# Patient Record
Sex: MALE | Race: BLACK OR AFRICAN AMERICAN | NOT HISPANIC OR LATINO | URBAN - METROPOLITAN AREA
[De-identification: names, ages, dates, MRNs, and addresses within clinical notes are randomized per-mention and may not be internally consistent; named-entity substitution may affect disease eponyms.]

---

## 2018-06-11 ENCOUNTER — INPATIENT (INPATIENT)
Facility: HOSPITAL | Age: 83
LOS: 8 days | Discharge: HOME | End: 2018-06-20
Attending: INTERNAL MEDICINE | Admitting: INTERNAL MEDICINE
Payer: MEDICARE

## 2018-06-11 VITALS
SYSTOLIC BLOOD PRESSURE: 112 MMHG | RESPIRATION RATE: 20 BRPM | HEIGHT: 71 IN | OXYGEN SATURATION: 99 % | DIASTOLIC BLOOD PRESSURE: 62 MMHG | TEMPERATURE: 101 F | WEIGHT: 115.96 LBS | HEART RATE: 116 BPM

## 2018-06-11 DIAGNOSIS — N17.9 ACUTE KIDNEY FAILURE, UNSPECIFIED: ICD-10-CM

## 2018-06-11 DIAGNOSIS — R74.8 ABNORMAL LEVELS OF OTHER SERUM ENZYMES: ICD-10-CM

## 2018-06-11 DIAGNOSIS — N39.0 URINARY TRACT INFECTION, SITE NOT SPECIFIED: ICD-10-CM

## 2018-06-11 DIAGNOSIS — I10 ESSENTIAL (PRIMARY) HYPERTENSION: ICD-10-CM

## 2018-06-11 DIAGNOSIS — E86.0 DEHYDRATION: ICD-10-CM

## 2018-06-11 LAB
ALBUMIN SERPL ELPH-MCNC: 2.5 G/DL — LOW (ref 3.3–5)
ALP SERPL-CCNC: 81 U/L — SIGNIFICANT CHANGE UP (ref 40–120)
ALT FLD-CCNC: 33 U/L — SIGNIFICANT CHANGE UP (ref 12–78)
ANION GAP SERPL CALC-SCNC: 8 MMOL/L — SIGNIFICANT CHANGE UP (ref 5–17)
APPEARANCE UR: ABNORMAL
APTT BLD: 36.9 SEC — SIGNIFICANT CHANGE UP (ref 27.5–37.4)
AST SERPL-CCNC: 82 U/L — HIGH (ref 15–37)
BACTERIA # UR AUTO: ABNORMAL
BASOPHILS # BLD AUTO: 0.1 K/UL — SIGNIFICANT CHANGE UP (ref 0–0.2)
BASOPHILS NFR BLD AUTO: 1 % — SIGNIFICANT CHANGE UP (ref 0–2)
BILIRUB SERPL-MCNC: 0.6 MG/DL — SIGNIFICANT CHANGE UP (ref 0.2–1.2)
BILIRUB UR-MCNC: NEGATIVE — SIGNIFICANT CHANGE UP
BUN SERPL-MCNC: 43 MG/DL — HIGH (ref 7–23)
CALCIUM SERPL-MCNC: 9.4 MG/DL — SIGNIFICANT CHANGE UP (ref 8.5–10.1)
CHLORIDE SERPL-SCNC: 101 MMOL/L — SIGNIFICANT CHANGE UP (ref 96–108)
CK MB BLD-MCNC: 0.9 % — SIGNIFICANT CHANGE UP (ref 0–3.5)
CK MB CFR SERPL CALC: 1.9 NG/ML — SIGNIFICANT CHANGE UP (ref 0.5–3.6)
CK SERPL-CCNC: 212 U/L — SIGNIFICANT CHANGE UP (ref 26–308)
CO2 SERPL-SCNC: 29 MMOL/L — SIGNIFICANT CHANGE UP (ref 22–31)
COLOR SPEC: YELLOW — SIGNIFICANT CHANGE UP
CREAT SERPL-MCNC: 2.42 MG/DL — HIGH (ref 0.5–1.3)
DIFF PNL FLD: ABNORMAL
EOSINOPHIL # BLD AUTO: 0 K/UL — SIGNIFICANT CHANGE UP (ref 0–0.5)
EOSINOPHIL NFR BLD AUTO: 0 % — SIGNIFICANT CHANGE UP (ref 0–6)
GLUCOSE SERPL-MCNC: 199 MG/DL — HIGH (ref 70–99)
GLUCOSE UR QL: NEGATIVE MG/DL — SIGNIFICANT CHANGE UP
HCT VFR BLD CALC: 28.1 % — LOW (ref 39–50)
HGB BLD-MCNC: 9.1 G/DL — LOW (ref 13–17)
INR BLD: 1.6 RATIO — HIGH (ref 0.88–1.16)
KETONES UR-MCNC: NEGATIVE — SIGNIFICANT CHANGE UP
LEUKOCYTE ESTERASE UR-ACNC: ABNORMAL
LYMPHOCYTES # BLD AUTO: 0.68 K/UL — LOW (ref 1–3.3)
LYMPHOCYTES # BLD AUTO: 7 % — LOW (ref 13–44)
MCHC RBC-ENTMCNC: 29.4 PG — SIGNIFICANT CHANGE UP (ref 27–34)
MCHC RBC-ENTMCNC: 32.4 GM/DL — SIGNIFICANT CHANGE UP (ref 32–36)
MCV RBC AUTO: 90.9 FL — SIGNIFICANT CHANGE UP (ref 80–100)
MONOCYTES # BLD AUTO: 1.36 K/UL — HIGH (ref 0–0.9)
MONOCYTES NFR BLD AUTO: 14 % — SIGNIFICANT CHANGE UP (ref 2–14)
NEUTROPHILS # BLD AUTO: 7.57 K/UL — HIGH (ref 1.8–7.4)
NEUTROPHILS NFR BLD AUTO: 77 % — SIGNIFICANT CHANGE UP (ref 43–77)
NEUTS BAND # BLD: 1 % — SIGNIFICANT CHANGE UP (ref 0–8)
NITRITE UR-MCNC: NEGATIVE — SIGNIFICANT CHANGE UP
NRBC # BLD: 0 /100 — SIGNIFICANT CHANGE UP (ref 0–0)
NRBC # BLD: SIGNIFICANT CHANGE UP /100 WBCS (ref 0–0)
PH UR: 7 — SIGNIFICANT CHANGE UP (ref 5–8)
PLAT MORPH BLD: NORMAL — SIGNIFICANT CHANGE UP
PLATELET # BLD AUTO: 226 K/UL — SIGNIFICANT CHANGE UP (ref 150–400)
POTASSIUM SERPL-MCNC: 3.9 MMOL/L — SIGNIFICANT CHANGE UP (ref 3.5–5.3)
POTASSIUM SERPL-SCNC: 3.9 MMOL/L — SIGNIFICANT CHANGE UP (ref 3.5–5.3)
PROT SERPL-MCNC: 8.2 GM/DL — SIGNIFICANT CHANGE UP (ref 6–8.3)
PROT UR-MCNC: 100 MG/DL
PROTHROM AB SERPL-ACNC: 17.6 SEC — HIGH (ref 9.8–12.7)
RBC # BLD: 3.09 M/UL — LOW (ref 4.2–5.8)
RBC # FLD: 14.4 % — SIGNIFICANT CHANGE UP (ref 10.3–14.5)
RBC BLD AUTO: SIGNIFICANT CHANGE UP
RBC CASTS # UR COMP ASSIST: ABNORMAL /HPF (ref 0–4)
SODIUM SERPL-SCNC: 138 MMOL/L — SIGNIFICANT CHANGE UP (ref 135–145)
SP GR SPEC: 1.01 — SIGNIFICANT CHANGE UP (ref 1.01–1.02)
TROPONIN I SERPL-MCNC: 0.08 NG/ML — HIGH (ref 0.01–0.04)
UROBILINOGEN FLD QL: NEGATIVE MG/DL — SIGNIFICANT CHANGE UP
WBC # BLD: 9.71 K/UL — SIGNIFICANT CHANGE UP (ref 3.8–10.5)
WBC # FLD AUTO: 9.71 K/UL — SIGNIFICANT CHANGE UP (ref 3.8–10.5)
WBC UR QL: ABNORMAL

## 2018-06-11 PROCEDURE — 74176 CT ABD & PELVIS W/O CONTRAST: CPT | Mod: 26

## 2018-06-11 PROCEDURE — 99285 EMERGENCY DEPT VISIT HI MDM: CPT

## 2018-06-11 RX ORDER — SENNA PLUS 8.6 MG/1
2 TABLET ORAL AT BEDTIME
Qty: 0 | Refills: 0 | Status: DISCONTINUED | OUTPATIENT
Start: 2018-06-11 | End: 2018-06-20

## 2018-06-11 RX ORDER — ACETAMINOPHEN 500 MG
650 TABLET ORAL EVERY 6 HOURS
Qty: 0 | Refills: 0 | Status: DISCONTINUED | OUTPATIENT
Start: 2018-06-11 | End: 2018-06-20

## 2018-06-11 RX ORDER — FINASTERIDE 5 MG/1
5 TABLET, FILM COATED ORAL DAILY
Qty: 0 | Refills: 0 | Status: DISCONTINUED | OUTPATIENT
Start: 2018-06-11 | End: 2018-06-20

## 2018-06-11 RX ORDER — ENOXAPARIN SODIUM 100 MG/ML
30 INJECTION SUBCUTANEOUS DAILY
Qty: 0 | Refills: 0 | Status: DISCONTINUED | OUTPATIENT
Start: 2018-06-11 | End: 2018-06-20

## 2018-06-11 RX ORDER — PIPERACILLIN AND TAZOBACTAM 4; .5 G/20ML; G/20ML
3.38 INJECTION, POWDER, LYOPHILIZED, FOR SOLUTION INTRAVENOUS EVERY 12 HOURS
Qty: 0 | Refills: 0 | Status: DISCONTINUED | OUTPATIENT
Start: 2018-06-11 | End: 2018-06-20

## 2018-06-11 RX ORDER — PIPERACILLIN AND TAZOBACTAM 4; .5 G/20ML; G/20ML
3.38 INJECTION, POWDER, LYOPHILIZED, FOR SOLUTION INTRAVENOUS EVERY 12 HOURS
Qty: 0 | Refills: 0 | Status: DISCONTINUED | OUTPATIENT
Start: 2018-06-11 | End: 2018-06-11

## 2018-06-11 RX ORDER — SODIUM CHLORIDE 9 MG/ML
1000 INJECTION INTRAMUSCULAR; INTRAVENOUS; SUBCUTANEOUS ONCE
Qty: 0 | Refills: 0 | Status: COMPLETED | OUTPATIENT
Start: 2018-06-11 | End: 2018-06-11

## 2018-06-11 RX ORDER — TAMSULOSIN HYDROCHLORIDE 0.4 MG/1
0.4 CAPSULE ORAL AT BEDTIME
Qty: 0 | Refills: 0 | Status: DISCONTINUED | OUTPATIENT
Start: 2018-06-11 | End: 2018-06-20

## 2018-06-11 RX ORDER — MAGNESIUM HYDROXIDE 400 MG/1
30 TABLET, CHEWABLE ORAL DAILY
Qty: 0 | Refills: 0 | Status: DISCONTINUED | OUTPATIENT
Start: 2018-06-11 | End: 2018-06-20

## 2018-06-11 RX ORDER — ASPIRIN/CALCIUM CARB/MAGNESIUM 324 MG
325 TABLET ORAL ONCE
Qty: 0 | Refills: 0 | Status: COMPLETED | OUTPATIENT
Start: 2018-06-11 | End: 2018-06-11

## 2018-06-11 RX ORDER — LABETALOL HCL 100 MG
100 TABLET ORAL
Qty: 0 | Refills: 0 | Status: DISCONTINUED | OUTPATIENT
Start: 2018-06-11 | End: 2018-06-20

## 2018-06-11 RX ORDER — PIPERACILLIN AND TAZOBACTAM 4; .5 G/20ML; G/20ML
3.38 INJECTION, POWDER, LYOPHILIZED, FOR SOLUTION INTRAVENOUS ONCE
Qty: 0 | Refills: 0 | Status: COMPLETED | OUTPATIENT
Start: 2018-06-11 | End: 2018-06-11

## 2018-06-11 RX ORDER — SODIUM CHLORIDE 9 MG/ML
1000 INJECTION INTRAMUSCULAR; INTRAVENOUS; SUBCUTANEOUS
Qty: 0 | Refills: 0 | Status: DISCONTINUED | OUTPATIENT
Start: 2018-06-11 | End: 2018-06-17

## 2018-06-11 RX ORDER — ACETAMINOPHEN 500 MG
975 TABLET ORAL ONCE
Qty: 0 | Refills: 0 | Status: COMPLETED | OUTPATIENT
Start: 2018-06-11 | End: 2018-06-11

## 2018-06-11 RX ORDER — AMLODIPINE BESYLATE 2.5 MG/1
5 TABLET ORAL DAILY
Qty: 0 | Refills: 0 | Status: DISCONTINUED | OUTPATIENT
Start: 2018-06-11 | End: 2018-06-20

## 2018-06-11 RX ADMIN — SODIUM CHLORIDE 1000 MILLILITER(S): 9 INJECTION INTRAMUSCULAR; INTRAVENOUS; SUBCUTANEOUS at 20:51

## 2018-06-11 RX ADMIN — PIPERACILLIN AND TAZOBACTAM 200 GRAM(S): 4; .5 INJECTION, POWDER, LYOPHILIZED, FOR SOLUTION INTRAVENOUS at 22:42

## 2018-06-11 RX ADMIN — Medication 325 MILLIGRAM(S): at 22:41

## 2018-06-11 RX ADMIN — Medication 975 MILLIGRAM(S): at 20:51

## 2018-06-11 NOTE — ED ADULT NURSE NOTE - PMH
Benign Prostatic Hyperplasia    BPH (benign prostatic hypertrophy)    Dehydration    HTN (Hypertension)    Hypertension    Renal insufficiency    Spinal stenosis of lumbar region

## 2018-06-11 NOTE — H&P ADULT - NSHPLABSRESULTS_GEN_ALL_CORE
9.1                  138  | 29   | 43           9.71  >-----------< 226     ------------------------< 199                   28.1                 3.9  | 101  | 2.42                                         Ca 9.4   Mg x     Ph x      EKG sinus tach  CARDIAC MARKERS ( 11 Jun 2018 20:46 )  .077 ng/mL / x     / 212 U/L / x     / 1.9 ng/mL  < from: CT Renal Stone Hunt (06.11.18 @ 21:42) >      EXAM:  CT RENAL STONE HUNT                            PROCEDURE DATE:  06/11/2018          INTERPRETATION:  CLINICAL INFORMATION: UTI. Rule out obstruction.    COMPARISON: CT abdomen/pelvis of 12/15/2014.    PROCEDURE:   CT of the Abdomen and Pelvis was performed without intravenous contrast.   Intravenous contrast: None.  Oral contrast: None.  Sagittal and coronal reformats were performed.    FINDINGS:    Examination is limited secondary to streak artifact, predominantly from   scanning technique with arms at the patient's side. Additional evaluation   of the solid abdominal organs is limited without the administration of   intravenous contrast material.    LOWER CHEST: Within normal limits.    LIVER: Within normal limits.  BILE DUCTS: Normal caliber.  GALLBLADDER: Within normal limits.  SPLEEN: Within normal limits.  PANCREAS: Within normal limits.  ADRENALS: Within normal limits.  KIDNEYS/URETERS: 2.8 cm right upper pole renal cyst. There may be mild   bilateral collecting system fullness without shorty hydronephrosis..   Minimal symmetric perinephric stranding.    BLADDER: Fluid distended demonstrating wall thickening and mild   perivesicular inflammatory change.  REPRODUCTIVE ORGANS: The prostate is enlarged. Bilateral hydroceles.    BOWEL: No bowel obstruction. Rectum is distended with stool measuring up   to 8.2 cm in the AP dimension. No definite colonic bowel wall thickening.   Colonic diverticulosis without discrete evidence of acute diverticulitis.   Appendix normal.  PERITONEUM: No ascites.  VESSELS:  Atherosclerosis of the abdominal aorta and its branch vessels.  RETROPERITONEUM: No lymphadenopathy.    ABDOMINAL WALL: Within normal limits.  BONES: Extensive multilevel degenerative changes of the spine with bulky   anterior osteophytes involving the lumbar spine.    IMPRESSION:     Limited examination.    Urinary bladder distention with wall thickening and mild perivesicular   inflammatory change, concerning for cystitis. Possible mild bilateral   collecting system fullness without shorty hydronephrosis to suggest   obstruction.    Large amount of stool within the rectum.    Bilateral hydroceles.    < end of copied text >

## 2018-06-11 NOTE — ED ADULT TRIAGE NOTE - CHIEF COMPLAINT QUOTE
BIBA, as per family, patient has been treated for UTI, abx unknown, has not been eating x1 week. Patient speaking in triage

## 2018-06-11 NOTE — H&P ADULT - ASSESSMENT
uti, dehydration, ROGER, CKD. HTn.Spinal stenosis uti, dehydration, ROGER, CKD stage 3 . HTn. Spinal stenosis

## 2018-06-11 NOTE — ED PROVIDER NOTE - OBJECTIVE STATEMENT
Pertinent PMH/PSH/FHx/SHx and Review of Systems contained within:    89yo M w no reported PMH presents to ED for eval of decr appetite, weakness & fever.  Daughter states pt started w sx about 1wk ago, PMD had home visit, dx pt w UTI, pt started on unknown abx 2d ago.  Pt has been taking abx but sx not improving.  Pt denies CP, SOB, vomiting, diarrhea, abd pain.     +fever, No photophobia/eye pain/changes in vision, No ear pain/sore throat/dysphagia, No chest pain/palpitations, no SOB/cough/wheeze/stridor, No abdominal pain, no rash

## 2018-06-11 NOTE — H&P ADULT - PSH
Eye Problem(s):negative  ENT Problem(s):negative  Cardiovascular problem(s):negative  Respiratory problem(s):negative  Gastro-intestinal problem(s):abdominal pain started 1 mo ago. Patient reports takes sy and is better. Patient bloating and gas per patient.   Genito-urinary problem(s):negative  Musculoskeletal problem(s):back pain  Integumentary problem(s):Patient reports skin is dry and itching   Neurological problem(s):negative  Psychiatric problem(s):negative  Endocrine problem(s):diabetes  Hematologic and/or Lymphatic problem(s):night sweats daily and night occasional, Patient also has Hot flashes.       Distress Thermometer Rating (0-10):0    Main Cause of Distress: worry, pain, sexual, skin dry/itchy, tingling in feet     Evaluation: Distress is mild (<4) and/or stable/going down     Intervention:No interventions needed at this time     Referral or Contacts:Oncology Provider          No significant past surgical history

## 2018-06-11 NOTE — H&P ADULT - FAMILY HISTORY
Father  Still living? Unknown  No significant family history, Age at diagnosis: Age Unknown     Mother  Still living? No  No significant family history, Age at diagnosis: Age Unknown

## 2018-06-11 NOTE — H&P ADULT - NSHPPHYSICALEXAM_GEN_ALL_CORE
General: A/ox 3, No acute Distress. appears weak  skin : Normal  Head. Sky. No lacerations  Neck: Supple, NO JVD  Cardiac: S1 S2, No M/R/G  Pulmonary: CTAP, Breathing unlabored, No Rhonchi/Rales/Wheezing  Abdomen: Soft, Non -tender, +BS x 4 quads  Extremities: No Rashes, No edema  Neuro: A/o x 3, No focal deficits. Normal Motor and sensory exam  Vascular: Normal distal pulses.  . bilaterql hydrocoels  Extremities: No edema  Decubiti ; None General: A/ox 3, No acute Distress. appears weak  skin : Normal  Head. Sky. No lacerations  Neck: Supple, NO JVD  Cardiac: S1 S2, No M/R/G  Pulmonary: CTAP, Breathing unlabored, No Rhonchi/Rales/Wheezing  Abdomen: Soft, Non -tender, +BS x 4 quads  Extremities: No Rashes, No edema  Neuro: A/o x 3, No focal deficits. Normal Motor and sensory exam  Vascular: Normal distal pulses.  . bilateral hydrocoels  Extremities: No edema  Decubiti ; None

## 2018-06-11 NOTE — H&P ADULT - PMH
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

## 2018-06-11 NOTE — H&P ADULT - NSHPREVIEWOFSYSTEMS_GEN_ALL_CORE
REVIEW OF SYSTEMS:    CONSTITUTIONAL: generalized weakness for 1 week with anorexia.  EYES/ENT: No visual changes;  No vertigo or throat pain   NECK: No pain or stiffness  RESPIRATORY: No cough, wheezing, hemoptysis; No shortness of breath  CARDIOVASCULAR: No chest pain or palpitations [ ] CHF [ ] MI [ ] CABG [ ]  GASTROINTESTINAL: No abdominal or epigastric pain. No nausea, vomiting, or hematemesis; No diarrhea or constipation. No melena or hematochezia. [ ]abdominal surgery [ ] prostrate problems   GENITOURINARY: No dysuria, frequency or hematuria   NEUROLOGICAL: No numbness or weakness. No hx of seizures  SKIN: No itching, burning, rashes, or lesions   All other review of systems is negative unless indicated above.

## 2018-06-11 NOTE — ED PROVIDER NOTE - PHYSICAL EXAMINATION
Gen: Alert, pleasant M in NAD  Head: NC, AT, EOMI, normal lids/conjunctiva  ENT: normal hearing, patent oropharynx, dry MM  Neck: supple, no tenderness/meningismus/JVD, Trachea midline, FROM  Pulm: Bilateral clear BS, normal resp effort, no wheeze/stridor/retractions  CV: RRR, no M/R/G, +dist pulses  Abd: soft, +mild suprapubic TTP, ND, +BS, no guarding/rebound tenderness  Mskel: no edema/erythema/cyanosis  Skin: no rash  Neuro: no sensory/motor deficits

## 2018-06-11 NOTE — H&P ADULT - HISTORY OF PRESENT ILLNESS
89yo M w no reported PMH presents to ED for eval of decr appetite, weakness & fever.  Daughter states pt started w sx about 1wk ago, PMD had home visit, dx pt w UTI, pt started on unknown abx 2d ago.  Pt has been taking abx but sx not improving.  Pt denies CP, SOB, vomiting, diarrhea, abd pain.     	+fever, No photophobia/eye pain/changes in vision, No ear pain/sore throat/dysphagia, No chest pain/palpitations, no SOB/cough/wheeze/stridor, No abdominal pain, no rash    ALLERGIES AND HOME MEDICATIONS:   Allergies:        Allergies:  	No Known Allergies:   	No Known Allergies:

## 2018-06-12 LAB
ANION GAP SERPL CALC-SCNC: 8 MMOL/L — SIGNIFICANT CHANGE UP (ref 5–17)
BLD GP AB SCN SERPL QL: SIGNIFICANT CHANGE UP
BUN SERPL-MCNC: 41 MG/DL — HIGH (ref 7–23)
CALCIUM SERPL-MCNC: 8.9 MG/DL — SIGNIFICANT CHANGE UP (ref 8.5–10.1)
CEA SERPL-MCNC: 4.3 NG/ML — HIGH (ref 0–3.8)
CHLORIDE SERPL-SCNC: 106 MMOL/L — SIGNIFICANT CHANGE UP (ref 96–108)
CO2 SERPL-SCNC: 29 MMOL/L — SIGNIFICANT CHANGE UP (ref 22–31)
CREAT SERPL-MCNC: 2.09 MG/DL — HIGH (ref 0.5–1.3)
GLUCOSE SERPL-MCNC: 130 MG/DL — HIGH (ref 70–99)
HCT VFR BLD CALC: 26.6 % — LOW (ref 39–50)
HGB BLD-MCNC: 8.4 G/DL — LOW (ref 13–17)
IRON SATN MFR SERPL: 13 % — LOW (ref 16–55)
IRON SATN MFR SERPL: 15 UG/DL — LOW (ref 45–165)
LACTATE SERPL-SCNC: 0.9 MMOL/L — SIGNIFICANT CHANGE UP (ref 0.7–2)
MCHC RBC-ENTMCNC: 29.4 PG — SIGNIFICANT CHANGE UP (ref 27–34)
MCHC RBC-ENTMCNC: 31.6 GM/DL — LOW (ref 32–36)
MCV RBC AUTO: 93 FL — SIGNIFICANT CHANGE UP (ref 80–100)
NRBC # BLD: 0 /100 WBCS — SIGNIFICANT CHANGE UP (ref 0–0)
OB PNL STL: NEGATIVE — SIGNIFICANT CHANGE UP
PLATELET # BLD AUTO: 199 K/UL — SIGNIFICANT CHANGE UP (ref 150–400)
POTASSIUM SERPL-MCNC: 3 MMOL/L — LOW (ref 3.5–5.3)
POTASSIUM SERPL-SCNC: 3 MMOL/L — LOW (ref 3.5–5.3)
RBC # BLD: 2.86 M/UL — LOW (ref 4.2–5.8)
RBC # FLD: 14.4 % — SIGNIFICANT CHANGE UP (ref 10.3–14.5)
SODIUM SERPL-SCNC: 143 MMOL/L — SIGNIFICANT CHANGE UP (ref 135–145)
TIBC SERPL-MCNC: 119 UG/DL — LOW (ref 220–430)
TROPONIN I SERPL-MCNC: 0.04 NG/ML — SIGNIFICANT CHANGE UP (ref 0.01–0.04)
UIBC SERPL-MCNC: 104 UG/DL — LOW (ref 110–370)
WBC # BLD: 9 K/UL — SIGNIFICANT CHANGE UP (ref 3.8–10.5)
WBC # FLD AUTO: 9 K/UL — SIGNIFICANT CHANGE UP (ref 3.8–10.5)

## 2018-06-12 PROCEDURE — 93010 ELECTROCARDIOGRAM REPORT: CPT

## 2018-06-12 PROCEDURE — 71045 X-RAY EXAM CHEST 1 VIEW: CPT | Mod: 26

## 2018-06-12 RX ORDER — MEGESTROL ACETATE 40 MG/ML
400 SUSPENSION ORAL DAILY
Qty: 0 | Refills: 0 | Status: DISCONTINUED | OUTPATIENT
Start: 2018-06-12 | End: 2018-06-20

## 2018-06-12 RX ORDER — PANTOPRAZOLE SODIUM 20 MG/1
40 TABLET, DELAYED RELEASE ORAL
Qty: 0 | Refills: 0 | Status: DISCONTINUED | OUTPATIENT
Start: 2018-06-12 | End: 2018-06-20

## 2018-06-12 RX ORDER — POTASSIUM CHLORIDE 20 MEQ
10 PACKET (EA) ORAL
Qty: 0 | Refills: 0 | Status: COMPLETED | OUTPATIENT
Start: 2018-06-12 | End: 2018-06-12

## 2018-06-12 RX ORDER — POTASSIUM CHLORIDE 20 MEQ
40 PACKET (EA) ORAL ONCE
Qty: 0 | Refills: 0 | Status: COMPLETED | OUTPATIENT
Start: 2018-06-12 | End: 2018-06-12

## 2018-06-12 RX ORDER — MEGESTROL ACETATE 40 MG/ML
400 SUSPENSION ORAL DAILY
Qty: 0 | Refills: 0 | Status: DISCONTINUED | OUTPATIENT
Start: 2018-06-12 | End: 2018-06-12

## 2018-06-12 RX ADMIN — Medication 100 MILLIEQUIVALENT(S): at 17:40

## 2018-06-12 RX ADMIN — MEGESTROL ACETATE 400 MILLIGRAM(S): 40 SUSPENSION ORAL at 12:10

## 2018-06-12 RX ADMIN — PIPERACILLIN AND TAZOBACTAM 25 GRAM(S): 4; .5 INJECTION, POWDER, LYOPHILIZED, FOR SOLUTION INTRAVENOUS at 17:40

## 2018-06-12 RX ADMIN — ENOXAPARIN SODIUM 30 MILLIGRAM(S): 100 INJECTION SUBCUTANEOUS at 12:10

## 2018-06-12 RX ADMIN — PANTOPRAZOLE SODIUM 40 MILLIGRAM(S): 20 TABLET, DELAYED RELEASE ORAL at 12:08

## 2018-06-12 RX ADMIN — Medication 1 ENEMA: at 00:45

## 2018-06-12 RX ADMIN — Medication 100 MILLIEQUIVALENT(S): at 09:14

## 2018-06-12 RX ADMIN — SODIUM CHLORIDE 100 MILLILITER(S): 9 INJECTION INTRAMUSCULAR; INTRAVENOUS; SUBCUTANEOUS at 00:58

## 2018-06-12 RX ADMIN — Medication 40 MILLIEQUIVALENT(S): at 12:09

## 2018-06-12 RX ADMIN — TAMSULOSIN HYDROCHLORIDE 0.4 MILLIGRAM(S): 0.4 CAPSULE ORAL at 21:05

## 2018-06-12 RX ADMIN — FINASTERIDE 5 MILLIGRAM(S): 5 TABLET, FILM COATED ORAL at 12:09

## 2018-06-12 RX ADMIN — Medication 100 MILLIEQUIVALENT(S): at 12:09

## 2018-06-12 RX ADMIN — PANTOPRAZOLE SODIUM 40 MILLIGRAM(S): 20 TABLET, DELAYED RELEASE ORAL at 17:39

## 2018-06-12 RX ADMIN — PIPERACILLIN AND TAZOBACTAM 25 GRAM(S): 4; .5 INJECTION, POWDER, LYOPHILIZED, FOR SOLUTION INTRAVENOUS at 06:37

## 2018-06-12 NOTE — PHYSICAL THERAPY INITIAL EVALUATION ADULT - PERTINENT HX OF CURRENT PROBLEM, REHAB EVAL
Pt is an 87yo M admitted secondary to decreased appetite, weakness, & fever. H/H: 8.4/26.6 + melena on 6/12. Occult blood feces pending as of 6/12. X-ray chest: negative. CT renal stone hunt: urinary bladder distention with wall thickening and mild perivesicular inflammatory change concerning for cystitis, possible mild bilateral collecting system fulness without shorty hydronephrosis to suggest obstruction, large amount of stool within the rectum, & bilateral hydroceles.

## 2018-06-12 NOTE — PROGRESS NOTE ADULT - SUBJECTIVE AND OBJECTIVE BOX
Patient is a 88y old  Male who presents with a chief complaint of wekness, UTI, ROGER, dehydration (2018 23:16)    ROGER improved. Hpokalemic. extremely anorexic.   INTERVAL HPI/OVERNIGHT EVENTS: has melanotic stools,. patient denies any abdominal or chest pain   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, chronic  Renal insufficiency  Dehydration  Spinal stenosis of lumbar region  BPH (benign prostatic hypertrophy)  Hypertension  Benign Prostatic Hyperplasia  HTN (Hypertension)  No significant past surgical history      MEDICATIONS  (STANDING):  amLODIPine   Tablet 5 milliGRAM(s) Oral daily  enoxaparin Injectable 30 milliGRAM(s) SubCutaneous daily  finasteride 5 milliGRAM(s) Oral daily  labetalol 100 milliGRAM(s) Oral two times a day  megestrol 400 milliGRAM(s) Oral daily  pantoprazole  Injectable 40 milliGRAM(s) IV Push two times a day  piperacillin/tazobactam IVPB. 3.375 Gram(s) IV Intermittent every 12 hours  potassium chloride    Tablet ER 40 milliEquivalent(s) Oral once  potassium chloride  10 mEq/100 mL IVPB 10 milliEquivalent(s) IV Intermittent every 1 hour  senna 2 Tablet(s) Oral at bedtime  sodium chloride 0.9%. 1000 milliLiter(s) (100 mL/Hr) IV Continuous <Continuous>  tamsulosin 0.4 milliGRAM(s) Oral at bedtime    MEDICATIONS  (PRN):  acetaminophen  Suppository 650 milliGRAM(s) Rectal every 6 hours PRN For Temp greater than 38 C (100.4 F)  magnesium hydroxide Suspension 30 milliLiter(s) Oral daily PRN Constipation      Allergies    No Known Allergies    Intolerances        REVIEW OF SYSTEMS:  CONSTITUTIONAL: No fever, weight loss, or fatigue  EYES: No eye pain, visual disturbances, or discharge  ENMT:  No difficulty hearing, tinnitus, vertigo; No sinus or throat pain  NECK: No pain or stiffness  BREASTS: No pain, masses, or nipple discharge  RESPIRATORY: No cough, wheezing, chills or hemoptysis; No shortness of breath  CARDIOVASCULAR: No chest pain, palpitations, dizziness, or leg swelling  GASTROINTESTINAL: No abdominal or epigastric pain. No nausea, vomiting, or hematemesis; No diarrhea or constipation. No melena or hematochezia.  GENITOURINARY: No dysuria, frequency, hematuria, or incontinence  NEUROLOGICAL: No headaches, memory loss, loss of strength, numbness, or tremors  SKIN: No itching, burning, rashes, or lesions   LYMPH NODES: No enlarged glands  ENDOCRINE: No heat or cold intolerance; No hair loss  MUSCULOSKELETAL: No joint pain or swelling; No muscle, back, or extremity pain  PSYCHIATRIC: No depression, anxiety, mood swings, or difficulty sleeping  HEME/LYMPH: No easy bruising, or bleeding gums  ALLERY AND IMMUNOLOGIC: No hives or eczema    Vital Signs Last 24 Hrs  T(C): 36.6 (2018 06:04), Max: 38.4 (2018 19:33)  T(F): 97.8 (2018 06:04), Max: 101.2 (2018 19:33)  HR: 85 (2018 06:04) (82 - 116)  BP: 108/58 (2018 06:34) (83/42 - 113/61)  BP(mean): --  RR: 18 (2018 06:04) (16 - 20)  SpO2: 94% (2018 06:04) (94% - 100%)    PHYSICAL EXAM:  GENERAL: NAD, well-groomed, well-developed  HEAD:  Atraumatic, Normocephalic  EYES: EOMI, PERRLA, conjunctiva and sclera clear  ENMT: No tonsillar erythema, exudates, or enlargement; Moist mucous membranes, Good dentition, No lesions  NECK: Supple, No JVD, Normal thyroid  NERVOUS SYSTEM:  Alert & Oriented X3, Good concentration; Motor Strength 5/5 B/L upper and lower extremities; DTRs 2+ intact and symmetric  CHEST/LUNG: Clear to percussion bilaterally; No rales, rhonchi, wheezing, or rubs  HEART: Regular rate and rhythm; No murmurs, rubs, or gallops  ABDOMEN: Soft, Nontender, Nondistended; Bowel sounds present  EXTREMITIES:  2+ Peripheral Pulses, No clubbing, cyanosis, or edema  LYMPH: No lymphadenopathy noted  SKIN: No rashes or lesions    LABS:                        8.4    9.00  )-----------( 199      ( 2018 06:45 )             26.6     06-12    143  |  106  |  41<H>  ----------------------------<  130<H>  3.0<L>   |  29  |  2.09<H>    Ca    8.9      2018 06:45    TPro  8.2  /  Alb  2.5<L>  /  TBili  0.6  /  DBili  x   /  AST  82<H>  /  ALT  33  /  AlkPhos  81  06-11      PT/INR - ( 2018 20:46 )   PT: 17.6 sec;   INR: 1.60 ratio         PTT - ( 2018 20:46 )  PTT:36.9 sec  Urinalysis Basic - ( 2018 20:46 )    Color: Yellow / Appearance: Slightly Turbid / S.010 / pH: x  Gluc: x / Ketone: Negative  / Bili: Negative / Urobili: Negative mg/dL   Blood: x / Protein: 100 mg/dL / Nitrite: Negative   Leuk Esterase: Moderate / RBC: 3-5 /HPF / WBC 11-25   Sq Epi: x / Non Sq Epi: x / Bacteria: Many      CAPILLARY BLOOD GLUCOSE          CARDIAC MARKERS ( 2018 20:46 )  .077 ng/mL / x     / 212 U/L / x     / 1.9 ng/mL            RADIOLOGY & ADDITIONAL TESTS:    Imaging Personally Reviewed:  [ ] YES  [ ] NO    Consultant(s) Notes Reviewed:  [ ] YES  [ ] NO    Care Discussed with Consultants/Other Providers [ ] YES  [ ] NO    Care discussed with family,         [  ]   yes  [  ]  No    imp:    stable[ ]    unstable[  ]     improving [   ]       unchanged  [ x ]                Plans:  Continue present plans  [x  ] continue zosyn, stool for occult blood, ir               New consult [  ]   specialty  .......               order test[  ]    test name. iron studies, CEA                 Discharge Planning  [  ]

## 2018-06-13 DIAGNOSIS — N18.3 CHRONIC KIDNEY DISEASE, STAGE 3 (MODERATE): ICD-10-CM

## 2018-06-13 LAB
-  K. PNEUMONIAE GROUP: SIGNIFICANT CHANGE UP
HCT VFR BLD CALC: 28 % — LOW (ref 39–50)
HGB BLD-MCNC: 8.8 G/DL — LOW (ref 13–17)
MCHC RBC-ENTMCNC: 28.6 PG — SIGNIFICANT CHANGE UP (ref 27–34)
MCHC RBC-ENTMCNC: 31.4 GM/DL — LOW (ref 32–36)
MCV RBC AUTO: 90.9 FL — SIGNIFICANT CHANGE UP (ref 80–100)
METHOD TYPE: SIGNIFICANT CHANGE UP
NRBC # BLD: 0 /100 WBCS — SIGNIFICANT CHANGE UP (ref 0–0)
PLATELET # BLD AUTO: 250 K/UL — SIGNIFICANT CHANGE UP (ref 150–400)
RBC # BLD: 3.08 M/UL — LOW (ref 4.2–5.8)
RBC # FLD: 14.4 % — SIGNIFICANT CHANGE UP (ref 10.3–14.5)
WBC # BLD: 10 K/UL — SIGNIFICANT CHANGE UP (ref 3.8–10.5)
WBC # FLD AUTO: 10 K/UL — SIGNIFICANT CHANGE UP (ref 3.8–10.5)

## 2018-06-13 PROCEDURE — 74176 CT ABD & PELVIS W/O CONTRAST: CPT | Mod: 26

## 2018-06-13 RX ORDER — IOHEXOL 300 MG/ML
30 INJECTION, SOLUTION INTRAVENOUS ONCE
Qty: 0 | Refills: 0 | Status: COMPLETED | OUTPATIENT
Start: 2018-06-13 | End: 2018-06-13

## 2018-06-13 RX ADMIN — FINASTERIDE 5 MILLIGRAM(S): 5 TABLET, FILM COATED ORAL at 12:20

## 2018-06-13 RX ADMIN — PANTOPRAZOLE SODIUM 40 MILLIGRAM(S): 20 TABLET, DELAYED RELEASE ORAL at 18:02

## 2018-06-13 RX ADMIN — AMLODIPINE BESYLATE 5 MILLIGRAM(S): 2.5 TABLET ORAL at 05:17

## 2018-06-13 RX ADMIN — Medication 100 MILLIGRAM(S): at 05:17

## 2018-06-13 RX ADMIN — PIPERACILLIN AND TAZOBACTAM 25 GRAM(S): 4; .5 INJECTION, POWDER, LYOPHILIZED, FOR SOLUTION INTRAVENOUS at 18:03

## 2018-06-13 RX ADMIN — ENOXAPARIN SODIUM 30 MILLIGRAM(S): 100 INJECTION SUBCUTANEOUS at 12:20

## 2018-06-13 RX ADMIN — PIPERACILLIN AND TAZOBACTAM 25 GRAM(S): 4; .5 INJECTION, POWDER, LYOPHILIZED, FOR SOLUTION INTRAVENOUS at 05:16

## 2018-06-13 RX ADMIN — SENNA PLUS 2 TABLET(S): 8.6 TABLET ORAL at 22:04

## 2018-06-13 RX ADMIN — IOHEXOL 30 MILLILITER(S): 300 INJECTION, SOLUTION INTRAVENOUS at 11:44

## 2018-06-13 RX ADMIN — MEGESTROL ACETATE 400 MILLIGRAM(S): 40 SUSPENSION ORAL at 12:20

## 2018-06-13 RX ADMIN — PANTOPRAZOLE SODIUM 40 MILLIGRAM(S): 20 TABLET, DELAYED RELEASE ORAL at 05:17

## 2018-06-13 RX ADMIN — TAMSULOSIN HYDROCHLORIDE 0.4 MILLIGRAM(S): 0.4 CAPSULE ORAL at 22:04

## 2018-06-13 RX ADMIN — Medication 100 MILLIGRAM(S): at 18:02

## 2018-06-13 NOTE — PROGRESS NOTE ADULT - SUBJECTIVE AND OBJECTIVE BOX
Patient is a 88y old  Male who presents with a chief complaint of weakness, UTI, ROGER, dehydration (2018 23:16)  CEa elevated. has iron deficiency anemia.     INTERVAL HPI/OVERNIGHT EVENTS: appetite has picked up.  PAST MEDICAL & SURGICAL HISTORY:  Hydrocele in adult: bilateral, chronic  Renal insufficiency  Dehydration  Spinal stenosis of lumbar region  BPH (benign prostatic hypertrophy)  Hypertension  Benign Prostatic Hyperplasia  HTN (Hypertension)  No significant past surgical history      MEDICATIONS  (STANDING):  amLODIPine   Tablet 5 milliGRAM(s) Oral daily  enoxaparin Injectable 30 milliGRAM(s) SubCutaneous daily  finasteride 5 milliGRAM(s) Oral daily  iohexol 300 mG (iodine)/mL Oral Solution 30 milliLiter(s) Oral once  labetalol 100 milliGRAM(s) Oral two times a day  megestrol Suspension 400 milliGRAM(s) Oral daily  pantoprazole  Injectable 40 milliGRAM(s) IV Push two times a day  piperacillin/tazobactam IVPB. 3.375 Gram(s) IV Intermittent every 12 hours  senna 2 Tablet(s) Oral at bedtime  sodium chloride 0.9%. 1000 milliLiter(s) (100 mL/Hr) IV Continuous <Continuous>  tamsulosin 0.4 milliGRAM(s) Oral at bedtime    MEDICATIONS  (PRN):  acetaminophen  Suppository 650 milliGRAM(s) Rectal every 6 hours PRN For Temp greater than 38 C (100.4 F)  magnesium hydroxide Suspension 30 milliLiter(s) Oral daily PRN Constipation      Allergies    No Known Allergies    Intolerances        REVIEW OF SYSTEMS:  CONSTITUTIONAL: No fever, weight loss, or fatigue  EYES: No eye pain, visual disturbances, or discharge  ENMT:  No difficulty hearing, tinnitus, vertigo; No sinus or throat pain  NECK: No pain or stiffness  BREASTS: No pain, masses, or nipple discharge  RESPIRATORY: No cough, wheezing, chills or hemoptysis; No shortness of breath  CARDIOVASCULAR: No chest pain, palpitations, dizziness, or leg swelling  GASTROINTESTINAL: No abdominal or epigastric pain. No nausea, vomiting, or hematemesis; No diarrhea or constipation. No melena or hematochezia.  GENITOURINARY: No dysuria, frequency, hematuria, or incontinence  NEUROLOGICAL: No headaches, memory loss, loss of strength, numbness, or tremors  SKIN: No itching, burning, rashes, or lesions   LYMPH NODES: No enlarged glands  ENDOCRINE: No heat or cold intolerance; No hair loss  MUSCULOSKELETAL: No joint pain or swelling; No muscle, back, or extremity pain  PSYCHIATRIC: No depression, anxiety, mood swings, or difficulty sleeping  HEME/LYMPH: No easy bruising, or bleeding gums  ALLERY AND IMMUNOLOGIC: No hives or eczema    Vital Signs Last 24 Hrs  T(C): 36.3 (2018 05:04), Max: 37.1 (2018 11:08)  T(F): 97.4 (2018 05:04), Max: 98.8 (2018 11:08)  HR: 86 (2018 05:04) (74 - 86)  BP: 145/79 (2018 05:04) (101/51 - 145/79)  BP(mean): --  RR: 18 (2018 05:04) (17 - 18)  SpO2: 99% (2018 05:04) (96% - 99%)    PHYSICAL EXAM:  GENERAL: NAD, well-groomed, well-developed. bed ridden  HEAD:  Atraumatic, Normocephalic  EYES: EOMI, PERRLA, conjunctiva and sclera clear  ENMT: No tonsillar erythema, exudates, or enlargement; Moist mucous membranes, Good dentition, No lesions  NECK: Supple, No JVD, Normal thyroid  NERVOUS SYSTEM:  Alert & Oriented X3, Good concentration; Motor Strength 5/5 B/L upper and lower extremities; DTRs 2+ intact and symmetric  CHEST/LUNG: Clear to percussion bilaterally; No rales, rhonchi, wheezing, or rubs  HEART: Regular rate and rhythm; No murmurs, rubs, or gallops  ABDOMEN: Soft, Nontender, Nondistended; Bowel sounds present. bilateral hydrocoeles  EXTREMITIES:  2+ Peripheral Pulses, No clubbing, cyanosis, or edema. contractures  LYMPH: No lymphadenopathy noted  SKIN: No rashes or lesions    LABS:                        8.8    10.00 )-----------( 250      ( 2018 06:24 )             28.0     06-12    143  |  106  |  41<H>  ----------------------------<  130<H>  3.0<L>   |  29  |  2.09<H>    Ca    8.9      2018 06:45    TPro  8.2  /  Alb  2.5<L>  /  TBili  0.6  /  DBili  x   /  AST  82<H>  /  ALT  33  /  AlkPhos  81  06-11      PT/INR - ( 2018 20:46 )   PT: 17.6 sec;   INR: 1.60 ratio         PTT - ( 2018 20:46 )  PTT:36.9 sec  Urinalysis Basic - ( 2018 20:46 )    Color: Yellow / Appearance: Slightly Turbid / S.010 / pH: x  Gluc: x / Ketone: Negative  / Bili: Negative / Urobili: Negative mg/dL   Blood: x / Protein: 100 mg/dL / Nitrite: Negative   Leuk Esterase: Moderate / RBC: 3-5 /HPF / WBC 11-25   Sq Epi: x / Non Sq Epi: x / Bacteria: Many      CAPILLARY BLOOD GLUCOSE          CARDIAC MARKERS ( 2018 10:31 )  .040 ng/mL / x     / x     / x     / x      CARDIAC MARKERS ( 2018 20:46 )  .077 ng/mL / x     / 212 U/L / x     / 1.9 ng/mL            RADIOLOGY & ADDITIONAL TESTS:    Imaging Personally Reviewed:  [ ] YES  [ ] NO    Consultant(s) Notes Reviewed:  [ ] YES  [ ] NO    Care Discussed with Consultants/Other Providers [ ] YES  [ ] NO    Care discussed with family,         [  ]   yes  [  ]  No    imp:    stable[ ]    unstable[  ]     improving [ x  ]       unchanged  [  ]                Plans:  Continue present plans  [x  ] start iron supplementation               New consult [  ]   specialty  .......               order test[  ]    test name.  Ct scan of abd and pelvis, CBC , BMP in am                Discharge Planning  [  ]

## 2018-06-13 NOTE — CONSULT NOTE ADULT - SUBJECTIVE AND OBJECTIVE BOX
HPI:  87yo M w no reported PMH presents to ED for eval of decr appetite, weakness & fever.  Daughter states pt started w sx about 1wk ago, PMD had home visit, dx pt w UTI, pt started on unknown abx 2d ago.  Pt has been taking abx but sx not improving.  Pt denies CP, SOB, vomiting, diarrhea, abd pain. 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, chronic  Renal insufficiency  Dehydration  Spinal stenosis of lumbar region  BPH (benign prostatic hypertrophy)  Hypertension  Benign Prostatic Hyperplasia  HTN (Hypertension)  No significant past surgical history      SOCHX:   tobacco,  -  alcohol    FMHX: FA/MO  - contributory       Recent Travel:    Immunizations:    Allergies    No Known Allergies    Intolerances        MEDICATIONS  (STANDING):  amLODIPine   Tablet 5 milliGRAM(s) Oral daily  enoxaparin Injectable 30 milliGRAM(s) SubCutaneous daily  finasteride 5 milliGRAM(s) Oral daily  labetalol 100 milliGRAM(s) Oral two times a day  megestrol Suspension 400 milliGRAM(s) Oral daily  pantoprazole  Injectable 40 milliGRAM(s) IV Push two times a day  piperacillin/tazobactam IVPB. 3.375 Gram(s) IV Intermittent every 12 hours  senna 2 Tablet(s) Oral at bedtime  sodium chloride 0.9%. 1000 milliLiter(s) (100 mL/Hr) IV Continuous <Continuous>  tamsulosin 0.4 milliGRAM(s) Oral at bedtime    MEDICATIONS  (PRN):  acetaminophen  Suppository 650 milliGRAM(s) Rectal every 6 hours PRN For Temp greater than 38 C (100.4 F)  magnesium hydroxide Suspension 30 milliLiter(s) Oral daily PRN Constipation      REVIEW OF SYSTEMS:  CONSTITUTIONAL: No fever, weight loss, or fatigue  EYES: No eye pain, visual disturbances, or discharge  ENMT:  No difficulty hearing, tinnitus, vertigo; No sinus or throat pain  NECK: No pain or stiffness  BREASTS: No pain, masses, or nipple discharge  RESPIRATORY: No cough, wheezing, chills or hemoptysis; No shortness of breath  CARDIOVASCULAR: No chest pain, palpitations, dizziness, or leg swelling  GASTROINTESTINAL: No abdominal or epigastric pain. No nausea, vomiting, or hematemesis; No diarrhea or constipation. No melena or hematochezia.  GENITOURINARY: No dysuria, frequency, hematuria, or incontinence  NEUROLOGICAL: No headaches, memory loss, loss of strength, numbness, or tremors  SKIN: No itching, burning, rashes, or lesions   LYMPH NODES: No enlarged glands  ENDOCRINE: No heat or cold intolerance; No hair loss  MUSCULOSKELETAL: No joint pain or swelling; No muscle, back, or extremity pain  PSYCHIATRIC: No depression, anxiety, mood swings, or difficulty sleeping  HEME/LYMPH: No easy bruising, or bleeding gums  ALLERGY AND IMMUNOLOGIC: No hives or eczema    VITAL SIGNS:    T(C): 36.2 (18 @ 16:35), Max: 36.6 (18 @ 11:29)  T(F): 97.1 (18 @ 16:35), Max: 97.8 (18 @ 11:29)  HR: 84 (18 @ 16:35) (74 - 86)  BP: 117/69 (18 @ 16:35) (115/62 - 145/79)  RR: 17 (18 @ 16:35) (17 - 18)  SpO2: 99% (18 @ 16:35) (97% - 99%)    PHYSICAL EXAM:    GENERAL: NAD, well-groomed, well-developed  HEAD:  Atraumatic, Normocephalic  EYES: EOMI, PERRLA, conjunctiva and sclera clear  ENMT: No tonsillar erythema, exudates, or enlargement; Moist mucous membranes, Good dentition, No lesions  NECK: Supple, No JVD, Normal thyroid  NERVOUS SYSTEM:  Alert & Oriented X3, Good concentration; Motor Strength 5/5 B/L upper and lower extremities; DTRs 2+ intact and symmetric  CHEST/LUNG: Clear to auscultation bilaterally; No rales, rhonchi, wheezing bilaterally  HEART: Regular rate and rhythm; No murmurs, rubs, or gallops  ABDOMEN: Soft, Nontender, Nondistended; Bowel sounds present  EXTREMITIES:  2+ Peripheral Pulses, No clubbing, cyanosis, or edema  LYMPH: No lymphadenopathy noted  SKIN: No rashes or lesions  BACK: no pressor sore     LABS:                         8.8    10.00 )-----------( 250      ( 2018 06:24 )             28.0     06-12    143  |  106  |  41<H>  ----------------------------<  130<H>  3.0<L>   |  29  |  2.09<H>    Ca    8.9      2018 06:45    TPro  8.2  /  Alb  2.5<L>  /  TBili  0.6  /  DBili  x   /  AST  82<H>  /  ALT  33  /  AlkPhos  81  06-11    LIVER FUNCTIONS - ( 2018 20:46 )  Alb: 2.5 g/dL / Pro: 8.2 gm/dL / ALK PHOS: 81 U/L / ALT: 33 U/L / AST: 82 U/L / GGT: x           PT/INR - ( 2018 20:46 )   PT: 17.6 sec;   INR: 1.60 ratio         PTT - ( 2018 20:46 )  PTT:36.9 sec  Urinalysis Basic - ( 2018 20:46 )    Color: Yellow / Appearance: Slightly Turbid / S.010 / pH: x  Gluc: x / Ketone: Negative  / Bili: Negative / Urobili: Negative mg/dL   Blood: x / Protein: 100 mg/dL / Nitrite: Negative   Leuk Esterase: Moderate / RBC: 3-5 /HPF / WBC 11-25   Sq Epi: x / Non Sq Epi: x / Bacteria: Many        CARDIAC MARKERS ( 2018 10:31 )  .040 ng/mL / x     / x     / x     / x      CARDIAC MARKERS ( 2018 20:46 )  .077 ng/mL / x     / 212 U/L / x     / 1.9 ng/mL                    Culture Results:   Growth in aerobic bottle: Gram Negative Rods  "Due to technical problems, Proteus sp. will Not be reported as part of  the BCID panel until further notice"  ***Blood Panel PCR results on this specimen are available  approximately 3 hours after the Gram stain result.***  Gram stain, PCR, and/or culture results may not always  correspond due to difference in methodologies.  ************************************************************  This PCR assay was performed using [employer].  The following targets are tested for: Enterococcus,  vancomycin resistant enterococci, Listeria monocytogenes,  coagulase negative staphylococci, S. aureus,  methicillin resistant S. aureus, Streptococcus agalactiae  (Group B), S. pneumoniae, S. pyogenes (Group A),  Acinetobacter baumannii, Enterobacter cloacae, E. coli,  Klebsiella oxytoca, K. pneumoniae, Proteus sp.,  Serratia marcescens, Haemophilus influenzae,  Neisseria meningitidis, Pseudomonas aeruginosa, Candida  albicans, C. glabrata, C krusei, C parapsilosis,  C. tropicalis and the KPC resistance gene. ( @ 09:25)  Culture Results:   >100,000 CFU/ml Klebsiella pneumoniae ( @ 09:24)  Culture Results:   No growth to date. ( @ 09:23)                Radiology:      < from: CT Abdomen and Pelvis w/ Oral Cont (18 @ 14:11) >    IMPRESSION: New moderate sided left hydroureteronephrosis. No radiopaque   urolithiasis is seen. Consider distal left ureteral stricture.    Persistent urinary bladder wall thickening and adjacent inflammation.   Correlate for infection. Pharyngeal includes neoplasm.    Cytology recommended.                BEATA OLIVARES M.D., ATTENDING RADIOLOGIST  This document has been electronically signed. 2018  2:37PM    < end of copied text > 89yo M w no reported PMH presents to ED for eval of decr appetite, weakness & fever.  Daughter states pt started w sx about 1wk ago, PMD had home visit, dx pt w UTI, pt started on unknown abx 2d ago.  Pt has been taking abx but sx not improving.  Pt denies CP, SOB, vomiting, diarrhea, abd pain. +fever, No photophobia/eye pain/changes in vision, No ear pain/sore throat/dysphagia, No chest pain/palpitations, no SOB/cough/wheeze/stridor, No abdominal pain, no rash  very poor historian   ALLERGIES AND HOME MEDICATIONS:   Allergies:        Allergies:  	No Known Allergies:   	No Known Allergies: (2018 23:16)      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, chronic  Renal insufficiency  Dehydration  Spinal stenosis of lumbar region  BPH (benign prostatic hypertrophy)  Hypertension  Benign Prostatic Hyperplasia  HTN (Hypertension)  No significant past surgical history      SOCHX:   no tobacco,  no-  alcohol    FMHX: FA/MO  -non contributory       Recent Travel:    Immunizations:    Allergies    No Known Allergies    Intolerances        MEDICATIONS  (STANDING):  amLODIPine   Tablet 5 milliGRAM(s) Oral daily  enoxaparin Injectable 30 milliGRAM(s) SubCutaneous daily  finasteride 5 milliGRAM(s) Oral daily  labetalol 100 milliGRAM(s) Oral two times a day  megestrol Suspension 400 milliGRAM(s) Oral daily  pantoprazole  Injectable 40 milliGRAM(s) IV Push two times a day  piperacillin/tazobactam IVPB. 3.375 Gram(s) IV Intermittent every 12 hours  senna 2 Tablet(s) Oral at bedtime  sodium chloride 0.9%. 1000 milliLiter(s) (100 mL/Hr) IV Continuous <Continuous>  tamsulosin 0.4 milliGRAM(s) Oral at bedtime    MEDICATIONS  (PRN):  acetaminophen  Suppository 650 milliGRAM(s) Rectal every 6 hours PRN For Temp greater than 38 C (100.4 F)  magnesium hydroxide Suspension 30 milliLiter(s) Oral daily PRN Constipation      REVIEW OF SYSTEMS:  CONSTITUTIONAL: No fever, weight loss, or fatigue  EYES: No eye pain, visual disturbances, or discharge  ENMT:  No difficulty hearing, tinnitus, vertigo; No sinus or throat pain  NECK: No pain or stiffness  RESPIRATORY: No cough, wheezing, chills or hemoptysis; No shortness of breath  CARDIOVASCULAR: No chest pain, palpitations, dizziness, or leg swelling  GASTROINTESTINAL: No abdominal or epigastric pain. No nausea, vomiting, or hematemesis; No diarrhea or constipation. No melena or hematochezia.  GENITOURINARY: No dysuria, frequency, hematuria, or incontinence  NEUROLOGICAL: No headaches, memory loss, loss of strength, numbness, or tremors  SKIN: No itching, burning, rashes, or lesions   LYMPH NODES: No enlarged glands  ENDOCRINE: No heat or cold intolerance; No hair loss  MUSCULOSKELETAL: rt shoulder hurts   PSYCHIATRIC: No depression, anxiety, mood swings, or difficulty sleeping  HEME/LYMPH: No easy bruising, or bleeding gums  ALLERGY AND IMMUNOLOGIC: No hives or eczema    VITAL SIGNS:    T(C): 36.2 (18 @ 16:35), Max: 36.6 (18 @ 11:29)  T(F): 97.1 (18 @ 16:35), Max: 97.8 (18 @ 11:29)  HR: 84 (18 @ 16:35) (74 - 86)  BP: 117/69 (18 @ 16:35) (115/62 - 145/79)  RR: 17 (18 @ 16:35) (17 - 18)  SpO2: 99% (18 @ 16:35) (97% - 99%)    PHYSICAL EXAM:    GENERAL: NAD, well-groomed, well-developed  HEAD:  Atraumatic, Normocephalic  EYES: EOMI, PERRLA, conjunctiva and sclera clear  ENMT:  Moist mucous membranes,  NECK: Supple, No JVD, Normal thyroid  NERVOUS SYSTEM:  Alert & Oriented X3, Good concentration;   rt shoulder very painful; not warm but swollen   CHEST/LUNG: Clear to auscultation bilaterally; No rales, rhonchi, wheezing bilaterally  HEART: Regular rate and rhythm; No murmurs, rubs, or gallops  ABDOMEN:  fiem distended; Bowel sounds present  EXTREMITIES:  2+ Peripheral Pulses, No clubbing, cyanosis, or edema  LYMPH: No lymphadenopathy noted  SKIN: No rashes or lesions  BACK: no pressor sore     LABS:                         8.8    10.00 )-----------( 250      ( 2018 06:24 )             28.0     06-12    143  |  106  |  41<H>  ----------------------------<  130<H>  3.0<L>   |  29  |  2.09<H>    Ca    8.9      2018 06:45    TPro  8.2  /  Alb  2.5<L>  /  TBili  0.6  /  DBili  x   /  AST  82<H>  /  ALT  33  /  AlkPhos  81  06-11    LIVER FUNCTIONS - ( 2018 20:46 )  Alb: 2.5 g/dL / Pro: 8.2 gm/dL / ALK PHOS: 81 U/L / ALT: 33 U/L / AST: 82 U/L / GGT: x           PT/INR - ( 2018 20:46 )   PT: 17.6 sec;   INR: 1.60 ratio         PTT - ( 2018 20:46 )  PTT:36.9 sec  Urinalysis Basic - ( 2018 20:46 )    Color: Yellow / Appearance: Slightly Turbid / S.010 / pH: x  Gluc: x / Ketone: Negative  / Bili: Negative / Urobili: Negative mg/dL   Blood: x / Protein: 100 mg/dL / Nitrite: Negative   Leuk Esterase: Moderate / RBC: 3-5 /HPF / WBC 11-25   Sq Epi: x / Non Sq Epi: x / Bacteria: Many        CARDIAC MARKERS ( 2018 10:31 )  .040 ng/mL / x     / x     / x     / x      CARDIAC MARKERS ( 2018 20:46 )  .077 ng/mL / x     / 212 U/L / x     / 1.9 ng/mL                    Culture Results:   Growth in aerobic bottle: Gram Negative Rods  "Due to technical problems, Proteus sp. will Not be reported as part of  the BCID panel until further notice"  ***Blood Panel PCR results on this specimen are available  approximately 3 hours after the Gram stain result.***  Gram stain, PCR, and/or culture results may not always  correspond due to difference in methodologies.  ************************************************************  This PCR assay was performed using [employer].  The following targets are tested for: Enterococcus,  vancomycin resistant enterococci, Listeria monocytogenes,  coagulase negative staphylococci, S. aureus,  methicillin resistant S. aureus, Streptococcus agalactiae  (Group B), S. pneumoniae, S. pyogenes (Group A),  Acinetobacter baumannii, Enterobacter cloacae, E. coli,  Klebsiella oxytoca, K. pneumoniae, Proteus sp.,  Serratia marcescens, Haemophilus influenzae,  Neisseria meningitidis, Pseudomonas aeruginosa, Candida  albicans, C. glabrata, C krusei, C parapsilosis,  C. tropicalis and the KPC resistance gene. ( @ 09:25)  Culture Results:   >100,000 CFU/ml Klebsiella pneumoniae ( @ 09:24)  Culture Results:   No growth to date. ( @ 09:23)                Radiology:      < from: CT Abdomen and Pelvis w/ Oral Cont (18 @ 14:11) >    IMPRESSION: New moderate sided left hydroureteronephrosis. No radiopaque   urolithiasis is seen. Consider distal left ureteral stricture.    Persistent urinary bladder wall thickening and adjacent inflammation.   Correlate for infection. Pharyngeal includes neoplasm.    Cytology recommended.                BEATA OLIVARES M.D., ATTENDING RADIOLOGIST  This document has been electronically signed. 2018  2:37PM    < end of copied text >

## 2018-06-13 NOTE — CONSULT NOTE ADULT - ASSESSMENT
urosepsis   gram negative rods in blood urosepsis   gram negative rods in blood   rt shoulder pain   antibiotics   await culture and revise   will follow with you thanks

## 2018-06-14 LAB
ANION GAP SERPL CALC-SCNC: 11 MMOL/L — SIGNIFICANT CHANGE UP (ref 5–17)
APPEARANCE UR: ABNORMAL
BACTERIA # UR AUTO: ABNORMAL
BILIRUB UR-MCNC: NEGATIVE — SIGNIFICANT CHANGE UP
BUN SERPL-MCNC: 36 MG/DL — HIGH (ref 7–23)
CALCIUM SERPL-MCNC: 9.2 MG/DL — SIGNIFICANT CHANGE UP (ref 8.5–10.1)
CHLORIDE SERPL-SCNC: 104 MMOL/L — SIGNIFICANT CHANGE UP (ref 96–108)
CO2 SERPL-SCNC: 26 MMOL/L — SIGNIFICANT CHANGE UP (ref 22–31)
COLOR SPEC: YELLOW — SIGNIFICANT CHANGE UP
COMMENT - URINE: SIGNIFICANT CHANGE UP
CREAT SERPL-MCNC: 1.73 MG/DL — HIGH (ref 0.5–1.3)
DIFF PNL FLD: ABNORMAL
EPI CELLS # UR: SIGNIFICANT CHANGE UP
GLUCOSE SERPL-MCNC: 92 MG/DL — SIGNIFICANT CHANGE UP (ref 70–99)
GLUCOSE UR QL: NEGATIVE MG/DL — SIGNIFICANT CHANGE UP
GRAN CASTS # UR COMP ASSIST: ABNORMAL /LPF
HCT VFR BLD CALC: 25.7 % — LOW (ref 39–50)
HGB BLD-MCNC: 8.6 G/DL — LOW (ref 13–17)
KETONES UR-MCNC: NEGATIVE — SIGNIFICANT CHANGE UP
LEUKOCYTE ESTERASE UR-ACNC: ABNORMAL
MCHC RBC-ENTMCNC: 29.7 PG — SIGNIFICANT CHANGE UP (ref 27–34)
MCHC RBC-ENTMCNC: 33.5 GM/DL — SIGNIFICANT CHANGE UP (ref 32–36)
MCV RBC AUTO: 88.6 FL — SIGNIFICANT CHANGE UP (ref 80–100)
NITRITE UR-MCNC: NEGATIVE — SIGNIFICANT CHANGE UP
NRBC # BLD: 0 /100 WBCS — SIGNIFICANT CHANGE UP (ref 0–0)
PH UR: 6 — SIGNIFICANT CHANGE UP (ref 5–8)
PLATELET # BLD AUTO: 263 K/UL — SIGNIFICANT CHANGE UP (ref 150–400)
POTASSIUM SERPL-MCNC: 3.3 MMOL/L — LOW (ref 3.5–5.3)
POTASSIUM SERPL-SCNC: 3.3 MMOL/L — LOW (ref 3.5–5.3)
PROT UR-MCNC: 100 MG/DL
RBC # BLD: 2.9 M/UL — LOW (ref 4.2–5.8)
RBC # FLD: 14.2 % — SIGNIFICANT CHANGE UP (ref 10.3–14.5)
RBC CASTS # UR COMP ASSIST: ABNORMAL /HPF (ref 0–4)
SODIUM SERPL-SCNC: 141 MMOL/L — SIGNIFICANT CHANGE UP (ref 135–145)
SP GR SPEC: 1.01 — SIGNIFICANT CHANGE UP (ref 1.01–1.02)
UROBILINOGEN FLD QL: NEGATIVE MG/DL — SIGNIFICANT CHANGE UP
WBC # BLD: 8.04 K/UL — SIGNIFICANT CHANGE UP (ref 3.8–10.5)
WBC # FLD AUTO: 8.04 K/UL — SIGNIFICANT CHANGE UP (ref 3.8–10.5)
WBC UR QL: >50

## 2018-06-14 PROCEDURE — 73030 X-RAY EXAM OF SHOULDER: CPT | Mod: 26,RT

## 2018-06-14 RX ORDER — IRON SUCROSE 20 MG/ML
100 INJECTION, SOLUTION INTRAVENOUS EVERY 24 HOURS
Qty: 0 | Refills: 0 | Status: COMPLETED | OUTPATIENT
Start: 2018-06-14 | End: 2018-06-15

## 2018-06-14 RX ORDER — POTASSIUM CHLORIDE 20 MEQ
40 PACKET (EA) ORAL EVERY 4 HOURS
Qty: 0 | Refills: 0 | Status: COMPLETED | OUTPATIENT
Start: 2018-06-14 | End: 2018-06-14

## 2018-06-14 RX ADMIN — PANTOPRAZOLE SODIUM 40 MILLIGRAM(S): 20 TABLET, DELAYED RELEASE ORAL at 05:47

## 2018-06-14 RX ADMIN — MEGESTROL ACETATE 400 MILLIGRAM(S): 40 SUSPENSION ORAL at 12:38

## 2018-06-14 RX ADMIN — PANTOPRAZOLE SODIUM 40 MILLIGRAM(S): 20 TABLET, DELAYED RELEASE ORAL at 19:48

## 2018-06-14 RX ADMIN — ENOXAPARIN SODIUM 30 MILLIGRAM(S): 100 INJECTION SUBCUTANEOUS at 12:38

## 2018-06-14 RX ADMIN — IRON SUCROSE 210 MILLIGRAM(S): 20 INJECTION, SOLUTION INTRAVENOUS at 18:47

## 2018-06-14 RX ADMIN — FINASTERIDE 5 MILLIGRAM(S): 5 TABLET, FILM COATED ORAL at 12:38

## 2018-06-14 RX ADMIN — Medication 100 MILLIGRAM(S): at 18:47

## 2018-06-14 RX ADMIN — PIPERACILLIN AND TAZOBACTAM 25 GRAM(S): 4; .5 INJECTION, POWDER, LYOPHILIZED, FOR SOLUTION INTRAVENOUS at 05:47

## 2018-06-14 RX ADMIN — Medication 100 MILLIGRAM(S): at 05:47

## 2018-06-14 RX ADMIN — PIPERACILLIN AND TAZOBACTAM 25 GRAM(S): 4; .5 INJECTION, POWDER, LYOPHILIZED, FOR SOLUTION INTRAVENOUS at 19:48

## 2018-06-14 RX ADMIN — Medication 40 MILLIEQUIVALENT(S): at 12:38

## 2018-06-14 RX ADMIN — AMLODIPINE BESYLATE 5 MILLIGRAM(S): 2.5 TABLET ORAL at 05:47

## 2018-06-14 RX ADMIN — Medication 40 MILLIEQUIVALENT(S): at 19:49

## 2018-06-14 NOTE — DIETITIAN INITIAL EVALUATION ADULT. - ETIOLOGY
inadequate protein-energy intake in setting of hx AMS, of BPH, renal insufficiency , cardiac hx, spinal stenosis

## 2018-06-14 NOTE — DIETITIAN INITIAL EVALUATION ADULT. - FACTORS AFF FOOD INTAKE
persistent lack of appetite/decreased appetite & fever x 1 week reported by daughter on adm/change in mental status/other (specify)

## 2018-06-14 NOTE — DIETITIAN INITIAL EVALUATION ADULT. - OTHER INFO
pt seen due to RN consult decreased oral intake. Breakfast tray was @ bedside c 0% intake when pt seen.  MD documentation of anorexia noted.

## 2018-06-14 NOTE — PROGRESS NOTE ADULT - ASSESSMENT
klebsiella sepsis, Left ureterohydronephrosis, BPH with bladder thickening  spinal stenosis  lam hydrocoeles  HYn  CKD stage 3

## 2018-06-14 NOTE — DIETITIAN INITIAL EVALUATION ADULT. - ENERGY NEEDS
Height (cm): 180.34 (06-11)  Weight (kg): 69.8 (06-12)  BMI (kg/m2): 21.5 (06-12)  IBW: 80.7 kg       % IBW: 86%           UBW: ?           %UBW: ?

## 2018-06-14 NOTE — CONSULT NOTE ADULT - ASSESSMENT
Urinary retention with hydronephrosis and bacteriemia Continue Ma Catheter and antibiotic.   Mid shaft Hypospadias ? acquired secondary to Ma catheter  Purulent urine      Continue Ma catheter, antibiotics

## 2018-06-14 NOTE — DIETITIAN INITIAL EVALUATION ADULT. - PERTINENT MEDS FT
06-14, Iron sucrose IV x 2 days, potassium chloride tablet 06-14, amlodipine , labetalol , tamsulosin, enoxaparin , megestrol , pantoprazole , senna , finasteride ,

## 2018-06-14 NOTE — CHART NOTE - NSCHARTNOTEFT_GEN_A_CORE
Upon Nutritional Assessment by the Registered Dietitian your patient was determined to meet criteria / has evidence of the following diagnosis/diagnoses:          [ ]  Mild Protein Calorie Malnutrition        [ x]  Moderate Protein Calorie Malnutrition        [ ] Severe Protein Calorie Malnutrition        [ ] Unspecified Protein Calorie Malnutrition        [ ] Underweight / BMI <19        [ ] Morbid Obesity / BMI > 40      Findings as based on:  •  Comprehensive nutrition assessment and consultation  •  Calorie counts (nutrient intake analysis)  •  Food acceptance and intake status from observations by staff  •  Follow up  •  Patient education  •  Intervention secondary to interdisciplinary rounds  •   concerns      Treatment:    The following diet has been recommended:  Low sodium , Ensure Enlive 2 x day=750 calories, 40 grams protein       PROVIDER Section:     By signing this assessment you are acknowledging and agree with the diagnosis/diagnoses assigned by the Registered Dietitian    Comments:

## 2018-06-14 NOTE — DIETITIAN INITIAL EVALUATION ADULT. - SOURCE
other (specify)/Chart review , RN, family not present when pt seen, pt is forgetful, unable to provide diet hx

## 2018-06-14 NOTE — PROGRESS NOTE ADULT - SUBJECTIVE AND OBJECTIVE BOX
HPI:  89yo M w no reported PMH presents to ED for eval of decr appetite, weakness & fever.  Daughter states pt started w sx about 1wk ago, PMD had home visit, dx pt w UTI, pt started on unknown abx 2d ago.  Pt has been taking abx but sx not improving.  Pt denies CP, SOB, vomiting, diarrhea, abd pain.     	+fever, No photophobia/eye pain/changes in vision, No ear pain/sore throat/dysphagia, No chest pain/palpitations, no SOB/cough/wheeze/stridor, No abdominal pain, no rash    ALLERGIES AND HOME MEDICATIONS:   Allergies:        Allergies:  	No Known Allergies:   	No Known Allergies: (2018 23:16)      Allergies    No Known Allergies    Intolerances        MEDICATIONS  (STANDING):  amLODIPine   Tablet 5 milliGRAM(s) Oral daily  enoxaparin Injectable 30 milliGRAM(s) SubCutaneous daily  finasteride 5 milliGRAM(s) Oral daily  iron sucrose IVPB 100 milliGRAM(s) IV Intermittent every 24 hours  labetalol 100 milliGRAM(s) Oral two times a day  megestrol Suspension 400 milliGRAM(s) Oral daily  pantoprazole  Injectable 40 milliGRAM(s) IV Push two times a day  piperacillin/tazobactam IVPB. 3.375 Gram(s) IV Intermittent every 12 hours  potassium chloride    Tablet ER 40 milliEquivalent(s) Oral every 4 hours  senna 2 Tablet(s) Oral at bedtime  sodium chloride 0.9%. 1000 milliLiter(s) (100 mL/Hr) IV Continuous <Continuous>  tamsulosin 0.4 milliGRAM(s) Oral at bedtime    MEDICATIONS  (PRN):  acetaminophen  Suppository 650 milliGRAM(s) Rectal every 6 hours PRN For Temp greater than 38 C (100.4 F)  magnesium hydroxide Suspension 30 milliLiter(s) Oral daily PRN Constipation      REVIEW OF SYSTEMS:    CONSTITUTIONAL: No fever, chills, weight loss, or fatigue  HEENT: No sore throat, runny nose, ear ache  RESPIRATORY: No cough, wheezing, No shortness of breath  CARDIOVASCULAR: No chest pain, palpitations, dizziness  GASTROINTESTINAL: No abdominal pain. No nausea, vomiting, diarrhea  GENITOURINARY: No dysuria, increase frequency, hematuria, or incontinence  NEUROLOGICAL: No headaches, memory loss, loss of strength, numbness, or tremors, no weakness  EXTREMITY: No pedal edema BLE  SKIN: No itching, burning, rashes, or lesions     VITAL SIGNS:  T(C): 37.5 (18 @ 16:36), Max: 37.5 (18 @ 16:36)  T(F): 99.5 (18 @ 16:36), Max: 99.5 (18 @ 16:36)  HR: 79 (18 @ 16:36) (72 - 80)  BP: 114/63 (18 @ 16:36) (114/63 - 153/71)  RR: 18 (18 @ 16:36) (18 - 18)  SpO2: 98% (18 @ 16:36) (98% - 100%)  Wt(kg): --    PHYSICAL EXAM:    GENERAL: not in any distress  HEENT: Neck is supple, normocephalic, atraumatic   CHEST/LUNG: Clear to auscultation bilaterally; No rales, rhonchi, wheezing  HEART: Regular rate and rhythm; No murmurs, rubs, or gallops  ABDOMEN: Soft, Nontender, Nondistended; Bowel sounds present, no rebound   EXTREMITIES:  2+ Peripheral Pulses, No clubbing, cyanosis, or edema  GENITOURINARY:   SKIN: No rashes or lesions  BACK: no pressor sore   NERVOUS SYSTEM:  Alert & Oriented X3, Good concentration  PSYCH: normal affect     LABS:                         8.6    8.04  )-----------( 263      ( 2018 07:11 )             25.7     06-14    141  |  104  |  36<H>  ----------------------------<  92  3.3<L>   |  26  |  1.73<H>    Ca    9.2      2018 07:11          Urinalysis Basic - ( 2018 14:03 )    Color: Yellow / Appearance: very cloudy / S.015 / pH: x  Gluc: x / Ketone: Negative  / Bili: Negative / Urobili: Negative mg/dL   Blood: x / Protein: 100 mg/dL / Nitrite: Negative   Leuk Esterase: Moderate / RBC: 25-50 /HPF / WBC >50   Sq Epi: x / Non Sq Epi: Few / Bacteria: Many                          Culture Results:   Growth in aerobic bottle: Klebsiella pneumoniae  "Due to technical problems, Proteus sp. will Not be reported as part of  the BCID panel until further notice"  ***Blood Panel PCR results on this specimen are available  approximately 3 hours after the Gram stain result.***  Gram stain, PCR, and/or culture results may not always  correspond due to difference in methodologies.  ************************************************************  This PCR assay was performed using [employer].  The following targets are tested for: Enterococcus,  vancomycin resistant enterococci, Listeria monocytogenes,  coagulase negative staphylococci, S. aureus,  methicillin resistant S. aureus, Streptococcus agalactiae  (Group B), S. pneumoniae, S. pyogenes (Group A),  Acinetobacter baumannii, Enterobacter cloacae, E. coli,  Klebsiella oxytoca, K. pneumoniae, Proteus sp.,  Serratia marcescens, Haemophilus influenzae,  Neisseria meningitidis, Pseudomonas aeruginosa, Candida  albicans, C. glabrata, C krusei, C parapsilosis,  C. tropicalis and the KPC resistance gene. ( @ 09:25)  Culture Results:   >100,000 CFU/ml Klebsiella pneumoniae  Susceptibility to follow. ( @ 09:24)  Culture Results:   No growth to date. ( @ 09:23)                Radiology:    < from: Xray Shoulder 2 Views, Right (18 @ 10:02) >    IMPRESSION: Degeneration as above.                IRMA HUNTER M.D., ATTENDING RADIOLOGIST  This document has been electronically signed. 2018 10:08AM                < end of copied text > HPI:  89yo M w no reported PMH presents to ED for eval of decr appetite, weakness & fever.  Daughter states pt started w sx about 1wk ago, PMD had home visit, dx pt w UTI, pt started on unknown abx 2d ago.  Pt has been taking abx but sx not improving.  Pt denies CP, SOB, vomiting, diarrhea, abd pain.   also with fever   work up consistent with urinary tract infection left hydro and kleb in blood   FINAL ID pending       ALLERGIES AND HOME MEDICATIONS:   Allergies:        Allergies:  	No Known Allergies:   	No Known Allergies: (2018 23:16)      Allergies    No Known Allergies    Intolerances        MEDICATIONS  (STANDING):  amLODIPine   Tablet 5 milliGRAM(s) Oral daily  enoxaparin Injectable 30 milliGRAM(s) SubCutaneous daily  finasteride 5 milliGRAM(s) Oral daily  iron sucrose IVPB 100 milliGRAM(s) IV Intermittent every 24 hours  labetalol 100 milliGRAM(s) Oral two times a day  megestrol Suspension 400 milliGRAM(s) Oral daily  pantoprazole  Injectable 40 milliGRAM(s) IV Push two times a day  piperacillin/tazobactam IVPB. 3.375 Gram(s) IV Intermittent every 12 hours  potassium chloride    Tablet ER 40 milliEquivalent(s) Oral every 4 hours  senna 2 Tablet(s) Oral at bedtime  sodium chloride 0.9%. 1000 milliLiter(s) (100 mL/Hr) IV Continuous <Continuous>  tamsulosin 0.4 milliGRAM(s) Oral at bedtime    MEDICATIONS  (PRN):  acetaminophen  Suppository 650 milliGRAM(s) Rectal every 6 hours PRN For Temp greater than 38 C (100.4 F)  magnesium hydroxide Suspension 30 milliLiter(s) Oral daily PRN Constipation      REVIEW OF SYSTEMS:    feels better   shoulder is better   no nausea vomiting diarrhea     VITAL SIGNS:  T(C): 37.5 (18 @ 16:36), Max: 37.5 (18 @ 16:36)  T(F): 99.5 (18 @ 16:36), Max: 99.5 (18 @ 16:36)  HR: 79 (18 @ 16:36) (72 - 80)  BP: 114/63 (18 @ 16:36) (114/63 - 153/71)  RR: 18 (18 @ 16:36) (18 - 18)  SpO2: 98% (18 @ 16:36) (98% - 100%)  Wt(kg): --    PHYSICAL EXAM:    GENERAL: not in any distress  HEENT: Neck is supple, normocephalic, atraumatic   CHEST/LUNG: Clear to auscultation bilaterally; No rales, rhonchi, wheezing  HEART: Regular rate and rhythm; No murmurs, rubs, or gallops  ABDOMEN: Soft, Nontender, Nondistended; Bowel sounds present, no rebound   EXTREMITIES:  2+ Peripheral Pulses, No clubbing, cyanosis, or edema  SKIN: No rashes or lesions  BACK: no pressor sore   NERVOUS SYSTEM:  Alert & Oriented X3, Good concentration  PSYCH: normal affect     LABS:                         8.6    8.04  )-----------( 263      ( 2018 07:11 )             25.7     06-14    141  |  104  |  36<H>  ----------------------------<  92  3.3<L>   |  26  |  1.73<H>    Ca    9.2      2018 07:11          Urinalysis Basic - ( 2018 14:03 )    Color: Yellow / Appearance: very cloudy / S.015 / pH: x  Gluc: x / Ketone: Negative  / Bili: Negative / Urobili: Negative mg/dL   Blood: x / Protein: 100 mg/dL / Nitrite: Negative   Leuk Esterase: Moderate / RBC: 25-50 /HPF / WBC >50   Sq Epi: x / Non Sq Epi: Few / Bacteria: Many                          Culture Results:   Growth in aerobic bottle: Klebsiella pneumoniae  "Due to technical problems, Proteus sp. will Not be reported as part of  the BCID panel until further notice"  ***Blood Panel PCR results on this specimen are available  approximately 3 hours after the Gram stain result.***  Gram stain, PCR, and/or culture results may not always  correspond due to difference in methodologies.  ************************************************************  This PCR assay was performed using [employer].  The following targets are tested for: Enterococcus,  vancomycin resistant enterococci, Listeria monocytogenes,  coagulase negative staphylococci, S. aureus,  methicillin resistant S. aureus, Streptococcus agalactiae  (Group B), S. pneumoniae, S. pyogenes (Group A),  Acinetobacter baumannii, Enterobacter cloacae, E. coli,  Klebsiella oxytoca, K. pneumoniae, Proteus sp.,  Serratia marcescens, Haemophilus influenzae,  Neisseria meningitidis, Pseudomonas aeruginosa, Candida  albicans, C. glabrata, C krusei, C parapsilosis,  C. tropicalis and the KPC resistance gene. ( @ 09:25)  Culture Results:   >100,000 CFU/ml Klebsiella pneumoniae  Susceptibility to follow. ( @ 09:24)  Culture Results:   No growth to date. ( @ 09:23)                Radiology:    < from: Xray Shoulder 2 Views, Right (18 @ 10:02) >    IMPRESSION: Degeneration as above.                IRMA HUNTER M.D., ATTENDING RADIOLOGIST  This document has been electronically signed. 2018 10:08AM                < end of copied text >

## 2018-06-14 NOTE — DIETITIAN INITIAL EVALUATION ADULT. - PHYSICAL APPEARANCE
debilitated/BMI=21.5(06-12), Nutrition focused physical exam conducted; Subcutaneous fat Exam;  [ mild  ]  Orbital fat pads region,  [mild   ]Buccal fat region,  [ mild ]triceps region, [WNL  ]ribs region.  Muscle Exam; [WNL  ]temples region, [  mild ]clavicle region, [  mild ]shoulder region, [ unable ]Scapula region, [ mild ]Interosseous region, [unable ]thigh region, [unable  ]Calf region/underweight

## 2018-06-14 NOTE — DIETITIAN INITIAL EVALUATION ADULT. - PERTINENT LABORATORY DATA
06-14 Na141 mmol/L Glu 92 mg/dL K+ 3.3 mmol/L<L> Cr  1.73 mg/dL<H> BUN 36 mg/dL<H> Est GFR (AA)40 mL/min/1.73M2<L> Phos n/a   Ca 9.2 mg/dL Hgb 8.6 g/dL<L> Hct 25.7 %<L> 06-11 Glucose 199 mg/dL<H> Alb 2.5 g/dL<L> bun 43 mg/dL<H> Cr 2.42 mg/dL<H>

## 2018-06-14 NOTE — PROGRESS NOTE ADULT - SUBJECTIVE AND OBJECTIVE BOX
Patient is a 88y old  Male who presents with a chief complaint of weakness, UTI, ROGER,CKD stage 3 , dehydration (11 Jun 2018 23:16)  klebsiella sepsis,  Sensitivity pending.   Ct scan of abd showing left ureterohydronephrosis  Xray shulder- DJD  INTERVAL HPI/OVERNIGHT EVENTS: unevenrful  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, chronic  Renal insufficiency  Dehydration  Spinal stenosis of lumbar region  BPH (benign prostatic hypertrophy)  Hypertension  Benign Prostatic Hyperplasia  HTN (Hypertension)  No significant past surgical history      MEDICATIONS  (STANDING):  amLODIPine   Tablet 5 milliGRAM(s) Oral daily  enoxaparin Injectable 30 milliGRAM(s) SubCutaneous daily  finasteride 5 milliGRAM(s) Oral daily  iron sucrose IVPB 100 milliGRAM(s) IV Intermittent every 24 hours  labetalol 100 milliGRAM(s) Oral two times a day  megestrol Suspension 400 milliGRAM(s) Oral daily  pantoprazole  Injectable 40 milliGRAM(s) IV Push two times a day  piperacillin/tazobactam IVPB. 3.375 Gram(s) IV Intermittent every 12 hours  potassium chloride    Tablet ER 40 milliEquivalent(s) Oral every 4 hours  senna 2 Tablet(s) Oral at bedtime  sodium chloride 0.9%. 1000 milliLiter(s) (100 mL/Hr) IV Continuous <Continuous>  tamsulosin 0.4 milliGRAM(s) Oral at bedtime    MEDICATIONS  (PRN):  acetaminophen  Suppository 650 milliGRAM(s) Rectal every 6 hours PRN For Temp greater than 38 C (100.4 F)  magnesium hydroxide Suspension 30 milliLiter(s) Oral daily PRN Constipation      Allergies    No Known Allergies    Intolerances        REVIEW OF SYSTEMS:  CONSTITUTIONAL: No fever, weight loss, or fatigue  EYES: No eye pain, visual disturbances, or discharge  ENMT:  No difficulty hearing, tinnitus, vertigo; No sinus or throat pain  NECK: No pain or stiffness  BREASTS: No pain, masses, or nipple discharge  RESPIRATORY: No cough, wheezing, chills or hemoptysis; No shortness of breath  CARDIOVASCULAR: No chest pain, palpitations, dizziness, or leg swelling  GASTROINTESTINAL: No abdominal or epigastric pain. No nausea, vomiting, or hematemesis; No diarrhea or constipation. No melena or hematochezia.  GENITOURINARY: No dysuria, frequency, hematuria, or incontinence  NEUROLOGICAL: No headaches, memory loss, loss of strength, numbness, or tremors  SKIN: No itching, burning, rashes, or lesions   LYMPH NODES: No enlarged glands  ENDOCRINE: No heat or cold intolerance; No hair loss  MUSCULOSKELETAL: No joint pain or swelling; No muscle, back, or extremity pain  PSYCHIATRIC: No depression, anxiety, mood swings, or difficulty sleeping  HEME/LYMPH: No easy bruising, or bleeding gums  ALLERY AND IMMUNOLOGIC: No hives or eczema    Vital Signs Last 24 Hrs  T(C): 37.1 (14 Jun 2018 04:50), Max: 37.1 (14 Jun 2018 04:50)  T(F): 98.8 (14 Jun 2018 04:50), Max: 98.8 (14 Jun 2018 04:50)  HR: 80 (14 Jun 2018 04:50) (73 - 84)  BP: 120/65 (14 Jun 2018 04:50) (115/62 - 153/71)  BP(mean): --  RR: 18 (14 Jun 2018 04:50) (17 - 18)  SpO2: 99% (14 Jun 2018 04:50) (97% - 100%)    PHYSICAL EXAM:  GENERAL: NAD, well-groomed, well-developed  HEAD:  Atraumatic, Normocephalic  EYES: EOMI, PERRLA, conjunctiva and sclera clear  ENMT: No tonsillar erythema, exudates, or enlargement; Moist mucous membranes, Good dentition, No lesions  NECK: Supple, No JVD, Normal thyroid  NERVOUS SYSTEM:  Alert & Oriented X3, Good concentration; Motor Strength 5/5 B/L upper and lower extremities; DTRs 2+ intact and symmetric  CHEST/LUNG: Clear to percussion bilaterally; No rales, rhonchi, wheezing, or rubs  HEART: Regular rate and rhythm; No murmurs, rubs, or gallops  ABDOMEN: Soft, Nontender, Nondistended; Bowel sounds present  EXTREMITIES:  2+ Peripheral Pulses, No clubbing, cyanosis, or edema  LYMPH: No lymphadenopathy noted  SKIN: No rashes or lesions    LABS:                        8.6    8.04  )-----------( 263      ( 14 Jun 2018 07:11 )             25.7     06-14    141  |  104  |  36<H>  ----------------------------<  92  3.3<L>   |  26  |  1.73<H>    Ca    9.2      14 Jun 2018 07:11            CAPILLARY BLOOD GLUCOSE                    RADIOLOGY & ADDITIONAL TESTS:    Imaging Personally Reviewed:  [ ] YES  [ ] NO    Consultant(s) Notes Reviewed:  [ ] YES  [ ] NO    Care Discussed with Consultants/Other Providers [ ] YES  [ ] NO    Care discussed with family,         [  ]   yes  [  ]  No    imp:    stable[ ]    unstable[  ]     improving [ x  ]       unchanged  [  ]                Plans:  Continue present plans  [ x ]               New consult [  ]   specialty  .... dr Mensah...               order test[  ]    test name.  cbc, bmp                Discharge Planning  [  ]

## 2018-06-14 NOTE — CONSULT NOTE ADULT - SUBJECTIVE AND OBJECTIVE BOX
HPI:  87yo M w no reported PMH presents to ED for eval of decr appetite, weakness & fever.  Daughter states pt started w sx about 1wk ago, PMD had home visit, dx pt w UTI, pt started on unknown abx 2d ago.  Pt has been taking abx but sx not improving.  Pt denies CP, SOB, vomiting, diarrhea, abd pain.     	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, chronic  Renal insufficiency  Dehydration  Spinal stenosis of lumbar region  BPH (benign prostatic hypertrophy)  Hypertension  HTN (Hypertension)  No significant past surgical history      Allergies    No Known Allergies        FAMILY HISTORY:  No significant family history (Father, Mother)      Home Medications:  amLODIPine 5 mg oral tablet: 1 tab(s) orally once a day (2018 22:48)  cholecalciferol oral tablet: 1000 unit(s) orally once a day (2018 22:48)  docusate sodium 100 mg oral capsule: 1 cap(s) orally 2 times a day (2018 22:48)  finasteride 5 mg oral tablet: 1 tab(s) orally once a day (2018 22:48)  labetalol 100 mg oral tablet: 1 tab(s) orally 2 times a day (2018 22:48)  senna oral tablet: 2 tab(s) orally once a day (at bedtime) (2018 22:48)  tamsulosin 0.4 mg oral capsule: 1 cap(s) orally once a day (2018 22:48)      MEDICATIONS  (STANDING):  amLODIPine   Tablet 5 milliGRAM(s) Oral daily  enoxaparin Injectable 30 milliGRAM(s) SubCutaneous daily  finasteride 5 milliGRAM(s) Oral daily  iron sucrose IVPB 100 milliGRAM(s) IV Intermittent every 24 hours  labetalol 100 milliGRAM(s) Oral two times a day  megestrol Suspension 400 milliGRAM(s) Oral daily  pantoprazole  Injectable 40 milliGRAM(s) IV Push two times a day  piperacillin/tazobactam IVPB. 3.375 Gram(s) IV Intermittent every 12 hours  potassium chloride    Tablet ER 40 milliEquivalent(s) Oral every 4 hours  senna 2 Tablet(s) Oral at bedtime  sodium chloride 0.9%. 1000 milliLiter(s) (100 mL/Hr) IV Continuous <Continuous>  tamsulosin 0.4 milliGRAM(s) Oral at bedtime    MEDICATIONS  (PRN):  acetaminophen  Suppository 650 milliGRAM(s) Rectal every 6 hours PRN For Temp greater than 38 C (100.4 F)  magnesium hydroxide Suspension 30 milliLiter(s) Oral daily PRN Constipation      ROS:    could not get history from the patient,      Physical Exam:    Vital Signs:  Vital Signs Last 24 Hrs  T(C): 37.5 (2018 16:36), Max: 37.5 (2018 16:36)  T(F): 99.5 (2018 16:36), Max: 99.5 (2018 16:36)  HR: 79 (2018 16:36) (72 - 80)  BP: 114/63 (2018 16:36) (114/63 - 153/71)  BP(mean): --  RR: 18 (2018 16:36) (18 - 18)  SpO2: 98% (2018 16:36) (98% - 100%)  Daily     Daily Weight in k.7 (2018 12:00)  I&O's Summary    2018 07:  -  2018 07:00  --------------------------------------------------------  IN: 340 mL / OUT: 0 mL / NET: 340 mL    2018 07:  -  2018 19:18  --------------------------------------------------------  IN: 410 mL / OUT: 0 mL / NET: 410 mL        General:  Appears stated age,  well-nourished, no distress  HEENT:  NC/AT, patent nares w/ pink mucosa, OP moist and pink,   conjunctivae clear  Chest:  Full & symmetric excursion, no increased effort.   Abdomen:  Soft, non-tender, non-distended.  Genitalia: Circumcised Phallus, mid shaft hypospadias. Both testicles descended, non tender, no mass. No epididymitis or epididymal mass. marked bilateral hydroceles  Rectal Examination: Deferred.  Extremities:  no edema, pedal pusation are present, no calf tenderness.  Skin:  No rash/erythema  Neuro/Psych:  Alert and conscious. Grossly intact and symmetrical.      LABS:                        8.6    8.04  )-----------( 263      ( 2018 07:11 )             25.7     06-14    141  |  104  |  36<H>  ----------------------------<  92  3.3<L>   |  26  |  1.73<H>    Ca    9.2      2018 07:11        Urinalysis Basic - ( 2018 14:03 )    Color: Yellow / Appearance: very cloudy / S.015 / pH: x  Gluc: x / Ketone: Negative  / Bili: Negative / Urobili: Negative mg/dL   Blood: x / Protein: 100 mg/dL / Nitrite: Negative   Leuk Esterase: Moderate / RBC: 25-50 /HPF / WBC >50   Sq Epi: x / Non Sq Epi: Few / Bacteria: Many        Culture - Blood (collected 2018 09:25)  Source: .Blood Blood  Gram Stain (prelim) (2018 14:44):    Growth in aerobic bottle: Gram Negative Rods  Preliminary Report (2018 10:16):    Growth in aerobic bottle: Klebsiella pneumoniae    "Due to technical problems, Proteus sp. will Not be reported as part of    the BCID panel until further notice"    ***Blood Panel PCR results on this specimen are available    approximately 3 hours after the Gram stain result.***    Gram stain, PCR, and/or culture results may not always    correspond due to difference in methodologies.    ************************************************************    This PCR assay was performed using [employer].    The following targets are tested for: Enterococcus,    vancomycin resistant enterococci, Listeria monocytogenes,    coagulase negative staphylococci, S. aureus,    methicillin resistant S. aureus, Streptococcus agalactiae    (Group B), S. pneumoniae, S. pyogenes (Group A),    Acinetobacter baumannii, Enterobacter cloacae, E. coli,    Klebsiella oxytoca, K. pneumoniae, Proteus sp.,    Serratia marcescens, Haemophilus influenzae,    Neisseria meningitidis, Pseudomonas aeruginosa, Candida    albicans, C. glabrata, C krusei, C parapsilosis,    C. tropicalis and the KPC resistance gene.  Organism: Blood Culture PCR (2018 19:04)  Organism: Blood Culture PCR (2018 19:04)    Sensitivities:      -  Klebsiella pneumoniae: Detec      Method Type: PCR    Culture - Urine (collected 2018 09:24)  Source: .Urine Clean Catch (Midstream)  Preliminary Report (2018 10:40):    >100,000 CFU/ml Klebsiella pneumoniae    Susceptibility to follow.    Culture - Blood (collected 2018 09:23)  Source: .Blood Blood  Preliminary Report (2018 10:00):    No growth to date.        RADIOLOGY & ADDITIONAL STUDIES:    < from: CT Abdomen and Pelvis w/ Oral Cont (18 @ 14:11) >  EXAM:  CT ABDOMEN AND PELVIS OC                            PROCEDURE DATE:  2018          INTERPRETATION:  CLINICAL INFORMATION: Anemia. Elevated CEA.    COMPARISON: CT abdomen and pelvis 2018    PROCEDURE:   CT of the Abdomen and Pelviswas performed without intravenous contrast.   Intravenous contrast: None.  Oral contrast: positive contrast was administered.  Sagittal and coronal reformats were performed.    FINDINGS:    LOWER CHEST: Trace bilateral pleural effusions.    LIVER: Within normal limits.  BILE DUCTS: Normal caliber.  GALLBLADDER: Within normal limits.  SPLEEN: Within normal limits.  PANCREAS: Within normal limits.  ADRENALS: Within normal limits.  KIDNEYS/URETERS: 3.1 cm right renal cyst again noted. New moderate   left-sided hydronephrosis with a dilated extrarenal pelvis. The ureter is   moderately. No ureterolithiasis is seen.  BLADDER: Circumferential urinary bladder wall thickening and trace.   Vesicular inflammation..  REPRODUCTIVE ORGANS: Prostatomegaly.    BOWEL: Diverticulosis coli. No bowel obstruction. Appendix normal.  PERITONEUM: No ascites.  VESSELS:  Tortuous calcified abdominal aorta..  RETROPERITONEUM: No lymphadenopathy.    ABDOMINAL WALL: Small bilateral inguinal hernias. Bilateral scrotal   hydroceles. Right anterior abdominal wall subcutaneous air likely from   recent subcutaneous injections.  BONES: Extensive multilevel degenerative changes of the spine with bulky   lumbar osteophytes and straightening of the lumbar lordosis.    IMPRESSION: New moderate sided left hydroureteronephrosis. No radiopaque   urolithiasis is seen. Consider distal left ureteral stricture.    Persistent urinary bladder wall thickening and adjacent inflammation.   Correlate for infection. Pharyngeal includes neoplasm.    Cytology recommended.                BEATA OLIVARES M.D., ATTENDING RADIOLOGIST

## 2018-06-15 LAB
-  AMIKACIN: SIGNIFICANT CHANGE UP
-  AMIKACIN: SIGNIFICANT CHANGE UP
-  AMOXICILLIN/CLAVULANIC ACID: SIGNIFICANT CHANGE UP
-  AMPICILLIN/SULBACTAM: SIGNIFICANT CHANGE UP
-  AMPICILLIN/SULBACTAM: SIGNIFICANT CHANGE UP
-  AMPICILLIN: SIGNIFICANT CHANGE UP
-  AMPICILLIN: SIGNIFICANT CHANGE UP
-  AZTREONAM: SIGNIFICANT CHANGE UP
-  AZTREONAM: SIGNIFICANT CHANGE UP
-  CEFAZOLIN: SIGNIFICANT CHANGE UP
-  CEFAZOLIN: SIGNIFICANT CHANGE UP
-  CEFEPIME: SIGNIFICANT CHANGE UP
-  CEFEPIME: SIGNIFICANT CHANGE UP
-  CEFOXITIN: SIGNIFICANT CHANGE UP
-  CEFOXITIN: SIGNIFICANT CHANGE UP
-  CEFTRIAXONE: SIGNIFICANT CHANGE UP
-  CEFTRIAXONE: SIGNIFICANT CHANGE UP
-  CIPROFLOXACIN: SIGNIFICANT CHANGE UP
-  CIPROFLOXACIN: SIGNIFICANT CHANGE UP
-  ERTAPENEM: SIGNIFICANT CHANGE UP
-  ERTAPENEM: SIGNIFICANT CHANGE UP
-  GENTAMICIN: SIGNIFICANT CHANGE UP
-  GENTAMICIN: SIGNIFICANT CHANGE UP
-  IMIPENEM: SIGNIFICANT CHANGE UP
-  IMIPENEM: SIGNIFICANT CHANGE UP
-  LEVOFLOXACIN: SIGNIFICANT CHANGE UP
-  LEVOFLOXACIN: SIGNIFICANT CHANGE UP
-  MEROPENEM: SIGNIFICANT CHANGE UP
-  MEROPENEM: SIGNIFICANT CHANGE UP
-  NITROFURANTOIN: SIGNIFICANT CHANGE UP
-  PIPERACILLIN/TAZOBACTAM: SIGNIFICANT CHANGE UP
-  PIPERACILLIN/TAZOBACTAM: SIGNIFICANT CHANGE UP
-  TIGECYCLINE: SIGNIFICANT CHANGE UP
-  TOBRAMYCIN: SIGNIFICANT CHANGE UP
-  TOBRAMYCIN: SIGNIFICANT CHANGE UP
-  TRIMETHOPRIM/SULFAMETHOXAZOLE: SIGNIFICANT CHANGE UP
-  TRIMETHOPRIM/SULFAMETHOXAZOLE: SIGNIFICANT CHANGE UP
CULTURE RESULTS: SIGNIFICANT CHANGE UP
CULTURE RESULTS: SIGNIFICANT CHANGE UP
GRAM STN FLD: SIGNIFICANT CHANGE UP
METHOD TYPE: SIGNIFICANT CHANGE UP
METHOD TYPE: SIGNIFICANT CHANGE UP
ORGANISM # SPEC MICROSCOPIC CNT: SIGNIFICANT CHANGE UP
SPECIMEN SOURCE: SIGNIFICANT CHANGE UP
SPECIMEN SOURCE: SIGNIFICANT CHANGE UP

## 2018-06-15 RX ADMIN — MEGESTROL ACETATE 400 MILLIGRAM(S): 40 SUSPENSION ORAL at 12:34

## 2018-06-15 RX ADMIN — PANTOPRAZOLE SODIUM 40 MILLIGRAM(S): 20 TABLET, DELAYED RELEASE ORAL at 05:45

## 2018-06-15 RX ADMIN — IRON SUCROSE 210 MILLIGRAM(S): 20 INJECTION, SOLUTION INTRAVENOUS at 18:32

## 2018-06-15 RX ADMIN — PIPERACILLIN AND TAZOBACTAM 25 GRAM(S): 4; .5 INJECTION, POWDER, LYOPHILIZED, FOR SOLUTION INTRAVENOUS at 05:38

## 2018-06-15 RX ADMIN — FINASTERIDE 5 MILLIGRAM(S): 5 TABLET, FILM COATED ORAL at 12:34

## 2018-06-15 RX ADMIN — SENNA PLUS 2 TABLET(S): 8.6 TABLET ORAL at 22:37

## 2018-06-15 RX ADMIN — PANTOPRAZOLE SODIUM 40 MILLIGRAM(S): 20 TABLET, DELAYED RELEASE ORAL at 18:31

## 2018-06-15 RX ADMIN — ENOXAPARIN SODIUM 30 MILLIGRAM(S): 100 INJECTION SUBCUTANEOUS at 12:33

## 2018-06-15 RX ADMIN — TAMSULOSIN HYDROCHLORIDE 0.4 MILLIGRAM(S): 0.4 CAPSULE ORAL at 22:37

## 2018-06-15 RX ADMIN — PIPERACILLIN AND TAZOBACTAM 25 GRAM(S): 4; .5 INJECTION, POWDER, LYOPHILIZED, FOR SOLUTION INTRAVENOUS at 19:40

## 2018-06-15 RX ADMIN — Medication 100 MILLIGRAM(S): at 19:41

## 2018-06-15 NOTE — PROGRESS NOTE ADULT - ATTENDING COMMENTS
discussed with pt's family including his son, an internist, will follow renal function, WBCS, Left hydronephrosis.

## 2018-06-15 NOTE — PROGRESS NOTE ADULT - SUBJECTIVE AND OBJECTIVE BOX
INTERVAL HPI/OVERNIGHT EVENTS:    Patient lying comfortably.  No complaint of abdominal pain.  Tolerating diet with no nausea/vomiting.  Obrien intact with slight turbid yellow urine/pyuria.      Vital Signs Last 24 Hrs  T(C): 37.2 (2018 23:26), Max: 37.5 (2018 16:36)  T(F): 98.9 (2018 23:26), Max: 99.5 (2018 16:36)  HR: 94 (2018 23:26) (72 - 94)  BP: 128/72 (2018 23:26) (114/63 - 135/98)  BP(mean): --  RR: 18 (2018 23:26) (18 - 18)  SpO2: 100% (2018 23:) (98% - 100%)    MEDICATIONS  (STANDING):  amLODIPine   Tablet 5 milliGRAM(s) Oral daily  enoxaparin Injectable 30 milliGRAM(s) SubCutaneous daily  finasteride 5 milliGRAM(s) Oral daily  iron sucrose IVPB 100 milliGRAM(s) IV Intermittent every 24 hours  labetalol 100 milliGRAM(s) Oral two times a day  megestrol Suspension 400 milliGRAM(s) Oral daily  pantoprazole  Injectable 40 milliGRAM(s) IV Push two times a day  piperacillin/tazobactam IVPB. 3.375 Gram(s) IV Intermittent every 12 hours  senna 2 Tablet(s) Oral at bedtime  sodium chloride 0.9%. 1000 milliLiter(s) (100 mL/Hr) IV Continuous <Continuous>  tamsulosin 0.4 milliGRAM(s) Oral at bedtime    MEDICATIONS  (PRN):  acetaminophen  Suppository 650 milliGRAM(s) Rectal every 6 hours PRN For Temp greater than 38 C (100.4 F)  magnesium hydroxide Suspension 30 milliLiter(s) Oral daily PRN Constipation      PHYSICAL EXAM:    GENERAL: NAD  HEAD:  Atraumatic, Normocephalic  EYES: EOMI, PERRLA, conjunctiva and sclera clear  CHEST/LUNG: Clear to ausculation, bilaterally   HEART: S1S2  ABDOMEN: non distended, +BS, soft, non tender, no guarding  EXTREMITIES:  calf soft, non tender   :  Genitalia: Circumcised Phallus, mid shaft hypospadias. Both testicles descended, non tender, no mass. No epididymitis or epididymal mass. marked bilateral hydroceles    I&O's Detail    2018 07:01  -  15 Eliezer 2018 07:00  --------------------------------------------------------  IN:    IV PiggyBack: 100 mL    Oral Fluid: 410 mL  Total IN: 510 mL    OUT:  Total OUT: 0 mL    Total NET: 510 mL      15 Eliezer 2018 07:01  -  15 Eliezer 2018 09:40  --------------------------------------------------------  IN:    Oral Fluid: 120 mL  Total IN: 120 mL    OUT:  Total OUT: 0 mL    Total NET: 120 mL          LABS:                        8.6    8.04  )-----------( 263      ( 2018 07:11 )             25.7         141  |  104  |  36<H>  ----------------------------<  92  3.3<L>   |  26  |  1.73<H>    Ca    9.2      2018 07:11        Urinalysis Basic - ( 2018 14:03 )    Color: Yellow / Appearance: very cloudy / S.015 / pH: x  Gluc: x / Ketone: Negative  / Bili: Negative / Urobili: Negative mg/dL   Blood: x / Protein: 100 mg/dL / Nitrite: Negative   Leuk Esterase: Moderate / RBC: 25-50 /HPF / WBC >50   Sq Epi: x / Non Sq Epi: Few / Bacteria: Many      Culture Results:   No growth to date. ( @ 08:25)    RADIOLOGY & ADDITIONAL STUDIES:    < from: CT Abdomen and Pelvis w/ Oral Cont (18 @ 14:11) >    LIVER: Within normal limits.  BILE DUCTS: Normal caliber.  GALLBLADDER: Within normal limits.  SPLEEN: Within normal limits.  PANCREAS: Within normal limits.  ADRENALS: Within normal limits.  KIDNEYS/URETERS: 3.1 cm right renal cyst again noted. New moderate   left-sided hydronephrosis with a dilated extrarenal pelvis. The ureter is   moderately. No ureterolithiasis is seen.  BLADDER: Circumferential urinary bladder wall thickening and trace.   Vesicular inflammation..  REPRODUCTIVE ORGANS: Prostatomegaly.    BOWEL: Diverticulosis coli. No bowel obstruction. Appendix normal.  PERITONEUM: No ascites.  VESSELS:  Tortuous calcified abdominal aorta..  RETROPERITONEUM: No lymphadenopathy.    ABDOMINAL WALL: Small bilateral inguinal hernias. Bilateral scrotal   hydroceles. Right anterior abdominal wall subcutaneous air likely from   recent subcutaneous injections.  BONES: Extensive multilevel degenerative changes of the spine with bulky   lumbar osteophytes and straightening of the lumbar lordosis.    IMPRESSION: New moderate sided left hydroureteronephrosis. No radiopaque   urolithiasis is seen. Consider distal left ureteral stricture.    Persistent urinary bladder wall thickening and adjacent inflammation.   Correlate for infection. Pharyngeal includes neoplasm.    < end of copied text >      Impression:    88M with HTN, CKD st III, a/w ROGER on CKD,  urosepsis due to Klebsiella with hydronephrosis, Left.  thickened bladder wall/enlarged prostate     Plan:  cont obrien/obrien care, I/O, trend renal function   Abx per ID   cont medical management/supportive care   prophylactic measure   will discuss with Dr. Mensah Re:  further urological intervention

## 2018-06-15 NOTE — PROGRESS NOTE ADULT - ASSESSMENT
uti, positive blood cultures.  dementia.  bedridden from spinal stenosis  bladder outlet obstruction

## 2018-06-16 LAB
-  AMIKACIN: SIGNIFICANT CHANGE UP
-  AMOXICILLIN/CLAVULANIC ACID: SIGNIFICANT CHANGE UP
-  AMPICILLIN/SULBACTAM: SIGNIFICANT CHANGE UP
-  AMPICILLIN: SIGNIFICANT CHANGE UP
-  AZTREONAM: SIGNIFICANT CHANGE UP
-  CEFAZOLIN: SIGNIFICANT CHANGE UP
-  CEFEPIME: SIGNIFICANT CHANGE UP
-  CEFOXITIN: SIGNIFICANT CHANGE UP
-  CEFTRIAXONE: SIGNIFICANT CHANGE UP
-  CIPROFLOXACIN: SIGNIFICANT CHANGE UP
-  ERTAPENEM: SIGNIFICANT CHANGE UP
-  GENTAMICIN: SIGNIFICANT CHANGE UP
-  IMIPENEM: SIGNIFICANT CHANGE UP
-  LEVOFLOXACIN: SIGNIFICANT CHANGE UP
-  MEROPENEM: SIGNIFICANT CHANGE UP
-  NITROFURANTOIN: SIGNIFICANT CHANGE UP
-  PIPERACILLIN/TAZOBACTAM: SIGNIFICANT CHANGE UP
-  TIGECYCLINE: SIGNIFICANT CHANGE UP
-  TOBRAMYCIN: SIGNIFICANT CHANGE UP
-  TRIMETHOPRIM/SULFAMETHOXAZOLE: SIGNIFICANT CHANGE UP
ANION GAP SERPL CALC-SCNC: 9 MMOL/L — SIGNIFICANT CHANGE UP (ref 5–17)
BUN SERPL-MCNC: 32 MG/DL — HIGH (ref 7–23)
CALCIUM SERPL-MCNC: 9.8 MG/DL — SIGNIFICANT CHANGE UP (ref 8.5–10.1)
CHLORIDE SERPL-SCNC: 105 MMOL/L — SIGNIFICANT CHANGE UP (ref 96–108)
CO2 SERPL-SCNC: 27 MMOL/L — SIGNIFICANT CHANGE UP (ref 22–31)
CREAT SERPL-MCNC: 1.37 MG/DL — HIGH (ref 0.5–1.3)
CULTURE RESULTS: SIGNIFICANT CHANGE UP
GLUCOSE SERPL-MCNC: 116 MG/DL — HIGH (ref 70–99)
HCT VFR BLD CALC: 27.6 % — LOW (ref 39–50)
HGB BLD-MCNC: 8.7 G/DL — LOW (ref 13–17)
MCHC RBC-ENTMCNC: 28.5 PG — SIGNIFICANT CHANGE UP (ref 27–34)
MCHC RBC-ENTMCNC: 31.5 GM/DL — LOW (ref 32–36)
MCV RBC AUTO: 90.5 FL — SIGNIFICANT CHANGE UP (ref 80–100)
METHOD TYPE: SIGNIFICANT CHANGE UP
NRBC # BLD: 0 /100 WBCS — SIGNIFICANT CHANGE UP (ref 0–0)
ORGANISM # SPEC MICROSCOPIC CNT: SIGNIFICANT CHANGE UP
ORGANISM # SPEC MICROSCOPIC CNT: SIGNIFICANT CHANGE UP
PLATELET # BLD AUTO: 315 K/UL — SIGNIFICANT CHANGE UP (ref 150–400)
POTASSIUM SERPL-MCNC: 3.3 MMOL/L — LOW (ref 3.5–5.3)
POTASSIUM SERPL-SCNC: 3.3 MMOL/L — LOW (ref 3.5–5.3)
RBC # BLD: 3.05 M/UL — LOW (ref 4.2–5.8)
RBC # FLD: 14.8 % — HIGH (ref 10.3–14.5)
SODIUM SERPL-SCNC: 141 MMOL/L — SIGNIFICANT CHANGE UP (ref 135–145)
SPECIMEN SOURCE: SIGNIFICANT CHANGE UP
WBC # BLD: 9.93 K/UL — SIGNIFICANT CHANGE UP (ref 3.8–10.5)
WBC # FLD AUTO: 9.93 K/UL — SIGNIFICANT CHANGE UP (ref 3.8–10.5)

## 2018-06-16 RX ORDER — POTASSIUM CHLORIDE 20 MEQ
40 PACKET (EA) ORAL ONCE
Qty: 0 | Refills: 0 | Status: COMPLETED | OUTPATIENT
Start: 2018-06-16 | End: 2018-06-16

## 2018-06-16 RX ORDER — IRON SUCROSE 20 MG/ML
100 INJECTION, SOLUTION INTRAVENOUS ONCE
Qty: 0 | Refills: 0 | Status: COMPLETED | OUTPATIENT
Start: 2018-06-16 | End: 2018-06-16

## 2018-06-16 RX ADMIN — ENOXAPARIN SODIUM 30 MILLIGRAM(S): 100 INJECTION SUBCUTANEOUS at 11:20

## 2018-06-16 RX ADMIN — Medication 40 MILLIEQUIVALENT(S): at 11:19

## 2018-06-16 RX ADMIN — FINASTERIDE 5 MILLIGRAM(S): 5 TABLET, FILM COATED ORAL at 11:21

## 2018-06-16 RX ADMIN — IRON SUCROSE 210 MILLIGRAM(S): 20 INJECTION, SOLUTION INTRAVENOUS at 23:26

## 2018-06-16 RX ADMIN — AMLODIPINE BESYLATE 5 MILLIGRAM(S): 2.5 TABLET ORAL at 11:19

## 2018-06-16 RX ADMIN — Medication 100 MILLIGRAM(S): at 17:27

## 2018-06-16 RX ADMIN — PIPERACILLIN AND TAZOBACTAM 25 GRAM(S): 4; .5 INJECTION, POWDER, LYOPHILIZED, FOR SOLUTION INTRAVENOUS at 17:27

## 2018-06-16 RX ADMIN — PANTOPRAZOLE SODIUM 40 MILLIGRAM(S): 20 TABLET, DELAYED RELEASE ORAL at 06:32

## 2018-06-16 RX ADMIN — PANTOPRAZOLE SODIUM 40 MILLIGRAM(S): 20 TABLET, DELAYED RELEASE ORAL at 17:27

## 2018-06-16 RX ADMIN — Medication 100 MILLIGRAM(S): at 06:32

## 2018-06-16 RX ADMIN — PIPERACILLIN AND TAZOBACTAM 25 GRAM(S): 4; .5 INJECTION, POWDER, LYOPHILIZED, FOR SOLUTION INTRAVENOUS at 06:31

## 2018-06-16 RX ADMIN — MEGESTROL ACETATE 400 MILLIGRAM(S): 40 SUSPENSION ORAL at 11:20

## 2018-06-16 NOTE — PROGRESS NOTE ADULT - SUBJECTIVE AND OBJECTIVE BOX
Patient is a 88y old  Male who presents with a chief complaint of weakness, UTI, SANJIV,CKD stage 3 , dehydration (2018 23:16)  s/p klebsiella sepsis, Bladder outflow obstruction.  Sanjiv improved.     INTERVAL HPI/OVERNIGHT EVENTS:   uneventful. no fever     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, chronic  Renal insufficiency  Dehydration  Spinal stenosis of lumbar region  BPH (benign prostatic hypertrophy)  Hypertension  Benign Prostatic Hyperplasia  HTN (Hypertension)  No significant past surgical history      MEDICATIONS  (STANDING):  amLODIPine   Tablet 5 milliGRAM(s) Oral daily  enoxaparin Injectable 30 milliGRAM(s) SubCutaneous daily  finasteride 5 milliGRAM(s) Oral daily  iron sucrose IVPB 100 milliGRAM(s) IV Intermittent once  labetalol 100 milliGRAM(s) Oral two times a day  megestrol Suspension 400 milliGRAM(s) Oral daily  pantoprazole  Injectable 40 milliGRAM(s) IV Push two times a day  piperacillin/tazobactam IVPB. 3.375 Gram(s) IV Intermittent every 12 hours  senna 2 Tablet(s) Oral at bedtime  sodium chloride 0.9%. 1000 milliLiter(s) (100 mL/Hr) IV Continuous <Continuous>  tamsulosin 0.4 milliGRAM(s) Oral at bedtime    MEDICATIONS  (PRN):  acetaminophen  Suppository 650 milliGRAM(s) Rectal every 6 hours PRN For Temp greater than 38 C (100.4 F)  magnesium hydroxide Suspension 30 milliLiter(s) Oral daily PRN Constipation      Allergies    No Known Allergies    Intolerances        REVIEW OF SYSTEMS:  CONSTITUTIONAL: No fever, weight loss, or fatigue  EYES: No eye pain, visual disturbances, or discharge  ENMT:  No difficulty hearing, tinnitus, vertigo; No sinus or throat pain  NECK: No pain or stiffness  BREASTS: No pain, masses, or nipple discharge  RESPIRATORY: No cough, wheezing, chills or hemoptysis; No shortness of breath  CARDIOVASCULAR: No chest pain, palpitations, dizziness, or leg swelling  GASTROINTESTINAL: No abdominal or epigastric pain. No nausea, vomiting, or hematemesis; No diarrhea or constipation. No melena or hematochezia.  GENITOURINARY: No dysuria, frequency, hematuria, or incontinence  NEUROLOGICAL: No headaches, memory loss, loss of strength, numbness, or tremors  SKIN: No itching, burning, rashes, or lesions   LYMPH NODES: No enlarged glands  ENDOCRINE: No heat or cold intolerance; No hair loss  MUSCULOSKELETAL: No joint pain or swelling; No muscle, back, or extremity pain  PSYCHIATRIC: No depression, anxiety, mood swings, or difficulty sleeping  HEME/LYMPH: No easy bruising, or bleeding gums  ALLERY AND IMMUNOLOGIC: No hives or eczema    Vital Signs Last 24 Hrs  T(C): 36.2 (2018 11:09), Max: 36.9 (15 Eliezer 2018 17:55)  T(F): 97.2 (2018 11:09), Max: 98.4 (15 Eliezer 2018 17:55)  HR: 78 (2018 11:09) (78 - 85)  BP: 113/65 (2018 11:09) (106/62 - 136/79)  BP(mean): --  RR: 18 (2018 11:09) (18 - 19)  SpO2: 98% (2018 11:09) (98% - 100%)    PHYSICAL EXAM:  GENERAL: NAD, well-groomed, well-developed  HEAD:  Atraumatic, Normocephalic  EYES: EOMI, PERRLA, conjunctiva and sclera clear  ENMT: No tonsillar erythema, exudates, or enlargement; Moist mucous membranes, Good dentition, No lesions  NECK: Supple, No JVD, Normal thyroid  NERVOUS SYSTEM:  Alert & Oriented X3, Good concentration; Motor Strength 5/5 B/L upper and lower extremities; DTRs 2+ intact and symmetric  CHEST/LUNG: Clear to percussion bilaterally; No rales, rhonchi, wheezing, or rubs  HEART: Regular rate and rhythm; No murmurs, rubs, or gallops  ABDOMEN: Soft, Nontender, Nondistended; Bowel sounds present  EXTREMITIES:  2+ Peripheral Pulses, No clubbing, cyanosis, or edema  LYMPH: No lymphadenopathy noted  SKIN: No rashes or lesions    LABS:                        8.7    9.93  )-----------( 315      ( 2018 10:16 )             27.6     06-16    141  |  105  |  32<H>  ----------------------------<  116<H>  3.3<L>   |  27  |  1.37<H>    Ca    9.8      2018 10:16          Urinalysis Basic - ( 2018 14:03 )    Color: Yellow / Appearance: very cloudy / S.015 / pH: x  Gluc: x / Ketone: Negative  / Bili: Negative / Urobili: Negative mg/dL   Blood: x / Protein: 100 mg/dL / Nitrite: Negative   Leuk Esterase: Moderate / RBC: 25-50 /HPF / WBC >50   Sq Epi: x / Non Sq Epi: Few / Bacteria: Many      CAPILLARY BLOOD GLUCOSE                    RADIOLOGY & ADDITIONAL TESTS:    Imaging Personally Reviewed:  [ ] YES  [ ] NO    Consultant(s) Notes Reviewed:  [ ] YES  [ ] NO    Care Discussed with Consultants/Other Providers [ ] YES  [ ] NO    Care discussed with family,         [  ]   yes  [  ]  No    imp:    stable[ ]    unstable[  ]     improving [  x ]       unchanged  [  ]                Plans:  Continue present plans  [x  ] as per Gu and ID. potassium supplementation.  continue Iv zosyn               New consult [  ]   specialty  .......               order test[  ]    test name.                  Discharge Planning  [  ]

## 2018-06-16 NOTE — PROGRESS NOTE ADULT - SUBJECTIVE AND OBJECTIVE BOX
87yo M w no reported PMH presents to ED for eval of decr appetite, weakness & fever.  Daughter states pt started w sx about 1wk ago, PMD had home visit, dx pt w UTI, pt started on unknown abx 2d ago.  Pt has been taking abx but sx not improving.  Pt denies CP, SOB, vomiting, diarrhea, abd pain.   also with fever   work up consistent with urinary tract infection left hydro and kleb in blood   FINAL ID noted      Allergies    No Known Allergies    Intolerances        MEDICATIONS  (STANDING):  amLODIPine   Tablet 5 milliGRAM(s) Oral daily  enoxaparin Injectable 30 milliGRAM(s) SubCutaneous daily  finasteride 5 milliGRAM(s) Oral daily  labetalol 100 milliGRAM(s) Oral two times a day  megestrol Suspension 400 milliGRAM(s) Oral daily  pantoprazole  Injectable 40 milliGRAM(s) IV Push two times a day  piperacillin/tazobactam IVPB. 3.375 Gram(s) IV Intermittent every 12 hours  senna 2 Tablet(s) Oral at bedtime  sodium chloride 0.9%. 1000 milliLiter(s) (100 mL/Hr) IV Continuous <Continuous>  tamsulosin 0.4 milliGRAM(s) Oral at bedtime    MEDICATIONS  (PRN):  acetaminophen  Suppository 650 milliGRAM(s) Rectal every 6 hours PRN For Temp greater than 38 C (100.4 F)  magnesium hydroxide Suspension 30 milliLiter(s) Oral daily PRN Constipation      REVIEW OF SYSTEMS:    feels better    VITAL SIGNS:  T(C): 36.8 (06-16-18 @ 17:08), Max: 36.8 (06-16-18 @ 17:08)  T(F): 98.2 (06-16-18 @ 17:08), Max: 98.2 (06-16-18 @ 17:08)  HR: 74 (06-16-18 @ 17:08) (74 - 85)  BP: 112/80 (06-16-18 @ 17:08) (112/80 - 128/71)  RR: 18 (06-16-18 @ 17:08) (18 - 18)  SpO2: 100% (06-16-18 @ 17:08) (98% - 100%)  Wt(kg): --    PHYSICAL EXAM:    GENERAL: not in any distress  HEENT: Neck is supple, normocephalic, atraumatic   CHEST/LUNG: Clear to auscultation bilaterally; No rales, rhonchi, wheezing  HEART: Regular rate and rhythm; No murmurs, rubs, or gallops  ABDOMEN: Soft, Nontender, Nondistended; Bowel sounds present, no rebound   EXTREMITIES:  2+ Peripheral Pulses, No clubbing, cyanosis, or edema  GENITOURINARY: no change in status ; obrien   SKIN: No rashes or lesions  BACK: no pressor sore   NERVOUS SYSTEM:  Alert & Oriented X3, Good concentration  PSYCH: normal affect     LABS:                         8.7    9.93  )-----------( 315      ( 16 Jun 2018 10:16 )             27.6     06-16    141  |  105  |  32<H>  ----------------------------<  116<H>  3.3<L>   |  27  |  1.37<H>    Ca    9.8      16 Jun 2018 10:16                                Culture Results:   50,000 - 99,000 CFU/mL Klebsiella pneumoniae (06-14 @ 18:16)  Culture Results:   No growth to date. (06-14 @ 08:25)  Culture Results:   Growth in aerobic bottle: Klebsiella pneumoniae  "Due to technical problems, Proteus sp. will Not be reported as part of  the BCID panel until further notice"  ***Blood Panel PCR results on this specimen are available  approximately 3 hours after the Gram stain result.***  Gram stain, PCR, and/or culture results may not always  correspond due to difference in methodologies.  ************************************************************  This PCR assay was performed using [employer].  The following targets are tested for: Enterococcus,  vancomycin resistant enterococci, Listeria monocytogenes,  coagulase negative staphylococci, S. aureus,  methicillin resistant S. aureus, Streptococcus agalactiae  (Group B), S. pneumoniae, S. pyogenes (Group A),  Acinetobacter baumannii, Enterobacter cloacae, E. coli,  Klebsiella oxytoca, K. pneumoniae, Proteus sp.,  Serratia marcescens, Haemophilus influenzae,  Neisseria meningitidis, Pseudomonas aeruginosa, Candida  albicans, C. glabrata, C krusei, C parapsilosis,  C. tropicalis and the KPC resistance gene. (06-12 @ 09:25)  Culture Results:   >100,000 CFU/ml Klebsiella pneumoniae (06-12 @ 09:24)  Culture Results:   No growth to date. (06-12 @ 09:23)                Radiology:

## 2018-06-16 NOTE — PROGRESS NOTE ADULT - SUBJECTIVE AND OBJECTIVE BOX
Patient seen and examined bedside resting comfortably.  No complaints offered.   Has Ma cathter, Afebrile    T(F): 97.7 (06-16-18 @ 05:14), Max: 98.9 (06-15-18 @ 11:39)  HR: 85 (06-16-18 @ 05:14) (76 - 85)  BP: 128/71 (06-16-18 @ 05:14) (106/62 - 136/79)  RR: 18 (06-16-18 @ 05:14) (18 - 19)  SpO2: 99% (06-16-18 @ 05:14) (99% - 100%)  Wt(kg): --  CAPILLARY BLOOD GLUCOSE    PHYSICAL EXAM:  General: NAD, alert and awake  HEENT: NCAT, EOMI, conjunctiva clear  Chest: nonlabored respirations, good inspiratory effort  Abdomen: soft, NTND.   Extremities: no pedal edema or calf tenderness noted   : uncircumcised phallus, adequate meatus. Ma in SITU with pyuria 1800ml/24hrs    LABS:                        8.7    9.93  )-----------( 315      ( 16 Jun 2018 10:16 )             27.6         I&O's Detail    15 Eliezer 2018 07:01  -  16 Jun 2018 07:00  --------------------------------------------------------  IN:    IV PiggyBack: 100 mL    Oral Fluid: 240 mL  Total IN: 340 mL    OUT:    Indwelling Catheter - Urethral: 1800 mL  Total OUT: 1800 mL    Total NET: -1460 mL      Impression:    88M with HTN, CKD st III, a/w ROGER on CKD,  urosepsis due to Klebsiella with Left hydronephrosis.  Thickened  bladder wall/enlarged prostate on CT    Plan:  -cont Ma Catheter, I/O, trend renal function   -Abiotics per ID   -cont medical management/supportive care   Dr. Mensah to discuss with family for further urological intervention Patient seen and examined bedside resting comfortably.  No complaints offered.   Has Ma cathter, Afebrile    T(F): 97.7 (06-16-18 @ 05:14), Max: 98.9 (06-15-18 @ 11:39)  HR: 85 (06-16-18 @ 05:14) (76 - 85)  BP: 128/71 (06-16-18 @ 05:14) (106/62 - 136/79)  RR: 18 (06-16-18 @ 05:14) (18 - 19)  SpO2: 99% (06-16-18 @ 05:14) (99% - 100%)  Wt(kg): --  CAPILLARY BLOOD GLUCOSE    PHYSICAL EXAM:  General: NAD, alert and awake  HEENT: NCAT, EOMI, conjunctiva clear  Chest: nonlabored respirations, good inspiratory effort  Abdomen: soft, NTND.   Extremities: no pedal edema or calf tenderness noted   : uncircumcised phallus, adequate meatus. Ma in SITU with yellow urine with + sediment 1800ml/24hrs    LABS:                        8.7    9.93  )-----------( 315      ( 16 Jun 2018 10:16 )             27.6         I&O's Detail    15 Eliezer 2018 07:01  -  16 Jun 2018 07:00  --------------------------------------------------------  IN:    IV PiggyBack: 100 mL    Oral Fluid: 240 mL  Total IN: 340 mL    OUT:    Indwelling Catheter - Urethral: 1800 mL  Total OUT: 1800 mL    Total NET: -1460 mL      Impression:    88M with HTN, CKD st III, a/w ROGER on CKD,  urosepsis due to Klebsiella with Left hydronephrosis.  Thickened  bladder wall/enlarged prostate on CT    Plan:  -cont Ma Catheter, I/O, trend renal function   -Abiotics per ID   -cont medical management/supportive care   Dr. Mensah to discuss with family for further urological intervention

## 2018-06-16 NOTE — PROGRESS NOTE ADULT - ASSESSMENT
urosepsis   kleb bacteremia fairly sensitive   obs uropathy     eval noted   follow up   continue current treatment

## 2018-06-17 LAB
ANION GAP SERPL CALC-SCNC: 11 MMOL/L — SIGNIFICANT CHANGE UP (ref 5–17)
BUN SERPL-MCNC: 36 MG/DL — HIGH (ref 7–23)
CALCIUM SERPL-MCNC: 10.1 MG/DL — SIGNIFICANT CHANGE UP (ref 8.5–10.1)
CHLORIDE SERPL-SCNC: 107 MMOL/L — SIGNIFICANT CHANGE UP (ref 96–108)
CO2 SERPL-SCNC: 24 MMOL/L — SIGNIFICANT CHANGE UP (ref 22–31)
CREAT SERPL-MCNC: 1.62 MG/DL — HIGH (ref 0.5–1.3)
CULTURE RESULTS: SIGNIFICANT CHANGE UP
GLUCOSE SERPL-MCNC: 100 MG/DL — HIGH (ref 70–99)
HCT VFR BLD CALC: 27.5 % — LOW (ref 39–50)
HGB BLD-MCNC: 8.7 G/DL — LOW (ref 13–17)
MCHC RBC-ENTMCNC: 28.3 PG — SIGNIFICANT CHANGE UP (ref 27–34)
MCHC RBC-ENTMCNC: 31.6 GM/DL — LOW (ref 32–36)
MCV RBC AUTO: 89.6 FL — SIGNIFICANT CHANGE UP (ref 80–100)
NRBC # BLD: 0 /100 WBCS — SIGNIFICANT CHANGE UP (ref 0–0)
PLATELET # BLD AUTO: 366 K/UL — SIGNIFICANT CHANGE UP (ref 150–400)
POTASSIUM SERPL-MCNC: 3.7 MMOL/L — SIGNIFICANT CHANGE UP (ref 3.5–5.3)
POTASSIUM SERPL-SCNC: 3.7 MMOL/L — SIGNIFICANT CHANGE UP (ref 3.5–5.3)
RBC # BLD: 3.07 M/UL — LOW (ref 4.2–5.8)
RBC # FLD: 14.9 % — HIGH (ref 10.3–14.5)
SODIUM SERPL-SCNC: 142 MMOL/L — SIGNIFICANT CHANGE UP (ref 135–145)
SPECIMEN SOURCE: SIGNIFICANT CHANGE UP
WBC # BLD: 10.54 K/UL — HIGH (ref 3.8–10.5)
WBC # FLD AUTO: 10.54 K/UL — HIGH (ref 3.8–10.5)

## 2018-06-17 RX ORDER — SODIUM CHLORIDE 9 MG/ML
1000 INJECTION INTRAMUSCULAR; INTRAVENOUS; SUBCUTANEOUS
Qty: 0 | Refills: 0 | Status: DISCONTINUED | OUTPATIENT
Start: 2018-06-17 | End: 2018-06-20

## 2018-06-17 RX ADMIN — PIPERACILLIN AND TAZOBACTAM 25 GRAM(S): 4; .5 INJECTION, POWDER, LYOPHILIZED, FOR SOLUTION INTRAVENOUS at 17:44

## 2018-06-17 RX ADMIN — PANTOPRAZOLE SODIUM 40 MILLIGRAM(S): 20 TABLET, DELAYED RELEASE ORAL at 17:45

## 2018-06-17 RX ADMIN — MEGESTROL ACETATE 400 MILLIGRAM(S): 40 SUSPENSION ORAL at 11:49

## 2018-06-17 RX ADMIN — PIPERACILLIN AND TAZOBACTAM 25 GRAM(S): 4; .5 INJECTION, POWDER, LYOPHILIZED, FOR SOLUTION INTRAVENOUS at 05:45

## 2018-06-17 RX ADMIN — PANTOPRAZOLE SODIUM 40 MILLIGRAM(S): 20 TABLET, DELAYED RELEASE ORAL at 05:41

## 2018-06-17 RX ADMIN — Medication 100 MILLIGRAM(S): at 17:40

## 2018-06-17 RX ADMIN — SODIUM CHLORIDE 100 MILLILITER(S): 9 INJECTION INTRAMUSCULAR; INTRAVENOUS; SUBCUTANEOUS at 12:00

## 2018-06-17 RX ADMIN — FINASTERIDE 5 MILLIGRAM(S): 5 TABLET, FILM COATED ORAL at 11:49

## 2018-06-17 RX ADMIN — AMLODIPINE BESYLATE 5 MILLIGRAM(S): 2.5 TABLET ORAL at 05:41

## 2018-06-17 RX ADMIN — SENNA PLUS 2 TABLET(S): 8.6 TABLET ORAL at 22:16

## 2018-06-17 RX ADMIN — Medication 100 MILLIGRAM(S): at 05:41

## 2018-06-17 RX ADMIN — ENOXAPARIN SODIUM 30 MILLIGRAM(S): 100 INJECTION SUBCUTANEOUS at 11:59

## 2018-06-17 RX ADMIN — TAMSULOSIN HYDROCHLORIDE 0.4 MILLIGRAM(S): 0.4 CAPSULE ORAL at 22:16

## 2018-06-17 NOTE — PROGRESS NOTE ADULT - SUBJECTIVE AND OBJECTIVE BOX
Patient is a 88y old  Male who presents with a chief complaint of weakness, UTI, ROGER,CKD stage 3 , dehydration (11 Jun 2018 23:16)  bed ridden male with klebsiella sepsis from UTI POA,  ROGER resolving    INTERVAL HPI/OVERNIGHT EVENTS: looking clinically dehydrated today  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, chronic  Renal insufficiency  Dehydration  Spinal stenosis of lumbar region  BPH (benign prostatic hypertrophy)  Hypertension  Benign Prostatic Hyperplasia  HTN (Hypertension)  No significant past surgical history      MEDICATIONS  (STANDING):  amLODIPine   Tablet 5 milliGRAM(s) Oral daily  enoxaparin Injectable 30 milliGRAM(s) SubCutaneous daily  finasteride 5 milliGRAM(s) Oral daily  labetalol 100 milliGRAM(s) Oral two times a day  megestrol Suspension 400 milliGRAM(s) Oral daily  pantoprazole  Injectable 40 milliGRAM(s) IV Push two times a day  piperacillin/tazobactam IVPB. 3.375 Gram(s) IV Intermittent every 12 hours  senna 2 Tablet(s) Oral at bedtime  sodium chloride 0.9%. 1000 milliLiter(s) (100 mL/Hr) IV Continuous <Continuous>  tamsulosin 0.4 milliGRAM(s) Oral at bedtime    MEDICATIONS  (PRN):  acetaminophen  Suppository 650 milliGRAM(s) Rectal every 6 hours PRN For Temp greater than 38 C (100.4 F)  magnesium hydroxide Suspension 30 milliLiter(s) Oral daily PRN Constipation      Allergies    No Known Allergies    Intolerances        REVIEW OF SYSTEMS:  CONSTITUTIONAL: No fever, weight loss, or fatigue  EYES: No eye pain, visual disturbances, or discharge  ENMT:  No difficulty hearing, tinnitus, vertigo; No sinus or throat pain  NECK: No pain or stiffness  BREASTS: No pain, masses, or nipple discharge  RESPIRATORY: No cough, wheezing, chills or hemoptysis; No shortness of breath  CARDIOVASCULAR: No chest pain, palpitations, dizziness, or leg swelling  GASTROINTESTINAL: No abdominal or epigastric pain. No nausea, vomiting, or hematemesis; No diarrhea or constipation. No melena or hematochezia.  GENITOURINARY: No dysuria, frequency, hematuria, or incontinence  NEUROLOGICAL: No headaches, memory loss, loss of strength, numbness, or tremors  SKIN: No itching, burning, rashes, or lesions   LYMPH NODES: No enlarged glands  ENDOCRINE: No heat or cold intolerance; No hair loss  MUSCULOSKELETAL: No joint pain or swelling; No muscle, back, or extremity pain  PSYCHIATRIC: No depression, anxiety, mood swings, or difficulty sleeping  HEME/LYMPH: No easy bruising, or bleeding gums  ALLERY AND IMMUNOLOGIC: No hives or eczema    Vital Signs Last 24 Hrs  T(C): 36.7 (17 Jun 2018 05:42), Max: 36.8 (16 Jun 2018 17:08)  T(F): 98 (17 Jun 2018 05:42), Max: 98.2 (16 Jun 2018 17:08)  HR: 87 (17 Jun 2018 05:42) (74 - 87)  BP: 129/69 (17 Jun 2018 05:42) (112/80 - 129/69)  BP(mean): --  RR: 18 (17 Jun 2018 05:42) (18 - 18)  SpO2: 98% (17 Jun 2018 05:42) (98% - 100%)    PHYSICAL EXAM:  GENERAL: NAD, well-groomed, well-developed  HEAD:  Atraumatic, Normocephalic  EYES: EOMI, PERRLA, conjunctiva and sclera clear  ENMT: No tonsillar erythema, exudates, or enlargement; Moist mucous membranes, Good dentition, No lesions  NECK: Supple, No JVD, Normal thyroid  NERVOUS SYSTEM:  Alert & Oriented X3, Good concentration; Motor Strength 5/5 B/L upper and lower extremities; DTRs 2+ intact and symmetric  CHEST/LUNG: Clear to percussion bilaterally; No rales, rhonchi, wheezing, or rubs  HEART: Regular rate and rhythm; No murmurs, rubs, or gallops  ABDOMEN: Soft, Nontender, Nondistended; Bowel sounds present. hAs indwelling orbien cath.  EXTREMITIES:  2+ Peripheral Pulses, No clubbing, cyanosis, or edema  LYMPH: No lymphadenopathy noted  SKIN: No rashes or lesions    LABS:                        8.7    10.54 )-----------( 366      ( 17 Jun 2018 07:49 )             27.5     06-17    142  |  107  |  36<H>  ----------------------------<  100<H>  3.7   |  24  |  1.62<H>    Ca    10.1      17 Jun 2018 07:49            CAPILLARY BLOOD GLUCOSE                    RADIOLOGY & ADDITIONAL TESTS:    Imaging Personally Reviewed:  [ ] YES  [ ] NO    Consultant(s) Notes Reviewed:  [ ] YES  [ ] NO    Care Discussed with Consultants/Other Providers [ ] YES  [ ] NO    Care discussed with family,         [  ]   yes  [  ]  No    imp:    stable[ ]    unstable[  ]     improving [ x  ]       unchanged  [  ]                Plans:  Continue present plans  [x  ] continue Iv zosyn as per ID               New consult [  ]   specialty  .......               order test[  ]    test name.                  Discharge Planning  [  ]

## 2018-06-17 NOTE — PROGRESS NOTE ADULT - SUBJECTIVE AND OBJECTIVE BOX
Patient seen and examined bedside resting earlier this am  No complaints.  Denies abdominal pain, fever.    T(F): 98.2 (06-17-18 @ 17:31), Max: 98.2 (06-17-18 @ 11:13)  HR: 70 (06-17-18 @ 17:31) (70 - 87)  BP: 136/69 (06-17-18 @ 17:31) (103/59 - 136/69)  RR: 18 (06-17-18 @ 17:31) (18 - 18)  SpO2: 100% (06-17-18 @ 17:31) (98% - 100%)  Wt(kg): --  CAPILLARY BLOOD GLUCOSE      PHYSICAL EXAM:  General: NAD, alert and awake  HEENT: NCAT, EOMI, conjunctiva clear  Chest: nonlabored respirations, good inspiratory effort  Abdomen: soft, NTND.   : obrien catheter indwelling draining clear urine. +scrotal edema    LABS:                        8.7    10.54 )-----------( 366      ( 17 Jun 2018 07:49 )             27.5   06-17    142  |  107  |  36<H>  ----------------------------<  100<H>  3.7   |  24  |  1.62<H>    Ca    10.1      17 Jun 2018 07:49      I&O's Detail    16 Jun 2018 07:01  -  17 Jun 2018 07:00  --------------------------------------------------------  IN:    Solution: 100 mL    Solution: 100 mL  Total IN: 200 mL    OUT:    Indwelling Catheter - Urethral: 900 mL  Total OUT: 900 mL    Total NET: -700 mL      17 Jun 2018 07:01  -  17 Jun 2018 17:51  --------------------------------------------------------  IN:  Total IN: 0 mL    OUT:    Indwelling Catheter - Urethral: 200 mL  Total OUT: 200 mL    Total NET: -200 mL      Culture - Urine (06.14.18 @ 18:16)    -  Tobramycin: R >8    -  Amoxicillin/Clavulanic Acid: I 16/8    -  Ampicillin: R >16 These ampicillin results predict results for amoxicillin    -  Ampicillin/Sulbactam: R >16/8    -  Cefazolin: S 4 This predicts results for oral agents cefaclor, cefdinir, cefpodoxime, cefprozil, cefuroxime axetil, cephalexin and locarbef for uncomplicated UTI. Note that some isolates may be susceptible to these agents while testing resistant to cefazolin.    -  Aztreonam: S <=4    -  Cefoxitin: S <=4    -  Cefepime: S <=2    -  Ceftriaxone: S <=1 Enterobacter, Citrobacter, and Serratia may develop resistance during prolonged therapy    -  Ciprofloxacin: R >2    -  Ertapenem: S <=0.5    -  Gentamicin: S <=1    -  Imipenem: S <=1    -  Levofloxacin: S 2    -  Nitrofurantoin: I 64 Should not be used to treat pyelonephritis    -  Meropenem: S <=1    -  Trimethoprim/Sulfamethoxazole: R >2/38    -  Tigecycline: S <=1    -  Piperacillin/Tazobactam: S <=8    -  Amikacin: S <=8    Specimen Source: .Urine Catheterized    Culture Results:   50,000 - 99,000 CFU/mL Klebsiella pneumoniae    Organism Identification: Klebsiella pneumoniae    Organism: Klebsiella pneumoniae    Method Type: JASWINDER      Impression: 88M with HTN, CKD st III, a/w ROGER on CKD,  urosepsis due to Klebsiella pneumoniae with Left hydronephrosis ?due to ureteral stricture.  Thickened bladder wall/enlarged prostate on CT    Plan:  -cont Obrien Catheter, I/O, trend renal function. Will discuss urology intervention planning with Dr Mensah.  -continue antibiotics per ID, sensitivity reviewed  -cont medical management and supportive care

## 2018-06-17 NOTE — PROGRESS NOTE ADULT - ASSESSMENT
sepsis , secondary to UTI POA from obstruc tive uropathy.  dementia  bedridden   hemodynamically improved.

## 2018-06-18 LAB
ANION GAP SERPL CALC-SCNC: 10 MMOL/L — SIGNIFICANT CHANGE UP (ref 5–17)
BUN SERPL-MCNC: 31 MG/DL — HIGH (ref 7–23)
CALCIUM SERPL-MCNC: 9.7 MG/DL — SIGNIFICANT CHANGE UP (ref 8.5–10.1)
CHLORIDE SERPL-SCNC: 110 MMOL/L — HIGH (ref 96–108)
CO2 SERPL-SCNC: 25 MMOL/L — SIGNIFICANT CHANGE UP (ref 22–31)
CREAT SERPL-MCNC: 1.41 MG/DL — HIGH (ref 0.5–1.3)
GLUCOSE SERPL-MCNC: 94 MG/DL — SIGNIFICANT CHANGE UP (ref 70–99)
HCT VFR BLD CALC: 25.6 % — LOW (ref 39–50)
HGB BLD-MCNC: 8.2 G/DL — LOW (ref 13–17)
MCHC RBC-ENTMCNC: 29.1 PG — SIGNIFICANT CHANGE UP (ref 27–34)
MCHC RBC-ENTMCNC: 32 GM/DL — SIGNIFICANT CHANGE UP (ref 32–36)
MCV RBC AUTO: 90.8 FL — SIGNIFICANT CHANGE UP (ref 80–100)
NRBC # BLD: 0 /100 WBCS — SIGNIFICANT CHANGE UP (ref 0–0)
PLATELET # BLD AUTO: 332 K/UL — SIGNIFICANT CHANGE UP (ref 150–400)
POTASSIUM SERPL-MCNC: 3.5 MMOL/L — SIGNIFICANT CHANGE UP (ref 3.5–5.3)
POTASSIUM SERPL-SCNC: 3.5 MMOL/L — SIGNIFICANT CHANGE UP (ref 3.5–5.3)
RBC # BLD: 2.82 M/UL — LOW (ref 4.2–5.8)
RBC # FLD: 14.9 % — HIGH (ref 10.3–14.5)
SODIUM SERPL-SCNC: 145 MMOL/L — SIGNIFICANT CHANGE UP (ref 135–145)
WBC # BLD: 10.21 K/UL — SIGNIFICANT CHANGE UP (ref 3.8–10.5)
WBC # FLD AUTO: 10.21 K/UL — SIGNIFICANT CHANGE UP (ref 3.8–10.5)

## 2018-06-18 PROCEDURE — 76775 US EXAM ABDO BACK WALL LIM: CPT | Mod: 26

## 2018-06-18 RX ADMIN — AMLODIPINE BESYLATE 5 MILLIGRAM(S): 2.5 TABLET ORAL at 06:12

## 2018-06-18 RX ADMIN — PIPERACILLIN AND TAZOBACTAM 25 GRAM(S): 4; .5 INJECTION, POWDER, LYOPHILIZED, FOR SOLUTION INTRAVENOUS at 06:11

## 2018-06-18 RX ADMIN — Medication 100 MILLIGRAM(S): at 18:03

## 2018-06-18 RX ADMIN — PIPERACILLIN AND TAZOBACTAM 25 GRAM(S): 4; .5 INJECTION, POWDER, LYOPHILIZED, FOR SOLUTION INTRAVENOUS at 18:04

## 2018-06-18 RX ADMIN — SODIUM CHLORIDE 100 MILLILITER(S): 9 INJECTION INTRAMUSCULAR; INTRAVENOUS; SUBCUTANEOUS at 11:49

## 2018-06-18 RX ADMIN — SODIUM CHLORIDE 100 MILLILITER(S): 9 INJECTION INTRAMUSCULAR; INTRAVENOUS; SUBCUTANEOUS at 06:12

## 2018-06-18 RX ADMIN — PANTOPRAZOLE SODIUM 40 MILLIGRAM(S): 20 TABLET, DELAYED RELEASE ORAL at 06:11

## 2018-06-18 RX ADMIN — ENOXAPARIN SODIUM 30 MILLIGRAM(S): 100 INJECTION SUBCUTANEOUS at 11:50

## 2018-06-18 RX ADMIN — MEGESTROL ACETATE 400 MILLIGRAM(S): 40 SUSPENSION ORAL at 11:51

## 2018-06-18 RX ADMIN — Medication 100 MILLIGRAM(S): at 06:12

## 2018-06-18 RX ADMIN — PANTOPRAZOLE SODIUM 40 MILLIGRAM(S): 20 TABLET, DELAYED RELEASE ORAL at 18:03

## 2018-06-18 RX ADMIN — FINASTERIDE 5 MILLIGRAM(S): 5 TABLET, FILM COATED ORAL at 11:51

## 2018-06-18 NOTE — PROGRESS NOTE ADULT - ASSESSMENT
Bed ridden patient  with sepsis- klebsiella . from UTi POA.    has obrien cath , ckd stage 3,   HTn  improving

## 2018-06-18 NOTE — PROGRESS NOTE ADULT - SUBJECTIVE AND OBJECTIVE BOX
Patient is a 88y old  Male who presents with a chief complaint of weakness, UTI, ROGER,CKD stage 3 , dehydration (11 Jun 2018 23:16)    Klebsiella  sepsis, ckd stage 3, , HTN  INTERVAL HPI/OVERNIGHT EVENTS: patient received Iv fluids and BUN/CR is improved. still has obrien cath  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, chronic  Renal insufficiency  Dehydration  Spinal stenosis of lumbar region  BPH (benign prostatic hypertrophy)  Hypertension  Benign Prostatic Hyperplasia  HTN (Hypertension)  No significant past surgical history      MEDICATIONS  (STANDING):  amLODIPine   Tablet 5 milliGRAM(s) Oral daily  enoxaparin Injectable 30 milliGRAM(s) SubCutaneous daily  finasteride 5 milliGRAM(s) Oral daily  labetalol 100 milliGRAM(s) Oral two times a day  megestrol Suspension 400 milliGRAM(s) Oral daily  pantoprazole  Injectable 40 milliGRAM(s) IV Push two times a day  piperacillin/tazobactam IVPB. 3.375 Gram(s) IV Intermittent every 12 hours  senna 2 Tablet(s) Oral at bedtime  sodium chloride 0.9%. 1000 milliLiter(s) (100 mL/Hr) IV Continuous <Continuous>  tamsulosin 0.4 milliGRAM(s) Oral at bedtime    MEDICATIONS  (PRN):  acetaminophen  Suppository 650 milliGRAM(s) Rectal every 6 hours PRN For Temp greater than 38 C (100.4 F)  magnesium hydroxide Suspension 30 milliLiter(s) Oral daily PRN Constipation      Allergies    No Known Allergies    Intolerances        REVIEW OF SYSTEMS:  CONSTITUTIONAL: No fever, weight loss, or fatigue  EYES: No eye pain, visual disturbances, or discharge  ENMT:  No difficulty hearing, tinnitus, vertigo; No sinus or throat pain  NECK: No pain or stiffness  BREASTS: No pain, masses, or nipple discharge  RESPIRATORY: No cough, wheezing, chills or hemoptysis; No shortness of breath  CARDIOVASCULAR: No chest pain, palpitations, dizziness, or leg swelling  GASTROINTESTINAL: No abdominal or epigastric pain. No nausea, vomiting, or hematemesis; No diarrhea or constipation. No melena or hematochezia.  GENITOURINARY: No dysuria, frequency, hematuria, or incontinence  NEUROLOGICAL: No headaches, memory loss, loss of strength, numbness, or tremors  SKIN: No itching, burning, rashes, or lesions   LYMPH NODES: No enlarged glands  ENDOCRINE: No heat or cold intolerance; No hair loss  MUSCULOSKELETAL: No joint pain or swelling; No muscle, back, or extremity pain  PSYCHIATRIC: No depression, anxiety, mood swings, or difficulty sleeping  HEME/LYMPH: No easy bruising, or bleeding gums  ALLERY AND IMMUNOLOGIC: No hives or eczema    Vital Signs Last 24 Hrs  T(C): 36.4 (18 Jun 2018 05:13), Max: 36.8 (17 Jun 2018 17:31)  T(F): 97.6 (18 Jun 2018 05:13), Max: 98.2 (17 Jun 2018 17:31)  HR: 69 (18 Jun 2018 05:13) (69 - 80)  BP: 131/61 (18 Jun 2018 05:13) (131/61 - 145/67)  BP(mean): --  RR: 17 (18 Jun 2018 05:13) (17 - 18)  SpO2: 99% (18 Jun 2018 05:13) (99% - 100%)    PHYSICAL EXAM:  GENERAL: NAD, well-groomed, well-developed  HEAD:  Atraumatic, Normocephalic  EYES: EOMI, PERRLA, conjunctiva and sclera clear  ENMT: No tonsillar erythema, exudates, or enlargement; Moist mucous membranes, Good dentition, No lesions  NECK: Supple, No JVD, Normal thyroid  NERVOUS SYSTEM:  Alert & Oriented X3, Good concentration; Motor Strength 5/5 B/L upper and lower extremities; DTRs 2+ intact and symmetric  CHEST/LUNG: Clear to percussion bilaterally; No rales, rhonchi, wheezing, or rubs  HEART: Regular rate and rhythm; No murmurs, rubs, or gallops  ABDOMEN: Soft, Nontender, Nondistended; Bowel sounds present  EXTREMITIES:  2+ Peripheral Pulses, No clubbing, cyanosis, or edema  LYMPH: No lymphadenopathy noted  SKIN: No rashes or lesions    LABS:                        8.2    10.21 )-----------( 332      ( 18 Jun 2018 07:48 )             25.6     06-18    145  |  110<H>  |  31<H>  ----------------------------<  94  3.5   |  25  |  1.41<H>    Ca    9.7      18 Jun 2018 07:48            CAPILLARY BLOOD GLUCOSE                    RADIOLOGY & ADDITIONAL TESTS:    Imaging Personally Reviewed:  [ ] YES  [ ] NO    Consultant(s) Notes Reviewed:  [ ] YES  [ ] NO    Care Discussed with Consultants/Other Providers [ ] YES  [ ] NO    Care discussed with family,         [  ]   yes  [  ]  No    imp:    stable[ ]    unstable[  ]     improving [  x ]       unchanged  [  ]                Plans:  Continue present plans  [x  ] as per Gu and Id               New consult [  ]   specialty  .......               order test[  ]    test name.                  Discharge Planning  [  ]

## 2018-06-18 NOTE — PROGRESS NOTE ADULT - SUBJECTIVE AND OBJECTIVE BOX
INTERVAL HPI/OVERNIGHT EVENTS:    Patient lying comfortably.  No complaint abdominal pain.  Obrien intact with yellow urine.        Vital Signs Last 24 Hrs  T(C): 36.4 (18 Jun 2018 05:13), Max: 36.8 (17 Jun 2018 17:31)  T(F): 97.6 (18 Jun 2018 05:13), Max: 98.2 (17 Jun 2018 17:31)  HR: 69 (18 Jun 2018 05:13) (69 - 80)  BP: 131/61 (18 Jun 2018 05:13) (131/61 - 145/67)  BP(mean): --  RR: 17 (18 Jun 2018 05:13) (17 - 18)  SpO2: 99% (18 Jun 2018 05:13) (99% - 100%)    MEDICATIONS  (STANDING):  amLODIPine   Tablet 5 milliGRAM(s) Oral daily  enoxaparin Injectable 30 milliGRAM(s) SubCutaneous daily  finasteride 5 milliGRAM(s) Oral daily  labetalol 100 milliGRAM(s) Oral two times a day  megestrol Suspension 400 milliGRAM(s) Oral daily  pantoprazole  Injectable 40 milliGRAM(s) IV Push two times a day  piperacillin/tazobactam IVPB. 3.375 Gram(s) IV Intermittent every 12 hours  senna 2 Tablet(s) Oral at bedtime  sodium chloride 0.9%. 1000 milliLiter(s) (100 mL/Hr) IV Continuous <Continuous>  tamsulosin 0.4 milliGRAM(s) Oral at bedtime    MEDICATIONS  (PRN):  acetaminophen  Suppository 650 milliGRAM(s) Rectal every 6 hours PRN For Temp greater than 38 C (100.4 F)  magnesium hydroxide Suspension 30 milliLiter(s) Oral daily PRN Constipation      PHYSICAL EXAM:      General: NAD, alert and awake  HEENT: NCAT, EOMI, conjunctiva clear  Chest: nonlabored respirations, good inspiratory effort  Abdomen: soft, NTND.   : obrien catheter indwelling draining clear urine. +scrotal edema    I&O's Detail    17 Jun 2018 07:01  -  18 Jun 2018 07:00  --------------------------------------------------------  IN:    sodium chloride 0.9%.: 2200 mL    Solution: 100 mL  Total IN: 2300 mL    OUT:    Indwelling Catheter - Urethral: 600 mL  Total OUT: 600 mL    Total NET: 1700 mL          LABS:                        8.2    10.21 )-----------( 332      ( 18 Jun 2018 07:48 )             25.6     06-18    145  |  110<H>  |  31<H>  ----------------------------<  94  3.5   |  25  |  1.41<H>    Ca    9.7      18 Jun 2018 07:48        Impression:      88M with HTN, CKD st III, a/w ROGER on CKD,  urosepsis due to Klebsiella pneumoniae with Left hydronephrosis ?due to ureteral stricture.  Thickened bladder wall/enlarged prostate on CT    Plan:  -cont Obrien Catheter, I/O, trend renal function. Will discuss urology intervention planning with Dr Mensah.  -continue antibiotics per ID  -cont medical management and supportive care

## 2018-06-18 NOTE — PROGRESS NOTE ADULT - SUBJECTIVE AND OBJECTIVE BOX
88M with HTN, CKD st III, a/w ROGER on CKD,  urosepsis due to Klebsiella pneumoniae with Left hydronephrosis ?due to ureteral stricture.  Thickened bladder wall/enlarged prostate on CT      No Known Allergies    Intolerances        MEDICATIONS  (STANDING):  amLODIPine   Tablet 5 milliGRAM(s) Oral daily  enoxaparin Injectable 30 milliGRAM(s) SubCutaneous daily  finasteride 5 milliGRAM(s) Oral daily  labetalol 100 milliGRAM(s) Oral two times a day  megestrol Suspension 400 milliGRAM(s) Oral daily  pantoprazole  Injectable 40 milliGRAM(s) IV Push two times a day  piperacillin/tazobactam IVPB. 3.375 Gram(s) IV Intermittent every 12 hours  senna 2 Tablet(s) Oral at bedtime  sodium chloride 0.9%. 1000 milliLiter(s) (100 mL/Hr) IV Continuous <Continuous>  tamsulosin 0.4 milliGRAM(s) Oral at bedtime    MEDICATIONS  (PRN):  acetaminophen  Suppository 650 milliGRAM(s) Rectal every 6 hours PRN For Temp greater than 38 C (100.4 F)  magnesium hydroxide Suspension 30 milliLiter(s) Oral daily PRN Constipation      REVIEW OF SYSTEMS:    poor historian  VITAL SIGNS:  T(C): 36.8 (06-18-18 @ 17:24), Max: 36.8 (06-18-18 @ 17:24)  T(F): 98.3 (06-18-18 @ 17:24), Max: 98.3 (06-18-18 @ 17:24)  HR: 87 (06-18-18 @ 17:24) (69 - 87)  BP: 144/77 (06-18-18 @ 17:24) (131/61 - 145/67)  RR: 18 (06-18-18 @ 17:24) (17 - 18)  SpO2: 99% (06-18-18 @ 17:24) (99% - 99%)  Wt(kg): --    PHYSICAL EXAM:    GENERAL: not in any distress  HEENT: Neck is supple, normocephalic, atraumatic   CHEST/LUNG: Clear to auscultation bilaterally; No rales, rhonchi, wheezing  HEART: Regular rate and rhythm; No murmurs, rubs, or gallops  ABDOMEN: Soft, Nontender, Nondistended; Bowel sounds present, no rebound   EXTREMITIES:  2+ Peripheral Pulses, No clubbing, cyanosis, or edema  GENITOURINARY: fley clear urine   SKIN: No rashes or lesions  BACK: no pressor sore   NERVOUS SYSTEM:  Alert & Oriented X3, Good concentration  PSYCH: normal affect     LABS:                         8.2    10.21 )-----------( 332      ( 18 Jun 2018 07:48 )             25.6     06-18    145  |  110<H>  |  31<H>  ----------------------------<  94  3.5   |  25  |  1.41<H>    Ca    9.7      18 Jun 2018 07:48                                Culture Results:   50,000 - 99,000 CFU/mL Klebsiella pneumoniae (06-14 @ 18:16)  Culture Results:   No growth to date. (06-14 @ 08:25)  Culture Results:   Growth in aerobic bottle: Klebsiella pneumoniae  "Due to technical problems, Proteus sp. will Not be reported as part of  the BCID panel until further notice"  ***Blood Panel PCR results on this specimen are available  approximately 3 hours after the Gram stain result.***  Gram stain, PCR, and/or culture results may not always  correspond due to difference in methodologies.  ************************************************************  This PCR assay was performed using [employer].  The following targets are tested for: Enterococcus,  vancomycin resistant enterococci, Listeria monocytogenes,  coagulase negative staphylococci, S. aureus,  methicillin resistant S. aureus, Streptococcus agalactiae  (Group B), S. pneumoniae, S. pyogenes (Group A),  Acinetobacter baumannii, Enterobacter cloacae, E. coli,  Klebsiella oxytoca, K. pneumoniae, Proteus sp.,  Serratia marcescens, Haemophilus influenzae,  Neisseria meningitidis, Pseudomonas aeruginosa, Candida  albicans, C. glabrata, C krusei, C parapsilosis,  C. tropicalis and the KPC resistance gene. (06-12 @ 09:25)  Culture Results:   >100,000 CFU/ml Klebsiella pneumoniae (06-12 @ 09:24)  Culture Results:   No growth at 5 days. (06-12 @ 09:23)                Radiology: 88M with HTN, CKD st III, a/w ROGER on CKD,  urosepsis due to Klebsiella pneumoniae with Left hydronephrosis ?due to ureteral stricture.  Thickened bladder wall/enlarged prostate on CT  sx note seen      No Known Allergies    Intolerances        MEDICATIONS  (STANDING):  amLODIPine   Tablet 5 milliGRAM(s) Oral daily  enoxaparin Injectable 30 milliGRAM(s) SubCutaneous daily  finasteride 5 milliGRAM(s) Oral daily  labetalol 100 milliGRAM(s) Oral two times a day  megestrol Suspension 400 milliGRAM(s) Oral daily  pantoprazole  Injectable 40 milliGRAM(s) IV Push two times a day  piperacillin/tazobactam IVPB. 3.375 Gram(s) IV Intermittent every 12 hours  senna 2 Tablet(s) Oral at bedtime  sodium chloride 0.9%. 1000 milliLiter(s) (100 mL/Hr) IV Continuous <Continuous>  tamsulosin 0.4 milliGRAM(s) Oral at bedtime    MEDICATIONS  (PRN):  acetaminophen  Suppository 650 milliGRAM(s) Rectal every 6 hours PRN For Temp greater than 38 C (100.4 F)  magnesium hydroxide Suspension 30 milliLiter(s) Oral daily PRN Constipation      REVIEW OF SYSTEMS:    poor historian  feels fine  VITAL SIGNS:  T(C): 36.8 (06-18-18 @ 17:24), Max: 36.8 (06-18-18 @ 17:24)  T(F): 98.3 (06-18-18 @ 17:24), Max: 98.3 (06-18-18 @ 17:24)  HR: 87 (06-18-18 @ 17:24) (69 - 87)  BP: 144/77 (06-18-18 @ 17:24) (131/61 - 145/67)  RR: 18 (06-18-18 @ 17:24) (17 - 18)  SpO2: 99% (06-18-18 @ 17:24) (99% - 99%)  Wt(kg): --    PHYSICAL EXAM:    GENERAL: not in any distress  HEENT: Neck is supple, normocephalic, atraumatic   CHEST/LUNG: Clear to auscultation bilaterally; No rales, rhonchi, wheezing  HEART: Regular rate and rhythm; No murmurs, rubs, or gallops  ABDOMEN: Soft, Nontender, Nondistended; Bowel sounds present, no rebound   EXTREMITIES:  2+ Peripheral Pulses, No clubbing, cyanosis, or edema  GENITOURINARY: obrien clear urine   SKIN: No rashes or lesions  BACK: no pressor sore   NERVOUS SYSTEM:  Alert & Oriented X2    LABS:                         8.2    10.21 )-----------( 332      ( 18 Jun 2018 07:48 )             25.6     06-18    145  |  110<H>  |  31<H>  ----------------------------<  94  3.5   |  25  |  1.41<H>    Ca    9.7      18 Jun 2018 07:48                                Culture Results:   50,000 - 99,000 CFU/mL Klebsiella pneumoniae (06-14 @ 18:16)  Culture Results:   No growth to date. (06-14 @ 08:25)  Culture Results:   Growth in aerobic bottle: Klebsiella pneumoniae  "Due to technical problems, Proteus sp. will Not be reported as part of  the BCID panel until further notice"  ***Blood Panel PCR results on this specimen are available  approximately 3 hours after the Gram stain result.***  Gram stain, PCR, and/or culture results may not always  correspond due to difference in methodologies.  ************************************************************  This PCR assay was performed using [employer].  The following targets are tested for: Enterococcus,  vancomycin resistant enterococci, Listeria monocytogenes,  coagulase negative staphylococci, S. aureus,  methicillin resistant S. aureus, Streptococcus agalactiae  (Group B), S. pneumoniae, S. pyogenes (Group A),  Acinetobacter baumannii, Enterobacter cloacae, E. coli,  Klebsiella oxytoca, K. pneumoniae, Proteus sp.,  Serratia marcescens, Haemophilus influenzae,  Neisseria meningitidis, Pseudomonas aeruginosa, Candida  albicans, C. glabrata, C krusei, C parapsilosis,  C. tropicalis and the KPC resistance gene. (06-12 @ 09:25)  Culture Results:   >100,000 CFU/ml Klebsiella pneumoniae (06-12 @ 09:24)  Culture Results:   No growth at 5 days. (06-12 @ 09:23)                Radiology:

## 2018-06-18 NOTE — PROGRESS NOTE ADULT - ASSESSMENT
urosepsis   kleb bacteremia fairly sensitive   obs uropathy     eval noted   follow up   continue cultureurrent treatment urosepsis   kleb bacteremia fairly sensitive   obs uropathy     eval noted   follow up   continue current treatment   discharge plan per gu

## 2018-06-19 LAB
ANION GAP SERPL CALC-SCNC: 9 MMOL/L — SIGNIFICANT CHANGE UP (ref 5–17)
BUN SERPL-MCNC: 26 MG/DL — HIGH (ref 7–23)
CALCIUM SERPL-MCNC: 9.5 MG/DL — SIGNIFICANT CHANGE UP (ref 8.5–10.1)
CHLORIDE SERPL-SCNC: 112 MMOL/L — HIGH (ref 96–108)
CO2 SERPL-SCNC: 23 MMOL/L — SIGNIFICANT CHANGE UP (ref 22–31)
CREAT SERPL-MCNC: 1.25 MG/DL — SIGNIFICANT CHANGE UP (ref 0.5–1.3)
CULTURE RESULTS: SIGNIFICANT CHANGE UP
GLUCOSE SERPL-MCNC: 85 MG/DL — SIGNIFICANT CHANGE UP (ref 70–99)
HCT VFR BLD CALC: 27 % — LOW (ref 39–50)
HGB BLD-MCNC: 8.6 G/DL — LOW (ref 13–17)
MCHC RBC-ENTMCNC: 29.3 PG — SIGNIFICANT CHANGE UP (ref 27–34)
MCHC RBC-ENTMCNC: 31.9 GM/DL — LOW (ref 32–36)
MCV RBC AUTO: 91.8 FL — SIGNIFICANT CHANGE UP (ref 80–100)
NRBC # BLD: 0 /100 WBCS — SIGNIFICANT CHANGE UP (ref 0–0)
PLATELET # BLD AUTO: 352 K/UL — SIGNIFICANT CHANGE UP (ref 150–400)
POTASSIUM SERPL-MCNC: 3.5 MMOL/L — SIGNIFICANT CHANGE UP (ref 3.5–5.3)
POTASSIUM SERPL-SCNC: 3.5 MMOL/L — SIGNIFICANT CHANGE UP (ref 3.5–5.3)
RBC # BLD: 2.94 M/UL — LOW (ref 4.2–5.8)
RBC # FLD: 15.3 % — HIGH (ref 10.3–14.5)
SODIUM SERPL-SCNC: 144 MMOL/L — SIGNIFICANT CHANGE UP (ref 135–145)
SPECIMEN SOURCE: SIGNIFICANT CHANGE UP
WBC # BLD: 10.4 K/UL — SIGNIFICANT CHANGE UP (ref 3.8–10.5)
WBC # FLD AUTO: 10.4 K/UL — SIGNIFICANT CHANGE UP (ref 3.8–10.5)

## 2018-06-19 RX ADMIN — Medication 100 MILLIGRAM(S): at 11:22

## 2018-06-19 RX ADMIN — AMLODIPINE BESYLATE 5 MILLIGRAM(S): 2.5 TABLET ORAL at 11:22

## 2018-06-19 RX ADMIN — SENNA PLUS 2 TABLET(S): 8.6 TABLET ORAL at 22:16

## 2018-06-19 RX ADMIN — ENOXAPARIN SODIUM 30 MILLIGRAM(S): 100 INJECTION SUBCUTANEOUS at 11:22

## 2018-06-19 RX ADMIN — MEGESTROL ACETATE 400 MILLIGRAM(S): 40 SUSPENSION ORAL at 11:22

## 2018-06-19 RX ADMIN — PANTOPRAZOLE SODIUM 40 MILLIGRAM(S): 20 TABLET, DELAYED RELEASE ORAL at 18:10

## 2018-06-19 RX ADMIN — FINASTERIDE 5 MILLIGRAM(S): 5 TABLET, FILM COATED ORAL at 11:22

## 2018-06-19 RX ADMIN — Medication 100 MILLIGRAM(S): at 18:10

## 2018-06-19 RX ADMIN — PIPERACILLIN AND TAZOBACTAM 25 GRAM(S): 4; .5 INJECTION, POWDER, LYOPHILIZED, FOR SOLUTION INTRAVENOUS at 18:10

## 2018-06-19 RX ADMIN — PANTOPRAZOLE SODIUM 40 MILLIGRAM(S): 20 TABLET, DELAYED RELEASE ORAL at 06:22

## 2018-06-19 RX ADMIN — PIPERACILLIN AND TAZOBACTAM 25 GRAM(S): 4; .5 INJECTION, POWDER, LYOPHILIZED, FOR SOLUTION INTRAVENOUS at 06:22

## 2018-06-19 RX ADMIN — TAMSULOSIN HYDROCHLORIDE 0.4 MILLIGRAM(S): 0.4 CAPSULE ORAL at 22:16

## 2018-06-19 NOTE — PROGRESS NOTE ADULT - SUBJECTIVE AND OBJECTIVE BOX
INTERVAL HPI/OVERNIGHT EVENTS:    Patient lying comfortably.  No complaint of abdominal pain.  Tolerating diet with no complaint of nausea/vomiting.  Obrien intact with clear yellow urine on the tubing.      Vital Signs Last 24 Hrs  T(C): 36.8 (19 Jun 2018 05:06), Max: 36.9 (19 Jun 2018 00:37)  T(F): 98.2 (19 Jun 2018 05:06), Max: 98.5 (19 Jun 2018 00:37)  HR: 83 (19 Jun 2018 05:06) (83 - 87)  BP: 138/84 (19 Jun 2018 05:06) (138/84 - 155/80)  BP(mean): --  RR: 18 (19 Jun 2018 05:06) (18 - 18)  SpO2: 99% (19 Jun 2018 05:06) (99% - 99%)    MEDICATIONS  (STANDING):  amLODIPine   Tablet 5 milliGRAM(s) Oral daily  enoxaparin Injectable 30 milliGRAM(s) SubCutaneous daily  finasteride 5 milliGRAM(s) Oral daily  labetalol 100 milliGRAM(s) Oral two times a day  megestrol Suspension 400 milliGRAM(s) Oral daily  pantoprazole  Injectable 40 milliGRAM(s) IV Push two times a day  piperacillin/tazobactam IVPB. 3.375 Gram(s) IV Intermittent every 12 hours  senna 2 Tablet(s) Oral at bedtime  sodium chloride 0.9%. 1000 milliLiter(s) (100 mL/Hr) IV Continuous <Continuous>  tamsulosin 0.4 milliGRAM(s) Oral at bedtime    MEDICATIONS  (PRN):  acetaminophen  Suppository 650 milliGRAM(s) Rectal every 6 hours PRN For Temp greater than 38 C (100.4 F)  magnesium hydroxide Suspension 30 milliLiter(s) Oral daily PRN Constipation      PHYSICAL EXAM:    General: NAD, alert and awake  HEENT: NCAT, EOMI, conjunctiva clear  Chest: nonlabored respirations, good inspiratory effort  Abdomen: soft, NTND.   : obrien catheter indwelling draining clear urine. +scrotal edema        I&O's Detail    18 Jun 2018 07:01  -  19 Jun 2018 07:00  --------------------------------------------------------  IN:    Oral Fluid: 120 mL  Total IN: 120 mL    OUT:    Indwelling Catheter - Urethral: 1250 mL  Total OUT: 1250 mL    Total NET: -1130 mL          LABS:                        8.6    10.40 )-----------( 352      ( 19 Jun 2018 07:46 )             27.0     06-19    144  |  112<H>  |  26<H>  ----------------------------<  85  3.5   |  23  |  1.25    Ca    9.5      19 Jun 2018 07:46    < from: US Renal (06.18.18 @ 21:14) >  Right kidney:  9.2 cm in length. No hydronephrosis or obvious renal   stone. Again noted, 2.9 x 3.4 x 2.9 cm cyst in the upper pole.    Left kidney:  9.5 cm in length. Mild hydronephrosis. No obvious renal   stone.    Urinary bladder: Decompressed by a Obrien catheter. Diffuse wall   thickening.    Prostate: Mildly enlarged, measuring 3.5 x 5.0 x 4.5 cm (41 ml in volume,   normal volume < 25 ml).          IMPRESSION:     Mild left hydronephrosis.    Diffuse bladder wall thickening, likely related to known urinary tract   infection/under distention. Neoplasm cannot be excluded.    < end of copied text >        Impression:        88M with HTN, CKD st III, a/w ROGER on CKD,  urosepsis due to Klebsiella pneumoniae with Left hydronephrosis  Thickened bladder wall/enlarged prostate on CT    Plan:    -cont Obrien Catheter, I/O, trend renal function. Will discuss with Dr. Mensah re:  renal sono and further urological intervention  -continue antibiotics per ID  -cont medical management and supportive care   prophylactic measure

## 2018-06-19 NOTE — PROGRESS NOTE ADULT - SUBJECTIVE AND OBJECTIVE BOX
Patient is a 88y old  Male who presents with a chief complaint of weakness, UTI, ROGER,CKD stage 3 , dehydration (11 Jun 2018 23:16)    Ct scan shows left hydronephrosis and thickening of the bladder wall  INTERVAL HPI/OVERNIGHT EVENTS:uneventful, no fever. BUN / Cr  improved  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, chronic  Renal insufficiency  Dehydration  Spinal stenosis of lumbar region  BPH (benign prostatic hypertrophy)  Hypertension  Benign Prostatic Hyperplasia  HTN (Hypertension)  No significant past surgical history      MEDICATIONS  (STANDING):  amLODIPine   Tablet 5 milliGRAM(s) Oral daily  enoxaparin Injectable 30 milliGRAM(s) SubCutaneous daily  finasteride 5 milliGRAM(s) Oral daily  labetalol 100 milliGRAM(s) Oral two times a day  megestrol Suspension 400 milliGRAM(s) Oral daily  pantoprazole  Injectable 40 milliGRAM(s) IV Push two times a day  piperacillin/tazobactam IVPB. 3.375 Gram(s) IV Intermittent every 12 hours  senna 2 Tablet(s) Oral at bedtime  sodium chloride 0.9%. 1000 milliLiter(s) (100 mL/Hr) IV Continuous <Continuous>  tamsulosin 0.4 milliGRAM(s) Oral at bedtime    MEDICATIONS  (PRN):  acetaminophen  Suppository 650 milliGRAM(s) Rectal every 6 hours PRN For Temp greater than 38 C (100.4 F)  magnesium hydroxide Suspension 30 milliLiter(s) Oral daily PRN Constipation      Allergies    No Known Allergies    Intolerances        REVIEW OF SYSTEMS:  CONSTITUTIONAL: No fever, weight loss, or fatigue  EYES: No eye pain, visual disturbances, or discharge  ENMT:  No difficulty hearing, tinnitus, vertigo; No sinus or throat pain  NECK: No pain or stiffness  BREASTS: No pain, masses, or nipple discharge  RESPIRATORY: No cough, wheezing, chills or hemoptysis; No shortness of breath  CARDIOVASCULAR: No chest pain, palpitations, dizziness, or leg swelling  GASTROINTESTINAL: No abdominal or epigastric pain. No nausea, vomiting, or hematemesis; No diarrhea or constipation. No melena or hematochezia.  GENITOURINARY: No dysuria, frequency, hematuria, or incontinence  NEUROLOGICAL: No headaches, memory loss, loss of strength, numbness, or tremors  SKIN: No itching, burning, rashes, or lesions   LYMPH NODES: No enlarged glands  ENDOCRINE: No heat or cold intolerance; No hair loss  MUSCULOSKELETAL: No joint pain or swelling; No muscle, back, or extremity pain  PSYCHIATRIC: No depression, anxiety, mood swings, or difficulty sleeping  HEME/LYMPH: No easy bruising, or bleeding gums  ALLERY AND IMMUNOLOGIC: No hives or eczema    Vital Signs Last 24 Hrs  T(C): 36.8 (19 Jun 2018 05:06), Max: 36.9 (19 Jun 2018 00:37)  T(F): 98.2 (19 Jun 2018 05:06), Max: 98.5 (19 Jun 2018 00:37)  HR: 83 (19 Jun 2018 05:06) (83 - 87)  BP: 138/84 (19 Jun 2018 05:06) (138/84 - 155/80)  BP(mean): --  RR: 18 (19 Jun 2018 05:06) (18 - 18)  SpO2: 99% (19 Jun 2018 05:06) (99% - 99%)    PHYSICAL EXAM:  GENERAL: NAD, well-groomed, well-developed  HEAD:  Atraumatic, Normocephalic  EYES: EOMI, PERRLA, conjunctiva and sclera clear  ENMT: No tonsillar erythema, exudates, or enlargement; Moist mucous membranes, Good dentition, No lesions  NECK: Supple, No JVD, Normal thyroid  NERVOUS SYSTEM:  Alert & Oriented X3, dementia , Good concentration; Motor Strength 5/5 B/L upper and lower extremities; DTRs 2+ intact and symmetric  CHEST/LUNG: Clear to percussion bilaterally; No rales, rhonchi, wheezing, or rubs  HEART: Regular rate and rhythm; No murmurs, rubs, or gallops  ABDOMEN: Soft, Nontender, Nondistended; Bowel sounds present  EXTREMITIES:  2+ Peripheral Pulses, No clubbing, cyanosis, or edema  LYMPH: No lymphadenopathy noted  SKIN: No rashes or lesions    LABS:                        8.6    10.40 )-----------( 352      ( 19 Jun 2018 07:46 )             27.0     06-19    144  |  112<H>  |  26<H>  ----------------------------<  85  3.5   |  23  |  1.25    Ca    9.5      19 Jun 2018 07:46            CAPILLARY BLOOD GLUCOSE                    RADIOLOGY & ADDITIONAL TESTS:    Imaging Personally Reviewed:  [ ] YES  [ ] NO    Consultant(s) Notes Reviewed:  [ ] YES  [ ] NO    Care Discussed with Consultants/Other Providers [ ] YES  [ ] NO    Care discussed with family,         [  ]   yes  [  ]  No    imp:    stable[ ]    unstable[  ]     improving [ x  ]       unchanged  [  ]                Plans:  Continue present plans  [ x ]               New consult [  ]   specialty  .......               order test[  ]    test name.                  Discharge Planning  [  ]

## 2018-06-20 ENCOUNTER — TRANSCRIPTION ENCOUNTER (OUTPATIENT)
Age: 83
End: 2018-06-20

## 2018-06-20 VITALS
RESPIRATION RATE: 17 BRPM | TEMPERATURE: 98 F | DIASTOLIC BLOOD PRESSURE: 72 MMHG | SYSTOLIC BLOOD PRESSURE: 143 MMHG | OXYGEN SATURATION: 100 % | HEART RATE: 71 BPM

## 2018-06-20 RX ORDER — ERYTHROPOIETIN 10000 [IU]/ML
10000 INJECTION, SOLUTION INTRAVENOUS; SUBCUTANEOUS ONCE
Qty: 0 | Refills: 0 | Status: COMPLETED | OUTPATIENT
Start: 2018-06-20 | End: 2018-06-20

## 2018-06-20 RX ADMIN — SODIUM CHLORIDE 100 MILLILITER(S): 9 INJECTION INTRAMUSCULAR; INTRAVENOUS; SUBCUTANEOUS at 05:25

## 2018-06-20 RX ADMIN — PIPERACILLIN AND TAZOBACTAM 25 GRAM(S): 4; .5 INJECTION, POWDER, LYOPHILIZED, FOR SOLUTION INTRAVENOUS at 05:34

## 2018-06-20 RX ADMIN — ERYTHROPOIETIN 10000 UNIT(S): 10000 INJECTION, SOLUTION INTRAVENOUS; SUBCUTANEOUS at 15:11

## 2018-06-20 RX ADMIN — FINASTERIDE 5 MILLIGRAM(S): 5 TABLET, FILM COATED ORAL at 12:59

## 2018-06-20 RX ADMIN — AMLODIPINE BESYLATE 5 MILLIGRAM(S): 2.5 TABLET ORAL at 05:24

## 2018-06-20 RX ADMIN — Medication 100 MILLIGRAM(S): at 05:23

## 2018-06-20 RX ADMIN — MEGESTROL ACETATE 400 MILLIGRAM(S): 40 SUSPENSION ORAL at 12:59

## 2018-06-20 RX ADMIN — PANTOPRAZOLE SODIUM 40 MILLIGRAM(S): 20 TABLET, DELAYED RELEASE ORAL at 05:23

## 2018-06-20 RX ADMIN — ENOXAPARIN SODIUM 30 MILLIGRAM(S): 100 INJECTION SUBCUTANEOUS at 12:59

## 2018-06-20 RX ADMIN — PANTOPRAZOLE SODIUM 40 MILLIGRAM(S): 20 TABLET, DELAYED RELEASE ORAL at 17:47

## 2018-06-20 RX ADMIN — Medication 100 MILLIGRAM(S): at 17:47

## 2018-06-20 NOTE — PROGRESS NOTE ADULT - NSHPATTENDINGPLANDISCUSS_GEN_ALL_CORE
patient.
RN/ team/ family
family
iliana.
iliana.
patient
patient, daughter
patient/ team / RN
son via tel. daughter at bed side.

## 2018-06-20 NOTE — DISCHARGE NOTE ADULT - CARE PROVIDER_API CALL
PMD,   PMD/ SOn who is an MD  Phone: (   )    -  Fax: (   )    -    Adithya Mensah (MD), Urology  [address] [place] [address]  Phone: [phone]  Fax: [phone]

## 2018-06-20 NOTE — DISCHARGE NOTE ADULT - SECONDARY DIAGNOSIS.
HTN (hypertension) Renal insufficiency Spinal stenosis of lumbar region UTI (urinary tract infection) Dehydration ROGER (acute kidney injury)

## 2018-06-20 NOTE — DISCHARGE NOTE ADULT - CARE PLAN
Principal Discharge DX:	Sepsis due to Klebsiella pneumoniae  Goal:	resolved  Assessment and plan of treatment:	Keep indwelling obrien catheter sttached to leg bag, Change catheter every 4 weeks. , take meds as advised, follow up with PMD In 1 week. . Also follow up with dr ferguson , If family decided to change to Suprapubic cystostomy  Secondary Diagnosis:	HTN (hypertension)  Assessment and plan of treatment:	medical management  Secondary Diagnosis:	Renal insufficiency  Assessment and plan of treatment:	improved,  with anemia. . On Procrit and [place] supplementation  Secondary Diagnosis:	Spinal stenosis of lumbar region  Assessment and plan of treatment:	stable, bedridden  Secondary Diagnosis:	UTI (urinary tract infection)  Assessment and plan of treatment:	resolved  Secondary Diagnosis:	Dehydration  Assessment and plan of treatment:	resolved  Secondary Diagnosis:	ROGER (acute kidney injury)  Assessment and plan of treatment:	resolved.

## 2018-06-20 NOTE — PROGRESS NOTE ADULT - PROBLEM SELECTOR PROBLEM 4
ROGER (acute kidney injury)

## 2018-06-20 NOTE — DISCHARGE NOTE ADULT - HOSPITAL COURSE
88 yewar old male was admitted with dehydration and ROGER and blood culture grew klebsiella. He had UTI POA from BPH and his sepsis was treated with IV Zosyn, and he was evaluated by dr mensah and a Ma cath was placed for ureine retention. patient's ROGER resolved. He has CKD stage 3 with anemia. and he also was iron deficient. he received Iv iron, and also procrit for his renal failure. . He is demented, Is bed ridden from spinal stenosis  for more than 6 monts with contractures of his lower extremities . His HTn was controlled with meds. . he improved. Family was advised to hasve a supra pubic catheter, but declined for the moment. patient will go home with Ma cath connected to leg bag and will follow up with Dr Mensah.

## 2018-06-20 NOTE — PROGRESS NOTE ADULT - PROVIDER SPECIALTY LIST ADULT
Infectious Disease
Internal Medicine
Surgery
Urology
Urology
Internal Medicine
Infectious Disease

## 2018-06-20 NOTE — PROGRESS NOTE ADULT - SUBJECTIVE AND OBJECTIVE BOX
HPI:  89yo M w no reported PMH presents to ED for eval of decr appetite, weakness & fever.  Daughter states pt started w sx about 1wk ago, PMD had home visit, dx pt w UTI, pt started on unknown abx 2d ago.  Pt has been taking abx but sx not improving.  Pt denies CP, SOB, vomiting, diarrhea, abd pain.   work up consistent with   urosepsis due to Klebsiella pneumoniae with Left hydronephrosis ?due to ureteral stricture.  Thickened bladder wall/enlarged prostate on CT  sx note seen      	ALLERGIES AND HOME MEDICATIONS:   Allergies:        Allergies:  	No Known Allergies:   	No Known Allergies: (11 Jun 2018 23:16)      Allergies    No Known Allergies    Intolerances        MEDICATIONS  (STANDING):  amLODIPine   Tablet 5 milliGRAM(s) Oral daily  enoxaparin Injectable 30 milliGRAM(s) SubCutaneous daily  finasteride 5 milliGRAM(s) Oral daily  labetalol 100 milliGRAM(s) Oral two times a day  megestrol Suspension 400 milliGRAM(s) Oral daily  pantoprazole  Injectable 40 milliGRAM(s) IV Push two times a day  piperacillin/tazobactam IVPB. 3.375 Gram(s) IV Intermittent every 12 hours  senna 2 Tablet(s) Oral at bedtime  sodium chloride 0.9%. 1000 milliLiter(s) (100 mL/Hr) IV Continuous <Continuous>  tamsulosin 0.4 milliGRAM(s) Oral at bedtime    MEDICATIONS  (PRN):  acetaminophen  Suppository 650 milliGRAM(s) Rectal every 6 hours PRN For Temp greater than 38 C (100.4 F)  magnesium hydroxide Suspension 30 milliLiter(s) Oral daily PRN Constipation      REVIEW OF SYSTEMS:    feels fine except rt knee arthritis   no cough short of breath   obrien plus     VITAL SIGNS:  T(C): 36.8 (06-20-18 @ 16:20), Max: 36.9 (06-19-18 @ 23:37)  T(F): 98.2 (06-20-18 @ 16:20), Max: 98.5 (06-19-18 @ 23:37)  HR: 71 (06-20-18 @ 16:20) (70 - 79)  BP: 143/72 (06-20-18 @ 16:20) (119/56 - 150/63)  RR: 17 (06-20-18 @ 16:20) (17 - 18)  SpO2: 100% (06-20-18 @ 16:20) (100% - 100%)  Wt(kg): --    PHYSICAL EXAM:    GENERAL: not in any distress  HEENT: Neck is supple, normocephalic, atraumatic   CHEST/LUNG: Clear to auscultation bilaterally; No rales, rhonchi, wheezing  HEART: Regular rate and rhythm; No murmurs, rubs, or gallops  ABDOMEN: Soft, Nontender, Nondistended; Bowel sounds present, no rebound   EXTREMITIES:  2+ Peripheral Pulses, No clubbing, cyanosis, or edema  sl swollen rt knee and decreased range of motion   GENITOURINARY: obrien   SKIN: No rashes or lesions  BACK: no pressor sore   NERVOUS SYSTEM:  Alert & Oriented X3, Good concentration  PSYCH: normal affect     LABS:                         8.6    10.40 )-----------( 352      ( 19 Jun 2018 07:46 )             27.0     06-19    144  |  112<H>  |  26<H>  ----------------------------<  85  3.5   |  23  |  1.25    Ca    9.5      19 Jun 2018 07:46                                Culture Results:   50,000 - 99,000 CFU/mL Klebsiella pneumoniae (06-14 @ 18:16)  Culture Results:   No growth at 5 days. (06-14 @ 08:25)                Radiology:

## 2018-06-20 NOTE — DISCHARGE NOTE ADULT - PLAN OF CARE
resolved Keep indwelling obrien catheter sttached to leg bag, Change catheter every 4 weeks. , take meds as advised, follow up with PMD In 1 week. . Also follow up with dr ferguson , If family decided to change to Suprapubic cystostomy medical management improved,  with anemia. . On Procrit and [place] supplementation stable, bedridden resolved.

## 2018-06-20 NOTE — DISCHARGE NOTE ADULT - PATIENT PORTAL LINK FT
You can access the [facility] Portal, offered by [facility], by registering with the following website: [email or web address]

## 2018-06-20 NOTE — PROGRESS NOTE ADULT - ASSESSMENT
renal failure, Bilateral hydronephrosis, UTI    secondary to urinary retention. Reversed with Ma catheter drainage and antibiotics.   Discussed with Dr Tierney, Pt 's son, yesterday. will continue Ma catheter drainage. Suggested to get suprapubic cystostomy to reduce the risk of infections. Family will think about it and let me know. Ma catheter should not be removed. should be changed every 4 weeks. Pt may be discharged with Ma catheter

## 2018-06-20 NOTE — DISCHARGE NOTE ADULT - PROVIDER TOKENS
FREE:[LAST:[PMD],PHONE:[(   )    -],FAX:[(   )    -],ADDRESS:[PMD/ SOn who is an MD]],TOKEN:'[id number]'

## 2018-06-20 NOTE — DISCHARGE NOTE ADULT - MEDICATION SUMMARY - MEDICATIONS TO TAKE
I will START or STAY ON the medications listed below when I get home from the hospital:    finasteride 5 mg oral tablet  -- 1 tab(s) by mouth once a day  -- Indication: For BPH    tamsulosin 0.4 mg oral capsule  -- 1 cap(s) by mouth once a day  -- Indication: For BPH    labetalol 100 mg oral tablet  -- 1 tab(s) by mouth 2 times a day  -- Indication: For HTN    amLODIPine 5 mg oral tablet  -- 1 tab(s) by mouth once a day  -- Indication: For HTN    ferrous sulfate 250 mg oral capsule, extended release  -- 1 cap(s) by mouth once a day  -- Indication: For Iron supplement. over the counter    docusate sodium 100 mg oral capsule  -- 1 cap(s) by mouth 2 times a day  -- Indication: For Consti[pation    cholecalciferol oral tablet  -- 1000 unit(s) by mouth once a day  -- Indication: For supplement

## 2018-06-20 NOTE — PROGRESS NOTE ADULT - ASSESSMENT
urosepsis   kleb bacteremia fairly sensitive   obs uropathy     eval noted   follow up   continue current treatment   discharge plan per gu and primary care physician  needs PT

## 2018-06-20 NOTE — PROGRESS NOTE ADULT - SUBJECTIVE AND OBJECTIVE BOX
Patient seen and examined bedside resting comfortably.  Ma catheter draining well.      T(F): 97.9 (06-20-18 @ 04:57), Max: 99.9 (06-19-18 @ 16:50)  HR: 77 (06-20-18 @ 04:57) (73 - 79)  BP: 145/77 (06-20-18 @ 04:57) (136/59 - 150/63)  RR: 17 (06-20-18 @ 04:57) (17 - 18)  SpO2: 100% (06-20-18 @ 04:57) (96% - 100%)        PHYSICAL EXAM:  General: NAD, WDWN  Neuro:  Alert & conscious  HEENT: NCAT, EOMI, conjunctiva clear  Lung: respirations nonlabored, good inspiratory effort  Abdomen: soft, NTND.   Extremities: no pedal edema or calf tenderness noted   : uncircumcised phallus, adequate meatus. Ma catheter draining well.    LABS:                        8.6    10.40 )-----------( 352      ( 19 Jun 2018 07:46 )             27.0     06-19    144  |  112<H>  |  26<H>  ----------------------------<  85  3.5   |  23  |  1.25    Ca    9.5      19 Jun 2018 07:46        I&O's Detail    19 Jun 2018 07:01  -  20 Jun 2018 07:00  --------------------------------------------------------  IN:    Oral Fluid: 520 mL    sodium chloride 0.9%.: 1200 mL    Solution: 100 mL  Total IN: 1820 mL    OUT:    Indwelling Catheter - Urethral: 1475 mL  Total OUT: 1475 mL    Total NET: 345 mL          wbc    06-19 @ 07:46    10.40    06-18 @ 07:48    10.21    06-17 @ 07:49    10.54    06-16 @ 10:16    9.93    06-14 @ 07:11    8.04        cr   06-19 @ 07:46   1.25    06-18 @ 07:48   1.41    06-17 @ 07:49   1.62    06-16 @ 10:16   1.37    06-14 @ 07:11   1.73

## 2018-06-20 NOTE — PROGRESS NOTE ADULT - SUBJECTIVE AND OBJECTIVE BOX
Patient is a 88y old  Male who presents with a chief complaint of weakness, UTI, ROGER,CKD stage 3 , dehydration (11 Jun 2018 23:16)  dmented, bedridden patient, s/p Klebsiella sepsis from UTI  secondary to bladder outlet obstruction POA..  Stable now with negative blood cultures.    INTERVAL HPI/OVERNIGHT EVENTS:  PAST MEDICAL & SURGICAL HISTORY:  Hydrocele in adult: bilateral, chronic  Renal insufficiency  Dehydration  Spinal stenosis of lumbar region  BPH (benign prostatic hypertrophy)  Hypertension  Benign Prostatic Hyperplasia  HTN (Hypertension)  No significant past surgical history      MEDICATIONS  (STANDING):  amLODIPine   Tablet 5 milliGRAM(s) Oral daily  enoxaparin Injectable 30 milliGRAM(s) SubCutaneous daily  finasteride 5 milliGRAM(s) Oral daily  labetalol 100 milliGRAM(s) Oral two times a day  megestrol Suspension 400 milliGRAM(s) Oral daily  pantoprazole  Injectable 40 milliGRAM(s) IV Push two times a day  piperacillin/tazobactam IVPB. 3.375 Gram(s) IV Intermittent every 12 hours  senna 2 Tablet(s) Oral at bedtime  sodium chloride 0.9%. 1000 milliLiter(s) (100 mL/Hr) IV Continuous <Continuous>  tamsulosin 0.4 milliGRAM(s) Oral at bedtime    MEDICATIONS  (PRN):  acetaminophen  Suppository 650 milliGRAM(s) Rectal every 6 hours PRN For Temp greater than 38 C (100.4 F)  magnesium hydroxide Suspension 30 milliLiter(s) Oral daily PRN Constipation      Allergies    No Known Allergies    Intolerances        REVIEW OF SYSTEMS:  CONSTITUTIONAL: No fever, weight loss, or fatigue  EYES: No eye pain, visual disturbances, or discharge  ENMT:  No difficulty hearing, tinnitus, vertigo; No sinus or throat pain  NECK: No pain or stiffness  BREASTS: No pain, masses, or nipple discharge  RESPIRATORY: No cough, wheezing, chills or hemoptysis; No shortness of breath  CARDIOVASCULAR: No chest pain, palpitations, dizziness, or leg swelling  GASTROINTESTINAL: No abdominal or epigastric pain. No nausea, vomiting, or hematemesis; No diarrhea or constipation. No melena or hematochezia.  GENITOURINARY: No dysuria, frequency, hematuria, or incontinence  NEUROLOGICAL: No headaches, memory loss, loss of strength, numbness, or tremors  SKIN: No itching, burning, rashes, or lesions   LYMPH NODES: No enlarged glands  ENDOCRINE: No heat or cold intolerance; No hair loss  MUSCULOSKELETAL: No joint pain or swelling; No muscle, back, or extremity pain  PSYCHIATRIC: No depression, anxiety, mood swings, or difficulty sleeping  HEME/LYMPH: No easy bruising, or bleeding gums  ALLERY AND IMMUNOLOGIC: No hives or eczema    Vital Signs Last 24 Hrs  T(C): 36.6 (20 Jun 2018 04:57), Max: 37.7 (19 Jun 2018 16:50)  T(F): 97.9 (20 Jun 2018 04:57), Max: 99.9 (19 Jun 2018 16:50)  HR: 77 (20 Jun 2018 04:57) (73 - 79)  BP: 145/77 (20 Jun 2018 04:57) (136/59 - 150/63)  BP(mean): --  RR: 17 (20 Jun 2018 04:57) (17 - 18)  SpO2: 100% (20 Jun 2018 04:57) (96% - 100%)    PHYSICAL EXAM:  GENERAL: NAD, well-groomed, well-developed  HEAD:  Atraumatic, Normocephalic  EYES: EOMI, PERRLA, conjunctiva and sclera clear  ENMT: No tonsillar erythema, exudates, or enlargement; Moist mucous membranes, Good dentition, No lesions  NECK: Supple, No JVD, Normal thyroid  NERVOUS SYSTEM:  Alert & Oriented X3, Good concentration; Motor Strength 5/5 B/L upper and lower extremities; DTRs 2+ intact and symmetric  CHEST/LUNG: Clear to percussion bilaterally; No rales, rhonchi, wheezing, or rubs  HEART: Regular rate and rhythm; No murmurs, rubs, or gallops  ABDOMEN: Soft, Nontender, Nondistended; Bowel sounds present. Ma cath in place. draining clear urine.  EXTREMITIES:  2+ Peripheral Pulses, No clubbing, cyanosis, or edema  LYMPH: No lymphadenopathy noted  SKIN: No rashes or lesions    LABS:                        8.6    10.40 )-----------( 352      ( 19 Jun 2018 07:46 )             27.0     06-19    144  |  112<H>  |  26<H>  ----------------------------<  85  3.5   |  23  |  1.25    Ca    9.5      19 Jun 2018 07:46            CAPILLARY BLOOD GLUCOSE                    RADIOLOGY & ADDITIONAL TESTS:    Imaging Personally Reviewed:  [ ] YES  [ ] NO    Consultant(s) Notes Reviewed:  [ ] YES  [ ] NO    Care Discussed with Consultants/Other Providers [ ] YES  [ ] NO    Care discussed with family,         [  ]   yes  [  ]  No    imp:    stable[ ]    unstable[  ]     improving [   x]       unchanged  [  ]                Plans:  Continue present plans  [x  ]will give Procrit for anemia. X 1 dose.               New consult [  ]   specialty  .......               order test[  ]    test name.                  Discharge Planning  [x ]

## 2018-06-25 DIAGNOSIS — N13.30 UNSPECIFIED HYDRONEPHROSIS: ICD-10-CM

## 2018-06-25 DIAGNOSIS — N43.2 OTHER HYDROCELE: ICD-10-CM

## 2018-06-25 DIAGNOSIS — K21.9 GASTRO-ESOPHAGEAL REFLUX DISEASE WITHOUT ESOPHAGITIS: ICD-10-CM

## 2018-06-25 DIAGNOSIS — N39.0 URINARY TRACT INFECTION, SITE NOT SPECIFIED: ICD-10-CM

## 2018-06-25 DIAGNOSIS — N40.0 BENIGN PROSTATIC HYPERPLASIA WITHOUT LOWER URINARY TRACT SYMPTOMS: ICD-10-CM

## 2018-06-25 DIAGNOSIS — R63.0 ANOREXIA: ICD-10-CM

## 2018-06-25 DIAGNOSIS — K59.00 CONSTIPATION, UNSPECIFIED: ICD-10-CM

## 2018-06-25 DIAGNOSIS — I12.9 HYPERTENSIVE CHRONIC KIDNEY DISEASE WITH STAGE 1 THROUGH STAGE 4 CHRONIC KIDNEY DISEASE, OR UNSPECIFIED CHRONIC KIDNEY DISEASE: ICD-10-CM

## 2018-06-25 DIAGNOSIS — E44.0 MODERATE PROTEIN-CALORIE MALNUTRITION: ICD-10-CM

## 2018-06-25 DIAGNOSIS — N18.3 CHRONIC KIDNEY DISEASE, STAGE 3 (MODERATE): ICD-10-CM

## 2018-06-25 DIAGNOSIS — Z74.01 BED CONFINEMENT STATUS: ICD-10-CM

## 2018-06-25 DIAGNOSIS — M48.061 SPINAL STENOSIS, LUMBAR REGION WITHOUT NEUROGENIC CLAUDICATION: ICD-10-CM

## 2018-06-25 DIAGNOSIS — F03.90 UNSPECIFIED DEMENTIA WITHOUT BEHAVIORAL DISTURBANCE: ICD-10-CM

## 2018-06-25 DIAGNOSIS — N32.0 BLADDER-NECK OBSTRUCTION: ICD-10-CM

## 2018-06-25 DIAGNOSIS — Z79.2 LONG TERM (CURRENT) USE OF ANTIBIOTICS: ICD-10-CM

## 2018-06-25 DIAGNOSIS — N17.9 ACUTE KIDNEY FAILURE, UNSPECIFIED: ICD-10-CM

## 2018-06-25 DIAGNOSIS — E87.6 HYPOKALEMIA: ICD-10-CM

## 2018-06-25 DIAGNOSIS — D50.9 IRON DEFICIENCY ANEMIA, UNSPECIFIED: ICD-10-CM

## 2018-06-25 DIAGNOSIS — A41.59 OTHER GRAM-NEGATIVE SEPSIS: ICD-10-CM

## 2018-06-25 DIAGNOSIS — R53.1 WEAKNESS: ICD-10-CM

## 2018-06-25 DIAGNOSIS — E86.0 DEHYDRATION: ICD-10-CM

## 2018-06-25 DIAGNOSIS — N13.2 HYDRONEPHROSIS WITH RENAL AND URETERAL CALCULOUS OBSTRUCTION: ICD-10-CM

## 2018-08-01 ENCOUNTER — OUTPATIENT (OUTPATIENT)
Dept: OUTPATIENT SERVICES | Facility: HOSPITAL | Age: 83
LOS: 1 days | End: 2018-08-01
Payer: MEDICARE

## 2018-08-01 PROCEDURE — G9001: CPT

## 2018-08-07 ENCOUNTER — EMERGENCY (EMERGENCY)
Facility: HOSPITAL | Age: 83
LOS: 0 days | Discharge: HOME | End: 2018-08-08
Attending: EMERGENCY MEDICINE
Payer: MEDICARE

## 2018-08-07 VITALS
HEART RATE: 80 BPM | WEIGHT: 149.91 LBS | TEMPERATURE: 98 F | RESPIRATION RATE: 16 BRPM | OXYGEN SATURATION: 100 % | DIASTOLIC BLOOD PRESSURE: 87 MMHG | HEIGHT: 68 IN | SYSTOLIC BLOOD PRESSURE: 142 MMHG

## 2018-08-07 VITALS
SYSTOLIC BLOOD PRESSURE: 145 MMHG | TEMPERATURE: 98 F | DIASTOLIC BLOOD PRESSURE: 80 MMHG | OXYGEN SATURATION: 98 % | RESPIRATION RATE: 16 BRPM | HEART RATE: 74 BPM

## 2018-08-07 DIAGNOSIS — R31.9 HEMATURIA, UNSPECIFIED: ICD-10-CM

## 2018-08-07 DIAGNOSIS — N39.0 URINARY TRACT INFECTION, SITE NOT SPECIFIED: ICD-10-CM

## 2018-08-07 DIAGNOSIS — N40.0 BENIGN PROSTATIC HYPERPLASIA WITHOUT LOWER URINARY TRACT SYMPTOMS: ICD-10-CM

## 2018-08-07 DIAGNOSIS — N28.9 DISORDER OF KIDNEY AND URETER, UNSPECIFIED: ICD-10-CM

## 2018-08-07 DIAGNOSIS — I10 ESSENTIAL (PRIMARY) HYPERTENSION: ICD-10-CM

## 2018-08-07 LAB
ALBUMIN SERPL ELPH-MCNC: 3.4 G/DL — SIGNIFICANT CHANGE UP (ref 3.3–5)
ALP SERPL-CCNC: 39 U/L — LOW (ref 40–120)
ALT FLD-CCNC: 11 U/L — LOW (ref 12–78)
ANION GAP SERPL CALC-SCNC: 11 MMOL/L — SIGNIFICANT CHANGE UP (ref 5–17)
APPEARANCE UR: ABNORMAL
APTT BLD: 36.7 SEC — SIGNIFICANT CHANGE UP (ref 27.5–37.4)
AST SERPL-CCNC: 19 U/L — SIGNIFICANT CHANGE UP (ref 15–37)
BACTERIA # UR AUTO: ABNORMAL
BASOPHILS # BLD AUTO: 0.02 K/UL — SIGNIFICANT CHANGE UP (ref 0–0.2)
BASOPHILS NFR BLD AUTO: 0.4 % — SIGNIFICANT CHANGE UP (ref 0–2)
BILIRUB SERPL-MCNC: 0.6 MG/DL — SIGNIFICANT CHANGE UP (ref 0.2–1.2)
BILIRUB UR-MCNC: NEGATIVE — SIGNIFICANT CHANGE UP
BUN SERPL-MCNC: 13 MG/DL — SIGNIFICANT CHANGE UP (ref 7–23)
CALCIUM SERPL-MCNC: 10.5 MG/DL — HIGH (ref 8.5–10.1)
CHLORIDE SERPL-SCNC: 101 MMOL/L — SIGNIFICANT CHANGE UP (ref 96–108)
CO2 SERPL-SCNC: 28 MMOL/L — SIGNIFICANT CHANGE UP (ref 22–31)
COLOR SPEC: ABNORMAL
CREAT SERPL-MCNC: 1.3 MG/DL — SIGNIFICANT CHANGE UP (ref 0.5–1.3)
DIFF PNL FLD: ABNORMAL
EOSINOPHIL # BLD AUTO: 0.33 K/UL — SIGNIFICANT CHANGE UP (ref 0–0.5)
EOSINOPHIL NFR BLD AUTO: 6.6 % — HIGH (ref 0–6)
EPI CELLS # UR: SIGNIFICANT CHANGE UP
GLUCOSE SERPL-MCNC: 89 MG/DL — SIGNIFICANT CHANGE UP (ref 70–99)
GLUCOSE UR QL: NEGATIVE MG/DL — SIGNIFICANT CHANGE UP
HCT VFR BLD CALC: 32.4 % — LOW (ref 39–50)
HGB BLD-MCNC: 10.7 G/DL — LOW (ref 13–17)
IMM GRANULOCYTES NFR BLD AUTO: 0 % — SIGNIFICANT CHANGE UP (ref 0–1.5)
INR BLD: 1.19 RATIO — HIGH (ref 0.88–1.16)
KETONES UR-MCNC: ABNORMAL
LEUKOCYTE ESTERASE UR-ACNC: ABNORMAL
LYMPHOCYTES # BLD AUTO: 1.14 K/UL — SIGNIFICANT CHANGE UP (ref 1–3.3)
LYMPHOCYTES # BLD AUTO: 22.7 % — SIGNIFICANT CHANGE UP (ref 13–44)
MCHC RBC-ENTMCNC: 29.5 PG — SIGNIFICANT CHANGE UP (ref 27–34)
MCHC RBC-ENTMCNC: 33 GM/DL — SIGNIFICANT CHANGE UP (ref 32–36)
MCV RBC AUTO: 89.3 FL — SIGNIFICANT CHANGE UP (ref 80–100)
MONOCYTES # BLD AUTO: 0.61 K/UL — SIGNIFICANT CHANGE UP (ref 0–0.9)
MONOCYTES NFR BLD AUTO: 12.2 % — SIGNIFICANT CHANGE UP (ref 2–14)
NEUTROPHILS # BLD AUTO: 2.92 K/UL — SIGNIFICANT CHANGE UP (ref 1.8–7.4)
NEUTROPHILS NFR BLD AUTO: 58.1 % — SIGNIFICANT CHANGE UP (ref 43–77)
NITRITE UR-MCNC: NEGATIVE — SIGNIFICANT CHANGE UP
NRBC # BLD: 0 /100 WBCS — SIGNIFICANT CHANGE UP (ref 0–0)
PH UR: 6.5 — SIGNIFICANT CHANGE UP (ref 5–8)
PLATELET # BLD AUTO: 203 K/UL — SIGNIFICANT CHANGE UP (ref 150–400)
POTASSIUM SERPL-MCNC: 3.5 MMOL/L — SIGNIFICANT CHANGE UP (ref 3.5–5.3)
POTASSIUM SERPL-SCNC: 3.5 MMOL/L — SIGNIFICANT CHANGE UP (ref 3.5–5.3)
PROT SERPL-MCNC: 8.4 GM/DL — HIGH (ref 6–8.3)
PROT UR-MCNC: 500 MG/DL
PROTHROM AB SERPL-ACNC: 13 SEC — HIGH (ref 9.8–12.7)
RBC # BLD: 3.63 M/UL — LOW (ref 4.2–5.8)
RBC # FLD: 16.4 % — HIGH (ref 10.3–14.5)
RBC CASTS # UR COMP ASSIST: >50 /HPF (ref 0–4)
SODIUM SERPL-SCNC: 140 MMOL/L — SIGNIFICANT CHANGE UP (ref 135–145)
SP GR SPEC: 1.02 — SIGNIFICANT CHANGE UP (ref 1.01–1.02)
UROBILINOGEN FLD QL: NEGATIVE MG/DL — SIGNIFICANT CHANGE UP
WBC # BLD: 5.02 K/UL — SIGNIFICANT CHANGE UP (ref 3.8–10.5)
WBC # FLD AUTO: 5.02 K/UL — SIGNIFICANT CHANGE UP (ref 3.8–10.5)
WBC UR QL: ABNORMAL

## 2018-08-07 PROCEDURE — 74176 CT ABD & PELVIS W/O CONTRAST: CPT | Mod: 26

## 2018-08-07 PROCEDURE — 99284 EMERGENCY DEPT VISIT MOD MDM: CPT

## 2018-08-07 RX ORDER — CEFPODOXIME PROXETIL 100 MG
1 TABLET ORAL
Qty: 20 | Refills: 0 | OUTPATIENT
Start: 2018-08-07 | End: 2018-08-16

## 2018-08-07 RX ORDER — CEFAZOLIN SODIUM 1 G
2000 VIAL (EA) INJECTION ONCE
Qty: 0 | Refills: 0 | Status: COMPLETED | OUTPATIENT
Start: 2018-08-07 | End: 2018-08-07

## 2018-08-07 RX ADMIN — Medication 100 MILLIGRAM(S): at 21:17

## 2018-08-07 NOTE — ED ADULT NURSE REASSESSMENT NOTE - NS ED NURSE REASSESS COMMENT FT1
Patient remains stable while in the ED, albs and UA and CT scan resulted. Due antibiotics given as ordered tolerated well. FC draining well with no blood or blood clots noted. VS wnl, will continue to monitor.

## 2018-08-07 NOTE — ED PROVIDER NOTE - PHYSICAL EXAMINATION
Gen: Alert, NAD  Head: NC, AT, EOMI, normal lids/conjunctiva  ENT: normal hearing, patent oropharynx, MMM  Neck: supple, no tenderness/meningismus/JVD, Trachea midline  Pulm: Bilateral clear BS, normal resp effort, no wheeze/stridor/retractions  CV: RRR, no M/R/G, +dist pulses  Abd: soft, NT/ND, +BS, no guarding/rebound tenderness  : obrien in place draining red urine, swollen scrotum family states is baseline  Mskel: no edema/erythema/cyanosis  Skin: no rash  Neuro: no new sensory/motor deficits

## 2018-08-07 NOTE — ED PROVIDER NOTE - OBJECTIVE STATEMENT
Pertinent PMH/PSH/FHx/SHx and Review of Systems contained within:    89yo M w PMH of HTN, BPH w indwelling obrien, presents to ED for eval of hematuria.  Family noted obrien output was dark red for the past 4d.  No fever, chills, vomiting, diarrhea, cough.  Pt denies abd pain, back pain, chest pain.  Pt previous UCx demonstrated Klebsiella, sensitive to Ancef, cefpodoxime.    No fever/chills, No photophobia/eye pain/changes in vision, No ear pain/sore throat/dysphagia, No chest pain/palpitations, no SOB/cough/wheeze/stridor, No abdominal pain, No neck/back pain, no rash, no changes in neurological status/function.

## 2018-08-07 NOTE — ED ADULT NURSE NOTE - NSIMPLEMENTINTERV_GEN_ALL_ED
Implemented All Fall with Harm Risk Interventions:  [place] to call system. Call bell, personal items and telephone within reach. Instruct patient to call for assistance. Room bathroom lighting operational. Non-slip footwear when patient is off stretcher. Physically safe environment: no spills, clutter or unnecessary equipment. Stretcher in lowest position, wheels locked, appropriate side rails in place. Provide visual cue, wrist band, yellow gown, etc. Monitor gait and stability. Monitor for mental status changes and reorient to person, place, and time. Review medications for side effects contributing to fall risk. Reinforce activity limits and safety measures with patient and family. Provide visual clues: red socks.

## 2018-08-07 NOTE — ED ADULT NURSE NOTE - OBJECTIVE STATEMENT
Patient 88M aaox3 non ambulatory with FC in place with blood in the bag as per family patient had FC inserted here a month ago for BPH, followed up with urologist and FC was changed around July 24 at urology office. Hematuria presemted since Friday. Patient 88M aaox3 non ambulatory with FC in place with blood in the bag as per family patient had FC inserted here a month ago for BPH, followed up with urologist and FC was changed around July 24 at urology office. Hematuria presemted since Friday.  Noted a [place] citizen 16 FC with dark eamon urine output, no blood or blood clots noted.  FC [place] citizen 16 replaced aseptically, UA specimen sent. IV access inerted, labs sent pending results.

## 2018-08-07 NOTE — ED PROVIDER NOTE - MEDICAL DECISION MAKING DETAILS
Pt VSS, no hydro on CT, given first dose of abx IV in ED.  Given Rx and instructed/cautioned on use.  Discussed results and outcome of testing with the patient, given copy as well.  Patient advised to please follow up with their primary care doctor within the next 24 hours and return to the Emergency Department for worsening symptoms or any other concerns.  Patient advised that their doctor may call  to follow up on the specific results of the tests performed today in the emergency department. Pt VSS, no hydro on CT, given first dose of abx IV in ED.  Ma changed, now draining straw colored urine.  Given Rx and instructed/cautioned on use.  Discussed results and outcome of testing with the patient, given copy as well.  Patient advised to please follow up with their primary care doctor within the next 24 hours and return to the Emergency Department for worsening symptoms or any other concerns.  Patient advised that their doctor may call  to follow up on the specific results of the tests performed today in the emergency department.

## 2018-08-09 LAB
-  AMIKACIN: SIGNIFICANT CHANGE UP
-  AMOXICILLIN/CLAVULANIC ACID: SIGNIFICANT CHANGE UP
-  AMPICILLIN/SULBACTAM: SIGNIFICANT CHANGE UP
-  AMPICILLIN: SIGNIFICANT CHANGE UP
-  AZTREONAM: SIGNIFICANT CHANGE UP
-  CEFAZOLIN: SIGNIFICANT CHANGE UP
-  CEFEPIME: SIGNIFICANT CHANGE UP
-  CEFOXITIN: SIGNIFICANT CHANGE UP
-  CEFTAZIDIME: SIGNIFICANT CHANGE UP
-  CEFTRIAXONE: SIGNIFICANT CHANGE UP
-  CIPROFLOXACIN: SIGNIFICANT CHANGE UP
-  ERTAPENEM: SIGNIFICANT CHANGE UP
-  GENTAMICIN: SIGNIFICANT CHANGE UP
-  IMIPENEM: SIGNIFICANT CHANGE UP
-  LEVOFLOXACIN: SIGNIFICANT CHANGE UP
-  LEVOFLOXACIN: SIGNIFICANT CHANGE UP
-  MEROPENEM: SIGNIFICANT CHANGE UP
-  NITROFURANTOIN: SIGNIFICANT CHANGE UP
-  PIPERACILLIN/TAZOBACTAM: SIGNIFICANT CHANGE UP
-  TIGECYCLINE: SIGNIFICANT CHANGE UP
-  TOBRAMYCIN: SIGNIFICANT CHANGE UP
-  TRIMETHOPRIM/SULFAMETHOXAZOLE: SIGNIFICANT CHANGE UP
-  TRIMETHOPRIM/SULFAMETHOXAZOLE: SIGNIFICANT CHANGE UP
CULTURE RESULTS: SIGNIFICANT CHANGE UP
METHOD TYPE: SIGNIFICANT CHANGE UP
METHOD TYPE: SIGNIFICANT CHANGE UP
ORGANISM # SPEC MICROSCOPIC CNT: SIGNIFICANT CHANGE UP
SPECIMEN SOURCE: SIGNIFICANT CHANGE UP

## 2018-08-10 RX ORDER — AZTREONAM 2 G
1 VIAL (EA) INJECTION
Qty: 14 | Refills: 0 | OUTPATIENT
Start: 2018-08-10 | End: 2018-08-16

## 2018-08-13 DIAGNOSIS — Z71.89 OTHER SPECIFIED COUNSELING: ICD-10-CM

## 2018-12-08 ENCOUNTER — EMERGENCY (EMERGENCY)
Facility: HOSPITAL | Age: 83
LOS: 0 days | Discharge: HOME | End: 2018-12-08
Attending: EMERGENCY MEDICINE
Payer: MEDICARE

## 2018-12-08 VITALS
RESPIRATION RATE: 19 BRPM | TEMPERATURE: 99 F | SYSTOLIC BLOOD PRESSURE: 124 MMHG | HEIGHT: 69 IN | DIASTOLIC BLOOD PRESSURE: 63 MMHG | HEART RATE: 89 BPM | OXYGEN SATURATION: 100 % | WEIGHT: 169.98 LBS

## 2018-12-08 DIAGNOSIS — M79.661 PAIN IN RIGHT LOWER LEG: ICD-10-CM

## 2018-12-08 DIAGNOSIS — N40.0 BENIGN PROSTATIC HYPERPLASIA WITHOUT LOWER URINARY TRACT SYMPTOMS: ICD-10-CM

## 2018-12-08 DIAGNOSIS — R50.9 FEVER, UNSPECIFIED: ICD-10-CM

## 2018-12-08 DIAGNOSIS — I10 ESSENTIAL (PRIMARY) HYPERTENSION: ICD-10-CM

## 2018-12-08 DIAGNOSIS — N30.00 ACUTE CYSTITIS WITHOUT HEMATURIA: ICD-10-CM

## 2018-12-08 DIAGNOSIS — M79.662 PAIN IN LEFT LOWER LEG: ICD-10-CM

## 2018-12-08 LAB
APPEARANCE UR: ABNORMAL
BILIRUB UR-MCNC: NEGATIVE — SIGNIFICANT CHANGE UP
COLOR SPEC: YELLOW — SIGNIFICANT CHANGE UP
DIFF PNL FLD: ABNORMAL
GLUCOSE UR QL: NEGATIVE MG/DL — SIGNIFICANT CHANGE UP
KETONES UR-MCNC: NEGATIVE — SIGNIFICANT CHANGE UP
LEUKOCYTE ESTERASE UR-ACNC: ABNORMAL
NITRITE UR-MCNC: POSITIVE
PH UR: 5 — SIGNIFICANT CHANGE UP (ref 5–8)
PROT UR-MCNC: 500 MG/DL
SP GR SPEC: 1.02 — SIGNIFICANT CHANGE UP (ref 1.01–1.02)
UROBILINOGEN FLD QL: NEGATIVE MG/DL — SIGNIFICANT CHANGE UP

## 2018-12-08 PROCEDURE — 99283 EMERGENCY DEPT VISIT LOW MDM: CPT

## 2018-12-08 RX ORDER — IBUPROFEN 200 MG
600 TABLET ORAL ONCE
Qty: 0 | Refills: 0 | Status: COMPLETED | OUTPATIENT
Start: 2018-12-08 | End: 2018-12-08

## 2018-12-08 RX ORDER — MOXIFLOXACIN HYDROCHLORIDE TABLETS, 400 MG 400 MG/1
1 TABLET, FILM COATED ORAL
Qty: 14 | Refills: 0 | OUTPATIENT
Start: 2018-12-08 | End: 2018-12-14

## 2018-12-08 RX ORDER — CIPROFLOXACIN LACTATE 400MG/40ML
500 VIAL (ML) INTRAVENOUS ONCE
Qty: 0 | Refills: 0 | Status: COMPLETED | OUTPATIENT
Start: 2018-12-08 | End: 2018-12-08

## 2018-12-08 RX ADMIN — Medication 500 MILLIGRAM(S): at 18:58

## 2018-12-08 RX ADMIN — Medication 600 MILLIGRAM(S): at 18:57

## 2018-12-08 NOTE — ED ADULT NURSE NOTE - OBJECTIVE STATEMENT
patient a/o x4 , c/o fever and foul smelling urine from Ma catheter since yesterday, today temperature 101.2 as per daughter. Patient is bed ridden and is not able to ambulate. Ski is intact.

## 2018-12-08 NOTE — ED ADULT TRIAGE NOTE - CHIEF COMPLAINT QUOTE
patient BIBA , c/o of fever started today , patient c/o of bilateral legs pain , patient has a Ma cath  in place , history of UTI denied any chest pain , denied abdominal pin no N/V

## 2018-12-08 NOTE — ED PROVIDER NOTE - CARE PROVIDER_API CALL
Adithya Mensah), Urology  [address] [place] [address]  Phone: [phone]  Fax: [phone]

## 2018-12-08 NOTE — ED PROVIDER NOTE - MEDICAL DECISION MAKING DETAILS
89 yo M with indwelling obrien, likely UTI  -change obrien, motrin, cipro, send ua and cx  -d/c with uro f/u

## 2018-12-08 NOTE — ED ADULT NURSE NOTE - NSIMPLEMENTINTERV_GEN_ALL_ED
Implemented All Universal Safety Interventions:  [place] to call system. Call bell, personal items and telephone within reach. Instruct patient to call for assistance. Room bathroom lighting operational. Non-slip footwear when patient is off stretcher. Physically safe environment: no spills, clutter or unnecessary equipment. Stretcher in lowest position, wheels locked, appropriate side rails in place.

## 2018-12-08 NOTE — ED ADULT NURSE REASSESSMENT NOTE - NS ED NURSE REASSESS COMMENT FT1
Ma catheter size 16 F replaced as per order, no urine return noted, DR. Frazier made aware, oral fluid given and will continue to observe.

## 2018-12-08 NOTE — ED PROVIDER NOTE - PROGRESS NOTE DETAILS
Results reported to patient--UA shows UTI  Pt. reports feeling better after meds  pt. agrees to f/u with primary care outpt., will refer to URO  pt. understands to return to ED if symptoms worsen; will d/c

## 2018-12-08 NOTE — ED PROVIDER NOTE - OBJECTIVE STATEMENT
89 yo M with recurrent UTIs present for fever and cloudy urine in obrien bag.  Fever started yesterday, temp of 101 at home.  Pt. has no other complaints, feels well otherwise.   ROS: negative for fever, cough, headache, chest pain, shortness of breath, abd pain, nausea, vomiting, diarrhea, rash, paresthesia, and weakness--all other systems reviewed are negative.   PMH: HTN, BPH w indwelling obrien; Meds: See EMR; SH: Denies smoking/drinking/drug use

## 2018-12-09 LAB
CULTURE RESULTS: SIGNIFICANT CHANGE UP
SPECIMEN SOURCE: SIGNIFICANT CHANGE UP

## 2019-02-13 ENCOUNTER — EMERGENCY (EMERGENCY)
Facility: HOSPITAL | Age: 84
LOS: 0 days | Discharge: HOME | End: 2019-02-13
Attending: STUDENT IN AN ORGANIZED HEALTH CARE EDUCATION/TRAINING PROGRAM
Payer: MEDICARE

## 2019-02-13 VITALS
RESPIRATION RATE: 18 BRPM | TEMPERATURE: 98 F | DIASTOLIC BLOOD PRESSURE: 84 MMHG | HEART RATE: 72 BPM | SYSTOLIC BLOOD PRESSURE: 169 MMHG | OXYGEN SATURATION: 100 %

## 2019-02-13 VITALS
OXYGEN SATURATION: 94 % | HEART RATE: 89 BPM | TEMPERATURE: 98 F | HEIGHT: 67 IN | SYSTOLIC BLOOD PRESSURE: 155 MMHG | WEIGHT: 149.91 LBS | DIASTOLIC BLOOD PRESSURE: 92 MMHG | RESPIRATION RATE: 19 BRPM

## 2019-02-13 DIAGNOSIS — N43.3 HYDROCELE, UNSPECIFIED: ICD-10-CM

## 2019-02-13 DIAGNOSIS — N40.0 BENIGN PROSTATIC HYPERPLASIA WITHOUT LOWER URINARY TRACT SYMPTOMS: ICD-10-CM

## 2019-02-13 DIAGNOSIS — Z79.2 LONG TERM (CURRENT) USE OF ANTIBIOTICS: ICD-10-CM

## 2019-02-13 DIAGNOSIS — E86.0 DEHYDRATION: ICD-10-CM
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
## 2019-02-13 DIAGNOSIS — S00.93XA CONTUSION OF UNSPECIFIED PART OF HEAD, INITIAL ENCOUNTER: ICD-10-CM

## 2019-02-13 DIAGNOSIS — N28.9 DISORDER OF KIDNEY AND URETER, UNSPECIFIED: ICD-10-CM

## 2019-02-13 DIAGNOSIS — Z04.3 ENCOUNTER FOR EXAMINATION AND OBSERVATION FOLLOWING OTHER ACCIDENT: ICD-10-CM
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
## 2019-02-13 DIAGNOSIS — I10 ESSENTIAL (PRIMARY) HYPERTENSION: ICD-10-CM

## 2019-02-13 PROCEDURE — 70450 CT HEAD/BRAIN W/O DYE: CPT | Mod: 26

## 2019-02-13 PROCEDURE — 99284 EMERGENCY DEPT VISIT MOD MDM: CPT

## 2019-02-13 PROCEDURE — 73080 X-RAY EXAM OF ELBOW: CPT | Mod: 26,LT

## 2019-02-13 PROCEDURE — 72125 CT NECK SPINE W/O DYE: CPT | Mod: 26

## 2019-02-13 PROCEDURE — 76377 3D RENDER W/INTRP POSTPROCES: CPT | Mod: 26

## 2019-02-13 NOTE — ED PROVIDER NOTE - CONSTITUTIONAL, MLM
normal... Well appearing, well nourished, awake, alert, oriented to person, place, time/situation and in no apparent distress, c collar in place

## 2019-02-13 NOTE — ED PROVIDER NOTE - OBJECTIVE STATEMENT
88 year old male presents today biba from home accompanied with his daughter c/o falling backwards while being assisted to the car, pt fell back struck his head without LOC and the transporter assisting him fell on top of him, (-) headache (-) dizziness or  lightheadedness (-) nausea or vomiting (-) chest pain (-) sob

## 2019-02-13 NOTE — ED PROVIDER NOTE - CLINICAL SUMMARY MEDICAL DECISION MAKING FREE TEXT BOX
pt presented s/p fall as he was being assisted, he did strike his head without LOC, ct head and neck negative for bleed or fracture, elbow xray negative for acute fracture, c collar removed, outpt follow up with is pmd

## 2019-02-13 NOTE — ED ADULT TRIAGE NOTE - CHIEF COMPLAINT QUOTE
patient BIBA as per EMS patient had a fall today unknown if hit head unknown LOC , patient confuse at the time of triage , patient co of general pain , denied chest pain

## 2019-04-11 ENCOUNTER — INPATIENT (INPATIENT)
Facility: HOSPITAL | Age: 84
LOS: 12 days | Discharge: HOME HEALTH | End: 2019-04-24
Attending: INTERNAL MEDICINE | Admitting: INTERNAL MEDICINE
Payer: MEDICARE

## 2019-04-11 VITALS
WEIGHT: 149.91 LBS | RESPIRATION RATE: 16 BRPM | TEMPERATURE: 98 F | HEART RATE: 75 BPM | DIASTOLIC BLOOD PRESSURE: 78 MMHG | HEIGHT: 70 IN | OXYGEN SATURATION: 98 % | SYSTOLIC BLOOD PRESSURE: 140 MMHG

## 2019-04-11 LAB
ALBUMIN SERPL ELPH-MCNC: 2.7 G/DL — LOW (ref 3.3–5)
ALP SERPL-CCNC: 56 U/L — SIGNIFICANT CHANGE UP (ref 40–120)
ALT FLD-CCNC: 24 U/L — SIGNIFICANT CHANGE UP (ref 12–78)
ANION GAP SERPL CALC-SCNC: 10 MMOL/L — SIGNIFICANT CHANGE UP (ref 5–17)
APTT BLD: 36.5 SEC — HIGH (ref 27.5–36.3)
AST SERPL-CCNC: 43 U/L — HIGH (ref 15–37)
BASOPHILS # BLD AUTO: 0.03 K/UL — SIGNIFICANT CHANGE UP (ref 0–0.2)
BASOPHILS NFR BLD AUTO: 0.3 % — SIGNIFICANT CHANGE UP (ref 0–2)
BILIRUB SERPL-MCNC: 0.7 MG/DL — SIGNIFICANT CHANGE UP (ref 0.2–1.2)
BUN SERPL-MCNC: 22 MG/DL — SIGNIFICANT CHANGE UP (ref 7–23)
CALCIUM SERPL-MCNC: 10.6 MG/DL — HIGH (ref 8.5–10.1)
CHLORIDE SERPL-SCNC: 99 MMOL/L — SIGNIFICANT CHANGE UP (ref 96–108)
CK MB BLD-MCNC: 0.5 % — SIGNIFICANT CHANGE UP (ref 0–3.5)
CK MB CFR SERPL CALC: 1.4 NG/ML — SIGNIFICANT CHANGE UP (ref 0.5–3.6)
CK SERPL-CCNC: 294 U/L — SIGNIFICANT CHANGE UP (ref 26–308)
CO2 SERPL-SCNC: 30 MMOL/L — SIGNIFICANT CHANGE UP (ref 22–31)
CREAT SERPL-MCNC: 1.18 MG/DL — SIGNIFICANT CHANGE UP (ref 0.5–1.3)
EOSINOPHIL # BLD AUTO: 0.38 K/UL — SIGNIFICANT CHANGE UP (ref 0–0.5)
EOSINOPHIL NFR BLD AUTO: 3.7 % — SIGNIFICANT CHANGE UP (ref 0–6)
GLUCOSE SERPL-MCNC: 105 MG/DL — HIGH (ref 70–99)
HCT VFR BLD CALC: 28.8 % — LOW (ref 39–50)
HGB BLD-MCNC: 9 G/DL — LOW (ref 13–17)
IMM GRANULOCYTES NFR BLD AUTO: 0.5 % — SIGNIFICANT CHANGE UP (ref 0–1.5)
INR BLD: 1.36 RATIO — HIGH (ref 0.88–1.16)
LACTATE SERPL-SCNC: 1.1 MMOL/L — SIGNIFICANT CHANGE UP (ref 0.7–2)
LYMPHOCYTES # BLD AUTO: 0.63 K/UL — LOW (ref 1–3.3)
LYMPHOCYTES # BLD AUTO: 6.2 % — LOW (ref 13–44)
MCHC RBC-ENTMCNC: 27.9 PG — SIGNIFICANT CHANGE UP (ref 27–34)
MCHC RBC-ENTMCNC: 31.3 GM/DL — LOW (ref 32–36)
MCV RBC AUTO: 89.2 FL — SIGNIFICANT CHANGE UP (ref 80–100)
MONOCYTES # BLD AUTO: 0.85 K/UL — SIGNIFICANT CHANGE UP (ref 0–0.9)
MONOCYTES NFR BLD AUTO: 8.3 % — SIGNIFICANT CHANGE UP (ref 2–14)
NEUTROPHILS # BLD AUTO: 8.24 K/UL — HIGH (ref 1.8–7.4)
NEUTROPHILS NFR BLD AUTO: 81 % — HIGH (ref 43–77)
NRBC # BLD: 0 /100 WBCS — SIGNIFICANT CHANGE UP (ref 0–0)
PLATELET # BLD AUTO: 289 K/UL — SIGNIFICANT CHANGE UP (ref 150–400)
POTASSIUM SERPL-MCNC: 3.9 MMOL/L — SIGNIFICANT CHANGE UP (ref 3.5–5.3)
POTASSIUM SERPL-SCNC: 3.9 MMOL/L — SIGNIFICANT CHANGE UP (ref 3.5–5.3)
PROT SERPL-MCNC: 8.8 GM/DL — HIGH (ref 6–8.3)
PROTHROM AB SERPL-ACNC: 15.4 SEC — HIGH (ref 10–12.9)
RBC # BLD: 3.23 M/UL — LOW (ref 4.2–5.8)
RBC # FLD: 15.7 % — HIGH (ref 10.3–14.5)
SODIUM SERPL-SCNC: 139 MMOL/L — SIGNIFICANT CHANGE UP (ref 135–145)
TROPONIN I SERPL-MCNC: 0.02 NG/ML — SIGNIFICANT CHANGE UP (ref 0.01–0.04)
WBC # BLD: 10.18 K/UL — SIGNIFICANT CHANGE UP (ref 3.8–10.5)
WBC # FLD AUTO: 10.18 K/UL — SIGNIFICANT CHANGE UP (ref 3.8–10.5)

## 2019-04-11 PROCEDURE — 93010 ELECTROCARDIOGRAM REPORT: CPT | Mod: 76

## 2019-04-11 PROCEDURE — 71045 X-RAY EXAM CHEST 1 VIEW: CPT | Mod: 26

## 2019-04-11 PROCEDURE — 99285 EMERGENCY DEPT VISIT HI MDM: CPT

## 2019-04-11 PROCEDURE — 70450 CT HEAD/BRAIN W/O DYE: CPT | Mod: 26

## 2019-04-11 PROCEDURE — 99223 1ST HOSP IP/OBS HIGH 75: CPT | Mod: AI

## 2019-04-11 NOTE — H&P ADULT - NSHPPHYSICALEXAM_GEN_ALL_CORE
Vital Signs Last 24 Hrs  T(C): 37.9 (12 Apr 2019 01:12), Max: 38 (11 Apr 2019 17:20)  T(F): 100.3 (12 Apr 2019 01:12), Max: 100.4 (11 Apr 2019 17:20)  HR: 84 (12 Apr 2019 01:12) (74 - 84)  BP: 140/78 (12 Apr 2019 01:12) (107/53 - 140/78)  BP(mean): --  RR: 18 (12 Apr 2019 01:12) (16 - 22)  SpO2: 99% (12 Apr 2019 01:12) (97% - 99%)    PHYSICAL EXAM:    GENERAL: elderly frail male  HEAD:  Atraumatic, Normocephalic  EYES: EOMI, PERRLA, conjunctiva and sclera clear  ENMT: No tonsillar erythema, exudates, or enlargement; Moist mucous membranes, No lesions  NECK: Supple, No JVD, Normal thyroid  NERVOUS SYSTEM:  Alert & Oriented X3,  Motor Strength 5/5 B/L upper and 4+/5 b/l lower extremities  CHEST/LUNG: Clear to percussion bilaterally; No rales, rhonchi, wheezing, or rubs  HEART: Regular rate and rhythm; No rubs, or gallops, +S1,S2  ABDOMEN: Soft, Nontender, Nondistended; Bowel sounds present, +mild tenderness suprapubic  EXTREMITIES:  2+ Peripheral Pulses, No clubbing, cyanosis, or edema  LYMPH: No cervical adenopathy  RECTAL: deferred  BREAST: No palpatble masses, skin no lesions   : nl male genitalia, obrien in place, +phemosis, +pus around obrien  SKIN: No rashes or lesions    IMPROVE VTE Individual Risk Assessment          RISK                                                          Points  [  ] Previous VTE                                                3  [  ] Thrombophilia                                             2  [  ] Lower limb paralysis                                    2        (unable to hold up >15 seconds)    [  ] Current Cancer                                             2         (within 6 months)  [ x ] Immobilization > 24 hrs                              1  [  ] ICU/CCU stay > 24 hours                            1  [ x ] Age > 60                                                    1  IMPROVE VTE Score ______2___ Vital Signs Last 24 Hrs  T(C): 37.9 (12 Apr 2019 01:12), Max: 38 (11 Apr 2019 17:20)  T(F): 100.3 (12 Apr 2019 01:12), Max: 100.4 (11 Apr 2019 17:20)  HR: 84 (12 Apr 2019 01:12) (74 - 84)  BP: 140/78 (12 Apr 2019 01:12) (107/53 - 140/78)  BP(mean): --  RR: 18 (12 Apr 2019 01:12) (16 - 22)  SpO2: 99% (12 Apr 2019 01:12) (97% - 99%)    PHYSICAL EXAM:    GENERAL: elderly frail male  HEAD:  Atraumatic, Normocephalic  EYES: EOMI, PERRLA, conjunctiva and sclera clear  ENMT: No tonsillar erythema, exudates, or enlargement; Moist mucous membranes, No lesions  NECK: Supple, No JVD, Normal thyroid  NERVOUS SYSTEM:  Alert & Oriented X3,  Motor Strength 5/5 B/L upper and 4+/5 b/l lower extremities  CHEST/LUNG: Clear to percussion bilaterally; No rales, rhonchi, wheezing, or rubs  HEART: Regular rate and rhythm; No rubs, or gallops, +S1,S2  ABDOMEN: Soft, Nontender, Nondistended; Bowel sounds present, +mild tenderness suprapubic  EXTREMITIES:  2+ Peripheral Pulses, No clubbing, cyanosis, no edema rle, 1+lle no tenderness no cord  LYMPH: No cervical adenopathy  RECTAL: deferred  BREAST: No palpatble masses, skin no lesions   : nl male genitalia, obrien in place, +phemosis, +pus around obrien  SKIN: No rashes or lesions    IMPROVE VTE Individual Risk Assessment          RISK                                                          Points  [  ] Previous VTE                                                3  [  ] Thrombophilia                                             2  [  ] Lower limb paralysis                                    2        (unable to hold up >15 seconds)    [  ] Current Cancer                                             2         (within 6 months)  [ x ] Immobilization > 24 hrs                              1  [  ] ICU/CCU stay > 24 hours                            1  [ x ] Age > 60                                                    1  IMPROVE VTE Score ______2___

## 2019-04-11 NOTE — ED ADULT NURSE NOTE - OBJECTIVE STATEMENT
89 YEAR OLD MALE 89 YEAR OLD MALE brought to the ed by ems c/o of syncope for 5 min unresponsive. hx of htn, enlarged prostate

## 2019-04-11 NOTE — H&P ADULT - HISTORY OF PRESENT ILLNESS
Pt is a 89 y/o male w/pmhx of HTN, HLD,CKD, LBP/radiculopathy - bed bound, BPH and urinary retention w/chronic obrien comes in after family noticed he was unresponsive to them for 2-3 minutes.  family was talking to him and pt became unresponsive family splattered some water on face and pt came about. no sz like activity. pt was at baseline on arousal.  Per son who  is a physician and  currently bedside reports he wasnt present but family had checked pt's bp and was reported to be about 82/60.  pt did have bp meds increased recently.  pt denies any fever, chills, sob, cp, palpitations, n/v/d/c, no travels or sick contacts. pt does report bladder discomfort and pain since last week.  Pt's son reports pt was urged for suprapubic in past and pt has been reluctant unsure when obrien was last changed.

## 2019-04-11 NOTE — H&P ADULT - NSICDXFAMILYHX_GEN_ALL_CORE_FT
FAMILY HISTORY:  Father  Still living? Unknown  No significant family history, Age at diagnosis: Age Unknown    Mother  Still living? No  No significant family history, Age at diagnosis: Age Unknown

## 2019-04-11 NOTE — ED ADULT NURSE NOTE - NSIMPLEMENTINTERV_GEN_ALL_ED
Implemented All Fall with Harm Risk Interventions:  [place] to call system. Call bell, personal items and telephone within reach. Instruct patient to call for assistance. Room bathroom lighting operational. Non-slip footwear when patient is off stretcher. Physically safe environment: no spills, clutter or unnecessary equipment. Stretcher in lowest position, wheels locked, appropriate side rails in place. Provide visual cue, wrist band, yellow gown, etc. Monitor gait and stability. Monitor for mental status changes and reorient to person, place, and time. Review medications for side effects contributing to fall risk. Reinforce activity limits and safety measures with patient and family. Provide visual clues: red socks.

## 2019-04-11 NOTE — H&P ADULT - NSICDXPASTMEDICALHX_GEN_ALL_CORE_FT
PAST MEDICAL HISTORY:  Benign Prostatic Hyperplasia     BPH (benign prostatic hypertrophy)     Dehydration     HTN (Hypertension)     Hydrocele in adult bolateral, chronic    Hypertension     Renal insufficiency     Spinal stenosis of lumbar region

## 2019-04-11 NOTE — H&P ADULT - PROBLEM SELECTOR PLAN 1
admit to tele  ?from hypotension  will hold labetolol and cont only ace  pt also w/temp likely urinary source

## 2019-04-11 NOTE — H&P ADULT - NSHPLABSRESULTS_GEN_ALL_CORE
LABS:                        9.0    10.18 )-----------( 289      ( 11 Apr 2019 16:59 )             28.8     04-11    139  |  99  |  22  ----------------------------<  105<H>  3.9   |  30  |  1.18    Ca    10.6<H>      11 Apr 2019 16:59    TPro  8.8<H>  /  Alb  2.7<L>  /  TBili  0.7  /  DBili  x   /  AST  43<H>  /  ALT  24  /  AlkPhos  56  04-11    PT/INR - ( 11 Apr 2019 16:59 )   PT: 15.4 sec;   INR: 1.36 ratio         PTT - ( 11 Apr 2019 16:59 )  PTT:36.5 sec    CAPILLARY BLOOD GLUCOSE      POCT Blood Glucose.: 114 mg/dL (11 Apr 2019 14:38)      RADIOLOGY & ADDITIONAL TESTS:    Imaging Personally Reviewed:  [ X] YES  [ ] NO

## 2019-04-12 DIAGNOSIS — Z29.9 ENCOUNTER FOR PROPHYLACTIC MEASURES, UNSPECIFIED: ICD-10-CM

## 2019-04-12 DIAGNOSIS — N40.1 BENIGN PROSTATIC HYPERPLASIA WITH LOWER URINARY TRACT SYMPTOMS: ICD-10-CM

## 2019-04-12 DIAGNOSIS — R55 SYNCOPE AND COLLAPSE: ICD-10-CM

## 2019-04-12 LAB
ANION GAP SERPL CALC-SCNC: 10 MMOL/L — SIGNIFICANT CHANGE UP (ref 5–17)
APPEARANCE UR: CLEAR — SIGNIFICANT CHANGE UP
BACTERIA # UR AUTO: ABNORMAL
BILIRUB UR-MCNC: NEGATIVE — SIGNIFICANT CHANGE UP
BUN SERPL-MCNC: 24 MG/DL — HIGH (ref 7–23)
CALCIUM SERPL-MCNC: 10.6 MG/DL — HIGH (ref 8.5–10.1)
CHLORIDE SERPL-SCNC: 96 MMOL/L — SIGNIFICANT CHANGE UP (ref 96–108)
CO2 SERPL-SCNC: 32 MMOL/L — HIGH (ref 22–31)
COLOR SPEC: YELLOW — SIGNIFICANT CHANGE UP
CREAT SERPL-MCNC: 1.11 MG/DL — SIGNIFICANT CHANGE UP (ref 0.5–1.3)
DIFF PNL FLD: ABNORMAL
EPI CELLS # UR: SIGNIFICANT CHANGE UP
GLUCOSE SERPL-MCNC: 99 MG/DL — SIGNIFICANT CHANGE UP (ref 70–99)
GLUCOSE UR QL: NEGATIVE MG/DL — SIGNIFICANT CHANGE UP
HCT VFR BLD CALC: 26.5 % — LOW (ref 39–50)
HGB BLD-MCNC: 8.3 G/DL — LOW (ref 13–17)
KETONES UR-MCNC: NEGATIVE — SIGNIFICANT CHANGE UP
LEUKOCYTE ESTERASE UR-ACNC: ABNORMAL
MCHC RBC-ENTMCNC: 27.9 PG — SIGNIFICANT CHANGE UP (ref 27–34)
MCHC RBC-ENTMCNC: 31.3 GM/DL — LOW (ref 32–36)
MCV RBC AUTO: 88.9 FL — SIGNIFICANT CHANGE UP (ref 80–100)
NITRITE UR-MCNC: NEGATIVE — SIGNIFICANT CHANGE UP
NRBC # BLD: 0 /100 WBCS — SIGNIFICANT CHANGE UP (ref 0–0)
PH UR: 6.5 — SIGNIFICANT CHANGE UP (ref 5–8)
PLATELET # BLD AUTO: 309 K/UL — SIGNIFICANT CHANGE UP (ref 150–400)
POTASSIUM SERPL-MCNC: 3.2 MMOL/L — LOW (ref 3.5–5.3)
POTASSIUM SERPL-SCNC: 3.2 MMOL/L — LOW (ref 3.5–5.3)
PROT SERPL-MCNC: 8.1 G/DL — SIGNIFICANT CHANGE UP (ref 6–8.3)
PROT SERPL-MCNC: 8.1 G/DL — SIGNIFICANT CHANGE UP (ref 6–8.3)
PROT UR-MCNC: 100 MG/DL
RBC # BLD: 2.98 M/UL — LOW (ref 4.2–5.8)
RBC # FLD: 15.7 % — HIGH (ref 10.3–14.5)
RBC CASTS # UR COMP ASSIST: SIGNIFICANT CHANGE UP /HPF (ref 0–4)
SODIUM SERPL-SCNC: 138 MMOL/L — SIGNIFICANT CHANGE UP (ref 135–145)
SP GR SPEC: 1.01 — SIGNIFICANT CHANGE UP (ref 1.01–1.02)
UROBILINOGEN FLD QL: NEGATIVE MG/DL — SIGNIFICANT CHANGE UP
WBC # BLD: 9.92 K/UL — SIGNIFICANT CHANGE UP (ref 3.8–10.5)
WBC # FLD AUTO: 9.92 K/UL — SIGNIFICANT CHANGE UP (ref 3.8–10.5)
WBC UR QL: ABNORMAL

## 2019-04-12 PROCEDURE — 99233 SBSQ HOSP IP/OBS HIGH 50: CPT

## 2019-04-12 PROCEDURE — 93970 EXTREMITY STUDY: CPT | Mod: 26

## 2019-04-12 RX ORDER — ENOXAPARIN SODIUM 100 MG/ML
40 INJECTION SUBCUTANEOUS EVERY 24 HOURS
Qty: 0 | Refills: 0 | Status: DISCONTINUED | OUTPATIENT
Start: 2019-04-12 | End: 2019-04-12

## 2019-04-12 RX ORDER — CHOLECALCIFEROL (VITAMIN D3) 125 MCG
1000 CAPSULE ORAL DAILY
Qty: 0 | Refills: 0 | Status: DISCONTINUED | OUTPATIENT
Start: 2019-04-12 | End: 2019-04-24

## 2019-04-12 RX ORDER — ACETAMINOPHEN 500 MG
650 TABLET ORAL EVERY 6 HOURS
Qty: 0 | Refills: 0 | Status: DISCONTINUED | OUTPATIENT
Start: 2019-04-12 | End: 2019-04-24

## 2019-04-12 RX ORDER — FINASTERIDE 5 MG/1
5 TABLET, FILM COATED ORAL DAILY
Qty: 0 | Refills: 0 | Status: DISCONTINUED | OUTPATIENT
Start: 2019-04-12 | End: 2019-04-24

## 2019-04-12 RX ORDER — POTASSIUM CHLORIDE 20 MEQ
40 PACKET (EA) ORAL ONCE
Qty: 0 | Refills: 0 | Status: COMPLETED | OUTPATIENT
Start: 2019-04-12 | End: 2019-04-12

## 2019-04-12 RX ORDER — TAMSULOSIN HYDROCHLORIDE 0.4 MG/1
0.4 CAPSULE ORAL AT BEDTIME
Qty: 0 | Refills: 0 | Status: DISCONTINUED | OUTPATIENT
Start: 2019-04-12 | End: 2019-04-19

## 2019-04-12 RX ORDER — FERROUS SULFATE 325(65) MG
325 TABLET ORAL DAILY
Qty: 0 | Refills: 0 | Status: DISCONTINUED | OUTPATIENT
Start: 2019-04-12 | End: 2019-04-24

## 2019-04-12 RX ORDER — DOCUSATE SODIUM 100 MG
100 CAPSULE ORAL
Qty: 0 | Refills: 0 | Status: DISCONTINUED | OUTPATIENT
Start: 2019-04-12 | End: 2019-04-24

## 2019-04-12 RX ORDER — PIPERACILLIN AND TAZOBACTAM 4; .5 G/20ML; G/20ML
3.38 INJECTION, POWDER, LYOPHILIZED, FOR SOLUTION INTRAVENOUS EVERY 8 HOURS
Qty: 0 | Refills: 0 | Status: DISCONTINUED | OUTPATIENT
Start: 2019-04-12 | End: 2019-04-16

## 2019-04-12 RX ORDER — AMLODIPINE BESYLATE 2.5 MG/1
5 TABLET ORAL DAILY
Qty: 0 | Refills: 0 | Status: DISCONTINUED | OUTPATIENT
Start: 2019-04-12 | End: 2019-04-24

## 2019-04-12 RX ADMIN — AMLODIPINE BESYLATE 5 MILLIGRAM(S): 2.5 TABLET ORAL at 06:24

## 2019-04-12 RX ADMIN — Medication 325 MILLIGRAM(S): at 12:26

## 2019-04-12 RX ADMIN — Medication 100 MILLIGRAM(S): at 17:25

## 2019-04-12 RX ADMIN — TAMSULOSIN HYDROCHLORIDE 0.4 MILLIGRAM(S): 0.4 CAPSULE ORAL at 22:14

## 2019-04-12 RX ADMIN — PIPERACILLIN AND TAZOBACTAM 25 GRAM(S): 4; .5 INJECTION, POWDER, LYOPHILIZED, FOR SOLUTION INTRAVENOUS at 06:24

## 2019-04-12 RX ADMIN — FINASTERIDE 5 MILLIGRAM(S): 5 TABLET, FILM COATED ORAL at 12:26

## 2019-04-12 RX ADMIN — PIPERACILLIN AND TAZOBACTAM 25 GRAM(S): 4; .5 INJECTION, POWDER, LYOPHILIZED, FOR SOLUTION INTRAVENOUS at 13:57

## 2019-04-12 RX ADMIN — Medication 1000 UNIT(S): at 12:26

## 2019-04-12 RX ADMIN — Medication 40 MILLIEQUIVALENT(S): at 12:26

## 2019-04-12 RX ADMIN — PIPERACILLIN AND TAZOBACTAM 25 GRAM(S): 4; .5 INJECTION, POWDER, LYOPHILIZED, FOR SOLUTION INTRAVENOUS at 22:14

## 2019-04-12 RX ADMIN — Medication 100 MILLIGRAM(S): at 06:24

## 2019-04-12 NOTE — PROGRESS NOTE ADULT - PROBLEM SELECTOR PLAN 1
Presumed Vasovagal in setting of sepsis   Head CT no acute changes, CXR: no acute changes.   EKG: SR @ 80bpm, premature atrial complex, bifascicular block. will get Echo.

## 2019-04-12 NOTE — PROGRESS NOTE ADULT - PROBLEM SELECTOR PLAN 2
complex UTI Severe Sepsis POA  Urology consult appreciated.   continue with IV abx. Pt will most likely need suprapubic cath after infection

## 2019-04-12 NOTE — PROGRESS NOTE ADULT - SUBJECTIVE AND OBJECTIVE BOX
Patient is a 89y old  Male who presents with a chief complaint of passed out (2019 13:18)      INTERVAL HPI/ OVERNIGHT EVENTS: Pt was seen and examined at bedside today, No significant overnight events, pt admits to chronic bedbound status, denies any CP, SoB, palpitation and dizziness. He admits to wound on insertion of obrien catheter. Daughter at bedside and care plan was discussed.     MEDICATIONS  (STANDING):  amLODIPine   Tablet 5 milliGRAM(s) Oral daily  cholecalciferol 1000 Unit(s) Oral daily  docusate sodium 100 milliGRAM(s) Oral two times a day  ferrous    sulfate 325 milliGRAM(s) Oral daily  finasteride 5 milliGRAM(s) Oral daily  piperacillin/tazobactam IVPB. 3.375 Gram(s) IV Intermittent every 8 hours  tamsulosin 0.4 milliGRAM(s) Oral at bedtime    MEDICATIONS  (PRN):  acetaminophen   Tablet .. 650 milliGRAM(s) Oral every 6 hours PRN Temp greater or equal to 38C (100.4F), Mild Pain (1 - 3)      Allergies    No Known Allergies    Intolerances        REVIEW OF SYSTEMS:    Unable to examine due to [ ] Encephalopathy [ ] Advanced Dementia [ ] Expressive Aphasia [ ] Non-verbal patient    CONSTITUTIONAL: positive fever, positive generalized weakness/Fatigue, No weight loss  EYES: No eye pain, visual disturbances, or discharge  ENMT:  No difficulty hearing, tinnitus, vertigo; No sinus or throat pain  NECK: No pain or stiffness  RESPIRATORY: No shortness of breath,  cough, wheezing, sputum or hemoptysis   CARDIOVASCULAR: No chest pain, palpitations, or leg swelling  GASTROINTESTINAL: No abdominal pain. No nausea, vomiting, diarrhea or constipation. No melena or hematochezia.  GENITOURINARY: indwelling obrien with penile wound   NEUROLOGICAL: chronic Leg weakness, No headaches, Dizziness, memory loss,  numbness, or tremors  SKIN: penile wound   MUSCULOSKELETAL: back pain  PSYCHIATRIC: No depression, anxiety, mood swings, or difficulty sleeping  HEME/LYMPH: No easy bruising, or bleeding gums      Vital Signs Last 24 Hrs  T(C): 37.7 (2019 17:24), Max: 37.9 (2019 01:12)  T(F): 99.8 (2019 17:24), Max: 100.3 (2019 01:12)  HR: 90 (2019 17:24) (77 - 90)  BP: 118/61 (2019 17:24) (107/53 - 148/76)  BP(mean): --  RR: 18 (2019 17:24) (17 - 22)  SpO2: 98% (2019 17:24) (97% - 100%)    PHYSICAL EXAM:  GENERAL: NAD, Thin, well-groomed  HEAD:  Atraumatic, Normocephalic  EYES: conjunctiva and sclera clear  ENMT: Moist mucous membranes  NECK: Supple, No JVD, Normal thyroid  CHEST/LUNG: Clear to Auscultation bilaterally; No rales, rhonchi, wheezing, or rubs  HEART: Regular rate and rhythm; No murmurs, rubs, or gallops  ABDOMEN: Soft, Nontender, Nondistended; Bowel sounds present  EXTREMITIES:  trace b/l LE edema, 2+ Peripheral Pulses, No clubbing, cyanosis  : indwelling obrien catheter with erosion of penile head, purulent discharge.  SKIN: No rashes or lesions  NERVOUS SYSTEM:  Alert & Oriented X3, Good concentration; Motor Strength 3/5 B/L lower extremities    LABS:                        8.3    9.92  )-----------( 309      ( 2019 07:15 )             26.5     04-12    138  |  96  |  24<H>  ----------------------------<  99  3.2<L>   |  32<H>  |  1.11    Ca    10.6<H>      2019 07:15    TPro  8.1  /  Alb  x   /  TBili  x   /  DBili  x   /  AST  x   /  ALT  x   /  AlkPhos  x   -12    PT/INR - ( 2019 16:59 )   PT: 15.4 sec;   INR: 1.36 ratio         PTT - ( 2019 16:59 )  PTT:36.5 sec  Urinalysis Basic - ( 2019 07:16 )    Color: Yellow / Appearance: Clear / S.010 / pH: x  Gluc: x / Ketone: Negative  / Bili: Negative / Urobili: Negative mg/dL   Blood: x / Protein: 100 mg/dL / Nitrite: Negative   Leuk Esterase: Moderate / RBC: 0-2 /HPF / WBC 6-10   Sq Epi: x / Non Sq Epi: Few / Bacteria: Many      CAPILLARY BLOOD GLUCOSE              RADIOLOGY & ADDITIONAL TESTS:          Imaging Personally Reviewed:  [ ] YES  [ ] NO    Consultant(s) Notes Reviewed:  [x ] YES  [ ] NO    Care Discussed with Consultants/Other Providers [x ] YES  [ ] NO

## 2019-04-12 NOTE — CONSULT NOTE ADULT - SUBJECTIVE AND OBJECTIVE BOX
HPI:  Pt is a 88 y/o male w/pmhx of HTN, HLD,CKD, LBP/radiculopathy - bed bound, BPH and urinary retention w/chronic obrien comes in after family noticed he was unresponsive to them for 2-3 minutes.  family was talking to him and pt became unresponsive family splattered some water on face and pt came about. no sz like activity. pt was at baseline on arousal.  Per son who  is a physician and  currently bedside reports he wasnt present but family had checked pt's bp and was reported to be about 82/60.  pt did have bp meds increased recently.  pt denies any fever, chills, sob, cp, palpitations, n/v/d/c, no travels or sick contacts. pt does report bladder discomfort and pain since last week.  Pt's son reports pt was urged for suprapubic in past and pt has been reluctant unsure when obrien was last changed. (2019 23:58)      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, chronic  Renal insufficiency  Dehydration  Spinal stenosis of lumbar region  BPH (benign prostatic hypertrophy)  Hypertension  Benign Prostatic Hyperplasia  HTN (Hypertension)  No significant past surgical history      Allergies    No Known Allergies    FAMILY HISTORY:  No significant family history (Father, Mother)      Home Medications:  amLODIPine 5 mg oral tablet: 1 tab(s) orally once a day (2018 22:48)  cholecalciferol oral tablet: 1000 unit(s) orally once a day (2018 22:48)  docusate sodium 100 mg oral capsule: 1 cap(s) orally 2 times a day (2018 22:48)  ferrous sulfate 250 mg oral capsule, extended release: 1 cap(s) orally once a day (2018 10:23)  finasteride 5 mg oral tablet: 1 tab(s) orally once a day (2018 22:48)  labetalol 100 mg oral tablet: 1 tab(s) orally 2 times a day (2018 22:48)  tamsulosin 0.4 mg oral capsule: 1 cap(s) orally once a day (2018 22:48)      MEDICATIONS  (STANDING):  amLODIPine   Tablet 5 milliGRAM(s) Oral daily  cholecalciferol 1000 Unit(s) Oral daily  docusate sodium 100 milliGRAM(s) Oral two times a day  ferrous    sulfate 325 milliGRAM(s) Oral daily  finasteride 5 milliGRAM(s) Oral daily  piperacillin/tazobactam IVPB. 3.375 Gram(s) IV Intermittent every 8 hours  tamsulosin 0.4 milliGRAM(s) Oral at bedtime    MEDICATIONS  (PRN):  acetaminophen   Tablet .. 650 milliGRAM(s) Oral every 6 hours PRN Temp greater or equal to 38C (100.4F), Mild Pain (1 - 3)      ROS:    General:  No wt loss, fevers, chills, night sweats  ENT:  No sore throat, pain, runny nose,   CV:  No pain, palpitatioins,  Resp:  No dyspnea, cough, tachypnea, wheezing  GI:  No pain, nausea, vomiting, diarrhea, constipatiion  : Obrien catheter hypospadias mid shaft. testes difficult to palpate.  Neuro:  No weakness, tingling,   Endocrine:  No polyuria, polydypsia, cold/heat intolerance  Skin:  No rash,  edema      Physical Exam:    Vital Signs:  Vital Signs Last 24 Hrs  T(C): 37 (2019 12:23), Max: 38 (2019 17:20)  T(F): 98.6 (2019 12:23), Max: 100.4 (2019 17:20)  HR: 80 (2019 12:23) (74 - 87)  BP: 121/67 (2019 12:23) (107/53 - 148/76)  BP(mean): --  RR: 18 (2019 12:23) (16 - 22)  SpO2: 100% (2019 12:23) (97% - 100%)  Daily Height in cm: 177.8 (2019 01:12)    Daily Weight in k.4 (2019 06:27)  I&O's Summary      General:  Appears stated age,  well-nourished, no distress  HEENT:  NC/AT, patent nares w/ pink mucosa, OP moist and pink,   conjunctivae clear  Chest:  Full & symmetric excursion, no increased effort.   Abdomen:  Soft, non-tender, non-distended, normoactive bowel sounds.  Genitalia: Circumcised Phallus, mid shaft hypospadias ++. Both testicles descended, mildly tender, no mass. No epididymitis or epididymal mass. No hydrocele.  Rectal Examination: Deferred.  Extremities:  no edema, pedal pusation are present, no calf tenderness.  Skin:  No rash/erythema  Neuro/Psych:  Alert and conscious. Grossly intact and symmetrical.      LABS:                        8.3    9.92  )-----------( 309      ( 2019 07:15 )             26.5     04-    138  |  96  |  24<H>  ----------------------------<  99  3.2<L>   |  32<H>  |  1.11    Ca    10.6<H>      2019 07:15    TPro  8.1  /  Alb  x   /  TBili  x   /  DBili  x   /  AST  x   /  ALT  x   /  AlkPhos  x   -12    PT/INR - ( 2019 16:59 )   PT: 15.4 sec;   INR: 1.36 ratio         PTT - ( 2019 16:59 )  PTT:36.5 sec  Urinalysis Basic - ( 2019 07:16 )    Color: Yellow / Appearance: Clear / S.010 / pH: x  Gluc: x / Ketone: Negative  / Bili: Negative / Urobili: Negative mg/dL   Blood: x / Protein: 100 mg/dL / Nitrite: Negative   Leuk Esterase: Moderate / RBC: 0-2 /HPF / WBC 6-10   Sq Epi: x / Non Sq Epi: Few / Bacteria: Many          RADIOLOGY & ADDITIONAL STUDIES:

## 2019-04-12 NOTE — CONSULT NOTE ADULT - ASSESSMENT
urinary retention: Ma catheter  Hypospadies continue Ma  UTI:  antibiotic    when stable recommend Suprapubic cystosotmy

## 2019-04-13 LAB
% ALBUMIN: 39 % — SIGNIFICANT CHANGE UP
% ALPHA 1: 7.6 % — SIGNIFICANT CHANGE UP
% ALPHA 2: 13.7 % — SIGNIFICANT CHANGE UP
% BETA: 11.7 % — SIGNIFICANT CHANGE UP
% GAMMA: 28 % — SIGNIFICANT CHANGE UP
% M SPIKE: 6.3 % — SIGNIFICANT CHANGE UP
ALBUMIN SERPL ELPH-MCNC: 3.2 G/DL — LOW (ref 3.6–5.5)
ALBUMIN/GLOB SERPL ELPH: 0.7 RATIO — SIGNIFICANT CHANGE UP
ALPHA1 GLOB SERPL ELPH-MCNC: 0.6 G/DL — HIGH (ref 0.1–0.4)
ALPHA2 GLOB SERPL ELPH-MCNC: 1.1 G/DL — HIGH (ref 0.5–1)
ANION GAP SERPL CALC-SCNC: 9 MMOL/L — SIGNIFICANT CHANGE UP (ref 5–17)
B-GLOBULIN SERPL ELPH-MCNC: 0.9 G/DL — SIGNIFICANT CHANGE UP (ref 0.5–1)
BLD GP AB SCN SERPL QL: SIGNIFICANT CHANGE UP
BUN SERPL-MCNC: 27 MG/DL — HIGH (ref 7–23)
CALCIUM SERPL-MCNC: 10.5 MG/DL — HIGH (ref 8.5–10.1)
CHLORIDE SERPL-SCNC: 98 MMOL/L — SIGNIFICANT CHANGE UP (ref 96–108)
CO2 SERPL-SCNC: 29 MMOL/L — SIGNIFICANT CHANGE UP (ref 22–31)
CREAT SERPL-MCNC: 1.32 MG/DL — HIGH (ref 0.5–1.3)
FERRITIN SERPL-MCNC: 972 NG/ML — HIGH (ref 30–400)
GAMMA GLOBULIN: 2.3 G/DL — HIGH (ref 0.6–1.6)
GLUCOSE SERPL-MCNC: 103 MG/DL — HIGH (ref 70–99)
HCT VFR BLD CALC: 27.5 % — LOW (ref 39–50)
HGB BLD-MCNC: 8.6 G/DL — LOW (ref 13–17)
INTERPRETATION SERPL IFE-IMP: SIGNIFICANT CHANGE UP
IRON SATN MFR SERPL: 12 % — LOW (ref 16–55)
IRON SATN MFR SERPL: 17 UG/DL — LOW (ref 45–165)
M-SPIKE: 0.5 G/DL — HIGH (ref 0–0)
MCHC RBC-ENTMCNC: 27.7 PG — SIGNIFICANT CHANGE UP (ref 27–34)
MCHC RBC-ENTMCNC: 31.3 GM/DL — LOW (ref 32–36)
MCV RBC AUTO: 88.4 FL — SIGNIFICANT CHANGE UP (ref 80–100)
NRBC # BLD: 0 /100 WBCS — SIGNIFICANT CHANGE UP (ref 0–0)
PLATELET # BLD AUTO: 316 K/UL — SIGNIFICANT CHANGE UP (ref 150–400)
POTASSIUM SERPL-MCNC: 3.3 MMOL/L — LOW (ref 3.5–5.3)
POTASSIUM SERPL-SCNC: 3.3 MMOL/L — LOW (ref 3.5–5.3)
PROT PATTERN SERPL ELPH-IMP: SIGNIFICANT CHANGE UP
RBC # BLD: 3.11 M/UL — LOW (ref 4.2–5.8)
RBC # FLD: 15.9 % — HIGH (ref 10.3–14.5)
SODIUM SERPL-SCNC: 136 MMOL/L — SIGNIFICANT CHANGE UP (ref 135–145)
TIBC SERPL-MCNC: 143 UG/DL — LOW (ref 220–430)
UIBC SERPL-MCNC: 126 UG/DL — SIGNIFICANT CHANGE UP (ref 110–370)
VIT B12 SERPL-MCNC: 717 PG/ML — SIGNIFICANT CHANGE UP (ref 232–1245)
WBC # BLD: 8.96 K/UL — SIGNIFICANT CHANGE UP (ref 3.8–10.5)
WBC # FLD AUTO: 8.96 K/UL — SIGNIFICANT CHANGE UP (ref 3.8–10.5)

## 2019-04-13 PROCEDURE — 93010 ELECTROCARDIOGRAM REPORT: CPT

## 2019-04-13 PROCEDURE — 99233 SBSQ HOSP IP/OBS HIGH 50: CPT

## 2019-04-13 RX ORDER — VANCOMYCIN HCL 1 G
1000 VIAL (EA) INTRAVENOUS ONCE
Qty: 0 | Refills: 0 | Status: COMPLETED | OUTPATIENT
Start: 2019-04-13 | End: 2019-04-13

## 2019-04-13 RX ORDER — VANCOMYCIN HCL 1 G
VIAL (EA) INTRAVENOUS
Qty: 0 | Refills: 0 | Status: DISCONTINUED | OUTPATIENT
Start: 2019-04-13 | End: 2019-04-13

## 2019-04-13 RX ORDER — POTASSIUM CHLORIDE 20 MEQ
40 PACKET (EA) ORAL ONCE
Qty: 0 | Refills: 0 | Status: COMPLETED | OUTPATIENT
Start: 2019-04-13 | End: 2019-04-13

## 2019-04-13 RX ADMIN — Medication 100 MILLIGRAM(S): at 17:25

## 2019-04-13 RX ADMIN — PIPERACILLIN AND TAZOBACTAM 25 GRAM(S): 4; .5 INJECTION, POWDER, LYOPHILIZED, FOR SOLUTION INTRAVENOUS at 14:40

## 2019-04-13 RX ADMIN — Medication 650 MILLIGRAM(S): at 10:50

## 2019-04-13 RX ADMIN — Medication 325 MILLIGRAM(S): at 12:03

## 2019-04-13 RX ADMIN — Medication 40 MILLIEQUIVALENT(S): at 12:03

## 2019-04-13 RX ADMIN — Medication 100 MILLIGRAM(S): at 06:33

## 2019-04-13 RX ADMIN — Medication 1000 UNIT(S): at 12:03

## 2019-04-13 RX ADMIN — Medication 650 MILLIGRAM(S): at 11:00

## 2019-04-13 RX ADMIN — TAMSULOSIN HYDROCHLORIDE 0.4 MILLIGRAM(S): 0.4 CAPSULE ORAL at 21:15

## 2019-04-13 RX ADMIN — FINASTERIDE 5 MILLIGRAM(S): 5 TABLET, FILM COATED ORAL at 12:03

## 2019-04-13 RX ADMIN — Medication 250 MILLIGRAM(S): at 13:01

## 2019-04-13 RX ADMIN — PIPERACILLIN AND TAZOBACTAM 25 GRAM(S): 4; .5 INJECTION, POWDER, LYOPHILIZED, FOR SOLUTION INTRAVENOUS at 06:33

## 2019-04-13 RX ADMIN — AMLODIPINE BESYLATE 5 MILLIGRAM(S): 2.5 TABLET ORAL at 06:33

## 2019-04-13 RX ADMIN — PIPERACILLIN AND TAZOBACTAM 25 GRAM(S): 4; .5 INJECTION, POWDER, LYOPHILIZED, FOR SOLUTION INTRAVENOUS at 21:14

## 2019-04-13 NOTE — PROGRESS NOTE ADULT - PROBLEM SELECTOR PLAN 2
complex UTI Severe Sepsis POA  Urology consult appreciated.   continue with IV abx. will add vancomycin, Pt will most likely need suprapubic cath after infection

## 2019-04-13 NOTE — PROGRESS NOTE ADULT - SUBJECTIVE AND OBJECTIVE BOX
Patient is a 89y old  Male who presents with a chief complaint of passed out (2019 18:22)      INTERVAL HPI/ OVERNIGHT EVENTS: Pt was seen and examined at bedside today, Pt continues to have fever, pt has chronic body pain otherwise no acute pain.      MEDICATIONS  (STANDING):  amLODIPine   Tablet 5 milliGRAM(s) Oral daily  cholecalciferol 1000 Unit(s) Oral daily  docusate sodium 100 milliGRAM(s) Oral two times a day  ferrous    sulfate 325 milliGRAM(s) Oral daily  finasteride 5 milliGRAM(s) Oral daily  piperacillin/tazobactam IVPB. 3.375 Gram(s) IV Intermittent every 8 hours  tamsulosin 0.4 milliGRAM(s) Oral at bedtime  vancomycin  IVPB 1000 milliGRAM(s) IV Intermittent once    MEDICATIONS  (PRN):  acetaminophen   Tablet .. 650 milliGRAM(s) Oral every 6 hours PRN Temp greater or equal to 38C (100.4F), Mild Pain (1 - 3)      Allergies    No Known Allergies    Intolerances        REVIEW OF SYSTEMS:    Unable to examine due to [ ] Encephalopathy [ ] Advanced Dementia [ ] Expressive Aphasia [ ] Non-verbal patient    CONSTITUTIONAL: positive fever, positive generalized weakness/Fatigue, No weight loss  EYES: No eye pain, visual disturbances, or discharge  ENMT:  No difficulty hearing, tinnitus, vertigo; No sinus or throat pain  NECK: No pain or stiffness  RESPIRATORY: No shortness of breath,  cough, wheezing, sputum or hemoptysis   CARDIOVASCULAR: No chest pain, palpitations, or leg swelling  GASTROINTESTINAL: No abdominal pain. No nausea, vomiting, diarrhea or constipation. No melena or hematochezia.  GENITOURINARY: indwelling obrien with penile wound   NEUROLOGICAL: chronic Leg weakness, No headaches, Dizziness, memory loss,  numbness, or tremors  SKIN: penile wound   MUSCULOSKELETAL: back pain  PSYCHIATRIC: No depression, anxiety, mood swings, or difficulty sleeping  HEME/LYMPH: No easy bruising, or bleeding gums      Vital Signs Last 24 Hrs  T(C): 38.4 (2019 11:12), Max: 38.4 (2019 11:12)  T(F): 101.1 (2019 11:12), Max: 101.1 (2019 11:12)  HR: 96 (2019 11:12) (90 - 97)  BP: 144/64 (2019 11:12) (118/61 - 144/64)  BP(mean): --  RR: 18 (2019 11:12) (18 - 18)  SpO2: 98% (2019 11:12) (98% - 99%)    PHYSICAL EXAM:  GENERAL: NAD, Thin, well-groomed  HEAD:  Atraumatic, Normocephalic  EYES: conjunctiva and sclera clear  ENMT: Moist mucous membranes  NECK: Supple, No JVD, Normal thyroid  CHEST/LUNG: Clear to Auscultation bilaterally; No rales, rhonchi, wheezing, or rubs  HEART: Regular rate and rhythm; No murmurs, rubs, or gallops  ABDOMEN: Soft, Nontender, Nondistended; Bowel sounds present  EXTREMITIES:  trace b/l LE edema, 2+ Peripheral Pulses, No clubbing, cyanosis  : indwelling obrien catheter with erosion of penile head, purulent discharge.  SKIN: No rashes or lesions  NERVOUS SYSTEM:  Alert & Oriented X3, Good concentration; Motor Strength 3/5 B/L lower extremities    LABS:                        8.6    8.96  )-----------( 316      ( 2019 07:37 )             27.5     04-13    136  |  98  |  27<H>  ----------------------------<  103<H>  3.3<L>   |  29  |  1.32<H>    Ca    10.5<H>      2019 07:37    TPro  8.1  /  Alb  3.2<L>  /  TBili  x   /  DBili  x   /  AST  x   /  ALT  x   /  AlkPhos  x   -12    PT/INR - ( 2019 16:59 )   PT: 15.4 sec;   INR: 1.36 ratio         PTT - ( 2019 16:59 )  PTT:36.5 sec  Urinalysis Basic - ( 2019 07:16 )    Color: Yellow / Appearance: Clear / S.010 / pH: x  Gluc: x / Ketone: Negative  / Bili: Negative / Urobili: Negative mg/dL   Blood: x / Protein: 100 mg/dL / Nitrite: Negative   Leuk Esterase: Moderate / RBC: 0-2 /HPF / WBC 6-10   Sq Epi: x / Non Sq Epi: Few / Bacteria: Many      CAPILLARY BLOOD GLUCOSE            Culture - Blood (collected 19)  Source: .Blood  Preliminary Report (19):    No growth to date.    Culture - Blood (collected 19)  Source: .Blood  Preliminary Report (19):    No growth to date.        RADIOLOGY & ADDITIONAL TESTS:          Imaging Personally Reviewed:  [ ] YES  [ ] NO    Consultant(s) Notes Reviewed:  [ ] YES  [ ] NO    Care Discussed with Consultants/Other Providers [x ] YES  [ ] NO

## 2019-04-13 NOTE — PROGRESS NOTE ADULT - SUBJECTIVE AND OBJECTIVE BOX
Patient seen and examined bedside resting comfortably.  No complaints offered.   Has Obrien Denies nausea and vomiting. Tolerating diet.  Denies chest pain, dyspnea, cough.    T(F): 99.8 (04-13-19 @ 17:25), Max: 101.1 (04-13-19 @ 11:12)  HR: 86 (04-13-19 @ 17:25) (86 - 97)  BP: 157/75 (04-13-19 @ 17:25) (130/73 - 157/75)  RR: 18 (04-13-19 @ 17:25) (18 - 18)  SpO2: 100% (04-13-19 @ 17:25) (98% - 100%)  Wt(kg): --  CAPILLARY BLOOD GLUCOSE          PHYSICAL EXAM:  General: NAD, alert and awake  HEENT: NCAT, EOMI, conjunctiva clear  Chest: nonlabored respirations, good inspiratory effort  Abdomen: soft, NTND.   Extremities: no pedal edema or calf tenderness noted   : No CVA or SP tenderness. Obrien with 1150/24hrs    LABS:                        8.6    8.96  )-----------( 316      ( 13 Apr 2019 07:37 )             27.5   04-13    136  |  98  |  27<H>  ----------------------------<  103<H>  3.3<L>   |  29  |  1.32<H>    Ca    10.5<H>      13 Apr 2019 07:37    TPro  8.1  /  Alb  3.2<L>  /  TBili  x   /  DBili  x   /  AST  x   /  ALT  x   /  AlkPhos  x   04-12    I&O's Detail    12 Apr 2019 07:01  -  13 Apr 2019 07:00  --------------------------------------------------------  IN:    IV PiggyBack: 100 mL    Oral Fluid: 480 mL  Total IN: 580 mL    OUT:    Indwelling Catheter - Urethral: 1150 mL  Total OUT: 1150 mL    Total NET: -570 mL      13 Apr 2019 07:01  -  13 Apr 2019 20:17  --------------------------------------------------------  IN:    IV PiggyBack: 350 mL    Oral Fluid: 598 mL  Total IN: 948 mL    OUT:    Indwelling Catheter - Urethral: 750 mL  Total OUT: 750 mL    Total NET: 198 mL          when stable recommend Suprapubic cystosotmy  Impression: 89y Male admitted with Urinary retention, UTI, Hypospadies      Plan:  - Continue obrien  - SPC planning Monday   - D/C Obrien catheter Sunday midnight, Hold AC   Discussed with Dr Mensah

## 2019-04-14 LAB
ANION GAP SERPL CALC-SCNC: 10 MMOL/L — SIGNIFICANT CHANGE UP (ref 5–17)
BUN SERPL-MCNC: 25 MG/DL — HIGH (ref 7–23)
CALCIUM SERPL-MCNC: 10.1 MG/DL — SIGNIFICANT CHANGE UP (ref 8.5–10.1)
CHLORIDE SERPL-SCNC: 100 MMOL/L — SIGNIFICANT CHANGE UP (ref 96–108)
CO2 SERPL-SCNC: 29 MMOL/L — SIGNIFICANT CHANGE UP (ref 22–31)
CREAT SERPL-MCNC: 1.31 MG/DL — HIGH (ref 0.5–1.3)
GLUCOSE SERPL-MCNC: 79 MG/DL — SIGNIFICANT CHANGE UP (ref 70–99)
HCT VFR BLD CALC: 25.6 % — LOW (ref 39–50)
HGB BLD-MCNC: 8.2 G/DL — LOW (ref 13–17)
MCHC RBC-ENTMCNC: 28.3 PG — SIGNIFICANT CHANGE UP (ref 27–34)
MCHC RBC-ENTMCNC: 32 GM/DL — SIGNIFICANT CHANGE UP (ref 32–36)
MCV RBC AUTO: 88.3 FL — SIGNIFICANT CHANGE UP (ref 80–100)
NRBC # BLD: 0 /100 WBCS — SIGNIFICANT CHANGE UP (ref 0–0)
PLATELET # BLD AUTO: 286 K/UL — SIGNIFICANT CHANGE UP (ref 150–400)
POTASSIUM SERPL-MCNC: 3.3 MMOL/L — LOW (ref 3.5–5.3)
POTASSIUM SERPL-SCNC: 3.3 MMOL/L — LOW (ref 3.5–5.3)
RBC # BLD: 2.9 M/UL — LOW (ref 4.2–5.8)
RBC # FLD: 15.6 % — HIGH (ref 10.3–14.5)
SODIUM SERPL-SCNC: 139 MMOL/L — SIGNIFICANT CHANGE UP (ref 135–145)
WBC # BLD: 8.27 K/UL — SIGNIFICANT CHANGE UP (ref 3.8–10.5)
WBC # FLD AUTO: 8.27 K/UL — SIGNIFICANT CHANGE UP (ref 3.8–10.5)

## 2019-04-14 PROCEDURE — 93306 TTE W/DOPPLER COMPLETE: CPT | Mod: 26

## 2019-04-14 PROCEDURE — 99233 SBSQ HOSP IP/OBS HIGH 50: CPT

## 2019-04-14 RX ORDER — POTASSIUM CHLORIDE 20 MEQ
20 PACKET (EA) ORAL
Qty: 0 | Refills: 0 | Status: COMPLETED | OUTPATIENT
Start: 2019-04-14 | End: 2019-04-14

## 2019-04-14 RX ORDER — VANCOMYCIN HCL 1 G
1000 VIAL (EA) INTRAVENOUS ONCE
Qty: 0 | Refills: 0 | Status: COMPLETED | OUTPATIENT
Start: 2019-04-14 | End: 2019-04-14

## 2019-04-14 RX ORDER — VANCOMYCIN HCL 1 G
VIAL (EA) INTRAVENOUS
Qty: 0 | Refills: 0 | Status: DISCONTINUED | OUTPATIENT
Start: 2019-04-14 | End: 2019-04-16

## 2019-04-14 RX ORDER — VANCOMYCIN HCL 1 G
1000 VIAL (EA) INTRAVENOUS DAILY
Qty: 0 | Refills: 0 | Status: DISCONTINUED | OUTPATIENT
Start: 2019-04-15 | End: 2019-04-16

## 2019-04-14 RX ADMIN — Medication 1000 UNIT(S): at 11:56

## 2019-04-14 RX ADMIN — Medication 250 MILLIGRAM(S): at 17:04

## 2019-04-14 RX ADMIN — TAMSULOSIN HYDROCHLORIDE 0.4 MILLIGRAM(S): 0.4 CAPSULE ORAL at 22:10

## 2019-04-14 RX ADMIN — PIPERACILLIN AND TAZOBACTAM 25 GRAM(S): 4; .5 INJECTION, POWDER, LYOPHILIZED, FOR SOLUTION INTRAVENOUS at 22:10

## 2019-04-14 RX ADMIN — AMLODIPINE BESYLATE 5 MILLIGRAM(S): 2.5 TABLET ORAL at 06:00

## 2019-04-14 RX ADMIN — Medication 20 MILLIEQUIVALENT(S): at 10:53

## 2019-04-14 RX ADMIN — PIPERACILLIN AND TAZOBACTAM 25 GRAM(S): 4; .5 INJECTION, POWDER, LYOPHILIZED, FOR SOLUTION INTRAVENOUS at 14:40

## 2019-04-14 RX ADMIN — Medication 100 MILLIGRAM(S): at 06:00

## 2019-04-14 RX ADMIN — Medication 325 MILLIGRAM(S): at 11:56

## 2019-04-14 RX ADMIN — FINASTERIDE 5 MILLIGRAM(S): 5 TABLET, FILM COATED ORAL at 11:56

## 2019-04-14 RX ADMIN — PIPERACILLIN AND TAZOBACTAM 25 GRAM(S): 4; .5 INJECTION, POWDER, LYOPHILIZED, FOR SOLUTION INTRAVENOUS at 06:00

## 2019-04-14 RX ADMIN — Medication 100 MILLIGRAM(S): at 17:06

## 2019-04-14 RX ADMIN — Medication 20 MILLIEQUIVALENT(S): at 11:56

## 2019-04-14 NOTE — PROGRESS NOTE ADULT - PROBLEM SELECTOR PLAN 1
Presumed Vasovagal in setting of sepsis   Head CT no acute changes, CXR: no acute changes. Echo benign    EKG: SR @ 80bpm, premature atrial complex, bifascicular block.

## 2019-04-14 NOTE — PROGRESS NOTE ADULT - PROBLEM SELECTOR PLAN 2
complex UTI Severe Sepsis POA  Urology consult appreciated. will get ID consult   continue with IV abx. pt to get suprapubic cath on Monday

## 2019-04-14 NOTE — PROGRESS NOTE ADULT - SUBJECTIVE AND OBJECTIVE BOX
Patient is a 89y old  Male who presents with a chief complaint of passed out (13 Apr 2019 20:17)      INTERVAL HPI/ OVERNIGHT EVENTS: Pt was seen and examined at bedside today, pt continues to have low grade fevers, pt denies new complaints.      MEDICATIONS  (STANDING):  amLODIPine   Tablet 5 milliGRAM(s) Oral daily  cholecalciferol 1000 Unit(s) Oral daily  docusate sodium 100 milliGRAM(s) Oral two times a day  ferrous    sulfate 325 milliGRAM(s) Oral daily  finasteride 5 milliGRAM(s) Oral daily  piperacillin/tazobactam IVPB. 3.375 Gram(s) IV Intermittent every 8 hours  tamsulosin 0.4 milliGRAM(s) Oral at bedtime    MEDICATIONS  (PRN):  acetaminophen   Tablet .. 650 milliGRAM(s) Oral every 6 hours PRN Temp greater or equal to 38C (100.4F), Mild Pain (1 - 3)      Allergies    No Known Allergies    Intolerances        REVIEW OF SYSTEMS:    Unable to examine due to [ ] Encephalopathy [ ] Advanced Dementia [ ] Expressive Aphasia [ ] Non-verbal patient    CONSTITUTIONAL: positive fever, positive generalized weakness/Fatigue, No weight loss  EYES: No eye pain, visual disturbances, or discharge  ENMT:  No difficulty hearing, tinnitus, vertigo; No sinus or throat pain  NECK: No pain or stiffness  RESPIRATORY: No shortness of breath,  cough, wheezing, sputum or hemoptysis   CARDIOVASCULAR: No chest pain, palpitations, or leg swelling  GASTROINTESTINAL: No abdominal pain. No nausea, vomiting, diarrhea or constipation. No melena or hematochezia.  GENITOURINARY: indwelling obrien with penile wound   NEUROLOGICAL: chronic Leg weakness, No headaches, Dizziness, memory loss,  numbness, or tremors  SKIN: penile wound   MUSCULOSKELETAL: back pain  PSYCHIATRIC: No depression, anxiety, mood swings, or difficulty sleeping  HEME/LYMPH: No easy bruising, or bleeding gums        Vital Signs Last 24 Hrs  T(C): 36.4 (14 Apr 2019 10:50), Max: 37.8 (14 Apr 2019 05:30)  T(F): 97.6 (14 Apr 2019 10:50), Max: 100 (14 Apr 2019 05:30)  HR: 89 (14 Apr 2019 10:50) (86 - 99)  BP: 133/63 (14 Apr 2019 10:50) (133/63 - 157/75)  BP(mean): --  RR: 18 (14 Apr 2019 10:50) (18 - 20)  SpO2: 100% (14 Apr 2019 10:50) (98% - 100%)    PHYSICAL EXAM:  GENERAL: NAD, Thin, well-groomed  HEAD:  Atraumatic, Normocephalic  EYES: conjunctiva and sclera clear  ENMT: Moist mucous membranes  NECK: Supple, No JVD, Normal thyroid  CHEST/LUNG: Clear to Auscultation bilaterally; No rales, rhonchi, wheezing, or rubs  HEART: Regular rate and rhythm; No murmurs, rubs, or gallops  ABDOMEN: Soft, Nontender, Nondistended; Bowel sounds present  EXTREMITIES:  trace b/l LE edema, 2+ Peripheral Pulses, No clubbing, cyanosis  : indwelling obrien catheter with erosion of penile head, purulent discharge.  SKIN: No rashes or lesions  NERVOUS SYSTEM:  Alert & Oriented X3, Good concentration; Motor Strength 3/5 B/L lower extremities    LABS:                        8.2    8.27  )-----------( 286      ( 14 Apr 2019 07:08 )             25.6     04-14    139  |  100  |  25<H>  ----------------------------<  79  3.3<L>   |  29  |  1.31<H>    Ca    10.1      14 Apr 2019 07:08          CAPILLARY BLOOD GLUCOSE            Culture - Blood (collected 04-12-19)  Source: .Blood  Preliminary Report (04-13-19):    No growth to date.    Culture - Blood (collected 04-12-19)  Source: .Blood  Preliminary Report (04-13-19):    No growth to date.        RADIOLOGY & ADDITIONAL TESTS:          Imaging Personally Reviewed:  [ ] YES  [ ] NO    Consultant(s) Notes Reviewed:  [x ] YES  [ ] NO    Care Discussed with Consultants/Other Providers [x ] YES  [ ] NO

## 2019-04-14 NOTE — PROGRESS NOTE ADULT - SUBJECTIVE AND OBJECTIVE BOX
Patient seen and examined bedside resting comfortably. No complaints offered.   Obrien in place draining well. denies hematuria.  Denies fevers, chills, dyspnea, chest pain, abdominal pain, n/v.      T(F): 98.8 (04-14-19 @ 16:43), Max: 100 (04-14-19 @ 05:30)  HR: 96 (04-14-19 @ 16:43) (89 - 99)  BP: 134/80 (04-14-19 @ 16:43) (133/63 - 140/72)  RR: 20 (04-14-19 @ 16:43) (18 - 20)  SpO2: 97% (04-14-19 @ 16:43) (97% - 100%)  Wt(kg): --  CAPILLARY BLOOD GLUCOSE      PHYSICAL EXAM:  General: NAD, A&Ox3  CV: +S1S2 regular rate and rhythm  Lung: clear to ausculation bilaterally, respirations nonlabored   Abdomen: non-distended, soft, nontender to palpation  Extremities: no pedal edema or calf tenderness noted   : obrien in place draining clear, yellow urine; Output: 2050ccs    LABS:                        8.2    8.27  )-----------( 286      ( 14 Apr 2019 07:08 )             25.6     04-14    139  |  100  |  25<H>  ----------------------------<  79  3.3<L>   |  29  |  1.31<H>    Ca    10.1      14 Apr 2019 07:08        I&O's Detail    13 Apr 2019 07:01  -  14 Apr 2019 07:00  --------------------------------------------------------  IN:    IV PiggyBack: 350 mL    Oral Fluid: 598 mL  Total IN: 948 mL    OUT:    Indwelling Catheter - Urethral: 2050 mL  Total OUT: 2050 mL    Total NET: -1102 mL        Impression: 9y Male admitted with Urinary retention, UTI, Hypospadies      Plan:  - Continue obrien for now. D/c obrien tonight at Midnight. Hold   - For SPC planning tomorrow Patient seen and examined bedside resting comfortably. No complaints offered.   Obrien in place draining well. denies hematuria.  Denies fevers, chills, dyspnea, chest pain, abdominal pain, n/v.      T(F): 98.8 (04-14-19 @ 16:43), Max: 100 (04-14-19 @ 05:30)  HR: 96 (04-14-19 @ 16:43) (89 - 99)  BP: 134/80 (04-14-19 @ 16:43) (133/63 - 140/72)  RR: 20 (04-14-19 @ 16:43) (18 - 20)  SpO2: 97% (04-14-19 @ 16:43) (97% - 100%)  Wt(kg): --  CAPILLARY BLOOD GLUCOSE      PHYSICAL EXAM:  General: NAD, A&Ox3  CV: +S1S2 regular rate and rhythm  Lung: clear to ausculation bilaterally, respirations nonlabored   Abdomen: non-distended, soft, nontender to palpation  Extremities: no pedal edema or calf tenderness noted   : obrien in place draining clear, yellow urine; Output: 2050ccs    LABS:                        8.2    8.27  )-----------( 286      ( 14 Apr 2019 07:08 )             25.6     04-14    139  |  100  |  25<H>  ----------------------------<  79  3.3<L>   |  29  |  1.31<H>    Ca    10.1      14 Apr 2019 07:08        I&O's Detail    13 Apr 2019 07:01  -  14 Apr 2019 07:00  --------------------------------------------------------  IN:    IV PiggyBack: 350 mL    Oral Fluid: 598 mL  Total IN: 948 mL    OUT:    Indwelling Catheter - Urethral: 2050 mL  Total OUT: 2050 mL    Total NET: -1102 mL        Impression: 89y Male admitted with Urinary retention, UTI, Hypospadies      Plan:  - Continue obrien for now. D/c obrien tonight at Midnight. Hold   - For SPC planning tomorrow

## 2019-04-15 LAB
ANION GAP SERPL CALC-SCNC: 13 MMOL/L — SIGNIFICANT CHANGE UP (ref 5–17)
BUN SERPL-MCNC: 23 MG/DL — SIGNIFICANT CHANGE UP (ref 7–23)
CALCIUM SERPL-MCNC: 10.7 MG/DL — HIGH (ref 8.5–10.1)
CHLORIDE SERPL-SCNC: 101 MMOL/L — SIGNIFICANT CHANGE UP (ref 96–108)
CO2 SERPL-SCNC: 24 MMOL/L — SIGNIFICANT CHANGE UP (ref 22–31)
CREAT SERPL-MCNC: 1.02 MG/DL — SIGNIFICANT CHANGE UP (ref 0.5–1.3)
GLUCOSE SERPL-MCNC: 65 MG/DL — LOW (ref 70–99)
HCT VFR BLD CALC: 29.3 % — LOW (ref 39–50)
HGB BLD-MCNC: 9 G/DL — LOW (ref 13–17)
MCHC RBC-ENTMCNC: 27.4 PG — SIGNIFICANT CHANGE UP (ref 27–34)
MCHC RBC-ENTMCNC: 30.7 GM/DL — LOW (ref 32–36)
MCV RBC AUTO: 89.3 FL — SIGNIFICANT CHANGE UP (ref 80–100)
NRBC # BLD: 0 /100 WBCS — SIGNIFICANT CHANGE UP (ref 0–0)
PLATELET # BLD AUTO: 360 K/UL — SIGNIFICANT CHANGE UP (ref 150–400)
POTASSIUM SERPL-MCNC: 3.6 MMOL/L — SIGNIFICANT CHANGE UP (ref 3.5–5.3)
POTASSIUM SERPL-SCNC: 3.6 MMOL/L — SIGNIFICANT CHANGE UP (ref 3.5–5.3)
RBC # BLD: 3.28 M/UL — LOW (ref 4.2–5.8)
RBC # FLD: 15.7 % — HIGH (ref 10.3–14.5)
SODIUM SERPL-SCNC: 138 MMOL/L — SIGNIFICANT CHANGE UP (ref 135–145)
VANCOMYCIN TROUGH SERPL-MCNC: 13.4 UG/ML — SIGNIFICANT CHANGE UP (ref 10–20)
WBC # BLD: 8.45 K/UL — SIGNIFICANT CHANGE UP (ref 3.8–10.5)
WBC # FLD AUTO: 8.45 K/UL — SIGNIFICANT CHANGE UP (ref 3.8–10.5)

## 2019-04-15 PROCEDURE — 99233 SBSQ HOSP IP/OBS HIGH 50: CPT

## 2019-04-15 RX ORDER — ENOXAPARIN SODIUM 100 MG/ML
40 INJECTION SUBCUTANEOUS DAILY
Qty: 0 | Refills: 0 | Status: DISCONTINUED | OUTPATIENT
Start: 2019-04-15 | End: 2019-04-20

## 2019-04-15 RX ADMIN — Medication 1000 UNIT(S): at 13:14

## 2019-04-15 RX ADMIN — FINASTERIDE 5 MILLIGRAM(S): 5 TABLET, FILM COATED ORAL at 13:14

## 2019-04-15 RX ADMIN — Medication 100 MILLIGRAM(S): at 16:00

## 2019-04-15 RX ADMIN — Medication 250 MILLIGRAM(S): at 22:16

## 2019-04-15 RX ADMIN — Medication 325 MILLIGRAM(S): at 13:15

## 2019-04-15 RX ADMIN — TAMSULOSIN HYDROCHLORIDE 0.4 MILLIGRAM(S): 0.4 CAPSULE ORAL at 22:16

## 2019-04-15 RX ADMIN — PIPERACILLIN AND TAZOBACTAM 25 GRAM(S): 4; .5 INJECTION, POWDER, LYOPHILIZED, FOR SOLUTION INTRAVENOUS at 07:25

## 2019-04-15 RX ADMIN — PIPERACILLIN AND TAZOBACTAM 25 GRAM(S): 4; .5 INJECTION, POWDER, LYOPHILIZED, FOR SOLUTION INTRAVENOUS at 15:59

## 2019-04-15 NOTE — PROGRESS NOTE ADULT - SUBJECTIVE AND OBJECTIVE BOX
88 y/o male w/ history of HTN, HLD, CKD II, Lumbar spinal stenosis w/ LBP/radiculopathy - bed bound, Hydrocele, BPH and urinary retention w/chronic Ma presented after a syncopal episode and was found to have a UTI, dehydration w/ ROGER on CKD 2.  He is lying in bed in NAD.    MEDICATIONS  (STANDING):  amLODIPine   Tablet 5 milliGRAM(s) Oral daily  cholecalciferol 1000 Unit(s) Oral daily  docusate sodium 100 milliGRAM(s) Oral two times a day  ferrous    sulfate 325 milliGRAM(s) Oral daily  finasteride 5 milliGRAM(s) Oral daily  piperacillin/tazobactam IVPB. 3.375 Gram(s) IV Intermittent every 8 hours  tamsulosin 0.4 milliGRAM(s) Oral at bedtime  vancomycin  IVPB      vancomycin  IVPB 1000 milliGRAM(s) IV Intermittent daily    MEDICATIONS  (PRN):  acetaminophen   Tablet .. 650 milliGRAM(s) Oral every 6 hours PRN Temp greater or equal to 38C (100.4F), Mild Pain (1 - 3)      Allergies    No Known Allergies    Intolerances        Vital Signs Last 24 Hrs  T(C): 37.6 (15 Apr 2019 16:37), Max: 37.6 (15 Apr 2019 12:41)  T(F): 99.7 (15 Apr 2019 16:37), Max: 99.7 (15 Apr 2019 16:37)  HR: 104 (15 Apr 2019 16:37) (73 - 104)  BP: 113/80 (15 Apr 2019 16:37) (113/80 - 154/85)  BP(mean): --  RR: 17 (15 Apr 2019 16:37) (16 - 18)  SpO2: 99% (15 Apr 2019 16:37) (98% - 100%)    PHYSICAL EXAM:  GENERAL: NAD, well-groomed, well-developed  HEAD: Atraumatic, Normocephalic  EYES: EOMI, PERRLA   NECK: Supple   NERVOUS SYSTEM: Alert   CHEST/LUNG: Clear to auscultation bilaterally; No rales, rhonchi, wheezing, or rubs  HEART: Regular rate and rhythm; No murmurs, rubs, or gallops  ABDOMEN: Soft, Nontender, Nondistended; Bowel sounds present  EXTREMITIES: No clubbing, cyanosis, or edema    LABS:                        9.0    8.45  )-----------( 360      ( 15 Apr 2019 07:45 )             29.3     04-15    138  |  101  |  23  ----------------------------<  65<L>  3.6   |  24  |  1.02    Ca    10.7<H>      15 Apr 2019 07:45      Culture - Blood (collected 12 Apr 2019 11:26)  Source: .Blood  Preliminary Report (13 Apr 2019 12:01):    No growth to date.    Culture - Blood (collected 12 Apr 2019 11:26)  Source: .Blood  Preliminary Report (13 Apr 2019 12:01):    No growth to date.      RADIOLOGY & ADDITIONAL TESTS:    04-15-19 @ 07:01  -  04-15-19 @ 19:31  --------------------------------------------------------  IN:  Total IN: 0 mL    OUT:    Voided: 2 mL  Total OUT: 2 mL    Total NET: -2 mL

## 2019-04-15 NOTE — PROGRESS NOTE ADULT - ASSESSMENT
89 y/o male w/ history of HTN, HLD, CKD II, Lumbar spinal stenosis w/ LBP/radiculopathy - bed bound, Hydrocele, BPH and urinary retention w/chronic Obrien presented after a syncopal episode and was found to have a UTI, dehydration w/ ROGER on CKD 2.      Syncope  - likely Vasovagal in setting of sepsis and dehydration/hypotension  - Head CT showed no acute changes  - Echo showed EF 55-60% w/ grade I diastolic dysfunction & mild to moderate aortic regurgitation  - EKG: SR @ 80bpm, premature atrial complex, bifascicular block    UTI w/ Severe Sepsis POA in patient  - urology following - unable to place SPC today so placed obrien instead - to be removed tomorrow for TOV  - c/w Zosyn and Vanc   - trough was checked only after 2 doses, so will need to be rechecked   - will Follow up w/ ID      HTN  - c/w Norvasc  - resume BB if BP is not controlled    BPH  - c/w Flomax and Proscar     ROGER on CKD III  - reolved  - likely prerenal    Prophylaxis:   DVT: Lovenox   GI: PO diet    Disposition: f/u PT consult.

## 2019-04-15 NOTE — PROGRESS NOTE ADULT - SUBJECTIVE AND OBJECTIVE BOX
Patient seen and examined at bedside in no distress.  No complaints offered.  S/p removal of obrien catheter this AM; no urge to void yet, no pain.    T(F): 97.5 (04-15-19 @ 06:22), Max: 98.8 (04-14-19 @ 16:43)  HR: 94 (04-15-19 @ 06:22) (89 - 98)  BP: 139/65 (04-15-19 @ 06:22) (133/63 - 154/85)  RR: 18 (04-15-19 @ 06:22) (16 - 20)  SpO2: 99% (04-15-19 @ 06:22) (97% - 100%)    PHYSICAL EXAM:  General: Awake, alert, NAD  CV: +S1S2 regular rate and rhythm  Lung: Respirations nonlabored  Abdomen: Soft, NTND  : No palpable bladder distention, no suprapubic tenderness     LABS:                        9.0    8.45  )-----------( 360      ( 15 Apr 2019 07:45 )             29.3     04-15    138  |  101  |  23  ----------------------------<  65<L>  3.6   |  24  |  1.02    Ca    10.7<H>      15 Apr 2019 07:45      A/P: 89M PMH HTN, HLD,CKD, LBP/radiculopathy, bed bound, BPH and urinary retention w/chronic obrien, a/w UTI, now s/p catheter removal  - suprapubic catheter placement planning today  - continue medical management and supportive care

## 2019-04-16 LAB
ANION GAP SERPL CALC-SCNC: 8 MMOL/L — SIGNIFICANT CHANGE UP (ref 5–17)
BUN SERPL-MCNC: 26 MG/DL — HIGH (ref 7–23)
CALCIUM SERPL-MCNC: 10.3 MG/DL — HIGH (ref 8.5–10.1)
CHLORIDE SERPL-SCNC: 100 MMOL/L — SIGNIFICANT CHANGE UP (ref 96–108)
CO2 SERPL-SCNC: 29 MMOL/L — SIGNIFICANT CHANGE UP (ref 22–31)
CREAT SERPL-MCNC: 1.38 MG/DL — HIGH (ref 0.5–1.3)
GLUCOSE SERPL-MCNC: 133 MG/DL — HIGH (ref 70–99)
HCT VFR BLD CALC: 29.2 % — LOW (ref 39–50)
HGB BLD-MCNC: 8.9 G/DL — LOW (ref 13–17)
MAGNESIUM SERPL-MCNC: 2.4 MG/DL — SIGNIFICANT CHANGE UP (ref 1.6–2.6)
MCHC RBC-ENTMCNC: 27.1 PG — SIGNIFICANT CHANGE UP (ref 27–34)
MCHC RBC-ENTMCNC: 30.5 GM/DL — LOW (ref 32–36)
MCV RBC AUTO: 89 FL — SIGNIFICANT CHANGE UP (ref 80–100)
NRBC # BLD: 0 /100 WBCS — SIGNIFICANT CHANGE UP (ref 0–0)
PHOSPHATE SERPL-MCNC: 2.4 MG/DL — LOW (ref 2.5–4.5)
PLATELET # BLD AUTO: 415 K/UL — HIGH (ref 150–400)
POTASSIUM SERPL-MCNC: 3.3 MMOL/L — LOW (ref 3.5–5.3)
POTASSIUM SERPL-SCNC: 3.3 MMOL/L — LOW (ref 3.5–5.3)
RBC # BLD: 3.28 M/UL — LOW (ref 4.2–5.8)
RBC # FLD: 15.7 % — HIGH (ref 10.3–14.5)
SODIUM SERPL-SCNC: 137 MMOL/L — SIGNIFICANT CHANGE UP (ref 135–145)
WBC # BLD: 12.96 K/UL — HIGH (ref 3.8–10.5)
WBC # FLD AUTO: 12.96 K/UL — HIGH (ref 3.8–10.5)

## 2019-04-16 PROCEDURE — 99233 SBSQ HOSP IP/OBS HIGH 50: CPT

## 2019-04-16 PROCEDURE — 99223 1ST HOSP IP/OBS HIGH 75: CPT

## 2019-04-16 RX ORDER — POTASSIUM CHLORIDE 20 MEQ
40 PACKET (EA) ORAL EVERY 4 HOURS
Qty: 0 | Refills: 0 | Status: COMPLETED | OUTPATIENT
Start: 2019-04-16 | End: 2019-04-16

## 2019-04-16 RX ORDER — NYSTATIN CREAM 100000 [USP'U]/G
1 CREAM TOPICAL
Qty: 0 | Refills: 0 | Status: DISCONTINUED | OUTPATIENT
Start: 2019-04-16 | End: 2019-04-24

## 2019-04-16 RX ORDER — SODIUM,POTASSIUM PHOSPHATES 278-250MG
1 POWDER IN PACKET (EA) ORAL
Qty: 0 | Refills: 0 | Status: COMPLETED | OUTPATIENT
Start: 2019-04-16 | End: 2019-04-17

## 2019-04-16 RX ORDER — CEFTRIAXONE 500 MG/1
1 INJECTION, POWDER, FOR SOLUTION INTRAMUSCULAR; INTRAVENOUS EVERY 24 HOURS
Qty: 0 | Refills: 0 | Status: DISCONTINUED | OUTPATIENT
Start: 2019-04-16 | End: 2019-04-24

## 2019-04-16 RX ADMIN — Medication 100 MILLIGRAM(S): at 17:44

## 2019-04-16 RX ADMIN — NYSTATIN CREAM 1 APPLICATION(S): 100000 CREAM TOPICAL at 17:43

## 2019-04-16 RX ADMIN — TAMSULOSIN HYDROCHLORIDE 0.4 MILLIGRAM(S): 0.4 CAPSULE ORAL at 21:13

## 2019-04-16 RX ADMIN — Medication 1 PACKET(S): at 17:43

## 2019-04-16 RX ADMIN — Medication 40 MILLIEQUIVALENT(S): at 11:45

## 2019-04-16 RX ADMIN — PIPERACILLIN AND TAZOBACTAM 25 GRAM(S): 4; .5 INJECTION, POWDER, LYOPHILIZED, FOR SOLUTION INTRAVENOUS at 00:22

## 2019-04-16 RX ADMIN — Medication 325 MILLIGRAM(S): at 11:45

## 2019-04-16 RX ADMIN — Medication 1 PACKET(S): at 21:13

## 2019-04-16 RX ADMIN — NYSTATIN CREAM 1 APPLICATION(S): 100000 CREAM TOPICAL at 06:13

## 2019-04-16 RX ADMIN — CEFTRIAXONE 100 GRAM(S): 500 INJECTION, POWDER, FOR SOLUTION INTRAMUSCULAR; INTRAVENOUS at 17:42

## 2019-04-16 RX ADMIN — Medication 40 MILLIEQUIVALENT(S): at 13:04

## 2019-04-16 RX ADMIN — Medication 1000 UNIT(S): at 11:44

## 2019-04-16 RX ADMIN — PIPERACILLIN AND TAZOBACTAM 25 GRAM(S): 4; .5 INJECTION, POWDER, LYOPHILIZED, FOR SOLUTION INTRAVENOUS at 06:11

## 2019-04-16 RX ADMIN — FINASTERIDE 5 MILLIGRAM(S): 5 TABLET, FILM COATED ORAL at 11:45

## 2019-04-16 RX ADMIN — AMLODIPINE BESYLATE 5 MILLIGRAM(S): 2.5 TABLET ORAL at 06:11

## 2019-04-16 RX ADMIN — PIPERACILLIN AND TAZOBACTAM 25 GRAM(S): 4; .5 INJECTION, POWDER, LYOPHILIZED, FOR SOLUTION INTRAVENOUS at 13:03

## 2019-04-16 RX ADMIN — ENOXAPARIN SODIUM 40 MILLIGRAM(S): 100 INJECTION SUBCUTANEOUS at 11:44

## 2019-04-16 RX ADMIN — Medication 100 MILLIGRAM(S): at 06:11

## 2019-04-16 NOTE — CONSULT NOTE ADULT - PROBLEM SELECTOR RECOMMENDATION 3
possibly UTI   change antibiotics to Rocephin(based on previous urine c.s which mostly grw klebsiella.  now obrien has been removed for TOV so new smaple cant be sent   SPC was tried to be placed yestefday unsuccessfully   this time urine c/s sample couldn't be sent   voiding trials

## 2019-04-16 NOTE — CONSULT NOTE ADULT - SUBJECTIVE AND OBJECTIVE BOX
Infectious Diseases - Attending at Dr. Laguna    HPI:  Pt is a 89 y/o male w/pmhx of HTN, HLD,CKD, LBP/radiculopathy - bed bound, BPH and urinary retention w/chronic obrien comes in after family noticed he was unresponsive to them for 2-3 minutes.  family was talking to him and pt became unresponsive family splattered some water on face and pt came about. no sz like activity. pt was at baseline on arousal.  Per son who  is a physician and  currently bedside reports he wasnt present but family had checked pt's bp and was reported to be about 82/60.  pt did have bp meds increased recently.  pt denies any fever, chills, sob, cp, palpitations, n/v/d/c, no travels or sick contacts. pt does report bladder discomfort and pain since last week.  Pt's son reports pt was urged for suprapubic in past and pt has been reluctant unsure when obrien was last changed. 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, chronic  Renal insufficiency  Dehydration  Spinal stenosis of lumbar region  BPH (benign prostatic hypertrophy)  Hypertension  Benign Prostatic Hyperplasia  HTN (Hypertension)  No significant past surgical history      Allergies    No Known Allergies    Intolerances        FAMILY HISTORY:  No significant family history (Father, Mother)      SOCIAL HISTORY:    REVIEW OF SYSTEMS:    Constitutional: No fever, weight loss or fatigue  Eyes: No eye pain, visual disturbances, or discharge  ENT:  No difficulty hearing, tinnitus, vertigo; No sinus or throat pain  Neck: No pain or stiffness  Respiratory: No cough, wheezing, chills or hemoptysis  Cardiovascular: No chest pain, palpitations, shortness of breath, dizziness or leg swelling  Gastrointestinal: No abdominal or epigastric pain. No nausea, vomiting or hematemesis; No diarrhea or constipation. No melena or hematochezia.  Genitourinary: No dysuria, frequency, hematuria or incontinence  Neurological: No headaches, memory loss, loss of strength, numbness or tremors  Skin: No itching, burning, rashes or lesions       MEDICATIONS  (STANDING):  amLODIPine   Tablet 5 milliGRAM(s) Oral daily  cholecalciferol 1000 Unit(s) Oral daily  docusate sodium 100 milliGRAM(s) Oral two times a day  enoxaparin Injectable 40 milliGRAM(s) SubCutaneous daily  ferrous    sulfate 325 milliGRAM(s) Oral daily  finasteride 5 milliGRAM(s) Oral daily  nystatin Powder 1 Application(s) Topical two times a day  piperacillin/tazobactam IVPB. 3.375 Gram(s) IV Intermittent every 8 hours  potassium phosphate / sodium phosphate powder 1 Packet(s) Oral four times a day before meals  tamsulosin 0.4 milliGRAM(s) Oral at bedtime    MEDICATIONS  (PRN):  acetaminophen   Tablet .. 650 milliGRAM(s) Oral every 6 hours PRN Temp greater or equal to 38C (100.4F), Mild Pain (1 - 3)      Vital Signs Last 24 Hrs  T(C): 36.4 (16 Apr 2019 10:50), Max: 37.3 (15 Apr 2019 23:53)  T(F): 97.6 (16 Apr 2019 10:50), Max: 99.2 (15 Apr 2019 23:53)  HR: 94 (16 Apr 2019 10:50) (94 - 104)  BP: 123/58 (16 Apr 2019 10:50) (123/58 - 149/86)  BP(mean): --  RR: 18 (16 Apr 2019 10:50) (18 - 19)  SpO2: 100% (16 Apr 2019 10:50) (98% - 100%)    PHYSICAL EXAM:    Constitutional: NAD, well-groomed, well-developed  HEENT: PERRLA, EOMI, Normal Hearing, MMM  Neck: No LAD, No JVD  Back: Normal spine flexure, No CVA tenderness  Respiratory: CTAB/L  Cardiovascular: S1 and S2, RRR, no M/G/R  Gastrointestinal: BS+, soft, NT/ND  Extremities: No peripheral edema  Vascular: 2+ peripheral pulses  Neurological: A/O x 3, no focal deficits  Skin: No rashes      LABS:                        8.9    12.96 )-----------( 415      ( 16 Apr 2019 07:12 )             29.2     04-16    137  |  100  |  26<H>  ----------------------------<  133<H>  3.3<L>   |  29  |  1.38<H>    Ca    10.3<H>      16 Apr 2019 07:12  Phos  2.4     04-16  Mg     2.4     04-16            MICROBIOLOGY:  RECENT CULTURES:  04-12 .Blood XXXX XXXX   No growth to date.          RADIOLOGY & ADDITIONAL STUDIES: Infectious Diseases - Attending at Dr. Laguna    HPI:  Pt is a 88 y/o male w/pmhx of HTN, HLD,CKD, LBP/radiculopathy - bed bound, BPH and urinary retention w/chronic obrien comes in after family noticed he was unresponsive to them for 2-3 minutes.  family was talking to him and pt became unresponsive family splattered some water on face and pt came about. no sz like activity. pt was at baseline on arousal.  Per son who  is a physician and  currently bedside reports he wasnt present but family had checked pt's bp and was reported to be about 82/60.  pt did have bp meds increased recently.  pt denies any fever, chills, sob, cp, palpitations, n/v/d/c, no travels or sick contacts. pt does report bladder discomfort and pain since last week.  Pt's son reports pt was urged for suprapubic in past and pt has been reluctant unsure when obrien was last changed. (11 Apr 2019 23:58)  pt was found to have pus around the obiren catheter site on admission ,.he has been in [facility] and [facility]   urine c/s couldn't be performed as tit was an old obrien sample    PAST MEDICAL & SURGICAL HISTORY:  Hydrocele in adult: bilateral, chronic  hypospadiasis  Renal insufficiency  Dehydration  Spinal stenosis of lumbar region  BPH (benign prostatic hypertrophy)  Hypertension  Benign Prostatic Hyperplasia  HTN (Hypertension)  No significant past surgical history      Allergies    No Known Allergies    Intolerances        FAMILY HISTORY:  No significant family history (Father, Mother)      SOCIAL HISTORY: non smoker    REVIEW OF SYSTEMS:    non verbal     MEDICATIONS  (STANDING):  amLODIPine   Tablet 5 milliGRAM(s) Oral daily  cholecalciferol 1000 Unit(s) Oral daily  docusate sodium 100 milliGRAM(s) Oral two times a day  enoxaparin Injectable 40 milliGRAM(s) SubCutaneous daily  ferrous    sulfate 325 milliGRAM(s) Oral daily  finasteride 5 milliGRAM(s) Oral daily  nystatin Powder 1 Application(s) Topical two times a day  piperacillin/tazobactam IVPB. 3.375 Gram(s) IV Intermittent every 8 hours  potassium phosphate / sodium phosphate powder 1 Packet(s) Oral four times a day before meals  tamsulosin 0.4 milliGRAM(s) Oral at bedtime    MEDICATIONS  (PRN):  acetaminophen   Tablet .. 650 milliGRAM(s) Oral every 6 hours PRN Temp greater or equal to 38C (100.4F), Mild Pain (1 - 3)      Vital Signs Last 24 Hrs  T(C): 36.4 (16 Apr 2019 10:50), Max: 37.3 (15 Apr 2019 23:53)  T(F): 97.6 (16 Apr 2019 10:50), Max: 99.2 (15 Apr 2019 23:53)  HR: 94 (16 Apr 2019 10:50) (94 - 104)  BP: 123/58 (16 Apr 2019 10:50) (123/58 - 149/86)  BP(mean): --  RR: 18 (16 Apr 2019 10:50) (18 - 19)  SpO2: 100% (16 Apr 2019 10:50) (98% - 100%)    PHYSICAL EXAM:    Constitutional: NAD, thin built  HEENT: PERRL  Neck: No LAD, No JVD  Back: Normal spine flexure, No CVA tenderness  Respiratory: CTAB/L  Cardiovascular: S1 and S2, RRR, no M/G/R  Gastrointestinal: BS+, soft, NT/ND  Extremities: No peripheral edema  Vascular: 2+ peripheral pulses  Neurological: moves all extremities  Skin: No rashes      LABS:                        8.9    12.96 )-----------( 415      ( 16 Apr 2019 07:12 )             29.2     04-16    137  |  100  |  26<H>  ----------------------------<  133<H>  3.3<L>   |  29  |  1.38<H>    Ca    10.3<H>      16 Apr 2019 07:12  Phos  2.4     04-16  Mg     2.4     04-16            MICROBIOLOGY:  RECENT CULTURES:  04-12 .Blood XXXX XXXX   No growth to date.          RADIOLOGY & ADDITIONAL STUDIES:

## 2019-04-16 NOTE — PROGRESS NOTE ADULT - SUBJECTIVE AND OBJECTIVE BOX
Patient seen and examined bedside resting comfortably.  Ma catheter removed for TOV      T(F): 97.6 (04-16-19 @ 10:50), Max: 99.7 (04-15-19 @ 16:37)  HR: 94 (04-16-19 @ 10:50) (94 - 104)  BP: 123/58 (04-16-19 @ 10:50) (113/80 - 149/86)  RR: 18 (04-16-19 @ 10:50) (17 - 19)  SpO2: 100% (04-16-19 @ 10:50) (98% - 100%)        PHYSICAL EXAM:  General: NAD, WDWN  Neuro:  Alert & conscious  HEENT: NCAT, EOMI, conjunctiva clear  Lung: respirations nonlabored, good inspiratory effort  Abdomen: soft, NTND.   Extremities: no pedal edema or calf tenderness noted   : Hypospadias      LABS:                        8.9    12.96 )-----------( 415      ( 16 Apr 2019 07:12 )             29.2     04-16    137  |  100  |  26<H>  ----------------------------<  133<H>  3.3<L>   |  29  |  1.38<H>    Ca    10.3<H>      16 Apr 2019 07:12  Phos  2.4     04-16  Mg     2.4     04-16        I&O's Detail    15 Apr 2019 07:01  -  16 Apr 2019 07:00  --------------------------------------------------------  IN:    Oral Fluid: 236 mL  Total IN: 236 mL    OUT:    Indwelling Catheter - Urethral: 300 mL    Voided: 2 mL  Total OUT: 302 mL    Total NET: -66 mL      16 Apr 2019 07:01  -  16 Apr 2019 13:48  --------------------------------------------------------  IN:  Total IN: 0 mL    OUT:    Indwelling Catheter - Urethral: 150 mL  Total OUT: 150 mL    Total NET: -150 mL          wbc    04-16 @ 07:12    12.96    04-15 @ 07:45    8.45    04-14 @ 07:08    8.27    04-13 @ 07:37    8.96    04-12 @ 07:15    9.92    04-11 @ 16:59    10.18        cr   04-16 @ 07:12   1.38    04-15 @ 07:45   1.02    04-14 @ 07:08   1.31    04-13 @ 07:37   1.32    04-12 @ 07:15   1.11    04-11 @ 16:59   1.18

## 2019-04-16 NOTE — PROGRESS NOTE ADULT - SUBJECTIVE AND OBJECTIVE BOX
89 y/o male w/ history of HTN, HLD, CKD II, Lumbar spinal stenosis w/ LBP/radiculopathy - bed bound, Hydrocele, BPH and urinary retention w/chronic Ma presented after a syncopal episode and was found to have a UTI, dehydration w/ ROGER on CKD 2. He is lying in bed in NAD.     MEDICATIONS  (STANDING):  amLODIPine   Tablet 5 milliGRAM(s) Oral daily  cefTRIAXone   IVPB 1 Gram(s) IV Intermittent every 24 hours  cholecalciferol 1000 Unit(s) Oral daily  docusate sodium 100 milliGRAM(s) Oral two times a day  enoxaparin Injectable 40 milliGRAM(s) SubCutaneous daily  ferrous    sulfate 325 milliGRAM(s) Oral daily  finasteride 5 milliGRAM(s) Oral daily  nystatin Powder 1 Application(s) Topical two times a day  potassium phosphate / sodium phosphate powder 1 Packet(s) Oral four times a day before meals  tamsulosin 0.4 milliGRAM(s) Oral at bedtime    MEDICATIONS  (PRN):  acetaminophen   Tablet .. 650 milliGRAM(s) Oral every 6 hours PRN Temp greater or equal to 38C (100.4F), Mild Pain (1 - 3)      Allergies    No Known Allergies     Vital Signs Last 24 Hrs  T(C): 37.1 (16 Apr 2019 16:48), Max: 37.3 (15 Apr 2019 23:53)  T(F): 98.8 (16 Apr 2019 16:48), Max: 99.2 (15 Apr 2019 23:53)  HR: 89 (16 Apr 2019 16:48) (89 - 104)  BP: 164/80 (16 Apr 2019 16:48) (123/58 - 164/80)  BP(mean): --  RR: 18 (16 Apr 2019 16:48) (18 - 19)  SpO2: 99% (16 Apr 2019 16:48) (98% - 100%)    PHYSICAL EXAM:  GENERAL: NAD, well-groomed, well-developed  HEAD: Atraumatic, Normocephalic  EYES: EOMI, PERRLA   NECK: Supple   NERVOUS SYSTEM: Alert   CHEST/LUNG: Clear to auscultation bilaterally; No rales, rhonchi, wheezing, or rubs  HEART: Regular rate and rhythm; No murmurs, rubs, or gallops  ABDOMEN: Soft, Nontender, Nondistended; Bowel sounds present  EXTREMITIES: No clubbing, cyanosis, or edema    LABS:                        8.9    12.96 )-----------( 415      ( 16 Apr 2019 07:12 )             29.2     04-16    137  |  100  |  26<H>  ----------------------------<  133<H>  3.3<L>   |  29  |  1.38<H>    Ca    10.3<H>      16 Apr 2019 07:12  Phos  2.4     04-16  Mg     2.4     04-16          Culture - Blood (collected 12 Apr 2019 11:26)  Source: .Blood  Preliminary Report (13 Apr 2019 12:01):    No growth to date.    Culture - Blood (collected 12 Apr 2019 11:26)  Source: .Blood  Preliminary Report (13 Apr 2019 12:01):    No growth to date.      RADIOLOGY & ADDITIONAL TESTS:    04-15-19 @ 07:01  -  04-16-19 @ 07:00  --------------------------------------------------------  IN:    Oral Fluid: 236 mL  Total IN: 236 mL    OUT:    Indwelling Catheter - Urethral: 300 mL    Voided: 2 mL  Total OUT: 302 mL    Total NET: -66 mL      04-16-19 @ 07:01  -  04-16-19 @ 21:03  --------------------------------------------------------  IN:    Oral Fluid: 360 mL  Total IN: 360 mL    OUT:    Indwelling Catheter - Urethral: 150 mL  Total OUT: 150 mL    Total NET: 210 mL

## 2019-04-16 NOTE — PROGRESS NOTE ADULT - SUBJECTIVE AND OBJECTIVE BOX
Patient seen and examined bedside resting comfortably.  No complaints offered.   Denies nausea and vomiting. Tolerating diet.  Flatus/BM. +  Denies chest pain, dyspnea, cough.    T(F): 97.8 (04-16-19 @ 05:38), Max: 99.7 (04-15-19 @ 16:37)  HR: 101 (04-16-19 @ 05:38) (73 - 104)  BP: 145/79 (04-16-19 @ 05:38) (113/80 - 149/86)  RR: 18 (04-16-19 @ 05:38) (17 - 19)  SpO2: 98% (04-16-19 @ 05:38) (98% - 99%)    PHYSICAL EXAM:  General: NAD  Neuro:  Alert & oriented x 3  Abdomen: soft, NTND. Normactive BS   Ma cath still in place. Site of attempted suprapubic tube insertion is clean &dry    LABS:                        8.9    12.96 )-----------( 415      ( 16 Apr 2019 07:12 )             29.2     04-16    137  |  100  |  26<H>  ----------------------------<  133<H>  3.3<L>   |  29  |  1.38<H>    Ca    10.3<H>      16 Apr 2019 07:12  Phos  2.4     04-16  Mg     2.4     04-16        I&O's Detail    15 Apr 2019 07:01  -  16 Apr 2019 07:00  --------------------------------------------------------  IN:    Oral Fluid: 236 mL  Total IN: 236 mL    OUT:    Indwelling Catheter - Urethral: 300 mL    Voided: 2 mL  Total OUT: 302 mL    Total NET: -66 mL          Impression:   urinary retention  UTI    Plan:  per Dr Mensah, Ma cath to be removed for a TOV today,  monitor attempted SP cath insertion site.

## 2019-04-16 NOTE — PROGRESS NOTE ADULT - ASSESSMENT
88 y/o male w/ history of HTN, HLD, CKD II, Lumbar spinal stenosis w/ LBP/radiculopathy - bed bound, Hydrocele, BPH and urinary retention w/chronic Obrien presented after a syncopal episode and was found to have a UTI, dehydration w/ ROGER on CKD 2.      Syncope  - likely Vasovagal in setting of sepsis and dehydration/hypotension  - Head CT showed no acute changes  - Echo showed EF 55-60% w/ grade I diastolic dysfunction & mild to moderate aortic regurgitation  - EKG: SR @ 80bpm, premature atrial complex, bifascicular block    UTI w/ Severe Sepsis POA in patient  - urology following - unable to place SPC today so placed obrien instead - to be removed tomorrow for TOV  - Zosyn and Vanc changed to Rocephin by ID  today    Hypokalemia & Hypophosphatemia  - replete w/ KCl & Kphos    BPH w/ urinary retention  - c/w Flomax and Proscar   - TOV pending after Obrien was removed today    HTN  - c/w Norvasc  - resume BB if BP is not controlled    ROGER on CKD III  - resolved  - likely prerenal    Prophylaxis:   DVT: Lovenox   GI: PO diet    Disposition: f/u PT consult. 88 y/o male w/ history of HTN, HLD, CKD II, Lumbar spinal stenosis w/ LBP/radiculopathy - bed bound, Hydrocele, BPH and urinary retention w/chronic Obrien presented after a syncopal episode and was found to have a UTI, dehydration w/ ROGER on CKD 2.      Syncope  - likely Vasovagal in setting of sepsis and dehydration/hypotension  - Head CT showed no acute changes  - Echo showed EF 55-60% w/ grade I diastolic dysfunction & mild to moderate aortic regurgitation  - EKG: SR @ 80bpm, premature atrial complex, bifascicular block    UTI w/ Severe Sepsis POA in patient  - urology following - unable to place SPC today so placed obrien instead - to be removed tomorrow for TOV  - Zosyn and Vanc changed to Rocephin by ID  today    Hypokalemia & Hypophosphatemia  - replete w/ KCl & Kphos    BPH w/ urinary retention  - c/w Flomax and Proscar   - TOV pending after Obrien was removed today    HTN  - c/w Norvasc  - resume BB if BP is not controlled    ROGER on CKD III  - resolved  - likely prerenal    Functional Quadraplegia    Prophylaxis:   DVT: Lovenox   GI: PO diet    Disposition: f/u PT consult.

## 2019-04-17 ENCOUNTER — TRANSCRIPTION ENCOUNTER (OUTPATIENT)
Age: 84
End: 2019-04-17

## 2019-04-17 DIAGNOSIS — D72.829 ELEVATED WHITE BLOOD CELL COUNT, UNSPECIFIED: ICD-10-CM

## 2019-04-17 DIAGNOSIS — Q54.1 HYPOSPADIAS, PENILE: ICD-10-CM

## 2019-04-17 LAB
ANION GAP SERPL CALC-SCNC: 9 MMOL/L — SIGNIFICANT CHANGE UP (ref 5–17)
BASOPHILS # BLD AUTO: 0.03 K/UL — SIGNIFICANT CHANGE UP (ref 0–0.2)
BASOPHILS NFR BLD AUTO: 0.3 % — SIGNIFICANT CHANGE UP (ref 0–2)
BUN SERPL-MCNC: 22 MG/DL — SIGNIFICANT CHANGE UP (ref 7–23)
CALCIUM SERPL-MCNC: 9.9 MG/DL — SIGNIFICANT CHANGE UP (ref 8.5–10.1)
CHLORIDE SERPL-SCNC: 106 MMOL/L — SIGNIFICANT CHANGE UP (ref 96–108)
CO2 SERPL-SCNC: 27 MMOL/L — SIGNIFICANT CHANGE UP (ref 22–31)
CREAT SERPL-MCNC: 1.16 MG/DL — SIGNIFICANT CHANGE UP (ref 0.5–1.3)
CULTURE RESULTS: SIGNIFICANT CHANGE UP
CULTURE RESULTS: SIGNIFICANT CHANGE UP
EOSINOPHIL # BLD AUTO: 1.02 K/UL — HIGH (ref 0–0.5)
EOSINOPHIL NFR BLD AUTO: 10.6 % — HIGH (ref 0–6)
GLUCOSE SERPL-MCNC: 124 MG/DL — HIGH (ref 70–99)
HCT VFR BLD CALC: 26.2 % — LOW (ref 39–50)
HGB BLD-MCNC: 8.1 G/DL — LOW (ref 13–17)
IMM GRANULOCYTES NFR BLD AUTO: 0.4 % — SIGNIFICANT CHANGE UP (ref 0–1.5)
LYMPHOCYTES # BLD AUTO: 0.82 K/UL — LOW (ref 1–3.3)
LYMPHOCYTES # BLD AUTO: 8.5 % — LOW (ref 13–44)
MAGNESIUM SERPL-MCNC: 2.1 MG/DL — SIGNIFICANT CHANGE UP (ref 1.6–2.6)
MCHC RBC-ENTMCNC: 27.6 PG — SIGNIFICANT CHANGE UP (ref 27–34)
MCHC RBC-ENTMCNC: 30.9 GM/DL — LOW (ref 32–36)
MCV RBC AUTO: 89.4 FL — SIGNIFICANT CHANGE UP (ref 80–100)
MONOCYTES # BLD AUTO: 0.92 K/UL — HIGH (ref 0–0.9)
MONOCYTES NFR BLD AUTO: 9.5 % — SIGNIFICANT CHANGE UP (ref 2–14)
NEUTROPHILS # BLD AUTO: 6.81 K/UL — SIGNIFICANT CHANGE UP (ref 1.8–7.4)
NEUTROPHILS NFR BLD AUTO: 70.7 % — SIGNIFICANT CHANGE UP (ref 43–77)
NRBC # BLD: 0 /100 WBCS — SIGNIFICANT CHANGE UP (ref 0–0)
PHOSPHATE SERPL-MCNC: 2.5 MG/DL — SIGNIFICANT CHANGE UP (ref 2.5–4.5)
PLATELET # BLD AUTO: 350 K/UL — SIGNIFICANT CHANGE UP (ref 150–400)
POTASSIUM SERPL-MCNC: 3.5 MMOL/L — SIGNIFICANT CHANGE UP (ref 3.5–5.3)
POTASSIUM SERPL-SCNC: 3.5 MMOL/L — SIGNIFICANT CHANGE UP (ref 3.5–5.3)
RBC # BLD: 2.93 M/UL — LOW (ref 4.2–5.8)
RBC # FLD: 15.8 % — HIGH (ref 10.3–14.5)
SODIUM SERPL-SCNC: 142 MMOL/L — SIGNIFICANT CHANGE UP (ref 135–145)
SPECIMEN SOURCE: SIGNIFICANT CHANGE UP
SPECIMEN SOURCE: SIGNIFICANT CHANGE UP
WBC # BLD: 9.64 K/UL — SIGNIFICANT CHANGE UP (ref 3.8–10.5)
WBC # FLD AUTO: 9.64 K/UL — SIGNIFICANT CHANGE UP (ref 3.8–10.5)

## 2019-04-17 PROCEDURE — 99233 SBSQ HOSP IP/OBS HIGH 50: CPT

## 2019-04-17 RX ORDER — NYSTATIN CREAM 100000 [USP'U]/G
1 CREAM TOPICAL
Qty: 1 | Refills: 0 | OUTPATIENT
Start: 2019-04-17 | End: 2019-04-23

## 2019-04-17 RX ORDER — POTASSIUM CHLORIDE 20 MEQ
20 PACKET (EA) ORAL ONCE
Qty: 0 | Refills: 0 | Status: COMPLETED | OUTPATIENT
Start: 2019-04-17 | End: 2019-04-17

## 2019-04-17 RX ADMIN — Medication 100 MILLIGRAM(S): at 17:39

## 2019-04-17 RX ADMIN — NYSTATIN CREAM 1 APPLICATION(S): 100000 CREAM TOPICAL at 17:38

## 2019-04-17 RX ADMIN — NYSTATIN CREAM 1 APPLICATION(S): 100000 CREAM TOPICAL at 06:08

## 2019-04-17 RX ADMIN — Medication 1 PACKET(S): at 06:08

## 2019-04-17 RX ADMIN — CEFTRIAXONE 100 GRAM(S): 500 INJECTION, POWDER, FOR SOLUTION INTRAMUSCULAR; INTRAVENOUS at 17:38

## 2019-04-17 RX ADMIN — Medication 1000 UNIT(S): at 12:21

## 2019-04-17 RX ADMIN — Medication 100 MILLIGRAM(S): at 06:08

## 2019-04-17 RX ADMIN — FINASTERIDE 5 MILLIGRAM(S): 5 TABLET, FILM COATED ORAL at 12:21

## 2019-04-17 RX ADMIN — Medication 325 MILLIGRAM(S): at 12:21

## 2019-04-17 RX ADMIN — AMLODIPINE BESYLATE 5 MILLIGRAM(S): 2.5 TABLET ORAL at 06:08

## 2019-04-17 RX ADMIN — TAMSULOSIN HYDROCHLORIDE 0.4 MILLIGRAM(S): 0.4 CAPSULE ORAL at 22:48

## 2019-04-17 RX ADMIN — ENOXAPARIN SODIUM 40 MILLIGRAM(S): 100 INJECTION SUBCUTANEOUS at 12:22

## 2019-04-17 RX ADMIN — Medication 20 MILLIEQUIVALENT(S): at 12:20

## 2019-04-17 NOTE — DISCHARGE NOTE PROVIDER - NSDCCPCAREPLAN_GEN_ALL_CORE_FT
PRINCIPAL DISCHARGE DIAGNOSIS  Diagnosis: Syncope  Assessment and Plan of Treatment:       SECONDARY DISCHARGE DIAGNOSES  Diagnosis: Acute UTI  Assessment and Plan of Treatment: PRINCIPAL DISCHARGE DIAGNOSIS  Diagnosis: Syncope  Assessment and Plan of Treatment: Presumed Vasovagal in settting of UTI sepsis and dehydration  - Head CT showed no acute changes  - Echo showed EF 55-60% w/ grade I diastolic dysfunction & mild to moderate aortic regurgitation  - EKG: SR @ 80bpm, premature atrial complex, bifascicular block      SECONDARY DISCHARGE DIAGNOSES  Diagnosis: Acute UTI  Assessment and Plan of Treatment: Severe Sepsis POA; resolved now.   IV Antibiotic course completed.    Diagnosis: Acute blood loss anemia  Assessment and Plan of Treatment: - drop in hgb due to hematuria  -s/p 2U pRBC transfusion   -H/H stable  -continue to monitor with PCP    Diagnosis: Acute renal failure  Assessment and Plan of Treatment: Resolved with IV fluids   follow up renal function with PCP.    Diagnosis: Hypertension  Assessment and Plan of Treatment: continue with Norvasc  follow up with PCP for management.    Diagnosis: Benign prostatic hyperplasia with urinary obstruction  Assessment and Plan of Treatment: Continue with indwelling obrien  Proscar and Flomax  Follow up with Urology Dr. ferguson in 1-2 weeks.    Diagnosis: Chronic diastolic heart failure  Assessment and Plan of Treatment: EF 55-60% w/ grade I diastolic dysfunction & mild to moderate aortic regurgitation  -Euvolemic  Follow up with PCP for monitoring.    Diagnosis: Spinal stenosis of lumbar region with radiculopathy  Assessment and Plan of Treatment: Functional quadriplegic   home physical therapy and analgesics as needed.   Supportive care.

## 2019-04-17 NOTE — PROGRESS NOTE ADULT - SUBJECTIVE AND OBJECTIVE BOX
88 y/o male w/ history of HTN, HLD, CKD II, Lumbar spinal stenosis w/ LBP/radiculopathy - bed bound, Hydrocele, BPH and urinary retention w/chronic Ma presented after a syncopal episode and was found to have a UTI, dehydration w/ ROGER on CKD 2. He is lying in bed in NAD.     MEDICATIONS  (STANDING):  amLODIPine   Tablet 5 milliGRAM(s) Oral daily  cefTRIAXone   IVPB 1 Gram(s) IV Intermittent every 24 hours  cholecalciferol 1000 Unit(s) Oral daily  docusate sodium 100 milliGRAM(s) Oral two times a day  enoxaparin Injectable 40 milliGRAM(s) SubCutaneous daily  ferrous    sulfate 325 milliGRAM(s) Oral daily  finasteride 5 milliGRAM(s) Oral daily  nystatin Powder 1 Application(s) Topical two times a day  tamsulosin 0.4 milliGRAM(s) Oral at bedtime    MEDICATIONS  (PRN):  acetaminophen   Tablet .. 650 milliGRAM(s) Oral every 6 hours PRN Temp greater or equal to 38C (100.4F), Mild Pain (1 - 3)      Allergies    No Known Allergies    Intolerances       Vital Signs Last 24 Hrs  T(C): 36.2 (17 Apr 2019 17:08), Max: 37.3 (17 Apr 2019 00:36)  T(F): 97.1 (17 Apr 2019 17:08), Max: 99.1 (17 Apr 2019 00:36)  HR: 94 (17 Apr 2019 17:08) (92 - 95)  BP: 137/64 (17 Apr 2019 17:08) (134/50 - 152/84)  BP(mean): --  RR: 19 (17 Apr 2019 17:08) (18 - 24)  SpO2: 98% (17 Apr 2019 17:08) (97% - 99%)    PHYSICAL EXAM:  GENERAL: NAD, well-groomed, well-developed  HEAD: Atraumatic, Normocephalic  EYES: EOMI, PERRLA   NECK: Supple   NERVOUS SYSTEM: Alert   CHEST/LUNG: Clear to auscultation bilaterally; No rales, rhonchi, wheezing, or rubs  HEART: Regular rate and rhythm; No murmurs, rubs, or gallops  ABDOMEN: Soft, Nontender, Nondistended; Bowel sounds present  EXTREMITIES: No clubbing, cyanosis, or edema    LABS:                        8.1    9.64  )-----------( 350      ( 17 Apr 2019 07:57 )             26.2     04-17    142  |  106  |  22  ----------------------------<  124<H>  3.5   |  27  |  1.16    Ca    9.9      17 Apr 2019 07:57  Phos  2.5     04-17  Mg     2.1     04-17          CAPILLARY BLOOD GLUCOSE          RADIOLOGY & ADDITIONAL TESTS:    04-16-19 @ 07:01  -  04-17-19 @ 07:00  --------------------------------------------------------  IN:    Oral Fluid: 360 mL  Total IN: 360 mL    OUT:    Indwelling Catheter - Urethral: 150 mL    Post-Void Residual per Intermittent Catheterization: 180 mL    Voided: 650 mL  Total OUT: 980 mL    Total NET: -620 mL

## 2019-04-17 NOTE — DISCHARGE NOTE PROVIDER - HOSPITAL COURSE
88 y/o male w/ history of HTN, HLD, CKD II, Lumbar spinal stenosis w/ LBP/radiculopathy - bed bound w/ functional quadriplegia, Hydrocele, BPH and urinary retention w/chronic Obrien presented after a syncopal episode and was found to have a UTI (there was pus around the Obrien) w/ Severe Sepsis POA, dehydration w/ ROGER on CKD 2.  Pt's syncope was likely Vasovagal in setting of sepsis and dehydration/hypotension. Head CT showed no acute changes. Pt's ROGER resolved w/ IVF. Pt was put on Zosyn and Vanc, which ID changed to Rocephin. Urology was consulted and attempted to place SPC but could not because the bladder was collapsed. A obrien was placed temporarily instead and removed yesterday. Since that time, patient has been voiding and bladder scan today revealed ~200ml in the bladder as per the nurse. As per my conversation with Dr. Mensah, the patient was cleared for discharge from urology standpoint.            Discharge time: 43 minutes 88 y/o male w/ history of HTN, HLD, CKD II, Lumbar spinal stenosis w/ LBP/radiculopathy - bed bound w/ functional quadriplegia, Hydrocele, BPH and urinary retention w/chronic Obrien presented after a syncopal episode and was found to have a UTI (there was pus around the Obrien) w/ Severe Sepsis POA, dehydration w/ ROGER on CKD 2.  Pt's syncope was likely Vasovagal in setting of sepsis and dehydration/hypotension. Head CT showed no acute changes. Pt's ROGER resolved w/ IVF. Pt was put on Zosyn and Vanc, which ID changed to Rocephin. Urology was consulted and attempted to place SPC but could not because the bladder was collapsed. Replacement indwelling obrien was placed. The patient had hematuria with acute blood loss anemia. He was transfused 2U pRBC with adequate improvement of hemoglobin. Obrien was irrigated and the hematuria resolved. Hemoglobin is stable and the patient will be discharged home with home services.           Discharge time: 40 minutes

## 2019-04-17 NOTE — PROGRESS NOTE ADULT - SUBJECTIVE AND OBJECTIVE BOX
Patient seen and examined bedside resting comfortably.  No complaints offered.   Has been incontinent of urine  Denies nausea and vomiting. Tolerating diet.  Flatus/BM. +  Denies chest pain, dyspnea, cough.    T(F): 97.3 (04-17-19 @ 10:50), Max: 99.1 (04-17-19 @ 00:36)  HR: 95 (04-17-19 @ 10:50) (89 - 95)  BP: 134/50 (04-17-19 @ 10:50) (134/50 - 164/80)  RR: 24 (04-17-19 @ 10:50) (18 - 24)  SpO2: 99% (04-17-19 @ 10:50) (97% - 99%)    PHYSICAL EXAM:  General: NAD  Neuro:  Alert   Abdomen: soft, NTND. Normactive BS  : incontinent of urine. Attempted SP tube site ok. Hypospadeus.    LABS:                        8.1    9.64  )-----------( 350      ( 17 Apr 2019 07:57 )             26.2     04-17    142  |  106  |  22  ----------------------------<  124<H>  3.5   |  27  |  1.16    Ca    9.9      17 Apr 2019 07:57  Phos  2.5     04-17  Mg     2.1     04-17        I&O's Detail    16 Apr 2019 07:01  -  17 Apr 2019 07:00  --------------------------------------------------------  IN:    Oral Fluid: 360 mL  Total IN: 360 mL    OUT:    Indwelling Catheter - Urethral: 150 mL    Post-Void Residual per Intermittent Catheterization: 180 mL    Voided: 650 mL  Total OUT: 980 mL    Total NET: -620 mL          Impression:   incontinent of urine  UTI      Plan:  -continue VTE prophylaxis   - continue IVAB per ID  - continue medical f/u  -will discuss with Dr. Mensah

## 2019-04-17 NOTE — DISCHARGE NOTE PROVIDER - CARE PROVIDER_API CALL
Your PMD,   Phone: (   )    -  Fax: (   )    -  Follow Up Time:     Adithya Mensah)  Urology  [address] [place] [address]  Phone: [phone]  Fax: [phone]  Follow Up Time:

## 2019-04-17 NOTE — DISCHARGE NOTE PROVIDER - NSDCHHNEEDSERVICE_GEN_ALL_CORE
Rehabilitation services Medication teaching and assessment/Observation and assessment/Teaching and training

## 2019-04-17 NOTE — PROGRESS NOTE ADULT - ASSESSMENT
89 y/o male w/ history of HTN, HLD, CKD II, Lumbar spinal stenosis w/ LBP/radiculopathy - bed bound, Hydrocele, BPH and urinary retention w/chronic Obrien presented after a syncopal episode and was found to have a UTI, dehydration w/ ROGER on CKD 2.      BPH w/ urinary retention  - c/w Flomax and Proscar   - as per Dr. Mensah, will placed SPC in AM    Syncope  - likely Vasovagal in setting of sepsis and dehydration/hypotension  - Head CT showed no acute changes  - Echo showed EF 55-60% w/ grade I diastolic dysfunction & mild to moderate aortic regurgitation  - EKG: SR @ 80bpm, premature atrial complex, bifascicular block    UTI w/ Severe Sepsis POA in patient  - urology following - unable to place SPC today so placed obrien instead - to be removed tomorrow for TOV  - Zosyn and Vanc changed to Rocephin by ID  today    Hypokalemia & Hypophosphatemia  - replete w/ KCl & Kphos    HTN  - c/w Norvasc  - resume BB if BP is not controlled    ROGER on CKD III  - resolved  - likely prerenal    Functional Quadriplegia  - nursing care    Prophylaxis:   DVT: Lovenox   GI: PO diet    Disposition: Discharge to home once cleared by urology, likely after SPC placed in AM. Called the pt's son, Samy ([phone]), but he did not . I left a message and will try again tomorrow. 88 y/o male w/ history of HTN, HLD, CKD II, Lumbar spinal stenosis w/ LBP/radiculopathy - bed bound, Hydrocele, BPH and urinary retention w/chronic Obrien presented after a syncopal episode and was found to have a UTI, dehydration w/ ROGER on CKD 2.      BPH w/ urinary retention  - c/w Flomax and Proscar   - as per Dr. Mensah, will get cystostomy in AM    Syncope  - likely Vasovagal in setting of sepsis and dehydration/hypotension  - Head CT showed no acute changes  - Echo showed EF 55-60% w/ grade I diastolic dysfunction & mild to moderate aortic regurgitation  - EKG: SR @ 80bpm, premature atrial complex, bifascicular block    UTI w/ Severe Sepsis POA in patient  - urology following - unable to place SPC today so placed obrien instead - to be removed tomorrow for TOV  - Zosyn and Vanc changed to Rocephin by ID  today    Hypokalemia & Hypophosphatemia  - replete w/ KCl & Kphos    HTN  - c/w Norvasc  - resume BB if BP is not controlled    ROGER on CKD III  - resolved  - likely prerenal    Functional Quadriplegia  - nursing care    Prophylaxis:   DVT: Lovenox   GI: PO diet    Disposition: Discharge to home once cleared by urology, likely after cystostomy in AM. Called the pt's son, Samy ([phone]), but he did not . I left a message and will try again tomorrow.

## 2019-04-18 PROCEDURE — 99233 SBSQ HOSP IP/OBS HIGH 50: CPT

## 2019-04-18 RX ORDER — LANOLIN ALCOHOL/MO/W.PET/CERES
3 CREAM (GRAM) TOPICAL ONCE
Qty: 0 | Refills: 0 | Status: COMPLETED | OUTPATIENT
Start: 2019-04-18 | End: 2019-04-18

## 2019-04-18 RX ORDER — CEPHALEXIN 500 MG
1 CAPSULE ORAL
Qty: 6 | Refills: 0 | OUTPATIENT
Start: 2019-04-18 | End: 2019-04-20

## 2019-04-18 RX ORDER — KETOROLAC TROMETHAMINE 30 MG/ML
15 SYRINGE (ML) INJECTION ONCE
Qty: 0 | Refills: 0 | Status: DISCONTINUED | OUTPATIENT
Start: 2019-04-18 | End: 2019-04-18

## 2019-04-18 RX ADMIN — Medication 100 MILLIGRAM(S): at 05:38

## 2019-04-18 RX ADMIN — Medication 650 MILLIGRAM(S): at 13:12

## 2019-04-18 RX ADMIN — Medication 3 MILLIGRAM(S): at 01:34

## 2019-04-18 RX ADMIN — CEFTRIAXONE 100 GRAM(S): 500 INJECTION, POWDER, FOR SOLUTION INTRAMUSCULAR; INTRAVENOUS at 18:06

## 2019-04-18 RX ADMIN — AMLODIPINE BESYLATE 5 MILLIGRAM(S): 2.5 TABLET ORAL at 05:38

## 2019-04-18 RX ADMIN — Medication 650 MILLIGRAM(S): at 22:36

## 2019-04-18 RX ADMIN — TAMSULOSIN HYDROCHLORIDE 0.4 MILLIGRAM(S): 0.4 CAPSULE ORAL at 22:36

## 2019-04-18 RX ADMIN — Medication 650 MILLIGRAM(S): at 23:34

## 2019-04-18 RX ADMIN — Medication 650 MILLIGRAM(S): at 13:35

## 2019-04-18 RX ADMIN — Medication 1000 UNIT(S): at 12:21

## 2019-04-18 RX ADMIN — FINASTERIDE 5 MILLIGRAM(S): 5 TABLET, FILM COATED ORAL at 12:21

## 2019-04-18 RX ADMIN — ENOXAPARIN SODIUM 40 MILLIGRAM(S): 100 INJECTION SUBCUTANEOUS at 12:21

## 2019-04-18 RX ADMIN — Medication 100 MILLIGRAM(S): at 18:07

## 2019-04-18 RX ADMIN — Medication 325 MILLIGRAM(S): at 12:21

## 2019-04-18 RX ADMIN — NYSTATIN CREAM 1 APPLICATION(S): 100000 CREAM TOPICAL at 06:55

## 2019-04-18 RX ADMIN — NYSTATIN CREAM 1 APPLICATION(S): 100000 CREAM TOPICAL at 18:07

## 2019-04-18 NOTE — DIETITIAN INITIAL EVALUATION ADULT. - OTHER INFO
Pt seen for LOS. Pt lives c daughter; is dependent for ADLs; has  [employer] assistance. Unable to interview pt due to cognitive impairment; no family present; pt unable to use  phone.  Wt loss as noted based on previous nutrition assessment  (6/2018). CNA reports pt c poor PO intake. No reports of N/V/C/D or chew/swallowing difficulty.

## 2019-04-18 NOTE — PROGRESS NOTE ADULT - SUBJECTIVE AND OBJECTIVE BOX
89 y/o male w/ history of HTN, HLD, CKD II, Lumbar spinal stenosis w/ LBP/radiculopathy - bed bound, Hydrocele, BPH and urinary retention w/chronic Ma presented after a syncopal episode and was found to have a UTI, dehydration w/ ROGER on CKD 2. He is lying in bed in NAD.     MEDICATIONS  (STANDING):  amLODIPine   Tablet 5 milliGRAM(s) Oral daily  cefTRIAXone   IVPB 1 Gram(s) IV Intermittent every 24 hours  cholecalciferol 1000 Unit(s) Oral daily  docusate sodium 100 milliGRAM(s) Oral two times a day  enoxaparin Injectable 40 milliGRAM(s) SubCutaneous daily  ferrous    sulfate 325 milliGRAM(s) Oral daily  finasteride 5 milliGRAM(s) Oral daily  nystatin Powder 1 Application(s) Topical two times a day  tamsulosin 0.4 milliGRAM(s) Oral at bedtime    MEDICATIONS  (PRN):  acetaminophen   Tablet .. 650 milliGRAM(s) Oral every 6 hours PRN Temp greater or equal to 38C (100.4F), Mild Pain (1 - 3)      Allergies    No Known Allergies    Intolerances        Vital Signs Last 24 Hrs  T(C): 37.3 (18 Apr 2019 13:58), Max: 37.3 (18 Apr 2019 13:58)  T(F): 99.2 (18 Apr 2019 13:58), Max: 99.2 (18 Apr 2019 13:58)  HR: 90 (18 Apr 2019 13:58) (90 - 104)  BP: 141/73 (18 Apr 2019 13:58) (134/69 - 149/68)  BP(mean): 99 (18 Apr 2019 00:15) (99 - 99)  RR: 17 (18 Apr 2019 13:58) (17 - 20)  SpO2: 98% (18 Apr 2019 13:58) (98% - 100%)    PHYSICAL EXAM:  GENERAL: NAD, well-groomed, well-developed  HEAD: Atraumatic, Normocephalic  EYES: EOMI, PERRLA   NECK: Supple   NERVOUS SYSTEM: Alert   CHEST/LUNG: Clear to auscultation bilaterally; No rales, rhonchi, wheezing, or rubs  HEART: Regular rate and rhythm; No murmurs, rubs, or gallops  ABDOMEN: Soft, Nontender, Nondistended; Bowel sounds present  EXTREMITIES: No clubbing, cyanosis, or edema      LABS:                        8.1    9.64  )-----------( 350      ( 17 Apr 2019 07:57 )             26.2     04-17    142  |  106  |  22  ----------------------------<  124<H>  3.5   |  27  |  1.16    Ca    9.9      17 Apr 2019 07:57  Phos  2.5     04-17  Mg     2.1     04-17       RADIOLOGY & ADDITIONAL TESTS:    04-17-19 @ 07:01  -  04-18-19 @ 07:00  --------------------------------------------------------  IN:  Total IN: 0 mL    OUT:    Voided: 400 mL  Total OUT: 400 mL    Total NET: -400 mL

## 2019-04-18 NOTE — DIETITIAN INITIAL EVALUATION ADULT. - ENERGY NEEDS
Height (cm): 177.8 (04-12)  Weight (kg): 61.5 (04-18)  BMI (kg/m2): 19.5 (04-18)  IBW:  75.5 kg +/- 10%   % IBW:  81%     UBW:  69.8 kg      %UBW: 88%

## 2019-04-18 NOTE — DIETITIAN INITIAL EVALUATION ADULT. - PHYSICAL APPEARANCE
BMI = 19.5; 2+ edema noted b/l feet & ankles; Nutrition focused physical exam conducted:  Subcutaneous fat loss: [moderate ] Orbital fat pads region, [mild ]Buccal fat region, [moderate ]Triceps region,  [moderate ]Ribs region.  Muscle wasting: [moderate ]Temples region, [moderate ]Clavicle region, [WDL ]Shoulder region, [unable ]Scapula region, [WDL ]Interosseous region,  [mild ]thigh region, [unable ]Calf region/underweight

## 2019-04-18 NOTE — DIETITIAN INITIAL EVALUATION ADULT. - PERTINENT LABORATORY DATA
04-17 Na142 mmol/L Glu 124 mg/dL<H> K+ 3.5 mmol/L Cr  1.16 mg/dL BUN 22 mg/dL 04-17 Phos 2.5 mg/dL 04-12 Alb 3.2 g/dL<L>

## 2019-04-18 NOTE — CHART NOTE - NSCHARTNOTEFT_GEN_A_CORE
Upon Nutritional Assessment by the Registered Dietitian your patient was determined to meet criteria / has evidence of the following diagnosis/diagnoses:          [ ]  Mild Protein Calorie Malnutrition        [X ]  Moderate Protein Calorie Malnutrition        [ ] Severe Protein Calorie Malnutrition        [ ] Unspecified Protein Calorie Malnutrition        [ ] Underweight / BMI <19        [ ] Morbid Obesity / BMI > 40      Findings as based on:  •  Comprehensive nutrition assessment and consultation  •  Calorie counts (nutrient intake analysis)  •  Food acceptance and intake status from observations by staff  •  Follow up  •  Patient education  •  Intervention secondary to interdisciplinary rounds  •   concerns      Treatment:    The following diet has been recommended:  Liberalize to Low Na diet + Ensure Enlive x 2/day (provides 700 kcal, 40 g protein) for additional nutrition support      PROVIDER Section:     By signing this assessment you are acknowledging and agree with the diagnosis/diagnoses assigned by the Registered Dietitian    Comments:

## 2019-04-18 NOTE — PROGRESS NOTE ADULT - ASSESSMENT
89 y/o male w/ history of HTN, HLD, CKD II, Lumbar spinal stenosis w/ LBP/radiculopathy - bed bound, Hydrocele, BPH and urinary retention w/chronic Obrien presented after a syncopal episode and was found to have a UTI, dehydration w/ ROGER on CKD 2.      BPH w/ urinary retention  - c/w Flomax and Proscar   - SPC placed by Dr. Mensah today,  - Dr. Mensah recommended patient be watched today w/ repeat CBC in AM    Syncope  - likely Vasovagal in setting of sepsis and dehydration/hypotension  - Head CT showed no acute changes  - Echo showed EF 55-60% w/ grade I diastolic dysfunction & mild to moderate aortic regurgitation  - EKG: SR @ 80bpm, premature atrial complex, bifascicular block    UTI w/ Severe Sepsis POA in patient  - urology following - unable to place SPC today so placed obrien instead - to be removed tomorrow for TOV  - Zosyn and Vanc changed to Rocephin by ID  today    Hypokalemia & Hypophosphatemia  - replete w/ KCl & Kphos    HTN  - c/w Norvasc  - resume BB if BP is not controlled    ROGER on CKD III  - resolved  - likely prerenal    Functional Quadriplegia  - nursing care    Prophylaxis:   DVT: Lovenox   GI: PO diet    Disposition: Discharge to home once cleared by urology. Called family and made them aware.

## 2019-04-19 LAB
ANION GAP SERPL CALC-SCNC: 10 MMOL/L — SIGNIFICANT CHANGE UP (ref 5–17)
ANION GAP SERPL CALC-SCNC: 12 MMOL/L — SIGNIFICANT CHANGE UP (ref 5–17)
BUN SERPL-MCNC: 26 MG/DL — HIGH (ref 7–23)
BUN SERPL-MCNC: 33 MG/DL — HIGH (ref 7–23)
CALCIUM SERPL-MCNC: 10.1 MG/DL — SIGNIFICANT CHANGE UP (ref 8.5–10.1)
CALCIUM SERPL-MCNC: 9.8 MG/DL — SIGNIFICANT CHANGE UP (ref 8.5–10.1)
CHLORIDE SERPL-SCNC: 108 MMOL/L — SIGNIFICANT CHANGE UP (ref 96–108)
CHLORIDE SERPL-SCNC: 110 MMOL/L — HIGH (ref 96–108)
CO2 SERPL-SCNC: 23 MMOL/L — SIGNIFICANT CHANGE UP (ref 22–31)
CO2 SERPL-SCNC: 26 MMOL/L — SIGNIFICANT CHANGE UP (ref 22–31)
CREAT SERPL-MCNC: 1.76 MG/DL — HIGH (ref 0.5–1.3)
CREAT SERPL-MCNC: 2.21 MG/DL — HIGH (ref 0.5–1.3)
CULTURE RESULTS: NO GROWTH — SIGNIFICANT CHANGE UP
GLUCOSE SERPL-MCNC: 121 MG/DL — HIGH (ref 70–99)
GLUCOSE SERPL-MCNC: 134 MG/DL — HIGH (ref 70–99)
HCT VFR BLD CALC: 23.3 % — LOW (ref 39–50)
HCT VFR BLD CALC: 24.1 % — LOW (ref 39–50)
HGB BLD-MCNC: 7.1 G/DL — LOW (ref 13–17)
HGB BLD-MCNC: 7.4 G/DL — LOW (ref 13–17)
MAGNESIUM SERPL-MCNC: 2.4 MG/DL — SIGNIFICANT CHANGE UP (ref 1.6–2.6)
MCHC RBC-ENTMCNC: 27.4 PG — SIGNIFICANT CHANGE UP (ref 27–34)
MCHC RBC-ENTMCNC: 28 PG — SIGNIFICANT CHANGE UP (ref 27–34)
MCHC RBC-ENTMCNC: 30.5 GM/DL — LOW (ref 32–36)
MCHC RBC-ENTMCNC: 30.7 GM/DL — LOW (ref 32–36)
MCV RBC AUTO: 90 FL — SIGNIFICANT CHANGE UP (ref 80–100)
MCV RBC AUTO: 91.3 FL — SIGNIFICANT CHANGE UP (ref 80–100)
NRBC # BLD: 0 /100 WBCS — SIGNIFICANT CHANGE UP (ref 0–0)
NRBC # BLD: 0 /100 WBCS — SIGNIFICANT CHANGE UP (ref 0–0)
PLATELET # BLD AUTO: 449 K/UL — HIGH (ref 150–400)
PLATELET # BLD AUTO: 470 K/UL — HIGH (ref 150–400)
POTASSIUM SERPL-MCNC: 3.9 MMOL/L — SIGNIFICANT CHANGE UP (ref 3.5–5.3)
POTASSIUM SERPL-MCNC: 4.1 MMOL/L — SIGNIFICANT CHANGE UP (ref 3.5–5.3)
POTASSIUM SERPL-SCNC: 3.9 MMOL/L — SIGNIFICANT CHANGE UP (ref 3.5–5.3)
POTASSIUM SERPL-SCNC: 4.1 MMOL/L — SIGNIFICANT CHANGE UP (ref 3.5–5.3)
RBC # BLD: 2.59 M/UL — LOW (ref 4.2–5.8)
RBC # BLD: 2.64 M/UL — LOW (ref 4.2–5.8)
RBC # FLD: 15.9 % — HIGH (ref 10.3–14.5)
RBC # FLD: 16 % — HIGH (ref 10.3–14.5)
SODIUM SERPL-SCNC: 144 MMOL/L — SIGNIFICANT CHANGE UP (ref 135–145)
SODIUM SERPL-SCNC: 145 MMOL/L — SIGNIFICANT CHANGE UP (ref 135–145)
SPECIMEN SOURCE: SIGNIFICANT CHANGE UP
WBC # BLD: 13.64 K/UL — HIGH (ref 3.8–10.5)
WBC # BLD: 13.78 K/UL — HIGH (ref 3.8–10.5)
WBC # FLD AUTO: 13.64 K/UL — HIGH (ref 3.8–10.5)
WBC # FLD AUTO: 13.78 K/UL — HIGH (ref 3.8–10.5)

## 2019-04-19 PROCEDURE — 99233 SBSQ HOSP IP/OBS HIGH 50: CPT

## 2019-04-19 RX ORDER — SODIUM CHLORIDE 9 MG/ML
1000 INJECTION, SOLUTION INTRAVENOUS
Qty: 0 | Refills: 0 | Status: DISCONTINUED | OUTPATIENT
Start: 2019-04-19 | End: 2019-04-23

## 2019-04-19 RX ORDER — SENNA PLUS 8.6 MG/1
2 TABLET ORAL AT BEDTIME
Qty: 0 | Refills: 0 | Status: DISCONTINUED | OUTPATIENT
Start: 2019-04-19 | End: 2019-04-24

## 2019-04-19 RX ORDER — LACTULOSE 10 G/15ML
20 SOLUTION ORAL
Qty: 0 | Refills: 0 | Status: DISCONTINUED | OUTPATIENT
Start: 2019-04-19 | End: 2019-04-24

## 2019-04-19 RX ADMIN — Medication 15 MILLIGRAM(S): at 01:00

## 2019-04-19 RX ADMIN — Medication 15 MILLIGRAM(S): at 00:14

## 2019-04-19 RX ADMIN — SENNA PLUS 2 TABLET(S): 8.6 TABLET ORAL at 22:33

## 2019-04-19 RX ADMIN — LACTULOSE 20 GRAM(S): 10 SOLUTION ORAL at 22:33

## 2019-04-19 RX ADMIN — Medication 1000 UNIT(S): at 12:41

## 2019-04-19 RX ADMIN — FINASTERIDE 5 MILLIGRAM(S): 5 TABLET, FILM COATED ORAL at 12:41

## 2019-04-19 RX ADMIN — NYSTATIN CREAM 1 APPLICATION(S): 100000 CREAM TOPICAL at 18:23

## 2019-04-19 RX ADMIN — NYSTATIN CREAM 1 APPLICATION(S): 100000 CREAM TOPICAL at 06:33

## 2019-04-19 RX ADMIN — Medication 100 MILLIGRAM(S): at 06:33

## 2019-04-19 RX ADMIN — Medication 100 MILLIGRAM(S): at 18:23

## 2019-04-19 RX ADMIN — CEFTRIAXONE 100 GRAM(S): 500 INJECTION, POWDER, FOR SOLUTION INTRAMUSCULAR; INTRAVENOUS at 18:23

## 2019-04-19 RX ADMIN — Medication 325 MILLIGRAM(S): at 12:41

## 2019-04-19 RX ADMIN — SODIUM CHLORIDE 60 MILLILITER(S): 9 INJECTION, SOLUTION INTRAVENOUS at 15:43

## 2019-04-19 NOTE — PROGRESS NOTE ADULT - SUBJECTIVE AND OBJECTIVE BOX
UROLOGY PROGRESS HPI:  Pt seen and examined at bedside. Denies complaints. Tolerating obrien well.    Vital Signs Last 24 Hrs  T(C): 36.4 (19 Apr 2019 12:30), Max: 36.7 (19 Apr 2019 00:24)  T(F): 97.5 (19 Apr 2019 12:30), Max: 98.1 (19 Apr 2019 00:24)  HR: 99 (19 Apr 2019 12:30) (99 - 137)  BP: 112/58 (19 Apr 2019 12:30) (85/53 - 115/75)  BP(mean): --  RR: 24 (19 Apr 2019 12:30) (16 - 24)  SpO2: 100% (19 Apr 2019 12:30) (100% - 100%)      PHYSICAL EXAM:    GENERAL: NAD  CHEST/LUNG: Clear to ausculation, bilaterally   HEART: RRR S1S2  ABDOMEN: non distended, +BS, soft, non tender, no guarding  : Dressing from old suprapubic site clean/dry/intact. Obrien indwelling draining light pink urine  EXTREMITIES:  calf soft, non tender b/l    I&O's Detail    18 Apr 2019 07:01  -  19 Apr 2019 07:00  --------------------------------------------------------  IN:  Total IN: 0 mL    OUT:    Indwelling Catheter - Suprapubic: 150 mL  Total OUT: 150 mL    Total NET: -150 mL          LABS:                        7.4    13.78 )-----------( 449      ( 19 Apr 2019 07:22 )             24.1     04-19    145  |  110<H>  |  26<H>  ----------------------------<  134<H>  4.1   |  23  |  1.76<H>    Ca    9.8      19 Apr 2019 07:22  Mg     2.4     04-19    Culture Results:   No growth (04-18 @ 21:01)      Assessment:89M with UR s/p SPC 4/18 and removal 4/19. Obrien placed 4/19.    Plan:  -Continue obrien, monitor urine output  -Continue flomax and proscar  -Wound care to old SPC site  -Continue medical management

## 2019-04-19 NOTE — PROGRESS NOTE ADULT - ASSESSMENT
89 y/o male w/ history of HTN, HLD, CKD II, Lumbar spinal stenosis w/ LBP/radiculopathy - bed bound, Hydrocele, BPH and urinary retention w/chronic Obrien presented after a syncopal episode and was found to have a UTI, dehydration w/ ROGER on CKD 2.      BPH w/ urinary retention  - c/w Flomax and Proscar   - SPC placed by Dr. Mensah on 4/18 - but there was mild bleeding around the site and no output from the catheter (Dr. Mesnah evaluated for clots blocking the catheter from draining but no clots were found and Dr. Mensah reports bladder was empty)  -  SPC removed today and obrien was placed  - drop in hgb may be due to mild bleeding around the site  - leukocytosis likely reactive    ROGER on CKD III  - rise in Cr today may have been postrenal   - give gentle hydration today to stimulate urine production as patient has no clear urine in bladder       Syncope  - likely Vasovagal in setting of sepsis and dehydration/hypotension  - Head CT showed no acute changes  - Echo showed EF 55-60% w/ grade I diastolic dysfunction & mild to moderate aortic regurgitation  - EKG: SR @ 80bpm, premature atrial complex, bifascicular block    UTI w/ Severe Sepsis POA in patient  - urology following - unable to place SPC today so placed obrien instead - to be removed tomorrow for TOV  - Zosyn and Vanc changed to Rocephin by ID  today    HTN  - c/w Norvasc  - resume BB if BP is not controlled    Functional Quadriplegia  - nursing care    Prophylaxis:   DVT: Lovenox   GI: PO diet    Disposition: Discharge to home once cleared by urology. Will discharge if Cr and WBC improve tomorrow and Hgb remains stable.

## 2019-04-19 NOTE — PROGRESS NOTE ADULT - SUBJECTIVE AND OBJECTIVE BOX
88 y/o male w/ history of HTN, HLD, CKD II, Lumbar spinal stenosis w/ LBP/radiculopathy - bed bound, Hydrocele, BPH and urinary retention w/chronic Ma presented after a syncopal episode and was found to have a UTI, dehydration w/ ROGER on CKD 2. He is lying in bed in NAD.     MEDICATIONS  (STANDING):  amLODIPine   Tablet 5 milliGRAM(s) Oral daily  cefTRIAXone   IVPB 1 Gram(s) IV Intermittent every 24 hours  cholecalciferol 1000 Unit(s) Oral daily  docusate sodium 100 milliGRAM(s) Oral two times a day  enoxaparin Injectable 40 milliGRAM(s) SubCutaneous daily  ferrous    sulfate 325 milliGRAM(s) Oral daily  finasteride 5 milliGRAM(s) Oral daily  nystatin Powder 1 Application(s) Topical two times a day  sodium chloride 0.45%. 1000 milliLiter(s) (60 mL/Hr) IV Continuous <Continuous>    MEDICATIONS  (PRN):  acetaminophen   Tablet .. 650 milliGRAM(s) Oral every 6 hours PRN Temp greater or equal to 38C (100.4F), Mild Pain (1 - 3)      Allergies    No Known Allergies    Intolerances        Vital Signs Last 24 Hrs  T(C): 37 (19 Apr 2019 17:59), Max: 37 (19 Apr 2019 17:59)  T(F): 98.6 (19 Apr 2019 17:59), Max: 98.6 (19 Apr 2019 17:59)  HR: 100 (19 Apr 2019 17:59) (99 - 137)  BP: 109/53 (19 Apr 2019 17:59) (85/53 - 115/75)  BP(mean): --  RR: 20 (19 Apr 2019 17:59) (16 - 24)  SpO2: 100% (19 Apr 2019 17:59) (100% - 100%)    PHYSICAL EXAM:  GENERAL: NAD, well-groomed, well-developed  HEAD: Atraumatic, Normocephalic  EYES: EOMI, PERRLA   NECK: Supple   NERVOUS SYSTEM: Alert   CHEST/LUNG: Clear to auscultation bilaterally; No rales, rhonchi, wheezing, or rubs  HEART: Regular rate and rhythm; No murmurs, rubs, or gallops  ABDOMEN: Soft, Nontender, Nondistended; Bowel sounds present  EXTREMITIES: No clubbing, cyanosis, or edema    LABS:                        7.1    13.64 )-----------( 470      ( 19 Apr 2019 18:38 )             23.3     04-19    144  |  108  |  33<H>  ----------------------------<  121<H>  3.9   |  26  |  2.21<H>    Ca    10.1      19 Apr 2019 18:38  Mg     2.4     04-19         Culture - Urine (collected 18 Apr 2019 21:01)  Source: .Urine  Final Report (19 Apr 2019 14:45):    No growth      RADIOLOGY & ADDITIONAL TESTS:    04-18-19 @ 07:01  -  04-19-19 @ 07:00  --------------------------------------------------------  IN:  Total IN: 0 mL    OUT:    Indwelling Catheter - Suprapubic: 150 mL  Total OUT: 150 mL    Total NET: -150 mL      04-19-19 @ 07:01  -  04-19-19 @ 19:37  --------------------------------------------------------  IN:    IV PiggyBack: 50 mL    Oral Fluid: 200 mL    sodium chloride 0.45%.: 240 mL  Total IN: 490 mL    OUT:    Indwelling Catheter - Suprapubic: 320 mL  Total OUT: 320 mL    Total NET: 170 mL

## 2019-04-20 LAB
ANION GAP SERPL CALC-SCNC: 9 MMOL/L — SIGNIFICANT CHANGE UP (ref 5–17)
BLD GP AB SCN SERPL QL: SIGNIFICANT CHANGE UP
BUN SERPL-MCNC: 38 MG/DL — HIGH (ref 7–23)
CALCIUM SERPL-MCNC: 10.1 MG/DL — SIGNIFICANT CHANGE UP (ref 8.5–10.1)
CHLORIDE SERPL-SCNC: 108 MMOL/L — SIGNIFICANT CHANGE UP (ref 96–108)
CO2 SERPL-SCNC: 26 MMOL/L — SIGNIFICANT CHANGE UP (ref 22–31)
CREAT SERPL-MCNC: 2.15 MG/DL — HIGH (ref 0.5–1.3)
GLUCOSE SERPL-MCNC: 89 MG/DL — SIGNIFICANT CHANGE UP (ref 70–99)
HCT VFR BLD CALC: 21.6 % — LOW (ref 39–50)
HGB BLD-MCNC: 6.6 G/DL — CRITICAL LOW (ref 13–17)
MAGNESIUM SERPL-MCNC: 2.6 MG/DL — SIGNIFICANT CHANGE UP (ref 1.6–2.6)
MCHC RBC-ENTMCNC: 27.7 PG — SIGNIFICANT CHANGE UP (ref 27–34)
MCHC RBC-ENTMCNC: 30.6 GM/DL — LOW (ref 32–36)
MCV RBC AUTO: 90.8 FL — SIGNIFICANT CHANGE UP (ref 80–100)
NRBC # BLD: 0 /100 WBCS — SIGNIFICANT CHANGE UP (ref 0–0)
PLATELET # BLD AUTO: 377 K/UL — SIGNIFICANT CHANGE UP (ref 150–400)
POTASSIUM SERPL-MCNC: 3.6 MMOL/L — SIGNIFICANT CHANGE UP (ref 3.5–5.3)
POTASSIUM SERPL-SCNC: 3.6 MMOL/L — SIGNIFICANT CHANGE UP (ref 3.5–5.3)
PROCALCITONIN SERPL-MCNC: 0.41 NG/ML — HIGH (ref 0.02–0.1)
RBC # BLD: 2.38 M/UL — LOW (ref 4.2–5.8)
RBC # FLD: 16 % — HIGH (ref 10.3–14.5)
SODIUM SERPL-SCNC: 143 MMOL/L — SIGNIFICANT CHANGE UP (ref 135–145)
WBC # BLD: 11.12 K/UL — HIGH (ref 3.8–10.5)
WBC # FLD AUTO: 11.12 K/UL — HIGH (ref 3.8–10.5)

## 2019-04-20 PROCEDURE — 99233 SBSQ HOSP IP/OBS HIGH 50: CPT

## 2019-04-20 RX ORDER — GABAPENTIN 400 MG/1
300 CAPSULE ORAL DAILY
Qty: 0 | Refills: 0 | Status: DISCONTINUED | OUTPATIENT
Start: 2019-04-20 | End: 2019-04-24

## 2019-04-20 RX ADMIN — GABAPENTIN 300 MILLIGRAM(S): 400 CAPSULE ORAL at 13:33

## 2019-04-20 RX ADMIN — Medication 325 MILLIGRAM(S): at 18:58

## 2019-04-20 RX ADMIN — LACTULOSE 20 GRAM(S): 10 SOLUTION ORAL at 06:11

## 2019-04-20 RX ADMIN — LACTULOSE 20 GRAM(S): 10 SOLUTION ORAL at 18:58

## 2019-04-20 RX ADMIN — Medication 650 MILLIGRAM(S): at 13:32

## 2019-04-20 RX ADMIN — CEFTRIAXONE 100 GRAM(S): 500 INJECTION, POWDER, FOR SOLUTION INTRAMUSCULAR; INTRAVENOUS at 19:02

## 2019-04-20 RX ADMIN — Medication 650 MILLIGRAM(S): at 15:11

## 2019-04-20 RX ADMIN — AMLODIPINE BESYLATE 5 MILLIGRAM(S): 2.5 TABLET ORAL at 06:11

## 2019-04-20 RX ADMIN — Medication 100 MILLIGRAM(S): at 18:59

## 2019-04-20 RX ADMIN — NYSTATIN CREAM 1 APPLICATION(S): 100000 CREAM TOPICAL at 06:11

## 2019-04-20 RX ADMIN — Medication 1000 UNIT(S): at 18:57

## 2019-04-20 RX ADMIN — Medication 100 MILLIGRAM(S): at 06:11

## 2019-04-20 NOTE — PROGRESS NOTE ADULT - SUBJECTIVE AND OBJECTIVE BOX
Patient seen and examined bedside     T(F): 98.8 (04-20-19 @ 14:26), Max: 98.8 (04-20-19 @ 14:26)  HR: 86 (04-20-19 @ 14:26) (86 - 100)  BP: 142/69 (04-20-19 @ 14:26) (105/60 - 142/69)  RR: 18 (04-20-19 @ 14:26) (17 - 20)  SpO2: 100% (04-20-19 @ 14:26) (95% - 100%)  Wt(kg): --  CAPILLARY BLOOD GLUCOSE      PHYSICAL EXAM:  General:   HEENT:   Chest:   Abdomen:   Extremities:  :   urine output 350cc    LABS:                        6.6    11.12 )-----------( 377      ( 20 Apr 2019 07:32 )             21.6   04-20    143  |  108  |  38<H>  ----------------------------<  89  3.6   |  26  |  2.15<H>    Ca    10.1      20 Apr 2019 07:32  Mg     2.6     04-20      I&O's Detail    19 Apr 2019 07:01  -  20 Apr 2019 07:00  --------------------------------------------------------  IN:    IV PiggyBack: 50 mL    Oral Fluid: 200 mL    sodium chloride 0.45%.: 240 mL  Total IN: 490 mL    OUT:    Indwelling Catheter - Suprapubic: 320 mL    Indwelling Catheter - Urethral: 350 mL  Total OUT: 670 mL    Total NET: -180 mL        Assessment:89M with UR s/p SPC 4/18 and removal 4/19. Obrien placed 4/19.    Plan:  -Continue obrien, monitor urine output  -Continue flomax and proscar  -Wound care to old SPC site  -Continue medical management Patient seen and examined bedside  Son and family present  Pt has had hematuria and low urine output today per RN.  2u PRBC transfusion.  Pt denies headache, cp, dyspnea, dizziness.    T(F): 98.8 (04-20-19 @ 14:26), Max: 98.8 (04-20-19 @ 14:26)  HR: 86 (04-20-19 @ 14:26) (86 - 100)  BP: 142/69 (04-20-19 @ 14:26) (105/60 - 142/69)  RR: 18 (04-20-19 @ 14:26) (17 - 20)  SpO2: 100% (04-20-19 @ 14:26) (95% - 100%)    PHYSICAL EXAM:  General: NAD, Alert and awake  HEENT: NCAT  Chest: nonlabored respirations  Abdomen: soft, nontender  Extremities:  : dressing over old SPC site CDI. Uncircumcised phallus with Hypospadias present, obrien catheter indwelling. No blood clots at urethral meatus. Obrien catheter drainage is gross hematuria, shorty blood. Urine output 350cc    LABS:                        6.6    11.12 )-----------( 377      ( 20 Apr 2019 07:32 )             21.6   04-20    143  |  108  |  38<H>  ----------------------------<  89  3.6   |  26  |  2.15<H>    Ca    10.1      20 Apr 2019 07:32  Mg     2.6     04-20      I&O's Detail    19 Apr 2019 07:01  -  20 Apr 2019 07:00  --------------------------------------------------------  IN:    IV PiggyBack: 50 mL    Oral Fluid: 200 mL    sodium chloride 0.45%.: 240 mL  Total IN: 490 mL    OUT:    Indwelling Catheter - Suprapubic: 320 mL    Indwelling Catheter - Urethral: 350 mL  Total OUT: 670 mL    Total NET: -180 mL        Assessment: 88yo M with h/o chronic obrien for UR s/p SPC 4/18 and removal 4/19. Obrien placed 4/19. With acute blood loss anemia 2/2 postprocedure gross hematuria    Plan:  -as discussed with Dr Mensah, agree with blood transfusions, trend wbc, h/h, renal function  -continue obrien catheter, monitor urine output quality and quantity.  -continue to hold lovenox VTE ppx.  -Continue flomax and proscar  -Wound care to old SPC site  -Continue medical management and supportive care Patient seen and examined bedside  Son and family present  Pt has had hematuria and low urine output today per RN.  2u PRBC transfusion.  Pt denies headache, cp, dyspnea, dizziness.    T(F): 98.8 (04-20-19 @ 14:26), Max: 98.8 (04-20-19 @ 14:26)  HR: 86 (04-20-19 @ 14:26) (86 - 100)  BP: 142/69 (04-20-19 @ 14:26) (105/60 - 142/69)  RR: 18 (04-20-19 @ 14:26) (17 - 20)  SpO2: 100% (04-20-19 @ 14:26) (95% - 100%)    PHYSICAL EXAM:  General: NAD, Alert and awake  HEENT: NCAT  Chest: nonlabored respirations  Abdomen: soft, nontender  Extremities:  : dressing over old SPC site CDI. Uncircumcised phallus with Hypospadias present, obrien catheter indwelling. No blood clots at urethral meatus. Obrien catheter drainage is gross hematuria, shorty blood. Urine output 350cc. Irrigated with saline, irrigant is clear. Pt tolerated well    LABS:                        6.6    11.12 )-----------( 377      ( 20 Apr 2019 07:32 )             21.6   04-20    143  |  108  |  38<H>  ----------------------------<  89  3.6   |  26  |  2.15<H>    Ca    10.1      20 Apr 2019 07:32  Mg     2.6     04-20      I&O's Detail    19 Apr 2019 07:01  -  20 Apr 2019 07:00  --------------------------------------------------------  IN:    IV PiggyBack: 50 mL    Oral Fluid: 200 mL    sodium chloride 0.45%.: 240 mL  Total IN: 490 mL    OUT:    Indwelling Catheter - Suprapubic: 320 mL    Indwelling Catheter - Urethral: 350 mL  Total OUT: 670 mL    Total NET: -180 mL        Assessment: 89yo M with h/o chronic obrien for UR s/p SPC 4/18 and removal 4/19. Obrien placed 4/19. With acute blood loss anemia 2/2 postprocedure gross hematuria    Plan:  -as discussed with Dr Mensah, agree with blood transfusions, trend wbc, h/h, renal function  -continue obrien catheter, monitor urine output quality and quantity.  -continue to hold lovenox VTE ppx.  -Continue flomax and proscar  -Wound care to old SPC site  -Continue medical management and supportive care

## 2019-04-20 NOTE — PROGRESS NOTE ADULT - SUBJECTIVE AND OBJECTIVE BOX
89 y/o male w/ history of HTN, HLD, CKD II, Lumbar spinal stenosis w/ LBP/radiculopathy - bed bound, Hydrocele, BPH and urinary retention w/chronic Obrien presented after a syncopal episode and was found to have a UTI, dehydration w/ ROGER on CKD 2. He is lying in bed in NAD, gross hematuria in obrien bag.     MEDICATIONS  (STANDING):  amLODIPine   Tablet 5 milliGRAM(s) Oral daily  cefTRIAXone   IVPB 1 Gram(s) IV Intermittent every 24 hours  cholecalciferol 1000 Unit(s) Oral daily  docusate sodium 100 milliGRAM(s) Oral two times a day  ferrous    sulfate 325 milliGRAM(s) Oral daily  finasteride 5 milliGRAM(s) Oral daily  gabapentin 300 milliGRAM(s) Oral daily  lactulose Syrup 20 Gram(s) Oral two times a day  nystatin Powder 1 Application(s) Topical two times a day  senna 2 Tablet(s) Oral at bedtime  sodium chloride 0.45%. 1000 milliLiter(s) (60 mL/Hr) IV Continuous <Continuous>    MEDICATIONS  (PRN):  acetaminophen   Tablet .. 650 milliGRAM(s) Oral every 6 hours PRN Temp greater or equal to 38C (100.4F), Mild Pain (1 - 3)      Allergies    No Known Allergies    Intolerances        Vital Signs Last 24 Hrs  T(C): 36.7 (20 Apr 2019 19:56), Max: 37.1 (20 Apr 2019 14:26)  T(F): 98 (20 Apr 2019 19:56), Max: 98.8 (20 Apr 2019 14:26)  HR: 87 (20 Apr 2019 19:56) (76 - 96)  BP: 129/61 (20 Apr 2019 19:56) (105/60 - 142/69)  BP(mean): --  RR: 18 (20 Apr 2019 19:56) (17 - 18)  SpO2: 97% (20 Apr 2019 19:56) (95% - 100%)    PHYSICAL EXAM:  GENERAL: NAD, well-groomed, well-developed  HEAD: Atraumatic, Normocephalic  EYES: EOMI, PERRLA   NECK: Supple   NERVOUS SYSTEM: Alert   CHEST/LUNG: Clear to auscultation bilaterally; No rales, rhonchi, wheezing, or rubs  HEART: Regular rate and rhythm; No murmurs, rubs, or gallops  ABDOMEN: Soft, Nontender, Nondistended; Bowel sounds present  EXTREMITIES: No clubbing, cyanosis, or edema      LABS:                        6.6    11.12 )-----------( 377      ( 20 Apr 2019 07:32 )             21.6     04-20    143  |  108  |  38<H>  ----------------------------<  89  3.6   |  26  |  2.15<H>    Ca    10.1      20 Apr 2019 07:32  Mg     2.6     04-20          CAPILLARY BLOOD GLUCOSE          Culture - Urine (collected 18 Apr 2019 21:01)  Source: .Urine  Final Report (19 Apr 2019 14:45):    No growth      RADIOLOGY & ADDITIONAL TESTS:    04-19-19 @ 07:01  -  04-20-19 @ 07:00  --------------------------------------------------------  IN:    IV PiggyBack: 50 mL    Oral Fluid: 200 mL    sodium chloride 0.45%.: 240 mL  Total IN: 490 mL    OUT:    Indwelling Catheter - Suprapubic: 320 mL    Indwelling Catheter - Urethral: 350 mL  Total OUT: 670 mL    Total NET: -180 mL      04-20-19 @ 07:01  -  04-20-19 @ 21:37  --------------------------------------------------------  IN:    Oral Fluid: 120 mL  Total IN: 120 mL    OUT:  Total OUT: 0 mL    Total NET: 120 mL

## 2019-04-20 NOTE — PROGRESS NOTE ADULT - ASSESSMENT
89 y/o male w/ history of HTN, HLD, CKD II, Lumbar spinal stenosis w/ LBP/radiculopathy - bed bound, Hydrocele, BPH and urinary retention w/chronic Obrien presented after a syncopal episode and was found to have a UTI, dehydration w/ ROGER on CKD 2.      BPH w/ urinary retention  - c/w Flomax and Proscar   - SPC placed by Dr. Mensah on 4/18 - but there was mild bleeding around the site and no output from the catheter (Dr. Mensah evaluated for clots blocking the catheter from draining but no clots were found and Dr. Mensah reports bladder was empty)  -  SPC removed today and Obrien was placed on 4/19    Anemia from acute blood loss  - drop in hgb due to hematuria, likely from SPC placement- no bleeding at SPC site   - will transfuse 2 units    ROGER on CKD III  - improving   - rise in Cr today may have been postrenal   - give gentle hydration today to stimulate urine production as patient has no clear urine in bladder     Syncope  - likely Vasovagal in setting of sepsis and dehydration/hypotension  - Head CT showed no acute changes  - Echo showed EF 55-60% w/ grade I diastolic dysfunction & mild to moderate aortic regurgitation  - EKG: SR @ 80bpm, premature atrial complex, bifascicular block    UTI w/ Severe Sepsis POA in patient  - urology following - unable to place SPC today so placed obrien instead - to be removed tomorrow for TOV  - Zosyn and Vanc changed to Rocephin by ID today  - leukocytosis likely reactive to SPC placement and is improving     HTN  - c/w Norvasc  - resume BB if BP is not controlled    Functional Quadriplegia  - nursing care    Prophylaxis:   DVT: Lovenox   GI: PO diet    Disposition: Discharge to home once cleared by urology. Will discharge if Cr and WBC improve tomorrow and Hgb remains stable. 89 y/o male w/ history of HTN, HLD, CKD II, Lumbar spinal stenosis w/ LBP/radiculopathy - bed bound, Hydrocele, BPH and urinary retention w/chronic Obrien presented after a syncopal episode and was found to have a UTI, dehydration w/ ROGER on CKD 2.      BPH w/ urinary retention  - c/w Flomax and Proscar   - SPC placed by Dr. Mensah on 4/18 - but there was mild bleeding around the site and no output from the catheter (Dr. Mensah evaluated for clots blocking the catheter from draining but no clots were found and Dr. Mensah reports bladder was empty)  -  SPC removed today and Obrien was placed on 4/19    Anemia from acute blood loss  - drop in hgb due to hematuria, likely from SPC placement- no bleeding at SPC site   - will transfuse 2 units  - off Lovenox    ROGER on CKD III  - improving   - rise in Cr today may have been postrenal   - give gentle hydration today to stimulate urine production as patient has no clear urine in bladder     Syncope  - likely Vasovagal in setting of sepsis and dehydration/hypotension  - Head CT showed no acute changes  - Echo showed EF 55-60% w/ grade I diastolic dysfunction & mild to moderate aortic regurgitation  - EKG: SR @ 80bpm, premature atrial complex, bifascicular block    UTI w/ Severe Sepsis POA in patient  - urology following - unable to place SPC today so placed obrien instead - to be removed tomorrow for TOV  - Zosyn and Vanc changed to Rocephin by ID today  - leukocytosis likely reactive to SPC placement and is improving     HTN  - c/w Norvasc  - resume BB if BP is not controlled    Functional Quadriplegia  - nursing care    Prophylaxis:   DVT: SCD  GI: PO diet    Disposition: Discharge to home once cleared by urology. Will discharge if Cr and WBC improve tomorrow and Hgb remains stable.

## 2019-04-21 LAB
ANION GAP SERPL CALC-SCNC: 9 MMOL/L — SIGNIFICANT CHANGE UP (ref 5–17)
BUN SERPL-MCNC: 32 MG/DL — HIGH (ref 7–23)
CALCIUM SERPL-MCNC: 9.8 MG/DL — SIGNIFICANT CHANGE UP (ref 8.5–10.1)
CHLORIDE SERPL-SCNC: 108 MMOL/L — SIGNIFICANT CHANGE UP (ref 96–108)
CO2 SERPL-SCNC: 26 MMOL/L — SIGNIFICANT CHANGE UP (ref 22–31)
CREAT SERPL-MCNC: 1.5 MG/DL — HIGH (ref 0.5–1.3)
GLUCOSE SERPL-MCNC: 85 MG/DL — SIGNIFICANT CHANGE UP (ref 70–99)
HCT VFR BLD CALC: 29.6 % — LOW (ref 39–50)
HGB BLD-MCNC: 9.4 G/DL — LOW (ref 13–17)
MAGNESIUM SERPL-MCNC: 2.3 MG/DL — SIGNIFICANT CHANGE UP (ref 1.6–2.6)
MCHC RBC-ENTMCNC: 28.1 PG — SIGNIFICANT CHANGE UP (ref 27–34)
MCHC RBC-ENTMCNC: 31.8 GM/DL — LOW (ref 32–36)
MCV RBC AUTO: 88.4 FL — SIGNIFICANT CHANGE UP (ref 80–100)
NRBC # BLD: 0 /100 WBCS — SIGNIFICANT CHANGE UP (ref 0–0)
PHOSPHATE SERPL-MCNC: 2.7 MG/DL — SIGNIFICANT CHANGE UP (ref 2.5–4.5)
PLATELET # BLD AUTO: 366 K/UL — SIGNIFICANT CHANGE UP (ref 150–400)
POTASSIUM SERPL-MCNC: 3.4 MMOL/L — LOW (ref 3.5–5.3)
POTASSIUM SERPL-SCNC: 3.4 MMOL/L — LOW (ref 3.5–5.3)
RBC # BLD: 3.35 M/UL — LOW (ref 4.2–5.8)
RBC # FLD: 15.5 % — HIGH (ref 10.3–14.5)
SODIUM SERPL-SCNC: 143 MMOL/L — SIGNIFICANT CHANGE UP (ref 135–145)
WBC # BLD: 10.12 K/UL — SIGNIFICANT CHANGE UP (ref 3.8–10.5)
WBC # FLD AUTO: 10.12 K/UL — SIGNIFICANT CHANGE UP (ref 3.8–10.5)

## 2019-04-21 PROCEDURE — 99233 SBSQ HOSP IP/OBS HIGH 50: CPT

## 2019-04-21 RX ORDER — POTASSIUM CHLORIDE 20 MEQ
40 PACKET (EA) ORAL ONCE
Qty: 0 | Refills: 0 | Status: COMPLETED | OUTPATIENT
Start: 2019-04-21 | End: 2019-04-21

## 2019-04-21 RX ADMIN — FINASTERIDE 5 MILLIGRAM(S): 5 TABLET, FILM COATED ORAL at 17:31

## 2019-04-21 RX ADMIN — Medication 100 MILLIGRAM(S): at 06:27

## 2019-04-21 RX ADMIN — Medication 40 MILLIEQUIVALENT(S): at 14:12

## 2019-04-21 RX ADMIN — CEFTRIAXONE 100 GRAM(S): 500 INJECTION, POWDER, FOR SOLUTION INTRAMUSCULAR; INTRAVENOUS at 17:30

## 2019-04-21 RX ADMIN — Medication 1000 UNIT(S): at 14:12

## 2019-04-21 RX ADMIN — NYSTATIN CREAM 1 APPLICATION(S): 100000 CREAM TOPICAL at 17:31

## 2019-04-21 RX ADMIN — LACTULOSE 20 GRAM(S): 10 SOLUTION ORAL at 06:27

## 2019-04-21 RX ADMIN — AMLODIPINE BESYLATE 5 MILLIGRAM(S): 2.5 TABLET ORAL at 06:27

## 2019-04-21 RX ADMIN — NYSTATIN CREAM 1 APPLICATION(S): 100000 CREAM TOPICAL at 06:27

## 2019-04-21 RX ADMIN — Medication 325 MILLIGRAM(S): at 14:12

## 2019-04-21 RX ADMIN — GABAPENTIN 300 MILLIGRAM(S): 400 CAPSULE ORAL at 14:16

## 2019-04-21 NOTE — PROGRESS NOTE ADULT - SUBJECTIVE AND OBJECTIVE BOX
89 y/o male w/ history of HTN, HLD, CKD II, Lumbar spinal stenosis w/ LBP/radiculopathy - bed bound, Hydrocele, BPH and urinary retention w/chronic Ma presented after a syncopal episode and was found to have a UTI, dehydration w/ ROGER on CKD 2. He is lying in bed in NAD, gross hematuria in Ma bag.    MEDICATIONS  (STANDING):  amLODIPine   Tablet 5 milliGRAM(s) Oral daily  cefTRIAXone   IVPB 1 Gram(s) IV Intermittent every 24 hours  cholecalciferol 1000 Unit(s) Oral daily  docusate sodium 100 milliGRAM(s) Oral two times a day  ferrous    sulfate 325 milliGRAM(s) Oral daily  finasteride 5 milliGRAM(s) Oral daily  gabapentin 300 milliGRAM(s) Oral daily  lactulose Syrup 20 Gram(s) Oral two times a day  nystatin Powder 1 Application(s) Topical two times a day  senna 2 Tablet(s) Oral at bedtime  sodium chloride 0.45%. 1000 milliLiter(s) (60 mL/Hr) IV Continuous <Continuous>    MEDICATIONS  (PRN):  acetaminophen   Tablet .. 650 milliGRAM(s) Oral every 6 hours PRN Temp greater or equal to 38C (100.4F), Mild Pain (1 - 3)      Allergies    No Known Allergies    Intolerances        Vital Signs Last 24 Hrs  T(C): 36.4 (21 Apr 2019 16:27), Max: 37 (21 Apr 2019 05:47)  T(F): 97.6 (21 Apr 2019 16:27), Max: 98.6 (21 Apr 2019 05:47)  HR: 80 (21 Apr 2019 16:27) (77 - 86)  BP: 141/61 (21 Apr 2019 16:27) (126/81 - 157/76)  BP(mean): --  RR: 16 (21 Apr 2019 16:27) (16 - 19)  SpO2: 100% (21 Apr 2019 16:27) (98% - 100%)    PHYSICAL EXAM:  GENERAL: NAD, well-groomed, well-developed  HEAD: Atraumatic, Normocephalic  EYES: EOMI, PERRLA   NECK: Supple   NERVOUS SYSTEM: Alert   CHEST/LUNG: Clear to auscultation bilaterally; No rales, rhonchi, wheezing, or rubs  HEART: Regular rate and rhythm; No murmurs, rubs, or gallops  ABDOMEN: Soft, Nontender, Nondistended; Bowel sounds present  EXTREMITIES: No clubbing, cyanosis, or edema      LABS:                        9.4    10.12 )-----------( 366      ( 21 Apr 2019 07:13 )             29.6     04-21    143  |  108  |  32<H>  ----------------------------<  85  3.4<L>   |  26  |  1.50<H>    Ca    9.8      21 Apr 2019 07:13  Phos  2.7     04-21  Mg     2.3     04-21       Culture - Urine (collected 18 Apr 2019 21:01)  Source: .Urine  Final Report (19 Apr 2019 14:45):    No growth      RADIOLOGY & ADDITIONAL TESTS:    04-20-19 @ 07:01  -  04-21-19 @ 07:00  --------------------------------------------------------  IN:    Oral Fluid: 180 mL  Total IN: 180 mL    OUT:    Indwelling Catheter - Suprapubic: 450 mL    Indwelling Catheter - Urethral: 400 mL  Total OUT: 850 mL    Total NET: -670 mL

## 2019-04-21 NOTE — PROGRESS NOTE ADULT - ASSESSMENT
89 y/o male w/ history of HTN, HLD, CKD II, Lumbar spinal stenosis w/ LBP/radiculopathy - bed bound, Hydrocele, BPH and urinary retention w/chronic Obrien presented after a syncopal episode and was found to have a UTI, dehydration w/ ROGER on CKD 2.      BPH w/ urinary retention  - c/w Flomax and Proscar   - SPC placed by Dr. Mensah on 4/18 - but there was mild bleeding around the site and no output from the catheter (Dr. Mensah evaluated for clots blocking the catheter from draining but no clots were found and Dr. Mensah reports bladder was empty)  -  SPC removed today and Obrien was placed on 4/19 - subsequent hematuria improving    Anemia from acute blood loss  - drop in hgb due to hematuria, likely from SPC placement- no bleeding at SPC site   - Hgb 9.4 s/p 2 units  - off prophylactic Lovenox    ROGER on CKD III  - improving   - rise in Cr today may have been postrenal   - give gentle hydration today to stimulate urine production as patient has no clear urine in bladder    Hypokalemia  - replete w/ KCl     Syncope  - likely Vasovagal in setting of sepsis and dehydration/hypotension  - Head CT showed no acute changes  - Echo showed EF 55-60% w/ grade I diastolic dysfunction & mild to moderate aortic regurgitation  - EKG: SR @ 80bpm, premature atrial complex, bifascicular block    UTI w/ Severe Sepsis POA in patient  - urology following - unable to place SPC today so placed obrien instead - to be removed tomorrow for TOV  - Zosyn and Vanc changed to Rocephin by ID today  - leukocytosis likely reactive to SPC placement and is improving     HTN  - c/w Norvasc  - resume BB if BP is not controlled    Functional Quadriplegia  - nursing care    Prophylaxis:   DVT: SCD  GI: PO diet    Disposition: Discharge to home once cleared by urology. Will discharge if Cr and WBC improve tomorrow and Hgb remains stable.

## 2019-04-22 LAB
ANION GAP SERPL CALC-SCNC: 8 MMOL/L — SIGNIFICANT CHANGE UP (ref 5–17)
BUN SERPL-MCNC: 28 MG/DL — HIGH (ref 7–23)
CALCIUM SERPL-MCNC: 9.6 MG/DL — SIGNIFICANT CHANGE UP (ref 8.5–10.1)
CHLORIDE SERPL-SCNC: 107 MMOL/L — SIGNIFICANT CHANGE UP (ref 96–108)
CO2 SERPL-SCNC: 29 MMOL/L — SIGNIFICANT CHANGE UP (ref 22–31)
CREAT SERPL-MCNC: 1.2 MG/DL — SIGNIFICANT CHANGE UP (ref 0.5–1.3)
GLUCOSE SERPL-MCNC: 90 MG/DL — SIGNIFICANT CHANGE UP (ref 70–99)
HCT VFR BLD CALC: 30.1 % — LOW (ref 39–50)
HGB BLD-MCNC: 9.7 G/DL — LOW (ref 13–17)
MAGNESIUM SERPL-MCNC: 2.3 MG/DL — SIGNIFICANT CHANGE UP (ref 1.6–2.6)
MCHC RBC-ENTMCNC: 28.8 PG — SIGNIFICANT CHANGE UP (ref 27–34)
MCHC RBC-ENTMCNC: 32.2 GM/DL — SIGNIFICANT CHANGE UP (ref 32–36)
MCV RBC AUTO: 89.3 FL — SIGNIFICANT CHANGE UP (ref 80–100)
NRBC # BLD: 0 /100 WBCS — SIGNIFICANT CHANGE UP (ref 0–0)
PHOSPHATE SERPL-MCNC: 2.7 MG/DL — SIGNIFICANT CHANGE UP (ref 2.5–4.5)
PLATELET # BLD AUTO: 374 K/UL — SIGNIFICANT CHANGE UP (ref 150–400)
POTASSIUM SERPL-MCNC: 3.6 MMOL/L — SIGNIFICANT CHANGE UP (ref 3.5–5.3)
POTASSIUM SERPL-SCNC: 3.6 MMOL/L — SIGNIFICANT CHANGE UP (ref 3.5–5.3)
RBC # BLD: 3.37 M/UL — LOW (ref 4.2–5.8)
RBC # FLD: 15.6 % — HIGH (ref 10.3–14.5)
SODIUM SERPL-SCNC: 144 MMOL/L — SIGNIFICANT CHANGE UP (ref 135–145)
WBC # BLD: 9.54 K/UL — SIGNIFICANT CHANGE UP (ref 3.8–10.5)
WBC # FLD AUTO: 9.54 K/UL — SIGNIFICANT CHANGE UP (ref 3.8–10.5)

## 2019-04-22 PROCEDURE — 99233 SBSQ HOSP IP/OBS HIGH 50: CPT

## 2019-04-22 RX ADMIN — FINASTERIDE 5 MILLIGRAM(S): 5 TABLET, FILM COATED ORAL at 19:04

## 2019-04-22 RX ADMIN — Medication 1000 UNIT(S): at 12:02

## 2019-04-22 RX ADMIN — CEFTRIAXONE 100 GRAM(S): 500 INJECTION, POWDER, FOR SOLUTION INTRAMUSCULAR; INTRAVENOUS at 17:34

## 2019-04-22 RX ADMIN — GABAPENTIN 300 MILLIGRAM(S): 400 CAPSULE ORAL at 12:02

## 2019-04-22 RX ADMIN — AMLODIPINE BESYLATE 5 MILLIGRAM(S): 2.5 TABLET ORAL at 06:10

## 2019-04-22 RX ADMIN — Medication 325 MILLIGRAM(S): at 12:02

## 2019-04-22 RX ADMIN — NYSTATIN CREAM 1 APPLICATION(S): 100000 CREAM TOPICAL at 20:47

## 2019-04-22 RX ADMIN — LACTULOSE 20 GRAM(S): 10 SOLUTION ORAL at 19:04

## 2019-04-22 RX ADMIN — NYSTATIN CREAM 1 APPLICATION(S): 100000 CREAM TOPICAL at 06:11

## 2019-04-22 RX ADMIN — Medication 100 MILLIGRAM(S): at 19:04

## 2019-04-22 NOTE — PROCEDURE NOTE - NSPOSTCAREGUIDE_GEN_A_CORE
Verbal/written post procedure instructions were given to patient/caregiver
Instructed patient/caregiver regarding signs and symptoms of infection

## 2019-04-22 NOTE — PROGRESS NOTE ADULT - ASSESSMENT
89 y/o male w/ history of HTN, HLD, CKD II, Lumbar spinal stenosis w/ LBP/radiculopathy - bed bound, Hydrocele, BPH and urinary retention w/chronic Obrien presented after a syncopal episode and was found to have a UTI, dehydration w/ ROGER on CKD 2.      BPH w/ urinary retention  - c/w Flomax and Proscar   - indwelling obrien after failed SPC placement   -Urology note appreciated.     Anemia from acute blood loss  - drop in hgb due to hematuria  -s/p 2U pRBC  -H/H stable, cont to monitor.     ROGER on CKD III  - improving   - cont to monitor     Hypokalemia  - replete w/ KCl     Syncope  - likely Vasovagal in setting of sepsis and dehydration/hypotension  - Head CT showed no acute changes  - Echo showed EF 55-60% w/ grade I diastolic dysfunction & mild to moderate aortic regurgitation  - EKG: SR @ 80bpm, premature atrial complex, bifascicular block    UTI w/ Severe Sepsis POA in patient  - ID on board cont w/ Rocephin      HTN  - c/w Norvasc  - resume BB if BP is not controlled    Functional Quadriplegia  - nursing care    Prophylaxis:   DVT: SCD  GI: PO diet

## 2019-04-22 NOTE — PROGRESS NOTE ADULT - SUBJECTIVE AND OBJECTIVE BOX
Patient is a 89y old  Male who presents with a chief complaint of passed out (22 Apr 2019 10:37)      INTERVAL HPI/ OVERNIGHT EVENTS: Pt was seen and examined at bedside today, No significant overnight events, pt denies any complaints, continue to have gross hematuria in urine.      MEDICATIONS  (STANDING):  amLODIPine   Tablet 5 milliGRAM(s) Oral daily  cefTRIAXone   IVPB 1 Gram(s) IV Intermittent every 24 hours  cholecalciferol 1000 Unit(s) Oral daily  docusate sodium 100 milliGRAM(s) Oral two times a day  ferrous    sulfate 325 milliGRAM(s) Oral daily  finasteride 5 milliGRAM(s) Oral daily  gabapentin 300 milliGRAM(s) Oral daily  lactulose Syrup 20 Gram(s) Oral two times a day  nystatin Powder 1 Application(s) Topical two times a day  senna 2 Tablet(s) Oral at bedtime  sodium chloride 0.45%. 1000 milliLiter(s) (60 mL/Hr) IV Continuous <Continuous>    MEDICATIONS  (PRN):  acetaminophen   Tablet .. 650 milliGRAM(s) Oral every 6 hours PRN Temp greater or equal to 38C (100.4F), Mild Pain (1 - 3)      Allergies    No Known Allergies    Intolerances        REVIEW OF SYSTEMS:    Unable to examine due to [ ] Encephalopathy [ ] Advanced Dementia [ ] Expressive Aphasia [ ] Non-verbal patient    CONSTITUTIONAL: No fever, positive generalized weakness/Fatigue, No weight loss  EYES: No eye pain, visual disturbances, or discharge  ENMT:  No difficulty hearing, tinnitus, vertigo; No sinus or throat pain  NECK: No pain or stiffness  RESPIRATORY: No shortness of breath,  cough, wheezing, sputum or hemoptysis   CARDIOVASCULAR: No chest pain, palpitations, or leg swelling  GASTROINTESTINAL: No abdominal pain. No nausea, vomiting, diarrhea or constipation. No melena or hematochezia.  GENITOURINARY:  gross hematuria  NEUROLOGICAL: No headaches, Dizziness, memory loss, loss of strength, numbness, or tremors  SKIN: No itching, burning, rashes, or lesions   MUSCULOSKELETAL: No joint pain or swelling; No muscle, back, or extremity pain  PSYCHIATRIC: No depression, anxiety, mood swings, or difficulty sleeping  HEME/LYMPH: No easy bruising, or bleeding gums      Vital Signs Last 24 Hrs  T(C): 36.8 (22 Apr 2019 17:25), Max: 36.8 (22 Apr 2019 17:25)  T(F): 98.3 (22 Apr 2019 17:25), Max: 98.3 (22 Apr 2019 17:25)  HR: 86 (22 Apr 2019 17:25) (78 - 86)  BP: 140/78 (22 Apr 2019 17:25) (123/66 - 140/78)  BP(mean): --  RR: 17 (22 Apr 2019 17:25) (16 - 18)  SpO2: 99% (22 Apr 2019 17:25) (99% - 100%)    PHYSICAL EXAM:  GENERAL: NAD, thin/ frail, well-groomed  HEAD:  Atraumatic, Normocephalic  EYES: conjunctiva and sclera clear  ENMT: Moist mucous membranes  NECK: Supple, No JVD, Normal thyroid  CHEST/LUNG: Clear to Auscultation bilaterally; No rales, rhonchi, wheezing, or rubs  HEART: Regular rate and rhythm; No murmurs, rubs, or gallops  ABDOMEN: Soft, Nontender, Nondistended; Bowel sounds present  EXTREMITIES:  2+ Peripheral Pulses, No clubbing, cyanosis, or edema  : Ma in place, gross hematuria.   SKIN: No rashes or lesions  NERVOUS SYSTEM:  Alert & Oriented, functional quadriplegic    LABS:                        9.7    9.54  )-----------( 374      ( 22 Apr 2019 07:43 )             30.1     04-22    144  |  107  |  28<H>  ----------------------------<  90  3.6   |  29  |  1.20    Ca    9.6      22 Apr 2019 07:43  Phos  2.7     04-22  Mg     2.3     04-22          CAPILLARY BLOOD GLUCOSE            Culture - Urine (collected 04-18-19)  Source: .Urine  Final Report (04-19-19):    No growth        RADIOLOGY & ADDITIONAL TESTS:          Imaging Personally Reviewed:  [ ] YES  [ ] NO    Consultant(s) Notes Reviewed:  [x ] YES  [ ] NO    Care Discussed with Consultants/Other Providers [x ] YES  [ ] NO

## 2019-04-22 NOTE — PROCEDURE NOTE - NSURITECHNIQUE_GU_A_CORE
Sterile gloves were worn for the duration of the procedure/The collection bag is below the level of the patient and urinary bladder/Proper hand hygiene was performed/A sterile drape was used to cover all adjacent areas/The catheter was appropriately lubricated/The site was cleaned with soap/water and sterile solution (betadine)/The catheter was secured with a securement device (e.g. StatLock)/The urinary drainage system is closed at the end of the procedure/All applicable medical record documentation is completed
The collection bag is below the level of the patient and urinary bladder/The catheter was appropriately lubricated/Proper hand hygiene was performed/Sterile gloves were worn for the duration of the procedure/A sterile drape was used to cover all adjacent areas/The catheter was secured with a securement device (e.g. StatLock)/All applicable medical record documentation is completed/The site was cleaned with soap/water and sterile solution (betadine)/The urinary drainage system is closed at the end of the procedure

## 2019-04-22 NOTE — PROCEDURE NOTE - NSICDXPROCEDURE_GEN_ALL_CORE_FT
PROCEDURES:  Complex insertion of Ma catheter 22-Apr-2019 14:32:18  Adithya Mensah
PROCEDURES:  Suprapubic tube placement 18-Apr-2019 15:37:17  Adithya Mensah

## 2019-04-22 NOTE — PROGRESS NOTE ADULT - SUBJECTIVE AND OBJECTIVE BOX
Patient seen and examined bedside resting comfortably.  No complaints offered.   Denies nausea and vomiting. Tolerating diet.  Flatus/BM. +  Denies chest pain, dyspnea, cough.    T(F): 97.6 (04-22-19 @ 05:35), Max: 97.7 (04-22-19 @ 00:32)  HR: 79 (04-22-19 @ 05:35) (79 - 86)  BP: 132/77 (04-22-19 @ 05:35) (132/77 - 145/73)  RR: 18 (04-22-19 @ 05:35) (16 - 18)  SpO2: 99% (04-22-19 @ 05:35) (99% - 100%)    PHYSICAL EXAM:  General: NAD  Neuro:  Alert & oriented x 3  Abdomen: soft, NTND. Normactive BS  : Ma cath in place. Draining grossly bloody urine. S-P wound dressing clean & dry.     LABS:                        9.7    9.54  )-----------( 374      ( 22 Apr 2019 07:43 )             30.1     04-22    144  |  107  |  28<H>  ----------------------------<  90  3.6   |  29  |  1.20    Ca    9.6      22 Apr 2019 07:43  Phos  2.7     04-22  Mg     2.3     04-22        I&O's Detail    21 Apr 2019 07:01  -  22 Apr 2019 07:00  --------------------------------------------------------  IN:    Oral Fluid: 300 mL  Total IN: 300 mL    OUT:    Indwelling Catheter - Suprapubic: 500 mL    Indwelling Catheter - Urethral: 350 mL  Total OUT: 850 mL    Total NET: -550 mL          Impression: 89y Male admitted with SYNCOPE  WEAKNESS  - s/p attempted S-P catheter insertion X 2  -  Hematuria via indwelling FC.  S/P 2 units PRBC 4/20.      Plan:  -continue VTE prophylaxis   - continue medical f/u  -Increase activity with PT, OOB, Ambulate  -continue local wound care  -f/u AM labs  -will discuss with Dr. Mensah.

## 2019-04-22 NOTE — PROCEDURE NOTE - NSPROCDETAILS_GEN_ALL_CORE
a urinary catheter insertion kit was used for all insertion materials
a urinary catheter insertion kit was used for all insertion materials

## 2019-04-23 LAB
ANION GAP SERPL CALC-SCNC: 8 MMOL/L — SIGNIFICANT CHANGE UP (ref 5–17)
BUN SERPL-MCNC: 29 MG/DL — HIGH (ref 7–23)
CALCIUM SERPL-MCNC: 9.9 MG/DL — SIGNIFICANT CHANGE UP (ref 8.5–10.1)
CHLORIDE SERPL-SCNC: 105 MMOL/L — SIGNIFICANT CHANGE UP (ref 96–108)
CO2 SERPL-SCNC: 28 MMOL/L — SIGNIFICANT CHANGE UP (ref 22–31)
CREAT SERPL-MCNC: 1.18 MG/DL — SIGNIFICANT CHANGE UP (ref 0.5–1.3)
GLUCOSE SERPL-MCNC: 93 MG/DL — SIGNIFICANT CHANGE UP (ref 70–99)
HCT VFR BLD CALC: 29.7 % — LOW (ref 39–50)
HGB BLD-MCNC: 9.4 G/DL — LOW (ref 13–17)
MCHC RBC-ENTMCNC: 28.3 PG — SIGNIFICANT CHANGE UP (ref 27–34)
MCHC RBC-ENTMCNC: 31.6 GM/DL — LOW (ref 32–36)
MCV RBC AUTO: 89.5 FL — SIGNIFICANT CHANGE UP (ref 80–100)
NRBC # BLD: 0 /100 WBCS — SIGNIFICANT CHANGE UP (ref 0–0)
PLATELET # BLD AUTO: 408 K/UL — HIGH (ref 150–400)
POTASSIUM SERPL-MCNC: 3.4 MMOL/L — LOW (ref 3.5–5.3)
POTASSIUM SERPL-SCNC: 3.4 MMOL/L — LOW (ref 3.5–5.3)
RBC # BLD: 3.32 M/UL — LOW (ref 4.2–5.8)
RBC # FLD: 15.4 % — HIGH (ref 10.3–14.5)
SODIUM SERPL-SCNC: 141 MMOL/L — SIGNIFICANT CHANGE UP (ref 135–145)
WBC # BLD: 8.14 K/UL — SIGNIFICANT CHANGE UP (ref 3.8–10.5)
WBC # FLD AUTO: 8.14 K/UL — SIGNIFICANT CHANGE UP (ref 3.8–10.5)

## 2019-04-23 PROCEDURE — 99233 SBSQ HOSP IP/OBS HIGH 50: CPT

## 2019-04-23 RX ORDER — POTASSIUM CHLORIDE 20 MEQ
20 PACKET (EA) ORAL ONCE
Qty: 0 | Refills: 0 | Status: COMPLETED | OUTPATIENT
Start: 2019-04-23 | End: 2019-04-23

## 2019-04-23 RX ADMIN — Medication 325 MILLIGRAM(S): at 12:52

## 2019-04-23 RX ADMIN — GABAPENTIN 300 MILLIGRAM(S): 400 CAPSULE ORAL at 12:53

## 2019-04-23 RX ADMIN — NYSTATIN CREAM 1 APPLICATION(S): 100000 CREAM TOPICAL at 18:08

## 2019-04-23 RX ADMIN — LACTULOSE 20 GRAM(S): 10 SOLUTION ORAL at 06:53

## 2019-04-23 RX ADMIN — NYSTATIN CREAM 1 APPLICATION(S): 100000 CREAM TOPICAL at 06:54

## 2019-04-23 RX ADMIN — FINASTERIDE 5 MILLIGRAM(S): 5 TABLET, FILM COATED ORAL at 17:54

## 2019-04-23 RX ADMIN — Medication 100 MILLIGRAM(S): at 06:54

## 2019-04-23 RX ADMIN — LACTULOSE 20 GRAM(S): 10 SOLUTION ORAL at 17:55

## 2019-04-23 RX ADMIN — AMLODIPINE BESYLATE 5 MILLIGRAM(S): 2.5 TABLET ORAL at 06:54

## 2019-04-23 RX ADMIN — Medication 20 MILLIEQUIVALENT(S): at 12:52

## 2019-04-23 RX ADMIN — CEFTRIAXONE 100 GRAM(S): 500 INJECTION, POWDER, FOR SOLUTION INTRAMUSCULAR; INTRAVENOUS at 17:53

## 2019-04-23 RX ADMIN — Medication 100 MILLIGRAM(S): at 17:53

## 2019-04-23 RX ADMIN — Medication 1000 UNIT(S): at 12:52

## 2019-04-23 NOTE — PROGRESS NOTE ADULT - SUBJECTIVE AND OBJECTIVE BOX
89y old  Male who presented with a chief complaint of passed out and was admitted with SYNCOPE  WEAKNESS      Interval History: Patient seen and examined at bedside with no complaints. Admits to flatus/BM. Denies pain, nasuea/vomiting. Tolerating diet.    Vital Signs Last 24 Hrs  T(F): 98.1 (04-23-19 @ 06:24), Max: 98.5 (04-22-19 @ 23:18)  HR: 82 (04-23-19 @ 06:24)  BP: 163/90 (04-23-19 @ 06:24)  RR: 18 (04-23-19 @ 06:24)  SpO2: 98% (04-23-19 @ 06:24)  Wt(kg): --   CAPILLARY BLOOD GLUCOSE          PHYSICAL EXAM:  GENERAL: Alert, NAD  CHEST/LUNG: Clear to auscultation bilaterally, respirations nonlabored  HEART: Regular rate and rhythm; S1 & S2 appreciated  ABDOMEN: + Bowel sounds, soft, Nontender, Nondistended  EXTREMITIES:  no calf tenderness, No edema    I&O's Detail    22 Apr 2019 07:01  -  23 Apr 2019 07:00  --------------------------------------------------------  IN:  Total IN: 0 mL    OUT:    Voided: 300 mL  Total OUT: 300 mL    Total NET: -300 mL          LABS:                        9.4    8.14  )-----------( 408      ( 23 Apr 2019 06:22 )             29.7     04-23    141  |  105  |  29<H>  ----------------------------<  93  3.4<L>   |  28  |  1.18    Ca    9.9      23 Apr 2019 06:22  Phos  2.7     04-22  Mg     2.3     04-22          RADIOLOGY & ADDITIONAL STUDIES:    ASSESSMENT & PLAN:  89M admitted with SYNCOPE/WEAKNESS. Hx of BPH with UR and chronic obrien use.  - s/p attempted S-P catheter insertion X 2  - Hematuria s/p 2 units PRBC 4/20. No resolved    - Maintain obrien, exchanged 4/23 by Dr. Mensah.  - f/u labs  - DVT prophylaxis, Incentive Spirometer, OOB, Ambulating, pain control  - continue current management per medicine  - will discuss with surgical attending, Dr. Mensah

## 2019-04-23 NOTE — PROGRESS NOTE ADULT - ASSESSMENT
88 y/o male w/ history of HTN, HLD, CKD II, Lumbar spinal stenosis w/ LBP/radiculopathy - bed bound, Hydrocele, BPH and urinary retention w/chronic Obrien presented after a syncopal episode and was found to have a UTI, dehydration w/ ROGER on CKD 2.      BPH w/ urinary retention  - c/w Flomax and Proscar   - indwelling obrien after failed SPC placement   -hematuria resolved with irrigation   -Urology note appreciated.     Anemia from acute blood loss  - drop in hgb due to hematuria  -s/p 2U pRBC  -H/H stable, cont to monitor.     ROGER on CKD III  - improving   - cont to monitor     Hypokalemia  - replete w/ KCl     Syncope  - likely Vasovagal in setting of sepsis and dehydration/hypotension  - Head CT showed no acute changes  - Echo showed EF 55-60% w/ grade I diastolic dysfunction & mild to moderate aortic regurgitation  - EKG: SR @ 80bpm, premature atrial complex, bifascicular block    UTI w/ Severe Sepsis POA in patient  - ID on board cont w/ Rocephin      HTN  - c/w Norvasc  - resume BB if BP is not controlled    Functional Quadriplegia  - nursing care    Prophylaxis:   DVT: SCD  GI: PO diet  Discharge planning to home with home aide.

## 2019-04-23 NOTE — PROGRESS NOTE ADULT - SUBJECTIVE AND OBJECTIVE BOX
Patient is a 89y old  Male who presents with a chief complaint of passed out (23 Apr 2019 09:57)      INTERVAL HPI/ OVERNIGHT EVENTS: Pt was seen and examined at bedside today, No significant overnight events, pt denies any new complaints. Spoke with son about the pt's care.      MEDICATIONS  (STANDING):  amLODIPine   Tablet 5 milliGRAM(s) Oral daily  cefTRIAXone   IVPB 1 Gram(s) IV Intermittent every 24 hours  cholecalciferol 1000 Unit(s) Oral daily  docusate sodium 100 milliGRAM(s) Oral two times a day  ferrous    sulfate 325 milliGRAM(s) Oral daily  finasteride 5 milliGRAM(s) Oral daily  gabapentin 300 milliGRAM(s) Oral daily  lactulose Syrup 20 Gram(s) Oral two times a day  nystatin Powder 1 Application(s) Topical two times a day  senna 2 Tablet(s) Oral at bedtime    MEDICATIONS  (PRN):  acetaminophen   Tablet .. 650 milliGRAM(s) Oral every 6 hours PRN Temp greater or equal to 38C (100.4F), Mild Pain (1 - 3)      Allergies    No Known Allergies    Intolerances        REVIEW OF SYSTEMS:    Unable to examine due to [ ] Encephalopathy [ ] Advanced Dementia [ ] Expressive Aphasia [ ] Non-verbal patient    CONSTITUTIONAL: No fever, positive generalized weakness/Fatigue, No weight loss  EYES: No eye pain, visual disturbances, or discharge  ENMT:  No difficulty hearing, tinnitus, vertigo; No sinus or throat pain  NECK: No pain or stiffness  RESPIRATORY: No shortness of breath,  cough, wheezing, sputum or hemoptysis   CARDIOVASCULAR: No chest pain, palpitations, or leg swelling  GASTROINTESTINAL: No abdominal pain. No nausea, vomiting, diarrhea or constipation. No melena or hematochezia.  GENITOURINARY: No dysuria, frequency, hematuria, or incontinence  NEUROLOGICAL: No headaches, Dizziness, memory loss, loss of strength, numbness, or tremors  SKIN: No itching, burning, rashes, or lesions   MUSCULOSKELETAL: No joint pain or swelling; No muscle, back, or extremity pain  PSYCHIATRIC: No depression, anxiety, mood swings, or difficulty sleeping  HEME/LYMPH: No easy bruising, or bleeding gums      Vital Signs Last 24 Hrs  T(C): 36.5 (23 Apr 2019 11:19), Max: 36.9 (22 Apr 2019 23:18)  T(F): 97.7 (23 Apr 2019 11:19), Max: 98.5 (22 Apr 2019 23:18)  HR: 82 (23 Apr 2019 11:19) (82 - 94)  BP: 124/82 (23 Apr 2019 11:19) (124/82 - 163/90)  BP(mean): --  RR: 16 (23 Apr 2019 11:19) (16 - 18)  SpO2: 98% (23 Apr 2019 11:19) (98% - 99%)    PHYSICAL EXAM:  GENERAL: NAD, thin/ frail, well-groomed  HEAD:  Atraumatic, Normocephalic  EYES: conjunctiva and sclera clear  ENMT: Moist mucous membranes  NECK: Supple, No JVD, Normal thyroid  CHEST/LUNG: Clear to Auscultation bilaterally; No rales, rhonchi, wheezing, or rubs  HEART: Regular rate and rhythm; No murmurs, rubs, or gallops  ABDOMEN: Soft, Nontender, Nondistended; Bowel sounds present  EXTREMITIES:  2+ Peripheral Pulses, No clubbing, cyanosis, or edema  : Ma in place, gross hematuria.   SKIN: No rashes or lesions  NERVOUS SYSTEM:  Alert & Oriented, functional quadriplegic      LABS:                        9.4    8.14  )-----------( 408      ( 23 Apr 2019 06:22 )             29.7     04-23    141  |  105  |  29<H>  ----------------------------<  93  3.4<L>   |  28  |  1.18    Ca    9.9      23 Apr 2019 06:22  Phos  2.7     04-22  Mg     2.3     04-22          CAPILLARY BLOOD GLUCOSE            Culture - Urine (collected 04-18-19)  Source: .Urine  Final Report (04-19-19):    No growth        RADIOLOGY & ADDITIONAL TESTS:          Imaging Personally Reviewed:  [ ] YES  [ ] NO    Consultant(s) Notes Reviewed:  [x ] YES  [ ] NO    Care Discussed with Consultants/Other Providers [x ] YES  [ ] NO

## 2019-04-23 NOTE — PROGRESS NOTE ADULT - ATTENDING COMMENTS
pt is stable. may be discharged to be followed as outpatient.
Dark hematuric urine indicated old clotted blood in the urine. # 22 F 2 way 5 cc balloon Ma catheter inserted and old clots evacuated. now irrgating clear return.
pt has overflow incontinence, post void residual is 200 ml. Bladder capacity is increasing as Ma catheter has been removed. Will insert suprapubic cystostomy tomorrow.

## 2019-04-24 ENCOUNTER — TRANSCRIPTION ENCOUNTER (OUTPATIENT)
Age: 84
End: 2019-04-24

## 2019-04-24 VITALS
OXYGEN SATURATION: 94 % | RESPIRATION RATE: 18 BRPM | HEART RATE: 91 BPM | TEMPERATURE: 97 F | DIASTOLIC BLOOD PRESSURE: 81 MMHG | SYSTOLIC BLOOD PRESSURE: 132 MMHG

## 2019-04-24 LAB
ANION GAP SERPL CALC-SCNC: 8 MMOL/L — SIGNIFICANT CHANGE UP (ref 5–17)
BUN SERPL-MCNC: 29 MG/DL — HIGH (ref 7–23)
CALCIUM SERPL-MCNC: 9.7 MG/DL — SIGNIFICANT CHANGE UP (ref 8.5–10.1)
CHLORIDE SERPL-SCNC: 104 MMOL/L — SIGNIFICANT CHANGE UP (ref 96–108)
CO2 SERPL-SCNC: 28 MMOL/L — SIGNIFICANT CHANGE UP (ref 22–31)
CREAT SERPL-MCNC: 1.05 MG/DL — SIGNIFICANT CHANGE UP (ref 0.5–1.3)
GLUCOSE SERPL-MCNC: 94 MG/DL — SIGNIFICANT CHANGE UP (ref 70–99)
HCT VFR BLD CALC: 31.4 % — LOW (ref 39–50)
HGB BLD-MCNC: 9.8 G/DL — LOW (ref 13–17)
MCHC RBC-ENTMCNC: 28.2 PG — SIGNIFICANT CHANGE UP (ref 27–34)
MCHC RBC-ENTMCNC: 31.2 GM/DL — LOW (ref 32–36)
MCV RBC AUTO: 90.2 FL — SIGNIFICANT CHANGE UP (ref 80–100)
NRBC # BLD: 0 /100 WBCS — SIGNIFICANT CHANGE UP (ref 0–0)
PLATELET # BLD AUTO: 365 K/UL — SIGNIFICANT CHANGE UP (ref 150–400)
POTASSIUM SERPL-MCNC: 3.7 MMOL/L — SIGNIFICANT CHANGE UP (ref 3.5–5.3)
POTASSIUM SERPL-SCNC: 3.7 MMOL/L — SIGNIFICANT CHANGE UP (ref 3.5–5.3)
RBC # BLD: 3.48 M/UL — LOW (ref 4.2–5.8)
RBC # FLD: 15.7 % — HIGH (ref 10.3–14.5)
SODIUM SERPL-SCNC: 140 MMOL/L — SIGNIFICANT CHANGE UP (ref 135–145)
WBC # BLD: 8.48 K/UL — SIGNIFICANT CHANGE UP (ref 3.8–10.5)
WBC # FLD AUTO: 8.48 K/UL — SIGNIFICANT CHANGE UP (ref 3.8–10.5)

## 2019-04-24 PROCEDURE — 99233 SBSQ HOSP IP/OBS HIGH 50: CPT

## 2019-04-24 RX ORDER — DOCUSATE SODIUM 100 MG
1 CAPSULE ORAL
Qty: 60 | Refills: 0
Start: 2019-04-24 | End: 2019-05-23

## 2019-04-24 RX ORDER — GABAPENTIN 400 MG/1
1 CAPSULE ORAL
Qty: 30 | Refills: 0
Start: 2019-04-24 | End: 2019-05-23

## 2019-04-24 RX ORDER — CHOLECALCIFEROL (VITAMIN D3) 125 MCG
1000 CAPSULE ORAL
Qty: 30 | Refills: 0
Start: 2019-04-24 | End: 2019-05-23

## 2019-04-24 RX ADMIN — NYSTATIN CREAM 1 APPLICATION(S): 100000 CREAM TOPICAL at 06:58

## 2019-04-24 RX ADMIN — AMLODIPINE BESYLATE 5 MILLIGRAM(S): 2.5 TABLET ORAL at 06:58

## 2019-04-24 RX ADMIN — Medication 100 MILLIGRAM(S): at 06:58

## 2019-04-24 NOTE — DISCHARGE NOTE NURSING/CASE MANAGEMENT/SOCIAL WORK - NSDCDPATPORTLINK_GEN_ALL_CORE
You can access the [facility], offered by [facility], by registering with the following website: [email or web address]

## 2019-04-24 NOTE — PROGRESS NOTE ADULT - PROVIDER SPECIALTY LIST ADULT
Hospitalist
Urology
Hospitalist
Hospitalist

## 2019-04-24 NOTE — PROGRESS NOTE ADULT - REASON FOR ADMISSION
passed out

## 2019-04-24 NOTE — PROGRESS NOTE ADULT - SUBJECTIVE AND OBJECTIVE BOX
Patient seen and examined bedside resting comfortably.  No complaints offered.   Denies nausea and vomiting. Tolerating diet.  Flatus/BM. +  Denies chest pain, dyspnea, cough.    T(F): 97.6 (04-24-19 @ 06:19), Max: 98.5 (04-23-19 @ 18:29)  HR: 84 (04-24-19 @ 06:19) (82 - 88)  BP: 165/92 (04-24-19 @ 06:19) (124/82 - 165/92)  RR: 17 (04-24-19 @ 06:19) (16 - 17)  SpO2: 95% (04-24-19 @ 06:19) (95% - 100%)      PHYSICAL EXAM:  General: NAD  Neuro:  Alert & oriented x 3  Abdomen: soft, NTND. Normactive BS  : Ma catheter draining clear urine. SP wound ok.    LABS:                        9.8    8.48  )-----------( 365      ( 24 Apr 2019 07:53 )             31.4     04-24    140  |  104  |  29<H>  ----------------------------<  94  3.7   |  28  |  1.05    Ca    9.7      24 Apr 2019 07:53        I&O's Detail    23 Apr 2019 07:01  -  24 Apr 2019 07:00  --------------------------------------------------------  IN:  Total IN: 0 mL    OUT:    Indwelling Catheter - Urethral: 500 mL  Total OUT: 500 mL    Total NET: -500 mL          Impression& Plan:  urologically stable for discharge, with Ma catheter in place. F/U in Dr. Mensah's office on an outpt basis
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
## 2019-05-06 DIAGNOSIS — N39.0 URINARY TRACT INFECTION, SITE NOT SPECIFIED: ICD-10-CM

## 2019-05-06 DIAGNOSIS — D62 ACUTE POSTHEMORRHAGIC ANEMIA: ICD-10-CM

## 2019-05-06 DIAGNOSIS — Q54.9 HYPOSPADIAS, UNSPECIFIED: ICD-10-CM

## 2019-05-06 DIAGNOSIS — A41.9 SEPSIS, UNSPECIFIED ORGANISM: ICD-10-CM

## 2019-05-06 DIAGNOSIS — N43.3 HYDROCELE, UNSPECIFIED: ICD-10-CM
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
## 2019-05-06 DIAGNOSIS — R55 SYNCOPE AND COLLAPSE: ICD-10-CM

## 2019-05-06 DIAGNOSIS — M48.061 SPINAL STENOSIS, LUMBAR REGION WITHOUT NEUROGENIC CLAUDICATION: ICD-10-CM

## 2019-05-06 DIAGNOSIS — I95.9 HYPOTENSION, UNSPECIFIED: ICD-10-CM

## 2019-05-06 DIAGNOSIS — N48.29 OTHER INFLAMMATORY DISORDERS OF PENIS: ICD-10-CM

## 2019-05-06 DIAGNOSIS — N39.490 OVERFLOW INCONTINENCE: ICD-10-CM

## 2019-05-06 DIAGNOSIS — R31.0 GROSS HEMATURIA: ICD-10-CM

## 2019-05-06 DIAGNOSIS — N18.3 CHRONIC KIDNEY DISEASE, STAGE 3 (MODERATE): ICD-10-CM

## 2019-05-06 DIAGNOSIS — E83.39 OTHER DISORDERS OF PHOSPHORUS METABOLISM: ICD-10-CM

## 2019-05-06 DIAGNOSIS — R33.8 OTHER RETENTION OF URINE: ICD-10-CM

## 2019-05-06 DIAGNOSIS — I35.0 NONRHEUMATIC AORTIC (VALVE) STENOSIS: ICD-10-CM

## 2019-05-06 DIAGNOSIS — T83.510A INFECTION AND INFLAMMATORY REACTION DUE TO CYSTOSTOMY CATHETER, INITIAL ENCOUNTER: ICD-10-CM

## 2019-05-06 DIAGNOSIS — N40.1 BENIGN PROSTATIC HYPERPLASIA WITH LOWER URINARY TRACT SYMPTOMS: ICD-10-CM

## 2019-05-06 DIAGNOSIS — Z74.01 BED CONFINEMENT STATUS: ICD-10-CM

## 2019-05-06 DIAGNOSIS — E44.0 MODERATE PROTEIN-CALORIE MALNUTRITION: ICD-10-CM

## 2019-05-06 DIAGNOSIS — M54.16 RADICULOPATHY, LUMBAR REGION: ICD-10-CM
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
## 2019-05-06 DIAGNOSIS — R53.2 FUNCTIONAL QUADRIPLEGIA: ICD-10-CM

## 2019-05-06 DIAGNOSIS — E87.6 HYPOKALEMIA: ICD-10-CM

## 2019-05-06 DIAGNOSIS — I13.0 HYPERTENSIVE HEART AND CHRONIC KIDNEY DISEASE WITH HEART FAILURE AND STAGE 1 THROUGH STAGE 4 CHRONIC KIDNEY DISEASE, OR UNSPECIFIED CHRONIC KIDNEY DISEASE: ICD-10-CM

## 2019-05-06 DIAGNOSIS — I50.32 CHRONIC DIASTOLIC (CONGESTIVE) HEART FAILURE: ICD-10-CM

## 2019-05-06 DIAGNOSIS — R65.20 SEVERE SEPSIS WITHOUT SEPTIC SHOCK: ICD-10-CM

## 2019-05-06 DIAGNOSIS — N13.8 OTHER OBSTRUCTIVE AND REFLUX UROPATHY: ICD-10-CM
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
## 2019-08-01 ENCOUNTER — OUTPATIENT (OUTPATIENT)
Dept: OUTPATIENT SERVICES | Facility: HOSPITAL | Age: 84
LOS: 1 days | End: 2019-08-01

## 2019-08-11 RX ORDER — MOXIFLOXACIN HYDROCHLORIDE TABLETS, 400 MG 400 MG/1
1 TABLET, FILM COATED ORAL
Qty: 0 | Refills: 0 | DISCHARGE
Start: 2019-08-11

## 2019-08-15 ENCOUNTER — INPATIENT (INPATIENT)
Facility: HOSPITAL | Age: 84
LOS: 4 days | Discharge: HOME | End: 2019-08-20
Attending: STUDENT IN AN ORGANIZED HEALTH CARE EDUCATION/TRAINING PROGRAM | Admitting: STUDENT IN AN ORGANIZED HEALTH CARE EDUCATION/TRAINING PROGRAM
Payer: MEDICARE

## 2019-08-15 VITALS
DIASTOLIC BLOOD PRESSURE: 68 MMHG | OXYGEN SATURATION: 93 % | TEMPERATURE: 98 F | RESPIRATION RATE: 18 BRPM | HEART RATE: 83 BPM | SYSTOLIC BLOOD PRESSURE: 117 MMHG

## 2019-08-15 DIAGNOSIS — D64.9 ANEMIA, UNSPECIFIED: ICD-10-CM

## 2019-08-15 DIAGNOSIS — N17.9 ACUTE KIDNEY FAILURE, UNSPECIFIED: ICD-10-CM

## 2019-08-15 DIAGNOSIS — R55 SYNCOPE AND COLLAPSE: ICD-10-CM

## 2019-08-15 DIAGNOSIS — I10 ESSENTIAL (PRIMARY) HYPERTENSION: ICD-10-CM

## 2019-08-15 DIAGNOSIS — N39.0 URINARY TRACT INFECTION, SITE NOT SPECIFIED: ICD-10-CM

## 2019-08-15 LAB
ALBUMIN SERPL ELPH-MCNC: 3 G/DL — LOW (ref 3.3–5)
ALP SERPL-CCNC: 40 U/L — SIGNIFICANT CHANGE UP (ref 40–120)
ALT FLD-CCNC: 9 U/L — SIGNIFICANT CHANGE UP (ref 4–41)
ANION GAP SERPL CALC-SCNC: 13 MMO/L — SIGNIFICANT CHANGE UP (ref 7–14)
APPEARANCE UR: SIGNIFICANT CHANGE UP
AST SERPL-CCNC: 19 U/L — SIGNIFICANT CHANGE UP (ref 4–40)
BACTERIA # UR AUTO: SIGNIFICANT CHANGE UP
BASE EXCESS BLDV CALC-SCNC: 2.8 MMOL/L — SIGNIFICANT CHANGE UP
BASOPHILS # BLD AUTO: 0.04 K/UL — SIGNIFICANT CHANGE UP (ref 0–0.2)
BASOPHILS NFR BLD AUTO: 0.4 % — SIGNIFICANT CHANGE UP (ref 0–2)
BILIRUB SERPL-MCNC: 0.4 MG/DL — SIGNIFICANT CHANGE UP (ref 0.2–1.2)
BILIRUB UR-MCNC: NEGATIVE — SIGNIFICANT CHANGE UP
BLOOD GAS VENOUS - CREATININE: 1.52 MG/DL — HIGH (ref 0.5–1.3)
BLOOD UR QL VISUAL: HIGH
BUN SERPL-MCNC: 22 MG/DL — SIGNIFICANT CHANGE UP (ref 7–23)
CALCIUM SERPL-MCNC: 9.8 MG/DL — SIGNIFICANT CHANGE UP (ref 8.4–10.5)
CHLORIDE BLDV-SCNC: 105 MMOL/L — SIGNIFICANT CHANGE UP (ref 96–108)
CHLORIDE SERPL-SCNC: 101 MMOL/L — SIGNIFICANT CHANGE UP (ref 98–107)
CK MB BLD-MCNC: 2.12 NG/ML — SIGNIFICANT CHANGE UP (ref 1–6.6)
CK MB BLD-MCNC: SIGNIFICANT CHANGE UP (ref 0–2.5)
CK SERPL-CCNC: 66 U/L — SIGNIFICANT CHANGE UP (ref 30–200)
CO2 SERPL-SCNC: 25 MMOL/L — SIGNIFICANT CHANGE UP (ref 22–31)
COLOR SPEC: YELLOW — SIGNIFICANT CHANGE UP
CREAT SERPL-MCNC: 1.43 MG/DL — HIGH (ref 0.5–1.3)
EOSINOPHIL # BLD AUTO: 0.6 K/UL — HIGH (ref 0–0.5)
EOSINOPHIL NFR BLD AUTO: 6.7 % — HIGH (ref 0–6)
GAS PNL BLDV: 134 MMOL/L — LOW (ref 136–146)
GLUCOSE BLDV-MCNC: 98 MG/DL — SIGNIFICANT CHANGE UP (ref 70–99)
GLUCOSE SERPL-MCNC: 102 MG/DL — HIGH (ref 70–99)
GLUCOSE UR-MCNC: NEGATIVE — SIGNIFICANT CHANGE UP
HCO3 BLDV-SCNC: 27 MMOL/L — SIGNIFICANT CHANGE UP (ref 20–27)
HCT VFR BLD CALC: 28 % — LOW (ref 39–50)
HCT VFR BLDV CALC: 28.1 % — LOW (ref 39–51)
HGB BLD-MCNC: 8.7 G/DL — LOW (ref 13–17)
HGB BLDV-MCNC: 9.1 G/DL — LOW (ref 13–17)
HYALINE CASTS # UR AUTO: NEGATIVE — SIGNIFICANT CHANGE UP
IMM GRANULOCYTES NFR BLD AUTO: 0.3 % — SIGNIFICANT CHANGE UP (ref 0–1.5)
KETONES UR-MCNC: NEGATIVE — SIGNIFICANT CHANGE UP
LACTATE BLDV-MCNC: 1.8 MMOL/L — SIGNIFICANT CHANGE UP (ref 0.5–2)
LEUKOCYTE ESTERASE UR-ACNC: SIGNIFICANT CHANGE UP
LYMPHOCYTES # BLD AUTO: 0.84 K/UL — LOW (ref 1–3.3)
LYMPHOCYTES # BLD AUTO: 9.4 % — LOW (ref 13–44)
MCHC RBC-ENTMCNC: 27.4 PG — SIGNIFICANT CHANGE UP (ref 27–34)
MCHC RBC-ENTMCNC: 31.1 % — LOW (ref 32–36)
MCV RBC AUTO: 88.3 FL — SIGNIFICANT CHANGE UP (ref 80–100)
MONOCYTES # BLD AUTO: 1.01 K/UL — HIGH (ref 0–0.9)
MONOCYTES NFR BLD AUTO: 11.3 % — SIGNIFICANT CHANGE UP (ref 2–14)
NEUTROPHILS # BLD AUTO: 6.39 K/UL — SIGNIFICANT CHANGE UP (ref 1.8–7.4)
NEUTROPHILS NFR BLD AUTO: 71.9 % — SIGNIFICANT CHANGE UP (ref 43–77)
NITRITE UR-MCNC: NEGATIVE — SIGNIFICANT CHANGE UP
NRBC # FLD: 0 K/UL — SIGNIFICANT CHANGE UP (ref 0–0)
PCO2 BLDV: 36 MMHG — LOW (ref 41–51)
PH BLDV: 7.47 PH — HIGH (ref 7.32–7.43)
PH UR: 6.5 — SIGNIFICANT CHANGE UP (ref 5–8)
PLATELET # BLD AUTO: 309 K/UL — SIGNIFICANT CHANGE UP (ref 150–400)
PMV BLD: 10.8 FL — SIGNIFICANT CHANGE UP (ref 7–13)
PO2 BLDV: 166 MMHG — HIGH (ref 35–40)
POTASSIUM BLDV-SCNC: 4.9 MMOL/L — HIGH (ref 3.4–4.5)
POTASSIUM SERPL-MCNC: 3.6 MMOL/L — SIGNIFICANT CHANGE UP (ref 3.5–5.3)
POTASSIUM SERPL-SCNC: 3.6 MMOL/L — SIGNIFICANT CHANGE UP (ref 3.5–5.3)
PROT SERPL-MCNC: 7.9 G/DL — SIGNIFICANT CHANGE UP (ref 6–8.3)
PROT UR-MCNC: 30 — SIGNIFICANT CHANGE UP
RBC # BLD: 3.17 M/UL — LOW (ref 4.2–5.8)
RBC # FLD: 15.2 % — HIGH (ref 10.3–14.5)
RBC CASTS # UR COMP ASSIST: SIGNIFICANT CHANGE UP (ref 0–?)
SAO2 % BLDV: 99.7 % — HIGH (ref 60–85)
SODIUM SERPL-SCNC: 139 MMOL/L — SIGNIFICANT CHANGE UP (ref 135–145)
SP GR SPEC: 1.01 — SIGNIFICANT CHANGE UP (ref 1–1.04)
SQUAMOUS # UR AUTO: SIGNIFICANT CHANGE UP
TROPONIN T, HIGH SENSITIVITY: 106 NG/L — CRITICAL HIGH (ref ?–14)
TROPONIN T, HIGH SENSITIVITY: 87 NG/L — CRITICAL HIGH (ref ?–14)
UROBILINOGEN FLD QL: NORMAL — SIGNIFICANT CHANGE UP
WBC # BLD: 8.91 K/UL — SIGNIFICANT CHANGE UP (ref 3.8–10.5)
WBC # FLD AUTO: 8.91 K/UL — SIGNIFICANT CHANGE UP (ref 3.8–10.5)
WBC UR QL: >50 — HIGH (ref 0–?)

## 2019-08-15 PROCEDURE — 99223 1ST HOSP IP/OBS HIGH 75: CPT

## 2019-08-15 PROCEDURE — 71045 X-RAY EXAM CHEST 1 VIEW: CPT | Mod: 26

## 2019-08-15 RX ORDER — HEPARIN SODIUM 5000 [USP'U]/ML
5000 INJECTION INTRAVENOUS; SUBCUTANEOUS
Refills: 0 | Status: DISCONTINUED | OUTPATIENT
Start: 2019-08-15 | End: 2019-08-20

## 2019-08-15 RX ORDER — AMLODIPINE BESYLATE 2.5 MG/1
5 TABLET ORAL DAILY
Refills: 0 | Status: DISCONTINUED | OUTPATIENT
Start: 2019-08-15 | End: 2019-08-20

## 2019-08-15 RX ORDER — GABAPENTIN 400 MG/1
300 CAPSULE ORAL
Refills: 0 | Status: DISCONTINUED | OUTPATIENT
Start: 2019-08-15 | End: 2019-08-20

## 2019-08-15 RX ORDER — SODIUM CHLORIDE 9 MG/ML
1000 INJECTION INTRAMUSCULAR; INTRAVENOUS; SUBCUTANEOUS
Refills: 0 | Status: DISCONTINUED | OUTPATIENT
Start: 2019-08-15 | End: 2019-08-16

## 2019-08-15 RX ORDER — CEFTRIAXONE 500 MG/1
1000 INJECTION, POWDER, FOR SOLUTION INTRAMUSCULAR; INTRAVENOUS EVERY 24 HOURS
Refills: 0 | Status: DISCONTINUED | OUTPATIENT
Start: 2019-08-16 | End: 2019-08-16

## 2019-08-15 RX ORDER — LABETALOL HCL 100 MG
100 TABLET ORAL
Refills: 0 | Status: DISCONTINUED | OUTPATIENT
Start: 2019-08-15 | End: 2019-08-16

## 2019-08-15 RX ORDER — ASPIRIN/CALCIUM CARB/MAGNESIUM 324 MG
324 TABLET ORAL ONCE
Refills: 0 | Status: COMPLETED | OUTPATIENT
Start: 2019-08-15 | End: 2019-08-15

## 2019-08-15 RX ORDER — CEFTRIAXONE 500 MG/1
1000 INJECTION, POWDER, FOR SOLUTION INTRAMUSCULAR; INTRAVENOUS ONCE
Refills: 0 | Status: COMPLETED | OUTPATIENT
Start: 2019-08-15 | End: 2019-08-15

## 2019-08-15 RX ADMIN — GABAPENTIN 300 MILLIGRAM(S): 400 CAPSULE ORAL at 19:29

## 2019-08-15 RX ADMIN — CEFTRIAXONE 100 MILLIGRAM(S): 500 INJECTION, POWDER, FOR SOLUTION INTRAMUSCULAR; INTRAVENOUS at 15:47

## 2019-08-15 RX ADMIN — SODIUM CHLORIDE 75 MILLILITER(S): 9 INJECTION INTRAMUSCULAR; INTRAVENOUS; SUBCUTANEOUS at 19:10

## 2019-08-15 RX ADMIN — AMLODIPINE BESYLATE 5 MILLIGRAM(S): 2.5 TABLET ORAL at 19:11

## 2019-08-15 RX ADMIN — HEPARIN SODIUM 5000 UNIT(S): 5000 INJECTION INTRAVENOUS; SUBCUTANEOUS at 19:11

## 2019-08-15 RX ADMIN — Medication 324 MILLIGRAM(S): at 15:47

## 2019-08-15 RX ADMIN — Medication 100 MILLIGRAM(S): at 19:11

## 2019-08-15 NOTE — H&P ADULT - NEGATIVE CARDIOVASCULAR SYMPTOMS
no chest pain/no orthopnea/no dyspnea on exertion/no palpitations/no paroxysmal nocturnal dyspnea/no peripheral edema/no claudication

## 2019-08-15 NOTE — CONSULT NOTE ADULT - ATTENDING COMMENTS
Syncope, hypotension in setting of poor PO. Otherwise appears to be a reliable historian.  Has chronic indwelling obrien. BP improved with IV fluids. TTE from 4/2019 reviewed.    Mildly elevated hs-troponin in setting of decreased renal function and recent hypotension. Large leukesterase and white blood cells in urine. No CP or palpitations. No ECG findings to suggest ACS. Given his bifascicular block would continue to monitor on tele for now, though the suspicion of this being the cause of his presentation is low.

## 2019-08-15 NOTE — H&P ADULT - RS GEN PE MLT RESP DETAILS PC
airway patent/no subcutaneous emphysema/respirations non-labored/no wheezes/clear to auscultation bilaterally/normal/breath sounds equal/good air movement/no chest wall tenderness/no rhonchi

## 2019-08-15 NOTE — ED PROVIDER NOTE - OBJECTIVE STATEMENT
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  Family reported to EMS that before their arrival pt was unresponsive for "10 minutes", but pt was awake/alert on EMS arrival.  Pt poor historian, not able to give me any information on what occurred this morning.  EMS states that on their arrival pt was hypotensive to 70s systolic, improved to 110s systolic with IVF.  On exam pt unwell appearing, in moderate, heart RRR, lungs CTAB, abd NTND, extremities without swelling, strength 5/5 in all extremities and skin without rash.

## 2019-08-15 NOTE — ED ADULT NURSE NOTE - NSIMPLEMENTINTERV_GEN_ALL_ED
Implemented All Universal Safety Interventions:  [place] to call system. Call bell, personal items and telephone within reach. Instruct patient to call for assistance. Room bathroom lighting operational. Non-slip footwear when patient is off stretcher. Physically safe environment: no spills, clutter or unnecessary equipment. Stretcher in lowest position, wheels locked, appropriate side rails in place.

## 2019-08-15 NOTE — H&P ADULT - GASTROINTESTINAL DETAILS
bowel sounds normal/no bruit/no rebound tenderness/no rigidity/no masses palpable/no guarding/no organomegaly/normal/nontender/no distention/soft

## 2019-08-15 NOTE — CONSULT NOTE ADULT - ASSESSMENT
88 y/o M w/ PMH of HTN, HLD, CKD, chronic LBP, and BPH w/ chronic indwelling Ma admit for syncope. Cardiology c/s for elev hs-cTn.    #Elev hs-cTn  -Unlikely to be ACS  -Suspect hs-cTn elev 2/2 dehydration/HoTN and ROGER/CKD  -Rec no further inpt ischemia eval  -Hold anti-HTNsives if BP remains labile  -Eval of malaise and poor PO intake as per primary team    #Please call w/ any further Qs    Stone Baker MD  Cardiology Fellow  [phone]  All Cardiology service information can be found 24/7 on [email or web address], password: [email or web address]

## 2019-08-15 NOTE — ED PROVIDER NOTE - CLINICAL SUMMARY MEDICAL DECISION MAKING FREE TEXT BOX
pt with multiple medical conditions as listed, in ED with report of "passing out" before ED arrival; pt awake/alert and in NAD at time of ED eval, but very poor historian and cannot explain episode that occurred this morning; will look for infectious vs electrolyte vs cardiac cause and re-eval; currently BP improving with IVF

## 2019-08-15 NOTE — CONSULT NOTE ADULT - CONSULT REASON
Progress Notes by Dreyer, Roman, MD at 10/23/18 03:54 PM     Author:  Dreyer, Roman, MD Service:  (none) Author Type:  Physician     Filed:  10/23/18 04:43 PM Encounter Date:  10/23/2018 Status:  Signed     :  Dreyer, Roman, MD (Physician)              Kingsley Mota is a very pleasant[RD1.1C] 60 year old[RD1.1T] male, here for:         6 months follow up[RD1.1C]      >>>>  follow up[RD1.2T] from [facility] yesterday  He had possible allergic reaction, and felt throat swelling and itching.  He vomited also[RD1.2M] x 2[RD1.3M] at work prior to coming in.[RD1.2M]  Felt itchy and jittery.  He may have eating something at work, but does not remember what.[RD1.3M]    Kingsley[RD1.2T] was given epi, zofran, IV solumedrol and prednisone taper over 6 days  The throat swelling is improved  He feels it could be related to food.[RD1.2M]  Kingsley[RD1.2T] has never had this type of reaction in the past.[RD1.2M]    No new detergents, soaps, moisturizing lotions, make-up, foods, drinks, medications, insect bites, plant exposure, occupational exposure.[RD1.2T]      >>>>  Pre-diabetes   Currently diet controlled[RD1.1C]  Recent a1c was significantly increased in 5/2018[RD1.3M]  Kingsley[RD1.3T] did not[RD1.3M] follow up[RD1.3T] to recheck it in August 2018[RD1.3M]  Kingsley denies any symptoms of hyperglycemia.  Kingsley walks for exercise 10,000 steps per day  However does not control dietary calorie intake.    No polyuria, polydipsia  No blurry vision.          Diabetes Testing[RD1.1C]   Lab Results   Component Value Date    A1C 6.9 05/12/2018    A1C 6.6 10/28/2017    A1C 6.7 05/06/2017    GLUCOSE 111 05/12/2018    GLUCOSE 117 10/28/2017        Lab Results      Component  Value Date    HGB 14.6 05/09/2017    HGB 14.5 10/06/2016         Wt Readings from Last 5 Encounters:    10/23/18 131 lb (59.4 kg)   04/09/18 135 lb (61.2 kg)   10/26/17 133 lb (60.3 kg)   07/18/17 135 lb (61.2 kg)   05/09/17 132 lb (59.9 kg)[RD1.1T]            ------------------------------------------  hyperlipidemia  Kingsley states that he is compliant with: lipitor 10 mg[RD1.1C]  He is due for a recheck of[RD1.3M]  LDL (bad cholesterol) because[RD1.3T] it was elevated in 2018[RD1.3M]    Lipid Panel with Hepatic Function[RD1.1C]   Lab Results   Component Value Date    CHOL 276 2018    HDL 47 2018    TRIG 130 2018     2018    AST 18 2018    ALT 22 2018[RD1.1T]             PMH:  -hyperlipidemia  -Impaired fasting glucose  -coronary angiogram in --normal   -screening colonoscopy   -Myoview stress: normal SPECT, 1 mm ST depression---false [+] EKG.     PSH:  -none    SOCIAL HX:  -no cig  -no etoh  -work: customer service, [employer]    FAMILY HX:  -father  of a stroke in his 60's  -mother: healthy  -6 siblings ---healthy    MEDS LIST:[RD1.1C]  Current Outpatient Prescriptions     Medication  Sig   • EPINEPHrine 0.3 MG/0.3ML SOAJ Use as directed and seek care immediately   • predniSONE (DELTASONE) 20 MG tablet 60mg daily for 2 days, then 40mg daily for 2 days, then 20mg daily for 2 days   • atorvastatin (LIPITOR) 10 MG tablet Take 1 Tab by mouth daily.   • fluocinonide (LIDEX) 0.05 % gel Apply to the affected areas of scalp twice daily until clear   • fexofenadine (ALLEGRA ALLERGY) 180 MG tablet Take 1 Tab by mouth daily.[RD1.1T]        ROS:[RD1.1C]  --General: No fever, no chills, no fatigue  --HENT:[RD1.3T] see HIP[RD1.3M]  --Respiratory: No cough, no dyspnea on exertion  --Cardiovascular: No chest discomfort, [+] palpitations.  --Gastrointestinal:[RD1.3T] see HPI[RD1.3M]  --Genitourinary: No dysuria, no frequency, no hematuria  --Musculoskeletal: No back pain, no joint pain, no extremity pain  --Neurologic: No syncope, no headache.  --Psychiatric: [+][RD1.3T] mild agitation.[RD1.3M]  --Hematologic/Lymphatic/Immunologic: No bruising or bleeding  --Endocrine: No polydipsia/polyuria  --Skin: No rash[RD1.3T]            PHYSICAL EXAM:[RD1.1C]    /78  Pulse 100  Temp 98.6 °F (37 °C) (Tympanic)  Ht 5' (1.524 m)  Wt 131 lb (59.4 kg)  BMI 25.58 kg/m2[RD1.1T]       GENERAL:  He is a well developed, well-nourished male, no acute distress,  ambulatory, alert/oriented x 3    HEAD: normocephalic, atraumatic, no scalp lesions, no rashes seen.    EYES:  PERRL, EOMI, Right eye: non-injected conjunctiva, no icterus.  Left eye: non-injected conjunctiva, no icterus.  THROAT:  oropharynx clear,  oral mucosa pink and moist, no tonsillar hypertrophy, no tonsillar exudate.  MOUTH:  Normal teeth, gums, tongue. Oral mucosa without lesions.[RD1.1C] No palatal, tongue or uvula edema.[RD1.3M]    NECK:  Supple, normal flexion/extension/rotation, no lymphadenopathy, no thyromegaly, no jvd, no carotid bruits[RD1.1C], no stridor[RD1.3M]    CHEST:  auscultation:  clear on the left, on the right, normal respiratory effort    CVS: Regular rhythm, normal heart rate, normal S1, S2; no S3, no S4, no murmur,     ABD: normo-active bowel sounds, soft to palpation, no distension, no tenderness, no hepato-splenomegaly, no hernias,    EXT: no clubbing, cyanosis, edema.  No varicosity, no calf tenderness, no calf induration.  DP/PT pulses are 2+ bilaterally,      NEURO:  CN II-XII intact, no nystagmus; Normal gait. Normal mentation. No focal neurological abnormalities.             ASSESSMENT / PLAN:[RD1.1C]    1. Anaphylaxis, subsequent encounter[RD1.4T]  Check RAST testing of food and environmental allergens  Refer to allergist service  for consultation  Acute symptoms are resolved  EKG checked to[RD1.3M] rule out[RD1.3T] cardiac issue:[RD1.3M] normal sinus rhythm[RD1.3T], and no signs of MI  Call 911 or go to ER if severe throat swelling or nausea recurs[RD1.3M]    - ALLERGY ORDER  - EKG - READY BY IM OR FM PROVIDER    2. Impaired fasting glucose[RD1.4T]  Monitor[RD1.3M] blood sugar[RD1.3T] and  a1c next week, after prednisone is comleted.[RD1.3M]    3.  Elev troponin Hyperlipidemia, unspecified hyperlipidemia type[RD1.4T]  Monitor lipids and[RD1.3M] liver function tests[RD1.3T] next week[RD1.3M]  - LIPID PROFILE - FILL IN THE DATE  - HEPATIC PANEL - FILL IN THE DATE[RD1.4T]          FOLLOW UP VISIT with R. DREYER, MD in[RD1.1C] April 2019[RD1.3M]  or earlier, if needed.     All of the instruction documented above was provided to the patient in writing via an After-Visit Summary.      The patient indicates understanding of these issues and agrees with the plan.  _______________[RD1.1C]  Electronically Signed by:    Roman Dreyer, MD , 10/23/2018[RD1.1T]      Revision History        User Key Date/Time User Provider Type Action    > RD1.4 10/23/18 04:43 PM Dreyer, Roman, MD Physician Sign     RD1.3 10/23/18 04:35 PM Dreyer, Roman, MD Physician      RD1.2 10/23/18 03:55 PM Dreyer, Roman, MD Physician      RD1.1 10/23/18 03:54 PM Dreyer, Roman, MD Physician     C - Copied, M - Manual, T - Template

## 2019-08-15 NOTE — H&P ADULT - PROBLEM SELECTOR PLAN 1
TTE  CE x 3, telemetry , serial EKG's   elevated high sensitivity troponin - doubt ACS- will keep trending   check orthostatics  asa TTE  CE x 3, telemetry , serial EKG's   elevated high sensitivity troponin - doubt ACS- will keep trending   check orthostatics  asa  Case d w DR Vernon

## 2019-08-15 NOTE — H&P ADULT - HISTORY OF PRESENT ILLNESS
88 yo M BPH with chronic obrien, HTN, CKD, spinal stenosis here for syncope. AS per ED chart and family patient passed out. As per patient he was "unresponsive " but he does not remember event only recalling his family' telling him what had happened. As per ED chart pt was hypotensive 70's systolic and improved to 110 with IVF. Pt currently without any complaints. NO chest pain, SOB, MANTILLA, PND, orthopnea, palpitations, diaphoresis, lightheadedness, dizziness, syncope, increased lower extremity edema, fever chills, malaise, myalgias, anorexia, weight changes ( loss or gain), night sweats, generalized fatigue abdominal pain, N/V/C/D BRBPR, melena, urinary symptoms, cough, and wheezing. Pt has been on Cipro 500mg PO BID since 8/11 for UTI

## 2019-08-15 NOTE — ED ADULT NURSE NOTE - OBJECTIVE STATEMENT
Low RN: Pt a&ox2 coming from home, initial complaint was having syncopal episodes at home, pt denies any present injury, pt has indwelling obrien in place, pt ekg showed abnormalities with ems, but when the pt arrived to the ED a stemi was not seen on the ekg gathered here. Pt breathing even and unlabored, poor historian, nsr on monitor, denies cp/discomfort, no headache/dizziness, abd soft, non-tender, non-distended, skin is cool and dry. IVL is clear and patent via ems, labs drawn and sent, rpt given to primary RN.

## 2019-08-15 NOTE — ED PROVIDER NOTE - CARE PLAN
Principal Discharge DX:	Syncope  Secondary Diagnosis:	ROGER (acute kidney injury)  Secondary Diagnosis:	Elevated troponin  Secondary Diagnosis:	UTI (urinary tract infection)

## 2019-08-15 NOTE — H&P ADULT - NEGATIVE GASTROINTESTINAL SYMPTOMS
no vomiting/no flatulence/no constipation/no change in bowel habits/no steatorrhea/no hiccoughs/no nausea/no abdominal pain/no melena/no jaundice/no diarrhea

## 2019-08-15 NOTE — H&P ADULT - NEGATIVE GENERAL SYMPTOMS
no weight gain/no polyphagia/no polyuria/no polydipsia/no fever/no malaise/no weight loss/no fatigue/no anorexia/no chills/no sweating

## 2019-08-15 NOTE — CONSULT NOTE ADULT - SUBJECTIVE AND OBJECTIVE BOX
Patient seen and evaluated at bedside    Chief Complaint: Elev troponin    HPI: Pt seen/examined at bedside using Mongolian Creole . 89 y/o M w/ PMH of HTN, HLD, CKD, chronic LBP, and BPH w/ chronic indwelling Ma admit for syncope. Pt seen/examined at bedside, but is a subpar historian. As per pt/fam, pt has had malaise and poor PO intake over last 24 hours. This AM, daughter noticed pt not eating anything and had poor PO intake of fluids as well. While sitting in chair, pt suddenly had LOC and his eyes rolled upwards. Total downtime ~several mins. Upon EMS arrival, they noted he was HoTNsive w/ BP in 70s. Pt not able to elaborate hx further, but he is very clear that he had no CP/dyspnea/palps. He remains w/o complaints at time of my interview.    PMHx:   Hydrocele in adult  Renal insufficiency  Dehydration  Spinal stenosis of lumbar region  BPH (benign prostatic hypertrophy)  Hypertension  Benign Prostatic Hyperplasia  HTN (Hypertension)      PSHx:   No significant past surgical history      Allergies:  No Known Allergies      Home Meds: Reviewed    FAMILY HISTORY:  No significant family history (Father, Mother)      Social History: Denies    REVIEW OF SYSTEMS:  CONSTITUTIONAL: No weakness, fevers or chills  EYES/ENT: No visual changes;  No dysphagia  NECK: No pain or stiffness  RESPIRATORY: No cough, wheezing, hemoptysis; No shortness of breath  CARDIOVASCULAR: No chest pain or palpitations; No lower extremity edema  GASTROINTESTINAL: No abdominal or epigastric pain. No nausea, vomiting, or hematemesis; No diarrhea or constipation. No melena or hematochezia.  BACK: No back pain  GENITOURINARY: No dysuria, frequency or hematuria  NEUROLOGICAL: No numbness or weakness  SKIN: No itching, burning, rashes, or lesions   All other review of systems is negative unless indicated above.    Physical Exam:  T(F): 98.3 (08-15), Max: 98.3 (08-15)  HR: 86 (08-15) (78 - 86)  BP: 128/73 (08-15) (106/56 - 128/73)  RR: 17 (08-15)  SpO2: 100% (08-15)  Gen: NAD.  HEENT: NCAT. PERRLA b/l.  Neck: No JVP elev.  CV: Normal S1, S2. RRR. No MRG.  Chest: CTAB. No WRR.  Abd: +BSx4. Soft. NTND.  Ext: No LE edema.  Skin: No cyanosis.    Cardiovascular Diagnostic Testing:    ECG: Personally reviewed: RBBB and LAFB. L axis deviation.    Imaging:    CXR: Personally reviewed.   Rotation limits evaluation but there are no focal consolidations. The   heart is not enlarged and no effusions or vascular congestion.    Labs: Personally reviewed                        8.7    8.91  )-----------( 309      ( 15 Aug 2019 12:17 )             28.0     08-15    139  |  101  |  22  ----------------------------<  102<H>  3.6   |  25  |  1.43<H>    Ca    9.8      15 Aug 2019 12:17    TPro  7.9  /  Alb  3.0<L>  /  TBili  0.4  /  DBili  x   /  AST  19  /  ALT  9   /  AlkPhos  40  08-15

## 2019-08-15 NOTE — ED ADULT NURSE REASSESSMENT NOTE - NS ED NURSE REASSESS COMMENT FT1
pt denies any symptoms. awake and alert. breathing unlabored. NSR on monitor. report given to ESSU 2 LAMAR Solomon. brought to essu 2 to await bed assignment.

## 2019-08-16 ENCOUNTER — TRANSCRIPTION ENCOUNTER (OUTPATIENT)
Age: 84
End: 2019-08-16

## 2019-08-16 DIAGNOSIS — Z29.9 ENCOUNTER FOR PROPHYLACTIC MEASURES, UNSPECIFIED: ICD-10-CM

## 2019-08-16 DIAGNOSIS — Z71.89 OTHER SPECIFIED COUNSELING: ICD-10-CM

## 2019-08-16 DIAGNOSIS — E87.6 HYPOKALEMIA: ICD-10-CM

## 2019-08-16 LAB
ANION GAP SERPL CALC-SCNC: 12 MMO/L — SIGNIFICANT CHANGE UP (ref 7–14)
ANION GAP SERPL CALC-SCNC: 14 MMO/L — SIGNIFICANT CHANGE UP (ref 7–14)
BASOPHILS # BLD AUTO: 0.03 K/UL — SIGNIFICANT CHANGE UP (ref 0–0.2)
BASOPHILS NFR BLD AUTO: 0.4 % — SIGNIFICANT CHANGE UP (ref 0–2)
BUN SERPL-MCNC: 27 MG/DL — HIGH (ref 7–23)
BUN SERPL-MCNC: 29 MG/DL — HIGH (ref 7–23)
CALCIUM SERPL-MCNC: 8.2 MG/DL — LOW (ref 8.4–10.5)
CALCIUM SERPL-MCNC: 9.6 MG/DL — SIGNIFICANT CHANGE UP (ref 8.4–10.5)
CHLORIDE SERPL-SCNC: 102 MMOL/L — SIGNIFICANT CHANGE UP (ref 98–107)
CHLORIDE SERPL-SCNC: 105 MMOL/L — SIGNIFICANT CHANGE UP (ref 98–107)
CHOLEST SERPL-MCNC: 117 MG/DL — LOW (ref 120–199)
CO2 SERPL-SCNC: 22 MMOL/L — SIGNIFICANT CHANGE UP (ref 22–31)
CO2 SERPL-SCNC: 26 MMOL/L — SIGNIFICANT CHANGE UP (ref 22–31)
CREAT SERPL-MCNC: 1.34 MG/DL — HIGH (ref 0.5–1.3)
CREAT SERPL-MCNC: 1.35 MG/DL — HIGH (ref 0.5–1.3)
EOSINOPHIL # BLD AUTO: 0.6 K/UL — HIGH (ref 0–0.5)
EOSINOPHIL NFR BLD AUTO: 8.9 % — HIGH (ref 0–6)
FERRITIN SERPL-MCNC: 565.9 NG/ML — HIGH (ref 30–400)
FOLATE SERPL-MCNC: 10.8 NG/ML — SIGNIFICANT CHANGE UP (ref 4.7–20)
GLUCOSE SERPL-MCNC: 80 MG/DL — SIGNIFICANT CHANGE UP (ref 70–99)
GLUCOSE SERPL-MCNC: 98 MG/DL — SIGNIFICANT CHANGE UP (ref 70–99)
HBA1C BLD-MCNC: 5.1 % — SIGNIFICANT CHANGE UP (ref 4–5.6)
HCT VFR BLD CALC: 24.3 % — LOW (ref 39–50)
HCT VFR BLD CALC: 25.3 % — LOW (ref 39–50)
HDLC SERPL-MCNC: 29 MG/DL — LOW (ref 35–55)
HGB BLD-MCNC: 7.8 G/DL — LOW (ref 13–17)
HGB BLD-MCNC: 8 G/DL — LOW (ref 13–17)
IMM GRANULOCYTES NFR BLD AUTO: 0.3 % — SIGNIFICANT CHANGE UP (ref 0–1.5)
IRON SATN MFR SERPL: 103 UG/DL — LOW (ref 155–535)
IRON SATN MFR SERPL: 17 UG/DL — LOW (ref 45–165)
LIPID PNL WITH DIRECT LDL SERPL: 71 MG/DL — SIGNIFICANT CHANGE UP
LYMPHOCYTES # BLD AUTO: 1.25 K/UL — SIGNIFICANT CHANGE UP (ref 1–3.3)
LYMPHOCYTES # BLD AUTO: 18.5 % — SIGNIFICANT CHANGE UP (ref 13–44)
MAGNESIUM SERPL-MCNC: 1.5 MG/DL — LOW (ref 1.6–2.6)
MAGNESIUM SERPL-MCNC: 1.7 MG/DL — SIGNIFICANT CHANGE UP (ref 1.6–2.6)
MCHC RBC-ENTMCNC: 27.8 PG — SIGNIFICANT CHANGE UP (ref 27–34)
MCHC RBC-ENTMCNC: 28.2 PG — SIGNIFICANT CHANGE UP (ref 27–34)
MCHC RBC-ENTMCNC: 31.6 % — LOW (ref 32–36)
MCHC RBC-ENTMCNC: 32.1 % — SIGNIFICANT CHANGE UP (ref 32–36)
MCV RBC AUTO: 87.7 FL — SIGNIFICANT CHANGE UP (ref 80–100)
MCV RBC AUTO: 87.8 FL — SIGNIFICANT CHANGE UP (ref 80–100)
MONOCYTES # BLD AUTO: 0.62 K/UL — SIGNIFICANT CHANGE UP (ref 0–0.9)
MONOCYTES NFR BLD AUTO: 9.2 % — SIGNIFICANT CHANGE UP (ref 2–14)
NEUTROPHILS # BLD AUTO: 4.22 K/UL — SIGNIFICANT CHANGE UP (ref 1.8–7.4)
NEUTROPHILS NFR BLD AUTO: 62.7 % — SIGNIFICANT CHANGE UP (ref 43–77)
NRBC # FLD: 0 K/UL — SIGNIFICANT CHANGE UP (ref 0–0)
NRBC # FLD: 0.02 K/UL — SIGNIFICANT CHANGE UP (ref 0–0)
PHOSPHATE SERPL-MCNC: 3.4 MG/DL — SIGNIFICANT CHANGE UP (ref 2.5–4.5)
PHOSPHATE SERPL-MCNC: 3.5 MG/DL — SIGNIFICANT CHANGE UP (ref 2.5–4.5)
PLATELET # BLD AUTO: 247 K/UL — SIGNIFICANT CHANGE UP (ref 150–400)
PLATELET # BLD AUTO: 267 K/UL — SIGNIFICANT CHANGE UP (ref 150–400)
PMV BLD: 10 FL — SIGNIFICANT CHANGE UP (ref 7–13)
PMV BLD: 11.1 FL — SIGNIFICANT CHANGE UP (ref 7–13)
POTASSIUM SERPL-MCNC: 2.9 MMOL/L — CRITICAL LOW (ref 3.5–5.3)
POTASSIUM SERPL-MCNC: 3.4 MMOL/L — LOW (ref 3.5–5.3)
POTASSIUM SERPL-SCNC: 2.9 MMOL/L — CRITICAL LOW (ref 3.5–5.3)
POTASSIUM SERPL-SCNC: 3.4 MMOL/L — LOW (ref 3.5–5.3)
RBC # BLD: 2.77 M/UL — LOW (ref 4.2–5.8)
RBC # BLD: 2.88 M/UL — LOW (ref 4.2–5.8)
RBC # FLD: 15.3 % — HIGH (ref 10.3–14.5)
RBC # FLD: 15.4 % — HIGH (ref 10.3–14.5)
RETICS #: 32 K/UL — SIGNIFICANT CHANGE UP (ref 25–125)
RETICS/RBC NFR: 1.1 % — SIGNIFICANT CHANGE UP (ref 0.5–2.5)
SODIUM SERPL-SCNC: 140 MMOL/L — SIGNIFICANT CHANGE UP (ref 135–145)
SODIUM SERPL-SCNC: 141 MMOL/L — SIGNIFICANT CHANGE UP (ref 135–145)
SPECIMEN SOURCE: SIGNIFICANT CHANGE UP
TRIGL SERPL-MCNC: 88 MG/DL — SIGNIFICANT CHANGE UP (ref 10–149)
TROPONIN T, HIGH SENSITIVITY: 117 NG/L — CRITICAL HIGH (ref ?–14)
TROPONIN T, HIGH SENSITIVITY: 127 NG/L — CRITICAL HIGH (ref ?–14)
TSH SERPL-MCNC: 1.46 UIU/ML — SIGNIFICANT CHANGE UP (ref 0.27–4.2)
UIBC SERPL-MCNC: 85.5 UG/DL — LOW (ref 110–370)
VIT B12 SERPL-MCNC: 548 PG/ML — SIGNIFICANT CHANGE UP (ref 200–900)
WBC # BLD: 5.84 K/UL — SIGNIFICANT CHANGE UP (ref 3.8–10.5)
WBC # BLD: 6.74 K/UL — SIGNIFICANT CHANGE UP (ref 3.8–10.5)
WBC # FLD AUTO: 5.84 K/UL — SIGNIFICANT CHANGE UP (ref 3.8–10.5)
WBC # FLD AUTO: 6.74 K/UL — SIGNIFICANT CHANGE UP (ref 3.8–10.5)

## 2019-08-16 PROCEDURE — 99233 SBSQ HOSP IP/OBS HIGH 50: CPT

## 2019-08-16 PROCEDURE — 93010 ELECTROCARDIOGRAM REPORT: CPT

## 2019-08-16 RX ORDER — MAGNESIUM SULFATE 500 MG/ML
2 VIAL (ML) INJECTION ONCE
Refills: 0 | Status: COMPLETED | OUTPATIENT
Start: 2019-08-16 | End: 2019-08-16

## 2019-08-16 RX ORDER — POTASSIUM CHLORIDE 20 MEQ
40 PACKET (EA) ORAL EVERY 4 HOURS
Refills: 0 | Status: COMPLETED | OUTPATIENT
Start: 2019-08-16 | End: 2019-08-16

## 2019-08-16 RX ORDER — VANCOMYCIN HCL 1 G
750 VIAL (EA) INTRAVENOUS EVERY 12 HOURS
Refills: 0 | Status: DISCONTINUED | OUTPATIENT
Start: 2019-08-16 | End: 2019-08-16

## 2019-08-16 RX ORDER — POTASSIUM CHLORIDE 20 MEQ
20 PACKET (EA) ORAL ONCE
Refills: 0 | Status: COMPLETED | OUTPATIENT
Start: 2019-08-16 | End: 2019-08-16

## 2019-08-16 RX ORDER — VANCOMYCIN HCL 1 G
1000 VIAL (EA) INTRAVENOUS EVERY 24 HOURS
Refills: 0 | Status: DISCONTINUED | OUTPATIENT
Start: 2019-08-16 | End: 2019-08-18

## 2019-08-16 RX ORDER — POTASSIUM CHLORIDE 20 MEQ
10 PACKET (EA) ORAL
Refills: 0 | Status: COMPLETED | OUTPATIENT
Start: 2019-08-16 | End: 2019-08-16

## 2019-08-16 RX ADMIN — GABAPENTIN 300 MILLIGRAM(S): 400 CAPSULE ORAL at 06:32

## 2019-08-16 RX ADMIN — Medication 100 MILLIEQUIVALENT(S): at 09:47

## 2019-08-16 RX ADMIN — AMLODIPINE BESYLATE 5 MILLIGRAM(S): 2.5 TABLET ORAL at 06:32

## 2019-08-16 RX ADMIN — Medication 100 MILLIGRAM(S): at 06:32

## 2019-08-16 RX ADMIN — HEPARIN SODIUM 5000 UNIT(S): 5000 INJECTION INTRAVENOUS; SUBCUTANEOUS at 06:32

## 2019-08-16 RX ADMIN — Medication 100 MILLIEQUIVALENT(S): at 11:39

## 2019-08-16 RX ADMIN — HEPARIN SODIUM 5000 UNIT(S): 5000 INJECTION INTRAVENOUS; SUBCUTANEOUS at 18:58

## 2019-08-16 RX ADMIN — Medication 50 GRAM(S): at 21:26

## 2019-08-16 RX ADMIN — GABAPENTIN 300 MILLIGRAM(S): 400 CAPSULE ORAL at 18:59

## 2019-08-16 RX ADMIN — Medication 50 GRAM(S): at 15:21

## 2019-08-16 RX ADMIN — Medication 40 MILLIEQUIVALENT(S): at 21:26

## 2019-08-16 RX ADMIN — Medication 20 MILLIEQUIVALENT(S): at 11:40

## 2019-08-16 RX ADMIN — Medication 40 MILLIEQUIVALENT(S): at 18:59

## 2019-08-16 RX ADMIN — Medication 250 MILLIGRAM(S): at 18:59

## 2019-08-16 RX ADMIN — Medication 100 MILLIEQUIVALENT(S): at 07:42

## 2019-08-16 NOTE — PHYSICAL THERAPY INITIAL EVALUATION ADULT - CRITERIA FOR SKILLED THERAPEUTIC INTERVENTIONS
impairments found/therapy frequency/anticipated discharge recommendation/predicted duration of therapy intervention/rehab potential

## 2019-08-16 NOTE — CONSULT NOTE ADULT - ASSESSMENT
89 yo M with a PMH of renal insufficiency, spinal stenosis, wheelchair bound x "many years", HTN, BPH, recent hospitalization 04/2019 for syncope/unresponsiveness after BP meds increased (treated for K. pneumoniae UTI), recurrent UTIs, urosepsis June 2018 (K. pneumoniae), and chronic indwelling obrien for obstructive uropathy who presented yesterday 8/15/2019 with a cc of syncope, found to be hypotensive by EMS, SBP 70s.  Per family, pt had been experiencing malaise and poor appetite.  ACS ruled out by Cardiology.  LVEF preserved on previous echo in Apr 2019 (LVEF 55-60%):    - would consider an alternative agent for hypertension; avoid AV lorrie blocking agents for now  - continue telemetry  - Keep K+ 4.0 - 4.5 & Mg 2.0 - 2.5  - further mgmt of UTI per ID/primary  - will d/w EP attending, Dr. Roberson 88 yo M with a PMH of renal insufficiency, spinal stenosis, wheelchair bound x "many years", HTN, BPH, recent hospitalization 04/2019 for syncope/unresponsiveness after BP meds increased (treated for K. pneumoniae UTI), recurrent UTIs, urosepsis June 2018 (K. pneumoniae), and chronic indwelling obrien for obstructive uropathy who presented yesterday 8/15/2019 with a cc of syncope, found to be hypotensive by EMS, SBP 70s.  Per family, pt had been experiencing malaise and poor appetite.  ACS ruled out by Cardiology.  LVEF preserved on previous echo in Apr 2019 (LVEF 55-60%):    - would consider an alternative agent for hypertension; avoid AV lorrie blocking agents for now  - continue telemetry  - Keep K+ 4.0 - 4.5 & Mg 2.0 - 2.5  - further mgmt of UTI per ID/primary  - will d/w EP attending, Dr. Sorenson

## 2019-08-16 NOTE — CONSULT NOTE ADULT - SUBJECTIVE AND OBJECTIVE BOX
HPI/CC:  Asked to see this 89 yo M with a PMH of renal insufficiency, spinal stenosis, wheelchair bound x many years, HTN, BPH, recent hospitalization 2019 for syncope/unresponsiveness after BP meds increased (treated for K. pneumoniae UTI), recurrent UTIs, urosepsis 2018 (K. pneumoniae), and chronic indwelling obrien for obstructive uropathy who presented yesterday 8/15/2019 with a cc of syncope, found to be hypotensive by EMS, SBP 70s.  Per family, pt had been experiencing malaise and poor appetite.  ACS ruled out by Cardiology.  LVEF preserved on previous echo in 2019 (LVEF 55-60%).  On exam, the patient was interviewed in his native language, [place] [employer]; he is alert, oriented, and a good historian.  He has a good understanding of his reason for visit however cannot recall any events surrounding his recurrent syncopal episodes.    PAST MEDICAL & SURGICAL HISTORY:  Hydrocele in adult: bilateral chronic  Renal insufficiency  Dehydration  Spinal stenosis of lumbar region  BPH (benign prostatic hypertrophy)  Hypertension  Benign Prostatic Hyperplasia  HTN (Hypertension)  No significant past surgical history  	  FAMILY HISTORY:  No significant family history    DRUG ALLERGIES:  No Known Allergies    MEDICATIONS:  amLODIPine   Tablet 5 milliGRAM(s) Oral daily  heparin  Injectable 5000 Unit(s) SubCutaneous two times a day  labetalol 100 milliGRAM(s) Oral two times a day    SOCIAL HISTORY:    Denies illicit drug use or tobacco use  Denies alcohol 	    REVIEW OF SYSTEMS:  CONSTITUTIONAL: No fever, weight loss, or fatigue  EYES: No eye pain, visual disturbances, or discharge  ENMT:  No difficulty hearing, tinnitus, vertigo; No sinus or throat pain  NECK: No pain or stiffness  RESPIRATORY: No cough, wheezing, chills or hemoptysis; No Shortness of Breath  CARDIOVASCULAR: No chest pain, palpitations, passing out, dizziness, or leg swelling  GASTROINTESTINAL: No abdominal or epigastric pain. No nausea, vomiting, or hematemesis; No diarrhea or constipation. No melena or hematochezia.  GENITOURINARY: No dysuria, frequency, hematuria, or incontinence  NEUROLOGICAL: No headaches, memory loss, loss of strength, numbness, or tremors  SKIN: No itching, burning, rashes, or lesions   LYMPH Nodes: No enlarged glands  ENDOCRINE: No heat or cold intolerance; No hair loss  MUSCULOSKELETAL: No joint pain or swelling; No muscle, back, or extremity pain  PSYCHIATRIC: No depression, anxiety, mood swings, or difficulty sleeping  HEME/LYMPH: No easy bruising, or bleeding gums  ALLERGY AND IMMUNOLOGIC: No hives or eczema	  All others negative	    PHYSICAL EXAM:  T(C): 36.6 (19 @ 10:54), Max: 37.4 (08-15-19 @ 19:06)  HR: 72 (19 @ 10:54) (72 - 86)  BP: 106/60 (19 @ 10:54) (106/60 - 135/70)  RR: 17 (19 @ 10:54) (16 - 18)  SpO2: 99% (19 @ 10:54) (97% - 100%)  Wt(kg): --  I&O's Summary    15 Aug 2019 07:01  -  16 Aug 2019 07:00  --------------------------------------------------------  IN: 0 mL / OUT: 400 mL / NET: -400 mL    Appearance: Normal	  HEENT:   Normal oral mucosa, PERRL, EOMI	  Lymphatic: No lymphadenopathy  Cardiovascular: Normal S1 S2, No JVD, No murmurs, No edema  Chest: clear  Respiratory: Lungs clear to auscultation	  Psychiatry: A & O x 3, Mood & affect appropriate  Gastrointestinal:  Soft, Non-tender, + BS	  Skin: No rashes, No ecchymoses, No cyanosis	  Neurologic: Non-focal  : obrien in situ  Extremities: Normal range of motion, No clubbing, cyanosis , 1-2+ pitting ankle edema, bilaterally  Vascular: Peripheral pulses palpable 2+ bilaterally    DIAGNOSTICS:  TELEMETRY: NSR, isolated VPC followed by brief NSVT  x 4beats  12 Lead EC2019, NSR 68 bpm, RBBB, LAFB, ARJUN 164, , QTc 521bpm  CATH: N/A  2D TTE:   < from: TTE Echo Doppler w/o Cont (19 @ 09:55) >   EXAM:  TTE WO CON COMPLETE W DOPPL      PROCEDURE DATE:  2019      INTERPRETATION:  REPORT:    TRANSTHORACIC ECHOCARDIOGRAM REPORT    Patient Name:   MOIZ RANDOLPH Patient Location: [facility] Rec #:  [id number]      Accession #:     [id number]  Account #:                      Height:           70.1 in 178.0 cm  YOB: 1930       Weight:           151.0 lb 68.50 kg  Patient Age:    89 years        BSA:              1.85 m²  Patient Gender: M               BP:          140/63 mmHg     Date of Exam:        2019 9:55:32 AM  Sonographer:         VICKY  Referring Physician: ANDREA    Procedure:   2D Echo/Doppler/Color Doppler Complete.  Indications: Syncope and collapse - R55  Diagnosis:   Syncope and collapse - R55    < end of copied text >  < from: TTE Echo Doppler w/o Cont (19 @ 09:55) >  Summary:   1. Left ventricular ejection fraction, by visual estimation, is 55 to   60%.   2. Spectral Doppler shows impaired relaxation pattern of left   ventricular myocardial filling (Grade I diastolic dysfunction).   3. Mild mitral annular calcification.   4. Mild mitral valve regurgitation.   5. Mild thickening and calcification of the anterior and posterior   mitral valve leaflets.   6. Mild to moderate aortic regurgitation.    [id number] Huseyin Ellis MDMD  Electronically signed on 2019 at 1:27:12 PM  *** Final ***  HUSEYIN ELLIS   This document has been electronically signed. 2019  9:55AM  < end of copied text >    HARRY: N/A	  RADIOLOGY: N/A    OTHER: N/A    LABS:	 	                          7.8    6.74  )-----------( 267      ( 16 Aug 2019 05:50 )             24.3     08-16    141  |  105  |  27<H>  ----------------------------<  80  2.9<LL>   |  22  |  1.34<H>    Ca    8.2<L>      16 Aug 2019 05:50  Phos  3.5     08-16  Mg     1.5     08-16    TPro  7.9  /  Alb  3.0<L>  /  TBili  0.4  /  DBili  x   /  AST  19  /  ALT  9   /  AlkPhos  40  -15    proBNP: N/A

## 2019-08-16 NOTE — CHART NOTE - NSCHARTNOTEFT_GEN_A_CORE
Ucx results reviewed showing >100K Staph aureus in setting of Chronic Ma.   Will start IV vancomycin for now and deescalate based on UCx sensitivities.

## 2019-08-16 NOTE — PHYSICAL THERAPY INITIAL EVALUATION ADULT - ADDITIONAL COMMENTS
Pt. poor historian. Social history and previous level of function obtained from patient's daughter at bedside. Pt. owns DME of rolling walker, wheelchair. Pt. has HHA 7 days/week for 10 hours/day. Pt. is non-ambulatory for past 5 years. Pt. transfers from bed<>chair by squat pviot transfer and with assistance.     Pt. was left supine in bed post PT Evaluation, NAD, all lines intact, daughter present.

## 2019-08-16 NOTE — PROGRESS NOTE ADULT - ASSESSMENT
89M BPH with chronic obrien, HTN, CKD3, spinal stenosis and chronically bedbound/chair bound here for syncope.  Syncope likely 2/2 dehydration, however currently undergoing infectious workup.  Discussed with cardiology, unlikely to be a cardiogenic syncope.

## 2019-08-16 NOTE — PHYSICAL THERAPY INITIAL EVALUATION ADULT - GENERAL OBSERVATIONS, REHAB EVAL
Consult received, chart reviewed. Patient received supine in bed, NAD, +tele, +obrien, daughter present. Patient agreed to Evaluation from Physical Therapist.

## 2019-08-16 NOTE — DISCHARGE NOTE PROVIDER - NSDCCPCAREPLAN_GEN_ALL_CORE_FT
PRINCIPAL DISCHARGE DIAGNOSIS  Diagnosis: Syncope  Assessment and Plan of Treatment:       SECONDARY DISCHARGE DIAGNOSES  Diagnosis: BPH (benign prostatic hyperplasia)  Assessment and Plan of Treatment:     Diagnosis: UTI (urinary tract infection)  Assessment and Plan of Treatment:     Diagnosis: Anemia  Assessment and Plan of Treatment: Anemia    Diagnosis: HTN (Hypertension)  Assessment and Plan of Treatment: HTN (Hypertension)    Diagnosis: Hypokalemia  Assessment and Plan of Treatment: Hypokalemia    Diagnosis: Elevated troponin  Assessment and Plan of Treatment:     Diagnosis: ROGER (acute kidney injury)  Assessment and Plan of Treatment: PRINCIPAL DISCHARGE DIAGNOSIS  Diagnosis: Syncope  Assessment and Plan of Treatment: TTE 4/2019 LVEF 55-60%.-no segmental wall motion abnormalities  - Repeat Echo showed- Moderate aortic regurgitation. Severe concentric left ventricular hypertrophy.  s/p implantable loop recorder today.  Wound site dry intact.  Device teaching provided to patient and his daughter.  Follow up on 9/5 at 12:30pm.    Oncology Building 4 th Floor at [facility]  [phone].         SECONDARY DISCHARGE DIAGNOSES  Diagnosis: HTN (Hypertension)  Assessment and Plan of Treatment: Your medications were adjusted. Take Amlodipine 5 mg daily and make a follow up appt with your PCP in 1 week to repeat your blood pressure    Diagnosis: UTI (urinary tract infection)  Assessment and Plan of Treatment: Ma was changed this admission  - UCx showed Staph aureus  Complete antibiotics as prescribed    Diagnosis: BPH (benign prostatic hyperplasia)  Assessment and Plan of Treatment:     Diagnosis: Hypokalemia  Assessment and Plan of Treatment: Hypokalemia    Diagnosis: Anemia  Assessment and Plan of Treatment: Anemia    Diagnosis: Elevated troponin  Assessment and Plan of Treatment:     Diagnosis: ROGER (acute kidney injury)  Assessment and Plan of Treatment: PRINCIPAL DISCHARGE DIAGNOSIS  Diagnosis: Syncope  Assessment and Plan of Treatment: TTE 4/2019 LVEF 55-60%.-no segmental wall motion abnormalities  - Repeat Echo showed- Moderate aortic regurgitation. Severe concentric left ventricular hypertrophy.  s/p implantable loop recorder today.  Wound site dry intact.  Device teaching provided to patient and his daughter.  Follow up on 9/5 at 12:30pm.    Oncology Building 4 th Floor at [facility]  [phone].         SECONDARY DISCHARGE DIAGNOSES  Diagnosis: ROGER (acute kidney injury)  Assessment and Plan of Treatment: avoid nephrotoxic medications such as Motrin, Advil and Aleve    Diagnosis: HTN (Hypertension)  Assessment and Plan of Treatment: Your medications were adjusted. Take Amlodipine 5 mg daily and make a follow up appt with your PCP in 1 week to repeat your blood pressure    Diagnosis: UTI (urinary tract infection)  Assessment and Plan of Treatment: Ma was changed this admission  - UCx showed Staph aureus  Complete antibiotics as prescribed: Ceftin 500 mg 2 x day for 3 days    Diagnosis: Anemia  Assessment and Plan of Treatment: Anemia    Diagnosis: Hypokalemia  Assessment and Plan of Treatment: Hypokalemia    Diagnosis: BPH (benign prostatic hyperplasia)  Assessment and Plan of Treatment:     Diagnosis: Elevated troponin  Assessment and Plan of Treatment:

## 2019-08-16 NOTE — DISCHARGE NOTE PROVIDER - NSDCCAREPROVSEEN_GEN_ALL_CORE_FT
Carlton Roberson  [facility], ADS  [facility] Cardiology, Team

## 2019-08-16 NOTE — DISCHARGE NOTE PROVIDER - NSDCFUSCHEDAPPT_GEN_ALL_CORE_FT
MOIZ RANDOLPH ; 09/05/2019 ; [facility] [phone] MOIZ RANDOLPH ; 09/05/2019 ; [facility] [phone] MOIZ RANDOLPH ; 09/05/2019 ; [facility] [phone]

## 2019-08-16 NOTE — DISCHARGE NOTE PROVIDER - HOSPITAL COURSE
89 Creole speaking M (wheelchair bound) PMHx of BPH (w/ chronic Ma), HTN, CKD and hydrocele who presented after  syncopal episode with LOC and hypotension (70s). Recently admitted 4/2019 for syncope/unresponsiveness after BP meds increased. 89 Creole speaking M (wheelchair bound) PMHx of BPH (w/ chronic Ma), HTN, CKD and hydrocele who presented after  syncopal episode with LOC and hypotension (70s). Recently admitted 4/2019 for syncope/unresponsiveness after BP meds increased.     TTE from April 2019 reviewed showing no segmental wall motion abnormalities. Repeat TTE reviewed, normal LVSF with severe concentric left ventricular hypertrophy.      - Discussed with cards, unlikely to be cardiogenic syncope.  EKG showing bifascicular block, however no indication for PPM placement at this time per cards/EP.     - labetalol DC'd s/p loop recorder by EP 8/20/19     UTI- UCx growing Staph aureus, sensitivities pending, Bcx negative     - s/p IV ceftriaxone x 1 day, vanco x3 days,  resumed IV ceftriaxone and on dc changed to Ceftin to complete total 7 day course.      ROGER resolving likely 2/2 volume depletion.     HTN meds adjusted     Chronic anemia, no evidence of overt bleeding Fe studies suggestive of anemia of chronic disease.     Hypokalemia, supplement PRN        Pt is optimized for discharge, home w/ home care

## 2019-08-16 NOTE — DISCHARGE NOTE PROVIDER - NSFOLLOWUPCLINICS_GEN_ALL_ED_FT
[facility]  Cardiology  [address] [place] [id number]  Phone: [phone]  Fax:   Follow Up Time:

## 2019-08-17 LAB
ANION GAP SERPL CALC-SCNC: 10 MMO/L — SIGNIFICANT CHANGE UP (ref 7–14)
BACTERIA UR CULT: SIGNIFICANT CHANGE UP
BUN SERPL-MCNC: 27 MG/DL — HIGH (ref 7–23)
CALCIUM SERPL-MCNC: 9.5 MG/DL — SIGNIFICANT CHANGE UP (ref 8.4–10.5)
CHLORIDE SERPL-SCNC: 102 MMOL/L — SIGNIFICANT CHANGE UP (ref 98–107)
CO2 SERPL-SCNC: 27 MMOL/L — SIGNIFICANT CHANGE UP (ref 22–31)
CREAT SERPL-MCNC: 1.17 MG/DL — SIGNIFICANT CHANGE UP (ref 0.5–1.3)
GLUCOSE SERPL-MCNC: 87 MG/DL — SIGNIFICANT CHANGE UP (ref 70–99)
HCT VFR BLD CALC: 27.8 % — LOW (ref 39–50)
HGB BLD-MCNC: 8.4 G/DL — LOW (ref 13–17)
MAGNESIUM SERPL-MCNC: 2.4 MG/DL — SIGNIFICANT CHANGE UP (ref 1.6–2.6)
MCHC RBC-ENTMCNC: 27.3 PG — SIGNIFICANT CHANGE UP (ref 27–34)
MCHC RBC-ENTMCNC: 30.2 % — LOW (ref 32–36)
MCV RBC AUTO: 90.3 FL — SIGNIFICANT CHANGE UP (ref 80–100)
NRBC # FLD: 0 K/UL — SIGNIFICANT CHANGE UP (ref 0–0)
PHOSPHATE SERPL-MCNC: 2.8 MG/DL — SIGNIFICANT CHANGE UP (ref 2.5–4.5)
PLATELET # BLD AUTO: 272 K/UL — SIGNIFICANT CHANGE UP (ref 150–400)
PMV BLD: 10.8 FL — SIGNIFICANT CHANGE UP (ref 7–13)
POTASSIUM SERPL-MCNC: 3.7 MMOL/L — SIGNIFICANT CHANGE UP (ref 3.5–5.3)
POTASSIUM SERPL-SCNC: 3.7 MMOL/L — SIGNIFICANT CHANGE UP (ref 3.5–5.3)
RBC # BLD: 3.08 M/UL — LOW (ref 4.2–5.8)
RBC # FLD: 15.4 % — HIGH (ref 10.3–14.5)
SODIUM SERPL-SCNC: 139 MMOL/L — SIGNIFICANT CHANGE UP (ref 135–145)
WBC # BLD: 6.2 K/UL — SIGNIFICANT CHANGE UP (ref 3.8–10.5)
WBC # FLD AUTO: 6.2 K/UL — SIGNIFICANT CHANGE UP (ref 3.8–10.5)

## 2019-08-17 PROCEDURE — 99233 SBSQ HOSP IP/OBS HIGH 50: CPT

## 2019-08-17 RX ORDER — POTASSIUM CHLORIDE 20 MEQ
20 PACKET (EA) ORAL ONCE
Refills: 0 | Status: COMPLETED | OUTPATIENT
Start: 2019-08-17 | End: 2019-08-17

## 2019-08-17 RX ADMIN — GABAPENTIN 300 MILLIGRAM(S): 400 CAPSULE ORAL at 17:45

## 2019-08-17 RX ADMIN — GABAPENTIN 300 MILLIGRAM(S): 400 CAPSULE ORAL at 09:31

## 2019-08-17 RX ADMIN — HEPARIN SODIUM 5000 UNIT(S): 5000 INJECTION INTRAVENOUS; SUBCUTANEOUS at 17:45

## 2019-08-17 RX ADMIN — Medication 20 MILLIEQUIVALENT(S): at 09:30

## 2019-08-17 RX ADMIN — AMLODIPINE BESYLATE 5 MILLIGRAM(S): 2.5 TABLET ORAL at 09:31

## 2019-08-17 RX ADMIN — Medication 250 MILLIGRAM(S): at 19:08

## 2019-08-17 NOTE — CHART NOTE - NSCHARTNOTEFT_GEN_A_CORE
- patient noted to have 8 beats of NSVT overnight. Patient currently with +Ucx and being treated for infectious etiology  - Would replete K to 4 Mg to 2.    Thank you.     Sukhdev Sanches MD   EP fellow

## 2019-08-18 LAB
ANION GAP SERPL CALC-SCNC: 11 MMO/L — SIGNIFICANT CHANGE UP (ref 7–14)
BUN SERPL-MCNC: 22 MG/DL — SIGNIFICANT CHANGE UP (ref 7–23)
CALCIUM SERPL-MCNC: 9.8 MG/DL — SIGNIFICANT CHANGE UP (ref 8.4–10.5)
CHLORIDE SERPL-SCNC: 102 MMOL/L — SIGNIFICANT CHANGE UP (ref 98–107)
CO2 SERPL-SCNC: 28 MMOL/L — SIGNIFICANT CHANGE UP (ref 22–31)
CREAT SERPL-MCNC: 1.03 MG/DL — SIGNIFICANT CHANGE UP (ref 0.5–1.3)
GLUCOSE SERPL-MCNC: 87 MG/DL — SIGNIFICANT CHANGE UP (ref 70–99)
HCT VFR BLD CALC: 29.9 % — LOW (ref 39–50)
HGB BLD-MCNC: 9.1 G/DL — LOW (ref 13–17)
MAGNESIUM SERPL-MCNC: 2 MG/DL — SIGNIFICANT CHANGE UP (ref 1.6–2.6)
MCHC RBC-ENTMCNC: 27.1 PG — SIGNIFICANT CHANGE UP (ref 27–34)
MCHC RBC-ENTMCNC: 30.4 % — LOW (ref 32–36)
MCV RBC AUTO: 89 FL — SIGNIFICANT CHANGE UP (ref 80–100)
NRBC # FLD: 0 K/UL — SIGNIFICANT CHANGE UP (ref 0–0)
PHOSPHATE SERPL-MCNC: 2.8 MG/DL — SIGNIFICANT CHANGE UP (ref 2.5–4.5)
PLATELET # BLD AUTO: 283 K/UL — SIGNIFICANT CHANGE UP (ref 150–400)
PMV BLD: 11 FL — SIGNIFICANT CHANGE UP (ref 7–13)
POTASSIUM SERPL-MCNC: 3.4 MMOL/L — LOW (ref 3.5–5.3)
POTASSIUM SERPL-SCNC: 3.4 MMOL/L — LOW (ref 3.5–5.3)
RBC # BLD: 3.36 M/UL — LOW (ref 4.2–5.8)
RBC # FLD: 15.2 % — HIGH (ref 10.3–14.5)
SODIUM SERPL-SCNC: 141 MMOL/L — SIGNIFICANT CHANGE UP (ref 135–145)
VANCOMYCIN TROUGH SERPL-MCNC: 13.9 UG/ML — SIGNIFICANT CHANGE UP (ref 10–20)
WBC # BLD: 4.8 K/UL — SIGNIFICANT CHANGE UP (ref 3.8–10.5)
WBC # FLD AUTO: 4.8 K/UL — SIGNIFICANT CHANGE UP (ref 3.8–10.5)

## 2019-08-18 PROCEDURE — 99233 SBSQ HOSP IP/OBS HIGH 50: CPT

## 2019-08-18 RX ORDER — VANCOMYCIN HCL 1 G
1250 VIAL (EA) INTRAVENOUS EVERY 24 HOURS
Refills: 0 | Status: DISCONTINUED | OUTPATIENT
Start: 2019-08-19 | End: 2019-08-19

## 2019-08-18 RX ORDER — VANCOMYCIN HCL 1 G
1250 VIAL (EA) INTRAVENOUS ONCE
Refills: 0 | Status: COMPLETED | OUTPATIENT
Start: 2019-08-18 | End: 2019-08-18

## 2019-08-18 RX ORDER — VANCOMYCIN HCL 1 G
VIAL (EA) INTRAVENOUS
Refills: 0 | Status: DISCONTINUED | OUTPATIENT
Start: 2019-08-18 | End: 2019-08-19

## 2019-08-18 RX ORDER — POTASSIUM CHLORIDE 20 MEQ
40 PACKET (EA) ORAL EVERY 4 HOURS
Refills: 0 | Status: COMPLETED | OUTPATIENT
Start: 2019-08-18 | End: 2019-08-18

## 2019-08-18 RX ADMIN — Medication 40 MILLIEQUIVALENT(S): at 10:59

## 2019-08-18 RX ADMIN — Medication 166.67 MILLIGRAM(S): at 22:13

## 2019-08-18 RX ADMIN — Medication 40 MILLIEQUIVALENT(S): at 14:55

## 2019-08-18 RX ADMIN — AMLODIPINE BESYLATE 5 MILLIGRAM(S): 2.5 TABLET ORAL at 06:56

## 2019-08-18 RX ADMIN — GABAPENTIN 300 MILLIGRAM(S): 400 CAPSULE ORAL at 17:15

## 2019-08-18 RX ADMIN — HEPARIN SODIUM 5000 UNIT(S): 5000 INJECTION INTRAVENOUS; SUBCUTANEOUS at 17:15

## 2019-08-18 RX ADMIN — HEPARIN SODIUM 5000 UNIT(S): 5000 INJECTION INTRAVENOUS; SUBCUTANEOUS at 06:56

## 2019-08-18 RX ADMIN — GABAPENTIN 300 MILLIGRAM(S): 400 CAPSULE ORAL at 06:56

## 2019-08-18 NOTE — CHART NOTE - NSCHARTNOTEFT_GEN_A_CORE
Tele Note    Sinus rhythm 50s-70s, 4 beats NSVT  - replete K with KCL 40mEQ x 2    Abelardo Lovell MD  Cardiology Fellow  [phone]  All Cardiology service information can be found 24/7 on [email or web address], password: [email or web address]

## 2019-08-19 LAB
ANION GAP SERPL CALC-SCNC: 11 MMO/L — SIGNIFICANT CHANGE UP (ref 7–14)
BUN SERPL-MCNC: 20 MG/DL — SIGNIFICANT CHANGE UP (ref 7–23)
CALCIUM SERPL-MCNC: 10 MG/DL — SIGNIFICANT CHANGE UP (ref 8.4–10.5)
CHLORIDE SERPL-SCNC: 102 MMOL/L — SIGNIFICANT CHANGE UP (ref 98–107)
CO2 SERPL-SCNC: 27 MMOL/L — SIGNIFICANT CHANGE UP (ref 22–31)
CREAT SERPL-MCNC: 0.99 MG/DL — SIGNIFICANT CHANGE UP (ref 0.5–1.3)
GLUCOSE SERPL-MCNC: 84 MG/DL — SIGNIFICANT CHANGE UP (ref 70–99)
HCT VFR BLD CALC: 27.9 % — LOW (ref 39–50)
HGB BLD-MCNC: 8.7 G/DL — LOW (ref 13–17)
MAGNESIUM SERPL-MCNC: 1.9 MG/DL — SIGNIFICANT CHANGE UP (ref 1.6–2.6)
MCHC RBC-ENTMCNC: 27.4 PG — SIGNIFICANT CHANGE UP (ref 27–34)
MCHC RBC-ENTMCNC: 31.2 % — LOW (ref 32–36)
MCV RBC AUTO: 87.7 FL — SIGNIFICANT CHANGE UP (ref 80–100)
NRBC # FLD: 0 K/UL — SIGNIFICANT CHANGE UP (ref 0–0)
PHOSPHATE SERPL-MCNC: 2.6 MG/DL — SIGNIFICANT CHANGE UP (ref 2.5–4.5)
PLATELET # BLD AUTO: 261 K/UL — SIGNIFICANT CHANGE UP (ref 150–400)
PMV BLD: 10.7 FL — SIGNIFICANT CHANGE UP (ref 7–13)
POTASSIUM SERPL-MCNC: 3.8 MMOL/L — SIGNIFICANT CHANGE UP (ref 3.5–5.3)
POTASSIUM SERPL-SCNC: 3.8 MMOL/L — SIGNIFICANT CHANGE UP (ref 3.5–5.3)
RBC # BLD: 3.18 M/UL — LOW (ref 4.2–5.8)
RBC # FLD: 15 % — HIGH (ref 10.3–14.5)
SODIUM SERPL-SCNC: 140 MMOL/L — SIGNIFICANT CHANGE UP (ref 135–145)
WBC # BLD: 4.6 K/UL — SIGNIFICANT CHANGE UP (ref 3.8–10.5)
WBC # FLD AUTO: 4.6 K/UL — SIGNIFICANT CHANGE UP (ref 3.8–10.5)

## 2019-08-19 PROCEDURE — 99233 SBSQ HOSP IP/OBS HIGH 50: CPT

## 2019-08-19 PROCEDURE — 93306 TTE W/DOPPLER COMPLETE: CPT | Mod: 26

## 2019-08-19 RX ORDER — MAGNESIUM OXIDE 400 MG ORAL TABLET 241.3 MG
400 TABLET ORAL
Refills: 0 | Status: COMPLETED | OUTPATIENT
Start: 2019-08-19 | End: 2019-08-20

## 2019-08-19 RX ORDER — CEFTRIAXONE 500 MG/1
1000 INJECTION, POWDER, FOR SOLUTION INTRAMUSCULAR; INTRAVENOUS EVERY 24 HOURS
Refills: 0 | Status: DISCONTINUED | OUTPATIENT
Start: 2019-08-19 | End: 2019-08-20

## 2019-08-19 RX ADMIN — AMLODIPINE BESYLATE 5 MILLIGRAM(S): 2.5 TABLET ORAL at 07:02

## 2019-08-19 RX ADMIN — GABAPENTIN 300 MILLIGRAM(S): 400 CAPSULE ORAL at 18:34

## 2019-08-19 RX ADMIN — GABAPENTIN 300 MILLIGRAM(S): 400 CAPSULE ORAL at 07:02

## 2019-08-19 RX ADMIN — HEPARIN SODIUM 5000 UNIT(S): 5000 INJECTION INTRAVENOUS; SUBCUTANEOUS at 18:34

## 2019-08-19 RX ADMIN — HEPARIN SODIUM 5000 UNIT(S): 5000 INJECTION INTRAVENOUS; SUBCUTANEOUS at 07:03

## 2019-08-19 RX ADMIN — CEFTRIAXONE 100 MILLIGRAM(S): 500 INJECTION, POWDER, FOR SOLUTION INTRAMUSCULAR; INTRAVENOUS at 22:37

## 2019-08-19 NOTE — PROGRESS NOTE ADULT - ASSESSMENT
88 yo M with a PMH of renal insufficiency, spinal stenosis, wheelchair bound x "many years", HTN, BPH, recent hospitalization 04/2019 for syncope/unresponsiveness after BP meds increased (treated for K. pneumoniae UTI), recurrent UTIs, urosepsis June 2018 (K. pneumoniae), and chronic indwelling obrien for obstructive uropathy who presented  8/15/2019 with recurrent syncope, unclear the etiology.   However, given the evidence of bifascicular block on EKG, bradyarrhythmia can not be ruled out.  Telemetry also demonstrated NSVT.  LVEF preserved on previous echo in Apr 2019 (LVEF 55-60%).      -  avoid AV lorrie blocking agents for now  - continue telemetry for symptomatic ling or tachycarrthmia  - Keep K+ 4.0 - 4.5 & Mg 2.0 - 2.5  - further mgmt of UTI per ID/primary  - will d/w EP attending, Dr. Sorenson regarding EPS with possible implantable loop recorder indication 88 yo M with a PMH of renal insufficiency, spinal stenosis, wheelchair bound x "many years", HTN, BPH, recent hospitalization 04/2019 for syncope/unresponsiveness after BP meds increased (treated for K. pneumoniae UTI), recurrent UTIs, urosepsis June 2018 (K. pneumoniae), and chronic indwelling obrien for obstructive uropathy who presented  8/15/2019 with recurrent syncope, unclear the etiology.   However, given the evidence of bifascicular block on EKG, bradyarrhythmia can not be ruled out.  Telemetry also demonstrated NSVT.  LVEF preserved on previous echo in Apr 2019 (LVEF 55-60%).      -  avoid AV lorrie blocking agents for now  - continue telemetry for symptomatic ling or tachyarrhythmia  - Keep K+ 4.0 - 4.5 & Mg 2.0 - 2.5  - further mgmt of UTI per ID/primary  - will d/w EP attending, Dr. Sorenson regarding EPS with possible implantable loop recorder indication

## 2019-08-20 ENCOUNTER — TRANSCRIPTION ENCOUNTER (OUTPATIENT)
Age: 84
End: 2019-08-20

## 2019-08-20 VITALS
SYSTOLIC BLOOD PRESSURE: 139 MMHG | TEMPERATURE: 98 F | OXYGEN SATURATION: 98 % | HEART RATE: 74 BPM | DIASTOLIC BLOOD PRESSURE: 68 MMHG | RESPIRATION RATE: 18 BRPM

## 2019-08-20 DIAGNOSIS — N40.0 BENIGN PROSTATIC HYPERPLASIA WITHOUT LOWER URINARY TRACT SYMPTOMS: ICD-10-CM

## 2019-08-20 DIAGNOSIS — R74.8 ABNORMAL LEVELS OF OTHER SERUM ENZYMES: ICD-10-CM

## 2019-08-20 LAB
ANION GAP SERPL CALC-SCNC: 13 MMO/L — SIGNIFICANT CHANGE UP (ref 7–14)
BACTERIA BLD CULT: SIGNIFICANT CHANGE UP
BACTERIA BLD CULT: SIGNIFICANT CHANGE UP
BUN SERPL-MCNC: 19 MG/DL — SIGNIFICANT CHANGE UP (ref 7–23)
CALCIUM SERPL-MCNC: 10 MG/DL — SIGNIFICANT CHANGE UP (ref 8.4–10.5)
CHLORIDE SERPL-SCNC: 100 MMOL/L — SIGNIFICANT CHANGE UP (ref 98–107)
CO2 SERPL-SCNC: 26 MMOL/L — SIGNIFICANT CHANGE UP (ref 22–31)
CREAT SERPL-MCNC: 0.95 MG/DL — SIGNIFICANT CHANGE UP (ref 0.5–1.3)
GLUCOSE SERPL-MCNC: 85 MG/DL — SIGNIFICANT CHANGE UP (ref 70–99)
HCT VFR BLD CALC: 30 % — LOW (ref 39–50)
HGB BLD-MCNC: 9.4 G/DL — LOW (ref 13–17)
MAGNESIUM SERPL-MCNC: 1.8 MG/DL — SIGNIFICANT CHANGE UP (ref 1.6–2.6)
MCHC RBC-ENTMCNC: 27.4 PG — SIGNIFICANT CHANGE UP (ref 27–34)
MCHC RBC-ENTMCNC: 31.3 % — LOW (ref 32–36)
MCV RBC AUTO: 87.5 FL — SIGNIFICANT CHANGE UP (ref 80–100)
NRBC # FLD: 0 K/UL — SIGNIFICANT CHANGE UP (ref 0–0)
PHOSPHATE SERPL-MCNC: 3 MG/DL — SIGNIFICANT CHANGE UP (ref 2.5–4.5)
PLATELET # BLD AUTO: 260 K/UL — SIGNIFICANT CHANGE UP (ref 150–400)
PMV BLD: 10.8 FL — SIGNIFICANT CHANGE UP (ref 7–13)
POTASSIUM SERPL-MCNC: 3.4 MMOL/L — LOW (ref 3.5–5.3)
POTASSIUM SERPL-SCNC: 3.4 MMOL/L — LOW (ref 3.5–5.3)
RBC # BLD: 3.43 M/UL — LOW (ref 4.2–5.8)
RBC # FLD: 15.2 % — HIGH (ref 10.3–14.5)
SODIUM SERPL-SCNC: 139 MMOL/L — SIGNIFICANT CHANGE UP (ref 135–145)
WBC # BLD: 4.89 K/UL — SIGNIFICANT CHANGE UP (ref 3.8–10.5)
WBC # FLD AUTO: 4.89 K/UL — SIGNIFICANT CHANGE UP (ref 3.8–10.5)

## 2019-08-20 PROCEDURE — 99239 HOSP IP/OBS DSCHRG MGMT >30: CPT

## 2019-08-20 PROCEDURE — 33285 INSJ SUBQ CAR RHYTHM MNTR: CPT

## 2019-08-20 RX ORDER — LABETALOL HCL 100 MG
1 TABLET ORAL
Qty: 0 | Refills: 0 | DISCHARGE

## 2019-08-20 RX ORDER — CEFUROXIME AXETIL 250 MG
500 TABLET ORAL EVERY 12 HOURS
Refills: 0 | Status: DISCONTINUED | OUTPATIENT
Start: 2019-08-20 | End: 2019-08-20

## 2019-08-20 RX ORDER — CEFUROXIME AXETIL 250 MG
1 TABLET ORAL
Qty: 6 | Refills: 0
Start: 2019-08-20 | End: 2019-08-22

## 2019-08-20 RX ADMIN — GABAPENTIN 300 MILLIGRAM(S): 400 CAPSULE ORAL at 05:09

## 2019-08-20 RX ADMIN — AMLODIPINE BESYLATE 5 MILLIGRAM(S): 2.5 TABLET ORAL at 05:09

## 2019-08-20 RX ADMIN — HEPARIN SODIUM 5000 UNIT(S): 5000 INJECTION INTRAVENOUS; SUBCUTANEOUS at 05:09

## 2019-08-20 RX ADMIN — MAGNESIUM OXIDE 400 MG ORAL TABLET 400 MILLIGRAM(S): 241.3 TABLET ORAL at 12:59

## 2019-08-20 NOTE — PROGRESS NOTE ADULT - SUBJECTIVE AND OBJECTIVE BOX
Patient is a 89y old  Male who presents with a chief complaint of Syncope (15 Aug 2019 16:20)      SUBJECTIVE / OVERNIGHT EVENTS: No acute complaints currently.  Daughter at bedside was not present during syncopal event prior to admission, but reports that he often faints especially when he doesn't eat or drink.  He has had lack of appetite for past week.  Denies fevers/chills, N/V/D, abd pain, dysphagia/odynophagia.  Gets monthly Ma exchange, hasn't had it exchanged this month yet.  Pt currenlty denies CP, SOB, dizziness.  Pt is largely bedbound/chair bound.       MEDICATIONS  (STANDING):  amLODIPine   Tablet 5 milliGRAM(s) Oral daily  cefTRIAXone   IVPB 1000 milliGRAM(s) IV Intermittent every 24 hours  gabapentin 300 milliGRAM(s) Oral two times a day  heparin  Injectable 5000 Unit(s) SubCutaneous two times a day  labetalol 100 milliGRAM(s) Oral two times a day  magnesium sulfate  IVPB 2 Gram(s) IV Intermittent once      Vital Signs Last 24 Hrs  T(C): 36.6 (16 Aug 2019 10:54), Max: 37.4 (15 Aug 2019 19:06)  T(F): 97.8 (16 Aug 2019 10:54), Max: 99.3 (15 Aug 2019 19:06)  HR: 72 (16 Aug 2019 10:54) (72 - 86)  BP: 106/60 (16 Aug 2019 10:54) (106/56 - 135/70)  BP(mean): --  RR: 17 (16 Aug 2019 10:54) (16 - 23)  SpO2: 99% (16 Aug 2019 10:54) (97% - 100%)  CAPILLARY BLOOD GLUCOSE        I&O's Summary    15 Aug 2019 07:01  -  16 Aug 2019 07:00  --------------------------------------------------------  IN: 0 mL / OUT: 400 mL / NET: -400 mL      PHYSICAL EXAM  GENERAL: NAD, well-developed, frail   HEAD:  Atraumatic, Normocephalic  EYES: EOMI, PERRLA, conjunctiva and sclera clear  CHEST/LUNG: Clear to auscultation bilaterally; No wheeze on anterior lung exam   HEART: Regular rate and rhythm; No murmurs, rubs, or gallops  ABDOMEN: Soft, Nontender, Nondistended; Bowel sounds present  : Ma in place, urine is clear, 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   EXTREMITIES:  2+ Peripheral Pulses, No clubbing, cyanosis, or edema  PSYCH: AAOx2  SKIN: No rashes or lesions    LABS:                        7.8    6.74  )-----------( 267      ( 16 Aug 2019 05:50 )             24.3     08-16    141  |  105  |  27<H>  ----------------------------<  80  2.9<LL>   |  22  |  1.34<H>    Ca    8.2<L>      16 Aug 2019 05:50  Phos  3.5     08-  Mg     1.5     -16    TPro  7.9  /  Alb  3.0<L>  /  TBili  0.4  /  DBili  x   /  AST  19  /  ALT  9   /  AlkPhos  40  08-15      CARDIAC MARKERS ( 15 Aug 2019 16:25 )  x     / x     / 66 u/L / 2.12 ng/mL / x          Urinalysis Basic - ( 15 Aug 2019 12:45 )    Color: YELLOW / Appearance: TURBID / S.009 / pH: 6.5  Gluc: NEGATIVE / Ketone: NEGATIVE  / Bili: NEGATIVE / Urobili: NORMAL   Blood: MODERATE / Protein: 30 / Nitrite: NEGATIVE   Leuk Esterase: LARGE / RBC: 0-2 / WBC >50   Sq Epi: OCC / Non Sq Epi: x / Bacteria: FEW    Blood Gas Venous Comprehensive (08.15.19 @ 12:17)    Blood Gas Venous - Lactate: 1.8: Please note updated reference range. mmol/L    Blood Gas Venous - Chloride: 105 mmol/L    Blood Gas Venous - Creatinine: 1.52 mg/dL    pH, Venous: 7.47 pH    pCO2, Venous: 36 mmHg    pO2, Venous: 166 mmHg    HCO3, Venous: 27 mmol/L    Base Excess, Venous: 2.8: REFERENCE RANGE = -3 + 2 mmol/L mmol/L    Oxygen Saturation, Venous: 99.7 %    Blood Gas Venous - Sodium: 134 mmol/L    Blood Gas Venous - Potassium: 4.9 mmol/L    Blood Gas Venous - Glucose: 98 mg/dL    Blood Gas Venous - Hemoglobin: 9.1 g/dL    Blood Gas Venous - Hematocrit: 28.1 %    Ferritin, Serum in AM (19 @ 05:50)    Ferritin, Serum: 565.9 ng/mL    Iron with Total Binding Capacity in AM (19 @ 05:50)    Iron Total, Serum: 17 ug/dL    Unsaturated Iron Binding Capacity: 85.5 ug/dL    Total Iron Binding Capacity.: 103 ug/dL          RADIOLOGY & ADDITIONAL TESTS:    Imaging Personally Reviewed:  < from: Xray Chest 1 View- PORTABLE-Urgent (08.15.19 @ 14:13) >  IMPRESSION:  Clear lungs.    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 Left ventricular ejection fraction, by visual estimation, is 55 to   60%.   2. Spectral Doppler shows impaired relaxation pattern of left   ventricular myocardial filling (Grade I diastolic dysfunction).   3. Mild mitral annular calcification.   4. Mild mitral valve regurgitation.   5. Mild thickening and calcification of the anterior and posterior   mitral valve leaflets.   6. Mild to moderate aortic regurgitation.    < end of copied text >    Consultant(s) Notes Reviewed:  Cards  Care Discussed with Consultants/Other Providers: Cards Dr. Baker
Patient is a 89y old  Male who presents with a chief complaint of Syncope (17 Aug 2019 13:45)    SUBJECTIVE / OVERNIGHT EVENTS:  Patient seen denying any complaints.     MEDICATIONS  (STANDING):  amLODIPine   Tablet 5 milliGRAM(s) Oral daily  gabapentin 300 milliGRAM(s) Oral two times a day  heparin  Injectable 5000 Unit(s) SubCutaneous two times a day  potassium chloride    Tablet ER 40 milliEquivalent(s) Oral every 4 hours  vancomycin  IVPB 1000 milliGRAM(s) IV Intermittent every 24 hours    MEDICATIONS  (PRN):      Vital Signs Last 24 Hrs  T(C): 36.9 (18 Aug 2019 09:47), Max: 36.9 (18 Aug 2019 09:47)  T(F): 98.4 (18 Aug 2019 09:47), Max: 98.4 (18 Aug 2019 09:47)  HR: 62 (18 Aug 2019 09:47) (62 - 78)  BP: 139/76 (18 Aug 2019 09:47) (116/62 - 139/76)  BP(mean): --  RR: 18 (18 Aug 2019 09:47) (18 - 18)  SpO2: 100% (18 Aug 2019 09:47) (96% - 100%)  CAPILLARY BLOOD GLUCOSE        I&O's Summary    17 Aug 2019 07:01  -  18 Aug 2019 07:00  --------------------------------------------------------  IN: 0 mL / OUT: 1950 mL / NET: -1950 mL    18 Aug 2019 07:01  -  18 Aug 2019 13:48  --------------------------------------------------------  IN: 0 mL / OUT: 100 mL / NET: -100 mL      PHYSICAL EXAM  GENERAL: NAD, well-developed  HEAD:  Atraumatic, Normocephalic  EYES: EOMI, PERRLA, conjunctiva and sclera clear  NECK: Supple, No JVD  CHEST/LUNG: Clear to auscultation bilaterally; No wheeze  HEART: Regular rate and rhythm; No murmurs, rubs, or gallops  ABDOMEN: Soft, Nontender, Nondistended; Bowel sounds present  EXTREMITIES:  2+ Peripheral Pulses, No clubbing, cyanosis, or edema  PSYCH: AAOx3  : Chronic obrien draining yellow urine  SKIN: No rashes or lesions      LABS:                        9.1    4.80  )-----------( 283      ( 18 Aug 2019 07:09 )             29.9     08-18    141  |  102  |  22  ----------------------------<  87  3.4<L>   |  28  |  1.03    Ca    9.8      18 Aug 2019 07:09  Phos  2.8     08-18  Mg     2.0     08-18                Culture - Urine (collected 15 Aug 2019 14:20)  Source: URINE CATHETER  Preliminary Report (16 Aug 2019 13:12):    STAU^Staphylococcus aureus    COLONY COUNT: > = 100,000 CFU/ML  Final Report (17 Aug 2019 10:14):    Culture grew 3 or more types of organisms which indicate    collection contamination; consider recollection only if    clinically indicated.        RADIOLOGY & ADDITIONAL TESTS:    Imaging Personally Reviewed:  Consultant(s) Notes Reviewed:    Care Discussed with Consultants/Other Providers:
Patient is a 89y old  Male who presents with a chief complaint of Syncope (17 Aug 2019 13:45)  Denies SOB, CP or palpitations;  Reports hx of dizziness.      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, chronic  Renal insufficiency  Dehydration  Spinal stenosis of lumbar region  BPH (benign prostatic hypertrophy)  Hypertension  Benign Prostatic Hyperplasia  HTN (Hypertension)  No significant past surgical history      MEDICATIONS  (STANDING):  amLODIPine   Tablet 5 milliGRAM(s) Oral daily  cefTRIAXone   IVPB 1000 milliGRAM(s) IV Intermittent every 24 hours  gabapentin 300 milliGRAM(s) Oral two times a day  heparin  Injectable 5000 Unit(s) SubCutaneous two times a day    MEDICATIONS  (PRN):            Vital Signs Last 24 Hrs  T(C): 36.7 (19 Aug 2019 07:01), Max: 37.2 (18 Aug 2019 17:12)  T(F): 98 (19 Aug 2019 07:01), Max: 98.9 (18 Aug 2019 17:12)  HR: 86 (19 Aug 2019 07:01) (71 - 86)  BP: 142/92 (19 Aug 2019 07:01) (117/68 - 144/68)  BP(mean): --  RR: 16 (19 Aug 2019 07:01) (16 - 18)  SpO2: 100% (19 Aug 2019 07:01) (98% - 100%)            INTERPRETATION OF TELEMETRY:  SR 50-80; NSVT on 8/17  ECG:SR; RBBB; LAFB        LABS:                        8.7    4.60  )-----------( 261      ( 19 Aug 2019 07:26 )             27.9     08-19    140  |  102  |  20  ----------------------------<  84  3.8   |  27  |  0.99    Ca    10.0      19 Aug 2019 07:26  Phos  2.6     08-19  Mg     1.9     08-19                BNP  RADIOLOGY & ADDITIONAL STUDIES:      Summary:   1. Left ventricular ejection fraction, by visual estimation, is 55 to   60%.   2. Spectral Doppler shows impaired relaxation pattern of left   ventricular myocardial filling (Grade I diastolic dysfunction).   3. Mild mitral annular calcification.   4. Mild mitral valve regurgitation.   5. Mild thickening and calcification of the anterior and posterior   mitral valve leaflets.   6. Mild to moderate aortic regurgitation.    [id number] De Ellis MDMD  Electronically signed on 4/14/2019 at 1:27:12 PM              *** Final ***    PHYSICAL EXAM:    GENERAL: In no apparent distress, well nourished, and hydrated.  elderly  NECK: Supple and normal thyroid.  No JVD or carotid bruit.  Carotid pulse is 2+ bilaterally.  HEART: Regular rate and rhythm; 2/6 systolic murmurs, no rubs, or gallops.  PULMONARY: Clear to auscultation and perfusion.  No rales, wheezing, or rhonchi bilaterally.  ABDOMEN: Soft, Nontender, Nondistended; Bowel sounds present  : obrien catheter  EXTREMITIES:  2+ Peripheral Pulses, No clubbing, cyanosis, or edema  NEUROLOGICAL: alert oriented x2; speech clear following commands appropritaely
Patient is a 89y old  Male who presents with a chief complaint of syncope (19 Aug 2019 12:08)    SUBJECTIVE / OVERNIGHT EVENTS:  Patient seen in recovery suite s/p loop recorder implantation denying any CP/dyspnea, is complaining of some leg pain, but denies any weakness.    MEDICATIONS  (STANDING):  amLODIPine   Tablet 5 milliGRAM(s) Oral daily  cefTRIAXone   IVPB 1000 milliGRAM(s) IV Intermittent every 24 hours  gabapentin 300 milliGRAM(s) Oral two times a day  heparin  Injectable 5000 Unit(s) SubCutaneous two times a day  magnesium oxide 400 milliGRAM(s) Oral three times a day with meals    MEDICATIONS  (PRN):      Vital Signs Last 24 Hrs  T(C): 36.8 (20 Aug 2019 05:06), Max: 36.8 (19 Aug 2019 17:24)  T(F): 98.2 (20 Aug 2019 05:06), Max: 98.2 (19 Aug 2019 17:24)  HR: 71 (20 Aug 2019 05:06) (71 - 83)  BP: 140/79 (20 Aug 2019 05:06) (135/70 - 150/70)  BP(mean): --  RR: 18 (20 Aug 2019 05:06) (18 - 18)  SpO2: 99% (20 Aug 2019 05:06) (98% - 100%)  CAPILLARY BLOOD GLUCOSE        I&O's Summary    19 Aug 2019 07:01  -  20 Aug 2019 07:00  --------------------------------------------------------  IN: 100 mL / OUT: 1400 mL / NET: -1300 mL    20 Aug 2019 07:01  -  20 Aug 2019 11:26  --------------------------------------------------------  IN: 0 mL / OUT: 200 mL / NET: -200 mL      PHYSICAL EXAM  GENERAL: NAD, well-developed  HEAD:  Atraumatic, Normocephalic  EYES: EOMI, PERRLA, conjunctiva and sclera clear  NECK: Supple, No JVD  CHEST/LUNG: Clear to auscultation bilaterally; No wheeze  HEART: Regular rate and rhythm; No murmurs, rubs, or gallops  ABDOMEN: Soft, Nontender, Nondistended; Bowel sounds present  EXTREMITIES:  2+ Peripheral Pulses, No clubbing, cyanosis, or edema  PSYCH: AAOx3  : Chronic obrien draining yellow urine  SKIN: No rashes or lesions        LABS:                        9.4    4.89  )-----------( 260      ( 20 Aug 2019 06:30 )             30.0     08-20    139  |  100  |  19  ----------------------------<  85  3.4<L>   |  26  |  0.95    Ca    10.0      20 Aug 2019 06:00  Phos  3.0     08-20  Mg     1.8     08-20                  RADIOLOGY & ADDITIONAL TESTS:    Imaging Personally Reviewed:  Consultant(s) Notes Reviewed:    Care Discussed with Consultants/Other Providers:
Patient is a 89y old  Male who presents with a chief complaint of Syncope (16 Aug 2019 12:22)      SUBJECTIVE / OVERNIGHT EVENTS: Tele events noted: 8 beats NSVT, pt asymptomatic.  Denies ISRAEL, DANYEL, Francesca changed yesterday.       MEDICATIONS  (STANDING):  amLODIPine   Tablet 5 milliGRAM(s) Oral daily  gabapentin 300 milliGRAM(s) Oral two times a day  heparin  Injectable 5000 Unit(s) SubCutaneous two times a day  vancomycin  IVPB 1000 milliGRAM(s) IV Intermittent every 24 hours      Vital Signs Last 24 Hrs  T(C): 36.8 (17 Aug 2019 09:10), Max: 36.8 (17 Aug 2019 09:10)  T(F): 98.2 (17 Aug 2019 09:10), Max: 98.2 (17 Aug 2019 09:10)  HR: 73 (17 Aug 2019 09:10) (63 - 91)  BP: 140/79 (17 Aug 2019 09:10) (114/59 - 140/79)  BP(mean): --  RR: 16 (17 Aug 2019 09:10) (16 - 18)  SpO2: 100% (17 Aug 2019 09:10) (98% - 100%)  CAPILLARY BLOOD GLUCOSE        I&O's Summary    16 Aug 2019 07:01  -  17 Aug 2019 07:00  --------------------------------------------------------  IN: 0 mL / OUT: 400 mL / NET: -400 mL    17 Aug 2019 07:01  -  17 Aug 2019 13:45  --------------------------------------------------------  IN: 0 mL / OUT: 1200 mL / NET: -1200 mL          PHYSICAL EXAM  GENERAL: NAD, well-developed  HEAD:  Atraumatic, Normocephalic  EYES: EOMI, PERRLA, conjunctiva and sclera clear  NECK: Supple, No JVD  CHEST/LUNG: Clear to auscultation bilaterally; No wheeze  HEART: Regular rate and rhythm; No murmurs, rubs, or gallops  ABDOMEN: Soft, Nontender, Nondistended; Bowel sounds present  EXTREMITIES:  2+ Peripheral Pulses, No clubbing, cyanosis, or edema  PSYCH: AAOx3  SKIN: No rashes or lesions    LABS:                        8.4    6.20  )-----------( 272      ( 17 Aug 2019 01:40 )             27.8     08-17    139  |  102  |  27<H>  ----------------------------<  87  3.7   |  27  |  1.17    Ca    9.5      17 Aug 2019 01:40  Phos  2.8     08-17  Mg     2.4     08-17        CARDIAC MARKERS ( 15 Aug 2019 16:25 )  x     / x     / 66 u/L / 2.12 ng/mL / x        Culture - Urine (08.15.19 @ 14:20)    Culture - Urine:   STAU^Staphylococcus aureus  COLONY COUNT: > = 100,000 CFU/ML    Culture - Urine:   Culture grew 3 or more types of organisms which indicate  collection contamination; consider recollection only if  clinically indicated.    Specimen Source: URINE CATHETER    Culture - Blood (08.15.19 @ 12:36)    Culture - Blood:   NO ORGANISMS ISOLATED  NO ORGANISMS ISOLATED AT 48 HRS.    Specimen Source: BLOOD VENOUS      RADIOLOGY & ADDITIONAL TESTS:    Imaging Personally Reviewed:  < from: Xray Chest 1 View- PORTABLE-Urgent (08.15.19 @ 14:13) >    IMPRESSION:  Clear lungs.      Consultant(s) Notes Reviewed:  Jenelle GUERRIER   Care Discussed with Consultants/Other Providers: Jenelle Baker
Patient is a 89y old  Male who presents with a chief complaint of syncope (19 Aug 2019 12:08)    SUBJECTIVE / OVERNIGHT EVENTS:  Patient seen denying any complaints.   Tele: SR 50-80; NSVT on 8/17, no episodes overnight    MEDICATIONS  (STANDING):  amLODIPine   Tablet 5 milliGRAM(s) Oral daily  cefTRIAXone   IVPB 1000 milliGRAM(s) IV Intermittent every 24 hours  gabapentin 300 milliGRAM(s) Oral two times a day  heparin  Injectable 5000 Unit(s) SubCutaneous two times a day    MEDICATIONS  (PRN):      Vital Signs Last 24 Hrs  T(C): 36.7 (19 Aug 2019 07:01), Max: 37.2 (18 Aug 2019 17:12)  T(F): 98 (19 Aug 2019 07:01), Max: 98.9 (18 Aug 2019 17:12)  HR: 86 (19 Aug 2019 07:01) (71 - 86)  BP: 142/92 (19 Aug 2019 07:01) (117/68 - 144/68)  BP(mean): --  RR: 16 (19 Aug 2019 07:01) (16 - 18)  SpO2: 100% (19 Aug 2019 07:01) (98% - 100%)  CAPILLARY BLOOD GLUCOSE        I&O's Summary    18 Aug 2019 07:01  -  19 Aug 2019 07:00  --------------------------------------------------------  IN: 0 mL / OUT: 550 mL / NET: -550 mL      PHYSICAL EXAM  GENERAL: NAD, well-developed  HEAD:  Atraumatic, Normocephalic  EYES: EOMI, PERRLA, conjunctiva and sclera clear  NECK: Supple, No JVD  CHEST/LUNG: Clear to auscultation bilaterally; No wheeze  HEART: Regular rate and rhythm; No murmurs, rubs, or gallops  ABDOMEN: Soft, Nontender, Nondistended; Bowel sounds present  EXTREMITIES:  2+ Peripheral Pulses, No clubbing, cyanosis, or edema  PSYCH: AAOx3  : Chronic obrien draining yellow urine  SKIN: No rashes or lesions      LABS:                        8.7    4.60  )-----------( 261      ( 19 Aug 2019 07:26 )             27.9     08-19    140  |  102  |  20  ----------------------------<  84  3.8   |  27  |  0.99    Ca    10.0      19 Aug 2019 07:26  Phos  2.6     08-19  Mg     1.9     08-19                  RADIOLOGY & ADDITIONAL TESTS:    Imaging Personally Reviewed:  Consultant(s) Notes Reviewed:    Care Discussed with Consultants/Other Providers:

## 2019-08-20 NOTE — PROGRESS NOTE ADULT - PROBLEM SELECTOR PROBLEM 3
ROGER (acute kidney injury)

## 2019-08-20 NOTE — PROGRESS NOTE ADULT - PROBLEM SELECTOR PLAN 6
- 2/2 poor PO intake
- 2/2 poor PO intake  - continue to supplement, monitor BMP
- 2/2 poor PO intake  - IV and PO repletion today  - Repeat BMP this afternoon   - Replete Mg
- 2/2 poor PO intake  - continue to supplement, monitor BMP
- 2/2 poor PO intake  - continue to supplement, monitor BMP

## 2019-08-20 NOTE — PROGRESS NOTE ADULT - PROBLEM SELECTOR PLAN 3
- ROGER resolving likely 2/2 volume depletion

## 2019-08-20 NOTE — PROGRESS NOTE ADULT - PROBLEM SELECTOR PLAN 2
- Pt has been on PO cipro since 8/11 from outpt provider.   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  F/U sensitivities   - Bcx negative   - Ma was changed this admission, pt has refused suprapubic catheter placement in past per [employer] notes
- Pt has been on PO cipro since 8/11 from outpt provider.   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
- Pt has been on PO cipro since 8/11 from outpt provider.   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  F/U sensitivities   - Bcx negative   - Ma was changed this admission, pt has refused suprapubic catheter placement in past per [employer] notes
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

## 2019-08-20 NOTE — DISCHARGE NOTE NURSING/CASE MANAGEMENT/SOCIAL WORK - NSDCDPATPORTLINK_GEN_ALL_CORE
You can access the [facility] Patient Portal, offered by [facility], by registering with the following website: [email or web address]

## 2019-08-20 NOTE — CHART NOTE - NSCHARTNOTEFT_GEN_A_CORE
s/p implantable loop recorder today.  Wound site dry intact.  Device teaching provided to patient and his daughter.  Follow up on 9/5 at 12:30pm.  Oncology Building 4 th Floor at [facility]  [phone].   Stable for discharge from EP standpoint.

## 2019-08-20 NOTE — ED ADULT NURSE NOTE - PRO INTERPRETER NEED 2

## 2019-08-20 NOTE — PROGRESS NOTE ADULT - PROBLEM SELECTOR PLAN 5
- Chronic anemia, no evidence of overt bleeding  - Fe studies suggestive of anemia of chronic disease

## 2019-08-20 NOTE — PROGRESS NOTE ADULT - PROBLEM SELECTOR PLAN 1
- Likely 2/2 dehydration as pt with poor PO intake for past week.  Hypotension was noted during EMS, likely hypovolemic hypotension   - CE continue to rise, pt denies any CP.  TTE from April 2019 reviewed showing no segmental wall motion abnormalities. Repeat TTE pending.  Discussed with cards, unlikely to be cardiogenic syncope.  EKG showing bifascicular block, however no indication for PPM placement at this time per cards/EP.   - Discontinued Labetalol  - Noted to have 4 beats NSVT overnight on telemetry,  keep Mg>2, K>4  - Tele fellow following, recommendations appreciated
- Likely 2/2 dehydration as pt with poor PO intake.  Hypotension was noted during EMS, likely hypovolemic hypotension   - CE continue to rise, pt denies any CP.  TTE from April 2019 reviewed showing no segmental wall motion abnormalities. Repeat TTE pending.  Discussed with cards, unlikely to be cardiogenic syncope.  EKG showing bifascicular block, however no indication for PPM placement at this time per cards.  - Consider dose decrease of BB if BP remains on lower side throughout admission.  Pt also on HCTZ, currently being held.
Likely 2/2 dehydration as pt with poor PO intake for past week.  Hypotension was noted during EMS, likely hypovolemic hypotension   - CE continue to rise, pt denies any CP.  TTE from April 2019 reviewed showing no segmental wall motion abnormalities. Repeat TTE reviewed, normal LVSF with severe concentric left ventricular hypertrophy.    - Discussed with cards, unlikely to be cardiogenic syncope.  EKG showing bifascicular block, however no indication for PPM placement at this time per cards/EP.   - labetalol DC'd  - s/p loop recorder by EP today  -  keep Mg>2, K>4  - EP following, recommendations appreciated
- Likely 2/2 dehydration as pt with poor PO intake for past week.  Hypotension was noted during EMS, likely hypovolemic hypotension   - CE continue to rise, pt denies any CP.  TTE from April 2019 reviewed showing no segmental wall motion abnormalities. Repeat TTE pending.  Discussed with cards, unlikely to be cardiogenic syncope.  EKG showing bifascicular block, however no indication for PPM placement at this time per cards/EP.   - Discontinued Labetalol  - Noted to have 8 beats NSVT overnight on telemetry,  keep Mg>2, K>4
Likely 2/2 dehydration as pt with poor PO intake for past week.  Hypotension was noted during EMS, likely hypovolemic hypotension   - CE continue to rise, pt denies any CP.  TTE from April 2019 reviewed showing no segmental wall motion abnormalities. Repeat TTE pending.  Discussed with cards, unlikely to be cardiogenic syncope.  EKG showing bifascicular block, however no indication for PPM placement at this time per cards/EP.   - Discontinued Labetalol  -  keep Mg>2, K>4  - EP following, recommendations appreciated

## 2019-08-20 NOTE — PROGRESS NOTE ADULT - PROBLEM SELECTOR PLAN 7
IMPROVE VTE Individual Risk Assessment    RISK                                                                Points    [  ] Previous VTE                                                  3  [  ] Thrombophilia                                               2  [ x ] Lower limb paralysis                                      2        (unable to hold up >15 seconds)    [  ] Current Cancer                                              2         (within 6 months)  [ x  ] Immobilization > 24 hrs                                1  [  ] ICU/CCU stay > 24 hours                              1  [ x ] Age > 60                                                      1    IMPROVE VTE Score 4  IMPROVE Score > or = 4: High Risk, pharmacologic VTE prophylaxis is indicated for most patients (in the absence of a contraindication)    On HSQ for DVT ppx

## 2019-08-20 NOTE — PROGRESS NOTE ADULT - PROBLEM SELECTOR PLAN 4
- Holding HCTZ  - Resume other home meds
- Holding HCTZ  - Resume other home meds, but consider dose decrease if BP remains on low side
- Holding HCTZ  - Resume other home meds

## 2019-09-05 ENCOUNTER — APPOINTMENT (OUTPATIENT)
Dept: ELECTROPHYSIOLOGY | Facility: CLINIC | Age: 84
End: 2019-09-05
Payer: MEDICARE

## 2019-09-05 PROCEDURE — 93285 PRGRMG DEV EVAL SCRMS IP: CPT

## 2019-10-08 ENCOUNTER — APPOINTMENT (OUTPATIENT)
Dept: ELECTROPHYSIOLOGY | Facility: CLINIC | Age: 84
End: 2019-10-08
Payer: MEDICARE

## 2019-10-08 PROCEDURE — 93299: CPT

## 2019-10-08 PROCEDURE — 93298 REM INTERROG DEV EVAL SCRMS: CPT

## 2019-10-22 NOTE — ED ADULT TRIAGE NOTE - WEIGHT IN LBS
149.9 Patient Specific Counseling (Will Not Stick From Patient To Patient): Call the office for a cyst inj. Detail Level: Detailed

## 2019-11-01 ENCOUNTER — INPATIENT (INPATIENT)
Facility: HOSPITAL | Age: 84
LOS: 3 days | Discharge: HOME HEALTH | End: 2019-11-05
Attending: INTERNAL MEDICINE | Admitting: INTERNAL MEDICINE
Payer: MEDICARE

## 2019-11-01 VITALS
OXYGEN SATURATION: 100 % | DIASTOLIC BLOOD PRESSURE: 69 MMHG | TEMPERATURE: 99 F | HEIGHT: 71 IN | SYSTOLIC BLOOD PRESSURE: 114 MMHG | HEART RATE: 78 BPM | WEIGHT: 119.93 LBS | RESPIRATION RATE: 18 BRPM

## 2019-11-01 DIAGNOSIS — Z29.9 ENCOUNTER FOR PROPHYLACTIC MEASURES, UNSPECIFIED: ICD-10-CM

## 2019-11-01 DIAGNOSIS — G93.41 METABOLIC ENCEPHALOPATHY: ICD-10-CM

## 2019-11-01 DIAGNOSIS — T83.511A INFECTION AND INFLAMMATORY REACTION DUE TO INDWELLING URETHRAL CATHETER, INITIAL ENCOUNTER: ICD-10-CM

## 2019-11-01 DIAGNOSIS — N40.1 BENIGN PROSTATIC HYPERPLASIA WITH LOWER URINARY TRACT SYMPTOMS: ICD-10-CM

## 2019-11-01 DIAGNOSIS — I10 ESSENTIAL (PRIMARY) HYPERTENSION: ICD-10-CM

## 2019-11-01 DIAGNOSIS — N17.9 ACUTE KIDNEY FAILURE, UNSPECIFIED: ICD-10-CM

## 2019-11-01 DIAGNOSIS — E87.6 HYPOKALEMIA: ICD-10-CM

## 2019-11-01 LAB
ALBUMIN SERPL ELPH-MCNC: 2.9 G/DL — LOW (ref 3.3–5)
ALP SERPL-CCNC: 51 U/L — SIGNIFICANT CHANGE UP (ref 40–120)
ALT FLD-CCNC: 16 U/L — SIGNIFICANT CHANGE UP (ref 12–78)
ANION GAP SERPL CALC-SCNC: 7 MMOL/L — SIGNIFICANT CHANGE UP (ref 5–17)
APPEARANCE UR: CLEAR — SIGNIFICANT CHANGE UP
AST SERPL-CCNC: 25 U/L — SIGNIFICANT CHANGE UP (ref 15–37)
BACTERIA # UR AUTO: ABNORMAL
BASOPHILS # BLD AUTO: 0.02 K/UL — SIGNIFICANT CHANGE UP (ref 0–0.2)
BASOPHILS NFR BLD AUTO: 0.3 % — SIGNIFICANT CHANGE UP (ref 0–2)
BILIRUB SERPL-MCNC: 0.3 MG/DL — SIGNIFICANT CHANGE UP (ref 0.2–1.2)
BILIRUB UR-MCNC: NEGATIVE — SIGNIFICANT CHANGE UP
BUN SERPL-MCNC: 22 MG/DL — SIGNIFICANT CHANGE UP (ref 7–23)
CALCIUM SERPL-MCNC: 9.6 MG/DL — SIGNIFICANT CHANGE UP (ref 8.5–10.1)
CHLORIDE SERPL-SCNC: 99 MMOL/L — SIGNIFICANT CHANGE UP (ref 96–108)
CO2 SERPL-SCNC: 30 MMOL/L — SIGNIFICANT CHANGE UP (ref 22–31)
COLOR SPEC: YELLOW — SIGNIFICANT CHANGE UP
CREAT SERPL-MCNC: 1.29 MG/DL — SIGNIFICANT CHANGE UP (ref 0.5–1.3)
DIFF PNL FLD: ABNORMAL
EOSINOPHIL # BLD AUTO: 0.31 K/UL — SIGNIFICANT CHANGE UP (ref 0–0.5)
EOSINOPHIL NFR BLD AUTO: 4.1 % — SIGNIFICANT CHANGE UP (ref 0–6)
GLUCOSE SERPL-MCNC: 108 MG/DL — HIGH (ref 70–99)
GLUCOSE UR QL: NEGATIVE MG/DL — SIGNIFICANT CHANGE UP
HCT VFR BLD CALC: 31.7 % — LOW (ref 39–50)
HGB BLD-MCNC: 10.2 G/DL — LOW (ref 13–17)
IMM GRANULOCYTES NFR BLD AUTO: 0.3 % — SIGNIFICANT CHANGE UP (ref 0–1.5)
KETONES UR-MCNC: NEGATIVE — SIGNIFICANT CHANGE UP
LACTATE SERPL-SCNC: 1 MMOL/L — SIGNIFICANT CHANGE UP (ref 0.7–2)
LEUKOCYTE ESTERASE UR-ACNC: ABNORMAL
LIDOCAIN IGE QN: 76 U/L — SIGNIFICANT CHANGE UP (ref 73–393)
LYMPHOCYTES # BLD AUTO: 0.6 K/UL — LOW (ref 1–3.3)
LYMPHOCYTES # BLD AUTO: 8 % — LOW (ref 13–44)
MCHC RBC-ENTMCNC: 29.3 PG — SIGNIFICANT CHANGE UP (ref 27–34)
MCHC RBC-ENTMCNC: 32.2 GM/DL — SIGNIFICANT CHANGE UP (ref 32–36)
MCV RBC AUTO: 91.1 FL — SIGNIFICANT CHANGE UP (ref 80–100)
MONOCYTES # BLD AUTO: 1.07 K/UL — HIGH (ref 0–0.9)
MONOCYTES NFR BLD AUTO: 14.3 % — HIGH (ref 2–14)
NEUTROPHILS # BLD AUTO: 5.48 K/UL — SIGNIFICANT CHANGE UP (ref 1.8–7.4)
NEUTROPHILS NFR BLD AUTO: 73 % — SIGNIFICANT CHANGE UP (ref 43–77)
NITRITE UR-MCNC: NEGATIVE — SIGNIFICANT CHANGE UP
NRBC # BLD: 0 /100 WBCS — SIGNIFICANT CHANGE UP (ref 0–0)
PH UR: 7 — SIGNIFICANT CHANGE UP (ref 5–8)
PLATELET # BLD AUTO: 280 K/UL — SIGNIFICANT CHANGE UP (ref 150–400)
POTASSIUM SERPL-MCNC: 3.2 MMOL/L — LOW (ref 3.5–5.3)
POTASSIUM SERPL-SCNC: 3.2 MMOL/L — LOW (ref 3.5–5.3)
PROT SERPL-MCNC: 8.5 GM/DL — HIGH (ref 6–8.3)
PROT UR-MCNC: 100 MG/DL
RBC # BLD: 3.48 M/UL — LOW (ref 4.2–5.8)
RBC # FLD: 15.2 % — HIGH (ref 10.3–14.5)
RBC CASTS # UR COMP ASSIST: ABNORMAL /HPF (ref 0–4)
SODIUM SERPL-SCNC: 136 MMOL/L — SIGNIFICANT CHANGE UP (ref 135–145)
SP GR SPEC: 1 — LOW (ref 1.01–1.02)
UROBILINOGEN FLD QL: NEGATIVE MG/DL — SIGNIFICANT CHANGE UP
WBC # BLD: 7.5 K/UL — SIGNIFICANT CHANGE UP (ref 3.8–10.5)
WBC # FLD AUTO: 7.5 K/UL — SIGNIFICANT CHANGE UP (ref 3.8–10.5)
WBC UR QL: >50

## 2019-11-01 PROCEDURE — 99223 1ST HOSP IP/OBS HIGH 75: CPT | Mod: AI

## 2019-11-01 PROCEDURE — 99285 EMERGENCY DEPT VISIT HI MDM: CPT

## 2019-11-01 PROCEDURE — 93010 ELECTROCARDIOGRAM REPORT: CPT

## 2019-11-01 PROCEDURE — 71045 X-RAY EXAM CHEST 1 VIEW: CPT | Mod: 26

## 2019-11-01 RX ORDER — POTASSIUM CHLORIDE 20 MEQ
40 PACKET (EA) ORAL ONCE
Refills: 0 | Status: COMPLETED | OUTPATIENT
Start: 2019-11-01 | End: 2019-11-01

## 2019-11-01 RX ORDER — POLYETHYLENE GLYCOL 3350 17 G/17G
17 POWDER, FOR SOLUTION ORAL DAILY
Refills: 0 | Status: DISCONTINUED | OUTPATIENT
Start: 2019-11-01 | End: 2019-11-05

## 2019-11-01 RX ORDER — GABAPENTIN 400 MG/1
300 CAPSULE ORAL
Refills: 0 | Status: DISCONTINUED | OUTPATIENT
Start: 2019-11-01 | End: 2019-11-05

## 2019-11-01 RX ORDER — SENNA PLUS 8.6 MG/1
2 TABLET ORAL AT BEDTIME
Refills: 0 | Status: DISCONTINUED | OUTPATIENT
Start: 2019-11-01 | End: 2019-11-05

## 2019-11-01 RX ORDER — LABETALOL HCL 100 MG
100 TABLET ORAL
Refills: 0 | Status: DISCONTINUED | OUTPATIENT
Start: 2019-11-01 | End: 2019-11-05

## 2019-11-01 RX ORDER — SODIUM CHLORIDE 9 MG/ML
1000 INJECTION INTRAMUSCULAR; INTRAVENOUS; SUBCUTANEOUS ONCE
Refills: 0 | Status: COMPLETED | OUTPATIENT
Start: 2019-11-01 | End: 2019-11-01

## 2019-11-01 RX ORDER — FERROUS SULFATE 325(65) MG
325 TABLET ORAL
Refills: 0 | Status: DISCONTINUED | OUTPATIENT
Start: 2019-11-01 | End: 2019-11-05

## 2019-11-01 RX ORDER — ACETAMINOPHEN 500 MG
975 TABLET ORAL ONCE
Refills: 0 | Status: COMPLETED | OUTPATIENT
Start: 2019-11-01 | End: 2019-11-01

## 2019-11-01 RX ORDER — AMLODIPINE BESYLATE 2.5 MG/1
5 TABLET ORAL DAILY
Refills: 0 | Status: DISCONTINUED | OUTPATIENT
Start: 2019-11-01 | End: 2019-11-05

## 2019-11-01 RX ORDER — CEFEPIME 1 G/1
1000 INJECTION, POWDER, FOR SOLUTION INTRAMUSCULAR; INTRAVENOUS EVERY 24 HOURS
Refills: 0 | Status: DISCONTINUED | OUTPATIENT
Start: 2019-11-01 | End: 2019-11-05

## 2019-11-01 RX ORDER — HYDROCHLOROTHIAZIDE 25 MG
25 TABLET ORAL DAILY
Refills: 0 | Status: DISCONTINUED | OUTPATIENT
Start: 2019-11-01 | End: 2019-11-05

## 2019-11-01 RX ORDER — HEPARIN SODIUM 5000 [USP'U]/ML
5000 INJECTION INTRAVENOUS; SUBCUTANEOUS EVERY 12 HOURS
Refills: 0 | Status: DISCONTINUED | OUTPATIENT
Start: 2019-11-01 | End: 2019-11-05

## 2019-11-01 RX ORDER — SODIUM CHLORIDE 9 MG/ML
1000 INJECTION, SOLUTION INTRAVENOUS
Refills: 0 | Status: DISCONTINUED | OUTPATIENT
Start: 2019-11-01 | End: 2019-11-05

## 2019-11-01 RX ORDER — CEFEPIME 1 G/1
1000 INJECTION, POWDER, FOR SOLUTION INTRAMUSCULAR; INTRAVENOUS ONCE
Refills: 0 | Status: COMPLETED | OUTPATIENT
Start: 2019-11-01 | End: 2019-11-01

## 2019-11-01 RX ORDER — ACETAMINOPHEN 500 MG
650 TABLET ORAL EVERY 6 HOURS
Refills: 0 | Status: DISCONTINUED | OUTPATIENT
Start: 2019-11-01 | End: 2019-11-05

## 2019-11-01 RX ADMIN — SODIUM CHLORIDE 75 MILLILITER(S): 9 INJECTION, SOLUTION INTRAVENOUS at 20:47

## 2019-11-01 RX ADMIN — Medication 975 MILLIGRAM(S): at 17:46

## 2019-11-01 RX ADMIN — Medication 40 MILLIEQUIVALENT(S): at 21:12

## 2019-11-01 RX ADMIN — SODIUM CHLORIDE 1000 MILLILITER(S): 9 INJECTION INTRAMUSCULAR; INTRAVENOUS; SUBCUTANEOUS at 17:45

## 2019-11-01 RX ADMIN — CEFEPIME 100 MILLIGRAM(S): 1 INJECTION, POWDER, FOR SOLUTION INTRAMUSCULAR; INTRAVENOUS at 17:45

## 2019-11-01 RX ADMIN — SENNA PLUS 2 TABLET(S): 8.6 TABLET ORAL at 21:12

## 2019-11-01 NOTE — H&P ADULT - ATTENDING COMMENTS
Authored by JENNY rosen. Reviewed and edited by Aida Abbott PA-C. Authored by PA student. Reviewed and edited by Aida Abbott PA-C and Pasquale Chen MD

## 2019-11-01 NOTE — H&P ADULT - NSHPPHYSICALEXAM_GEN_ALL_CORE
Vital Signs Last 24 Hrs  T(C): 37.9 (01 Nov 2019 19:01), Max: 38.1 (01 Nov 2019 17:45)  T(F): 100.2 (01 Nov 2019 19:01), Max: 100.5 (01 Nov 2019 17:45)  HR: 78 (01 Nov 2019 15:28) (78 - 78)  BP: 123/70 (01 Nov 2019 18:56) (114/69 - 123/70)  BP(mean): --  RR: 18 (01 Nov 2019 18:56) (18 - 18)  SpO2: 98% (01 Nov 2019 18:56) (98% - 100%)    Physical Exam:   GENERAL: NAD, well-groomed, well-developed  HEAD:  Atraumatic, Normocephalic  EYES: EOMI, PERRLA, conjunctiva and sclera clear  ENMT: No tonsillar erythema, exudates, or enlargement; Moist mucous membranes, No lesions  NECK: Supple, No JVD  NERVOUS SYSTEM:  Alert & Confused, Poor concentration; Motor Strength 5/5 B/L upper and 0/5 lower extremities, No arm/leg drift, good finger grasp  CHEST/LUNG: Clear to percussion bilaterally; No rales, rhonchi, wheezing, or rubs  HEART: Regular rate and rhythm; No murmurs  ABDOMEN: Soft, Mild suprapubic tenderness, Mild distended; Bowel sounds present  EXTREMITIES:  2+ Peripheral Pulses, No clubbing, cyanosis, or 1+ pitting edema b/l lower extremity  LYMPH: No lymphadenopathy noted  SKIN: No rashes or lesions

## 2019-11-01 NOTE — H&P ADULT - ASSESSMENT
Pt is a 89 y/o male with a PMHx of HTN, BHP chronic obrien catheter, paraplegic b/l LE, CKD and recurrent UTIs. Admitted for metabolic encephalopathy 2/2 UTI, and ROGER    IMPROVE VTE Individual Risk Assessment          RISK                                                          Points    [  ] Previous VTE                                                3    [  ] Thrombophilia                                             2    [x  ] Lower limb paralysis                                   2        (unable to hold up >15 seconds)      [  ] Current Cancer                                             2         (within 6 months)    [ x ] Immobilization > 24 hrs                              1    [  ] ICU/CCU stay > 24 hours                            1    [x  ] Age > 60                                                        1    IMPROVE VTE Score __4_______

## 2019-11-01 NOTE — H&P ADULT - NSHPREVIEWOFSYSTEMS_GEN_ALL_CORE
REVIEW OF SYSTEMS:  CONSTITUTIONAL: +fever, weight loss, or fatigue  EYES: No eye pain, visual disturbances, or discharge  ENMT:  No difficulty hearing, tinnitus, vertigo; No sinus or throat pain  NECK: No pain or stiffness  RESPIRATORY: No cough, wheezing, chills or hemoptysis; No shortness of breath  CARDIOVASCULAR: No chest pain, palpitations, dizziness, or leg swelling  GASTROINTESTINAL: No abdominal or epigastric pain. No nausea, vomiting, or hematemesis; No diarrhea or constipation. No melena or hematochezia.  GENITOURINARY: No dysuria, No frequency, No hematuria, No incontinence  NEUROLOGICAL: No headaches, memory loss, paraplegic, numbness, or tremors  SKIN: No itching, burning, rashes, or lesions   LYMPH NODES: No enlarged glands  ENDOCRINE: No heat or cold intolerance; No hair loss  MUSCULOSKELETAL: No joint pain or swelling; + chronic back pain, No muscle, or extremity pain  PSYCHIATRIC: No depression, anxiety, mood swings, or difficulty sleeping  HEME/LYMPH: No easy bruising, or bleeding gums  ALLERGY AND IMMUNOLOGIC: No hives or eczema

## 2019-11-01 NOTE — ED ADULT NURSE REASSESSMENT NOTE - NS ED NURSE REASSESS COMMENT FT1
obrien irrigated by MD Mensah, as per MD Mensah no need to change obrien catheter at this time. no acute distress noted. ongoing assess.

## 2019-11-01 NOTE — H&P ADULT - HISTORY OF PRESENT ILLNESS
Pt is a 89 y/o male with a PMHx of HTN, BHP chronic obrien catheter, paraplegic b/l LE, CKD and recurrent UTIs. Patient accompanied by son who explains he had been experiencing confusion and weakness. Son explains that these are the same symptoms he usually experiences during UTI. The son noted a low grade fever, Tmax: 99.7. Pt had obrien changed 7 days prior, suspecting of possible infection, and brought pt in for evaluation. Obrien usually changed 1x/month. Denies CP, SOB, cough, respiratory symptoms, HA.   Of note, son states SPC was attempted last time however unsuccessful 2/2 to persistent bleeding, requiring blood transfusion.    In ED, labs no leukocytosis, K 3.2, UA +LE, don, WBC, RBC. CXR clear. Patient received 1LNS bolus, cefepime 1g IVPB, tylenol 975mg.     ED physician discussed patient with Dr. Chen, who accepted patient for admission.

## 2019-11-01 NOTE — ED PROVIDER NOTE - OBJECTIVE STATEMENT
Pt is a 89 y/o male with a PMHx of HTN, BHP chronic obrien, and recurrent UTI. Per son, in the past pt has had listlessness, confusion and weakness during a UTI. The son noted a similar pattern with a low grade fever. Pt recently had obrien changed 2-3 days prior, suspecting of possible infection, and brought pt in for evaluation.

## 2019-11-01 NOTE — ED PROVIDER NOTE - GENITOURINARY, MLM
Catheter with urethral enlargement and some indentation of penile tissue likely secondary to chronic obrien placement. No discharge, lesions.

## 2019-11-01 NOTE — H&P ADULT - NSHPLABSRESULTS_GEN_ALL_CORE
10.2   7.50  )-----------( 280      ( 2019 17:38 )             31.7       136  |  99  |  22  ----------------------------<  108<H>  3.2<L>   |  30  |  1.29    Ca    9.6      2019 17:38    TPro  8.5<H>  /  Alb  2.9<L>  /  TBili  0.3  /  DBili  x   /  AST  25  /  ALT  16  /  AlkPhos  51      Urinalysis Basic - ( 2019 17:40 )    Color: Yellow / Appearance: Clear / S.005 / pH: x  Gluc: x / Ketone: Negative  / Bili: Negative / Urobili: Negative mg/dL   Blood: x / Protein: 100 mg/dL / Nitrite: Negative   Leuk Esterase: Moderate / RBC: 6-10 /HPF / WBC >50   Sq Epi: x / Non Sq Epi: x / Bacteria: Moderate      Xray Chest 1 View-PORTABLE IMMEDIATE (19 @ 17:10) >  IMPRESSION: No gross consolidation is seen.

## 2019-11-01 NOTE — H&P ADULT - PROBLEM SELECTOR PROBLEM 3
Acute renal failure superimposed on stage 2 chronic kidney disease, unspecified acute renal failure type

## 2019-11-01 NOTE — H&P ADULT - PROBLEM SELECTOR PLAN 2
-continue cefepime 1g q24 hrs (renal dosing)  -urine culture, blood culture pending  -Urology consult with Dr. Mensah pending  -per son, SPC attempted last admission however failed 2/2 to persistent bleeding, requiring blood transfusion

## 2019-11-01 NOTE — ED ADULT NURSE NOTE - NSIMPLEMENTINTERV_GEN_ALL_ED
Implemented All Fall Risk Interventions:  [place] to call system. Call bell, personal items and telephone within reach. Instruct patient to call for assistance. Room bathroom lighting operational. Non-slip footwear when patient is off stretcher. Physically safe environment: no spills, clutter or unnecessary equipment. Stretcher in lowest position, wheels locked, appropriate side rails in place. Provide visual cue, wrist band, yellow gown, etc. Monitor gait and stability. Monitor for mental status changes and reorient to person, place, and time. Review medications for side effects contributing to fall risk. Reinforce activity limits and safety measures with patient and family.

## 2019-11-01 NOTE — ED ADULT NURSE NOTE - OBJECTIVE STATEMENT
pt having temp 99.7 at home, confused, drowsy this am, indwelling obrien changed by visiting nurse x 1 week ago. hx:bph, paraplegia pt having temp 99.7 at home, confused, drowsy this am, indwelling obrien from home changed by visiting nurse x 1 week ago. hx:bph, paraplegia

## 2019-11-02 LAB
ANION GAP SERPL CALC-SCNC: 4 MMOL/L — LOW (ref 5–17)
BUN SERPL-MCNC: 17 MG/DL — SIGNIFICANT CHANGE UP (ref 7–23)
CALCIUM SERPL-MCNC: 9.9 MG/DL — SIGNIFICANT CHANGE UP (ref 8.5–10.1)
CHLORIDE SERPL-SCNC: 105 MMOL/L — SIGNIFICANT CHANGE UP (ref 96–108)
CO2 SERPL-SCNC: 31 MMOL/L — SIGNIFICANT CHANGE UP (ref 22–31)
CREAT SERPL-MCNC: 1.03 MG/DL — SIGNIFICANT CHANGE UP (ref 0.5–1.3)
CULTURE RESULTS: SIGNIFICANT CHANGE UP
GLUCOSE SERPL-MCNC: 91 MG/DL — SIGNIFICANT CHANGE UP (ref 70–99)
HCT VFR BLD CALC: 29.6 % — LOW (ref 39–50)
HGB BLD-MCNC: 9.4 G/DL — LOW (ref 13–17)
MAGNESIUM SERPL-MCNC: 1.9 MG/DL — SIGNIFICANT CHANGE UP (ref 1.6–2.6)
MCHC RBC-ENTMCNC: 29.1 PG — SIGNIFICANT CHANGE UP (ref 27–34)
MCHC RBC-ENTMCNC: 31.8 GM/DL — LOW (ref 32–36)
MCV RBC AUTO: 91.6 FL — SIGNIFICANT CHANGE UP (ref 80–100)
NRBC # BLD: 0 /100 WBCS — SIGNIFICANT CHANGE UP (ref 0–0)
PHOSPHATE SERPL-MCNC: 2.6 MG/DL — SIGNIFICANT CHANGE UP (ref 2.5–4.5)
PLATELET # BLD AUTO: 259 K/UL — SIGNIFICANT CHANGE UP (ref 150–400)
POTASSIUM SERPL-MCNC: 3.5 MMOL/L — SIGNIFICANT CHANGE UP (ref 3.5–5.3)
POTASSIUM SERPL-SCNC: 3.5 MMOL/L — SIGNIFICANT CHANGE UP (ref 3.5–5.3)
RBC # BLD: 3.23 M/UL — LOW (ref 4.2–5.8)
RBC # FLD: 15.5 % — HIGH (ref 10.3–14.5)
SODIUM SERPL-SCNC: 140 MMOL/L — SIGNIFICANT CHANGE UP (ref 135–145)
SPECIMEN SOURCE: SIGNIFICANT CHANGE UP
WBC # BLD: 6.79 K/UL — SIGNIFICANT CHANGE UP (ref 3.8–10.5)
WBC # FLD AUTO: 6.79 K/UL — SIGNIFICANT CHANGE UP (ref 3.8–10.5)

## 2019-11-02 PROCEDURE — 99233 SBSQ HOSP IP/OBS HIGH 50: CPT

## 2019-11-02 RX ORDER — FINASTERIDE 5 MG/1
5 TABLET, FILM COATED ORAL DAILY
Refills: 0 | Status: DISCONTINUED | OUTPATIENT
Start: 2019-11-02 | End: 2019-11-05

## 2019-11-02 RX ORDER — TAMSULOSIN HYDROCHLORIDE 0.4 MG/1
0.4 CAPSULE ORAL AT BEDTIME
Refills: 0 | Status: DISCONTINUED | OUTPATIENT
Start: 2019-11-02 | End: 2019-11-05

## 2019-11-02 RX ADMIN — Medication 100 MILLIGRAM(S): at 17:49

## 2019-11-02 RX ADMIN — Medication 325 MILLIGRAM(S): at 17:49

## 2019-11-02 RX ADMIN — CEFEPIME 100 MILLIGRAM(S): 1 INJECTION, POWDER, FOR SOLUTION INTRAMUSCULAR; INTRAVENOUS at 17:49

## 2019-11-02 RX ADMIN — HEPARIN SODIUM 5000 UNIT(S): 5000 INJECTION INTRAVENOUS; SUBCUTANEOUS at 17:50

## 2019-11-02 RX ADMIN — SODIUM CHLORIDE 75 MILLILITER(S): 9 INJECTION, SOLUTION INTRAVENOUS at 05:10

## 2019-11-02 RX ADMIN — GABAPENTIN 300 MILLIGRAM(S): 400 CAPSULE ORAL at 17:49

## 2019-11-02 RX ADMIN — Medication 650 MILLIGRAM(S): at 17:50

## 2019-11-02 NOTE — PROGRESS NOTE ADULT - PROBLEM SELECTOR PLAN 2
-continue cefepime 1g q24 hrs  -urine culture, blood culture pending  -Urology consult with Dr. Mensah pending  -per son, SPC attempted last admission however failed 2/2 to persistent bleeding, requiring blood transfusion

## 2019-11-02 NOTE — CONSULT NOTE ADULT - SUBJECTIVE AND OBJECTIVE BOX
Chief Complaint:  Patient is a 89y old  Male who presents with a chief complaint of UTI (2019 13:17)      HPI:  Pt is a 89 y/o male with a PMHx of HTN, BHP chronic obrien catheter, paraplegic b/l LE, CKD and recurrent UTIs. Patient accompanied by son who explains he had been experiencing confusion and weakness. Son explains that these are the same symptoms he usually experiences during UTI. The son noted a low grade fever, Tmax: 99.7. Pt had obrien changed 7 days prior, suspecting of possible infection, and brought pt in for evaluation. Obrien usually changed 1x/month. Denies CP, SOB, cough, respiratory symptoms, HA.   Of note, son states SPC was attempted last time however unsuccessful 2/2 to persistent bleeding, requiring blood transfusion.    ED physician discussed patient with Dr. Chen, who accepted patient for admission. 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 650 milliGRAM(s) Oral every 6 hours PRN  amLODIPine   Tablet 5 milliGRAM(s) Oral daily  cefepime   IVPB 1000 milliGRAM(s) IV Intermittent every 24 hours  ferrous    sulfate 325 milliGRAM(s) Oral two times a day  gabapentin 300 milliGRAM(s) Oral two times a day  heparin  Injectable 5000 Unit(s) SubCutaneous every 12 hours  hydrochlorothiazide 25 milliGRAM(s) Oral daily  labetalol 100 milliGRAM(s) Oral two times a day  lactated ringers. 1000 milliLiter(s) IV Continuous <Continuous>  polyethylene glycol 3350 17 Gram(s) Oral daily PRN  senna 2 Tablet(s) Oral at bedtime      REVIEW OF SYSTEMS:  All other review of systems is negative unless indicated above.    Relevant Family History:   FAMILY HISTORY:  No significant family history      Relevant Social History:  Denies ETOH or tobacco history    Physical Exam:    Vital Signs:  Vital Signs Last 24 Hrs  T(C): 37.1 (2019 11:25), Max: 38.1 (2019 17:45)  T(F): 98.8 (2019 11:25), Max: 100.5 (2019 17:45)  HR: 80 (2019 11:25) (64 - 80)  BP: 154/78 (2019 11:25) (114/68 - 154/78)  BP(mean): --  RR: 17 (2019 11:25) (16 - 18)  SpO2: 99% (2019 11:25) (97% - 99%)  Daily Height in cm: 175.26 (2019 20:35)    Daily Weight in k.3 (2019 05:49)    Constitutional: WDWN resting comfortably in bed; NAD  HEENT: NC/AT, PERRL, EOMI, anicteric sclera, no nasal discharge; uvula midline, no oropharyngeal erythema or exudates  Neck: supple; no JVD or thyromegaly  Respiratory: CTA B/L; no W/R/R, no retractions  Cardiac: +S1/S2; RRR; no M/R/G; PMI non-displaced  Gastrointestinal: soft, NT/ND; no rebound or guarding; +BS   Extremities: , no clubbing or cyanosis; no peripheral edema  Musculoskeletal:  no joint swelling, tenderness or erythema  Vascular: 2+ radial, femoral, DP/PT pulses B/L  Skin: warm, dry and intact; no rashes, wounds, or scars  Neurologic: Alert and responsive; CNS grossly intact; no focal deficits no asterixis, no tremor, no encephalopathy  : Indwelling Obrien draining clear yellow urine, Uncircumcised phallus with Hypospadias present  Laboratory:                          9.4    6.79  )-----------( 259      ( 2019 07:36 )             29.6     11-02    140  |  105  |  17  ----------------------------<  91  3.5   |  31  |  1.03    Ca    9.9      2019 07:36  Phos  2.6     11-  Mg     1.9     11-02    TPro  8.5<H>  /  Alb  2.9<L>  /  TBili  0.3  /  DBili  x   /  AST  25  /  ALT  16  /  AlkPhos  51  11-    LIVER FUNCTIONS - ( 2019 17:38 )  Alb: 2.9 g/dL / Pro: 8.5 gm/dL / ALK PHOS: 51 U/L / ALT: 16 U/L / AST: 25 U/L / GGT: x             Urinalysis Basic - ( 2019 17:40 )    Color: Yellow / Appearance: Clear / S.005 / pH: x  Gluc: x / Ketone: Negative  / Bili: Negative / Urobili: Negative mg/dL   Blood: x / Protein: 100 mg/dL / Nitrite: Negative   Leuk Esterase: Moderate / RBC: 6-10 /HPF / WBC >50   Sq Epi: x / Non Sq Epi: x / Bacteria: Moderate    Lipase serum76 U/L      Intake and Output    19 @ 07:01  -  19 @ 07:00  --------------------------------------------------------  IN: 750 mL / OUT: 300 mL / NET: 450 mL    19 @ 08:01  -  19 @ 17:17  --------------------------------------------------------  IN: 640 mL / OUT: 800 mL / NET: -160 mL    Urinalysis (19 @ 17:40)    pH Urine: 7.0    Glucose Qualitative, Urine: Negative mg/dL    Blood, Urine: Moderate    Color: Yellow    Urine Appearance: Clear    Bilirubin: Negative    Ketone - Urine: Negative    Specific Gravity: 1.005    Protein, Urine: 100 mg/dL    Urobilinogen: Negative mg/dL    Nitrite: Negative    Leukocyte Esterase Concentration: Moderate      Assessment: 89yo M with h/o chronic obrien for UR with recurrent UTI    Plan:  - continue Obrien catheter, monitor urine output quality and quantity.  - Continue antibiotics  - Continue Flomax and Proscar  - Continue medical management and supportive care Chief Complaint:  Patient is a 89y old  Male who presents with a chief complaint of UTI (2019 13:17)      HPI:  Pt is a 89 y/o male with a PMHx of HTN, BHP chronic obrien catheter, paraplegic b/l LE, CKD and recurrent UTIs. Patient accompanied by son who explains he had been experiencing confusion and weakness. Son explains that these are the same symptoms he usually experiences during UTI. The son noted a low grade fever, Tmax: 99.7. Pt had obrien changed 7 days prior, suspecting of possible infection, and brought pt in for evaluation. Obrien usually changed 1x/month. Denies CP, SOB, cough, respiratory symptoms, HA.   Of note, son states SPC was attempted last time however unsuccessful 2/2 to persistent bleeding, requiring blood transfusion.    ED physician discussed patient with Dr. Chen, who accepted patient for admission. 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 650 milliGRAM(s) Oral every 6 hours PRN  amLODIPine   Tablet 5 milliGRAM(s) Oral daily  cefepime   IVPB 1000 milliGRAM(s) IV Intermittent every 24 hours  ferrous    sulfate 325 milliGRAM(s) Oral two times a day  gabapentin 300 milliGRAM(s) Oral two times a day  heparin  Injectable 5000 Unit(s) SubCutaneous every 12 hours  hydrochlorothiazide 25 milliGRAM(s) Oral daily  labetalol 100 milliGRAM(s) Oral two times a day  lactated ringers. 1000 milliLiter(s) IV Continuous <Continuous>  polyethylene glycol 3350 17 Gram(s) Oral daily PRN  senna 2 Tablet(s) Oral at bedtime      REVIEW OF SYSTEMS:  All other review of systems is negative unless indicated above.    Relevant Family History:   FAMILY HISTORY:  No significant family history      Relevant Social History:  Denies ETOH or tobacco history    Physical Exam:    Vital Signs:  Vital Signs Last 24 Hrs  T(C): 37.1 (2019 11:25), Max: 38.1 (2019 17:45)  T(F): 98.8 (2019 11:25), Max: 100.5 (2019 17:45)  HR: 80 (2019 11:25) (64 - 80)  BP: 154/78 (2019 11:25) (114/68 - 154/78)  BP(mean): --  RR: 17 (2019 11:25) (16 - 18)  SpO2: 99% (2019 11:25) (97% - 99%)  Daily Height in cm: 175.26 (2019 20:35)    Daily Weight in k.3 (2019 05:49)    Constitutional: WDWN resting comfortably in bed; NAD  HEENT: NC/AT, PERRL, EOMI, anicteric sclera, no nasal discharge; uvula midline, no oropharyngeal erythema or exudates  Neck: supple; no JVD or thyromegaly  Respiratory: CTA B/L; no W/R/R, no retractions  Cardiac: +S1/S2; RRR; no M/R/G; PMI non-displaced  Gastrointestinal: soft, NT/ND; no rebound or guarding; +BS   Extremities: , no clubbing or cyanosis; no peripheral edema  Musculoskeletal:  no joint swelling, tenderness or erythema  Vascular: 2+ radial, femoral, DP/PT pulses B/L  Skin: warm, dry and intact; no rashes, wounds, or scars  Neurologic: Alert and responsive; CNS grossly intact; no focal deficits no asterixis, no tremor, no encephalopathy  : Indwelling Obrien draining clear yellow urine, Uncircumcised phallus with Hypospadias present  Laboratory:                          9.4    6.79  )-----------( 259      ( 2019 07:36 )             29.6     11-02    140  |  105  |  17  ----------------------------<  91  3.5   |  31  |  1.03    Ca    9.9      2019 07:36  Phos  2.6     11-  Mg     1.9     11-02    TPro  8.5<H>  /  Alb  2.9<L>  /  TBili  0.3  /  DBili  x   /  AST  25  /  ALT  16  /  AlkPhos  51  11-    LIVER FUNCTIONS - ( 2019 17:38 )  Alb: 2.9 g/dL / Pro: 8.5 gm/dL / ALK PHOS: 51 U/L / ALT: 16 U/L / AST: 25 U/L / GGT: x             Urinalysis Basic - ( 2019 17:40 )    Color: Yellow / Appearance: Clear / S.005 / pH: x  Gluc: x / Ketone: Negative  / Bili: Negative / Urobili: Negative mg/dL   Blood: x / Protein: 100 mg/dL / Nitrite: Negative   Leuk Esterase: Moderate / RBC: 6-10 /HPF / WBC >50   Sq Epi: x / Non Sq Epi: x / Bacteria: Moderate    Lipase serum76 U/L      Intake and Output    19 @ 07:01  -  19 @ 07:00  --------------------------------------------------------  IN: 750 mL / OUT: 300 mL / NET: 450 mL    19 @ 08:01  -  19 @ 17:17  --------------------------------------------------------  IN: 640 mL / OUT: 800 mL / NET: -160 mL    Urinalysis (19 @ 17:40)    pH Urine: 7.0    Glucose Qualitative, Urine: Negative mg/dL    Blood, Urine: Moderate    Color: Yellow    Urine Appearance: Clear    Bilirubin: Negative    Ketone - Urine: Negative    Specific Gravity: 1.005    Protein, Urine: 100 mg/dL    Urobilinogen: Negative mg/dL    Nitrite: Negative    Leukocyte Esterase Concentration: Moderate      Assessment: 89yo M with h/o chronic obrien for UR with recurrent UTI, Failed SPC in April sec to bleeding    Plan:  - continue Obrien catheter, monitor urine output quality and quantity.  - Continue antibiotics, f/u Cultures  - Continue Flomax and Proscar  - Continue medical management and supportive care

## 2019-11-02 NOTE — PROGRESS NOTE ADULT - SUBJECTIVE AND OBJECTIVE BOX
89 y/o male with a PMHx of HTN, BHP chronic obrien catheter, paraplegic b/l LE, CKD and recurrent UTIs. Patient accompanied by son to ED who explains he had been experiencing confusion and weakness.  Pt admitted for UTI.    Today:  Pt seen at bedside, GEOVANNI x 3.  Says he feels much better today.      REVIEW OF SYSTEMS:  No new complaints      MEDICATIONS  (STANDING):  amLODIPine   Tablet 5 milliGRAM(s) Oral daily  cefepime   IVPB 1000 milliGRAM(s) IV Intermittent every 24 hours  ferrous    sulfate 325 milliGRAM(s) Oral two times a day  gabapentin 300 milliGRAM(s) Oral two times a day  heparin  Injectable 5000 Unit(s) SubCutaneous every 12 hours  hydrochlorothiazide 25 milliGRAM(s) Oral daily  labetalol 100 milliGRAM(s) Oral two times a day  lactated ringers. 1000 milliLiter(s) (75 mL/Hr) IV Continuous <Continuous>  senna 2 Tablet(s) Oral at bedtime    MEDICATIONS  (PRN):  acetaminophen   Tablet .. 650 milliGRAM(s) Oral every 6 hours PRN Temp greater or equal to 38C (100.4F), Mild Pain (1 - 3)  polyethylene glycol 3350 17 Gram(s) Oral daily PRN Constipation          Vital Signs Last 24 Hrs  T(C): 37.1 (2019 11:25), Max: 38.1 (2019 17:45)  T(F): 98.8 (2019 11:25), Max: 100.5 (2019 17:45)  HR: 80 (2019 11:25) (64 - 80)  BP: 154/78 (2019 11:25) (114/68 - 154/78)  BP(mean): --  RR: 17 (2019 11:25) (16 - 18)  SpO2: 99% (2019 11:25) (97% - 100%)    PHYSICAL EXAM:    GENERAL: NAD, well-groomed, well-developed  HEAD:  Atraumatic, Normocephalic  EYES: EOMI, PERRLA, conjunctiva and sclera clear  NECK: Supple, No JVD, Normal thyroid  NERVOUS SYSTEM:  Alert & Oriented X3, Good concentration  CHEST/LUNG: Clear to percussion bilaterally; No rales, rhonchi, wheezing, or rubs  HEART: Regular rate and rhythm; No murmurs, rubs, or gallops  ABDOMEN: Soft, Nontender, Nondistended; Bowel sounds present      LABS:                        9.4    6.79  )-----------( 259      ( 2019 07:36 )             29.6     11-02    140  |  105  |  17  ----------------------------<  91  3.5   |  31  |  1.03    Ca    9.9      2019 07:36  Phos  2.6     11-  Mg     1.9         TPro  8.5<H>  /  Alb  2.9<L>  /  TBili  0.3  /  DBili  x   /  AST  25  /  ALT  16  /  AlkPhos  51  11      Urinalysis Basic - ( 2019 17:40 )    Color: Yellow / Appearance: Clear / S.005 / pH: x  Gluc: x / Ketone: Negative  / Bili: Negative / Urobili: Negative mg/dL   Blood: x / Protein: 100 mg/dL / Nitrite: Negative   Leuk Esterase: Moderate / RBC: 6-10 /HPF / WBC >50   Sq Epi: x / Non Sq Epi: x / Bacteria: Moderate

## 2019-11-02 NOTE — PROGRESS NOTE ADULT - ASSESSMENT
88 y/o male with a PMHx of HTN, BHP chronic obrien catheter, paraplegic b/l LE, CKD and recurrent UTIs. Patient accompanied by son to ED who explains he had been experiencing confusion and weakness.  Pt admitted for UTI.

## 2019-11-03 LAB
ANION GAP SERPL CALC-SCNC: 7 MMOL/L — SIGNIFICANT CHANGE UP (ref 5–17)
BASOPHILS # BLD AUTO: 0.04 K/UL — SIGNIFICANT CHANGE UP (ref 0–0.2)
BASOPHILS NFR BLD AUTO: 0.7 % — SIGNIFICANT CHANGE UP (ref 0–2)
BUN SERPL-MCNC: 15 MG/DL — SIGNIFICANT CHANGE UP (ref 7–23)
CALCIUM SERPL-MCNC: 9.9 MG/DL — SIGNIFICANT CHANGE UP (ref 8.5–10.1)
CHLORIDE SERPL-SCNC: 102 MMOL/L — SIGNIFICANT CHANGE UP (ref 96–108)
CO2 SERPL-SCNC: 30 MMOL/L — SIGNIFICANT CHANGE UP (ref 22–31)
CREAT SERPL-MCNC: 1 MG/DL — SIGNIFICANT CHANGE UP (ref 0.5–1.3)
EOSINOPHIL # BLD AUTO: 0.55 K/UL — HIGH (ref 0–0.5)
EOSINOPHIL NFR BLD AUTO: 9.4 % — HIGH (ref 0–6)
GLUCOSE SERPL-MCNC: 77 MG/DL — SIGNIFICANT CHANGE UP (ref 70–99)
HCT VFR BLD CALC: 29.9 % — LOW (ref 39–50)
HGB BLD-MCNC: 9.5 G/DL — LOW (ref 13–17)
IMM GRANULOCYTES NFR BLD AUTO: 0.3 % — SIGNIFICANT CHANGE UP (ref 0–1.5)
LYMPHOCYTES # BLD AUTO: 1.02 K/UL — SIGNIFICANT CHANGE UP (ref 1–3.3)
LYMPHOCYTES # BLD AUTO: 17.4 % — SIGNIFICANT CHANGE UP (ref 13–44)
MCHC RBC-ENTMCNC: 28.9 PG — SIGNIFICANT CHANGE UP (ref 27–34)
MCHC RBC-ENTMCNC: 31.8 GM/DL — LOW (ref 32–36)
MCV RBC AUTO: 90.9 FL — SIGNIFICANT CHANGE UP (ref 80–100)
MONOCYTES # BLD AUTO: 0.75 K/UL — SIGNIFICANT CHANGE UP (ref 0–0.9)
MONOCYTES NFR BLD AUTO: 12.8 % — SIGNIFICANT CHANGE UP (ref 2–14)
NEUTROPHILS # BLD AUTO: 3.47 K/UL — SIGNIFICANT CHANGE UP (ref 1.8–7.4)
NEUTROPHILS NFR BLD AUTO: 59.4 % — SIGNIFICANT CHANGE UP (ref 43–77)
NRBC # BLD: 0 /100 WBCS — SIGNIFICANT CHANGE UP (ref 0–0)
PLATELET # BLD AUTO: 267 K/UL — SIGNIFICANT CHANGE UP (ref 150–400)
POTASSIUM SERPL-MCNC: 3.3 MMOL/L — LOW (ref 3.5–5.3)
POTASSIUM SERPL-SCNC: 3.3 MMOL/L — LOW (ref 3.5–5.3)
RBC # BLD: 3.29 M/UL — LOW (ref 4.2–5.8)
RBC # FLD: 15.1 % — HIGH (ref 10.3–14.5)
SODIUM SERPL-SCNC: 139 MMOL/L — SIGNIFICANT CHANGE UP (ref 135–145)
WBC # BLD: 5.85 K/UL — SIGNIFICANT CHANGE UP (ref 3.8–10.5)
WBC # FLD AUTO: 5.85 K/UL — SIGNIFICANT CHANGE UP (ref 3.8–10.5)

## 2019-11-03 PROCEDURE — 99233 SBSQ HOSP IP/OBS HIGH 50: CPT

## 2019-11-03 RX ORDER — POTASSIUM CHLORIDE 20 MEQ
40 PACKET (EA) ORAL EVERY 4 HOURS
Refills: 0 | Status: COMPLETED | OUTPATIENT
Start: 2019-11-03 | End: 2019-11-03

## 2019-11-03 RX ADMIN — Medication 325 MILLIGRAM(S): at 05:38

## 2019-11-03 RX ADMIN — TAMSULOSIN HYDROCHLORIDE 0.4 MILLIGRAM(S): 0.4 CAPSULE ORAL at 21:53

## 2019-11-03 RX ADMIN — GABAPENTIN 300 MILLIGRAM(S): 400 CAPSULE ORAL at 17:01

## 2019-11-03 RX ADMIN — Medication 650 MILLIGRAM(S): at 17:01

## 2019-11-03 RX ADMIN — HEPARIN SODIUM 5000 UNIT(S): 5000 INJECTION INTRAVENOUS; SUBCUTANEOUS at 17:01

## 2019-11-03 RX ADMIN — Medication 100 MILLIGRAM(S): at 17:01

## 2019-11-03 RX ADMIN — GABAPENTIN 300 MILLIGRAM(S): 400 CAPSULE ORAL at 05:38

## 2019-11-03 RX ADMIN — Medication 25 MILLIGRAM(S): at 05:38

## 2019-11-03 RX ADMIN — SENNA PLUS 2 TABLET(S): 8.6 TABLET ORAL at 21:52

## 2019-11-03 RX ADMIN — Medication 40 MILLIEQUIVALENT(S): at 11:29

## 2019-11-03 RX ADMIN — HEPARIN SODIUM 5000 UNIT(S): 5000 INJECTION INTRAVENOUS; SUBCUTANEOUS at 05:38

## 2019-11-03 RX ADMIN — Medication 40 MILLIEQUIVALENT(S): at 09:45

## 2019-11-03 RX ADMIN — FINASTERIDE 5 MILLIGRAM(S): 5 TABLET, FILM COATED ORAL at 11:29

## 2019-11-03 RX ADMIN — AMLODIPINE BESYLATE 5 MILLIGRAM(S): 2.5 TABLET ORAL at 05:38

## 2019-11-03 RX ADMIN — Medication 100 MILLIGRAM(S): at 05:38

## 2019-11-03 RX ADMIN — Medication 325 MILLIGRAM(S): at 17:01

## 2019-11-03 RX ADMIN — CEFEPIME 100 MILLIGRAM(S): 1 INJECTION, POWDER, FOR SOLUTION INTRAMUSCULAR; INTRAVENOUS at 17:02

## 2019-11-03 RX ADMIN — Medication 650 MILLIGRAM(S): at 18:11

## 2019-11-03 NOTE — PROGRESS NOTE ADULT - SUBJECTIVE AND OBJECTIVE BOX
89 y/o male with a PMHx of HTN, BHP chronic obrien catheter, paraplegic b/l LE, CKD and recurrent UTIs. Patient accompanied by son to ED who explains he had been experiencing confusion and weakness.  Pt admitted for UTI.    Today:  Pt seen at bedside, GEOVANNI x 3.  Says he feels much better today.      REVIEW OF SYSTEMS:  No new complaints      MEDICATIONS  (STANDING):  amLODIPine   Tablet 5 milliGRAM(s) Oral daily  cefepime   IVPB 1000 milliGRAM(s) IV Intermittent every 24 hours  ferrous    sulfate 325 milliGRAM(s) Oral two times a day  finasteride 5 milliGRAM(s) Oral daily  gabapentin 300 milliGRAM(s) Oral two times a day  heparin  Injectable 5000 Unit(s) SubCutaneous every 12 hours  hydrochlorothiazide 25 milliGRAM(s) Oral daily  labetalol 100 milliGRAM(s) Oral two times a day  lactated ringers. 1000 milliLiter(s) (75 mL/Hr) IV Continuous <Continuous>  potassium chloride    Tablet ER 40 milliEquivalent(s) Oral every 4 hours  senna 2 Tablet(s) Oral at bedtime  tamsulosin 0.4 milliGRAM(s) Oral at bedtime    MEDICATIONS  (PRN):  acetaminophen   Tablet .. 650 milliGRAM(s) Oral every 6 hours PRN Temp greater or equal to 38C (100.4F), Mild Pain (1 - 3)  polyethylene glycol 3350 17 Gram(s) Oral daily PRN Constipation        Vital Signs Last 24 Hrs  T(C): 37.3 (2019 05:04), Max: 38.1 (2019 17:20)  T(F): 99.1 (2019 05:04), Max: 100.5 (2019 17:20)  HR: 76 (2019 05:04) (75 - 80)  BP: 158/74 (2019 05:04) (154/78 - 173/72)  BP(mean): --  RR: 18 (2019 05:04) (17 - 18)  SpO2: 96% (2019 05:04) (96% - 99%)    PHYSICAL EXAM:    GENERAL: NAD, well-groomed, well-developed  HEAD:  Atraumatic, Normocephalic  EYES: EOMI, PERRLA, conjunctiva and sclera clear  NECK: Supple, No JVD, Normal thyroid  NERVOUS SYSTEM:  Alert & Oriented X3, Good concentration  CHEST/LUNG: Clear to percussion bilaterally; No rales, rhonchi, wheezing, or rubs  HEART: Regular rate and rhythm; No murmurs, rubs, or gallops  ABDOMEN: Soft, Nontender, Nondistended; Bowel sounds present      LABS:                        9.5    5.85  )-----------( 267      ( 2019 07:38 )             29.9     11-03    139  |  102  |  15  ----------------------------<  77  3.3<L>   |  30  |  1.00    Ca    9.9      2019 07:38  Phos  2.6     11-02  Mg     1.9     11-    TPro  8.5<H>  /  Alb  2.9<L>  /  TBili  0.3  /  DBili  x   /  AST  25  /  ALT  16  /  AlkPhos  51  11-01      Urinalysis Basic - ( 2019 17:40 )    Color: Yellow / Appearance: Clear / S.005 / pH: x  Gluc: x / Ketone: Negative  / Bili: Negative / Urobili: Negative mg/dL   Blood: x / Protein: 100 mg/dL / Nitrite: Negative   Leuk Esterase: Moderate / RBC: 6-10 /HPF / WBC >50   Sq Epi: x / Non Sq Epi: x / Bacteria: Moderate

## 2019-11-03 NOTE — PROGRESS NOTE ADULT - SUBJECTIVE AND OBJECTIVE BOX
Patient seen and examined bedside resting comfortably. No acute overnight events and no new complaints offered.   Denies hematuria and dysuria. Denies nausea and vomiting. Tolerating diet.  Denies chest pain, dyspnea, cough.    T(F): 99.1 (11-03-19 @ 05:04), Max: 100.5 (11-02-19 @ 17:20)  HR: 76 (11-03-19 @ 05:04) (75 - 80)  BP: 158/74 (11-03-19 @ 05:04) (154/78 - 173/72)  RR: 18 (11-03-19 @ 05:04) (17 - 18)  SpO2: 96% (11-03-19 @ 05:04) (96% - 99%)  CAPILLARY BLOOD GLUCOSE    PHYSICAL EXAM:    General: NAD, A&O x3  Chest: Nonlabored respirations, good inspiratory intake  Abdomen: Soft, NT/ND. (+) BS, no rebound tenderness, no guarding  : Indwelling obrien catheter draining clear, yellow urine. Uncircumcised phallus with Hypospadias present    LABS:                        9.5    5.85  )-----------( 267      ( 03 Nov 2019 07:38 )             29.9   11-03    139  |  102  |  15  ----------------------------<  77  3.3<L>   |  30  |  1.00    Ca    9.9      03 Nov 2019 07:38  Phos  2.6     11-02  Mg     1.9     11-02    TPro  8.5<H>  /  Alb  2.9<L>  /  TBili  0.3  /  DBili  x   /  AST  25  /  ALT  16  /  AlkPhos  51  11-01    I&O's Detail      02 Nov 2019 08:01  -  03 Nov 2019 07:00  --------------------------------------------------------  IN:    lactated ringers.: 2700 mL    Oral Fluid: 1040 mL  Total IN: 3740 mL    OUT:    Ureteral Catheter: 1050 mL    Voided: 1650 mL  Total OUT: 2700 mL    Total NET: 1040 mL    A/P: 89y Male with PMHx HTN, BPH, chronic obrien catheter, CKD, and recurrent UTIs admitted with metabolic encephalopathy 2/2 UTI    -Continue chronic obrien catheter, monitor I&Os  -Continue IV abx, f/u cultures  -F/u labs  -Continue Flomax and Proscar  -Continue medical management per medical team Patient seen and examined bedside resting comfortably. No acute overnight events and no new complaints offered.   Denies hematuria and dysuria. Denies nausea and vomiting. Tolerating diet.  Denies chest pain, dyspnea, cough.    T(F): 99.1 (11-03-19 @ 05:04), Max: 100.5 (11-02-19 @ 17:20)  HR: 76 (11-03-19 @ 05:04) (75 - 80)  BP: 158/74 (11-03-19 @ 05:04) (154/78 - 173/72)  RR: 18 (11-03-19 @ 05:04) (17 - 18)  SpO2: 96% (11-03-19 @ 05:04) (96% - 99%)  CAPILLARY BLOOD GLUCOSE    PHYSICAL EXAM:    General: NAD, A&O x3  Chest: Nonlabored respirations  CV: S1S2, RRR  Abdomen: Soft, NT/ND  : Indwelling obrien catheter draining clear, yellow urine (1050cc/24hr). Uncircumcised phallus with Hypospadias present    LABS:                        9.5    5.85  )-----------( 267      ( 03 Nov 2019 07:38 )             29.9   11-03    139  |  102  |  15  ----------------------------<  77  3.3<L>   |  30  |  1.00    Ca    9.9      03 Nov 2019 07:38  Phos  2.6     11-02  Mg     1.9     11-02    TPro  8.5<H>  /  Alb  2.9<L>  /  TBili  0.3  /  DBili  x   /  AST  25  /  ALT  16  /  AlkPhos  51  11-01    I&O's Detail      02 Nov 2019 08:01  -  03 Nov 2019 07:00  --------------------------------------------------------  IN:    lactated ringers.: 2700 mL    Oral Fluid: 1040 mL  Total IN: 3740 mL    OUT:    Ureteral Catheter: 1050 mL    Voided: 1650 mL  Total OUT: 2700 mL    Total NET: 1040 mL    A/P: 89y Male with PMHx HTN, BPH, chronic obrien catheter, CKD, and recurrent UTIs admitted with metabolic encephalopathy (resolved) 2/2 UTI.   Failed SPC in April sec to bleeding    -Continue obrien catheter, monitor I&Os  -Continue IV abx  -F/u labs  -Continue Flomax and Proscar  -Continue medical management per medical team

## 2019-11-03 NOTE — PROGRESS NOTE ADULT - PROBLEM SELECTOR PLAN 2
-continue cefepime 1g q24 hrs  -urine culture shows >3 organisms; await sens  -Blood Cx no growth to date  -Urology consult with Dr. Mensah  -per son, SPC attempted last admission however failed 2/2 to persistent bleeding, requiring blood transfusion

## 2019-11-04 LAB
ANION GAP SERPL CALC-SCNC: 5 MMOL/L — SIGNIFICANT CHANGE UP (ref 5–17)
BASOPHILS # BLD AUTO: 0.03 K/UL — SIGNIFICANT CHANGE UP (ref 0–0.2)
BASOPHILS NFR BLD AUTO: 0.6 % — SIGNIFICANT CHANGE UP (ref 0–2)
BLD GP AB SCN SERPL QL: SIGNIFICANT CHANGE UP
BUN SERPL-MCNC: 23 MG/DL — SIGNIFICANT CHANGE UP (ref 7–23)
CALCIUM SERPL-MCNC: 9.4 MG/DL — SIGNIFICANT CHANGE UP (ref 8.5–10.1)
CHLORIDE SERPL-SCNC: 104 MMOL/L — SIGNIFICANT CHANGE UP (ref 96–108)
CO2 SERPL-SCNC: 30 MMOL/L — SIGNIFICANT CHANGE UP (ref 22–31)
CREAT SERPL-MCNC: 1.19 MG/DL — SIGNIFICANT CHANGE UP (ref 0.5–1.3)
EOSINOPHIL # BLD AUTO: 0.65 K/UL — HIGH (ref 0–0.5)
EOSINOPHIL NFR BLD AUTO: 13.2 % — HIGH (ref 0–6)
GLUCOSE SERPL-MCNC: 98 MG/DL — SIGNIFICANT CHANGE UP (ref 70–99)
HCT VFR BLD CALC: 29.3 % — LOW (ref 39–50)
HGB BLD-MCNC: 9.1 G/DL — LOW (ref 13–17)
IMM GRANULOCYTES NFR BLD AUTO: 0.2 % — SIGNIFICANT CHANGE UP (ref 0–1.5)
LYMPHOCYTES # BLD AUTO: 0.95 K/UL — LOW (ref 1–3.3)
LYMPHOCYTES # BLD AUTO: 19.3 % — SIGNIFICANT CHANGE UP (ref 13–44)
MCHC RBC-ENTMCNC: 28.4 PG — SIGNIFICANT CHANGE UP (ref 27–34)
MCHC RBC-ENTMCNC: 31.1 GM/DL — LOW (ref 32–36)
MCV RBC AUTO: 91.6 FL — SIGNIFICANT CHANGE UP (ref 80–100)
MONOCYTES # BLD AUTO: 0.68 K/UL — SIGNIFICANT CHANGE UP (ref 0–0.9)
MONOCYTES NFR BLD AUTO: 13.8 % — SIGNIFICANT CHANGE UP (ref 2–14)
NEUTROPHILS # BLD AUTO: 2.61 K/UL — SIGNIFICANT CHANGE UP (ref 1.8–7.4)
NEUTROPHILS NFR BLD AUTO: 52.9 % — SIGNIFICANT CHANGE UP (ref 43–77)
NRBC # BLD: 0 /100 WBCS — SIGNIFICANT CHANGE UP (ref 0–0)
PLATELET # BLD AUTO: 285 K/UL — SIGNIFICANT CHANGE UP (ref 150–400)
POTASSIUM SERPL-MCNC: 3.8 MMOL/L — SIGNIFICANT CHANGE UP (ref 3.5–5.3)
POTASSIUM SERPL-SCNC: 3.8 MMOL/L — SIGNIFICANT CHANGE UP (ref 3.5–5.3)
RBC # BLD: 3.2 M/UL — LOW (ref 4.2–5.8)
RBC # FLD: 15.2 % — HIGH (ref 10.3–14.5)
SODIUM SERPL-SCNC: 139 MMOL/L — SIGNIFICANT CHANGE UP (ref 135–145)
WBC # BLD: 4.93 K/UL — SIGNIFICANT CHANGE UP (ref 3.8–10.5)
WBC # FLD AUTO: 4.93 K/UL — SIGNIFICANT CHANGE UP (ref 3.8–10.5)

## 2019-11-04 PROCEDURE — 99233 SBSQ HOSP IP/OBS HIGH 50: CPT

## 2019-11-04 RX ADMIN — GABAPENTIN 300 MILLIGRAM(S): 400 CAPSULE ORAL at 06:17

## 2019-11-04 RX ADMIN — GABAPENTIN 300 MILLIGRAM(S): 400 CAPSULE ORAL at 17:55

## 2019-11-04 RX ADMIN — Medication 25 MILLIGRAM(S): at 06:17

## 2019-11-04 RX ADMIN — FINASTERIDE 5 MILLIGRAM(S): 5 TABLET, FILM COATED ORAL at 11:12

## 2019-11-04 RX ADMIN — Medication 325 MILLIGRAM(S): at 06:17

## 2019-11-04 RX ADMIN — CEFEPIME 100 MILLIGRAM(S): 1 INJECTION, POWDER, FOR SOLUTION INTRAMUSCULAR; INTRAVENOUS at 17:55

## 2019-11-04 RX ADMIN — TAMSULOSIN HYDROCHLORIDE 0.4 MILLIGRAM(S): 0.4 CAPSULE ORAL at 21:23

## 2019-11-04 RX ADMIN — Medication 100 MILLIGRAM(S): at 06:17

## 2019-11-04 RX ADMIN — HEPARIN SODIUM 5000 UNIT(S): 5000 INJECTION INTRAVENOUS; SUBCUTANEOUS at 17:55

## 2019-11-04 RX ADMIN — Medication 100 MILLIGRAM(S): at 17:55

## 2019-11-04 RX ADMIN — AMLODIPINE BESYLATE 5 MILLIGRAM(S): 2.5 TABLET ORAL at 06:17

## 2019-11-04 RX ADMIN — Medication 325 MILLIGRAM(S): at 17:55

## 2019-11-04 RX ADMIN — SENNA PLUS 2 TABLET(S): 8.6 TABLET ORAL at 21:23

## 2019-11-04 NOTE — CHART NOTE - NSCHARTNOTEFT_GEN_A_CORE
Discussed with son, Samy Tierney, about suprapubic cystotomy placement in operating room. However, family did not agree and would like to hold off on this procedure  Continue regular diet  Discussed with Dr. Mensah

## 2019-11-04 NOTE — PROGRESS NOTE ADULT - PROBLEM SELECTOR PLAN 2
-continue cefepime 1g q24 hrs  -urine culture shows >3 organisms; await sens  -Blood Cx no growth to date  -Urology consult with Dr. Mensah  -per son, SPC attempted last admission however failed 2/2 to persistent bleeding, requiring blood transfusion  -Urology consult appreciated; plan is for inpatient SPC placement.  -Pt is currently medically optimized for procedure

## 2019-11-04 NOTE — PROGRESS NOTE ADULT - ASSESSMENT
88 y/o male with a PMHx of HTN, BHP chronic obrien catheter, paraplegic b/l LE, CKD and recurrent UTIs. Patient accompanied by son to ED who explains he had been experiencing confusion and weakness.  Pt admitted for UTI. No

## 2019-11-04 NOTE — PROGRESS NOTE ADULT - SUBJECTIVE AND OBJECTIVE BOX
88 y/o male with a PMHx of HTN, BHP chronic obrien catheter, paraplegic b/l LE, CKD and recurrent UTIs. Patient accompanied by son to ED who explains he had been experiencing confusion and weakness.  Pt admitted for UTI.    Today:  Pt seen at bedside, GEOVANNI x 3.  Says he feels the same, greatly improved.      REVIEW OF SYSTEMS:  No new complaints      REVIEW OF SYSTEMS:  No fever/chills, No photophobia/eye pain/changes in vision,  No chest pain/palpitations, no SOB/cough/wheeze/stridor, No abdominal pain, No N/V/D, no dysuria/frequency/discharge, No neck/back pain, no rash, no changes in neurological status/function.      MEDICATIONS  (STANDING):  amLODIPine   Tablet 5 milliGRAM(s) Oral daily  cefepime   IVPB 1000 milliGRAM(s) IV Intermittent every 24 hours  ferrous    sulfate 325 milliGRAM(s) Oral two times a day  finasteride 5 milliGRAM(s) Oral daily  gabapentin 300 milliGRAM(s) Oral two times a day  heparin  Injectable 5000 Unit(s) SubCutaneous every 12 hours  hydrochlorothiazide 25 milliGRAM(s) Oral daily  labetalol 100 milliGRAM(s) Oral two times a day  lactated ringers. 1000 milliLiter(s) (75 mL/Hr) IV Continuous <Continuous>  senna 2 Tablet(s) Oral at bedtime  tamsulosin 0.4 milliGRAM(s) Oral at bedtime    MEDICATIONS  (PRN):  acetaminophen   Tablet .. 650 milliGRAM(s) Oral every 6 hours PRN Temp greater or equal to 38C (100.4F), Mild Pain (1 - 3)  polyethylene glycol 3350 17 Gram(s) Oral daily PRN Constipation      Allergies  No Known Allergies        Vital Signs Last 24 Hrs  T(C): 36.4 (04 Nov 2019 11:11), Max: 38.6 (03 Nov 2019 17:02)  T(F): 97.5 (04 Nov 2019 11:11), Max: 101.4 (03 Nov 2019 17:02)  HR: 65 (04 Nov 2019 11:11) (65 - 83)  BP: 139/64 (04 Nov 2019 11:11) (139/64 - 151/75)  BP(mean): --  RR: 17 (04 Nov 2019 11:11) (17 - 18)  SpO2: 100% (04 Nov 2019 11:11) (98% - 100%)    PHYSICAL EXAM:    GENERAL: NAD, well-groomed, well-developed  HEAD:  Atraumatic, Normocephalic  NECK: Supple, No JVD, Normal thyroid  NERVOUS SYSTEM:  Alert & Oriented X3, Good concentration  CHEST/LUNG: Clear to percussion bilaterally; No rales, rhonchi, wheezing, or rubs  HEART: Regular rate and rhythm; No murmurs, rubs, or gallops  ABDOMEN: Soft, Nontender, Nondistended; Bowel sounds present      LABS:                        9.1    4.93  )-----------( 285      ( 04 Nov 2019 06:33 )             29.3     11-04    139  |  104  |  23  ----------------------------<  98  3.8   |  30  |  1.19    Ca    9.4      04 Nov 2019 06:33

## 2019-11-05 ENCOUNTER — TRANSCRIPTION ENCOUNTER (OUTPATIENT)
Age: 84
End: 2019-11-05

## 2019-11-05 VITALS
SYSTOLIC BLOOD PRESSURE: 111 MMHG | TEMPERATURE: 98 F | HEART RATE: 77 BPM | RESPIRATION RATE: 18 BRPM | OXYGEN SATURATION: 100 % | DIASTOLIC BLOOD PRESSURE: 67 MMHG

## 2019-11-05 LAB
ALBUMIN SERPL ELPH-MCNC: 2.6 G/DL — LOW (ref 3.3–5)
ALP SERPL-CCNC: 47 U/L — SIGNIFICANT CHANGE UP (ref 40–120)
ALT FLD-CCNC: 16 U/L — SIGNIFICANT CHANGE UP (ref 12–78)
ANION GAP SERPL CALC-SCNC: 7 MMOL/L — SIGNIFICANT CHANGE UP (ref 5–17)
AST SERPL-CCNC: 21 U/L — SIGNIFICANT CHANGE UP (ref 15–37)
BASOPHILS # BLD AUTO: 0.05 K/UL — SIGNIFICANT CHANGE UP (ref 0–0.2)
BASOPHILS NFR BLD AUTO: 0.9 % — SIGNIFICANT CHANGE UP (ref 0–2)
BILIRUB SERPL-MCNC: 0.2 MG/DL — SIGNIFICANT CHANGE UP (ref 0.2–1.2)
BUN SERPL-MCNC: 26 MG/DL — HIGH (ref 7–23)
CALCIUM SERPL-MCNC: 10.1 MG/DL — SIGNIFICANT CHANGE UP (ref 8.5–10.1)
CHLORIDE SERPL-SCNC: 103 MMOL/L — SIGNIFICANT CHANGE UP (ref 96–108)
CO2 SERPL-SCNC: 29 MMOL/L — SIGNIFICANT CHANGE UP (ref 22–31)
CREAT SERPL-MCNC: 1.17 MG/DL — SIGNIFICANT CHANGE UP (ref 0.5–1.3)
EOSINOPHIL # BLD AUTO: 0.63 K/UL — HIGH (ref 0–0.5)
EOSINOPHIL NFR BLD AUTO: 11.3 % — HIGH (ref 0–6)
GLUCOSE SERPL-MCNC: 88 MG/DL — SIGNIFICANT CHANGE UP (ref 70–99)
HCT VFR BLD CALC: 30.4 % — LOW (ref 39–50)
HGB BLD-MCNC: 9.5 G/DL — LOW (ref 13–17)
IMM GRANULOCYTES NFR BLD AUTO: 0.2 % — SIGNIFICANT CHANGE UP (ref 0–1.5)
LYMPHOCYTES # BLD AUTO: 1.04 K/UL — SIGNIFICANT CHANGE UP (ref 1–3.3)
LYMPHOCYTES # BLD AUTO: 18.7 % — SIGNIFICANT CHANGE UP (ref 13–44)
MCHC RBC-ENTMCNC: 28.8 PG — SIGNIFICANT CHANGE UP (ref 27–34)
MCHC RBC-ENTMCNC: 31.3 GM/DL — LOW (ref 32–36)
MCV RBC AUTO: 92.1 FL — SIGNIFICANT CHANGE UP (ref 80–100)
MONOCYTES # BLD AUTO: 0.53 K/UL — SIGNIFICANT CHANGE UP (ref 0–0.9)
MONOCYTES NFR BLD AUTO: 9.5 % — SIGNIFICANT CHANGE UP (ref 2–14)
NEUTROPHILS # BLD AUTO: 3.31 K/UL — SIGNIFICANT CHANGE UP (ref 1.8–7.4)
NEUTROPHILS NFR BLD AUTO: 59.4 % — SIGNIFICANT CHANGE UP (ref 43–77)
NRBC # BLD: 0 /100 WBCS — SIGNIFICANT CHANGE UP (ref 0–0)
PLATELET # BLD AUTO: 315 K/UL — SIGNIFICANT CHANGE UP (ref 150–400)
POTASSIUM SERPL-MCNC: 3.6 MMOL/L — SIGNIFICANT CHANGE UP (ref 3.5–5.3)
POTASSIUM SERPL-SCNC: 3.6 MMOL/L — SIGNIFICANT CHANGE UP (ref 3.5–5.3)
PROT SERPL-MCNC: 8.3 GM/DL — SIGNIFICANT CHANGE UP (ref 6–8.3)
RBC # BLD: 3.3 M/UL — LOW (ref 4.2–5.8)
RBC # FLD: 15.3 % — HIGH (ref 10.3–14.5)
SODIUM SERPL-SCNC: 139 MMOL/L — SIGNIFICANT CHANGE UP (ref 135–145)
WBC # BLD: 5.57 K/UL — SIGNIFICANT CHANGE UP (ref 3.8–10.5)
WBC # FLD AUTO: 5.57 K/UL — SIGNIFICANT CHANGE UP (ref 3.8–10.5)

## 2019-11-05 PROCEDURE — 99239 HOSP IP/OBS DSCHRG MGMT >30: CPT

## 2019-11-05 RX ADMIN — AMLODIPINE BESYLATE 5 MILLIGRAM(S): 2.5 TABLET ORAL at 05:54

## 2019-11-05 RX ADMIN — Medication 325 MILLIGRAM(S): at 05:52

## 2019-11-05 RX ADMIN — Medication 25 MILLIGRAM(S): at 05:52

## 2019-11-05 RX ADMIN — CEFEPIME 100 MILLIGRAM(S): 1 INJECTION, POWDER, FOR SOLUTION INTRAMUSCULAR; INTRAVENOUS at 17:07

## 2019-11-05 RX ADMIN — FINASTERIDE 5 MILLIGRAM(S): 5 TABLET, FILM COATED ORAL at 11:12

## 2019-11-05 RX ADMIN — Medication 325 MILLIGRAM(S): at 17:08

## 2019-11-05 RX ADMIN — HEPARIN SODIUM 5000 UNIT(S): 5000 INJECTION INTRAVENOUS; SUBCUTANEOUS at 05:53

## 2019-11-05 RX ADMIN — Medication 100 MILLIGRAM(S): at 06:02

## 2019-11-05 RX ADMIN — GABAPENTIN 300 MILLIGRAM(S): 400 CAPSULE ORAL at 05:52

## 2019-11-05 RX ADMIN — HEPARIN SODIUM 5000 UNIT(S): 5000 INJECTION INTRAVENOUS; SUBCUTANEOUS at 17:08

## 2019-11-05 RX ADMIN — GABAPENTIN 300 MILLIGRAM(S): 400 CAPSULE ORAL at 17:08

## 2019-11-05 RX ADMIN — Medication 100 MILLIGRAM(S): at 17:08

## 2019-11-05 NOTE — PROGRESS NOTE ADULT - SUBJECTIVE AND OBJECTIVE BOX
Patient seen and examined bedside resting comfortably. Pt is confused.  No complaints offered.   Denies nausea and vomiting. Tolerating diet.  Flatus/BM. +  Denies chest pain, dyspnea, cough.    T(F): 99 (11-05-19 @ 05:35), Max: 99 (11-05-19 @ 05:35)  HR: 74 (11-05-19 @ 05:35) (65 - 84)  BP: 152/68 (11-05-19 @ 05:35) (139/64 - 155/82)  RR: 18 (11-05-19 @ 05:35) (17 - 18)  SpO2: 99% (11-05-19 @ 05:35) (97% - 100%)    PHYSICAL EXAM:  General: NAD  Neuro:  Alert confused  Abdomen: soft, NTND. Normactive BS  : Ma cath in place. Draining clear urine.    LABS:                        9.5    5.57  )-----------( 315      ( 05 Nov 2019 08:08 )             30.4     11-05    139  |  103  |  26<H>  ----------------------------<  88  3.6   |  29  |  1.17    Ca    10.1      05 Nov 2019 08:08    TPro  8.3  /  Alb  2.6<L>  /  TBili  0.2  /  DBili  x   /  AST  21  /  ALT  16  /  AlkPhos  47  11-05      I&O's Detail    04 Nov 2019 07:01  -  05 Nov 2019 07:00  --------------------------------------------------------  IN:    Solution: 50 mL  Total IN: 50 mL    OUT:    Ureteral Catheter: 900 mL    Voided: 700 mL  Total OUT: 1600 mL    Total NET: -1550 mL          Impression: 89y Male admitted with URINARY TRACT INFECTION  UTI  chronic Ma    Plan:  -continue VTE prophylaxis   - continue IVAB  - continue medical f/u  - Continue Ma cath. Family refuse insertion of SP cystostomy yesterday  - continue present care. SP cath, if family agrees.

## 2019-11-05 NOTE — DISCHARGE NOTE PROVIDER - CARE PROVIDER_API CALL
Adithya Mensah)  Urology  [address] [facility], [address] [place] [address]  Phone: [phone]  Fax: [phone]  Follow Up Time:

## 2019-11-05 NOTE — DISCHARGE NOTE PROVIDER - NSDCMRMEDTOKEN_GEN_ALL_CORE_FT
amLODIPine 5 mg oral tablet: 1 tab(s) orally once a day  ferrous sulfate 324 mg (65 mg elemental iron) oral tablet: 1 tab(s) orally 2 times a day  finasteride 5 mg oral tablet: 1 tab(s) orally once a day  gabapentin 300 mg oral capsule: 1 cap(s) orally 2 times a day  hydroCHLOROthiazide 25 mg oral tablet: 1 tab(s) orally once a day  labetalol 100 mg oral tablet: 1 tab(s) orally 2 times a day  Senna 8.6 mg oral tablet: 2 tab(s) orally once a day (at bedtime)  tamsulosin 0.4 mg oral capsule: 1 cap(s) orally once a day (at bedtime)

## 2019-11-05 NOTE — DISCHARGE NOTE NURSING/CASE MANAGEMENT/SOCIAL WORK - PATIENT PORTAL LINK FT
You can access the FollowMyHealth Patient Portal offered by [facility] by registering at the following website: [email or web address]. By joining [facility]’s FollowMyHealth portal, you will also be able to view your health information using other applications (apps) compatible with our system.

## 2019-11-06 LAB
CULTURE RESULTS: SIGNIFICANT CHANGE UP
CULTURE RESULTS: SIGNIFICANT CHANGE UP
SPECIMEN SOURCE: SIGNIFICANT CHANGE UP
SPECIMEN SOURCE: SIGNIFICANT CHANGE UP

## 2019-11-08 ENCOUNTER — APPOINTMENT (OUTPATIENT)
Dept: ELECTROPHYSIOLOGY | Facility: CLINIC | Age: 84
End: 2019-11-08

## 2019-11-08 ENCOUNTER — APPOINTMENT (OUTPATIENT)
Dept: ELECTROPHYSIOLOGY | Facility: CLINIC | Age: 84
End: 2019-11-08
Payer: MEDICARE

## 2019-11-08 DIAGNOSIS — N18.2 CHRONIC KIDNEY DISEASE, STAGE 2 (MILD): ICD-10-CM

## 2019-11-08 DIAGNOSIS — N17.9 ACUTE KIDNEY FAILURE, UNSPECIFIED: ICD-10-CM

## 2019-11-08 DIAGNOSIS — E87.6 HYPOKALEMIA: ICD-10-CM

## 2019-11-08 DIAGNOSIS — N39.0 URINARY TRACT INFECTION, SITE NOT SPECIFIED: ICD-10-CM

## 2019-11-08 DIAGNOSIS — Z96.0 PRESENCE OF UROGENITAL IMPLANTS: ICD-10-CM
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
## 2019-11-08 DIAGNOSIS — G93.41 METABOLIC ENCEPHALOPATHY: ICD-10-CM
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
## 2019-11-08 DIAGNOSIS — T83.511A INFECTION AND INFLAMMATORY REACTION DUE TO INDWELLING URETHRAL CATHETER, INITIAL ENCOUNTER: ICD-10-CM

## 2019-11-08 DIAGNOSIS — I12.9 HYPERTENSIVE CHRONIC KIDNEY DISEASE WITH STAGE 1 THROUGH STAGE 4 CHRONIC KIDNEY DISEASE, OR UNSPECIFIED CHRONIC KIDNEY DISEASE: ICD-10-CM

## 2019-11-08 DIAGNOSIS — R33.8 OTHER RETENTION OF URINE: ICD-10-CM

## 2019-11-08 DIAGNOSIS — N40.1 BENIGN PROSTATIC HYPERPLASIA WITH LOWER URINARY TRACT SYMPTOMS: ICD-10-CM

## 2019-11-08 PROCEDURE — 93299: CPT

## 2019-11-08 PROCEDURE — 93298 REM INTERROG DEV EVAL SCRMS: CPT

## 2019-12-11 ENCOUNTER — APPOINTMENT (OUTPATIENT)
Dept: ELECTROPHYSIOLOGY | Facility: CLINIC | Age: 84
End: 2019-12-11
Payer: MEDICARE

## 2019-12-11 PROCEDURE — 93299: CPT

## 2019-12-11 PROCEDURE — 93298 REM INTERROG DEV EVAL SCRMS: CPT

## 2020-01-04 ENCOUNTER — INPATIENT (INPATIENT)
Facility: HOSPITAL | Age: 85
LOS: 9 days | Discharge: HOME | End: 2020-01-14
Attending: INTERNAL MEDICINE | Admitting: INTERNAL MEDICINE
Payer: MEDICARE

## 2020-01-04 VITALS
SYSTOLIC BLOOD PRESSURE: 109 MMHG | OXYGEN SATURATION: 95 % | RESPIRATION RATE: 19 BRPM | HEART RATE: 99 BPM | TEMPERATURE: 100 F | WEIGHT: 149.91 LBS | DIASTOLIC BLOOD PRESSURE: 61 MMHG | HEIGHT: 67 IN

## 2020-01-04 DIAGNOSIS — I10 ESSENTIAL (PRIMARY) HYPERTENSION: ICD-10-CM

## 2020-01-04 DIAGNOSIS — M48.061 SPINAL STENOSIS, LUMBAR REGION WITHOUT NEUROGENIC CLAUDICATION: ICD-10-CM

## 2020-01-04 DIAGNOSIS — N28.9 DISORDER OF KIDNEY AND URETER, UNSPECIFIED: ICD-10-CM

## 2020-01-04 DIAGNOSIS — N43.3 HYDROCELE, UNSPECIFIED: ICD-10-CM

## 2020-01-04 DIAGNOSIS — T83.511D INFECTION AND INFLAMMATORY REACTION DUE TO INDWELLING URETHRAL CATHETER, SUBSEQUENT ENCOUNTER: ICD-10-CM

## 2020-01-04 DIAGNOSIS — N40.1 BENIGN PROSTATIC HYPERPLASIA WITH LOWER URINARY TRACT SYMPTOMS: ICD-10-CM

## 2020-01-04 LAB
ALBUMIN SERPL ELPH-MCNC: 3.1 G/DL — LOW (ref 3.3–5)
ALP SERPL-CCNC: 45 U/L — SIGNIFICANT CHANGE UP (ref 40–120)
ALT FLD-CCNC: 16 U/L — SIGNIFICANT CHANGE UP (ref 12–78)
ANION GAP SERPL CALC-SCNC: 7 MMOL/L — SIGNIFICANT CHANGE UP (ref 5–17)
APPEARANCE UR: ABNORMAL
APTT BLD: 34.9 SEC — SIGNIFICANT CHANGE UP (ref 27.5–36.3)
AST SERPL-CCNC: 18 U/L — SIGNIFICANT CHANGE UP (ref 15–37)
BACTERIA # UR AUTO: ABNORMAL
BASOPHILS # BLD AUTO: 0.04 K/UL — SIGNIFICANT CHANGE UP (ref 0–0.2)
BASOPHILS NFR BLD AUTO: 0.3 % — SIGNIFICANT CHANGE UP (ref 0–2)
BILIRUB SERPL-MCNC: 0.6 MG/DL — SIGNIFICANT CHANGE UP (ref 0.2–1.2)
BILIRUB UR-MCNC: NEGATIVE — SIGNIFICANT CHANGE UP
BUN SERPL-MCNC: 38 MG/DL — HIGH (ref 7–23)
CALCIUM SERPL-MCNC: 9.8 MG/DL — SIGNIFICANT CHANGE UP (ref 8.5–10.1)
CHLORIDE SERPL-SCNC: 104 MMOL/L — SIGNIFICANT CHANGE UP (ref 96–108)
CO2 SERPL-SCNC: 30 MMOL/L — SIGNIFICANT CHANGE UP (ref 22–31)
COLOR SPEC: YELLOW — SIGNIFICANT CHANGE UP
CREAT SERPL-MCNC: 2.04 MG/DL — HIGH (ref 0.5–1.3)
DIFF PNL FLD: ABNORMAL
EOSINOPHIL # BLD AUTO: 0.56 K/UL — HIGH (ref 0–0.5)
EOSINOPHIL NFR BLD AUTO: 4.9 % — SIGNIFICANT CHANGE UP (ref 0–6)
EPI CELLS # UR: SIGNIFICANT CHANGE UP
GLUCOSE SERPL-MCNC: 113 MG/DL — HIGH (ref 70–99)
GLUCOSE UR QL: NEGATIVE MG/DL — SIGNIFICANT CHANGE UP
HCT VFR BLD CALC: 32 % — LOW (ref 39–50)
HGB BLD-MCNC: 10.2 G/DL — LOW (ref 13–17)
IMM GRANULOCYTES NFR BLD AUTO: 0.5 % — SIGNIFICANT CHANGE UP (ref 0–1.5)
INR BLD: 1.24 RATIO — HIGH (ref 0.88–1.16)
KETONES UR-MCNC: NEGATIVE — SIGNIFICANT CHANGE UP
LACTATE SERPL-SCNC: 1.5 MMOL/L — SIGNIFICANT CHANGE UP (ref 0.7–2)
LEUKOCYTE ESTERASE UR-ACNC: ABNORMAL
LYMPHOCYTES # BLD AUTO: 0.5 K/UL — LOW (ref 1–3.3)
LYMPHOCYTES # BLD AUTO: 4.3 % — LOW (ref 13–44)
MCHC RBC-ENTMCNC: 29 PG — SIGNIFICANT CHANGE UP (ref 27–34)
MCHC RBC-ENTMCNC: 31.9 GM/DL — LOW (ref 32–36)
MCV RBC AUTO: 90.9 FL — SIGNIFICANT CHANGE UP (ref 80–100)
MONOCYTES # BLD AUTO: 0.95 K/UL — HIGH (ref 0–0.9)
MONOCYTES NFR BLD AUTO: 8.2 % — SIGNIFICANT CHANGE UP (ref 2–14)
NEUTROPHILS # BLD AUTO: 9.42 K/UL — HIGH (ref 1.8–7.4)
NEUTROPHILS NFR BLD AUTO: 81.8 % — HIGH (ref 43–77)
NITRITE UR-MCNC: NEGATIVE — SIGNIFICANT CHANGE UP
NRBC # BLD: 0 /100 WBCS — SIGNIFICANT CHANGE UP (ref 0–0)
PH UR: 6.5 — SIGNIFICANT CHANGE UP (ref 5–8)
PLATELET # BLD AUTO: 244 K/UL — SIGNIFICANT CHANGE UP (ref 150–400)
POTASSIUM SERPL-MCNC: 3.8 MMOL/L — SIGNIFICANT CHANGE UP (ref 3.5–5.3)
POTASSIUM SERPL-SCNC: 3.8 MMOL/L — SIGNIFICANT CHANGE UP (ref 3.5–5.3)
PROT SERPL-MCNC: 8.4 GM/DL — HIGH (ref 6–8.3)
PROT UR-MCNC: 500 MG/DL
PROTHROM AB SERPL-ACNC: 14 SEC — HIGH (ref 10–12.9)
RBC # BLD: 3.52 M/UL — LOW (ref 4.2–5.8)
RBC # FLD: 16.4 % — HIGH (ref 10.3–14.5)
RBC CASTS # UR COMP ASSIST: ABNORMAL /HPF (ref 0–4)
SODIUM SERPL-SCNC: 141 MMOL/L — SIGNIFICANT CHANGE UP (ref 135–145)
SP GR SPEC: 1.01 — SIGNIFICANT CHANGE UP (ref 1.01–1.02)
UROBILINOGEN FLD QL: NEGATIVE MG/DL — SIGNIFICANT CHANGE UP
WBC # BLD: 11.53 K/UL — HIGH (ref 3.8–10.5)
WBC # FLD AUTO: 11.53 K/UL — HIGH (ref 3.8–10.5)
WBC UR QL: ABNORMAL

## 2020-01-04 PROCEDURE — 71045 X-RAY EXAM CHEST 1 VIEW: CPT | Mod: 26

## 2020-01-04 PROCEDURE — 99285 EMERGENCY DEPT VISIT HI MDM: CPT

## 2020-01-04 PROCEDURE — 93010 ELECTROCARDIOGRAM REPORT: CPT

## 2020-01-04 RX ORDER — AMLODIPINE BESYLATE 2.5 MG/1
5 TABLET ORAL DAILY
Refills: 0 | Status: DISCONTINUED | OUTPATIENT
Start: 2020-01-04 | End: 2020-01-14

## 2020-01-04 RX ORDER — ACETAMINOPHEN 500 MG
650 TABLET ORAL EVERY 6 HOURS
Refills: 0 | Status: DISCONTINUED | OUTPATIENT
Start: 2020-01-04 | End: 2020-01-14

## 2020-01-04 RX ORDER — GABAPENTIN 400 MG/1
300 CAPSULE ORAL
Refills: 0 | Status: DISCONTINUED | OUTPATIENT
Start: 2020-01-04 | End: 2020-01-14

## 2020-01-04 RX ORDER — SENNA PLUS 8.6 MG/1
2 TABLET ORAL AT BEDTIME
Refills: 0 | Status: DISCONTINUED | OUTPATIENT
Start: 2020-01-04 | End: 2020-01-14

## 2020-01-04 RX ORDER — SODIUM CHLORIDE 9 MG/ML
1000 INJECTION, SOLUTION INTRAVENOUS ONCE
Refills: 0 | Status: COMPLETED | OUTPATIENT
Start: 2020-01-04 | End: 2020-01-04

## 2020-01-04 RX ORDER — TAMSULOSIN HYDROCHLORIDE 0.4 MG/1
0.4 CAPSULE ORAL AT BEDTIME
Refills: 0 | Status: DISCONTINUED | OUTPATIENT
Start: 2020-01-04 | End: 2020-01-14

## 2020-01-04 RX ORDER — FINASTERIDE 5 MG/1
5 TABLET, FILM COATED ORAL DAILY
Refills: 0 | Status: DISCONTINUED | OUTPATIENT
Start: 2020-01-04 | End: 2020-01-14

## 2020-01-04 RX ORDER — SODIUM CHLORIDE 9 MG/ML
1000 INJECTION INTRAMUSCULAR; INTRAVENOUS; SUBCUTANEOUS
Refills: 0 | Status: DISCONTINUED | OUTPATIENT
Start: 2020-01-04 | End: 2020-01-12

## 2020-01-04 RX ORDER — FERROUS SULFATE 325(65) MG
325 TABLET ORAL DAILY
Refills: 0 | Status: DISCONTINUED | OUTPATIENT
Start: 2020-01-04 | End: 2020-01-14

## 2020-01-04 RX ORDER — ACETAMINOPHEN 500 MG
1000 TABLET ORAL ONCE
Refills: 0 | Status: COMPLETED | OUTPATIENT
Start: 2020-01-04 | End: 2020-01-04

## 2020-01-04 RX ORDER — PIPERACILLIN AND TAZOBACTAM 4; .5 G/20ML; G/20ML
3.38 INJECTION, POWDER, LYOPHILIZED, FOR SOLUTION INTRAVENOUS EVERY 12 HOURS
Refills: 0 | Status: COMPLETED | OUTPATIENT
Start: 2020-01-04 | End: 2020-01-07

## 2020-01-04 RX ORDER — LABETALOL HCL 100 MG
100 TABLET ORAL
Refills: 0 | Status: DISCONTINUED | OUTPATIENT
Start: 2020-01-04 | End: 2020-01-14

## 2020-01-04 RX ORDER — HEPARIN SODIUM 5000 [USP'U]/ML
5000 INJECTION INTRAVENOUS; SUBCUTANEOUS
Refills: 0 | Status: DISCONTINUED | OUTPATIENT
Start: 2020-01-04 | End: 2020-01-09

## 2020-01-04 RX ADMIN — Medication 400 MILLIGRAM(S): at 12:44

## 2020-01-04 RX ADMIN — GABAPENTIN 300 MILLIGRAM(S): 400 CAPSULE ORAL at 18:31

## 2020-01-04 RX ADMIN — SODIUM CHLORIDE 60 MILLILITER(S): 9 INJECTION INTRAMUSCULAR; INTRAVENOUS; SUBCUTANEOUS at 16:45

## 2020-01-04 RX ADMIN — FINASTERIDE 5 MILLIGRAM(S): 5 TABLET, FILM COATED ORAL at 18:31

## 2020-01-04 RX ADMIN — TAMSULOSIN HYDROCHLORIDE 0.4 MILLIGRAM(S): 0.4 CAPSULE ORAL at 21:48

## 2020-01-04 RX ADMIN — Medication 1000 MILLIGRAM(S): at 13:00

## 2020-01-04 RX ADMIN — PIPERACILLIN AND TAZOBACTAM 25 GRAM(S): 4; .5 INJECTION, POWDER, LYOPHILIZED, FOR SOLUTION INTRAVENOUS at 18:31

## 2020-01-04 RX ADMIN — HEPARIN SODIUM 5000 UNIT(S): 5000 INJECTION INTRAVENOUS; SUBCUTANEOUS at 18:31

## 2020-01-04 RX ADMIN — Medication 650 MILLIGRAM(S): at 00:00

## 2020-01-04 RX ADMIN — Medication 650 MILLIGRAM(S): at 14:47

## 2020-01-04 RX ADMIN — SODIUM CHLORIDE 1000 MILLILITER(S): 9 INJECTION, SOLUTION INTRAVENOUS at 11:30

## 2020-01-04 RX ADMIN — SENNA PLUS 2 TABLET(S): 8.6 TABLET ORAL at 21:48

## 2020-01-04 RX ADMIN — Medication 650 MILLIGRAM(S): at 20:45

## 2020-01-04 RX ADMIN — SODIUM CHLORIDE 1000 MILLILITER(S): 9 INJECTION, SOLUTION INTRAVENOUS at 10:30

## 2020-01-04 RX ADMIN — Medication 325 MILLIGRAM(S): at 18:31

## 2020-01-04 NOTE — ED ADULT TRIAGE NOTE - CHIEF COMPLAINT QUOTE
patient BIBA , as per son Amada , patient was confuse and weak this morning , patient  has a Ma in place

## 2020-01-04 NOTE — H&P ADULT - NSHPLABSRESULTS_GEN_ALL_CORE
10.2   11.53 )-----------( 244      ( 2020 11:13 )             32.0     01-04    141  |  104  |  38<H>  ----------------------------<  113<H>  3.8   |  30  |  2.04<H>    Ca    9.8      2020 11:13    TPro  8.4<H>  /  Alb  3.1<L>  /  TBili  0.6  /  DBili  x   /  AST  18  /  ALT  16  /  AlkPhos  45  01-04    PT/INR - ( 2020 11:13 )   PT: 14.0 sec;   INR: 1.24 ratio         PTT - ( 2020 11:13 )  PTT:34.9 sec  Urinalysis Basic - ( 2020 11:32 )    Color: Yellow / Appearance: very cloudy / S.010 / pH: x  Gluc: x / Ketone: Negative  / Bili: Negative / Urobili: Negative mg/dL   Blood: x / Protein: 500 mg/dL / Nitrite: Negative   Leuk Esterase: Moderate / RBC: 3-5 /HPF / WBC 11-25   Sq Epi: x / Non Sq Epi: Few / Bacteria: Many      CAPILLARY BLOOD GLUCOSE                Urine Culture:      Blood Culture:

## 2020-01-04 NOTE — ED ADULT NURSE NOTE - PMH
Benign Prostatic Hyperplasia    BPH (benign prostatic hypertrophy)    Dehydration    HTN (Hypertension)    Hydrocele in adult  bilateral, chronic  Hypertension    Renal insufficiency    Spinal stenosis of lumbar region

## 2020-01-04 NOTE — ED PROVIDER NOTE - CLINICAL SUMMARY MEDICAL DECISION MAKING FREE TEXT BOX
labs and diagnostic imaging results reviewed with patient and family; considering age extremis and multi medical problems will admit for IV abx

## 2020-01-04 NOTE — ED ADULT NURSE NOTE - NSIMPLEMENTINTERV_GEN_ALL_ED
Implemented All Fall with Harm Risk Interventions:  [place] to call system. Call bell, personal items and telephone within reach. Instruct patient to call for assistance. Room bathroom lighting operational. Non-slip footwear when patient is off stretcher. Physically safe environment: no spills, clutter or unnecessary equipment. Stretcher in lowest position, wheels locked, appropriate side rails in place. Provide visual cue, wrist band, yellow gown, etc. Monitor gait and stability. Monitor for mental status changes and reorient to person, place, and time. Review medications for side effects contributing to fall risk. Reinforce activity limits and safety measures with patient and family. Provide visual clues: red socks.

## 2020-01-04 NOTE — H&P ADULT - ASSESSMENT
IMPROVE VTE Individual Risk Assessment    RISK                                                                Points    [  ] Previous VTE                                                  3    [  ] Thrombophilia                                               2    [  ] Lower limb paralysis                                      2        (unable to hold up >15 seconds)      [  ] Current Cancer                                              2         (within 6 months)    [  ] Immobilization > 24 hrs                                1    [  ] ICU/CCU stay > 24 hours                              1    [  ] Age > 60                                                      1    IMPROVE VTE Score _________    IMPROVE Score 0-1: Low Risk, No VTE prophylaxis required for most patients, encourage ambulation.   IMPROVE Score 2-3: At risk, pharmacologic VTE prophylaxis is indicated for most patients (in the absence of a contraindication)  IMPROVE Score > or = 4: High Risk, pharmacologic VTE prophylaxis is indicated for most patients (in the absence of a contraindication)  : The patient is a 89y Male complaining of weakness.	  Pt. was found to have a kink in the obrien, resulting in suprapubic pain. Had temp. as per family 102

## 2020-01-04 NOTE — H&P ADULT - NSICDXPASTMEDICALHX_GEN_ALL_CORE_FT
PAST MEDICAL HISTORY:  Benign Prostatic Hyperplasia     BPH (benign prostatic hypertrophy)     Dehydration     HTN (Hypertension)     Hydrocele in adult bilateral, chronic    Hypertension     Renal insufficiency     Spinal stenosis of lumbar region

## 2020-01-04 NOTE — H&P ADULT - HISTORY OF PRESENT ILLNESS
· Chief Complaint: The patient is a 89y Male complaining of weakness.	  Pt. was found to have a kink in the obrien, resulting in suprapubic pain. Had temp. as per family 102.

## 2020-01-04 NOTE — H&P ADULT - NSHPREVIEWOFSYSTEMS_GEN_ALL_CORE
CONSTITUTIONAL: Fever, weight loss, or fatigue  EYES: No eye pain, visual disturbances, or discharge  ENMT:  No difficulty hearing, tinnitus, vertigo; No sinus or throat pain  NECK: No pain or stiffness  BREASTS: No pain, masses, or nipple discharge  RESPIRATORY: No cough, wheezing, chills or hemoptysis; No shortness of breath  CARDIOVASCULAR: No chest pain, palpitations, dizziness, or leg swelling  GASTROINTESTINAL: No abdominal or epigastric pain. No nausea, vomiting, or hematemesis; No diarrhea or constipation. No melena or hematochezia.  GENITOURINARY: Chronic Ma  NEUROLOGICAL: No headaches, memory loss, loss of strength, numbness, or tremors  SKIN: No itching, burning, rashes, or lesions   LYMPH NODES: No enlarged glands  ENDOCRINE: No heat or cold intolerance; No hair loss  MUSCULOSKELETAL: No joint pain or swelling; No muscle, back, or extremity pain  PSYCHIATRIC: No depression, anxiety, mood swings, or difficulty sleeping  HEME/LYMPH: No easy bruising, or bleeding gums  ALLERGY AND IMMUNOLOGIC: No hives or eczema

## 2020-01-04 NOTE — H&P ADULT - NSHPPHYSICALEXAM_GEN_ALL_CORE
GENERAL: NAD, well-groomed, well-developed  HEAD:  Atraumatic, Normocephalic  EYES: EOMI, PERRLA, conjunctiva and sclera clear  ENMT:  Moist mucous membranes, Good dentition, No lesions  NECK: Supple, No JVD, Normal thyroid  NERVOUS SYSTEM:  Alert & Oriented X 3, Good concentration; Motor Strength 4/5 B/L upper and lower extremities;   CHEST/LUNG: Clear to percussion bilaterally; No rales, rhonchi, wheezing, or rubs  HEART: Regular rate and rhythm; No murmurs, rubs, or gallops  ABDOMEN: Soft, Nontender, Nondistended; Bowel sounds present. Ma in place, draining clear urine  EXTREMITIES:  2+ Peripheral Pulses, No clubbing, cyanosis, or edema  LYMPH: No lymphadenopathy noted  SKIN: No rashes or lesions      Vital Signs Last 24 Hrs  T(C): 38.3 (04 Jan 2020 10:39), Max: 38.3 (04 Jan 2020 10:39)  T(F): 101 (04 Jan 2020 10:39), Max: 101 (04 Jan 2020 10:39)  HR: 93 (04 Jan 2020 10:39) (93 - 99)  BP: 108/63 (04 Jan 2020 10:39) (108/63 - 109/61)  BP(mean): --  RR: 17 (04 Jan 2020 10:39) (17 - 19)  SpO2: 97% (04 Jan 2020 10:39) (95% - 97%)

## 2020-01-04 NOTE — ED ADULT NURSE NOTE - OBJECTIVE STATEMENT
a/o x4 c/o weakness and confusion this morning. patient c/o feeling cold with chills, denies pains. Ma catheter 16 F. draining thick cloudy yellow urine.

## 2020-01-05 LAB
ANION GAP SERPL CALC-SCNC: 9 MMOL/L — SIGNIFICANT CHANGE UP (ref 5–17)
BASOPHILS # BLD AUTO: 0.04 K/UL — SIGNIFICANT CHANGE UP (ref 0–0.2)
BASOPHILS NFR BLD AUTO: 0.3 % — SIGNIFICANT CHANGE UP (ref 0–2)
BUN SERPL-MCNC: 33 MG/DL — HIGH (ref 7–23)
CALCIUM SERPL-MCNC: 9.2 MG/DL — SIGNIFICANT CHANGE UP (ref 8.5–10.1)
CHLORIDE SERPL-SCNC: 103 MMOL/L — SIGNIFICANT CHANGE UP (ref 96–108)
CO2 SERPL-SCNC: 27 MMOL/L — SIGNIFICANT CHANGE UP (ref 22–31)
CREAT SERPL-MCNC: 1.71 MG/DL — HIGH (ref 0.5–1.3)
EOSINOPHIL # BLD AUTO: 0.18 K/UL — SIGNIFICANT CHANGE UP (ref 0–0.5)
EOSINOPHIL NFR BLD AUTO: 1.6 % — SIGNIFICANT CHANGE UP (ref 0–6)
GLUCOSE SERPL-MCNC: 96 MG/DL — SIGNIFICANT CHANGE UP (ref 70–99)
HCT VFR BLD CALC: 27.4 % — LOW (ref 39–50)
HGB BLD-MCNC: 8.6 G/DL — LOW (ref 13–17)
IMM GRANULOCYTES NFR BLD AUTO: 0.5 % — SIGNIFICANT CHANGE UP (ref 0–1.5)
LYMPHOCYTES # BLD AUTO: 0.63 K/UL — LOW (ref 1–3.3)
LYMPHOCYTES # BLD AUTO: 5.4 % — LOW (ref 13–44)
MCHC RBC-ENTMCNC: 28.5 PG — SIGNIFICANT CHANGE UP (ref 27–34)
MCHC RBC-ENTMCNC: 31.4 GM/DL — LOW (ref 32–36)
MCV RBC AUTO: 90.7 FL — SIGNIFICANT CHANGE UP (ref 80–100)
MONOCYTES # BLD AUTO: 0.69 K/UL — SIGNIFICANT CHANGE UP (ref 0–0.9)
MONOCYTES NFR BLD AUTO: 6 % — SIGNIFICANT CHANGE UP (ref 2–14)
NEUTROPHILS # BLD AUTO: 9.96 K/UL — HIGH (ref 1.8–7.4)
NEUTROPHILS NFR BLD AUTO: 86.2 % — HIGH (ref 43–77)
NRBC # BLD: 0 /100 WBCS — SIGNIFICANT CHANGE UP (ref 0–0)
PLATELET # BLD AUTO: 174 K/UL — SIGNIFICANT CHANGE UP (ref 150–400)
POTASSIUM SERPL-MCNC: 3.2 MMOL/L — LOW (ref 3.5–5.3)
POTASSIUM SERPL-SCNC: 3.2 MMOL/L — LOW (ref 3.5–5.3)
RBC # BLD: 3.02 M/UL — LOW (ref 4.2–5.8)
RBC # FLD: 16.9 % — HIGH (ref 10.3–14.5)
SODIUM SERPL-SCNC: 139 MMOL/L — SIGNIFICANT CHANGE UP (ref 135–145)
WBC # BLD: 11.56 K/UL — HIGH (ref 3.8–10.5)
WBC # FLD AUTO: 11.56 K/UL — HIGH (ref 3.8–10.5)

## 2020-01-05 RX ORDER — POTASSIUM CHLORIDE 20 MEQ
20 PACKET (EA) ORAL
Refills: 0 | Status: COMPLETED | OUTPATIENT
Start: 2020-01-05 | End: 2020-01-05

## 2020-01-05 RX ORDER — IBUPROFEN 200 MG
600 TABLET ORAL EVERY 6 HOURS
Refills: 0 | Status: DISCONTINUED | OUTPATIENT
Start: 2020-01-05 | End: 2020-01-14

## 2020-01-05 RX ADMIN — Medication 100 MILLIGRAM(S): at 05:50

## 2020-01-05 RX ADMIN — HEPARIN SODIUM 5000 UNIT(S): 5000 INJECTION INTRAVENOUS; SUBCUTANEOUS at 18:45

## 2020-01-05 RX ADMIN — GABAPENTIN 300 MILLIGRAM(S): 400 CAPSULE ORAL at 18:44

## 2020-01-05 RX ADMIN — Medication 20 MILLIEQUIVALENT(S): at 13:19

## 2020-01-05 RX ADMIN — GABAPENTIN 300 MILLIGRAM(S): 400 CAPSULE ORAL at 05:51

## 2020-01-05 RX ADMIN — PIPERACILLIN AND TAZOBACTAM 25 GRAM(S): 4; .5 INJECTION, POWDER, LYOPHILIZED, FOR SOLUTION INTRAVENOUS at 18:45

## 2020-01-05 RX ADMIN — TAMSULOSIN HYDROCHLORIDE 0.4 MILLIGRAM(S): 0.4 CAPSULE ORAL at 21:39

## 2020-01-05 RX ADMIN — Medication 600 MILLIGRAM(S): at 01:28

## 2020-01-05 RX ADMIN — Medication 325 MILLIGRAM(S): at 13:19

## 2020-01-05 RX ADMIN — FINASTERIDE 5 MILLIGRAM(S): 5 TABLET, FILM COATED ORAL at 13:19

## 2020-01-05 RX ADMIN — Medication 600 MILLIGRAM(S): at 01:30

## 2020-01-05 RX ADMIN — SENNA PLUS 2 TABLET(S): 8.6 TABLET ORAL at 21:39

## 2020-01-05 RX ADMIN — HEPARIN SODIUM 5000 UNIT(S): 5000 INJECTION INTRAVENOUS; SUBCUTANEOUS at 05:51

## 2020-01-05 RX ADMIN — Medication 100 MILLIGRAM(S): at 18:44

## 2020-01-05 RX ADMIN — AMLODIPINE BESYLATE 5 MILLIGRAM(S): 2.5 TABLET ORAL at 05:51

## 2020-01-05 RX ADMIN — PIPERACILLIN AND TAZOBACTAM 25 GRAM(S): 4; .5 INJECTION, POWDER, LYOPHILIZED, FOR SOLUTION INTRAVENOUS at 05:51

## 2020-01-05 NOTE — PROGRESS NOTE ADULT - SUBJECTIVE AND OBJECTIVE BOX
Patient is a 89y old  Male who presents with a chief complaint of Fever. UTI. Renal inasufficiency. (2020 13:42)      INTERVAL HPI/OVERNIGHT EVENTS:    MEDICATIONS  (STANDING):  amLODIPine   Tablet 5 milliGRAM(s) Oral daily  ferrous    sulfate 325 milliGRAM(s) Oral daily  finasteride 5 milliGRAM(s) Oral daily  gabapentin 300 milliGRAM(s) Oral two times a day  heparin  Injectable 5000 Unit(s) SubCutaneous two times a day  labetalol 100 milliGRAM(s) Oral two times a day  piperacillin/tazobactam IVPB.. 3.375 Gram(s) IV Intermittent every 12 hours  senna 2 Tablet(s) Oral at bedtime  sodium chloride 0.9%. 1000 milliLiter(s) (60 mL/Hr) IV Continuous <Continuous>  tamsulosin 0.4 milliGRAM(s) Oral at bedtime    MEDICATIONS  (PRN):  acetaminophen   Tablet .. 650 milliGRAM(s) Oral every 6 hours PRN Temp greater or equal to 38C (100.4F), Mild Pain (1 - 3)  ibuprofen  Tablet. 600 milliGRAM(s) Oral every 6 hours PRN Temp greater or equal to 38C (100.4F)      Allergies    No Known Allergies    Intolerances        REVIEW OF SYSTEMS:  CONSTITUTIONAL: No fever, weight loss, or fatigue  EYES: No eye pain, visual disturbances, or discharge  ENMT:  No difficulty hearing, tinnitus, vertigo; No sinus or throat pain  NECK: No pain or stiffness  BREASTS: No pain, masses, or nipple discharge  RESPIRATORY: No cough, wheezing, chills or hemoptysis; No shortness of breath  CARDIOVASCULAR: No chest pain, palpitations, dizziness, or leg swelling  GASTROINTESTINAL: No abdominal or epigastric pain. No nausea, vomiting, or hematemesis; No diarrhea or constipation. No melena or hematochezia.  GENITOURINARY: No dysuria, frequency, hematuria, or incontinence  NEUROLOGICAL: No headaches, memory loss, loss of strength, numbness, or tremors  SKIN: No itching, burning, rashes, or lesions   LYMPH NODES: No enlarged glands  ENDOCRINE: No heat or cold intolerance; No hair loss  MUSCULOSKELETAL: No joint pain or swelling; No muscle, back, or extremity pain  PSYCHIATRIC: No depression, anxiety, mood swings, or difficulty sleeping  HEME/LYMPH: No easy bruising, or bleeding gums  ALLERGY AND IMMUNOLOGIC: No hives or eczema    Vital Signs Last 24 Hrs  T(C): 36.9 (2020 05:42), Max: 38.8 (2020 20:14)  T(F): 98.5 (2020 05:42), Max: 101.9 (2020 20:14)  HR: 85 (2020 05:42) (70 - 104)  BP: 140/67 (2020 05:42) (98/68 - 140/67)  BP(mean): --  RR: 18 (2020 05:42) (16 - 19)  SpO2: 98% (2020 05:42) (95% - 98%)    PHYSICAL EXAM:  GENERAL: NAD, well-groomed, well-developed  HEAD:  Atraumatic, Normocephalic  EYES: EOMI, PERRL, conjunctiva and sclera clear  ENMT:  Moist mucous membranes, Good dentition, No lesions  NECK: Supple, No JVD, Normal thyroid  NERVOUS SYSTEM:  Alert & Oriented X3, Good concentration; Motor Strength 4/5 B/L upper and lower extremities;   CHEST/LUNG: Clear to percussion bilaterally; No rales, rhonchi, wheezing, or rubs  HEART: Regular rate and rhythm; No murmurs, rubs, or gallops  ABDOMEN: Soft, Nontender, Nondistended; Bowel sounds present. Ma in place, draining clear eamon urine.  EXTREMITIES:  2+ Peripheral Pulses, No clubbing, cyanosis, or edema  LYMPH: No lymphadenopathy noted  SKIN: No rashes or lesions    LABS:                        8.6    11.56 )-----------( 174      ( 2020 07:36 )             27.4     01-04    141  |  104  |  38<H>  ----------------------------<  113<H>  3.8   |  30  |  2.04<H>    Ca    9.8      2020 11:13    TPro  8.4<H>  /  Alb  3.1<L>  /  TBili  0.6  /  DBili  x   /  AST  18  /  ALT  16  /  AlkPhos  45  01-04    PT/INR - ( 2020 11:13 )   PT: 14.0 sec;   INR: 1.24 ratio         PTT - ( 2020 11:13 )  PTT:34.9 sec  Urinalysis Basic - ( 2020 11:32 )    Color: Yellow / Appearance: very cloudy / S.010 / pH: x  Gluc: x / Ketone: Negative  / Bili: Negative / Urobili: Negative mg/dL   Blood: x / Protein: 500 mg/dL / Nitrite: Negative   Leuk Esterase: Moderate / RBC: 3-5 /HPF / WBC 11-25   Sq Epi: x / Non Sq Epi: Few / Bacteria: Many      CAPILLARY BLOOD GLUCOSE      RADIOLOGY & ADDITIONAL TESTS:    < from: Xray Chest 1 View-PORTABLE IMMEDIATE (20 @ 12:33) >    EXAM:  XR CHEST PORTABLE IMMED 1V                            PROCEDURE DATE:  2020          INTERPRETATION:  Single AP view of the chest    Comparison:2019    Clinical Indication:Sepsis    FINDINGS/  IMPRESSION:Loop recorder overlies the left heart. Lungs are grossly clear. Degenerative changes of the thoracic spine.                JOSE ANTONIO REN M.D., ATTENDING RADIOLOGIST  This document has been electronically signed. 2020  4:12PM                < end of copied text >  Imaging Personally Reviewed:  [ ] YES  [ ] NO    Consultant(s) Notes Reviewed:  [ ] YES  [ ] NO    Care Discussed with Consultants/Other Providers [ ] YES  [ ] NO    PROBLEMS:  ACUTE CYSTITIS WITH HEMATURIA  BLOOD PRESSURE  Acute cystitis with hematuria  Renal insufficiency  Hypertension  BPH with obstruction/lower urinary tract symptoms  Essential hypertension  Spinal stenosis of lumbar region without neurogenic claudication  Hydrocele in adult  Urinary tract infection associated with catheterization of urinary tract, unspecified indwelling urinary catheter type, subsequent encounter      Care discussed with family,         [  ]   yes  [  ]  No    imp:    stable[ ]    unstable[  ]     improving [   ]       unchanged  [  ]                Plans:  Continue present plans  [  ]               New consult [  ]   specialty  .......               order keyla[  ]    test name.                  Discharge Planning  [  ]

## 2020-01-05 NOTE — PROGRESS NOTE ADULT - ASSESSMENT
IMPROVE VTE Individual Risk Assessment    RISK                                                                Points    [  ] Previous VTE                                                  3    [  ] Thrombophilia                                               2    [  ] Lower limb paralysis                                      2        (unable to hold up >15 seconds)      [  ] Current Cancer                                              2         (within 6 months)    [  ] Immobilization > 24 hrs                                1    [  ] ICU/CCU stay > 24 hours                              1    [  ] Age > 60                                                      1    IMPROVE VTE Score _________    IMPROVE Score 0-1: Low Risk, No VTE prophylaxis required for most patients, encourage ambulation.   IMPROVE Score 2-3: At risk, pharmacologic VTE prophylaxis is indicated for most patients (in the absence of a contraindication)  IMPROVE Score > or = 4: High Risk, pharmacologic VTE prophylaxis is indicated for most patients (in the absence of a contraindication)  : The patient is a 89y Male complaining of weakness.	  Pt. was found to have a kink in the obrien, resulting in suprapubic pain. Had temp. as per family 102  01/05/2020 : Alert, awake. Asymptomatic. On Zosyn. Urology consult.

## 2020-01-06 LAB
-  AMIKACIN: SIGNIFICANT CHANGE UP
-  AMPICILLIN/SULBACTAM: SIGNIFICANT CHANGE UP
-  AMPICILLIN: SIGNIFICANT CHANGE UP
-  AZTREONAM: SIGNIFICANT CHANGE UP
-  CEFAZOLIN: SIGNIFICANT CHANGE UP
-  CEFEPIME: SIGNIFICANT CHANGE UP
-  CEFOXITIN: SIGNIFICANT CHANGE UP
-  CEFTRIAXONE: SIGNIFICANT CHANGE UP
-  CIPROFLOXACIN: SIGNIFICANT CHANGE UP
-  GENTAMICIN: SIGNIFICANT CHANGE UP
-  IMIPENEM: SIGNIFICANT CHANGE UP
-  LEVOFLOXACIN: SIGNIFICANT CHANGE UP
-  MEROPENEM: SIGNIFICANT CHANGE UP
-  NITROFURANTOIN: SIGNIFICANT CHANGE UP
-  PIPERACILLIN/TAZOBACTAM: SIGNIFICANT CHANGE UP
-  TIGECYCLINE: SIGNIFICANT CHANGE UP
-  TOBRAMYCIN: SIGNIFICANT CHANGE UP
-  TRIMETHOPRIM/SULFAMETHOXAZOLE: SIGNIFICANT CHANGE UP
ANION GAP SERPL CALC-SCNC: 7 MMOL/L — SIGNIFICANT CHANGE UP (ref 5–17)
BUN SERPL-MCNC: 32 MG/DL — HIGH (ref 7–23)
CALCIUM SERPL-MCNC: 9.9 MG/DL — SIGNIFICANT CHANGE UP (ref 8.5–10.1)
CHLORIDE SERPL-SCNC: 105 MMOL/L — SIGNIFICANT CHANGE UP (ref 96–108)
CO2 SERPL-SCNC: 29 MMOL/L — SIGNIFICANT CHANGE UP (ref 22–31)
CREAT SERPL-MCNC: 1.59 MG/DL — HIGH (ref 0.5–1.3)
CULTURE RESULTS: SIGNIFICANT CHANGE UP
GLUCOSE SERPL-MCNC: 79 MG/DL — SIGNIFICANT CHANGE UP (ref 70–99)
MAGNESIUM SERPL-MCNC: 2 MG/DL — SIGNIFICANT CHANGE UP (ref 1.6–2.6)
METHOD TYPE: SIGNIFICANT CHANGE UP
ORGANISM # SPEC MICROSCOPIC CNT: SIGNIFICANT CHANGE UP
ORGANISM # SPEC MICROSCOPIC CNT: SIGNIFICANT CHANGE UP
PHOSPHATE SERPL-MCNC: 3.1 MG/DL — SIGNIFICANT CHANGE UP (ref 2.5–4.5)
POTASSIUM SERPL-MCNC: 3.2 MMOL/L — LOW (ref 3.5–5.3)
POTASSIUM SERPL-SCNC: 3.2 MMOL/L — LOW (ref 3.5–5.3)
SODIUM SERPL-SCNC: 141 MMOL/L — SIGNIFICANT CHANGE UP (ref 135–145)
SPECIMEN SOURCE: SIGNIFICANT CHANGE UP

## 2020-01-06 PROCEDURE — 99233 SBSQ HOSP IP/OBS HIGH 50: CPT

## 2020-01-06 RX ORDER — POTASSIUM CHLORIDE 20 MEQ
40 PACKET (EA) ORAL EVERY 4 HOURS
Refills: 0 | Status: COMPLETED | OUTPATIENT
Start: 2020-01-06 | End: 2020-01-06

## 2020-01-06 RX ADMIN — Medication 40 MILLIEQUIVALENT(S): at 10:22

## 2020-01-06 RX ADMIN — FINASTERIDE 5 MILLIGRAM(S): 5 TABLET, FILM COATED ORAL at 12:50

## 2020-01-06 RX ADMIN — GABAPENTIN 300 MILLIGRAM(S): 400 CAPSULE ORAL at 06:47

## 2020-01-06 RX ADMIN — TAMSULOSIN HYDROCHLORIDE 0.4 MILLIGRAM(S): 0.4 CAPSULE ORAL at 22:10

## 2020-01-06 RX ADMIN — SODIUM CHLORIDE 60 MILLILITER(S): 9 INJECTION INTRAMUSCULAR; INTRAVENOUS; SUBCUTANEOUS at 06:46

## 2020-01-06 RX ADMIN — HEPARIN SODIUM 5000 UNIT(S): 5000 INJECTION INTRAVENOUS; SUBCUTANEOUS at 06:47

## 2020-01-06 RX ADMIN — Medication 325 MILLIGRAM(S): at 12:50

## 2020-01-06 RX ADMIN — PIPERACILLIN AND TAZOBACTAM 25 GRAM(S): 4; .5 INJECTION, POWDER, LYOPHILIZED, FOR SOLUTION INTRAVENOUS at 06:47

## 2020-01-06 RX ADMIN — GABAPENTIN 300 MILLIGRAM(S): 400 CAPSULE ORAL at 17:20

## 2020-01-06 RX ADMIN — Medication 100 MILLIGRAM(S): at 17:22

## 2020-01-06 RX ADMIN — HEPARIN SODIUM 5000 UNIT(S): 5000 INJECTION INTRAVENOUS; SUBCUTANEOUS at 17:21

## 2020-01-06 RX ADMIN — AMLODIPINE BESYLATE 5 MILLIGRAM(S): 2.5 TABLET ORAL at 06:47

## 2020-01-06 RX ADMIN — Medication 100 MILLIGRAM(S): at 06:47

## 2020-01-06 RX ADMIN — PIPERACILLIN AND TAZOBACTAM 25 GRAM(S): 4; .5 INJECTION, POWDER, LYOPHILIZED, FOR SOLUTION INTRAVENOUS at 17:21

## 2020-01-06 NOTE — PROGRESS NOTE ADULT - ASSESSMENT
IMPROVE VTE Individual Risk Assessment    RISK                                                                Points    [  ] Previous VTE                                                  3    [  ] Thrombophilia                                               2    [  ] Lower limb paralysis                                      2        (unable to hold up >15 seconds)      [  ] Current Cancer                                              2         (within 6 months)    [  ] Immobilization > 24 hrs                                1    [  ] ICU/CCU stay > 24 hours                              1    [  ] Age > 60                                                      1    IMPROVE VTE Score _________    IMPROVE Score 0-1: Low Risk, No VTE prophylaxis required for most patients, encourage ambulation.   IMPROVE Score 2-3: At risk, pharmacologic VTE prophylaxis is indicated for most patients (in the absence of a contraindication)  IMPROVE Score > or = 4: High Risk, pharmacologic VTE prophylaxis is indicated for most patients (in the absence of a contraindication)  : The patient is a 89y Male complaining of weakness.	  Pt. was found to have a kink in the obrien, resulting in suprapubic pain. Had temp. as per family 102  01/05/2020 : Alert, awake. Asymptomatic. On Zosyn. Urology consult.  01/06/2020 : Alert. asymptomatic. Urine culture pos, for Gm neg. rods. On Zosyn. Urology consult.

## 2020-01-06 NOTE — CONSULT NOTE ADULT - ASSESSMENT
Impression: 89y Male w/ PMH HTN, spinal stenosis, BPH, renal insufficiency hydrocele admitted with UTI.    Plan  -Pt to go for cystoscopy and cystostomy when medically stable.    -Continue obrien catheter for now.  Monitor output.    -Continue abx.   -Continue medical management per medicine.  -Discussed with Dr. Mensah.

## 2020-01-06 NOTE — DIETITIAN INITIAL EVALUATION ADULT. - PERTINENT MEDS FT
MEDICATIONS  (STANDING):  amLODIPine   Tablet 5 milliGRAM(s) Oral daily  ferrous    sulfate 325 milliGRAM(s) Oral daily  finasteride 5 milliGRAM(s) Oral daily  gabapentin 300 milliGRAM(s) Oral two times a day  heparin  Injectable 5000 Unit(s) SubCutaneous two times a day  labetalol 100 milliGRAM(s) Oral two times a day  piperacillin/tazobactam IVPB.. 3.375 Gram(s) IV Intermittent every 12 hours  senna 2 Tablet(s) Oral at bedtime  sodium chloride 0.9%. 1000 milliLiter(s) (60 mL/Hr) IV Continuous <Continuous>  tamsulosin 0.4 milliGRAM(s) Oral at bedtime    MEDICATIONS  (PRN):  acetaminophen   Tablet .. 650 milliGRAM(s) Oral every 6 hours PRN Temp greater or equal to 38C (100.4F), Mild Pain (1 - 3)  ibuprofen  Tablet. 600 milliGRAM(s) Oral every 6 hours PRN Temp greater or equal to 38C (100.4F)

## 2020-01-06 NOTE — DIETITIAN INITIAL EVALUATION ADULT. - PHYSICAL APPEARANCE
other (specify)/BMI 25.1; no edema noted/well nourished Nutrition focused physical exam conducted:  Subcutaneous fat loss: [ moderate ] Orbital fat pads region, [n/a  ]Buccal fat region, [ moderate ]Triceps region,  [ mild-moderate ]Ribs region.  Muscle wasting: [ moderate ]Temples region, [ moderate ]Clavicle region, [ mild ]Shoulder region, [ moderate  ]Scapula region, [ moderate ]Interosseous region,  [ moderate ]thigh region, [ moderate ]Calf region

## 2020-01-06 NOTE — DIETITIAN INITIAL EVALUATION ADULT. - PERTINENT LABORATORY DATA
01-06 Na141 mmol/L Glu 79 mg/dL K+ 3.2 mmol/L<L> Cr  1.59 mg/dL<H> BUN 32 mg/dL<H> 01-06 Phos 3.1 mg/dL 01-04 Alb 3.1 g/dL<L>

## 2020-01-06 NOTE — CHART NOTE - NSCHARTNOTEFT_GEN_A_CORE
Upon Nutritional Assessment by the Registered Dietitian your patient was determined to meet criteria / has evidence of the following diagnosis/diagnoses:          [ ]  Mild Protein Calorie Malnutrition        [x ]  Moderate Protein Calorie Malnutrition        [ ] Severe Protein Calorie Malnutrition        [ ] Unspecified Protein Calorie Malnutrition        [ ] Underweight / BMI <19        [ ] Morbid Obesity / BMI > 40      Findings as based on:  •  Comprehensive nutrition assessment and consultation  •  Calorie counts (nutrient intake analysis)  •  Food acceptance and intake status from observations by staff  •  Follow up  •  Patient education  •  Intervention secondary to interdisciplinary rounds  •   concerns      Treatment:    The following diet has been recommended: Regular diet, Low Sodium, Ensure Enlive x 2/day (provides 700 kcal, 40 g protein)       PROVIDER Section:     By signing this assessment you are acknowledging and agree with the diagnosis/diagnoses assigned by the Registered Dietitian    Comments:

## 2020-01-06 NOTE — PROGRESS NOTE ADULT - SUBJECTIVE AND OBJECTIVE BOX
Patient is a 89y old  Male who presents with a chief complaint of Fever. UTI. Renal inasufficiency. (2020 08:41)      INTERVAL HPI/OVERNIGHT EVENTS:    MEDICATIONS  (STANDING):  amLODIPine   Tablet 5 milliGRAM(s) Oral daily  ferrous    sulfate 325 milliGRAM(s) Oral daily  finasteride 5 milliGRAM(s) Oral daily  gabapentin 300 milliGRAM(s) Oral two times a day  heparin  Injectable 5000 Unit(s) SubCutaneous two times a day  labetalol 100 milliGRAM(s) Oral two times a day  piperacillin/tazobactam IVPB.. 3.375 Gram(s) IV Intermittent every 12 hours  senna 2 Tablet(s) Oral at bedtime  sodium chloride 0.9%. 1000 milliLiter(s) (60 mL/Hr) IV Continuous <Continuous>  tamsulosin 0.4 milliGRAM(s) Oral at bedtime    MEDICATIONS  (PRN):  acetaminophen   Tablet .. 650 milliGRAM(s) Oral every 6 hours PRN Temp greater or equal to 38C (100.4F), Mild Pain (1 - 3)  ibuprofen  Tablet. 600 milliGRAM(s) Oral every 6 hours PRN Temp greater or equal to 38C (100.4F)      Allergies    No Known Allergies    Intolerances        REVIEW OF SYSTEMS:  CONSTITUTIONAL: No fever, weight loss, or fatigue  EYES: No eye pain, visual disturbances, or discharge  ENMT:  No difficulty hearing, tinnitus, vertigo; No sinus or throat pain  NECK: No pain or stiffness  BREASTS: No pain, masses, or nipple discharge  RESPIRATORY: No cough, wheezing, chills or hemoptysis; No shortness of breath  CARDIOVASCULAR: No chest pain, palpitations, dizziness, or leg swelling  GASTROINTESTINAL: No abdominal or epigastric pain. No nausea, vomiting, or hematemesis; No diarrhea or constipation. No melena or hematochezia.  GENITOURINARY: No dysuria, frequency, hematuria, or incontinence  NEUROLOGICAL: No headaches, memory loss, loss of strength, numbness, or tremors  SKIN: No itching, burning, rashes, or lesions   LYMPH NODES: No enlarged glands  ENDOCRINE: No heat or cold intolerance; No hair loss  MUSCULOSKELETAL: No joint pain or swelling; No muscle, back, or extremity pain  PSYCHIATRIC: No depression, anxiety, mood swings, or difficulty sleeping  HEME/LYMPH: No easy bruising, or bleeding gums  ALLERGY AND IMMUNOLOGIC: No hives or eczema    Vital Signs Last 24 Hrs  T(C): 37 (2020 06:50), Max: 37 (2020 06:50)  T(F): 98.6 (2020 06:50), Max: 98.6 (2020 06:50)  HR: 70 (2020 06:50) (68 - 79)  BP: 140/68 (2020 06:50) (110/59 - 140/68)  BP(mean): --  RR: 18 (2020 06:50) (18 - 18)  SpO2: 98% (2020 06:50) (97% - 99%)    PHYSICAL EXAM:  GENERAL: NAD, well-groomed, well-developed  HEAD:  Atraumatic, Normocephalic  EYES: EOMI, PERRL, conjunctiva and sclera clear  ENMT:  Moist mucous membranes, Good dentition, No lesions  NECK: Supple, No JVD, Normal thyroid  NERVOUS SYSTEM:  Alert & Oriented X3, Good concentration; Motor Strength 5/5 B/L upper and lower extremities;   CHEST/LUNG: Clear to percussion bilaterally; No rales, rhonchi, wheezing, or rubs  HEART: Regular rate and rhythm; No murmurs, rubs, or gallops  ABDOMEN: Soft, Nontender, Nondistended; Bowel sounds present. Ma in place  EXTREMITIES:  2+ Peripheral Pulses, No clubbing, cyanosis, or edema  LYMPH: No lymphadenopathy noted  SKIN: No rashes or lesions    LABS:                        8.6    11.56 )-----------( 174      ( 2020 07:36 )             27.4     01-05    139  |  103  |  33<H>  ----------------------------<  96  3.2<L>   |  27  |  1.71<H>    Ca    9.2      2020 07:36    TPro  8.4<H>  /  Alb  3.1<L>  /  TBili  0.6  /  DBili  x   /  AST  18  /  ALT  16  /  AlkPhos  45  01-04    PT/INR - ( 2020 11:13 )   PT: 14.0 sec;   INR: 1.24 ratio         PTT - ( 2020 11:13 )  PTT:34.9 sec  Urinalysis Basic - ( 2020 11:32 )    Color: Yellow / Appearance: very cloudy / S.010 / pH: x  Gluc: x / Ketone: Negative  / Bili: Negative / Urobili: Negative mg/dL   Blood: x / Protein: 500 mg/dL / Nitrite: Negative   Leuk Esterase: Moderate / RBC: 3-5 /HPF / WBC 11-25   Sq Epi: x / Non Sq Epi: Few / Bacteria: Many      CAPILLARY BLOOD GLUCOSE                    Culture - Blood (collected 20 @ 17:58)  Source: .Blood Blood-Peripheral  Preliminary Report (20 @ 18:01):    No growth to date.    Culture - Urine (collected 20 @ 17:00)  Source: .Urine Clean Catch (Midstream)  Preliminary Report (20 @ 14:43):    >100,000 CFU/ml Gram Negative Rods    Culture - Blood (collected 20 @ 16:40)  Source: .Blood Blood-Peripheral  Preliminary Report (20 @ 17:01):    No growth to date.          RADIOLOGY & ADDITIONAL TESTS:    Imaging Personally Reviewed:  [ ] YES  [ ] NO    Consultant(s) Notes Reviewed:  [ ] YES  [ ] NO    Care Discussed with Consultants/Other Providers [ ] YES  [ ] NO    PROBLEMS:  ACUTE CYSTITIS WITH HEMATURIA  BLOOD PRESSURE  Acute cystitis with hematuria  Renal insufficiency  Hypertension  BPH with obstruction/lower urinary tract symptoms  Essential hypertension  Spinal stenosis of lumbar region without neurogenic claudication  Hydrocele in adult  Urinary tract infection associated with catheterization of urinary tract, unspecified indwelling urinary catheter type, subsequent encounter      Care discussed with family,         [  ]   yes  [  ]  No    imp:    stable[ ]    unstable[  ]     improving [   ]       unchanged  [  ]                Plans:  Continue present plans  [  ]               New consult [  ]   specialty  .......               order keyla[  ]    test name.                  Discharge Planning  [  ]

## 2020-01-06 NOTE — DIETITIAN INITIAL EVALUATION ADULT. - OTHER INFO
Pt confused; speaks some English. Pt refused free  services, pt requested "No. No. Call my son". Called son, daughter answered was unable to speak while at work. Daughter did not return call. Per chart review and pt. Pt lives with adult children. Pt denied any abdominal discomfort. No reported difficulties with chewing/swallowing. Pt consuming ~75% of meals. No reported allergies to foods. Laset BM 01-05 x 2. Pt c obrien 800 mL 01-06.

## 2020-01-06 NOTE — CONSULT NOTE ADULT - SUBJECTIVE AND OBJECTIVE BOX
Chief Complaint:  Patient is a 89y old  Male who presents with a chief complaint of Fever. UTI. Renal insufficiency. (2020 08:11)    HPI: The patient is a 89y Male w/ PMH HTN, spinal stenosis, BPH, renal insufficiency hydrocele complaining of weakness.  Pt was found to have a kink in the obrien, resulting in suprapubic pain. Had temp as per family 102. (2020 13:42)      Pt admitted with UTI.    Pt seen and examined at bedside with Dr. Mensah.    Denies fever, chills, nausea, vomiting.  Obrien catheter in place.        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 650 milliGRAM(s) Oral every 6 hours PRN  amLODIPine   Tablet 5 milliGRAM(s) Oral daily  ferrous    sulfate 325 milliGRAM(s) Oral daily  finasteride 5 milliGRAM(s) Oral daily  gabapentin 300 milliGRAM(s) Oral two times a day  heparin  Injectable 5000 Unit(s) SubCutaneous two times a day  ibuprofen  Tablet. 600 milliGRAM(s) Oral every 6 hours PRN  labetalol 100 milliGRAM(s) Oral two times a day  piperacillin/tazobactam IVPB.. 3.375 Gram(s) IV Intermittent every 12 hours  senna 2 Tablet(s) Oral at bedtime  sodium chloride 0.9%. 1000 milliLiter(s) IV Continuous <Continuous>  tamsulosin 0.4 milliGRAM(s) Oral at bedtime      REVIEW OF SYSTEMS:  All other review of systems is negative unless indicated above.    Relevant Family History:   FAMILY HISTORY:  No significant family history      Relevant Social History:  Denies ETOH or tobacco history    Physical Exam:    Vital Signs:  Vital Signs Last 24 Hrs  T(C): 37.1 (2020 17:30), Max: 37.1 (2020 17:30)  T(F): 98.8 (2020 17:30), Max: 98.8 (2020 17:30)  HR: 85 (2020 17:30) (68 - 85)  BP: 159/75 (2020 17:30) (117/63 - 159/75)  RR: 18 (2020 17:30) (17 - 18)  SpO2: 100% (2020 17:30) (97% - 100%)  Daily     Daily Weight in k.8 (2020 15:24)    Constitutional: WDWN resting comfortably in bed; NAD  HEENT: NC/AT  Neck: supple; no JVD or thyromegaly  Respiratory: nonlabored respiations  Cardiac: +S1/S2; RRR  Gastrointestinal: soft, NT/ND; no suprapubic tenderness  : uncircumcised phallus, hypospadias of proximal shaft, b/l hydroceles, no swelling, erythema, or discharge, obrien catheter in place draining clear yellow urine  Extremities: no clubbing or cyanosis; no peripheral edema  Musculoskeletal:  no joint swelling, tenderness or erythema  Skin: warm, dry and intact; no rashes, wounds, or scars      Laboratory:                          8.6    11.56 )-----------( 174      ( 2020 07:36 )             27.4         141  |  105  |  32<H>  ----------------------------<  79  3.2<L>   |  29  |  1.59<H>    Ca    9.9      2020 07:59  Phos  3.1       Mg     2.0             Intake and Output    20 @ 07:20 @ 07:00  --------------------------------------------------------  IN: 1080 mL / OUT: 3502 mL / NET: -2422 mL    20 @ 07:01  -  20 @ 18:02  --------------------------------------------------------  IN: 0 mL / OUT: 400 mL / NET: -400 mL

## 2020-01-07 LAB
ANION GAP SERPL CALC-SCNC: 5 MMOL/L — SIGNIFICANT CHANGE UP (ref 5–17)
BUN SERPL-MCNC: 24 MG/DL — HIGH (ref 7–23)
CALCIUM SERPL-MCNC: 9.6 MG/DL — SIGNIFICANT CHANGE UP (ref 8.5–10.1)
CHLORIDE SERPL-SCNC: 107 MMOL/L — SIGNIFICANT CHANGE UP (ref 96–108)
CO2 SERPL-SCNC: 28 MMOL/L — SIGNIFICANT CHANGE UP (ref 22–31)
CREAT SERPL-MCNC: 1.39 MG/DL — HIGH (ref 0.5–1.3)
GLUCOSE SERPL-MCNC: 84 MG/DL — SIGNIFICANT CHANGE UP (ref 70–99)
HCT VFR BLD CALC: 28.3 % — LOW (ref 39–50)
HGB BLD-MCNC: 9 G/DL — LOW (ref 13–17)
MCHC RBC-ENTMCNC: 28.5 PG — SIGNIFICANT CHANGE UP (ref 27–34)
MCHC RBC-ENTMCNC: 31.8 GM/DL — LOW (ref 32–36)
MCV RBC AUTO: 89.6 FL — SIGNIFICANT CHANGE UP (ref 80–100)
NRBC # BLD: 0 /100 WBCS — SIGNIFICANT CHANGE UP (ref 0–0)
PLATELET # BLD AUTO: 186 K/UL — SIGNIFICANT CHANGE UP (ref 150–400)
POTASSIUM SERPL-MCNC: 3.5 MMOL/L — SIGNIFICANT CHANGE UP (ref 3.5–5.3)
POTASSIUM SERPL-SCNC: 3.5 MMOL/L — SIGNIFICANT CHANGE UP (ref 3.5–5.3)
RBC # BLD: 3.16 M/UL — LOW (ref 4.2–5.8)
RBC # FLD: 16.3 % — HIGH (ref 10.3–14.5)
SODIUM SERPL-SCNC: 140 MMOL/L — SIGNIFICANT CHANGE UP (ref 135–145)
WBC # BLD: 5.39 K/UL — SIGNIFICANT CHANGE UP (ref 3.8–10.5)
WBC # FLD AUTO: 5.39 K/UL — SIGNIFICANT CHANGE UP (ref 3.8–10.5)

## 2020-01-07 PROCEDURE — 99232 SBSQ HOSP IP/OBS MODERATE 35: CPT

## 2020-01-07 RX ORDER — CIPROFLOXACIN LACTATE 400MG/40ML
250 VIAL (ML) INTRAVENOUS EVERY 12 HOURS
Refills: 0 | Status: DISCONTINUED | OUTPATIENT
Start: 2020-01-07 | End: 2020-01-14

## 2020-01-07 RX ADMIN — Medication 325 MILLIGRAM(S): at 12:20

## 2020-01-07 RX ADMIN — SODIUM CHLORIDE 60 MILLILITER(S): 9 INJECTION INTRAMUSCULAR; INTRAVENOUS; SUBCUTANEOUS at 13:20

## 2020-01-07 RX ADMIN — Medication 100 MILLIGRAM(S): at 05:17

## 2020-01-07 RX ADMIN — Medication 100 MILLIGRAM(S): at 17:58

## 2020-01-07 RX ADMIN — Medication 250 MILLIGRAM(S): at 17:56

## 2020-01-07 RX ADMIN — PIPERACILLIN AND TAZOBACTAM 25 GRAM(S): 4; .5 INJECTION, POWDER, LYOPHILIZED, FOR SOLUTION INTRAVENOUS at 05:17

## 2020-01-07 RX ADMIN — HEPARIN SODIUM 5000 UNIT(S): 5000 INJECTION INTRAVENOUS; SUBCUTANEOUS at 05:16

## 2020-01-07 RX ADMIN — GABAPENTIN 300 MILLIGRAM(S): 400 CAPSULE ORAL at 17:56

## 2020-01-07 RX ADMIN — SENNA PLUS 2 TABLET(S): 8.6 TABLET ORAL at 21:22

## 2020-01-07 RX ADMIN — SODIUM CHLORIDE 60 MILLILITER(S): 9 INJECTION INTRAMUSCULAR; INTRAVENOUS; SUBCUTANEOUS at 04:27

## 2020-01-07 RX ADMIN — TAMSULOSIN HYDROCHLORIDE 0.4 MILLIGRAM(S): 0.4 CAPSULE ORAL at 21:22

## 2020-01-07 RX ADMIN — HEPARIN SODIUM 5000 UNIT(S): 5000 INJECTION INTRAVENOUS; SUBCUTANEOUS at 17:58

## 2020-01-07 RX ADMIN — GABAPENTIN 300 MILLIGRAM(S): 400 CAPSULE ORAL at 05:17

## 2020-01-07 RX ADMIN — AMLODIPINE BESYLATE 5 MILLIGRAM(S): 2.5 TABLET ORAL at 05:17

## 2020-01-07 RX ADMIN — FINASTERIDE 5 MILLIGRAM(S): 5 TABLET, FILM COATED ORAL at 12:20

## 2020-01-07 NOTE — PROGRESS NOTE ADULT - ASSESSMENT
IMPROVE VTE Individual Risk Assessment    RISK                                                                Points    [  ] Previous VTE                                                  3    [  ] Thrombophilia                                               2    [  ] Lower limb paralysis                                      2        (unable to hold up >15 seconds)      [  ] Current Cancer                                              2         (within 6 months)    [  ] Immobilization > 24 hrs                                1    [  ] ICU/CCU stay > 24 hours                              1    [  ] Age > 60                                                      1    IMPROVE VTE Score _________    IMPROVE Score 0-1: Low Risk, No VTE prophylaxis required for most patients, encourage ambulation.   IMPROVE Score 2-3: At risk, pharmacologic VTE prophylaxis is indicated for most patients (in the absence of a contraindication)  IMPROVE Score > or = 4: High Risk, pharmacologic VTE prophylaxis is indicated for most patients (in the absence of a contraindication)  : The patient is a 89y Male complaining of weakness.	  Pt. was found to have a kink in the obrien, resulting in suprapubic pain. Had temp. as per family 102  01/05/2020 : Alert, awake. Asymptomatic. On Zosyn. Urology consult.  01/06/2020 : Alert. asymptomatic. Urine culture pos, for Gm neg. rods. On Zosyn. Urology consult.  01/07/2020 : Awake, Asymptomatic. Afebrile. Klebsiella in the urine, pan sensitive. Urology consult noted. For cystoscopy. ID consult. On Zosyn.

## 2020-01-07 NOTE — PHYSICAL THERAPY INITIAL EVALUATION ADULT - ADDITIONAL COMMENTS
As per pt, Pt lives with his daughter Ching in a private home, 3 entry steps, no steps once inside. Pt sleeps in a hospital bed & has a home health aide 12 hours/day, 7 days/week. Family provides assist when aide not present. Pt require assist for all bed mobility & OOB to wheelchair transfers (squat pivot). Pt is non-ambulatory 6 years. Pt dependent for ADL's & incontinent at baseline.  Daughter states pt was not able to maintain upright sitting at edge of bed or in wheelchair at baseline.

## 2020-01-07 NOTE — CONSULT NOTE ADULT - SUBJECTIVE AND OBJECTIVE BOX
· Chief Complaint: The patient is a 89y Male complaining of weakness.	  Pt. was found to have a kink in the obrien, resulting in suprapubic pain. Had temp. as per family 102. 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, chronic  Renal insufficiency  Dehydration  Spinal stenosis of lumbar region  BPH (benign prostatic hypertrophy)  Hypertension  Benign Prostatic Hyperplasia  HTN (Hypertension)  No significant past surgical history      SOCHX:   tobacco,  -  alcohol    FMHX: FA/MO  - contributory       Recent Travel:    Immunizations:    Allergies    No Known Allergies    Intolerances        MEDICATIONS  (STANDING):  amLODIPine   Tablet 5 milliGRAM(s) Oral daily  ferrous    sulfate 325 milliGRAM(s) Oral daily  finasteride 5 milliGRAM(s) Oral daily  gabapentin 300 milliGRAM(s) Oral two times a day  heparin  Injectable 5000 Unit(s) SubCutaneous two times a day  labetalol 100 milliGRAM(s) Oral two times a day  senna 2 Tablet(s) Oral at bedtime  sodium chloride 0.9%. 1000 milliLiter(s) (60 mL/Hr) IV Continuous <Continuous>  tamsulosin 0.4 milliGRAM(s) Oral at bedtime    MEDICATIONS  (PRN):  acetaminophen   Tablet .. 650 milliGRAM(s) Oral every 6 hours PRN Temp greater or equal to 38C (100.4F), Mild Pain (1 - 3)  ibuprofen  Tablet. 600 milliGRAM(s) Oral every 6 hours PRN Temp greater or equal to 38C (100.4F)      REVIEW OF SYSTEMS: IN PROGRESS  CONSTITUTIONAL: No fever, weight loss, or fatigue  EYES: No eye pain, visual disturbances, or discharge  ENMT:  No difficulty hearing, tinnitus, vertigo; No sinus or throat pain  NECK: No pain or stiffness  BREASTS: No pain, masses, or nipple discharge  RESPIRATORY: No cough, wheezing, chills or hemoptysis; No shortness of breath  CARDIOVASCULAR: No chest pain, palpitations, dizziness, or leg swelling  GASTROINTESTINAL: No abdominal or epigastric pain. No nausea, vomiting, or hematemesis; No diarrhea or constipation. No melena or hematochezia.  GENITOURINARY: No dysuria, frequency, hematuria, or incontinence  NEUROLOGICAL: No headaches, memory loss, loss of strength, numbness, or tremors  SKIN: No itching, burning, rashes, or lesions   LYMPH NODES: No enlarged glands      VITAL SIGNS:    T(C): 36.8 (01-07-20 @ 05:00), Max: 37.1 (01-06-20 @ 17:30)  T(F): 98.3 (01-07-20 @ 05:00), Max: 98.8 (01-06-20 @ 17:30)  HR: 77 (01-07-20 @ 05:00) (75 - 85)  BP: 155/77 (01-07-20 @ 05:00) (117/63 - 159/75)  RR: 18 (01-07-20 @ 05:00) (17 - 18)  SpO2: 98% (01-07-20 @ 05:00) (97% - 100%)    PHYSICAL EXAM: IN PROGRESS     GENERAL: NAD, well-groomed, well-developed  HEAD:  Atraumatic, Normocephalic  EYES: EOMI, PERRLA, conjunctiva and sclera clear  ENMT: No tonsillar erythema, exudates, or enlargement; Moist mucous membranes, Good dentition, No lesions  NECK: Supple, No JVD, Normal thyroid  NERVOUS SYSTEM:  Alert & Oriented X3, Good concentration; Motor Strength 5/5 B/L upper and lower extremities; DTRs 2+ intact and symmetric  CHEST/LUNG: Clear bilaterally; No rales, rhonchi, wheezing bilaterally  HEART: Regular rate and rhythm; No murmurs, rubs, or gallops  ABDOMEN: Soft, Nontender, Nondistended; Bowel sounds present  EXTREMITIES:  2+ Peripheral Pulses, No clubbing, cyanosis, or edema  LYMPH: No lymphadenopathy noted  SKIN: No rashes or lesions  BACK: no pressor sore     LABS:                         9.0    5.39  )-----------( 186      ( 07 Jan 2020 07:35 )             28.3     01-07    140  |  107  |  24<H>  ----------------------------<  84  3.5   |  28  |  1.39<H>    Ca    9.6      07 Jan 2020 07:35  Phos  3.1     01-06  Mg     2.0     01-06                                Culture Results:   No growth to date. (01-04 @ 17:58)  Culture Results:   >100,000 CFU/ml Klebsiella pneumoniae (01-04 @ 17:00)  Culture Results:   No growth to date. (01-04 @ 16:40)                Radiology: 89y Male complaining of weakness.	  Pt. was found to have a kink in the obrien, resulting in suprapubic pain. Had temp. as per family 102. 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, chronic  Renal insufficiency  Dehydration  Spinal stenosis of lumbar region  BPH (benign prostatic hypertrophy)  Hypertension  Benign Prostatic Hyperplasia  HTN (Hypertension)  No significant past surgical history      SOCHX:   tobacco,  -  alcohol    FMHX: FA/MO  - contributory       Recent Travel:    Immunizations:    Allergies    No Known Allergies    Intolerances        MEDICATIONS  (STANDING):  amLODIPine   Tablet 5 milliGRAM(s) Oral daily  ferrous    sulfate 325 milliGRAM(s) Oral daily  finasteride 5 milliGRAM(s) Oral daily  gabapentin 300 milliGRAM(s) Oral two times a day  heparin  Injectable 5000 Unit(s) SubCutaneous two times a day  labetalol 100 milliGRAM(s) Oral two times a day  senna 2 Tablet(s) Oral at bedtime  sodium chloride 0.9%. 1000 milliLiter(s) (60 mL/Hr) IV Continuous <Continuous>  tamsulosin 0.4 milliGRAM(s) Oral at bedtime        REVIEW OF SYSTEMS:   CONSTITUTIONAL: No fever, weight loss, or fatigue  EYES: No eye pain, visual disturbances, or discharge  ENMT:  No difficulty hearing, tinnitus, vertigo; No sinus or throat pain  NECK: No pain or stiffness  BREASTS: No pain, masses, or nipple discharge  RESPIRATORY: No cough,   CARDIOVASCULAR: No chest pain,   GASTROINTESTINAL: No abdominal   GENITOURINARY: No dysuria  NEUROLOGICAL: No headache  SKIN: No itching, burning, rashes, or lesions         VITAL SIGNS:    T(C): 36.8 (01-07-20 @ 05:00), Max: 37.1 (01-06-20 @ 17:30)  T(F): 98.3 (01-07-20 @ 05:00), Max: 98.8 (01-06-20 @ 17:30)  HR: 77 (01-07-20 @ 05:00) (75 - 85)  BP: 155/77 (01-07-20 @ 05:00) (117/63 - 159/75)  RR: 18 (01-07-20 @ 05:00) (17 - 18)  SpO2: 98% (01-07-20 @ 05:00) (97% - 100%)    PHYSICAL EXAM: IN PROGRESS     GENERAL: NAD, well-groomed, well-developed  HEAD:  Atraumatic, Normocephalic  EYES: EOMI, PERRLA, conjunctiva and sclera clear  ENMT: No tonsillar erythema, exudates, or enlargement; Moist mucous membranes, Good dentition, No lesions  NECK: Supple, No JVD, Normal thyroid  NERVOUS SYSTEM:  Alert & Oriented   CHEST/LUNG: Clear bilaterally; No rales, rhonchi, wheezing bilaterally  HEART: Regular rate and rhythm; No murmurs, rubs, or gallops  ABDOMEN: Soft, Nontender, Nondistended; Bowel sounds present, on obrien   EXTREMITIES:  2+ Peripheral Pulses, No clubbing, cyanosis, or edema  LYMPH: No lymphadenopathy noted  SKIN: No rashes or lesions  BACK: no pressor sore     LABS:                         9.0    5.39  )-----------( 186      ( 07 Jan 2020 07:35 )             28.3     01-07    140  |  107  |  24<H>  ----------------------------<  84  3.5   |  28  |  1.39<H>    Ca    9.6      07 Jan 2020 07:35  Phos  3.1     01-06  Mg     2.0     01-06                                Culture Results:   No growth to date. (01-04 @ 17:58)  Culture Results:   >100,000 CFU/ml Klebsiella pneumoniae (01-04 @ 17:00)  Culture Results:   No growth to date. (01-04 @ 16:40)                Radiology:

## 2020-01-07 NOTE — PROGRESS NOTE ADULT - SUBJECTIVE AND OBJECTIVE BOX
Patient is a 89y old  Male who presents with a chief complaint of Fever. UTI. Renal inasufficiency. (06 Jan 2020 18:01)      INTERVAL HPI/OVERNIGHT EVENTS:    MEDICATIONS  (STANDING):  amLODIPine   Tablet 5 milliGRAM(s) Oral daily  ferrous    sulfate 325 milliGRAM(s) Oral daily  finasteride 5 milliGRAM(s) Oral daily  gabapentin 300 milliGRAM(s) Oral two times a day  heparin  Injectable 5000 Unit(s) SubCutaneous two times a day  labetalol 100 milliGRAM(s) Oral two times a day  senna 2 Tablet(s) Oral at bedtime  sodium chloride 0.9%. 1000 milliLiter(s) (60 mL/Hr) IV Continuous <Continuous>  tamsulosin 0.4 milliGRAM(s) Oral at bedtime    MEDICATIONS  (PRN):  acetaminophen   Tablet .. 650 milliGRAM(s) Oral every 6 hours PRN Temp greater or equal to 38C (100.4F), Mild Pain (1 - 3)  ibuprofen  Tablet. 600 milliGRAM(s) Oral every 6 hours PRN Temp greater or equal to 38C (100.4F)      Allergies    No Known Allergies    Intolerances        REVIEW OF SYSTEMS:  CONSTITUTIONAL: No fever, weight loss, or fatigue  EYES: No eye pain, visual disturbances, or discharge  ENMT:  No difficulty hearing, tinnitus, vertigo; No sinus or throat pain  NECK: No pain or stiffness  BREASTS: No pain, masses, or nipple discharge  RESPIRATORY: No cough, wheezing, chills or hemoptysis; No shortness of breath  CARDIOVASCULAR: No chest pain, palpitations, dizziness, or leg swelling  GASTROINTESTINAL: No abdominal or epigastric pain. No nausea, vomiting, or hematemesis; No diarrhea or constipation. No melena or hematochezia.  GENITOURINARY: No dysuria, frequency, hematuria, or incontinence  NEUROLOGICAL: No headaches, memory loss, loss of strength, numbness, or tremors  SKIN: No itching, burning, rashes, or lesions   LYMPH NODES: No enlarged glands  ENDOCRINE: No heat or cold intolerance; No hair loss  MUSCULOSKELETAL: No joint pain or swelling; No muscle, back, or extremity pain  PSYCHIATRIC: No depression, anxiety, mood swings, or difficulty sleeping  HEME/LYMPH: No easy bruising, or bleeding gums  ALLERGY AND IMMUNOLOGIC: No hives or eczema    Vital Signs Last 24 Hrs  T(C): 36.8 (07 Jan 2020 05:00), Max: 37.1 (06 Jan 2020 17:30)  T(F): 98.3 (07 Jan 2020 05:00), Max: 98.8 (06 Jan 2020 17:30)  HR: 77 (07 Jan 2020 05:00) (75 - 85)  BP: 155/77 (07 Jan 2020 05:00) (117/63 - 159/75)  BP(mean): --  RR: 18 (07 Jan 2020 05:00) (17 - 18)  SpO2: 98% (07 Jan 2020 05:00) (97% - 100%)    PHYSICAL EXAM:  GENERAL: NAD, well-groomed, well-developed  HEAD:  Atraumatic, Normocephalic  EYES: EOMI, PERRL, conjunctiva and sclera clear  ENMT:  Moist mucous membranes, Good dentition, No lesions  NECK: Supple, No JVD, Normal thyroid  NERVOUS SYSTEM:  Alert & Oriented X3, Good concentration; Motor Strength 5/5 B/L upper and lower extremities; DTRs 2+ intact and symmetric  CHEST/LUNG: Clear to percussion bilaterally; No rales, rhonchi, wheezing, or rubs  HEART: Regular rate and rhythm; No murmurs, rubs, or gallops  ABDOMEN: Soft, Nontender, Nondistended; Bowel sounds present  EXTREMITIES:  2+ Peripheral Pulses, No clubbing, cyanosis, or edema  LYMPH: No lymphadenopathy noted  SKIN: No rashes or lesions    LABS:                        9.0    5.39  )-----------( 186      ( 07 Jan 2020 07:35 )             28.3     01-07    140  |  107  |  24<H>  ----------------------------<  84  3.5   |  28  |  1.39<H>    Ca    9.6      07 Jan 2020 07:35  Phos  3.1     01-06  Mg     2.0     01-06          CAPILLARY BLOOD GLUCOSE                    Culture - Blood (collected 01-04-20 @ 17:58)  Source: .Blood Blood-Peripheral  Preliminary Report (01-05-20 @ 18:01):    No growth to date.    Culture - Urine (collected 01-04-20 @ 17:00)  Source: .Urine Clean Catch (Midstream)  Final Report (01-06-20 @ 18:49):    >100,000 CFU/ml Klebsiella pneumoniae  Organism: Klebsiella pneumoniae (01-06-20 @ 18:49)  Organism: Klebsiella pneumoniae (01-06-20 @ 18:49)      -  Amikacin: S <=16      -  Ampicillin: R >16 These ampicillin results predict results for amoxicillin      -  Ampicillin/Sulbactam: R >16/8 Enterobacter, Citrobacter, and Serratia may develop resistance during prolonged therapy (3-4 days)      -  Aztreonam: S <=4      -  Cefazolin: S <=8 (MIC_CL_COM_ENTERIC_CEFAZU) For uncomplicated UTI with K. pneumoniae, E. coli, or P. mirablis: JASWINDER <=16 is sensitive and JASWINDER >=32 is resistant. This also predicts results for oral agents cefaclor, cefdinir, cefpodoxime, cefprozil, cefuroxime axetil, cephalexin and locarbef for uncomplicated UTI. Note that some isolates may be susceptible to these agents while testing resistant to cefazolin.      -  Cefepime: S <=4      -  Cefoxitin: S <=8      -  Ceftriaxone: S <=1 Enterobacter, Citrobacter, and Serratia may develop resistance during prolonged therapy      -  Ciprofloxacin: S <=1      -  Gentamicin: S <=4      -  Imipenem: S <=1      -  Levofloxacin: S <=2      -  Meropenem: S <=1      -  Nitrofurantoin: I 64 Should not be used to treat pyelonephritis      -  Piperacillin/Tazobactam: S <=16      -  Tigecycline: S <=2      -  Tobramycin: R >8      -  Trimethoprim/Sulfamethoxazole: R >2/38      Method Type: JASWINDER    Culture - Blood (collected 01-04-20 @ 16:40)  Source: .Blood Blood-Peripheral  Preliminary Report (01-05-20 @ 17:01):    No growth to date.          RADIOLOGY & ADDITIONAL TESTS:    Imaging Personally Reviewed:  [ ] YES  [ ] NO    Consultant(s) Notes Reviewed:  [ ] YES  [ ] NO    Care Discussed with Consultants/Other Providers [ ] YES  [ ] NO    PROBLEMS:  ACUTE CYSTITIS WITH HEMATURIA  BLOOD PRESSURE  Acute cystitis with hematuria  Renal insufficiency  Hypertension  BPH with obstruction/lower urinary tract symptoms  Essential hypertension  Spinal stenosis of lumbar region without neurogenic claudication  Hydrocele in adult  Urinary tract infection associated with catheterization of urinary tract, unspecified indwelling urinary catheter type, subsequent encounter      Care discussed with family,         [  ]   yes  [  ]  No    imp:    stable[ ]    unstable[  ]     improving [   ]       unchanged  [  ]                Plans:  Continue present plans  [  ]               New consult [  ]   specialty  .......               order keyla[  ]    test name.                  Discharge Planning  [  ]

## 2020-01-07 NOTE — PROGRESS NOTE ADULT - SUBJECTIVE AND OBJECTIVE BOX
Patient seen and examined bedside resting comfortably.  Denies nausea and vomiting. Tolerating diet.  Denies chest pain, dyspnea, cough.    T(F): 98.3 (01-07-20 @ 05:00), Max: 98.8 (01-06-20 @ 17:30)  HR: 77 (01-07-20 @ 05:00) (75 - 85)  BP: 155/77 (01-07-20 @ 05:00) (117/63 - 159/75)  RR: 18 (01-07-20 @ 05:00) (17 - 18)  SpO2: 98% (01-07-20 @ 05:00) (97% - 100%)    PHYSICAL EXAM:  General: NAD  Neuro:  Alert & oriented x 3  Abdomen: soft, NTND. Normactive BS  : Ma cath draining yellow urine with some sediment in it. Scrotum is enlarged with bilateral hydroceles. Soft; non-tender. Uncircumcised. Hypospadias.    LABS:                        9.0    5.39  )-----------( 186      ( 07 Jan 2020 07:35 )             28.3     01-07    140  |  107  |  24<H>  ----------------------------<  84  3.5   |  28  |  1.39<H>    Ca    9.6      07 Jan 2020 07:35  Phos  3.1     01-06  Mg     2.0     01-06    Culture - Blood (01.04.20 @ 17:58)    Specimen Source: .Blood Blood-Peripheral    Culture Results:   No growth to date. in 2 specimens    Culture - Urine (01.04.20 @ 17:00)    -  Imipenem: S <=1    -  Levofloxacin: S <=2    -  Meropenem: S <=1    -  Piperacillin/Tazobactam: S <=16    -  Tigecycline: S <=2    -  Nitrofurantoin: I 64 Should not be used to treat pyelonephritis    -  Trimethoprim/Sulfamethoxazole: R >2/38    -  Tobramycin: R >8    -  Amikacin: S <=16    -  Ampicillin: R >16 These ampicillin results predict results for amoxicillin    -  Ampicillin/Sulbactam: R >16/8 Enterobacter, Citrobacter, and Serratia may develop resistance during prolonged therapy (3-4 days)    -  Cefazolin: S <=8 (MIC_CL_COM_ENTERIC_CEFAZU) For uncomplicated UTI with K. pneumoniae, E. coli, or P. mirablis: JASWNIDER <=16 is sensitive and JASWINDER >=32 is resistant. This also predicts results for oral agents cefaclor, cefdinir, cefpodoxime, cefprozil, cefuroxime axetil, cephalexin and locarbef for uncomplicated UTI. Note that some isolates may be susceptible to these agents while testing resistant to cefazolin.    -  Aztreonam: S <=4    -  Cefoxitin: S <=8    -  Cefepime: S <=4    -  Ceftriaxone: S <=1 Enterobacter, Citrobacter, and Serratia may develop resistance during prolonged therapy    -  Ciprofloxacin: S <=1    -  Gentamicin: S <=4    Specimen Source: .Urine Clean Catch (Midstream)    Culture Results:   >100,000 CFU/ml Klebsiella pneumoniae    Organism Identification: Klebsiella pneumoniae    Organism: Klebsiella pneumoniae    Method Type: JASWINDER    I&O's Detail    06 Jan 2020 07:01  -  07 Jan 2020 07:00  --------------------------------------------------------  IN:    sodium chloride 0.9%.: 720 mL  Total IN: 720 mL    OUT:    Indwelling Catheter - Urethral: 2100 mL    Stool: 3 mL    Voided: 400 mL  Total OUT: 2503 mL    Total NET: -1783 mL          Impression: 89y Male admitted with ACUTE CYSTITIS WITH HEMATURIA  chronic Ma catheter  UTI  leukocytosis has improved from yesterday.      Plan:  -continue VTE prophylaxis   - continue IV Zosyn, as started by Dr. Jeffery  - continue medical f/u  - Dr. Mensah plans to do cystoscopy & SPC when medically stable.

## 2020-01-07 NOTE — PHYSICAL THERAPY INITIAL EVALUATION ADULT - GENERAL OBSERVATIONS, REHAB EVAL
Pt encountered supine in bed, daughter at bedside, A&Ox4, NAd and comfortable. Pt denied pain prior to session.

## 2020-01-07 NOTE — CONSULT NOTE ADULT - ASSESSMENT
Complicated UTI with kink in the obrien/ BPH, renal insufficiency hydrocele    urine culture with klebsiella fairly sensistive   s/p zosyn, will give po cipro  Blood cultures so far neg   urology   fu cystoscopy by urology   NOTE TO CONTINUE  EVAL;AUITON IN PROGRESS Complicated UTI with kink in the obrien/ BPH, renal insufficiency hydrocele    urine culture with klebsiella fairly sensistive   s/p zosyn, will give po cipro  Blood cultures so far neg   urology   fu cystoscopy by urology /  SPC when medically stable.  patient is clear for any urological procedure from infectious disease point of view   we will fu  thanks

## 2020-01-07 NOTE — PHYSICAL THERAPY INITIAL EVALUATION ADULT - CRITERIA FOR SKILLED THERAPEUTIC INTERVENTIONS
impairments found/functional limitations in following categories/therapy frequency/anticipated discharge recommendation/rehab potential/risk reduction/prevention/predicted duration of therapy intervention

## 2020-01-08 DIAGNOSIS — T83.511S INFECTION AND INFLAMMATORY REACTION DUE TO INDWELLING URETHRAL CATHETER, SEQUELA: ICD-10-CM

## 2020-01-08 PROCEDURE — 99232 SBSQ HOSP IP/OBS MODERATE 35: CPT

## 2020-01-08 RX ADMIN — HEPARIN SODIUM 5000 UNIT(S): 5000 INJECTION INTRAVENOUS; SUBCUTANEOUS at 05:50

## 2020-01-08 RX ADMIN — SENNA PLUS 2 TABLET(S): 8.6 TABLET ORAL at 22:49

## 2020-01-08 RX ADMIN — Medication 100 MILLIGRAM(S): at 17:43

## 2020-01-08 RX ADMIN — SODIUM CHLORIDE 60 MILLILITER(S): 9 INJECTION INTRAMUSCULAR; INTRAVENOUS; SUBCUTANEOUS at 01:05

## 2020-01-08 RX ADMIN — TAMSULOSIN HYDROCHLORIDE 0.4 MILLIGRAM(S): 0.4 CAPSULE ORAL at 22:49

## 2020-01-08 RX ADMIN — Medication 325 MILLIGRAM(S): at 11:19

## 2020-01-08 RX ADMIN — FINASTERIDE 5 MILLIGRAM(S): 5 TABLET, FILM COATED ORAL at 11:19

## 2020-01-08 RX ADMIN — Medication 100 MILLIGRAM(S): at 05:50

## 2020-01-08 RX ADMIN — Medication 250 MILLIGRAM(S): at 17:43

## 2020-01-08 RX ADMIN — HEPARIN SODIUM 5000 UNIT(S): 5000 INJECTION INTRAVENOUS; SUBCUTANEOUS at 17:43

## 2020-01-08 RX ADMIN — AMLODIPINE BESYLATE 5 MILLIGRAM(S): 2.5 TABLET ORAL at 05:50

## 2020-01-08 RX ADMIN — GABAPENTIN 300 MILLIGRAM(S): 400 CAPSULE ORAL at 17:43

## 2020-01-08 RX ADMIN — GABAPENTIN 300 MILLIGRAM(S): 400 CAPSULE ORAL at 05:50

## 2020-01-08 RX ADMIN — Medication 250 MILLIGRAM(S): at 05:49

## 2020-01-08 RX ADMIN — SODIUM CHLORIDE 60 MILLILITER(S): 9 INJECTION INTRAMUSCULAR; INTRAVENOUS; SUBCUTANEOUS at 17:43

## 2020-01-08 NOTE — PROGRESS NOTE ADULT - SUBJECTIVE AND OBJECTIVE BOX
HPI:  · Chief Complaint: The patient is a 89y Male complaining of weakness.	  Pt. was found to have a kink in the obrien, resulting in suprapubic pain. Had temp. as per family 102. (04 Jan 2020 13:42)      Allergies    No Known Allergies    Intolerances        MEDICATIONS  (STANDING):  amLODIPine   Tablet 5 milliGRAM(s) Oral daily  ciprofloxacin     Tablet 250 milliGRAM(s) Oral every 12 hours  ferrous    sulfate 325 milliGRAM(s) Oral daily  finasteride 5 milliGRAM(s) Oral daily  gabapentin 300 milliGRAM(s) Oral two times a day  heparin  Injectable 5000 Unit(s) SubCutaneous two times a day  labetalol 100 milliGRAM(s) Oral two times a day  senna 2 Tablet(s) Oral at bedtime  sodium chloride 0.9%. 1000 milliLiter(s) (60 mL/Hr) IV Continuous <Continuous>  tamsulosin 0.4 milliGRAM(s) Oral at bedtime    MEDICATIONS  (PRN):  acetaminophen   Tablet .. 650 milliGRAM(s) Oral every 6 hours PRN Temp greater or equal to 38C (100.4F), Mild Pain (1 - 3)  ibuprofen  Tablet. 600 milliGRAM(s) Oral every 6 hours PRN Temp greater or equal to 38C (100.4F)      REVIEW OF SYSTEMS:    CONSTITUTIONAL: No fever, chills, weight loss, or fatigue  HEENT: No sore throat, runny nose, ear ache  RESPIRATORY: No cough, wheezing, No shortness of breath  CARDIOVASCULAR: No chest pain, palpitations, dizziness  GASTROINTESTINAL: No abdominal pain. No nausea, vomiting, diarrhea  GENITOURINARY: No dysuria, increase frequency, hematuria, or incontinence  NEUROLOGICAL: No headaches   EXTREMITY: No pedal edema BLE  SKIN: No itching, burning, rashes, or lesions     VITAL SIGNS:  T(C): 36.4 (01-08-20 @ 11:28), Max: 37.2 (01-07-20 @ 17:41)  T(F): 97.5 (01-08-20 @ 11:28), Max: 98.9 (01-07-20 @ 17:41)  HR: 68 (01-08-20 @ 11:28) (66 - 81)  BP: 143/69 (01-08-20 @ 11:28) (143/69 - 159/79)  RR: 18 (01-08-20 @ 11:28) (17 - 18)  SpO2: 98% (01-08-20 @ 11:28) (97% - 100%)  Wt(kg): --    PHYSICAL EXAM:    GENERAL: not in any distress  HEENT: Neck is supple, normocephalic, atraumatic   CHEST/LUNG: Clear to percussion bilaterally; No rales, rhonchi, wheezing  HEART: Regular rate and rhythm; No murmurs, rubs, or gallops  ABDOMEN: Soft, Nontender, Nondistended; Bowel sounds present, no rebound   EXTREMITIES:  2+ Peripheral Pulses, No clubbing, cyanosis, or edema  GENITOURINARY:   SKIN: No rashes or lesions  BACK: no pressor sore   NERVOUS SYSTEM:  Alert & Oriented X3  LABS:                         9.0    5.39  )-----------( 186      ( 07 Jan 2020 07:35 )             28.3     01-07    140  |  107  |  24<H>  ----------------------------<  84  3.5   |  28  |  1.39<H>    Ca    9.6      07 Jan 2020 07:35                Culture Results:   No growth to date. (01-04 @ 17:58)  Culture Results:   >100,000 CFU/ml Klebsiella pneumoniae (01-04 @ 17:00)  Culture Results:   No growth to date. (01-04 @ 16:40)                Radiology: HPI:  · Chief Complaint: The patient is a 89y Male complaining of weakness.	  Pt. was found to have a kink in the obrien, resulting in suprapubic pain. Had temp. as per family 102. (04 Jan 2020 13:42)  work up consistent with urinary tract infection   now all better     Allergies    No Known Allergies    Intolerances        MEDICATIONS  (STANDING):  amLODIPine   Tablet 5 milliGRAM(s) Oral daily  ciprofloxacin     Tablet 250 milliGRAM(s) Oral every 12 hours  ferrous    sulfate 325 milliGRAM(s) Oral daily  finasteride 5 milliGRAM(s) Oral daily  gabapentin 300 milliGRAM(s) Oral two times a day  heparin  Injectable 5000 Unit(s) SubCutaneous two times a day  labetalol 100 milliGRAM(s) Oral two times a day  senna 2 Tablet(s) Oral at bedtime  sodium chloride 0.9%. 1000 milliLiter(s) (60 mL/Hr) IV Continuous <Continuous>  tamsulosin 0.4 milliGRAM(s) Oral at bedtime    MEDICATIONS  (PRN):  acetaminophen   Tablet .. 650 milliGRAM(s) Oral every 6 hours PRN Temp greater or equal to 38C (100.4F), Mild Pain (1 - 3)  ibuprofen  Tablet. 600 milliGRAM(s) Oral every 6 hours PRN Temp greater or equal to 38C (100.4F)      REVIEW OF SYSTEMS:    feels ok   no pelvic genital pain     VITAL SIGNS:  T(C): 36.4 (01-08-20 @ 11:28), Max: 37.2 (01-07-20 @ 17:41)  T(F): 97.5 (01-08-20 @ 11:28), Max: 98.9 (01-07-20 @ 17:41)  HR: 68 (01-08-20 @ 11:28) (66 - 81)  BP: 143/69 (01-08-20 @ 11:28) (143/69 - 159/79)  RR: 18 (01-08-20 @ 11:28) (17 - 18)  SpO2: 98% (01-08-20 @ 11:28) (97% - 100%)  Wt(kg): --    PHYSICAL EXAM:    GENERAL: not in any distress  HEENT: Neck is supple, normocephalic, atraumatic   CHEST/LUNG: Clear to percussion bilaterally; No rales, rhonchi, wheezing  HEART: Regular rate and rhythm; No murmurs, rubs, or gallops  ABDOMEN: Soft, Nontender, Nondistended; Bowel sounds present, no rebound   EXTREMITIES:  2+ Peripheral Pulses, No clubbing, cyanosis, or edema  GENITOURINARY: obrien   non tender genitalia   SKIN: No rashes or lesions  BACK: no pressor sore   NERVOUS SYSTEM:  Alert & Oriented X3  LABS:                         9.0    5.39  )-----------( 186      ( 07 Jan 2020 07:35 )             28.3     01-07    140  |  107  |  24<H>  ----------------------------<  84  3.5   |  28  |  1.39<H>    Ca    9.6      07 Jan 2020 07:35                Culture Results:   No growth to date. (01-04 @ 17:58)  Culture Results:   >100,000 CFU/ml Klebsiella pneumoniae (01-04 @ 17:00)  Culture Results:   No growth to date. (01-04 @ 16:40)                Radiology:

## 2020-01-08 NOTE — PROGRESS NOTE ADULT - ASSESSMENT
IMPROVE VTE Individual Risk Assessment    RISK                                                                Points    [  ] Previous VTE                                                  3    [  ] Thrombophilia                                               2    [  ] Lower limb paralysis                                      2        (unable to hold up >15 seconds)      [  ] Current Cancer                                              2         (within 6 months)    [  ] Immobilization > 24 hrs                                1    [  ] ICU/CCU stay > 24 hours                              1    [  ] Age > 60                                                      1    IMPROVE VTE Score _________    IMPROVE Score 0-1: Low Risk, No VTE prophylaxis required for most patients, encourage ambulation.   IMPROVE Score 2-3: At risk, pharmacologic VTE prophylaxis is indicated for most patients (in the absence of a contraindication)  IMPROVE Score > or = 4: High Risk, pharmacologic VTE prophylaxis is indicated for most patients (in the absence of a contraindication)  : The patient is a 89y Male complaining of weakness.	  Pt. was found to have a kink in the obrien, resulting in suprapubic pain. Had temp. as per family 102  01/05/2020 : Alert, awake. Asymptomatic. On Zosyn. Urology consult.  01/06/2020 : Alert. asymptomatic. Urine culture pos, for Gm neg. rods. On Zosyn. Urology consult.  01/07/2020 : Awake, Asymptomatic. Afebrile. Klebsiella in the urine, pan sensitive. Urology consult noted. For cystoscopy. ID consult. On Zosyn.  01/08/2020 : Awake, Asymptomatic. ID consult noted. On Cipro, Zosyn discontinued. Urology F/U noted. Discharge planning.

## 2020-01-08 NOTE — PROGRESS NOTE ADULT - SUBJECTIVE AND OBJECTIVE BOX
Patient seen and examined bedside resting comfortably.  No complaints offered.     T(F): 97.8 (01-08-20 @ 05:47), Max: 99.1 (01-07-20 @ 11:18)  HR: 66 (01-08-20 @ 05:47) (66 - 81)  BP: 158/80 (01-08-20 @ 05:47) (131/79 - 159/79)  RR: 18 (01-08-20 @ 05:47) (16 - 18)  SpO2: 98% (01-08-20 @ 05:47) (97% - 100%)    PHYSICAL EXAM:  General: NAD  Neuro:  Alert & oriented x 3  Abdomen: soft, NTND. Normactive BS  : Ma cath draining yellow urine with some sediment in it. Scrotum is enlarged with bilateral hydroceles. Soft; non-tender. Uncircumcised. Hypospadias.    LABS:                        9.0    5.39  )-----------( 186      ( 07 Jan 2020 07:35 )             28.3     01-07    140  |  107  |  24<H>  ----------------------------<  84  3.5   |  28  |  1.39<H>    Ca    9.6      07 Jan 2020 07:35        I&O's Detail    07 Jan 2020 07:01  -  08 Jan 2020 07:00  --------------------------------------------------------  IN:    sodium chloride 0.9%.: 720 mL  Total IN: 720 mL    OUT:    Indwelling Catheter - Urethral: 4300 mL  Total OUT: 4300 mL    Total NET: -3580 mL          Impression:   UTI   Urinary retention    Plan:  -continue VTE prophylaxis   - antibiotic changed by ID to Cipro PO  - continue medical f/u  - continue Ma cath to BSD  - Dr. Mensah plans to insert SP cath when UTI is fully resolved. He may delay till after discharge &   do on outpt. basis

## 2020-01-08 NOTE — PROGRESS NOTE ADULT - SUBJECTIVE AND OBJECTIVE BOX
Patient is a 89y old  Male who presents with a chief complaint of Fever. UTI. Renal inasufficiency. (08 Jan 2020 08:36)      INTERVAL HPI/OVERNIGHT EVENTS:    MEDICATIONS  (STANDING):  amLODIPine   Tablet 5 milliGRAM(s) Oral daily  ciprofloxacin     Tablet 250 milliGRAM(s) Oral every 12 hours  ferrous    sulfate 325 milliGRAM(s) Oral daily  finasteride 5 milliGRAM(s) Oral daily  gabapentin 300 milliGRAM(s) Oral two times a day  heparin  Injectable 5000 Unit(s) SubCutaneous two times a day  labetalol 100 milliGRAM(s) Oral two times a day  senna 2 Tablet(s) Oral at bedtime  sodium chloride 0.9%. 1000 milliLiter(s) (60 mL/Hr) IV Continuous <Continuous>  tamsulosin 0.4 milliGRAM(s) Oral at bedtime    MEDICATIONS  (PRN):  acetaminophen   Tablet .. 650 milliGRAM(s) Oral every 6 hours PRN Temp greater or equal to 38C (100.4F), Mild Pain (1 - 3)  ibuprofen  Tablet. 600 milliGRAM(s) Oral every 6 hours PRN Temp greater or equal to 38C (100.4F)      Allergies    No Known Allergies    Intolerances        REVIEW OF SYSTEMS:  CONSTITUTIONAL: No fever, weight loss, or fatigue  EYES: No eye pain, visual disturbances, or discharge  ENMT:  No difficulty hearing, tinnitus, vertigo; No sinus or throat pain  NECK: No pain or stiffness  BREASTS: No pain, masses, or nipple discharge  RESPIRATORY: No cough, wheezing, chills or hemoptysis; No shortness of breath  CARDIOVASCULAR: No chest pain, palpitations, dizziness, or leg swelling  GASTROINTESTINAL: No abdominal or epigastric pain. No nausea, vomiting, or hematemesis; No diarrhea or constipation. No melena or hematochezia.  GENITOURINARY: No dysuria, frequency, hematuria, or incontinence  NEUROLOGICAL: No headaches, memory loss, loss of strength, numbness, or tremors  SKIN: No itching, burning, rashes, or lesions   LYMPH NODES: No enlarged glands  ENDOCRINE: No heat or cold intolerance; No hair loss  MUSCULOSKELETAL: No joint pain or swelling; No muscle, back, or extremity pain  PSYCHIATRIC: No depression, anxiety, mood swings, or difficulty sleeping  HEME/LYMPH: No easy bruising, or bleeding gums  ALLERGY AND IMMUNOLOGIC: No hives or eczema    Vital Signs Last 24 Hrs  T(C): 36.6 (08 Jan 2020 05:47), Max: 37.3 (07 Jan 2020 11:18)  T(F): 97.8 (08 Jan 2020 05:47), Max: 99.1 (07 Jan 2020 11:18)  HR: 66 (08 Jan 2020 05:47) (66 - 81)  BP: 158/80 (08 Jan 2020 05:47) (131/79 - 159/79)  BP(mean): --  RR: 18 (08 Jan 2020 05:47) (16 - 18)  SpO2: 98% (08 Jan 2020 05:47) (97% - 100%)    PHYSICAL EXAM:  GENERAL: NAD, well-groomed, well-developed  HEAD:  Atraumatic, Normocephalic  EYES: EOMI, PERRL, conjunctiva and sclera clear  ENMT:  Moist mucous membranes, Good dentition, No lesions  NECK: Supple, No JVD, Normal thyroid  NERVOUS SYSTEM:  Alert & Oriented X3, Good concentration; Motor Strength 5/5 B/L upper and lower extremities; DTRs 2+ intact and symmetric  CHEST/LUNG: Clear to percussion bilaterally; No rales, rhonchi, wheezing, or rubs  HEART: Regular rate and rhythm; No murmurs, rubs, or gallops  ABDOMEN: Soft, Nontender, Nondistended; Bowel sounds present  EXTREMITIES:  2+ Peripheral Pulses, No clubbing, cyanosis, or edema  LYMPH: No lymphadenopathy noted  SKIN: No rashes or lesions    LABS:                        9.0    5.39  )-----------( 186      ( 07 Jan 2020 07:35 )             28.3     01-07    140  |  107  |  24<H>  ----------------------------<  84  3.5   |  28  |  1.39<H>    Ca    9.6      07 Jan 2020 07:35          CAPILLARY BLOOD GLUCOSE                    Culture - Blood (collected 01-04-20 @ 17:58)  Source: .Blood Blood-Peripheral  Preliminary Report (01-05-20 @ 18:01):    No growth to date.    Culture - Urine (collected 01-04-20 @ 17:00)  Source: .Urine Clean Catch (Midstream)  Final Report (01-06-20 @ 18:49):    >100,000 CFU/ml Klebsiella pneumoniae  Organism: Klebsiella pneumoniae (01-06-20 @ 18:49)  Organism: Klebsiella pneumoniae (01-06-20 @ 18:49)      -  Amikacin: S <=16      -  Ampicillin: R >16 These ampicillin results predict results for amoxicillin      -  Ampicillin/Sulbactam: R >16/8 Enterobacter, Citrobacter, and Serratia may develop resistance during prolonged therapy (3-4 days)      -  Aztreonam: S <=4      -  Cefazolin: S <=8 (MIC_CL_COM_ENTERIC_CEFAZU) For uncomplicated UTI with K. pneumoniae, E. coli, or P. mirablis: JASWINDER <=16 is sensitive and JASWINDER >=32 is resistant. This also predicts results for oral agents cefaclor, cefdinir, cefpodoxime, cefprozil, cefuroxime axetil, cephalexin and locarbef for uncomplicated UTI. Note that some isolates may be susceptible to these agents while testing resistant to cefazolin.      -  Cefepime: S <=4      -  Cefoxitin: S <=8      -  Ceftriaxone: S <=1 Enterobacter, Citrobacter, and Serratia may develop resistance during prolonged therapy      -  Ciprofloxacin: S <=1      -  Gentamicin: S <=4      -  Imipenem: S <=1      -  Levofloxacin: S <=2      -  Meropenem: S <=1      -  Nitrofurantoin: I 64 Should not be used to treat pyelonephritis      -  Piperacillin/Tazobactam: S <=16      -  Tigecycline: S <=2      -  Tobramycin: R >8      -  Trimethoprim/Sulfamethoxazole: R >2/38      Method Type: JASWINDER    Culture - Blood (collected 01-04-20 @ 16:40)  Source: .Blood Blood-Peripheral  Preliminary Report (01-05-20 @ 17:01):    No growth to date.          RADIOLOGY & ADDITIONAL TESTS:  < from: Xray Chest 1 View-PORTABLE IMMEDIATE (01.04.20 @ 12:33) >  FINDINGS/  IMPRESSION:Loop recorder overlies the left heart. Lungs are grossly clear. Degenerative changes of the thoracic spine.      < end of copied text >    Imaging Personally Reviewed:  [ ] YES  [ ] NO    Consultant(s) Notes Reviewed:  [ ] YES  [ ] NO    Care Discussed with Consultants/Other Providers [ ] YES  [ ] NO    PROBLEMS:  ACUTE CYSTITIS WITH HEMATURIA  BLOOD PRESSURE  Acute cystitis with hematuria  Renal insufficiency  Hypertension  BPH with obstruction/lower urinary tract symptoms  Essential hypertension  Spinal stenosis of lumbar region without neurogenic claudication  Hydrocele in adult  Urinary tract infection associated with catheterization of urinary tract, unspecified indwelling urinary catheter type, subsequent encounter      Care discussed with family,         [  ]   yes  [  ]  No    imp:    stable[ ]    unstable[  ]     improving [   ]       unchanged  [  ]                Plans:  Continue present plans  [  ]               New consult [  ]   specialty  .......               order keyla[  ]    test name.                  Discharge Planning  [  ]

## 2020-01-08 NOTE — PROGRESS NOTE ADULT - ASSESSMENT
Complicated UTI with kink in the obrien/ BPH, renal insufficiency hydrocele    urine culture with klebsiella fairly sensitive   s/p zosyn, on po cipro  Blood cultures so far neg   urology   fu cystoscopy by urology /  SPC when medically stable.  patient is clear for any urological procedure from infectious disease point of view   we will fu Complicated UTI with kink in the obrien/ BPH, renal insufficiency hydrocele  urine culture with klebsiella fairly sensitive   s/p zosyn, on po cipro  Blood cultures so far neg   urology   fu cystoscopy by urology /  SPC when medically stable.  patient is clear for any urological procedure from infectious disease point of view   we will fu

## 2020-01-08 NOTE — CHART NOTE - NSCHARTNOTEFT_GEN_A_CORE
Per telephone discussion with Dr. Mensah. pt is being added on to OR schedule for Friday. This case is going to be an add-on case to be done after the previously scheduled cases are completed.

## 2020-01-09 PROCEDURE — 99231 SBSQ HOSP IP/OBS SF/LOW 25: CPT

## 2020-01-09 RX ADMIN — SENNA PLUS 2 TABLET(S): 8.6 TABLET ORAL at 22:34

## 2020-01-09 RX ADMIN — Medication 325 MILLIGRAM(S): at 11:05

## 2020-01-09 RX ADMIN — FINASTERIDE 5 MILLIGRAM(S): 5 TABLET, FILM COATED ORAL at 11:05

## 2020-01-09 RX ADMIN — Medication 250 MILLIGRAM(S): at 18:14

## 2020-01-09 RX ADMIN — SODIUM CHLORIDE 60 MILLILITER(S): 9 INJECTION INTRAMUSCULAR; INTRAVENOUS; SUBCUTANEOUS at 11:05

## 2020-01-09 RX ADMIN — AMLODIPINE BESYLATE 5 MILLIGRAM(S): 2.5 TABLET ORAL at 06:32

## 2020-01-09 RX ADMIN — GABAPENTIN 300 MILLIGRAM(S): 400 CAPSULE ORAL at 06:32

## 2020-01-09 RX ADMIN — SODIUM CHLORIDE 60 MILLILITER(S): 9 INJECTION INTRAMUSCULAR; INTRAVENOUS; SUBCUTANEOUS at 22:33

## 2020-01-09 RX ADMIN — Medication 100 MILLIGRAM(S): at 18:15

## 2020-01-09 RX ADMIN — GABAPENTIN 300 MILLIGRAM(S): 400 CAPSULE ORAL at 18:14

## 2020-01-09 RX ADMIN — TAMSULOSIN HYDROCHLORIDE 0.4 MILLIGRAM(S): 0.4 CAPSULE ORAL at 22:34

## 2020-01-09 RX ADMIN — Medication 100 MILLIGRAM(S): at 06:32

## 2020-01-09 RX ADMIN — HEPARIN SODIUM 5000 UNIT(S): 5000 INJECTION INTRAVENOUS; SUBCUTANEOUS at 18:14

## 2020-01-09 RX ADMIN — HEPARIN SODIUM 5000 UNIT(S): 5000 INJECTION INTRAVENOUS; SUBCUTANEOUS at 06:32

## 2020-01-09 RX ADMIN — Medication 250 MILLIGRAM(S): at 06:32

## 2020-01-09 NOTE — PROGRESS NOTE ADULT - SUBJECTIVE AND OBJECTIVE BOX
Patient is a 89y old  Male who presents with a chief complaint of Fever. UTI. Renal inasufficiency. (08 Jan 2020 12:29)      INTERVAL HPI/OVERNIGHT EVENTS:    MEDICATIONS  (STANDING):  amLODIPine   Tablet 5 milliGRAM(s) Oral daily  ciprofloxacin     Tablet 250 milliGRAM(s) Oral every 12 hours  ferrous    sulfate 325 milliGRAM(s) Oral daily  finasteride 5 milliGRAM(s) Oral daily  gabapentin 300 milliGRAM(s) Oral two times a day  heparin  Injectable 5000 Unit(s) SubCutaneous two times a day  labetalol 100 milliGRAM(s) Oral two times a day  senna 2 Tablet(s) Oral at bedtime  sodium chloride 0.9%. 1000 milliLiter(s) (60 mL/Hr) IV Continuous <Continuous>  tamsulosin 0.4 milliGRAM(s) Oral at bedtime    MEDICATIONS  (PRN):  acetaminophen   Tablet .. 650 milliGRAM(s) Oral every 6 hours PRN Temp greater or equal to 38C (100.4F), Mild Pain (1 - 3)  ibuprofen  Tablet. 600 milliGRAM(s) Oral every 6 hours PRN Temp greater or equal to 38C (100.4F)      Allergies    No Known Allergies    Intolerances        REVIEW OF SYSTEMS:  CONSTITUTIONAL: No fever, weight loss, or fatigue  EYES: No eye pain, visual disturbances, or discharge  ENMT:  No difficulty hearing, tinnitus, vertigo; No sinus or throat pain  NECK: No pain or stiffness  BREASTS: No pain, masses, or nipple discharge  RESPIRATORY: No cough, wheezing, chills or hemoptysis; No shortness of breath  CARDIOVASCULAR: No chest pain, palpitations, dizziness, or leg swelling  GASTROINTESTINAL: No abdominal or epigastric pain. No nausea, vomiting, or hematemesis; No diarrhea or constipation. No melena or hematochezia.  GENITOURINARY: No dysuria, frequency, hematuria, or incontinence  NEUROLOGICAL: No headaches, memory loss, loss of strength, numbness, or tremors  SKIN: No itching, burning, rashes, or lesions   LYMPH NODES: No enlarged glands  ENDOCRINE: No heat or cold intolerance; No hair loss  MUSCULOSKELETAL: No joint pain or swelling; No muscle, back, or extremity pain  PSYCHIATRIC: No depression, anxiety, mood swings, or difficulty sleeping  HEME/LYMPH: No easy bruising, or bleeding gums  ALLERGY AND IMMUNOLOGIC: No hives or eczema    Vital Signs Last 24 Hrs  T(C): 36.7 (09 Jan 2020 10:57), Max: 36.8 (09 Jan 2020 00:33)  T(F): 98 (09 Jan 2020 10:57), Max: 98.3 (09 Jan 2020 00:33)  HR: 70 (09 Jan 2020 10:57) (70 - 77)  BP: 121/60 (09 Jan 2020 10:57) (121/60 - 167/86)  BP(mean): --  RR: 18 (09 Jan 2020 10:57) (17 - 18)  SpO2: 100% (09 Jan 2020 10:57) (97% - 100%)    PHYSICAL EXAM:  GENERAL: NAD, well-groomed, well-developed  HEAD:  Atraumatic, Normocephalic  EYES: EOMI, PERRL, conjunctiva and sclera clear  ENMT:  Moist mucous membranes, Good dentition, No lesions  NECK: Supple, No JVD, Normal thyroid  NERVOUS SYSTEM:  Alert & Oriented X3, Good concentration; Motor Strength 5/5 B/L upper and lower extremities; DTRs 2+ intact and symmetric  CHEST/LUNG: Clear to percussion bilaterally; No rales, rhonchi, wheezing, or rubs  HEART: Regular rate and rhythm; No murmurs, rubs, or gallops  ABDOMEN: Soft, Nontender, Nondistended; Bowel sounds present  EXTREMITIES:  2+ Peripheral Pulses, No clubbing, cyanosis, or edema  LYMPH: No lymphadenopathy noted  SKIN: No rashes or lesions    LABS:              CAPILLARY BLOOD GLUCOSE                    Culture - Blood (collected 01-04-20 @ 17:58)  Source: .Blood Blood-Peripheral  Preliminary Report (01-05-20 @ 18:01):    No growth to date.    Culture - Urine (collected 01-04-20 @ 17:00)  Source: .Urine Clean Catch (Midstream)  Final Report (01-06-20 @ 18:49):    >100,000 CFU/ml Klebsiella pneumoniae  Organism: Klebsiella pneumoniae (01-06-20 @ 18:49)  Organism: Klebsiella pneumoniae (01-06-20 @ 18:49)      -  Amikacin: S <=16      -  Ampicillin: R >16 These ampicillin results predict results for amoxicillin      -  Ampicillin/Sulbactam: R >16/8 Enterobacter, Citrobacter, and Serratia may develop resistance during prolonged therapy (3-4 days)      -  Aztreonam: S <=4      -  Cefazolin: S <=8 (MIC_CL_COM_ENTERIC_CEFAZU) For uncomplicated UTI with K. pneumoniae, E. coli, or P. mirablis: JASWINDER <=16 is sensitive and JASWINDER >=32 is resistant. This also predicts results for oral agents cefaclor, cefdinir, cefpodoxime, cefprozil, cefuroxime axetil, cephalexin and locarbef for uncomplicated UTI. Note that some isolates may be susceptible to these agents while testing resistant to cefazolin.      -  Cefepime: S <=4      -  Cefoxitin: S <=8      -  Ceftriaxone: S <=1 Enterobacter, Citrobacter, and Serratia may develop resistance during prolonged therapy      -  Ciprofloxacin: S <=1      -  Gentamicin: S <=4      -  Imipenem: S <=1      -  Levofloxacin: S <=2      -  Meropenem: S <=1      -  Nitrofurantoin: I 64 Should not be used to treat pyelonephritis      -  Piperacillin/Tazobactam: S <=16      -  Tigecycline: S <=2      -  Tobramycin: R >8      -  Trimethoprim/Sulfamethoxazole: R >2/38      Method Type: JASWINDER    Culture - Blood (collected 01-04-20 @ 16:40)  Source: .Blood Blood-Peripheral  Preliminary Report (01-05-20 @ 17:01):    No growth to date.          RADIOLOGY & ADDITIONAL TESTS:    Imaging Personally Reviewed:  [ ] YES  [ ] NO    Consultant(s) Notes Reviewed:  [ ] YES  [ ] NO    Care Discussed with Consultants/Other Providers [ ] YES  [ ] NO    PROBLEMS:  ACUTE CYSTITIS WITH HEMATURIA  BLOOD PRESSURE  Acute cystitis with hematuria  Urinary tract infection associated with indwelling urethral catheter, sequela  Renal insufficiency  Hypertension  BPH with obstruction/lower urinary tract symptoms  Essential hypertension  Spinal stenosis of lumbar region without neurogenic claudication  Hydrocele in adult  Urinary tract infection associated with catheterization of urinary tract, unspecified indwelling urinary catheter type, subsequent encounter      Care discussed with family,         [  ]   yes  [  ]  No    imp:    stable[ ]    unstable[  ]     improving [   ]       unchanged  [  ]                Plans:  Continue present plans  [  ]               New consult [  ]   specialty  .......               order keyla[  ]    test name.                  Discharge Planning  [  ]

## 2020-01-09 NOTE — PROGRESS NOTE ADULT - SUBJECTIVE AND OBJECTIVE BOX
Patient seen and examined bedside resting comfortably.  No complaints offered.   Abdominal pain is well controlled.  Denies nausea and vomiting. Tolerating diet.  Flatus/BM. +  Denies chest pain, dyspnea, cough.    T(F): 98 (01-09-20 @ 10:57), Max: 98.3 (01-09-20 @ 00:33)  HR: 70 (01-09-20 @ 10:57) (70 - 77)  BP: 121/60 (01-09-20 @ 10:57) (121/60 - 167/86)  RR: 18 (01-09-20 @ 10:57) (17 - 18)  SpO2: 100% (01-09-20 @ 10:57) (97% - 100%)    PHYSICAL EXAM:  General: NAD  Neuro:  Alert & oriented x 3  Abdomen: soft, NTND. Normactive BS  : Ma cath draining yellow urine with some sediment in it. Scrotum is enlarged with bilateral hydroceles. Soft; non-tender. Uncircumcised. Hypospadias.General: NAD  Neuro:  Alert & oriented x 3  Abdomen: soft, NTND. Normactive BS    LABS:            I&O's Detail    08 Jan 2020 07:01  -  09 Jan 2020 07:00  --------------------------------------------------------  IN:    sodium chloride 0.9%.: 720 mL  Total IN: 720 mL    OUT:    Indwelling Catheter - Urethral: 2300 mL  Total OUT: 2300 mL    Total NET: -1580 mL      09 Jan 2020 07:01  -  09 Jan 2020 13:11  --------------------------------------------------------  IN:    Oral Fluid: 360 mL  Total IN: 360 mL    OUT:  Total OUT: 0 mL    Total NET: 360 mL          Impression:   UTI   Urinary retention    Plan:  -continue VTE prophylaxis   - antibiotic changed by ID to Cipro PO  - continue medical f/u  - continue Ma cath to BSD  - scheduled for cysto, TURRB, SP cystostomy Fri. [Was originally supposed to be done today. however, Dr Mensah needs to speak to pt's son, who is a physician.

## 2020-01-09 NOTE — PROGRESS NOTE ADULT - ASSESSMENT
IMPROVE VTE Individual Risk Assessment    RISK                                                                Points    [  ] Previous VTE                                                  3    [  ] Thrombophilia                                               2    [  ] Lower limb paralysis                                      2        (unable to hold up >15 seconds)      [  ] Current Cancer                                              2         (within 6 months)    [  ] Immobilization > 24 hrs                                1    [  ] ICU/CCU stay > 24 hours                              1    [  ] Age > 60                                                      1    IMPROVE VTE Score _________    IMPROVE Score 0-1: Low Risk, No VTE prophylaxis required for most patients, encourage ambulation.   IMPROVE Score 2-3: At risk, pharmacologic VTE prophylaxis is indicated for most patients (in the absence of a contraindication)  IMPROVE Score > or = 4: High Risk, pharmacologic VTE prophylaxis is indicated for most patients (in the absence of a contraindication)  : The patient is a 89y Male complaining of weakness.	  Pt. was found to have a kink in the obrien, resulting in suprapubic pain. Had temp. as per family 102  01/05/2020 : Alert, awake. Asymptomatic. On Zosyn. Urology consult.  01/06/2020 : Alert. asymptomatic. Urine culture pos, for Gm neg. rods. On Zosyn. Urology consult.  01/07/2020 : Awake, Asymptomatic. Afebrile. Klebsiella in the urine, pan sensitive. Urology consult noted. For cystoscopy. ID consult. On Zosyn.  01/09/2020 : Pt. alert. Awake. On oral cipro. Urology consult noted. For Cystoscopy today.  01/08/2020 : Awake, Asymptomatic. ID consult noted. On Cipro, Zosyn discontinued. Urology F/U noted. Discharge planning.

## 2020-01-10 DIAGNOSIS — N17.9 ACUTE KIDNEY FAILURE, UNSPECIFIED: ICD-10-CM

## 2020-01-10 LAB
ANION GAP SERPL CALC-SCNC: 5 MMOL/L — SIGNIFICANT CHANGE UP (ref 5–17)
BUN SERPL-MCNC: 20 MG/DL — SIGNIFICANT CHANGE UP (ref 7–23)
CALCIUM SERPL-MCNC: 10 MG/DL — SIGNIFICANT CHANGE UP (ref 8.5–10.1)
CHLORIDE SERPL-SCNC: 107 MMOL/L — SIGNIFICANT CHANGE UP (ref 96–108)
CO2 SERPL-SCNC: 27 MMOL/L — SIGNIFICANT CHANGE UP (ref 22–31)
CREAT SERPL-MCNC: 1.09 MG/DL — SIGNIFICANT CHANGE UP (ref 0.5–1.3)
GLUCOSE BLDC GLUCOMTR-MCNC: 77 MG/DL — SIGNIFICANT CHANGE UP (ref 70–99)
GLUCOSE SERPL-MCNC: 80 MG/DL — SIGNIFICANT CHANGE UP (ref 70–99)
HCT VFR BLD CALC: 29.9 % — LOW (ref 39–50)
HGB BLD-MCNC: 9.4 G/DL — LOW (ref 13–17)
MCHC RBC-ENTMCNC: 28.3 PG — SIGNIFICANT CHANGE UP (ref 27–34)
MCHC RBC-ENTMCNC: 31.4 GM/DL — LOW (ref 32–36)
MCV RBC AUTO: 90.1 FL — SIGNIFICANT CHANGE UP (ref 80–100)
NRBC # BLD: 0 /100 WBCS — SIGNIFICANT CHANGE UP (ref 0–0)
PLATELET # BLD AUTO: 232 K/UL — SIGNIFICANT CHANGE UP (ref 150–400)
POTASSIUM SERPL-MCNC: 3.2 MMOL/L — LOW (ref 3.5–5.3)
POTASSIUM SERPL-SCNC: 3.2 MMOL/L — LOW (ref 3.5–5.3)
RBC # BLD: 3.32 M/UL — LOW (ref 4.2–5.8)
RBC # FLD: 16.4 % — HIGH (ref 10.3–14.5)
SODIUM SERPL-SCNC: 139 MMOL/L — SIGNIFICANT CHANGE UP (ref 135–145)
WBC # BLD: 4.7 K/UL — SIGNIFICANT CHANGE UP (ref 3.8–10.5)
WBC # FLD AUTO: 4.7 K/UL — SIGNIFICANT CHANGE UP (ref 3.8–10.5)

## 2020-01-10 PROCEDURE — 99231 SBSQ HOSP IP/OBS SF/LOW 25: CPT

## 2020-01-10 RX ORDER — POTASSIUM CHLORIDE 20 MEQ
40 PACKET (EA) ORAL EVERY 4 HOURS
Refills: 0 | Status: COMPLETED | OUTPATIENT
Start: 2020-01-10 | End: 2020-01-11

## 2020-01-10 RX ADMIN — Medication 100 MILLIGRAM(S): at 06:36

## 2020-01-10 RX ADMIN — Medication 250 MILLIGRAM(S): at 06:36

## 2020-01-10 RX ADMIN — SENNA PLUS 2 TABLET(S): 8.6 TABLET ORAL at 22:32

## 2020-01-10 RX ADMIN — Medication 100 MILLIGRAM(S): at 17:03

## 2020-01-10 RX ADMIN — SODIUM CHLORIDE 60 MILLILITER(S): 9 INJECTION INTRAMUSCULAR; INTRAVENOUS; SUBCUTANEOUS at 13:06

## 2020-01-10 RX ADMIN — AMLODIPINE BESYLATE 5 MILLIGRAM(S): 2.5 TABLET ORAL at 06:36

## 2020-01-10 RX ADMIN — FINASTERIDE 5 MILLIGRAM(S): 5 TABLET, FILM COATED ORAL at 13:09

## 2020-01-10 RX ADMIN — GABAPENTIN 300 MILLIGRAM(S): 400 CAPSULE ORAL at 06:36

## 2020-01-10 RX ADMIN — Medication 40 MILLIEQUIVALENT(S): at 22:33

## 2020-01-10 RX ADMIN — TAMSULOSIN HYDROCHLORIDE 0.4 MILLIGRAM(S): 0.4 CAPSULE ORAL at 22:32

## 2020-01-10 RX ADMIN — Medication 250 MILLIGRAM(S): at 17:03

## 2020-01-10 RX ADMIN — Medication 325 MILLIGRAM(S): at 13:08

## 2020-01-10 RX ADMIN — GABAPENTIN 300 MILLIGRAM(S): 400 CAPSULE ORAL at 17:03

## 2020-01-10 NOTE — PROGRESS NOTE ADULT - ASSESSMENT
IMPROVE VTE Individual Risk Assessment    RISK                                                                Points    [  ] Previous VTE                                                  3    [  ] Thrombophilia                                               2    [  ] Lower limb paralysis                                      2        (unable to hold up >15 seconds)      [  ] Current Cancer                                              2         (within 6 months)    [  ] Immobilization > 24 hrs                                1    [  ] ICU/CCU stay > 24 hours                              1    [  ] Age > 60                                                      1    IMPROVE VTE Score _________    IMPROVE Score 0-1: Low Risk, No VTE prophylaxis required for most patients, encourage ambulation.   IMPROVE Score 2-3: At risk, pharmacologic VTE prophylaxis is indicated for most patients (in the absence of a contraindication)  IMPROVE Score > or = 4: High Risk, pharmacologic VTE prophylaxis is indicated for most patients (in the absence of a contraindication)  : The patient is a 89y Male complaining of weakness.	  Pt. was found to have a kink in the obrien, resulting in suprapubic pain. Had temp. as per family 102  01/05/2020 : Alert, awake. Asymptomatic. On Zosyn. Urology consult.  01/06/2020 : Alert. asymptomatic. Urine culture pos, for Gm neg. rods. On Zosyn. Urology consult.  01/07/2020 : Awake, Asymptomatic. Afebrile. Klebsiella in the urine, pan sensitive. Urology consult noted. For cystoscopy. ID consult. On Zosyn.  01/09/2020 : Pt. alert. Awake. On oral cipro. Urology consult noted. For Cystoscopy today.  01/08/2020 : Awake, Asymptomatic. ID consult noted. On Cipro, Zosyn discontinued. Urology F/U noted. Discharge planning.  01/10/2020 : Asymptomatic. For cystoscopy today. On Cipro. Renal function improving. Urology F/U.

## 2020-01-10 NOTE — PROGRESS NOTE ADULT - SUBJECTIVE AND OBJECTIVE BOX
Patient is a 89y old  Male who presents with a chief complaint of Fever. UTI. Renal inasufficiency. (09 Jan 2020 13:10)      INTERVAL HPI/OVERNIGHT EVENTS:    MEDICATIONS  (STANDING):  amLODIPine   Tablet 5 milliGRAM(s) Oral daily  ciprofloxacin     Tablet 250 milliGRAM(s) Oral every 12 hours  ferrous    sulfate 325 milliGRAM(s) Oral daily  finasteride 5 milliGRAM(s) Oral daily  gabapentin 300 milliGRAM(s) Oral two times a day  labetalol 100 milliGRAM(s) Oral two times a day  senna 2 Tablet(s) Oral at bedtime  sodium chloride 0.9%. 1000 milliLiter(s) (60 mL/Hr) IV Continuous <Continuous>  tamsulosin 0.4 milliGRAM(s) Oral at bedtime    MEDICATIONS  (PRN):  acetaminophen   Tablet .. 650 milliGRAM(s) Oral every 6 hours PRN Temp greater or equal to 38C (100.4F), Mild Pain (1 - 3)  ibuprofen  Tablet. 600 milliGRAM(s) Oral every 6 hours PRN Temp greater or equal to 38C (100.4F)      Allergies    No Known Allergies    Intolerances        REVIEW OF SYSTEMS:  CONSTITUTIONAL: No fever, weight loss, or fatigue  EYES: No eye pain, visual disturbances, or discharge  ENMT:  No difficulty hearing, tinnitus, vertigo; No sinus or throat pain  NECK: No pain or stiffness  BREASTS: No pain, masses, or nipple discharge  RESPIRATORY: No cough, wheezing, chills or hemoptysis; No shortness of breath  CARDIOVASCULAR: No chest pain, palpitations, dizziness, or leg swelling  GASTROINTESTINAL: No abdominal or epigastric pain. No nausea, vomiting, or hematemesis; No diarrhea or constipation. No melena or hematochezia.  GENITOURINARY: No dysuria, frequency, hematuria, or incontinence  NEUROLOGICAL: No headaches, memory loss, loss of strength, numbness, or tremors  SKIN: No itching, burning, rashes, or lesions   LYMPH NODES: No enlarged glands  ENDOCRINE: No heat or cold intolerance; No hair loss  MUSCULOSKELETAL: No joint pain or swelling; No muscle, back, or extremity pain  PSYCHIATRIC: No depression, anxiety, mood swings, or difficulty sleeping  HEME/LYMPH: No easy bruising, or bleeding gums  ALLERGY AND IMMUNOLOGIC: No hives or eczema    Vital Signs Last 24 Hrs  T(C): 36.4 (10 Gelacio 2020 05:56), Max: 36.7 (09 Jan 2020 10:57)  T(F): 97.5 (10 Gelacio 2020 05:56), Max: 98 (09 Jan 2020 10:57)  HR: 71 (10 Gelacio 2020 05:56) (68 - 71)  BP: 161/70 (10 Gelacio 2020 05:56) (110/72 - 161/70)  BP(mean): --  RR: 18 (10 Gelacio 2020 05:56) (17 - 18)  SpO2: 100% (10 Gelacio 2020 05:56) (95% - 100%)    PHYSICAL EXAM:  GENERAL: NAD, well-groomed, well-developed  HEAD:  Atraumatic, Normocephalic  EYES: EOMI, PERRL, conjunctiva and sclera clear  ENMT:  Moist mucous membranes, Good dentition, No lesions  NECK: Supple, No JVD, Normal thyroid  NERVOUS SYSTEM:  Alert & Oriented X3, Good concentration; Motor Strength 5/5 B/L upper and lower extremities; DTRs 2+ intact and symmetric  CHEST/LUNG: Clear to percussion bilaterally; No rales, rhonchi, wheezing, or rubs  HEART: Regular rate and rhythm; No murmurs, rubs, or gallops  ABDOMEN: Soft, Nontender, Nondistended; Bowel sounds present  EXTREMITIES:  2+ Peripheral Pulses, No clubbing, cyanosis, or edema  LYMPH: No lymphadenopathy noted  SKIN: No rashes or lesions    LABS:                        9.4    4.70  )-----------( 232      ( 10 Gelacio 2020 08:21 )             29.9               CAPILLARY BLOOD GLUCOSE                    Culture - Blood (collected 01-04-20 @ 17:58)  Source: .Blood Blood-Peripheral  Final Report (01-09-20 @ 18:01):    No growth at 5 days.    Culture - Urine (collected 01-04-20 @ 17:00)  Source: .Urine Clean Catch (Midstream)  Final Report (01-06-20 @ 18:49):    >100,000 CFU/ml Klebsiella pneumoniae  Organism: Klebsiella pneumoniae (01-06-20 @ 18:49)  Organism: Klebsiella pneumoniae (01-06-20 @ 18:49)      -  Amikacin: S <=16      -  Ampicillin: R >16 These ampicillin results predict results for amoxicillin      -  Ampicillin/Sulbactam: R >16/8 Enterobacter, Citrobacter, and Serratia may develop resistance during prolonged therapy (3-4 days)      -  Aztreonam: S <=4      -  Cefazolin: S <=8 (MIC_CL_COM_ENTERIC_CEFAZU) For uncomplicated UTI with K. pneumoniae, E. coli, or P. mirablis: JASWINDER <=16 is sensitive and JASWINDER >=32 is resistant. This also predicts results for oral agents cefaclor, cefdinir, cefpodoxime, cefprozil, cefuroxime axetil, cephalexin and locarbef for uncomplicated UTI. Note that some isolates may be susceptible to these agents while testing resistant to cefazolin.      -  Cefepime: S <=4      -  Cefoxitin: S <=8      -  Ceftriaxone: S <=1 Enterobacter, Citrobacter, and Serratia may develop resistance during prolonged therapy      -  Ciprofloxacin: S <=1      -  Gentamicin: S <=4      -  Imipenem: S <=1      -  Levofloxacin: S <=2      -  Meropenem: S <=1      -  Nitrofurantoin: I 64 Should not be used to treat pyelonephritis      -  Piperacillin/Tazobactam: S <=16      -  Tigecycline: S <=2      -  Tobramycin: R >8      -  Trimethoprim/Sulfamethoxazole: R >2/38      Method Type: JASWINDER    Culture - Blood (collected 01-04-20 @ 16:40)  Source: .Blood Blood-Peripheral  Final Report (01-09-20 @ 17:01):    No growth at 5 days.          RADIOLOGY & ADDITIONAL TESTS:    Imaging Personally Reviewed:  [ ] YES  [ ] NO    Consultant(s) Notes Reviewed:  [ ] YES  [ ] NO    Care Discussed with Consultants/Other Providers [ ] YES  [ ] NO    PROBLEMS:  ACUTE CYSTITIS WITH HEMATURIA  BLOOD PRESSURE  Acute cystitis with hematuria  Urinary tract infection associated with indwelling urethral catheter, sequela  Renal insufficiency  Hypertension  BPH with obstruction/lower urinary tract symptoms  Essential hypertension  Spinal stenosis of lumbar region without neurogenic claudication  Hydrocele in adult  Urinary tract infection associated with catheterization of urinary tract, unspecified indwelling urinary catheter type, subsequent encounter      Care discussed with family,         [  ]   yes  [  ]  No    imp:    stable[ ]    unstable[  ]     improving [   ]       unchanged  [  ]                Plans:  Continue present plans  [  ]               New consult [  ]   specialty  .......               order keyla[  ]    test name.                  Discharge Planning  [  ]

## 2020-01-10 NOTE — PROGRESS NOTE ADULT - SUBJECTIVE AND OBJECTIVE BOX
Patient seen and examined bedside resting comfortably.  Ma catheter draining well.      T(F): 97.3 (01-10-20 @ 17:28), Max: 97.7 (01-10-20 @ 00:40)  HR: 63 (01-10-20 @ 17:28) (62 - 71)  BP: 145/67 (01-10-20 @ 17:28) (137/73 - 161/70)  RR: 17 (01-10-20 @ 17:28) (17 - 18)  SpO2: 97% (01-10-20 @ 17:28) (97% - 100%)    POCT Blood Glucose.: 77 mg/dL (10 Gelacio 2020 12:06)      PHYSICAL EXAM:  General: NAD, WDWN  Neuro:  Alert & conscious  HEENT: NCAT, EOMI, conjunctiva clear  Lung: respirations nonlabored, good inspiratory effort  Abdomen: soft, NTND.   Extremities: no pedal edema or calf tenderness noted   : uncircumcised phallus, adequate meatus.     LABS:                        9.4    4.70  )-----------( 232      ( 10 Gelacio 2020 08:21 )             29.9     01-10    139  |  107  |  20  ----------------------------<  80  3.2<L>   |  27  |  1.09    Ca    10.0      10 Gelacio 2020 08:21        I&O's Detail    09 Jan 2020 07:01  -  10 Gelacio 2020 07:00  --------------------------------------------------------  IN:    Oral Fluid: 540 mL    sodium chloride 0.9%.: 720 mL  Total IN: 1260 mL    OUT:    Indwelling Catheter - Urethral: 850 mL    Voided: 400 mL  Total OUT: 1250 mL    Total NET: 10 mL      10 Gelacio 2020 07:01  -  10 Gelacio 2020 20:34  --------------------------------------------------------  IN:    sodium chloride 0.9%.: 720 mL  Total IN: 720 mL    OUT:  Total OUT: 0 mL    Total NET: 720 mL          wbc    01-10 @ 08:21    4.70    01-07 @ 07:35    5.39    01-05 @ 07:36    11.56    01-04 @ 11:13    11.53        cr   01-10 @ 08:21   1.09    01-07 @ 07:35   1.39    01-06 @ 07:59   1.59    01-05 @ 07:36   1.71    01-04 @ 11:13   2.04

## 2020-01-11 ENCOUNTER — TRANSCRIPTION ENCOUNTER (OUTPATIENT)
Age: 85
End: 2020-01-11

## 2020-01-11 PROCEDURE — 99231 SBSQ HOSP IP/OBS SF/LOW 25: CPT

## 2020-01-11 RX ORDER — CIPROFLOXACIN LACTATE 400MG/40ML
1 VIAL (ML) INTRAVENOUS
Qty: 10 | Refills: 0
Start: 2020-01-11 | End: 2020-01-15

## 2020-01-11 RX ADMIN — FINASTERIDE 5 MILLIGRAM(S): 5 TABLET, FILM COATED ORAL at 11:01

## 2020-01-11 RX ADMIN — Medication 100 MILLIGRAM(S): at 17:49

## 2020-01-11 RX ADMIN — SODIUM CHLORIDE 60 MILLILITER(S): 9 INJECTION INTRAMUSCULAR; INTRAVENOUS; SUBCUTANEOUS at 06:57

## 2020-01-11 RX ADMIN — Medication 250 MILLIGRAM(S): at 17:50

## 2020-01-11 RX ADMIN — Medication 100 MILLIGRAM(S): at 06:58

## 2020-01-11 RX ADMIN — Medication 40 MILLIEQUIVALENT(S): at 02:39

## 2020-01-11 RX ADMIN — Medication 250 MILLIGRAM(S): at 06:58

## 2020-01-11 RX ADMIN — GABAPENTIN 300 MILLIGRAM(S): 400 CAPSULE ORAL at 06:58

## 2020-01-11 RX ADMIN — SODIUM CHLORIDE 60 MILLILITER(S): 9 INJECTION INTRAMUSCULAR; INTRAVENOUS; SUBCUTANEOUS at 23:12

## 2020-01-11 RX ADMIN — Medication 325 MILLIGRAM(S): at 11:01

## 2020-01-11 RX ADMIN — TAMSULOSIN HYDROCHLORIDE 0.4 MILLIGRAM(S): 0.4 CAPSULE ORAL at 23:02

## 2020-01-11 RX ADMIN — SENNA PLUS 2 TABLET(S): 8.6 TABLET ORAL at 23:02

## 2020-01-11 RX ADMIN — AMLODIPINE BESYLATE 5 MILLIGRAM(S): 2.5 TABLET ORAL at 06:58

## 2020-01-11 RX ADMIN — GABAPENTIN 300 MILLIGRAM(S): 400 CAPSULE ORAL at 17:49

## 2020-01-11 NOTE — PROGRESS NOTE ADULT - ATTENDING COMMENTS
I have discussed the plan wit Dr Tierney at bedside, will do the procedure as elective ambulatory procedure.

## 2020-01-11 NOTE — PROGRESS NOTE ADULT - ASSESSMENT
Complicated UTI with kink in the obrien/ BPH, renal insufficiency hydrocele  urine culture with klebsiella fairly sensitive   s/p zosyn, on po cipro  Blood cultures so far neg   urology   fu cystoscopy by urology /  SPC when medically stable.  patient is clear for any urological procedure from infectious disease point of view

## 2020-01-11 NOTE — PROGRESS NOTE ADULT - SUBJECTIVE AND OBJECTIVE BOX
HPI:  · Chief Complaint: The patient is a 89y Male complaining of weakness.	  Pt. was found to have a kink in the obrien, resulting in suprapubic pain. Had temp. as per family 102. (04 Jan 2020 13:42)  work up consistent with urinary tract infection fairly sensitive kleb   my error   i saw on9th and accidently entered ID note on my partners note     Allergies    No Known Allergies    Intolerances        MEDICATIONS  (STANDING):  amLODIPine   Tablet 5 milliGRAM(s) Oral daily  ciprofloxacin     Tablet 250 milliGRAM(s) Oral every 12 hours  ferrous    sulfate 325 milliGRAM(s) Oral daily  finasteride 5 milliGRAM(s) Oral daily  gabapentin 300 milliGRAM(s) Oral two times a day  labetalol 100 milliGRAM(s) Oral two times a day  senna 2 Tablet(s) Oral at bedtime  sodium chloride 0.9%. 1000 milliLiter(s) (60 mL/Hr) IV Continuous <Continuous>  tamsulosin 0.4 milliGRAM(s) Oral at bedtime    MEDICATIONS  (PRN):  acetaminophen   Tablet .. 650 milliGRAM(s) Oral every 6 hours PRN Temp greater or equal to 38C (100.4F), Mild Pain (1 - 3)  ibuprofen  Tablet. 600 milliGRAM(s) Oral every 6 hours PRN Temp greater or equal to 38C (100.4F)      REVIEW OF SYSTEMS:    feels fine   pain in penile area    VITAL SIGNS:  T(C): 36.8 (01-11-20 @ 17:05), Max: 36.8 (01-11-20 @ 17:05)  T(F): 98.3 (01-11-20 @ 17:05), Max: 98.3 (01-11-20 @ 17:05)  HR: 69 (01-11-20 @ 17:05) (69 - 72)  BP: 148/70 (01-11-20 @ 17:05) (126/68 - 163/75)  RR: 16 (01-11-20 @ 17:05) (16 - 18)  SpO2: 98% (01-11-20 @ 17:05) (97% - 100%)  Wt(kg): --    PHYSICAL EXAM:    GENERAL: not in any distress  HEENT: Neck is supple, normocephalic, atraumatic   CHEST/LUNG: Clear to auscultation bilaterally; No rales, rhonchi, wheezing  HEART: Regular rate and rhythm; No murmurs, rubs, or gallops  ABDOMEN: Soft, Nontender, Nondistended; Bowel sounds present, no rebound   EXTREMITIES:  2+ Peripheral Pulses, No clubbing, cyanosis, or edema  GENITOURINARY: mild tender  SKIN: No rashes or lesions  BACK: no pressor sore   NERVOUS SYSTEM:  Alert & Oriented X3, Good concentration  PSYCH: normal affect     LABS:                         9.4    4.70  )-----------( 232      ( 10 Gelacio 2020 08:21 )             29.9     01-10    139  |  107  |  20  ----------------------------<  80  3.2<L>   |  27  |  1.09    Ca    10.0      10 Gelacio 2020 08:21                                              Radiology:

## 2020-01-11 NOTE — DISCHARGE NOTE PROVIDER - NSDCMRMEDTOKEN_GEN_ALL_CORE_FT
amLODIPine 5 mg oral tablet: 1 tab(s) orally once a day  ciprofloxacin 250 mg oral tablet: 1 tab(s) orally every 12 hours MDD:UTI  ferrous sulfate 324 mg (65 mg elemental iron) oral tablet: 1 tab(s) orally 2 times a day  finasteride 5 mg oral tablet: 1 tab(s) orally once a day  gabapentin 300 mg oral capsule: 1 cap(s) orally 2 times a day  hydroCHLOROthiazide 25 mg oral tablet: 1 tab(s) orally once a day  labetalol 100 mg oral tablet: 1 tab(s) orally 2 times a day  Senna 8.6 mg oral tablet: 2 tab(s) orally once a day (at bedtime)  tamsulosin 0.4 mg oral capsule: 1 cap(s) orally once a day (at bedtime)

## 2020-01-11 NOTE — DISCHARGE NOTE PROVIDER - NSDCFUSCHEDAPPT_GEN_ALL_CORE_FT
MOIZ RANDOLPH ; 03/09/2020 ; [facility] [id number] MOIZ RANDOLPH ; 03/09/2020 ; [facility] [id number]

## 2020-01-11 NOTE — DISCHARGE NOTE PROVIDER - HOSPITAL COURSE
Pt. was found to have a kink in the obrien, resulting in suprapubic pain. Had temp. as per family 102    01/05/2020 : Alert, awake. Asymptomatic. On Zosyn. Urology consult.    01/06/2020 : Alert. asymptomatic. Urine culture pos, for Gm neg. rods. On Zosyn. Urology consult.    01/07/2020 : Awake, Asymptomatic. Afebrile. Klebsiella in the urine, pan sensitive. Urology consult noted. For cystoscopy. ID consult. On Zosyn.    01/09/2020 : Pt. alert. Awake. On oral cipro. Urology consult noted. For Cystoscopy today.    01/08/2020 : Awake, Asymptomatic. ID consult noted. On Cipro, Zosyn discontinued. Urology F/U noted. Discharge planning.    01/10/2020 : Asymptomatic. For cystoscopy today. On Cipro. Renal function improving. Urology F/U.    01/11/2020 : Asymptomatic. Discussed with Dr. Mensah. He has no plans for Cystoscopy or SPC at present. Pt. may be discharged home and f/u as out patient. Spoke to the daughter, requesting ambulett for transportation.

## 2020-01-11 NOTE — DISCHARGE NOTE PROVIDER - NSDCCPCAREPLAN_GEN_ALL_CORE_FT
PRINCIPAL DISCHARGE DIAGNOSIS  Diagnosis: Acute cystitis with hematuria  Assessment and Plan of Treatment:

## 2020-01-11 NOTE — PROGRESS NOTE ADULT - SUBJECTIVE AND OBJECTIVE BOX
Patient seen and examined bedside in no acute distress.  No acute overnight events. Obrien draining well.     T(F): 97.9 (01-11-20 @ 11:00), Max: 97.9 (01-11-20 @ 11:00)  HR: 72 (01-11-20 @ 11:00) (63 - 72)  BP: 126/68 (01-11-20 @ 11:00) (126/68 - 163/75)  RR: 18 (01-11-20 @ 11:00) (17 - 18)  SpO2: 97% (01-11-20 @ 11:00) (97% - 100%)    PHYSICAL EXAM:    General: NAD  Chest: nonlabored respirations  Abdomen: soft, NT/ND.   Extremities: Calf soft, nontender b/l.   : No suprapubic tenderness or bladder distention. Obrien draining clear yellow urine (output: 1320cc/24hr)     LABS:                        9.4    4.70  )-----------( 232      ( 10 Gelacio 2020 08:21 )             29.9   01-10    139  |  107  |  20  ----------------------------<  80  3.2<L>   |  27  |  1.09    Ca    10.0      10 Gelacio 2020 08:21      I&O's Detail    10 Gelacio 2020 07:01  -  11 Jan 2020 07:00  --------------------------------------------------------  IN:    sodium chloride 0.9%.: 1440 mL  Total IN: 1440 mL    OUT:    Indwelling Catheter - Urethral: 1320 mL  Total OUT: 1320 mL    Total NET: 120 mL    Impression: 89y Male w/ PMH HTN, spinal stenosis, BPH, chronic obrien, admitted with hydrocele, ROGER, Klebsiella UTI.  ROGER improved, and leukocytosis improved. Afebrile.   Obrien draining well.     Plan  As per Dr. Mensah-- Cystoscopy and SPC placement postponed, patient  stable for d/c, patient to follow up with Dr. Mensah as outpatient for elective procedure

## 2020-01-11 NOTE — DISCHARGE NOTE PROVIDER - CARE PROVIDER_API CALL
Adithya Mensah)  Urology  [address] [facility], [address] [place] [address]  Phone: [phone]  Fax: [phone]  Follow Up Time:

## 2020-01-12 PROCEDURE — 99231 SBSQ HOSP IP/OBS SF/LOW 25: CPT

## 2020-01-12 RX ADMIN — GABAPENTIN 300 MILLIGRAM(S): 400 CAPSULE ORAL at 05:42

## 2020-01-12 RX ADMIN — Medication 100 MILLIGRAM(S): at 17:17

## 2020-01-12 RX ADMIN — AMLODIPINE BESYLATE 5 MILLIGRAM(S): 2.5 TABLET ORAL at 05:41

## 2020-01-12 RX ADMIN — Medication 250 MILLIGRAM(S): at 17:17

## 2020-01-12 RX ADMIN — Medication 250 MILLIGRAM(S): at 05:41

## 2020-01-12 RX ADMIN — TAMSULOSIN HYDROCHLORIDE 0.4 MILLIGRAM(S): 0.4 CAPSULE ORAL at 22:18

## 2020-01-12 RX ADMIN — Medication 100 MILLIGRAM(S): at 05:41

## 2020-01-12 RX ADMIN — SENNA PLUS 2 TABLET(S): 8.6 TABLET ORAL at 22:18

## 2020-01-12 RX ADMIN — FINASTERIDE 5 MILLIGRAM(S): 5 TABLET, FILM COATED ORAL at 11:11

## 2020-01-12 RX ADMIN — Medication 325 MILLIGRAM(S): at 11:11

## 2020-01-12 RX ADMIN — GABAPENTIN 300 MILLIGRAM(S): 400 CAPSULE ORAL at 17:17

## 2020-01-12 NOTE — PROGRESS NOTE ADULT - ASSESSMENT
IMPROVE VTE Individual Risk Assessment    RISK                                                                Points    [  ] Previous VTE                                                  3    [  ] Thrombophilia                                               2    [  ] Lower limb paralysis                                      2        (unable to hold up >15 seconds)      [  ] Current Cancer                                              2         (within 6 months)    [  ] Immobilization > 24 hrs                                1    [  ] ICU/CCU stay > 24 hours                              1    [  ] Age > 60                                                      1    IMPROVE VTE Score _________    IMPROVE Score 0-1: Low Risk, No VTE prophylaxis required for most patients, encourage ambulation.   IMPROVE Score 2-3: At risk, pharmacologic VTE prophylaxis is indicated for most patients (in the absence of a contraindication)  IMPROVE Score > or = 4: High Risk, pharmacologic VTE prophylaxis is indicated for most patients (in the absence of a contraindication)  : The patient is a 89y Male complaining of weakness.	  Pt. was found to have a kink in the obrien, resulting in suprapubic pain. Had temp. as per family 102  01/05/2020 : Alert, awake. Asymptomatic. On Zosyn. Urology consult.  01/06/2020 : Alert. asymptomatic. Urine culture pos, for Gm neg. rods. On Zosyn. Urology consult.  01/07/2020 : Awake, Asymptomatic. Afebrile. Klebsiella in the urine, pan sensitive. Urology consult noted. For cystoscopy. ID consult. On Zosyn.  01/09/2020 : Pt. alert. Awake. On oral cipro. Urology consult noted. For Cystoscopy today.  01/08/2020 : Awake, Asymptomatic. ID consult noted. On Cipro, Zosyn discontinued. Urology F/U noted. Discharge planning.  01/10/2020 : Asymptomatic. For cystoscopy today. On Cipro. Renal function improving. Urology F/U.  01/12/2020 : Pt. not discharged yesterday because of HHA not in place. Discharge in AM.;

## 2020-01-12 NOTE — PROGRESS NOTE ADULT - SUBJECTIVE AND OBJECTIVE BOX
Patient seen and examined at bedside with no complaints.   Obrien catheter in place.  Denies fever, chills, suprapubic pain, n/v/d.        Vital Signs Last 24 Hrs  T(F): 98.2 (01-12-20 @ 05:19), Max: 98.3 (01-11-20 @ 17:05)  HR: 76 (01-12-20 @ 05:19)  BP: 160/83 (01-12-20 @ 05:19)  RR: 18 (01-12-20 @ 05:19)  SpO2: 99% (01-12-20 @ 05:19)        POCT Blood Glucose.: 77 mg/dL (10 Gelacio 2020 12:06)      GENERAL: Alert, NAD  CHEST/LUNG: Respirations nonlabored  HEART: S1S2, Regular rate and rhythm;   ABDOMEN: Soft, Nontender, Nondistended, no suprapubic tenderness  : Obrien catheter draining 2,000 cc/24 hr clear yellow urine  EXTREMITIES:  no calf tenderness, No edema    I&O's Detail    11 Jan 2020 07:01  -  12 Jan 2020 07:00  --------------------------------------------------------  IN:    Oral Fluid: 300 mL    sodium chloride 0.9%: 720 mL  Total IN: 1020 mL    OUT:    Indwelling Catheter - Urethral: 2000 mL  Total OUT: 2000 mL    Total NET: -980 mL      12 Jan 2020 07:01  -  12 Jan 2020 11:28  --------------------------------------------------------  IN:    Oral Fluid: 240 mL  Total IN: 240 mL    OUT:  Total OUT: 0 mL    Total NET: 240 mL    Impression:  89 y/o Male w/ PMH HTN, spinal stenosis, BPH, chronic obrein, admitted with hydrocele, ROGER, Klebsiella UTI.  ROGER improved, and leukocytosis improved. Afebrile. Obrien in place.    Plan  -Urologically stable for d/c with obrien catheter.  Pt is to follow up outpatient with Dr Mensah for ambulatory SPC.    -Plan discussed with patient and family.   -Continue to monitor obrien output.    -Continue medical management and supportive care.    -Will discuss with Dr. Mensah.

## 2020-01-12 NOTE — PROGRESS NOTE ADULT - SUBJECTIVE AND OBJECTIVE BOX
Patient is a 89y old  Male who presents with a chief complaint of Fever. UTI. Renal inasufficiency. (11 Jan 2020 23:45)      INTERVAL HPI/OVERNIGHT EVENTS:    MEDICATIONS  (STANDING):  amLODIPine   Tablet 5 milliGRAM(s) Oral daily  ciprofloxacin     Tablet 250 milliGRAM(s) Oral every 12 hours  ferrous    sulfate 325 milliGRAM(s) Oral daily  finasteride 5 milliGRAM(s) Oral daily  gabapentin 300 milliGRAM(s) Oral two times a day  labetalol 100 milliGRAM(s) Oral two times a day  senna 2 Tablet(s) Oral at bedtime  tamsulosin 0.4 milliGRAM(s) Oral at bedtime    MEDICATIONS  (PRN):  acetaminophen   Tablet .. 650 milliGRAM(s) Oral every 6 hours PRN Temp greater or equal to 38C (100.4F), Mild Pain (1 - 3)  ibuprofen  Tablet. 600 milliGRAM(s) Oral every 6 hours PRN Temp greater or equal to 38C (100.4F)      Allergies    No Known Allergies    Intolerances        REVIEW OF SYSTEMS:  CONSTITUTIONAL: No fever, weight loss, or fatigue  EYES: No eye pain, visual disturbances, or discharge  ENMT:  No difficulty hearing, tinnitus, vertigo; No sinus or throat pain  NECK: No pain or stiffness  BREASTS: No pain, masses, or nipple discharge  RESPIRATORY: No cough, wheezing, chills or hemoptysis; No shortness of breath  CARDIOVASCULAR: No chest pain, palpitations, dizziness, or leg swelling  GASTROINTESTINAL: No abdominal or epigastric pain. No nausea, vomiting, or hematemesis; No diarrhea or constipation. No melena or hematochezia.  GENITOURINARY: No dysuria, frequency, hematuria, or incontinence  NEUROLOGICAL: No headaches, memory loss, loss of strength, numbness, or tremors  SKIN: No itching, burning, rashes, or lesions   LYMPH NODES: No enlarged glands  ENDOCRINE: No heat or cold intolerance; No hair loss  MUSCULOSKELETAL: No joint pain or swelling; No muscle, back, or extremity pain  PSYCHIATRIC: No depression, anxiety, mood swings, or difficulty sleeping  HEME/LYMPH: No easy bruising, or bleeding gums  ALLERGY AND IMMUNOLOGIC: No hives or eczema    Vital Signs Last 24 Hrs  T(C): 36.8 (12 Jan 2020 05:19), Max: 36.8 (11 Jan 2020 17:05)  T(F): 98.2 (12 Jan 2020 05:19), Max: 98.3 (11 Jan 2020 17:05)  HR: 76 (12 Jan 2020 05:19) (69 - 76)  BP: 160/83 (12 Jan 2020 05:19) (126/68 - 162/77)  BP(mean): --  RR: 18 (12 Jan 2020 05:19) (16 - 18)  SpO2: 99% (12 Jan 2020 05:19) (97% - 99%)    PHYSICAL EXAM:  GENERAL: NAD, well-groomed, well-developed  HEAD:  Atraumatic, Normocephalic  EYES: EOMI, PERRL, conjunctiva and sclera clear  ENMT:  Moist mucous membranes, Good dentition, No lesions  NECK: Supple, No JVD, Normal thyroid  NERVOUS SYSTEM:  Alert & Oriented X3, Good concentration; Motor Strength 5/5 B/L upper and lower extremities; DTRs 2+ intact and symmetric  CHEST/LUNG: Clear to percussion bilaterally; No rales, rhonchi, wheezing, or rubs  HEART: Regular rate and rhythm; No murmurs, rubs, or gallops  ABDOMEN: Soft, Nontender, Nondistended; Bowel sounds present  EXTREMITIES:  2+ Peripheral Pulses, No clubbing, cyanosis, or edema  LYMPH: No lymphadenopathy noted  SKIN: No rashes or lesions    LABS:              CAPILLARY BLOOD GLUCOSE                        RADIOLOGY & ADDITIONAL TESTS:    Imaging Personally Reviewed:  [ ] YES  [ ] NO    Consultant(s) Notes Reviewed:  [ ] YES  [ ] NO    Care Discussed with Consultants/Other Providers [ ] YES  [ ] NO    PROBLEMS:  ACUTE CYSTITIS WITH HEMATURIA  BLOOD PRESSURE  Acute cystitis with hematuria  ROGER (acute kidney injury)  Urinary tract infection associated with indwelling urethral catheter, sequela  Renal insufficiency  Hypertension  BPH with obstruction/lower urinary tract symptoms  Essential hypertension  Spinal stenosis of lumbar region without neurogenic claudication  Hydrocele in adult  Urinary tract infection associated with catheterization of urinary tract, unspecified indwelling urinary catheter type, subsequent encounter      Care discussed with family,         [  ]   yes  [  ]  No    imp:    stable[ ]    unstable[  ]     improving [   ]       unchanged  [  ]                Plans:  Continue present plans  [  ]               New consult [  ]   specialty  .......               order keyla[  ]    test name.                  Discharge Planning  [  ]

## 2020-01-13 ENCOUNTER — APPOINTMENT (OUTPATIENT)
Dept: ELECTROPHYSIOLOGY | Facility: CLINIC | Age: 85
End: 2020-01-13
Payer: MEDICARE

## 2020-01-13 PROCEDURE — 99231 SBSQ HOSP IP/OBS SF/LOW 25: CPT

## 2020-01-13 PROCEDURE — G2066: CPT

## 2020-01-13 PROCEDURE — 93298 REM INTERROG DEV EVAL SCRMS: CPT

## 2020-01-13 RX ADMIN — Medication 250 MILLIGRAM(S): at 17:56

## 2020-01-13 RX ADMIN — FINASTERIDE 5 MILLIGRAM(S): 5 TABLET, FILM COATED ORAL at 11:08

## 2020-01-13 RX ADMIN — SENNA PLUS 2 TABLET(S): 8.6 TABLET ORAL at 22:43

## 2020-01-13 RX ADMIN — AMLODIPINE BESYLATE 5 MILLIGRAM(S): 2.5 TABLET ORAL at 05:35

## 2020-01-13 RX ADMIN — GABAPENTIN 300 MILLIGRAM(S): 400 CAPSULE ORAL at 05:35

## 2020-01-13 RX ADMIN — Medication 100 MILLIGRAM(S): at 05:35

## 2020-01-13 RX ADMIN — TAMSULOSIN HYDROCHLORIDE 0.4 MILLIGRAM(S): 0.4 CAPSULE ORAL at 22:43

## 2020-01-13 RX ADMIN — Medication 100 MILLIGRAM(S): at 17:56

## 2020-01-13 RX ADMIN — GABAPENTIN 300 MILLIGRAM(S): 400 CAPSULE ORAL at 17:56

## 2020-01-13 RX ADMIN — Medication 250 MILLIGRAM(S): at 05:35

## 2020-01-13 RX ADMIN — Medication 325 MILLIGRAM(S): at 11:08

## 2020-01-13 NOTE — PROGRESS NOTE ADULT - REASON FOR ADMISSION
Fever. UTI. Renal inasufficiency.
Fever. UTI. Renal insufficiency.
Fever. UTI. Renal inasufficiency.

## 2020-01-13 NOTE — PROGRESS NOTE ADULT - PROBLEM SELECTOR PROBLEM 4
Essential hypertension
Essential hypertension
BPH with obstruction/lower urinary tract symptoms
Essential hypertension

## 2020-01-13 NOTE — PROGRESS NOTE ADULT - SUBJECTIVE AND OBJECTIVE BOX
Patient seen and examined bedside resting comfortably.  No complaints offered. NO acute overnight events.  Obrien draining well.   Denies nausea and vomiting. Tolerating diet.  Denies fevers, chest pain, dyspnea, cough.    T(F): 98.8 (01-13-20 @ 05:31), Max: 99.1 (01-13-20 @ 00:34)  HR: 90 (01-13-20 @ 05:31) (74 - 90)  BP: 153/84 (01-13-20 @ 05:31) (120/55 - 153/84)  RR: 18 (01-13-20 @ 05:31) (18 - 19)  SpO2: 100% (01-13-20 @ 05:31) (98% - 100%)    PHYSICAL EXAM:    General: NAD, alert and awake  Chest: nonlabored respirations  Abdomen: soft, NT/ND.   Extremities: Calf soft, nontender b/l.   : No suprapubic tenderness or bladder distention.  Obrien draining clear yellow urine    LABS:  No new labs    I&O's Detail    12 Jan 2020 07:01  -  13 Jan 2020 07:00  --------------------------------------------------------  IN:    Oral Fluid: 480 mL  Total IN: 480 mL    OUT:    Indwelling Catheter - Urethral: 800 mL    Intermittent Catheterization - Urethral: 600 mL  Total OUT: 1400 mL    Total NET: -920 mL    Impression:  88 y/o Male w/ PMH HTN, spinal stenosis, BPH, chronic obrien, admitted with hydrocele, ROGER, Klebsiella UTI.  ROGER improved, and leukocytosis improved. Afebrile. Obrien in place.    Plan  -Urologically stable for d/c with obrien catheter.  Pt is to follow up outpatient with Dr Mensah for ambulatory SPC.    -Plan discussed with patient and family.   -Continue to monitor obrien output.    -Continue medical management and supportive care

## 2020-01-13 NOTE — PROGRESS NOTE ADULT - SUBJECTIVE AND OBJECTIVE BOX
Patient is a 89y old  Male who presents with a chief complaint of Fever. UTI. Renal inasufficiency. (13 Jan 2020 08:48)      INTERVAL HPI/OVERNIGHT EVENTS:    MEDICATIONS  (STANDING):  amLODIPine   Tablet 5 milliGRAM(s) Oral daily  ciprofloxacin     Tablet 250 milliGRAM(s) Oral every 12 hours  ferrous    sulfate 325 milliGRAM(s) Oral daily  finasteride 5 milliGRAM(s) Oral daily  gabapentin 300 milliGRAM(s) Oral two times a day  labetalol 100 milliGRAM(s) Oral two times a day  senna 2 Tablet(s) Oral at bedtime  tamsulosin 0.4 milliGRAM(s) Oral at bedtime    MEDICATIONS  (PRN):  acetaminophen   Tablet .. 650 milliGRAM(s) Oral every 6 hours PRN Temp greater or equal to 38C (100.4F), Mild Pain (1 - 3)  ibuprofen  Tablet. 600 milliGRAM(s) Oral every 6 hours PRN Temp greater or equal to 38C (100.4F)      Allergies    No Known Allergies    Intolerances        REVIEW OF SYSTEMS:  CONSTITUTIONAL: No fever, weight loss, or fatigue  EYES: No eye pain, visual disturbances, or discharge  ENMT:  No difficulty hearing, tinnitus, vertigo; No sinus or throat pain  NECK: No pain or stiffness  BREASTS: No pain, masses, or nipple discharge  RESPIRATORY: No cough, wheezing, chills or hemoptysis; No shortness of breath  CARDIOVASCULAR: No chest pain, palpitations, dizziness, or leg swelling  GASTROINTESTINAL: No abdominal or epigastric pain. No nausea, vomiting, or hematemesis; No diarrhea or constipation. No melena or hematochezia.  GENITOURINARY: No dysuria, frequency, hematuria, or incontinence  NEUROLOGICAL: No headaches, memory loss, loss of strength, numbness, or tremors  SKIN: No itching, burning, rashes, or lesions   LYMPH NODES: No enlarged glands  ENDOCRINE: No heat or cold intolerance; No hair loss  MUSCULOSKELETAL: No joint pain or swelling; No muscle, back, or extremity pain  PSYCHIATRIC: No depression, anxiety, mood swings, or difficulty sleeping  HEME/LYMPH: No easy bruising, or bleeding gums  ALLERGY AND IMMUNOLOGIC: No hives or eczema    Vital Signs Last 24 Hrs  T(C): 37.2 (13 Jan 2020 12:17), Max: 37.3 (13 Jan 2020 00:34)  T(F): 99 (13 Jan 2020 12:17), Max: 99.1 (13 Jan 2020 00:34)  HR: 80 (13 Jan 2020 12:17) (76 - 90)  BP: 108/70 (13 Jan 2020 12:17) (108/70 - 153/84)  BP(mean): --  RR: 17 (13 Jan 2020 12:17) (17 - 19)  SpO2: 100% (13 Jan 2020 12:17) (98% - 100%)    PHYSICAL EXAM:  GENERAL: NAD, well-groomed, well-developed  HEAD:  Atraumatic, Normocephalic  EYES: EOMI, PERRL, conjunctiva and sclera clear  ENMT:  Moist mucous membranes, Good dentition, No lesions  NECK: Supple, No JVD, Normal thyroid  NERVOUS SYSTEM:  Alert & Oriented X3, Good concentration; Motor Strength 5/5 B/L upper and lower extremities; DTRs 2+ intact and symmetric  CHEST/LUNG: Clear to percussion bilaterally; No rales, rhonchi, wheezing, or rubs  HEART: Regular rate and rhythm; No murmurs, rubs, or gallops  ABDOMEN: Soft, Nontender, Nondistended; Bowel sounds present  EXTREMITIES:  2+ Peripheral Pulses, No clubbing, cyanosis, or edema  LYMPH: No lymphadenopathy noted  SKIN: No rashes or lesions    LABS:              CAPILLARY BLOOD GLUCOSE                        RADIOLOGY & ADDITIONAL TESTS:    Imaging Personally Reviewed:  [ ] YES  [ ] NO    Consultant(s) Notes Reviewed:  [ ] YES  [ ] NO    Care Discussed with Consultants/Other Providers [ ] YES  [ ] NO    PROBLEMS:  ACUTE CYSTITIS WITH HEMATURIA  BLOOD PRESSURE  Acute cystitis with hematuria  ROGER (acute kidney injury)  Urinary tract infection associated with indwelling urethral catheter, sequela  Renal insufficiency  Hypertension  BPH with obstruction/lower urinary tract symptoms  Essential hypertension  Spinal stenosis of lumbar region without neurogenic claudication  Hydrocele in adult  Urinary tract infection associated with catheterization of urinary tract, unspecified indwelling urinary catheter type, subsequent encounter      Care discussed with family,         [  ]   yes  [  ]  No    imp:    stable[ ]    unstable[  ]     improving [   ]       unchanged  [  ]                Plans:  Continue present plans  [  ]               New consult [  ]   specialty  .......               order keyla[  ]    test name.                  Discharge Planning  [  ]

## 2020-01-13 NOTE — PROGRESS NOTE ADULT - ASSESSMENT
IMPROVE VTE Individual Risk Assessment    RISK                                                                Points    [  ] Previous VTE                                                  3    [  ] Thrombophilia                                               2    [  ] Lower limb paralysis                                      2        (unable to hold up >15 seconds)      [  ] Current Cancer                                              2         (within 6 months)    [  ] Immobilization > 24 hrs                                1    [  ] ICU/CCU stay > 24 hours                              1    [  ] Age > 60                                                      1    IMPROVE VTE Score _________    IMPROVE Score 0-1: Low Risk, No VTE prophylaxis required for most patients, encourage ambulation.   IMPROVE Score 2-3: At risk, pharmacologic VTE prophylaxis is indicated for most patients (in the absence of a contraindication)  IMPROVE Score > or = 4: High Risk, pharmacologic VTE prophylaxis is indicated for most patients (in the absence of a contraindication)  : The patient is a 89y Male complaining of weakness.	  Pt. was found to have a kink in the obrien, resulting in suprapubic pain. Had temp. as per family 102  01/05/2020 : Alert, awake. Asymptomatic. On Zosyn. Urology consult.  01/06/2020 : Alert. asymptomatic. Urine culture pos, for Gm neg. rods. On Zosyn. Urology consult.  01/07/2020 : Awake, Asymptomatic. Afebrile. Klebsiella in the urine, pan sensitive. Urology consult noted. For cystoscopy. ID consult. On Zosyn.  01/09/2020 : Pt. alert. Awake. On oral cipro. Urology consult noted. For Cystoscopy today.  01/08/2020 : Awake, Asymptomatic. ID consult noted. On Cipro, Zosyn discontinued. Urology F/U noted. Discharge planning.  01/10/2020 : Asymptomatic. For cystoscopy today. On Cipro. Renal function improving. Urology F/U.  01/13/2020 : For discharge Home. F/O with urology as out patient.  01/12/2020 : Pt. not discharged yesterday because of HHA not in place. Discharge in AM.;

## 2020-01-13 NOTE — PROGRESS NOTE ADULT - PROBLEM SELECTOR PROBLEM 5
Hydrocele in adult
BPH with obstruction/lower urinary tract symptoms
Hypertension

## 2020-01-13 NOTE — PROGRESS NOTE ADULT - SUBJECTIVE AND OBJECTIVE BOX
HPI:  · Chief Complaint: The patient is a 89y Male complaining of weakness.	  Pt. was found to have a kink in the obrien, resulting in suprapubic pain. Had temp. as per family 102. (04 Jan 2020 13:42)  work up consistent with pan sensitive urinary tract infection     Allergies    No Known Allergies    Intolerances        MEDICATIONS  (STANDING):  amLODIPine   Tablet 5 milliGRAM(s) Oral daily  ciprofloxacin     Tablet 250 milliGRAM(s) Oral every 12 hours  ferrous    sulfate 325 milliGRAM(s) Oral daily  finasteride 5 milliGRAM(s) Oral daily  gabapentin 300 milliGRAM(s) Oral two times a day  labetalol 100 milliGRAM(s) Oral two times a day  senna 2 Tablet(s) Oral at bedtime  tamsulosin 0.4 milliGRAM(s) Oral at bedtime    MEDICATIONS  (PRN):  acetaminophen   Tablet .. 650 milliGRAM(s) Oral every 6 hours PRN Temp greater or equal to 38C (100.4F), Mild Pain (1 - 3)  ibuprofen  Tablet. 600 milliGRAM(s) Oral every 6 hours PRN Temp greater or equal to 38C (100.4F)      REVIEW OF SYSTEMS:  no issues     VITAL SIGNS:  T(C): 36.7 (01-13-20 @ 16:20), Max: 37.3 (01-13-20 @ 00:34)  T(F): 98 (01-13-20 @ 16:20), Max: 99.1 (01-13-20 @ 00:34)  HR: 82 (01-13-20 @ 16:20) (80 - 90)  BP: 131/77 (01-13-20 @ 16:20) (108/70 - 153/84)  RR: 17 (01-13-20 @ 16:20) (17 - 18)  SpO2: 99% (01-13-20 @ 16:20) (98% - 100%)  Wt(kg): --    PHYSICAL EXAM:    GENERAL: not in any distress  HEENT: Neck is supple, normocephalic, atraumatic   CHEST/LUNG: Clear to auscultation bilaterally; No rales, rhonchi, wheezing  HEART: Regular rate and rhythm; No murmurs, rubs, or gallops  ABDOMEN: Soft, Nontender, Nondistended; Bowel sounds present, no rebound   EXTREMITIES:  2+ Peripheral Pulses, No clubbing, cyanosis, or edema  GENITOURINARY: swollen genitalia obrien   tender  SKIN: No rashes or lesions  BACK: no pressor sore   NERVOUS SYSTEM:  Alert & Oriented X3  LABS:                                                   Radiology: No Yes

## 2020-01-13 NOTE — CHART NOTE - NSCHARTNOTEFT_GEN_A_CORE
Assessment: Pt seen for nutrition follow-up. Pt c ROGER, UTI, renal insufficiency, spinal stenosis.  Pt denies any N/V/C/D. Per Nurses aid, no GI distress noted. Pt c good appetite.     Factors impacting intake: [ ] none [ ] nausea  [ ] vomiting [ ] diarrhea [ ] constipation  [ ]chewing problems [ ] swallowing issues  [x ] other:     Diet Prescription: Diet, Regular:   Low Sodium  Supplement Feeding Modality:  Oral  Ensure Enlive Cans or Servings Per Day:  1       Frequency:  Two Times a day (01-06-20 @ 15:43)    Intake: 100%    Current Weight: 74.3 kg (01/13)  % Weight Change: Unable to assess, changes in fluid accumulation     Physical appearance: BMI 25.1; Nutrition focused physical exam conducted:  Subcutaneous fat loss: [ moderate ] Orbital fat pads region, [n/a  ]Buccal fat region, [ moderate ]Triceps region,  [ mild-moderate ]Ribs region.  Muscle wasting: [ moderate ]Temples region, [ moderate ]Clavicle region, [ mild ]Shoulder region, [ moderate  ]Scapula region, [ moderate ]Interosseous region,  [ moderate ]thigh region, [ moderate ]Calf region      Pertinent Medications: MEDICATIONS  (STANDING):  amLODIPine   Tablet 5 milliGRAM(s) Oral daily  ciprofloxacin     Tablet 250 milliGRAM(s) Oral every 12 hours  ferrous    sulfate 325 milliGRAM(s) Oral daily  finasteride 5 milliGRAM(s) Oral daily  gabapentin 300 milliGRAM(s) Oral two times a day  labetalol 100 milliGRAM(s) Oral two times a day  senna 2 Tablet(s) Oral at bedtime  tamsulosin 0.4 milliGRAM(s) Oral at bedtime    MEDICATIONS  (PRN):  acetaminophen   Tablet .. 650 milliGRAM(s) Oral every 6 hours PRN Temp greater or equal to 38C (100.4F), Mild Pain (1 - 3)  ibuprofen  Tablet. 600 milliGRAM(s) Oral every 6 hours PRN Temp greater or equal to 38C (100.4F)    Pertinent Labs: 01-10 Na 139 mmol/L Glu 80 mg/dL K+ 3.2 mmol/L<L> Cr 1.09 mg/dL BUN 20 mg/dL Phos n/a   Alb n/a   PAB n/a   Hgb 9.4 g/dL<L> Hct 29.9 %<L> HgA1C n/a     24Hr FS:    Skin: pressure injury penis     Estimated Needs:   [x ] no change since previous assessment (01/06)  [ ] recalculated:     Previous Nutrition Diagnosis:   Nutrition Diagnostic Terminology #1 Malnutrition...   Malnutrition Moderate Malnutrition in context of chronic illness/condition.   Etiology increased protein-energy needs in presence of HTN, renal insufficient c suprapubic obrien.   Signs/Symptoms Physical Findings: moderate subq fat loss and muscle wasting.     Goal/Expected Outcome Pt to meet > 75% protein-energy needs via meals/supplements during LOS. (met)       Nutrition Diagnosis is [x ] ongoing  [ ] resolved  [ ] improved  [ ] not applicable     New Nutrition Diagnosis: [x ] not applicable       Interventions:   Recommend  [x ] Continue: current diet Rx   [ ] Change Diet To:  [ ] Nutrition Supplement:  [ ] Nutrition Support:  [ ] Other:     Monitoring and Evaluation:   [x ] PO intake [ x ] Tolerance to diet prescription [ x ] weights [ x ] labs[ x ] follow up per protocol  [ ] other:

## 2020-01-13 NOTE — PROGRESS NOTE ADULT - PROBLEM SELECTOR PROBLEM 3
Renal insufficiency
Renal insufficiency
Essential hypertension
Spinal stenosis of lumbar region without neurogenic claudication
Renal insufficiency

## 2020-01-13 NOTE — PROGRESS NOTE ADULT - PROBLEM SELECTOR PROBLEM 2
Hypertension
Hypertension
Hydrocele in adult
Spinal stenosis of lumbar region without neurogenic claudication
Hypertension

## 2020-01-13 NOTE — PROGRESS NOTE ADULT - PROBLEM SELECTOR PROBLEM 6
Spinal stenosis of lumbar region without neurogenic claudication
Renal insufficiency
Renal insufficiency
Urinary tract infection associated with indwelling urethral catheter, sequela
Hypertension

## 2020-01-13 NOTE — PROGRESS NOTE ADULT - PROBLEM SELECTOR PROBLEM 7
BPH with obstruction/lower urinary tract symptoms
ROGER (acute kidney injury)
Renal insufficiency
Urinary tract infection associated with indwelling urethral catheter, sequela
Urinary tract infection associated with indwelling urethral catheter, sequela

## 2020-01-13 NOTE — PROGRESS NOTE ADULT - PROBLEM SELECTOR PROBLEM 1
Hydrocele in adult
Urinary tract infection associated with catheterization of urinary tract, unspecified indwelling urinary catheter type, subsequent encounter
Urinary tract infection associated with indwelling urethral catheter, sequela

## 2020-01-14 ENCOUNTER — TRANSCRIPTION ENCOUNTER (OUTPATIENT)
Age: 85
End: 2020-01-14

## 2020-01-14 VITALS
SYSTOLIC BLOOD PRESSURE: 130 MMHG | TEMPERATURE: 98 F | OXYGEN SATURATION: 98 % | HEART RATE: 74 BPM | RESPIRATION RATE: 18 BRPM | DIASTOLIC BLOOD PRESSURE: 60 MMHG

## 2020-01-14 RX ADMIN — Medication 100 MILLIGRAM(S): at 06:37

## 2020-01-14 RX ADMIN — Medication 250 MILLIGRAM(S): at 06:37

## 2020-01-14 RX ADMIN — GABAPENTIN 300 MILLIGRAM(S): 400 CAPSULE ORAL at 06:37

## 2020-01-14 RX ADMIN — AMLODIPINE BESYLATE 5 MILLIGRAM(S): 2.5 TABLET ORAL at 06:37

## 2020-01-14 NOTE — DISCHARGE NOTE NURSING/CASE MANAGEMENT/SOCIAL WORK - PATIENT PORTAL LINK FT
You can access the FollowMyHealth Patient Portal offered by [facility] by registering at the following website: [email or web address]. By joining [facility]’s FollowMyHealth portal, you will also be able to view your health information using other applications (apps) compatible with our system.

## 2020-01-21 DIAGNOSIS — N43.3 HYDROCELE, UNSPECIFIED: ICD-10-CM

## 2020-01-21 DIAGNOSIS — N30.01 ACUTE CYSTITIS WITH HEMATURIA: ICD-10-CM

## 2020-01-21 DIAGNOSIS — T83.511A INFECTION AND INFLAMMATORY REACTION DUE TO INDWELLING URETHRAL CATHETER, INITIAL ENCOUNTER: ICD-10-CM

## 2020-01-21 DIAGNOSIS — E86.0 DEHYDRATION: ICD-10-CM

## 2020-01-21 DIAGNOSIS — N17.9 ACUTE KIDNEY FAILURE, UNSPECIFIED: ICD-10-CM

## 2020-01-21 DIAGNOSIS — T83.091A OTHER MECHANICAL COMPLICATION OF INDWELLING URETHRAL CATHETER, INITIAL ENCOUNTER: ICD-10-CM

## 2020-01-21 DIAGNOSIS — B96.1 KLEBSIELLA PNEUMONIAE [K. PNEUMONIAE] AS THE CAUSE OF DISEASES CLASSIFIED ELSEWHERE: ICD-10-CM

## 2020-01-21 DIAGNOSIS — N13.8 OTHER OBSTRUCTIVE AND REFLUX UROPATHY: ICD-10-CM

## 2020-01-21 DIAGNOSIS — T83.028A DISPLACEMENT OF OTHER URINARY CATHETER, INITIAL ENCOUNTER: ICD-10-CM
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
## 2020-01-21 DIAGNOSIS — N28.9 DISORDER OF KIDNEY AND URETER, UNSPECIFIED: ICD-10-CM

## 2020-01-21 DIAGNOSIS — M48.061 SPINAL STENOSIS, LUMBAR REGION WITHOUT NEUROGENIC CLAUDICATION: ICD-10-CM

## 2020-01-21 DIAGNOSIS — E44.0 MODERATE PROTEIN-CALORIE MALNUTRITION: ICD-10-CM

## 2020-01-21 DIAGNOSIS — N40.1 BENIGN PROSTATIC HYPERPLASIA WITH LOWER URINARY TRACT SYMPTOMS: ICD-10-CM

## 2020-02-13 ENCOUNTER — APPOINTMENT (OUTPATIENT)
Dept: ELECTROPHYSIOLOGY | Facility: CLINIC | Age: 85
End: 2020-02-13
Payer: MEDICARE

## 2020-02-13 PROBLEM — N43.3 HYDROCELE, UNSPECIFIED: Chronic | Status: ACTIVE | Noted: 2018-06-11

## 2020-02-13 PROCEDURE — 93298 REM INTERROG DEV EVAL SCRMS: CPT

## 2020-02-13 PROCEDURE — G2066: CPT

## 2020-03-09 ENCOUNTER — APPOINTMENT (OUTPATIENT)
Dept: ELECTROPHYSIOLOGY | Facility: CLINIC | Age: 85
End: 2020-03-09
Payer: MEDICARE

## 2020-03-09 PROCEDURE — 93285 PRGRMG DEV EVAL SCRMS IP: CPT

## 2020-04-13 ENCOUNTER — APPOINTMENT (OUTPATIENT)
Dept: ELECTROPHYSIOLOGY | Facility: CLINIC | Age: 85
End: 2020-04-13
Payer: MEDICARE

## 2020-04-13 PROCEDURE — 93298 REM INTERROG DEV EVAL SCRMS: CPT

## 2020-04-13 PROCEDURE — G2066: CPT

## 2020-04-20 NOTE — PATIENT PROFILE ADULT - DO YOU FEEL UNSAFE AT WORK?
not applicable Topical Sulfur Applications Pregnancy And Lactation Text: This medication is Pregnancy Category C and has an unknown safety profile during pregnancy. It is unknown if this topical medication is excreted in breast milk.

## 2020-05-18 ENCOUNTER — APPOINTMENT (OUTPATIENT)
Dept: ELECTROPHYSIOLOGY | Facility: CLINIC | Age: 85
End: 2020-05-18
Payer: MEDICARE

## 2020-05-18 PROCEDURE — 93298 REM INTERROG DEV EVAL SCRMS: CPT

## 2020-05-18 PROCEDURE — G2066: CPT

## 2020-06-18 ENCOUNTER — APPOINTMENT (OUTPATIENT)
Dept: ELECTROPHYSIOLOGY | Facility: CLINIC | Age: 85
End: 2020-06-18
Payer: MEDICARE

## 2020-06-18 PROCEDURE — 93298 REM INTERROG DEV EVAL SCRMS: CPT

## 2020-06-18 PROCEDURE — G2066: CPT

## 2020-07-23 ENCOUNTER — APPOINTMENT (OUTPATIENT)
Dept: ELECTROPHYSIOLOGY | Facility: CLINIC | Age: 85
End: 2020-07-23
Payer: MEDICARE

## 2020-07-23 PROCEDURE — G2066: CPT

## 2020-07-23 PROCEDURE — 93298 REM INTERROG DEV EVAL SCRMS: CPT

## 2020-08-25 ENCOUNTER — APPOINTMENT (OUTPATIENT)
Dept: ELECTROPHYSIOLOGY | Facility: CLINIC | Age: 85
End: 2020-08-25
Payer: MEDICARE

## 2020-08-25 PROCEDURE — G2066: CPT

## 2020-08-25 PROCEDURE — 93298 REM INTERROG DEV EVAL SCRMS: CPT

## 2020-09-22 ENCOUNTER — INPATIENT (INPATIENT)
Facility: HOSPITAL | Age: 85
LOS: 20 days | Discharge: HOME HEALTH | End: 2020-10-13
Attending: INTERNAL MEDICINE | Admitting: INTERNAL MEDICINE
Payer: MEDICARE

## 2020-09-22 VITALS
HEART RATE: 112 BPM | WEIGHT: 149.91 LBS | HEIGHT: 68 IN | RESPIRATION RATE: 18 BRPM | DIASTOLIC BLOOD PRESSURE: 64 MMHG | OXYGEN SATURATION: 95 % | SYSTOLIC BLOOD PRESSURE: 101 MMHG | TEMPERATURE: 101 F

## 2020-09-22 DIAGNOSIS — A41.9 SEPSIS, UNSPECIFIED ORGANISM: ICD-10-CM

## 2020-09-22 DIAGNOSIS — I10 ESSENTIAL (PRIMARY) HYPERTENSION: ICD-10-CM

## 2020-09-22 DIAGNOSIS — N30.00 ACUTE CYSTITIS WITHOUT HEMATURIA: ICD-10-CM

## 2020-09-22 DIAGNOSIS — N40.1 BENIGN PROSTATIC HYPERPLASIA WITH LOWER URINARY TRACT SYMPTOMS: ICD-10-CM

## 2020-09-22 LAB
ALBUMIN SERPL ELPH-MCNC: 2.3 G/DL — LOW (ref 3.3–5)
ALP SERPL-CCNC: 82 U/L — SIGNIFICANT CHANGE UP (ref 40–120)
ALT FLD-CCNC: 25 U/L — SIGNIFICANT CHANGE UP (ref 12–78)
ANION GAP SERPL CALC-SCNC: 8 MMOL/L — SIGNIFICANT CHANGE UP (ref 5–17)
APPEARANCE UR: ABNORMAL
APTT BLD: 29.3 SEC — SIGNIFICANT CHANGE UP (ref 27.5–35.5)
AST SERPL-CCNC: 42 U/L — HIGH (ref 15–37)
BACTERIA # UR AUTO: ABNORMAL
BASOPHILS # BLD AUTO: 0.04 K/UL — SIGNIFICANT CHANGE UP (ref 0–0.2)
BASOPHILS NFR BLD AUTO: 0.2 % — SIGNIFICANT CHANGE UP (ref 0–2)
BILIRUB SERPL-MCNC: 0.9 MG/DL — SIGNIFICANT CHANGE UP (ref 0.2–1.2)
BILIRUB UR-MCNC: NEGATIVE — SIGNIFICANT CHANGE UP
BUN SERPL-MCNC: 48 MG/DL — HIGH (ref 7–23)
CALCIUM SERPL-MCNC: 9.5 MG/DL — SIGNIFICANT CHANGE UP (ref 8.5–10.1)
CHLORIDE SERPL-SCNC: 109 MMOL/L — HIGH (ref 96–108)
CO2 SERPL-SCNC: 27 MMOL/L — SIGNIFICANT CHANGE UP (ref 22–31)
COLOR SPEC: YELLOW — SIGNIFICANT CHANGE UP
CREAT SERPL-MCNC: 2.36 MG/DL — HIGH (ref 0.5–1.3)
DIFF PNL FLD: ABNORMAL
EOSINOPHIL # BLD AUTO: 0.38 K/UL — SIGNIFICANT CHANGE UP (ref 0–0.5)
EOSINOPHIL NFR BLD AUTO: 2.1 % — SIGNIFICANT CHANGE UP (ref 0–6)
GLUCOSE SERPL-MCNC: 135 MG/DL — HIGH (ref 70–99)
GLUCOSE UR QL: NEGATIVE MG/DL — SIGNIFICANT CHANGE UP
GRAN CASTS # UR COMP ASSIST: ABNORMAL /LPF
HCT VFR BLD CALC: 26 % — LOW (ref 39–50)
HGB BLD-MCNC: 8.1 G/DL — LOW (ref 13–17)
IMM GRANULOCYTES NFR BLD AUTO: 0.9 % — SIGNIFICANT CHANGE UP (ref 0–1.5)
INR BLD: 1.59 RATIO — HIGH (ref 0.88–1.16)
KETONES UR-MCNC: NEGATIVE — SIGNIFICANT CHANGE UP
LACTATE SERPL-SCNC: 1.4 MMOL/L — SIGNIFICANT CHANGE UP (ref 0.7–2)
LEUKOCYTE ESTERASE UR-ACNC: ABNORMAL
LYMPHOCYTES # BLD AUTO: 0.55 K/UL — LOW (ref 1–3.3)
LYMPHOCYTES # BLD AUTO: 3 % — LOW (ref 13–44)
MCHC RBC-ENTMCNC: 29.1 PG — SIGNIFICANT CHANGE UP (ref 27–34)
MCHC RBC-ENTMCNC: 31.2 GM/DL — LOW (ref 32–36)
MCV RBC AUTO: 93.5 FL — SIGNIFICANT CHANGE UP (ref 80–100)
MONOCYTES # BLD AUTO: 1.61 K/UL — HIGH (ref 0–0.9)
MONOCYTES NFR BLD AUTO: 8.8 % — SIGNIFICANT CHANGE UP (ref 2–14)
NEUTROPHILS # BLD AUTO: 15.63 K/UL — HIGH (ref 1.8–7.4)
NEUTROPHILS NFR BLD AUTO: 85 % — HIGH (ref 43–77)
NITRITE UR-MCNC: NEGATIVE — SIGNIFICANT CHANGE UP
NRBC # BLD: 0 /100 WBCS — SIGNIFICANT CHANGE UP (ref 0–0)
PH UR: 6.5 — SIGNIFICANT CHANGE UP (ref 5–8)
PLATELET # BLD AUTO: 300 K/UL — SIGNIFICANT CHANGE UP (ref 150–400)
POTASSIUM SERPL-MCNC: 3.6 MMOL/L — SIGNIFICANT CHANGE UP (ref 3.5–5.3)
POTASSIUM SERPL-SCNC: 3.6 MMOL/L — SIGNIFICANT CHANGE UP (ref 3.5–5.3)
PROT SERPL-MCNC: 8.4 GM/DL — HIGH (ref 6–8.3)
PROT UR-MCNC: 100 MG/DL
PROTHROM AB SERPL-ACNC: 18 SEC — HIGH (ref 10.6–13.6)
RBC # BLD: 2.78 M/UL — LOW (ref 4.2–5.8)
RBC # FLD: 16.5 % — HIGH (ref 10.3–14.5)
RBC CASTS # UR COMP ASSIST: SIGNIFICANT CHANGE UP /HPF (ref 0–4)
SARS-COV-2 RNA SPEC QL NAA+PROBE: SIGNIFICANT CHANGE UP
SODIUM SERPL-SCNC: 144 MMOL/L — SIGNIFICANT CHANGE UP (ref 135–145)
SP GR SPEC: 1 — LOW (ref 1.01–1.02)
TROPONIN I SERPL-MCNC: <.015 NG/ML — SIGNIFICANT CHANGE UP (ref 0.01–0.04)
UROBILINOGEN FLD QL: NEGATIVE MG/DL — SIGNIFICANT CHANGE UP
WBC # BLD: 18.37 K/UL — HIGH (ref 3.8–10.5)
WBC # FLD AUTO: 18.37 K/UL — HIGH (ref 3.8–10.5)
WBC UR QL: ABNORMAL

## 2020-09-22 PROCEDURE — 99285 EMERGENCY DEPT VISIT HI MDM: CPT | Mod: CS

## 2020-09-22 PROCEDURE — 99223 1ST HOSP IP/OBS HIGH 75: CPT | Mod: AI

## 2020-09-22 PROCEDURE — 71045 X-RAY EXAM CHEST 1 VIEW: CPT | Mod: 26

## 2020-09-22 PROCEDURE — 93010 ELECTROCARDIOGRAM REPORT: CPT

## 2020-09-22 RX ORDER — MIRTAZAPINE 45 MG/1
15 TABLET, ORALLY DISINTEGRATING ORAL DAILY
Refills: 0 | Status: DISCONTINUED | OUTPATIENT
Start: 2020-09-22 | End: 2020-09-22

## 2020-09-22 RX ORDER — CEFEPIME 1 G/1
1000 INJECTION, POWDER, FOR SOLUTION INTRAMUSCULAR; INTRAVENOUS DAILY
Refills: 0 | Status: DISCONTINUED | OUTPATIENT
Start: 2020-09-23 | End: 2020-09-24

## 2020-09-22 RX ORDER — FINASTERIDE 5 MG/1
5 TABLET, FILM COATED ORAL DAILY
Refills: 0 | Status: DISCONTINUED | OUTPATIENT
Start: 2020-09-22 | End: 2020-10-13

## 2020-09-22 RX ORDER — VANCOMYCIN HCL 1 G
1000 VIAL (EA) INTRAVENOUS ONCE
Refills: 0 | Status: COMPLETED | OUTPATIENT
Start: 2020-09-22 | End: 2020-09-22

## 2020-09-22 RX ORDER — TAMSULOSIN HYDROCHLORIDE 0.4 MG/1
0.4 CAPSULE ORAL AT BEDTIME
Refills: 0 | Status: DISCONTINUED | OUTPATIENT
Start: 2020-09-22 | End: 2020-10-13

## 2020-09-22 RX ORDER — HYDROCHLOROTHIAZIDE 25 MG
25 TABLET ORAL DAILY
Refills: 0 | Status: DISCONTINUED | OUTPATIENT
Start: 2020-09-22 | End: 2020-09-22

## 2020-09-22 RX ORDER — SENNA PLUS 8.6 MG/1
2 TABLET ORAL
Qty: 0 | Refills: 0 | DISCHARGE

## 2020-09-22 RX ORDER — GABAPENTIN 400 MG/1
300 CAPSULE ORAL
Refills: 0 | Status: DISCONTINUED | OUTPATIENT
Start: 2020-09-22 | End: 2020-10-13

## 2020-09-22 RX ORDER — ACETAMINOPHEN 500 MG
650 TABLET ORAL ONCE
Refills: 0 | Status: COMPLETED | OUTPATIENT
Start: 2020-09-22 | End: 2020-09-22

## 2020-09-22 RX ORDER — CEFEPIME 1 G/1
1000 INJECTION, POWDER, FOR SOLUTION INTRAMUSCULAR; INTRAVENOUS ONCE
Refills: 0 | Status: COMPLETED | OUTPATIENT
Start: 2020-09-22 | End: 2020-09-22

## 2020-09-22 RX ORDER — AMLODIPINE BESYLATE 2.5 MG/1
5 TABLET ORAL DAILY
Refills: 0 | Status: DISCONTINUED | OUTPATIENT
Start: 2020-09-22 | End: 2020-09-23

## 2020-09-22 RX ORDER — SODIUM CHLORIDE 9 MG/ML
2000 INJECTION INTRAMUSCULAR; INTRAVENOUS; SUBCUTANEOUS ONCE
Refills: 0 | Status: COMPLETED | OUTPATIENT
Start: 2020-09-22 | End: 2020-09-22

## 2020-09-22 RX ORDER — ACETAMINOPHEN 500 MG
650 TABLET ORAL EVERY 6 HOURS
Refills: 0 | Status: DISCONTINUED | OUTPATIENT
Start: 2020-09-22 | End: 2020-10-13

## 2020-09-22 RX ORDER — LABETALOL HCL 100 MG
100 TABLET ORAL
Refills: 0 | Status: DISCONTINUED | OUTPATIENT
Start: 2020-09-22 | End: 2020-10-07

## 2020-09-22 RX ORDER — MIRTAZAPINE 45 MG/1
7.5 TABLET, ORALLY DISINTEGRATING ORAL DAILY
Refills: 0 | Status: DISCONTINUED | OUTPATIENT
Start: 2020-09-22 | End: 2020-10-13

## 2020-09-22 RX ADMIN — TAMSULOSIN HYDROCHLORIDE 0.4 MILLIGRAM(S): 0.4 CAPSULE ORAL at 21:39

## 2020-09-22 RX ADMIN — CEFEPIME 100 MILLIGRAM(S): 1 INJECTION, POWDER, FOR SOLUTION INTRAMUSCULAR; INTRAVENOUS at 13:25

## 2020-09-22 RX ADMIN — CEFEPIME 1000 MILLIGRAM(S): 1 INJECTION, POWDER, FOR SOLUTION INTRAMUSCULAR; INTRAVENOUS at 13:55

## 2020-09-22 RX ADMIN — SODIUM CHLORIDE 2000 MILLILITER(S): 9 INJECTION INTRAMUSCULAR; INTRAVENOUS; SUBCUTANEOUS at 12:00

## 2020-09-22 RX ADMIN — Medication 650 MILLIGRAM(S): at 13:00

## 2020-09-22 RX ADMIN — Medication 250 MILLIGRAM(S): at 14:00

## 2020-09-22 RX ADMIN — Medication 100 MILLIGRAM(S): at 17:36

## 2020-09-22 RX ADMIN — Medication 650 MILLIGRAM(S): at 12:00

## 2020-09-22 RX ADMIN — GABAPENTIN 300 MILLIGRAM(S): 400 CAPSULE ORAL at 17:36

## 2020-09-22 RX ADMIN — Medication 650 MILLIGRAM(S): at 22:52

## 2020-09-22 NOTE — ED ADULT NURSE NOTE - ED STAT RN HANDOFF DETAILS
received report from Olga NEWTON for continued care in ED Hold.  Pt is awake, alert, obrien cath in tact, draining well. to 1D for continued care awaiting bed assignment

## 2020-09-22 NOTE — ED PROVIDER NOTE - OBJECTIVE STATEMENT
89M pmhx HTN, CKD, BPH with chronic obrien. fever and weakness for 8 days. no other symptoms. fever started shortly after hs obrien was changed

## 2020-09-22 NOTE — H&P ADULT - NSHPPHYSICALEXAM_GEN_ALL_CORE
ICU Vital Signs Last 24 Hrs  T(C): 36.9 (22 Sep 2020 14:00), Max: 38.1 (22 Sep 2020 11:29)  T(F): 98.4 (22 Sep 2020 14:00), Max: 100.6 (22 Sep 2020 11:29)  HR: 96 (22 Sep 2020 14:35) (90 - 112)  BP: 115/53 (22 Sep 2020 14:35) (101/64 - 115/53)  BP(mean): --  ABP: --  ABP(mean): --  RR: 20 (22 Sep 2020 14:35) (15 - 20)  SpO2: 96% (22 Sep 2020 14:35) (94% - 98%)  GENERAL: NAD well-developed  HEAD:  Atraumatic, Normocephalic  EYES: EOMI, PERRLA, conjunctiva and sclera clear  ENMT: No tonsillar erythema, exudates, or enlargement; Moist mucous membranes, Good dentition, No lesions  NECK: Supple, No JVD, Normal thyroid  NERVOUS SYSTEM:  Alert & Oriented X3, Good concentration; Motor Strength 5/5 B/L upper and lower extremities; DTRs 2+ intact and symmetric  CHEST/LUNG: Clear to percussion bilaterally; No rales, rhonchi, wheezing, or rubs  HEART: Regular rate and rhythm; No murmurs, rubs, or gallops  ABDOMEN: Soft, Nontender, Nondistended; Bowel sounds present  EXTREMITIES:  2+ Peripheral Pulses, No clubbing, cyanosis, or edema  LYMPH: No lymphadenopathy   SKIN: No rashes or lesions

## 2020-09-22 NOTE — H&P ADULT - PROBLEM SELECTOR PROBLEM 2
Sepsis with acute renal failure and tubular necrosis without septic shock, due to unspecified organism

## 2020-09-22 NOTE — ED ADULT TRIAGE NOTE - NS ED NURSE DIRECT TO ROOM YN
"Chief Complaint   Patient presents with     Diabetes     f/u     Eye Problem       Initial /88 (BP Location: Left arm, Patient Position: Sitting, Cuff Size: Adult Large)  Pulse 95  Temp 98  F (36.7  C) (Tympanic)  Resp 20  Wt (!) 363 lb (164.7 kg)  SpO2 97%  BMI 54.39 kg/m2 Estimated body mass index is 54.39 kg/(m^2) as calculated from the following:    Height as of 4/12/17: 5' 8.5\" (1.74 m).    Weight as of this encounter: 363 lb (164.7 kg).  Medication Reconciliation: complete     Princess LINDSEY Betts CMA      " Yes

## 2020-09-22 NOTE — CONSULT NOTE ADULT - SUBJECTIVE AND OBJECTIVE BOX
Patient chart reviewed, full consult to follow.     Thank you for the courtesy of this consultation.   [facility], [facility]  NEPHROLOGY AND HYPERTENSION  [address]  [address]  [place] [address]  [phone]    MD SOFY KEENAN MD ANDREY GONCHARUK, MD MADHU KORRAPATI, MD YELENA ROSENBERG, MD BINNY KOSHY, MD CHRISTOPHER CAPUTO, MD EDWARD BOVER, MD      Information from chart:  "Patient is a 89y old  Male who presents with a chief complaint of fever (22 Sep 2020 15:53)    HPI:  : 89M pmhx HTN, CKD, BPH with chronic obrien. fever and weakness for 8 days. no other symptoms. fever started shortly after hs obrien was changed- patient denies nausea vomiting or diarrhea - complain of abdominal distention      (22 Sep 2020 15:16)   "    Patient with obrien, gross pyuria    PAST MEDICAL & SURGICAL HISTORY:  Hydrocele in adult  bilateral, chronic    Renal insufficiency    Dehydration    Spinal stenosis of lumbar region    BPH (benign prostatic hypertrophy)    Hypertension    Benign Prostatic Hyperplasia    HTN (Hypertension)    No significant past surgical history      FAMILY HISTORY:  No significant family history      Allergies    No Known Allergies    Intolerances      Home Medications:  amLODIPine 5 mg oral tablet: 1 tab(s) orally once a day (22 Sep 2020 15:13)  finasteride 5 mg oral tablet: 1 tab(s) orally once a day (22 Sep 2020 15:13)  gabapentin 300 mg oral capsule: 1 cap(s) orally 2 times a day (22 Sep 2020 15:13)  hydroCHLOROthiazide 25 mg oral tablet: 1 tab(s) orally once a day (22 Sep 2020 15:13)  labetalol 100 mg oral tablet: 1 tab(s) orally 2 times a day (22 Sep 2020 15:13)  mirtazapine 15 mg oral tablet: 1/2 tab(s) orally once a day (22 Sep 2020 15:13)  tamsulosin 0.4 mg oral capsule: 1 cap(s) orally once a day (at bedtime) (22 Sep 2020 15:13)    MEDICATIONS  (STANDING):  amLODIPine   Tablet 5 milliGRAM(s) Oral daily  cefepime   IVPB 1000 milliGRAM(s) IV Intermittent daily  finasteride 5 milliGRAM(s) Oral daily  gabapentin 300 milliGRAM(s) Oral two times a day  labetalol 100 milliGRAM(s) Oral two times a day  mirtazapine 7.5 milliGRAM(s) Oral daily  tamsulosin 0.4 milliGRAM(s) Oral at bedtime    MEDICATIONS  (PRN):  acetaminophen   Tablet .. 650 milliGRAM(s) Oral every 6 hours PRN Temp greater or equal to 38C (100.4F)    Vital Signs Last 24 Hrs  T(C): 38.3 (22 Sep 2020 22:44), Max: 38.3 (22 Sep 2020 22:44)  T(F): 101 (22 Sep 2020 22:44), Max: 101 (22 Sep 2020 22:44)  HR: 105 (22 Sep 2020 22:44) (90 - 112)  BP: 114/44 (22 Sep 2020 22:44) ( - /)  BP(mean): --  RR: 22 (22 Sep 2020 22:44) (15 - )  SpO2: 96% (22 Sep 2020 22:44) (94% - 98%)    Daily Height in cm: 172.72 (22 Sep 2020 22:44)    Daily     20 @ 07:01  -  20 @ 00:16  --------------------------------------------------------  IN: 0 mL / OUT: 700 mL / NET: -700 mL      CAPILLARY BLOOD GLUCOSE        PHYSICAL EXAM:      T(C): 38.3 (20 @ 22:44), Max: 38.3 (20 @ 22:44)  HR: 105 (20 @ 22:44) (90 - 112)  BP: 114/44 (20 @ 22:44) ( - /73)  RR: 22 (20 @ 22:44) (15 - )  SpO2: 96% (20 @ 22:44) (94% - 98%)  Wt(kg): --  Lungs clear  Heart S1S2  Abd soft NT ND  Extremities:   tr edema                  144  |  109<H>  |  48<H>  ----------------------------<  135<H>  3.6   |  27  |  2.36<H>    Ca    9.5      22 Sep 2020 12:25    TPro  8.4<H>  /  Alb  2.3<L>  /  TBili  0.9  /  DBili  x   /  AST  42<H>  /  ALT  25  /  AlkPhos  82                            8.1    18.37 )-----------( 300      ( 22 Sep 2020 12:25 )             26.0     Creatinine Trend: 2.36<--  Urinalysis Basic - ( 22 Sep 2020 13:48 )    Color: Yellow / Appearance: very cloudy / S.005 / pH: x  Gluc: x / Ketone: Negative  / Bili: Negative / Urobili: Negative mg/dL   Blood: x / Protein: 100 mg/dL / Nitrite: Negative   Leuk Esterase: Moderate / RBC: 0-2 /HPF / WBC 26-50   Sq Epi: x / Non Sq Epi: x / Bacteria: TNTC            Assessment   ROGER suspected post renal, infectious AIN    Plan  Obrien, IV abx; maintenance IVF  Urine  eosinophils   Will follow.    Samy Noriega MD

## 2020-09-22 NOTE — H&P ADULT - HISTORY OF PRESENT ILLNESS
: 89M pmhx HTN, CKD, BPH with chronic obrien. fever and weakness for 8 days. no other symptoms. fever started shortly after hs obrien was changed     : 89M pmhx HTN, CKD, BPH with chronic obrien. fever and weakness for 8 days. no other symptoms. fever started shortly after hs obrien was changed- patient denies nausea vomiting or diarrhea - complain of abdominal distention

## 2020-09-22 NOTE — ED ADULT TRIAGE NOTE - RESPIRATORY RATE (BREATHS/MIN)
18 Modified Advancement Flap Text: The defect edges were debeveled with a #15 scalpel blade.  Given the location of the defect, shape of the defect and the proximity to free margins a modified advancement flap was deemed most appropriate.  Using a sterile surgical marker, an appropriate advancement flap was drawn incorporating the defect and placing the expected incisions within the relaxed skin tension lines where possible.    The area thus outlined was incised deep to adipose tissue with a #15 scalpel blade.  The skin margins were undermined to an appropriate distance in all directions utilizing iris scissors.

## 2020-09-22 NOTE — ED PROVIDER NOTE - CLINICAL SUMMARY MEDICAL DECISION MAKING FREE TEXT BOX
patient will be evaluated for septic workup. UTI very possible- after switching obrien patient with cloudy urine in obrien. will require empiric treatment and admission for sepsis. r/o covid and other sourcs of infection patient will be evaluated for septic workup. UTI very possible- after switching obrien patient with cloudy urine in obrien. will require empiric treatment and admission for sepsis. r/o covid and other sources of infection

## 2020-09-22 NOTE — ED ADULT NURSE NOTE - OBJECTIVE STATEMENT
Pt presents to ED A&Ox2 (name and place) for fever. Pt is unable to provide a hx at this time. skin is hot to touch and dry. No cough observed. sinus tach w PACs on monitor, tachy to 110s. Pt has an indwelling obrien with little drainage noted. respirations even and unlabored. Pt has poor vasculature and is resistant to IV placement. One 18 ga placed in left fa for initial labs and first set of BC. Second IV placed in same arm for 2nd set of blood cultures. Obrien changed out. Unable to replace with 16Fr on one attempt, likely due to BPH as per his daughter (and enlarged scrotum). 16Fr Coude tip obrien placed with immediate drainage of cloudy yellow / white urine. UA and urine culture sent. ivf infusing well, sites asymptomatic. Pt monitored closely, vitals as noted as per orders.- Updated information provided by daughter- states pt has had decreased PO intake and decreased energy for about 8 days, believes he has a UTI- Hx of same after obrien changes., and his obrien was last changed about 2 weeks ago, and this is how he presents when he has UTIs. Daughter also states pt eats a regular diet but must be fed. (passed dysphagia screening prior to her arrival)

## 2020-09-23 LAB
ANION GAP SERPL CALC-SCNC: 9 MMOL/L — SIGNIFICANT CHANGE UP (ref 5–17)
BUN SERPL-MCNC: 40 MG/DL — HIGH (ref 7–23)
CALCIUM SERPL-MCNC: 8.8 MG/DL — SIGNIFICANT CHANGE UP (ref 8.5–10.1)
CHLORIDE SERPL-SCNC: 111 MMOL/L — HIGH (ref 96–108)
CO2 SERPL-SCNC: 24 MMOL/L — SIGNIFICANT CHANGE UP (ref 22–31)
CREAT SERPL-MCNC: 2.02 MG/DL — HIGH (ref 0.5–1.3)
EOSINOPHIL NFR URNS MANUAL: POSITIVE
GLUCOSE SERPL-MCNC: 119 MG/DL — HIGH (ref 70–99)
HCT VFR BLD CALC: 24 % — LOW (ref 39–50)
HGB BLD-MCNC: 7.3 G/DL — LOW (ref 13–17)
MCHC RBC-ENTMCNC: 28.4 PG — SIGNIFICANT CHANGE UP (ref 27–34)
MCHC RBC-ENTMCNC: 30.4 GM/DL — LOW (ref 32–36)
MCV RBC AUTO: 93.4 FL — SIGNIFICANT CHANGE UP (ref 80–100)
NRBC # BLD: 0 /100 WBCS — SIGNIFICANT CHANGE UP (ref 0–0)
PLATELET # BLD AUTO: 323 K/UL — SIGNIFICANT CHANGE UP (ref 150–400)
POTASSIUM SERPL-MCNC: 3.3 MMOL/L — LOW (ref 3.5–5.3)
POTASSIUM SERPL-SCNC: 3.3 MMOL/L — LOW (ref 3.5–5.3)
RBC # BLD: 2.57 M/UL — LOW (ref 4.2–5.8)
RBC # FLD: 16.5 % — HIGH (ref 10.3–14.5)
SARS-COV-2 IGG SERPL QL IA: NEGATIVE — SIGNIFICANT CHANGE UP
SARS-COV-2 IGM SERPL IA-ACNC: 0.32 RATIO — SIGNIFICANT CHANGE UP
SODIUM SERPL-SCNC: 144 MMOL/L — SIGNIFICANT CHANGE UP (ref 135–145)
WBC # BLD: 20.15 K/UL — HIGH (ref 3.8–10.5)
WBC # FLD AUTO: 20.15 K/UL — HIGH (ref 3.8–10.5)

## 2020-09-23 PROCEDURE — 76775 US EXAM ABDO BACK WALL LIM: CPT | Mod: 26

## 2020-09-23 PROCEDURE — 99233 SBSQ HOSP IP/OBS HIGH 50: CPT

## 2020-09-23 RX ORDER — POTASSIUM CHLORIDE 20 MEQ
40 PACKET (EA) ORAL ONCE
Refills: 0 | Status: COMPLETED | OUTPATIENT
Start: 2020-09-23 | End: 2020-09-23

## 2020-09-23 RX ORDER — SODIUM CHLORIDE 9 MG/ML
1000 INJECTION, SOLUTION INTRAVENOUS
Refills: 0 | Status: DISCONTINUED | OUTPATIENT
Start: 2020-09-23 | End: 2020-09-24

## 2020-09-23 RX ADMIN — SODIUM CHLORIDE 70 MILLILITER(S): 9 INJECTION, SOLUTION INTRAVENOUS at 00:53

## 2020-09-23 RX ADMIN — Medication 100 MILLIGRAM(S): at 18:51

## 2020-09-23 RX ADMIN — Medication 650 MILLIGRAM(S): at 00:24

## 2020-09-23 RX ADMIN — GABAPENTIN 300 MILLIGRAM(S): 400 CAPSULE ORAL at 05:26

## 2020-09-23 RX ADMIN — Medication 650 MILLIGRAM(S): at 21:51

## 2020-09-23 RX ADMIN — MIRTAZAPINE 7.5 MILLIGRAM(S): 45 TABLET, ORALLY DISINTEGRATING ORAL at 12:21

## 2020-09-23 RX ADMIN — Medication 100 MILLIGRAM(S): at 05:26

## 2020-09-23 RX ADMIN — SODIUM CHLORIDE 70 MILLILITER(S): 9 INJECTION, SOLUTION INTRAVENOUS at 22:33

## 2020-09-23 RX ADMIN — TAMSULOSIN HYDROCHLORIDE 0.4 MILLIGRAM(S): 0.4 CAPSULE ORAL at 21:52

## 2020-09-23 RX ADMIN — Medication 650 MILLIGRAM(S): at 23:32

## 2020-09-23 RX ADMIN — SODIUM CHLORIDE 70 MILLILITER(S): 9 INJECTION, SOLUTION INTRAVENOUS at 10:13

## 2020-09-23 RX ADMIN — GABAPENTIN 300 MILLIGRAM(S): 400 CAPSULE ORAL at 18:51

## 2020-09-23 RX ADMIN — FINASTERIDE 5 MILLIGRAM(S): 5 TABLET, FILM COATED ORAL at 12:21

## 2020-09-23 RX ADMIN — Medication 40 MILLIEQUIVALENT(S): at 18:53

## 2020-09-23 RX ADMIN — CEFEPIME 100 MILLIGRAM(S): 1 INJECTION, POWDER, FOR SOLUTION INTRAMUSCULAR; INTRAVENOUS at 12:18

## 2020-09-23 NOTE — PROGRESS NOTE ADULT - SUBJECTIVE AND OBJECTIVE BOX
[facility] NEPHROLOGY SERVICES, [facility]  NEPHROLOGY AND HYPERTENSION  [address]  [address]  [place] [address]  [phone]    MD SOFY KEENAN, MD LUIS CAT, MD ELODIA MCGEE, MD JIGNESH MCCARTY, MD LESA RAMIREZ, MD LEANNE MURRAY MD          Patient events noted  feels well no distress     MEDICATIONS  (STANDING):  cefepime   IVPB 1000 milliGRAM(s) IV Intermittent daily  dextrose 5% + sodium chloride 0.9%. 1000 milliLiter(s) (70 mL/Hr) IV Continuous <Continuous>  finasteride 5 milliGRAM(s) Oral daily  gabapentin 300 milliGRAM(s) Oral two times a day  labetalol 100 milliGRAM(s) Oral two times a day  mirtazapine 7.5 milliGRAM(s) Oral daily  tamsulosin 0.4 milliGRAM(s) Oral at bedtime    MEDICATIONS  (PRN):  acetaminophen   Tablet .. 650 milliGRAM(s) Oral every 6 hours PRN Temp greater or equal to 38C (100.4F)      09-22-20 @ 07:01  -  09-23-20 @ 07:00  --------------------------------------------------------  IN: 420 mL / OUT: 1100 mL / NET: -680 mL    09-23-20 @ 07:01  -  09-23-20 @ 23:45  --------------------------------------------------------  IN: 1690 mL / OUT: 500 mL / NET: 1190 mL      PHYSICAL EXAM:      T(C): 37.4 (09-23-20 @ 23:32), Max: 38.9 (09-23-20 @ 00:24)  HR: 94 (09-23-20 @ 23:32) (93 - 105)  BP: 121/57 (09-23-20 @ 23:32) (104/50 - 146/62)  RR: 18 (09-23-20 @ 23:32) (18 - 18)  SpO2: 98% (09-23-20 @ 23:32) (93% - 98%)  Wt(kg): --  Lungs clear  Heart S1S2  Abd soft NT ND  Extremities:   tr edema                                    7.3    20.15 )-----------( 323      ( 23 Sep 2020 06:56 )             24.0     09-23    144  |  111<H>  |  40<H>  ----------------------------<  119<H>  3.3<L>   |  24  |  2.02<H>    Ca    8.8      23 Sep 2020 06:56    TPro  8.4<H>  /  Alb  2.3<L>  /  TBili  0.9  /  DBili  x   /  AST  42<H>  /  ALT  25  /  AlkPhos  82  09-22      LIVER FUNCTIONS - ( 22 Sep 2020 12:25 )  Alb: 2.3 g/dL / Pro: 8.4 gm/dL / ALK PHOS: 82 U/L / ALT: 25 U/L / AST: 42 U/L / GGT: x           Creatinine Trend: 2.02<--, 2.36<--    Eosinophil Count, Urine (09.23.20 @ 09:52)    Eosinophil Count, Urine: Positive          Assessment   ROGER suspected post renal, infectious AIN    Plan  Ma, IV abx; maintenance IVF  Urine  eosinophils noted  Will follow.      Samy Noriega MD

## 2020-09-23 NOTE — CONSULT NOTE ADULT - SUBJECTIVE AND OBJECTIVE BOX
HPI:  : 89M pmhx HTN, CKD, BPH with chronic obrien. fever and weakness for 8 days. no other symptoms. fever started shortly after hs obrien was changed- patient denies nausea vomiting or diarrhea - complain of abdominal distention   ID service called for evaluation.      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 1000 milliLiter(s) (70 mL/Hr) IV Continuous <Continuous>  finasteride 5 milliGRAM(s) Oral daily  gabapentin 300 milliGRAM(s) Oral two times a day  labetalol 100 milliGRAM(s) Oral two times a day  mirtazapine 7.5 milliGRAM(s) Oral daily  tamsulosin 0.4 milliGRAM(s) Oral at bedtime    MEDICATIONS  (PRN):  acetaminophen   Tablet .. 650 milliGRAM(s) Oral every 6 hours PRN Temp greater or equal to 38C (100.4F)      REVIEW OF SYSTEMS:  CONSTITUTIONAL: feels cold and weak   EYES: No eye pain,   ENMT:  No throat pain  NECK: No pain or stiffness  RESPIRATORY: No cough, wheezing,   CARDIOVASCULAR: No chest pain, palpitations, dizziness,   GASTROINTESTINAL: + abdominal No nausea, vomiting, or hematemesis; No diarrhea or constipation. No melena or hematochezia.  GENITOURINARY: POSITIVE obrien chronic   NEUROLOGICAL: No headaches,   SKIN: No itching, burning, rashes, or lesions   LYMPH NODES: No enlarged glands  ENDOCRINE: + feels cold + chills   MUSCULOSKELETAL: No joint pain or swelling; No muscle, back, or extremity pain  PSYCHIATRIC: No depression, anxiety, mood swings, or difficulty sleeping  HEME/LYMPH: No easy bruising, or bleeding gums  ALLERGY AND IMMUNOLOGIC: No hives or eczema    VITAL SIGNS:    T(C): 38 (20 @ 06:24), Max: 38.9 (20 @ 00:24)  T(F): 100.4 (20 @ 06:24), Max: 102 (20 @ 00:24)  HR: 105 (20 @ 05:23) (90 - 112)  BP: 128/60 (20 @ 05:23) (101/64 - 128/60)  RR: 18 (20 @ 05:23) (15 - 22)  SpO2: 96% (20 @ 05:23) (94% - 98%)  Wt(kg): --    PHYSICAL EXAM:    GENERAL: appears chronically ill no acute distress   HEAD:  Atraumatic, Normocephalic  EYES: EOMI, PERRLA, conjunctiva and sclera clear  ENMT: Moist mucous membranes  NECK: Supple, No JVD  NERVOUS SYSTEM:  not very talkative   CHEST/LUNG: Clear to percussion bilaterally; No rales, rhonchi, wheezing bilaterally  HEART: Regular rate and rhythm; No murmurs, rubs, or gallops  ABDOMEN: Soft, Nontender, Nondistended; Bowel sounds present  EXTREMITIES:  2+ Peripheral Pulses, No clubbing, cyanosis, or edema  LYMPH: No lymphadenopathy noted  SKIN: No rashes or lesions      LABS:                         7.3    20.15 )-----------( 323      ( 23 Sep 2020 06:56 )             24.0         144  |  111<H>  |  40<H>  ----------------------------<  119<H>  3.3<L>   |  24  |  2.02<H>    Ca    8.8      23 Sep 2020 06:56    TPro  8.4<H>  /  Alb  2.3<L>  /  TBili  0.9  /  DBili  x   /  AST  42<H>  /  ALT  25  /  AlkPhos  82      LIVER FUNCTIONS - ( 22 Sep 2020 12:25 )  Alb: 2.3 g/dL / Pro: 8.4 gm/dL / ALK PHOS: 82 U/L / ALT: 25 U/L / AST: 42 U/L / GGT: x           PT/INR - ( 22 Sep 2020 12:25 )   PT: 18.0 sec;   INR: 1.59 ratio         PTT - ( 22 Sep 2020 12:25 )  PTT:29.3 sec  Urinalysis Basic - ( 22 Sep 2020 13:48 )    Color: Yellow / Appearance: very cloudy / S.005 / pH: x  Gluc: x / Ketone: Negative  / Bili: Negative / Urobili: Negative mg/dL   Blood: x / Protein: 100 mg/dL / Nitrite: Negative   Leuk Esterase: Moderate / RBC: 0-2 /HPF / WBC 26-50   Sq Epi: x / Non Sq Epi: x / Bacteria: TNTC        CARDIAC MARKERS ( 22 Sep 2020 18:22 )  <.015 ng/mL / x     / x     / x     / x

## 2020-09-23 NOTE — PROGRESS NOTE ADULT - SUBJECTIVE AND OBJECTIVE BOX
Patient is a 89y old  Male who presents with a chief complaint of fever (24 Sep 2020 09:51)      OVERNIGHT EVENTS:    MEDICATIONS  (STANDING):  cefepime   IVPB 1000 milliGRAM(s) IV Intermittent daily  dextrose 5% + sodium chloride 0.45%. 1000 milliLiter(s) (70 mL/Hr) IV Continuous <Continuous>  finasteride 5 milliGRAM(s) Oral daily  gabapentin 300 milliGRAM(s) Oral two times a day  labetalol 100 milliGRAM(s) Oral two times a day  mirtazapine 7.5 milliGRAM(s) Oral daily  tamsulosin 0.4 milliGRAM(s) Oral at bedtime    MEDICATIONS  (PRN):  acetaminophen   Tablet .. 650 milliGRAM(s) Oral every 6 hours PRN Temp greater or equal to 38C (100.4F)      Allergies    No Known Allergies    Intolerances        SUBJECTIVE: in bed in NAD, no acute events overnight     T(F): 100.4  HR: 105  BP: 128/60  RR: 18   SpO2: 96%   Wt(kg): --    PHYSICAL EXAM:  GENERAL: NAD, well-groomed, well-developed  HEAD:  Atraumatic, Normocephalic  EYES: EOMI, PERRLA, conjunctiva and sclera clear  ENMT: No tonsillar erythema, exudates, or enlargement; Moist mucous membranes, Good dentition, No lesions  NECK: Supple, No JVD, Normal thyroid  CHEST/LUNG: Clear to  auscultation bilaterally; No rales, rhonchi, wheezing, or rubs  bilaterally  HEART: Regular rate and rhythm; No murmurs, rubs, or gallops  ABDOMEN: Soft, Nontender, Nondistended; Bowel sounds present  EXTREMITIES:  2+ Peripheral Pulses, No clubbing, cyanosis, or edema BL LE  SKIN: No rashes or lesions  NERVOUS SYSTEM:  Alert & Oriented X3, Good concentration; Motor Strength 5/5 B/L upper and lower extremities;   DTRs 2+ intact and symmetric, sensation intact BL    LABS:             Urinalysis Basic - ( 22 Sep 2020 13:48 )    Color: Yellow / Appearance: very cloudy / S.005 / pH: x  Gluc: x / Ketone: Negative  / Bili: Negative / Urobili: Negative mg/dL   Blood: x / Protein: 100 mg/dL / Nitrite: Negative   Leuk Esterase: Moderate / RBC: 0-2 /HPF / WBC 26-50   Sq Epi: x / Non Sq Epi: x / Bacteria: TNTC      Cultures;   CAPILLARY BLOOD GLUCOSE      Culture - Urine (collected 22 Sep 2020 18:58)  Source: .Urine Clean Catch (Midstream)  Preliminary Report (23 Sep 2020 20:17):    >100,000 CFU/ml Klebsiella pneumoniae    Culture - Blood (collected 22 Sep 2020 15:10)  Source: .Blood Blood-Peripheral  Preliminary Report (23 Sep 2020 16:01):    No growth to date.    Culture - Blood (collected 22 Sep 2020 15:10)  Source: .Blood Blood-Peripheral  Preliminary Report (23 Sep 2020 16:01):    No growth to date.        Lipid panel:     CARDIAC MARKERS ( 22 Sep 2020 18:22 )  <.015 ng/mL / x     / x     / x     / x            RADIOLOGY & ADDITIONAL TESTS:      Imaging Personally Reviewed:  [ x] YES      Consultant(s) Notes Reviewed:  [x ] YES     Care Discussed with [x ] Consultants [X ] Patient [x ] Family  [x ]    [x ]  Other; RN

## 2020-09-24 DIAGNOSIS — N39.0 URINARY TRACT INFECTION, SITE NOT SPECIFIED: ICD-10-CM

## 2020-09-24 DIAGNOSIS — E87.0 HYPEROSMOLALITY AND HYPERNATREMIA: ICD-10-CM

## 2020-09-24 DIAGNOSIS — D64.9 ANEMIA, UNSPECIFIED: ICD-10-CM

## 2020-09-24 DIAGNOSIS — E44.0 MODERATE PROTEIN-CALORIE MALNUTRITION: ICD-10-CM

## 2020-09-24 DIAGNOSIS — N40.0 BENIGN PROSTATIC HYPERPLASIA WITHOUT LOWER URINARY TRACT SYMPTOMS: ICD-10-CM

## 2020-09-24 LAB
ANION GAP SERPL CALC-SCNC: 9 MMOL/L — SIGNIFICANT CHANGE UP (ref 5–17)
BUN SERPL-MCNC: 35 MG/DL — HIGH (ref 7–23)
CALCIUM SERPL-MCNC: 9.1 MG/DL — SIGNIFICANT CHANGE UP (ref 8.5–10.1)
CHLORIDE SERPL-SCNC: 116 MMOL/L — HIGH (ref 96–108)
CO2 SERPL-SCNC: 23 MMOL/L — SIGNIFICANT CHANGE UP (ref 22–31)
CREAT SERPL-MCNC: 1.72 MG/DL — HIGH (ref 0.5–1.3)
GLUCOSE SERPL-MCNC: 144 MG/DL — HIGH (ref 70–99)
HCT VFR BLD CALC: 23.9 % — LOW (ref 39–50)
HGB BLD-MCNC: 7.5 G/DL — LOW (ref 13–17)
MAGNESIUM SERPL-MCNC: 2 MG/DL — SIGNIFICANT CHANGE UP (ref 1.6–2.6)
MCHC RBC-ENTMCNC: 29.1 PG — SIGNIFICANT CHANGE UP (ref 27–34)
MCHC RBC-ENTMCNC: 31.4 GM/DL — LOW (ref 32–36)
MCV RBC AUTO: 92.6 FL — SIGNIFICANT CHANGE UP (ref 80–100)
NRBC # BLD: 0 /100 WBCS — SIGNIFICANT CHANGE UP (ref 0–0)
PHOSPHATE SERPL-MCNC: 2.9 MG/DL — SIGNIFICANT CHANGE UP (ref 2.5–4.5)
PLATELET # BLD AUTO: 381 K/UL — SIGNIFICANT CHANGE UP (ref 150–400)
POTASSIUM SERPL-MCNC: 3.5 MMOL/L — SIGNIFICANT CHANGE UP (ref 3.5–5.3)
POTASSIUM SERPL-SCNC: 3.5 MMOL/L — SIGNIFICANT CHANGE UP (ref 3.5–5.3)
RBC # BLD: 2.58 M/UL — LOW (ref 4.2–5.8)
RBC # FLD: 16.5 % — HIGH (ref 10.3–14.5)
SODIUM SERPL-SCNC: 148 MMOL/L — HIGH (ref 135–145)
WBC # BLD: 20.92 K/UL — HIGH (ref 3.8–10.5)
WBC # FLD AUTO: 20.92 K/UL — HIGH (ref 3.8–10.5)

## 2020-09-24 PROCEDURE — 99233 SBSQ HOSP IP/OBS HIGH 50: CPT

## 2020-09-24 RX ORDER — SODIUM CHLORIDE 9 MG/ML
1000 INJECTION, SOLUTION INTRAVENOUS
Refills: 0 | Status: DISCONTINUED | OUTPATIENT
Start: 2020-09-24 | End: 2020-10-03

## 2020-09-24 RX ORDER — MEROPENEM 1 G/30ML
500 INJECTION INTRAVENOUS EVERY 12 HOURS
Refills: 0 | Status: DISCONTINUED | OUTPATIENT
Start: 2020-09-24 | End: 2020-09-27

## 2020-09-24 RX ADMIN — GABAPENTIN 300 MILLIGRAM(S): 400 CAPSULE ORAL at 18:16

## 2020-09-24 RX ADMIN — Medication 100 MILLIGRAM(S): at 18:16

## 2020-09-24 RX ADMIN — GABAPENTIN 300 MILLIGRAM(S): 400 CAPSULE ORAL at 06:02

## 2020-09-24 RX ADMIN — SODIUM CHLORIDE 70 MILLILITER(S): 9 INJECTION, SOLUTION INTRAVENOUS at 11:10

## 2020-09-24 RX ADMIN — MIRTAZAPINE 7.5 MILLIGRAM(S): 45 TABLET, ORALLY DISINTEGRATING ORAL at 11:57

## 2020-09-24 RX ADMIN — Medication 100 MILLIGRAM(S): at 06:02

## 2020-09-24 RX ADMIN — FINASTERIDE 5 MILLIGRAM(S): 5 TABLET, FILM COATED ORAL at 11:55

## 2020-09-24 RX ADMIN — TAMSULOSIN HYDROCHLORIDE 0.4 MILLIGRAM(S): 0.4 CAPSULE ORAL at 21:29

## 2020-09-24 RX ADMIN — CEFEPIME 100 MILLIGRAM(S): 1 INJECTION, POWDER, FOR SOLUTION INTRAMUSCULAR; INTRAVENOUS at 12:20

## 2020-09-24 NOTE — CHART NOTE - TREATMENT: THE FOLLOWING DIET HAS BEEN RECOMMENDED
Diet, Soft:   Low Sodium  Supplement Feeding Modality:  Oral  Ensure Enlive Cans or Servings Per Day:  1       Frequency:  Two Times a day (09-24-20 @ 15:41) [Pending Verification By Attending]  Diet, DASH/TLC:   Sodium & Cholesterol Restricted (09-22-20 @ 16:43) [Active]

## 2020-09-24 NOTE — PROGRESS NOTE ADULT - SUBJECTIVE AND OBJECTIVE BOX
Patient is a 89y old  Male who presents with a chief complaint of fever (24 Sep 2020 09:51)      OVERNIGHT EVENTS:      REVIEW OF SYSTEMS: denies chest pain/SOB, diaphoresis, no F/C, cough, dizziness, headache, blurry vision, nausea, vomiting, abdominal pain. Rest unremarkable     MEDICATIONS  (STANDING):  cefepime   IVPB 1000 milliGRAM(s) IV Intermittent daily  dextrose 5% + sodium chloride 0.45%. 1000 milliLiter(s) (70 mL/Hr) IV Continuous <Continuous>  finasteride 5 milliGRAM(s) Oral daily  gabapentin 300 milliGRAM(s) Oral two times a day  labetalol 100 milliGRAM(s) Oral two times a day  mirtazapine 7.5 milliGRAM(s) Oral daily  tamsulosin 0.4 milliGRAM(s) Oral at bedtime    MEDICATIONS  (PRN):  acetaminophen   Tablet .. 650 milliGRAM(s) Oral every 6 hours PRN Temp greater or equal to 38C (100.4F)      Allergies    No Known Allergies    Intolerances        SUBJECTIVE: in bed in NAD, no acute events overnight     T(F): 100 (20 @ 05:20), Max: 102 (20 @ 21:51)  HR: 107 (20 @ 05:20) (93 - 107)  BP: 104/62 (20 @ 05:20) (104/50 - 146/62)  RR: 18 (20 @ 05:20) (18 - 18)  SpO2: 97% (20 @ 05:20) (93% - 98%)  Wt(kg): --    PHYSICAL EXAM:  GENERAL: NAD, well-groomed, well-developed  HEAD:  Atraumatic, Normocephalic  EYES: EOMI, PERRLA, conjunctiva and sclera clear  ENMT: No tonsillar erythema, exudates, or enlargement; Moist mucous membranes, Good dentition, No lesions  NECK: Supple, No JVD, Normal thyroid  CHEST/LUNG: Clear to  auscultation bilaterally; No rales, rhonchi, wheezing, or rubs  bilaterally  HEART: Regular rate and rhythm; No murmurs, rubs, or gallops  ABDOMEN: Soft, Nontender, Nondistended; Bowel sounds present  EXTREMITIES:  2+ Peripheral Pulses, No clubbing, cyanosis, or edema BL LE  SKIN: No rashes or lesions  NERVOUS SYSTEM:  Alert & Oriented X3, Good concentration; Motor Strength 5/5 B/L upper and lower extremities;   DTRs 2+ intact and symmetric, sensation intact BL    LABS:                                          7.5    20.92 )-----------( 381      ( 24 Sep 2020 07:37 )             23.9   09-24    148<H>  |  116<H>  |  35<H>  ----------------------------<  144<H>  3.5   |  23  |  1.72<H>    Ca    9.1      24 Sep 2020 07:37  Phos  2.9     09-24  Mg     2.0     09-24    TPro  8.4<H>  /  Alb  2.3<L>  /  TBili  0.9  /  DBili  x   /  AST  42<H>  /  ALT  25  /  AlkPhos  82  09-22     PTT - ( 22 Sep 2020 12:25 )  PTT:29.3 sec  Urinalysis Basic - ( 22 Sep 2020 13:48 )    Color: Yellow / Appearance: very cloudy / S.005 / pH: x  Gluc: x / Ketone: Negative  / Bili: Negative / Urobili: Negative mg/dL   Blood: x / Protein: 100 mg/dL / Nitrite: Negative   Leuk Esterase: Moderate / RBC: 0-2 /HPF / WBC 26-50   Sq Epi: x / Non Sq Epi: x / Bacteria: TNTC      Cultures;   CAPILLARY BLOOD GLUCOSE      Culture - Urine (collected 22 Sep 2020 18:58)  Source: .Urine Clean Catch (Midstream)  Preliminary Report (23 Sep 2020 20:17):    >100,000 CFU/ml Klebsiella pneumoniae    Culture - Blood (collected 22 Sep 2020 15:10)  Source: .Blood Blood-Peripheral  Preliminary Report (23 Sep 2020 16:01):    No growth to date.    Culture - Blood (collected 22 Sep 2020 15:10)  Source: .Blood Blood-Peripheral  Preliminary Report (23 Sep 2020 16:01):    No growth to date.        Lipid panel:     CARDIAC MARKERS ( 22 Sep 2020 18:22 )  <.015 ng/mL / x     / x     / x     / x            RADIOLOGY & ADDITIONAL TESTS:      Imaging Personally Reviewed:  [ x] YES      Consultant(s) Notes Reviewed:  [x ] YES     Care Discussed with [x ] Consultants [X ] Patient [x ] Family  [x ]    [x ]  Other; RN Patient is a 89y old  Male who presents with a chief complaint of fever (24 Sep 2020 09:51)      OVERNIGHT EVENTS:      REVIEW OF SYSTEMS: denies chest pain/SOB, diaphoresis, no F/C, cough, dizziness, headache, blurry vision, nausea, vomiting, abdominal pain. Rest unremarkable     MEDICATIONS  (STANDING):  cefepime   IVPB 1000 milliGRAM(s) IV Intermittent daily  dextrose 5% + sodium chloride 0.45%. 1000 milliLiter(s) (70 mL/Hr) IV Continuous <Continuous>  finasteride 5 milliGRAM(s) Oral daily  gabapentin 300 milliGRAM(s) Oral two times a day  labetalol 100 milliGRAM(s) Oral two times a day  mirtazapine 7.5 milliGRAM(s) Oral daily  tamsulosin 0.4 milliGRAM(s) Oral at bedtime    MEDICATIONS  (PRN):  acetaminophen   Tablet .. 650 milliGRAM(s) Oral every 6 hours PRN Temp greater or equal to 38C (100.4F)      Allergies    No Known Allergies    Intolerances        SUBJECTIVE: in bed in NAD, no acute events overnight     T(F): 100 (20 @ 05:20), Max: 102 (20 @ 21:51)  HR: 107 (20 @ 05:20) (93 - 107)  BP: 104/62 (20 @ 05:20) (104/50 - 146/62)  RR: 18 (20 @ 05:20) (18 - 18)  SpO2: 97% (20 @ 05:20) (93% - 98%)  Wt(kg): --    PHYSICAL EXAM:  GENERAL: NAD, well-groomed, well-developed, elderly   HEAD:  Atraumatic, Normocephalic  EYES: EOMI, PERRLA, conjunctiva and sclera clear  ENMT: No tonsillar erythema, exudates, or enlargement; Moist mucous membranes, Good dentition, No lesions  NECK: Supple, No JVD, Normal thyroid  CHEST/LUNG: Clear to  auscultation bilaterally; No rales, rhonchi, wheezing, or rubs  bilaterally  HEART: Regular rate and rhythm; No murmurs, rubs, or gallops  ABDOMEN: Soft, Nontender, Nondistended; Bowel sounds present  EXTREMITIES:  2+ Peripheral Pulses, No clubbing, cyanosis, or edema BL LE  SKIN: No rashes or lesions  NERVOUS SYSTEM:  Alert & Oriented X3, Good concentration; Motor Strength 4/5 B/L upper and lower extremities;   DTRs 2+ intact and symmetric, sensation intact BL    LABS:                                          7.5    20.92 )-----------( 381      ( 24 Sep 2020 07:37 )             23.9   09-24    148<H>  |  116<H>  |  35<H>  ----------------------------<  144<H>  3.5   |  23  |  1.72<H>    Ca    9.1      24 Sep 2020 07:37  Phos  2.9     09-24  Mg     2.0     09-24    TPro  8.4<H>  /  Alb  2.3<L>  /  TBili  0.9  /  DBili  x   /  AST  42<H>  /  ALT  25  /  AlkPhos  82  09-22     PTT - ( 22 Sep 2020 12:25 )  PTT:29.3 sec  Urinalysis Basic - ( 22 Sep 2020 13:48 )    Color: Yellow / Appearance: very cloudy / S.005 / pH: x  Gluc: x / Ketone: Negative  / Bili: Negative / Urobili: Negative mg/dL   Blood: x / Protein: 100 mg/dL / Nitrite: Negative   Leuk Esterase: Moderate / RBC: 0-2 /HPF / WBC 26-50   Sq Epi: x / Non Sq Epi: x / Bacteria: TNTC      Cultures;   CAPILLARY BLOOD GLUCOSE      Culture - Urine (collected 22 Sep 2020 18:58)  Source: .Urine Clean Catch (Midstream)  Preliminary Report (23 Sep 2020 20:17):    >100,000 CFU/ml Klebsiella pneumoniae    Culture - Blood (collected 22 Sep 2020 15:10)  Source: .Blood Blood-Peripheral  Preliminary Report (23 Sep 2020 16:01):    No growth to date.    Culture - Blood (collected 22 Sep 2020 15:10)  Source: .Blood Blood-Peripheral  Preliminary Report (23 Sep 2020 16:01):    No growth to date.        Lipid panel:     CARDIAC MARKERS ( 22 Sep 2020 18:22 )  <.015 ng/mL / x     / x     / x     / x            RADIOLOGY & ADDITIONAL TESTS:      Imaging Personally Reviewed:  [ x] YES      Consultant(s) Notes Reviewed:  [x ] YES     Care Discussed with [x ] Consultants [X ] Patient [x ] Family  [x ]    [x ]  Other; RN Patient is a 89y old  Male who presents with a chief complaint of fever (24 Sep 2020 09:51)      OVERNIGHT EVENTS:      REVIEW OF SYSTEMS: denies chest pain/SOB, diaphoresis, no F/C, cough, dizziness, headache, blurry vision, nausea, vomiting, abdominal pain. Rest unremarkable     MEDICATIONS  (STANDING):  cefepime   IVPB 1000 milliGRAM(s) IV Intermittent daily  dextrose 5% + sodium chloride 0.45%. 1000 milliLiter(s) (70 mL/Hr) IV Continuous <Continuous>  finasteride 5 milliGRAM(s) Oral daily  gabapentin 300 milliGRAM(s) Oral two times a day  labetalol 100 milliGRAM(s) Oral two times a day  mirtazapine 7.5 milliGRAM(s) Oral daily  tamsulosin 0.4 milliGRAM(s) Oral at bedtime    MEDICATIONS  (PRN):  acetaminophen   Tablet .. 650 milliGRAM(s) Oral every 6 hours PRN Temp greater or equal to 38C (100.4F)      Allergies    No Known Allergies    Intolerances        SUBJECTIVE: in bed in NAD, no acute events overnight     T(F): 100 (20 @ 05:20), Max: 102 (20 @ 21:51)  HR: 107 (20 @ 05:20) (93 - 107)  BP: 104/62 (20 @ 05:20) (104/50 - 146/62)  RR: 18 (20 @ 05:20) (18 - 18)  SpO2: 97% (20 @ 05:20) (93% - 98%)  Wt(kg): --    PHYSICAL EXAM:  GENERAL: NAD, well-groomed, well-developed, elderly   HEAD:  Atraumatic, Normocephalic  EYES: EOMI, PERRLA, conjunctiva and sclera clear  ENMT: No tonsillar erythema, exudates, or enlargement; Moist mucous membranes, Good dentition, No lesions  NECK: Supple, No JVD, Normal thyroid  CHEST/LUNG: Clear to  auscultation bilaterally; No rales, rhonchi, wheezing, or rubs  bilaterally  HEART: Regular rate and rhythm; No murmurs, rubs, or gallops  ABDOMEN: Soft, Nontender, Nondistended; Bowel sounds present  EXTREMITIES:  2+ Peripheral Pulses, No clubbing, cyanosis, or edema BL LE  SKIN: No rashes or lesions  NERVOUS SYSTEM:  Alert & Oriented X3, Good concentration; Motor Strength 4/5 B/L upper and lower extremities;   DTRs 2+ intact and symmetric, sensation intact BL    LABS:                                          7.5    20.92 )-----------( 381      ( 24 Sep 2020 07:37 )             23.9   09-24    148<H>  |  116<H>  |  35<H>  ----------------------------<  144<H>  3.5   |  23  |  1.72<H>    Ca    9.1      24 Sep 2020 07:37  Phos  2.9     -  Mg     2.0     -24    TPro  8.4<H>  /  Alb  2.3<L>  /  TBili  0.9  /  DBili  x   /  AST  42<H>  /  ALT  25  /  AlkPhos  82  09-22     PTT - ( 22 Sep 2020 12:25 )  PTT:29.3 sec  Urinalysis Basic - ( 22 Sep 2020 13:48 )    Color: Yellow / Appearance: very cloudy / S.005 / pH: x  Gluc: x / Ketone: Negative  / Bili: Negative / Urobili: Negative mg/dL   Blood: x / Protein: 100 mg/dL / Nitrite: Negative   Leuk Esterase: Moderate / RBC: 0-2 /HPF / WBC 26-50   Sq Epi: x / Non Sq Epi: x / Bacteria: TNTC      Cultures;   CAPILLARY BLOOD GLUCOSE      Culture - Urine (collected 22 Sep 2020 18:58)  Source: .Urine Clean Catch (Midstream)  Preliminary Report (23 Sep 2020 20:17):    >100,000 CFU/ml Klebsiella pneumoniae    Culture - Blood (collected 22 Sep 2020 15:10)  Source: .Blood Blood-Peripheral  Preliminary Report (23 Sep 2020 16:01):    No growth to date.    Culture - Blood (collected 22 Sep 2020 15:10)  Source: .Blood Blood-Peripheral  Preliminary Report (23 Sep 2020 16:01):    No growth to date.        Lipid panel:     CARDIAC MARKERS ( 22 Sep 2020 18:22 )  <.015 ng/mL / x     / x     / x     / x            RADIOLOGY & ADDITIONAL TESTS:    < from: Xray Chest 1 View- PORTABLE-Urgent (Xray Chest 1 View- PORTABLE-Urgent .) (20 @ 13:37) >  IMPRESSION:  Trace left pleural effusion with associated atelectasis and/or pneumonia.      < end of copied text >    Imaging Personally Reviewed:  [ x] YES      Consultant(s) Notes Reviewed:  [x ] YES     Care Discussed with [x ] Consultants [X ] Patient [x ] Family  [x ]    [x ]  Other; RN

## 2020-09-24 NOTE — DIETITIAN INITIAL EVALUATION ADULT. - PHYSICAL APPEARANCE
well nourished/other (specify)/BMI= 26.0. 2+ edema: L-leg, L-foot, L-ankle. 3+ edema R-ankle, R-foot. Nutrition focused physical exam conducted:  Subcutaneous fat loss: [moderate ] Orbital fat pads region, [n/a- pt w/dentures ]Buccal fat region, [moderate] ribs region.  Muscle wasting: [moderate ]Temples region, [moderate ]Clavicle region, [ moderate ]Shoulder region.

## 2020-09-24 NOTE — DIETITIAN INITIAL EVALUATION ADULT. - PERTINENT MEDS FT
MEDICATIONS  (STANDING):  cefepime   IVPB 1000 milliGRAM(s) IV Intermittent daily  dextrose 5% + sodium chloride 0.45%. 1000 milliLiter(s) (70 mL/Hr) IV Continuous <Continuous>  finasteride 5 milliGRAM(s) Oral daily  gabapentin 300 milliGRAM(s) Oral two times a day  labetalol 100 milliGRAM(s) Oral two times a day  mirtazapine 7.5 milliGRAM(s) Oral daily  tamsulosin 0.4 milliGRAM(s) Oral at bedtime    MEDICATIONS  (PRN):  acetaminophen   Tablet .. 650 milliGRAM(s) Oral every 6 hours PRN Temp greater or equal to 38C (100.4F)

## 2020-09-24 NOTE — PROGRESS NOTE ADULT - PROBLEM SELECTOR PLAN 7
unclear present since [id number] normally 8-9 now in 7s check stool and iron studies,   empirically will type and cross in am just in cased

## 2020-09-24 NOTE — PROGRESS NOTE ADULT - SUBJECTIVE AND OBJECTIVE BOX
Subjective: agitated, confused.       MEDICATIONS  (STANDING):  cefepime   IVPB 1000 milliGRAM(s) IV Intermittent daily  dextrose 5% + sodium chloride 0.45%. 1000 milliLiter(s) (70 mL/Hr) IV Continuous <Continuous>  finasteride 5 milliGRAM(s) Oral daily  gabapentin 300 milliGRAM(s) Oral two times a day  labetalol 100 milliGRAM(s) Oral two times a day  mirtazapine 7.5 milliGRAM(s) Oral daily  tamsulosin 0.4 milliGRAM(s) Oral at bedtime    MEDICATIONS  (PRN):  acetaminophen   Tablet .. 650 milliGRAM(s) Oral every 6 hours PRN Temp greater or equal to 38C (100.4F)          T(C): 37.8 (20 @ 05:20), Max: 38.9 (20 @ 21:51)  HR: 107 (20 @ 05:20) (93 - 107)  BP: 104/62 (20 @ 05:20) (104/50 - 146/62)  RR: 18 (20 @ 05:20) (18 - 18)  SpO2: 97% (20 @ 05:20) (93% - 98%)  Wt(kg): --        I&O's Detail    23 Sep 2020 07:01  -  24 Sep 2020 07:00  --------------------------------------------------------  IN:    dextrose 5% + sodium chloride 0.9%: 1470 mL    IV PiggyBack: 50 mL    Oral Fluid: 660 mL  Total IN: 2180 mL    OUT:    Indwelling Catheter - Urethral (mL): 700 mL    Voided (mL): 500 mL  Total OUT: 1200 mL    Total NET: 980 mL               PHYSICAL EXAM:    GENERAL: agitated  NECK: Supple, no inc in JVP  CHEST/LUNG: Clear  HEART: S1S2  ABDOMEN: Soft, Nontender, Nondistended; Bowel sounds present  EXTREMITIES: trace edema   NEURO: confused.       LABS:  CBC Full  -  ( 24 Sep 2020 07:37 )  WBC Count : 20.92 K/uL  RBC Count : 2.58 M/uL  Hemoglobin : 7.5 g/dL  Hematocrit : 23.9 %  Platelet Count - Automated : 381 K/uL  Mean Cell Volume : 92.6 fl  Mean Cell Hemoglobin : 29.1 pg  Mean Cell Hemoglobin Concentration : 31.4 gm/dL  Auto Neutrophil # : x  Auto Lymphocyte # : x  Auto Monocyte # : x  Auto Eosinophil # : x  Auto Basophil # : x  Auto Neutrophil % : x  Auto Lymphocyte % : x  Auto Monocyte % : x  Auto Eosinophil % : x  Auto Basophil % : x        148<H>  |  116<H>  |  35<H>  ----------------------------<  144<H>  3.5   |  23  |  1.72<H>    Ca    9.1      24 Sep 2020 07:37  Phos  2.9       Mg     2.0         TPro  8.4<H>  /  Alb  2.3<L>  /  TBili  0.9  /  DBili  x   /  AST  42<H>  /  ALT  25  /  AlkPhos  82      PT/INR - ( 22 Sep 2020 12:25 )   PT: 18.0 sec;   INR: 1.59 ratio         PTT - ( 22 Sep 2020 12:25 )  PTT:29.3 sec  Urinalysis Basic - ( 22 Sep 2020 13:48 )    Color: Yellow / Appearance: very cloudy / S.005 / pH: x  Gluc: x / Ketone: Negative  / Bili: Negative / Urobili: Negative mg/dL   Blood: x / Protein: 100 mg/dL / Nitrite: Negative   Leuk Esterase: Moderate / RBC: 0-2 /HPF / WBC 26-50   Sq Epi: x / Non Sq Epi: x / Bacteria: TNTC      Culture Results:   >100,000 CFU/ml Klebsiella pneumoniae ( @ 18:58)  Culture Results:   No growth to date. ( @ 15:10)  Culture Results:   No growth to date. ( @ 15:10)          Impression:  ROGER secondary to pre-renal azotemia  Emerging hypoNa -- poor oral intake, isotonic saline    Recommendations:  Change IVFs to hypotonic  BMP in am

## 2020-09-24 NOTE — DIETITIAN INITIAL EVALUATION ADULT. - ENERGY NEEDS
Height (cm): 172.7 (09-23)  Weight (kg): 77.6 (09-23)  BMI (kg/m2): 26 (09-23)  IBW: 70 kg   % IBW: 111%      UBW: 165#     %UBW: 103%

## 2020-09-24 NOTE — PROGRESS NOTE ADULT - ASSESSMENT
fevers   urinary tract infection ? from  obrien   fevers started after obrien change   as per RN no cough ;  urine culture klebsiella;in jan 2020; urinary tract infection urine culture pan sensitive kleb   persistent fever and increased wbc   discussed with PMD   vss   patient is non toxic   will revise antibiotics   follow up urine culture   as no response in 48 hours of cefepime may have esbl   will narrow coverage based on culture

## 2020-09-24 NOTE — DIETITIAN INITIAL EVALUATION ADULT. - PERTINENT LABORATORY DATA
09-24 Na148 mmol/L<H> Glu 144 mg/dL<H> K+ 3.5 mmol/L Cr  1.72 mg/dL<H> BUN 35 mg/dL<H> 09-24 Phos 2.9 mg/dL 09-22 Alb 2.3 g/dL<L>

## 2020-09-24 NOTE — PROGRESS NOTE ADULT - SUBJECTIVE AND OBJECTIVE BOX
HPI:  : 89M pmhx HTN, CKD, BPH with chronic obrien. fever and weakness for 8 days. no other symptoms. fever started shortly after hs obrien was changed- patient denies nausea vomiting or diarrhea - complain of abdominal distention      (22 Sep 2020 15:16)      Allergies    No Known Allergies    Intolerances        MEDICATIONS  (STANDING):  cefepime   IVPB 1000 milliGRAM(s) IV Intermittent daily  dextrose 5% + sodium chloride 0.45%. 1000 milliLiter(s) (70 mL/Hr) IV Continuous <Continuous>  finasteride 5 milliGRAM(s) Oral daily  gabapentin 300 milliGRAM(s) Oral two times a day  labetalol 100 milliGRAM(s) Oral two times a day  mirtazapine 7.5 milliGRAM(s) Oral daily  tamsulosin 0.4 milliGRAM(s) Oral at bedtime    MEDICATIONS  (PRN):  acetaminophen   Tablet .. 650 milliGRAM(s) Oral every 6 hours PRN Temp greater or equal to 38C (100.4F)      REVIEW OF SYSTEMS:    CONSTITUTIONAL: No fever, chills, weight loss, or fatigue  HEENT: No sore throat, runny nose, ear ache  RESPIRATORY: No cough, wheezing, No shortness of breath  CARDIOVASCULAR: No chest pain, palpitations, dizziness  GASTROINTESTINAL: No abdominal pain. No nausea, vomiting, diarrhea  GENITOURINARY: No dysuria, increase frequency, hematuria, or incontinence  NEUROLOGICAL: No headaches, memory loss, loss of strength, numbness, or tremors, no weakness  EXTREMITY: No pedal edema BLE  SKIN: No itching, burning, rashes, or lesions     VITAL SIGNS:  T(C): 36.7 (09-24-20 @ 13:05), Max: 38.9 (09-23-20 @ 21:51)  T(F): 98 (09-24-20 @ 13:05), Max: 102 (09-23-20 @ 21:51)  HR: 102 (09-24-20 @ 13:05) (88 - 107)  BP: 111/64 (09-24-20 @ 13:05) (104/62 - 146/62)  RR: 16 (09-24-20 @ 13:05) (16 - 18)  SpO2: 97% (09-24-20 @ 13:05) (93% - 98%)  Wt(kg): --    PHYSICAL EXAM:    GENERAL: not in any distress  HEENT: Neck is supple, normocephalic, atraumatic   CHEST/LUNG: Clear to auscultation bilaterally; No rales, rhonchi, wheezing  HEART: Regular rate and rhythm; No murmurs, rubs, or gallops  ABDOMEN: Soft, Nontender, Nondistended; Bowel sounds present, no rebound   EXTREMITIES:  2+ Peripheral Pulses, No clubbing, cyanosis, or edema  GENITOURINARY:   SKIN: No rashes or lesions  BACK: no pressor sore   NERVOUS SYSTEM:  Alert & Oriented X3, Good concentration  PSYCH: normal affect     LABS:                         7.5    20.92 )-----------( 381      ( 24 Sep 2020 07:37 )             23.9     09-24    148<H>  |  116<H>  |  35<H>  ----------------------------<  144<H>  3.5   |  23  |  1.72<H>    Ca    9.1      24 Sep 2020 07:37  Phos  2.9     09-24  Mg     2.0     09-24              CARDIAC MARKERS ( 22 Sep 2020 18:22 )  <.015 ng/mL / x     / x     / x     / x                        Culture Results:   >100,000 CFU/ml Klebsiella pneumoniae (09-22 @ 18:58)  Culture Results:   No growth to date. (09-22 @ 15:10)  Culture Results:   No growth to date. (09-22 @ 15:10)                Radiology:       HPI:   89M pmhx HTN, CKD, BPH with chronic obrien. fever and weakness for 8 days. no other symptoms. fever started shortly after hs obrien was changed- patient denies nausea vomiting or diarrhea - complain of abdominal distention   all events noted   no info from patient   PMD and renal notes reviewed  day 2 cefepime      (22 Sep 2020 15:16)  102 once this aftrnoon otherwise vss     Allergies    No Known Allergies    Intolerances        MEDICATIONS  (STANDING):  cefepime   IVPB 1000 milliGRAM(s) IV Intermittent daily  dextrose 5% + sodium chloride 0.45%. 1000 milliLiter(s) (70 mL/Hr) IV Continuous <Continuous>  finasteride 5 milliGRAM(s) Oral daily  gabapentin 300 milliGRAM(s) Oral two times a day  labetalol 100 milliGRAM(s) Oral two times a day  mirtazapine 7.5 milliGRAM(s) Oral daily  tamsulosin 0.4 milliGRAM(s) Oral at bedtime    MEDICATIONS  (PRN):  acetaminophen   Tablet .. 650 milliGRAM(s) Oral every 6 hours PRN Temp greater or equal to 38C (100.4F)      REVIEW OF SYSTEMS:  unable to assess   had a fever earlier   agitated   in nad     VITAL SIGNS:  T(C): 36.7 (09-24-20 @ 13:05), Max: 38.9 (09-23-20 @ 21:51)  T(F): 98 (09-24-20 @ 13:05), Max: 102 (09-23-20 @ 21:51)  HR: 102 (09-24-20 @ 13:05) (88 - 107)  BP: 111/64 (09-24-20 @ 13:05) (104/62 - 146/62)  RR: 16 (09-24-20 @ 13:05) (16 - 18)  SpO2: 97% (09-24-20 @ 13:05) (93% - 98%)  Wt(kg): --    PHYSICAL EXAM:    GENERAL: not in any distress  HEENT: Neck is supple, normocephalic, atraumatic   CHEST/LUNG: Clear to auscultation bilaterally; No rales, rhonchi, wheezing  HEART: Regular rate and rhythm; No murmurs, rubs, or gallops  ABDOMEN: Soft, Nontender, Nondistended; Bowel sounds present, no rebound   EXTREMITIES:  2+ Peripheral Pulses, No clubbing, cyanosis, or edema  GENITOURINARY: obrien   SKIN: No rashes or lesions  BACK: no pressor sore   NERVOUS SYSTEM:  Alert &confused; limited exam     LABS:                         7.5    20.92 )-----------( 381      ( 24 Sep 2020 07:37 )             23.9     09-24    148<H>  |  116<H>  |  35<H>  ----------------------------<  144<H>  3.5   |  23  |  1.72<H>    Ca    9.1      24 Sep 2020 07:37  Phos  2.9     09-24  Mg     2.0     09-24              CARDIAC MARKERS ( 22 Sep 2020 18:22 )  <.015 ng/mL / x     / x     / x     / x                        Culture Results:   >100,000 CFU/ml Klebsiella pneumoniae (09-22 @ 18:58)  Culture Results:   No growth to date. (09-22 @ 15:10)  Culture Results:   No growth to date. (09-22 @ 15:10)                Radiology:    < from: US Renal (09.23.20 @ 13:32) >  Impression: Mild bilateral hydronephrosis. Consider CT abdomen and pelvis for further evaluation. Bilateral renal cysts.            DEENA SCHILLING M.D., ATTENDING RADIOLOGIST  This document has been electronically signed. Sep 23 2020  1:47PM    < end of copied text >

## 2020-09-24 NOTE — DIETITIAN INITIAL EVALUATION ADULT. - ETIOLOGY
inadequate protein energy intake in the presence of AMS, stage 2 PU, Renal Insufficiency, advanced age

## 2020-09-24 NOTE — DIETITIAN INITIAL EVALUATION ADULT. - OTHER INFO
Pt adm w/fever, per chart pt w/UTI w/sepsis. Met w/pt at bedside, pt sleepy and lethargic. Spoke to RN at bedside, RN reported pt w/depressed appetite and PO intake, consumes <50% meals. Pt needs & receives assistance w/meals. No N/V/D/C reported. No difficulty chewing/swallowing reported. Pt w/full dentures. Per chart pt is forgetful & disoriented. Per previous adm record pt lives w/daughter. Attempted to speak to dtr Ching Tierney, as contact provided ([phone]), however, unsuccessful. Per previous records wt (01/2020) was 74.9 kg/165# w/no documented edema. Current wt may likely be elevated due to 2+ & 3+ edema, as noted below.

## 2020-09-24 NOTE — PROGRESS NOTE ADULT - PROBLEM SELECTOR PLAN 1
intravenous Rocephin   infectious disease consult-kendra noted  9/24/2020 wbc not down trending . will d/w ID about changing antibx. urine cx klebsiella

## 2020-09-25 LAB
-  AMIKACIN: SIGNIFICANT CHANGE UP
-  AMOXICILLIN/CLAVULANIC ACID: SIGNIFICANT CHANGE UP
-  AMPICILLIN/SULBACTAM: SIGNIFICANT CHANGE UP
-  AMPICILLIN: SIGNIFICANT CHANGE UP
-  AZTREONAM: SIGNIFICANT CHANGE UP
-  CEFAZOLIN: SIGNIFICANT CHANGE UP
-  CEFEPIME: SIGNIFICANT CHANGE UP
-  CEFOXITIN: SIGNIFICANT CHANGE UP
-  CEFTRIAXONE: SIGNIFICANT CHANGE UP
-  CIPROFLOXACIN: SIGNIFICANT CHANGE UP
-  ERTAPENEM: SIGNIFICANT CHANGE UP
-  GENTAMICIN: SIGNIFICANT CHANGE UP
-  IMIPENEM: SIGNIFICANT CHANGE UP
-  LEVOFLOXACIN: SIGNIFICANT CHANGE UP
-  MEROPENEM: SIGNIFICANT CHANGE UP
-  NITROFURANTOIN: SIGNIFICANT CHANGE UP
-  PIPERACILLIN/TAZOBACTAM: SIGNIFICANT CHANGE UP
-  TIGECYCLINE: SIGNIFICANT CHANGE UP
-  TOBRAMYCIN: SIGNIFICANT CHANGE UP
-  TRIMETHOPRIM/SULFAMETHOXAZOLE: SIGNIFICANT CHANGE UP
ANION GAP SERPL CALC-SCNC: 5 MMOL/L — SIGNIFICANT CHANGE UP (ref 5–17)
BLD GP AB SCN SERPL QL: SIGNIFICANT CHANGE UP
BUN SERPL-MCNC: 29 MG/DL — HIGH (ref 7–23)
CALCIUM SERPL-MCNC: 9.3 MG/DL — SIGNIFICANT CHANGE UP (ref 8.5–10.1)
CHLORIDE SERPL-SCNC: 115 MMOL/L — HIGH (ref 96–108)
CO2 SERPL-SCNC: 27 MMOL/L — SIGNIFICANT CHANGE UP (ref 22–31)
CREAT SERPL-MCNC: 1.6 MG/DL — HIGH (ref 0.5–1.3)
CULTURE RESULTS: SIGNIFICANT CHANGE UP
FERRITIN SERPL-MCNC: 1439 NG/ML — HIGH (ref 30–400)
GLUCOSE SERPL-MCNC: 122 MG/DL — HIGH (ref 70–99)
HCT VFR BLD CALC: 23.6 % — LOW (ref 39–50)
HGB BLD-MCNC: 7.5 G/DL — LOW (ref 13–17)
IRON SATN MFR SERPL: 15 % — LOW (ref 16–55)
IRON SATN MFR SERPL: 16 UG/DL — LOW (ref 45–165)
MAGNESIUM SERPL-MCNC: 2 MG/DL — SIGNIFICANT CHANGE UP (ref 1.6–2.6)
MCHC RBC-ENTMCNC: 29.2 PG — SIGNIFICANT CHANGE UP (ref 27–34)
MCHC RBC-ENTMCNC: 31.8 GM/DL — LOW (ref 32–36)
MCV RBC AUTO: 91.8 FL — SIGNIFICANT CHANGE UP (ref 80–100)
METHOD TYPE: SIGNIFICANT CHANGE UP
NRBC # BLD: 0 /100 WBCS — SIGNIFICANT CHANGE UP (ref 0–0)
ORGANISM # SPEC MICROSCOPIC CNT: SIGNIFICANT CHANGE UP
ORGANISM # SPEC MICROSCOPIC CNT: SIGNIFICANT CHANGE UP
PHOSPHATE SERPL-MCNC: 2.8 MG/DL — SIGNIFICANT CHANGE UP (ref 2.5–4.5)
PLATELET # BLD AUTO: 424 K/UL — HIGH (ref 150–400)
POTASSIUM SERPL-MCNC: 3.4 MMOL/L — LOW (ref 3.5–5.3)
POTASSIUM SERPL-SCNC: 3.4 MMOL/L — LOW (ref 3.5–5.3)
RBC # BLD: 2.57 M/UL — LOW (ref 4.2–5.8)
RBC # FLD: 16.8 % — HIGH (ref 10.3–14.5)
SODIUM SERPL-SCNC: 147 MMOL/L — HIGH (ref 135–145)
SPECIMEN SOURCE: SIGNIFICANT CHANGE UP
TIBC SERPL-MCNC: 108 UG/DL — LOW (ref 220–430)
UIBC SERPL-MCNC: 91 UG/DL — LOW (ref 110–370)
WBC # BLD: 21.68 K/UL — HIGH (ref 3.8–10.5)
WBC # FLD AUTO: 21.68 K/UL — HIGH (ref 3.8–10.5)

## 2020-09-25 PROCEDURE — 99232 SBSQ HOSP IP/OBS MODERATE 35: CPT

## 2020-09-25 RX ADMIN — GABAPENTIN 300 MILLIGRAM(S): 400 CAPSULE ORAL at 06:22

## 2020-09-25 RX ADMIN — MIRTAZAPINE 7.5 MILLIGRAM(S): 45 TABLET, ORALLY DISINTEGRATING ORAL at 17:01

## 2020-09-25 RX ADMIN — TAMSULOSIN HYDROCHLORIDE 0.4 MILLIGRAM(S): 0.4 CAPSULE ORAL at 21:27

## 2020-09-25 RX ADMIN — Medication 100 MILLIGRAM(S): at 06:22

## 2020-09-25 RX ADMIN — Medication 100 MILLIGRAM(S): at 17:01

## 2020-09-25 RX ADMIN — MEROPENEM 100 MILLIGRAM(S): 1 INJECTION INTRAVENOUS at 17:02

## 2020-09-25 RX ADMIN — FINASTERIDE 5 MILLIGRAM(S): 5 TABLET, FILM COATED ORAL at 17:01

## 2020-09-25 RX ADMIN — MEROPENEM 100 MILLIGRAM(S): 1 INJECTION INTRAVENOUS at 06:22

## 2020-09-25 RX ADMIN — SODIUM CHLORIDE 70 MILLILITER(S): 9 INJECTION, SOLUTION INTRAVENOUS at 22:46

## 2020-09-25 RX ADMIN — GABAPENTIN 300 MILLIGRAM(S): 400 CAPSULE ORAL at 17:01

## 2020-09-25 NOTE — PROGRESS NOTE ADULT - SUBJECTIVE AND OBJECTIVE BOX
HPI:  : 89M pmhx HTN, CKD, BPH with chronic obrien. fever and weakness for 8 days. no other symptoms. fever started shortly after hs obrien was changed- patient denies nausea vomiting or diarrhea - complain of abdominal distention      (22 Sep 2020 15:16)      Allergies    No Known Allergies    Intolerances        MEDICATIONS  (STANDING):  dextrose 5% + sodium chloride 0.45%. 1000 milliLiter(s) (70 mL/Hr) IV Continuous <Continuous>  finasteride 5 milliGRAM(s) Oral daily  gabapentin 300 milliGRAM(s) Oral two times a day  labetalol 100 milliGRAM(s) Oral two times a day  meropenem  IVPB 500 milliGRAM(s) IV Intermittent every 12 hours  mirtazapine 7.5 milliGRAM(s) Oral daily  tamsulosin 0.4 milliGRAM(s) Oral at bedtime    MEDICATIONS  (PRN):  acetaminophen   Tablet .. 650 milliGRAM(s) Oral every 6 hours PRN Temp greater or equal to 38C (100.4F)      REVIEW OF SYSTEMS:    CONSTITUTIONAL: No fever, chills, weight loss, or fatigue  HEENT: No sore throat, runny nose, ear ache  RESPIRATORY: No cough, wheezing, No shortness of breath  CARDIOVASCULAR: No chest pain, palpitations, dizziness  GASTROINTESTINAL: No abdominal pain. No nausea, vomiting, diarrhea  GENITOURINARY: No dysuria, increase frequency, hematuria, or incontinence  NEUROLOGICAL: No headaches, memory loss, loss of strength, numbness, or tremors, no weakness  EXTREMITY: No pedal edema BLE  SKIN: No itching, burning, rashes, or lesions     VITAL SIGNS:  T(C): 37.1 (09-25-20 @ 06:07), Max: 37.1 (09-24-20 @ 11:51)  T(F): 98.8 (09-25-20 @ 06:07), Max: 98.8 (09-25-20 @ 06:07)  HR: 99 (09-25-20 @ 06:07) (88 - 102)  BP: 116/56 (09-25-20 @ 06:07) (111/64 - 130/63)  RR: 16 (09-25-20 @ 06:07) (16 - 18)  SpO2: 99% (09-25-20 @ 06:07) (95% - 99%)  Wt(kg): --    PHYSICAL EXAM:    GENERAL: not in any distress  HEENT: Neck is supple, normocephalic, atraumatic   CHEST/LUNG: Clear to percussion bilaterally; No rales, rhonchi, wheezing  HEART: Regular rate and rhythm; No murmurs, rubs, or gallops  ABDOMEN: Soft, Nontender, Nondistended; Bowel sounds present, no rebound   EXTREMITIES:  2+ Peripheral Pulses, No clubbing, cyanosis, or edema  GENITOURINARY:   SKIN: No rashes or lesions  BACK: no pressor sore   NERVOUS SYSTEM:  Alert & Oriented X3, Good concentration  PSYCH: normal affect     LABS:                         7.5    21.68 )-----------( 424      ( 25 Sep 2020 08:17 )             23.6     09-24    148<H>  |  116<H>  |  35<H>  ----------------------------<  144<H>  3.5   |  23  |  1.72<H>    Ca    9.1      24 Sep 2020 07:37  Phos  2.9     09-24  Mg     2.0     09-24        Culture Results:   >100,000 CFU/ml Klebsiella pneumoniae (09-22 @ 18:58)  Culture Results:   No growth to date. (09-22 @ 15:10)  Culture Results:   No growth to date. (09-22 @ 15:10)

## 2020-09-25 NOTE — PROGRESS NOTE ADULT - ASSESSMENT
Assessment and Plan     sepsis due to uti  remains with leukocytosis  antibiotics were increased to merrem yesterday   status post fevers afebrile today   renal us mild bilateral hydro   uc + kleb sensitivities pending   cont merrem   follow sensitivities

## 2020-09-25 NOTE — PROGRESS NOTE ADULT - SUBJECTIVE AND OBJECTIVE BOX
Patient is a 89y old  Male who presents with a chief complaint of fever (24 Sep 2020 09:51)      OVERNIGHT EVENTS: No events over night.       REVIEW OF SYSTEMS: denies chest pain/SOB, diaphoresis, no F/C, cough, dizziness, headache, blurry vision, nausea, vomiting, abdominal pain. Rest unremarkable     MEDICATIONS  (STANDING):  dextrose 5% + sodium chloride 0.45%. 1000 milliLiter(s) (70 mL/Hr) IV Continuous <Continuous>  finasteride 5 milliGRAM(s) Oral daily  gabapentin 300 milliGRAM(s) Oral two times a day  labetalol 100 milliGRAM(s) Oral two times a day  meropenem  IVPB 500 milliGRAM(s) IV Intermittent every 12 hours  mirtazapine 7.5 milliGRAM(s) Oral daily  tamsulosin 0.4 milliGRAM(s) Oral at bedtime    MEDICATIONS  (PRN):  acetaminophen   Tablet .. 650 milliGRAM(s) Oral every 6 hours PRN Temp greater or equal to 38C (100.4F)    Allergies    No Known Allergies    Intolerances        SUBJECTIVE: in bed in NAD, no acute events overnight     T(C): 37 (09-25-20 @ 12:27), Max: 37.1 (09-25-20 @ 06:07)  HR: 91 (09-25-20 @ 12:27) (91 - 99)  BP: 119/45 (09-25-20 @ 12:27) (116/56 - 119/45)  RR: 18 (09-25-20 @ 12:27) (16 - 18)  SpO2: 97% (09-25-20 @ 12:27) (97% - 99%)      PHYSICAL EXAM:  GENERAL: NAD, well-groomed, well-developed, elderly   HEAD:  Atraumatic, Normocephalic  EYES: EOMI, conjunctiva and sclera clear  NECK: Supple, No JVD, Normal thyroid  CHEST/LUNG: Clear to  auscultation bilaterally; No rales, rhonchi, wheezing, or rubs  bilaterally  HEART: Regular rate and rhythm; No murmurs, rubs, or gallops  ABDOMEN: Soft, Nontender, Nondistended; Bowel sounds present  EXTREMITIES:  2+ Peripheral Pulses, No clubbing, cyanosis, or edema BL LE  SKIN: No rashes or lesions  NERVOUS SYSTEM:  Alert & Oriented X3, Good concentration; Motor Strength 4/5 B/L upper and lower extremities;   DTRs 2+ intact and symmetric, sensation intact BL                          7.5    21.68 )-----------( 424      ( 25 Sep 2020 08:17 )             23.6               147|115|29<122  3.4|27|1.60  9.3,2.0,2.8  09-25 @ 08:17      Culture - Urine (collected 22 Sep 2020 18:58)  Source: .Urine Clean Catch (Midstream)  Preliminary Report (23 Sep 2020 20:17):    >100,000 CFU/ml Klebsiella pneumoniae    Culture - Blood (collected 22 Sep 2020 15:10)  Source: .Blood Blood-Peripheral  Preliminary Report (23 Sep 2020 16:01):    No growth to date.    Culture - Blood (collected 22 Sep 2020 15:10)  Source: .Blood Blood-Peripheral  Preliminary Report (23 Sep 2020 16:01):    No growth to date.        Lipid panel:     CARDIAC MARKERS ( 22 Sep 2020 18:22 )  <.015 ng/mL / x     / x     / x     / x            RADIOLOGY & ADDITIONAL TESTS:    < from: Xray Chest 1 View- PORTABLE-Urgent (Xray Chest 1 View- PORTABLE-Urgent .) (09.22.20 @ 13:37) >  IMPRESSION:  Trace left pleural effusion with associated atelectasis and/or pneumonia.      < end of copied text >    Imaging Personally Reviewed:  [ x] YES      Consultant(s) Notes Reviewed:  [x ] YES     Care Discussed with [x ] Consultants [X ] Patient [x ] Family  [x ]    [x ]  Other; RN

## 2020-09-25 NOTE — PROGRESS NOTE ADULT - SUBJECTIVE AND OBJECTIVE BOX
NEPHROLOGY PROGRESS NOTE    CHIEF COMPLAINT:  ROGER    HPI:  Renal function better but Na still elevated      EXAM:  T(F): 98.6 (09-25-20 @ 12:27)  HR: 91 (09-25-20 @ 12:27)  BP: 119/45 (09-25-20 @ 12:27)  RR: 18 (09-25-20 @ 12:27)  SpO2: 97% (09-25-20 @ 12:27)    NO apparent distress  Normal respiratory effort, lungs clear bilaterally  Heart RRR with no murmur, no peripheral edema         LABS                             7.5    21.68 )-----------( 424      ( 25 Sep 2020 08:17 )             23.6          09-25    147<H>  |  115<H>  |  29<H>  ----------------------------<  122<H>  3.4<L>   |  27  |  1.60<H>    Ca    9.3      25 Sep 2020 08:17  Phos  2.8     09-25  Mg     2.0     09-25      Impression:  ROGER secondary to pre-renal azotemia, better  Hypernatremia -- slightly better on 1/2 NS    Recommendations:  Continue present IVF and check BMP again in AM

## 2020-09-26 LAB
ANION GAP SERPL CALC-SCNC: 5 MMOL/L — SIGNIFICANT CHANGE UP (ref 5–17)
ANISOCYTOSIS BLD QL: SLIGHT — SIGNIFICANT CHANGE UP
BASOPHILS # BLD AUTO: 0 K/UL — SIGNIFICANT CHANGE UP (ref 0–0.2)
BASOPHILS NFR BLD AUTO: 0 % — SIGNIFICANT CHANGE UP (ref 0–2)
BUN SERPL-MCNC: 34 MG/DL — HIGH (ref 7–23)
CALCIUM SERPL-MCNC: 9.3 MG/DL — SIGNIFICANT CHANGE UP (ref 8.5–10.1)
CHLORIDE SERPL-SCNC: 115 MMOL/L — HIGH (ref 96–108)
CO2 SERPL-SCNC: 26 MMOL/L — SIGNIFICANT CHANGE UP (ref 22–31)
CREAT SERPL-MCNC: 1.6 MG/DL — HIGH (ref 0.5–1.3)
EOSINOPHIL # BLD AUTO: 0.49 K/UL — SIGNIFICANT CHANGE UP (ref 0–0.5)
EOSINOPHIL NFR BLD AUTO: 2 % — SIGNIFICANT CHANGE UP (ref 0–6)
GLUCOSE SERPL-MCNC: 125 MG/DL — HIGH (ref 70–99)
HCT VFR BLD CALC: 22.6 % — LOW (ref 39–50)
HGB BLD-MCNC: 7.3 G/DL — LOW (ref 13–17)
HYPOCHROMIA BLD QL: SLIGHT — SIGNIFICANT CHANGE UP
LYMPHOCYTES # BLD AUTO: 0.73 K/UL — LOW (ref 1–3.3)
LYMPHOCYTES # BLD AUTO: 3 % — LOW (ref 13–44)
MANUAL SMEAR VERIFICATION: SIGNIFICANT CHANGE UP
MCHC RBC-ENTMCNC: 29.2 PG — SIGNIFICANT CHANGE UP (ref 27–34)
MCHC RBC-ENTMCNC: 32.3 GM/DL — SIGNIFICANT CHANGE UP (ref 32–36)
MCV RBC AUTO: 90.4 FL — SIGNIFICANT CHANGE UP (ref 80–100)
MICROCYTES BLD QL: SLIGHT — SIGNIFICANT CHANGE UP
MONOCYTES # BLD AUTO: 0.73 K/UL — SIGNIFICANT CHANGE UP (ref 0–0.9)
MONOCYTES NFR BLD AUTO: 3 % — SIGNIFICANT CHANGE UP (ref 2–14)
NEUTROPHILS # BLD AUTO: 22.2 K/UL — HIGH (ref 1.8–7.4)
NEUTROPHILS NFR BLD AUTO: 87 % — HIGH (ref 43–77)
NEUTS BAND # BLD: 4 % — SIGNIFICANT CHANGE UP (ref 0–8)
NRBC # BLD: 0 /100 — SIGNIFICANT CHANGE UP (ref 0–0)
NRBC # BLD: SIGNIFICANT CHANGE UP /100 WBCS (ref 0–0)
PLAT MORPH BLD: NORMAL — SIGNIFICANT CHANGE UP
PLATELET # BLD AUTO: 441 K/UL — HIGH (ref 150–400)
POTASSIUM SERPL-MCNC: 3.6 MMOL/L — SIGNIFICANT CHANGE UP (ref 3.5–5.3)
POTASSIUM SERPL-SCNC: 3.6 MMOL/L — SIGNIFICANT CHANGE UP (ref 3.5–5.3)
PROMYELOCYTES # FLD: 1 % — HIGH (ref 0–0)
RBC # BLD: 2.5 M/UL — LOW (ref 4.2–5.8)
RBC # FLD: 16.7 % — HIGH (ref 10.3–14.5)
RBC BLD AUTO: SIGNIFICANT CHANGE UP
SCHISTOCYTES BLD QL AUTO: SLIGHT — SIGNIFICANT CHANGE UP
SODIUM SERPL-SCNC: 146 MMOL/L — HIGH (ref 135–145)
WBC # BLD: 24.4 K/UL — HIGH (ref 3.8–10.5)
WBC # FLD AUTO: 24.4 K/UL — HIGH (ref 3.8–10.5)

## 2020-09-26 PROCEDURE — 99233 SBSQ HOSP IP/OBS HIGH 50: CPT

## 2020-09-26 RX ADMIN — GABAPENTIN 300 MILLIGRAM(S): 400 CAPSULE ORAL at 06:03

## 2020-09-26 RX ADMIN — SODIUM CHLORIDE 70 MILLILITER(S): 9 INJECTION, SOLUTION INTRAVENOUS at 21:44

## 2020-09-26 RX ADMIN — FINASTERIDE 5 MILLIGRAM(S): 5 TABLET, FILM COATED ORAL at 11:21

## 2020-09-26 RX ADMIN — GABAPENTIN 300 MILLIGRAM(S): 400 CAPSULE ORAL at 17:10

## 2020-09-26 RX ADMIN — MEROPENEM 100 MILLIGRAM(S): 1 INJECTION INTRAVENOUS at 06:03

## 2020-09-26 RX ADMIN — Medication 100 MILLIGRAM(S): at 06:03

## 2020-09-26 RX ADMIN — MIRTAZAPINE 7.5 MILLIGRAM(S): 45 TABLET, ORALLY DISINTEGRATING ORAL at 11:21

## 2020-09-26 RX ADMIN — MEROPENEM 100 MILLIGRAM(S): 1 INJECTION INTRAVENOUS at 17:10

## 2020-09-26 RX ADMIN — Medication 100 MILLIGRAM(S): at 17:11

## 2020-09-26 RX ADMIN — TAMSULOSIN HYDROCHLORIDE 0.4 MILLIGRAM(S): 0.4 CAPSULE ORAL at 21:42

## 2020-09-26 RX ADMIN — SODIUM CHLORIDE 70 MILLILITER(S): 9 INJECTION, SOLUTION INTRAVENOUS at 11:22

## 2020-09-26 NOTE — PROGRESS NOTE ADULT - ASSESSMENT
89M pmhx HTN, CKD, BPH with chronic obrien who presents with fever and weakness for 8 days. No other symptoms. fever started shortly after his obrien was changed- patient denies nausea vomiting or diarrhea - complain of abdominal distention

## 2020-09-26 NOTE — PROGRESS NOTE ADULT - PROBLEM SELECTOR PLAN 4
Per son, has chronic anemia; old records show baseline Hgb of 8 - 9  Monitor CBC stable in the mid 7's  high Ferritin levels likely due to acute or chronic disease

## 2020-09-26 NOTE — PROGRESS NOTE ADULT - SUBJECTIVE AND OBJECTIVE BOX
Patient is a 89y old  Male who presents with a chief complaint of fever (25 Sep 2020 13:39)      INTERVAL HPI/OVERNIGHT EVENTS:    No specific complaints    REVIEW OF SYSTEMS:   Unable to get full ROS; pt not speaking much    MEDICATIONS  (STANDING):  dextrose 5% + sodium chloride 0.45%. 1000 milliLiter(s) (70 mL/Hr) IV Continuous <Continuous>  finasteride 5 milliGRAM(s) Oral daily  gabapentin 300 milliGRAM(s) Oral two times a day  labetalol 100 milliGRAM(s) Oral two times a day  meropenem  IVPB 500 milliGRAM(s) IV Intermittent every 12 hours  mirtazapine 7.5 milliGRAM(s) Oral daily  tamsulosin 0.4 milliGRAM(s) Oral at bedtime    MEDICATIONS  (PRN):  acetaminophen   Tablet .. 650 milliGRAM(s) Oral every 6 hours PRN Temp greater or equal to 38C (100.4F)      Allergies    No Known Allergies    Intolerances        Vital Signs Last 24 Hrs  T(C): 37.1 (26 Sep 2020 12:31), Max: 37.1 (25 Sep 2020 16:32)  T(F): 98.7 (26 Sep 2020 12:31), Max: 98.7 (25 Sep 2020 16:32)  HR: 91 (26 Sep 2020 12:31) (91 - 98)  BP: 106/47 (26 Sep 2020 12:31) (106/47 - 113/54)  BP(mean): --  RR: 18 (26 Sep 2020 12:31) (18 - 18)  SpO2: 98% (26 Sep 2020 12:31) (98% - 100%)    PHYSICAL EXAM:  GENERAL: NAD; Awake and responsive, but not very talkative  HEAD:  Atraumatic, Normocephalic  EYES: EOMI, PERRLA, conjunctiva and sclera clear  ENT: O/P Clear  NECK: Supple, No JVD  NERVOUS SYSTEM:  No focal deficits  CHEST/LUNG: Clear to percussion bilaterally; No rales, rhonchi, wheezing  HEART: Regular rate and rhythm; No murmurs, rubs, or gallops  ABDOMEN: Soft, Nontender, Nondistended; Bowel sounds present  EXTREMITIES:  2+ Peripheral Pulses, No clubbing, cyanosis, or edema  SKIN: No rashes or lesions    LABS:                        7.3    24.40 )-----------( 441      ( 26 Sep 2020 10:37 )             22.6     09-26    146<H>  |  115<H>  |  34<H>  ----------------------------<  125<H>  3.6   |  26  |  1.60<H>    Ca    9.3      26 Sep 2020 10:37  Phos  2.8     09-25  Mg     2.0     09-25          CAPILLARY BLOOD GLUCOSE          RADIOLOGY & ADDITIONAL TESTS:    Imaging Personally Reviewed:  [ ] YES  [ ] NO    Consultant(s) Notes Reviewed:  [ ] YES  [ ] NO    Care Discussed with Consultants/Other Providers [ ] YES  [ ] NO

## 2020-09-26 NOTE — PROGRESS NOTE ADULT - PROBLEM SELECTOR PLAN 1
Escalated to Meropenem.   Urine cx shows Klebsiella, R to multiple ABX but S to Meropenam  ID following (Marchese)

## 2020-09-27 DIAGNOSIS — Z29.9 ENCOUNTER FOR PROPHYLACTIC MEASURES, UNSPECIFIED: ICD-10-CM

## 2020-09-27 LAB
ANION GAP SERPL CALC-SCNC: 7 MMOL/L — SIGNIFICANT CHANGE UP (ref 5–17)
BUN SERPL-MCNC: 30 MG/DL — HIGH (ref 7–23)
CALCIUM SERPL-MCNC: 9.2 MG/DL — SIGNIFICANT CHANGE UP (ref 8.5–10.1)
CHLORIDE SERPL-SCNC: 115 MMOL/L — HIGH (ref 96–108)
CO2 SERPL-SCNC: 24 MMOL/L — SIGNIFICANT CHANGE UP (ref 22–31)
CREAT SERPL-MCNC: 1.41 MG/DL — HIGH (ref 0.5–1.3)
CULTURE RESULTS: SIGNIFICANT CHANGE UP
CULTURE RESULTS: SIGNIFICANT CHANGE UP
GLUCOSE SERPL-MCNC: 124 MG/DL — HIGH (ref 70–99)
HCT VFR BLD CALC: 21.5 % — LOW (ref 39–50)
HGB BLD-MCNC: 7 G/DL — CRITICAL LOW (ref 13–17)
MAGNESIUM SERPL-MCNC: 2 MG/DL — SIGNIFICANT CHANGE UP (ref 1.6–2.6)
MCHC RBC-ENTMCNC: 29.2 PG — SIGNIFICANT CHANGE UP (ref 27–34)
MCHC RBC-ENTMCNC: 32.6 GM/DL — SIGNIFICANT CHANGE UP (ref 32–36)
MCV RBC AUTO: 89.6 FL — SIGNIFICANT CHANGE UP (ref 80–100)
NRBC # BLD: 0 /100 WBCS — SIGNIFICANT CHANGE UP (ref 0–0)
PHOSPHATE SERPL-MCNC: 3 MG/DL — SIGNIFICANT CHANGE UP (ref 2.5–4.5)
PLATELET # BLD AUTO: 483 K/UL — HIGH (ref 150–400)
POTASSIUM SERPL-MCNC: 3.4 MMOL/L — LOW (ref 3.5–5.3)
POTASSIUM SERPL-SCNC: 3.4 MMOL/L — LOW (ref 3.5–5.3)
RBC # BLD: 2.4 M/UL — LOW (ref 4.2–5.8)
RBC # FLD: 16.4 % — HIGH (ref 10.3–14.5)
SODIUM SERPL-SCNC: 146 MMOL/L — HIGH (ref 135–145)
SPECIMEN SOURCE: SIGNIFICANT CHANGE UP
SPECIMEN SOURCE: SIGNIFICANT CHANGE UP
WBC # BLD: 24.35 K/UL — HIGH (ref 3.8–10.5)
WBC # FLD AUTO: 24.35 K/UL — HIGH (ref 3.8–10.5)

## 2020-09-27 PROCEDURE — 71045 X-RAY EXAM CHEST 1 VIEW: CPT | Mod: 26

## 2020-09-27 PROCEDURE — 99233 SBSQ HOSP IP/OBS HIGH 50: CPT

## 2020-09-27 PROCEDURE — 74018 RADEX ABDOMEN 1 VIEW: CPT | Mod: 26

## 2020-09-27 RX ORDER — POLYETHYLENE GLYCOL 3350 17 G/17G
17 POWDER, FOR SOLUTION ORAL ONCE
Refills: 0 | Status: COMPLETED | OUTPATIENT
Start: 2020-09-27 | End: 2020-09-27

## 2020-09-27 RX ORDER — POTASSIUM CHLORIDE 20 MEQ
10 PACKET (EA) ORAL
Refills: 0 | Status: COMPLETED | OUTPATIENT
Start: 2020-09-27 | End: 2020-09-28

## 2020-09-27 RX ORDER — POLYETHYLENE GLYCOL 3350 17 G/17G
17 POWDER, FOR SOLUTION ORAL AT BEDTIME
Refills: 0 | Status: DISCONTINUED | OUTPATIENT
Start: 2020-09-27 | End: 2020-10-13

## 2020-09-27 RX ORDER — METRONIDAZOLE 500 MG
500 TABLET ORAL EVERY 8 HOURS
Refills: 0 | Status: DISCONTINUED | OUTPATIENT
Start: 2020-09-27 | End: 2020-09-29

## 2020-09-27 RX ORDER — IOHEXOL 300 MG/ML
30 INJECTION, SOLUTION INTRAVENOUS ONCE
Refills: 0 | Status: COMPLETED | OUTPATIENT
Start: 2020-09-27 | End: 2020-09-28

## 2020-09-27 RX ORDER — CEFTRIAXONE 500 MG/1
1000 INJECTION, POWDER, FOR SOLUTION INTRAMUSCULAR; INTRAVENOUS EVERY 24 HOURS
Refills: 0 | Status: DISCONTINUED | OUTPATIENT
Start: 2020-09-27 | End: 2020-09-30

## 2020-09-27 RX ADMIN — TAMSULOSIN HYDROCHLORIDE 0.4 MILLIGRAM(S): 0.4 CAPSULE ORAL at 22:05

## 2020-09-27 RX ADMIN — GABAPENTIN 300 MILLIGRAM(S): 400 CAPSULE ORAL at 18:23

## 2020-09-27 RX ADMIN — POLYETHYLENE GLYCOL 3350 17 GRAM(S): 17 POWDER, FOR SOLUTION ORAL at 22:05

## 2020-09-27 RX ADMIN — POLYETHYLENE GLYCOL 3350 17 GRAM(S): 17 POWDER, FOR SOLUTION ORAL at 11:43

## 2020-09-27 RX ADMIN — SODIUM CHLORIDE 70 MILLILITER(S): 9 INJECTION, SOLUTION INTRAVENOUS at 18:22

## 2020-09-27 RX ADMIN — Medication 10 MILLIEQUIVALENT(S): at 18:23

## 2020-09-27 RX ADMIN — Medication 100 MILLIGRAM(S): at 05:36

## 2020-09-27 RX ADMIN — Medication 100 MILLIGRAM(S): at 18:24

## 2020-09-27 RX ADMIN — MEROPENEM 100 MILLIGRAM(S): 1 INJECTION INTRAVENOUS at 05:35

## 2020-09-27 RX ADMIN — GABAPENTIN 300 MILLIGRAM(S): 400 CAPSULE ORAL at 05:36

## 2020-09-27 RX ADMIN — MEROPENEM 100 MILLIGRAM(S): 1 INJECTION INTRAVENOUS at 18:25

## 2020-09-27 RX ADMIN — FINASTERIDE 5 MILLIGRAM(S): 5 TABLET, FILM COATED ORAL at 11:43

## 2020-09-27 RX ADMIN — MIRTAZAPINE 7.5 MILLIGRAM(S): 45 TABLET, ORALLY DISINTEGRATING ORAL at 11:42

## 2020-09-27 NOTE — PROGRESS NOTE ADULT - PROBLEM SELECTOR PLAN 4
Per son, has chronic anemia; old records show baseline Hgb of 8 - 9  Monitor CBC; has been stable in the mid 7's  Hgb at 7.0 now; will d/w son regarding possible transfusion  high Ferritin levels likely due to acute or chronic disease

## 2020-09-27 NOTE — PROGRESS NOTE ADULT - PROBLEM SELECTOR PLAN 2
Treat UTI as above  Per records, baseline Cr ~1.1  At admission, Cr = 2.36 --> 2.02, 1.72, 1.6, 1.6, 1.41

## 2020-09-27 NOTE — PROGRESS NOTE ADULT - SUBJECTIVE AND OBJECTIVE BOX
Patient is a 89y old  Male who presents with a chief complaint of fever (26 Sep 2020 14:24)      INTERVAL HPI/OVERNIGHT EVENTS:    States he doesn't feel well, but can't explain further; on further questioning, agrees it may be related to abdomen    REVIEW OF SYSTEMS:   Remaining ROS negative    MEDICATIONS  (STANDING):  dextrose 5% + sodium chloride 0.45%. 1000 milliLiter(s) (70 mL/Hr) IV Continuous <Continuous>  finasteride 5 milliGRAM(s) Oral daily  gabapentin 300 milliGRAM(s) Oral two times a day  labetalol 100 milliGRAM(s) Oral two times a day  meropenem  IVPB 500 milliGRAM(s) IV Intermittent every 12 hours  mirtazapine 7.5 milliGRAM(s) Oral daily  tamsulosin 0.4 milliGRAM(s) Oral at bedtime    MEDICATIONS  (PRN):  acetaminophen   Tablet .. 650 milliGRAM(s) Oral every 6 hours PRN Temp greater or equal to 38C (100.4F)      Allergies    No Known Allergies    Intolerances        Vital Signs Last 24 Hrs  T(C): 37 (27 Sep 2020 05:46), Max: 37.5 (26 Sep 2020 23:55)  T(F): 98.6 (27 Sep 2020 05:46), Max: 99.5 (26 Sep 2020 23:55)  HR: 91 (27 Sep 2020 05:46) (91 - 111)  BP: 119/53 (27 Sep 2020 05:46) (106/47 - 133/58)  BP(mean): --  RR: 18 (27 Sep 2020 05:46) (17 - 18)  SpO2: 97% (27 Sep 2020 05:46) (97% - 98%)    PHYSICAL EXAM:  GENERAL: NAD  HEAD:  Atraumatic, Normocephalic  EYES: EOMI, PERRLA, conjunctiva and sclera clear  ENT: O/P Clear  NECK: Supple, No JVD  NERVOUS SYSTEM:  No focal deficits  CHEST/LUNG: Clear to percussion bilaterally; No rales, rhonchi, wheezing  HEART: Regular rate and rhythm; No murmurs, rubs, or gallops  ABDOMEN: Soft, Nontender, distended and resonant; slightly tender.   EXTREMITIES:  2+ Peripheral Pulses, No clubbing, cyanosis, or edema  SKIN: No rashes or lesions    LABS:                        7.0    24.35 )-----------( 483      ( 27 Sep 2020 09:37 )             21.5     09-27    146<H>  |  115<H>  |  30<H>  ----------------------------<  124<H>  3.4<L>   |  24  |  1.41<H>    Ca    9.2      27 Sep 2020 09:37  Phos  3.0     09-27  Mg     2.0     09-27          CAPILLARY BLOOD GLUCOSE          RADIOLOGY & ADDITIONAL TESTS:    Imaging Personally Reviewed:  [ ] YES  [ ] NO    Consultant(s) Notes Reviewed:  [ ] YES  [ ] NO    Care Discussed with Consultants/Other Providers [ ] YES  [ ] NO

## 2020-09-27 NOTE — PROGRESS NOTE ADULT - SUBJECTIVE AND OBJECTIVE BOX
HPI:  : 89M pmhx HTN, CKD, BPH with chronic obrien. fever and weakness for 8 days. no other symptoms. fever started shortly after hs obrien was changed- patient denies nausea vomiting or diarrhea - complain of abdominal distention      (22 Sep 2020 15:16)      Allergies    No Known Allergies    Intolerances        MEDICATIONS  (STANDING):  dextrose 5% + sodium chloride 0.45%. 1000 milliLiter(s) (70 mL/Hr) IV Continuous <Continuous>  finasteride 5 milliGRAM(s) Oral daily  gabapentin 300 milliGRAM(s) Oral two times a day  labetalol 100 milliGRAM(s) Oral two times a day  meropenem  IVPB 500 milliGRAM(s) IV Intermittent every 12 hours  mirtazapine 7.5 milliGRAM(s) Oral daily  polyethylene glycol 3350 17 Gram(s) Oral at bedtime  potassium chloride    Tablet ER 10 milliEquivalent(s) Oral two times a day  tamsulosin 0.4 milliGRAM(s) Oral at bedtime    MEDICATIONS  (PRN):  acetaminophen   Tablet .. 650 milliGRAM(s) Oral every 6 hours PRN Temp greater or equal to 38C (100.4F)      REVIEW OF SYSTEMS:    CONSTITUTIONAL: No fever, chills, weight loss, or fatigue  HEENT: No sore throat, runny nose, ear ache  RESPIRATORY: No cough, wheezing, No shortness of breath  CARDIOVASCULAR: No chest pain, palpitations, dizziness  GASTROINTESTINAL: No abdominal pain. No nausea, vomiting, diarrhea  GENITOURINARY: No dysuria, increase frequency, hematuria, or incontinence  NEUROLOGICAL: No headaches, memory loss, loss of strength, numbness, or tremors, no weakness  EXTREMITY: No pedal edema BLE  SKIN: No itching, burning, rashes, or lesions     VITAL SIGNS:  T(C): 37 (09-27-20 @ 13:37), Max: 37.5 (09-26-20 @ 23:55)  T(F): 98.6 (09-27-20 @ 13:37), Max: 99.5 (09-26-20 @ 23:55)  HR: 89 (09-27-20 @ 13:37) (89 - 111)  BP: 120/77 (09-27-20 @ 13:37) (119/53 - 133/58)  RR: 18 (09-27-20 @ 13:37) (17 - 18)  SpO2: 98% (09-27-20 @ 13:37) (97% - 98%)  Wt(kg): --    PHYSICAL EXAM:    GENERAL: not in any distress  HEENT: Neck is supple, normocephalic, atraumatic   CHEST/LUNG: Clear to auscultation bilaterally; No rales, rhonchi, wheezing  HEART: Regular rate and rhythm; No murmurs, rubs, or gallops  ABDOMEN: Soft, Nontender, Nondistended; Bowel sounds present, no rebound   EXTREMITIES:  2+ Peripheral Pulses, No clubbing, cyanosis, or edema  GENITOURINARY:   SKIN: No rashes or lesions  BACK: no pressor sore   NERVOUS SYSTEM:  Alert & Oriented X3, Good concentration  PSYCH: normal affect     LABS:                         7.0    24.35 )-----------( 483      ( 27 Sep 2020 09:37 )             21.5     09-27    146<H>  |  115<H>  |  30<H>  ----------------------------<  124<H>  3.4<L>   |  24  |  1.41<H>    Ca    9.2      27 Sep 2020 09:37  Phos  3.0     09-27  Mg     2.0     09-27                                Culture Results:   >100,000 CFU/ml Klebsiella pneumoniae (09-22 @ 18:58)  Culture Results:   No Growth Final (09-22 @ 15:10)  Culture Results:   No Growth Final (09-22 @ 15:10)                Radiology:          89M pmhx HTN, CKD, BPH with chronic obrien. fever and weakness for 8 days. no other symptoms. fever started shortly after hs obrien was changed- patient denies nausea vomiting or diarrhea - complain of abdominal distention      (22 Sep 2020 15:16)  work up consistent with urinary tract infection ; high wbc; acute renal failure   on merrem for persistent fevers and kleb in urine      Allergies    No Known Allergies    Intolerances        MEDICATIONS  (STANDING):  dextrose 5% + sodium chloride 0.45%. 1000 milliLiter(s) (70 mL/Hr) IV Continuous <Continuous>  finasteride 5 milliGRAM(s) Oral daily  gabapentin 300 milliGRAM(s) Oral two times a day  labetalol 100 milliGRAM(s) Oral two times a day  meropenem  IVPB 500 milliGRAM(s) IV Intermittent every 12 hours  mirtazapine 7.5 milliGRAM(s) Oral daily  polyethylene glycol 3350 17 Gram(s) Oral at bedtime  potassium chloride    Tablet ER 10 milliEquivalent(s) Oral two times a day  tamsulosin 0.4 milliGRAM(s) Oral at bedtime    MEDICATIONS  (PRN):  acetaminophen   Tablet .. 650 milliGRAM(s) Oral every 6 hours PRN Temp greater or equal to 38C (100.4F)      REVIEW OF SYSTEMS:    poor historian   feels fine   denies nausea vomiting diarrhea     VITAL SIGNS:  T(C): 37 (09-27-20 @ 13:37), Max: 37.5 (09-26-20 @ 23:55)  T(F): 98.6 (09-27-20 @ 13:37), Max: 99.5 (09-26-20 @ 23:55)  HR: 89 (09-27-20 @ 13:37) (89 - 111)  BP: 120/77 (09-27-20 @ 13:37) (119/53 - 133/58)  RR: 18 (09-27-20 @ 13:37) (17 - 18)  SpO2: 98% (09-27-20 @ 13:37) (97% - 98%)  Wt(kg): --    PHYSICAL EXAM:    GENERAL: not in any distress  HEENT: Neck is supple, normocephalic, atraumatic   CHEST/LUNG: Clear to auscultation bilaterally; No rales, rhonchi, wheezing  HEART: Regular rate and rhythm; No murmurs, rubs, or gallops  ABDOMEN: Soft, Nontender, Nondistended; Bowel sounds present, no rebound   EXTREMITIES:  2+ Peripheral Pulses, No clubbing, cyanosis, or edema  SKIN: No rashes or lesions  BACK: no pressor sore   NERVOUS SYSTEM:  Alert & Oriented X2  LABS:                         7.0    24.35 )-----------( 483      ( 27 Sep 2020 09:37 )             21.5     09-27    146<H>  |  115<H>  |  30<H>  ----------------------------<  124<H>  3.4<L>   |  24  |  1.41<H>    Ca    9.2      27 Sep 2020 09:37  Phos  3.0     09-27  Mg     2.0     09-27                                Culture Results:   >100,000 CFU/ml Klebsiella pneumoniae (09-22 @ 18:58)  Culture Results:   No Growth Final (09-22 @ 15:10)  Culture Results:   No Growth Final (09-22 @ 15:10)                Radiology:    ct< from: Xray Chest 1 View- PORTABLE-Routine (Xray Chest 1 View- PORTABLE-Routine .) (09.27.20 @ 13:24) >    IMPRESSION:   No evidence of active chest disease.    Lateral radiograph recommended to exclude posterior lung base pathology.            IRMA TAYLOR M.D., ATTENDING RADIOLOGIST  This document has been electronically signed. Sep 27 02344:13PM    < end of copied text >  ct< from: US Renal (09.23.20 @ 13:32) >  Impression: Mild bilateral hydronephrosis. Consider CT abdomen and pelvis for further evaluation. Bilateral renal cysts.            DEENA SCHILLING M.D., ATTENDING RADIOLOGIST  This document has been electronically signed. Sep 23 2020  1:47PM    < end of copied text >

## 2020-09-28 LAB
ANION GAP SERPL CALC-SCNC: 7 MMOL/L — SIGNIFICANT CHANGE UP (ref 5–17)
BUN SERPL-MCNC: 26 MG/DL — HIGH (ref 7–23)
CALCIUM SERPL-MCNC: 9.2 MG/DL — SIGNIFICANT CHANGE UP (ref 8.5–10.1)
CHLORIDE SERPL-SCNC: 113 MMOL/L — HIGH (ref 96–108)
CO2 SERPL-SCNC: 24 MMOL/L — SIGNIFICANT CHANGE UP (ref 22–31)
CREAT SERPL-MCNC: 1.28 MG/DL — SIGNIFICANT CHANGE UP (ref 0.5–1.3)
GLUCOSE SERPL-MCNC: 117 MG/DL — HIGH (ref 70–99)
HCT VFR BLD CALC: 21 % — CRITICAL LOW (ref 39–50)
HGB BLD-MCNC: 6.8 G/DL — CRITICAL LOW (ref 13–17)
MCHC RBC-ENTMCNC: 29.2 PG — SIGNIFICANT CHANGE UP (ref 27–34)
MCHC RBC-ENTMCNC: 32.4 GM/DL — SIGNIFICANT CHANGE UP (ref 32–36)
MCV RBC AUTO: 90.1 FL — SIGNIFICANT CHANGE UP (ref 80–100)
NRBC # BLD: 0 /100 WBCS — SIGNIFICANT CHANGE UP (ref 0–0)
PLATELET # BLD AUTO: 474 K/UL — HIGH (ref 150–400)
POTASSIUM SERPL-MCNC: 3.4 MMOL/L — LOW (ref 3.5–5.3)
POTASSIUM SERPL-SCNC: 3.4 MMOL/L — LOW (ref 3.5–5.3)
RBC # BLD: 2.33 M/UL — LOW (ref 4.2–5.8)
RBC # FLD: 16.6 % — HIGH (ref 10.3–14.5)
SODIUM SERPL-SCNC: 144 MMOL/L — SIGNIFICANT CHANGE UP (ref 135–145)
WBC # BLD: 20.26 K/UL — HIGH (ref 3.8–10.5)
WBC # FLD AUTO: 20.26 K/UL — HIGH (ref 3.8–10.5)

## 2020-09-28 PROCEDURE — 74176 CT ABD & PELVIS W/O CONTRAST: CPT | Mod: 26

## 2020-09-28 PROCEDURE — 99233 SBSQ HOSP IP/OBS HIGH 50: CPT

## 2020-09-28 RX ORDER — POTASSIUM CHLORIDE 20 MEQ
10 PACKET (EA) ORAL THREE TIMES A DAY
Refills: 0 | Status: COMPLETED | OUTPATIENT
Start: 2020-09-28 | End: 2020-09-29

## 2020-09-28 RX ADMIN — Medication 10 MILLIEQUIVALENT(S): at 14:03

## 2020-09-28 RX ADMIN — Medication 10 MILLIEQUIVALENT(S): at 21:58

## 2020-09-28 RX ADMIN — CEFTRIAXONE 100 MILLIGRAM(S): 500 INJECTION, POWDER, FOR SOLUTION INTRAMUSCULAR; INTRAVENOUS at 00:19

## 2020-09-28 RX ADMIN — TAMSULOSIN HYDROCHLORIDE 0.4 MILLIGRAM(S): 0.4 CAPSULE ORAL at 21:58

## 2020-09-28 RX ADMIN — Medication 100 MILLIGRAM(S): at 06:13

## 2020-09-28 RX ADMIN — GABAPENTIN 300 MILLIGRAM(S): 400 CAPSULE ORAL at 06:13

## 2020-09-28 RX ADMIN — Medication 10 MILLIEQUIVALENT(S): at 06:13

## 2020-09-28 RX ADMIN — Medication 500 MILLIGRAM(S): at 21:58

## 2020-09-28 RX ADMIN — POLYETHYLENE GLYCOL 3350 17 GRAM(S): 17 POWDER, FOR SOLUTION ORAL at 21:58

## 2020-09-28 RX ADMIN — FINASTERIDE 5 MILLIGRAM(S): 5 TABLET, FILM COATED ORAL at 11:57

## 2020-09-28 RX ADMIN — Medication 500 MILLIGRAM(S): at 14:03

## 2020-09-28 RX ADMIN — Medication 500 MILLIGRAM(S): at 06:14

## 2020-09-28 RX ADMIN — MIRTAZAPINE 7.5 MILLIGRAM(S): 45 TABLET, ORALLY DISINTEGRATING ORAL at 11:57

## 2020-09-28 RX ADMIN — IOHEXOL 30 MILLILITER(S): 300 INJECTION, SOLUTION INTRAVENOUS at 14:07

## 2020-09-28 RX ADMIN — SODIUM CHLORIDE 70 MILLILITER(S): 9 INJECTION, SOLUTION INTRAVENOUS at 20:28

## 2020-09-28 NOTE — PROGRESS NOTE ADULT - SUBJECTIVE AND OBJECTIVE BOX
HPI:  89M pmhx HTN, CKD, BPH with chronic obrien. fever and weakness for 8 days. no other symptoms. fever started shortly after hs obrien was changed- patient denies nausea vomiting or diarrhea - complain of abdominal distention      (22 Sep 2020 15:16)      Allergies    No Known Allergies    Intolerances        MEDICATIONS  (STANDING):  cefTRIAXone   IVPB 1000 milliGRAM(s) IV Intermittent every 24 hours  dextrose 5% + sodium chloride 0.45%. 1000 milliLiter(s) (70 mL/Hr) IV Continuous <Continuous>  finasteride 5 milliGRAM(s) Oral daily  gabapentin 300 milliGRAM(s) Oral two times a day  iohexol 300 mG (iodine)/mL Oral Solution 30 milliLiter(s) Oral once  labetalol 100 milliGRAM(s) Oral two times a day  metroNIDAZOLE    Tablet 500 milliGRAM(s) Oral every 8 hours  mirtazapine 7.5 milliGRAM(s) Oral daily  polyethylene glycol 3350 17 Gram(s) Oral at bedtime  potassium chloride    Tablet ER 10 milliEquivalent(s) Oral three times a day  tamsulosin 0.4 milliGRAM(s) Oral at bedtime    MEDICATIONS  (PRN):  acetaminophen   Tablet .. 650 milliGRAM(s) Oral every 6 hours PRN Temp greater or equal to 38C (100.4F)      REVIEW OF SYSTEMS:    CONSTITUTIONAL: No fever, chills, weight loss, or fatigue  HEENT: No sore throat, runny nose, ear ache  RESPIRATORY: No cough, wheezing, No shortness of breath  CARDIOVASCULAR: No chest pain, palpitations, dizziness  GASTROINTESTINAL: No abdominal pain. No nausea, vomiting, diarrhea  GENITOURINARY: No dysuria, increase frequency, hematuria, or incontinence  NEUROLOGICAL: No headaches, memory loss, loss of strength, numbness, or tremors, no weakness  EXTREMITY: No pedal edema BLE  SKIN: No itching, burning, rashes, or lesions     VITAL SIGNS:  T(C): 36.7 (09-28-20 @ 06:31), Max: 37.2 (09-27-20 @ 18:40)  T(F): 98 (09-28-20 @ 06:31), Max: 98.9 (09-27-20 @ 18:40)  HR: 92 (09-28-20 @ 06:31) (89 - 92)  BP: 119/65 (09-28-20 @ 06:31) (116/51 - 140/58)  RR: 18 (09-28-20 @ 06:31) (17 - 18)  SpO2: 93% (09-28-20 @ 06:31) (93% - 98%)  Wt(kg): --    PHYSICAL EXAM:    GENERAL: not in any distress  HEENT: Neck is supple, normocephalic, atraumatic   CHEST/LUNG: Clear to percussion bilaterally; No rales, rhonchi, wheezing  HEART: Regular rate and rhythm; No murmurs, rubs, or gallops  ABDOMEN: Soft, Nontender, Nondistended; Bowel sounds present, no rebound   EXTREMITIES:  2+ Peripheral Pulses, No clubbing, cyanosis, or edema  GENITOURINARY:   SKIN: No rashes or lesions  BACK: no pressor sore   NERVOUS SYSTEM:  Alert & Oriented X3, Good concentration  PSYCH: normal affect     LABS:                         6.8    20.26 )-----------( 474      ( 28 Sep 2020 06:23 )             21.0     09-28    144  |  113<H>  |  26<H>  ----------------------------<  117<H>  3.4<L>   |  24  |  1.28    Ca    9.2      28 Sep 2020 06:23  Phos  3.0     09-27  Mg     2.0     09-27    Culture Results:   >100,000 CFU/ml Klebsiella pneumoniae (09-22 @ 18:58)  Culture Results:   No Growth Final (09-22 @ 15:10)  Culture Results:   No Growth Final (09-22 @ 15:10)      Radiology:       HPI:  89M pmhx HTN, CKD, BPH with chronic obrien. fever and weakness for 8 days. no other symptoms. fever started shortly after hs obrien was changed- patient denies nausea vomiting or diarrhea - complain of abdominal distention      (22 Sep 2020 15:16)      Allergies    No Known Allergies    Intolerances        MEDICATIONS  (STANDING):  cefTRIAXone   IVPB 1000 milliGRAM(s) IV Intermittent every 24 hours  dextrose 5% + sodium chloride 0.45%. 1000 milliLiter(s) (70 mL/Hr) IV Continuous <Continuous>  finasteride 5 milliGRAM(s) Oral daily  gabapentin 300 milliGRAM(s) Oral two times a day  iohexol 300 mG (iodine)/mL Oral Solution 30 milliLiter(s) Oral once  labetalol 100 milliGRAM(s) Oral two times a day  metroNIDAZOLE    Tablet 500 milliGRAM(s) Oral every 8 hours  mirtazapine 7.5 milliGRAM(s) Oral daily  polyethylene glycol 3350 17 Gram(s) Oral at bedtime  potassium chloride    Tablet ER 10 milliEquivalent(s) Oral three times a day  tamsulosin 0.4 milliGRAM(s) Oral at bedtime    MEDICATIONS  (PRN):  acetaminophen   Tablet .. 650 milliGRAM(s) Oral every 6 hours PRN Temp greater or equal to 38C (100.4F)      REVIEW OF SYSTEMS:    CONSTITUTIONAL: No fever, chills, weight loss, or fatigue  HEENT: No sore throat, runny nose, ear ache  RESPIRATORY: No cough, wheezing, No shortness of breath  CARDIOVASCULAR: No chest pain, palpitations, dizziness  GASTROINTESTINAL: No abdominal pain. No nausea, vomiting, diarrhea  GENITOURINARY: No dysuria, increase frequency, hematuria, or incontinence  NEUROLOGICAL: No headaches, memory loss, loss of strength, numbness, or tremors, no weakness  EXTREMITY: No pedal edema BLE  SKIN: No itching, burning, rashes, or lesions     VITAL SIGNS:  T(C): 36.7 (09-28-20 @ 06:31), Max: 37.2 (09-27-20 @ 18:40)  T(F): 98 (09-28-20 @ 06:31), Max: 98.9 (09-27-20 @ 18:40)  HR: 92 (09-28-20 @ 06:31) (89 - 92)  BP: 119/65 (09-28-20 @ 06:31) (116/51 - 140/58)  RR: 18 (09-28-20 @ 06:31) (17 - 18)  SpO2: 93% (09-28-20 @ 06:31) (93% - 98%)  Wt(kg): --    PHYSICAL EXAM:    GENERAL: Dementia  HEENT: Neck is supple, normocephalic, atraumatic   CHEST/LUNG: Clear to percussion bilaterally; No rales, rhonchi, wheezing  HEART: Regular rate and rhythm; No murmurs, rubs, or gallops  ABDOMEN: Soft, Nontender, Nondistended; Bowel sounds present, no rebound   EXTREMITIES:  Atrophy of LE  GENITOURINARY: Obrien  SKIN: No rashes or lesions      LABS:                         6.8    20.26 )-----------( 474      ( 28 Sep 2020 06:23 )             21.0     09-28    144  |  113<H>  |  26<H>  ----------------------------<  117<H>  3.4<L>   |  24  |  1.28    Ca    9.2      28 Sep 2020 06:23  Phos  3.0     09-27  Mg     2.0     09-27    Culture Results:   >100,000 CFU/ml Klebsiella pneumoniae (09-22 @ 18:58)  Culture Results:   No Growth Final (09-22 @ 15:10)  Culture Results:   No Growth Final (09-22 @ 15:10)      Radiology:

## 2020-09-28 NOTE — PROGRESS NOTE ADULT - ASSESSMENT
Patient is being seen for fevers/ urinary tract infection ? from  Ma.  fevers started after Ma change.    Afebrile  Significant leukocytosis noted.  Unclear etiology??      urine culture  klebsiella not reported as esbl     Renal US: Mild B/L hydronephrosis    CXR: No infiltrates seen      s/p Cefepime, then Merrem  Now on Ceftriaxone and Flagyl  Pending Abd-pelvis CT

## 2020-09-28 NOTE — PROGRESS NOTE ADULT - SUBJECTIVE AND OBJECTIVE BOX
Patient is a 89y old  Male who presents with a chief complaint of fever (28 Sep 2020 09:08)      INTERVAL HPI/OVERNIGHT EVENTS:    Feels about the same; no complaints    REVIEW OF SYSTEMS:   Remaining ROS negative    MEDICATIONS  (STANDING):  cefTRIAXone   IVPB 1000 milliGRAM(s) IV Intermittent every 24 hours  dextrose 5% + sodium chloride 0.45%. 1000 milliLiter(s) (70 mL/Hr) IV Continuous <Continuous>  finasteride 5 milliGRAM(s) Oral daily  gabapentin 300 milliGRAM(s) Oral two times a day  iohexol 300 mG (iodine)/mL Oral Solution 30 milliLiter(s) Oral once  labetalol 100 milliGRAM(s) Oral two times a day  metroNIDAZOLE    Tablet 500 milliGRAM(s) Oral every 8 hours  mirtazapine 7.5 milliGRAM(s) Oral daily  polyethylene glycol 3350 17 Gram(s) Oral at bedtime  potassium chloride    Tablet ER 10 milliEquivalent(s) Oral three times a day  tamsulosin 0.4 milliGRAM(s) Oral at bedtime    MEDICATIONS  (PRN):  acetaminophen   Tablet .. 650 milliGRAM(s) Oral every 6 hours PRN Temp greater or equal to 38C (100.4F)      Allergies    No Known Allergies    Intolerances        Vital Signs Last 24 Hrs  T(C): 36.7 (28 Sep 2020 06:31), Max: 37.2 (27 Sep 2020 18:40)  T(F): 98 (28 Sep 2020 06:31), Max: 98.9 (27 Sep 2020 18:40)  HR: 92 (28 Sep 2020 06:31) (89 - 92)  BP: 119/65 (28 Sep 2020 06:31) (116/51 - 140/58)  BP(mean): --  RR: 18 (28 Sep 2020 06:31) (17 - 18)  SpO2: 93% (28 Sep 2020 06:31) (93% - 98%)    PHYSICAL EXAM:  GENERAL: NAD  HEAD:  Atraumatic, Normocephalic  EYES: EOMI, PERRLA, conjunctiva and sclera clear  ENT: O/P Clear  NECK: Supple, No JVD  NERVOUS SYSTEM:  No focal deficits  CHEST/LUNG: Clear to percussion bilaterally; No rales, rhonchi, wheezing  HEART: Regular rate and rhythm; No murmurs, rubs, or gallops  ABDOMEN: Soft, Nontender, Nondistended; Bowel sounds present  EXTREMITIES:  2+ Peripheral Pulses, No clubbing, cyanosis, or edema  SKIN: No rashes or lesions    LABS:                        6.8    20.26 )-----------( 474      ( 28 Sep 2020 06:23 )             21.0     09-28    144  |  113<H>  |  26<H>  ----------------------------<  117<H>  3.4<L>   |  24  |  1.28    Ca    9.2      28 Sep 2020 06:23  Phos  3.0     09-27  Mg     2.0     09-27          CAPILLARY BLOOD GLUCOSE          RADIOLOGY & ADDITIONAL TESTS:    Imaging Personally Reviewed:  [ ] YES  [ ] NO    Consultant(s) Notes Reviewed:  [ ] YES  [ ] NO    Care Discussed with Consultants/Other Providers [ ] YES  [ ] NO

## 2020-09-28 NOTE — PROGRESS NOTE ADULT - PROBLEM SELECTOR PLAN 2
Treat UTI as above  Per records, baseline Cr ~1.1  At admission, Cr = 2.36 --> 2.02, 1.72, 1.6, 1.6, 1.41, 1.28

## 2020-09-28 NOTE — PROGRESS NOTE ADULT - PROBLEM SELECTOR PLAN 1
Escalated to Meropenem.   Urine cx shows Klebsiella, R to multiple ABX but S to Meropenam  ID following ([facility]chese)  Due to persistent WBC's, changed to Ceftriaxone + Flagyl  CT of abdomen ordered/pending  WBCs finally starting to come down

## 2020-09-28 NOTE — PROGRESS NOTE ADULT - PROBLEM SELECTOR PLAN 4
Per son, has chronic anemia; old records show baseline Hgb of 8 - 9  Monitor CBC; has been stable in the mid 7's  Hgb at 6.8 now, which is likely to be dilutional in nature, but due to the chronically anemic baseline, would benefit from transfusion;  1 unit ordered...  high Ferritin levels likely due to acute or chronic disease

## 2020-09-29 ENCOUNTER — APPOINTMENT (OUTPATIENT)
Dept: ELECTROPHYSIOLOGY | Facility: CLINIC | Age: 85
End: 2020-09-29
Payer: MEDICARE

## 2020-09-29 DIAGNOSIS — R50.9 FEVER, UNSPECIFIED: ICD-10-CM

## 2020-09-29 LAB
ANION GAP SERPL CALC-SCNC: 7 MMOL/L — SIGNIFICANT CHANGE UP (ref 5–17)
APPEARANCE UR: CLEAR — SIGNIFICANT CHANGE UP
BACTERIA # UR AUTO: ABNORMAL
BASOPHILS # BLD AUTO: 0.04 K/UL — SIGNIFICANT CHANGE UP (ref 0–0.2)
BASOPHILS NFR BLD AUTO: 0.2 % — SIGNIFICANT CHANGE UP (ref 0–2)
BILIRUB UR-MCNC: NEGATIVE — SIGNIFICANT CHANGE UP
BUN SERPL-MCNC: 23 MG/DL — SIGNIFICANT CHANGE UP (ref 7–23)
CALCIUM SERPL-MCNC: 9.1 MG/DL — SIGNIFICANT CHANGE UP (ref 8.5–10.1)
CHLORIDE SERPL-SCNC: 112 MMOL/L — HIGH (ref 96–108)
CO2 SERPL-SCNC: 24 MMOL/L — SIGNIFICANT CHANGE UP (ref 22–31)
COLOR SPEC: YELLOW — SIGNIFICANT CHANGE UP
CREAT SERPL-MCNC: 1.26 MG/DL — SIGNIFICANT CHANGE UP (ref 0.5–1.3)
DIFF PNL FLD: ABNORMAL
EOSINOPHIL # BLD AUTO: 0.26 K/UL — SIGNIFICANT CHANGE UP (ref 0–0.5)
EOSINOPHIL NFR BLD AUTO: 1.6 % — SIGNIFICANT CHANGE UP (ref 0–6)
EPI CELLS # UR: SIGNIFICANT CHANGE UP
GLUCOSE SERPL-MCNC: 121 MG/DL — HIGH (ref 70–99)
GLUCOSE UR QL: NEGATIVE MG/DL — SIGNIFICANT CHANGE UP
GRAN CASTS # UR COMP ASSIST: ABNORMAL /LPF
HCT VFR BLD CALC: 23.8 % — LOW (ref 39–50)
HGB BLD-MCNC: 8 G/DL — LOW (ref 13–17)
IMM GRANULOCYTES NFR BLD AUTO: 2 % — HIGH (ref 0–1.5)
KETONES UR-MCNC: NEGATIVE — SIGNIFICANT CHANGE UP
LEUKOCYTE ESTERASE UR-ACNC: ABNORMAL
LYMPHOCYTES # BLD AUTO: 1.02 K/UL — SIGNIFICANT CHANGE UP (ref 1–3.3)
LYMPHOCYTES # BLD AUTO: 6.1 % — LOW (ref 13–44)
MAGNESIUM SERPL-MCNC: 2.2 MG/DL — SIGNIFICANT CHANGE UP (ref 1.6–2.6)
MCHC RBC-ENTMCNC: 29.4 PG — SIGNIFICANT CHANGE UP (ref 27–34)
MCHC RBC-ENTMCNC: 33.6 GM/DL — SIGNIFICANT CHANGE UP (ref 32–36)
MCV RBC AUTO: 87.5 FL — SIGNIFICANT CHANGE UP (ref 80–100)
MONOCYTES # BLD AUTO: 1.28 K/UL — HIGH (ref 0–0.9)
MONOCYTES NFR BLD AUTO: 7.7 % — SIGNIFICANT CHANGE UP (ref 2–14)
NEUTROPHILS # BLD AUTO: 13.74 K/UL — HIGH (ref 1.8–7.4)
NEUTROPHILS NFR BLD AUTO: 82.4 % — HIGH (ref 43–77)
NITRITE UR-MCNC: NEGATIVE — SIGNIFICANT CHANGE UP
NRBC # BLD: 0 /100 WBCS — SIGNIFICANT CHANGE UP (ref 0–0)
PH UR: 5 — SIGNIFICANT CHANGE UP (ref 5–8)
PHOSPHATE SERPL-MCNC: 2.5 MG/DL — SIGNIFICANT CHANGE UP (ref 2.5–4.5)
PLATELET # BLD AUTO: 510 K/UL — HIGH (ref 150–400)
POTASSIUM SERPL-MCNC: 3.6 MMOL/L — SIGNIFICANT CHANGE UP (ref 3.5–5.3)
POTASSIUM SERPL-SCNC: 3.6 MMOL/L — SIGNIFICANT CHANGE UP (ref 3.5–5.3)
PROT UR-MCNC: 100 MG/DL
RBC # BLD: 2.72 M/UL — LOW (ref 4.2–5.8)
RBC # FLD: 16.1 % — HIGH (ref 10.3–14.5)
RBC CASTS # UR COMP ASSIST: ABNORMAL /HPF (ref 0–4)
SODIUM SERPL-SCNC: 143 MMOL/L — SIGNIFICANT CHANGE UP (ref 135–145)
SP GR SPEC: 1.01 — SIGNIFICANT CHANGE UP (ref 1.01–1.02)
UROBILINOGEN FLD QL: NEGATIVE MG/DL — SIGNIFICANT CHANGE UP
WBC # BLD: 16.67 K/UL — HIGH (ref 3.8–10.5)
WBC # FLD AUTO: 16.67 K/UL — HIGH (ref 3.8–10.5)
WBC UR QL: ABNORMAL

## 2020-09-29 PROCEDURE — 99233 SBSQ HOSP IP/OBS HIGH 50: CPT

## 2020-09-29 PROCEDURE — 71045 X-RAY EXAM CHEST 1 VIEW: CPT | Mod: 26

## 2020-09-29 PROCEDURE — G2066: CPT

## 2020-09-29 PROCEDURE — 93298 REM INTERROG DEV EVAL SCRMS: CPT

## 2020-09-29 RX ORDER — ACETAMINOPHEN 500 MG
650 TABLET ORAL ONCE
Refills: 0 | Status: COMPLETED | OUTPATIENT
Start: 2020-09-29 | End: 2020-09-29

## 2020-09-29 RX ADMIN — CEFTRIAXONE 100 MILLIGRAM(S): 500 INJECTION, POWDER, FOR SOLUTION INTRAMUSCULAR; INTRAVENOUS at 00:24

## 2020-09-29 RX ADMIN — FINASTERIDE 5 MILLIGRAM(S): 5 TABLET, FILM COATED ORAL at 11:33

## 2020-09-29 RX ADMIN — Medication 650 MILLIGRAM(S): at 01:37

## 2020-09-29 RX ADMIN — MIRTAZAPINE 7.5 MILLIGRAM(S): 45 TABLET, ORALLY DISINTEGRATING ORAL at 11:33

## 2020-09-29 RX ADMIN — Medication 100 MILLIGRAM(S): at 05:42

## 2020-09-29 RX ADMIN — Medication 10 MILLIEQUIVALENT(S): at 05:42

## 2020-09-29 RX ADMIN — GABAPENTIN 300 MILLIGRAM(S): 400 CAPSULE ORAL at 05:42

## 2020-09-29 RX ADMIN — Medication 500 MILLIGRAM(S): at 05:42

## 2020-09-29 RX ADMIN — Medication 650 MILLIGRAM(S): at 17:45

## 2020-09-29 NOTE — PROGRESS NOTE ADULT - SUBJECTIVE AND OBJECTIVE BOX
Patient is a 89y old  Male who presents with a chief complaint of fever (29 Sep 2020 09:28)      INTERVAL HPI/OVERNIGHT EVENTS:    Complains of increased "spit", but otherwise OK. Had a new fever this afternoon. Not eating much    REVIEW OF SYSTEMS:   Remaining ROS negative    MEDICATIONS  (STANDING):  cefTRIAXone   IVPB 1000 milliGRAM(s) IV Intermittent every 24 hours  dextrose 5% + sodium chloride 0.45%. 1000 milliLiter(s) (70 mL/Hr) IV Continuous <Continuous>  finasteride 5 milliGRAM(s) Oral daily  gabapentin 300 milliGRAM(s) Oral two times a day  labetalol 100 milliGRAM(s) Oral two times a day  mirtazapine 7.5 milliGRAM(s) Oral daily  polyethylene glycol 3350 17 Gram(s) Oral at bedtime  tamsulosin 0.4 milliGRAM(s) Oral at bedtime    MEDICATIONS  (PRN):  acetaminophen   Tablet .. 650 milliGRAM(s) Oral every 6 hours PRN Temp greater or equal to 38C (100.4F)      Allergies    No Known Allergies    Intolerances        Vital Signs Last 24 Hrs  T(C): 38.4 (29 Sep 2020 16:07), Max: 38.4 (29 Sep 2020 16:07)  T(F): 101.2 (29 Sep 2020 16:07), Max: 101.2 (29 Sep 2020 16:07)  HR: 111 (29 Sep 2020 16:07) (96 - 111)  BP: 143/72 (29 Sep 2020 16:07) (132/60 - 159/81)  BP(mean): --  RR: 18 (29 Sep 2020 16:07) (18 - 18)  SpO2: 95% (29 Sep 2020 16:07) (95% - 98%)    PHYSICAL EXAM:  GENERAL: NAD  HEAD:  Atraumatic, Normocephalic  EYES: EOMI, PERRLA, conjunctiva and sclera clear  ENT: O/P Clear  NECK: Supple, No JVD  NERVOUS SYSTEM:  No focal deficits  CHEST/LUNG: Clear to percussion bilaterally; No rales, rhonchi, wheezing  HEART: Regular rate and rhythm; No murmurs, rubs, or gallops  ABDOMEN: Soft, Nontender, Nondistended; Bowel sounds present  EXTREMITIES:  2+ Peripheral Pulses, No clubbing, cyanosis, or edema  SKIN: No rashes or lesions    LABS:                        8.0    16.67 )-----------( 510      ( 29 Sep 2020 07:16 )             23.8     09-29    143  |  112<H>  |  23  ----------------------------<  121<H>  3.6   |  24  |  1.26    Ca    9.1      29 Sep 2020 07:16  Phos  2.5     09-29  Mg     2.2     09-29          CAPILLARY BLOOD GLUCOSE          RADIOLOGY & ADDITIONAL TESTS:    Imaging Personally Reviewed:  [ ] YES  [ ] NO    Consultant(s) Notes Reviewed:  [ ] YES  [ ] NO    Care Discussed with Consultants/Other Providers [ ] YES  [ ] NO

## 2020-09-29 NOTE — PROGRESS NOTE ADULT - PROBLEM SELECTOR PLAN 7
continue w/ usual Labetalol  His usual HCTZ on hold due to ARF  His usual Amlodipine on hold due to borderline hypotension, but will probably be able to restart soon

## 2020-09-29 NOTE — PROGRESS NOTE ADULT - SUBJECTIVE AND OBJECTIVE BOX
HPI:  89M pmhx HTN, CKD, BPH with chronic obrien. fever and weakness for 8 days. no other symptoms. fever started shortly after hs obrien was changed- patient denies nausea vomiting or diarrhea - complain of abdominal distention      (22 Sep 2020 15:16)      Allergies    No Known Allergies    Intolerances        MEDICATIONS  (STANDING):  cefTRIAXone   IVPB 1000 milliGRAM(s) IV Intermittent every 24 hours  dextrose 5% + sodium chloride 0.45%. 1000 milliLiter(s) (70 mL/Hr) IV Continuous <Continuous>  finasteride 5 milliGRAM(s) Oral daily  gabapentin 300 milliGRAM(s) Oral two times a day  labetalol 100 milliGRAM(s) Oral two times a day  metroNIDAZOLE    Tablet 500 milliGRAM(s) Oral every 8 hours  mirtazapine 7.5 milliGRAM(s) Oral daily  polyethylene glycol 3350 17 Gram(s) Oral at bedtime  tamsulosin 0.4 milliGRAM(s) Oral at bedtime    MEDICATIONS  (PRN):  acetaminophen   Tablet .. 650 milliGRAM(s) Oral every 6 hours PRN Temp greater or equal to 38C (100.4F)      REVIEW OF SYSTEMS:    CONSTITUTIONAL: No fever, chills, weight loss, or fatigue  HEENT: No sore throat, runny nose, ear ache  RESPIRATORY: No cough, wheezing, No shortness of breath  CARDIOVASCULAR: No chest pain, palpitations, dizziness  GASTROINTESTINAL: No abdominal pain. No nausea, vomiting, diarrhea  GENITOURINARY: No dysuria, increase frequency, hematuria, or incontinence  NEUROLOGICAL: No headaches, memory loss, loss of strength, numbness, or tremors, no weakness  EXTREMITY: No pedal edema BLE  SKIN: No itching, burning, rashes, or lesions     VITAL SIGNS:  T(C): 37.1 (09-29-20 @ 05:31), Max: 38.1 (09-28-20 @ 23:53)  T(F): 98.7 (09-29-20 @ 05:31), Max: 100.6 (09-28-20 @ 23:53)  HR: 96 (09-29-20 @ 05:31) (88 - 111)  BP: 151/78 (09-29-20 @ 05:31) (127/75 - 151/78)  RR: 18 (09-29-20 @ 05:31) (17 - 18)  SpO2: 96% (09-29-20 @ 05:31) (96% - 99%)  Wt(kg): --    PHYSICAL EXAM:    GENERAL: not in any distress  HEENT: Neck is supple, normocephalic, atraumatic   CHEST/LUNG: Clear to percussion bilaterally; No rales, rhonchi, wheezing  HEART: Regular rate and rhythm; No murmurs, rubs, or gallops  ABDOMEN: Soft, Nontender, Nondistended; Bowel sounds present, no rebound   EXTREMITIES:  2+ Peripheral Pulses, No clubbing, cyanosis, or edema  GENITOURINARY:   SKIN: No rashes or lesions  BACK: no pressor sore   NERVOUS SYSTEM:  Alert & Oriented X3, Good concentration  PSYCH: normal affect     LABS:                         8.0    16.67 )-----------( 510      ( 29 Sep 2020 07:16 )             23.8     09-29    143  |  112<H>  |  23  ----------------------------<  121<H>  3.6   |  24  |  1.26    Ca    9.1      29 Sep 2020 07:16  Phos  2.5     09-29  Mg     2.2     09-29      Culture Results:   >100,000 CFU/ml Klebsiella pneumoniae (09-22 @ 18:58)  Culture Results:   No Growth Final (09-22 @ 15:10)  Culture Results:   No Growth Final (09-22 @ 15:10)          Radiology:

## 2020-09-29 NOTE — PROGRESS NOTE ADULT - ASSESSMENT
Patient is being seen for fevers/ urinary tract infection ? from  Ma.  fevers started after Ma change.    Low grade fever reported over night  Trending down leukocytosis      urine culture  klebsiella not reported as esbl     Renal US: Mild B/L hydronephrosis    Abd-pelvis CT: negative for hydronephrosis.  Cystitis    CXR: No infiltrates seen      s/p Cefepime, then Merrem  Now on Ceftriaxone and Flagyl   Patient is being seen for fevers/ urinary tract infection ? from  Ma.  fevers started after Ma change.    Low grade fever reported over night  Trending down leukocytosis      urine culture  klebsiella not reported as esbl     Renal US: Mild B/L hydronephrosis    Abd-pelvis CT: negative for hydronephrosis.  Cystitis    CXR: No infiltrates seen      s/p Cefepime, then Merrem  Will dc Flagyl  Continue Ceftriaxone

## 2020-09-29 NOTE — PROGRESS NOTE ADULT - PROBLEM SELECTOR PLAN 1
Treat UTI as above  Per records, baseline Cr ~1.1  At admission, Cr = 2.36 --> 2.02, 1.72, 1.6, 1.6, 1.41, 1.28, 1.26

## 2020-09-30 DIAGNOSIS — Z51.5 ENCOUNTER FOR PALLIATIVE CARE: ICD-10-CM

## 2020-09-30 DIAGNOSIS — D64.9 ANEMIA, UNSPECIFIED: ICD-10-CM

## 2020-09-30 DIAGNOSIS — R14.0 ABDOMINAL DISTENSION (GASEOUS): ICD-10-CM

## 2020-09-30 DIAGNOSIS — N18.9 CHRONIC KIDNEY DISEASE, UNSPECIFIED: ICD-10-CM

## 2020-09-30 DIAGNOSIS — R13.10 DYSPHAGIA, UNSPECIFIED: ICD-10-CM

## 2020-09-30 DIAGNOSIS — R10.32 LEFT LOWER QUADRANT PAIN: ICD-10-CM

## 2020-09-30 DIAGNOSIS — A41.9 SEPSIS, UNSPECIFIED ORGANISM: ICD-10-CM

## 2020-09-30 DIAGNOSIS — B35.1 TINEA UNGUIUM: ICD-10-CM

## 2020-09-30 DIAGNOSIS — L89.90 PRESSURE ULCER OF UNSPECIFIED SITE, UNSPECIFIED STAGE: ICD-10-CM

## 2020-09-30 DIAGNOSIS — E87.6 HYPOKALEMIA: ICD-10-CM

## 2020-09-30 DIAGNOSIS — N40.0 BENIGN PROSTATIC HYPERPLASIA WITHOUT LOWER URINARY TRACT SYMPTOMS: ICD-10-CM

## 2020-09-30 DIAGNOSIS — K59.00 CONSTIPATION, UNSPECIFIED: ICD-10-CM

## 2020-09-30 DIAGNOSIS — I10 ESSENTIAL (PRIMARY) HYPERTENSION: ICD-10-CM

## 2020-09-30 LAB
ANION GAP SERPL CALC-SCNC: 9 MMOL/L — SIGNIFICANT CHANGE UP (ref 5–17)
BUN SERPL-MCNC: 20 MG/DL — SIGNIFICANT CHANGE UP (ref 7–23)
CALCIUM SERPL-MCNC: 9.3 MG/DL — SIGNIFICANT CHANGE UP (ref 8.5–10.1)
CHLORIDE SERPL-SCNC: 110 MMOL/L — HIGH (ref 96–108)
CO2 SERPL-SCNC: 23 MMOL/L — SIGNIFICANT CHANGE UP (ref 22–31)
CREAT SERPL-MCNC: 1.26 MG/DL — SIGNIFICANT CHANGE UP (ref 0.5–1.3)
CULTURE RESULTS: SIGNIFICANT CHANGE UP
GLUCOSE BLDC GLUCOMTR-MCNC: 114 MG/DL — HIGH (ref 70–99)
GLUCOSE SERPL-MCNC: 124 MG/DL — HIGH (ref 70–99)
HCT VFR BLD CALC: 31 % — LOW (ref 39–50)
HGB BLD-MCNC: 9.8 G/DL — LOW (ref 13–17)
MCHC RBC-ENTMCNC: 28.8 PG — SIGNIFICANT CHANGE UP (ref 27–34)
MCHC RBC-ENTMCNC: 31.6 GM/DL — LOW (ref 32–36)
MCV RBC AUTO: 91.2 FL — SIGNIFICANT CHANGE UP (ref 80–100)
NRBC # BLD: 0 /100 WBCS — SIGNIFICANT CHANGE UP (ref 0–0)
PLATELET # BLD AUTO: 542 K/UL — HIGH (ref 150–400)
POTASSIUM SERPL-MCNC: 3.4 MMOL/L — LOW (ref 3.5–5.3)
POTASSIUM SERPL-SCNC: 3.4 MMOL/L — LOW (ref 3.5–5.3)
PROCALCITONIN SERPL-MCNC: 0.41 NG/ML — HIGH (ref 0.02–0.1)
RBC # BLD: 3.4 M/UL — LOW (ref 4.2–5.8)
RBC # FLD: 16 % — HIGH (ref 10.3–14.5)
SODIUM SERPL-SCNC: 142 MMOL/L — SIGNIFICANT CHANGE UP (ref 135–145)
SPECIMEN SOURCE: SIGNIFICANT CHANGE UP
WBC # BLD: 19.76 K/UL — HIGH (ref 3.8–10.5)
WBC # FLD AUTO: 19.76 K/UL — HIGH (ref 3.8–10.5)

## 2020-09-30 PROCEDURE — 73100 X-RAY EXAM OF WRIST: CPT | Mod: 26,52,RT

## 2020-09-30 PROCEDURE — 43752 NASAL/OROGASTRIC W/TUBE PLMT: CPT

## 2020-09-30 PROCEDURE — 74018 RADEX ABDOMEN 1 VIEW: CPT | Mod: 26

## 2020-09-30 PROCEDURE — 99223 1ST HOSP IP/OBS HIGH 75: CPT

## 2020-09-30 PROCEDURE — 99233 SBSQ HOSP IP/OBS HIGH 50: CPT

## 2020-09-30 PROCEDURE — 71250 CT THORAX DX C-: CPT | Mod: 26

## 2020-09-30 PROCEDURE — 70450 CT HEAD/BRAIN W/O DYE: CPT | Mod: 26

## 2020-09-30 RX ORDER — POTASSIUM CHLORIDE 20 MEQ
10 PACKET (EA) ORAL
Refills: 0 | Status: COMPLETED | OUTPATIENT
Start: 2020-09-30 | End: 2020-09-30

## 2020-09-30 RX ORDER — POTASSIUM CHLORIDE 20 MEQ
40 PACKET (EA) ORAL ONCE
Refills: 0 | Status: COMPLETED | OUTPATIENT
Start: 2020-09-30 | End: 2020-10-01

## 2020-09-30 RX ORDER — PIPERACILLIN AND TAZOBACTAM 4; .5 G/20ML; G/20ML
3.38 INJECTION, POWDER, LYOPHILIZED, FOR SOLUTION INTRAVENOUS EVERY 8 HOURS
Refills: 0 | Status: DISCONTINUED | OUTPATIENT
Start: 2020-09-30 | End: 2020-10-06

## 2020-09-30 RX ORDER — ACETAMINOPHEN 500 MG
1000 TABLET ORAL ONCE
Refills: 0 | Status: COMPLETED | OUTPATIENT
Start: 2020-09-30 | End: 2020-09-30

## 2020-09-30 RX ORDER — ACETAMINOPHEN 500 MG
650 TABLET ORAL EVERY 6 HOURS
Refills: 0 | Status: DISCONTINUED | OUTPATIENT
Start: 2020-09-30 | End: 2020-09-30

## 2020-09-30 RX ORDER — SENNA PLUS 8.6 MG/1
2 TABLET ORAL AT BEDTIME
Refills: 0 | Status: DISCONTINUED | OUTPATIENT
Start: 2020-09-30 | End: 2020-10-13

## 2020-09-30 RX ORDER — LACTULOSE 10 G/15ML
10 SOLUTION ORAL
Refills: 0 | Status: COMPLETED | OUTPATIENT
Start: 2020-09-30 | End: 2020-10-02

## 2020-09-30 RX ORDER — POLYETHYLENE GLYCOL 3350 17 G/17G
17 POWDER, FOR SOLUTION ORAL DAILY
Refills: 0 | Status: DISCONTINUED | OUTPATIENT
Start: 2020-09-30 | End: 2020-09-30

## 2020-09-30 RX ORDER — POTASSIUM CHLORIDE 20 MEQ
10 PACKET (EA) ORAL
Refills: 0 | Status: DISCONTINUED | OUTPATIENT
Start: 2020-09-30 | End: 2020-09-30

## 2020-09-30 RX ORDER — IOHEXOL 300 MG/ML
30 INJECTION, SOLUTION INTRAVENOUS ONCE
Refills: 0 | Status: DISCONTINUED | OUTPATIENT
Start: 2020-09-30 | End: 2020-09-30

## 2020-09-30 RX ADMIN — GABAPENTIN 300 MILLIGRAM(S): 400 CAPSULE ORAL at 17:59

## 2020-09-30 RX ADMIN — Medication 100 MILLIEQUIVALENT(S): at 12:35

## 2020-09-30 RX ADMIN — SODIUM CHLORIDE 70 MILLILITER(S): 9 INJECTION, SOLUTION INTRAVENOUS at 12:11

## 2020-09-30 RX ADMIN — Medication 400 MILLIGRAM(S): at 00:20

## 2020-09-30 RX ADMIN — CEFTRIAXONE 100 MILLIGRAM(S): 500 INJECTION, POWDER, FOR SOLUTION INTRAMUSCULAR; INTRAVENOUS at 00:19

## 2020-09-30 RX ADMIN — PIPERACILLIN AND TAZOBACTAM 25 GRAM(S): 4; .5 INJECTION, POWDER, LYOPHILIZED, FOR SOLUTION INTRAVENOUS at 21:55

## 2020-09-30 RX ADMIN — LACTULOSE 10 GRAM(S): 10 SOLUTION ORAL at 18:01

## 2020-09-30 RX ADMIN — Medication 100 MILLIGRAM(S): at 17:58

## 2020-09-30 RX ADMIN — TAMSULOSIN HYDROCHLORIDE 0.4 MILLIGRAM(S): 0.4 CAPSULE ORAL at 21:54

## 2020-09-30 RX ADMIN — Medication 100 MILLIEQUIVALENT(S): at 15:08

## 2020-09-30 RX ADMIN — SENNA PLUS 2 TABLET(S): 8.6 TABLET ORAL at 21:55

## 2020-09-30 RX ADMIN — FINASTERIDE 5 MILLIGRAM(S): 5 TABLET, FILM COATED ORAL at 12:12

## 2020-09-30 RX ADMIN — POLYETHYLENE GLYCOL 3350 17 GRAM(S): 17 POWDER, FOR SOLUTION ORAL at 21:56

## 2020-09-30 RX ADMIN — MIRTAZAPINE 7.5 MILLIGRAM(S): 45 TABLET, ORALLY DISINTEGRATING ORAL at 12:12

## 2020-09-30 RX ADMIN — Medication 100 MILLIEQUIVALENT(S): at 12:12

## 2020-09-30 RX ADMIN — Medication 1 ENEMA: at 17:53

## 2020-09-30 RX ADMIN — PIPERACILLIN AND TAZOBACTAM 25 GRAM(S): 4; .5 INJECTION, POWDER, LYOPHILIZED, FOR SOLUTION INTRAVENOUS at 15:10

## 2020-09-30 NOTE — CONSULT NOTE ADULT - PROBLEM SELECTOR RECOMMENDATION 3
Pt with distended abdomen, CT abdomen above.  +BS, non-tender upon exam.  Likely d/t constipation.  Recommend to continue bowel regimen ordered, giving PRN dulcolax as needed. Keeping a close eye for worsening abdominal distention.

## 2020-09-30 NOTE — CONSULT NOTE ADULT - SUBJECTIVE AND OBJECTIVE BOX
HPI:  : 89M pmhx HTN, CKD, BPH with chronic obrien. fever and weakness for 8 days. no other symptoms. fever started shortly after hs obrien was changed- patient denies nausea vomiting or diarrhea - complain of abdominal distention   Complaining of thick nails     (22 Sep 2020 15:16)      PAST MEDICAL & SURGICAL HISTORY:  Hydrocele in adult  bilateral, chronic    Renal insufficiency    Dehydration    Spinal stenosis of lumbar region    BPH (benign prostatic hypertrophy)    Hypertension    Benign Prostatic Hyperplasia    HTN (Hypertension)    No significant past surgical history            MEDICATIONS  (STANDING):  dextrose 5% + sodium chloride 0.45%. 1000 milliLiter(s) (70 mL/Hr) IV Continuous <Continuous>  finasteride 5 milliGRAM(s) Oral daily  gabapentin 300 milliGRAM(s) Oral two times a day  labetalol 100 milliGRAM(s) Oral two times a day  lactulose Syrup 10 Gram(s) Enteral Tube two times a day  mirtazapine 7.5 milliGRAM(s) Oral daily  piperacillin/tazobactam IVPB.. 3.375 Gram(s) IV Intermittent every 8 hours  polyethylene glycol 3350 17 Gram(s) Oral at bedtime  potassium chloride   Powder 40 milliEquivalent(s) Oral once  senna 2 Tablet(s) Oral at bedtime  tamsulosin 0.4 milliGRAM(s) Oral at bedtime    MEDICATIONS  (PRN):  acetaminophen   Tablet .. 650 milliGRAM(s) Oral every 6 hours PRN Temp greater or equal to 38C (100.4F)  bisacodyl Suppository 10 milliGRAM(s) Rectal daily PRN Constipation      Allergies    No Known Allergies    Intolerances        SOCIAL HISTORY:Smoking history  Alcohol history  Drug history    FAMILY HISTORY:  No significant family history        Vital Signs Last 24 Hrs  T(C): 36.7 (30 Sep 2020 18:02), Max: 38.8 (29 Sep 2020 22:23)  T(F): 98 (30 Sep 2020 18:02), Max: 101.9 (29 Sep 2020 22:23)  HR: 95 (30 Sep 2020 18:02) (95 - 115)  BP: 148/70 (30 Sep 2020 18:02) (139/80 - 175/95)  BP(mean): --  RR: 18 (30 Sep 2020 18:02) (18 - 22)  SpO2: 97% (30 Sep 2020 18:02) (97% - 98%)    PHYSICAL EXAM:    GENERAL: Elderly black 89 Year old male  NG tube in place  HEAD:  Atraumatic, Normocephalic  NERVOUS SYSTEM:  Alert & Oriented X3,   Good concentration; Motor Strength 1/5 B/L lower extremities;   DTRs 0+ Sensorium present   EXTREMITIES:  non Peripheral Pulses  , No clubbing, cyanosis,    Vascularity Skin appearance: hair absent   SKIN: No rashes or lesions  ORTHOPEDIC:  No foot deformities  ULCER : None  Area of CC:  NAils 1-10 thick with subugnual debris  LABS:                        9.8    19.76 )-----------( 542      ( 30 Sep 2020 08:15 )             31.0     09-30    142  |  110<H>  |  20  ----------------------------<  124<H>  3.4<L>   |  23  |  1.26    Ca    9.3      30 Sep 2020 08:15  Phos  2.5     09-  Mg     2.2     09-29        Urinalysis Basic - ( 29 Sep 2020 19:47 )    Color: Yellow / Appearance: Clear / S.010 / pH: x  Gluc: x / Ketone: Negative  / Bili: Negative / Urobili: Negative mg/dL   Blood: x / Protein: 100 mg/dL / Nitrite: Negative   Leuk Esterase: Small / RBC: 6-10 /HPF / WBC 11-25   Sq Epi: x / Non Sq Epi: Few / Bacteria: Moderate            RADIOLOGY & ADDITIONAL STUDIES:

## 2020-09-30 NOTE — CONSULT NOTE ADULT - PROBLEM SELECTOR RECOMMENDATION 10
Contact: Ching (daughter) # [phone], [phone]  Please page [phone] with any questions or concerns. Thank you. Contact: Ching (daughter) # [phone]  Samy (son) [phone]  Spoke with Jane today, family is not interested in discussing any GOC at this time. Will re-address when timing is appropriate.   Please page [phone] with any questions or concerns. Thank you.

## 2020-09-30 NOTE — PROGRESS NOTE ADULT - PROBLEM SELECTOR PLAN 4
presumed AOCD , has chronciaclly low hgb    did receive one unit of blood during this hospitalization on 9/28/2020 presumed AOCD , has chronically low hgb    did receive one unit of blood during this hospitalization on 9/28/2020

## 2020-09-30 NOTE — CONSULT NOTE ADULT - PROBLEM SELECTOR RECOMMENDATION 5
Pt s/p blood transfusion yesterday.  Last H/H 9.8/31  Recommend to continue to monitor CBC as ordered and transfuse as needed.

## 2020-09-30 NOTE — PROGRESS NOTE ADULT - SUBJECTIVE AND OBJECTIVE BOX
HPI:  89M pmhx HTN, CKD, BPH with chronic obrien. fever and weakness for 8 days. no other symptoms. fever started shortly after hs obrien was changed- patient denies nausea vomiting or diarrhea - complain of abdominal distention      (22 Sep 2020 15:16)      Allergies    No Known Allergies    Intolerances        MEDICATIONS  (STANDING):  dextrose 5% + sodium chloride 0.45%. 1000 milliLiter(s) (70 mL/Hr) IV Continuous <Continuous>  finasteride 5 milliGRAM(s) Oral daily  gabapentin 300 milliGRAM(s) Oral two times a day  labetalol 100 milliGRAM(s) Oral two times a day  mirtazapine 7.5 milliGRAM(s) Oral daily  piperacillin/tazobactam IVPB.. 3.375 Gram(s) IV Intermittent every 8 hours  polyethylene glycol 3350 17 Gram(s) Oral at bedtime  potassium chloride   Powder 40 milliEquivalent(s) Oral once  potassium chloride  10 mEq/100 mL IVPB 10 milliEquivalent(s) IV Intermittent every 1 hour  tamsulosin 0.4 milliGRAM(s) Oral at bedtime    MEDICATIONS  (PRN):  acetaminophen   Tablet .. 650 milliGRAM(s) Oral every 6 hours PRN Temp greater or equal to 38C (100.4F)      REVIEW OF SYSTEMS:    CONSTITUTIONAL: No fever, chills, weight loss, or fatigue  HEENT: No sore throat, runny nose, ear ache  RESPIRATORY: No cough, wheezing, No shortness of breath  CARDIOVASCULAR: No chest pain, palpitations, dizziness  GASTROINTESTINAL: No abdominal pain. No nausea, vomiting, diarrhea  GENITOURINARY: No dysuria, increase frequency, hematuria, or incontinence  NEUROLOGICAL: No headaches, memory loss, loss of strength, numbness, or tremors, no weakness  EXTREMITY: No pedal edema BLE  SKIN: No itching, burning, rashes, or lesions     VITAL SIGNS:  T(C): 36.9 (20 @ 06:06), Max: 38.8 (20 @ 22:23)  T(F): 98.4 (20 @ 06:06), Max: 101.9 (20 @ 22:23)  HR: 95 (20 @ 06:06) (95 - 115)  BP: 139/80 (20 @ 06:06) (139/80 - 175/95)  RR: 18 (20 @ 06:06) (18 - 22)  SpO2: 97% (20 @ 06:06) (95% - 98%)  Wt(kg): --    PHYSICAL EXAM:    GENERAL: not in any distress  HEENT: Neck is supple, normocephalic, atraumatic   CHEST/LUNG: Clear to percussion bilaterally; No rales, rhonchi, wheezing  HEART: Regular rate and rhythm; No murmurs, rubs, or gallops  ABDOMEN: Soft, Nontender, Nondistended; Bowel sounds present, no rebound   EXTREMITIES:  2+ Peripheral Pulses, No clubbing, cyanosis, or edema  GENITOURINARY:   SKIN: No rashes or lesions  BACK: no pressor sore   NERVOUS SYSTEM:  Alert & Oriented X3, Good concentration  PSYCH: normal affect     LABS:                         9.8    19.76 )-----------( 542      ( 30 Sep 2020 08:15 )             31.0         142  |  110<H>  |  20  ----------------------------<  124<H>  3.4<L>   |  23  |  1.26    Ca    9.3      30 Sep 2020 08:15  Phos  2.5       Mg     2.2               Urinalysis Basic - ( 29 Sep 2020 19:47 )    Color: Yellow / Appearance: Clear / S.010 / pH: x  Gluc: x / Ketone: Negative  / Bili: Negative / Urobili: Negative mg/dL   Blood: x / Protein: 100 mg/dL / Nitrite: Negative   Leuk Esterase: Small / RBC: 6-10 /HPF / WBC 11-25   Sq Epi: x / Non Sq Epi: Few / Bacteria: Moderate      Radiology:       HPI:  89M pmhx HTN, CKD, BPH with chronic obrien. fever and weakness for 8 days. no other symptoms. fever started shortly after hs obrien was changed- patient denies nausea vomiting or diarrhea - complain of abdominal distention      (22 Sep 2020 15:16)      Allergies    No Known Allergies    Intolerances        MEDICATIONS  (STANDING):  dextrose 5% + sodium chloride 0.45%. 1000 milliLiter(s) (70 mL/Hr) IV Continuous <Continuous>  finasteride 5 milliGRAM(s) Oral daily  gabapentin 300 milliGRAM(s) Oral two times a day  labetalol 100 milliGRAM(s) Oral two times a day  mirtazapine 7.5 milliGRAM(s) Oral daily  piperacillin/tazobactam IVPB.. 3.375 Gram(s) IV Intermittent every 8 hours  polyethylene glycol 3350 17 Gram(s) Oral at bedtime  potassium chloride   Powder 40 milliEquivalent(s) Oral once  potassium chloride  10 mEq/100 mL IVPB 10 milliEquivalent(s) IV Intermittent every 1 hour  tamsulosin 0.4 milliGRAM(s) Oral at bedtime    MEDICATIONS  (PRN):  acetaminophen   Tablet .. 650 milliGRAM(s) Oral every 6 hours PRN Temp greater or equal to 38C (100.4F)      REVIEW OF SYSTEMS:    CONSTITUTIONAL: No fever, chills, weight loss, or fatigue  HEENT: No sore throat, runny nose, ear ache  RESPIRATORY: No cough, wheezing, No shortness of breath  CARDIOVASCULAR: No chest pain, palpitations, dizziness  GASTROINTESTINAL: No abdominal pain. No nausea, vomiting, diarrhea  GENITOURINARY: No dysuria, increase frequency, hematuria, or incontinence  NEUROLOGICAL: No headaches, memory loss, loss of strength, numbness, or tremors, no weakness  EXTREMITY: No pedal edema BLE  SKIN: No itching, burning, rashes, or lesions     VITAL SIGNS:  T(C): 36.9 (20 @ 06:06), Max: 38.8 (20 @ 22:23)  T(F): 98.4 (20 @ 06:06), Max: 101.9 (20 @ 22:23)  HR: 95 (20 @ 06:06) (95 - 115)  BP: 139/80 (20 @ 06:06) (139/80 - 175/95)  RR: 18 (20 @ 06:06) (18 - 22)  SpO2: 97% (20 @ 06:06) (95% - 98%)  Wt(kg): --    PHYSICAL EXAM:    GENERAL: Alert, oriented in person  HEENT: Neck is supple, normocephalic, atraumatic   CHEST/LUNG: Clear to ausculation bilaterally; No rales, rhonchi, wheezing  HEART: Regular rate and rhythm; No murmurs, rubs, or gallops  ABDOMEN: Distended on today exam, mild tenderness in the lower quadrants, soft, depressible   EXTREMITIES:  2+ Peripheral Pulses, No clubbing, cyanosis, or edema  GENITOURINARY: Obrien      LABS:                         9.8    19.76 )-----------( 542      ( 30 Sep 2020 08:15 )             31.0     09-30    142  |  110<H>  |  20  ----------------------------<  124<H>  3.4<L>   |  23  |  1.26    Ca    9.3      30 Sep 2020 08:15  Phos  2.5     -  Mg     2.2     -          Urinalysis Basic - ( 29 Sep 2020 19:47 )    Color: Yellow / Appearance: Clear / S.010 / pH: x  Gluc: x / Ketone: Negative  / Bili: Negative / Urobili: Negative mg/dL   Blood: x / Protein: 100 mg/dL / Nitrite: Negative   Leuk Esterase: Small / RBC: 6-10 /HPF / WBC 11-25   Sq Epi: x / Non Sq Epi: Few / Bacteria: Moderate      Radiology:       HPI:  89M pmhx HTN, CKD, BPH with chronic obrien. fever and weakness for 8 days. no other symptoms. fever started shortly after hs obrien was changed- patient denies nausea vomiting or diarrhea - complain of abdominal distention      (22 Sep 2020 15:16)    work up consistent with UTI   persistent fevers despite merrem   Allergies    No Known Allergies    Intolerances        MEDICATIONS  (STANDING):  dextrose 5% + sodium chloride 0.45%. 1000 milliLiter(s) (70 mL/Hr) IV Continuous <Continuous>  finasteride 5 milliGRAM(s) Oral daily  gabapentin 300 milliGRAM(s) Oral two times a day  labetalol 100 milliGRAM(s) Oral two times a day  mirtazapine 7.5 milliGRAM(s) Oral daily  piperacillin/tazobactam IVPB.. 3.375 Gram(s) IV Intermittent every 8 hours  polyethylene glycol 3350 17 Gram(s) Oral at bedtime  potassium chloride   Powder 40 milliEquivalent(s) Oral once  potassium chloride  10 mEq/100 mL IVPB 10 milliEquivalent(s) IV Intermittent every 1 hour  tamsulosin 0.4 milliGRAM(s) Oral at bedtime    MEDICATIONS  (PRN):  acetaminophen   Tablet .. 650 milliGRAM(s) Oral every 6 hours PRN Temp greater or equal to 38C (100.4F)      REVIEW OF SYSTEMS:    poor historian   no issues     VITAL SIGNS:  T(C): 36.9 (20 @ 06:06), Max: 38.8 (20 @ 22:23)  T(F): 98.4 (20 @ 06:06), Max: 101.9 (20 @ 22:23)  HR: 95 (20 @ 06:06) (95 - 115)  BP: 139/80 (20 @ 06:06) (139/80 - 175/95)  RR: 18 (20 @ 06:06) (18 - 22)  SpO2: 97% (20 @ 06:06) (95% - 98%)  Wt(kg): --    PHYSICAL EXAM:    GENERAL: Alert, oriented x2  HEENT: Neck is supple, normocephalic, atraumatic   CHEST/LUNG: Clear to ausculation bilaterally; No rales, rhonchi, wheezing  HEART: Regular rate and rhythm; No murmurs, rubs, or gallops  ABDOMEN: Distended on today exam, mild tenderness in the lower quadrants, soft, depressible   EXTREMITIES:  2+ Peripheral Pulses, No clubbing, cyanosis, or edema  GENITOURINARY: Obrien      LABS:                         9.8    19.76 )-----------( 542      ( 30 Sep 2020 08:15 )             31.0         142  |  110<H>  |  20  ----------------------------<  124<H>  3.4<L>   |  23  |  1.26    Ca    9.3      30 Sep 2020 08:15  Phos  2.5       Mg     2.2               Urinalysis Basic - ( 29 Sep 2020 19:47 )    Color: Yellow / Appearance: Clear / S.010 / pH: x  Gluc: x / Ketone: Negative  / Bili: Negative / Urobili: Negative mg/dL   Blood: x / Protein: 100 mg/dL / Nitrite: Negative   Leuk Esterase: Small / RBC: 6-10 /HPF / WBC 11-25   Sq Epi: x / Non Sq Epi: Few / Bacteria: Moderate      Radiology:      < from: CT Chest No Cont (20 @ 11:18) >  Left upper and lower lobe peripheral opacities, suspicious for multifocal pneumonia.  Small bilateral pleural effusions.  Mild to moderate bilateral hydroureteronephrosis.            BISI TIJERINA M.D., ATTENDING RADIOLOGIST  This document has been electronically signed. Sep 30 2020 11:39AM    < end of copied text >  < from: CT Abdomen and Pelvis w/ Oral Cont (20 @ 14:42) >  Please note that evaluation of the abdominal organs and vascular structures is limited by lack of intravenous contrast.    LIVER: Within normal limits.  BILE DUCTS: Normal caliber.  GALLBLADDER: Within normal limits.  SPLEEN: Within normal limits.  PANCREAS: Atrophic.  ADRENALS: Within normal limits.  KIDNEYS/URETERS: Renal hypodensities, possibly cysts. No hydronephrosis.    BLADDER: Obrien catheter. Wall thickening.  REPRODUCTIVE ORGANS: Central prostatic gland calcifications.    BOWEL: No bowel obstruction.Appendix is not visualized. Rectum distended by stool. Colonic diverticulosis.  PERITONEUM: No ascites.  VESSELS: Atherosclerotic changes.  RETROPERITONEUM/LYMPH NODES: Prominent groin lymph nodes.  ABDOMINAL WALL: Subcutaneous soft tissue edema. Small fat-containing umbilical hernia.  BONES: Degenerative changes.    IMPRESSION:  Urinary bladder findings compatible with cystitis.              BISI TIJERINA M.D., ATTENDING RADIOLOGIST    < end of copied text >

## 2020-09-30 NOTE — CONSULT NOTE ADULT - PROBLEM SELECTOR RECOMMENDATION 9
Pt remains on soft low sodium diet.  Recommend to continue to monitor for adequate nutritional intake and add supplements as needed.  Pt is on mirtazapine 7.5 mg oral daily. Pt with PMHx of BPH. Recommend to continue flomax as ordered.

## 2020-09-30 NOTE — PROGRESS NOTE ADULT - SUBJECTIVE AND OBJECTIVE BOX
Patient is a 89y old  Male who presents with a chief complaint of fever (29 Sep 2020 09:28)      INTERVAL HPI/OVERNIGHT EVENTS: fever    MEDICATIONS  (STANDING):  dextrose 5% + sodium chloride 0.45%. 1000 milliLiter(s) (70 mL/Hr) IV Continuous <Continuous>  finasteride 5 milliGRAM(s) Oral daily  gabapentin 300 milliGRAM(s) Oral two times a day  labetalol 100 milliGRAM(s) Oral two times a day  mirtazapine 7.5 milliGRAM(s) Oral daily  polyethylene glycol 3350 17 Gram(s) Oral at bedtime  potassium chloride   Powder 40 milliEquivalent(s) Oral once  tamsulosin 0.4 milliGRAM(s) Oral at bedtime    MEDICATIONS  (PRN):  acetaminophen   Tablet .. 650 milliGRAM(s) Oral every 6 hours PRN Temp greater or equal to 38C (100.4F)              Allergies    No Known Allergies    Intolerances      Vital Signs Last 24 Hrs  T(C): 36.9 (30 Sep 2020 06:06), Max: 38.8 (29 Sep 2020 22:23)  T(F): 98.4 (30 Sep 2020 06:06), Max: 101.9 (29 Sep 2020 22:23)  HR: 95 (30 Sep 2020 06:06) (95 - 115)  BP: 139/80 (30 Sep 2020 06:06) (139/80 - 175/95)  BP(mean): --  RR: 18 (30 Sep 2020 06:06) (18 - 22)  SpO2: 97% (30 Sep 2020 06:06) (95% - 98%)      PHYSICAL EXAM:  GENERAL: NAD, elderly   HEAD:  Atraumatic, Normocephalic  EYES: EOMI, PERRLA, conjunctiva and sclera clear  ENT: O/P Clear  NECK: Supple, No JVD  NERVOUS SYSTEM:  No focal deficits  CHEST/LUNG: Clear to percussion bilaterally; No rales, rhonchi, wheezing  HEART: Regular rate and rhythm; No murmurs, rubs, or gallops  ABDOMEN: Soft, Nontender, Nondistended; Bowel sounds present  EXTREMITIES:  2+ Peripheral Pulses, No clubbing, cyanosis, or edema  SKIN: No rashes or lesions    LABS:                                 9.8    19.76 )-----------( 542      ( 30 Sep 2020 08:15 )             31.0   09-30    142  |  110<H>  |  20  ----------------------------<  124<H>  3.4<L>   |  23  |  1.26    Ca    9.3      30 Sep 2020 08:15  Phos  2.5     09-29  Mg     2.2     09-29          CAPILLARY BLOOD GLUCOSE          RADIOLOGY & ADDITIONAL TESTS:    Imaging Personally Reviewed:  [ ] YES  [ ] NO    Consultant(s) Notes Reviewed:  [ ] YES  [ ] NO    Care Discussed with Consultants/Other Providers [ ] YES  [ ] NO Patient is a 89y old  Male who presents with a chief complaint of fever (29 Sep 2020 09:28)      INTERVAL HPI/OVERNIGHT EVENTS: fever    MEDICATIONS  (STANDING):  dextrose 5% + sodium chloride 0.45%. 1000 milliLiter(s) (70 mL/Hr) IV Continuous <Continuous>  finasteride 5 milliGRAM(s) Oral daily  gabapentin 300 milliGRAM(s) Oral two times a day  labetalol 100 milliGRAM(s) Oral two times a day  mirtazapine 7.5 milliGRAM(s) Oral daily  polyethylene glycol 3350 17 Gram(s) Oral at bedtime  potassium chloride   Powder 40 milliEquivalent(s) Oral once  tamsulosin 0.4 milliGRAM(s) Oral at bedtime    MEDICATIONS  (PRN):  acetaminophen   Tablet .. 650 milliGRAM(s) Oral every 6 hours PRN Temp greater or equal to 38C (100.4F)            Allergies    No Known Allergies    Intolerances      Vital Signs Last 24 Hrs  T(C): 36.9 (30 Sep 2020 06:06), Max: 38.8 (29 Sep 2020 22:23)  T(F): 98.4 (30 Sep 2020 06:06), Max: 101.9 (29 Sep 2020 22:23)  HR: 95 (30 Sep 2020 06:06) (95 - 115)  BP: 139/80 (30 Sep 2020 06:06) (139/80 - 175/95)  BP(mean): --  RR: 18 (30 Sep 2020 06:06) (18 - 22)  SpO2: 97% (30 Sep 2020 06:06) (95% - 98%)      PHYSICAL EXAM:  GENERAL: NAD, elderly   HEAD:  Atraumatic, Normocephalic  EYES: EOMI, PERRLA, conjunctiva and sclera clear  ENT: O/P Clear  NECK: Supple, No JVD  NERVOUS SYSTEM:  No focal deficits  CHEST/LUNG: Clear to percussion bilaterally; No rales, rhonchi, wheezing  HEART: Regular rate and rhythm; No murmurs, rubs, or gallops  ABDOMEN: Soft, mildly distended LLQ tenderness  Bowel sounds present  EXTREMITIES:  2+ Peripheral Pulses, No clubbing, cyanosis, or edema  SKIN: No rashes or lesions    LABS:                                 9.8    19.76 )-----------( 542      ( 30 Sep 2020 08:15 )             31.0   09-30    142  |  110<H>  |  20  ----------------------------<  124<H>  3.4<L>   |  23  |  1.26    Ca    9.3      30 Sep 2020 08:15  Phos  2.5     09-29  Mg     2.2     09-29          CAPILLARY BLOOD GLUCOSE          RADIOLOGY & ADDITIONAL TESTS:    Imaging Personally Reviewed:  [ ] YES  [ ] NO    Consultant(s) Notes Reviewed:  [ ] YES  [ ] NO    Care Discussed with Consultants/Other Providers [ ] YES  [ ] NO

## 2020-09-30 NOTE — CONSULT NOTE ADULT - PROBLEM SELECTOR RECOMMENDATION 2
CT abdomen on 9/28 showed Rectum distended by stool.  Ordered enema x 1, senna, dulcolax PRN, and Miralax.  No bowel movement charted-recommend giving PRN Dulcolax if no bowel movement after enema given today.

## 2020-09-30 NOTE — CHART NOTE - NSCHARTNOTEFT_GEN_A_CORE
House- Medicine NP:    Asked by Diana Pelletier for NGT insertion for decompression. Patient is a 89y old  Male who presents with a chief complaint of fever (30 Sep 2020 14:18)   Order noted on chart.    T(C): 36.8 (09-30-20 @ 11:27), Max: 38.8 (09-29-20 @ 22:23)  T(F): 98.3 (09-30-20 @ 11:27), Max: 101.9 (09-29-20 @ 22:23)  HR: 101 (09-30-20 @ 11:27) (95 - 115)  BP: 151/75 (09-30-20 @ 11:27) (139/80 - 175/95)  RR: 18 (09-30-20 @ 11:27) (18 - 22)  SpO2: 97% (09-30-20 @ 11:27) (95% - 98%)    NGT inserted to R nostril with dark brown fluid returned. Pt tolerated procedure well. Placement verified via auscultation. KUB ordered to confirm placement. Pt attempting to pull on tubing. BL wrist restraints for safety to prevent pulling on tubing.

## 2020-09-30 NOTE — CHART NOTE - NSCHARTNOTEFT_GEN_A_CORE
Nutrition follow-up note-    Pt adm w/fever, per chart pt w/UTI w/sepsis. Met w/pt at bedside, pt sleepy and lethargic. Spoke to RN who reported pt w/depressed appetite and PO intake, consumes <50% most meals & has been refusing some meals and meds. Pt needs & receives assistance w/meals. No N/V/D/C reported. No difficulty chewing/swallowing reported. Pt w/full dentures. Per chart pt remains confused.     Factors impacting intake: [ ] none [ ] nausea  [ ] vomiting [ ] diarrhea [ ] constipation  [ ]chewing problems [ ] swallowing issues  [x ] other: AMS, depressed appetite.    Diet Prescription: Diet, Soft:   Low Sodium  Supplement Feeding Modality:  Oral  Ensure Enlive Cans or Servings Per Day:  1       Frequency:  Two Times a day (09-24-20 @ 15:41)    Intake: 0-75% variable intake x 1 week.    Current Weight: (9/29) 86.4 kg, (9/23 admission) 77.60 kg. (9/24= 78.4 kg, 9/27= 84.3 kg --> 5.9 kg/7.5% gain x 3 days??).  % Weight Change: 11.3% gain (8.8 kg) x 8 days/since admission. Questionable accuracy of weights.     Physical appearance: 2+ edema L-leg & L-ankle. 3+ edema r-foot & R-knee. Partially, wt gain may be edema related.   Nutrition focused physical exam conducted:  Subcutaneous fat loss: [moderate ] Orbital fat pads region, [n/a- pt w/dentures ]Buccal fat region, [moderate] ribs region.    Muscle wasting: [moderate ]Temples region, [moderate ]Clavicle region, [ moderate ]Shoulder region.    Pertinent Medications: MEDICATIONS  (STANDING):  dextrose 5% + sodium chloride 0.45%. 1000 milliLiter(s) (70 mL/Hr) IV Continuous <Continuous>  finasteride 5 milliGRAM(s) Oral daily  gabapentin 300 milliGRAM(s) Oral two times a day  labetalol 100 milliGRAM(s) Oral two times a day  mirtazapine 7.5 milliGRAM(s) Oral daily  piperacillin/tazobactam IVPB.. 3.375 Gram(s) IV Intermittent every 8 hours  polyethylene glycol 3350 17 Gram(s) Oral at bedtime  potassium chloride   Powder 40 milliEquivalent(s) Oral once  potassium chloride  10 mEq/100 mL IVPB 10 milliEquivalent(s) IV Intermittent every 1 hour  tamsulosin 0.4 milliGRAM(s) Oral at bedtime    MEDICATIONS  (PRN):  acetaminophen   Tablet .. 650 milliGRAM(s) Oral every 6 hours PRN Temp greater or equal to 38C (100.4F)    Pertinent Labs: 09-30 Na142 mmol/L Glu 124 mg/dL<H> K+ 3.4 mmol/L<L> Cr  1.26 mg/dL BUN 20 mg/dL 09-29 Phos 2.5 mg/dL      Skin: Stage 2 PU R-buttock.    Estimated Needs:   [x ] no change since previous assessment (9/24)  [ ] recalculated:     Previous Nutrition Diagnosis:     Nutrition Diagnostic Terminology #1 Malnutrition...     Malnutrition Moderate malnutrition in the context of chronic illness.     Etiology inadequate protein energy intake in the presence of AMS, stage 2 PU, Renal Insufficiency, advanced age.     Signs/Symptoms physical findings of fat & muscle loss.     Goal/Expected Outcome Pt will consume >50% nutritional needs via meals and PO supplement- GOAL not met consistently.      Nutrition Diagnosis is [ x] ongoing  [ ] resolved [ ] not applicable     New Nutrition Diagnosis: [x ] not applicable      Interventions:   Recommend:  [x] Continue current diet as Rx'd.  [ ] Change Diet To:  [x ] Nutrition Supplement: Continue current PO supplement as Rx'd.  [ ] Nutrition Support  [ ] Other:     Monitoring and Evaluation:   [x ] PO intake [ x ] Tolerance to diet prescription [ x ] weights [ x ] labs[ x ] follow up per protocol  [ ] other:

## 2020-09-30 NOTE — CONSULT NOTE ADULT - PROBLEM SELECTOR RECOMMENDATION 7
Pt with PMHx of CKD. Pt has chronic indwelling obrien catheter-felt weak after obrien was changed.  Recommend to continue to monitor labs and UO closely.  Todays Cr 1.26.

## 2020-09-30 NOTE — PROGRESS NOTE ADULT - ASSESSMENT
Patient is being seen for fevers/ urinary tract infection ? from  Ma.  fevers started after Ma change.    Several episodes of fever reported   Trending up leukocytosis    CXR: LLL infiltrate    Patient physically decondition with swallowing problems.  Most likely asp PNA    Patient was panculture overnight.    UA significant for pyuria.  patient with chronic Ma.    UC: Klebsiella  Renal US: Mild B/L hydronephrosis  Abd-pelvis CT: negative for hydronephrosis.  Cystitis    s/p 7 days course of Abx for UTI    s/p Cefepime, Merrem and Ceftriaxone  s/p Flagyl    Will start Zosyn today covering for a possible Asp PNA  Recommend swallowing evaluation  Procal  BC x2  Sputum cx Patient is being seen for fevers/ urinary tract infection ? from  Ma  has had various antibiotics since admission with ongoing fevers and leucocytosis     ct chest noted; has bilateral hydronephrosis; none on ct abdominal pelvis 48 hours prior    Patient physically decondition with swallowing problems.  Most likely asp PN    UA significant for pyuria.  patient with chronic Ma.  UC: Klebsiella; repeat blood culture and urine culture no growth so far    s/p Cefepime, Merrem and Ceftriaxone  s/p Flagyl    now  covering for a possible Asp PNA  procal ??   will order for am   day 1 zosyn both fevers and wbc are lower   if fevers recur and wbc gets worse will dc zosyn sooner   otherwise dc based on procalcitonin trend   has had 8 days broad spectrum antibiotics so far Patient is being seen for fevers/ urinary tract infection ? from  Ma  has had various antibiotics since admission with ongoing fevers and leucocytosis     ct chest noted; has bilateral hydronephrosis; none on ct abdominal pelvis 48 hours prior    Patient physically decondition with swallowing problems.  Most likely asp PN    UA significant for pyuria.  patient with chronic Ma.  UC: Klebsiella; repeat blood culture and urine culture no growth so far    s/p Cefepime, Merrem and Ceftriaxone  s/p Flagyl    now  covering for a possible Asp PNA

## 2020-09-30 NOTE — PROGRESS NOTE ADULT - PROBLEM SELECTOR PLAN 1
9/30/2020 on antibx  spike fever last night , wbc increasing  cxr done 9/29/2020 shows LLL infiltrate meredith get Ct chest , check RVP and flu . meredith d/w ID, follow up on all repeat cultures 9/30/2020 on antibx  spike fever last night , wbc increasing  cxr done 9/29/2020 shows LLL infiltrate meredith get Ct chest , check RVP and flu . meredith d/w ID, follow up on all repeat cultures   suspect aspiration : NPO , place NGT get head ct and swallow eval 9/30/2020 on antibx  spike fever last night , wbc increasing  cxr done 9/29/2020 shows LLL infiltrate will get Ct chest , check RVP and flu . will d/w ID, follow up on all repeat cultures   suspect aspiration : NPO , place NGT get head ct and swallow eval

## 2020-09-30 NOTE — CONSULT NOTE ADULT - ATTENDING COMMENTS
I have seen and evaluated this patient independently and with NP Jurgen agree with the above findings : 89 year old male with PMHx of HTN, CKD, BPH with chronic obrien admitted for fever, weakness and abdominal distension- CT today shows fecal impaction and multifocal pneumonia. Call placed to daughter Ching, who resides with patient, who deferred to her brother as he is a physician. Ching did express some concern that her father's skin is beginning to break down. Called pt's son Samy at request of daughter, Samy is an internist and questioned why Palliative care was consulted on his father and wanted to discuss care with the primary physician on his father's case and not with me. He was quite angered and stated that he needs a prognosis to work with before even entertaining speaking to Palliative care. Will not actively follow until family has had a chance to be counseled by primary team. Thank you.  Total attending time 70 min

## 2020-09-30 NOTE — CONSULT NOTE ADULT - PROBLEM SELECTOR RECOMMENDATION 4
Pt being tx for multi-focal PNA and recurrent klebsiella UTIs  WBC Count: 19.76 today  Recommend to continue zosyn as ordered.

## 2020-09-30 NOTE — PROGRESS NOTE ADULT - ATTENDING COMMENTS
mild to moderate hydronephrosis : in setting of h/o prostate enlargement will consult urology mild to moderate hydronephrosis : in setting of h/o prostate enlargement has chronic obrien

## 2020-09-30 NOTE — PROGRESS NOTE ADULT - PROBLEM SELECTOR PLAN 2
klebsiella antibc per ID , pt with recurrent Urinary Tract Infection-with klebsiella klebsiella antibx per ID , pt with recurrent Urinary Tract Infection-with klebsiella

## 2020-09-30 NOTE — CONSULT NOTE ADULT - SUBJECTIVE AND OBJECTIVE BOX
HPI:  : 89M pmhx HTN, CKD, BPH with chronic obrien. fever and weakness for 8 days. no other symptoms. fever started shortly after hs obrien was changed- patient denies nausea vomiting or diarrhea - complain of abdominal distention      (22 Sep 2020 15:16)    PERTINENT PM/SXH:   Hydrocele in adult    Renal insufficiency    Dehydration    Spinal stenosis of lumbar region    BPH (benign prostatic hypertrophy)    Hypertension    Benign Prostatic Hyperplasia    HTN (Hypertension)      No significant past surgical history      FAMILY HISTORY:  No significant family history    SOCIAL HISTORY:   Significant other/partner: Yes [ ]  No [x ] Children:  Yes [ x]  No [ ] Gnosticist/Spirituality: Taoist  Substance hx: Yes[ ]  No [ ]   Tobacco hx:  Yes [ ] No [ ]   Alcohol hx: Yes [ ] No [ ]   Home Opioid hx:  Yes [ ] No [ x]  [ x] I-Stop Reference No:[id number]  Living Situation: [x ]Home  [ ]Long term care  [ ]Rehab [ ]Other  lives with dtr and HHA 12/7days  ADVANCE DIRECTIVES:    DNR  MOLST  Yes [ ] No [ x]  Living Will  Yes [ ]  No [ ]     [ ] Health Care Proxy(s)  [ ] Surrogate(s)  [ ] Guardian           Name(s): Phone Number(s):  Ching (daughter) # [phone], [phone]    BASELINE (I)ADL(s) (prior to admission):  [place]: [ ]Total  [ ] Moderate [x ]Dependent    Allergies    No Known Allergies    Intolerances    MEDICATIONS  (STANDING):  dextrose 5% + sodium chloride 0.45%. 1000 milliLiter(s) (70 mL/Hr) IV Continuous <Continuous>  finasteride 5 milliGRAM(s) Oral daily  gabapentin 300 milliGRAM(s) Oral two times a day  labetalol 100 milliGRAM(s) Oral two times a day  mirtazapine 7.5 milliGRAM(s) Oral daily  piperacillin/tazobactam IVPB.. 3.375 Gram(s) IV Intermittent every 8 hours  polyethylene glycol 3350 17 Gram(s) Oral at bedtime  potassium chloride   Powder 40 milliEquivalent(s) Oral once  potassium chloride  10 mEq/100 mL IVPB 10 milliEquivalent(s) IV Intermittent every 1 hour  tamsulosin 0.4 milliGRAM(s) Oral at bedtime    MEDICATIONS  (PRN):  acetaminophen   Tablet .. 650 milliGRAM(s) Oral every 6 hours PRN Temp greater or equal to 38C (100.4F)    PRESENT SYMPTOMS: [x ]Unable to obtain due to poor mentation   Source if other than patient:  [ ]Family   [ ]Team     Pain (Impact on QOL):    Location -   Severity -        Minimal acceptable level (0-10 scale):  Quality:   Onset:   Duration:                 Aggravating factors -  Relieving factors -  Radiation -    PAIN AD Score: 0    [email or web address] (press ctrl +  left click to view)    Dyspnea:  Yes [ ] No [ ] - [ ]Mild [ ]Moderate [ ]Severe  Anxiety:    Yes [ ] No [ ] - [ ]Mild [ ]Moderate [ ]Severe  Fatigue:    Yes [ ] No [ ] - [ ]Mild [ ]Moderate [ ]Severe  Nausea:    Yes [ ] No [ ] - [ ]Mild [ ]Moderate [ ]Severe                         Loss of appetite: Yes [ ] No [ ] - [ ]Mild [ ]Moderate [ ]Severe             Constipation:  Yes [ ] No [ ] - [ ]Mild [ ]Moderate [ ]Severe  Grief: Yes [ ] No [ ]     Other Symptoms:  [ ]All other review of systems negative     Karnofsky Performance Score/Palliative Performance Status Version 2:    30     %    http://palliative.info/resource_material/[email or web address].pdf    PHYSICAL EXAM:  Vital Signs Last 24 Hrs  T(C): 36.8 (30 Sep 2020 11:27), Max: 38.8 (29 Sep 2020 22:23)  T(F): 98.3 (30 Sep 2020 11:27), Max: 101.9 (29 Sep 2020 22:23)  HR: 101 (30 Sep 2020 11:27) (95 - 115)  BP: 151/75 (30 Sep 2020 11:27) (139/80 - 175/95)  BP(mean): --  RR: 18 (30 Sep 2020 11:27) (18 - 22)  SpO2: 97% (30 Sep 2020 11:27) (95% - 98%) I&O's Summary    29 Sep 2020 07:01  -  30 Sep 2020 07:00  --------------------------------------------------------  IN: 0 mL / OUT: 1901 mL / NET: -1901 mL        GENERAL:  [x ]Alert  [ x]Oriented x 1-2  [ ]Lethargic  [ ]Cachexia  [ ]Unarousable  [x ]Verbal  [ ]Non-Verbal  Behavioral:   [ ] Anxiety  [ ] Delirium [ ] Agitation [ ] Other  HEENT:  [x ]Normal   [ ]Dry mouth   [ ]ET Tube/Trach  [ ]Oral lesions  PULMONARY:   [x ]Clear  [ ]Tachypnea  [ ]Audible excessive secretions   [ ]Rhonchi        [ ]Right [ ]Left [ ]Bilateral  [ ]Crackles        [ ]Right [ ]Left [ ]Bilateral  [ ]Wheezing     [ ]Right [ ]Left [ ]Bilateral  CARDIOVASCULAR:    [ ]Regular [ ]Irregular [x ]Tachy  [ ]Eugenio [ ]Murmur [ ]Other  GASTROINTESTINAL:  [x ]Soft  [x ]Distended   [x ]+BS  [ x]Non tender [ ]Tender  [ ]PEG [ ]OGT/ NGT  Last BM:   20 @ 07:  -  20 @ 07:00  --------------------------------------------------------  OUT: 0 mL    20 @ 07:01  -  20 @ 07:00  --------------------------------------------------------  OUT: 1 mL    GENITOURINARY:  [ ]Normal [ ] Incontinent   [ ]Oliguria/Anuria   [ x]Obrien chronic  MUSCULOSKELETAL:   [ ]Normal   [ ]Weakness  [x ]Bed/Wheelchair bound [ ]Edema  NEUROLOGIC:   [ ]No focal deficits  [ x] Cognitive impairment  [ ] Dysphagia [ ]Dysarthria [ ] Paresis [ ]Other   SKIN:   [ ]Normal   [ x]Pressure ulcer(s)  [ ]Rash    LABS:                        9.8    19.76 )-----------( 542      ( 30 Sep 2020 08:15 )             31.0       142  |  110<H>  |  20  ----------------------------<  124<H>  3.4<L>   |  23  |  1.26    Ca    9.3      30 Sep 2020 08:15  Phos  2.5       Mg     2.2         Urinalysis Basic - ( 29 Sep 2020 19:47 )    Color: Yellow / Appearance: Clear / S.010 / pH: x  Gluc: x / Ketone: Negative  / Bili: Negative / Urobili: Negative mg/dL   Blood: x / Protein: 100 mg/dL / Nitrite: Negative   Leuk Esterase: Small / RBC: 6-10 /HPF / WBC 11-25   Sq Epi: x / Non Sq Epi: Few / Bacteria: Moderate      RADIOLOGY & ADDITIONAL STUDIES:  < from: CT Chest No Cont (20 @ 11:18) >    EXAM:  CT CHEST                            PROCEDURE DATE:  2020        INTERPRETATION:  CLINICAL INFORMATION: Leukocytosis and fever    COMPARISON: Abdominal CT dated 2020    PROCEDURE:  CT of the Chest was performed without intravenous contrast.  Sagittal and coronal reformats were performed.    FINDINGS:    LUNGS AND AIRWAYS: Patent central airways.  Bilateral dependent and basilar atelectasis. Left upper and lower lobe peripheral opacities.  PLEURA: Small bilateral pleural effusions.  MEDIASTINUM AND BREANNA: No lymphadenopathy.  VESSELS: Atherosclerotic changes of thoracic aorta and coronary arteries.  HEART: Heart size is normal. No pericardial effusion.  CHEST WALL AND LOWER NECK: Left anterior chest wall cardiac loop recorder.  VISUALIZED UPPER ABDOMEN: Mild to moderate bilateral hydroureteronephrosis. Colonic diverticula.  BONES: Degenerative changes.    IMPRESSION:  Left upper and lower lobe peripheral opacities, suspicious for multifocal pneumonia.  Small bilateral pleural effusions.  Mild to moderate bilateral hydroureteronephrosis.    < from: Xray Wrist Single View, Right (20 @ 01:27) >    EXAM:  WRIST SINGLE VIEW RT                            PROCEDURE DATE:  2020          INTERPRETATION:  Clinical information: right wrist swelling and warmth.    3 views.    Osteopenia is noted.  No acute fracture or focal osteolysis is noted.    Impression:    Findings as discussed above.    < from: Xray Kidney Ureter Bladder (20 @ 14:27) >  IMPRESSION:  No evidence of bowel obstruction.    < end of copied text >  < from: US Renal (20 @ 13:32) >    EXAM:  US KIDNEY(S)                            PROCEDURE DATE:  2020          INTERPRETATION:  History: UTI.    Bilateral renal ultrasound.    Limited by shadowing from bowel gas.    Right kidney 9.3 left 9.6 cm long dimension. Mild bilateralhydronephrosis. A 3.2 cm right renal cortical cyst and a 1.4 cm left renal cortical cyst. No gross solid mass or shadowing renal calculi bilaterally. Urinary bladder collapsed with Obrien.    Impression: Mild bilateral hydronephrosis. Consider CT abdomen and pelvis for further evaluation. Bilateral renal cysts.      < end of copied text >    < from: Xray Chest 1 View- PORTABLE-Urgent (Xray Chest 1 View- PORTABLE-Urgent .) (20 @ 19:47) >    IMPRESSION:    Small developing left basilar infiltrate.    Mild cardiomegaly. Loop recorder.    < end of copied text >  < from: CT Abdomen and Pelvis w/ Oral Cont (20 @ 14:42) >    EXAM:  CT ABDOMEN AND PELVIS OC                            PROCEDURE DATE:  2020          INTERPRETATION:  CLINICAL INFORMATION: Abdominal pain and fever    COMPARISON: 2018    PROCEDURE:  CT of the Abdomen and Pelvis was performed without intravenous contrast.  Intravenous contrast: None.  Oral contrast: positive contrast was administered.  Sagittal and coronal reformats were performed.    FINDINGS:  LOWER CHEST: Small bilateral pleural effusions. Basilar atelectasis.    Please note that evaluation of the abdominal organs and vascular structures is limited by lack of intravenous contrast.    LIVER: Within normal limits.  BILE DUCTS: Normal caliber.  GALLBLADDER: Within normal limits.  SPLEEN: Within normal limits.  PANCREAS: Atrophic.  ADRENALS: Within normal limits.  KIDNEYS/URETERS: Renal hypodensities, possibly cysts. No hydronephrosis.    BLADDER: Obrien catheter. Wall thickening.  REPRODUCTIVE ORGANS: Central prostatic gland calcifications.    BOWEL: No bowel obstruction.Appendix is not visualized. Rectum distended by stool. Colonic diverticulosis.  PERITONEUM: No ascites.  VESSELS: Atherosclerotic changes.  RETROPERITONEUM/LYMPH NODES: Prominent groin lymph nodes.  ABDOMINAL WALL: Subcutaneous soft tissue edema. Small fat-containing umbilical hernia.  BONES: Degenerative changes.    IMPRESSION:  Urinary bladder findings compatible with cystitis.      < end of copied text >      PROTEIN CALORIE MALNUTRITION PRESENT: [ x] Yes [ ] No  [ ] PPSV2 < or = to 30% [ ] significant weight loss  [x ] poor nutritional intake [ ] catabolic state [ ] anasarca     Albumin, Serum: 2.3 g/dL (20 @ 12:25)  REFERRALS:   [ ]Chaplaincy  [ ] Hospice  [ ]Child Life  [x ]Social Work  [ ]Case management [ ]Holistic Therapy   Goals of Care Discussion Document:

## 2020-09-30 NOTE — CHART NOTE - NSCHARTNOTEFT_GEN_A_CORE
Medicine Pa Note    Notified by Rn that patient is having pain. Patient seen and examined at bedside. Patient stated that he is having rt arm pain localized to lateral aspect of rt wrist. Patient denies sob, chest pain, nausea. diarrhea, constipation, vomiting, abd pain, cough, cold, or  headache. In addition, received report from signing out NP that patient had fever today at which time blood cx , urine cx  and CXR . Patient is currently being treated for pyelonephritis on Rocephin.     Vital Signs Last 24 Hrs  T(C): 37.7 (29 Sep 2020 23:52), Max: 38.8 (29 Sep 2020 22:23)  T(F): 99.9 (29 Sep 2020 23:52), Max: 101.9 (29 Sep 2020 22:23)  HR: 106 (29 Sep 2020 23:52) (96 - 115)  BP: 175/95 (29 Sep 2020 23:52) (143/72 - 175/95)  BP(mean): --  RR: 18 (29 Sep 2020 23:52) (18 - 22)  SpO2: 97% (29 Sep 2020 23:52) (95% - 98%)    Gen patient lying in bed   Head AT/NC  Eyes EOMI Perrla conjunctiva and sclera clear  ENMT tongue midline  no epistaxis   Neck supple no JVD  CHEST/LUNG: Clear to percussion bilaterally; No rales, rhonchi, wheezing  HEART: Regular rate and rhythm; No murmurs, rubs, or gallops  ABDOMEN: Soft, Nontender, Nondistended; BS + , obrien in place  EXTREMITIES:  2+ Peripheral Pulses, No clubbing, cyanosis, or edema, SCDS on   Neuro alert, awake , answering questions, movement of extremities  Skin right wrist lateral aspect  quarter size swelling , warm and erythema to area , sensory intact, cap refill intact, pulses + 2  ? possible IV line infiltration    a/p 89M pmhx HTN, CKD, BPH with chronic obrien who presents with fever and weakness for 8 days. No other symptoms. fever started shortly after his obrien was changed- patient denies nausea vomiting or diarrhea - complain of abdominal distention. Patient now with localized redness to  rt wrist.  Will give dose of Iv tynelol for the pain,  Will continue to monitor the patient.  [facility] Medicine Pa Note    Notified by Rn that patient is having pain. Patient seen and examined at bedside. Patient stated that he is having rt arm pain localized to lateral aspect of rt wrist. Patient denies sob, chest pain, nausea. diarrhea, constipation, vomiting, abd pain, cough, cold, or  headache. In addition, received report from signing out NP that patient had fever today at which time blood cx , urine cx  and CXR . Patient is currently being treated for pyelonephritis on Rocephin.     Vital Signs Last 24 Hrs  T(C): 37.7 (29 Sep 2020 23:52), Max: 38.8 (29 Sep 2020 22:23)  T(F): 99.9 (29 Sep 2020 23:52), Max: 101.9 (29 Sep 2020 22:23)  HR: 106 (29 Sep 2020 23:52) (96 - 115)  BP: 175/95 (29 Sep 2020 23:52) (143/72 - 175/95)  BP(mean): --  RR: 18 (29 Sep 2020 23:52) (18 - 22)  SpO2: 97% (29 Sep 2020 23:52) (95% - 98%)    Gen patient lying in bed   Head AT/NC  Eyes EOMI Perrla conjunctiva and sclera clear  ENMT tongue midline  no epistaxis   Neck supple no JVD  CHEST/LUNG: Clear to percussion bilaterally; No rales, rhonchi, wheezing  HEART: Regular rate and rhythm; No murmurs, rubs, or gallops  ABDOMEN: Soft, Nontender, Nondistended; BS + , obrien in place  EXTREMITIES:  2+ Peripheral Pulses, No clubbing, cyanosis, or edema, SCDS on   Neuro alert, awake , answering questions, movement of extremities  Skin right wrist lateral aspect  quarter size swelling , warm and erythema to area , sensory intact, cap refill intact, pulses + 2  ? possible IV line infiltration    a/p 89M pmhx HTN, CKD, BPH with chronic obrien who presents with fever and weakness for 8 days. No other symptoms. fever started shortly after his obrien was changed- patient denies nausea vomiting or diarrhea - complain of abdominal distention. Patient now with localized redness to  rt wrist : order xrAY RT WRIST   Will give dose of Iv tynelol for the pain,  Will continue to monitor the patient.  [facility] Medicine Pa Note    Notified by Rn that patient is having pain. Patient seen and examined at bedside. Patient stated that he is having rt arm pain localized to lateral aspect of rt wrist. Patient denies sob, chest pain, nausea. diarrhea, constipation, vomiting, abd pain, cough, cold, or  headache. In addition, received report from signing out NP that patient had fever today at which time blood cx , urine cx  and CXR . Patient is currently being treated for pyelonephritis on Rocephin.     Vital Signs Last 24 Hrs  T(C): 37.7 (29 Sep 2020 23:52), Max: 38.8 (29 Sep 2020 22:23)  T(F): 99.9 (29 Sep 2020 23:52), Max: 101.9 (29 Sep 2020 22:23)  HR: 106 (29 Sep 2020 23:52) (96 - 115)  BP: 175/95 (29 Sep 2020 23:52) (143/72 - 175/95)  BP(mean): --  RR: 18 (29 Sep 2020 23:52) (18 - 22)  SpO2: 97% (29 Sep 2020 23:52) (95% - 98%)    Gen patient lying in bed   Head AT/NC  Eyes EOMI Perrla conjunctiva and sclera clear  ENMT tongue midline  no epistaxis   Neck supple no JVD  CHEST/LUNG: Clear to percussion bilaterally; No rales, rhonchi, wheezing  HEART: Regular rate and rhythm; No murmurs, rubs, or gallops  ABDOMEN: Soft, Nontender, Nondistended; BS + , obrien in place  EXTREMITIES:  2+ Peripheral Pulses, No clubbing, cyanosis, or edema, SCDS on   Neuro alert, awake , answering questions, movement of extremities  Skin right wrist lateral aspect  quarter size swelling , warm and erythema to area , sensory intact, cap refill intact, pulses + 2  ? possible IV line infiltration    a/p 89M pmhx HTN, CKD, BPH with chronic obrien who presents with fever and weakness for 8 days. No other symptoms. fever started shortly after his obrien was changed- patient denies nausea vomiting or diarrhea - complain of abdominal distention. Patient now with localized redness to  rt wrist : order Xray RT WRIST   Will give dose of Iv tynelol for the pain,  Will continue to monitor the patient.  discussed with Dr Catalina Shine RP[facility]    addendum  Pain resolved in the rt wrist. Xray unofficially read by me negative for fracture  will continue to monitor the patient  [facility]l [facility]

## 2020-09-30 NOTE — PROGRESS NOTE ADULT - PROBLEM SELECTOR PLAN 10
ct scan done by colleague on 9/28/2020 shows full of stool in rectum : will give enema , and once ngt placed will attempt decompression and place lactulose

## 2020-10-01 LAB
ANION GAP SERPL CALC-SCNC: 7 MMOL/L — SIGNIFICANT CHANGE UP (ref 5–17)
BLD GP AB SCN SERPL QL: SIGNIFICANT CHANGE UP
BUN SERPL-MCNC: 20 MG/DL — SIGNIFICANT CHANGE UP (ref 7–23)
CALCIUM SERPL-MCNC: 9.1 MG/DL — SIGNIFICANT CHANGE UP (ref 8.5–10.1)
CHLORIDE SERPL-SCNC: 112 MMOL/L — HIGH (ref 96–108)
CO2 SERPL-SCNC: 24 MMOL/L — SIGNIFICANT CHANGE UP (ref 22–31)
CREAT SERPL-MCNC: 1.33 MG/DL — HIGH (ref 0.5–1.3)
FLU A RESULT: SIGNIFICANT CHANGE UP
FLU A RESULT: SIGNIFICANT CHANGE UP
FLUAV AG NPH QL: SIGNIFICANT CHANGE UP
FLUBV AG NPH QL: SIGNIFICANT CHANGE UP
GLUCOSE SERPL-MCNC: 99 MG/DL — SIGNIFICANT CHANGE UP (ref 70–99)
GRAM STN FLD: SIGNIFICANT CHANGE UP
HCT VFR BLD CALC: 23.6 % — LOW (ref 39–50)
HCT VFR BLD CALC: 24.4 % — LOW (ref 39–50)
HGB BLD-MCNC: 7.7 G/DL — LOW (ref 13–17)
HGB BLD-MCNC: 7.8 G/DL — LOW (ref 13–17)
MAGNESIUM SERPL-MCNC: 2.1 MG/DL — SIGNIFICANT CHANGE UP (ref 1.6–2.6)
MCHC RBC-ENTMCNC: 29.2 PG — SIGNIFICANT CHANGE UP (ref 27–34)
MCHC RBC-ENTMCNC: 29.3 PG — SIGNIFICANT CHANGE UP (ref 27–34)
MCHC RBC-ENTMCNC: 32 GM/DL — SIGNIFICANT CHANGE UP (ref 32–36)
MCHC RBC-ENTMCNC: 32.6 GM/DL — SIGNIFICANT CHANGE UP (ref 32–36)
MCV RBC AUTO: 89.7 FL — SIGNIFICANT CHANGE UP (ref 80–100)
MCV RBC AUTO: 91.4 FL — SIGNIFICANT CHANGE UP (ref 80–100)
NRBC # BLD: 0 /100 WBCS — SIGNIFICANT CHANGE UP (ref 0–0)
NRBC # BLD: 0 /100 WBCS — SIGNIFICANT CHANGE UP (ref 0–0)
OB PNL STL: NEGATIVE — SIGNIFICANT CHANGE UP
PHOSPHATE SERPL-MCNC: 3.2 MG/DL — SIGNIFICANT CHANGE UP (ref 2.5–4.5)
PLATELET # BLD AUTO: 456 K/UL — HIGH (ref 150–400)
PLATELET # BLD AUTO: 464 K/UL — HIGH (ref 150–400)
POTASSIUM SERPL-MCNC: 3.3 MMOL/L — LOW (ref 3.5–5.3)
POTASSIUM SERPL-SCNC: 3.3 MMOL/L — LOW (ref 3.5–5.3)
RAPID RVP RESULT: SIGNIFICANT CHANGE UP
RBC # BLD: 2.63 M/UL — LOW (ref 4.2–5.8)
RBC # BLD: 2.67 M/UL — LOW (ref 4.2–5.8)
RBC # FLD: 16 % — HIGH (ref 10.3–14.5)
RBC # FLD: 16 % — HIGH (ref 10.3–14.5)
RSV RESULT: SIGNIFICANT CHANGE UP
RSV RNA RESP QL NAA+PROBE: SIGNIFICANT CHANGE UP
SODIUM SERPL-SCNC: 143 MMOL/L — SIGNIFICANT CHANGE UP (ref 135–145)
SPECIMEN SOURCE: SIGNIFICANT CHANGE UP
WBC # BLD: 15.67 K/UL — HIGH (ref 3.8–10.5)
WBC # BLD: 16.21 K/UL — HIGH (ref 3.8–10.5)
WBC # FLD AUTO: 15.67 K/UL — HIGH (ref 3.8–10.5)
WBC # FLD AUTO: 16.21 K/UL — HIGH (ref 3.8–10.5)

## 2020-10-01 PROCEDURE — 99233 SBSQ HOSP IP/OBS HIGH 50: CPT

## 2020-10-01 RX ORDER — POTASSIUM CHLORIDE 20 MEQ
10 PACKET (EA) ORAL
Refills: 0 | Status: COMPLETED | OUTPATIENT
Start: 2020-10-01 | End: 2020-10-01

## 2020-10-01 RX ADMIN — GABAPENTIN 300 MILLIGRAM(S): 400 CAPSULE ORAL at 17:43

## 2020-10-01 RX ADMIN — LACTULOSE 10 GRAM(S): 10 SOLUTION ORAL at 17:43

## 2020-10-01 RX ADMIN — Medication 100 MILLIGRAM(S): at 17:43

## 2020-10-01 RX ADMIN — Medication 10 MILLIGRAM(S): at 13:44

## 2020-10-01 RX ADMIN — Medication 100 MILLIEQUIVALENT(S): at 18:43

## 2020-10-01 RX ADMIN — PIPERACILLIN AND TAZOBACTAM 25 GRAM(S): 4; .5 INJECTION, POWDER, LYOPHILIZED, FOR SOLUTION INTRAVENOUS at 21:31

## 2020-10-01 RX ADMIN — PIPERACILLIN AND TAZOBACTAM 25 GRAM(S): 4; .5 INJECTION, POWDER, LYOPHILIZED, FOR SOLUTION INTRAVENOUS at 13:49

## 2020-10-01 RX ADMIN — Medication 100 MILLIEQUIVALENT(S): at 19:56

## 2020-10-01 RX ADMIN — Medication 100 MILLIEQUIVALENT(S): at 15:11

## 2020-10-01 RX ADMIN — PIPERACILLIN AND TAZOBACTAM 25 GRAM(S): 4; .5 INJECTION, POWDER, LYOPHILIZED, FOR SOLUTION INTRAVENOUS at 05:57

## 2020-10-01 NOTE — PROGRESS NOTE ADULT - PROBLEM SELECTOR PLAN 10
ct scan done by colleague on 9/28/2020 shows full of stool in rectum : will give enema , and once ngt placed will attempt decompression and place lactulose    10/1/2020 had a bm

## 2020-10-01 NOTE — PROGRESS NOTE ADULT - SUBJECTIVE AND OBJECTIVE BOX
HPI:  : 89M pmhx HTN, CKD, BPH with chronic obrien. fever and weakness for 8 days. no other symptoms. fever started shortly after hs obrien was changed- patient denies nausea vomiting or diarrhea - complain of abdominal distention      (22 Sep 2020 15:16)      Allergies    No Known Allergies    Intolerances        MEDICATIONS  (STANDING):  dextrose 5% + sodium chloride 0.45%. 1000 milliLiter(s) (70 mL/Hr) IV Continuous <Continuous>  finasteride 5 milliGRAM(s) Oral daily  gabapentin 300 milliGRAM(s) Oral two times a day  labetalol 100 milliGRAM(s) Oral two times a day  lactulose Syrup 10 Gram(s) Enteral Tube two times a day  mirtazapine 7.5 milliGRAM(s) Oral daily  piperacillin/tazobactam IVPB.. 3.375 Gram(s) IV Intermittent every 8 hours  polyethylene glycol 3350 17 Gram(s) Oral at bedtime  potassium chloride   Powder 40 milliEquivalent(s) Oral once  potassium chloride  10 mEq/100 mL IVPB 10 milliEquivalent(s) IV Intermittent every 1 hour  senna 2 Tablet(s) Oral at bedtime  tamsulosin 0.4 milliGRAM(s) Oral at bedtime    MEDICATIONS  (PRN):  acetaminophen   Tablet .. 650 milliGRAM(s) Oral every 6 hours PRN Temp greater or equal to 38C (100.4F)  bisacodyl Suppository 10 milliGRAM(s) Rectal daily PRN Constipation      REVIEW OF SYSTEMS:    CONSTITUTIONAL: No fever, chills, weight loss, or fatigue  HEENT: No sore throat, runny nose, ear ache  RESPIRATORY: No cough, wheezing, No shortness of breath  CARDIOVASCULAR: No chest pain, palpitations, dizziness  GASTROINTESTINAL: No abdominal pain. No nausea, vomiting, diarrhea  GENITOURINARY: No dysuria, increase frequency, hematuria, or incontinence  NEUROLOGICAL: No headaches, memory loss, loss of strength, numbness, or tremors, no weakness  EXTREMITY: No pedal edema BLE  SKIN: No itching, burning, rashes, or lesions     VITAL SIGNS:  T(C): 37 (10-01-20 @ 11:35), Max: 37.1 (10-01-20 @ 00:19)  T(F): 98.6 (10-01-20 @ 11:35), Max: 98.8 (10-01-20 @ 00:19)  HR: 88 (10-01-20 @ 11:35) (88 - 100)  BP: 127/77 (10-01-20 @ 11:35) (127/77 - 148/70)  RR: 18 (10-01-20 @ 11:35) (18 - 18)  SpO2: 97% (10-01-20 @ 11:35) (95% - 99%)  Wt(kg): --    PHYSICAL EXAM:    GENERAL: not in any distress  HEENT: Neck is supple, normocephalic, atraumatic   CHEST/LUNG: Clear to auscultation bilaterally; No rales, rhonchi, wheezing  HEART: Regular rate and rhythm; No murmurs, rubs, or gallops  ABDOMEN: Soft, Nontender, Nondistended; Bowel sounds present, no rebound   EXTREMITIES:  2+ Peripheral Pulses, No clubbing, cyanosis, or edema  GENITOURINARY:   SKIN: No rashes or lesions  BACK: no pressor sore   NERVOUS SYSTEM:  Alert & Oriented X3, Good concentration  PSYCH: normal affect     LABS:                         7.8    16.21 )-----------( 464      ( 01 Oct 2020 08:12 )             24.4     10-    143  |  112<H>  |  20  ----------------------------<  99  3.3<L>   |  24  |  1.33<H>    Ca    9.1      01 Oct 2020 08:12  Phos  3.2     10-01  Mg     2.1     10-01          Urinalysis Basic - ( 29 Sep 2020 19:47 )    Color: Yellow / Appearance: Clear / S.010 / pH: x  Gluc: x / Ketone: Negative  / Bili: Negative / Urobili: Negative mg/dL   Blood: x / Protein: 100 mg/dL / Nitrite: Negative   Leuk Esterase: Small / RBC: 6-10 /HPF / WBC 11-25   Sq Epi: x / Non Sq Epi: Few / Bacteria: Moderate                          Culture Results:   <10,000 CFU/mL Normal Urogenital Vanessa ( @ 00:47)  Culture Results:   No growth to date. ( @ 00:18)  Culture Results:   No growth to date. ( @ 00:17)                Radiology:       HPI:  : 89M pmhx HTN, CKD, BPH with chronic obrien. fever and weakness for 8 days. no other symptoms. fever started shortly after hs obrien was changed- patient denies nausea vomiting or diarrhea - complain of abdominal distention      (22 Sep 2020 15:16)  all events noted since last seen   in error i modified dr coronado note from yesterday       Allergies    No Known Allergies    Intolerances        MEDICATIONS  (STANDING):  dextrose 5% + sodium chloride 0.45%. 1000 milliLiter(s) (70 mL/Hr) IV Continuous <Continuous>  finasteride 5 milliGRAM(s) Oral daily  gabapentin 300 milliGRAM(s) Oral two times a day  labetalol 100 milliGRAM(s) Oral two times a day  lactulose Syrup 10 Gram(s) Enteral Tube two times a day  mirtazapine 7.5 milliGRAM(s) Oral daily  piperacillin/tazobactam IVPB.. 3.375 Gram(s) IV Intermittent every 8 hours  polyethylene glycol 3350 17 Gram(s) Oral at bedtime  potassium chloride   Powder 40 milliEquivalent(s) Oral once  potassium chloride  10 mEq/100 mL IVPB 10 milliEquivalent(s) IV Intermittent every 1 hour  senna 2 Tablet(s) Oral at bedtime  tamsulosin 0.4 milliGRAM(s) Oral at bedtime    MEDICATIONS  (PRN):  acetaminophen   Tablet .. 650 milliGRAM(s) Oral every 6 hours PRN Temp greater or equal to 38C (100.4F)  bisacodyl Suppository 10 milliGRAM(s) Rectal daily PRN Constipation      REVIEW OF SYSTEMS:    unable to assess     VITAL SIGNS:  T(C): 37 (10-01-20 @ 11:35), Max: 37.1 (10-01-20 @ 00:19)  T(F): 98.6 (10-01-20 @ 11:35), Max: 98.8 (10-01-20 @ 00:19)  HR: 88 (10-01-20 @ 11:35) (88 - 100)  BP: 127/77 (10-01-20 @ 11:35) (127/77 - 148/70)  RR: 18 (10-01-20 @ 11:35) (18 - 18)  SpO2: 97% (10-01-20 @ 11:35) (95% - 99%)  Wt(kg): --    PHYSICAL EXAM:  alert and oriented x1  GENERAL: not in any distress  HEENT: Neck is supple, normocephalic, atraumatic   CHEST/LUNG: Clear to auscultation bilaterally; No rales, rhonchi, wheezing  HEART: Regular rate and rhythm; No murmurs, rubs, or gallops  ABDOMEN: Soft, Nontender, Nondistended; Bowel sounds present, no rebound   EXTREMITIES:  2+ Peripheral Pulses, No clubbing, cyanosis, or edema  GENITOURINARY: ch obrien   SKIN: No rashes or lesions  BACK: no pressor sore   NERVOUS SYSTEM:  Alert & Oriented X1    LABS:                         7.8    16.21 )-----------( 464      ( 01 Oct 2020 08:12 )             24.4     10-    143  |  112<H>  |  20  ----------------------------<  99  3.3<L>   |  24  |  1.33<H>    Ca    9.1      01 Oct 2020 08:12  Phos  3.2     10-01  Mg     2.1     10-01          Urinalysis Basic - ( 29 Sep 2020 19:47 )    Color: Yellow / Appearance: Clear / S.010 / pH: x  Gluc: x / Ketone: Negative  / Bili: Negative / Urobili: Negative mg/dL   Blood: x / Protein: 100 mg/dL / Nitrite: Negative   Leuk Esterase: Small / RBC: 6-10 /HPF / WBC 11-25   Sq Epi: x / Non Sq Epi: Few / Bacteria: Moderate                          Culture Results:   <10,000 CFU/mL Normal Urogenital Vanessa ( @ 00:47)  Culture Results:   No growth to date. ( @ 00:18)  Culture Results:   No growth to date. ( @ 00:17)                Radiology:      us< from: CT Chest No Cont (20 @ 11:18) >  IMPRESSION:  Left upper and lower lobe peripheral opacities, suspicious for multifocal pneumonia.  Small bilateral pleural effusions.  Mild to moderate bilateral hydroureteronephrosis.            BISI TIJERINA M.D., ATTENDING RADIOLOGIST  This document has been electronically signed. Sep 30 2020 11:39AM    < end of copied text >

## 2020-10-01 NOTE — SWALLOW BEDSIDE ASSESSMENT ADULT - SWALLOW EVAL: RECOMMENDED DIET
DYSPHAGIA 1 PUREE WITH NECTAR THICK LIQUIDS as conservative diet consistencies; however po intake is poor with dx malnutrition DYSPHAGIA 1 PUREE WITH NECTAR THICK LIQUIDS as conservative consistency diet; however po intake is poor with dx malnutrition

## 2020-10-01 NOTE — SWALLOW BEDSIDE ASSESSMENT ADULT - SWALLOW EVAL: PATIENT/FAMILY GOALS STATEMENT
let me die, it's better if I die, I don't want anything, cover my head, that's enough for me stated "let me die, it's better if I die, I don't want anything, cover my head, that's enough for me"

## 2020-10-01 NOTE — SWALLOW BEDSIDE ASSESSMENT ADULT - SLP PERTINENT HISTORY OF CURRENT PROBLEM
admitted with Acute cystitis without hematuria; PMH  HTN, CKD, BPH with chronic obrien, fever and weakness for 8 days and complaints of abdominal distention.

## 2020-10-01 NOTE — PROGRESS NOTE ADULT - PROBLEM SELECTOR PLAN 10
Contact: Ching (daughter) # [phone]  Samy (son) [phone]  Spoke with Samy and Ching yesterday, family is not interested in discussing any GOC at this time. Will re-address when timing is appropriate.   Please page [phone] with any questions or concerns. Thank you.

## 2020-10-01 NOTE — SWALLOW BEDSIDE ASSESSMENT ADULT - SWALLOW EVAL: DIAGNOSIS
This 89 yr old male Oropharyngeal dysphagia in context of multiple medical problems including poor po intake and now with left upper and lower lobe opacities; limited exam due to poor po acceptance; trigger of pharyngeal swallow was consistent and pt is managing oral secretions; overt s/s aspiration not evident on this limited exam

## 2020-10-01 NOTE — PHYSICAL THERAPY INITIAL EVALUATION ADULT - GENERAL OBSERVATIONS, REHAB EVAL
Patient's wounds measured and documented by PLEWIS, Pt presents with stage II to gluteal cleft and R buttock.

## 2020-10-01 NOTE — PHYSICAL THERAPY INITIAL EVALUATION ADULT - PERTINENT HX OF CURRENT PROBLEM, REHAB EVAL
89M pmhx HTN, CKD, BPH with chronic obrien. fever and weakness for 8 days. no other symptoms. fever started shortly after hs obrien was changed- patient denies nausea vomiting or diarrhea - complain of abdominal distention.

## 2020-10-01 NOTE — CHART NOTE - NSCHARTNOTEFT_GEN_A_CORE
Notified by RN that despite wrist restraints, pt has self-removed NGT.  RN states attending MD called earlier in the shift with verbal order to clamp NGT due to low output (clamped @ 23:30)    -since NGT was clamped due to low output, will not replace at this time.   -For re-eval by MD in am.  -may remove restraints as they were placed in conjunction with NGT  -continue to monitor

## 2020-10-01 NOTE — PROGRESS NOTE ADULT - PROBLEM SELECTOR PLAN 1
9/30/2020 on antibx  spike fever last night , wbc increasing  cxr done 9/29/2020 shows LLL infiltrate will get Ct chest , check RVP and flu . will d/w ID, follow up on all repeat cultures   suspect aspiration : NPO , place NGT get head ct and swallow eval      10/1/2020 ct head negative, seen by speech swallow and diet adjusted

## 2020-10-01 NOTE — SWALLOW BEDSIDE ASSESSMENT ADULT - SWALLOW EVAL: FUNCTIONAL LEVEL AT TIME OF EVAL
minimal cooperation for po intake during exam minimal cooperation for po intake during exam and verbally refusing

## 2020-10-01 NOTE — PROGRESS NOTE ADULT - PROBLEM SELECTOR PLAN 1
Pt now on dysphagia 1 pureed nectar consistency fluid  Recommend to continue to monitor for adequate nutritional intake and add supplements as needed.  Pt is on mirtazapine 7.5 mg oral daily.

## 2020-10-01 NOTE — SWALLOW BEDSIDE ASSESSMENT ADULT - COMMENTS
9/30 MD note: spike fever last night , wbc increasing  cxr done 9/29/2020 shows LLL infiltrate; will get Ct chest , check RVP and flu; head ct and swallow eval  9/30 Palliative; CT today shows fecal impaction and multifocal pneumonia; will not actively follow until family has had a chance to be counseled by primary team  9/30 CT head mild scattered chronic ischemic changes; no acute abnormalities only 1 spoon trial accepted but triggered several pharyngeal swallows

## 2020-10-01 NOTE — SWALLOW BEDSIDE ASSESSMENT ADULT - NS SPL SWALLOW CLINIC TRIAL FT
minimal acceptance but with no overt s/s; recommend nectar thick for safety and efficiency minimal acceptance but with no overt s/s; however, recommend nectar thick for safety and efficiency

## 2020-10-01 NOTE — PROGRESS NOTE ADULT - SUBJECTIVE AND OBJECTIVE BOX
Patient is a 89y old  Male who presents with a chief complaint of fever (29 Sep 2020 09:28)      INTERVAL HPI/OVERNIGHT EVENTS: none    MEDICATIONS  (STANDING):  dextrose 5% + sodium chloride 0.45%. 1000 milliLiter(s) (70 mL/Hr) IV Continuous <Continuous>  finasteride 5 milliGRAM(s) Oral daily  gabapentin 300 milliGRAM(s) Oral two times a day  labetalol 100 milliGRAM(s) Oral two times a day  lactulose Syrup 10 Gram(s) Enteral Tube two times a day  mirtazapine 7.5 milliGRAM(s) Oral daily  piperacillin/tazobactam IVPB.. 3.375 Gram(s) IV Intermittent every 8 hours  polyethylene glycol 3350 17 Gram(s) Oral at bedtime  potassium chloride   Powder 40 milliEquivalent(s) Oral once  potassium chloride  10 mEq/100 mL IVPB 10 milliEquivalent(s) IV Intermittent every 1 hour  senna 2 Tablet(s) Oral at bedtime  tamsulosin 0.4 milliGRAM(s) Oral at bedtime    MEDICATIONS  (PRN):  acetaminophen   Tablet .. 650 milliGRAM(s) Oral every 6 hours PRN Temp greater or equal to 38C (100.4F)  bisacodyl Suppository 10 milliGRAM(s) Rectal daily PRN Constipation    Allergies    No Known Allergies    Intolerances  Vital Signs Last 24 Hrs  T(C): 37 (01 Oct 2020 11:35), Max: 37.1 (01 Oct 2020 00:19)  T(F): 98.6 (01 Oct 2020 11:35), Max: 98.8 (01 Oct 2020 00:19)  HR: 88 (01 Oct 2020 11:35) (88 - 100)  BP: 127/77 (01 Oct 2020 11:35) (127/77 - 148/70)  BP(mean): --  RR: 18 (01 Oct 2020 11:35) (18 - 18)  SpO2: 97% (01 Oct 2020 11:35) (95% - 99%)    PHYSICAL EXAM:  GENERAL: NAD, elderly   HEAD:  Atraumatic, Normocephalic  EYES: EOMI, PERRLA, conjunctiva and sclera clear  ENT: O/P Clear  NECK: Supple, No JVD  NERVOUS SYSTEM:  No focal deficits  CHEST/LUNG: Clear to percussion bilaterally; No rales, rhonchi, wheezing  HEART: Regular rate and rhythm; No murmurs, rubs, or gallops  ABDOMEN: Soft, mildly distended LLQ tenderness  Bowel sounds present  EXTREMITIES:  2+ Peripheral Pulses, No clubbing, cyanosis, or edema  SKIN: No rashes or lesions    LABS:                      7.8    16.21 )-----------( 464      ( 01 Oct 2020 08:12 )             24.4   10-01    143  |  112<H>  |  20  ----------------------------<  99  3.3<L>   |  24  |  1.33<H>    Ca    9.1      01 Oct 2020 08:12  Phos  3.2     10-01  Mg     2.1     10-01          CAPILLARY BLOOD GLUCOSE          RADIOLOGY & ADDITIONAL TESTS:    Imaging Personally Reviewed:  [ ] YES  [ ] NO    Consultant(s) Notes Reviewed:  [ ] YES  [ ] NO    Care Discussed with Consultants/Other Providers [ ] YES  [ ] NO

## 2020-10-01 NOTE — PROGRESS NOTE ADULT - PROBLEM SELECTOR PLAN 2
CT abdomen on 9/28 showed Rectum distended by stool.  Ordered enema x 1, senna, dulcolax PRN, and Miralax.  No bowel movement charted-recommend giving PRN Dulcolax stat.

## 2020-10-01 NOTE — SWALLOW BEDSIDE ASSESSMENT ADULT - SLP GENERAL OBSERVATIONS
awake and verbal for exam; did not follow directions nor answer specific questions; spontaneous comments made regarding pain and food refusal

## 2020-10-01 NOTE — SWALLOW BEDSIDE ASSESSMENT ADULT - ADDITIONAL RECOMMENDATIONS
HIGER ASPIRATION RISK DUE TO DEPENDENT FEEDING STATUS; SLOW PACE/SMALL BITES; SMALLER MORE FREQUENT MEALS; PREFERRED FOODS HIGER ASPIRATION RISK DUE TO DEPENDENT FEEDING STATUS; SLOW PACE/SMALL BITES; SMALLER MORE FREQUENT MEALS; PREFERRED FOODS; D/C FEEDING IF S/S DIFFICULTY NOTED

## 2020-10-01 NOTE — PROGRESS NOTE ADULT - PROBLEM SELECTOR PLAN 4
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

## 2020-10-01 NOTE — PROGRESS NOTE ADULT - SUBJECTIVE AND OBJECTIVE BOX
INTERVAL HPI/OVERNIGHT EVENTS: Pt lying in bed, in NAD. Being ruled out for influenza.    Code Status: FC    Allergies    No Known Allergies    Intolerances    MEDICATIONS  (STANDING):  dextrose 5% + sodium chloride 0.45%. 1000 milliLiter(s) (70 mL/Hr) IV Continuous <Continuous>  finasteride 5 milliGRAM(s) Oral daily  gabapentin 300 milliGRAM(s) Oral two times a day  labetalol 100 milliGRAM(s) Oral two times a day  lactulose Syrup 10 Gram(s) Enteral Tube two times a day  mirtazapine 7.5 milliGRAM(s) Oral daily  piperacillin/tazobactam IVPB.. 3.375 Gram(s) IV Intermittent every 8 hours  polyethylene glycol 3350 17 Gram(s) Oral at bedtime  potassium chloride   Powder 40 milliEquivalent(s) Oral once  senna 2 Tablet(s) Oral at bedtime  tamsulosin 0.4 milliGRAM(s) Oral at bedtime    MEDICATIONS  (PRN):  acetaminophen   Tablet .. 650 milliGRAM(s) Oral every 6 hours PRN Temp greater or equal to 38C (100.4F)  bisacodyl Suppository 10 milliGRAM(s) Rectal daily PRN Constipation    PRESENT SYMPTOMS: [X ]Unable to obtain due to poor mentation   Source if other than patient:  [ ]Family   [ ]Team     Pain (Impact on QOL):  Denies-unable to adequately assess symptoms  Location:  Severity:  Minimal acceptable level (0-10 scale):       Quality:       Onset:  Duration:  Aggravating factors:  Relieving Factors  Radiation:    Dyspnea:  Yes [ ] No [ ] - [ ]Mild [ ]Moderate [ ]Severe  Anxiety:    Yes [ ] No [ ] - [ ]Mild [ ]Moderate [ ]Severe  Fatigue:    Yes [ ] No [ ] - [ ]Mild [ ]Moderate [ ]Severe  Nausea:    Yes [ ] No [ ] - [ ]Mild [ ]Moderate [ ]Severe                         Loss of appetite: Yes [ ] No [ ] - [ ]Mild [ ]Moderate [ ]Severe             Constipation:  Yes [ ] No [ ] - [ ]Mild [ ]Moderate [ ]Severe  Grief: Yes [ ] No [ ]     PAIN AD Score:	0  [email or web address] (Ctrl + left click to view)    Other Symptoms:  [ ]All other review of systems negative     Karnofsky Performance Score/Palliative Performance Status Version 2:   30    %    http://palliative.info/resource_material/[email or web address].pdf    PHYSICAL EXAM:  Vital Signs Last 24 Hrs  T(C): 37 (01 Oct 2020 11:35), Max: 37.1 (01 Oct 2020 00:19)  T(F): 98.6 (01 Oct 2020 11:35), Max: 98.8 (01 Oct 2020 00:19)  HR: 88 (01 Oct 2020 11:35) (88 - 100)  BP: 127/77 (01 Oct 2020 11:35) (127/77 - 148/70)  BP(mean): --  RR: 18 (01 Oct 2020 11:35) (18 - 18)  SpO2: 97% (01 Oct 2020 11:35) (95% - 99%) I&O's Summary    30 Sep 2020 07:01  -  01 Oct 2020 07:00  --------------------------------------------------------  IN: 0 mL / OUT: 661 mL / NET: -661 mL    GENERAL:  [x ]Alert  [ x]Oriented x 1-2  [ ]Lethargic  [ ]Cachexia  [ ]Unarousable  [x ]Verbal  [ ]Non-Verbal  Behavioral:   [ ] Anxiety  [ ] Delirium [ ] Agitation [ ] Other  HEENT:  [x ]Normal   [ ]Dry mouth   [ ]ET Tube/Trach  [ ]Oral lesions  PULMONARY:   [x ]Clear  [ ]Tachypnea  [ ]Audible excessive secretions   [ ]Rhonchi        [ ]Right [ ]Left [ ]Bilateral  [ ]Crackles        [ ]Right [ ]Left [ ]Bilateral  [ ]Wheezing     [ ]Right [ ]Left [ ]Bilateral  CARDIOVASCULAR:    [ ]Regular [ ]Irregular [x ]Tachy  [ ]Eugenio [ ]Murmur [ ]Other  GASTROINTESTINAL:  [x ]Soft  [x ]Distended   [x ]+BS  [ x]Non tender [ ]Tender  [ ]PEG [ ]OGT/ NGT  Last BM:   20 @ 07:01  -  20 @ 07:00  --------------------------------------------------------  OUT: 0 mL    20 @ 07:01  -  20 @ 07:00  --------------------------------------------------------  OUT: 1 mL    GENITOURINARY:  [ ]Normal [ ] Incontinent   [ ]Oliguria/Anuria   [ x]Ma chronic  MUSCULOSKELETAL:   [ ]Normal   [ ]Weakness  [x ]Bed/Wheelchair bound [ ]Edema  NEUROLOGIC:   [ ]No focal deficits  [ x] Cognitive impairment  [ ] Dysphagia [ ]Dysarthria [ ] Paresis [ ]Other   SKIN:   [ ]Normal   [ x]Pressure ulcer(s)  [ ]Rash    LABS:                        7.8    16.21 )-----------( 464      ( 01 Oct 2020 08:12 )             24.4   10    143  |  112<H>  |  20  ----------------------------<  99  3.3<L>   |  24  |  1.33<H>    Ca    9.1      01 Oct 2020 08:12  Phos  3.2     10-01  Mg     2.1     10-01        Urinalysis Basic - ( 29 Sep 2020 19:47 )    Color: Yellow / Appearance: Clear / S.010 / pH: x  Gluc: x / Ketone: Negative  / Bili: Negative / Urobili: Negative mg/dL   Blood: x / Protein: 100 mg/dL / Nitrite: Negative   Leuk Esterase: Small / RBC: 6-10 /HPF / WBC 11-25   Sq Epi: x / Non Sq Epi: Few / Bacteria: Moderate      RADIOLOGY & ADDITIONAL STUDIES:  < from: CT Head No Cont (20 @ 18:51) >    EXAM:  CT BRAIN                            PROCEDURE DATE:  2020          INTERPRETATION:  INDICATION:  Dysphasia. Rule out stroke.  TECHNIQUE:  A non contrast 2.5mm axial CT study of the brain was performed from skull base to vertex. Coronaland sagittal reformations were generated from the axial data.  COMPARISON EXAMINATION:  CT 2019    FINDINGS:    HEMISPHERES:  Chronic ischemic changes are noted in the white matter in conjunction with mild to moderate volume loss. No acute abnormality is suggested.  VENTRICLES:  Midline and normal in size.  POSTERIOR FOSSA:  The brain stem and cerebellum are unremarkable.  No CP angle lesion noted.  EXTRACEREBRAL SPACES:  No subdural or epidural collections are noted.  SKULL BASE AND CALVARIUM:  Appears intact.  No fracture or destructive lesion is identified.  SINUSES AND MASTOIDS:  Clear.  MISCELLANEOUS:  No orbital or suprasellar abnormality noted.    IMPRESSION:  1)  mild scattered chronic ischemic changes in both hemispheres with mild to moderate generalized volume loss and involutional change. No acute abnormality or space-occupying lesion noted.  2)  follow-up MR imaging may be considered for further assessment.        < end of copied text >    Protein Calorie Malnutrition Present: [ x] yes [ ] no  [ ] PPSV2 < or = 30%  [ ] significant weight loss [ x] poor nutritional intake [ ] anasarca [ ] catabolic state Albumin, Serum: 2.3 g/dL (20 @ 12:25)      REFERRALS:   [ ]Chaplaincy  [ ] Hospice  [ ]Child Life  [ ]Social Work  [ ]Case management [ ]Holistic Therapy   Goals of Care Document:

## 2020-10-01 NOTE — PROGRESS NOTE ADULT - ATTENDING COMMENTS
mild to moderate hydronephrosis : in setting of h/o prostate enlargement has chronic obrien mild to moderate hydronephrosis : in setting of h/o prostate enlargement has chronic obrien   stage 2 ulcer right buttock supportive and ordered specilaty bed  Turn and position the  patient  frequently per  protocol to prevent decubiti ulcer,  skin breakdown and/or further skin breakdown.

## 2020-10-01 NOTE — PHYSICAL THERAPY INITIAL EVALUATION ADULT - NS ASR RISK AREAS PT EVAL
February 14, 2017      Marcus Clark MD  [address] [facility] [place]  [facility] [address]           [facility]  [address] [place] [address]  Phone: [phone]  Fax: [phone]          Patient: Gloria Sanz   MR Number: [id number]   YOB: 1931   Date of Visit: 2/14/2017       Dear Dr. Marcus Clark:    Thank you for referring Gloria Sanz to me for evaluation. Attached you will find relevant portions of my assessment and plan of care.    If you have questions, please do not hesitate to call me. I look forward to following Gloria Sanz along with you.    Sincerely,    Vladimir Antoine III, MD    Enclosure  CC:  No Recipients    If you would like to receive this communication electronically, please contact [email or web address] or [phone] to request more information on [facility] access.    For providers and/or their staff who would like to refer a patient to [facility], please contact us through our one-stop-shop provider referral line, [facility], at [phone].    If you feel you have received this communication in error or would no longer like to receive these types of communications, please e-mail [email or web address]         
impaired skin integrity

## 2020-10-01 NOTE — PHYSICAL THERAPY INITIAL EVALUATION ADULT - PATIENT PROFILE REVIEW, REHAB EVAL
Shift assessment completed. Pt is alert & oriented x4. Able to verbalize needs. Resting quietly with no distress noted. Dressing to left knee is clean, dry and intact. Neurovascular and peripheral vascular checks WNL. Incentive Spirometry at bedside. Patient encouraged to use hourly x 10 repetitions. Patient voiding clear yellow urine without difficulty. Patient denies pain at present time. Bed low and locked. Call light within reach. Patient instructed to call for assistance. Pt verbalizes understanding. Will monitor. wound care assessment/yes

## 2020-10-01 NOTE — PROGRESS NOTE ADULT - ASSESSMENT
89 year old male with PMHx of HTN, CKD, BPH with chronic obrien, fever and weakness for 8 days, and complaints of abdominal distention. Palliative care consulted for GOC discussion in the setting of advanced age and multiple underlying comorbidities.

## 2020-10-01 NOTE — PROGRESS NOTE ADULT - PROBLEM SELECTOR PLAN 4
Pt being tx for multi-focal PNA and recurrent klebsiella UTIs  WBC Count: 16.21, slowly downtrending today   Recommend to continue zosyn as ordered.

## 2020-10-01 NOTE — SWALLOW BEDSIDE ASSESSMENT ADULT - SWALLOW EVAL: RECOMMENDED FEEDING/EATING TECHNIQUES
check mouth frequently for oral residue/pocketing/crush medication (when feasible)/position upright (90 degrees)/oral hygiene/maintain upright posture during/after eating for 30 mins/small sips/bites/alternate food with liquid/allow for swallow between intakes/no straws

## 2020-10-01 NOTE — PROGRESS NOTE ADULT - PROBLEM SELECTOR PLAN 7
Pt with PMHx of CKD. Pt has chronic indwelling obrien catheter-felt weak after obrien was changed.  Recommend to continue to monitor labs and UO closely.  Todays Cr 1.33

## 2020-10-01 NOTE — PROGRESS NOTE ADULT - ASSESSMENT
fevers   urinary tract infection ? from  obrien   fevers started after obrien change   as per RN no cough ;  urine culture  klebsiella not reported as esbl   persistent increased in wbc from ??  no response despite merrem and that was overkill   antibiotics narrowed   will get ct abdominal pelvis ; there is some abdominal tenderness   antibiotics revised ;; fevers   urinary tract infection ? from ch obrien   fevers started after obrien change   CT abdominal pelvis 3 days ago NEGATIVE; ct chest yesterday multifocal pneumonia and bilateral hydro  new Ct chest noted ; multifocal pn ?? aspiration   repeat blood culture and urine culture NEGATIVE   afebrile today and wbc is better ;; resolving leucocytosis from zosyn   continue current treatment

## 2020-10-01 NOTE — SWALLOW BEDSIDE ASSESSMENT ADULT - ORAL PREPARATORY PHASE
Lateral loss of bolus/Reduced oral grading/Refuses to accept bolus into oral cavity Refuses to accept bolus into oral cavity/max prompting needed/Reduced oral grading

## 2020-10-01 NOTE — SWALLOW BEDSIDE ASSESSMENT ADULT - ORAL PHASE
Delayed oral transit time Decreased anterior-posterior movement of the bolus/removed amount on lips rather than swallow

## 2020-10-02 LAB
CULTURE RESULTS: SIGNIFICANT CHANGE UP
GRAM STN FLD: SIGNIFICANT CHANGE UP
SPECIMEN SOURCE: SIGNIFICANT CHANGE UP

## 2020-10-02 PROCEDURE — 99233 SBSQ HOSP IP/OBS HIGH 50: CPT

## 2020-10-02 RX ADMIN — TAMSULOSIN HYDROCHLORIDE 0.4 MILLIGRAM(S): 0.4 CAPSULE ORAL at 22:06

## 2020-10-02 RX ADMIN — GABAPENTIN 300 MILLIGRAM(S): 400 CAPSULE ORAL at 10:01

## 2020-10-02 RX ADMIN — POLYETHYLENE GLYCOL 3350 17 GRAM(S): 17 POWDER, FOR SOLUTION ORAL at 22:06

## 2020-10-02 RX ADMIN — MIRTAZAPINE 7.5 MILLIGRAM(S): 45 TABLET, ORALLY DISINTEGRATING ORAL at 11:06

## 2020-10-02 RX ADMIN — PIPERACILLIN AND TAZOBACTAM 25 GRAM(S): 4; .5 INJECTION, POWDER, LYOPHILIZED, FOR SOLUTION INTRAVENOUS at 22:06

## 2020-10-02 RX ADMIN — SENNA PLUS 2 TABLET(S): 8.6 TABLET ORAL at 22:06

## 2020-10-02 RX ADMIN — FINASTERIDE 5 MILLIGRAM(S): 5 TABLET, FILM COATED ORAL at 11:06

## 2020-10-02 RX ADMIN — Medication 100 MILLIGRAM(S): at 18:09

## 2020-10-02 RX ADMIN — PIPERACILLIN AND TAZOBACTAM 25 GRAM(S): 4; .5 INJECTION, POWDER, LYOPHILIZED, FOR SOLUTION INTRAVENOUS at 06:30

## 2020-10-02 RX ADMIN — Medication 100 MILLIGRAM(S): at 10:01

## 2020-10-02 RX ADMIN — GABAPENTIN 300 MILLIGRAM(S): 400 CAPSULE ORAL at 18:09

## 2020-10-02 RX ADMIN — SODIUM CHLORIDE 70 MILLILITER(S): 9 INJECTION, SOLUTION INTRAVENOUS at 06:30

## 2020-10-02 RX ADMIN — SODIUM CHLORIDE 70 MILLILITER(S): 9 INJECTION, SOLUTION INTRAVENOUS at 18:10

## 2020-10-02 RX ADMIN — PIPERACILLIN AND TAZOBACTAM 25 GRAM(S): 4; .5 INJECTION, POWDER, LYOPHILIZED, FOR SOLUTION INTRAVENOUS at 13:19

## 2020-10-02 NOTE — PROGRESS NOTE ADULT - PROBLEM SELECTOR PLAN 2
CT abdomen on 9/28 showed Rectum distended by stool. Abdomen distended, soft upon exam, +bs.  BM charted today.  Recommend to continue PRN dulcolax as needed.  Recommend repeat imaging to r/o impaction.

## 2020-10-02 NOTE — PROGRESS NOTE ADULT - PROBLEM SELECTOR PLAN 4
Pt being tx for multi-focal PNA and recurrent klebsiella UTIs  WBC Count: 15.67, slowly downtrending today   Recommend to continue zosyn and doxycyline as ordered.

## 2020-10-02 NOTE — PROGRESS NOTE ADULT - SUBJECTIVE AND OBJECTIVE BOX
HPI:  89M pmhx HTN, CKD, BPH with chronic obrien. fever and weakness for 8 days. no other symptoms. fever started shortly after hs obrien was changed- patient denies nausea vomiting or diarrhea - complain of abdominal distention      (22 Sep 2020 15:16)      Allergies    No Known Allergies    Intolerances        MEDICATIONS  (STANDING):  dextrose 5% + sodium chloride 0.45%. 1000 milliLiter(s) (70 mL/Hr) IV Continuous <Continuous>  finasteride 5 milliGRAM(s) Oral daily  gabapentin 300 milliGRAM(s) Oral two times a day  labetalol 100 milliGRAM(s) Oral two times a day  lactulose Syrup 10 Gram(s) Enteral Tube two times a day  mirtazapine 7.5 milliGRAM(s) Oral daily  piperacillin/tazobactam IVPB.. 3.375 Gram(s) IV Intermittent every 8 hours  polyethylene glycol 3350 17 Gram(s) Oral at bedtime  senna 2 Tablet(s) Oral at bedtime  tamsulosin 0.4 milliGRAM(s) Oral at bedtime    MEDICATIONS  (PRN):  acetaminophen   Tablet .. 650 milliGRAM(s) Oral every 6 hours PRN Temp greater or equal to 38C (100.4F)  bisacodyl Suppository 10 milliGRAM(s) Rectal daily PRN Constipation      REVIEW OF SYSTEMS:    CONSTITUTIONAL: No fever, chills, weight loss, or fatigue  HEENT: No sore throat, runny nose, ear ache  RESPIRATORY: No cough, wheezing, No shortness of breath  CARDIOVASCULAR: No chest pain, palpitations, dizziness  GASTROINTESTINAL: No abdominal pain. No nausea, vomiting, diarrhea  GENITOURINARY: No dysuria, increase frequency, hematuria, or incontinence  NEUROLOGICAL: No headaches, memory loss, loss of strength, numbness, or tremors, no weakness  EXTREMITY: No pedal edema BLE  SKIN: No itching, burning, rashes, or lesions     VITAL SIGNS:  T(C): 37.8 (10-02-20 @ 05:01), Max: 37.8 (10-02-20 @ 05:01)  T(F): 100.1 (10-02-20 @ 05:01), Max: 100.1 (10-02-20 @ 05:01)  HR: 95 (10-02-20 @ 05:01) (88 - 111)  BP: 137/75 (10-02-20 @ 05:01) (127/77 - 168/66)  RR: 18 (10-02-20 @ 05:01) (18 - 18)  SpO2: 98% (10-02-20 @ 05:01) (97% - 100%)  Wt(kg): --    PHYSICAL EXAM:    GENERAL: not in any distress  HEENT: Neck is supple, normocephalic, atraumatic   CHEST/LUNG: Clear to percussion bilaterally; No rales, rhonchi, wheezing  HEART: Regular rate and rhythm; No murmurs, rubs, or gallops  ABDOMEN: Soft, Nontender, Nondistended; Bowel sounds present, no rebound   EXTREMITIES:  2+ Peripheral Pulses, No clubbing, cyanosis, or edema  GENITOURINARY:   SKIN: No rashes or lesions  BACK: no pressor sore   NERVOUS SYSTEM:  Alert & Oriented X3, Good concentration  PSYCH: normal affect     LABS:                         7.7    15.67 )-----------( 456      ( 01 Oct 2020 21:54 )             23.6     10-01    143  |  112<H>  |  20  ----------------------------<  99  3.3<L>   |  24  |  1.33<H>    Ca    9.1      01 Oct 2020 08:12  Phos  3.2     10-01  Mg     2.1     10-01      Culture Results:   Normal Respiratory Vanessa present (10-01 @ 00:24)  Culture Results:   <10,000 CFU/mL Normal Urogenital Vanessa (09-30 @ 00:47)  Culture Results:   No growth to date. (09-30 @ 00:18)  Culture Results:   No growth to date. (09-30 @ 00:17)        Radiology:       HPI:  89M pmhx HTN, CKD, BPH with chronic obrien. fever and weakness for 8 days. no other symptoms. fever started shortly after hs obrien was changed- patient denies nausea vomiting or diarrhea - complain of abdominal distention      (22 Sep 2020 15:16)      Allergies    No Known Allergies    Intolerances        MEDICATIONS  (STANDING):  dextrose 5% + sodium chloride 0.45%. 1000 milliLiter(s) (70 mL/Hr) IV Continuous <Continuous>  finasteride 5 milliGRAM(s) Oral daily  gabapentin 300 milliGRAM(s) Oral two times a day  labetalol 100 milliGRAM(s) Oral two times a day  lactulose Syrup 10 Gram(s) Enteral Tube two times a day  mirtazapine 7.5 milliGRAM(s) Oral daily  piperacillin/tazobactam IVPB.. 3.375 Gram(s) IV Intermittent every 8 hours  polyethylene glycol 3350 17 Gram(s) Oral at bedtime  senna 2 Tablet(s) Oral at bedtime  tamsulosin 0.4 milliGRAM(s) Oral at bedtime    MEDICATIONS  (PRN):  acetaminophen   Tablet .. 650 milliGRAM(s) Oral every 6 hours PRN Temp greater or equal to 38C (100.4F)  bisacodyl Suppository 10 milliGRAM(s) Rectal daily PRN Constipation      REVIEW OF SYSTEMS:    CONSTITUTIONAL: No fever, chills, weight loss, or fatigue  HEENT: No sore throat, runny nose, ear ache  RESPIRATORY: No cough, wheezing, No shortness of breath  CARDIOVASCULAR: No chest pain, palpitations, dizziness  GASTROINTESTINAL: No abdominal pain. No nausea, vomiting, diarrhea  GENITOURINARY: No dysuria, increase frequency, hematuria, or incontinence  NEUROLOGICAL: No headaches, memory loss, loss of strength, numbness, or tremors, no weakness  EXTREMITY: No pedal edema BLE  SKIN: No itching, burning, rashes, or lesions     VITAL SIGNS:  T(C): 37.8 (10-02-20 @ 05:01), Max: 37.8 (10-02-20 @ 05:01)  T(F): 100.1 (10-02-20 @ 05:01), Max: 100.1 (10-02-20 @ 05:01)  HR: 95 (10-02-20 @ 05:01) (88 - 111)  BP: 137/75 (10-02-20 @ 05:01) (127/77 - 168/66)  RR: 18 (10-02-20 @ 05:01) (18 - 18)  SpO2: 98% (10-02-20 @ 05:01) (97% - 100%)  Wt(kg): --    PHYSICAL EXAM:    GENERAL: Alert, oriented x2  HEENT: Neck is supple, normocephalic, atraumatic   CHEST/LUNG: Clear to ausculation bilaterally; No rales, rhonchi, wheezing  HEART: Regular rate and rhythm; No murmurs, rubs, or gallops  ABDOMEN: Distended on today exam, mild tenderness in the lower quadrants, soft, depressible   EXTREMITIES:  2+ Peripheral Pulses, No clubbing, cyanosis, or edema  GENITOURINARY: Obrien    LABS:                         7.7    15.67 )-----------( 456      ( 01 Oct 2020 21:54 )             23.6     10-01    143  |  112<H>  |  20  ----------------------------<  99  3.3<L>   |  24  |  1.33<H>    Ca    9.1      01 Oct 2020 08:12  Phos  3.2     10-01  Mg     2.1     10-01      Culture Results:   Normal Respiratory Vanessa present (10-01 @ 00:24)  Culture Results:   <10,000 CFU/mL Normal Urogenital Vanessa (09-30 @ 00:47)  Culture Results:   No growth to date. (09-30 @ 00:18)  Culture Results:   No growth to date. (09-30 @ 00:17)        Radiology:

## 2020-10-02 NOTE — PROGRESS NOTE ADULT - ATTENDING COMMENTS
mild to moderate hydronephrosis : in setting of h/o prostate enlargement has chronic obrien   stage 2 ulcer right buttock supportive and ordered specilaty bed  Turn and position the  patient  frequently per  protocol to prevent decubiti ulcer,  skin breakdown and/or further skin breakdown.

## 2020-10-02 NOTE — PROGRESS NOTE ADULT - SUBJECTIVE AND OBJECTIVE BOX
INTERVAL HPI/OVERNIGHT EVENTS: Pt lying in bed, in NAD, pt denies any pain or distress.    Code Status: FC    Allergies    No Known Allergies    Intolerances    MEDICATIONS  (STANDING):  dextrose 5% + sodium chloride 0.45%. 1000 milliLiter(s) (70 mL/Hr) IV Continuous <Continuous>  doxycycline hyclate Capsule 100 milliGRAM(s) Oral every 12 hours  finasteride 5 milliGRAM(s) Oral daily  gabapentin 300 milliGRAM(s) Oral two times a day  labetalol 100 milliGRAM(s) Oral two times a day  mirtazapine 7.5 milliGRAM(s) Oral daily  piperacillin/tazobactam IVPB.. 3.375 Gram(s) IV Intermittent every 8 hours  polyethylene glycol 3350 17 Gram(s) Oral at bedtime  senna 2 Tablet(s) Oral at bedtime  tamsulosin 0.4 milliGRAM(s) Oral at bedtime    MEDICATIONS  (PRN):  acetaminophen   Tablet .. 650 milliGRAM(s) Oral every 6 hours PRN Temp greater or equal to 38C (100.4F)  bisacodyl Suppository 10 milliGRAM(s) Rectal daily PRN Constipation      PRESENT SYMPTOMS: [X ]Unable to obtain due to poor mentation   Source if other than patient:  [ ]Family   [ ]Team     Pain (Impact on QOL):  Denies-unable to adequately assess symptoms  Location:  Severity:  Minimal acceptable level (0-10 scale):       Quality:       Onset:  Duration:  Aggravating factors:  Relieving Factors  Radiation:    Dyspnea:  Yes [ ] No [ ] - [ ]Mild [ ]Moderate [ ]Severe  Anxiety:    Yes [ ] No [ ] - [ ]Mild [ ]Moderate [ ]Severe  Fatigue:    Yes [ ] No [ ] - [ ]Mild [ ]Moderate [ ]Severe  Nausea:    Yes [ ] No [ ] - [ ]Mild [ ]Moderate [ ]Severe                         Loss of appetite: Yes [ ] No [ ] - [ ]Mild [ ]Moderate [ ]Severe             Constipation:  Yes [ ] No [ ] - [ ]Mild [ ]Moderate [ ]Severe  Grief: Yes [ ] No [ ]     PAIN AD Score:	0  [email or web address] (Ctrl + left click to view)    Other Symptoms:  [ ]All other review of systems negative     Karnofsky Performance Score/Palliative Performance Status Version 2:   30    %    http://palliative.info/resource_material/[email or web address].pdf    PHYSICAL EXAM:  Vital Signs Last 24 Hrs  T(C): 36.7 (02 Oct 2020 11:52), Max: 37.8 (02 Oct 2020 05:01)  T(F): 98 (02 Oct 2020 11:52), Max: 100.1 (02 Oct 2020 05:01)  HR: 81 (02 Oct 2020 11:52) (81 - 111)  BP: 137/68 (02 Oct 2020 11:52) (137/68 - 168/66)  BP(mean): --  RR: 17 (02 Oct 2020 11:52) (17 - 18)  SpO2: 98% (02 Oct 2020 11:52) (97% - 100%)  I&O's Summary    01 Oct 2020 07:01  -  02 Oct 2020 07:00  --------------------------------------------------------  IN: 1440 mL / OUT: 271 mL / NET: 1169 mL  GENERAL:  [x ]Alert  [ x]Oriented x 1-2  [ ]Lethargic  [ ]Cachexia  [ ]Unarousable  [x ]Verbal  [ ]Non-Verbal  Behavioral:   [ ] Anxiety  [ ] Delirium [ ] Agitation [ ] Other  HEENT:  [x ]Normal   [ ]Dry mouth   [ ]ET Tube/Trach  [ ]Oral lesions  PULMONARY:   [x ]Clear  [ ]Tachypnea  [ ]Audible excessive secretions   [ ]Rhonchi        [ ]Right [ ]Left [ ]Bilateral  [ ]Crackles        [ ]Right [ ]Left [ ]Bilateral  [ ]Wheezing     [ ]Right [ ]Left [ ]Bilateral  CARDIOVASCULAR:    [ ]Regular [ ]Irregular [x ]Tachy  [ ]Eugenio [ ]Murmur [ ]Other  GASTROINTESTINAL:  [x ]Soft  [x ]Distended   [x ]+BS  [ x]Non tender [ ]Tender  [ ]PEG [ ]OGT/ NGT  Last BM: 10/2  09-24-20 @ 07:01  -  20 @ 07:00  --------------------------------------------------------  OUT: 0 mL    20 @ 07:01  -  20 @ 07:00  --------------------------------------------------------  OUT: 1 mL    GENITOURINARY:  [ ]Normal [ ] Incontinent   [ ]Oliguria/Anuria   [ x]Ma chronic  MUSCULOSKELETAL:   [ ]Normal   [ ]Weakness  [x ]Bed/Wheelchair bound [ ]Edema  NEUROLOGIC:   [ ]No focal deficits  [ x] Cognitive impairment  [ ] Dysphagia [ ]Dysarthria [ ] Paresis [ ]Other   SKIN:   [ ]Normal   [ x]Pressure ulcer(s)  [ ]Rash    LABS:                                7.7    15.67 )-----------( 456      ( 01 Oct 2020 21:54 )             23.6   10-    143  |  112<H>  |  20  ----------------------------<  99  3.3<L>   |  24  |  1.33<H>    Ca    9.1      01 Oct 2020 08:12  Phos  3.2     10-  Mg     2.1     10-01    Urinalysis Basic - ( 29 Sep 2020 19:47 )    Color: Yellow / Appearance: Clear / S.010 / pH: x  Gluc: x / Ketone: Negative  / Bili: Negative / Urobili: Negative mg/dL   Blood: x / Protein: 100 mg/dL / Nitrite: Negative   Leuk Esterase: Small / RBC: 6-10 /HPF / WBC 11-25   Sq Epi: x / Non Sq Epi: Few / Bacteria: Moderate      RADIOLOGY & ADDITIONAL STUDIES:    < from: CT Head No Cont (20 @ 18:51) >    EXAM:  CT BRAIN                            PROCEDURE DATE:  2020          INTERPRETATION:  INDICATION:  Dysphasia. Rule out stroke.  TECHNIQUE:  A non contrast 2.5mm axial CT study of the brain was performed from skull base to vertex. Coronaland sagittal reformations were generated from the axial data.  COMPARISON EXAMINATION:  CT 2019    FINDINGS:    HEMISPHERES:  Chronic ischemic changes are noted in the white matter in conjunction with mild to moderate volume loss. No acute abnormality is suggested.  VENTRICLES:  Midline and normal in size.  POSTERIOR FOSSA:  The brain stem and cerebellum are unremarkable.  No CP angle lesion noted.  EXTRACEREBRAL SPACES:  No subdural or epidural collections are noted.  SKULL BASE AND CALVARIUM:  Appears intact.  No fracture or destructive lesion is identified.  SINUSES AND MASTOIDS:  Clear.  MISCELLANEOUS:  No orbital or suprasellar abnormality noted.    IMPRESSION:  1)  mild scattered chronic ischemic changes in both hemispheres with mild to moderate generalized volume loss and involutional change. No acute abnormality or space-occupying lesion noted.  2)  follow-up MR imaging may be considered for further assessment.    < end of copied text >    Protein Calorie Malnutrition Present: [ x] yes [ ] no  [ ] PPSV2 < or = 30%  [ ] significant weight loss [ x] poor nutritional intake [ ] anasarca [ ] catabolic state Albumin, Serum: 2.3 g/dL (20 @ 12:25)      REFERRALS:   [ ]Chaplaincy  [ ] Hospice  [ ]Child Life  [ ]Social Work  [ ]Case management [ ]Holistic Therapy   Goals of Care Document:

## 2020-10-02 NOTE — PROGRESS NOTE ADULT - SUBJECTIVE AND OBJECTIVE BOX
Patient is a 89y old  Male who presents with a chief complaint of fever (29 Sep 2020 09:28)      INTERVAL HPI/OVERNIGHT EVENTS: none    MEDICATIONS  (STANDING):  dextrose 5% + sodium chloride 0.45%. 1000 milliLiter(s) (70 mL/Hr) IV Continuous <Continuous>  doxycycline hyclate Capsule 100 milliGRAM(s) Oral every 12 hours  finasteride 5 milliGRAM(s) Oral daily  gabapentin 300 milliGRAM(s) Oral two times a day  labetalol 100 milliGRAM(s) Oral two times a day  mirtazapine 7.5 milliGRAM(s) Oral daily  piperacillin/tazobactam IVPB.. 3.375 Gram(s) IV Intermittent every 8 hours  polyethylene glycol 3350 17 Gram(s) Oral at bedtime  senna 2 Tablet(s) Oral at bedtime  tamsulosin 0.4 milliGRAM(s) Oral at bedtime    MEDICATIONS  (PRN):  acetaminophen   Tablet .. 650 milliGRAM(s) Oral every 6 hours PRN Temp greater or equal to 38C (100.4F)  bisacodyl Suppository 10 milliGRAM(s) Rectal daily PRN Constipation    Allergies    No Known Allergies    Intolerances    Vital Signs Last 24 Hrs  T(C): 37.8 (02 Oct 2020 05:01), Max: 37.8 (02 Oct 2020 05:01)  T(F): 100.1 (02 Oct 2020 05:01), Max: 100.1 (02 Oct 2020 05:01)  HR: 95 (02 Oct 2020 05:01) (88 - 111)  BP: 137/75 (02 Oct 2020 05:01) (127/77 - 168/66)  BP(mean): --  RR: 18 (02 Oct 2020 05:01) (18 - 18)  SpO2: 98% (02 Oct 2020 05:01) (97% - 100%)      PHYSICAL EXAM:  GENERAL: NAD, elderly   HEAD:  Atraumatic, Normocephalic  EYES: EOMI, PERRLA, conjunctiva and sclera clear  ENT: O/P Clear  NECK: Supple, No JVD  NERVOUS SYSTEM:  No focal deficits  CHEST/LUNG: Clear to percussion bilaterally; No rales, rhonchi, wheezing  HEART: Regular rate and rhythm; No murmurs, rubs, or gallops  ABDOMEN: Soft, mildly distended LLQ tenderness  Bowel sounds present  EXTREMITIES:  2+ Peripheral Pulses, No clubbing, cyanosis, or edema  SKIN: No rashes or lesions    LABS:                                       7.7    15.67 )-----------( 456      ( 01 Oct 2020 21:54 )             23.6   10-01    143  |  112<H>  |  20  ----------------------------<  99  3.3<L>   |  24  |  1.33<H>    Ca    9.1      01 Oct 2020 08:12  Phos  3.2     10-01  Mg     2.1     10-01        CAPILLARY BLOOD GLUCOSE          RADIOLOGY & ADDITIONAL TESTS:    Imaging Personally Reviewed:  [ ] YES  [ ] NO    Consultant(s) Notes Reviewed:  [ ] YES  [ ] NO    Care Discussed with Consultants/Other Providers [ ] YES  [ ] NO

## 2020-10-02 NOTE — PROGRESS NOTE ADULT - ASSESSMENT
Patient is being seen for fevers/ urinary tract infection ? from  Ma  has had various antibiotics since admission with ongoing fevers and leucocytosis     ct chest noted; has bilateral hydronephrosis; none on ct abdominal pelvis 48 hours prior    Patient physically decondition with swallowing problems.  Most likely asp PNA    Low grade fever reported today  Improving leukocytosis    Procal: 0.41    sputum cx: pending    RSV: negative  Influenza A/B: negative    UA significant for pyuria.  patient with chronic Ma.  UC: Klebsiella; repeat blood culture and urine culture no growth so far    Chest CT: Left upper and lower lobe peripheral opacities, suspicious for multifocal pneumonia.   Small bilateral pleural effusions.   Mild to moderate bilateral hydroureteronephrosis.       s/p Cefepime, Merrem and Ceftriaxone  s/p Flagyl    now  covering for a possible Asp PNA  -Continue Zosyn, will add atypical coverage  -Mycoplasma, legionella  -Trend proccal

## 2020-10-02 NOTE — PROGRESS NOTE ADULT - PROBLEM SELECTOR PLAN 1
9/30/2020 on antibx  spike fever last night , wbc increasing  cxr done 9/29/2020 shows LLL infiltrate will get Ct chest , check RVP and flu . will d/w ID, follow up on all repeat cultures   suspect aspiration : NPO , place NGT get head ct and swallow eval      10/1/2020 ct head negative, seen by speech swallow and diet adjusted    10/2/2020 f/u labs continue with meds  blood cx  and urine cx from 9/30ngtd   sputum cx pending

## 2020-10-02 NOTE — PROGRESS NOTE ADULT - PROBLEM SELECTOR PLAN 10
Contact: Ching (daughter) # [phone]  Samy (son) [phone]  Spoke with Samy and Ching yesterday, family is not interested in discussing any GOC at this time. Will re-address when timing is appropriate.   Please page [phone] with any questions or concerns. Thank you. Contact: Ching (daughter) # [phone]  Samy (son) [phone]  Spoke with Samy and Ching earlier in the week, family is not interested in discussing any GOC at this time. Will be signing off now, please reconsult as needed, thank you for involving us in the care of this patient.  Please page [phone] with any questions or concerns. Thank you.

## 2020-10-03 LAB
ANION GAP SERPL CALC-SCNC: 5 MMOL/L — SIGNIFICANT CHANGE UP (ref 5–17)
BUN SERPL-MCNC: 16 MG/DL — SIGNIFICANT CHANGE UP (ref 7–23)
CALCIUM SERPL-MCNC: 8.6 MG/DL — SIGNIFICANT CHANGE UP (ref 8.5–10.1)
CHLORIDE SERPL-SCNC: 111 MMOL/L — HIGH (ref 96–108)
CO2 SERPL-SCNC: 27 MMOL/L — SIGNIFICANT CHANGE UP (ref 22–31)
CREAT SERPL-MCNC: 1.46 MG/DL — HIGH (ref 0.5–1.3)
GLUCOSE SERPL-MCNC: 78 MG/DL — SIGNIFICANT CHANGE UP (ref 70–99)
HCT VFR BLD CALC: 22.9 % — LOW (ref 39–50)
HGB BLD-MCNC: 7.3 G/DL — LOW (ref 13–17)
MAGNESIUM SERPL-MCNC: 1.7 MG/DL — SIGNIFICANT CHANGE UP (ref 1.6–2.6)
MCHC RBC-ENTMCNC: 29.4 PG — SIGNIFICANT CHANGE UP (ref 27–34)
MCHC RBC-ENTMCNC: 31.9 GM/DL — LOW (ref 32–36)
MCV RBC AUTO: 92.3 FL — SIGNIFICANT CHANGE UP (ref 80–100)
NRBC # BLD: 0 /100 WBCS — SIGNIFICANT CHANGE UP (ref 0–0)
PHOSPHATE SERPL-MCNC: 3.5 MG/DL — SIGNIFICANT CHANGE UP (ref 2.5–4.5)
PLATELET # BLD AUTO: 417 K/UL — HIGH (ref 150–400)
POTASSIUM SERPL-MCNC: 3.3 MMOL/L — LOW (ref 3.5–5.3)
POTASSIUM SERPL-SCNC: 3.3 MMOL/L — LOW (ref 3.5–5.3)
PROCALCITONIN SERPL-MCNC: 0.29 NG/ML — HIGH (ref 0.02–0.1)
RBC # BLD: 2.48 M/UL — LOW (ref 4.2–5.8)
RBC # FLD: 16 % — HIGH (ref 10.3–14.5)
SODIUM SERPL-SCNC: 143 MMOL/L — SIGNIFICANT CHANGE UP (ref 135–145)
WBC # BLD: 13.22 K/UL — HIGH (ref 3.8–10.5)
WBC # FLD AUTO: 13.22 K/UL — HIGH (ref 3.8–10.5)

## 2020-10-03 PROCEDURE — 99233 SBSQ HOSP IP/OBS HIGH 50: CPT

## 2020-10-03 RX ORDER — POTASSIUM CHLORIDE 20 MEQ
40 PACKET (EA) ORAL ONCE
Refills: 0 | Status: COMPLETED | OUTPATIENT
Start: 2020-10-03 | End: 2020-10-03

## 2020-10-03 RX ORDER — SODIUM CHLORIDE 9 MG/ML
1000 INJECTION, SOLUTION INTRAVENOUS
Refills: 0 | Status: DISCONTINUED | OUTPATIENT
Start: 2020-10-03 | End: 2020-10-06

## 2020-10-03 RX ORDER — POTASSIUM CHLORIDE 20 MEQ
10 PACKET (EA) ORAL
Refills: 0 | Status: COMPLETED | OUTPATIENT
Start: 2020-10-03 | End: 2020-10-03

## 2020-10-03 RX ADMIN — PIPERACILLIN AND TAZOBACTAM 25 GRAM(S): 4; .5 INJECTION, POWDER, LYOPHILIZED, FOR SOLUTION INTRAVENOUS at 21:39

## 2020-10-03 RX ADMIN — GABAPENTIN 300 MILLIGRAM(S): 400 CAPSULE ORAL at 17:51

## 2020-10-03 RX ADMIN — PIPERACILLIN AND TAZOBACTAM 25 GRAM(S): 4; .5 INJECTION, POWDER, LYOPHILIZED, FOR SOLUTION INTRAVENOUS at 05:40

## 2020-10-03 RX ADMIN — Medication 40 MILLIEQUIVALENT(S): at 17:52

## 2020-10-03 RX ADMIN — Medication 100 MILLIGRAM(S): at 17:51

## 2020-10-03 RX ADMIN — MIRTAZAPINE 7.5 MILLIGRAM(S): 45 TABLET, ORALLY DISINTEGRATING ORAL at 11:20

## 2020-10-03 RX ADMIN — TAMSULOSIN HYDROCHLORIDE 0.4 MILLIGRAM(S): 0.4 CAPSULE ORAL at 21:39

## 2020-10-03 RX ADMIN — SODIUM CHLORIDE 70 MILLILITER(S): 9 INJECTION, SOLUTION INTRAVENOUS at 05:44

## 2020-10-03 RX ADMIN — GABAPENTIN 300 MILLIGRAM(S): 400 CAPSULE ORAL at 05:44

## 2020-10-03 RX ADMIN — Medication 100 MILLIEQUIVALENT(S): at 09:53

## 2020-10-03 RX ADMIN — Medication 100 MILLIGRAM(S): at 05:44

## 2020-10-03 RX ADMIN — FINASTERIDE 5 MILLIGRAM(S): 5 TABLET, FILM COATED ORAL at 11:20

## 2020-10-03 RX ADMIN — Medication 100 MILLIEQUIVALENT(S): at 11:20

## 2020-10-03 RX ADMIN — SENNA PLUS 2 TABLET(S): 8.6 TABLET ORAL at 21:39

## 2020-10-03 RX ADMIN — Medication 100 MILLIGRAM(S): at 17:50

## 2020-10-03 RX ADMIN — PIPERACILLIN AND TAZOBACTAM 25 GRAM(S): 4; .5 INJECTION, POWDER, LYOPHILIZED, FOR SOLUTION INTRAVENOUS at 13:57

## 2020-10-03 RX ADMIN — POLYETHYLENE GLYCOL 3350 17 GRAM(S): 17 POWDER, FOR SOLUTION ORAL at 21:39

## 2020-10-03 RX ADMIN — SODIUM CHLORIDE 100 MILLILITER(S): 9 INJECTION, SOLUTION INTRAVENOUS at 09:54

## 2020-10-03 RX ADMIN — Medication 100 MILLIEQUIVALENT(S): at 12:47

## 2020-10-03 NOTE — PROGRESS NOTE ADULT - ATTENDING COMMENTS
mild to moderate hydronephrosis : in setting of h/o prostate enlargement has chronic obrien   stage 2 ulcer right buttock supportive and ordered specialty bed  Turn and position the  patient  frequently per  protocol to prevent decubiti ulcer,  skin breakdown and/or further skin breakdown.

## 2020-10-03 NOTE — PROGRESS NOTE ADULT - SUBJECTIVE AND OBJECTIVE BOX
HPI:  : 89M pmhx HTN, CKD, BPH with chronic obrien. fever and weakness for 8 days. no other symptoms. fever started shortly after hs obrien was changed- patient denies nausea vomiting or diarrhea - complain of abdominal distention      (22 Sep 2020 15:16)  treated for obrien related urinary tract infection   now ongoing fevers   sp multiple antibiotics   now zosyn day 3  appears to have responded to that as wbc and fevers all better  doxy added yesterday noted    Allergies    No Known Allergies    Intolerances        MEDICATIONS  (STANDING):  dextrose 5% + sodium chloride 0.45%. 1000 milliLiter(s) (100 mL/Hr) IV Continuous <Continuous>  doxycycline hyclate Capsule 100 milliGRAM(s) Oral every 12 hours  finasteride 5 milliGRAM(s) Oral daily  gabapentin 300 milliGRAM(s) Oral two times a day  labetalol 100 milliGRAM(s) Oral two times a day  mirtazapine 7.5 milliGRAM(s) Oral daily  piperacillin/tazobactam IVPB.. 3.375 Gram(s) IV Intermittent every 8 hours  polyethylene glycol 3350 17 Gram(s) Oral at bedtime  senna 2 Tablet(s) Oral at bedtime  tamsulosin 0.4 milliGRAM(s) Oral at bedtime    MEDICATIONS  (PRN):  acetaminophen   Tablet .. 650 milliGRAM(s) Oral every 6 hours PRN Temp greater or equal to 38C (100.4F)  bisacodyl Suppository 10 milliGRAM(s) Rectal daily PRN Constipation      REVIEW OF SYSTEMS:    unable to assess     VITAL SIGNS:  T(C): 36.9 (10-04-20 @ 00:03), Max: 37.6 (10-03-20 @ 06:00)  T(F): 98.4 (10-04-20 @ 00:03), Max: 99.6 (10-03-20 @ 06:00)  HR: 84 (10-04-20 @ 00:03) (81 - 97)  BP: 150/77 (10-04-20 @ 00:03) (150/77 - 156/51)  RR: 17 (10-04-20 @ 00:03) (17 - 18)  SpO2: 98% (10-04-20 @ 00:03) (97% - 98%)  Wt(kg): --    PHYSICAL EXAM:    GENERAL: not in any distress  chronically ill  HEENT: Neck is supple, normocephalic, atraumatic   CHEST/LUNG: Clear to auscultation bilaterally; No rales, rhonchi, wheezing  HEART: Regular rate and rhythm; No murmurs, rubs, or gallops  ABDOMEN: Soft, Nontender, Nondistended; Bowel sounds present, no rebound   EXTREMITIES:  2+ Peripheral Pulses, No clubbing, cyanosis, or edema  GENITOURINARY: obrien   SKIN: No rashes or lesions  BACK: no pressor sore   NERVOUS SYSTEM:  Alert & Oriented x2      LABS:                         7.3    13.22 )-----------( 417      ( 03 Oct 2020 07:51 )             22.9     10-03    143  |  111<H>  |  16  ----------------------------<  78  3.3<L>   |  27  |  1.46<H>    Ca    8.6      03 Oct 2020 07:51  Phos  3.5     10-03  Mg     1.7     10-03                                Culture Results:   Normal Respiratory Vanessa present (10-01 @ 00:24)  Culture Results:   <10,000 CFU/mL Normal Urogenital Vanessa (09-30 @ 00:47)  Culture Results:   No growth to date. (09-30 @ 00:18)  Culture Results:   No growth to date. (09-30 @ 00:17)                Radiology:

## 2020-10-03 NOTE — PROGRESS NOTE ADULT - ASSESSMENT
fevers   urinary tract infection ? from  obrien   fevers started after obrien change ; initially treated for obrien related urinary tract infection   now health care associated oneumonia aspiration likely   CT abdominal [id number] ago NEGATIVE; ct chest2 days ago showed  multifocal pneumonia and bilateral hydro  repeat blood culture and urine culture NEGATIVE   afebrile today and wbc is better ;; resolving leucocytosis from zosyn and doxy   continue current treatment

## 2020-10-03 NOTE — PROGRESS NOTE ADULT - SUBJECTIVE AND OBJECTIVE BOX
Patient is a 89y old  Male who presents with a chief complaint of fever (29 Sep 2020 09:28)      INTERVAL HPI/OVERNIGHT EVENTS: none      MEDICATIONS  (STANDING):  dextrose 5% + sodium chloride 0.45%. 1000 milliLiter(s) (100 mL/Hr) IV Continuous <Continuous>  doxycycline hyclate Capsule 100 milliGRAM(s) Oral every 12 hours  finasteride 5 milliGRAM(s) Oral daily  gabapentin 300 milliGRAM(s) Oral two times a day  labetalol 100 milliGRAM(s) Oral two times a day  mirtazapine 7.5 milliGRAM(s) Oral daily  piperacillin/tazobactam IVPB.. 3.375 Gram(s) IV Intermittent every 8 hours  polyethylene glycol 3350 17 Gram(s) Oral at bedtime  potassium chloride   Powder 40 milliEquivalent(s) Oral once  potassium chloride  10 mEq/100 mL IVPB 10 milliEquivalent(s) IV Intermittent every 1 hour  senna 2 Tablet(s) Oral at bedtime  tamsulosin 0.4 milliGRAM(s) Oral at bedtime    MEDICATIONS  (PRN):  acetaminophen   Tablet .. 650 milliGRAM(s) Oral every 6 hours PRN Temp greater or equal to 38C (100.4F)  bisacodyl Suppository 10 milliGRAM(s) Rectal daily PRN Constipation    Allergies    No Known Allergies    Intolerances    Vital Signs Last 24 Hrs  T(C): 37.6 (03 Oct 2020 06:00), Max: 37.6 (03 Oct 2020 06:00)  T(F): 99.6 (03 Oct 2020 06:00), Max: 99.6 (03 Oct 2020 06:00)  HR: 81 (03 Oct 2020 06:00) (81 - 94)  BP: 154/73 (03 Oct 2020 06:00) (137/68 - 154/73)  BP(mean): --  RR: 18 (03 Oct 2020 06:00) (17 - 18)  SpO2: 98% (03 Oct 2020 06:00) (98% - 100%)      PHYSICAL EXAM:  GENERAL: NAD, elderly   HEAD:  Atraumatic, Normocephalic  EYES: EOMI, PERRLA, conjunctiva and sclera clear  ENT: O/P Clear  NECK: Supple, No JVD  NERVOUS SYSTEM:  No focal deficits  CHEST/LUNG: Clear to percussion bilaterally; No rales, rhonchi, wheezing  HEART: Regular rate and rhythm; No murmurs, rubs, or gallops  ABDOMEN: Soft, mildly distended LLQ tenderness  Bowel sounds present  EXTREMITIES:  2+ Peripheral Pulses, No clubbing, cyanosis, or edema  SKIN: No rashes or lesions    LABS:                                          7.3    13.22 )-----------( 417      ( 03 Oct 2020 07:51 )             22.9   10-03    143  |  111<H>  |  16  ----------------------------<  78  3.3<L>   |  27  |  1.46<H>    Ca    8.6      03 Oct 2020 07:51  Phos  3.5     10-03  Mg     1.7     10-03          CAPILLARY BLOOD GLUCOSE          RADIOLOGY & ADDITIONAL TESTS:    Imaging Personally Reviewed:  [ ] YES  [ ] NO    Consultant(s) Notes Reviewed:  [ ] YES  [ ] NO    Care Discussed with Consultants/Other Providers [ ] YES  [ ] NO

## 2020-10-03 NOTE — PROGRESS NOTE ADULT - PROBLEM SELECTOR PLAN 4
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

## 2020-10-03 NOTE — PROGRESS NOTE ADULT - PROBLEM SELECTOR PLAN 1
9/30/2020 on antibx  spike fever last night , wbc increasing  cxr done 9/29/2020 shows LLL infiltrate will get Ct chest , check RVP and flu . will d/w ID, follow up on all repeat cultures   suspect aspiration : NPO , place NGT get head ct and swallow eval      10/1/2020 ct head negative, seen by speech swallow and diet adjusted    10/2/2020 f/u labs continue with meds  blood cx  and urine cx from 9/30ngtd   sputum cx ngtd, flu negative, rvp negative

## 2020-10-04 LAB
ANION GAP SERPL CALC-SCNC: 5 MMOL/L — SIGNIFICANT CHANGE UP (ref 5–17)
BUN SERPL-MCNC: 14 MG/DL — SIGNIFICANT CHANGE UP (ref 7–23)
CALCIUM SERPL-MCNC: 8.6 MG/DL — SIGNIFICANT CHANGE UP (ref 8.5–10.1)
CHLORIDE SERPL-SCNC: 109 MMOL/L — HIGH (ref 96–108)
CO2 SERPL-SCNC: 27 MMOL/L — SIGNIFICANT CHANGE UP (ref 22–31)
CREAT SERPL-MCNC: 1.38 MG/DL — HIGH (ref 0.5–1.3)
GLUCOSE SERPL-MCNC: 89 MG/DL — SIGNIFICANT CHANGE UP (ref 70–99)
HCT VFR BLD CALC: 24.2 % — LOW (ref 39–50)
HGB BLD-MCNC: 7.5 G/DL — LOW (ref 13–17)
MAGNESIUM SERPL-MCNC: 1.8 MG/DL — SIGNIFICANT CHANGE UP (ref 1.6–2.6)
MCHC RBC-ENTMCNC: 29.1 PG — SIGNIFICANT CHANGE UP (ref 27–34)
MCHC RBC-ENTMCNC: 31 GM/DL — LOW (ref 32–36)
MCV RBC AUTO: 93.8 FL — SIGNIFICANT CHANGE UP (ref 80–100)
NRBC # BLD: 0 /100 WBCS — SIGNIFICANT CHANGE UP (ref 0–0)
PHOSPHATE SERPL-MCNC: 2.9 MG/DL — SIGNIFICANT CHANGE UP (ref 2.5–4.5)
PLATELET # BLD AUTO: 371 K/UL — SIGNIFICANT CHANGE UP (ref 150–400)
POTASSIUM SERPL-MCNC: 3.6 MMOL/L — SIGNIFICANT CHANGE UP (ref 3.5–5.3)
POTASSIUM SERPL-SCNC: 3.6 MMOL/L — SIGNIFICANT CHANGE UP (ref 3.5–5.3)
RBC # BLD: 2.58 M/UL — LOW (ref 4.2–5.8)
RBC # FLD: 15.7 % — HIGH (ref 10.3–14.5)
SODIUM SERPL-SCNC: 141 MMOL/L — SIGNIFICANT CHANGE UP (ref 135–145)
WBC # BLD: 12.55 K/UL — HIGH (ref 3.8–10.5)
WBC # FLD AUTO: 12.55 K/UL — HIGH (ref 3.8–10.5)

## 2020-10-04 PROCEDURE — 99233 SBSQ HOSP IP/OBS HIGH 50: CPT

## 2020-10-04 RX ORDER — AMLODIPINE BESYLATE 2.5 MG/1
5 TABLET ORAL DAILY
Refills: 0 | Status: DISCONTINUED | OUTPATIENT
Start: 2020-10-04 | End: 2020-10-13

## 2020-10-04 RX ADMIN — Medication 100 MILLIGRAM(S): at 07:35

## 2020-10-04 RX ADMIN — PIPERACILLIN AND TAZOBACTAM 25 GRAM(S): 4; .5 INJECTION, POWDER, LYOPHILIZED, FOR SOLUTION INTRAVENOUS at 13:58

## 2020-10-04 RX ADMIN — PIPERACILLIN AND TAZOBACTAM 25 GRAM(S): 4; .5 INJECTION, POWDER, LYOPHILIZED, FOR SOLUTION INTRAVENOUS at 06:58

## 2020-10-04 RX ADMIN — SODIUM CHLORIDE 100 MILLILITER(S): 9 INJECTION, SOLUTION INTRAVENOUS at 20:23

## 2020-10-04 RX ADMIN — PIPERACILLIN AND TAZOBACTAM 25 GRAM(S): 4; .5 INJECTION, POWDER, LYOPHILIZED, FOR SOLUTION INTRAVENOUS at 21:33

## 2020-10-04 RX ADMIN — GABAPENTIN 300 MILLIGRAM(S): 400 CAPSULE ORAL at 07:35

## 2020-10-04 NOTE — PROGRESS NOTE ADULT - SUBJECTIVE AND OBJECTIVE BOX
Patient is a 89y old  Male who presents with a chief complaint of fever (29 Sep 2020 09:28)      INTERVAL HPI/OVERNIGHT EVENTS: none      MEDICATIONS  (STANDING):  amLODIPine   Tablet 5 milliGRAM(s) Oral daily  dextrose 5% + sodium chloride 0.45%. 1000 milliLiter(s) (100 mL/Hr) IV Continuous <Continuous>  doxycycline hyclate Capsule 100 milliGRAM(s) Oral every 12 hours  finasteride 5 milliGRAM(s) Oral daily  gabapentin 300 milliGRAM(s) Oral two times a day  labetalol 100 milliGRAM(s) Oral two times a day  mirtazapine 7.5 milliGRAM(s) Oral daily  piperacillin/tazobactam IVPB.. 3.375 Gram(s) IV Intermittent every 8 hours  polyethylene glycol 3350 17 Gram(s) Oral at bedtime  senna 2 Tablet(s) Oral at bedtime  tamsulosin 0.4 milliGRAM(s) Oral at bedtime    MEDICATIONS  (PRN):  acetaminophen   Tablet .. 650 milliGRAM(s) Oral every 6 hours PRN Temp greater or equal to 38C (100.4F)  bisacodyl Suppository 10 milliGRAM(s) Rectal daily PRN Constipation    Allergies    No Known Allergies    Intolerances  Vital Signs Last 24 Hrs  T(C): 36.7 (04 Oct 2020 06:20), Max: 37 (03 Oct 2020 17:20)  T(F): 98 (04 Oct 2020 06:20), Max: 98.6 (03 Oct 2020 17:20)  HR: 77 (04 Oct 2020 06:20) (77 - 97)  BP: 161/78 (04 Oct 2020 06:20) (150/77 - 161/78)  BP(mean): --  RR: 18 (04 Oct 2020 06:20) (17 - 18)  SpO2: 98% (04 Oct 2020 06:20) (97% - 98%)    PHYSICAL EXAM:  GENERAL: NAD, elderly   HEAD:  Atraumatic, Normocephalic  EYES: EOMI, PERRLA, conjunctiva and sclera clear  ENT: O/P Clear  NECK: Supple, No JVD  NERVOUS SYSTEM:  No focal deficits  CHEST/LUNG: Clear to percussion bilaterally; No rales, rhonchi, wheezing  HEART: Regular rate and rhythm; No murmurs, rubs, or gallops  ABDOMEN: Soft, mildly distended LLQ tenderness  Bowel sounds present  EXTREMITIES:  2+ Peripheral Pulses, No clubbing, cyanosis, or edema  SKIN: No rashes or lesions    LABS:                                          7.5    12.55 )-----------( 371      ( 04 Oct 2020 07:06 )             24.2   10-04    141  |  109<H>  |  14  ----------------------------<  89  3.6   |  27  |  1.38<H>    Ca    8.6      04 Oct 2020 07:06  Phos  2.9     10-04  Mg     1.8     10-04              CAPILLARY BLOOD GLUCOSE          RADIOLOGY & ADDITIONAL TESTS:    Imaging Personally Reviewed:  [ ] YES  [ ] NO    Consultant(s) Notes Reviewed:  [ ] YES  [ ] NO    Care Discussed with Consultants/Other Providers [ ] YES  [ ] NO

## 2020-10-04 NOTE — PROGRESS NOTE ADULT - ATTENDING COMMENTS
mild to moderate hydronephrosis : in setting of h/o prostate enlargement has chronic obrien   stage 2 ulcer right buttock supportive and ordered specialty bed  Turn and position the  patient  frequently per  protocol to prevent decubiti ulcer,  skin breakdown and/or further skin breakdown.     10/4/87531 mbs in am

## 2020-10-05 LAB
CULTURE RESULTS: SIGNIFICANT CHANGE UP
CULTURE RESULTS: SIGNIFICANT CHANGE UP
M PNEUMO IGM SER-ACNC: 131 UNITS/ML — SIGNIFICANT CHANGE UP
MYCOPLASMA AG SPEC QL: NEGATIVE — SIGNIFICANT CHANGE UP
SPECIMEN SOURCE: SIGNIFICANT CHANGE UP
SPECIMEN SOURCE: SIGNIFICANT CHANGE UP

## 2020-10-05 PROCEDURE — 99233 SBSQ HOSP IP/OBS HIGH 50: CPT

## 2020-10-05 RX ADMIN — TAMSULOSIN HYDROCHLORIDE 0.4 MILLIGRAM(S): 0.4 CAPSULE ORAL at 21:33

## 2020-10-05 RX ADMIN — SODIUM CHLORIDE 100 MILLILITER(S): 9 INJECTION, SOLUTION INTRAVENOUS at 13:24

## 2020-10-05 RX ADMIN — POLYETHYLENE GLYCOL 3350 17 GRAM(S): 17 POWDER, FOR SOLUTION ORAL at 21:33

## 2020-10-05 RX ADMIN — SENNA PLUS 2 TABLET(S): 8.6 TABLET ORAL at 21:33

## 2020-10-05 RX ADMIN — PIPERACILLIN AND TAZOBACTAM 25 GRAM(S): 4; .5 INJECTION, POWDER, LYOPHILIZED, FOR SOLUTION INTRAVENOUS at 22:30

## 2020-10-05 RX ADMIN — SODIUM CHLORIDE 100 MILLILITER(S): 9 INJECTION, SOLUTION INTRAVENOUS at 21:36

## 2020-10-05 RX ADMIN — SODIUM CHLORIDE 100 MILLILITER(S): 9 INJECTION, SOLUTION INTRAVENOUS at 06:22

## 2020-10-05 RX ADMIN — PIPERACILLIN AND TAZOBACTAM 25 GRAM(S): 4; .5 INJECTION, POWDER, LYOPHILIZED, FOR SOLUTION INTRAVENOUS at 13:16

## 2020-10-05 RX ADMIN — PIPERACILLIN AND TAZOBACTAM 25 GRAM(S): 4; .5 INJECTION, POWDER, LYOPHILIZED, FOR SOLUTION INTRAVENOUS at 06:22

## 2020-10-05 NOTE — PROGRESS NOTE ADULT - PROBLEM SELECTOR PLAN 10
ct scan done by colleague on 9/28/2020 shows full of stool in rectum : will give enema , and once ngt placed will attempt decompression and place lactulose    10/1/2020 had a bm  10/5/2020 resolved

## 2020-10-05 NOTE — PROGRESS NOTE ADULT - SUBJECTIVE AND OBJECTIVE BOX
HPI:  : 89M pmhx HTN, CKD, BPH with chronic obrien. fever and weakness for 8 days. no other symptoms. fever started shortly after hs obrien was changed- patient denies nausea vomiting or diarrhea - complain of abdominal distention      (22 Sep 2020 15:16)    treated for obrien related urinary tract infection   now ongoing fevers   sp multiple antibiotics   now zosyn day 3  appears to have responded to that as wbc and fevers all better  doxy added yesterday noted  now day 5 zosyn   doing much better   Allergies    No Known Allergies    Intolerances        MEDICATIONS  (STANDING):  amLODIPine   Tablet 5 milliGRAM(s) Oral daily  dextrose 5% + sodium chloride 0.45%. 1000 milliLiter(s) (100 mL/Hr) IV Continuous <Continuous>  doxycycline hyclate Capsule 100 milliGRAM(s) Oral every 12 hours  finasteride 5 milliGRAM(s) Oral daily  gabapentin 300 milliGRAM(s) Oral two times a day  labetalol 100 milliGRAM(s) Oral two times a day  mirtazapine 7.5 milliGRAM(s) Oral daily  piperacillin/tazobactam IVPB.. 3.375 Gram(s) IV Intermittent every 8 hours  polyethylene glycol 3350 17 Gram(s) Oral at bedtime  senna 2 Tablet(s) Oral at bedtime  tamsulosin 0.4 milliGRAM(s) Oral at bedtime    MEDICATIONS  (PRN):  acetaminophen   Tablet .. 650 milliGRAM(s) Oral every 6 hours PRN Temp greater or equal to 38C (100.4F)  bisacodyl Suppository 10 milliGRAM(s) Rectal daily PRN Constipation      REVIEW OF SYSTEMS:  feels fine   just wants aides to help him more   CONSTITUTIONAL: No fever, chills, weight loss, or fatigue  HEENT: No sore throat, runny nose, ear ache  RESPIRATORY: denies  cough, wheezing, No shortness of breath  CARDIOVASCULAR: No chest pain, palpitations, dizziness  GASTROINTESTINAL: No abdominal pain. No nausea, vomiting, diarrhea  GENITOURINARY:feels wetness   NEUROLOGICAL: No headaches,  is weak   EXTREMITY: No pedal edema BLE  SKIN: No itching, burning, rashes, or lesions   poor historian     VITAL SIGNS:  T(C): 36.6 (10-05-20 @ 11:32), Max: 36.6 (10-05-20 @ 11:32)  T(F): 97.8 (10-05-20 @ 11:32), Max: 97.8 (10-05-20 @ 11:32)  HR: 76 (10-05-20 @ 11:32) (72 - 84)  BP: 166/78 (10-05-20 @ 11:32) (126/44 - 166/82)  RR: 20 (10-05-20 @ 11:32) (18 - 20)  SpO2: 99% (10-05-20 @ 11:32) (99% - 100%)  Wt(kg): --    PHYSICAL EXAM:    GENERAL: not in any distress  HEENT: Neck is supple, normocephalic, atraumatic   CHEST/LUNG: Clear to auscultation bilaterally; No rales, rhonchi, wheezing  HEART: Regular rate and rhythm; No murmurs, rubs, or gallops  ABDOMEN: Soft, Nontender, Nondistended; Bowel sounds present, no rebound   EXTREMITIES:  2+ Peripheral Pulses, No clubbing, cyanosis, or edema  GENITOURINARY: obrien; swollen genitalia   SKIN: No rashes or lesions  BACK: no pressor sore   NERVOUS SYSTEM:  Alert & Oriented X2    LABS:                         7.5    12.55 )-----------( 371      ( 04 Oct 2020 07:06 )             24.2     10-04    141  |  109<H>  |  14  ----------------------------<  89  3.6   |  27  |  1.38<H>    Ca    8.6      04 Oct 2020 07:06  Phos  2.9     10-04  Mg     1.8     10-04                                Culture Results:   Normal Respiratory Vanessa present (10-01 @ 00:24)  Culture Results:   <10,000 CFU/mL Normal Urogenital Vanessa (09-30 @ 00:47)  Culture Results:   No Growth Final (09-30 @ 00:18)  Culture Results:   No Growth Final (09-30 @ 00:17)                Radiology:      < from: CT Chest No Cont (09.30.20 @ 11:18) >  IMPRESSION:  Left upper and lower lobe peripheral opacities, suspicious for multifocal pneumonia.  Small bilateral pleural effusions.  Mild to moderate bilateral hydroureteronephrosis.            BISI TIJERINA M.D., ATTENDING RADIOLOGIST  This document has been electronically signed. Sep 30 2020 11:39AM    < end of copied text >

## 2020-10-05 NOTE — PROGRESS NOTE ADULT - SUBJECTIVE AND OBJECTIVE BOX
Patient is a 89y old  Male who presents with a chief complaint of fever (29 Sep 2020 09:28)      INTERVAL HPI/OVERNIGHT EVENTS: none    MEDICATIONS  (STANDING):  amLODIPine   Tablet 5 milliGRAM(s) Oral daily  dextrose 5% + sodium chloride 0.45%. 1000 milliLiter(s) (100 mL/Hr) IV Continuous <Continuous>  doxycycline hyclate Capsule 100 milliGRAM(s) Oral every 12 hours  finasteride 5 milliGRAM(s) Oral daily  gabapentin 300 milliGRAM(s) Oral two times a day  labetalol 100 milliGRAM(s) Oral two times a day  mirtazapine 7.5 milliGRAM(s) Oral daily  piperacillin/tazobactam IVPB.. 3.375 Gram(s) IV Intermittent every 8 hours  polyethylene glycol 3350 17 Gram(s) Oral at bedtime  senna 2 Tablet(s) Oral at bedtime  tamsulosin 0.4 milliGRAM(s) Oral at bedtime    MEDICATIONS  (PRN):  acetaminophen   Tablet .. 650 milliGRAM(s) Oral every 6 hours PRN Temp greater or equal to 38C (100.4F)  bisacodyl Suppository 10 milliGRAM(s) Rectal daily PRN Constipation        Allergies    No Known Allergies    Intolerances    Vital Signs Last 24 Hrs  T(C): 36.2 (05 Oct 2020 05:56), Max: 36.7 (04 Oct 2020 11:30)  T(F): 97.2 (05 Oct 2020 05:56), Max: 98 (04 Oct 2020 11:30)  HR: 72 (05 Oct 2020 05:56) (72 - 88)  BP: 157/78 (05 Oct 2020 05:56) (126/44 - 166/82)  BP(mean): --  RR: 18 (05 Oct 2020 05:56) (18 - 18)  SpO2: 100% (05 Oct 2020 05:56) (99% - 100%)    PHYSICAL EXAM:  GENERAL: NAD, elderly   HEAD:  Atraumatic, Normocephalic  EYES: EOMI, PERRLA, conjunctiva and sclera clear  ENT: O/P Clear  NECK: Supple, No JVD  NERVOUS SYSTEM:  No focal deficits  CHEST/LUNG: Clear to percussion bilaterally; No rales, rhonchi, wheezing  HEART: Regular rate and rhythm; No murmurs, rubs, or gallops  ABDOMEN: Soft, mildly distended LLQ tenderness  Bowel sounds present  EXTREMITIES:  2+ Peripheral Pulses, No clubbing, cyanosis, or edema  SKIN: No rashes or lesions    LABS:                                                           7.5    12.55 )-----------( 371      ( 04 Oct 2020 07:06 )             24.2   10-04    141  |  109<H>  |  14  ----------------------------<  89  3.6   |  27  |  1.38<H>    Ca    8.6      04 Oct 2020 07:06  Phos  2.9     10-04  Mg     1.8     10-04                CAPILLARY BLOOD GLUCOSE          RADIOLOGY & ADDITIONAL TESTS:    Imaging Personally Reviewed:  [ ] YES  [ ] NO    Consultant(s) Notes Reviewed:  [ ] YES  [ ] NO    Care Discussed with Consultants/Other Providers [ ] YES  [ ] NO Patient is a 89y old  Male who presents with a chief complaint of fever (29 Sep 2020 09:28)      INTERVAL HPI/OVERNIGHT EVENTS: none    MEDICATIONS  (STANDING):  amLODIPine   Tablet 5 milliGRAM(s) Oral daily  dextrose 5% + sodium chloride 0.45%. 1000 milliLiter(s) (100 mL/Hr) IV Continuous <Continuous>  doxycycline hyclate Capsule 100 milliGRAM(s) Oral every 12 hours  finasteride 5 milliGRAM(s) Oral daily  gabapentin 300 milliGRAM(s) Oral two times a day  labetalol 100 milliGRAM(s) Oral two times a day  mirtazapine 7.5 milliGRAM(s) Oral daily  piperacillin/tazobactam IVPB.. 3.375 Gram(s) IV Intermittent every 8 hours  polyethylene glycol 3350 17 Gram(s) Oral at bedtime  senna 2 Tablet(s) Oral at bedtime  tamsulosin 0.4 milliGRAM(s) Oral at bedtime    MEDICATIONS  (PRN):  acetaminophen   Tablet .. 650 milliGRAM(s) Oral every 6 hours PRN Temp greater or equal to 38C (100.4F)  bisacodyl Suppository 10 milliGRAM(s) Rectal daily PRN Constipation        Allergies    No Known Allergies    Intolerances    Vital Signs Last 24 Hrs  T(C): 36.2 (05 Oct 2020 05:56), Max: 36.7 (04 Oct 2020 11:30)  T(F): 97.2 (05 Oct 2020 05:56), Max: 98 (04 Oct 2020 11:30)  HR: 72 (05 Oct 2020 05:56) (72 - 88)  BP: 157/78 (05 Oct 2020 05:56) (126/44 - 166/82)  BP(mean): --  RR: 18 (05 Oct 2020 05:56) (18 - 18)  SpO2: 100% (05 Oct 2020 05:56) (99% - 100%)    PHYSICAL EXAM:  GENERAL: NAD, elderly   HEAD:  Atraumatic, Normocephalic  EYES: EOMI, PERRLA, conjunctiva and sclera clear  ENT: O/P Clear  NECK: Supple, No JVD  NERVOUS SYSTEM:  No focal deficits  CHEST/LUNG: Clear to percussion bilaterally; No rales, rhonchi, wheezing  HEART: Regular rate and rhythm; No murmurs, rubs, or gallops  ABDOMEN: Soft, mildly distended LLQ tenderness  Bowel sounds present  EXTREMITIES:  2+ Peripheral Pulses, No clubbing, cyanosis, or edema  SKIN: stage 2 right buttock    LABS:                                                           7.5    12.55 )-----------( 371      ( 04 Oct 2020 07:06 )             24.2   10-04    141  |  109<H>  |  14  ----------------------------<  89  3.6   |  27  |  1.38<H>    Ca    8.6      04 Oct 2020 07:06  Phos  2.9     10-04  Mg     1.8     10-04                CAPILLARY BLOOD GLUCOSE          RADIOLOGY & ADDITIONAL TESTS:    Imaging Personally Reviewed:  [ ] YES  [ ] NO    Consultant(s) Notes Reviewed:  [ ] YES  [ ] NO    Care Discussed with Consultants/Other Providers [ ] YES  [ ] NO

## 2020-10-05 NOTE — PROGRESS NOTE ADULT - ATTENDING COMMENTS
mild to moderate hydronephrosis : in setting of h/o prostate enlargement has chronic obrien     stage 2 ulcer right buttock supportive and ordered specialty bed  Turn and position the  patient  frequently per  protocol to prevent decubiti ulcer,  skin breakdown and/or further skin breakdown.    ? Dysphagia- 10/4/45626 MBS in am     10/5/2020 await MBS

## 2020-10-05 NOTE — PROGRESS NOTE ADULT - ASSESSMENT
fevers   urinary tract infection ? from  obrien   fevers started after obrien change ; initially treated for obrien related urinary tract infection   now health care associated oneumonia aspiration likely   CT abdominal [id number] ago NEGATIVE; ct chest2 days ago showed  multifocal pneumonia and bilateral hydro  repeat blood culture and urine culture NEGATIVE   afebrile today and wbc is better ;; resolving leucocytosis from zosyn and doxy   day 5 zosyn   3 doxy   continue current treatment

## 2020-10-06 LAB
ANION GAP SERPL CALC-SCNC: 10 MMOL/L — SIGNIFICANT CHANGE UP (ref 5–17)
ANION GAP SERPL CALC-SCNC: 5 MMOL/L — SIGNIFICANT CHANGE UP (ref 5–17)
BLD GP AB SCN SERPL QL: SIGNIFICANT CHANGE UP
BUN SERPL-MCNC: 7 MG/DL — SIGNIFICANT CHANGE UP (ref 7–23)
BUN SERPL-MCNC: 9 MG/DL — SIGNIFICANT CHANGE UP (ref 7–23)
CALCIUM SERPL-MCNC: 6.4 MG/DL — CRITICAL LOW (ref 8.5–10.1)
CALCIUM SERPL-MCNC: 8.3 MG/DL — LOW (ref 8.5–10.1)
CHLORIDE SERPL-SCNC: 107 MMOL/L — SIGNIFICANT CHANGE UP (ref 96–108)
CHLORIDE SERPL-SCNC: 96 MMOL/L — SIGNIFICANT CHANGE UP (ref 96–108)
CO2 SERPL-SCNC: 21 MMOL/L — LOW (ref 22–31)
CO2 SERPL-SCNC: 26 MMOL/L — SIGNIFICANT CHANGE UP (ref 22–31)
CREAT SERPL-MCNC: 1.22 MG/DL — SIGNIFICANT CHANGE UP (ref 0.5–1.3)
CREAT SERPL-MCNC: 1.32 MG/DL — HIGH (ref 0.5–1.3)
GLUCOSE BLDC GLUCOMTR-MCNC: 100 MG/DL — HIGH (ref 70–99)
GLUCOSE SERPL-MCNC: 92 MG/DL — SIGNIFICANT CHANGE UP (ref 70–99)
GLUCOSE SERPL-MCNC: 992 MG/DL — CRITICAL HIGH (ref 70–99)
HCT VFR BLD CALC: 20.2 % — CRITICAL LOW (ref 39–50)
HCT VFR BLD CALC: 23.5 % — LOW (ref 39–50)
HGB BLD-MCNC: 6.1 G/DL — CRITICAL LOW (ref 13–17)
HGB BLD-MCNC: 7.4 G/DL — LOW (ref 13–17)
MAGNESIUM SERPL-MCNC: 1.3 MG/DL — LOW (ref 1.6–2.6)
MAGNESIUM SERPL-MCNC: 1.6 MG/DL — SIGNIFICANT CHANGE UP (ref 1.6–2.6)
MCHC RBC-ENTMCNC: 28.9 PG — SIGNIFICANT CHANGE UP (ref 27–34)
MCHC RBC-ENTMCNC: 29 PG — SIGNIFICANT CHANGE UP (ref 27–34)
MCHC RBC-ENTMCNC: 30.2 GM/DL — LOW (ref 32–36)
MCHC RBC-ENTMCNC: 31.5 GM/DL — LOW (ref 32–36)
MCV RBC AUTO: 91.8 FL — SIGNIFICANT CHANGE UP (ref 80–100)
MCV RBC AUTO: 96.2 FL — SIGNIFICANT CHANGE UP (ref 80–100)
NRBC # BLD: 0 /100 WBCS — SIGNIFICANT CHANGE UP (ref 0–0)
NRBC # BLD: 0 /100 WBCS — SIGNIFICANT CHANGE UP (ref 0–0)
PHOSPHATE SERPL-MCNC: 2.3 MG/DL — LOW (ref 2.5–4.5)
PHOSPHATE SERPL-MCNC: 2.8 MG/DL — SIGNIFICANT CHANGE UP (ref 2.5–4.5)
PLATELET # BLD AUTO: 267 K/UL — SIGNIFICANT CHANGE UP (ref 150–400)
PLATELET # BLD AUTO: 328 K/UL — SIGNIFICANT CHANGE UP (ref 150–400)
POTASSIUM SERPL-MCNC: 2.3 MMOL/L — CRITICAL LOW (ref 3.5–5.3)
POTASSIUM SERPL-MCNC: 3 MMOL/L — LOW (ref 3.5–5.3)
POTASSIUM SERPL-SCNC: 2.3 MMOL/L — CRITICAL LOW (ref 3.5–5.3)
POTASSIUM SERPL-SCNC: 3 MMOL/L — LOW (ref 3.5–5.3)
RBC # BLD: 2.1 M/UL — LOW (ref 4.2–5.8)
RBC # BLD: 2.56 M/UL — LOW (ref 4.2–5.8)
RBC # FLD: 15.3 % — HIGH (ref 10.3–14.5)
RBC # FLD: 15.9 % — HIGH (ref 10.3–14.5)
SODIUM SERPL-SCNC: 127 MMOL/L — LOW (ref 135–145)
SODIUM SERPL-SCNC: 138 MMOL/L — SIGNIFICANT CHANGE UP (ref 135–145)
WBC # BLD: 7.64 K/UL — SIGNIFICANT CHANGE UP (ref 3.8–10.5)
WBC # BLD: 9.55 K/UL — SIGNIFICANT CHANGE UP (ref 3.8–10.5)
WBC # FLD AUTO: 7.64 K/UL — SIGNIFICANT CHANGE UP (ref 3.8–10.5)
WBC # FLD AUTO: 9.55 K/UL — SIGNIFICANT CHANGE UP (ref 3.8–10.5)

## 2020-10-06 PROCEDURE — 99233 SBSQ HOSP IP/OBS HIGH 50: CPT

## 2020-10-06 RX ORDER — SODIUM CHLORIDE 9 MG/ML
1000 INJECTION, SOLUTION INTRAVENOUS
Refills: 0 | Status: DISCONTINUED | OUTPATIENT
Start: 2020-10-06 | End: 2020-10-07

## 2020-10-06 RX ORDER — POTASSIUM CHLORIDE 20 MEQ
10 PACKET (EA) ORAL
Refills: 0 | Status: DISCONTINUED | OUTPATIENT
Start: 2020-10-06 | End: 2020-10-06

## 2020-10-06 RX ORDER — POTASSIUM CHLORIDE 20 MEQ
10 PACKET (EA) ORAL
Refills: 0 | Status: COMPLETED | OUTPATIENT
Start: 2020-10-06 | End: 2020-10-06

## 2020-10-06 RX ORDER — INSULIN HUMAN 100 [IU]/ML
10 INJECTION, SOLUTION SUBCUTANEOUS ONCE
Refills: 0 | Status: DISCONTINUED | OUTPATIENT
Start: 2020-10-06 | End: 2020-10-06

## 2020-10-06 RX ADMIN — GABAPENTIN 300 MILLIGRAM(S): 400 CAPSULE ORAL at 11:21

## 2020-10-06 RX ADMIN — FINASTERIDE 5 MILLIGRAM(S): 5 TABLET, FILM COATED ORAL at 11:19

## 2020-10-06 RX ADMIN — AMLODIPINE BESYLATE 5 MILLIGRAM(S): 2.5 TABLET ORAL at 11:19

## 2020-10-06 RX ADMIN — PIPERACILLIN AND TAZOBACTAM 25 GRAM(S): 4; .5 INJECTION, POWDER, LYOPHILIZED, FOR SOLUTION INTRAVENOUS at 05:25

## 2020-10-06 RX ADMIN — Medication 100 MILLIEQUIVALENT(S): at 11:37

## 2020-10-06 RX ADMIN — Medication 100 MILLIEQUIVALENT(S): at 13:18

## 2020-10-06 RX ADMIN — Medication 100 MILLIEQUIVALENT(S): at 15:25

## 2020-10-06 RX ADMIN — Medication 100 MILLIGRAM(S): at 17:24

## 2020-10-06 RX ADMIN — GABAPENTIN 300 MILLIGRAM(S): 400 CAPSULE ORAL at 17:24

## 2020-10-06 RX ADMIN — SODIUM CHLORIDE 60 MILLILITER(S): 9 INJECTION, SOLUTION INTRAVENOUS at 13:48

## 2020-10-06 RX ADMIN — MIRTAZAPINE 7.5 MILLIGRAM(S): 45 TABLET, ORALLY DISINTEGRATING ORAL at 11:19

## 2020-10-06 NOTE — PROGRESS NOTE ADULT - SUBJECTIVE AND OBJECTIVE BOX
Patient is a 89y old  Male who presents with a chief complaint of fever (29 Sep 2020 09:28)      INTERVAL HPI/OVERNIGHT EVENTS: none      MEDICATIONS  (STANDING):  amLODIPine   Tablet 5 milliGRAM(s) Oral daily  dextrose 5% + sodium chloride 0.9%. 1000 milliLiter(s) (60 mL/Hr) IV Continuous <Continuous>  finasteride 5 milliGRAM(s) Oral daily  gabapentin 300 milliGRAM(s) Oral two times a day  labetalol 100 milliGRAM(s) Oral two times a day  mirtazapine 7.5 milliGRAM(s) Oral daily  polyethylene glycol 3350 17 Gram(s) Oral at bedtime  potassium chloride  10 mEq/100 mL IVPB 10 milliEquivalent(s) IV Intermittent every 1 hour  senna 2 Tablet(s) Oral at bedtime  tamsulosin 0.4 milliGRAM(s) Oral at bedtime    MEDICATIONS  (PRN):  acetaminophen   Tablet .. 650 milliGRAM(s) Oral every 6 hours PRN Temp greater or equal to 38C (100.4F)  bisacodyl Suppository 10 milliGRAM(s) Rectal daily PRN Constipation        Allergies    No Known Allergies    Intolerances    Vital Signs Last 24 Hrs  T(C): 36.4 (05 Oct 2020 16:00), Max: 36.4 (05 Oct 2020 16:00)  T(F): 97.5 (05 Oct 2020 16:00), Max: 97.5 (05 Oct 2020 16:00)  HR: 85 (06 Oct 2020 11:01) (76 - 85)  BP: 148/82 (06 Oct 2020 11:01) (148/82 - 158/93)  BP(mean): --  RR: 20 (05 Oct 2020 16:00) (20 - 20)  SpO2: 99% (05 Oct 2020 16:00) (99% - 99%)      PHYSICAL EXAM:  GENERAL: NAD, elderly   HEAD:  Atraumatic, Normocephalic  EYES: EOMI, PERRLA, conjunctiva and sclera clear  ENT: O/P Clear  NECK: Supple, No JVD  NERVOUS SYSTEM:  No focal deficits  CHEST/LUNG: Clear to percussion bilaterally; No rales, rhonchi, wheezing  HEART: Regular rate and rhythm; No murmurs, rubs, or gallops  ABDOMEN: Soft, mildly distended LLQ tenderness  Bowel sounds present  EXTREMITIES:  2+ Peripheral Pulses, No clubbing, cyanosis, or edema  SKIN: No rashes or lesions    LABS:                                                         7.4    9.55  )-----------( 328      ( 06 Oct 2020 10:42 )             23.5   10-06    138  |  107  |  9   ----------------------------<  92  3.0<L>   |  26  |  1.22    Ca    8.3<L>      06 Oct 2020 10:42  Phos  2.8     10-06  Mg     1.6     10-06          CAPILLARY BLOOD GLUCOSE          RADIOLOGY & ADDITIONAL TESTS:    Imaging Personally Reviewed:  [ ] YES  [ ] NO    Consultant(s) Notes Reviewed:  [ ] YES  [ ] NO    Care Discussed with Consultants/Other Providers [ ] YES  [ ] NO Patient is a 89y old  Male who presents with a chief complaint of fever (29 Sep 2020 09:28)      INTERVAL HPI/OVERNIGHT EVENTS: none      MEDICATIONS  (STANDING):  amLODIPine   Tablet 5 milliGRAM(s) Oral daily  dextrose 5% + sodium chloride 0.9%. 1000 milliLiter(s) (60 mL/Hr) IV Continuous <Continuous>  finasteride 5 milliGRAM(s) Oral daily  gabapentin 300 milliGRAM(s) Oral two times a day  labetalol 100 milliGRAM(s) Oral two times a day  mirtazapine 7.5 milliGRAM(s) Oral daily  polyethylene glycol 3350 17 Gram(s) Oral at bedtime  potassium chloride  10 mEq/100 mL IVPB 10 milliEquivalent(s) IV Intermittent every 1 hour  senna 2 Tablet(s) Oral at bedtime  tamsulosin 0.4 milliGRAM(s) Oral at bedtime    MEDICATIONS  (PRN):  acetaminophen   Tablet .. 650 milliGRAM(s) Oral every 6 hours PRN Temp greater or equal to 38C (100.4F)  bisacodyl Suppository 10 milliGRAM(s) Rectal daily PRN Constipation        Allergies    No Known Allergies    Intolerances    Vital Signs Last 24 Hrs  T(C): 36.4 (05 Oct 2020 16:00), Max: 36.4 (05 Oct 2020 16:00)  T(F): 97.5 (05 Oct 2020 16:00), Max: 97.5 (05 Oct 2020 16:00)  HR: 85 (06 Oct 2020 11:01) (76 - 85)  BP: 148/82 (06 Oct 2020 11:01) (148/82 - 158/93)  BP(mean): --  RR: 20 (05 Oct 2020 16:00) (20 - 20)  SpO2: 99% (05 Oct 2020 16:00) (99% - 99%)      PHYSICAL EXAM:  GENERAL: NAD, elderly   HEAD:  Atraumatic, Normocephalic  EYES: EOMI, PERRLA, conjunctiva and sclera clear  ENT: O/P Clear  NECK: Supple, No JVD  NERVOUS SYSTEM:  No focal deficits  CHEST/LUNG: Clear to percussion bilaterally; No rales, rhonchi, wheezing  HEART: Regular rate and rhythm; No murmurs, rubs, or gallops  ABDOMEN: Soft, mildly distended non tender  Bowel sounds present  EXTREMITIES:  2+ Peripheral Pulses, No clubbing, cyanosis, or edema  SKIN: stage 2 right buttock   LABS:                                                         7.4    9.55  )-----------( 328      ( 06 Oct 2020 10:42 )             23.5   10-06    138  |  107  |  9   ----------------------------<  92  3.0<L>   |  26  |  1.22    Ca    8.3<L>      06 Oct 2020 10:42  Phos  2.8     10-06  Mg     1.6     10-06          CAPILLARY BLOOD GLUCOSE          RADIOLOGY & ADDITIONAL TESTS:    Imaging Personally Reviewed:  [ ] YES  [ ] NO    Consultant(s) Notes Reviewed:  [ ] YES  [ ] NO    Care Discussed with Consultants/Other Providers [ ] YES  [ ] NO

## 2020-10-06 NOTE — CHART NOTE - NSCHARTNOTEFT_GEN_A_CORE
Nutrition follow-up note-        Factors impacting intake: [ ] none [ ] nausea  [ ] vomiting [ ] diarrhea [ ] constipation  [ ]chewing problems [x ] swallowing issues  [x ] other: AMS, pt confused, difficulty feeding self, refusing to eat.    Diet Prescription: Diet, Dysphagia 1 Pureed-Nectar Consistency Fluid (10-01-20 @ 12:11)    Intake: Very poor PO intake, pt has been refusing meals.    Current Weight: (10/3) 84.1 kg, (9/27) 84.3 kg, (9/23 admission) 77.60 kg. (Noted big discrepancy in daily weights: 9/24= 78.4 kg, 9/27= 84.3 kg --> 5.9 kg/7.5% gain x 3 days??).   % Weight Change: 0.2 kg loss x 6 days ( wt stable since 9/27). Questionable accuracy of admission weight.    Physical appearance: 2+ R & L legs and 3+ edema scrotum.     Pertinent Medications: MEDICATIONS  (STANDING):  amLODIPine   Tablet 5 milliGRAM(s) Oral daily  dextrose 5% + sodium chloride 0.9%. 1000 milliLiter(s) (60 mL/Hr) IV Continuous <Continuous>  finasteride 5 milliGRAM(s) Oral daily  gabapentin 300 milliGRAM(s) Oral two times a day  labetalol 100 milliGRAM(s) Oral two times a day  mirtazapine 7.5 milliGRAM(s) Oral daily  polyethylene glycol 3350 17 Gram(s) Oral at bedtime  potassium chloride  10 mEq/100 mL IVPB 10 milliEquivalent(s) IV Intermittent every 1 hour  senna 2 Tablet(s) Oral at bedtime  tamsulosin 0.4 milliGRAM(s) Oral at bedtime    MEDICATIONS  (PRN):  acetaminophen   Tablet .. 650 milliGRAM(s) Oral every 6 hours PRN Temp greater or equal to 38C (100.4F)  bisacodyl Suppository 10 milliGRAM(s) Rectal daily PRN Constipation    Pertinent Labs: 10-06 Na138 mmol/L Glu 92 mg/dL K+ 3.0 mmol/L<L> Cr  1.22 mg/dL BUN 9 mg/dL 10-06 Phos 2.8 mg/dL      Skin:     Estimated Needs:   [ ] no change since previous assessment  [ ] recalculated:     Previous Nutrition Diagnosis:   [ ] Inadequate Energy Intake [ ]Inadequate Oral Intake [ ] Excessive Energy Intake   [ ] Underweight [ ] Increased Nutrient Needs [ ] Overweight/Obesity   [ ] Altered GI Function [ ] Unintended Weight Loss [ ] Food & Nutrition Related Knowledge Deficit [ ] Malnutrition     Nutrition Diagnosis is [ ] ongoing  [ ] resolved [ ] not applicable     New Nutrition Diagnosis: [ ] not applicable      Interventions:   Recommend  [ ] Change Diet To:  [ ] Nutrition Supplement  [ ] Nutrition Support  [ ] Other:     Monitoring and Evaluation:   [ ] PO intake [ x ] Tolerance to diet prescription [ x ] weights [ x ] labs[ x ] follow up per protocol  [ ] other: Nutrition follow-up note-    Pt adm w/fever, per chart pt w/UTI w/sepsis, ARF. PMHx includes- HTN, CKD, BPH w/chronic obrien. Met w/pt at bedside, pt sleepy and lethargic, refusing to speak to RDN. Spoke to RN at bedside who reported pt refusing all meals/PO intake & has been refusing some  meds. Pt needs & receives assistance w/meals. No N/V/D/C reported. Per chart pt remains confused & disoriented. Pt s/p bedside swallow eval (10/1) w/recommendation for Dysphagia 1, pureed, Nectar-thick fluids. Pt seen by Palliative care- at present no GOC per chart.      Factors impacting intake: [ ] none [ ] nausea  [ ] vomiting [ ] diarrhea [ ] constipation  [ ]chewing problems [x ] swallowing issues  [x ] other: AMS, pt confused, difficulty feeding self, refusing to eat.    Diet Prescription: Diet, Dysphagia 1 Pureed-Nectar Consistency Fluid (10-01-20 @ 12:11)    Intake: Very poor PO intake, pt has been refusing meals.    Current Weight: (10/3) 84.1 kg, (9/27) 84.3 kg, (9/23 admission) 77.60 kg. (Noted big discrepancy in daily weights: 9/24= 78.4 kg, 9/27= 84.3 kg --> 5.9 kg/7.5% gain x 3 days??).   % Weight Change: 0.2 kg loss x 6 days ( wt stable since 9/27). Questionable accuracy of admission weight.    Physical appearance: 2+ R & L legs and 3+ edema scrotum. Noted fluctuating edema throughout LOS.  Unable to conduct nutrition-focused physical exam- Pt had more than half his face covered w/his cap and declined physical exam.     Pertinent Medications: MEDICATIONS  (STANDING):  amLODIPine   Tablet 5 milliGRAM(s) Oral daily  dextrose 5% + sodium chloride 0.9%. 1000 milliLiter(s) (60 mL/Hr) IV Continuous <Continuous>  finasteride 5 milliGRAM(s) Oral daily  gabapentin 300 milliGRAM(s) Oral two times a day  labetalol 100 milliGRAM(s) Oral two times a day  mirtazapine 7.5 milliGRAM(s) Oral daily  polyethylene glycol 3350 17 Gram(s) Oral at bedtime  potassium chloride  10 mEq/100 mL IVPB 10 milliEquivalent(s) IV Intermittent every 1 hour  senna 2 Tablet(s) Oral at bedtime  tamsulosin 0.4 milliGRAM(s) Oral at bedtime    MEDICATIONS  (PRN):  acetaminophen   Tablet .. 650 milliGRAM(s) Oral every 6 hours PRN Temp greater or equal to 38C (100.4F)  bisacodyl Suppository 10 milliGRAM(s) Rectal daily PRN Constipation    Pertinent Labs: 10-06 Na138 mmol/L Glu 92 mg/dL K+ 3.0 mmol/L<L> Cr  1.22 mg/dL BUN 9 mg/dL 10-06 Phos 2.8 mg/dL      Skin: Pressure ulcer x 2- Stage 2: R-buttock and Gluteal cleft.     Estimated Needs:   [x ] no change since previous assessment (9/24)  [ ] recalculated:       Previous Nutrition Diagnosis:     Nutrition Diagnostic Terminology #1 Malnutrition...   Malnutrition Moderate malnutrition in the context of chronic illness.   Etiology inadequate protein energy intake in the presence of AMS, stage 2 PU, Renal Insufficiency, advanced age.   Signs/Symptoms physical findings of fat & muscle loss.   Goal/Expected Outcome Pt will consume >50% nutritional needs via meals and PO supplement- GOAL not met consistently.    Nutrition Diagnosis is [] ongoing  [ ] resolved [x ] not applicable       New Nutrition Diagnosis: (10/6)    Severe malnutrition in the context of acute illness    Etiology inadequate protein energy intake & increased nutritional needs in the presence of AMS, stage 2 pressure injuries, UTI, sepsis    Signs/symptoms <50% x >5 days, physical findings of fat & muscle loss (as per previous assessment).    Goal/Expected Outcome Pt will consume >50% nutritional needs via meals/PO supplement.      Interventions:   Recommend:  [x] Continue current diet as Rx'd.  [ ] Change Diet To:  [x ] Nutrition Supplement: Recommend- Vital Health shake x 2/day (1040 kcal + 44 gm protein).  [ ] Nutrition Support  [ ] Other:     Monitoring and Evaluation:   [x ] PO intake [ x ] Tolerance to diet prescription [ x ] weights [ x ] labs[ x ] follow up per protocol  [ ] other:

## 2020-10-06 NOTE — CHART NOTE - TREATMENT: THE FOLLOWING DIET HAS BEEN RECOMMENDED
Diet, Dysphagia 1 Pureed-Nectar Consistency Fluid:   Supplement Feeding Modality:  Oral  Health Shake Cans or Servings Per Day:  1       Frequency:  Two Times a day (10-06-20 @ 12:16) [Pending Verification By Attending]  Diet, Dysphagia 1 Pureed-Nectar Consistency Fluid (10-01-20 @ 12:11) [Active]

## 2020-10-06 NOTE — PROGRESS NOTE ADULT - ASSESSMENT
present with fevers   urinary tract infection ? from ch obrien with  bilateral hydro  fevers started after obrien change ; initially treated for obrien related urinary tract infection   now health care associated oneumonia aspiration likely   CT abdominal [id number] ago NEGATIVE  CT chest multifocal pneumonia and bilateral hydro   repeat blood culture and urine culture NEGATIVE   fever resolved   Resolving leucocytosis from zosyn and doxy   day 6 zosyn   5 doxy   s/p cefepime / gerry and vanco   will discontinue all abx and watch off abx   monitor off abx   high risk for aspiration   aspiration precaution

## 2020-10-06 NOTE — PROGRESS NOTE ADULT - SUBJECTIVE AND OBJECTIVE BOX
89 yo man with HTN, CKD, BPH with chronic obrien. fever and weakness for 8 days. no other symptoms. fever started shortly after hs obrien was changed- patient denies nausea vomiting or diarrhea - complain of abdominal distention      (22 Sep 2020 15:16)      Allergies    No Known Allergies    Intolerances        MEDICATIONS  (STANDING):  amLODIPine   Tablet 5 milliGRAM(s) Oral daily  doxycycline hyclate Capsule 100 milliGRAM(s) Oral every 12 hours  finasteride 5 milliGRAM(s) Oral daily  gabapentin 300 milliGRAM(s) Oral two times a day  labetalol 100 milliGRAM(s) Oral two times a day  mirtazapine 7.5 milliGRAM(s) Oral daily  piperacillin/tazobactam IVPB.. 3.375 Gram(s) IV Intermittent every 8 hours  polyethylene glycol 3350 17 Gram(s) Oral at bedtime  senna 2 Tablet(s) Oral at bedtime  tamsulosin 0.4 milliGRAM(s) Oral at bedtime      REVIEW OF SYSTEMS:    poor historian, cannot obtain reliable ho     VITAL SIGNS:  T(C): 36.4 (10-05-20 @ 16:00), Max: 36.6 (10-05-20 @ 11:32)  T(F): 97.5 (10-05-20 @ 16:00), Max: 97.8 (10-05-20 @ 11:32)  HR: 76 (10-05-20 @ 16:00) (76 - 76)  BP: 158/93 (10-05-20 @ 16:00) (158/93 - 166/78)  RR: 20 (10-05-20 @ 16:00) (20 - 20)  SpO2: 99% (10-05-20 @ 16:00) (99% - 99%)  Wt(kg): --    PHYSICAL EXAM:    GENERAL: not in any distress, lethargic in RA   HEENT: Neck is supple, normocephalic, atraumatic   CHEST/LUNG: Clear   HEART: Regular rate and rhythm; No murmurs, rubs, or gallops  ABDOMEN: Soft, Nontender, Nondistended; Bowel sounds present, no rebound   EXTREMITIES:  2+ Peripheral Pulses, No clubbing, cyanosis, or edema  GENITOURINARY: obrien in   SKIN: No rashes or lesions  BACK: no pressor sore   NERVOUS SYSTEM:  Alert & somewhat confused demented ?    LABS:                         6.1    7.64  )-----------( 267      ( 06 Oct 2020 09:24 )             20.2     10-06    127<L>  |  96  |  7   ----------------------------<  992<HH>  2.3<LL>   |  21<L>  |  1.32<H>    Ca    6.4<LL>      06 Oct 2020 09:24  Phos  2.3     10-06  Mg     1.3     10-06                                Culture Results:   Normal Respiratory Vanessa present (10-01 @ 00:24)  Culture Results:   <10,000 CFU/mL Normal Urogenital Vanessa (09-30 @ 00:47)  Culture Results:   No Growth Final (09-30 @ 00:18)  Culture Results:   No Growth Final (09-30 @ 00:17)                Radiology:

## 2020-10-06 NOTE — PHARMACOTHERAPY INTERVENTION NOTE - COMMENTS
Recommended discontinuing doxycycline for completed duration of therapy and patient's clinical status (resolved leukocytosis, afebrile, hemodynamically stable, on room air).

## 2020-10-06 NOTE — CONSULT NOTE ADULT - SUBJECTIVE AND OBJECTIVE BOX
HPI:  : 89M pmhx HTN, CKD, BPH with chronic obrien. fever and weakness for 8 days. no other symptoms. fever started shortly after hs obrien was changed- patient denies nausea vomiting or diarrhea - complain of abdominal distention      (22 Sep 2020 15:16)  ----------------- As above --------------------------------------------  The patient presented with sepsis from the urinary tract. Hopsital stay complicated by aspiration pneumonia Consult called for increase secretions and poor PO intake. patient cleared by speech for dysphagia diet. Patient's MS improving. Patient states that he denies dysphagia or odynophagia but does not have an appetite.   Spoke with son who is a doctor. 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 1000 milliLiter(s) (60 mL/Hr) IV Continuous <Continuous>  finasteride 5 milliGRAM(s) Oral daily  gabapentin 300 milliGRAM(s) Oral two times a day  labetalol 100 milliGRAM(s) Oral two times a day  mirtazapine 7.5 milliGRAM(s) Oral daily  polyethylene glycol 3350 17 Gram(s) Oral at bedtime  senna 2 Tablet(s) Oral at bedtime  tamsulosin 0.4 milliGRAM(s) Oral at bedtime    MEDICATIONS  (PRN):  acetaminophen   Tablet .. 650 milliGRAM(s) Oral every 6 hours PRN Temp greater or equal to 38C (100.4F)  bisacodyl Suppository 10 milliGRAM(s) Rectal daily PRN Constipation      Allergies    No Known Allergies    Intolerances        FAMILY HISTORY:  No significant family history        REVIEW OF SYSTEMS:    CONSTITUTIONAL: No fever, weight loss,  EYES: No eye pain, visual disturbances, or discharge  ENMT:  No difficulty hearing, tinnitus, vertigo; No sinus or throat pain  NECK: No pain or stiffness  BREASTS: No pain, masses, or nipple discharge  RESPIRATORY: No cough, wheezing, chills or hemoptysis; No shortness of breath  CARDIOVASCULAR: No chest pain, palpitations, dizziness, or leg swelling  GASTROINTESTINAL: See above  GENITOURINARY: No dysuria, frequency, hematuria, or incontinence  NEUROLOGICAL: No headaches, memory loss, loss of strength, numbness, or tremors  SKIN: No itching, burning, rashes, or lesions   LYMPH NODES: No enlarged glands  ENDOCRINE: No heat or cold intolerance; No hair loss  MUSCULOSKELETAL: No joint pain or swelling; No muscle, back, or extremity pain  PSYCHIATRIC: No depression, anxiety, mood swings, or difficulty sleeping  HEME/LYMPH: No easy bruising, or bleeding gums  ALLERGY AND IMMUNOLOGIC: No hives or eczema          SOCIAL HISTORY:    FAMILY HISTORY:  No significant family history        Vital Signs Last 24 Hrs  T(C): 37.1 (06 Oct 2020 11:48), Max: 37.1 (06 Oct 2020 11:48)  T(F): 98.7 (06 Oct 2020 11:48), Max: 98.7 (06 Oct 2020 11:48)  HR: 76 (06 Oct 2020 11:48) (76 - 85)  BP: 168/82 (06 Oct 2020 11:48) (148/82 - 168/82)  BP(mean): --  RR: 19 (06 Oct 2020 11:48) (19 - 20)  SpO2: 99% (06 Oct 2020 11:48) (99% - 99%)    PHYSICAL EXAM:    GENERAL: NAD, well-groomed, well-developed  HEAD:  Atraumatic, Normocephalic  EYES: EOMI, PERRLA, conjunctiva and sclera clear  NECK: Supple, No JVD, Normal thyroid  NERVOUS SYSTEM:  Alert & Oriented X3, Good concentration; Motor Strength 5/5 B/L upper and lower extremities;   CHEST/LUNG: Clear to percussion bilaterally; No rales, rhonchi, wheezing, or rubs  HEART: Regular rate and rhythm; No murmurs, rubs, or gallops  ABDOMEN: Soft, Nontender, Nondistended; Bowel sounds present  EXTREMITIES:  2+ Peripheral Pulses, No clubbing, cyanosis, or edema  LYMPH: No lymphadenopathy noted   RECTAL: Deferred   SKIN: No rashes or lesions    LABS:                        7.4    9.55  )-----------( 328      ( 06 Oct 2020 10:42 )             23.5       CBC:  10-06 @ 10:42  WBC  9.55  HGB 7.4  HCT 23.5 Plate 328  MCV 91.8  10-06 @ 09:24  WBC  7.64  HGB 6.1  HCT 20.2 Plate 267  MCV 96.2  10-04 @ 07:06  WBC  12.55  HGB 7.5  HCT 24.2 Plate 371  MCV 93.8  10-03 @ 07:51  WBC  13.22  HGB 7.3  HCT 22.9 Plate 417  MCV 92.3  10-01 @ 21:54  WBC  15.67  HGB 7.7  HCT 23.6 Plate 456  MCV 89.7  10-01 @ 08:12  WBC  16.21  HGB 7.8  HCT 24.4 Plate 464  MCV 91.4  09-30 @ 08:15  WBC  19.76  HGB 9.8  HCT 31.0 Plate 542  MCV 91.2           06 Oct 2020 10:42    138    |  107    |  9      ----------------------------<  92     3.0     |  26     |  1.22   06 Oct 2020 09:24    127    |  96     |  7      ----------------------------<  992    2.3     |  21     |  1.32   04 Oct 2020 07:06    141    |  109    |  14     ----------------------------<  89     3.6     |  27     |  1.38   03 Oct 2020 07:51    143    |  111    |  16     ----------------------------<  78     3.3     |  27     |  1.46   01 Oct 2020 08:12    143    |  112    |  20     ----------------------------<  99     3.3     |  24     |  1.33   30 Sep 2020 08:15    142    |  110    |  20     ----------------------------<  124    3.4     |  23     |  1.26     Ca    8.3        06 Oct 2020 10:42  Ca    6.4        06 Oct 2020 09:24  Ca    8.6        04 Oct 2020 07:06  Ca    8.6        03 Oct 2020 07:51  Ca    9.1        01 Oct 2020 08:12  Ca    9.3        30 Sep 2020 08:15  Phos  2.8       06 Oct 2020 10:42  Phos  2.3       06 Oct 2020 09:24  Phos  2.9       04 Oct 2020 07:06  Phos  3.5       03 Oct 2020 07:51  Phos  3.2       01 Oct 2020 08:12  Mg     1.6       06 Oct 2020 10:42  Mg     1.3       06 Oct 2020 09:24  Mg     1.8       04 Oct 2020 07:06  Mg     1.7       03 Oct 2020 07:51  Mg     2.1       01 Oct 2020 08:12              RADIOLOGY & ADDITIONAL STUDIES: HPI:  : 89M pmhx HTN, CKD, BPH with chronic obrien. fever and weakness for 8 days. no other symptoms. fever started shortly after hs obrien was changed- patient denies nausea vomiting or diarrhea - complain of abdominal distention      (22 Sep 2020 15:16)  ----------------- As above --------------------------------------------  The patient presented with sepsis from the urinary tract. Hopsital stay complicated by aspiration pneumonia Consult called for increase secretions and poor PO intake. patient cleared by speech for dysphagia diet. Patient's MS improving. Patient states that he denies dysphagia or odynophagia but does not have an appetite.   Spoke with son who is a doctor. 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 1000 milliLiter(s) (60 mL/Hr) IV Continuous <Continuous>  finasteride 5 milliGRAM(s) Oral daily  gabapentin 300 milliGRAM(s) Oral two times a day  labetalol 100 milliGRAM(s) Oral two times a day  mirtazapine 7.5 milliGRAM(s) Oral daily  polyethylene glycol 3350 17 Gram(s) Oral at bedtime  senna 2 Tablet(s) Oral at bedtime  tamsulosin 0.4 milliGRAM(s) Oral at bedtime    MEDICATIONS  (PRN):  acetaminophen   Tablet .. 650 milliGRAM(s) Oral every 6 hours PRN Temp greater or equal to 38C (100.4F)  bisacodyl Suppository 10 milliGRAM(s) Rectal daily PRN Constipation      Allergies    No Known Allergies    Intolerances        FAMILY HISTORY:  No significant family history        REVIEW OF SYSTEMS:    CONSTITUTIONAL: No fever, weight loss,  EYES: No eye pain, visual disturbances, or discharge  ENMT:  No difficulty hearing, tinnitus, vertigo; No sinus or throat pain  NECK: No pain or stiffness  BREASTS: No pain, masses, or nipple discharge  RESPIRATORY: No cough, wheezing, chills or hemoptysis; No shortness of breath  CARDIOVASCULAR: No chest pain, palpitations, dizziness, or leg swelling  GASTROINTESTINAL: See above  GENITOURINARY: No dysuria, frequency, hematuria, or incontinence  NEUROLOGICAL: No headaches, memory loss, loss of strength, numbness, or tremors  SKIN: No itching, burning, rashes, or lesions   LYMPH NODES: No enlarged glands  ENDOCRINE: No heat or cold intolerance; No hair loss  MUSCULOSKELETAL: No joint pain or swelling; No muscle, back, or extremity pain  PSYCHIATRIC: No depression, anxiety, mood swings, or difficulty sleeping  HEME/LYMPH: No easy bruising, or bleeding gums  ALLERGY AND IMMUNOLOGIC: No hives or eczema          SOCIAL HISTORY:    FAMILY HISTORY:  No significant family history        Vital Signs Last 24 Hrs  T(C): 37.1 (06 Oct 2020 11:48), Max: 37.1 (06 Oct 2020 11:48)  T(F): 98.7 (06 Oct 2020 11:48), Max: 98.7 (06 Oct 2020 11:48)  HR: 76 (06 Oct 2020 11:48) (76 - 85)  BP: 168/82 (06 Oct 2020 11:48) (148/82 - 168/82)  BP(mean): --  RR: 19 (06 Oct 2020 11:48) (19 - 20)  SpO2: 99% (06 Oct 2020 11:48) (99% - 99%)    PHYSICAL EXAM:    GENERAL: NAD, well-groomed, well-developed  HEAD:  Atraumatic, Normocephalic  EYES: EOMI, PERRLA, conjunctiva and sclera clear  NECK: Supple, No JVD, Normal thyroid  NERVOUS SYSTEM:  Alert & Oriented X3, Good concentration; Motor Strength 5/5 B/L upper and lower extremities;   CHEST/LUNG: Clear to percussion bilaterally; No rales, rhonchi, wheezing, or rubs  HEART: Regular rate and rhythm; No murmurs, rubs, or gallops  ABDOMEN: Soft, Nontender, Nondistended; Bowel sounds present  EXTREMITIES:  2+ Peripheral Pulses, No clubbing, cyanosis, or edema  LYMPH: No lymphadenopathy noted   RECTAL: Deferred   SKIN: No rashes or lesions    LABS:                        7.4    9.55  )-----------( 328      ( 06 Oct 2020 10:42 )             23.5       CBC:  10-06 @ 10:42  WBC  9.55  HGB 7.4  HCT 23.5 Plate 328  MCV 91.8  10-06 @ 09:24  WBC  7.64  HGB 6.1  HCT 20.2 Plate 267  MCV 96.2  10-04 @ 07:06  WBC  12.55  HGB 7.5  HCT 24.2 Plate 371  MCV 93.8  10-03 @ 07:51  WBC  13.22  HGB 7.3  HCT 22.9 Plate 417  MCV 92.3  10-01 @ 21:54  WBC  15.67  HGB 7.7  HCT 23.6 Plate 456  MCV 89.7  10-01 @ 08:12  WBC  16.21  HGB 7.8  HCT 24.4 Plate 464  MCV 91.4  09-30 @ 08:15  WBC  19.76  HGB 9.8  HCT 31.0 Plate 542  MCV 91.2           06 Oct 2020 10:42    138    |  107    |  9      ----------------------------<  92     3.0     |  26     |  1.22   06 Oct 2020 09:24    127    |  96     |  7      ----------------------------<  992    2.3     |  21     |  1.32   04 Oct 2020 07:06    141    |  109    |  14     ----------------------------<  89     3.6     |  27     |  1.38   03 Oct 2020 07:51    143    |  111    |  16     ----------------------------<  78     3.3     |  27     |  1.46   01 Oct 2020 08:12    143    |  112    |  20     ----------------------------<  99     3.3     |  24     |  1.33   30 Sep 2020 08:15    142    |  110    |  20     ----------------------------<  124    3.4     |  23     |  1.26     Ca    8.3        06 Oct 2020 10:42  Ca    6.4        06 Oct 2020 09:24  Ca    8.6        04 Oct 2020 07:06  Ca    8.6        03 Oct 2020 07:51  Ca    9.1        01 Oct 2020 08:12  Ca    9.3        30 Sep 2020 08:15  Phos  2.8       06 Oct 2020 10:42  Phos  2.3       06 Oct 2020 09:24  Phos  2.9       04 Oct 2020 07:06  Phos  3.5       03 Oct 2020 07:51  Phos  3.2       01 Oct 2020 08:12  Mg     1.6       06 Oct 2020 10:42  Mg     1.3       06 Oct 2020 09:24  Mg     1.8       04 Oct 2020 07:06  Mg     1.7       03 Oct 2020 07:51  Mg     2.1       01 Oct 2020 08:12              RADIOLOGY & ADDITIONAL STUDIES:  < from: CT Abdomen and Pelvis w/ Oral Cont (09.28.20 @ 14:42) >    EXAM:  CT ABDOMEN AND PELVIS OC                            PROCEDURE DATE:  09/28/2020          INTERPRETATION:  CLINICAL INFORMATION: Abdominal pain and fever    COMPARISON: 8/7/2018    PROCEDURE:  CT of the Abdomen and Pelvis was performed without intravenous contrast.  Intravenous contrast: None.  Oral contrast: positive contrast was administered.  Sagittal and coronal reformats were performed.    FINDINGS:  LOWER CHEST: Small bilateral pleural effusions. Basilar atelectasis.    Please note that evaluation of the abdominal organs and vascular structures is limited by lack of intravenous contrast.    LIVER: Within normal limits.  BILE DUCTS: Normal caliber.  GALLBLADDER: Within normal limits.  SPLEEN: Within normal limits.  PANCREAS: Atrophic.  ADRENALS: Within normal limits.  KIDNEYS/URETERS: Renal hypodensities, possibly cysts. No hydronephrosis.    BLADDER: Obrien catheter. Wall thickening.  REPRODUCTIVE ORGANS: Central prostatic gland calcifications.    BOWEL: No bowel obstruction.Appendix is not visualized. Rectum distended by stool. Colonic diverticulosis.  PERITONEUM: No ascites.  VESSELS: Atherosclerotic changes.  RETROPERITONEUM/LYMPH NODES: Prominent groin lymph nodes.  ABDOMINAL WALL: Subcutaneous soft tissue edema. Small fat-containing umbilical hernia.  BONES: Degenerative changes.    IMPRESSION:  Urinary bladder findings compatible with cystitis.              BISI TIJERINA M.D., ATTENDING RADIOLOGIST  This document has been electronically signed. Sep 28 2020  2:59PM    < end of copied text >

## 2020-10-06 NOTE — PHARMACOTHERAPY INTERVENTION NOTE - COMMENTS
Recommended discontinuing piperacillin/tazobactam for completed duration of therapy and patient's clinical status (resolved leukocytosis, afebrile, hemodynamically stable, on room air).

## 2020-10-06 NOTE — PROGRESS NOTE ADULT - ATTENDING COMMENTS
mild to moderate hydronephrosis : in setting of h/o prostate enlargement has chronic obrien     stage 2 ulcer right buttock supportive and ordered specialty bed  Turn and position the  patient  frequently per  protocol to prevent decubiti ulcer,  skin breakdown and/or further skin breakdown.    ? Dysphagia- 10/4/32657 MBS in am     10/5/2020 await MBS     10/6/2020 not eating ? dysphagia had bedside swallow eval ( I d/w [facility] speech pathologist on 10/5/2020 patient doesn't and will not participate in MBS )  didn't really participate will get GI eval for possible egd may even need PEG

## 2020-10-06 NOTE — PROGRESS NOTE ADULT - PROBLEM SELECTOR PLAN 1
9/30/2020 on antibx  spike fever last night , wbc increasing  cxr done 9/29/2020 shows LLL infiltrate will get Ct chest , check RVP and flu . will d/w ID, follow up on all repeat cultures   suspect aspiration : NPO , place NGT get head ct and swallow eval      10/1/2020 ct head negative, seen by speech swallow and diet adjusted    10/2/2020 f/u labs continue with meds  blood cx  and urine cx from 9/30ngtd   sputum cx ngtd, flu negative, rvp negative    10/6/2020 per ID will stop antibx , ID feels completed course

## 2020-10-07 PROCEDURE — 99233 SBSQ HOSP IP/OBS HIGH 50: CPT

## 2020-10-07 RX ORDER — DEXTROSE MONOHYDRATE, SODIUM CHLORIDE, AND POTASSIUM CHLORIDE 50; .745; 4.5 G/1000ML; G/1000ML; G/1000ML
1000 INJECTION, SOLUTION INTRAVENOUS
Refills: 0 | Status: DISCONTINUED | OUTPATIENT
Start: 2020-10-07 | End: 2020-10-08

## 2020-10-07 RX ORDER — LABETALOL HCL 100 MG
200 TABLET ORAL EVERY 12 HOURS
Refills: 0 | Status: DISCONTINUED | OUTPATIENT
Start: 2020-10-07 | End: 2020-10-13

## 2020-10-07 RX ADMIN — SENNA PLUS 2 TABLET(S): 8.6 TABLET ORAL at 21:55

## 2020-10-07 RX ADMIN — GABAPENTIN 300 MILLIGRAM(S): 400 CAPSULE ORAL at 18:52

## 2020-10-07 RX ADMIN — AMLODIPINE BESYLATE 5 MILLIGRAM(S): 2.5 TABLET ORAL at 06:17

## 2020-10-07 RX ADMIN — Medication 200 MILLIGRAM(S): at 18:52

## 2020-10-07 RX ADMIN — DEXTROSE MONOHYDRATE, SODIUM CHLORIDE, AND POTASSIUM CHLORIDE 60 MILLILITER(S): 50; .745; 4.5 INJECTION, SOLUTION INTRAVENOUS at 10:50

## 2020-10-07 RX ADMIN — Medication 100 MILLIGRAM(S): at 06:17

## 2020-10-07 RX ADMIN — GABAPENTIN 300 MILLIGRAM(S): 400 CAPSULE ORAL at 06:17

## 2020-10-07 RX ADMIN — TAMSULOSIN HYDROCHLORIDE 0.4 MILLIGRAM(S): 0.4 CAPSULE ORAL at 21:55

## 2020-10-07 RX ADMIN — MIRTAZAPINE 7.5 MILLIGRAM(S): 45 TABLET, ORALLY DISINTEGRATING ORAL at 12:39

## 2020-10-07 RX ADMIN — FINASTERIDE 5 MILLIGRAM(S): 5 TABLET, FILM COATED ORAL at 12:39

## 2020-10-07 NOTE — PROGRESS NOTE ADULT - SUBJECTIVE AND OBJECTIVE BOX
Subjective: eats poorly      MEDICATIONS  (STANDING):  amLODIPine   Tablet 5 milliGRAM(s) Oral daily  finasteride 5 milliGRAM(s) Oral daily  gabapentin 300 milliGRAM(s) Oral two times a day  labetalol 100 milliGRAM(s) Oral two times a day  mirtazapine 7.5 milliGRAM(s) Oral daily  polyethylene glycol 3350 17 Gram(s) Oral at bedtime  senna 2 Tablet(s) Oral at bedtime  sodium chloride 0.9% with potassium chloride 20 mEq/L 1000 milliLiter(s) (60 mL/Hr) IV Continuous <Continuous>  tamsulosin 0.4 milliGRAM(s) Oral at bedtime    MEDICATIONS  (PRN):  acetaminophen   Tablet .. 650 milliGRAM(s) Oral every 6 hours PRN Temp greater or equal to 38C (100.4F)  bisacodyl Suppository 10 milliGRAM(s) Rectal daily PRN Constipation          T(C): 37.1 (10-07-20 @ 04:58), Max: 37.2 (10-06-20 @ 16:58)  HR: 94 (10-07-20 @ 04:58) (76 - 100)  BP: 165/89 (10-07-20 @ 04:58) (148/82 - 169/86)  RR: 18 (10-07-20 @ 04:58) (18 - 19)  SpO2: 98% (10-07-20 @ 04:58) (98% - 99%)  Wt(kg): --        I&O's Detail    06 Oct 2020 07:01  -  07 Oct 2020 07:00  --------------------------------------------------------  IN:  Total IN: 0 mL    OUT:    Indwelling Catheter - Urethral (mL): 1200 mL    Voided (mL): 1400 mL  Total OUT: 2600 mL    Total NET: -2600 mL      07 Oct 2020 07:01  -  07 Oct 2020 10:07  --------------------------------------------------------  IN:  Total IN: 0 mL    OUT:    Voided (mL): 800 mL  Total OUT: 800 mL    Total NET: -800 mL               PHYSICAL EXAM:    GENERAL: NAD  NECK: Supple, no inc in JVP  CHEST/LUNG: Clear  HEART: S1S2  ABDOMEN: Soft, Nontender, Nondistended; Bowel sounds present  EXTREMITIES:  non-pitting edema  NEURO: no asterixis      LABS:  CBC Full  -  ( 06 Oct 2020 10:42 )  WBC Count : 9.55 K/uL  RBC Count : 2.56 M/uL  Hemoglobin : 7.4 g/dL  Hematocrit : 23.5 %  Platelet Count - Automated : 328 K/uL  Mean Cell Volume : 91.8 fl  Mean Cell Hemoglobin : 28.9 pg  Mean Cell Hemoglobin Concentration : 31.5 gm/dL  Auto Neutrophil # : x  Auto Lymphocyte # : x  Auto Monocyte # : x  Auto Eosinophil # : x  Auto Basophil # : x  Auto Neutrophil % : x  Auto Lymphocyte % : x  Auto Monocyte % : x  Auto Eosinophil % : x  Auto Basophil % : x    10-06    138  |  107  |  9   ----------------------------<  92  3.0<L>   |  26  |  1.22    Ca    8.3<L>      06 Oct 2020 10:42  Phos  2.8     10-06  Mg     1.6     10-06          Assessment   ROGER -- pre-renal azotemia. Resolving  HypoNa -- pseudo. Improved with glycemic correction  HypoK -- poor intake.   HTN    Recommendations:   Change IVFs to NS with KCL  Increase Labetalol to 200mg BID  BMP in am

## 2020-10-07 NOTE — PROGRESS NOTE ADULT - ASSESSMENT
HPI:  : 89M pmhx HTN, CKD, BPH with chronic obrien. fever and weakness for 8 days. no other symptoms. fever started shortly after hs obrien was changed- patient denies nausea vomiting or diarrhea - complain of abdominal distention      (22 Sep 2020 15:16)  ----------------- As above --------------------------------------------  The patient presented with sepsis from the urinary tract. Hopsital stay complicated by aspiration pneumonia Consult called for increase secretions and poor PO intake. patient cleared by speech for dysphagia diet. Patient's MS improving. Patient states that he denies dysphagia or odynophagia but does not have an appetite.   Spoke with son who is a doctor. Patient had no problems with eating prior to getting sick      ? Dysphagia  -  Nurse states that she thinks the family did not come in to feed patient yesterday. 1) Hopefully, daughter will bring in food and attempt to feed the patient today 2) MBS - spoke with Sariah Cary 3) ? NGT  ======= discussed with son yesterday [phone] who agreed with above. Left message for him to call me today

## 2020-10-07 NOTE — PROGRESS NOTE ADULT - PROBLEM SELECTOR PLAN 3
continue with Proscar, Flomax
Escalated to Meropenem.   Urine cx shows Klebsiella, R to multiple ABX but S to Meropenam  ID following ([facility])  Due to persistent WBC's, changed to Ceftriaxone + Flagyl  CT of abdomen ordered/pending  WBCs finally starting to come down so Flagyl D/C's
Pt with distended abdomen, CT abdomen above.  +BS, non-tender upon exam.  Likely d/t constipation.  Recommend to continue bowel regimen ordered, giving PRN dulcolax as needed. Keeping a close eye for worsening abdominal distention.  S/p NGT placement for decompression, now removed.
Pt with distended abdomen, CT abdomen above. Pt denies any N/V upon exam today.  +BS, non-tender upon exam.  Likely d/t constipation.  Recommend to continue bowel regimen ordered, giving PRN dulcolax as needed. Keeping a close eye for worsening abdominal distention.  S/p NGT placement for decompression, now removed.
continue home meds
continue home meds
continue with Proscar, Flomax
continue home meds
continue with Proscar, Flomax

## 2020-10-07 NOTE — PROGRESS NOTE ADULT - PROBLEM SELECTOR PLAN 1
9/30/2020 on antibx spike fever last night , wbc increasing  cxr done 9/29/2020 shows LLL infiltrate will get Ct chest , check RVP and flu . will d/w ID, follow up on all repeat cultures   suspect aspiration : NPO , place NGT get head ct and swallow eval      10/1/2020 ct head negative, seen by speech swallow and diet adjusted    10/2/2020 f/u labs continue with meds  blood cx  and urine cx from 9/30ngtd   sputum cx ngtd, flu negative, rvp negative    10/6/2020 per ID will stop antibx , ID feels completed course  10/7/2020 antibx stopped yesterday

## 2020-10-07 NOTE — PROGRESS NOTE ADULT - PROBLEM SELECTOR PLAN 6
continue with meds
continue w/ usual Labetalol BP stable   His usual HCTZ on hold due to ARF  His usual Amlodipine on hold due to borderline hypotension, but will probably be able to restart soon
9/24/2020 IVF changed by renal.   Continuing w/ D5 1/2NS for now  Monitor Na levels.
Pt with stage II to sacrum.  Continue wound care ordered.
Pt with stage II to sacrum.  Continue wound care ordered.
continue w/ usual Labetalol  His usual HCTZ on hold due to ARF  His usual Amlodipine on hold due to borderline hypotension
continue w/ usual Labetalol  His usual HCTZ on hold due to ARF  His usual Amlodipine on hold due to borderline hypotension
continue w/ usual Labetalol  His usual HCTZ on hold due to ARF  His usual Amlodipine on hold due to borderline hypotension, but will probably be able to restart soon
continue w/ usual Labetalol BP stable   His usual HCTZ on hold due to ARF  His usual Amlodipine on hold due to borderline hypotension, but will probably be able to restart soon
continue with meds

## 2020-10-07 NOTE — PROGRESS NOTE ADULT - PROBLEM SELECTOR PLAN 4
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

## 2020-10-07 NOTE — PROGRESS NOTE ADULT - SUBJECTIVE AND OBJECTIVE BOX
HPI:  : 89M pmhx HTN, CKD, BPH with chronic obrien. fever and weakness for 8 days. no other symptoms. fever started shortly after hs obrien was changed- patient denies nausea vomiting or diarrhea - complain of abdominal distention      (22 Sep 2020 15:16)      Allergies    No Known Allergies    Intolerances        MEDICATIONS  (STANDING):  amLODIPine   Tablet 5 milliGRAM(s) Oral daily  finasteride 5 milliGRAM(s) Oral daily  gabapentin 300 milliGRAM(s) Oral two times a day  labetalol 200 milliGRAM(s) Oral every 12 hours  mirtazapine 7.5 milliGRAM(s) Oral daily  polyethylene glycol 3350 17 Gram(s) Oral at bedtime  senna 2 Tablet(s) Oral at bedtime  sodium chloride 0.9% with potassium chloride 20 mEq/L 1000 milliLiter(s) (60 mL/Hr) IV Continuous <Continuous>  tamsulosin 0.4 milliGRAM(s) Oral at bedtime    MEDICATIONS  (PRN):  acetaminophen   Tablet .. 650 milliGRAM(s) Oral every 6 hours PRN Temp greater or equal to 38C (100.4F)  bisacodyl Suppository 10 milliGRAM(s) Rectal daily PRN Constipation      REVIEW OF SYSTEMS:    cannot give reliable ho     VITAL SIGNS:  T(C): 37.1 (10-07-20 @ 04:58), Max: 37.2 (10-06-20 @ 16:58)  T(F): 98.8 (10-07-20 @ 04:58), Max: 99 (10-06-20 @ 16:58)  HR: 94 (10-07-20 @ 04:58) (76 - 100)  BP: 165/89 (10-07-20 @ 04:58) (148/82 - 169/86)  RR: 18 (10-07-20 @ 04:58) (18 - 19)  SpO2: 98% (10-07-20 @ 04:58) (98% - 99%)  Wt(kg): --    PHYSICAL EXAM:    GENERAL: not in any distress in RA   HEENT: Neck is supple, normocephalic, atraumatic   CHEST/LUNG: Clear to percussion bilaterally; No rales, rhonchi, wheezing  HEART: Regular rate and rhythm; No murmurs, rubs, or gallops  ABDOMEN: Soft, Nontender, Nondistended; Bowel sounds present, no rebound   EXTREMITIES:  2+ Peripheral Pulses, No clubbing, cyanosis, or edema  SKIN: No rashes or lesions  BACK: no pressor sore , stage 2 noninfectious decubiti   NERVOUS SYSTEM:  more AA , somewhat talking , hard to understand     LABS:                         7.4    9.55  )-----------( 328      ( 06 Oct 2020 10:42 )             23.5     10-06    138  |  107  |  9   ----------------------------<  92  3.0<L>   |  26  |  1.22    Ca    8.3<L>      06 Oct 2020 10:42  Phos  2.8     10-06  Mg     1.6     10-06                                Culture Results:   Normal Respiratory Vanessa present (10-01 @ 00:24)                Radiology:

## 2020-10-07 NOTE — PROGRESS NOTE ADULT - PROBLEM SELECTOR PROBLEM 10
Encounter for palliative care
Encounter for palliative care
Left lower quadrant abdominal pain

## 2020-10-07 NOTE — PROGRESS NOTE ADULT - ATTENDING COMMENTS
mild to moderate hydronephrosis : in setting of h/o prostate enlargement has chronic obrien     stage 2 ulcer right buttock supportive and ordered specialty bed  Turn and position the  patient  frequently per  protocol to prevent decubiti ulcer,  skin breakdown and/or further skin breakdown.    ? Dysphagia- 10/4/47989 MBS in am    10/5/2020 await MBS     10/6/2020 not eating ? dysphagia had bedside swallow eval ( I d/w [facility] speech pathologist on 10/5/2020 patient doesn't and will not    participate in MBS )  didn't really participate will get GI eval for possible egd may even need PEG  10/7/2020  reviewed GI note await decision by GI and family

## 2020-10-07 NOTE — PROGRESS NOTE ADULT - PROBLEM SELECTOR PLAN 5
9/24/2020 IVF changed by renal.   Monitor Na levels.
resolved
resolved
9/24/2020 IVF changed by renal  f/u in am labs
9/24/2020 IVF changed by renal.   Continuing w/ D5 1/2NS for now  Monitor Na levels.
Per son, has chronic anemia; old records show baseline Hgb of 8 - 9  Monitor CBC; has been stable in the mid 7's  Hgb at 6.8 now, which is likely to be dilutional in nature, but due to the chronically anemic baseline, would benefit from transfusion;  1 unit ordered...  high Ferritin levels likely due to acute or chronic disease
Pt s/p blood transfusion  Last H/H 7.7/23.6  Recommend to continue to monitor CBC as ordered and transfuse as needed.
Pt s/p blood transfusion  Last H/H 7.8/24.4  Recommend to continue to monitor CBC as ordered and transfuse as needed.
resolved

## 2020-10-07 NOTE — PROGRESS NOTE ADULT - PROBLEM SELECTOR PROBLEM 5
Hypernatremia
Hypernatremia
Anemia
Anemia
Anemia, unspecified type
Hypernatremia

## 2020-10-07 NOTE — PROGRESS NOTE ADULT - PROBLEM SELECTOR PLAN 8
nutrition consult noted
DVT Prophy: SCDs; avoiding ACs due to anemia
Pt with PMHx of HTN.  Recommend to continue labetalol as ordered.
Pt with PMHx of HTN.  Recommend to continue labetalol as ordered.
nutrition consult noted  Ensure Enlive BID
nutrition consult noted

## 2020-10-07 NOTE — PROGRESS NOTE ADULT - SUBJECTIVE AND OBJECTIVE BOX
Patient is a 89y old  Male who presents with a chief complaint of fever (07 Oct 2020 10:40)      HPI:  : 89M pmhx HTN, CKD, BPH with chronic obrien. fever and weakness for 8 days. no other symptoms. fever started shortly after hs obrien was changed- patient denies nausea vomiting or diarrhea - complain of abdominal distention      (22 Sep 2020 15:16)      INTERVAL HPI/OVERNIGHT EVENTS:  The nurse denies melena, hematochezia, hematemesis, nausea, vomiting, abdominal pain, constipation, diarrhea, or change in bowel movements     MEDICATIONS  (STANDING):  amLODIPine   Tablet 5 milliGRAM(s) Oral daily  finasteride 5 milliGRAM(s) Oral daily  gabapentin 300 milliGRAM(s) Oral two times a day  labetalol 200 milliGRAM(s) Oral every 12 hours  mirtazapine 7.5 milliGRAM(s) Oral daily  polyethylene glycol 3350 17 Gram(s) Oral at bedtime  senna 2 Tablet(s) Oral at bedtime  sodium chloride 0.9% with potassium chloride 20 mEq/L 1000 milliLiter(s) (60 mL/Hr) IV Continuous <Continuous>  tamsulosin 0.4 milliGRAM(s) Oral at bedtime    MEDICATIONS  (PRN):  acetaminophen   Tablet .. 650 milliGRAM(s) Oral every 6 hours PRN Temp greater or equal to 38C (100.4F)  bisacodyl Suppository 10 milliGRAM(s) Rectal daily PRN Constipation      FAMILY HISTORY:  No significant family history        Allergies    No Known Allergies    Intolerances        PMH/PSH:  Hydrocele in adult    Renal insufficiency    Dehydration    Spinal stenosis of lumbar region    BPH (benign prostatic hypertrophy)    Hypertension    Benign Prostatic Hyperplasia    HTN (Hypertension)    No significant past surgical history          REVIEW OF SYSTEMS:  CONSTITUTIONAL: No fever, weight loss,  EYES: No eye pain, visual disturbances, or discharge  ENMT:  No difficulty hearing, tinnitus, vertigo; No sinus or throat pain  NECK: No pain or stiffness  BREASTS: No pain, masses, or nipple discharge  RESPIRATORY: No cough, wheezing, chills or hemoptysis; No shortness of breath  CARDIOVASCULAR: No chest pain, palpitations, dizziness, or leg swelling  GASTROINTESTINAL: See above   GENITOURINARY: No dysuria, frequency, hematuria, or incontinence  NEUROLOGICAL: No headaches, memory loss, loss of strength, numbness, or tremors  SKIN: No itching, burning, rashes, or lesions   LYMPH NODES: No enlarged glands  ENDOCRINE: No heat or cold intolerance; No hair loss  MUSCULOSKELETAL: No joint pain or swelling; No muscle, back, or extremity pain  PSYCHIATRIC: No depression, anxiety, mood swings, or difficulty sleeping  HEME/LYMPH: No easy bruising, or bleeding gums  ALLERGY AND IMMUNOLOGIC: No hives or eczema    Vital Signs Last 24 Hrs  T(C): 37.1 (07 Oct 2020 04:58), Max: 37.2 (06 Oct 2020 16:58)  T(F): 98.8 (07 Oct 2020 04:58), Max: 99 (06 Oct 2020 16:58)  HR: 94 (07 Oct 2020 04:58) (76 - 100)  BP: 165/89 (07 Oct 2020 04:58) (165/89 - 169/86)  BP(mean): --  RR: 18 (07 Oct 2020 04:58) (18 - 19)  SpO2: 98% (07 Oct 2020 04:58) (98% - 99%)    PHYSICAL EXAM:  GENERAL: NAD, well-groomed, well-developed  HEAD:  Atraumatic, Normocephalic  EYES: EOMI, PERRLA, conjunctiva and sclera clear  NECK: Supple, No JVD, Normal thyroid  NERVOUS SYSTEM:  Alert   CHEST/LUNG: Clear to percussion bilaterally; No rales, rhonchi, wheezing, or rubs  HEART: Regular rate and rhythm; No murmurs, rubs, or gallops  ABDOMEN: Soft, Nontender, Nondistended; Bowel sounds present  EXTREMITIES:  2+ Peripheral Pulses, No clubbing, cyanosis, or edema  LYMPH: No lymphadenopathy noted  SKIN: No rashes or lesions    LAB                          7.4    9.55  )-----------( 328      ( 06 Oct 2020 10:42 )             23.5       CBC:  10-06 @ 10:42  WBC 9.55   Hgb 7.4   Hct 23.5   Plts 328  MCV 91.8  10-06 @ 09:24  WBC 7.64   Hgb 6.1   Hct 20.2   Plts 267  MCV 96.2  10-04 @ 07:06  WBC 12.55   Hgb 7.5   Hct 24.2   Plts 371  MCV 93.8  10-03 @ 07:51  WBC 13.22   Hgb 7.3   Hct 22.9   Plts 417  MCV 92.3  10-01 @ 21:54  WBC 15.67   Hgb 7.7   Hct 23.6   Plts 456  MCV 89.7  10-01 @ 08:12  WBC 16.21   Hgb 7.8   Hct 24.4   Plts 464  MCV 91.4      Chemistry:  10-06 @ 10:42  Na+ 138  K+ 3.0  Cl- 107  CO2 26  BUN 9  Cr 1.22     10-06 @ 09:24  Na+ 127  K+ 2.3  Cl- 96  CO2 21  BUN 7  Cr 1.32     10-04 @ 07:06  Na+ 141  K+ 3.6  Cl- 109  CO2 27  BUN 14  Cr 1.38     10-03 @ 07:51  Na+ 143  K+ 3.3  Cl- 111  CO2 27  BUN 16  Cr 1.46     10-01 @ 08:12  Na+ 143  K+ 3.3  Cl- 112  CO2 24  BUN 20  Cr 1.33         Glucose, Serum: 92 mg/dL (10-06 @ 10:42)  Glucose, Serum: 992 mg/dL (10-06 @ 09:24)  Glucose, Serum: 89 mg/dL (10-04 @ 07:06)  Glucose, Serum: 78 mg/dL (10-03 @ 07:51)  Glucose, Serum: 99 mg/dL (10-01 @ 08:12)      06 Oct 2020 10:42    138    |  107    |  9      ----------------------------<  92     3.0     |  26     |  1.22   06 Oct 2020 09:24    127    |  96     |  7      ----------------------------<  992    2.3     |  21     |  1.32   04 Oct 2020 07:06    141    |  109    |  14     ----------------------------<  89     3.6     |  27     |  1.38   03 Oct 2020 07:51    143    |  111    |  16     ----------------------------<  78     3.3     |  27     |  1.46   01 Oct 2020 08:12    143    |  112    |  20     ----------------------------<  99     3.3     |  24     |  1.33     Ca    8.3        06 Oct 2020 10:42  Ca    6.4        06 Oct 2020 09:24  Ca    8.6        04 Oct 2020 07:06  Ca    8.6        03 Oct 2020 07:51  Ca    9.1        01 Oct 2020 08:12  Phos  2.8       06 Oct 2020 10:42  Phos  2.3       06 Oct 2020 09:24  Phos  2.9       04 Oct 2020 07:06  Phos  3.5       03 Oct 2020 07:51  Phos  3.2       01 Oct 2020 08:12  Mg     1.6       06 Oct 2020 10:42  Mg     1.3       06 Oct 2020 09:24  Mg     1.8       04 Oct 2020 07:06  Mg     1.7       03 Oct 2020 07:51  Mg     2.1       01 Oct 2020 08:12                CAPILLARY BLOOD GLUCOSE              RADIOLOGY & ADDITIONAL TESTS:    Imaging Personally Reviewed:  [ ] YES  [ ] NO    Consultant(s) Notes Reviewed:  [ ] YES  [ ] NO    Care Discussed with Consultants/Other Providers [ ] YES  [ ] NO

## 2020-10-07 NOTE — PROGRESS NOTE ADULT - PROBLEM SELECTOR PROBLEM 9
Hypokalemia
BPH (benign prostatic hyperplasia)
BPH (benign prostatic hyperplasia)
Hypokalemia
Preventive measure

## 2020-10-07 NOTE — CHART NOTE - NSCHARTNOTEFT_GEN_A_CORE
Spoke with Speech therapist ( Kristian Fitzgerald ). Patient passed speech evaluation. Maintain present diet. No need for MBS. Patient just not eating enough. Awaiting call back from family.

## 2020-10-07 NOTE — PROGRESS NOTE ADULT - PROBLEM SELECTOR PROBLEM 8
Moderate protein-calorie malnutrition
Preventive measure
HTN (hypertension)
HTN (hypertension)
Moderate protein-calorie malnutrition
Preventive measure
Moderate protein-calorie malnutrition

## 2020-10-07 NOTE — PROGRESS NOTE ADULT - PROBLEM SELECTOR PROBLEM 7
Anemia, unspecified type
Moderate protein-calorie malnutrition
CKD (chronic kidney disease)
CKD (chronic kidney disease)
Essential hypertension
Moderate protein-calorie malnutrition
Anemia, unspecified type

## 2020-10-07 NOTE — PROGRESS NOTE ADULT - PROBLEM SELECTOR PROBLEM 6
Prostate enlargement
Essential hypertension
Hypernatremia
Pressure ulcer
Pressure ulcer
Prostate enlargement

## 2020-10-07 NOTE — PROGRESS NOTE ADULT - ASSESSMENT
present with fevers   urinary tract infection ? from  obrien with  bilateral hydro  fevers started after obrien change ; initially treated for obrien related urinary tract infection   now health care associated oneumonia aspiration likely   CT abdominal [id number] ago NEGATIVE  CT chest multifocal pneumonia and bilateral hydro   repeat blood culture and urine culture NEGATIVE   fever resolved   Resolved leucocytosis from zosyn and doxy   s/p IV antibiotics   monitor off abx   high risk for aspiration   aspiration precaution  no fever spike , leukocytosis down   more AA

## 2020-10-07 NOTE — PROGRESS NOTE ADULT - SUBJECTIVE AND OBJECTIVE BOX
Patient is a 89y old  Male who presents with a chief complaint of fever (29 Sep 2020 09:28)      INTERVAL HPI/OVERNIGHT EVENTS: none      MEDICATIONS  (STANDING):  amLODIPine   Tablet 5 milliGRAM(s) Oral daily  dextrose 5% + sodium chloride 0.9%. 1000 milliLiter(s) (60 mL/Hr) IV Continuous <Continuous>  finasteride 5 milliGRAM(s) Oral daily  gabapentin 300 milliGRAM(s) Oral two times a day  labetalol 100 milliGRAM(s) Oral two times a day  mirtazapine 7.5 milliGRAM(s) Oral daily  polyethylene glycol 3350 17 Gram(s) Oral at bedtime  senna 2 Tablet(s) Oral at bedtime  tamsulosin 0.4 milliGRAM(s) Oral at bedtime    MEDICATIONS  (PRN):  acetaminophen   Tablet .. 650 milliGRAM(s) Oral every 6 hours PRN Temp greater or equal to 38C (100.4F)  bisacodyl Suppository 10 milliGRAM(s) Rectal daily PRN Constipation        Allergies    No Known Allergies    Intolerances    Vital Signs Last 24 Hrs  T(C): 37.1 (07 Oct 2020 04:58), Max: 37.2 (06 Oct 2020 16:58)  T(F): 98.8 (07 Oct 2020 04:58), Max: 99 (06 Oct 2020 16:58)  HR: 94 (07 Oct 2020 04:58) (76 - 100)  BP: 165/89 (07 Oct 2020 04:58) (148/82 - 169/86)  BP(mean): --  RR: 18 (07 Oct 2020 04:58) (18 - 19)  SpO2: 98% (07 Oct 2020 04:58) (98% - 99%)    PHYSICAL EXAM:  GENERAL: NAD, elderly   HEAD:  Atraumatic, Normocephalic  EYES: EOMI, PERRLA, conjunctiva and sclera clear  ENT: O/P Clear  NECK: Supple, No JVD  NERVOUS SYSTEM:  No focal deficits  CHEST/LUNG: Clear to percussion bilaterally; No rales, rhonchi, wheezing  HEART: Regular rate and rhythm; No murmurs, rubs, or gallops  ABDOMEN: Soft, mildly distended non tender  Bowel sounds present  EXTREMITIES:  2+ Peripheral Pulses, No clubbing, cyanosis, or edema  SKIN: stage 2 right buttock     LABS:                                                              7.4    9.55  )-----------( 328      ( 06 Oct 2020 10:42 )             23.5   10-06    138  |  107  |  9   ----------------------------<  92  3.0<L>   |  26  |  1.22    Ca    8.3<L>      06 Oct 2020 10:42  Phos  2.8     10-06  Mg     1.6     10-06          CAPILLARY BLOOD GLUCOSE          RADIOLOGY & ADDITIONAL TESTS:    Imaging Personally Reviewed:  [ ] YES  [ ] NO    Consultant(s) Notes Reviewed:  [ ] YES  [ ] NO    Care Discussed with Consultants/Other Providers [ ] YES  [ ] NO

## 2020-10-08 LAB
ANION GAP SERPL CALC-SCNC: 6 MMOL/L — SIGNIFICANT CHANGE UP (ref 5–17)
BUN SERPL-MCNC: 7 MG/DL — SIGNIFICANT CHANGE UP (ref 7–23)
CALCIUM SERPL-MCNC: 8.9 MG/DL — SIGNIFICANT CHANGE UP (ref 8.5–10.1)
CHLORIDE SERPL-SCNC: 111 MMOL/L — HIGH (ref 96–108)
CO2 SERPL-SCNC: 27 MMOL/L — SIGNIFICANT CHANGE UP (ref 22–31)
CREAT SERPL-MCNC: 1.13 MG/DL — SIGNIFICANT CHANGE UP (ref 0.5–1.3)
GLUCOSE SERPL-MCNC: 84 MG/DL — SIGNIFICANT CHANGE UP (ref 70–99)
HCT VFR BLD CALC: 27.7 % — LOW (ref 39–50)
HGB BLD-MCNC: 8.7 G/DL — LOW (ref 13–17)
MAGNESIUM SERPL-MCNC: 1.5 MG/DL — LOW (ref 1.6–2.6)
MCHC RBC-ENTMCNC: 29 PG — SIGNIFICANT CHANGE UP (ref 27–34)
MCHC RBC-ENTMCNC: 31.4 GM/DL — LOW (ref 32–36)
MCV RBC AUTO: 92.3 FL — SIGNIFICANT CHANGE UP (ref 80–100)
NRBC # BLD: 0 /100 WBCS — SIGNIFICANT CHANGE UP (ref 0–0)
PHOSPHATE SERPL-MCNC: 2.9 MG/DL — SIGNIFICANT CHANGE UP (ref 2.5–4.5)
PLATELET # BLD AUTO: 314 K/UL — SIGNIFICANT CHANGE UP (ref 150–400)
POTASSIUM SERPL-MCNC: 3.3 MMOL/L — LOW (ref 3.5–5.3)
POTASSIUM SERPL-SCNC: 3.3 MMOL/L — LOW (ref 3.5–5.3)
RBC # BLD: 3 M/UL — LOW (ref 4.2–5.8)
RBC # FLD: 15.5 % — HIGH (ref 10.3–14.5)
SODIUM SERPL-SCNC: 144 MMOL/L — SIGNIFICANT CHANGE UP (ref 135–145)
WBC # BLD: 9.06 K/UL — SIGNIFICANT CHANGE UP (ref 3.8–10.5)
WBC # FLD AUTO: 9.06 K/UL — SIGNIFICANT CHANGE UP (ref 3.8–10.5)

## 2020-10-08 PROCEDURE — 99223 1ST HOSP IP/OBS HIGH 75: CPT

## 2020-10-08 PROCEDURE — 99233 SBSQ HOSP IP/OBS HIGH 50: CPT

## 2020-10-08 RX ORDER — NYSTATIN CREAM 100000 [USP'U]/G
1 CREAM TOPICAL EVERY 8 HOURS
Refills: 0 | Status: DISCONTINUED | OUTPATIENT
Start: 2020-10-08 | End: 2020-10-13

## 2020-10-08 RX ORDER — POTASSIUM CHLORIDE 20 MEQ
40 PACKET (EA) ORAL ONCE
Refills: 0 | Status: DISCONTINUED | OUTPATIENT
Start: 2020-10-08 | End: 2020-10-08

## 2020-10-08 RX ORDER — POTASSIUM CHLORIDE 20 MEQ
40 PACKET (EA) ORAL ONCE
Refills: 0 | Status: COMPLETED | OUTPATIENT
Start: 2020-10-08 | End: 2020-10-08

## 2020-10-08 RX ADMIN — AMLODIPINE BESYLATE 5 MILLIGRAM(S): 2.5 TABLET ORAL at 06:57

## 2020-10-08 RX ADMIN — SENNA PLUS 2 TABLET(S): 8.6 TABLET ORAL at 22:12

## 2020-10-08 RX ADMIN — NYSTATIN CREAM 1 APPLICATION(S): 100000 CREAM TOPICAL at 22:13

## 2020-10-08 RX ADMIN — FINASTERIDE 5 MILLIGRAM(S): 5 TABLET, FILM COATED ORAL at 12:19

## 2020-10-08 RX ADMIN — GABAPENTIN 300 MILLIGRAM(S): 400 CAPSULE ORAL at 06:57

## 2020-10-08 RX ADMIN — Medication 40 MILLIEQUIVALENT(S): at 12:19

## 2020-10-08 RX ADMIN — TAMSULOSIN HYDROCHLORIDE 0.4 MILLIGRAM(S): 0.4 CAPSULE ORAL at 22:12

## 2020-10-08 RX ADMIN — MIRTAZAPINE 7.5 MILLIGRAM(S): 45 TABLET, ORALLY DISINTEGRATING ORAL at 12:19

## 2020-10-08 RX ADMIN — Medication 200 MILLIGRAM(S): at 06:57

## 2020-10-08 NOTE — PROGRESS NOTE ADULT - ASSESSMENT
present with fevers   urinary tract infection ? from  obrien with  bilateral hydro  fevers started after obrien change ; initially treated for obrien related urinary tract infection   now health care associated oneumonia aspiration likely   CT abdominal [id number] ago NEGATIVE  CT chest multifocal pneumonia and bilateral hydro   repeat blood culture and urine culture NEGATIVE   fever resolved   Resolved leucocytosis from zosyn and doxy completed   s/p IV antibiotics   monitor off abx and stable   in roomair , not in any distress on exam this morning   high risk for aspiration   aspiration precaution  no fever spike   more AA

## 2020-10-08 NOTE — PROGRESS NOTE ADULT - ASSESSMENT
89M pmhx HTN, CKD, BPH with chronic obrien who presents with fever and weakness for 8 days. No other symptoms. fever started shortly after his obrien was changed- patient denies nausea vomiting or diarrhea - complain of abdominal distention     	    Problem/Plan - 1   ·  Problem: Sepsis with acute renal failure and tubular necrosis without septic shock, due to unspecified organism.  Plan: 9/30/2020 on antibx spike fever last night , wbc increasing  cxr done 9/29/2020 shows LLL infiltrate will get Ct chest , check RVP and flu . will d/w ID, follow up on all repeat cultures   suspect aspiration : NPO , place NGT get head ct and swallow eval      10/1/2020 ct head negative, seen by speech swallow and diet adjusted    10/2/2020 f/u labs continue with meds  blood cx  and urine cx from 9/30ngtd   sputum cx ngtd, flu negative, rvp negative    10/6/2020 per ID will stop antibx , ID feels completed course  10/7/2020 antibx stopped yesterday.   10/08/2020 - afebrile off antibiotics x 48 hours    Problem/Plan - 2   ·  Problem: Acute cystitis without hematuria.  Plan: klebsiella antibx per ID , pt with recurrent Urinary Tract Infection-with klebsiella. s/p ABx treatment.     Problem/Plan - 3   ·  Problem: Benign prostatic hyperplasia with urinary retention.  Plan: continue with Proscar, Flomax.     Problem/Plan - 4   ·  Problem: Anemia, unspecified type.  Plan: presumed AOCD , has chronically low hgb    did receive one unit of blood during this hospitalization on 9/28/2020    10/1/2020 monitor hgb  may need another unit , check fobt  10/3/2020 fobt negative  10/7/2020 monitor hgb check labs in am  ( initial labs from 10/6/2020 were an error).   10/08/2020 - now with Hematuria after removing obrien.  check cbc in am     Problem/Plan - 5   ·  Problem: Hypernatremia.  Plan: resolved.     Problem/Plan - 6   Problem: Essential hypertension. Plan: continue w/ usual Labetalol BP stable   His usual HCTZ on hold due to ARF  His usual Amlodipine on hold due to borderline hypotension, but will probably be able to restart soon.    Problem/Plan - 7   ·  Problem: Moderate protein-calorie malnutrition.  Plan: nutrition consult noted  10/1/2020 swallow eval noted from 9/30/2020.     Problem/Plan - 8   ·  Problem: Preventive measure.  Plan: DVT Prophy: SCDs; avoiding ACs due to anemia.     Problem/Plan - 9   ·  Problem: Hypokalemia.  Plan: will be replaced as needed follow up am labs.     Problem/Plan - 10   Problem: Left lower quadrant abdominal pain. Plan; ct scan done by colleague on 9/28/2020 shows full of stool in rectum : will give enema , and once ngt placed will attempt decompression and place lactulose    10/1/2020 had a bm  10/5/2020 resolved.

## 2020-10-08 NOTE — PROGRESS NOTE ADULT - SUBJECTIVE AND OBJECTIVE BOX
Patient is a 89y old  Male who presents with a chief complaint of fever (08 Oct 2020 13:15)    HPI: 89M pmhx HTN, CKD, BPH with chronic obrien. fever and weakness for 8 days. no other symptoms. fever started shortly after hs obrien was changed- patient denies nausea vomiting or diarrhea - complain of abdominal distention  (22 Sep 2020 15:16)    SUBJECTIVE & OBJECTIVE: Pt seen and examined at bedside. He removed his obrien last night and is having Hematuria.  Has been placed on mittens for safety   Vital Signs Last 24 Hrs  T(C): 36.4 (08 Oct 2020 16:26), Max: 37.6 (08 Oct 2020 05:15)  T(F): 97.5 (08 Oct 2020 16:26), Max: 99.6 (08 Oct 2020 05:15)  HR: 93 (08 Oct 2020 16:26) (80 - 93)  BP: 97/45 (08 Oct 2020 16:26) (97/45 - 187/97)  RR: 20 (08 Oct 2020 16:26) (18 - 20)  SpO2: 92% (08 Oct 2020 16:26) (92% - 100%)  Daily     Daily Weight in k.5 (08 Oct 2020 05:15)  I&O's Detail    07 Oct 2020 07:01  -  08 Oct 2020 07:00  --------------------------------------------------------  IN:  Total IN: 0 mL    OUT:    Indwelling Catheter - Urethral (mL): 800 mL    Voided (mL): 1600 mL  Total OUT: 2400 mL    Total NET: -2400 mL        GENERAL: weak appearing, bitemporal wasting, chronically ill appearing  HEAD:  Atraumatic, Normocephalic  EYES: EOMI, PERRLA, conjunctiva and sclera clear  ENMT: Moist mucous membranes  NECK: Supple, No JVD  NERVOUS SYSTEM:  Alert & Oriented X3, Motor Strength 5/5 B/L upper and lower extremities; DTRs 2+ intact and symmetric  CHEST/LUNG: Clear to auscultation bilaterally; No rales, rhonchi, wheezing, or rubs  HEART: Regular rate and rhythm; No murmurs, rubs, or gallops  ABDOMEN: Soft, Nontender, Nondistended; Bowel sounds present  EXTREMITIES:  2+ Peripheral Pulses, No clubbing, cyanosis, or edema  MEDICATIONS  (STANDING):  amLODIPine   Tablet 5 milliGRAM(s) Oral daily  finasteride 5 milliGRAM(s) Oral daily  gabapentin 300 milliGRAM(s) Oral two times a day  labetalol 200 milliGRAM(s) Oral every 12 hours  mirtazapine 7.5 milliGRAM(s) Oral daily  polyethylene glycol 3350 17 Gram(s) Oral at bedtime  senna 2 Tablet(s) Oral at bedtime  tamsulosin 0.4 milliGRAM(s) Oral at bedtime    MEDICATIONS  (PRN):  acetaminophen   Tablet .. 650 milliGRAM(s) Oral every 6 hours PRN Temp greater or equal to 38C (100.4F)  bisacodyl Suppository 10 milliGRAM(s) Rectal daily PRN Constipation    LABS:                        8.7    9.06  )-----------( 314      ( 08 Oct 2020 08:04 )             27.7     10-08    144  |  111<H>  |  7   ----------------------------<  84  3.3<L>   |  27  |  1.13    Ca    8.9      08 Oct 2020 07:21  Phos  2.9     10-08  Mg     1.5     10-08          CAPILLARY BLOOD GLUCOSE                RECENT CULTURES:    RADIOLOGY & ADDITIONAL TESTS:    DVT/GI ppx  Discussed with pt @ bedside

## 2020-10-08 NOTE — PROGRESS NOTE ADULT - SUBJECTIVE AND OBJECTIVE BOX
HPI:  : 89M pmhx HTN, CKD, BPH with chronic obrien. fever and weakness for 8 days. no other symptoms. fever started shortly after hs obrien was changed- patient denies nausea vomiting or diarrhea - complain of abdominal distention      (22 Sep 2020 15:16)      Allergies    No Known Allergies    Intolerances        MEDICATIONS  (STANDING):  amLODIPine   Tablet 5 milliGRAM(s) Oral daily  finasteride 5 milliGRAM(s) Oral daily  gabapentin 300 milliGRAM(s) Oral two times a day  labetalol 200 milliGRAM(s) Oral every 12 hours  mirtazapine 7.5 milliGRAM(s) Oral daily  polyethylene glycol 3350 17 Gram(s) Oral at bedtime  senna 2 Tablet(s) Oral at bedtime  sodium chloride 0.9% with potassium chloride 20 mEq/L 1000 milliLiter(s) (60 mL/Hr) IV Continuous <Continuous>  tamsulosin 0.4 milliGRAM(s) Oral at bedtime    MEDICATIONS  (PRN):  acetaminophen   Tablet .. 650 milliGRAM(s) Oral every 6 hours PRN Temp greater or equal to 38C (100.4F)  bisacodyl Suppository 10 milliGRAM(s) Rectal daily PRN Constipation      REVIEW OF SYSTEMS: poor historian     CONSTITUTIONAL: No fever  RESPIRATORY: No cough  CARDIOVASCULAR: No chest pain  GASTROINTESTINAL: No abdominal pain.  GENITOURINARY: No dysuria  NEUROLOGICAL: No headaches  EXTREMITY: No pedal edema BLE  SKIN: No itching    VITAL SIGNS:  T(C): 37.6 (10-08-20 @ 05:15), Max: 37.6 (10-08-20 @ 05:15)  T(F): 99.6 (10-08-20 @ 05:15), Max: 99.6 (10-08-20 @ 05:15)  HR: 89 (10-08-20 @ 05:15) (87 - 98)  BP: 181/74 (10-08-20 @ 05:15) (167/75 - 187/97)  RR: 18 (10-08-20 @ 05:15) (18 - 18)  SpO2: 100% (10-08-20 @ 05:15) (98% - 100%)  Wt(kg): --    PHYSICAL EXAM:    GENERAL: not in any distress  HEENT: Neck is supple, normocephalic, atraumatic   CHEST/LUNG: Clear to percussion bilaterally; No rales, rhonchi, wheezing  HEART: Regular rate and rhythm; No murmurs, rubs, or gallops  ABDOMEN: Soft, Nontender, Nondistended; Bowel sounds present, no rebound   EXTREMITIES:  2+ Peripheral Pulses, No clubbing, cyanosis, or edema  GENITOURINARY:   SKIN: No rashes or lesions  BACK: no pressor sore   NERVOUS SYSTEM:  more AA     LABS:                         8.7    9.06  )-----------( 314      ( 08 Oct 2020 08:04 )             27.7     10-08    144  |  111<H>  |  7   ----------------------------<  84  3.3<L>   |  27  |  1.13    Ca    8.9      08 Oct 2020 07:21  Phos  2.9     10-08  Mg     1.5     10-08                                              Radiology:

## 2020-10-08 NOTE — PROGRESS NOTE ADULT - SUBJECTIVE AND OBJECTIVE BOX
Subjective: pulled out obrien over night with resulting hematuria.       MEDICATIONS  (STANDING):  amLODIPine   Tablet 5 milliGRAM(s) Oral daily  finasteride 5 milliGRAM(s) Oral daily  gabapentin 300 milliGRAM(s) Oral two times a day  labetalol 200 milliGRAM(s) Oral every 12 hours  mirtazapine 7.5 milliGRAM(s) Oral daily  polyethylene glycol 3350 17 Gram(s) Oral at bedtime  senna 2 Tablet(s) Oral at bedtime  sodium chloride 0.9% with potassium chloride 20 mEq/L 1000 milliLiter(s) (60 mL/Hr) IV Continuous <Continuous>  tamsulosin 0.4 milliGRAM(s) Oral at bedtime    MEDICATIONS  (PRN):  acetaminophen   Tablet .. 650 milliGRAM(s) Oral every 6 hours PRN Temp greater or equal to 38C (100.4F)  bisacodyl Suppository 10 milliGRAM(s) Rectal daily PRN Constipation          T(C): 37.6 (10-08-20 @ 05:15), Max: 37.6 (10-08-20 @ 05:15)  HR: 89 (10-08-20 @ 05:15) (87 - 98)  BP: 181/74 (10-08-20 @ 05:15) (167/75 - 187/97)  RR: 18 (10-08-20 @ 05:15) (18 - 18)  SpO2: 100% (10-08-20 @ 05:15) (98% - 100%)  Wt(kg): --        I&O's Detail    07 Oct 2020 07:01  -  08 Oct 2020 07:00  --------------------------------------------------------  IN:  Total IN: 0 mL    OUT:    Indwelling Catheter - Urethral (mL): 800 mL    Voided (mL): 1600 mL  Total OUT: 2400 mL    Total NET: -2400 mL               PHYSICAL EXAM:    GENERAL: NAD  NECK: Supple, no inc in JVP  CHEST/LUNG: Clear  HEART: S1S2  ABDOMEN: Soft, Nontender, Nondistended; Bowel sounds present  EXTREMITIES:  non-pitting edema  NEURO: no asterixis      LABS:  CBC Full  -  ( 08 Oct 2020 08:04 )  WBC Count : 9.06 K/uL  RBC Count : 3.00 M/uL  Hemoglobin : 8.7 g/dL  Hematocrit : 27.7 %  Platelet Count - Automated : 314 K/uL  Mean Cell Volume : 92.3 fl  Mean Cell Hemoglobin : 29.0 pg  Mean Cell Hemoglobin Concentration : 31.4 gm/dL  Auto Neutrophil # : x  Auto Lymphocyte # : x  Auto Monocyte # : x  Auto Eosinophil # : x  Auto Basophil # : x  Auto Neutrophil % : x  Auto Lymphocyte % : x  Auto Monocyte % : x  Auto Eosinophil % : x  Auto Basophil % : x    10-08    144  |  111<H>  |  7   ----------------------------<  84  3.3<L>   |  27  |  1.13    Ca    8.9      08 Oct 2020 07:21  Phos  2.9     10-08  Mg     1.5     10-08        Assessment:  ROGER -- pre-renal azotemia. Resolving  HypoNa -- pseudo. Improved with glycemic correction  HypoK -- poor intake.   HTN  Traumatic hematuria    Recommendations:   D/c saline  Increase Labetalol to 200mg BID  KCL 40 meq PO X1  BMP in am  Urology to address traumatic hematuria.     Thank you!

## 2020-10-08 NOTE — CONSULT NOTE ADULT - SUBJECTIVE AND OBJECTIVE BOX
UROLOGY CONSULT NOTE  89yo Male with PMH of HTN, CKD, BPH with chronic obrien presented to [facility] with a chief complaint of fever shortly after obrien change. Admitted with UTI and found with acute on chronic renal failure. Urology consulted for replacement of obrien catheter. Patient is a poor historian but does not offer any complaints.    Review of Systems:  Located in [facility]    MEDICATIONS  (STANDING):  amLODIPine   Tablet 5 milliGRAM(s) Oral daily  finasteride 5 milliGRAM(s) Oral daily  gabapentin 300 milliGRAM(s) Oral two times a day  labetalol 200 milliGRAM(s) Oral every 12 hours  mirtazapine 7.5 milliGRAM(s) Oral daily  nystatin Powder 1 Application(s) Topical every 8 hours  polyethylene glycol 3350 17 Gram(s) Oral at bedtime  senna 2 Tablet(s) Oral at bedtime  tamsulosin 0.4 milliGRAM(s) Oral at bedtime    MEDICATIONS  (PRN):  acetaminophen   Tablet .. 650 milliGRAM(s) Oral every 6 hours PRN Temp greater or equal to 38C (100.4F)  bisacodyl Suppository 10 milliGRAM(s) Rectal daily PRN Constipation    Allergies  No Known Allergies    Vital Signs Last 24 Hrs  T(C): 36.4 (08 Oct 2020 16:26), Max: 37.6 (08 Oct 2020 05:15)  T(F): 97.5 (08 Oct 2020 16:26), Max: 99.6 (08 Oct 2020 05:15)  HR: 71 (08 Oct 2020 19:24) (71 - 93)  BP: 157/49 (08 Oct 2020 19:24) (95/55 - 187/97)  RR: 18 (08 Oct 2020 18:18) (18 - 20)  SpO2: 98% (08 Oct 2020 18:18) (92% - 100%)    Physical Exam:  General: in NAD  Eyes : nonicteric sclera  HENT:  WNL, no JVD  Chest:  clear breath sounds  Cardiovascular: S1S2  Abdomen: soft, NT  : edema of the foreskin and scrotum, minimal blood visible on urethra. Obrien catheter replaced draining 200cc of bloody, un-clotted urine  Extremities: no edema present    Neuro/Psych: AAO x1    LABS:                        8.7    9.06  )-----------( 314      ( 08 Oct 2020 08:04 )             27.7     10-08    144  |  111<H>  |  7   ----------------------------<  84  3.3<L>   |  27  |  1.13    Ca    8.9      08 Oct 2020 07:21  Phos  2.9     10-08  Mg     1.5     10-08

## 2020-10-08 NOTE — PROGRESS NOTE ADULT - NUTRITIONAL ASSESSMENT
This patient has been assessed with a concern for Malnutrition and has been determined to have a diagnosis/diagnoses of Moderate protein-calorie malnutrition.    This patient is being managed with:   Diet Soft-  Low Sodium  Supplement Feeding Modality:  Oral  Ensure Enlive Cans or Servings Per Day:  1       Frequency:  Two Times a day  Entered: Sep 24 2020  3:41PM    
This patient has been assessed with a concern for Malnutrition and has been determined to have a diagnosis/diagnoses of Severe protein-calorie malnutrition.    This patient is being managed with:   Diet Dysphagia 1 Pureed-Nectar Consistency Fluid-  Supplement Feeding Modality:  Oral  Vital 1.0 Cans or Servings Per Day:  1       Frequency:  Two Times a day  Entered: Oct  6 2020  7:23PM    Diet Dysphagia 1 Pureed-Nectar Consistency Fluid-  Supplement Feeding Modality:  Oral  Health Shake Cans or Servings Per Day:  1       Frequency:  Two Times a day  Entered: Oct  6 2020 12:16PM    The following pending diet order is being considered for treatment of Severe protein-calorie malnutrition:null
This patient has been assessed with a concern for Malnutrition and has been determined to have a diagnosis/diagnoses of Moderate protein-calorie malnutrition.    This patient is being managed with:   Diet Soft-  Low Sodium  Supplement Feeding Modality:  Oral  Ensure Enlive Cans or Servings Per Day:  1       Frequency:  Two Times a day  Entered: Sep 24 2020  3:41PM    
This patient has been assessed with a concern for Malnutrition and has been determined to have a diagnosis/diagnoses of Severe protein-calorie malnutrition.    This patient is being managed with:   Diet Dysphagia 1 Pureed-Nectar Consistency Fluid-  Supplement Feeding Modality:  Oral  Vital 1.0 Cans or Servings Per Day:  1       Frequency:  Two Times a day  Entered: Oct  6 2020  7:23PM    Diet Dysphagia 1 Pureed-Nectar Consistency Fluid-  Supplement Feeding Modality:  Oral  Health Shake Cans or Servings Per Day:  1       Frequency:  Two Times a day  Entered: Oct  6 2020 12:16PM    The following pending diet order is being considered for treatment of Severe protein-calorie malnutrition:null

## 2020-10-08 NOTE — PROGRESS NOTE ADULT - ASSESSMENT
HPI:  : 89M pmhx HTN, CKD, BPH with chronic obrien. fever and weakness for 8 days. no other symptoms. fever started shortly after hs obrien was changed- patient denies nausea vomiting or diarrhea - complain of abdominal distention      (22 Sep 2020 15:16)  ----------------- As above --------------------------------------------  The patient presented with sepsis from the urinary tract. Hopsital stay complicated by aspiration pneumonia Consult called for increase secretions and poor PO intake. patient cleared by speech for dysphagia diet. Patient's MS improving. Patient states that he denies dysphagia or odynophagia but does not have an appetite.   Spoke with son who is a doctor. Patient had no problems with eating prior to getting sick      ? Dysphagia  -  Nurse states that the patient did not eat that much when family came. Refusing meals / medications Patient passed speech evaluation yesterday.   ======= Discussed with son  [phone]. He does not want the patient to have a PEG / NGT. Will follow on a PRN basis for now

## 2020-10-08 NOTE — PROGRESS NOTE ADULT - SUBJECTIVE AND OBJECTIVE BOX
Patient is a 89y old  Male who presents with a chief complaint of fever (08 Oct 2020 10:20)      HPI:  : 89M pmhx HTN, CKD, BPH with chronic obrien. fever and weakness for 8 days. no other symptoms. fever started shortly after hs obrien was changed- patient denies nausea vomiting or diarrhea - complain of abdominal distention      (22 Sep 2020 15:16)      INTERVAL HPI/OVERNIGHT EVENTS:  Patient refusing to eat. States that he wants to die. Family came last nigt to feed the patient and he only ate a little. The nurse denies melena, hematochezia, hematemesis, nausea, vomiting, abdominal pain, constipation, diarrhea, or change in bowel movements Patient passed the speech evaluation yesterday. No need for MBS    MEDICATIONS  (STANDING):  amLODIPine   Tablet 5 milliGRAM(s) Oral daily  finasteride 5 milliGRAM(s) Oral daily  gabapentin 300 milliGRAM(s) Oral two times a day  labetalol 200 milliGRAM(s) Oral every 12 hours  mirtazapine 7.5 milliGRAM(s) Oral daily  polyethylene glycol 3350 17 Gram(s) Oral at bedtime  senna 2 Tablet(s) Oral at bedtime  tamsulosin 0.4 milliGRAM(s) Oral at bedtime    MEDICATIONS  (PRN):  acetaminophen   Tablet .. 650 milliGRAM(s) Oral every 6 hours PRN Temp greater or equal to 38C (100.4F)  bisacodyl Suppository 10 milliGRAM(s) Rectal daily PRN Constipation      FAMILY HISTORY:  No significant family history        Allergies    No Known Allergies    Intolerances        PMH/PSH:  Hydrocele in adult    Renal insufficiency    Dehydration    Spinal stenosis of lumbar region    BPH (benign prostatic hypertrophy)    Hypertension    Benign Prostatic Hyperplasia    HTN (Hypertension)    No significant past surgical history          REVIEW OF SYSTEMS:  CONSTITUTIONAL: No fever, weight loss,   EYES: No eye pain, visual disturbances, or discharge  ENMT:  No difficulty hearing, tinnitus, vertigo; No sinus or throat pain  NECK: No pain or stiffness  BREASTS: No pain, masses, or nipple discharge  RESPIRATORY: No cough, wheezing, chills or hemoptysis; No shortness of breath  CARDIOVASCULAR: No chest pain, palpitations, dizziness, or leg swelling  GASTROINTESTINAL: See above  GENITOURINARY: No dysuria, frequency, hematuria, or incontinence  NEUROLOGICAL: No headaches,  numbness, or tremors  SKIN: No itching, burning, rashes, or lesions   LYMPH NODES: No enlarged glands  ENDOCRINE: No heat or cold intolerance; No hair loss  MUSCULOSKELETAL: No joint pain or swelling; No muscle, back, or extremity pain  PSYCHIATRIC: No depression, anxiety, mood swings, or difficulty sleeping  HEME/LYMPH: No easy bruising, or bleeding gums  ALLERGY AND IMMUNOLOGIC: No hives or eczema    Vital Signs Last 24 Hrs  T(C): 37.6 (08 Oct 2020 05:15), Max: 37.6 (08 Oct 2020 05:15)  T(F): 99.6 (08 Oct 2020 05:15), Max: 99.6 (08 Oct 2020 05:15)  HR: 89 (08 Oct 2020 05:15) (87 - 97)  BP: 181/74 (08 Oct 2020 05:15) (174/67 - 187/97)  BP(mean): --  RR: 18 (08 Oct 2020 05:15) (18 - 18)  SpO2: 100% (08 Oct 2020 05:15) (98% - 100%)    PHYSICAL EXAM:  GENERAL: NAD, well-groomed, well-developed  HEAD:  Atraumatic, Normocephalic  EYES: EOMI, PERRLA, conjunctiva and sclera clear  NECK: Supple, No JVD, Normal thyroid  NERVOUS SYSTEM:  Alert & Oriented X1 Good concentration;  CHEST/LUNG: Clear to percussion bilaterally; No rales, rhonchi, wheezing, or rubs  HEART: Regular rate and rhythm; No murmurs, rubs, or gallops  ABDOMEN: Soft, Nontender, Nondistended; Bowel sounds present  EXTREMITIES:  2+ Peripheral Pulses, No clubbing, cyanosis, or edema  LYMPH: No lymphadenopathy noted  SKIN: No rashes or lesions    LAB                          8.7    9.06  )-----------( 314      ( 08 Oct 2020 08:04 )             27.7       CBC:  10-08 @ 08:04  WBC 9.06   Hgb 8.7   Hct 27.7   Plts 314  MCV 92.3  10-06 @ 10:42  WBC 9.55   Hgb 7.4   Hct 23.5   Plts 328  MCV 91.8  10-06 @ 09:24  WBC 7.64   Hgb 6.1   Hct 20.2   Plts 267  MCV 96.2  10-04 @ 07:06  WBC 12.55   Hgb 7.5   Hct 24.2   Plts 371  MCV 93.8  10-03 @ 07:51  WBC 13.22   Hgb 7.3   Hct 22.9   Plts 417  MCV 92.3  10-01 @ 21:54  WBC 15.67   Hgb 7.7   Hct 23.6   Plts 456  MCV 89.7      Chemistry:  10-08 @ 07:21  Na+ 144  K+ 3.3  Cl- 111  CO2 27  BUN 7  Cr 1.13     10-06 @ 10:42  Na+ 138  K+ 3.0  Cl- 107  CO2 26  BUN 9  Cr 1.22     10-06 @ 09:24  Na+ 127  K+ 2.3  Cl- 96  CO2 21  BUN 7  Cr 1.32     10-04 @ 07:06  Na+ 141  K+ 3.6  Cl- 109  CO2 27  BUN 14  Cr 1.38     10-03 @ 07:51  Na+ 143  K+ 3.3  Cl- 111  CO2 27  BUN 16  Cr 1.46         Glucose, Serum: 84 mg/dL (10-08 @ 07:21)  Glucose, Serum: 92 mg/dL (10-06 @ 10:42)  Glucose, Serum: 992 mg/dL (10-06 @ 09:24)  Glucose, Serum: 89 mg/dL (10-04 @ 07:06)  Glucose, Serum: 78 mg/dL (10-03 @ 07:51)      08 Oct 2020 07:21    144    |  111    |  7      ----------------------------<  84     3.3     |  27     |  1.13   06 Oct 2020 10:42    138    |  107    |  9      ----------------------------<  92     3.0     |  26     |  1.22   06 Oct 2020 09:24    127    |  96     |  7      ----------------------------<  992    2.3     |  21     |  1.32   04 Oct 2020 07:06    141    |  109    |  14     ----------------------------<  89     3.6     |  27     |  1.38   03 Oct 2020 07:51    143    |  111    |  16     ----------------------------<  78     3.3     |  27     |  1.46     Ca    8.9        08 Oct 2020 07:21  Ca    8.3        06 Oct 2020 10:42  Ca    6.4        06 Oct 2020 09:24  Ca    8.6        04 Oct 2020 07:06  Ca    8.6        03 Oct 2020 07:51  Phos  2.9       08 Oct 2020 07:21  Phos  2.8       06 Oct 2020 10:42  Phos  2.3       06 Oct 2020 09:24  Phos  2.9       04 Oct 2020 07:06  Phos  3.5       03 Oct 2020 07:51  Mg     1.5       08 Oct 2020 07:21  Mg     1.6       06 Oct 2020 10:42  Mg     1.3       06 Oct 2020 09:24  Mg     1.8       04 Oct 2020 07:06  Mg     1.7       03 Oct 2020 07:51                CAPILLARY BLOOD GLUCOSE              RADIOLOGY & ADDITIONAL TESTS:    Imaging Personally Reviewed:  [ ] YES  [ ] NO    Consultant(s) Notes Reviewed:  [ ] YES  [ ] NO    Care Discussed with Consultants/Other Providers [ ] YES  [ ] NO

## 2020-10-09 ENCOUNTER — TRANSCRIPTION ENCOUNTER (OUTPATIENT)
Age: 85
End: 2020-10-09

## 2020-10-09 LAB
BLD GP AB SCN SERPL QL: SIGNIFICANT CHANGE UP
HCT VFR BLD CALC: 22.7 % — LOW (ref 39–50)
HCT VFR BLD CALC: 24 % — LOW (ref 39–50)
HGB BLD-MCNC: 7 G/DL — CRITICAL LOW (ref 13–17)
HGB BLD-MCNC: 7.5 G/DL — LOW (ref 13–17)
LEGIONELLA AB SER-ACNC: ABNORMAL
MCHC RBC-ENTMCNC: 29.1 PG — SIGNIFICANT CHANGE UP (ref 27–34)
MCHC RBC-ENTMCNC: 31.3 GM/DL — LOW (ref 32–36)
MCV RBC AUTO: 93 FL — SIGNIFICANT CHANGE UP (ref 80–100)
NRBC # BLD: 0 /100 WBCS — SIGNIFICANT CHANGE UP (ref 0–0)
PLATELET # BLD AUTO: 302 K/UL — SIGNIFICANT CHANGE UP (ref 150–400)
RBC # BLD: 2.58 M/UL — LOW (ref 4.2–5.8)
RBC # FLD: 15.6 % — HIGH (ref 10.3–14.5)
WBC # BLD: 9.59 K/UL — SIGNIFICANT CHANGE UP (ref 3.8–10.5)
WBC # FLD AUTO: 9.59 K/UL — SIGNIFICANT CHANGE UP (ref 3.8–10.5)

## 2020-10-09 PROCEDURE — 99233 SBSQ HOSP IP/OBS HIGH 50: CPT

## 2020-10-09 PROCEDURE — 99232 SBSQ HOSP IP/OBS MODERATE 35: CPT

## 2020-10-09 RX ADMIN — Medication 200 MILLIGRAM(S): at 18:37

## 2020-10-09 RX ADMIN — GABAPENTIN 300 MILLIGRAM(S): 400 CAPSULE ORAL at 18:37

## 2020-10-09 RX ADMIN — SENNA PLUS 2 TABLET(S): 8.6 TABLET ORAL at 22:27

## 2020-10-09 RX ADMIN — AMLODIPINE BESYLATE 5 MILLIGRAM(S): 2.5 TABLET ORAL at 06:05

## 2020-10-09 RX ADMIN — NYSTATIN CREAM 1 APPLICATION(S): 100000 CREAM TOPICAL at 14:07

## 2020-10-09 RX ADMIN — GABAPENTIN 300 MILLIGRAM(S): 400 CAPSULE ORAL at 06:05

## 2020-10-09 RX ADMIN — TAMSULOSIN HYDROCHLORIDE 0.4 MILLIGRAM(S): 0.4 CAPSULE ORAL at 22:27

## 2020-10-09 RX ADMIN — NYSTATIN CREAM 1 APPLICATION(S): 100000 CREAM TOPICAL at 22:27

## 2020-10-09 RX ADMIN — Medication 200 MILLIGRAM(S): at 06:05

## 2020-10-09 RX ADMIN — POLYETHYLENE GLYCOL 3350 17 GRAM(S): 17 POWDER, FOR SOLUTION ORAL at 22:27

## 2020-10-09 RX ADMIN — NYSTATIN CREAM 1 APPLICATION(S): 100000 CREAM TOPICAL at 06:05

## 2020-10-09 NOTE — PROGRESS NOTE ADULT - SUBJECTIVE AND OBJECTIVE BOX
HPI:  : 89M pmhx HTN, CKD, BPH with chronic obrien. fever and weakness for 8 days. no other symptoms. fever started shortly after hs obrien was changed- patient denies nausea vomiting or diarrhea - complain of abdominal distention      (22 Sep 2020 15:16)      Allergies    No Known Allergies    Intolerances        MEDICATIONS  (STANDING):  amLODIPine   Tablet 5 milliGRAM(s) Oral daily  finasteride 5 milliGRAM(s) Oral daily  gabapentin 300 milliGRAM(s) Oral two times a day  labetalol 200 milliGRAM(s) Oral every 12 hours  mirtazapine 7.5 milliGRAM(s) Oral daily  nystatin Powder 1 Application(s) Topical every 8 hours  polyethylene glycol 3350 17 Gram(s) Oral at bedtime  senna 2 Tablet(s) Oral at bedtime  tamsulosin 0.4 milliGRAM(s) Oral at bedtime    MEDICATIONS  (PRN):  acetaminophen   Tablet .. 650 milliGRAM(s) Oral every 6 hours PRN Temp greater or equal to 38C (100.4F)  bisacodyl Suppository 10 milliGRAM(s) Rectal daily PRN Constipation      REVIEW OF SYSTEMS:    CONSTITUTIONAL: No fever  RESPIRATORY: No cough  CARDIOVASCULAR: No chest pain, palpitations, dizziness  GASTROINTESTINAL: No abdominal pain.  GENITOURINARY: No dysuria, increase frequency, hematuria, or incontinence  NEUROLOGICAL: No headaches  EXTREMITY: No pedal edema BLE  SKIN: No itching, burning, rashes, or lesions     VITAL SIGNS:  T(C): 36.4 (10-09-20 @ 05:00), Max: 36.8 (10-08-20 @ 23:39)  T(F): 97.6 (10-09-20 @ 05:00), Max: 98.2 (10-08-20 @ 23:39)  HR: 100 (10-09-20 @ 05:00) (71 - 100)  BP: 129/74 (10-09-20 @ 05:00) (95/55 - 172/66)  RR: 18 (10-09-20 @ 05:00) (18 - 20)  SpO2: 100% (10-09-20 @ 05:00) (92% - 100%)  Wt(kg): --    PHYSICAL EXAM:    GENERAL: not in any distress  HEENT: Neck is supple, normocephalic, atraumatic   CHEST/LUNG: Clear to percussion bilaterally; No rales, rhonchi, wheezing  HEART: Regular rate and rhythm; No murmurs, rubs, or gallops  ABDOMEN: Soft, Nontender, Nondistended; Bowel sounds present, no rebound   EXTREMITIES:  2+ Peripheral Pulses, No clubbing, cyanosis, or edema  GENITOURINARY:   SKIN: No rashes or lesions  BACK: no pressor sore   NERVOUS SYSTEM:  Alert & answering ques appropriately " i m ok "     LABS:                         7.5    9.59  )-----------( 302      ( 09 Oct 2020 08:43 )             24.0     10-08    144  |  111<H>  |  7   ----------------------------<  84  3.3<L>   |  27  |  1.13    Ca    8.9      08 Oct 2020 07:21  Phos  2.9     10-08  Mg     1.5     10-08                                              Radiology:

## 2020-10-09 NOTE — PROGRESS NOTE ADULT - ASSESSMENT
present with fevers   poss urinary tract infection ? from  obrien with  bilateral hydro  fevers started after obrien change ; initially treated for obrien related urinary tract infection   then treated for health care associated pneumonia aspiration likely   CT abdominal [id number] ago NEGATIVE  CT chest multifocal pneumonia and bilateral hydro   repeat blood culture and urine culture NEGATIVE   fever resolved   Resolved leucocytosis from zosyn and doxy completed   s/p IV antibiotics   in roomair , not in any distress on exam this morning   more alert and awake   high risk for aspiration   aspiration precaution  no fever spike   monitoring off abx and stable

## 2020-10-09 NOTE — PROGRESS NOTE ADULT - ASSESSMENT
89yo Male with PMH of HTN, CKD, BPH with chronic obrien presented to [facility] with a fever, admitted with UTI. Obrien catheter replaced with Dr. Mensah at bedside.    Plan:  Stable from  standpoint  Continue Obrien- Hematuria clearing- continue to observe color -   Hypokalemia- K+ 3.3- primary service to address.       - plan discussed with Dr. Mensah

## 2020-10-09 NOTE — PROGRESS NOTE ADULT - SUBJECTIVE AND OBJECTIVE BOX
Patient is a 89y old  Male who presents with a chief complaint of fever (09 Oct 2020 10:32)    HPI:  : 89M pmhx HTN, CKD, BPH with chronic obrien. fever and weakness for 8 days. no other symptoms. fever started shortly after hs obrien was changed- patient denies nausea vomiting or diarrhea - complain of abdominal distention      (22 Sep 2020 15:16)    SUBJECTIVE & OBJECTIVE: Pt seen and examined at bedside. Afebrile overnight.   PHYSICAL EXAM:  ICU Vital Signs Last 24 Hrs  T(C): 36.5 (09 Oct 2020 11:05), Max: 36.8 (08 Oct 2020 23:39)  T(F): 97.7 (09 Oct 2020 11:05), Max: 98.2 (08 Oct 2020 23:39)  HR: 100 (09 Oct 2020 11:05) (71 - 100)  BP: 128/62 (09 Oct 2020 11:05) (95/55 - 172/66)  RR: 17 (09 Oct 2020 11:05) (17 - 20)  SpO2: 100% (09 Oct 2020 05:00) (92% - 100%)  Daily   Daily   I&O's Detail    08 Oct 2020 07:01  -  09 Oct 2020 07:00  --------------------------------------------------------  IN:  Total IN: 0 mL    OUT:    Indwelling Catheter - Urethral (mL): 700 mL    Stool (mL): 2 mL  Total OUT: 702 mL    Total NET: -702 mL        GENERAL: thin and chronically ill appearing.   HEAD:  Atraumatic, Normocephalic  EYES: EOMI, PERRLA, conjunctiva and sclera clear  ENMT: Moist mucous membranes  NECK: Supple, No JVD  NERVOUS SYSTEM:  Alert, Motor Strength 5/5 B/L upper and lower extremities; DTRs 2+ intact and symmetric  CHEST/LUNG: Clear to auscultation bilaterally; No rales, rhonchi, wheezing, or rubs  HEART: Regular rate and rhythm; No murmurs, rubs, or gallops  ABDOMEN: Soft, Nontender, Nondistended; Bowel sounds present  EXTREMITIES:  2+ Peripheral Pulses, No clubbing, cyanosis, or edema  : obrien catheter in place.  dark red urine    MEDICATIONS  (STANDING):  amLODIPine   Tablet 5 milliGRAM(s) Oral daily  finasteride 5 milliGRAM(s) Oral daily  gabapentin 300 milliGRAM(s) Oral two times a day  labetalol 200 milliGRAM(s) Oral every 12 hours  mirtazapine 7.5 milliGRAM(s) Oral daily  nystatin Powder 1 Application(s) Topical every 8 hours  polyethylene glycol 3350 17 Gram(s) Oral at bedtime  senna 2 Tablet(s) Oral at bedtime  tamsulosin 0.4 milliGRAM(s) Oral at bedtime    MEDICATIONS  (PRN):  acetaminophen   Tablet .. 650 milliGRAM(s) Oral every 6 hours PRN Temp greater or equal to 38C (100.4F)  bisacodyl Suppository 10 milliGRAM(s) Rectal daily PRN Constipation    LABS:                        7.5    9.59  )-----------( 302      ( 09 Oct 2020 08:43 )             24.0     10-08    144  |  111<H>  |  7   ----------------------------<  84  3.3<L>   |  27  |  1.13    Ca    8.9      08 Oct 2020 07:21  Phos  2.9     10-08  Mg     1.5     10-08      RECENT CULTURES: none pending    RADIOLOGY & ADDITIONAL TESTS:    DVT/GI ppx  Discussed with pt @ bedside

## 2020-10-09 NOTE — DISCHARGE NOTE NURSING/CASE MANAGEMENT/SOCIAL WORK - PATIENT PORTAL LINK FT
You can access the FollowMyHealth Patient Portal offered by [facility] by registering at the following website: [email or web address]. By joining [facility]’s FollowMyHealth portal, you will also be able to view your health information using other applications (apps) compatible with our system.

## 2020-10-09 NOTE — PROGRESS NOTE ADULT - ASSESSMENT
89M pmhx HTN, CKD, BPH with chronic obrien who presents with fever and weakness for 8 days. No other symptoms. fever started shortly after his obrien was changed- patient denies nausea vomiting or diarrhea - complain of abdominal distention       Problem/Plan - 1   ·  Problem: Acute Blood Loss Anemia due to Hematuria  - continue serial h/h  - type and screen today  - may need PRBC for hgb < 7.0   - Acute on Chronic anemia  has chronically low hgb    did receive one unit of blood during this hospitalization on 9/28/2020    Problem/Plan - 2  ·  Problem: Sepsis with acute renal failure and tubular necrosis without septic shock, due to unspecified organism.    Plan: 9/30/2020 on antibx spike fever last night , wbc increasing  cxr done 9/29/2020 shows LLL infiltrate will get Ct chest , check RVP and flu . will d/w ID, follow up on all repeat cultures  10/1/2020 ct head negative, seen by speech swallow and diet adjusted  10/2/2020 f/u labs continue with meds  blood cx  and urine cx from 9/30ngtd   sputum cx ngtd, flu negative, rvp negative  10/6/2020 per ID will stop antibx , ID feels completed course  10/7/2020 antibx stopped yesterday.   10/08/2020 - afebrile off antibiotics x 48 hours  - 10/09/2020 - now s/p ABX treatment a febrile and doing well    Problem/Plan - 3  ·  Problem: Acute cystitis without hematuria.    Plan: klebsiella antibx per ID , pt with recurrent Urinary Tract Infection-with klebsiella.  10/08/2020  s/p ABx treatment.     Problem/Plan - 4  ·  Problem: Benign prostatic hyperplasia with urinary retention  .  Plan: continue with Proscar, Flomax.     Problem/Plan - 5   ·  Problem: Hypernatremia.  Plan: resolved.     Problem/Plan - 6   Problem: Essential hypertension. Plan: continue w/ usual Labetalol BP stable   His usual HCTZ on hold due to ARF  His usual Amlodipine on hold due to borderline hypotension, but will probably be able to restart soon.    Problem/Plan - 7   ·  Problem: Moderate protein-calorie malnutrition.  Plan: nutrition consult noted  10/1/2020 swallow eval noted from 9/30/2020.   - encourage oral intake    Problem/Plan - 8  ·  Problem: Hypokalemia.  Plan: will be replaced as needed follow up am labs.     Problem/Plan - 9   Problem: Left lower quadrant abdominal pain. Plan; ct scan done by colleague on 9/28/2020 shows full of stool in rectum : will give enema , and once ngt placed will attempt decompression and place lactulose    10/1/2020 had a bm  10/5/2020 resolved.

## 2020-10-09 NOTE — CHART NOTE - NSCHARTNOTEFT_GEN_A_CORE
Nutrition follow-up note-    Pt adm w/fever, per chart pt w/UTI w/sepsis, ARF. PMHx includes- HTN, CKD, BPH w/chronic obrien. Met w/pt at bedside, pt refuses to speak to RDN. Spoke to RN/PCA at bedside who reported pt continues to refuse all meals/PO intake & has been refusing some meds. Family refuses NGT/PEG at present. Daughter brought food from home to feed pt, however, pt ate very little. No N/V/D/C reported. Per chart pt remains confused & disoriented.  Pt s/p bedside swallow eval (10/1 & repeat on 10/7) w/recommendation for Dysphagia 1, pureed, Nectar-thick fluids.    Factors impacting intake: [ ] none [ ] nausea  [ ] vomiting [ ] diarrhea [ ] constipation  [ ]chewing problems [x ] swallowing issues  [x ] other: AMS, pt confused, difficulty feeding self, refusing to eat.    Diet Prescription: Diet, Dysphagia 1 Pureed-Nectar Consistency Fluid:   Supplement Feeding Modality:  Oral  Health Shake Cans or Servings Per Day:  1       Frequency:  Two Times a day (10-06-20 @ 12:16)    Intake: Very poor PO intake, pt refusing all PO intake.     Current Weight: (10/8) 84.5 kg, (9/23) 77.6 kg.  % Weight Change:  (Noted big discrepancy in daily weights: 9/24= 78.4 kg, 9/27= 84.3 kg --> 5.9 kg/7.5% gain x 3 days??).   % Weight Change: 0.2 kg gain x 12 days (wt stable since 9/27). Questionable accuracy of admission weight.    Physical appearance: 1+ edema R & L legs, 3+ edema- scrotum. Pt continues to decline nutrition-focused physical exam.     Pertinent Medications: MEDICATIONS  (STANDING):  amLODIPine   Tablet 5 milliGRAM(s) Oral daily  finasteride 5 milliGRAM(s) Oral daily  gabapentin 300 milliGRAM(s) Oral two times a day  labetalol 200 milliGRAM(s) Oral every 12 hours  mirtazapine 7.5 milliGRAM(s) Oral daily  nystatin Powder 1 Application(s) Topical every 8 hours  polyethylene glycol 3350 17 Gram(s) Oral at bedtime  senna 2 Tablet(s) Oral at bedtime  tamsulosin 0.4 milliGRAM(s) Oral at bedtime    MEDICATIONS  (PRN):  acetaminophen   Tablet .. 650 milliGRAM(s) Oral every 6 hours PRN Temp greater or equal to 38C (100.4F)  bisacodyl Suppository 10 milliGRAM(s) Rectal daily PRN Constipation    Pertinent Labs: 10-08 Na144 mmol/L Glu 84 mg/dL K+ 3.3 mmol/L<L> Cr  1.13 mg/dL BUN 7 mg/dL 10-08 Phos 2.9 mg/dL      Skin: Pressure ulcer x 2- Stage 2: R-buttock and Gluteal cleft.    Estimated Needs:   [x ] no change since previous assessment (9/24)  [ ] recalculated:     Previous Nutrition Diagnosis:     Severe malnutrition in the context of acute illness    Etiology inadequate protein energy intake & increased nutritional needs in the presence of AMS, stage 2 pressure injuries, UTI, sepsis    Signs/symptoms <50% x >5 days, physical findings of fat & muscle loss (as per previous assessment).    Goal/Expected Outcome Pt will consume >50% nutritional needs via meals/PO supplement- GOAL consistently not met.    Nutrition Diagnosis is [x ] ongoing  [ ] resolved [ ] not applicable     New Nutrition Diagnosis: [ x] not applicable      Interventions:   Recommend:  [x] Continue current diet as rx'd.  [ ] Change Diet To:  [x ] Nutrition Supplement: Continue current PO supplement as Rx'd.  [ ] Nutrition Support  [x ] Other: Consider appetite-stimulant medication therapy- if medically feasible.     Monitoring and Evaluation:   [x ] PO intake [ x ] Tolerance to diet prescription [ x ] weights [ x ] labs[ x ] follow up per protocol  [ ] other:

## 2020-10-10 LAB
ANION GAP SERPL CALC-SCNC: 10 MMOL/L — SIGNIFICANT CHANGE UP (ref 5–17)
BLD GP AB SCN SERPL QL: SIGNIFICANT CHANGE UP
BUN SERPL-MCNC: 14 MG/DL — SIGNIFICANT CHANGE UP (ref 7–23)
CALCIUM SERPL-MCNC: 8.7 MG/DL — SIGNIFICANT CHANGE UP (ref 8.5–10.1)
CHLORIDE SERPL-SCNC: 114 MMOL/L — HIGH (ref 96–108)
CO2 SERPL-SCNC: 24 MMOL/L — SIGNIFICANT CHANGE UP (ref 22–31)
CREAT SERPL-MCNC: 1.49 MG/DL — HIGH (ref 0.5–1.3)
GLUCOSE SERPL-MCNC: 55 MG/DL — LOW (ref 70–99)
HCT VFR BLD CALC: 21.6 % — LOW (ref 39–50)
HCT VFR BLD CALC: 29.4 % — LOW (ref 39–50)
HGB BLD-MCNC: 6.7 G/DL — CRITICAL LOW (ref 13–17)
HGB BLD-MCNC: 9.1 G/DL — LOW (ref 13–17)
MAGNESIUM SERPL-MCNC: 1.5 MG/DL — LOW (ref 1.6–2.6)
MCHC RBC-ENTMCNC: 28.9 PG — SIGNIFICANT CHANGE UP (ref 27–34)
MCHC RBC-ENTMCNC: 31 GM/DL — LOW (ref 32–36)
MCV RBC AUTO: 93.1 FL — SIGNIFICANT CHANGE UP (ref 80–100)
NRBC # BLD: 0 /100 WBCS — SIGNIFICANT CHANGE UP (ref 0–0)
PHOSPHATE SERPL-MCNC: 3.5 MG/DL — SIGNIFICANT CHANGE UP (ref 2.5–4.5)
PLATELET # BLD AUTO: 278 K/UL — SIGNIFICANT CHANGE UP (ref 150–400)
POTASSIUM SERPL-MCNC: 3.4 MMOL/L — LOW (ref 3.5–5.3)
POTASSIUM SERPL-SCNC: 3.4 MMOL/L — LOW (ref 3.5–5.3)
RBC # BLD: 2.32 M/UL — LOW (ref 4.2–5.8)
RBC # FLD: 16 % — HIGH (ref 10.3–14.5)
SODIUM SERPL-SCNC: 148 MMOL/L — HIGH (ref 135–145)
WBC # BLD: 8.36 K/UL — SIGNIFICANT CHANGE UP (ref 3.8–10.5)
WBC # FLD AUTO: 8.36 K/UL — SIGNIFICANT CHANGE UP (ref 3.8–10.5)

## 2020-10-10 PROCEDURE — 99233 SBSQ HOSP IP/OBS HIGH 50: CPT

## 2020-10-10 PROCEDURE — 99231 SBSQ HOSP IP/OBS SF/LOW 25: CPT

## 2020-10-10 RX ADMIN — NYSTATIN CREAM 1 APPLICATION(S): 100000 CREAM TOPICAL at 22:24

## 2020-10-10 RX ADMIN — POLYETHYLENE GLYCOL 3350 17 GRAM(S): 17 POWDER, FOR SOLUTION ORAL at 22:24

## 2020-10-10 RX ADMIN — MIRTAZAPINE 7.5 MILLIGRAM(S): 45 TABLET, ORALLY DISINTEGRATING ORAL at 11:19

## 2020-10-10 RX ADMIN — SENNA PLUS 2 TABLET(S): 8.6 TABLET ORAL at 22:24

## 2020-10-10 RX ADMIN — NYSTATIN CREAM 1 APPLICATION(S): 100000 CREAM TOPICAL at 13:18

## 2020-10-10 RX ADMIN — FINASTERIDE 5 MILLIGRAM(S): 5 TABLET, FILM COATED ORAL at 11:18

## 2020-10-10 RX ADMIN — NYSTATIN CREAM 1 APPLICATION(S): 100000 CREAM TOPICAL at 06:23

## 2020-10-10 RX ADMIN — GABAPENTIN 300 MILLIGRAM(S): 400 CAPSULE ORAL at 17:30

## 2020-10-10 RX ADMIN — TAMSULOSIN HYDROCHLORIDE 0.4 MILLIGRAM(S): 0.4 CAPSULE ORAL at 22:24

## 2020-10-10 RX ADMIN — Medication 200 MILLIGRAM(S): at 17:30

## 2020-10-10 NOTE — PROGRESS NOTE ADULT - ASSESSMENT
89M pmhx HTN, CKD, BPH with chronic obrien who presents with fever and weakness for 8 days. No other symptoms. fever started shortly after his obrien was changed- patient denies nausea vomiting or diarrhea - complain of abdominal distention       Problem/Plan - 1   ·  Problem: Acute Blood Loss Anemia due to Hematuria  - continue serial h/h  - type and screen today  - may need PRBC for hgb < 7.0   - Acute on Chronic anemia  has chronically low hgb    did receive one unit of blood during this hospitalization on 9/28/2020  - 10/10/2020 - Hemoglobin today is 6.7  Telephone consent for 2 PRBC obtained from iliana Polanco  - check hgb at 9 pm  - check cbc in am   - may need lasix between transfusions.     Problem/Plan - 2  ·  Problem: Sepsis with acute renal failure and tubular necrosis without septic shock, due to unspecified organism.    Plan: 9/30/2020 on antibx spike fever last night , wbc increasing  cxr done 9/29/2020 shows LLL infiltrate will get Ct chest , check RVP and flu . will d/w ID, follow up on all repeat cultures  10/1/2020 ct head negative, seen by speech swallow and diet adjusted  10/2/2020 f/u labs continue with meds  blood cx  and urine cx from 9/30ngtd   sputum cx ngtd, flu negative, rvp negative  10/6/2020 per ID will stop antibx , ID feels completed course  10/7/2020 antibx stopped yesterday.   10/08/2020 - afebrile off antibiotics x 48 hours  - 10/09/2020 - now s/p ABX treatment a febrile and doing well    Problem/Plan - 3  ·  Problem: Acute cystitis without hematuria.    Plan: klebsiella antibx per ID , pt with recurrent Urinary Tract Infection-with klebsiella.  10/08/2020  s/p ABx treatment.     Problem/Plan - 4  ·  Problem: Benign prostatic hyperplasia with urinary retention  .  Plan: continue with Proscar, Flomax.     Problem/Plan - 5   ·  Problem: Hypernatremia.  Plan: resolved.     Problem/Plan - 6   Problem: Essential hypertension. Plan: continue w/ usual Labetalol BP stable   His usual HCTZ on hold due to ARF  His usual Amlodipine on hold due to borderline hypotension, but will probably be able to restart soon.    Problem/Plan - 7   ·  Problem: Moderate protein-calorie malnutrition.  Plan: nutrition consult noted  10/1/2020 swallow eval noted from 9/30/2020.   - encourage oral intake    Problem/Plan - 8  ·  Problem: Hypokalemia.  Plan: will be replaced as needed follow up am labs.     Problem/Plan - 9   Problem: Left lower quadrant abdominal pain. Plan; ct scan done by colleague on 9/28/2020 shows full of stool in rectum : will give enema , and once ngt placed will attempt decompression and place lactulose    10/1/2020 had a bm

## 2020-10-10 NOTE — PROGRESS NOTE ADULT - SUBJECTIVE AND OBJECTIVE BOX
Patient seen and examined at bedside.   Pt is a poor historian. No events overnight.    Vital Signs Last 24 Hrs  T(F): 98.4 (10-10-20 @ 12:38), Max: 98.4 (10-10-20 @ 11:21)  HR: 81 (10-10-20 @ 12:38)  BP: 145/87 (10-10-20 @ 12:38)  RR: 18 (10-10-20 @ 12:38)  SpO2: 99% (10-10-20 @ 12:20)    GENERAL: NAD  CHEST/LUNG:  respirations nonlabored  HEART: Regular rate and rhythm  ABD: soft, NTND   : obrien catheter indwelling, draining blood-tinged urine       I&O's Detail    09 Oct 2020 07:01  -  10 Oct 2020 07:00  --------------------------------------------------------  IN:  Total IN: 0 mL    OUT:    Indwelling Catheter - Urethral (mL): 100 mL    Voided (mL): 500 mL  Total OUT: 600 mL    Total NET: -600 mL    LABS:                        6.7    8.36  )-----------( 278      ( 10 Oct 2020 09:51 )             21.6     10-10    148<H>  |  114<H>  |  14  ----------------------------<  55<L>  3.4<L>   |  24  |  1.49<H>    Ca    8.7      10 Oct 2020 09:51  Phos  3.5     10-10  Mg     1.5     10-10      Impression: 89yo Male with PMH of HTN, CKD, BPH with chronic obrien presented to [facility] with a fever, admitted with UTI s/p obrien catheter.   1uPRBC today. Additional unit ordered.     Plan:  Continue Obrien and observe urine output   continue to monitor for s/s of bleeding. Closely monitor H/H and transfuse as needed.   Continue medical management and supportive care   stable  Will discuss with Dr. Mensah

## 2020-10-10 NOTE — PROGRESS NOTE ADULT - SUBJECTIVE AND OBJECTIVE BOX
Patient is a 89y old  Male who presents with a chief complaint of fever (09 Oct 2020 11:56)    HPI:  : 89M pmhx HTN, CKD, BPH with chronic obrien. fever and weakness for 8 days. no other symptoms. fever started shortly after hs obrien was changed- patient denies nausea vomiting or diarrhea - complain of abdominal distention      (22 Sep 2020 15:16)    SUBJECTIVE & OBJECTIVE: Pt seen and examined at bedside.   PHYSICAL EXAM:  ICU Vital Signs Last 24 Hrs  T(C): 36.8 (10 Oct 2020 06:23), Max: 36.8 (10 Oct 2020 06:23)  T(F): 98.3 (10 Oct 2020 06:23), Max: 98.3 (10 Oct 2020 06:23)  HR: 83 (10 Oct 2020 06:23) (83 - 100)  BP: 146/65 (10 Oct 2020 06:23) (128/62 - 146/65)  BP(mean): --  ABP: --  ABP(mean): --  RR: 18 (10 Oct 2020 06:23) (17 - 18)  SpO2: 100% (10 Oct 2020 06:23) (96% - 100%)  Daily     Daily Weight in k.7 (10 Oct 2020 06:23)  I&O's Detail    09 Oct 2020 07:01  -  10 Oct 2020 07:00  --------------------------------------------------------  IN:  Total IN: 0 mL    OUT:    Indwelling Catheter - Urethral (mL): 100 mL    Voided (mL): 500 mL  Total OUT: 600 mL    Total NET: -600 mL        GENERAL: chronically ill appearing.   HEAD:  Atraumatic, Normocephalic  EYES: EOMI, PERRLA, conjunctiva and sclera clear  ENMT: Moist mucous membranes  NECK: Supple, No JVD  NERVOUS SYSTEM:  Alert & Oriented X3, Motor Strength 5/5 B/L upper and lower extremities; DTRs 2+ intact and symmetric  CHEST/LUNG: Clear to auscultation bilaterally; No rales, rhonchi, wheezing, or rubs  HEART: Regular rate and rhythm; No murmurs, rubs, or gallops  ABDOMEN: Soft, Nontender, Nondistended; Bowel sounds present  EXTREMITIES:  2+ Peripheral Pulses, No clubbing, cyanosis, or edema  : obrien in place. dark urine   MEDICATIONS  (STANDING):  amLODIPine   Tablet 5 milliGRAM(s) Oral daily  finasteride 5 milliGRAM(s) Oral daily  gabapentin 300 milliGRAM(s) Oral two times a day  labetalol 200 milliGRAM(s) Oral every 12 hours  mirtazapine 7.5 milliGRAM(s) Oral daily  nystatin Powder 1 Application(s) Topical every 8 hours  polyethylene glycol 3350 17 Gram(s) Oral at bedtime  senna 2 Tablet(s) Oral at bedtime  tamsulosin 0.4 milliGRAM(s) Oral at bedtime    MEDICATIONS  (PRN):  acetaminophen   Tablet .. 650 milliGRAM(s) Oral every 6 hours PRN Temp greater or equal to 38C (100.4F)  bisacodyl Suppository 10 milliGRAM(s) Rectal daily PRN Constipation    LABS:                        6.7    8.36  )-----------( 278      ( 10 Oct 2020 09:51 )             21.6     10-10    148<H>  |  114<H>  |  14  ----------------------------<  55<L>  3.4<L>   |  24  |  1.49<H>    Ca    8.7      10 Oct 2020 09:51  Phos  3.5     10-10  Mg     1.5     10-10          CAPILLARY BLOOD GLUCOSE                RECENT CULTURES:    RADIOLOGY & ADDITIONAL TESTS:    DVT/GI ppx  Discussed with pt @ bedside

## 2020-10-10 NOTE — PROGRESS NOTE ADULT - SUBJECTIVE AND OBJECTIVE BOX
89M pmhx HTN, CKD, BPH with chronic obrien. fever and weakness for 8 days. no other symptoms. fever started shortly after hs obrien was changed- patient denies nausea vomiting or diarrhea - complain of abdominal distention      (22 Sep 2020 15:16)  all events noted since last seen 10/5   treated for obrien related urinary tract infection   reseen for  ongoing fevers   sp multiple antibiotics   appears to have responded to that as wbc and fevers all better  zosyn and doxy dcd 10/6   doing much better   now no fevers but obrien out replaced 10/8 ; worsening hb/hct at 6.7 now   also acute renal failure as cr increased to 1.45  today   Allergies    No Known Allergies    Intolerances        MEDICATIONS  (STANDING):  amLODIPine   Tablet 5 milliGRAM(s) Oral daily  finasteride 5 milliGRAM(s) Oral daily  gabapentin 300 milliGRAM(s) Oral two times a day  labetalol 200 milliGRAM(s) Oral every 12 hours  mirtazapine 7.5 milliGRAM(s) Oral daily  nystatin Powder 1 Application(s) Topical every 8 hours  polyethylene glycol 3350 17 Gram(s) Oral at bedtime  senna 2 Tablet(s) Oral at bedtime  tamsulosin 0.4 milliGRAM(s) Oral at bedtime    MEDICATIONS  (PRN):  acetaminophen   Tablet .. 650 milliGRAM(s) Oral every 6 hours PRN Temp greater or equal to 38C (100.4F)  bisacodyl Suppository 10 milliGRAM(s) Rectal daily PRN Constipation      REVIEW OF SYSTEMS:    poor historian    unable to assess   VITAL SIGNS:  T(C): 36.9 (10-10-20 @ 12:38), Max: 36.9 (10-10-20 @ 11:21)  T(F): 98.4 (10-10-20 @ 12:38), Max: 98.4 (10-10-20 @ 11:21)  HR: 81 (10-10-20 @ 12:38) (81 - 95)  BP: 145/87 (10-10-20 @ 12:38) (131/76 - 146/65)  RR: 18 (10-10-20 @ 12:38) (18 - 18)  SpO2: 99% (10-10-20 @ 12:20) (96% - 100%)  Wt(kg): --    PHYSICAL EXAM:    GENERAL: not in any distress  HEENT: Neck is supple, normocephalic, atraumatic   CHEST/LUNG: Clear to auscultation bilaterally; No rales, rhonchi, wheezing  HEART: Regular rate and rhythm; No murmurs, rubs, or gallops  ABDOMEN: Soft, Nontender, Nondistended; Bowel sounds present, no rebound   EXTREMITIES:  2+ Peripheral Pulses, No clubbing, cyanosis, or edema  GENITOURINARY:   SKIN: No rashes or lesions  BACK: no pressor sore   NERVOUS SYSTEM:  Alert & Oriented X3, Good concentration  PSYCH: normal affect     LABS:                         6.7    8.36  )-----------( 278      ( 10 Oct 2020 09:51 )             21.6     10-10    148<H>  |  114<H>  |  14  ----------------------------<  55<L>  3.4<L>   |  24  |  1.49<H>    Ca    8.7      10 Oct 2020 09:51  Phos  3.5     10-10  Mg     1.5     10-10                                              Radiology:         89M pmhx HTN, CKD, BPH with chronic obrien. fever and weakness for 8 days. no other symptoms. fever started shortly after hs obrien was changed- patient denies nausea vomiting or diarrhea - complain of abdominal distention      (22 Sep 2020 15:16)  all events noted since last seen 10/5   treated for obrien related urinary tract infection   reseen for  ongoing fevers   sp multiple antibiotics   appears to have responded to that as wbc and fevers all better  zosyn and doxy dcd 10/6   doing much better   now no fevers but obrien out replaced 10/8 ; worsening hb/hct at 6.7 now   also acute renal failure as cr increased to 1.45  today   Allergies    No Known Allergies    Intolerances        MEDICATIONS  (STANDING):  amLODIPine   Tablet 5 milliGRAM(s) Oral daily  finasteride 5 milliGRAM(s) Oral daily  gabapentin 300 milliGRAM(s) Oral two times a day  labetalol 200 milliGRAM(s) Oral every 12 hours  mirtazapine 7.5 milliGRAM(s) Oral daily  nystatin Powder 1 Application(s) Topical every 8 hours  polyethylene glycol 3350 17 Gram(s) Oral at bedtime  senna 2 Tablet(s) Oral at bedtime  tamsulosin 0.4 milliGRAM(s) Oral at bedtime    MEDICATIONS  (PRN):  acetaminophen   Tablet .. 650 milliGRAM(s) Oral every 6 hours PRN Temp greater or equal to 38C (100.4F)  bisacodyl Suppository 10 milliGRAM(s) Rectal daily PRN Constipation      REVIEW OF SYSTEMS:    poor historian    unable to assess   VITAL SIGNS:  T(C): 36.9 (10-10-20 @ 12:38), Max: 36.9 (10-10-20 @ 11:21)  T(F): 98.4 (10-10-20 @ 12:38), Max: 98.4 (10-10-20 @ 11:21)  HR: 81 (10-10-20 @ 12:38) (81 - 95)  BP: 145/87 (10-10-20 @ 12:38) (131/76 - 146/65)  RR: 18 (10-10-20 @ 12:38) (18 - 18)  SpO2: 99% (10-10-20 @ 12:20) (96% - 100%)  Wt(kg): --    PHYSICAL EXAM:    GENERAL: not in any distress  HEENT: Neck is supple, normocephalic, atraumatic   CHEST/LUNG: Clear to auscultation bilaterally; No rales, rhonchi, wheezing  HEART: Regular rate and rhythm; No murmurs, rubs, or gallops  ABDOMEN: Soft, Nontender, Nondistended; Bowel sounds present, no rebound   EXTREMITIES:  2+ Peripheral Pulses, No clubbing, cyanosis, or edema  GENITOURINARY: obrien bloody urine   SKIN: No rashes or lesions  BACK: no pressor sore   NERVOUS SYSTEM: alert responsive   alert and oriented x2   seen with son   has ji dennis plus   LABS:                         6.7    8.36  )-----------( 278      ( 10 Oct 2020 09:51 )             21.6     10-10    148<H>  |  114<H>  |  14  ----------------------------<  55<L>  3.4<L>   |  24  |  1.49<H>    Ca    8.7      10 Oct 2020 09:51  Phos  3.5     10-10  Mg     1.5     10-10                                              Radiology:

## 2020-10-10 NOTE — PROGRESS NOTE ADULT - PROBLEM SELECTOR PROBLEM 1
Acute cystitis without hematuria
Dysphagia
Dysphagia
Prostate enlargement
Sepsis with acute renal failure and tubular necrosis without septic shock, due to unspecified organism
Prostate enlargement
BPH (benign prostatic hyperplasia)
Prostate enlargement
Acute cystitis without hematuria
Sepsis with acute renal failure and tubular necrosis without septic shock, due to unspecified organism
Sepsis with acute renal failure and tubular necrosis without septic shock, due to unspecified organism

## 2020-10-10 NOTE — PROGRESS NOTE ADULT - PROBLEM SELECTOR PROBLEM 3
Benign prostatic hyperplasia with urinary retention
Acute cystitis without hematuria
Abdominal distention
Abdominal distention
Acute cystitis without hematuria
Benign prostatic hyperplasia with urinary retention
Acute cystitis without hematuria
Abdominal distention
Acute cystitis without hematuria
Benign prostatic hyperplasia with urinary retention
Benign prostatic hyperplasia with urinary retention

## 2020-10-10 NOTE — PROGRESS NOTE ADULT - PROBLEM SELECTOR PROBLEM 4
Anemia, unspecified type
UTI (urinary tract infection)
Anemia, unspecified type
Benign prostatic hyperplasia with urinary retention
Essential hypertension
Essential hypertension
Sepsis
Sepsis
UTI (urinary tract infection)
UTI (urinary tract infection)
Essential hypertension
Anemia, unspecified type

## 2020-10-10 NOTE — PROGRESS NOTE ADULT - ASSESSMENT
present with fevers   poss urinary tract infection ? from  obrien with  bilateral hydro  fevers started after obrien change ; initially treated for obrien related urinary tract infection   then treated for health care associated pneumonia aspiration likely   CT abdominal [id number] ago NEGATIVE  CT chest multifocal pneumonia and bilateral hydro   repeat blood culture and urine culture NEGATIVE   fever resolved   Resolved leucocytosis from zosyn and doxy completed   s/p IV antibiotics   in roomair , not in any distress on exam this morning   more alert and awake   high risk for aspiration   aspiration precaution  no fever spike   monitoring off abx and stable      89M pmhx HTN, CKD, BPH with chronic obrien. fever and weakness for 8 days. no other symptoms. fever started shortly after hs obrien was changed- ? from ch obrien with  bilateral hydro  fevers started after obrien change ; initially treated for obrien related urinary tract infection   then treated for health care associated pneumonia aspiration likely   off antibiotics as of 10/6   aspiration precautions needed  stable ID wise except today hb decreased to 6.7 being transfused and cr increased to 1.49  discussed with son by bedside  he is a md   will get bladder scan though i dont feel the bladder at all   no fever spike   monitoring off abx and stable   follow up renal fn

## 2020-10-10 NOTE — PROGRESS NOTE ADULT - PROBLEM SELECTOR PROBLEM 2
Acute cystitis without hematuria
Essential hypertension
Acute cystitis without hematuria
Constipation
Constipation
Fever, unspecified fever cause
Sepsis with acute renal failure and tubular necrosis without septic shock, due to unspecified organism
Essential hypertension
CKD (chronic kidney disease)
Essential hypertension
Sepsis with acute renal failure and tubular necrosis without septic shock, due to unspecified organism
Acute cystitis without hematuria

## 2020-10-11 LAB
ANION GAP SERPL CALC-SCNC: 10 MMOL/L — SIGNIFICANT CHANGE UP (ref 5–17)
BUN SERPL-MCNC: 13 MG/DL — SIGNIFICANT CHANGE UP (ref 7–23)
CALCIUM SERPL-MCNC: 8.9 MG/DL — SIGNIFICANT CHANGE UP (ref 8.5–10.1)
CHLORIDE SERPL-SCNC: 114 MMOL/L — HIGH (ref 96–108)
CO2 SERPL-SCNC: 25 MMOL/L — SIGNIFICANT CHANGE UP (ref 22–31)
CREAT SERPL-MCNC: 1.37 MG/DL — HIGH (ref 0.5–1.3)
GLUCOSE SERPL-MCNC: 67 MG/DL — LOW (ref 70–99)
HCT VFR BLD CALC: 29.4 % — LOW (ref 39–50)
HGB BLD-MCNC: 9.5 G/DL — LOW (ref 13–17)
MCHC RBC-ENTMCNC: 29.1 PG — SIGNIFICANT CHANGE UP (ref 27–34)
MCHC RBC-ENTMCNC: 32.3 GM/DL — SIGNIFICANT CHANGE UP (ref 32–36)
MCV RBC AUTO: 90.2 FL — SIGNIFICANT CHANGE UP (ref 80–100)
NRBC # BLD: 0 /100 WBCS — SIGNIFICANT CHANGE UP (ref 0–0)
PLATELET # BLD AUTO: 276 K/UL — SIGNIFICANT CHANGE UP (ref 150–400)
POTASSIUM SERPL-MCNC: 3.2 MMOL/L — LOW (ref 3.5–5.3)
POTASSIUM SERPL-SCNC: 3.2 MMOL/L — LOW (ref 3.5–5.3)
RBC # BLD: 3.26 M/UL — LOW (ref 4.2–5.8)
RBC # FLD: 15.9 % — HIGH (ref 10.3–14.5)
SODIUM SERPL-SCNC: 149 MMOL/L — HIGH (ref 135–145)
WBC # BLD: 8.79 K/UL — SIGNIFICANT CHANGE UP (ref 3.8–10.5)
WBC # FLD AUTO: 8.79 K/UL — SIGNIFICANT CHANGE UP (ref 3.8–10.5)

## 2020-10-11 PROCEDURE — 99233 SBSQ HOSP IP/OBS HIGH 50: CPT

## 2020-10-11 RX ORDER — HYDRALAZINE HCL 50 MG
10 TABLET ORAL ONCE
Refills: 0 | Status: COMPLETED | OUTPATIENT
Start: 2020-10-11 | End: 2020-10-11

## 2020-10-11 RX ORDER — POTASSIUM CHLORIDE 20 MEQ
40 PACKET (EA) ORAL ONCE
Refills: 0 | Status: DISCONTINUED | OUTPATIENT
Start: 2020-10-11 | End: 2020-10-11

## 2020-10-11 RX ORDER — SODIUM CHLORIDE 9 MG/ML
1000 INJECTION, SOLUTION INTRAVENOUS
Refills: 0 | Status: DISCONTINUED | OUTPATIENT
Start: 2020-10-11 | End: 2020-10-12

## 2020-10-11 RX ORDER — MAGNESIUM SULFATE 500 MG/ML
2 VIAL (ML) INJECTION ONCE
Refills: 0 | Status: COMPLETED | OUTPATIENT
Start: 2020-10-11 | End: 2020-10-11

## 2020-10-11 RX ORDER — POTASSIUM CHLORIDE 20 MEQ
10 PACKET (EA) ORAL
Refills: 0 | Status: COMPLETED | OUTPATIENT
Start: 2020-10-11 | End: 2020-10-11

## 2020-10-11 RX ADMIN — GABAPENTIN 300 MILLIGRAM(S): 400 CAPSULE ORAL at 17:28

## 2020-10-11 RX ADMIN — MIRTAZAPINE 7.5 MILLIGRAM(S): 45 TABLET, ORALLY DISINTEGRATING ORAL at 11:42

## 2020-10-11 RX ADMIN — Medication 10 MILLIGRAM(S): at 06:34

## 2020-10-11 RX ADMIN — FINASTERIDE 5 MILLIGRAM(S): 5 TABLET, FILM COATED ORAL at 11:42

## 2020-10-11 RX ADMIN — Medication 200 MILLIGRAM(S): at 17:28

## 2020-10-11 RX ADMIN — Medication 650 MILLIGRAM(S): at 14:20

## 2020-10-11 RX ADMIN — NYSTATIN CREAM 1 APPLICATION(S): 100000 CREAM TOPICAL at 13:07

## 2020-10-11 RX ADMIN — TAMSULOSIN HYDROCHLORIDE 0.4 MILLIGRAM(S): 0.4 CAPSULE ORAL at 21:38

## 2020-10-11 RX ADMIN — Medication 100 MILLIEQUIVALENT(S): at 10:42

## 2020-10-11 RX ADMIN — Medication 100 MILLIEQUIVALENT(S): at 17:29

## 2020-10-11 RX ADMIN — Medication 50 GRAM(S): at 15:10

## 2020-10-11 RX ADMIN — Medication 650 MILLIGRAM(S): at 13:30

## 2020-10-11 RX ADMIN — Medication 100 MILLIEQUIVALENT(S): at 15:11

## 2020-10-11 RX ADMIN — SENNA PLUS 2 TABLET(S): 8.6 TABLET ORAL at 21:38

## 2020-10-11 RX ADMIN — POLYETHYLENE GLYCOL 3350 17 GRAM(S): 17 POWDER, FOR SOLUTION ORAL at 21:37

## 2020-10-11 RX ADMIN — NYSTATIN CREAM 1 APPLICATION(S): 100000 CREAM TOPICAL at 21:37

## 2020-10-11 RX ADMIN — SODIUM CHLORIDE 40 MILLILITER(S): 9 INJECTION, SOLUTION INTRAVENOUS at 10:42

## 2020-10-11 RX ADMIN — NYSTATIN CREAM 1 APPLICATION(S): 100000 CREAM TOPICAL at 05:49

## 2020-10-11 NOTE — PROGRESS NOTE ADULT - ASSESSMENT
89M pmhx HTN, CKD, BPH with chronic obrien who presents with fever and weakness for 8 days. No other symptoms. fever started shortly after his obrien was changed- patient denies nausea vomiting or diarrhea - complain of abdominal distention       Problem/Plan - 1   ·  Problem: Acute Blood Loss Anemia due to Hematuria  - s/p 2 PRBC on 10/10/2020 hgb now 9.5  - Acute on Chronic anemia  has chronically low hgb    did receive one unit of blood during this hospitalization on 9/28/2020    Problem/Plan - 2  ·  Problem: Sepsis with acute renal failure and tubular necrosis without septic shock, due to unspecified organism.    Plan: 9/30/2020 on antibx spike fever last night , wbc increasing  cxr done 9/29/2020 shows LLL infiltrate will get Ct chest , check RVP and flu . will d/w ID, follow up on all repeat cultures  10/1/2020 ct head negative, seen by speech swallow and diet adjusted  10/2/2020 f/u labs continue with meds  blood cx  and urine cx from 9/30ngtd   sputum cx ngtd, flu negative, rvp negative  10/6/2020 per ID will stop antibx , ID feels completed course  10/7/2020 antibx stopped yesterday.   10/08/2020 - afebrile off antibiotics x 48 hours  - 10/09/2020 - now s/p ABX treatment a febrile and doing well    Problem/Plan - 3  ·  Problem: Acute cystitis without hematuria.    Plan: klebsiella antibx per ID , pt with recurrent Urinary Tract Infection-with klebsiella.  10/08/2020  s/p ABx treatment.     Problem/Plan - 4  ·  Problem: Benign prostatic hyperplasia with urinary retention  .  Plan: continue with Proscar, Flomax.     Problem/Plan - 5   ·  Problem: Hypernatremia.  Plan: started on D5W, check bmp in am    Problem/Plan - 6   Problem: Essential hypertension. Plan: Labetolol decreased to BID    Problem/Plan - 7   ·  Problem: Moderate protein-calorie malnutrition.  Plan: nutrition consult noted  10/1/2020 swallow eval noted from 9/30/2020.   - encourage oral intake    Problem/Plan - 8  ·  Problem: Hypokalemia.  Plan: will be replaced as needed follow up am labs.     Problem/Plan - 9   Problem: Left lower quadrant abdominal pain. Plan; ct scan done by colleague on 9/28/2020 shows full of stool in rectum : will give enema , and once ngt placed will attempt decompression and place lactulose    10/1/2020 had a bm

## 2020-10-11 NOTE — PROGRESS NOTE ADULT - SUBJECTIVE AND OBJECTIVE BOX
Patient is a 89y old  Male who presents with a chief complaint of fever (11 Oct 2020 09:24)    HPI:  : 89M pmhx HTN, CKD, BPH with chronic obrien. fever and weakness for 8 days. no other symptoms. fever started shortly after hs obrien was changed- patient denies nausea vomiting or diarrhea - complain of abdominal distention      (22 Sep 2020 15:16)    SUBJECTIVE & OBJECTIVE: Pt seen and examined at bedside.   PHYSICAL EXAM:  Vital Signs Last 24 Hrs  T(C): 36.8 (11 Oct 2020 17:14), Max: 36.9 (10 Oct 2020 21:00)  T(F): 98.2 (11 Oct 2020 17:14), Max: 98.5 (10 Oct 2020 21:00)  HR: 87 (11 Oct 2020 17:14) (80 - 87)  BP: 167/82 (11 Oct 2020 17:14) (136/73 - 193/75)  RR: 18 (11 Oct 2020 17:14) (17 - 18)  SpO2: 100% (11 Oct 2020 17:14) (96% - 100%)  Daily     Daily Weight in k.1 (11 Oct 2020 05:47)  I&O's Detail    10 Oct 2020 07:01  -  11 Oct 2020 07:00  --------------------------------------------------------  IN:    Oral Fluid: 476 mL  Total IN: 476 mL    OUT:    Indwelling Catheter - Urethral (mL): 2250 mL  Total OUT: 2250 mL    Total NET: -1774 mL      11 Oct 2020 07:01  -  11 Oct 2020 19:43  --------------------------------------------------------  IN:    dextrose 5%: 400 mL    IV PiggyBack: 50 mL    IV PiggyBack: 300 mL    Oral Fluid: 240 mL  Total IN: 990 mL    OUT:    Indwelling Catheter - Urethral (mL): 750 mL  Total OUT: 750 mL    Total NET: 240 mL        GENERAL: chronically ill appearing.   HEAD:  Atraumatic, Normocephalic  EYES: EOMI, PERRLA, conjunctiva and sclera clear  ENMT: Moist mucous membranes  NECK: Supple, No JVD  NERVOUS SYSTEM:  Alert & Oriented X3, Motor Strength 3/5 B/L upper and lower extremities; DTRs 2+ intact and symmetric  CHEST/LUNG: Clear to auscultation bilaterally; No rales, rhonchi, wheezing, or rubs  HEART: Regular rate and rhythm; No murmurs, rubs, or gallops  ABDOMEN: Soft, Nontender, Nondistended; Bowel sounds present  EXTREMITIES:  2+ Peripheral Pulses, No clubbing, cyanosis, or edema  MEDICATIONS  (STANDING):  amLODIPine   Tablet 5 milliGRAM(s) Oral daily  dextrose 5%. 1000 milliLiter(s) (40 mL/Hr) IV Continuous <Continuous>  finasteride 5 milliGRAM(s) Oral daily  gabapentin 300 milliGRAM(s) Oral two times a day  labetalol 200 milliGRAM(s) Oral every 12 hours  mirtazapine 7.5 milliGRAM(s) Oral daily  nystatin Powder 1 Application(s) Topical every 8 hours  polyethylene glycol 3350 17 Gram(s) Oral at bedtime  senna 2 Tablet(s) Oral at bedtime  tamsulosin 0.4 milliGRAM(s) Oral at bedtime    MEDICATIONS  (PRN):  acetaminophen   Tablet .. 650 milliGRAM(s) Oral every 6 hours PRN Temp greater or equal to 38C (100.4F)  bisacodyl Suppository 10 milliGRAM(s) Rectal daily PRN Constipation    LABS:                        9.5    8.79  )-----------( 276      ( 11 Oct 2020 07:11 )             29.4     10-11    149<H>  |  114<H>  |  13  ----------------------------<  67<L>  3.2<L>   |  25  |  1.37<H>    Ca    8.9      11 Oct 2020 07:10  Phos  3.5     10-10  Mg     1.5     10-10          CAPILLARY BLOOD GLUCOSE                RECENT CULTURES:    RADIOLOGY & ADDITIONAL TESTS:    DVT/GI ppx  Discussed with pt @ bedside

## 2020-10-11 NOTE — PROGRESS NOTE ADULT - SUBJECTIVE AND OBJECTIVE BOX
Subjective: awake, confused. Protective mittens are on.       MEDICATIONS  (STANDING):  amLODIPine   Tablet 5 milliGRAM(s) Oral daily  finasteride 5 milliGRAM(s) Oral daily  gabapentin 300 milliGRAM(s) Oral two times a day  labetalol 200 milliGRAM(s) Oral every 12 hours  mirtazapine 7.5 milliGRAM(s) Oral daily  nystatin Powder 1 Application(s) Topical every 8 hours  polyethylene glycol 3350 17 Gram(s) Oral at bedtime  senna 2 Tablet(s) Oral at bedtime  tamsulosin 0.4 milliGRAM(s) Oral at bedtime    MEDICATIONS  (PRN):  acetaminophen   Tablet .. 650 milliGRAM(s) Oral every 6 hours PRN Temp greater or equal to 38C (100.4F)  bisacodyl Suppository 10 milliGRAM(s) Rectal daily PRN Constipation          T(C): 36.6 (10-11-20 @ 05:47), Max: 36.9 (10-10-20 @ 11:21)  HR: 80 (10-11-20 @ 05:47) (80 - 92)  BP: 169/95 (10-11-20 @ 06:17) (131/76 - 193/75)  RR: 17 (10-11-20 @ 05:47) (17 - 18)  SpO2: 96% (10-11-20 @ 05:47) (96% - 99%)  Wt(kg): --        I&O's Detail    10 Oct 2020 07:01  -  11 Oct 2020 07:00  --------------------------------------------------------  IN:    Oral Fluid: 476 mL  Total IN: 476 mL    OUT:    Indwelling Catheter - Urethral (mL): 2250 mL  Total OUT: 2250 mL    Total NET: -1774 mL               PHYSICAL EXAM:    GENERAL: NAD  NECK: Supple, no inc in JVP  CHEST/LUNG: Clear  HEART: S1S2  ABDOMEN: Soft, Nontender, Nondistended; Bowel sounds present  EXTREMITIES:  mild pedal edema.   NEURO: confused.       LABS:  CBC Full  -  ( 11 Oct 2020 07:11 )  WBC Count : 8.79 K/uL  RBC Count : 3.26 M/uL  Hemoglobin : 9.5 g/dL  Hematocrit : 29.4 %  Platelet Count - Automated : 276 K/uL  Mean Cell Volume : 90.2 fl  Mean Cell Hemoglobin : 29.1 pg  Mean Cell Hemoglobin Concentration : 32.3 gm/dL  Auto Neutrophil # : x  Auto Lymphocyte # : x  Auto Monocyte # : x  Auto Eosinophil # : x  Auto Basophil # : x  Auto Neutrophil % : x  Auto Lymphocyte % : x  Auto Monocyte % : x  Auto Eosinophil % : x  Auto Basophil % : x    10-11    149<H>  |  114<H>  |  13  ----------------------------<  67<L>  3.2<L>   |  25  |  1.37<H>    Ca    8.9      11 Oct 2020 07:10  Phos  3.5     10-10  Mg     1.5     10-10          Assessment:  ROGER -- pre-renal azotemia. Resolving  HyperNa -- hypovolemic  HypoK -- poor intake.   HTN  Traumatic hematuria    Recommendations:   D5W at 40cc/h  Change Labetalol to 200mg TID  KCL 40 meq PO X1  Magnesium sulfate ordered  BMP in am

## 2020-10-12 ENCOUNTER — TRANSCRIPTION ENCOUNTER (OUTPATIENT)
Age: 85
End: 2020-10-12

## 2020-10-12 LAB
ANION GAP SERPL CALC-SCNC: 8 MMOL/L — SIGNIFICANT CHANGE UP (ref 5–17)
BUN SERPL-MCNC: 11 MG/DL — SIGNIFICANT CHANGE UP (ref 7–23)
CALCIUM SERPL-MCNC: 9 MG/DL — SIGNIFICANT CHANGE UP (ref 8.5–10.1)
CHLORIDE SERPL-SCNC: 111 MMOL/L — HIGH (ref 96–108)
CO2 SERPL-SCNC: 27 MMOL/L — SIGNIFICANT CHANGE UP (ref 22–31)
CREAT SERPL-MCNC: 1.27 MG/DL — SIGNIFICANT CHANGE UP (ref 0.5–1.3)
GLUCOSE SERPL-MCNC: 85 MG/DL — SIGNIFICANT CHANGE UP (ref 70–99)
HCT VFR BLD CALC: 30.6 % — LOW (ref 39–50)
HGB BLD-MCNC: 9.4 G/DL — LOW (ref 13–17)
MAGNESIUM SERPL-MCNC: 1.9 MG/DL — SIGNIFICANT CHANGE UP (ref 1.6–2.6)
MCHC RBC-ENTMCNC: 28.4 PG — SIGNIFICANT CHANGE UP (ref 27–34)
MCHC RBC-ENTMCNC: 30.7 GM/DL — LOW (ref 32–36)
MCV RBC AUTO: 92.4 FL — SIGNIFICANT CHANGE UP (ref 80–100)
NRBC # BLD: 0 /100 WBCS — SIGNIFICANT CHANGE UP (ref 0–0)
PHOSPHATE SERPL-MCNC: 2.3 MG/DL — LOW (ref 2.5–4.5)
PLATELET # BLD AUTO: 271 K/UL — SIGNIFICANT CHANGE UP (ref 150–400)
POTASSIUM SERPL-MCNC: 3.3 MMOL/L — LOW (ref 3.5–5.3)
POTASSIUM SERPL-SCNC: 3.3 MMOL/L — LOW (ref 3.5–5.3)
RBC # BLD: 3.31 M/UL — LOW (ref 4.2–5.8)
RBC # FLD: 15.9 % — HIGH (ref 10.3–14.5)
SODIUM SERPL-SCNC: 146 MMOL/L — HIGH (ref 135–145)
WBC # BLD: 7.25 K/UL — SIGNIFICANT CHANGE UP (ref 3.8–10.5)
WBC # FLD AUTO: 7.25 K/UL — SIGNIFICANT CHANGE UP (ref 3.8–10.5)

## 2020-10-12 PROCEDURE — 99233 SBSQ HOSP IP/OBS HIGH 50: CPT

## 2020-10-12 RX ORDER — POTASSIUM PHOSPHATE, MONOBASIC POTASSIUM PHOSPHATE, DIBASIC 236; 224 MG/ML; MG/ML
15 INJECTION, SOLUTION INTRAVENOUS ONCE
Refills: 0 | Status: COMPLETED | OUTPATIENT
Start: 2020-10-12 | End: 2020-10-12

## 2020-10-12 RX ORDER — SODIUM CHLORIDE 9 MG/ML
1000 INJECTION, SOLUTION INTRAVENOUS
Refills: 0 | Status: COMPLETED | OUTPATIENT
Start: 2020-10-12 | End: 2020-10-12

## 2020-10-12 RX ORDER — SENNA PLUS 8.6 MG/1
2 TABLET ORAL
Qty: 60 | Refills: 0
Start: 2020-10-12 | End: 2020-11-10

## 2020-10-12 RX ORDER — POLYETHYLENE GLYCOL 3350 17 G/17G
17 POWDER, FOR SOLUTION ORAL
Qty: 510 | Refills: 0
Start: 2020-10-12 | End: 2020-11-10

## 2020-10-12 RX ORDER — NYSTATIN CREAM 100000 [USP'U]/G
1 CREAM TOPICAL
Qty: 90 | Refills: 0
Start: 2020-10-12 | End: 2020-10-21

## 2020-10-12 RX ORDER — ACETAMINOPHEN 500 MG
2 TABLET ORAL
Qty: 0 | Refills: 0 | DISCHARGE
Start: 2020-10-12

## 2020-10-12 RX ADMIN — POLYETHYLENE GLYCOL 3350 17 GRAM(S): 17 POWDER, FOR SOLUTION ORAL at 22:17

## 2020-10-12 RX ADMIN — NYSTATIN CREAM 1 APPLICATION(S): 100000 CREAM TOPICAL at 05:13

## 2020-10-12 RX ADMIN — SODIUM CHLORIDE 40 MILLILITER(S): 9 INJECTION, SOLUTION INTRAVENOUS at 04:43

## 2020-10-12 RX ADMIN — TAMSULOSIN HYDROCHLORIDE 0.4 MILLIGRAM(S): 0.4 CAPSULE ORAL at 22:17

## 2020-10-12 RX ADMIN — FINASTERIDE 5 MILLIGRAM(S): 5 TABLET, FILM COATED ORAL at 11:56

## 2020-10-12 RX ADMIN — AMLODIPINE BESYLATE 5 MILLIGRAM(S): 2.5 TABLET ORAL at 05:13

## 2020-10-12 RX ADMIN — NYSTATIN CREAM 1 APPLICATION(S): 100000 CREAM TOPICAL at 22:17

## 2020-10-12 RX ADMIN — GABAPENTIN 300 MILLIGRAM(S): 400 CAPSULE ORAL at 05:13

## 2020-10-12 RX ADMIN — Medication 200 MILLIGRAM(S): at 05:13

## 2020-10-12 RX ADMIN — NYSTATIN CREAM 1 APPLICATION(S): 100000 CREAM TOPICAL at 13:04

## 2020-10-12 RX ADMIN — SENNA PLUS 2 TABLET(S): 8.6 TABLET ORAL at 22:17

## 2020-10-12 RX ADMIN — GABAPENTIN 300 MILLIGRAM(S): 400 CAPSULE ORAL at 17:13

## 2020-10-12 RX ADMIN — MIRTAZAPINE 7.5 MILLIGRAM(S): 45 TABLET, ORALLY DISINTEGRATING ORAL at 11:55

## 2020-10-12 RX ADMIN — SODIUM CHLORIDE 70 MILLILITER(S): 9 INJECTION, SOLUTION INTRAVENOUS at 15:59

## 2020-10-12 RX ADMIN — Medication 200 MILLIGRAM(S): at 17:13

## 2020-10-12 RX ADMIN — POTASSIUM PHOSPHATE, MONOBASIC POTASSIUM PHOSPHATE, DIBASIC 62.5 MILLIMOLE(S): 236; 224 INJECTION, SOLUTION INTRAVENOUS at 10:25

## 2020-10-12 NOTE — PROGRESS NOTE ADULT - ASSESSMENT
89M pmhx HTN, CKD, BPH with chronic obrien who presents with fever and weakness for 8 days. No other symptoms. fever started shortly after his obrien was changed- patient denies nausea vomiting or diarrhea - complain of abdominal distention     Problem/Plan - 1   ·  Problem: Acute Blood Loss Anemia due to Hematuria  - s/p 2 PRBC on 10/10/2020 hgb now 9.5  - Acute on Chronic anemia  has chronically low hgb    did receive one unit of blood during this hospitalization on 9/28/2020    Problem/Plan - 2  ·  Problem: Sepsis with acute renal failure and tubular necrosis without septic shock, due to unspecified organism.    Plan: 9/30/2020 on antibx spike fever last night , wbc increasing  cxr done 9/29/2020 shows LLL infiltrate will get Ct chest , check RVP and flu . will d/w ID, follow up on all repeat cultures  10/1/2020 ct head negative, seen by speech swallow and diet adjusted  10/2/2020 f/u labs continue with meds  blood cx  and urine cx from 9/30ngtd   sputum cx ngtd, flu negative, rvp negative  10/6/2020 per ID will stop antibx , ID feels completed course  10/7/2020 antibx stopped yesterday.   10/08/2020 - afebrile off antibiotics x 48 hours  - 10/09/2020 - now s/p ABX treatment a febrile and doing well    Problem/Plan - 3  Acute cystitis without hematuria.     klebsiella antibx per ID , pt with recurrent Urinary Tract Infection-with klebsiella.  10/08/2020  s/p ABx treatment.     Problem/Plan - 4   Benign prostatic hyperplasia with urinary retention  - continue with Proscar, Flomax.     Problem/Plan - 5   Hypernatremia: resolving with IVF hydration.  - last serum Na+: 146.    Problem/Plan - 6   Essential hypertension.   - Labetolol decreased to BID    Problem/Plan - 7   Moderate protein-calorie malnutrition.    - nutrition consult noted  - 10/1/2020 swallow eval noted from 9/30/2020.   - encourage oral intake    Problem/Plan - 8  Hypokalemia.    - replete potassium  - BMP in AM.     Problem/Plan - 9    Left lower quadrant abdominal pain/constipation:  resolved  - CT abd/pelvis done at that time showed constipation, lactulose enema was given at that time.  - having BM.    Decub ulcer - local wound care.  - local wound care.

## 2020-10-12 NOTE — PROGRESS NOTE ADULT - ASSESSMENT
Assessment and Plan     A:  status post asp pna  status post uti  stable off antibiotics   afebrile  no leukocytosis     P:  stable off antibiotics  asp precautions  clinically stable from infectious disease standpoint. Will sign off please reconsult if needed.   thank you, pls reconsult if needed.

## 2020-10-12 NOTE — DISCHARGE NOTE PROVIDER - NSDCCPCAREPLAN_GEN_ALL_CORE_FT
PRINCIPAL DISCHARGE DIAGNOSIS  Diagnosis: Sepsis  Assessment and Plan of Treatment: from UTI      SECONDARY DISCHARGE DIAGNOSES  Diagnosis: Hypophosphatemia  Assessment and Plan of Treatment:     Diagnosis: Hypokalemia  Assessment and Plan of Treatment:     Diagnosis: Hypernatremia  Assessment and Plan of Treatment: resolving    Diagnosis: Moderate protein-calorie malnutrition  Assessment and Plan of Treatment:     Diagnosis: UTI (urinary tract infection)  Assessment and Plan of Treatment:

## 2020-10-12 NOTE — DISCHARGE NOTE PROVIDER - HOSPITAL COURSE
89M pmhx HTN, CKD, BPH with chronic obrien who presents with fever and weakness for 8 days. No other symptoms. fever started shortly after his obrien was changed- patient denies nausea vomiting or diarrhea - complain of abdominal distention.  He was admitted and treated for the followings:    Acute Blood Loss Anemia due to Hematuria  - s/p 2 PRBC on 10/10/2020 hgb now 9.5  - Acute on Chronic anemia  has chronically low hgb    did receive one unit of blood during this hospitalization on 9/28/2020    Sepsis with acute renal failure and tubular necrosis without septic shock, due to unspecified organism.    Plan: 9/30/2020 on antibx spike fever last night , wbc increasing  cxr done 9/29/2020 shows LLL infiltrate will get Ct chest , check RVP and flu . will d/w ID, follow up on all repeat cultures  10/1/2020 ct head negative, seen by speech swallow and diet adjusted  10/2/2020 f/u labs continue with meds  blood cx  and urine cx from 9/30ngtd   sputum cx ngtd, flu negative, rvp negative  10/6/2020 per ID will stop antibx , ID feels completed course  10/7/2020 antibx stopped yesterday.   10/08/2020 - afebrile off antibiotics x 48 hours  - 10/09/2020 - now s/p ABX treatment a febrile and doing well    Acute cystitis without hematuria.     klebsiella antibx per ID , pt with recurrent Urinary Tract Infection-with klebsiella.  10/08/2020  s/p ABx treatment.      Benign prostatic hyperplasia with urinary retention  - continue with Proscar, Flomax.     Hypernatremia: resolving with IVF hydration.  - last serum Na+: 146.    Essential hypertension.   - Labetolol decreased to BID    Moderate protein-calorie malnutrition.    - nutrition consult noted  - 10/1/2020 swallow eval noted from 9/30/2020.   - encourage oral intake    Problem/Plan - 8  Hypokalemia.    - replete potassium  - BMP in AM.     Hypophosphatemia  - replete phos     Left lower quadrant abdominal pain/constipation:  resolved  - CT abd/pelvis done at that time showed constipation, lactulose enema was given at that time.  - having BM.    Decub ulcer - local wound care.  - local wound care.    It took 35 minutes to discharge the patient. 89M pmhx HTN, CKD, BPH with chronic obrien who presents with fever and weakness for 8 days. No other symptoms. fever started shortly after his obrien was changed- patient denies nausea vomiting or diarrhea - complain of abdominal distention.  He was admitted and treated for the followings:    Acute Blood Loss Anemia due to Hematuria  - s/p 2 PRBC on 10/10/2020 hgb now 9.5  - Acute on Chronic anemia  has chronically low hgb    did receive one unit of blood during this hospitalization on 9/28/2020    Sepsis with acute renal failure and tubular necrosis without septic shock, due to unspecified organism.    Plan: 9/30/2020 on antibx spike fever last night , wbc increasing  cxr done 9/29/2020 shows LLL infiltrate will get Ct chest , check RVP and flu . will d/w ID, follow up on all repeat cultures  10/1/2020 ct head negative, seen by speech swallow and diet adjusted  10/2/2020 f/u labs continue with meds  blood cx  and urine cx from 9/30ngtd   sputum cx ngtd, flu negative, rvp negative  10/6/2020 per ID will stop antibx , ID feels completed course  10/7/2020 antibx stopped yesterday.   10/08/2020 - afebrile off antibiotics x 48 hours  - 10/09/2020 - now s/p ABX treatment a febrile and doing well    Acute cystitis without hematuria.     klebsiella antibx per ID , pt with recurrent Urinary Tract Infection-with klebsiella.  10/08/2020  s/p ABx treatment.      Benign prostatic hyperplasia with urinary retention  - continue with Proscar, Flomax.     Hypernatremia: resolved with IVF hydration.  - last serum Na+: 143.    Essential hypertension.   - Labetolol decreased to BID    Moderate protein-calorie malnutrition.    - nutrition consult noted  - 10/1/2020 swallow eval noted from 9/30/2020.   - encourage oral intake    Problem/Plan - 8  Hypokalemia.    - replete potassium  - BMP in AM.     Hypophosphatemia  - replete phos     Left lower quadrant abdominal pain/constipation:  resolved  - CT abd/pelvis done at that time showed constipation, lactulose enema was given at that time.  - having BM.    Decub ulcer - local wound care.  - local wound care.    It took 35 minutes to discharge the patient.

## 2020-10-12 NOTE — PROGRESS NOTE ADULT - SUBJECTIVE AND OBJECTIVE BOX
HPI:   89M pmhx HTN, CKD, BPH with chronic obrien. fever and weakness for 8 days. no other symptoms. fever started shortly after hs obrien was changed also with  bilateral hydro  fevers started after obrien change  He was then treated for health care associated pneumonia likely aspiration  off antibiotics as of 10/6         Allergies    No Known Allergies    Intolerances        MEDICATIONS  (STANDING):  amLODIPine   Tablet 5 milliGRAM(s) Oral daily  dextrose 5%. 1000 milliLiter(s) (40 mL/Hr) IV Continuous <Continuous>  finasteride 5 milliGRAM(s) Oral daily  gabapentin 300 milliGRAM(s) Oral two times a day  labetalol 200 milliGRAM(s) Oral every 12 hours  mirtazapine 7.5 milliGRAM(s) Oral daily  nystatin Powder 1 Application(s) Topical every 8 hours  polyethylene glycol 3350 17 Gram(s) Oral at bedtime  potassium phosphate IVPB 15 milliMole(s) IV Intermittent once  senna 2 Tablet(s) Oral at bedtime  tamsulosin 0.4 milliGRAM(s) Oral at bedtime    MEDICATIONS  (PRN):  acetaminophen   Tablet .. 650 milliGRAM(s) Oral every 6 hours PRN Temp greater or equal to 38C (100.4F)  bisacodyl Suppository 10 milliGRAM(s) Rectal daily PRN Constipation      REVIEW OF SYSTEMS:    no new complaints   denies diarrhea or abd pain     VITAL SIGNS:  T(C): 37.2 (10-12-20 @ 05:12), Max: 37.2 (10-12-20 @ 05:12)  T(F): 98.9 (10-12-20 @ 05:12), Max: 98.9 (10-12-20 @ 05:12)  HR: 83 (10-12-20 @ 05:12) (74 - 87)  BP: 162/68 (10-12-20 @ 05:12) (159/85 - 187/79)  RR: 17 (10-12-20 @ 05:12) (17 - 18)  SpO2: 100% (10-12-20 @ 05:12) (96% - 100%)  Wt(kg): --    PHYSICAL EXAM:    GENERAL: not in any distress appears chronically ill   HEENT: Neck is supple, normocephalic, atraumatic   CHEST/LUNG: Clear to percussion bilaterally; No rales, rhonchi, wheezing  HEART: Regular rate and rhythm; No murmurs, rubs, or gallops  ABDOMEN: Soft, Nontender, Nondistended; Bowel sounds present, no rebound   EXTREMITIES:  No clubbing, cyanosis, or edema  SKIN: No rashes or lesions  NERVOUS SYSTEM:  Awake and alert   PSYCH: normal affect     LABS:                         9.4    7.25  )-----------( 271      ( 12 Oct 2020 07:35 )             30.6     10-12    146<H>  |  111<H>  |  11  ----------------------------<  85  3.3<L>   |  27  |  1.27    Ca    9.0      12 Oct 2020 07:35  Phos  2.3     10-12  Mg     1.9     10-12

## 2020-10-12 NOTE — PROGRESS NOTE ADULT - SUBJECTIVE AND OBJECTIVE BOX
Patient is a 89y old  Male who presents with a chief complaint of fever (12 Oct 2020 10:05), he has no pain at present.    MEDICATIONS  (STANDING):  amLODIPine   Tablet 5 milliGRAM(s) Oral daily  dextrose 5%. 1000 milliLiter(s) (40 mL/Hr) IV Continuous <Continuous>  finasteride 5 milliGRAM(s) Oral daily  gabapentin 300 milliGRAM(s) Oral two times a day  labetalol 200 milliGRAM(s) Oral every 12 hours  mirtazapine 7.5 milliGRAM(s) Oral daily  nystatin Powder 1 Application(s) Topical every 8 hours  polyethylene glycol 3350 17 Gram(s) Oral at bedtime  senna 2 Tablet(s) Oral at bedtime  tamsulosin 0.4 milliGRAM(s) Oral at bedtime    MEDICATIONS  (PRN):  acetaminophen   Tablet .. 650 milliGRAM(s) Oral every 6 hours PRN Temp greater or equal to 38C (100.4F)  bisacodyl Suppository 10 milliGRAM(s) Rectal daily PRN Constipation    REVIEW OF SYSTEMS:  reviewed.     Vital Signs Last 24 Hrs  T(C): 36.6 (12 Oct 2020 11:38), Max: 37.2 (12 Oct 2020 05:12)  T(F): 97.8 (12 Oct 2020 11:38), Max: 98.9 (12 Oct 2020 05:12)  HR: 86 (12 Oct 2020 11:38) (74 - 87)  BP: 179/83 (12 Oct 2020 11:38) (160/65 - 187/79)  BP(mean): --  RR: 17 (12 Oct 2020 11:38) (17 - 18)  SpO2: 100% (12 Oct 2020 11:38) (100% - 100%)    PHYSICAL EXAM:  GENERAL: NAD, well-developed  HEAD:  Atraumatic, Normocephalic  EYES: Conjunctiva and sclera clear  ENMT:  Moist mucous membranes   NECK: Supple, No JVD   NERVOUS SYSTEM:  Alert & oriented, moves all ext.  CHEST/LUNG:  Good air entry bilaterally; No rales, rhonchi,    HEART: S1,S2; No murmurs, rubs, or gallops  ABDOMEN: Soft, Nontender, Nondistended; Bowel sounds present  EXTREMITIES:  2+ Peripheral Pulses, No clubbing, cyanosis, or edema  LYMPH: No lymphadenopathy noted  SKIN: bed sores, pressure ulcer to gluteal cleft and  buttock    LABS:                        9.4    7.25  )-----------( 271      ( 12 Oct 2020 07:35 )             30.6     10-12    146<H>  |  111<H>  |  11  ----------------------------<  85  3.3<L>   |  27  |  1.27    Ca    9.0      12 Oct 2020 07:35  Phos  2.3     10-12  Mg     1.9     10-12         cardiac markers     CAPILLARY BLOOD GLUCOSE        Cultures    RADIOLOGY & ADDITIONAL TESTS:    Imaging Personally Reviewed:  [ ] YES  [ ] NO    Consultant(s) Notes Reviewed:  [x ] YES  [ ] NO    Care Discussed with Consultants/Other Providers [ ] YES  [ ] NO

## 2020-10-12 NOTE — DISCHARGE NOTE PROVIDER - NSDCMRMEDTOKEN_GEN_ALL_CORE_FT
acetaminophen 325 mg oral tablet: 2 tab(s) orally every 6 hours, As needed, Temp greater or equal to 38C (100.4F)  amLODIPine 5 mg oral tablet: 1 tab(s) orally once a day  finasteride 5 mg oral tablet: 1 tab(s) orally once a day  gabapentin 300 mg oral capsule: 1 cap(s) orally 2 times a day  labetalol 100 mg oral tablet: 1 tab(s) orally 2 times a day  mirtazapine 15 mg oral tablet: 1/2 tab(s) orally once a day  nystatin 100,000 units/g topical powder: 1 application topically every 8 hours  polyethylene glycol 3350 oral powder for reconstitution: 17 gram(s) orally once a day (at bedtime)  senna oral tablet: 2 tab(s) orally once a day (at bedtime)  tamsulosin 0.4 mg oral capsule: 1 cap(s) orally once a day (at bedtime)

## 2020-10-13 ENCOUNTER — FORM ENCOUNTER (OUTPATIENT)
Age: 85
End: 2020-10-13

## 2020-10-13 VITALS — SYSTOLIC BLOOD PRESSURE: 170 MMHG | DIASTOLIC BLOOD PRESSURE: 80 MMHG | HEART RATE: 66 BPM

## 2020-10-13 LAB
ANION GAP SERPL CALC-SCNC: 7 MMOL/L — SIGNIFICANT CHANGE UP (ref 5–17)
BUN SERPL-MCNC: 9 MG/DL — SIGNIFICANT CHANGE UP (ref 7–23)
CALCIUM SERPL-MCNC: 8.8 MG/DL — SIGNIFICANT CHANGE UP (ref 8.5–10.1)
CHLORIDE SERPL-SCNC: 108 MMOL/L — SIGNIFICANT CHANGE UP (ref 96–108)
CO2 SERPL-SCNC: 28 MMOL/L — SIGNIFICANT CHANGE UP (ref 22–31)
CREAT SERPL-MCNC: 1.22 MG/DL — SIGNIFICANT CHANGE UP (ref 0.5–1.3)
GLUCOSE SERPL-MCNC: 79 MG/DL — SIGNIFICANT CHANGE UP (ref 70–99)
PHOSPHATE SERPL-MCNC: 3 MG/DL — SIGNIFICANT CHANGE UP (ref 2.5–4.5)
POTASSIUM SERPL-MCNC: 3.4 MMOL/L — LOW (ref 3.5–5.3)
POTASSIUM SERPL-SCNC: 3.4 MMOL/L — LOW (ref 3.5–5.3)
SODIUM SERPL-SCNC: 143 MMOL/L — SIGNIFICANT CHANGE UP (ref 135–145)

## 2020-10-13 PROCEDURE — 99239 HOSP IP/OBS DSCHRG MGMT >30: CPT

## 2020-10-13 RX ORDER — POTASSIUM CHLORIDE 20 MEQ
20 PACKET (EA) ORAL ONCE
Refills: 0 | Status: COMPLETED | OUTPATIENT
Start: 2020-10-13 | End: 2020-10-13

## 2020-10-13 RX ADMIN — Medication 200 MILLIGRAM(S): at 06:29

## 2020-10-13 RX ADMIN — AMLODIPINE BESYLATE 5 MILLIGRAM(S): 2.5 TABLET ORAL at 06:29

## 2020-10-13 RX ADMIN — Medication 0.2 MILLIGRAM(S): at 11:55

## 2020-10-13 RX ADMIN — NYSTATIN CREAM 1 APPLICATION(S): 100000 CREAM TOPICAL at 06:29

## 2020-10-13 RX ADMIN — GABAPENTIN 300 MILLIGRAM(S): 400 CAPSULE ORAL at 06:29

## 2020-10-13 RX ADMIN — NYSTATIN CREAM 1 APPLICATION(S): 100000 CREAM TOPICAL at 16:19

## 2020-10-13 NOTE — CHART NOTE - NSCHARTNOTESELECT_GEN_ALL_CORE
Malnutrition Notification
Malnutrition Notification
Event Note
NGT out
Nutrition Services

## 2020-10-13 NOTE — PROGRESS NOTE ADULT - REASON FOR ADMISSION
fever
fever, SEPSIS; URINARY TRACT INFECTION
fever

## 2020-10-13 NOTE — PROGRESS NOTE ADULT - SUBJECTIVE AND OBJECTIVE BOX
Patient is a 89y old  Male who presents with a chief complaint of fever (12 Oct 2020 15:18), he has no pain, no SOB.     MEDICATIONS  (STANDING):  amLODIPine   Tablet 5 milliGRAM(s) Oral daily  finasteride 5 milliGRAM(s) Oral daily  gabapentin 300 milliGRAM(s) Oral two times a day  labetalol 200 milliGRAM(s) Oral every 12 hours  mirtazapine 7.5 milliGRAM(s) Oral daily  nystatin Powder 1 Application(s) Topical every 8 hours  polyethylene glycol 3350 17 Gram(s) Oral at bedtime  senna 2 Tablet(s) Oral at bedtime  tamsulosin 0.4 milliGRAM(s) Oral at bedtime    MEDICATIONS  (PRN):  acetaminophen   Tablet .. 650 milliGRAM(s) Oral every 6 hours PRN Temp greater or equal to 38C (100.4F)  bisacodyl Suppository 10 milliGRAM(s) Rectal daily PRN Constipation        REVIEW OF SYSTEMS:  reviewed and negative.     Vital Signs Last 24 Hrs  T(C): 37.3 (13 Oct 2020 05:01), Max: 37.3 (13 Oct 2020 05:01)  T(F): 99.1 (13 Oct 2020 05:01), Max: 99.1 (13 Oct 2020 05:01)  HR: 81 (13 Oct 2020 05:01) (81 - 86)  BP: 155/80 (13 Oct 2020 05:01) (141/68 - 179/83)  BP(mean): --  RR: 18 (13 Oct 2020 05:01) (17 - 18)  SpO2: 97% (13 Oct 2020 05:01) (96% - 100%)    PHYSICAL EXAM:  GENERAL: NAD, well-groomed, well-developed  HEAD:  Atraumatic, Normocephalic  EYES:  conjunctiva and sclera clear  ENMT:  Moist mucous membranes   NECK: Supple, No JVD   NERVOUS SYSTEM:  Alert & Oriented, moves all ext.  CHEST/LUNG: Clear to auscultation  bilaterally; No rales, rhonchi, wheezing, or rubs  HEART: Regular rate and rhythm; No murmurs, rubs, or gallops  ABDOMEN: Soft, Nontender, Nondistended; Bowel sounds present  EXTREMITIES:  2+ Peripheral Pulses, No clubbing, cyanosis, or edema  LYMPH: No lymphadenopathy noted  SKIN: No petechiae    LABS:                        9.4    7.25  )-----------( 271      ( 12 Oct 2020 07:35 )             30.6     10-13    143  |  108  |  9   ----------------------------<  79  3.4<L>   |  28  |  1.22    Ca    8.8      13 Oct 2020 06:15  Phos  3.0     10-13  Mg     1.9     10-12         cardiac markers     CAPILLARY BLOOD GLUCOSE        Cultures    RADIOLOGY & ADDITIONAL TESTS:    Imaging Personally Reviewed:  [ ] YES  [ ] NO    Consultant(s) Notes Reviewed:  [ ] YES  [ ] NO    Care Discussed with Consultants/Other Providers [ ] YES  [ ] NO

## 2020-10-13 NOTE — CHART NOTE - NSCHARTNOTEFT_GEN_A_CORE
Assessment: Pt seen for and remains with severe malnutrition. Pt admitted with sepsis. Pt c CKD, HTN, dysphagia, constipation, UTI, anemia. Pt remains with poor PO/refusing intake of meals & supplements. SLP 10-07- rec' pureed c nectar thick liquids. Pt remains full code.     Factors impacting intake: [ ] none [ ] nausea  [ ] vomiting [ ] diarrhea [ ] constipation  [ ]chewing problems [ ] swallowing issues  [ ] other:     Diet Prescription: Diet, Dysphagia 1 Pureed-Nectar Consistency Fluid:   Supplement Feeding Modality:  Oral  Health Shake Cans or Servings Per Day:  1       Frequency:  Two Times a day (10-06-20 @ 12:16)    Intake: <10%    Current Weight: 78.3 kg ( 10-, BILAT leg 2 +edema & scrotum edema 3+) ;   83kg ( 10-, BILAT ankle 1+, BILAT foot 2+, & scrotum 4+ edema)   % Weight Change: unable ot accurately assess changes in dry wt- fluctuations in fluid accumulation as noted     Physical appearance: BMI 26.2; BILAT leg 2 +edema & scrotum edema 3+ noted     Pertinent Medications: MEDICATIONS  (STANDING):  amLODIPine   Tablet 5 milliGRAM(s) Oral daily  finasteride 5 milliGRAM(s) Oral daily  gabapentin 300 milliGRAM(s) Oral two times a day  labetalol 200 milliGRAM(s) Oral every 12 hours  mirtazapine 7.5 milliGRAM(s) Oral daily  nystatin Powder 1 Application(s) Topical every 8 hours  polyethylene glycol 3350 17 Gram(s) Oral at bedtime  senna 2 Tablet(s) Oral at bedtime  tamsulosin 0.4 milliGRAM(s) Oral at bedtime    MEDICATIONS  (PRN):  acetaminophen   Tablet .. 650 milliGRAM(s) Oral every 6 hours PRN Temp greater or equal to 38C (100.4F)  bisacodyl Suppository 10 milliGRAM(s) Rectal daily PRN Constipation    Pertinent Labs: 10-13 Na 143 mmol/L Glu 79 mg/dL K+ 3.4 mmol/L<L> Cr 1.22 mg/dL BUN 9 mg/dL Phos 3.0 mg/dL Alb n/a   PAB n/a   Hgb n/a   Hct n/a   HgA1C n/a    Glucose, Serum: 79 mg/dL   24Hr FS:  Skin: R Buttocks stage II; L gluteal stage II     Estimated Needs:   [x ] no change since previous assessment (09-)   [ ] recalculated:     Previous Nutrition Diagnosis:   Severe malnutrition in the context of acute illness    Etiology inadequate protein energy intake & increased nutritional needs in the presence of AMS, stage 2 pressure injuries, UTI, sepsis    Signs/symptoms <50% x >5 days, physical findings of fat & muscle loss (as per previous assessment).    Goal/Expected Outcome Pt will consume >50% nutritional needs via meals/PO supplement ( consistently not met)     Nutrition Diagnosis is [ x] ongoing  [ ] resolved  [ ] improved  [ ] not applicable       New Nutrition Diagnosis: [x ] not applicable       Interventions:   Recommend  [x ] Continue: Current diet order as prescribed & encouragement with meals  [ ] Change Diet To:  [ ] Nutrition Supplement:  [ ] Nutrition Support:  [ ] Other:     Monitoring and Evaluation:   [ x] PO intake [ x ] Tolerance to diet prescription [ x ] weights [ x ] labs[ x ] follow up per protocol  [ ] other:

## 2020-10-13 NOTE — PROGRESS NOTE ADULT - PROVIDER SPECIALTY LIST ADULT
Gastroenterology
Gastroenterology
Hospitalist
Infectious Disease
Nephrology
Palliative Care
Palliative Care
Urology
Urology
Nephrology
Infectious Disease
Hospitalist

## 2020-10-13 NOTE — PROGRESS NOTE ADULT - ASSESSMENT
89M pmhx HTN, CKD, BPH with chronic obrien who presents with fever and weakness for 8 days. No other symptoms. fever started shortly after his obrien was changed- patient denies nausea vomiting or diarrhea - complain of abdominal distention     Problem/Plan - 1   ·  Problem: Acute Blood Loss Anemia due to Hematuria  - s/p 2 PRBC on 10/10/2020 hgb now 9.5  - Acute on Chronic anemia  has chronically low hgb    did receive one unit of blood during this hospitalization on 9/28/2020    Problem/Plan - 2  ·  Problem: Sepsis with acute renal failure and tubular necrosis without septic shock, due to unspecified organism.    Plan: 9/30/2020 on antibx spike fever last night , wbc increasing  cxr done 9/29/2020 shows LLL infiltrate will get Ct chest , check RVP and flu . will d/w ID, follow up on all repeat cultures  10/1/2020 ct head negative, seen by speech swallow and diet adjusted  10/2/2020 f/u labs continue with meds  blood cx  and urine cx from 9/30ngtd   sputum cx ngtd, flu negative, rvp negative  10/6/2020 per ID will stop antibx , ID feels completed course  10/7/2020 antibx stopped .   10/08/2020 - afebrile off antibiotics x 48 hours  - 10/09/2020 - now s/p ABX treatment a febrile and doing well    Problem/Plan - 3  Acute cystitis without hematuria.     klebsiella antibx per ID , pt with recurrent Urinary Tract Infection-with klebsiella.  10/08/2020  s/p ABx treatment.     Problem/Plan - 4   Benign prostatic hyperplasia with urinary retention  - continue with Proscar, Flomax.     Problem/Plan - 5   Hypernatremia: resolving with IVF hydration.  - last serum Na+: 143.    Problem/Plan - 6   Essential hypertension.   - Labetolol decreased to BID    Problem/Plan - 7   Moderate protein-calorie malnutrition.    - nutrition consult noted  - 10/1/2020 swallow eval noted from 9/30/2020.   - encourage oral intake    Problem/Plan - 8  Hypokalemia: improved after K+ suppl.    Problem/Plan - 9    Left lower quadrant abdominal pain/constipation:  resolved  - CT abd/pelvis done at that time showed constipation, lactulose enema was given at that time.  - having BM.    Decub ulcer - local wound care.  - local wound care.    Discharge patient home today.

## 2020-10-17 DIAGNOSIS — R33.8 OTHER RETENTION OF URINE: ICD-10-CM

## 2020-10-17 DIAGNOSIS — A41.9 SEPSIS, UNSPECIFIED ORGANISM: ICD-10-CM

## 2020-10-17 DIAGNOSIS — B35.1 TINEA UNGUIUM: ICD-10-CM

## 2020-10-17 DIAGNOSIS — B96.1 KLEBSIELLA PNEUMONIAE [K. PNEUMONIAE] AS THE CAUSE OF DISEASES CLASSIFIED ELSEWHERE: ICD-10-CM

## 2020-10-17 DIAGNOSIS — M48.061 SPINAL STENOSIS, LUMBAR REGION WITHOUT NEUROGENIC CLAUDICATION: ICD-10-CM

## 2020-10-17 DIAGNOSIS — J69.0 PNEUMONITIS DUE TO INHALATION OF FOOD AND VOMIT: ICD-10-CM

## 2020-10-17 DIAGNOSIS — E87.0 HYPEROSMOLALITY AND HYPERNATREMIA: ICD-10-CM

## 2020-10-17 DIAGNOSIS — R53.1 WEAKNESS: ICD-10-CM

## 2020-10-17 DIAGNOSIS — N17.0 ACUTE KIDNEY FAILURE WITH TUBULAR NECROSIS: ICD-10-CM

## 2020-10-17 DIAGNOSIS — N40.1 BENIGN PROSTATIC HYPERPLASIA WITH LOWER URINARY TRACT SYMPTOMS: ICD-10-CM

## 2020-10-17 DIAGNOSIS — Z78.1 PHYSICAL RESTRAINT STATUS: ICD-10-CM

## 2020-10-17 DIAGNOSIS — Z79.2 LONG TERM (CURRENT) USE OF ANTIBIOTICS: ICD-10-CM

## 2020-10-17 DIAGNOSIS — E83.39 OTHER DISORDERS OF PHOSPHORUS METABOLISM: ICD-10-CM

## 2020-10-17 DIAGNOSIS — E87.6 HYPOKALEMIA: ICD-10-CM

## 2020-10-17 DIAGNOSIS — T83.83XA HEMORRHAGE DUE TO GENITOURINARY PROSTHETIC DEVICES, IMPLANTS AND GRAFTS, INITIAL ENCOUNTER: ICD-10-CM

## 2020-10-17 DIAGNOSIS — N18.9 CHRONIC KIDNEY DISEASE, UNSPECIFIED: ICD-10-CM

## 2020-10-17 DIAGNOSIS — L89.312 PRESSURE ULCER OF RIGHT BUTTOCK, STAGE 2: ICD-10-CM

## 2020-10-17 DIAGNOSIS — N28.1 CYST OF KIDNEY, ACQUIRED: ICD-10-CM

## 2020-10-17 DIAGNOSIS — D63.1 ANEMIA IN CHRONIC KIDNEY DISEASE: ICD-10-CM

## 2020-10-17 DIAGNOSIS — Z79.899 OTHER LONG TERM (CURRENT) DRUG THERAPY: ICD-10-CM

## 2020-10-17 DIAGNOSIS — G92 TOXIC ENCEPHALOPATHY: ICD-10-CM

## 2020-10-17 DIAGNOSIS — I12.9 HYPERTENSIVE CHRONIC KIDNEY DISEASE WITH STAGE 1 THROUGH STAGE 4 CHRONIC KIDNEY DISEASE, OR UNSPECIFIED CHRONIC KIDNEY DISEASE: ICD-10-CM

## 2020-10-17 DIAGNOSIS — E43 UNSPECIFIED SEVERE PROTEIN-CALORIE MALNUTRITION: ICD-10-CM

## 2020-10-17 DIAGNOSIS — D62 ACUTE POSTHEMORRHAGIC ANEMIA: ICD-10-CM

## 2020-10-17 DIAGNOSIS — K59.00 CONSTIPATION, UNSPECIFIED: ICD-10-CM

## 2020-10-17 DIAGNOSIS — N13.6 PYONEPHROSIS: ICD-10-CM
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
## 2020-10-22 ENCOUNTER — NON-APPOINTMENT (OUTPATIENT)
Age: 85
End: 2020-10-22

## 2020-10-23 ENCOUNTER — APPOINTMENT (OUTPATIENT)
Dept: CARE COORDINATION | Facility: HOME HEALTH | Age: 85
End: 2020-10-23

## 2020-11-02 ENCOUNTER — NON-APPOINTMENT (OUTPATIENT)
Age: 85
End: 2020-11-02

## 2020-11-03 ENCOUNTER — APPOINTMENT (OUTPATIENT)
Dept: ELECTROPHYSIOLOGY | Facility: CLINIC | Age: 85
End: 2020-11-03
Payer: MEDICARE

## 2020-11-03 PROCEDURE — G2066: CPT

## 2020-11-03 PROCEDURE — 93298 REM INTERROG DEV EVAL SCRMS: CPT

## 2020-11-11 ENCOUNTER — NON-APPOINTMENT (OUTPATIENT)
Age: 85
End: 2020-11-11

## 2020-11-24 ENCOUNTER — NON-APPOINTMENT (OUTPATIENT)
Age: 85
End: 2020-11-24

## 2020-11-25 ENCOUNTER — APPOINTMENT (OUTPATIENT)
Dept: HOME HEALTH SERVICES | Facility: HOME HEALTH | Age: 85
End: 2020-11-25

## 2020-12-08 ENCOUNTER — APPOINTMENT (OUTPATIENT)
Dept: ELECTROPHYSIOLOGY | Facility: CLINIC | Age: 85
End: 2020-12-08
Payer: MEDICARE

## 2020-12-08 PROCEDURE — 93298 REM INTERROG DEV EVAL SCRMS: CPT

## 2020-12-08 PROCEDURE — G2066: CPT

## 2020-12-09 ENCOUNTER — NON-APPOINTMENT (OUTPATIENT)
Age: 85
End: 2020-12-09

## 2020-12-10 ENCOUNTER — NON-APPOINTMENT (OUTPATIENT)
Age: 85
End: 2020-12-10

## 2020-12-11 ENCOUNTER — NON-APPOINTMENT (OUTPATIENT)
Age: 85
End: 2020-12-11

## 2020-12-15 ENCOUNTER — MED ADMIN CHARGE (OUTPATIENT)
Age: 85
End: 2020-12-15

## 2020-12-15 ENCOUNTER — APPOINTMENT (OUTPATIENT)
Dept: HOME HEALTH SERVICES | Facility: HOME HEALTH | Age: 85
End: 2020-12-15
Payer: MEDICARE

## 2020-12-15 VITALS
HEART RATE: 72 BPM | SYSTOLIC BLOOD PRESSURE: 122 MMHG | WEIGHT: 140 LBS | HEIGHT: 62 IN | TEMPERATURE: 98.1 F | BODY MASS INDEX: 25.76 KG/M2 | RESPIRATION RATE: 16 BRPM | OXYGEN SATURATION: 98 % | DIASTOLIC BLOOD PRESSURE: 62 MMHG

## 2020-12-15 DIAGNOSIS — Z99.3 DEPENDENCE ON WHEELCHAIR: ICD-10-CM

## 2020-12-15 DIAGNOSIS — R33.9 RETENTION OF URINE, UNSPECIFIED: ICD-10-CM

## 2020-12-15 DIAGNOSIS — Z87.898 PERSONAL HISTORY OF OTHER SPECIFIED CONDITIONS: ICD-10-CM

## 2020-12-15 DIAGNOSIS — K59.09 OTHER CONSTIPATION: ICD-10-CM

## 2020-12-15 DIAGNOSIS — Z95.818 PRESENCE OF OTHER CARDIAC IMPLANTS AND GRAFTS: ICD-10-CM

## 2020-12-15 DIAGNOSIS — Z91.81 HISTORY OF FALLING: ICD-10-CM

## 2020-12-15 DIAGNOSIS — R82.90 UNSPECIFIED ABNORMAL FINDINGS IN URINE: ICD-10-CM

## 2020-12-15 DIAGNOSIS — K57.30 DIVERTICULOSIS OF LARGE INTESTINE W/OUT PERFORATION OR ABSCESS W/OUT BLEEDING: ICD-10-CM

## 2020-12-15 DIAGNOSIS — D47.2 MONOCLONAL GAMMOPATHY: ICD-10-CM

## 2020-12-15 PROCEDURE — 90662 IIV NO PRSV INCREASED AG IM: CPT

## 2020-12-15 PROCEDURE — G0008: CPT

## 2020-12-15 PROCEDURE — 99344 HOME/RES VST NEW MOD MDM 60: CPT | Mod: 25

## 2020-12-15 PROCEDURE — G0506: CPT

## 2020-12-15 RX ORDER — NYSTATIN 100000 1/G
100000 POWDER TOPICAL
Refills: 0 | Status: DISCONTINUED | COMMUNITY
Start: 2020-10-23 | End: 2020-12-15

## 2020-12-15 RX ORDER — LABETALOL HYDROCHLORIDE 100 MG/1
100 TABLET, FILM COATED ORAL
Refills: 0 | Status: DISCONTINUED | COMMUNITY
Start: 2020-10-23 | End: 2020-12-15

## 2020-12-15 RX ORDER — GABAPENTIN 300 MG/1
300 CAPSULE ORAL
Refills: 0 | Status: DISCONTINUED | COMMUNITY
Start: 2020-10-23 | End: 2020-12-15

## 2020-12-15 RX ORDER — NITROFURANTOIN (MONOHYDRATE/MACROCRYSTALS) 25; 75 MG/1; MG/1
100 CAPSULE ORAL
Qty: 14 | Refills: 0 | Status: DISCONTINUED | COMMUNITY
Start: 2020-08-17

## 2020-12-15 RX ORDER — TAMSULOSIN HYDROCHLORIDE 0.4 MG/1
0.4 CAPSULE ORAL
Refills: 0 | Status: DISCONTINUED | COMMUNITY
End: 2020-12-15

## 2020-12-15 RX ORDER — FINASTERIDE 5 MG/1
5 TABLET, FILM COATED ORAL
Refills: 0 | Status: DISCONTINUED | COMMUNITY
Start: 2020-10-23 | End: 2020-12-15

## 2020-12-15 RX ORDER — POLYETHYLENE GLYCOL 3350 17 G/17G
17 POWDER, FOR SOLUTION ORAL
Refills: 0 | Status: DISCONTINUED | COMMUNITY
Start: 2020-10-23 | End: 2020-12-15

## 2020-12-15 RX ORDER — HYDROCHLOROTHIAZIDE 25 MG/1
25 TABLET ORAL DAILY
Refills: 0 | Status: DISCONTINUED | COMMUNITY
End: 2020-12-15

## 2020-12-15 RX ORDER — CIPROFLOXACIN HYDROCHLORIDE 500 MG/1
500 TABLET, FILM COATED ORAL
Qty: 20 | Refills: 0 | Status: DISCONTINUED | COMMUNITY
Start: 2020-09-16

## 2020-12-17 NOTE — REASON FOR VISIT
[Initial Annual Medicare Wellness Visit] : an initial annual Medicare wellness visit [Family Member] : family member [Pre-Visit Preparation] : pre-visit preparation was done [Intercurrent Specialty/Sub-specialty Visits] : the patient has intercurrent specialty/sub-specialty visits

## 2020-12-22 PROBLEM — Z99.3 WHEELCHAIR CONFINEMENT: Status: ACTIVE | Noted: 2020-12-15

## 2020-12-22 PROBLEM — K57.30 DIVERTICULOSIS OF COLON: Status: ACTIVE | Noted: 2020-12-15

## 2020-12-22 PROBLEM — K59.09 CHRONIC CONSTIPATION: Status: ACTIVE | Noted: 2020-12-15

## 2020-12-22 NOTE — COUNSELING
[Normal Weight - ( BMI  <25 )] : normal weight - ( BMI  <25 ) [Continue diet as tolerated] : continue diet as tolerated based on goals of care [Non - Smoker] : non-smoker [] : foot exam [Decrease hospital use] : decrease hospital use [Minimize unnecessary interventions] : minimize unnecessary interventions [Discussed disease trajectory with patient/caregiver] : discussed disease trajectory with patient/caregiver [Likely to achieve goals/desired outcomes] : likely to achieve goals/desired outcomes [Patient/Caregiver has ___ understanding of disease process] : patient/caregiver has [unfilled] understanding of disease process [Patient/Caregiver not ready to engage in discussion] : patient/caregiver not ready to engage in discussion [Patient/Caregiver is unclear of wishes] : patient/caregiver is unclear of wishes [ - New patient with 2 or more chronic conditions; CCM discussed and patient-centered care plan established] : New patient with 2 or more chronic conditions; CCM discussed and patient-centered care plan established

## 2020-12-22 NOTE — HEALTH RISK ASSESSMENT
[HRA Reviewed] : Health risk assessment reviewed [Patient not ambulatory (Wheelchair)] : Patient is not ambulatory (Wheelchair) [No] : The patient does not have visual impairment

## 2020-12-22 NOTE — CHRONIC CARE ASSESSMENT
[Other: ____] : [unfilled] [Can not Exercise (Disability)] : Exercise: The patient can not exercise due to disability

## 2020-12-23 NOTE — PHYSICAL EXAM
[No Acute Distress] : no acute distress [Well Nourished] : well nourished [Well Developed] : well developed [Normal Voice/Communication] : normal voice communication [Normal Sclera/Conjunctiva] : normal sclera/conjunctiva [Normal Outer Ear/Nose] : the ears and nose were normal in appearance [Normal TMs] : both tympanic membranes were normal [No JVD] : no jugular venous distention [Supple] : the neck was supple [No Respiratory Distress] : no respiratory distress [Clear to Auscultation] : lungs were clear to auscultation bilaterally [No Accessory Muscle Use] : no accessory muscle use [Normal Rate] : heart rate was normal  [Regular Rhythm] : with a regular rhythm [Normal S1, S2] : normal S1 and S2 [No Murmurs] : no murmurs heard [No Edema] : there was no peripheral edema [Normal Bowel Sounds] : normal bowel sounds [Non Tender] : non-tender [Soft] : abdomen soft [Not Distended] : not distended [Normal Post Cervical Nodes] : no posterior cervical lymphadenopathy [Normal Anterior Cervical Nodes] : no anterior cervical lymphadenopathy [No CVA Tenderness] : no ~M costovertebral angle tenderness [No Spinal Tenderness] : no spinal tenderness [Normal Gait] : normal gait [Normal Strength/Tone] : muscle strength and tone were normal [No Rash] : no rash [No Skin Lesions] : no skin lesions [Normal Affect] : the affect was normal [de-identified] : pleasantly confused [de-identified] : 16 Fr obrien in place [de-identified] : A&Ox2
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
## 2020-12-23 NOTE — ASSESSMENT
[FreeTextEntry1] : Patient Lift: Face to Face/Chart Notes must explain Medical Need:\par ·	A patient lift is necessary and required to transfer between bed and a chair, wheelchair, or commode  and, without the use of a lift, the patient would be bed confined. \par \par Pt also requires a over bed table

## 2020-12-23 NOTE — CURRENT MEDS
[Medication and Allergies Reconciled] : medication and allergies reconciled [High Risk Medications Reviewed and Reconciled (Beers Criteria)] : high risk medications reviewed and reconciled [Reviewed patient reported medication adherence from Comprehensive Assessment] : Reviewed patient reported medication adherence from comprehensive assessment [Adherent to medications] : Patient is adherent to medications as prescribed [de-identified] : managed by family

## 2021-01-12 ENCOUNTER — APPOINTMENT (OUTPATIENT)
Dept: ELECTROPHYSIOLOGY | Facility: CLINIC | Age: 86
End: 2021-01-12
Payer: MEDICARE

## 2021-01-12 PROCEDURE — G2066: CPT

## 2021-01-12 PROCEDURE — 93298 REM INTERROG DEV EVAL SCRMS: CPT

## 2021-01-22 ENCOUNTER — FORM ENCOUNTER (OUTPATIENT)
Age: 86
End: 2021-01-22

## 2021-01-27 ENCOUNTER — APPOINTMENT (OUTPATIENT)
Dept: HOME HEALTH SERVICES | Facility: HOME HEALTH | Age: 86
End: 2021-01-27

## 2021-03-02 NOTE — H&P ADULT - CLICK TO LAUNCH ORM
Subjective   Rylee Hull is a 79 y.o. female.     History of Present Illness     Depression  Her mother  several weeks ago.  Her daughter is currently being treated for breast cancer and her son  of an apparent suicide several years ago. She admits to intermittent depression, nervousness, difficulty sleeping, and fatigue. There has been no change in her appetite and she denies any suicidal ideation.  She will be moving back into her home along with her daughter next week and is looking forward to this.  Lab Results   Component Value Date    TSH 0.995 10/30/2020     Hypertension  Home blood pressure readings: have generally been at goal at home. Associated signs and symptoms: none.  She continues to deny any chest pain, palpitations, dyspnea, orthopnea, paroxysmal nocturnal dyspnea or peripheral edema. Current antihypertensive medications includes bisoprolol, losartan, and hydralazine.  She has yet to take her medication today  Lab Results   Component Value Date    GLUCOSE 89 10/30/2020    BUN 16 10/30/2020    CREATININE 0.61 10/30/2020    EGFRIFNONA 95 10/30/2020    EGFRIFAFRI  2016      Comment:      <15 Indicative of kidney failure.    BCR 26.2 (H) 10/30/2020    K 4.4 10/30/2020    CO2 25.3 10/30/2020    CALCIUM 9.6 10/30/2020    ALBUMIN 4.40 10/30/2020    LABIL2 1.6 2015    AST 27 10/30/2020    ALT 19 10/30/2020     Dyslipidemia  Compliance with treatment remains good. The patient exercises daily. She is currently being prescribed the following medication for her dyslipidemia - lifestyle modifcation.    Lab Results   Component Value Date    CHOL 194 10/30/2020    CHLPL 204 (H) 2015    TRIG 77 10/30/2020    HDL 73 (H) 10/30/2020     (H) 10/30/2020     Gouty Arthritis  She denies any attacks since last here and has not required any colchicine.  She has a family history of gout.    Lab Results   Component Value Date    URICACID 5.9 (H) 10/30/2020     Urinary Incontinence  She  has a more then five year history of urinary frequency, nocturia x 1-2, urgency and occasional incontinence if she does not get to the bathroom quick enough. She denies any dysuria, hematuria, or incontinence with coughing, sneezing, lifting, pushing or pulling. She denies any fecal incontinence.  She continues to feel that fesoterodine has helped and denies any apparent side effects     Labs  Most recent vitamin D 50    The following portions of the patient's history were reviewed and updated as appropriate: allergies, current medications, past medical history, past social history and problem list.    Review of Systems   Constitutional: Positive for fatigue. Negative for appetite change, chills, fever and unexpected weight change.   HENT: Positive for congestion, rhinorrhea and sneezing. Negative for ear pain and sore throat.    Eyes: Negative for visual disturbance.   Respiratory: Negative for cough, shortness of breath and wheezing.    Cardiovascular: Negative for chest pain, palpitations and leg swelling.   Gastrointestinal: Negative for abdominal pain, blood in stool, constipation, diarrhea, nausea and vomiting.   Genitourinary: Positive for frequency and urgency. Negative for dysuria and hematuria.   Musculoskeletal: Negative for arthralgias and back pain.   Skin: Negative for rash.        Lesion right index finger   Neurological: Negative for weakness, numbness and headaches.   Psychiatric/Behavioral: Positive for dysphoric mood. Negative for sleep disturbance. The patient is not nervous/anxious.      Objective   Physical Exam  Constitutional:       General: She is not in acute distress.     Appearance: Normal appearance. She is well-developed. She is not diaphoretic.      Comments: Alert and in fair spirits. No apparent distress. No pallor, jaundice, diaphoresis, or cyanosis.   HENT:      Head: Atraumatic.      Right Ear: Tympanic membrane, ear canal and external ear normal.      Left Ear: Tympanic membrane,  ear canal and external ear normal.   Eyes:      Conjunctiva/sclera: Conjunctivae normal.   Neck:      Thyroid: No thyroid mass or thyromegaly.      Vascular: No carotid bruit or JVD.      Trachea: Trachea normal. No tracheal deviation.   Cardiovascular:      Rate and Rhythm: Normal rate and regular rhythm.      Heart sounds: Normal heart sounds, S1 normal and S2 normal. No murmur. No gallop.    Pulmonary:      Effort: Pulmonary effort is normal.      Breath sounds: Normal breath sounds.   Abdominal:      General: Bowel sounds are normal. There is no distension.   Musculoskeletal:      Right lower leg: No edema.      Left lower leg: No edema.      Comments: No peripheral joint redness or warmth.   Lymphadenopathy:      Head:      Right side of head: No submental, submandibular, tonsillar, preauricular, posterior auricular or occipital adenopathy.      Left side of head: No submental, submandibular, tonsillar, preauricular, posterior auricular or occipital adenopathy.      Cervical: No cervical adenopathy.      Upper Body:      Right upper body: No supraclavicular adenopathy.      Left upper body: No supraclavicular adenopathy.   Skin:     General: Skin is warm.      Coloration: Skin is not cyanotic, jaundiced or pale.      Findings: No rash.      Nails: There is no clubbing.     Neurological:      Mental Status: She is alert and oriented to person, place, and time.      Cranial Nerves: No cranial nerve deficit.      Motor: No tremor.      Coordination: Coordination normal.      Gait: Gait normal.   Psychiatric:         Attention and Perception: Attention normal.         Mood and Affect: Mood is depressed (when discussing her mom's death however also smiled when appropriate).         Speech: Speech normal.         Behavior: Behavior normal.         Thought Content: Thought content normal. Thought content does not include suicidal ideation.       Assessment/Plan   Problems Addressed this Visit        Allergies and  Adverse Reactions    Chronic allergic rhinitis        Cardiac and Vasculature    Essential hypertension   Hypertension: elevated today however has yet to take her medication. Evidence of target organ damage: none.  Encouraged to continue to work on diet and exercise plan.   Continue current medication for now    Mixed hyperlipidemia  As above.       Genitourinary and Reproductive     Urge urinary incontinence  Continue current medication  Encouraged to report if any worse or if any new symptoms or concerns.       Health Encounters    Healthcare maintenance  Patient has received COVID-19 #1 and is aware to return for a second dose.   We will discuss an updated mammogram and DEXA scan at her return       Mental Health    Reactive depression  Supportive therapy  Encouraged to report if any worse or if any new symptoms or concerns.       Musculoskeletal and Injuries    History of acute gouty arthritis  Continue current medication    Osteopenia      Diagnoses       Codes Comments    Chronic allergic rhinitis    -  Primary ICD-10-CM: J30.9  ICD-9-CM: 477.9     Mixed hyperlipidemia     ICD-10-CM: E78.2  ICD-9-CM: 272.2     Essential hypertension     ICD-10-CM: I10  ICD-9-CM: 401.9     Urge urinary incontinence     ICD-10-CM: N39.41  ICD-9-CM: 788.31     Healthcare maintenance     ICD-10-CM: Z00.00  ICD-9-CM: V70.0     Osteopenia, unspecified location     ICD-10-CM: M85.80  ICD-9-CM: 733.90     History of acute gouty arthritis     ICD-10-CM: Z87.39  ICD-9-CM: V13.4     Reactive depression     ICD-10-CM: F32.9  ICD-9-CM: 300.4                   .

## 2021-03-07 ENCOUNTER — FORM ENCOUNTER (OUTPATIENT)
Age: 86
End: 2021-03-07

## 2021-03-12 ENCOUNTER — TRANSCRIPTION ENCOUNTER (OUTPATIENT)
Age: 86
End: 2021-03-12

## 2021-03-12 ENCOUNTER — NON-APPOINTMENT (OUTPATIENT)
Age: 86
End: 2021-03-12

## 2021-03-14 ENCOUNTER — RX RENEWAL (OUTPATIENT)
Age: 86
End: 2021-03-14

## 2021-03-23 ENCOUNTER — APPOINTMENT (OUTPATIENT)
Dept: ELECTROPHYSIOLOGY | Facility: CLINIC | Age: 86
End: 2021-03-23
Payer: MEDICARE

## 2021-03-23 ENCOUNTER — NON-APPOINTMENT (OUTPATIENT)
Age: 86
End: 2021-03-23

## 2021-03-23 ENCOUNTER — APPOINTMENT (OUTPATIENT)
Dept: HOME HEALTH SERVICES | Facility: HOME HEALTH | Age: 86
End: 2021-03-23

## 2021-03-23 PROCEDURE — G2066: CPT

## 2021-03-23 PROCEDURE — 93298 REM INTERROG DEV EVAL SCRMS: CPT

## 2021-03-24 ENCOUNTER — APPOINTMENT (OUTPATIENT)
Dept: HOME HEALTH SERVICES | Facility: HOME HEALTH | Age: 86
End: 2021-03-24
Payer: MEDICARE

## 2021-03-24 VITALS
TEMPERATURE: 97.9 F | DIASTOLIC BLOOD PRESSURE: 68 MMHG | SYSTOLIC BLOOD PRESSURE: 128 MMHG | OXYGEN SATURATION: 98 % | HEART RATE: 84 BPM

## 2021-03-24 PROCEDURE — 0031A: CPT

## 2021-04-23 ENCOUNTER — APPOINTMENT (OUTPATIENT)
Dept: HOME HEALTH SERVICES | Facility: HOME HEALTH | Age: 86
End: 2021-04-23

## 2021-04-27 ENCOUNTER — NON-APPOINTMENT (OUTPATIENT)
Age: 86
End: 2021-04-27

## 2021-04-27 ENCOUNTER — APPOINTMENT (OUTPATIENT)
Dept: ELECTROPHYSIOLOGY | Facility: CLINIC | Age: 86
End: 2021-04-27
Payer: MEDICARE

## 2021-04-27 PROCEDURE — G2066: CPT

## 2021-04-27 PROCEDURE — 93298 REM INTERROG DEV EVAL SCRMS: CPT

## 2021-06-08 ENCOUNTER — NON-APPOINTMENT (OUTPATIENT)
Age: 86
End: 2021-06-08

## 2021-06-09 ENCOUNTER — RX RENEWAL (OUTPATIENT)
Age: 86
End: 2021-06-09

## 2021-06-15 ENCOUNTER — APPOINTMENT (OUTPATIENT)
Dept: HOME HEALTH SERVICES | Facility: HOME HEALTH | Age: 86
End: 2021-06-15
Payer: MEDICARE

## 2021-06-15 VITALS
DIASTOLIC BLOOD PRESSURE: 54 MMHG | RESPIRATION RATE: 14 BRPM | SYSTOLIC BLOOD PRESSURE: 110 MMHG | TEMPERATURE: 98.2 F | HEART RATE: 78 BPM | OXYGEN SATURATION: 97 %

## 2021-06-15 DIAGNOSIS — Z92.29 PERSONAL HISTORY OF OTHER DRUG THERAPY: ICD-10-CM

## 2021-06-15 DIAGNOSIS — Z23 ENCOUNTER FOR IMMUNIZATION: ICD-10-CM

## 2021-06-15 PROCEDURE — 99349 HOME/RES VST EST MOD MDM 40: CPT

## 2021-06-15 NOTE — COUNSELING
[Normal Weight - ( BMI  <25 )] : normal weight - ( BMI  <25 ) [Continue diet as tolerated] : continue diet as tolerated based on goals of care [Non - Smoker] : non-smoker [] : foot exam [Decrease hospital use] : decrease hospital use [Minimize unnecessary interventions] : minimize unnecessary interventions [Discussed disease trajectory with patient/caregiver] : discussed disease trajectory with patient/caregiver [Likely to achieve goals/desired outcomes] : likely to achieve goals/desired outcomes [Patient/Caregiver has ___ understanding of disease process] : patient/caregiver has [unfilled] understanding of disease process [Patient/Caregiver not ready to engage in discussion] : patient/caregiver not ready to engage in discussion [Patient/Caregiver is unclear of wishes] : patient/caregiver is unclear of wishes

## 2021-06-23 PROBLEM — Z23 NEED FOR COVID-19 VACCINE: Status: RESOLVED | Noted: 2021-03-24 | Resolved: 2021-06-23

## 2021-06-23 PROBLEM — Z92.29 HISTORY OF INFLUENZA VACCINATION: Status: RESOLVED | Noted: 2020-12-15 | Resolved: 2021-06-23

## 2021-06-23 NOTE — CURRENT MEDS
[Medication and Allergies Reconciled] : medication and allergies reconciled [High Risk Medications Reviewed and Reconciled (Beers Criteria)] : high risk medications reviewed and reconciled [Reviewed patient reported medication adherence from Comprehensive Assessment] : Reviewed patient reported medication adherence from comprehensive assessment [Adherent to medications] : Patient is adherent to medications as prescribed [de-identified] : managed by family

## 2021-06-23 NOTE — ASSESSMENT
[FreeTextEntry1] : Dieurefin Lysse requires positioning of the body to alleviate pain and pressure in ways not feasible in an ordinary bed. Dieurefin Lysse requires frequent changes in body position due to ankylosing spondylitis.   Dieurefin Lysse also requires the head of the bed to be elevated more than 30 degrees due to HTN and to avoid problems with aspiration . Dieurefin Lysse requires a functioning bed and needs repair or replacement of current bed \par \par Pt also requires a functioning over bed table

## 2021-06-23 NOTE — REASON FOR VISIT
[Follow-Up] : a follow-up visit [Family Member] : family member [Formal Caregiver] : formal caregiver [Pre-Visit Preparation] : pre-visit preparation was done [Intercurrent Specialty/Sub-specialty Visits] : the patient has intercurrent specialty/sub-specialty visits [FreeTextEntry1] : for chronic conditions

## 2021-06-23 NOTE — HISTORY OF PRESENT ILLNESS
[Patient] : patient [Family Member] : family member [Formal Caregiver] : formal caregiver [FreeTextEntry2] : Patient denies fever, cough, trouble breathing, rash, vomiting and diarrhea. Patient has not been in close contact with someone covid positive. \par N95 mask, gloves, eye wear   used during visit: [Y ]. Total face to face time with patient is 40 min.\par \par PMH: MCI, CKD, Anemia, Ankylosing spondylitis, HTN, BPH with urinary retention and chronic obrien catheter, Diverticulosis, aortic atherosclerosis\par \par Interval events: received COVID vaccine; bed is broken, head does not go up; bedside table arrived broken per daughter \par \par Pt in wheelchair for visit, A&Ox2  pleasantly confused with no c/o \par Has no skin breakdown \par Appetite is good weight stable on regular diet, able to feed self but has not been able to as his over bed table is broken\par Moves bowels in commode\par Sleeps well through night with mirtazapine\par \par BPH with urinary retention- has 16 Fr obrien which is managed by urology Dr Wagner- last changed in 11/20; on finasteride and tamsulosin\par \par Ankylosing spondylitis- uses gabapentin for pain, non- ambulatory as above\par \par HTN: BP controlled with amlodipine\par \par CKD: GFR 45, Creat 1.22 in 10/20\par \par \par Admitted to [facility] VS\par 9/22- 10/13  for urosepsis/anemia- transfused during that admission - last H&H 9.4/30.6\par \par \par

## 2021-06-23 NOTE — PHYSICAL EXAM
[Well Nourished] : well nourished [No Acute Distress] : no acute distress [Well Developed] : well developed [Normal Voice/Communication] : normal voice communication [Normal Sclera/Conjunctiva] : normal sclera/conjunctiva [Normal Outer Ear/Nose] : the ears and nose were normal in appearance [Normal TMs] : both tympanic membranes were normal [No JVD] : no jugular venous distention [Supple] : the neck was supple [No Respiratory Distress] : no respiratory distress [Clear to Auscultation] : lungs were clear to auscultation bilaterally [No Accessory Muscle Use] : no accessory muscle use [Normal Rate] : heart rate was normal  [Regular Rhythm] : with a regular rhythm [Normal S1, S2] : normal S1 and S2 [No Murmurs] : no murmurs heard [No Edema] : there was no peripheral edema [Normal Bowel Sounds] : normal bowel sounds [Non Tender] : non-tender [Soft] : abdomen soft [Not Distended] : not distended [Normal Post Cervical Nodes] : no posterior cervical lymphadenopathy [Normal Anterior Cervical Nodes] : no anterior cervical lymphadenopathy [No CVA Tenderness] : no ~M costovertebral angle tenderness [No Spinal Tenderness] : no spinal tenderness [Normal Gait] : normal gait [Normal Strength/Tone] : muscle strength and tone were normal [No Rash] : no rash [No Skin Lesions] : no skin lesions [Normal Affect] : the affect was normal [de-identified] : pleasantly confused [de-identified] : 16 Fr obrien in place [de-identified] : A&Ox2

## 2021-06-27 NOTE — ED POST DISCHARGE NOTE - ADDITIONAL DOCUMENTATION
27-Jun-2021 05:28
Prescribe an additional abx bactrim for uti for stenotrophomonas maltophilia and continue cefpodoxime for klebsiella pnem
27-Jun-2021 01:27

## 2021-07-06 ENCOUNTER — APPOINTMENT (OUTPATIENT)
Dept: ELECTROPHYSIOLOGY | Facility: CLINIC | Age: 86
End: 2021-07-06
Payer: MEDICARE

## 2021-07-06 ENCOUNTER — NON-APPOINTMENT (OUTPATIENT)
Age: 86
End: 2021-07-06

## 2021-07-06 PROCEDURE — 93298 REM INTERROG DEV EVAL SCRMS: CPT

## 2021-07-06 PROCEDURE — G2066: CPT

## 2021-07-14 ENCOUNTER — NON-APPOINTMENT (OUTPATIENT)
Age: 86
End: 2021-07-14

## 2021-07-22 ENCOUNTER — APPOINTMENT (OUTPATIENT)
Dept: HOME HEALTH SERVICES | Facility: HOME HEALTH | Age: 86
End: 2021-07-22

## 2021-08-10 ENCOUNTER — APPOINTMENT (OUTPATIENT)
Dept: ELECTROPHYSIOLOGY | Facility: CLINIC | Age: 86
End: 2021-08-10
Payer: MEDICARE

## 2021-08-10 ENCOUNTER — NON-APPOINTMENT (OUTPATIENT)
Age: 86
End: 2021-08-10

## 2021-08-10 PROCEDURE — G2066: CPT

## 2021-08-10 PROCEDURE — 93298 REM INTERROG DEV EVAL SCRMS: CPT

## 2021-08-11 ENCOUNTER — NON-APPOINTMENT (OUTPATIENT)
Age: 86
End: 2021-08-11

## 2021-09-14 ENCOUNTER — APPOINTMENT (OUTPATIENT)
Dept: ELECTROPHYSIOLOGY | Facility: CLINIC | Age: 86
End: 2021-09-14
Payer: MEDICARE

## 2021-09-14 ENCOUNTER — NON-APPOINTMENT (OUTPATIENT)
Age: 86
End: 2021-09-14

## 2021-09-14 PROCEDURE — 93298 REM INTERROG DEV EVAL SCRMS: CPT

## 2021-09-14 PROCEDURE — G2066: CPT

## 2021-10-01 ENCOUNTER — NON-APPOINTMENT (OUTPATIENT)
Age: 86
End: 2021-10-01

## 2021-10-05 ENCOUNTER — APPOINTMENT (OUTPATIENT)
Dept: HOME HEALTH SERVICES | Facility: HOME HEALTH | Age: 86
End: 2021-10-05

## 2021-10-05 ENCOUNTER — NON-APPOINTMENT (OUTPATIENT)
Age: 86
End: 2021-10-05

## 2021-10-05 DIAGNOSIS — R82.90 UNSPECIFIED ABNORMAL FINDINGS IN URINE: ICD-10-CM

## 2021-10-06 ENCOUNTER — TRANSCRIPTION ENCOUNTER (OUTPATIENT)
Age: 86
End: 2021-10-06

## 2021-10-07 ENCOUNTER — LABORATORY RESULT (OUTPATIENT)
Age: 86
End: 2021-10-07

## 2021-10-08 ENCOUNTER — NON-APPOINTMENT (OUTPATIENT)
Age: 86
End: 2021-10-08

## 2021-10-08 LAB
APPEARANCE: ABNORMAL
BILIRUBIN URINE: NEGATIVE
BLOOD URINE: ABNORMAL
COLOR: YELLOW
GLUCOSE QUALITATIVE U: NEGATIVE
KETONES URINE: NEGATIVE
LEUKOCYTE ESTERASE URINE: ABNORMAL
NITRITE URINE: NEGATIVE
PH URINE: 6
PROTEIN URINE: ABNORMAL
SPECIFIC GRAVITY URINE: 1.01
UROBILINOGEN URINE: NORMAL

## 2021-10-12 ENCOUNTER — NON-APPOINTMENT (OUTPATIENT)
Age: 86
End: 2021-10-12

## 2021-10-12 LAB — BACTERIA UR CULT: NORMAL

## 2021-10-19 ENCOUNTER — NON-APPOINTMENT (OUTPATIENT)
Age: 86
End: 2021-10-19

## 2021-10-19 ENCOUNTER — APPOINTMENT (OUTPATIENT)
Dept: ELECTROPHYSIOLOGY | Facility: CLINIC | Age: 86
End: 2021-10-19
Payer: MEDICARE

## 2021-10-19 PROCEDURE — 93298 REM INTERROG DEV EVAL SCRMS: CPT

## 2021-10-19 PROCEDURE — G2066: CPT | Mod: NC

## 2021-10-28 ENCOUNTER — NON-APPOINTMENT (OUTPATIENT)
Age: 86
End: 2021-10-28

## 2021-10-29 NOTE — PHYSICAL THERAPY INITIAL EVALUATION ADULT - GENERAL OBSERVATIONS, REHAB EVAL
Pt encountered supine in bed + telemetry, IV LUE, private aide at bedside Kenalog Preparation: Kenalog

## 2021-10-31 PROCEDURE — 99439 CHRNC CARE MGMT STAF EA ADDL: CPT

## 2021-10-31 PROCEDURE — 99490 CHRNC CARE MGMT STAFF 1ST 20: CPT

## 2021-11-11 ENCOUNTER — NON-APPOINTMENT (OUTPATIENT)
Age: 86
End: 2021-11-11

## 2021-11-14 ENCOUNTER — NON-APPOINTMENT (OUTPATIENT)
Age: 86
End: 2021-11-14

## 2021-11-14 VITALS
TEMPERATURE: 97.3 F | DIASTOLIC BLOOD PRESSURE: 79 MMHG | SYSTOLIC BLOOD PRESSURE: 114 MMHG | OXYGEN SATURATION: 98 % | HEART RATE: 74 BPM
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
## 2021-11-15 ENCOUNTER — NON-APPOINTMENT (OUTPATIENT)
Age: 86
End: 2021-11-15

## 2021-11-16 ENCOUNTER — APPOINTMENT (OUTPATIENT)
Dept: HOME HEALTH SERVICES | Facility: HOME HEALTH | Age: 86
End: 2021-11-16
Payer: MEDICARE

## 2021-11-16 VITALS
DIASTOLIC BLOOD PRESSURE: 70 MMHG | TEMPERATURE: 98.1 F | OXYGEN SATURATION: 99 % | HEART RATE: 62 BPM | SYSTOLIC BLOOD PRESSURE: 110 MMHG | RESPIRATION RATE: 16 BRPM

## 2021-11-16 DIAGNOSIS — R82.90 UNSPECIFIED ABNORMAL FINDINGS IN URINE: ICD-10-CM

## 2021-11-16 PROCEDURE — 99349 HOME/RES VST EST MOD MDM 40: CPT

## 2021-11-17 PROBLEM — R82.90 CLOUDY URINE: Status: RESOLVED | Noted: 2021-10-05 | Resolved: 2021-11-16

## 2021-11-17 NOTE — PHYSICAL EXAM
[No Acute Distress] : no acute distress [Well Nourished] : well nourished [Well Developed] : well developed [Normal Voice/Communication] : normal voice communication [Normal Sclera/Conjunctiva] : normal sclera/conjunctiva [Normal Outer Ear/Nose] : the ears and nose were normal in appearance [Normal TMs] : both tympanic membranes were normal [No JVD] : no jugular venous distention [Supple] : the neck was supple [No Respiratory Distress] : no respiratory distress [Clear to Auscultation] : lungs were clear to auscultation bilaterally [No Accessory Muscle Use] : no accessory muscle use [Normal Rate] : heart rate was normal  [Regular Rhythm] : with a regular rhythm [Normal S1, S2] : normal S1 and S2 [No Murmurs] : no murmurs heard [No Edema] : there was no peripheral edema [Normal Bowel Sounds] : normal bowel sounds [Non Tender] : non-tender [Soft] : abdomen soft [Not Distended] : not distended [Normal Post Cervical Nodes] : no posterior cervical lymphadenopathy [Normal Anterior Cervical Nodes] : no anterior cervical lymphadenopathy [No CVA Tenderness] : no ~M costovertebral angle tenderness [No Spinal Tenderness] : no spinal tenderness [Normal Gait] : normal gait [Normal Strength/Tone] : muscle strength and tone were normal [No Rash] : no rash [No Skin Lesions] : no skin lesions [Normal Affect] : the affect was normal [de-identified] : pleasantly confused [de-identified] : 16 Fr obrien in place [de-identified] : A&Ox2

## 2021-11-17 NOTE — HEALTH RISK ASSESSMENT
[No] : The patient does not have visual impairment [HRA Reviewed] : Health risk assessment reviewed [Full assistance needed] : managing finances [Patient not ambulatory (Wheelchair)] : Patient is not ambulatory (Wheelchair)

## 2021-11-17 NOTE — CURRENT MEDS
[Medication and Allergies Reconciled] : medication and allergies reconciled [High Risk Medications Reviewed and Reconciled (Beers Criteria)] : high risk medications reviewed and reconciled [Reviewed patient reported medication adherence from Comprehensive Assessment] : Reviewed patient reported medication adherence from comprehensive assessment [Adherent to medications] : Patient is adherent to medications as prescribed [de-identified] : managed by family

## 2021-11-17 NOTE — HISTORY OF PRESENT ILLNESS
[Patient] : patient [Family Member] : family member [Formal Caregiver] : formal caregiver [FreeTextEntry2] : Patient denies fever, cough, trouble breathing, rash, vomiting and diarrhea. Patient has not been in close contact with someone covid positive. \par N95 mask, gloves, eye wear   used during visit: [Y ]. Total face to face time with patient is 40 min.\par \par PMH: MCI, CKD, Anemia, Ankylosing spondylitis, HTN, BPH with urinary retention and chronic obrien catheter, Diverticulosis, aortic atherosclerosis\par \par Interval events: received COVID booster on 11/14 and he is doing well \par \par Pt in wheelchair for visit, A&Ox2  pleasantly confused  reports "I am fine"\par Has no skin breakdown \par Appetite is good weight stable on regular diet, able to feed self but has not been able to as his over bed table is broken\par Moves bowels in commode\par Sleeps well through night with mirtazapine\par \par BPH with urinary retention- has 16 Fr obrien which is managed by urology Dr Wagner; on finasteride and tamsulosin\par \par Ankylosing spondylitis- uses gabapentin for pain, non- ambulatory as above\par \par HTN: BP controlled with amlodipine\par \par CKD: GFR 45, Creat 1.22 in 10/20\par \par \par Admitted to [facility] VS\par 9/22- 10/13  for urosepsis/anemia- transfused during that admission - last H&H 9.4/30.6\par \par \par  normal/good air movement/airway patent/breath sounds equal

## 2021-11-23 ENCOUNTER — APPOINTMENT (OUTPATIENT)
Dept: ELECTROPHYSIOLOGY | Facility: CLINIC | Age: 86
End: 2021-11-23

## 2021-12-28 ENCOUNTER — APPOINTMENT (OUTPATIENT)
Dept: ELECTROPHYSIOLOGY | Facility: CLINIC | Age: 86
End: 2021-12-28
Payer: MEDICARE

## 2021-12-28 ENCOUNTER — NON-APPOINTMENT (OUTPATIENT)
Age: 86
End: 2021-12-28

## 2021-12-28 PROCEDURE — G2066: CPT

## 2021-12-28 PROCEDURE — 93298 REM INTERROG DEV EVAL SCRMS: CPT

## 2021-12-29 PROCEDURE — G0179 MD RECERTIFICATION HHA PT: CPT

## 2022-01-09 ENCOUNTER — TRANSCRIPTION ENCOUNTER (OUTPATIENT)
Age: 87
End: 2022-01-09

## 2022-01-10 ENCOUNTER — NON-APPOINTMENT (OUTPATIENT)
Age: 87
End: 2022-01-10

## 2022-01-10 ENCOUNTER — APPOINTMENT (OUTPATIENT)
Dept: HOME HEALTH SERVICES | Facility: HOME HEALTH | Age: 87
End: 2022-01-10

## 2022-01-10 ENCOUNTER — TRANSCRIPTION ENCOUNTER (OUTPATIENT)
Age: 87
End: 2022-01-10

## 2022-01-10 ENCOUNTER — APPOINTMENT (OUTPATIENT)
Dept: HOME HEALTH SERVICES | Facility: HOME HEALTH | Age: 87
End: 2022-01-10
Payer: MEDICARE

## 2022-01-10 PROCEDURE — 99349 HOME/RES VST EST MOD MDM 40: CPT | Mod: 95

## 2022-01-17 ENCOUNTER — TRANSCRIPTION ENCOUNTER (OUTPATIENT)
Age: 87
End: 2022-01-17

## 2022-01-18 ENCOUNTER — LABORATORY RESULT (OUTPATIENT)
Age: 87
End: 2022-01-18

## 2022-01-19 ENCOUNTER — NON-APPOINTMENT (OUTPATIENT)
Age: 87
End: 2022-01-19

## 2022-01-19 ENCOUNTER — TRANSCRIPTION ENCOUNTER (OUTPATIENT)
Age: 87
End: 2022-01-19

## 2022-01-20 ENCOUNTER — NON-APPOINTMENT (OUTPATIENT)
Age: 87
End: 2022-01-20

## 2022-01-24 ENCOUNTER — NON-APPOINTMENT (OUTPATIENT)
Age: 87
End: 2022-01-24

## 2022-01-25 ENCOUNTER — APPOINTMENT (OUTPATIENT)
Dept: HOME HEALTH SERVICES | Facility: HOME HEALTH | Age: 87
End: 2022-01-25
Payer: MEDICARE

## 2022-01-25 ENCOUNTER — TRANSCRIPTION ENCOUNTER (OUTPATIENT)
Age: 87
End: 2022-01-25

## 2022-01-25 ENCOUNTER — NON-APPOINTMENT (OUTPATIENT)
Age: 87
End: 2022-01-25

## 2022-01-25 PROCEDURE — 99348 HOME/RES VST EST LOW MDM 30: CPT | Mod: 95

## 2022-01-31 ENCOUNTER — NON-APPOINTMENT (OUTPATIENT)
Age: 87
End: 2022-01-31

## 2022-01-31 PROCEDURE — 99439 CHRNC CARE MGMT STAF EA ADDL: CPT

## 2022-01-31 PROCEDURE — 99490 CHRNC CARE MGMT STAFF 1ST 20: CPT

## 2022-02-01 ENCOUNTER — APPOINTMENT (OUTPATIENT)
Dept: ELECTROPHYSIOLOGY | Facility: CLINIC | Age: 87
End: 2022-02-01
Payer: MEDICARE

## 2022-02-01 ENCOUNTER — NON-APPOINTMENT (OUTPATIENT)
Age: 87
End: 2022-02-01

## 2022-02-01 PROCEDURE — 93298 REM INTERROG DEV EVAL SCRMS: CPT

## 2022-02-01 PROCEDURE — G2066: CPT

## 2022-02-02 ENCOUNTER — NON-APPOINTMENT (OUTPATIENT)
Age: 87
End: 2022-02-02

## 2022-02-03 DIAGNOSIS — U07.1 COVID-19: ICD-10-CM

## 2022-02-08 ENCOUNTER — NON-APPOINTMENT (OUTPATIENT)
Age: 87
End: 2022-02-08

## 2022-02-08 RX ORDER — CIPROFLOXACIN HYDROCHLORIDE 250 MG/1
250 TABLET, FILM COATED ORAL
Qty: 20 | Refills: 0 | Status: DISCONTINUED | COMMUNITY
Start: 2022-01-10 | End: 2022-02-08

## 2022-02-09 ENCOUNTER — LABORATORY RESULT (OUTPATIENT)
Age: 87
End: 2022-02-09

## 2022-02-09 ENCOUNTER — TRANSCRIPTION ENCOUNTER (OUTPATIENT)
Age: 87
End: 2022-02-09

## 2022-02-09 ENCOUNTER — NON-APPOINTMENT (OUTPATIENT)
Age: 87
End: 2022-02-09

## 2022-02-10 ENCOUNTER — NON-APPOINTMENT (OUTPATIENT)
Age: 87
End: 2022-02-10

## 2022-02-20 NOTE — DISCHARGE NOTE NURSING/CASE MANAGEMENT/SOCIAL WORK - NURSING SECTION COMPLETE
Patient is a preciously healthy 22 year old male with pmhx of ADHD on medication presenting for nausea vomiting and abdomen pain. Initial preesentation was one bout of vomit Thursday 2/17/22 monring followed by diarrhea and mild nausea. Symptoms somewhat improved but nuasea persisted through Saturday. Patient was out with friends when he began vomiting repreatedly and complained of severe 10/10 abdomen pain. Brought to the ED at [facility] and assessed for appendicitis. He was found to have elevated Lactic acid and WBC. CT of abdomen could not rule out appendicitis but was not definitive. Patient to be admitted for continued work-up and surgery consult.   Patient/Caregiver provided printed discharge information.

## 2022-02-23 ENCOUNTER — APPOINTMENT (OUTPATIENT)
Dept: HOME HEALTH SERVICES | Facility: HOME HEALTH | Age: 87
End: 2022-02-23
Payer: MEDICARE

## 2022-02-23 VITALS
HEART RATE: 81 BPM | RESPIRATION RATE: 16 BRPM | DIASTOLIC BLOOD PRESSURE: 60 MMHG | TEMPERATURE: 98.1 F | OXYGEN SATURATION: 97 % | SYSTOLIC BLOOD PRESSURE: 120 MMHG

## 2022-02-23 DIAGNOSIS — R41.89 OTHER SYMPTOMS AND SIGNS INVOLVING COGNITIVE FUNCTIONS AND AWARENESS: ICD-10-CM

## 2022-02-23 DIAGNOSIS — N40.1 BENIGN PROSTATIC HYPERPLASIA WITH LOWER URINARY TRACT SYMPMS: ICD-10-CM

## 2022-02-23 DIAGNOSIS — T83.511A INFECTION AND INFLAMMATORY REACTION DUE TO INDWELLING URETHRAL CATHETER, INITIAL ENCOUNTER: ICD-10-CM

## 2022-02-23 DIAGNOSIS — N39.0 INFECTION AND INFLAMMATORY REACTION DUE TO INDWELLING URETHRAL CATHETER, INITIAL ENCOUNTER: ICD-10-CM

## 2022-02-23 PROCEDURE — 99349 HOME/RES VST EST MOD MDM 40: CPT

## 2022-02-23 RX ORDER — CIPROFLOXACIN HYDROCHLORIDE 500 MG/1
500 TABLET, FILM COATED ORAL
Qty: 20 | Refills: 0 | Status: DISCONTINUED | COMMUNITY
Start: 2022-02-09 | End: 2022-02-23

## 2022-02-23 NOTE — HEALTH RISK ASSESSMENT
[HRA Reviewed] : Health risk assessment reviewed [Full assistance needed] : managing finances [Patient not ambulatory (Wheelchair)] : Patient is not ambulatory (Wheelchair) [No] : The patient does not have visual impairment

## 2022-02-23 NOTE — CURRENT MEDS
[Medication and Allergies Reconciled] : medication and allergies reconciled [High Risk Medications Reviewed and Reconciled (Beers Criteria)] : high risk medications reviewed and reconciled [Reviewed patient reported medication adherence from Comprehensive Assessment] : Reviewed patient reported medication adherence from comprehensive assessment [Adherent to medications] : Patient is adherent to medications as prescribed [de-identified] : managed by family

## 2022-02-23 NOTE — PHYSICAL EXAM
[No Acute Distress] : no acute distress [Well Nourished] : well nourished [Well Developed] : well developed [Normal Voice/Communication] : normal voice communication [Normal Sclera/Conjunctiva] : normal sclera/conjunctiva [Normal Outer Ear/Nose] : the ears and nose were normal in appearance [Normal TMs] : both tympanic membranes were normal [No JVD] : no jugular venous distention [Supple] : the neck was supple [No Respiratory Distress] : no respiratory distress [Clear to Auscultation] : lungs were clear to auscultation bilaterally [No Accessory Muscle Use] : no accessory muscle use [Normal Rate] : heart rate was normal  [Regular Rhythm] : with a regular rhythm [Normal S1, S2] : normal S1 and S2 [No Murmurs] : no murmurs heard [No Edema] : there was no peripheral edema [Normal Bowel Sounds] : normal bowel sounds [Non Tender] : non-tender [Soft] : abdomen soft [Not Distended] : not distended [Normal Post Cervical Nodes] : no posterior cervical lymphadenopathy [Normal Anterior Cervical Nodes] : no anterior cervical lymphadenopathy [No CVA Tenderness] : no ~M costovertebral angle tenderness [No Spinal Tenderness] : no spinal tenderness [Normal Gait] : normal gait [Normal Strength/Tone] : muscle strength and tone were normal [No Rash] : no rash [No Skin Lesions] : no skin lesions [Normal Affect] : the affect was normal [de-identified] : pleasantly confused [de-identified] : 16 Fr obrien in place, urine clear [de-identified] : A&Ox2

## 2022-02-23 NOTE — REVIEW OF SYSTEMS
[Negative] : Heme/Lymph [Constipation] : no constipation [FreeTextEntry8] : as noted in HPI [FreeTextEntry1] : ROS with daughter and pt

## 2022-02-23 NOTE — HISTORY OF PRESENT ILLNESS
[Patient] : patient [Family Member] : family member [Formal Caregiver] : formal caregiver [FreeTextEntry2] : Patient denies fever, cough, trouble breathing, rash, vomiting and diarrhea. Patient has not been in close contact with someone covid positive. \par N95 mask, gloves, eye wear   used during visit: [Y ]. Total face to face time with patient is 40 min.\par \par PMH: MCI, CKD, Anemia, Ankylosing spondylitis, HTN, BPH with urinary retention and chronic obrien catheter, Diverticulosis, aortic atherosclerosis\par \par Interval events: had COVID in January, did not receive MAB; also treated for UTI on 2/9\par \par Pt in bed for visit, A&Ox2  pleasantly confused with no c/o \par Has no skin breakdown \par Appetite remains good; weight stable on regular diet, able to feed self- reports I am fat\par Moving bowels in commode- last BM Monday \par Sleeps well through night with mirtazapine\par \par BPH with urinary retention- has 16 Fr obrien which is managed by urology Dr Wagner; on finasteride and tamsulosin\par \par Ankylosing spondylitis- uses gabapentin for pain, non- ambulatory as above\par \par HTN: BP controlled with amlodipine\par \par CKD: GFR 45, Creat 1.22 in 10/20\par \par \par Admitted to [facility] VS\par 9/22- 10/13  for urosepsis/anemia- transfused during that admission - last H&H 9.4/30.6\par \par \par

## 2022-03-08 ENCOUNTER — NON-APPOINTMENT (OUTPATIENT)
Age: 87
End: 2022-03-08

## 2022-03-08 ENCOUNTER — APPOINTMENT (OUTPATIENT)
Dept: ELECTROPHYSIOLOGY | Facility: CLINIC | Age: 87
End: 2022-03-08
Payer: MEDICARE

## 2022-03-08 PROCEDURE — G2066: CPT

## 2022-03-08 PROCEDURE — 93298 REM INTERROG DEV EVAL SCRMS: CPT

## 2022-03-28 ENCOUNTER — NON-APPOINTMENT (OUTPATIENT)
Age: 87
End: 2022-03-28

## 2022-04-04 ENCOUNTER — APPOINTMENT (OUTPATIENT)
Dept: HOME HEALTH SERVICES | Facility: HOME HEALTH | Age: 87
End: 2022-04-04

## 2022-04-05 NOTE — PHYSICAL THERAPY INITIAL EVALUATION ADULT - IMPAIRMENTS FOUND, PT EVAL
[facility] Call Center    Phone Message    May a detailed message be left on voicemail: yes     Reason for Call: Other: Pt is to have an interstitial lung test - no order in Epic, PCP suggested [facility] but they don't do that.  Where does Dr. Soni recommend they have this done and can there be a referral added to Epic?     Action Taken: Message routed to:  [facility] ([facility]): cardio    Travel Screening: Not Applicable                                                                         integumentary integrity

## 2022-04-12 ENCOUNTER — NON-APPOINTMENT (OUTPATIENT)
Age: 87
End: 2022-04-12

## 2022-04-12 ENCOUNTER — APPOINTMENT (OUTPATIENT)
Dept: ELECTROPHYSIOLOGY | Facility: CLINIC | Age: 87
End: 2022-04-12
Payer: MEDICARE

## 2022-04-12 PROCEDURE — G2066: CPT

## 2022-04-12 PROCEDURE — 93298 REM INTERROG DEV EVAL SCRMS: CPT

## 2022-05-09 ENCOUNTER — NON-APPOINTMENT (OUTPATIENT)
Age: 87
End: 2022-05-09

## 2022-05-13 ENCOUNTER — APPOINTMENT (OUTPATIENT)
Dept: HOME HEALTH SERVICES | Facility: HOME HEALTH | Age: 87
End: 2022-05-13

## 2022-05-17 ENCOUNTER — APPOINTMENT (OUTPATIENT)
Dept: ELECTROPHYSIOLOGY | Facility: CLINIC | Age: 87
End: 2022-05-17
Payer: MEDICARE

## 2022-05-17 ENCOUNTER — NON-APPOINTMENT (OUTPATIENT)
Age: 87
End: 2022-05-17

## 2022-05-17 PROCEDURE — 93298 REM INTERROG DEV EVAL SCRMS: CPT

## 2022-05-17 PROCEDURE — G2066: CPT

## 2022-06-09 ENCOUNTER — RX RENEWAL (OUTPATIENT)
Age: 87
End: 2022-06-09

## 2022-06-22 ENCOUNTER — NON-APPOINTMENT (OUTPATIENT)
Age: 87
End: 2022-06-22

## 2022-06-22 ENCOUNTER — APPOINTMENT (OUTPATIENT)
Dept: ELECTROPHYSIOLOGY | Facility: CLINIC | Age: 87
End: 2022-06-22
Payer: MEDICARE

## 2022-06-22 PROCEDURE — 93298 REM INTERROG DEV EVAL SCRMS: CPT

## 2022-06-22 PROCEDURE — G2066: CPT

## 2022-06-23 ENCOUNTER — NON-APPOINTMENT (OUTPATIENT)
Age: 87
End: 2022-06-23

## 2022-06-27 PROCEDURE — G0179 MD RECERTIFICATION HHA PT: CPT

## 2022-07-01 ENCOUNTER — APPOINTMENT (OUTPATIENT)
Dept: HOME HEALTH SERVICES | Facility: HOME HEALTH | Age: 87
End: 2022-07-01

## 2022-07-24 ENCOUNTER — NON-APPOINTMENT (OUTPATIENT)
Age: 87
End: 2022-07-24

## 2022-07-25 ENCOUNTER — APPOINTMENT (OUTPATIENT)
Dept: ELECTROPHYSIOLOGY | Facility: CLINIC | Age: 87
End: 2022-07-25

## 2022-07-25 PROCEDURE — G2066: CPT

## 2022-07-25 PROCEDURE — 93298 REM INTERROG DEV EVAL SCRMS: CPT

## 2022-08-15 ENCOUNTER — NON-APPOINTMENT (OUTPATIENT)
Age: 87
End: 2022-08-15

## 2022-08-19 NOTE — H&P ADULT - PROBLEM/PLAN-2
Addended by: Queenie Arboleda on: 8/19/2022 06:00 PM     Modules accepted: Orders
DISPLAY PLAN FREE TEXT

## 2022-08-26 ENCOUNTER — NON-APPOINTMENT (OUTPATIENT)
Age: 87
End: 2022-08-26

## 2022-08-29 ENCOUNTER — NON-APPOINTMENT (OUTPATIENT)
Age: 87
End: 2022-08-29

## 2022-08-29 ENCOUNTER — APPOINTMENT (OUTPATIENT)
Dept: ELECTROPHYSIOLOGY | Facility: CLINIC | Age: 87
End: 2022-08-29

## 2022-08-29 PROCEDURE — G2066: CPT

## 2022-08-29 PROCEDURE — 93298 REM INTERROG DEV EVAL SCRMS: CPT

## 2022-08-30 ENCOUNTER — TRANSCRIPTION ENCOUNTER (OUTPATIENT)
Age: 87
End: 2022-08-30

## 2022-08-31 ENCOUNTER — APPOINTMENT (OUTPATIENT)
Dept: HOME HEALTH SERVICES | Facility: HOME HEALTH | Age: 87
End: 2022-08-31

## 2022-08-31 VITALS
HEART RATE: 68 BPM | TEMPERATURE: 97.1 F | DIASTOLIC BLOOD PRESSURE: 70 MMHG | RESPIRATION RATE: 16 BRPM | OXYGEN SATURATION: 97 % | SYSTOLIC BLOOD PRESSURE: 110 MMHG

## 2022-08-31 DIAGNOSIS — H26.9 UNSPECIFIED CATARACT: ICD-10-CM

## 2022-08-31 PROCEDURE — 99349 HOME/RES VST EST MOD MDM 40: CPT | Mod: 25

## 2022-08-31 PROCEDURE — 90662 IIV NO PRSV INCREASED AG IM: CPT

## 2022-08-31 PROCEDURE — G0008: CPT

## 2022-09-01 ENCOUNTER — LABORATORY RESULT (OUTPATIENT)
Age: 87
End: 2022-09-01

## 2022-09-02 PROBLEM — H26.9 CATARACT: Status: ACTIVE | Noted: 2022-09-02

## 2022-09-02 NOTE — REASON FOR VISIT
[Follow-Up] : a follow-up visit [Formal Caregiver] : formal caregiver [Pre-Visit Preparation] : pre-visit preparation was done [Intercurrent Specialty/Sub-specialty Visits] : the patient has no intercurrent specialty/sub-specialty visits [FreeTextEntry1] : for chronic conditions; PST for cataract surgery

## 2022-09-02 NOTE — HISTORY OF PRESENT ILLNESS
[Patient] : patient [Family Member] : family member [Formal Caregiver] : formal caregiver [FreeTextEntry2] : Patient denies fever, cough, trouble breathing, rash, vomiting and diarrhea. Patient has not been in close contact with someone covid positive. \par N95 mask, gloves, eye wear   used during visit: [Y ]. Total face to face time with patient is 40 min.\par \par PMH: MCI, CKD, Anemia, Ankylosing spondylitis, HTN, BPH with urinary retention and chronic obrien catheter, Diverticulosis, aortic atherosclerosis\par \par 8/31/22\par Pt in wheelchair for visit, A&Ox2  pleasantly confused with no c/o; scheduled for (R) eye cataract surgery on 9/7; \par Flu shot given \par Appetite good\par Obrien in place- no issues- next change 9/11\par BM good - last yesterday \par No skin breakdown \par \par  \par Has no skin breakdown \par Appetite remains good; weight stable on regular diet, able to feed self; but needs meals prepared\par Moving bowels in commode- last BM Monday \par Sleeps well through night with mirtazapine\par \par BPH with urinary retention- has 16 Fr obrien which is managed by urology Dr Wagner; on finasteride and tamsulosin\par \par Ankylosing spondylitis- uses gabapentin for pain, non- ambulatory as above\par \par HTN: BP controlled with amlodipine\par \par CKD: GFR 45, Creat 1.22 in 10/20\par \par \par Admitted to [facility]\par 9/22- 10/13/20  for urosepsis/anemia- transfused during that admission - last H&H 9.4/30.6\par \par \par

## 2022-09-02 NOTE — CURRENT MEDS
[Medication and Allergies Reconciled] : medication and allergies reconciled [High Risk Medications Reviewed and Reconciled (Beers Criteria)] : high risk medications reviewed and reconciled [Reviewed patient reported medication adherence from Comprehensive Assessment] : Reviewed patient reported medication adherence from comprehensive assessment [Adherent to medications] : Patient is adherent to medications as prescribed [de-identified] : managed by family

## 2022-09-02 NOTE — PHYSICAL EXAM
[No Acute Distress] : no acute distress [Well Nourished] : well nourished [Well Developed] : well developed [Normal Voice/Communication] : normal voice communication [Normal Sclera/Conjunctiva] : normal sclera/conjunctiva [Normal Outer Ear/Nose] : the ears and nose were normal in appearance [Normal TMs] : both tympanic membranes were normal [No JVD] : no jugular venous distention [Supple] : the neck was supple [No Respiratory Distress] : no respiratory distress [Clear to Auscultation] : lungs were clear to auscultation bilaterally [No Accessory Muscle Use] : no accessory muscle use [Normal Rate] : heart rate was normal  [Regular Rhythm] : with a regular rhythm [Normal S1, S2] : normal S1 and S2 [No Murmurs] : no murmurs heard [No Edema] : there was no peripheral edema [Normal Bowel Sounds] : normal bowel sounds [Non Tender] : non-tender [Soft] : abdomen soft [Not Distended] : not distended [Normal Post Cervical Nodes] : no posterior cervical lymphadenopathy [Normal Anterior Cervical Nodes] : no anterior cervical lymphadenopathy [No CVA Tenderness] : no ~M costovertebral angle tenderness [No Spinal Tenderness] : no spinal tenderness [Normal Gait] : normal gait [Normal Strength/Tone] : muscle strength and tone were normal [No Rash] : no rash [No Skin Lesions] : no skin lesions [Normal Affect] : the affect was normal [de-identified] : pleasantly confused [de-identified] : 16 Fr obrien in place, urine clear [de-identified] : A&Ox2

## 2022-10-03 ENCOUNTER — APPOINTMENT (OUTPATIENT)
Dept: ELECTROPHYSIOLOGY | Facility: CLINIC | Age: 87
End: 2022-10-03

## 2022-10-03 ENCOUNTER — NON-APPOINTMENT (OUTPATIENT)
Age: 87
End: 2022-10-03

## 2022-10-03 PROCEDURE — G2066: CPT

## 2022-10-03 PROCEDURE — 93298 REM INTERROG DEV EVAL SCRMS: CPT

## 2022-10-20 PROCEDURE — 99072 ADDL SUPL MATRL&STAF TM PHE: CPT

## 2022-10-20 PROCEDURE — G0180 MD CERTIFICATION HHA PATIENT: CPT

## 2022-10-21 ENCOUNTER — APPOINTMENT (OUTPATIENT)
Dept: HOME HEALTH SERVICES | Facility: HOME HEALTH | Age: 87
End: 2022-10-21

## 2022-10-21 NOTE — ED ADULT TRIAGE NOTE - BSA (M2)
Quality 431: Preventive Care And Screening: Unhealthy Alcohol Use - Screening: Patient not identified as an unhealthy alcohol user when screened for unhealthy alcohol use using a systematic screening method Detail Level: Detailed 1.7

## 2022-11-08 ENCOUNTER — NON-APPOINTMENT (OUTPATIENT)
Age: 87
End: 2022-11-08

## 2022-11-08 ENCOUNTER — APPOINTMENT (OUTPATIENT)
Dept: ELECTROPHYSIOLOGY | Facility: CLINIC | Age: 87
End: 2022-11-08

## 2022-11-08 PROCEDURE — G2066: CPT

## 2022-11-08 PROCEDURE — 93298 REM INTERROG DEV EVAL SCRMS: CPT

## 2022-11-11 ENCOUNTER — RX RENEWAL (OUTPATIENT)
Age: 87
End: 2022-11-11

## 2022-11-15 ENCOUNTER — NON-APPOINTMENT (OUTPATIENT)
Age: 87
End: 2022-11-15

## 2022-11-16 ENCOUNTER — LABORATORY RESULT (OUTPATIENT)
Age: 87
End: 2022-11-16

## 2022-11-18 ENCOUNTER — NON-APPOINTMENT (OUTPATIENT)
Age: 87
End: 2022-11-18

## 2022-11-28 ENCOUNTER — RX RENEWAL (OUTPATIENT)
Age: 87
End: 2022-11-28

## 2022-11-30 ENCOUNTER — NON-APPOINTMENT (OUTPATIENT)
Age: 87
End: 2022-11-30

## 2022-12-07 ENCOUNTER — APPOINTMENT (OUTPATIENT)
Dept: HOME HEALTH SERVICES | Facility: HOME HEALTH | Age: 87
End: 2022-12-07

## 2022-12-07 VITALS
RESPIRATION RATE: 18 BRPM | OXYGEN SATURATION: 99 % | HEART RATE: 78 BPM | DIASTOLIC BLOOD PRESSURE: 60 MMHG | SYSTOLIC BLOOD PRESSURE: 110 MMHG | TEMPERATURE: 98.7 F

## 2022-12-07 PROCEDURE — 99349 HOME/RES VST EST MOD MDM 40: CPT

## 2022-12-07 RX ORDER — CIPROFLOXACIN HYDROCHLORIDE 250 MG/1
250 TABLET, FILM COATED ORAL TWICE DAILY
Qty: 14 | Refills: 0 | Status: COMPLETED | COMMUNITY
Start: 2021-10-08 | End: 2022-12-07

## 2022-12-07 NOTE — PHYSICAL EXAM
[No Acute Distress] : no acute distress [Well Nourished] : well nourished [Well Developed] : well developed [Normal Voice/Communication] : normal voice communication [Normal Sclera/Conjunctiva] : normal sclera/conjunctiva [Normal Outer Ear/Nose] : the ears and nose were normal in appearance [Normal TMs] : both tympanic membranes were normal [No JVD] : no jugular venous distention [Supple] : the neck was supple [No Respiratory Distress] : no respiratory distress [Clear to Auscultation] : lungs were clear to auscultation bilaterally [No Accessory Muscle Use] : no accessory muscle use [Normal Rate] : heart rate was normal  [Regular Rhythm] : with a regular rhythm [Normal S1, S2] : normal S1 and S2 [No Murmurs] : no murmurs heard [No Edema] : there was no peripheral edema [Normal Bowel Sounds] : normal bowel sounds [Non Tender] : non-tender [Soft] : abdomen soft [Not Distended] : not distended [Normal Post Cervical Nodes] : no posterior cervical lymphadenopathy [Normal Anterior Cervical Nodes] : no anterior cervical lymphadenopathy [No CVA Tenderness] : no ~M costovertebral angle tenderness [No Spinal Tenderness] : no spinal tenderness [Normal Gait] : normal gait [Normal Strength/Tone] : muscle strength and tone were normal [No Rash] : no rash [No Skin Lesions] : no skin lesions [Normal Affect] : the affect was normal [Breast Exam Declined] : patient declined to have breast exam done [de-identified] : pleasantly confused [de-identified] : 16 Fr obrien in place, urine clear [de-identified] : A&Ox2

## 2022-12-07 NOTE — CURRENT MEDS
[Medication and Allergies Reconciled] : medication and allergies reconciled [High Risk Medications Reviewed and Reconciled (Beers Criteria)] : high risk medications reviewed and reconciled [Reviewed patient reported medication adherence from Comprehensive Assessment] : Reviewed patient reported medication adherence from comprehensive assessment [Adherent to medications] : Patient is adherent to medications as prescribed [de-identified] : managed by family

## 2022-12-07 NOTE — REVIEW OF SYSTEMS
[Negative] : Genitourinary [Constipation] : no constipation [FreeTextEntry1] : ROS with daughter,  pt, and aide

## 2022-12-07 NOTE — REASON FOR VISIT
[Follow-Up] : a follow-up visit [Formal Caregiver] : formal caregiver [Pre-Visit Preparation] : pre-visit preparation was done [Intercurrent Specialty/Sub-specialty Visits] : the patient has no intercurrent specialty/sub-specialty visits [FreeTextEntry1] : for chronic conditions [FreeTextEntry2] : chart review

## 2022-12-07 NOTE — HISTORY OF PRESENT ILLNESS
[Patient] : patient [Family Member] : family member [Formal Caregiver] : formal caregiver [FreeTextEntry2] : Patient denies fever, cough, trouble breathing, rash, vomiting and diarrhea. Patient has not been in close contact with someone covid positive. \par N95 mask, gloves, eye wear   used during visit: [Y ]. Total face to face time with patient is 40 min.\par \par PMH: MCI, CKD, Anemia, Ankylosing spondylitis, HTN, BPH with urinary retention and chronic obrien catheter, Diverticulosis, aortic atherosclerosis\par \par 8/31/22\par Pt in wheelchair for visit, A&Ox2  pleasantly confused with no c/o. Aide present. Spoke to daughter Ching on the phone and updated\par - Treated last month for UTI with cipro -  symptoms resolved- catheter intact draining light yellow urine\par - has chronic lower leg edema as reported by aide and daughter  - will elevate feet\par - reported No skin breakdown \par \par Appetite remains good; weight stable on regular diet, able to feed self; but needs meals prepared\par Moving bowels in commode\par Sleeps well through night with mirtazapine\par \par BPH with urinary retention- has 16 Fr obrien which is managed by urology Dr Wagner; on finasteride and tamsulosin\par \par Ankylosing spondylitis- uses gabapentin for pain, non- ambulatory as above\par \par HTN: BP controlled with amlodipine\par \par CKD: GFR 45, Creat 1.22 in 10/20\par \par \par Admitted to [facility]\par 9/22- 10/13/20  for urosepsis/anemia- transfused during that admission - last H&H 9.4/30.6\par \par \par

## 2022-12-13 ENCOUNTER — NON-APPOINTMENT (OUTPATIENT)
Age: 87
End: 2022-12-13

## 2022-12-13 ENCOUNTER — APPOINTMENT (OUTPATIENT)
Dept: ELECTROPHYSIOLOGY | Facility: CLINIC | Age: 87
End: 2022-12-13
Payer: MEDICARE

## 2022-12-13 PROCEDURE — G2066: CPT

## 2022-12-13 PROCEDURE — 93298 REM INTERROG DEV EVAL SCRMS: CPT

## 2022-12-19 ENCOUNTER — RX RENEWAL (OUTPATIENT)
Age: 87
End: 2022-12-19

## 2023-01-01 ENCOUNTER — APPOINTMENT (OUTPATIENT)
Dept: HOME HEALTH SERVICES | Facility: HOME HEALTH | Age: 88
End: 2023-01-01

## 2023-01-01 ENCOUNTER — NON-APPOINTMENT (OUTPATIENT)
Age: 88
End: 2023-01-01

## 2023-01-01 ENCOUNTER — APPOINTMENT (OUTPATIENT)
Dept: ELECTROPHYSIOLOGY | Facility: CLINIC | Age: 88
End: 2023-01-01
Payer: MEDICARE

## 2023-01-01 ENCOUNTER — LABORATORY RESULT (OUTPATIENT)
Age: 88
End: 2023-01-01

## 2023-01-01 ENCOUNTER — APPOINTMENT (OUTPATIENT)
Dept: ELECTROPHYSIOLOGY | Facility: CLINIC | Age: 88
End: 2023-01-01

## 2023-01-01 ENCOUNTER — INPATIENT (INPATIENT)
Facility: HOSPITAL | Age: 88
LOS: 49 days | Discharge: NURSING, REHAB OR LONG-TERM CARE | End: 2024-01-19
Attending: INTERNAL MEDICINE | Admitting: INTERNAL MEDICINE
Payer: MEDICARE

## 2023-01-01 ENCOUNTER — APPOINTMENT (OUTPATIENT)
Dept: HOME HEALTH SERVICES | Facility: HOME HEALTH | Age: 88
End: 2023-01-01
Payer: MEDICAID

## 2023-01-01 ENCOUNTER — FORM ENCOUNTER (OUTPATIENT)
Age: 88
End: 2023-01-01

## 2023-01-01 ENCOUNTER — TRANSCRIPTION ENCOUNTER (OUTPATIENT)
Age: 88
End: 2023-01-01

## 2023-01-01 ENCOUNTER — INPATIENT (INPATIENT)
Facility: HOSPITAL | Age: 88
LOS: 2 days | Discharge: HOME HEALTH | End: 2023-03-31
Attending: INTERNAL MEDICINE | Admitting: INTERNAL MEDICINE
Payer: MEDICARE

## 2023-01-01 ENCOUNTER — INPATIENT (INPATIENT)
Facility: HOSPITAL | Age: 88
LOS: 3 days | Discharge: HOME HEALTH | End: 2023-11-01
Attending: INTERNAL MEDICINE | Admitting: INTERNAL MEDICINE
Payer: MEDICARE

## 2023-01-01 ENCOUNTER — APPOINTMENT (OUTPATIENT)
Dept: HOME HEALTH SERVICES | Facility: HOME HEALTH | Age: 88
End: 2023-01-01
Payer: MEDICARE

## 2023-01-01 VITALS
HEART RATE: 72 BPM | TEMPERATURE: 98.8 F | OXYGEN SATURATION: 99 % | SYSTOLIC BLOOD PRESSURE: 122 MMHG | DIASTOLIC BLOOD PRESSURE: 66 MMHG | RESPIRATION RATE: 16 BRPM

## 2023-01-01 VITALS
TEMPERATURE: 97.5 F | OXYGEN SATURATION: 97 % | RESPIRATION RATE: 18 BRPM | DIASTOLIC BLOOD PRESSURE: 76 MMHG | SYSTOLIC BLOOD PRESSURE: 128 MMHG | HEART RATE: 75 BPM

## 2023-01-01 VITALS
TEMPERATURE: 98 F | SYSTOLIC BLOOD PRESSURE: 127 MMHG | RESPIRATION RATE: 16 BRPM | WEIGHT: 160.06 LBS | DIASTOLIC BLOOD PRESSURE: 73 MMHG | HEART RATE: 71 BPM | OXYGEN SATURATION: 97 %

## 2023-01-01 VITALS
RESPIRATION RATE: 16 BRPM | DIASTOLIC BLOOD PRESSURE: 72 MMHG | TEMPERATURE: 97.5 F | OXYGEN SATURATION: 97 % | SYSTOLIC BLOOD PRESSURE: 140 MMHG | HEART RATE: 75 BPM

## 2023-01-01 VITALS
HEART RATE: 72 BPM | TEMPERATURE: 98.2 F | OXYGEN SATURATION: 98 % | SYSTOLIC BLOOD PRESSURE: 130 MMHG | RESPIRATION RATE: 16 BRPM | DIASTOLIC BLOOD PRESSURE: 60 MMHG

## 2023-01-01 VITALS
RESPIRATION RATE: 20 BRPM | OXYGEN SATURATION: 93 % | SYSTOLIC BLOOD PRESSURE: 97 MMHG | DIASTOLIC BLOOD PRESSURE: 60 MMHG | HEIGHT: 70 IN | TEMPERATURE: 97 F | HEART RATE: 68 BPM | WEIGHT: 167.99 LBS

## 2023-01-01 VITALS
DIASTOLIC BLOOD PRESSURE: 74 MMHG | RESPIRATION RATE: 18 BRPM | SYSTOLIC BLOOD PRESSURE: 151 MMHG | HEART RATE: 71 BPM | OXYGEN SATURATION: 97 % | TEMPERATURE: 99 F

## 2023-01-01 VITALS
RESPIRATION RATE: 18 BRPM | DIASTOLIC BLOOD PRESSURE: 51 MMHG | SYSTOLIC BLOOD PRESSURE: 120 MMHG | TEMPERATURE: 98.5 F | OXYGEN SATURATION: 99 % | HEART RATE: 68 BPM

## 2023-01-01 VITALS
HEART RATE: 72 BPM | SYSTOLIC BLOOD PRESSURE: 96 MMHG | WEIGHT: 139.99 LBS | TEMPERATURE: 99 F | OXYGEN SATURATION: 98 % | HEIGHT: 70 IN | RESPIRATION RATE: 16 BRPM | DIASTOLIC BLOOD PRESSURE: 56 MMHG

## 2023-01-01 VITALS
OXYGEN SATURATION: 100 % | RESPIRATION RATE: 18 BRPM | SYSTOLIC BLOOD PRESSURE: 169 MMHG | DIASTOLIC BLOOD PRESSURE: 76 MMHG | TEMPERATURE: 98 F | HEART RATE: 88 BPM

## 2023-01-01 DIAGNOSIS — Z97.8 PRESENCE OF OTHER SPECIFIED DEVICES: ICD-10-CM

## 2023-01-01 DIAGNOSIS — I95.9 HYPOTENSION, UNSPECIFIED: ICD-10-CM

## 2023-01-01 DIAGNOSIS — N17.9 ACUTE KIDNEY FAILURE, UNSPECIFIED: ICD-10-CM

## 2023-01-01 DIAGNOSIS — I10 ESSENTIAL (PRIMARY) HYPERTENSION: ICD-10-CM

## 2023-01-01 DIAGNOSIS — R41.89 OTHER SYMPTOMS AND SIGNS INVOLVING COGNITIVE FUNCTIONS AND AWARENESS: ICD-10-CM

## 2023-01-01 DIAGNOSIS — E87.1 HYPO-OSMOLALITY AND HYPONATREMIA: ICD-10-CM

## 2023-01-01 DIAGNOSIS — D64.9 ANEMIA, UNSPECIFIED: ICD-10-CM

## 2023-01-01 DIAGNOSIS — R82.71 BACTERIURIA: ICD-10-CM

## 2023-01-01 DIAGNOSIS — R39.9 UNSPECIFIED SYMPTOMS AND SIGNS INVOLVING THE GENITOURINARY SYSTEM: ICD-10-CM

## 2023-01-01 DIAGNOSIS — E86.1 HYPOVOLEMIA: ICD-10-CM

## 2023-01-01 DIAGNOSIS — N39.0 URINARY TRACT INFECTION, SITE NOT SPECIFIED: ICD-10-CM

## 2023-01-01 DIAGNOSIS — D63.8 ANEMIA IN OTHER CHRONIC DISEASES CLASSIFIED ELSEWHERE: ICD-10-CM

## 2023-01-01 DIAGNOSIS — D50.9 IRON DEFICIENCY ANEMIA, UNSPECIFIED: ICD-10-CM

## 2023-01-01 DIAGNOSIS — Z99.3 DEPENDENCE ON WHEELCHAIR: ICD-10-CM

## 2023-01-01 DIAGNOSIS — Z23 ENCOUNTER FOR IMMUNIZATION: ICD-10-CM

## 2023-01-01 DIAGNOSIS — R53.81 OTHER MALAISE: ICD-10-CM

## 2023-01-01 DIAGNOSIS — J96.01 ACUTE RESPIRATORY FAILURE WITH HYPOXIA: ICD-10-CM

## 2023-01-01 DIAGNOSIS — N40.0 BENIGN PROSTATIC HYPERPLASIA WITHOUT LOWER URINARY TRACT SYMPTOMS: ICD-10-CM

## 2023-01-01 DIAGNOSIS — N18.32 CHRONIC KIDNEY DISEASE, STAGE 3B: ICD-10-CM

## 2023-01-01 DIAGNOSIS — N18.30 CHRONIC KIDNEY DISEASE, STAGE 3 UNSPECIFIED: ICD-10-CM

## 2023-01-01 DIAGNOSIS — B96.20 UNSPECIFIED ESCHERICHIA COLI [E. COLI] AS THE CAUSE OF DISEASES CLASSIFIED ELSEWHERE: ICD-10-CM

## 2023-01-01 DIAGNOSIS — L89.322 PRESSURE ULCER OF LEFT BUTTOCK, STAGE 2: ICD-10-CM

## 2023-01-01 DIAGNOSIS — M45.9 ANKYLOSING SPONDYLITIS OF UNSPECIFIED SITES IN SPINE: ICD-10-CM

## 2023-01-01 DIAGNOSIS — F03.90 UNSPECIFIED DEMENTIA WITHOUT BEHAVIORAL DISTURBANCE: ICD-10-CM

## 2023-01-01 DIAGNOSIS — I12.9 HYPERTENSIVE CHRONIC KIDNEY DISEASE WITH STAGE 1 THROUGH STAGE 4 CHRONIC KIDNEY DISEASE, OR UNSPECIFIED CHRONIC KIDNEY DISEASE: ICD-10-CM

## 2023-01-01 DIAGNOSIS — H61.21 IMPACTED CERUMEN, RIGHT EAR: ICD-10-CM

## 2023-01-01 DIAGNOSIS — Z95.818 PRESENCE OF OTHER CARDIAC IMPLANTS AND GRAFTS: Chronic | ICD-10-CM

## 2023-01-01 DIAGNOSIS — B96.5 URINARY TRACT INFECTION, SITE NOT SPECIFIED: ICD-10-CM
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
## 2023-01-01 DIAGNOSIS — Z51.5 ENCOUNTER FOR PALLIATIVE CARE: ICD-10-CM

## 2023-01-01 DIAGNOSIS — Z87.898 PERSONAL HISTORY OF OTHER SPECIFIED CONDITIONS: ICD-10-CM

## 2023-01-01 DIAGNOSIS — R33.9 RETENTION OF URINE, UNSPECIFIED: ICD-10-CM

## 2023-01-01 DIAGNOSIS — T83.511A INFECTION AND INFLAMMATORY REACTION DUE TO INDWELLING URETHRAL CATHETER, INITIAL ENCOUNTER: ICD-10-CM

## 2023-01-01 DIAGNOSIS — A41.9 SEPSIS, UNSPECIFIED ORGANISM: ICD-10-CM

## 2023-01-01 DIAGNOSIS — H10.33 UNSPECIFIED ACUTE CONJUNCTIVITIS, BILATERAL: ICD-10-CM
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
## 2023-01-01 DIAGNOSIS — G93.40 ENCEPHALOPATHY, UNSPECIFIED: ICD-10-CM

## 2023-01-01 DIAGNOSIS — K59.00 CONSTIPATION, UNSPECIFIED: ICD-10-CM

## 2023-01-01 DIAGNOSIS — R55 SYNCOPE AND COLLAPSE: ICD-10-CM
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
## 2023-01-01 LAB
-  AMIKACIN: SIGNIFICANT CHANGE UP
-  AMOXICILLIN/CLAVULANIC ACID: SIGNIFICANT CHANGE UP
-  AMOXICILLIN/CLAVULANIC ACID: SIGNIFICANT CHANGE UP
-  AMPICILLIN/SULBACTAM: SIGNIFICANT CHANGE UP
-  AMPICILLIN: SIGNIFICANT CHANGE UP
-  AZTREONAM: SIGNIFICANT CHANGE UP
-  CEFAZOLIN: SIGNIFICANT CHANGE UP
-  CEFEPIME: SIGNIFICANT CHANGE UP
-  CEFOXITIN: SIGNIFICANT CHANGE UP
-  CEFOXITIN: SIGNIFICANT CHANGE UP
-  CEFTAZIDIME: SIGNIFICANT CHANGE UP
-  CEFTAZIDIME: SIGNIFICANT CHANGE UP
-  CEFTRIAXONE: SIGNIFICANT CHANGE UP
-  CEFTRIAXONE: SIGNIFICANT CHANGE UP
-  CEFUROXIME: SIGNIFICANT CHANGE UP
-  CEFUROXIME: SIGNIFICANT CHANGE UP
-  CIPROFLOXACIN: SIGNIFICANT CHANGE UP
-  CLINDAMYCIN: SIGNIFICANT CHANGE UP
-  ERTAPENEM: SIGNIFICANT CHANGE UP
-  ERTAPENEM: SIGNIFICANT CHANGE UP
-  ERYTHROMYCIN: SIGNIFICANT CHANGE UP
-  GENTAMICIN: SIGNIFICANT CHANGE UP
-  IMIPENEM: SIGNIFICANT CHANGE UP
-  LEVOFLOXACIN: SIGNIFICANT CHANGE UP
-  LINEZOLID: SIGNIFICANT CHANGE UP
-  MEROPENEM: SIGNIFICANT CHANGE UP
-  MINOCYCLINE: SIGNIFICANT CHANGE UP
-  MINOCYCLINE: SIGNIFICANT CHANGE UP
-  NITROFURANTOIN: SIGNIFICANT CHANGE UP
-  OXACILLIN: SIGNIFICANT CHANGE UP
-  PENICILLIN: SIGNIFICANT CHANGE UP
-  PIPERACILLIN/TAZOBACTAM: SIGNIFICANT CHANGE UP
-  RIFAMPIN: SIGNIFICANT CHANGE UP
-  TETRACYCLINE: SIGNIFICANT CHANGE UP
-  TOBRAMYCIN: SIGNIFICANT CHANGE UP
-  TOBRAMYCIN: SIGNIFICANT CHANGE UP
-  TRIMETHOPRIM/SULFAMETHOXAZOLE: SIGNIFICANT CHANGE UP
-  VANCOMYCIN: SIGNIFICANT CHANGE UP
ALBUMIN SERPL ELPH-MCNC: 1.4 G/DL — LOW (ref 3.3–5)
ALBUMIN SERPL ELPH-MCNC: 1.5 G/DL — LOW (ref 3.3–5)
ALBUMIN SERPL ELPH-MCNC: 1.6 G/DL — LOW (ref 3.3–5)
ALBUMIN SERPL ELPH-MCNC: 1.7 G/DL — LOW (ref 3.3–5)
ALBUMIN SERPL ELPH-MCNC: 1.9 G/DL — LOW (ref 3.3–5)
ALBUMIN SERPL ELPH-MCNC: 2 G/DL — LOW (ref 3.3–5)
ALBUMIN SERPL ELPH-MCNC: 2 G/DL — LOW (ref 3.3–5)
ALBUMIN SERPL ELPH-MCNC: 2.1 G/DL — LOW (ref 3.3–5)
ALBUMIN SERPL ELPH-MCNC: 2.1 G/DL — LOW (ref 3.3–5)
ALBUMIN SERPL ELPH-MCNC: 2.2 G/DL — LOW (ref 3.3–5)
ALBUMIN SERPL ELPH-MCNC: 2.2 G/DL — LOW (ref 3.3–5)
ALBUMIN SERPL ELPH-MCNC: 2.8 G/DL — LOW (ref 3.3–5)
ALBUMIN SERPL ELPH-MCNC: 3.3 G/DL
ALLERGY+IMMUNOLOGY DIAG STUDY NOTE: SIGNIFICANT CHANGE UP
ALLERGY+IMMUNOLOGY DIAG STUDY NOTE: SIGNIFICANT CHANGE UP
ALP BLD-CCNC: 40 U/L
ALP SERPL-CCNC: 112 U/L — SIGNIFICANT CHANGE UP (ref 40–120)
ALP SERPL-CCNC: 112 U/L — SIGNIFICANT CHANGE UP (ref 40–120)
ALP SERPL-CCNC: 115 U/L — SIGNIFICANT CHANGE UP (ref 40–120)
ALP SERPL-CCNC: 115 U/L — SIGNIFICANT CHANGE UP (ref 40–120)
ALP SERPL-CCNC: 27 U/L — LOW (ref 40–120)
ALP SERPL-CCNC: 27 U/L — LOW (ref 40–120)
ALP SERPL-CCNC: 28 U/L — LOW (ref 40–120)
ALP SERPL-CCNC: 28 U/L — LOW (ref 40–120)
ALP SERPL-CCNC: 32 U/L — LOW (ref 40–120)
ALP SERPL-CCNC: 32 U/L — LOW (ref 40–120)
ALP SERPL-CCNC: 33 U/L — LOW (ref 40–120)
ALP SERPL-CCNC: 33 U/L — LOW (ref 40–120)
ALP SERPL-CCNC: 34 U/L — LOW (ref 40–120)
ALP SERPL-CCNC: 34 U/L — LOW (ref 40–120)
ALP SERPL-CCNC: 38 U/L — LOW (ref 40–120)
ALP SERPL-CCNC: 38 U/L — LOW (ref 40–120)
ALP SERPL-CCNC: 39 U/L — LOW (ref 40–120)
ALP SERPL-CCNC: 39 U/L — LOW (ref 40–120)
ALP SERPL-CCNC: 40 U/L — SIGNIFICANT CHANGE UP (ref 40–120)
ALP SERPL-CCNC: 40 U/L — SIGNIFICANT CHANGE UP (ref 40–120)
ALP SERPL-CCNC: 41 U/L — SIGNIFICANT CHANGE UP (ref 40–120)
ALP SERPL-CCNC: 47 U/L — SIGNIFICANT CHANGE UP (ref 40–120)
ALP SERPL-CCNC: 58 U/L — SIGNIFICANT CHANGE UP (ref 40–120)
ALP SERPL-CCNC: 58 U/L — SIGNIFICANT CHANGE UP (ref 40–120)
ALP SERPL-CCNC: 59 U/L — SIGNIFICANT CHANGE UP (ref 40–120)
ALP SERPL-CCNC: 64 U/L — SIGNIFICANT CHANGE UP (ref 40–120)
ALP SERPL-CCNC: 64 U/L — SIGNIFICANT CHANGE UP (ref 40–120)
ALP SERPL-CCNC: 72 U/L — SIGNIFICANT CHANGE UP (ref 40–120)
ALP SERPL-CCNC: 72 U/L — SIGNIFICANT CHANGE UP (ref 40–120)
ALP SERPL-CCNC: 85 U/L — SIGNIFICANT CHANGE UP (ref 40–120)
ALP SERPL-CCNC: 85 U/L — SIGNIFICANT CHANGE UP (ref 40–120)
ALP SERPL-CCNC: 88 U/L — SIGNIFICANT CHANGE UP (ref 40–120)
ALP SERPL-CCNC: 88 U/L — SIGNIFICANT CHANGE UP (ref 40–120)
ALP SERPL-CCNC: 92 U/L — SIGNIFICANT CHANGE UP (ref 40–120)
ALP SERPL-CCNC: 92 U/L — SIGNIFICANT CHANGE UP (ref 40–120)
ALT FLD-CCNC: 10 U/L — LOW (ref 12–78)
ALT FLD-CCNC: 12 U/L — SIGNIFICANT CHANGE UP (ref 12–78)
ALT FLD-CCNC: 14 U/L — SIGNIFICANT CHANGE UP (ref 12–78)
ALT FLD-CCNC: 15 U/L — SIGNIFICANT CHANGE UP (ref 12–78)
ALT FLD-CCNC: 15 U/L — SIGNIFICANT CHANGE UP (ref 12–78)
ALT FLD-CCNC: 16 U/L — SIGNIFICANT CHANGE UP (ref 12–78)
ALT FLD-CCNC: 16 U/L — SIGNIFICANT CHANGE UP (ref 12–78)
ALT FLD-CCNC: 18 U/L — SIGNIFICANT CHANGE UP (ref 12–78)
ALT FLD-CCNC: 7 U/L — LOW (ref 12–78)
ALT FLD-CCNC: 8 U/L — LOW (ref 12–78)
ALT FLD-CCNC: 9 U/L — LOW (ref 12–78)
ALT FLD-CCNC: <6 U/L — LOW (ref 12–78)
ALT SERPL-CCNC: 10 U/L
ANION GAP SERPL CALC-SCNC: 10 MMOL/L — SIGNIFICANT CHANGE UP (ref 5–17)
ANION GAP SERPL CALC-SCNC: 11 MMOL/L — SIGNIFICANT CHANGE UP (ref 5–17)
ANION GAP SERPL CALC-SCNC: 11 MMOL/L — SIGNIFICANT CHANGE UP (ref 5–17)
ANION GAP SERPL CALC-SCNC: 13 MMOL/L
ANION GAP SERPL CALC-SCNC: 14 MMOL/L — SIGNIFICANT CHANGE UP (ref 5–17)
ANION GAP SERPL CALC-SCNC: 14 MMOL/L — SIGNIFICANT CHANGE UP (ref 5–17)
ANION GAP SERPL CALC-SCNC: 16 MMOL/L — SIGNIFICANT CHANGE UP (ref 5–17)
ANION GAP SERPL CALC-SCNC: 16 MMOL/L — SIGNIFICANT CHANGE UP (ref 5–17)
ANION GAP SERPL CALC-SCNC: 3 MMOL/L — LOW (ref 5–17)
ANION GAP SERPL CALC-SCNC: 4 MMOL/L — LOW (ref 5–17)
ANION GAP SERPL CALC-SCNC: 5 MMOL/L — SIGNIFICANT CHANGE UP (ref 5–17)
ANION GAP SERPL CALC-SCNC: 6 MMOL/L — SIGNIFICANT CHANGE UP (ref 5–17)
ANION GAP SERPL CALC-SCNC: 7 MMOL/L — SIGNIFICANT CHANGE UP (ref 5–17)
ANION GAP SERPL CALC-SCNC: 8 MMOL/L — SIGNIFICANT CHANGE UP (ref 5–17)
ANION GAP SERPL CALC-SCNC: 9 MMOL/L — SIGNIFICANT CHANGE UP (ref 5–17)
ANISOCYTOSIS BLD QL: SLIGHT — SIGNIFICANT CHANGE UP
APPEARANCE UR: ABNORMAL
APPEARANCE UR: CLEAR — SIGNIFICANT CHANGE UP
APPEARANCE: CLEAR
APTT BLD: 35.9 SEC — HIGH (ref 24.5–35.6)
APTT BLD: 35.9 SEC — HIGH (ref 24.5–35.6)
AST SERPL-CCNC: 14 U/L — LOW (ref 15–37)
AST SERPL-CCNC: 15 U/L — SIGNIFICANT CHANGE UP (ref 15–37)
AST SERPL-CCNC: 16 U/L — SIGNIFICANT CHANGE UP (ref 15–37)
AST SERPL-CCNC: 18 U/L
AST SERPL-CCNC: 18 U/L — SIGNIFICANT CHANGE UP (ref 15–37)
AST SERPL-CCNC: 19 U/L — SIGNIFICANT CHANGE UP (ref 15–37)
AST SERPL-CCNC: 19 U/L — SIGNIFICANT CHANGE UP (ref 15–37)
AST SERPL-CCNC: 22 U/L — SIGNIFICANT CHANGE UP (ref 15–37)
AST SERPL-CCNC: 22 U/L — SIGNIFICANT CHANGE UP (ref 15–37)
AST SERPL-CCNC: 24 U/L — SIGNIFICANT CHANGE UP (ref 15–37)
AST SERPL-CCNC: 29 U/L — SIGNIFICANT CHANGE UP (ref 15–37)
AST SERPL-CCNC: 29 U/L — SIGNIFICANT CHANGE UP (ref 15–37)
AST SERPL-CCNC: 30 U/L — SIGNIFICANT CHANGE UP (ref 15–37)
AST SERPL-CCNC: 35 U/L — SIGNIFICANT CHANGE UP (ref 15–37)
AST SERPL-CCNC: 35 U/L — SIGNIFICANT CHANGE UP (ref 15–37)
AST SERPL-CCNC: 42 U/L — HIGH (ref 15–37)
AST SERPL-CCNC: 42 U/L — HIGH (ref 15–37)
AST SERPL-CCNC: 46 U/L — HIGH (ref 15–37)
AST SERPL-CCNC: 46 U/L — HIGH (ref 15–37)
AST SERPL-CCNC: 47 U/L — HIGH (ref 15–37)
AST SERPL-CCNC: 47 U/L — HIGH (ref 15–37)
AST SERPL-CCNC: 49 U/L — HIGH (ref 15–37)
AST SERPL-CCNC: 49 U/L — HIGH (ref 15–37)
B PERT IGG+IGM PNL SER: ABNORMAL
B PERT IGG+IGM PNL SER: ABNORMAL
BACTERIA # UR AUTO: ABNORMAL
BACTERIA # UR AUTO: ABNORMAL /HPF
BACTERIA # UR AUTO: ABNORMAL /HPF
BACTERIA UR CULT: ABNORMAL
BACTERIA: ABNORMAL /HPF
BASE EXCESS BLDA CALC-SCNC: -5 MMOL/L — LOW (ref -2–3)
BASE EXCESS BLDA CALC-SCNC: -5 MMOL/L — LOW (ref -2–3)
BASE EXCESS BLDA CALC-SCNC: 8.3 MMOL/L — HIGH (ref -2–3)
BASE EXCESS BLDA CALC-SCNC: 8.3 MMOL/L — HIGH (ref -2–3)
BASE EXCESS BLDV CALC-SCNC: -1.3 MMOL/L — SIGNIFICANT CHANGE UP (ref -2–3)
BASE EXCESS BLDV CALC-SCNC: -1.3 MMOL/L — SIGNIFICANT CHANGE UP (ref -2–3)
BASE EXCESS BLDV CALC-SCNC: 4.9 MMOL/L — HIGH (ref -2–3)
BASE EXCESS BLDV CALC-SCNC: 4.9 MMOL/L — HIGH (ref -2–3)
BASE EXCESS BLDV CALC-SCNC: 6.3 MMOL/L — HIGH (ref -2–3)
BASE EXCESS BLDV CALC-SCNC: 6.3 MMOL/L — HIGH (ref -2–3)
BASOPHILS # BLD AUTO: 0 K/UL — SIGNIFICANT CHANGE UP (ref 0–0.2)
BASOPHILS # BLD AUTO: 0.01 K/UL — SIGNIFICANT CHANGE UP (ref 0–0.2)
BASOPHILS # BLD AUTO: 0.01 K/UL — SIGNIFICANT CHANGE UP (ref 0–0.2)
BASOPHILS # BLD AUTO: 0.02 K/UL — SIGNIFICANT CHANGE UP (ref 0–0.2)
BASOPHILS # BLD AUTO: 0.03 K/UL — SIGNIFICANT CHANGE UP (ref 0–0.2)
BASOPHILS NFR BLD AUTO: 0 % — SIGNIFICANT CHANGE UP (ref 0–2)
BASOPHILS NFR BLD AUTO: 0.2 % — SIGNIFICANT CHANGE UP (ref 0–2)
BASOPHILS NFR BLD AUTO: 0.3 % — SIGNIFICANT CHANGE UP (ref 0–2)
BASOPHILS NFR BLD AUTO: 0.4 % — SIGNIFICANT CHANGE UP (ref 0–2)
BASOPHILS NFR BLD AUTO: 0.5 % — SIGNIFICANT CHANGE UP (ref 0–2)
BASOPHILS NFR BLD AUTO: 0.5 % — SIGNIFICANT CHANGE UP (ref 0–2)
BASOPHILS NFR BLD AUTO: 0.6 % — SIGNIFICANT CHANGE UP (ref 0–2)
BILIRUB SERPL-MCNC: 0.2 MG/DL
BILIRUB SERPL-MCNC: 0.2 MG/DL — SIGNIFICANT CHANGE UP (ref 0.2–1.2)
BILIRUB SERPL-MCNC: 0.2 MG/DL — SIGNIFICANT CHANGE UP (ref 0.2–1.2)
BILIRUB SERPL-MCNC: 0.3 MG/DL — SIGNIFICANT CHANGE UP (ref 0.2–1.2)
BILIRUB SERPL-MCNC: 0.4 MG/DL — SIGNIFICANT CHANGE UP (ref 0.2–1.2)
BILIRUB SERPL-MCNC: 0.5 MG/DL — SIGNIFICANT CHANGE UP (ref 0.2–1.2)
BILIRUB SERPL-MCNC: 0.6 MG/DL — SIGNIFICANT CHANGE UP (ref 0.2–1.2)
BILIRUB SERPL-MCNC: 0.6 MG/DL — SIGNIFICANT CHANGE UP (ref 0.2–1.2)
BILIRUB UR-MCNC: NEGATIVE — SIGNIFICANT CHANGE UP
BILIRUBIN URINE: NEGATIVE
BLD GP AB SCN SERPL QL: SIGNIFICANT CHANGE UP
BLOOD GAS COMMENTS ARTERIAL: SIGNIFICANT CHANGE UP
BLOOD GAS COMMENTS, VENOUS: SIGNIFICANT CHANGE UP
BLOOD URINE: ABNORMAL
BUN SERPL-MCNC: 24 MG/DL — HIGH (ref 7–23)
BUN SERPL-MCNC: 24 MG/DL — HIGH (ref 7–23)
BUN SERPL-MCNC: 27 MG/DL — HIGH (ref 7–23)
BUN SERPL-MCNC: 27 MG/DL — HIGH (ref 7–23)
BUN SERPL-MCNC: 28 MG/DL — HIGH (ref 7–23)
BUN SERPL-MCNC: 28 MG/DL — HIGH (ref 7–23)
BUN SERPL-MCNC: 29 MG/DL — HIGH (ref 7–23)
BUN SERPL-MCNC: 30 MG/DL — HIGH (ref 7–23)
BUN SERPL-MCNC: 30 MG/DL — HIGH (ref 7–23)
BUN SERPL-MCNC: 32 MG/DL — HIGH (ref 7–23)
BUN SERPL-MCNC: 32 MG/DL — HIGH (ref 7–23)
BUN SERPL-MCNC: 33 MG/DL — HIGH (ref 7–23)
BUN SERPL-MCNC: 33 MG/DL — HIGH (ref 7–23)
BUN SERPL-MCNC: 36 MG/DL — HIGH (ref 7–23)
BUN SERPL-MCNC: 36 MG/DL — HIGH (ref 7–23)
BUN SERPL-MCNC: 37 MG/DL — HIGH (ref 7–23)
BUN SERPL-MCNC: 37 MG/DL — HIGH (ref 7–23)
BUN SERPL-MCNC: 40 MG/DL — HIGH (ref 7–23)
BUN SERPL-MCNC: 41 MG/DL — HIGH (ref 7–23)
BUN SERPL-MCNC: 42 MG/DL — HIGH (ref 7–23)
BUN SERPL-MCNC: 45 MG/DL — HIGH (ref 7–23)
BUN SERPL-MCNC: 45 MG/DL — HIGH (ref 7–23)
BUN SERPL-MCNC: 46 MG/DL — HIGH (ref 7–23)
BUN SERPL-MCNC: 46 MG/DL — HIGH (ref 7–23)
BUN SERPL-MCNC: 47 MG/DL — HIGH (ref 7–23)
BUN SERPL-MCNC: 47 MG/DL — HIGH (ref 7–23)
BUN SERPL-MCNC: 49 MG/DL — HIGH (ref 7–23)
BUN SERPL-MCNC: 49 MG/DL — HIGH (ref 7–23)
BUN SERPL-MCNC: 50 MG/DL
BUN SERPL-MCNC: 50 MG/DL — HIGH (ref 7–23)
BUN SERPL-MCNC: 50 MG/DL — HIGH (ref 7–23)
BUN SERPL-MCNC: 54 MG/DL — HIGH (ref 7–23)
BUN SERPL-MCNC: 54 MG/DL — HIGH (ref 7–23)
BUN SERPL-MCNC: 55 MG/DL — HIGH (ref 7–23)
BUN SERPL-MCNC: 55 MG/DL — HIGH (ref 7–23)
BUN SERPL-MCNC: 58 MG/DL — HIGH (ref 7–23)
BUN SERPL-MCNC: 63 MG/DL — HIGH (ref 7–23)
BUN SERPL-MCNC: 63 MG/DL — HIGH (ref 7–23)
BUN SERPL-MCNC: 64 MG/DL — HIGH (ref 7–23)
BUN SERPL-MCNC: 66 MG/DL — HIGH (ref 7–23)
BUN SERPL-MCNC: 66 MG/DL — HIGH (ref 7–23)
BUN SERPL-MCNC: 70 MG/DL — HIGH (ref 7–23)
BUN SERPL-MCNC: 70 MG/DL — HIGH (ref 7–23)
BUN SERPL-MCNC: 76 MG/DL — HIGH (ref 7–23)
BUN SERPL-MCNC: 76 MG/DL — HIGH (ref 7–23)
BUN SERPL-MCNC: 78 MG/DL — HIGH (ref 7–23)
BUN SERPL-MCNC: 78 MG/DL — HIGH (ref 7–23)
BUN SERPL-MCNC: 85 MG/DL — HIGH (ref 7–23)
BUN SERPL-MCNC: 85 MG/DL — HIGH (ref 7–23)
BUN SERPL-MCNC: 86 MG/DL — HIGH (ref 7–23)
BUN SERPL-MCNC: 86 MG/DL — HIGH (ref 7–23)
BUN SERPL-MCNC: 87 MG/DL — HIGH (ref 7–23)
BUN SERPL-MCNC: 87 MG/DL — HIGH (ref 7–23)
BUN SERPL-MCNC: 88 MG/DL — HIGH (ref 7–23)
BUN SERPL-MCNC: 88 MG/DL — HIGH (ref 7–23)
CALCIUM SERPL-MCNC: 8.1 MG/DL — LOW (ref 8.5–10.1)
CALCIUM SERPL-MCNC: 8.1 MG/DL — LOW (ref 8.5–10.1)
CALCIUM SERPL-MCNC: 8.3 MG/DL — LOW (ref 8.5–10.1)
CALCIUM SERPL-MCNC: 8.4 MG/DL — LOW (ref 8.5–10.1)
CALCIUM SERPL-MCNC: 8.5 MG/DL — SIGNIFICANT CHANGE UP (ref 8.5–10.1)
CALCIUM SERPL-MCNC: 8.5 MG/DL — SIGNIFICANT CHANGE UP (ref 8.5–10.1)
CALCIUM SERPL-MCNC: 8.6 MG/DL — SIGNIFICANT CHANGE UP (ref 8.5–10.1)
CALCIUM SERPL-MCNC: 8.7 MG/DL — SIGNIFICANT CHANGE UP (ref 8.5–10.1)
CALCIUM SERPL-MCNC: 8.8 MG/DL — SIGNIFICANT CHANGE UP (ref 8.5–10.1)
CALCIUM SERPL-MCNC: 8.9 MG/DL — SIGNIFICANT CHANGE UP (ref 8.5–10.1)
CALCIUM SERPL-MCNC: 9 MG/DL — SIGNIFICANT CHANGE UP (ref 8.5–10.1)
CALCIUM SERPL-MCNC: 9.1 MG/DL — SIGNIFICANT CHANGE UP (ref 8.5–10.1)
CALCIUM SERPL-MCNC: 9.2 MG/DL — SIGNIFICANT CHANGE UP (ref 8.5–10.1)
CALCIUM SERPL-MCNC: 9.3 MG/DL — SIGNIFICANT CHANGE UP (ref 8.5–10.1)
CALCIUM SERPL-MCNC: 9.4 MG/DL — SIGNIFICANT CHANGE UP (ref 8.5–10.1)
CALCIUM SERPL-MCNC: 9.8 MG/DL
CAST: NORMAL /LPF
CHLORIDE BLDV-SCNC: 97 MMOL/L — LOW (ref 98–107)
CHLORIDE BLDV-SCNC: 97 MMOL/L — LOW (ref 98–107)
CHLORIDE SERPL-SCNC: 101 MMOL/L — SIGNIFICANT CHANGE UP (ref 96–108)
CHLORIDE SERPL-SCNC: 103 MMOL/L — SIGNIFICANT CHANGE UP (ref 96–108)
CHLORIDE SERPL-SCNC: 103 MMOL/L — SIGNIFICANT CHANGE UP (ref 96–108)
CHLORIDE SERPL-SCNC: 104 MMOL/L
CHLORIDE SERPL-SCNC: 104 MMOL/L — SIGNIFICANT CHANGE UP (ref 96–108)
CHLORIDE SERPL-SCNC: 104 MMOL/L — SIGNIFICANT CHANGE UP (ref 96–108)
CHLORIDE SERPL-SCNC: 105 MMOL/L — SIGNIFICANT CHANGE UP (ref 96–108)
CHLORIDE SERPL-SCNC: 106 MMOL/L — SIGNIFICANT CHANGE UP (ref 96–108)
CHLORIDE SERPL-SCNC: 107 MMOL/L — SIGNIFICANT CHANGE UP (ref 96–108)
CHLORIDE SERPL-SCNC: 108 MMOL/L — SIGNIFICANT CHANGE UP (ref 96–108)
CHLORIDE SERPL-SCNC: 109 MMOL/L — HIGH (ref 96–108)
CHLORIDE SERPL-SCNC: 110 MMOL/L — HIGH (ref 96–108)
CHLORIDE SERPL-SCNC: 111 MMOL/L — HIGH (ref 96–108)
CHLORIDE SERPL-SCNC: 111 MMOL/L — HIGH (ref 96–108)
CHLORIDE SERPL-SCNC: 112 MMOL/L — HIGH (ref 96–108)
CHLORIDE SERPL-SCNC: 113 MMOL/L — HIGH (ref 96–108)
CHLORIDE SERPL-SCNC: 114 MMOL/L — HIGH (ref 96–108)
CHLORIDE SERPL-SCNC: 115 MMOL/L — HIGH (ref 96–108)
CHLORIDE SERPL-SCNC: 117 MMOL/L — HIGH (ref 96–108)
CHLORIDE SERPL-SCNC: 117 MMOL/L — HIGH (ref 96–108)
CHLORIDE SERPL-SCNC: 98 MMOL/L — SIGNIFICANT CHANGE UP (ref 96–108)
CHLORIDE SERPL-SCNC: 98 MMOL/L — SIGNIFICANT CHANGE UP (ref 96–108)
CHLORIDE SERPL-SCNC: 99 MMOL/L — SIGNIFICANT CHANGE UP (ref 96–108)
CHLORIDE SERPL-SCNC: 99 MMOL/L — SIGNIFICANT CHANGE UP (ref 96–108)
CO2 BLDA-SCNC: 28 MMOL/L — HIGH (ref 19–24)
CO2 BLDA-SCNC: 28 MMOL/L — HIGH (ref 19–24)
CO2 BLDA-SCNC: 35 MMOL/L — HIGH (ref 19–24)
CO2 BLDA-SCNC: 35 MMOL/L — HIGH (ref 19–24)
CO2 BLDV-SCNC: 26 MMOL/L — SIGNIFICANT CHANGE UP (ref 22–26)
CO2 BLDV-SCNC: 26 MMOL/L — SIGNIFICANT CHANGE UP (ref 22–26)
CO2 BLDV-SCNC: 29 MMOL/L — HIGH (ref 22–26)
CO2 BLDV-SCNC: 29 MMOL/L — HIGH (ref 22–26)
CO2 BLDV-SCNC: 34 MMOL/L — HIGH (ref 22–26)
CO2 BLDV-SCNC: 34 MMOL/L — HIGH (ref 22–26)
CO2 SERPL-SCNC: 17 MMOL/L — LOW (ref 22–31)
CO2 SERPL-SCNC: 17 MMOL/L — LOW (ref 22–31)
CO2 SERPL-SCNC: 19 MMOL/L — LOW (ref 22–31)
CO2 SERPL-SCNC: 19 MMOL/L — LOW (ref 22–31)
CO2 SERPL-SCNC: 20 MMOL/L — LOW (ref 22–31)
CO2 SERPL-SCNC: 21 MMOL/L — LOW (ref 22–31)
CO2 SERPL-SCNC: 21 MMOL/L — LOW (ref 22–31)
CO2 SERPL-SCNC: 22 MMOL/L — SIGNIFICANT CHANGE UP (ref 22–31)
CO2 SERPL-SCNC: 23 MMOL/L — SIGNIFICANT CHANGE UP (ref 22–31)
CO2 SERPL-SCNC: 24 MMOL/L
CO2 SERPL-SCNC: 24 MMOL/L — SIGNIFICANT CHANGE UP (ref 22–31)
CO2 SERPL-SCNC: 25 MMOL/L — SIGNIFICANT CHANGE UP (ref 22–31)
CO2 SERPL-SCNC: 26 MMOL/L — SIGNIFICANT CHANGE UP (ref 22–31)
CO2 SERPL-SCNC: 27 MMOL/L — SIGNIFICANT CHANGE UP (ref 22–31)
CO2 SERPL-SCNC: 28 MMOL/L — SIGNIFICANT CHANGE UP (ref 22–31)
CO2 SERPL-SCNC: 29 MMOL/L — SIGNIFICANT CHANGE UP (ref 22–31)
CO2 SERPL-SCNC: 30 MMOL/L — SIGNIFICANT CHANGE UP (ref 22–31)
CO2 SERPL-SCNC: 31 MMOL/L — SIGNIFICANT CHANGE UP (ref 22–31)
CO2 SERPL-SCNC: 32 MMOL/L — HIGH (ref 22–31)
CO2 SERPL-SCNC: 32 MMOL/L — HIGH (ref 22–31)
COLOR FLD: SIGNIFICANT CHANGE UP
COLOR FLD: SIGNIFICANT CHANGE UP
COLOR SPEC: YELLOW — SIGNIFICANT CHANGE UP
COLOR: YELLOW
CREAT SERPL-MCNC: 1.56 MG/DL
CREAT SERPL-MCNC: 1.65 MG/DL — HIGH (ref 0.5–1.3)
CREAT SERPL-MCNC: 1.65 MG/DL — HIGH (ref 0.5–1.3)
CREAT SERPL-MCNC: 1.78 MG/DL — HIGH (ref 0.5–1.3)
CREAT SERPL-MCNC: 1.78 MG/DL — HIGH (ref 0.5–1.3)
CREAT SERPL-MCNC: 1.79 MG/DL — HIGH (ref 0.5–1.3)
CREAT SERPL-MCNC: 1.79 MG/DL — HIGH (ref 0.5–1.3)
CREAT SERPL-MCNC: 1.81 MG/DL — HIGH (ref 0.5–1.3)
CREAT SERPL-MCNC: 1.87 MG/DL — HIGH (ref 0.5–1.3)
CREAT SERPL-MCNC: 1.87 MG/DL — HIGH (ref 0.5–1.3)
CREAT SERPL-MCNC: 1.88 MG/DL — HIGH (ref 0.5–1.3)
CREAT SERPL-MCNC: 1.88 MG/DL — HIGH (ref 0.5–1.3)
CREAT SERPL-MCNC: 1.93 MG/DL — HIGH (ref 0.5–1.3)
CREAT SERPL-MCNC: 1.93 MG/DL — HIGH (ref 0.5–1.3)
CREAT SERPL-MCNC: 1.96 MG/DL — HIGH (ref 0.5–1.3)
CREAT SERPL-MCNC: 1.97 MG/DL — HIGH (ref 0.5–1.3)
CREAT SERPL-MCNC: 1.98 MG/DL — HIGH (ref 0.5–1.3)
CREAT SERPL-MCNC: 1.98 MG/DL — HIGH (ref 0.5–1.3)
CREAT SERPL-MCNC: 1.99 MG/DL — HIGH (ref 0.5–1.3)
CREAT SERPL-MCNC: 1.99 MG/DL — HIGH (ref 0.5–1.3)
CREAT SERPL-MCNC: 2.04 MG/DL — HIGH (ref 0.5–1.3)
CREAT SERPL-MCNC: 2.04 MG/DL — HIGH (ref 0.5–1.3)
CREAT SERPL-MCNC: 2.09 MG/DL — HIGH (ref 0.5–1.3)
CREAT SERPL-MCNC: 2.09 MG/DL — HIGH (ref 0.5–1.3)
CREAT SERPL-MCNC: 2.11 MG/DL — HIGH (ref 0.5–1.3)
CREAT SERPL-MCNC: 2.11 MG/DL — HIGH (ref 0.5–1.3)
CREAT SERPL-MCNC: 2.17 MG/DL — HIGH (ref 0.5–1.3)
CREAT SERPL-MCNC: 2.18 MG/DL — HIGH (ref 0.5–1.3)
CREAT SERPL-MCNC: 2.21 MG/DL — HIGH (ref 0.5–1.3)
CREAT SERPL-MCNC: 2.21 MG/DL — HIGH (ref 0.5–1.3)
CREAT SERPL-MCNC: 2.23 MG/DL — HIGH (ref 0.5–1.3)
CREAT SERPL-MCNC: 2.23 MG/DL — HIGH (ref 0.5–1.3)
CREAT SERPL-MCNC: 2.24 MG/DL — HIGH (ref 0.5–1.3)
CREAT SERPL-MCNC: 2.24 MG/DL — HIGH (ref 0.5–1.3)
CREAT SERPL-MCNC: 2.26 MG/DL — HIGH (ref 0.5–1.3)
CREAT SERPL-MCNC: 2.3 MG/DL — HIGH (ref 0.5–1.3)
CREAT SERPL-MCNC: 2.3 MG/DL — HIGH (ref 0.5–1.3)
CREAT SERPL-MCNC: 2.33 MG/DL — HIGH (ref 0.5–1.3)
CREAT SERPL-MCNC: 2.33 MG/DL — HIGH (ref 0.5–1.3)
CREAT SERPL-MCNC: 2.36 MG/DL — HIGH (ref 0.5–1.3)
CREAT SERPL-MCNC: 2.36 MG/DL — HIGH (ref 0.5–1.3)
CREAT SERPL-MCNC: 2.37 MG/DL — HIGH (ref 0.5–1.3)
CREAT SERPL-MCNC: 2.37 MG/DL — HIGH (ref 0.5–1.3)
CREAT SERPL-MCNC: 2.47 MG/DL — HIGH (ref 0.5–1.3)
CREAT SERPL-MCNC: 2.47 MG/DL — HIGH (ref 0.5–1.3)
CREAT SERPL-MCNC: 2.59 MG/DL — HIGH (ref 0.5–1.3)
CREAT SERPL-MCNC: 2.59 MG/DL — HIGH (ref 0.5–1.3)
CREAT SERPL-MCNC: 2.68 MG/DL — HIGH (ref 0.5–1.3)
CREAT SERPL-MCNC: 2.68 MG/DL — HIGH (ref 0.5–1.3)
CREAT SERPL-MCNC: 2.72 MG/DL — HIGH (ref 0.5–1.3)
CREAT SERPL-MCNC: 2.72 MG/DL — HIGH (ref 0.5–1.3)
CREAT SERPL-MCNC: 2.83 MG/DL — HIGH (ref 0.5–1.3)
CREAT SERPL-MCNC: 2.83 MG/DL — HIGH (ref 0.5–1.3)
CREAT SERPL-MCNC: 2.91 MG/DL — HIGH (ref 0.5–1.3)
CREAT SERPL-MCNC: 2.91 MG/DL — HIGH (ref 0.5–1.3)
CREAT SERPL-MCNC: 3.09 MG/DL — HIGH (ref 0.5–1.3)
CREAT SERPL-MCNC: 3.09 MG/DL — HIGH (ref 0.5–1.3)
CREAT SERPL-MCNC: 3.14 MG/DL — HIGH (ref 0.5–1.3)
CREAT SERPL-MCNC: 3.14 MG/DL — HIGH (ref 0.5–1.3)
CREAT SERPL-MCNC: 3.25 MG/DL — HIGH (ref 0.5–1.3)
CREAT SERPL-MCNC: 3.25 MG/DL — HIGH (ref 0.5–1.3)
CREAT SERPL-MCNC: 3.4 MG/DL — HIGH (ref 0.5–1.3)
CREAT SERPL-MCNC: 3.4 MG/DL — HIGH (ref 0.5–1.3)
CULTURE RESULTS: ABNORMAL
CULTURE RESULTS: SIGNIFICANT CHANGE UP
D DIMER BLD IA.RAPID-MCNC: 3048 NG/ML DDU — HIGH
D DIMER BLD IA.RAPID-MCNC: 3048 NG/ML DDU — HIGH
DACRYOCYTES BLD QL SMEAR: SLIGHT — SIGNIFICANT CHANGE UP
DACRYOCYTES BLD QL SMEAR: SLIGHT — SIGNIFICANT CHANGE UP
DAT IGG-SP REAG RBC-IMP: ABNORMAL
DAT IGG-SP REAG RBC-IMP: ABNORMAL
DIFF PNL FLD: ABNORMAL
DIR ANTIGLOB POLYSPECIFIC INTERPRETATION: ABNORMAL
DIR ANTIGLOB POLYSPECIFIC INTERPRETATION: ABNORMAL
EGFR: 16 ML/MIN/1.73M2 — LOW
EGFR: 16 ML/MIN/1.73M2 — LOW
EGFR: 17 ML/MIN/1.73M2 — LOW
EGFR: 17 ML/MIN/1.73M2 — LOW
EGFR: 18 ML/MIN/1.73M2 — LOW
EGFR: 19 ML/MIN/1.73M2 — LOW
EGFR: 19 ML/MIN/1.73M2 — LOW
EGFR: 20 ML/MIN/1.73M2 — LOW
EGFR: 20 ML/MIN/1.73M2 — LOW
EGFR: 21 ML/MIN/1.73M2 — LOW
EGFR: 21 ML/MIN/1.73M2 — LOW
EGFR: 22 ML/MIN/1.73M2 — LOW
EGFR: 24 ML/MIN/1.73M2 — LOW
EGFR: 24 ML/MIN/1.73M2 — LOW
EGFR: 25 ML/MIN/1.73M2 — LOW
EGFR: 26 ML/MIN/1.73M2 — LOW
EGFR: 27 ML/MIN/1.73M2 — LOW
EGFR: 28 ML/MIN/1.73M2 — LOW
EGFR: 29 ML/MIN/1.73M2 — LOW
EGFR: 30 ML/MIN/1.73M2 — LOW
EGFR: 30 ML/MIN/1.73M2 — LOW
EGFR: 31 ML/MIN/1.73M2 — LOW
EGFR: 32 ML/MIN/1.73M2 — LOW
EGFR: 32 ML/MIN/1.73M2 — LOW
EGFR: 33 ML/MIN/1.73M2 — LOW
EGFR: 34 ML/MIN/1.73M2 — LOW
EGFR: 35 ML/MIN/1.73M2 — LOW
EGFR: 38 ML/MIN/1.73M2 — LOW
EGFR: 38 ML/MIN/1.73M2 — LOW
EGFR: 41 ML/MIN/1.73M2
ELLIPTOCYTES BLD QL SMEAR: SLIGHT — SIGNIFICANT CHANGE UP
EOSINOPHIL # BLD AUTO: 0.02 K/UL — SIGNIFICANT CHANGE UP (ref 0–0.5)
EOSINOPHIL # BLD AUTO: 0.02 K/UL — SIGNIFICANT CHANGE UP (ref 0–0.5)
EOSINOPHIL # BLD AUTO: 0.03 K/UL — SIGNIFICANT CHANGE UP (ref 0–0.5)
EOSINOPHIL # BLD AUTO: 0.03 K/UL — SIGNIFICANT CHANGE UP (ref 0–0.5)
EOSINOPHIL # BLD AUTO: 0.15 K/UL — SIGNIFICANT CHANGE UP (ref 0–0.5)
EOSINOPHIL # BLD AUTO: 0.15 K/UL — SIGNIFICANT CHANGE UP (ref 0–0.5)
EOSINOPHIL # BLD AUTO: 0.2 K/UL — SIGNIFICANT CHANGE UP (ref 0–0.5)
EOSINOPHIL # BLD AUTO: 0.2 K/UL — SIGNIFICANT CHANGE UP (ref 0–0.5)
EOSINOPHIL # BLD AUTO: 0.21 K/UL — SIGNIFICANT CHANGE UP (ref 0–0.5)
EOSINOPHIL # BLD AUTO: 0.25 K/UL — SIGNIFICANT CHANGE UP (ref 0–0.5)
EOSINOPHIL # BLD AUTO: 0.25 K/UL — SIGNIFICANT CHANGE UP (ref 0–0.5)
EOSINOPHIL # BLD AUTO: 0.28 K/UL — SIGNIFICANT CHANGE UP (ref 0–0.5)
EOSINOPHIL # BLD AUTO: 0.28 K/UL — SIGNIFICANT CHANGE UP (ref 0–0.5)
EOSINOPHIL # BLD AUTO: 0.46 K/UL — SIGNIFICANT CHANGE UP (ref 0–0.5)
EOSINOPHIL # BLD AUTO: 0.46 K/UL — SIGNIFICANT CHANGE UP (ref 0–0.5)
EOSINOPHIL # BLD AUTO: 0.49 K/UL — SIGNIFICANT CHANGE UP (ref 0–0.5)
EOSINOPHIL # BLD AUTO: 0.93 K/UL — HIGH (ref 0–0.5)
EOSINOPHIL # FLD: 0 % — SIGNIFICANT CHANGE UP
EOSINOPHIL # FLD: 0 % — SIGNIFICANT CHANGE UP
EOSINOPHIL NFR BLD AUTO: 0.2 % — SIGNIFICANT CHANGE UP (ref 0–6)
EOSINOPHIL NFR BLD AUTO: 0.2 % — SIGNIFICANT CHANGE UP (ref 0–6)
EOSINOPHIL NFR BLD AUTO: 0.4 % — SIGNIFICANT CHANGE UP (ref 0–6)
EOSINOPHIL NFR BLD AUTO: 0.4 % — SIGNIFICANT CHANGE UP (ref 0–6)
EOSINOPHIL NFR BLD AUTO: 1.5 % — SIGNIFICANT CHANGE UP (ref 0–6)
EOSINOPHIL NFR BLD AUTO: 1.5 % — SIGNIFICANT CHANGE UP (ref 0–6)
EOSINOPHIL NFR BLD AUTO: 11 % — HIGH (ref 0–6)
EOSINOPHIL NFR BLD AUTO: 11.2 % — HIGH (ref 0–6)
EOSINOPHIL NFR BLD AUTO: 11.2 % — HIGH (ref 0–6)
EOSINOPHIL NFR BLD AUTO: 18.6 % — HIGH (ref 0–6)
EOSINOPHIL NFR BLD AUTO: 3 % — SIGNIFICANT CHANGE UP (ref 0–6)
EOSINOPHIL NFR BLD AUTO: 3 % — SIGNIFICANT CHANGE UP (ref 0–6)
EOSINOPHIL NFR BLD AUTO: 3.2 % — SIGNIFICANT CHANGE UP (ref 0–6)
EOSINOPHIL NFR BLD AUTO: 3.2 % — SIGNIFICANT CHANGE UP (ref 0–6)
EOSINOPHIL NFR BLD AUTO: 3.7 % — SIGNIFICANT CHANGE UP (ref 0–6)
EOSINOPHIL NFR BLD AUTO: 3.9 % — SIGNIFICANT CHANGE UP (ref 0–6)
EOSINOPHIL NFR BLD AUTO: 3.9 % — SIGNIFICANT CHANGE UP (ref 0–6)
EPI CELLS # UR: PRESENT
EPI CELLS # UR: PRESENT
EPI CELLS # UR: SIGNIFICANT CHANGE UP
EPI CELLS # UR: SIGNIFICANT CHANGE UP
EPITHELIAL CELLS: 1 /HPF
FERRITIN SERPL-MCNC: 715 NG/ML — HIGH (ref 30–400)
FERRITIN SERPL-MCNC: 715 NG/ML — HIGH (ref 30–400)
FLUAV AG NPH QL: SIGNIFICANT CHANGE UP
FLUAV AG NPH QL: SIGNIFICANT CHANGE UP
FLUBV AG NPH QL: SIGNIFICANT CHANGE UP
FLUBV AG NPH QL: SIGNIFICANT CHANGE UP
FLUID INTAKE SUBSTANCE CLASS: SIGNIFICANT CHANGE UP
FLUID INTAKE SUBSTANCE CLASS: SIGNIFICANT CHANGE UP
FOLATE+VIT B12 SERBLD-IMP: 0 % — SIGNIFICANT CHANGE UP
FOLATE+VIT B12 SERBLD-IMP: 0 % — SIGNIFICANT CHANGE UP
GAS PNL BLDA: SIGNIFICANT CHANGE UP
GAS PNL BLDV: 127 MMOL/L — LOW (ref 136–145)
GAS PNL BLDV: 127 MMOL/L — LOW (ref 136–145)
GAS PNL BLDV: SIGNIFICANT CHANGE UP
GLUCOSE BLDC GLUCOMTR-MCNC: 138 MG/DL — HIGH (ref 70–99)
GLUCOSE BLDC GLUCOMTR-MCNC: 138 MG/DL — HIGH (ref 70–99)
GLUCOSE BLDC GLUCOMTR-MCNC: 76 MG/DL — SIGNIFICANT CHANGE UP (ref 70–99)
GLUCOSE BLDC GLUCOMTR-MCNC: 76 MG/DL — SIGNIFICANT CHANGE UP (ref 70–99)
GLUCOSE BLDC GLUCOMTR-MCNC: 81 MG/DL — SIGNIFICANT CHANGE UP (ref 70–99)
GLUCOSE BLDC GLUCOMTR-MCNC: 81 MG/DL — SIGNIFICANT CHANGE UP (ref 70–99)
GLUCOSE BLDC GLUCOMTR-MCNC: 84 MG/DL — SIGNIFICANT CHANGE UP (ref 70–99)
GLUCOSE BLDC GLUCOMTR-MCNC: 84 MG/DL — SIGNIFICANT CHANGE UP (ref 70–99)
GLUCOSE BLDC GLUCOMTR-MCNC: 92 MG/DL — SIGNIFICANT CHANGE UP (ref 70–99)
GLUCOSE BLDC GLUCOMTR-MCNC: 92 MG/DL — SIGNIFICANT CHANGE UP (ref 70–99)
GLUCOSE BLDC GLUCOMTR-MCNC: 94 MG/DL — SIGNIFICANT CHANGE UP (ref 70–99)
GLUCOSE BLDC GLUCOMTR-MCNC: 94 MG/DL — SIGNIFICANT CHANGE UP (ref 70–99)
GLUCOSE BLDV-MCNC: 109 MG/DL — HIGH (ref 65–95)
GLUCOSE BLDV-MCNC: 109 MG/DL — HIGH (ref 65–95)
GLUCOSE QUALITATIVE U: NEGATIVE MG/DL
GLUCOSE SERPL-MCNC: 100 MG/DL — HIGH (ref 70–99)
GLUCOSE SERPL-MCNC: 100 MG/DL — HIGH (ref 70–99)
GLUCOSE SERPL-MCNC: 102 MG/DL — HIGH (ref 70–99)
GLUCOSE SERPL-MCNC: 102 MG/DL — HIGH (ref 70–99)
GLUCOSE SERPL-MCNC: 108 MG/DL — HIGH (ref 70–99)
GLUCOSE SERPL-MCNC: 108 MG/DL — HIGH (ref 70–99)
GLUCOSE SERPL-MCNC: 112 MG/DL — HIGH (ref 70–99)
GLUCOSE SERPL-MCNC: 112 MG/DL — HIGH (ref 70–99)
GLUCOSE SERPL-MCNC: 115 MG/DL — HIGH (ref 70–99)
GLUCOSE SERPL-MCNC: 115 MG/DL — HIGH (ref 70–99)
GLUCOSE SERPL-MCNC: 117 MG/DL — HIGH (ref 70–99)
GLUCOSE SERPL-MCNC: 117 MG/DL — HIGH (ref 70–99)
GLUCOSE SERPL-MCNC: 119 MG/DL — HIGH (ref 70–99)
GLUCOSE SERPL-MCNC: 119 MG/DL — HIGH (ref 70–99)
GLUCOSE SERPL-MCNC: 136 MG/DL — HIGH (ref 70–99)
GLUCOSE SERPL-MCNC: 136 MG/DL — HIGH (ref 70–99)
GLUCOSE SERPL-MCNC: 138 MG/DL — HIGH (ref 70–99)
GLUCOSE SERPL-MCNC: 138 MG/DL — HIGH (ref 70–99)
GLUCOSE SERPL-MCNC: 151 MG/DL — HIGH (ref 70–99)
GLUCOSE SERPL-MCNC: 151 MG/DL — HIGH (ref 70–99)
GLUCOSE SERPL-MCNC: 164 MG/DL — HIGH (ref 70–99)
GLUCOSE SERPL-MCNC: 164 MG/DL — HIGH (ref 70–99)
GLUCOSE SERPL-MCNC: 173 MG/DL — HIGH (ref 70–99)
GLUCOSE SERPL-MCNC: 173 MG/DL — HIGH (ref 70–99)
GLUCOSE SERPL-MCNC: 175 MG/DL — HIGH (ref 70–99)
GLUCOSE SERPL-MCNC: 177 MG/DL — HIGH (ref 70–99)
GLUCOSE SERPL-MCNC: 177 MG/DL — HIGH (ref 70–99)
GLUCOSE SERPL-MCNC: 193 MG/DL — HIGH (ref 70–99)
GLUCOSE SERPL-MCNC: 193 MG/DL — HIGH (ref 70–99)
GLUCOSE SERPL-MCNC: 217 MG/DL — HIGH (ref 70–99)
GLUCOSE SERPL-MCNC: 217 MG/DL — HIGH (ref 70–99)
GLUCOSE SERPL-MCNC: 69 MG/DL — LOW (ref 70–99)
GLUCOSE SERPL-MCNC: 69 MG/DL — LOW (ref 70–99)
GLUCOSE SERPL-MCNC: 71 MG/DL — SIGNIFICANT CHANGE UP (ref 70–99)
GLUCOSE SERPL-MCNC: 71 MG/DL — SIGNIFICANT CHANGE UP (ref 70–99)
GLUCOSE SERPL-MCNC: 75 MG/DL — SIGNIFICANT CHANGE UP (ref 70–99)
GLUCOSE SERPL-MCNC: 75 MG/DL — SIGNIFICANT CHANGE UP (ref 70–99)
GLUCOSE SERPL-MCNC: 77 MG/DL
GLUCOSE SERPL-MCNC: 78 MG/DL — SIGNIFICANT CHANGE UP (ref 70–99)
GLUCOSE SERPL-MCNC: 82 MG/DL — SIGNIFICANT CHANGE UP (ref 70–99)
GLUCOSE SERPL-MCNC: 82 MG/DL — SIGNIFICANT CHANGE UP (ref 70–99)
GLUCOSE SERPL-MCNC: 83 MG/DL — SIGNIFICANT CHANGE UP (ref 70–99)
GLUCOSE SERPL-MCNC: 84 MG/DL — SIGNIFICANT CHANGE UP (ref 70–99)
GLUCOSE SERPL-MCNC: 84 MG/DL — SIGNIFICANT CHANGE UP (ref 70–99)
GLUCOSE SERPL-MCNC: 85 MG/DL — SIGNIFICANT CHANGE UP (ref 70–99)
GLUCOSE SERPL-MCNC: 85 MG/DL — SIGNIFICANT CHANGE UP (ref 70–99)
GLUCOSE SERPL-MCNC: 88 MG/DL — SIGNIFICANT CHANGE UP (ref 70–99)
GLUCOSE SERPL-MCNC: 88 MG/DL — SIGNIFICANT CHANGE UP (ref 70–99)
GLUCOSE SERPL-MCNC: 89 MG/DL — SIGNIFICANT CHANGE UP (ref 70–99)
GLUCOSE SERPL-MCNC: 90 MG/DL — SIGNIFICANT CHANGE UP (ref 70–99)
GLUCOSE SERPL-MCNC: 90 MG/DL — SIGNIFICANT CHANGE UP (ref 70–99)
GLUCOSE SERPL-MCNC: 91 MG/DL — SIGNIFICANT CHANGE UP (ref 70–99)
GLUCOSE SERPL-MCNC: 91 MG/DL — SIGNIFICANT CHANGE UP (ref 70–99)
GLUCOSE SERPL-MCNC: 93 MG/DL — SIGNIFICANT CHANGE UP (ref 70–99)
GLUCOSE SERPL-MCNC: 93 MG/DL — SIGNIFICANT CHANGE UP (ref 70–99)
GLUCOSE SERPL-MCNC: 95 MG/DL — SIGNIFICANT CHANGE UP (ref 70–99)
GLUCOSE SERPL-MCNC: 95 MG/DL — SIGNIFICANT CHANGE UP (ref 70–99)
GLUCOSE SERPL-MCNC: 96 MG/DL — SIGNIFICANT CHANGE UP (ref 70–99)
GLUCOSE SERPL-MCNC: 97 MG/DL — SIGNIFICANT CHANGE UP (ref 70–99)
GLUCOSE UR QL: NEGATIVE MG/DL — SIGNIFICANT CHANGE UP
GRAM STN FLD: ABNORMAL
HAPTOGLOB SERPL-MCNC: 369 MG/DL — HIGH (ref 34–200)
HAPTOGLOB SERPL-MCNC: 369 MG/DL — HIGH (ref 34–200)
HCO3 BLDA-SCNC: 25 MMOL/L — SIGNIFICANT CHANGE UP (ref 21–28)
HCO3 BLDA-SCNC: 25 MMOL/L — SIGNIFICANT CHANGE UP (ref 21–28)
HCO3 BLDA-SCNC: 33 MMOL/L — HIGH (ref 21–28)
HCO3 BLDA-SCNC: 33 MMOL/L — HIGH (ref 21–28)
HCO3 BLDV-SCNC: 24 MMOL/L — SIGNIFICANT CHANGE UP (ref 22–28)
HCO3 BLDV-SCNC: 24 MMOL/L — SIGNIFICANT CHANGE UP (ref 22–28)
HCO3 BLDV-SCNC: 28 MMOL/L — SIGNIFICANT CHANGE UP (ref 22–28)
HCO3 BLDV-SCNC: 28 MMOL/L — SIGNIFICANT CHANGE UP (ref 22–28)
HCO3 BLDV-SCNC: 32 MMOL/L — HIGH (ref 22–28)
HCO3 BLDV-SCNC: 32 MMOL/L — HIGH (ref 22–28)
HCT VFR BLD CALC: 19 % — CRITICAL LOW (ref 39–50)
HCT VFR BLD CALC: 19 % — CRITICAL LOW (ref 39–50)
HCT VFR BLD CALC: 21.3 % — LOW (ref 39–50)
HCT VFR BLD CALC: 21.3 % — LOW (ref 39–50)
HCT VFR BLD CALC: 21.4 % — LOW (ref 39–50)
HCT VFR BLD CALC: 21.4 % — LOW (ref 39–50)
HCT VFR BLD CALC: 22 % — LOW (ref 39–50)
HCT VFR BLD CALC: 22 % — LOW (ref 39–50)
HCT VFR BLD CALC: 22.2 % — LOW (ref 39–50)
HCT VFR BLD CALC: 22.2 % — LOW (ref 39–50)
HCT VFR BLD CALC: 22.3 % — LOW (ref 39–50)
HCT VFR BLD CALC: 22.6 % — LOW (ref 39–50)
HCT VFR BLD CALC: 22.6 % — LOW (ref 39–50)
HCT VFR BLD CALC: 22.8 % — LOW (ref 39–50)
HCT VFR BLD CALC: 22.8 % — LOW (ref 39–50)
HCT VFR BLD CALC: 23.4 % — LOW (ref 39–50)
HCT VFR BLD CALC: 23.4 % — LOW (ref 39–50)
HCT VFR BLD CALC: 23.8 % — LOW (ref 39–50)
HCT VFR BLD CALC: 24 % — LOW (ref 39–50)
HCT VFR BLD CALC: 24 % — LOW (ref 39–50)
HCT VFR BLD CALC: 24.3 % — LOW (ref 39–50)
HCT VFR BLD CALC: 24.3 % — LOW (ref 39–50)
HCT VFR BLD CALC: 24.4 % — LOW (ref 39–50)
HCT VFR BLD CALC: 24.6 % — LOW (ref 39–50)
HCT VFR BLD CALC: 24.8 % — LOW (ref 39–50)
HCT VFR BLD CALC: 24.8 % — LOW (ref 39–50)
HCT VFR BLD CALC: 25.2 % — LOW (ref 39–50)
HCT VFR BLD CALC: 25.2 % — LOW (ref 39–50)
HCT VFR BLD CALC: 25.7 % — LOW (ref 39–50)
HCT VFR BLD CALC: 25.7 % — LOW (ref 39–50)
HCT VFR BLD CALC: 25.8 % — LOW (ref 39–50)
HCT VFR BLD CALC: 25.8 % — LOW (ref 39–50)
HCT VFR BLD CALC: 26.4 % — LOW (ref 39–50)
HCT VFR BLD CALC: 26.4 % — LOW (ref 39–50)
HCT VFR BLD CALC: 26.5 % — LOW (ref 39–50)
HCT VFR BLD CALC: 26.5 % — LOW (ref 39–50)
HCT VFR BLD CALC: 26.9 % — LOW (ref 39–50)
HCT VFR BLD CALC: 26.9 % — LOW (ref 39–50)
HCT VFR BLD CALC: 27.2 % — LOW (ref 39–50)
HCT VFR BLD CALC: 27.2 % — LOW (ref 39–50)
HCT VFR BLD CALC: 27.7 % — LOW (ref 39–50)
HCT VFR BLD CALC: 27.7 % — LOW (ref 39–50)
HCT VFR BLD CALC: 27.8 % — LOW (ref 39–50)
HCT VFR BLD CALC: 27.8 % — LOW (ref 39–50)
HCT VFR BLD CALC: 27.9 % — LOW (ref 39–50)
HCT VFR BLD CALC: 27.9 % — LOW (ref 39–50)
HCT VFR BLD CALC: 28 % — LOW (ref 39–50)
HCT VFR BLD CALC: 28 % — LOW (ref 39–50)
HCT VFR BLD CALC: 28.2 % — LOW (ref 39–50)
HCT VFR BLD CALC: 28.2 % — LOW (ref 39–50)
HCT VFR BLD CALC: 29.5 % — LOW (ref 39–50)
HCT VFR BLD CALC: 29.5 % — LOW (ref 39–50)
HCT VFR BLD CALC: 29.6 % — LOW (ref 39–50)
HCT VFR BLD CALC: 29.6 % — LOW (ref 39–50)
HCT VFR BLD CALC: 29.9 % — LOW (ref 39–50)
HCT VFR BLD CALC: 29.9 % — LOW (ref 39–50)
HCT VFR BLD CALC: 30.1 % — LOW (ref 39–50)
HCT VFR BLD CALC: 30.1 % — LOW (ref 39–50)
HCT VFR BLDA CALC: 24 % — LOW (ref 37–47)
HCT VFR BLDA CALC: 24 % — LOW (ref 37–47)
HGB BLD CALC-MCNC: 7.9 G/DL — LOW (ref 12.6–17.4)
HGB BLD CALC-MCNC: 7.9 G/DL — LOW (ref 12.6–17.4)
HGB BLD-MCNC: 6.3 G/DL — CRITICAL LOW (ref 13–17)
HGB BLD-MCNC: 6.3 G/DL — CRITICAL LOW (ref 13–17)
HGB BLD-MCNC: 6.7 G/DL — CRITICAL LOW (ref 13–17)
HGB BLD-MCNC: 6.7 G/DL — CRITICAL LOW (ref 13–17)
HGB BLD-MCNC: 6.9 G/DL — CRITICAL LOW (ref 13–17)
HGB BLD-MCNC: 6.9 G/DL — CRITICAL LOW (ref 13–17)
HGB BLD-MCNC: 7 G/DL — CRITICAL LOW (ref 13–17)
HGB BLD-MCNC: 7.1 G/DL — LOW (ref 13–17)
HGB BLD-MCNC: 7.1 G/DL — LOW (ref 13–17)
HGB BLD-MCNC: 7.3 G/DL — LOW (ref 13–17)
HGB BLD-MCNC: 7.4 G/DL — LOW (ref 13–17)
HGB BLD-MCNC: 7.5 G/DL — LOW (ref 13–17)
HGB BLD-MCNC: 7.6 G/DL — LOW (ref 13–17)
HGB BLD-MCNC: 7.6 G/DL — LOW (ref 13–17)
HGB BLD-MCNC: 7.8 G/DL — LOW (ref 13–17)
HGB BLD-MCNC: 7.9 G/DL — LOW (ref 13–17)
HGB BLD-MCNC: 8.2 G/DL — LOW (ref 13–17)
HGB BLD-MCNC: 8.2 G/DL — LOW (ref 13–17)
HGB BLD-MCNC: 8.3 G/DL — LOW (ref 13–17)
HGB BLD-MCNC: 8.3 G/DL — LOW (ref 13–17)
HGB BLD-MCNC: 8.5 G/DL — LOW (ref 13–17)
HGB BLD-MCNC: 8.7 G/DL — LOW (ref 13–17)
HGB BLD-MCNC: 8.8 G/DL — LOW (ref 13–17)
HGB BLD-MCNC: 8.8 G/DL — LOW (ref 13–17)
HGB BLD-MCNC: 9 G/DL — LOW (ref 13–17)
HGB BLD-MCNC: 9.2 G/DL — LOW (ref 13–17)
HGB BLD-MCNC: 9.4 G/DL — LOW (ref 13–17)
HGB BLD-MCNC: 9.4 G/DL — LOW (ref 13–17)
HGB BLD-MCNC: 9.6 G/DL — LOW (ref 13–17)
HGB BLD-MCNC: 9.6 G/DL — LOW (ref 13–17)
HOROWITZ INDEX BLDA+IHG-RTO: 21 — SIGNIFICANT CHANGE UP
HOROWITZ INDEX BLDA+IHG-RTO: 21 — SIGNIFICANT CHANGE UP
HOROWITZ INDEX BLDA+IHG-RTO: 30 — SIGNIFICANT CHANGE UP
HOROWITZ INDEX BLDA+IHG-RTO: 30 — SIGNIFICANT CHANGE UP
HOROWITZ INDEX BLDV+IHG-RTO: 21 — SIGNIFICANT CHANGE UP
HOROWITZ INDEX BLDV+IHG-RTO: 21 — SIGNIFICANT CHANGE UP
HYPOCHROMIA BLD QL: SLIGHT — SIGNIFICANT CHANGE UP
IAT COMP-SP REAG SERPL QL: SIGNIFICANT CHANGE UP
IAT COMP-SP REAG SERPL QL: SIGNIFICANT CHANGE UP
IMM GRANULOCYTES NFR BLD AUTO: 0.2 % — SIGNIFICANT CHANGE UP (ref 0–0.9)
IMM GRANULOCYTES NFR BLD AUTO: 0.5 % — SIGNIFICANT CHANGE UP (ref 0–0.9)
IMM GRANULOCYTES NFR BLD AUTO: 0.5 % — SIGNIFICANT CHANGE UP (ref 0–0.9)
IMM GRANULOCYTES NFR BLD AUTO: 0.6 % — SIGNIFICANT CHANGE UP (ref 0–0.9)
IMM GRANULOCYTES NFR BLD AUTO: 0.7 % — SIGNIFICANT CHANGE UP (ref 0–0.9)
IMM GRANULOCYTES NFR BLD AUTO: 0.7 % — SIGNIFICANT CHANGE UP (ref 0–0.9)
IMM GRANULOCYTES NFR BLD AUTO: 0.8 % — SIGNIFICANT CHANGE UP (ref 0–0.9)
IMM GRANULOCYTES NFR BLD AUTO: 0.8 % — SIGNIFICANT CHANGE UP (ref 0–0.9)
IMM GRANULOCYTES NFR BLD AUTO: 1.7 % — HIGH (ref 0–0.9)
IMM GRANULOCYTES NFR BLD AUTO: 1.7 % — HIGH (ref 0–0.9)
IMM GRANULOCYTES NFR BLD AUTO: 1.8 % — HIGH (ref 0–0.9)
IMM GRANULOCYTES NFR BLD AUTO: 1.8 % — HIGH (ref 0–0.9)
INR BLD: 1.02 RATIO — SIGNIFICANT CHANGE UP (ref 0.85–1.18)
INR BLD: 1.02 RATIO — SIGNIFICANT CHANGE UP (ref 0.85–1.18)
IRON SATN MFR SERPL: 23 % — SIGNIFICANT CHANGE UP (ref 16–55)
IRON SATN MFR SERPL: 23 % — SIGNIFICANT CHANGE UP (ref 16–55)
IRON SATN MFR SERPL: 32 UG/DL — LOW (ref 45–165)
IRON SATN MFR SERPL: 32 UG/DL — LOW (ref 45–165)
KETONES UR-MCNC: NEGATIVE MG/DL — SIGNIFICANT CHANGE UP
KETONES UR-MCNC: NEGATIVE MG/DL — SIGNIFICANT CHANGE UP
KETONES UR-MCNC: NEGATIVE — SIGNIFICANT CHANGE UP
KETONES URINE: NEGATIVE MG/DL
LACTATE BLDV-MCNC: 0.8 MMOL/L — SIGNIFICANT CHANGE UP (ref 0.56–1.39)
LACTATE BLDV-MCNC: 0.8 MMOL/L — SIGNIFICANT CHANGE UP (ref 0.56–1.39)
LACTATE SERPL-SCNC: 0.7 MMOL/L — SIGNIFICANT CHANGE UP (ref 0.7–2)
LACTATE SERPL-SCNC: 0.7 MMOL/L — SIGNIFICANT CHANGE UP (ref 0.7–2)
LDH SERPL L TO P-CCNC: 267 U/L — HIGH (ref 50–242)
LDH SERPL L TO P-CCNC: 267 U/L — HIGH (ref 50–242)
LEUKOCYTE ESTERASE UR-ACNC: ABNORMAL
LEUKOCYTE ESTERASE URINE: ABNORMAL
LIDOCAIN IGE QN: 33 U/L — SIGNIFICANT CHANGE UP (ref 13–75)
LIDOCAIN IGE QN: 33 U/L — SIGNIFICANT CHANGE UP (ref 13–75)
LYMPHOCYTES # BLD AUTO: 0.23 K/UL — LOW (ref 1–3.3)
LYMPHOCYTES # BLD AUTO: 0.23 K/UL — LOW (ref 1–3.3)
LYMPHOCYTES # BLD AUTO: 0.28 K/UL — LOW (ref 1–3.3)
LYMPHOCYTES # BLD AUTO: 0.28 K/UL — LOW (ref 1–3.3)
LYMPHOCYTES # BLD AUTO: 0.34 K/UL — LOW (ref 1–3.3)
LYMPHOCYTES # BLD AUTO: 0.34 K/UL — LOW (ref 1–3.3)
LYMPHOCYTES # BLD AUTO: 0.43 K/UL — LOW (ref 1–3.3)
LYMPHOCYTES # BLD AUTO: 0.43 K/UL — LOW (ref 1–3.3)
LYMPHOCYTES # BLD AUTO: 0.51 K/UL — LOW (ref 1–3.3)
LYMPHOCYTES # BLD AUTO: 0.51 K/UL — LOW (ref 1–3.3)
LYMPHOCYTES # BLD AUTO: 0.58 K/UL — LOW (ref 1–3.3)
LYMPHOCYTES # BLD AUTO: 0.58 K/UL — LOW (ref 1–3.3)
LYMPHOCYTES # BLD AUTO: 0.61 K/UL — LOW (ref 1–3.3)
LYMPHOCYTES # BLD AUTO: 0.61 K/UL — LOW (ref 1–3.3)
LYMPHOCYTES # BLD AUTO: 0.68 K/UL — LOW (ref 1–3.3)
LYMPHOCYTES # BLD AUTO: 0.85 K/UL — LOW (ref 1–3.3)
LYMPHOCYTES # BLD AUTO: 0.93 K/UL — LOW (ref 1–3.3)
LYMPHOCYTES # BLD AUTO: 0.93 K/UL — LOW (ref 1–3.3)
LYMPHOCYTES # BLD AUTO: 0.94 K/UL — LOW (ref 1–3.3)
LYMPHOCYTES # BLD AUTO: 0.94 K/UL — LOW (ref 1–3.3)
LYMPHOCYTES # BLD AUTO: 10.4 % — LOW (ref 13–44)
LYMPHOCYTES # BLD AUTO: 10.4 % — LOW (ref 13–44)
LYMPHOCYTES # BLD AUTO: 13 % — SIGNIFICANT CHANGE UP (ref 13–44)
LYMPHOCYTES # BLD AUTO: 13 % — SIGNIFICANT CHANGE UP (ref 13–44)
LYMPHOCYTES # BLD AUTO: 13.6 % — SIGNIFICANT CHANGE UP (ref 13–44)
LYMPHOCYTES # BLD AUTO: 19 % — SIGNIFICANT CHANGE UP (ref 13–44)
LYMPHOCYTES # BLD AUTO: 3.4 % — LOW (ref 13–44)
LYMPHOCYTES # BLD AUTO: 3.4 % — LOW (ref 13–44)
LYMPHOCYTES # BLD AUTO: 5.2 % — LOW (ref 13–44)
LYMPHOCYTES # BLD AUTO: 5.6 % — LOW (ref 13–44)
LYMPHOCYTES # BLD AUTO: 5.6 % — LOW (ref 13–44)
LYMPHOCYTES # BLD AUTO: 7.4 % — LOW (ref 13–44)
LYMPHOCYTES # BLD AUTO: 7.4 % — LOW (ref 13–44)
LYMPHOCYTES # BLD AUTO: 8.8 % — LOW (ref 13–44)
LYMPHOCYTES # BLD AUTO: 8.8 % — LOW (ref 13–44)
LYMPHOCYTES # BLD AUTO: 9.4 % — LOW (ref 13–44)
LYMPHOCYTES # BLD AUTO: 9.4 % — LOW (ref 13–44)
LYMPHOCYTES # FLD: 7 % — SIGNIFICANT CHANGE UP
LYMPHOCYTES # FLD: 7 % — SIGNIFICANT CHANGE UP
Lab: SIGNIFICANT CHANGE UP
Lab: SIGNIFICANT CHANGE UP
MAGNESIUM SERPL-MCNC: 1.7 MG/DL — SIGNIFICANT CHANGE UP (ref 1.6–2.6)
MAGNESIUM SERPL-MCNC: 1.7 MG/DL — SIGNIFICANT CHANGE UP (ref 1.6–2.6)
MAGNESIUM SERPL-MCNC: 1.8 MG/DL — SIGNIFICANT CHANGE UP (ref 1.6–2.6)
MAGNESIUM SERPL-MCNC: 1.9 MG/DL — SIGNIFICANT CHANGE UP (ref 1.6–2.6)
MAGNESIUM SERPL-MCNC: 1.9 MG/DL — SIGNIFICANT CHANGE UP (ref 1.6–2.6)
MAGNESIUM SERPL-MCNC: 2 MG/DL — SIGNIFICANT CHANGE UP (ref 1.6–2.6)
MAGNESIUM SERPL-MCNC: 2 MG/DL — SIGNIFICANT CHANGE UP (ref 1.6–2.6)
MAGNESIUM SERPL-MCNC: 2.1 MG/DL — SIGNIFICANT CHANGE UP (ref 1.6–2.6)
MAGNESIUM SERPL-MCNC: 2.2 MG/DL — SIGNIFICANT CHANGE UP (ref 1.6–2.6)
MAGNESIUM SERPL-MCNC: 2.3 MG/DL — SIGNIFICANT CHANGE UP (ref 1.6–2.6)
MAGNESIUM SERPL-MCNC: 2.4 MG/DL — SIGNIFICANT CHANGE UP (ref 1.6–2.6)
MAGNESIUM SERPL-MCNC: 2.4 MG/DL — SIGNIFICANT CHANGE UP (ref 1.6–2.6)
MAGNESIUM SERPL-MCNC: 2.5 MG/DL — SIGNIFICANT CHANGE UP (ref 1.6–2.6)
MAGNESIUM SERPL-MCNC: 2.5 MG/DL — SIGNIFICANT CHANGE UP (ref 1.6–2.6)
MAGNESIUM SERPL-MCNC: 2.7 MG/DL — HIGH (ref 1.6–2.6)
MAGNESIUM SERPL-MCNC: 2.9 MG/DL — HIGH (ref 1.6–2.6)
MAGNESIUM SERPL-MCNC: 2.9 MG/DL — HIGH (ref 1.6–2.6)
MANUAL SMEAR VERIFICATION: SIGNIFICANT CHANGE UP
MCHC RBC-ENTMCNC: 27.7 PG — SIGNIFICANT CHANGE UP (ref 27–34)
MCHC RBC-ENTMCNC: 27.7 PG — SIGNIFICANT CHANGE UP (ref 27–34)
MCHC RBC-ENTMCNC: 27.8 PG — SIGNIFICANT CHANGE UP (ref 27–34)
MCHC RBC-ENTMCNC: 27.9 PG — SIGNIFICANT CHANGE UP (ref 27–34)
MCHC RBC-ENTMCNC: 28 PG — SIGNIFICANT CHANGE UP (ref 27–34)
MCHC RBC-ENTMCNC: 28.3 PG — SIGNIFICANT CHANGE UP (ref 27–34)
MCHC RBC-ENTMCNC: 28.4 PG — SIGNIFICANT CHANGE UP (ref 27–34)
MCHC RBC-ENTMCNC: 28.6 PG — SIGNIFICANT CHANGE UP (ref 27–34)
MCHC RBC-ENTMCNC: 28.7 PG — SIGNIFICANT CHANGE UP (ref 27–34)
MCHC RBC-ENTMCNC: 28.7 PG — SIGNIFICANT CHANGE UP (ref 27–34)
MCHC RBC-ENTMCNC: 28.8 PG — SIGNIFICANT CHANGE UP (ref 27–34)
MCHC RBC-ENTMCNC: 28.9 PG — SIGNIFICANT CHANGE UP (ref 27–34)
MCHC RBC-ENTMCNC: 28.9 PG — SIGNIFICANT CHANGE UP (ref 27–34)
MCHC RBC-ENTMCNC: 29 PG — SIGNIFICANT CHANGE UP (ref 27–34)
MCHC RBC-ENTMCNC: 29.1 PG — SIGNIFICANT CHANGE UP (ref 27–34)
MCHC RBC-ENTMCNC: 29.1 PG — SIGNIFICANT CHANGE UP (ref 27–34)
MCHC RBC-ENTMCNC: 29.2 PG — SIGNIFICANT CHANGE UP (ref 27–34)
MCHC RBC-ENTMCNC: 29.3 PG — SIGNIFICANT CHANGE UP (ref 27–34)
MCHC RBC-ENTMCNC: 29.4 PG — SIGNIFICANT CHANGE UP (ref 27–34)
MCHC RBC-ENTMCNC: 29.5 PG — SIGNIFICANT CHANGE UP (ref 27–34)
MCHC RBC-ENTMCNC: 29.8 G/DL — LOW (ref 32–36)
MCHC RBC-ENTMCNC: 29.8 G/DL — LOW (ref 32–36)
MCHC RBC-ENTMCNC: 30 G/DL — LOW (ref 32–36)
MCHC RBC-ENTMCNC: 30 G/DL — LOW (ref 32–36)
MCHC RBC-ENTMCNC: 30.1 G/DL — LOW (ref 32–36)
MCHC RBC-ENTMCNC: 30.1 G/DL — LOW (ref 32–36)
MCHC RBC-ENTMCNC: 30.2 G/DL — LOW (ref 32–36)
MCHC RBC-ENTMCNC: 30.3 PG — SIGNIFICANT CHANGE UP (ref 27–34)
MCHC RBC-ENTMCNC: 30.4 PG — SIGNIFICANT CHANGE UP (ref 27–34)
MCHC RBC-ENTMCNC: 30.4 PG — SIGNIFICANT CHANGE UP (ref 27–34)
MCHC RBC-ENTMCNC: 30.5 G/DL — LOW (ref 32–36)
MCHC RBC-ENTMCNC: 30.5 G/DL — LOW (ref 32–36)
MCHC RBC-ENTMCNC: 30.7 G/DL — LOW (ref 32–36)
MCHC RBC-ENTMCNC: 30.7 G/DL — LOW (ref 32–36)
MCHC RBC-ENTMCNC: 30.8 G/DL — LOW (ref 32–36)
MCHC RBC-ENTMCNC: 30.8 G/DL — LOW (ref 32–36)
MCHC RBC-ENTMCNC: 30.9 G/DL — LOW (ref 32–36)
MCHC RBC-ENTMCNC: 30.9 G/DL — LOW (ref 32–36)
MCHC RBC-ENTMCNC: 31.1 G/DL — LOW (ref 32–36)
MCHC RBC-ENTMCNC: 31.2 G/DL — LOW (ref 32–36)
MCHC RBC-ENTMCNC: 31.2 G/DL — LOW (ref 32–36)
MCHC RBC-ENTMCNC: 31.3 G/DL — LOW (ref 32–36)
MCHC RBC-ENTMCNC: 31.4 G/DL — LOW (ref 32–36)
MCHC RBC-ENTMCNC: 31.4 G/DL — LOW (ref 32–36)
MCHC RBC-ENTMCNC: 31.5 G/DL — LOW (ref 32–36)
MCHC RBC-ENTMCNC: 31.5 G/DL — LOW (ref 32–36)
MCHC RBC-ENTMCNC: 31.6 G/DL — LOW (ref 32–36)
MCHC RBC-ENTMCNC: 31.6 G/DL — LOW (ref 32–36)
MCHC RBC-ENTMCNC: 31.8 G/DL — LOW (ref 32–36)
MCHC RBC-ENTMCNC: 31.9 G/DL — LOW (ref 32–36)
MCHC RBC-ENTMCNC: 32 G/DL — SIGNIFICANT CHANGE UP (ref 32–36)
MCHC RBC-ENTMCNC: 32.1 G/DL — SIGNIFICANT CHANGE UP (ref 32–36)
MCHC RBC-ENTMCNC: 32.2 G/DL — SIGNIFICANT CHANGE UP (ref 32–36)
MCHC RBC-ENTMCNC: 32.2 G/DL — SIGNIFICANT CHANGE UP (ref 32–36)
MCHC RBC-ENTMCNC: 32.3 G/DL — SIGNIFICANT CHANGE UP (ref 32–36)
MCHC RBC-ENTMCNC: 32.4 G/DL — SIGNIFICANT CHANGE UP (ref 32–36)
MCHC RBC-ENTMCNC: 32.4 G/DL — SIGNIFICANT CHANGE UP (ref 32–36)
MCHC RBC-ENTMCNC: 32.5 G/DL — SIGNIFICANT CHANGE UP (ref 32–36)
MCHC RBC-ENTMCNC: 32.5 G/DL — SIGNIFICANT CHANGE UP (ref 32–36)
MCHC RBC-ENTMCNC: 32.6 G/DL — SIGNIFICANT CHANGE UP (ref 32–36)
MCHC RBC-ENTMCNC: 32.6 G/DL — SIGNIFICANT CHANGE UP (ref 32–36)
MCHC RBC-ENTMCNC: 32.7 G/DL — SIGNIFICANT CHANGE UP (ref 32–36)
MCV RBC AUTO: 88.5 FL — SIGNIFICANT CHANGE UP (ref 80–100)
MCV RBC AUTO: 88.5 FL — SIGNIFICANT CHANGE UP (ref 80–100)
MCV RBC AUTO: 88.8 FL — SIGNIFICANT CHANGE UP (ref 80–100)
MCV RBC AUTO: 88.8 FL — SIGNIFICANT CHANGE UP (ref 80–100)
MCV RBC AUTO: 89.2 FL — SIGNIFICANT CHANGE UP (ref 80–100)
MCV RBC AUTO: 89.2 FL — SIGNIFICANT CHANGE UP (ref 80–100)
MCV RBC AUTO: 89.3 FL — SIGNIFICANT CHANGE UP (ref 80–100)
MCV RBC AUTO: 89.3 FL — SIGNIFICANT CHANGE UP (ref 80–100)
MCV RBC AUTO: 89.4 FL — SIGNIFICANT CHANGE UP (ref 80–100)
MCV RBC AUTO: 89.4 FL — SIGNIFICANT CHANGE UP (ref 80–100)
MCV RBC AUTO: 89.5 FL — SIGNIFICANT CHANGE UP (ref 80–100)
MCV RBC AUTO: 89.5 FL — SIGNIFICANT CHANGE UP (ref 80–100)
MCV RBC AUTO: 90.1 FL — SIGNIFICANT CHANGE UP (ref 80–100)
MCV RBC AUTO: 90.1 FL — SIGNIFICANT CHANGE UP (ref 80–100)
MCV RBC AUTO: 90.3 FL — SIGNIFICANT CHANGE UP (ref 80–100)
MCV RBC AUTO: 90.4 FL — SIGNIFICANT CHANGE UP (ref 80–100)
MCV RBC AUTO: 90.5 FL — SIGNIFICANT CHANGE UP (ref 80–100)
MCV RBC AUTO: 90.6 FL — SIGNIFICANT CHANGE UP (ref 80–100)
MCV RBC AUTO: 90.6 FL — SIGNIFICANT CHANGE UP (ref 80–100)
MCV RBC AUTO: 90.8 FL — SIGNIFICANT CHANGE UP (ref 80–100)
MCV RBC AUTO: 90.9 FL — SIGNIFICANT CHANGE UP (ref 80–100)
MCV RBC AUTO: 90.9 FL — SIGNIFICANT CHANGE UP (ref 80–100)
MCV RBC AUTO: 91 FL — SIGNIFICANT CHANGE UP (ref 80–100)
MCV RBC AUTO: 91 FL — SIGNIFICANT CHANGE UP (ref 80–100)
MCV RBC AUTO: 91.2 FL — SIGNIFICANT CHANGE UP (ref 80–100)
MCV RBC AUTO: 91.2 FL — SIGNIFICANT CHANGE UP (ref 80–100)
MCV RBC AUTO: 91.8 FL — SIGNIFICANT CHANGE UP (ref 80–100)
MCV RBC AUTO: 92 FL — SIGNIFICANT CHANGE UP (ref 80–100)
MCV RBC AUTO: 92 FL — SIGNIFICANT CHANGE UP (ref 80–100)
MCV RBC AUTO: 92.4 FL — SIGNIFICANT CHANGE UP (ref 80–100)
MCV RBC AUTO: 92.5 FL — SIGNIFICANT CHANGE UP (ref 80–100)
MCV RBC AUTO: 92.7 FL — SIGNIFICANT CHANGE UP (ref 80–100)
MCV RBC AUTO: 92.7 FL — SIGNIFICANT CHANGE UP (ref 80–100)
MCV RBC AUTO: 92.8 FL — SIGNIFICANT CHANGE UP (ref 80–100)
MCV RBC AUTO: 92.8 FL — SIGNIFICANT CHANGE UP (ref 80–100)
MCV RBC AUTO: 93 FL — SIGNIFICANT CHANGE UP (ref 80–100)
MCV RBC AUTO: 93 FL — SIGNIFICANT CHANGE UP (ref 80–100)
MCV RBC AUTO: 94 FL — SIGNIFICANT CHANGE UP (ref 80–100)
MCV RBC AUTO: 94 FL — SIGNIFICANT CHANGE UP (ref 80–100)
MCV RBC AUTO: 94.1 FL — SIGNIFICANT CHANGE UP (ref 80–100)
MCV RBC AUTO: 94.1 FL — SIGNIFICANT CHANGE UP (ref 80–100)
MCV RBC AUTO: 94.3 FL — SIGNIFICANT CHANGE UP (ref 80–100)
MCV RBC AUTO: 94.9 FL — SIGNIFICANT CHANGE UP (ref 80–100)
MCV RBC AUTO: 94.9 FL — SIGNIFICANT CHANGE UP (ref 80–100)
MESOTHL CELL # FLD: 1 % — SIGNIFICANT CHANGE UP
MESOTHL CELL # FLD: 1 % — SIGNIFICANT CHANGE UP
METHOD TYPE: SIGNIFICANT CHANGE UP
MICROCYTES BLD QL: SLIGHT — SIGNIFICANT CHANGE UP
MICROSCOPIC-UA: NORMAL
MONOCYTES # BLD AUTO: 0.36 K/UL — SIGNIFICANT CHANGE UP (ref 0–0.9)
MONOCYTES # BLD AUTO: 0.36 K/UL — SIGNIFICANT CHANGE UP (ref 0–0.9)
MONOCYTES # BLD AUTO: 0.54 K/UL — SIGNIFICANT CHANGE UP (ref 0–0.9)
MONOCYTES # BLD AUTO: 0.59 K/UL — SIGNIFICANT CHANGE UP (ref 0–0.9)
MONOCYTES # BLD AUTO: 0.59 K/UL — SIGNIFICANT CHANGE UP (ref 0–0.9)
MONOCYTES # BLD AUTO: 0.61 K/UL — SIGNIFICANT CHANGE UP (ref 0–0.9)
MONOCYTES # BLD AUTO: 0.61 K/UL — SIGNIFICANT CHANGE UP (ref 0–0.9)
MONOCYTES # BLD AUTO: 0.69 K/UL — SIGNIFICANT CHANGE UP (ref 0–0.9)
MONOCYTES # BLD AUTO: 0.69 K/UL — SIGNIFICANT CHANGE UP (ref 0–0.9)
MONOCYTES # BLD AUTO: 0.78 K/UL — SIGNIFICANT CHANGE UP (ref 0–0.9)
MONOCYTES # BLD AUTO: 0.82 K/UL — SIGNIFICANT CHANGE UP (ref 0–0.9)
MONOCYTES # BLD AUTO: 0.82 K/UL — SIGNIFICANT CHANGE UP (ref 0–0.9)
MONOCYTES # BLD AUTO: 1.01 K/UL — HIGH (ref 0–0.9)
MONOCYTES # BLD AUTO: 1.01 K/UL — HIGH (ref 0–0.9)
MONOCYTES # BLD AUTO: 1.04 K/UL — HIGH (ref 0–0.9)
MONOCYTES # BLD AUTO: 1.04 K/UL — HIGH (ref 0–0.9)
MONOCYTES # BLD AUTO: 1.05 K/UL — HIGH (ref 0–0.9)
MONOCYTES # BLD AUTO: 1.05 K/UL — HIGH (ref 0–0.9)
MONOCYTES # BLD AUTO: 1.7 K/UL — HIGH (ref 0–0.9)
MONOCYTES # BLD AUTO: 1.7 K/UL — HIGH (ref 0–0.9)
MONOCYTES NFR BLD AUTO: 10.1 % — SIGNIFICANT CHANGE UP (ref 2–14)
MONOCYTES NFR BLD AUTO: 10.1 % — SIGNIFICANT CHANGE UP (ref 2–14)
MONOCYTES NFR BLD AUTO: 11.4 % — SIGNIFICANT CHANGE UP (ref 2–14)
MONOCYTES NFR BLD AUTO: 11.4 % — SIGNIFICANT CHANGE UP (ref 2–14)
MONOCYTES NFR BLD AUTO: 12 % — SIGNIFICANT CHANGE UP (ref 2–14)
MONOCYTES NFR BLD AUTO: 12.6 % — SIGNIFICANT CHANGE UP (ref 2–14)
MONOCYTES NFR BLD AUTO: 12.6 % — SIGNIFICANT CHANGE UP (ref 2–14)
MONOCYTES NFR BLD AUTO: 14.4 % — HIGH (ref 2–14)
MONOCYTES NFR BLD AUTO: 14.4 % — HIGH (ref 2–14)
MONOCYTES NFR BLD AUTO: 14.6 % — HIGH (ref 2–14)
MONOCYTES NFR BLD AUTO: 14.6 % — HIGH (ref 2–14)
MONOCYTES NFR BLD AUTO: 15.6 % — HIGH (ref 2–14)
MONOCYTES NFR BLD AUTO: 16.7 % — HIGH (ref 2–14)
MONOCYTES NFR BLD AUTO: 16.7 % — HIGH (ref 2–14)
MONOCYTES NFR BLD AUTO: 17.2 % — HIGH (ref 2–14)
MONOCYTES NFR BLD AUTO: 17.2 % — HIGH (ref 2–14)
MONOCYTES NFR BLD AUTO: 5.2 % — SIGNIFICANT CHANGE UP (ref 2–14)
MONOCYTES NFR BLD AUTO: 5.2 % — SIGNIFICANT CHANGE UP (ref 2–14)
MONOCYTES NFR BLD AUTO: 8.7 % — SIGNIFICANT CHANGE UP (ref 2–14)
MONOCYTES NFR BLD AUTO: 8.7 % — SIGNIFICANT CHANGE UP (ref 2–14)
MONOS+MACROS # FLD: 12 % — SIGNIFICANT CHANGE UP
MONOS+MACROS # FLD: 12 % — SIGNIFICANT CHANGE UP
MRSA PCR RESULT.: SIGNIFICANT CHANGE UP
MRSA PCR RESULT.: SIGNIFICANT CHANGE UP
NEUTROPHILS # BLD AUTO: 2.51 K/UL — SIGNIFICANT CHANGE UP (ref 1.8–7.4)
NEUTROPHILS # BLD AUTO: 2.51 K/UL — SIGNIFICANT CHANGE UP (ref 1.8–7.4)
NEUTROPHILS # BLD AUTO: 2.57 K/UL — SIGNIFICANT CHANGE UP (ref 1.8–7.4)
NEUTROPHILS # BLD AUTO: 2.6 K/UL — SIGNIFICANT CHANGE UP (ref 1.8–7.4)
NEUTROPHILS # BLD AUTO: 4.22 K/UL — SIGNIFICANT CHANGE UP (ref 1.8–7.4)
NEUTROPHILS # BLD AUTO: 4.22 K/UL — SIGNIFICANT CHANGE UP (ref 1.8–7.4)
NEUTROPHILS # BLD AUTO: 4.8 K/UL — SIGNIFICANT CHANGE UP (ref 1.8–7.4)
NEUTROPHILS # BLD AUTO: 4.8 K/UL — SIGNIFICANT CHANGE UP (ref 1.8–7.4)
NEUTROPHILS # BLD AUTO: 5.09 K/UL — SIGNIFICANT CHANGE UP (ref 1.8–7.4)
NEUTROPHILS # BLD AUTO: 5.09 K/UL — SIGNIFICANT CHANGE UP (ref 1.8–7.4)
NEUTROPHILS # BLD AUTO: 5.23 K/UL — SIGNIFICANT CHANGE UP (ref 1.8–7.4)
NEUTROPHILS # BLD AUTO: 5.23 K/UL — SIGNIFICANT CHANGE UP (ref 1.8–7.4)
NEUTROPHILS # BLD AUTO: 5.68 K/UL — SIGNIFICANT CHANGE UP (ref 1.8–7.4)
NEUTROPHILS # BLD AUTO: 5.68 K/UL — SIGNIFICANT CHANGE UP (ref 1.8–7.4)
NEUTROPHILS # BLD AUTO: 5.77 K/UL — SIGNIFICANT CHANGE UP (ref 1.8–7.4)
NEUTROPHILS # BLD AUTO: 5.77 K/UL — SIGNIFICANT CHANGE UP (ref 1.8–7.4)
NEUTROPHILS # BLD AUTO: 6.93 K/UL — SIGNIFICANT CHANGE UP (ref 1.8–7.4)
NEUTROPHILS # BLD AUTO: 6.93 K/UL — SIGNIFICANT CHANGE UP (ref 1.8–7.4)
NEUTROPHILS # BLD AUTO: 8.7 K/UL — HIGH (ref 1.8–7.4)
NEUTROPHILS # BLD AUTO: 8.7 K/UL — HIGH (ref 1.8–7.4)
NEUTROPHILS NFR BLD AUTO: 51.4 % — SIGNIFICANT CHANGE UP (ref 43–77)
NEUTROPHILS NFR BLD AUTO: 58 % — SIGNIFICANT CHANGE UP (ref 43–77)
NEUTROPHILS NFR BLD AUTO: 61 % — SIGNIFICANT CHANGE UP (ref 43–77)
NEUTROPHILS NFR BLD AUTO: 61 % — SIGNIFICANT CHANGE UP (ref 43–77)
NEUTROPHILS NFR BLD AUTO: 66.5 % — SIGNIFICANT CHANGE UP (ref 43–77)
NEUTROPHILS NFR BLD AUTO: 66.5 % — SIGNIFICANT CHANGE UP (ref 43–77)
NEUTROPHILS NFR BLD AUTO: 69.9 % — SIGNIFICANT CHANGE UP (ref 43–77)
NEUTROPHILS NFR BLD AUTO: 69.9 % — SIGNIFICANT CHANGE UP (ref 43–77)
NEUTROPHILS NFR BLD AUTO: 75.3 % — SIGNIFICANT CHANGE UP (ref 43–77)
NEUTROPHILS NFR BLD AUTO: 75.3 % — SIGNIFICANT CHANGE UP (ref 43–77)
NEUTROPHILS NFR BLD AUTO: 78 % — HIGH (ref 43–77)
NEUTROPHILS NFR BLD AUTO: 78 % — HIGH (ref 43–77)
NEUTROPHILS NFR BLD AUTO: 78.6 % — HIGH (ref 43–77)
NEUTROPHILS NFR BLD AUTO: 78.6 % — HIGH (ref 43–77)
NEUTROPHILS NFR BLD AUTO: 83.3 % — HIGH (ref 43–77)
NEUTROPHILS NFR BLD AUTO: 83.3 % — HIGH (ref 43–77)
NEUTROPHILS NFR BLD AUTO: 83.4 % — HIGH (ref 43–77)
NEUTROPHILS NFR BLD AUTO: 83.4 % — HIGH (ref 43–77)
NEUTROPHILS NFR BLD AUTO: 83.5 % — HIGH (ref 43–77)
NEUTROPHILS NFR BLD AUTO: 83.5 % — HIGH (ref 43–77)
NEUTROPHILS-BODY FLUID: 80 % — SIGNIFICANT CHANGE UP
NEUTROPHILS-BODY FLUID: 80 % — SIGNIFICANT CHANGE UP
NIGHT BLUE STAIN TISS: SIGNIFICANT CHANGE UP
NIGHT BLUE STAIN TISS: SIGNIFICANT CHANGE UP
NITRITE UR-MCNC: NEGATIVE — SIGNIFICANT CHANGE UP
NITRITE UR-MCNC: POSITIVE
NITRITE UR-MCNC: POSITIVE
NITRITE URINE: NEGATIVE
NRBC # BLD: 0 /100 WBCS — SIGNIFICANT CHANGE UP (ref 0–0)
NRBC # BLD: 0 /100 — SIGNIFICANT CHANGE UP (ref 0–0)
NRBC # BLD: SIGNIFICANT CHANGE UP /100 WBCS (ref 0–0)
NRBC # FLD: 0 % — SIGNIFICANT CHANGE UP
NRBC # FLD: 0 % — SIGNIFICANT CHANGE UP
NT-PROBNP SERPL-SCNC: 7966 PG/ML — HIGH (ref 0–450)
NT-PROBNP SERPL-SCNC: 7966 PG/ML — HIGH (ref 0–450)
OB PNL STL: NEGATIVE — SIGNIFICANT CHANGE UP
OB PNL STL: NEGATIVE — SIGNIFICANT CHANGE UP
ORGANISM # SPEC MICROSCOPIC CNT: ABNORMAL
ORGANISM # SPEC MICROSCOPIC CNT: SIGNIFICANT CHANGE UP
OTHER CELLS FLD MANUAL: 0 % — SIGNIFICANT CHANGE UP
OTHER CELLS FLD MANUAL: 0 % — SIGNIFICANT CHANGE UP
PCO2 BLDA: 47 MMHG — HIGH (ref 32–46)
PCO2 BLDA: 47 MMHG — HIGH (ref 32–46)
PCO2 BLDA: 73 MMHG — CRITICAL HIGH (ref 32–46)
PCO2 BLDA: 73 MMHG — CRITICAL HIGH (ref 32–46)
PCO2 BLDV: 35 MMHG — LOW (ref 42–55)
PCO2 BLDV: 35 MMHG — LOW (ref 42–55)
PCO2 BLDV: 44 MMHG — SIGNIFICANT CHANGE UP (ref 42–55)
PCO2 BLDV: 44 MMHG — SIGNIFICANT CHANGE UP (ref 42–55)
PCO2 BLDV: 51 MMHG — SIGNIFICANT CHANGE UP (ref 42–55)
PCO2 BLDV: 51 MMHG — SIGNIFICANT CHANGE UP (ref 42–55)
PH BLDA: 7.15 — CRITICAL LOW (ref 7.35–7.45)
PH BLDA: 7.15 — CRITICAL LOW (ref 7.35–7.45)
PH BLDA: 7.46 — HIGH (ref 7.35–7.45)
PH BLDA: 7.46 — HIGH (ref 7.35–7.45)
PH BLDV: 7.35 — SIGNIFICANT CHANGE UP (ref 7.32–7.43)
PH BLDV: 7.35 — SIGNIFICANT CHANGE UP (ref 7.32–7.43)
PH BLDV: 7.41 — SIGNIFICANT CHANGE UP (ref 7.32–7.43)
PH BLDV: 7.41 — SIGNIFICANT CHANGE UP (ref 7.32–7.43)
PH BLDV: 7.51 — HIGH (ref 7.32–7.43)
PH BLDV: 7.51 — HIGH (ref 7.32–7.43)
PH UR: 6 — SIGNIFICANT CHANGE UP (ref 5–8)
PH UR: 6 — SIGNIFICANT CHANGE UP (ref 5–8)
PH UR: 6.5 — SIGNIFICANT CHANGE UP (ref 5–8)
PH UR: 6.5 — SIGNIFICANT CHANGE UP (ref 5–8)
PH UR: 7 — SIGNIFICANT CHANGE UP (ref 5–8)
PH URINE: 6
PHOSPHATE SERPL-MCNC: 2 MG/DL — LOW (ref 2.5–4.5)
PHOSPHATE SERPL-MCNC: 2 MG/DL — LOW (ref 2.5–4.5)
PHOSPHATE SERPL-MCNC: 2.1 MG/DL — LOW (ref 2.5–4.5)
PHOSPHATE SERPL-MCNC: 2.1 MG/DL — LOW (ref 2.5–4.5)
PHOSPHATE SERPL-MCNC: 2.5 MG/DL — SIGNIFICANT CHANGE UP (ref 2.5–4.5)
PHOSPHATE SERPL-MCNC: 2.5 MG/DL — SIGNIFICANT CHANGE UP (ref 2.5–4.5)
PHOSPHATE SERPL-MCNC: 2.6 MG/DL — SIGNIFICANT CHANGE UP (ref 2.5–4.5)
PHOSPHATE SERPL-MCNC: 2.6 MG/DL — SIGNIFICANT CHANGE UP (ref 2.5–4.5)
PHOSPHATE SERPL-MCNC: 2.9 MG/DL — SIGNIFICANT CHANGE UP (ref 2.5–4.5)
PHOSPHATE SERPL-MCNC: 2.9 MG/DL — SIGNIFICANT CHANGE UP (ref 2.5–4.5)
PHOSPHATE SERPL-MCNC: 3.2 MG/DL — SIGNIFICANT CHANGE UP (ref 2.5–4.5)
PHOSPHATE SERPL-MCNC: 3.6 MG/DL — SIGNIFICANT CHANGE UP (ref 2.5–4.5)
PHOSPHATE SERPL-MCNC: 3.8 MG/DL — SIGNIFICANT CHANGE UP (ref 2.5–4.5)
PHOSPHATE SERPL-MCNC: 3.8 MG/DL — SIGNIFICANT CHANGE UP (ref 2.5–4.5)
PHOSPHATE SERPL-MCNC: 4.2 MG/DL — SIGNIFICANT CHANGE UP (ref 2.5–4.5)
PHOSPHATE SERPL-MCNC: 4.3 MG/DL — SIGNIFICANT CHANGE UP (ref 2.5–4.5)
PHOSPHATE SERPL-MCNC: 4.3 MG/DL — SIGNIFICANT CHANGE UP (ref 2.5–4.5)
PHOSPHATE SERPL-MCNC: 4.4 MG/DL — SIGNIFICANT CHANGE UP (ref 2.5–4.5)
PHOSPHATE SERPL-MCNC: 4.4 MG/DL — SIGNIFICANT CHANGE UP (ref 2.5–4.5)
PHOSPHATE SERPL-MCNC: 4.5 MG/DL — SIGNIFICANT CHANGE UP (ref 2.5–4.5)
PHOSPHATE SERPL-MCNC: 4.7 MG/DL — HIGH (ref 2.5–4.5)
PHOSPHATE SERPL-MCNC: 5 MG/DL — HIGH (ref 2.5–4.5)
PHOSPHATE SERPL-MCNC: 5 MG/DL — HIGH (ref 2.5–4.5)
PHOSPHATE SERPL-MCNC: 5.7 MG/DL — HIGH (ref 2.5–4.5)
PHOSPHATE SERPL-MCNC: 5.7 MG/DL — HIGH (ref 2.5–4.5)
PLAT MORPH BLD: NORMAL — SIGNIFICANT CHANGE UP
PLATELET # BLD AUTO: 161 K/UL — SIGNIFICANT CHANGE UP (ref 150–400)
PLATELET # BLD AUTO: 161 K/UL — SIGNIFICANT CHANGE UP (ref 150–400)
PLATELET # BLD AUTO: 163 K/UL — SIGNIFICANT CHANGE UP (ref 150–400)
PLATELET # BLD AUTO: 163 K/UL — SIGNIFICANT CHANGE UP (ref 150–400)
PLATELET # BLD AUTO: 164 K/UL — SIGNIFICANT CHANGE UP (ref 150–400)
PLATELET # BLD AUTO: 164 K/UL — SIGNIFICANT CHANGE UP (ref 150–400)
PLATELET # BLD AUTO: 165 K/UL — SIGNIFICANT CHANGE UP (ref 150–400)
PLATELET # BLD AUTO: 165 K/UL — SIGNIFICANT CHANGE UP (ref 150–400)
PLATELET # BLD AUTO: 182 K/UL — SIGNIFICANT CHANGE UP (ref 150–400)
PLATELET # BLD AUTO: 182 K/UL — SIGNIFICANT CHANGE UP (ref 150–400)
PLATELET # BLD AUTO: 184 K/UL — SIGNIFICANT CHANGE UP (ref 150–400)
PLATELET # BLD AUTO: 184 K/UL — SIGNIFICANT CHANGE UP (ref 150–400)
PLATELET # BLD AUTO: 185 K/UL — SIGNIFICANT CHANGE UP (ref 150–400)
PLATELET # BLD AUTO: 185 K/UL — SIGNIFICANT CHANGE UP (ref 150–400)
PLATELET # BLD AUTO: 192 K/UL — SIGNIFICANT CHANGE UP (ref 150–400)
PLATELET # BLD AUTO: 192 K/UL — SIGNIFICANT CHANGE UP (ref 150–400)
PLATELET # BLD AUTO: 199 K/UL — SIGNIFICANT CHANGE UP (ref 150–400)
PLATELET # BLD AUTO: 199 K/UL — SIGNIFICANT CHANGE UP (ref 150–400)
PLATELET # BLD AUTO: 201 K/UL — SIGNIFICANT CHANGE UP (ref 150–400)
PLATELET # BLD AUTO: 201 K/UL — SIGNIFICANT CHANGE UP (ref 150–400)
PLATELET # BLD AUTO: 209 K/UL — SIGNIFICANT CHANGE UP (ref 150–400)
PLATELET # BLD AUTO: 210 K/UL — SIGNIFICANT CHANGE UP (ref 150–400)
PLATELET # BLD AUTO: 210 K/UL — SIGNIFICANT CHANGE UP (ref 150–400)
PLATELET # BLD AUTO: 211 K/UL — SIGNIFICANT CHANGE UP (ref 150–400)
PLATELET # BLD AUTO: 213 K/UL — SIGNIFICANT CHANGE UP (ref 150–400)
PLATELET # BLD AUTO: 216 K/UL — SIGNIFICANT CHANGE UP (ref 150–400)
PLATELET # BLD AUTO: 220 K/UL — SIGNIFICANT CHANGE UP (ref 150–400)
PLATELET # BLD AUTO: 220 K/UL — SIGNIFICANT CHANGE UP (ref 150–400)
PLATELET # BLD AUTO: 229 K/UL — SIGNIFICANT CHANGE UP (ref 150–400)
PLATELET # BLD AUTO: 229 K/UL — SIGNIFICANT CHANGE UP (ref 150–400)
PLATELET # BLD AUTO: 230 K/UL — SIGNIFICANT CHANGE UP (ref 150–400)
PLATELET # BLD AUTO: 230 K/UL — SIGNIFICANT CHANGE UP (ref 150–400)
PLATELET # BLD AUTO: 231 K/UL — SIGNIFICANT CHANGE UP (ref 150–400)
PLATELET # BLD AUTO: 231 K/UL — SIGNIFICANT CHANGE UP (ref 150–400)
PLATELET # BLD AUTO: 235 K/UL — SIGNIFICANT CHANGE UP (ref 150–400)
PLATELET # BLD AUTO: 235 K/UL — SIGNIFICANT CHANGE UP (ref 150–400)
PLATELET # BLD AUTO: 242 K/UL — SIGNIFICANT CHANGE UP (ref 150–400)
PLATELET # BLD AUTO: 242 K/UL — SIGNIFICANT CHANGE UP (ref 150–400)
PLATELET # BLD AUTO: 245 K/UL — SIGNIFICANT CHANGE UP (ref 150–400)
PLATELET # BLD AUTO: 245 K/UL — SIGNIFICANT CHANGE UP (ref 150–400)
PLATELET # BLD AUTO: 246 K/UL — SIGNIFICANT CHANGE UP (ref 150–400)
PLATELET # BLD AUTO: 246 K/UL — SIGNIFICANT CHANGE UP (ref 150–400)
PLATELET # BLD AUTO: 250 K/UL — SIGNIFICANT CHANGE UP (ref 150–400)
PLATELET # BLD AUTO: 250 K/UL — SIGNIFICANT CHANGE UP (ref 150–400)
PLATELET # BLD AUTO: 253 K/UL — SIGNIFICANT CHANGE UP (ref 150–400)
PLATELET # BLD AUTO: 253 K/UL — SIGNIFICANT CHANGE UP (ref 150–400)
PLATELET # BLD AUTO: 257 K/UL — SIGNIFICANT CHANGE UP (ref 150–400)
PLATELET # BLD AUTO: 257 K/UL — SIGNIFICANT CHANGE UP (ref 150–400)
PLATELET # BLD AUTO: 261 K/UL — SIGNIFICANT CHANGE UP (ref 150–400)
PLATELET # BLD AUTO: 261 K/UL — SIGNIFICANT CHANGE UP (ref 150–400)
PLATELET # BLD AUTO: 263 K/UL — SIGNIFICANT CHANGE UP (ref 150–400)
PLATELET # BLD AUTO: 263 K/UL — SIGNIFICANT CHANGE UP (ref 150–400)
PLATELET # BLD AUTO: 274 K/UL — SIGNIFICANT CHANGE UP (ref 150–400)
PLATELET # BLD AUTO: 274 K/UL — SIGNIFICANT CHANGE UP (ref 150–400)
PLATELET # BLD AUTO: 308 K/UL — SIGNIFICANT CHANGE UP (ref 150–400)
PLATELET # BLD AUTO: 308 K/UL — SIGNIFICANT CHANGE UP (ref 150–400)
PLATELET # BLD AUTO: 333 K/UL — SIGNIFICANT CHANGE UP (ref 150–400)
PLATELET # BLD AUTO: 333 K/UL — SIGNIFICANT CHANGE UP (ref 150–400)
PO2 BLDA: 130 MMHG — HIGH (ref 83–108)
PO2 BLDA: 130 MMHG — HIGH (ref 83–108)
PO2 BLDA: 52 MMHG — LOW (ref 83–108)
PO2 BLDA: 52 MMHG — LOW (ref 83–108)
PO2 BLDV: 44 MMHG — SIGNIFICANT CHANGE UP (ref 25–45)
PO2 BLDV: 44 MMHG — SIGNIFICANT CHANGE UP (ref 25–45)
PO2 BLDV: 54 MMHG — HIGH (ref 25–45)
PO2 BLDV: 54 MMHG — HIGH (ref 25–45)
PO2 BLDV: 57 MMHG — HIGH (ref 25–45)
PO2 BLDV: 57 MMHG — HIGH (ref 25–45)
POIKILOCYTOSIS BLD QL AUTO: SLIGHT — SIGNIFICANT CHANGE UP
POLYCHROMASIA BLD QL SMEAR: SLIGHT — SIGNIFICANT CHANGE UP
POLYCHROMASIA BLD QL SMEAR: SLIGHT — SIGNIFICANT CHANGE UP
POTASSIUM BLDV-SCNC: 4.8 MMOL/L — SIGNIFICANT CHANGE UP (ref 3.5–5.1)
POTASSIUM BLDV-SCNC: 4.8 MMOL/L — SIGNIFICANT CHANGE UP (ref 3.5–5.1)
POTASSIUM SERPL-MCNC: 3.2 MMOL/L — LOW (ref 3.5–5.3)
POTASSIUM SERPL-MCNC: 3.3 MMOL/L — LOW (ref 3.5–5.3)
POTASSIUM SERPL-MCNC: 3.4 MMOL/L — LOW (ref 3.5–5.3)
POTASSIUM SERPL-MCNC: 3.5 MMOL/L — SIGNIFICANT CHANGE UP (ref 3.5–5.3)
POTASSIUM SERPL-MCNC: 3.6 MMOL/L — SIGNIFICANT CHANGE UP (ref 3.5–5.3)
POTASSIUM SERPL-MCNC: 3.6 MMOL/L — SIGNIFICANT CHANGE UP (ref 3.5–5.3)
POTASSIUM SERPL-MCNC: 3.7 MMOL/L — SIGNIFICANT CHANGE UP (ref 3.5–5.3)
POTASSIUM SERPL-MCNC: 3.8 MMOL/L — SIGNIFICANT CHANGE UP (ref 3.5–5.3)
POTASSIUM SERPL-MCNC: 3.9 MMOL/L — SIGNIFICANT CHANGE UP (ref 3.5–5.3)
POTASSIUM SERPL-MCNC: 4 MMOL/L — SIGNIFICANT CHANGE UP (ref 3.5–5.3)
POTASSIUM SERPL-MCNC: 4 MMOL/L — SIGNIFICANT CHANGE UP (ref 3.5–5.3)
POTASSIUM SERPL-MCNC: 4.1 MMOL/L — SIGNIFICANT CHANGE UP (ref 3.5–5.3)
POTASSIUM SERPL-MCNC: 4.1 MMOL/L — SIGNIFICANT CHANGE UP (ref 3.5–5.3)
POTASSIUM SERPL-MCNC: 4.2 MMOL/L — SIGNIFICANT CHANGE UP (ref 3.5–5.3)
POTASSIUM SERPL-MCNC: 4.3 MMOL/L — SIGNIFICANT CHANGE UP (ref 3.5–5.3)
POTASSIUM SERPL-MCNC: 4.3 MMOL/L — SIGNIFICANT CHANGE UP (ref 3.5–5.3)
POTASSIUM SERPL-MCNC: 4.4 MMOL/L — SIGNIFICANT CHANGE UP (ref 3.5–5.3)
POTASSIUM SERPL-MCNC: 4.7 MMOL/L — SIGNIFICANT CHANGE UP (ref 3.5–5.3)
POTASSIUM SERPL-MCNC: 4.9 MMOL/L — SIGNIFICANT CHANGE UP (ref 3.5–5.3)
POTASSIUM SERPL-MCNC: 4.9 MMOL/L — SIGNIFICANT CHANGE UP (ref 3.5–5.3)
POTASSIUM SERPL-MCNC: 5 MMOL/L — SIGNIFICANT CHANGE UP (ref 3.5–5.3)
POTASSIUM SERPL-MCNC: 5.1 MMOL/L — SIGNIFICANT CHANGE UP (ref 3.5–5.3)
POTASSIUM SERPL-MCNC: 5.5 MMOL/L — HIGH (ref 3.5–5.3)
POTASSIUM SERPL-MCNC: 5.5 MMOL/L — HIGH (ref 3.5–5.3)
POTASSIUM SERPL-MCNC: 5.9 MMOL/L — HIGH (ref 3.5–5.3)
POTASSIUM SERPL-MCNC: 5.9 MMOL/L — HIGH (ref 3.5–5.3)
POTASSIUM SERPL-SCNC: 3.2 MMOL/L — LOW (ref 3.5–5.3)
POTASSIUM SERPL-SCNC: 3.3 MMOL/L — LOW (ref 3.5–5.3)
POTASSIUM SERPL-SCNC: 3.4 MMOL/L — LOW (ref 3.5–5.3)
POTASSIUM SERPL-SCNC: 3.5 MMOL/L — SIGNIFICANT CHANGE UP (ref 3.5–5.3)
POTASSIUM SERPL-SCNC: 3.6 MMOL/L — SIGNIFICANT CHANGE UP (ref 3.5–5.3)
POTASSIUM SERPL-SCNC: 3.6 MMOL/L — SIGNIFICANT CHANGE UP (ref 3.5–5.3)
POTASSIUM SERPL-SCNC: 3.7 MMOL/L — SIGNIFICANT CHANGE UP (ref 3.5–5.3)
POTASSIUM SERPL-SCNC: 3.8 MMOL/L — SIGNIFICANT CHANGE UP (ref 3.5–5.3)
POTASSIUM SERPL-SCNC: 3.9 MMOL/L — SIGNIFICANT CHANGE UP (ref 3.5–5.3)
POTASSIUM SERPL-SCNC: 4 MMOL/L — SIGNIFICANT CHANGE UP (ref 3.5–5.3)
POTASSIUM SERPL-SCNC: 4 MMOL/L — SIGNIFICANT CHANGE UP (ref 3.5–5.3)
POTASSIUM SERPL-SCNC: 4.1 MMOL/L — SIGNIFICANT CHANGE UP (ref 3.5–5.3)
POTASSIUM SERPL-SCNC: 4.1 MMOL/L — SIGNIFICANT CHANGE UP (ref 3.5–5.3)
POTASSIUM SERPL-SCNC: 4.2 MMOL/L
POTASSIUM SERPL-SCNC: 4.2 MMOL/L — SIGNIFICANT CHANGE UP (ref 3.5–5.3)
POTASSIUM SERPL-SCNC: 4.3 MMOL/L — SIGNIFICANT CHANGE UP (ref 3.5–5.3)
POTASSIUM SERPL-SCNC: 4.3 MMOL/L — SIGNIFICANT CHANGE UP (ref 3.5–5.3)
POTASSIUM SERPL-SCNC: 4.4 MMOL/L — SIGNIFICANT CHANGE UP (ref 3.5–5.3)
POTASSIUM SERPL-SCNC: 4.7 MMOL/L — SIGNIFICANT CHANGE UP (ref 3.5–5.3)
POTASSIUM SERPL-SCNC: 4.9 MMOL/L — SIGNIFICANT CHANGE UP (ref 3.5–5.3)
POTASSIUM SERPL-SCNC: 4.9 MMOL/L — SIGNIFICANT CHANGE UP (ref 3.5–5.3)
POTASSIUM SERPL-SCNC: 5 MMOL/L — SIGNIFICANT CHANGE UP (ref 3.5–5.3)
POTASSIUM SERPL-SCNC: 5.1 MMOL/L — SIGNIFICANT CHANGE UP (ref 3.5–5.3)
POTASSIUM SERPL-SCNC: 5.5 MMOL/L — HIGH (ref 3.5–5.3)
POTASSIUM SERPL-SCNC: 5.5 MMOL/L — HIGH (ref 3.5–5.3)
POTASSIUM SERPL-SCNC: 5.9 MMOL/L — HIGH (ref 3.5–5.3)
POTASSIUM SERPL-SCNC: 5.9 MMOL/L — HIGH (ref 3.5–5.3)
PROT SERPL-MCNC: 7 GM/DL — SIGNIFICANT CHANGE UP (ref 6–8.3)
PROT SERPL-MCNC: 7 GM/DL — SIGNIFICANT CHANGE UP (ref 6–8.3)
PROT SERPL-MCNC: 7.3 GM/DL — SIGNIFICANT CHANGE UP (ref 6–8.3)
PROT SERPL-MCNC: 7.4 GM/DL — SIGNIFICANT CHANGE UP (ref 6–8.3)
PROT SERPL-MCNC: 7.5 GM/DL — SIGNIFICANT CHANGE UP (ref 6–8.3)
PROT SERPL-MCNC: 7.5 GM/DL — SIGNIFICANT CHANGE UP (ref 6–8.3)
PROT SERPL-MCNC: 7.6 GM/DL — SIGNIFICANT CHANGE UP (ref 6–8.3)
PROT SERPL-MCNC: 7.6 GM/DL — SIGNIFICANT CHANGE UP (ref 6–8.3)
PROT SERPL-MCNC: 7.8 G/DL
PROT SERPL-MCNC: 7.8 GM/DL — SIGNIFICANT CHANGE UP (ref 6–8.3)
PROT SERPL-MCNC: 7.9 GM/DL — SIGNIFICANT CHANGE UP (ref 6–8.3)
PROT SERPL-MCNC: 8 GM/DL — SIGNIFICANT CHANGE UP (ref 6–8.3)
PROT SERPL-MCNC: 8.2 GM/DL — SIGNIFICANT CHANGE UP (ref 6–8.3)
PROT SERPL-MCNC: 8.2 GM/DL — SIGNIFICANT CHANGE UP (ref 6–8.3)
PROT SERPL-MCNC: 8.4 GM/DL — HIGH (ref 6–8.3)
PROT UR-MCNC: 100 MG/DL
PROT UR-MCNC: 30 MG/DL
PROT UR-MCNC: 30 MG/DL
PROTEIN URINE: 100 MG/DL
PROTHROM AB SERPL-ACNC: 12.2 SEC — SIGNIFICANT CHANGE UP (ref 9.5–13)
PROTHROM AB SERPL-ACNC: 12.2 SEC — SIGNIFICANT CHANGE UP (ref 9.5–13)
RAPID RVP RESULT: SIGNIFICANT CHANGE UP
RBC # BLD: 2.09 M/UL — LOW (ref 4.2–5.8)
RBC # BLD: 2.09 M/UL — LOW (ref 4.2–5.8)
RBC # BLD: 2.3 M/UL — LOW (ref 4.2–5.8)
RBC # BLD: 2.3 M/UL — LOW (ref 4.2–5.8)
RBC # BLD: 2.36 M/UL — LOW (ref 4.2–5.8)
RBC # BLD: 2.36 M/UL — LOW (ref 4.2–5.8)
RBC # BLD: 2.38 M/UL — LOW (ref 4.2–5.8)
RBC # BLD: 2.38 M/UL — LOW (ref 4.2–5.8)
RBC # BLD: 2.41 M/UL — LOW (ref 4.2–5.8)
RBC # BLD: 2.41 M/UL — LOW (ref 4.2–5.8)
RBC # BLD: 2.42 M/UL — LOW (ref 4.2–5.8)
RBC # BLD: 2.42 M/UL — LOW (ref 4.2–5.8)
RBC # BLD: 2.51 M/UL — LOW (ref 4.2–5.8)
RBC # BLD: 2.51 M/UL — LOW (ref 4.2–5.8)
RBC # BLD: 2.52 M/UL — LOW (ref 4.2–5.8)
RBC # BLD: 2.53 M/UL — LOW (ref 4.2–5.8)
RBC # BLD: 2.53 M/UL — LOW (ref 4.2–5.8)
RBC # BLD: 2.59 M/UL — LOW (ref 4.2–5.8)
RBC # BLD: 2.59 M/UL — LOW (ref 4.2–5.8)
RBC # BLD: 2.61 M/UL — LOW (ref 4.2–5.8)
RBC # BLD: 2.62 M/UL — LOW (ref 4.2–5.8)
RBC # BLD: 2.62 M/UL — LOW (ref 4.2–5.8)
RBC # BLD: 2.64 M/UL — LOW (ref 4.2–5.8)
RBC # BLD: 2.65 M/UL — LOW (ref 4.2–5.8)
RBC # BLD: 2.65 M/UL — LOW (ref 4.2–5.8)
RBC # BLD: 2.68 M/UL — LOW (ref 4.2–5.8)
RBC # BLD: 2.73 M/UL — LOW (ref 4.2–5.8)
RBC # BLD: 2.73 M/UL — LOW (ref 4.2–5.8)
RBC # BLD: 2.81 M/UL — LOW (ref 4.2–5.8)
RBC # BLD: 2.81 M/UL — LOW (ref 4.2–5.8)
RBC # BLD: 2.83 M/UL — LOW (ref 4.2–5.8)
RBC # BLD: 2.83 M/UL — LOW (ref 4.2–5.8)
RBC # BLD: 2.9 M/UL — LOW (ref 4.2–5.8)
RBC # BLD: 2.9 M/UL — LOW (ref 4.2–5.8)
RBC # BLD: 2.93 M/UL — LOW (ref 4.2–5.8)
RBC # BLD: 3.02 M/UL — LOW (ref 4.2–5.8)
RBC # BLD: 3.02 M/UL — LOW (ref 4.2–5.8)
RBC # BLD: 3.03 M/UL — LOW (ref 4.2–5.8)
RBC # BLD: 3.03 M/UL — LOW (ref 4.2–5.8)
RBC # BLD: 3.04 M/UL — LOW (ref 4.2–5.8)
RBC # BLD: 3.04 M/UL — LOW (ref 4.2–5.8)
RBC # BLD: 3.08 M/UL — LOW (ref 4.2–5.8)
RBC # BLD: 3.08 M/UL — LOW (ref 4.2–5.8)
RBC # BLD: 3.13 M/UL — LOW (ref 4.2–5.8)
RBC # BLD: 3.15 M/UL — LOW (ref 4.2–5.8)
RBC # BLD: 3.15 M/UL — LOW (ref 4.2–5.8)
RBC # BLD: 3.25 M/UL — LOW (ref 4.2–5.8)
RBC # BLD: 3.25 M/UL — LOW (ref 4.2–5.8)
RBC # BLD: 3.27 M/UL — LOW (ref 4.2–5.8)
RBC # BLD: 3.27 M/UL — LOW (ref 4.2–5.8)
RBC # BLD: 3.33 M/UL — LOW (ref 4.2–5.8)
RBC # BLD: 3.33 M/UL — LOW (ref 4.2–5.8)
RBC # FLD: 15.7 % — HIGH (ref 10.3–14.5)
RBC # FLD: 15.7 % — HIGH (ref 10.3–14.5)
RBC # FLD: 15.8 % — HIGH (ref 10.3–14.5)
RBC # FLD: 15.9 % — HIGH (ref 10.3–14.5)
RBC # FLD: 15.9 % — HIGH (ref 10.3–14.5)
RBC # FLD: 16 % — HIGH (ref 10.3–14.5)
RBC # FLD: 16.2 % — HIGH (ref 10.3–14.5)
RBC # FLD: 16.3 % — HIGH (ref 10.3–14.5)
RBC # FLD: 16.4 % — HIGH (ref 10.3–14.5)
RBC # FLD: 16.5 % — HIGH (ref 10.3–14.5)
RBC # FLD: 16.6 % — HIGH (ref 10.3–14.5)
RBC # FLD: 16.7 % — HIGH (ref 10.3–14.5)
RBC # FLD: 16.8 % — HIGH (ref 10.3–14.5)
RBC # FLD: 16.9 % — HIGH (ref 10.3–14.5)
RBC # FLD: 17 % — HIGH (ref 10.3–14.5)
RBC # FLD: 17.2 % — HIGH (ref 10.3–14.5)
RBC BLD AUTO: ABNORMAL
RBC BLD AUTO: ABNORMAL
RBC BLD AUTO: SIGNIFICANT CHANGE UP
RBC CASTS # UR COMP ASSIST: ABNORMAL /HPF (ref 0–4)
RBC CASTS # UR COMP ASSIST: SIGNIFICANT CHANGE UP /HPF (ref 0–4)
RBC CASTS # UR COMP ASSIST: SIGNIFICANT CHANGE UP /HPF (ref 0–4)
RCV VOL RI: HIGH /UL (ref 0–0)
RCV VOL RI: HIGH /UL (ref 0–0)
RED BLOOD CELLS URINE: NORMAL /HPF
RETICS #: 32 K/UL — SIGNIFICANT CHANGE UP (ref 25–125)
RETICS #: 32 K/UL — SIGNIFICANT CHANGE UP (ref 25–125)
RETICS/RBC NFR: 1.3 % — SIGNIFICANT CHANGE UP (ref 0.5–2.5)
RETICS/RBC NFR: 1.3 % — SIGNIFICANT CHANGE UP (ref 0.5–2.5)
REVIEW: NORMAL
S AUREUS DNA NOSE QL NAA+PROBE: SIGNIFICANT CHANGE UP
S AUREUS DNA NOSE QL NAA+PROBE: SIGNIFICANT CHANGE UP
SAO2 % BLDA: 83.9 % — LOW (ref 94–98)
SAO2 % BLDA: 83.9 % — LOW (ref 94–98)
SAO2 % BLDA: 99.4 % — HIGH (ref 94–98)
SAO2 % BLDA: 99.4 % — HIGH (ref 94–98)
SAO2 % BLDV: 71.6 % — LOW (ref 94–98)
SAO2 % BLDV: 71.6 % — LOW (ref 94–98)
SAO2 % BLDV: 82.9 % — LOW (ref 94–98)
SAO2 % BLDV: 82.9 % — LOW (ref 94–98)
SAO2 % BLDV: 87.6 % — LOW (ref 94–98)
SAO2 % BLDV: 87.6 % — LOW (ref 94–98)
SARS-COV-2 RNA SPEC QL NAA+PROBE: SIGNIFICANT CHANGE UP
SODIUM SERPL-SCNC: 129 MMOL/L — LOW (ref 135–145)
SODIUM SERPL-SCNC: 129 MMOL/L — LOW (ref 135–145)
SODIUM SERPL-SCNC: 130 MMOL/L — LOW (ref 135–145)
SODIUM SERPL-SCNC: 130 MMOL/L — LOW (ref 135–145)
SODIUM SERPL-SCNC: 133 MMOL/L — LOW (ref 135–145)
SODIUM SERPL-SCNC: 135 MMOL/L — SIGNIFICANT CHANGE UP (ref 135–145)
SODIUM SERPL-SCNC: 135 MMOL/L — SIGNIFICANT CHANGE UP (ref 135–145)
SODIUM SERPL-SCNC: 136 MMOL/L — SIGNIFICANT CHANGE UP (ref 135–145)
SODIUM SERPL-SCNC: 136 MMOL/L — SIGNIFICANT CHANGE UP (ref 135–145)
SODIUM SERPL-SCNC: 137 MMOL/L — SIGNIFICANT CHANGE UP (ref 135–145)
SODIUM SERPL-SCNC: 137 MMOL/L — SIGNIFICANT CHANGE UP (ref 135–145)
SODIUM SERPL-SCNC: 138 MMOL/L — SIGNIFICANT CHANGE UP (ref 135–145)
SODIUM SERPL-SCNC: 139 MMOL/L — SIGNIFICANT CHANGE UP (ref 135–145)
SODIUM SERPL-SCNC: 139 MMOL/L — SIGNIFICANT CHANGE UP (ref 135–145)
SODIUM SERPL-SCNC: 140 MMOL/L — SIGNIFICANT CHANGE UP (ref 135–145)
SODIUM SERPL-SCNC: 141 MMOL/L — SIGNIFICANT CHANGE UP (ref 135–145)
SODIUM SERPL-SCNC: 142 MMOL/L
SODIUM SERPL-SCNC: 142 MMOL/L — SIGNIFICANT CHANGE UP (ref 135–145)
SODIUM SERPL-SCNC: 143 MMOL/L — SIGNIFICANT CHANGE UP (ref 135–145)
SODIUM SERPL-SCNC: 144 MMOL/L — SIGNIFICANT CHANGE UP (ref 135–145)
SODIUM SERPL-SCNC: 146 MMOL/L — HIGH (ref 135–145)
SODIUM SERPL-SCNC: 147 MMOL/L — HIGH (ref 135–145)
SP GR SPEC: 1 — LOW (ref 1.01–1.02)
SP GR SPEC: 1.01 — SIGNIFICANT CHANGE UP (ref 1.01–1.02)
SP GR SPEC: 1.01 — SIGNIFICANT CHANGE UP (ref 1.01–1.02)
SP GR SPEC: 1.01 — SIGNIFICANT CHANGE UP (ref 1–1.03)
SP GR SPEC: 1.01 — SIGNIFICANT CHANGE UP (ref 1–1.03)
SPECIFIC GRAVITY URINE: 1.01
SPECIMEN SOURCE: SIGNIFICANT CHANGE UP
TIBC SERPL-MCNC: 136 UG/DL — LOW (ref 220–430)
TIBC SERPL-MCNC: 136 UG/DL — LOW (ref 220–430)
TOTAL NUCLEATED CELL COUNT, BODY FLUID: SIGNIFICANT CHANGE UP /UL
TOTAL NUCLEATED CELL COUNT, BODY FLUID: SIGNIFICANT CHANGE UP /UL
TROPONIN I, HIGH SENSITIVITY RESULT: 30.7 NG/L — SIGNIFICANT CHANGE UP
TROPONIN I, HIGH SENSITIVITY RESULT: 30.7 NG/L — SIGNIFICANT CHANGE UP
TUBE TYPE: SIGNIFICANT CHANGE UP
TUBE TYPE: SIGNIFICANT CHANGE UP
UIBC SERPL-MCNC: 104 UG/DL — LOW (ref 110–370)
UIBC SERPL-MCNC: 104 UG/DL — LOW (ref 110–370)
UROBILINOGEN FLD QL: 0.2 MG/DL — SIGNIFICANT CHANGE UP (ref 0.2–1)
UROBILINOGEN FLD QL: 0.2 MG/DL — SIGNIFICANT CHANGE UP (ref 0.2–1)
UROBILINOGEN FLD QL: NEGATIVE MG/DL — SIGNIFICANT CHANGE UP
UROBILINOGEN URINE: 0.2 MG/DL
VANCOMYCIN FLD-MCNC: 15.4 UG/ML — SIGNIFICANT CHANGE UP
VANCOMYCIN FLD-MCNC: 15.4 UG/ML — SIGNIFICANT CHANGE UP
VANCOMYCIN FLD-MCNC: 19.1 UG/ML — SIGNIFICANT CHANGE UP
VANCOMYCIN FLD-MCNC: 19.1 UG/ML — SIGNIFICANT CHANGE UP
VANCOMYCIN FLD-MCNC: 19.9 UG/ML — SIGNIFICANT CHANGE UP
VANCOMYCIN FLD-MCNC: 19.9 UG/ML — SIGNIFICANT CHANGE UP
VANCOMYCIN TROUGH SERPL-MCNC: 16.7 UG/ML — SIGNIFICANT CHANGE UP (ref 10–20)
VANCOMYCIN TROUGH SERPL-MCNC: 16.7 UG/ML — SIGNIFICANT CHANGE UP (ref 10–20)
VANCOMYCIN TROUGH SERPL-MCNC: 20.8 UG/ML — HIGH (ref 10–20)
VANCOMYCIN TROUGH SERPL-MCNC: 20.8 UG/ML — HIGH (ref 10–20)
VANCOMYCIN TROUGH SERPL-MCNC: 20.9 UG/ML — HIGH (ref 10–20)
VANCOMYCIN TROUGH SERPL-MCNC: 20.9 UG/ML — HIGH (ref 10–20)
VANCOMYCIN TROUGH SERPL-MCNC: 22.1 UG/ML — HIGH (ref 10–20)
VANCOMYCIN TROUGH SERPL-MCNC: 22.1 UG/ML — HIGH (ref 10–20)
WBC # BLD: 10.11 K/UL — SIGNIFICANT CHANGE UP (ref 3.8–10.5)
WBC # BLD: 10.11 K/UL — SIGNIFICANT CHANGE UP (ref 3.8–10.5)
WBC # BLD: 10.42 K/UL — SIGNIFICANT CHANGE UP (ref 3.8–10.5)
WBC # BLD: 10.42 K/UL — SIGNIFICANT CHANGE UP (ref 3.8–10.5)
WBC # BLD: 3 K/UL — LOW (ref 3.8–10.5)
WBC # BLD: 3 K/UL — LOW (ref 3.8–10.5)
WBC # BLD: 3.99 K/UL — SIGNIFICANT CHANGE UP (ref 3.8–10.5)
WBC # BLD: 3.99 K/UL — SIGNIFICANT CHANGE UP (ref 3.8–10.5)
WBC # BLD: 4.13 K/UL — SIGNIFICANT CHANGE UP (ref 3.8–10.5)
WBC # BLD: 4.13 K/UL — SIGNIFICANT CHANGE UP (ref 3.8–10.5)
WBC # BLD: 4.49 K/UL — SIGNIFICANT CHANGE UP (ref 3.8–10.5)
WBC # BLD: 4.56 K/UL — SIGNIFICANT CHANGE UP (ref 3.8–10.5)
WBC # BLD: 4.56 K/UL — SIGNIFICANT CHANGE UP (ref 3.8–10.5)
WBC # BLD: 5 K/UL — SIGNIFICANT CHANGE UP (ref 3.8–10.5)
WBC # BLD: 5.14 K/UL — SIGNIFICANT CHANGE UP (ref 3.8–10.5)
WBC # BLD: 5.14 K/UL — SIGNIFICANT CHANGE UP (ref 3.8–10.5)
WBC # BLD: 5.37 K/UL — SIGNIFICANT CHANGE UP (ref 3.8–10.5)
WBC # BLD: 5.37 K/UL — SIGNIFICANT CHANGE UP (ref 3.8–10.5)
WBC # BLD: 5.93 K/UL — SIGNIFICANT CHANGE UP (ref 3.8–10.5)
WBC # BLD: 5.93 K/UL — SIGNIFICANT CHANGE UP (ref 3.8–10.5)
WBC # BLD: 5.95 K/UL — SIGNIFICANT CHANGE UP (ref 3.8–10.5)
WBC # BLD: 5.95 K/UL — SIGNIFICANT CHANGE UP (ref 3.8–10.5)
WBC # BLD: 6.08 K/UL — SIGNIFICANT CHANGE UP (ref 3.8–10.5)
WBC # BLD: 6.08 K/UL — SIGNIFICANT CHANGE UP (ref 3.8–10.5)
WBC # BLD: 6.15 K/UL — SIGNIFICANT CHANGE UP (ref 3.8–10.5)
WBC # BLD: 6.15 K/UL — SIGNIFICANT CHANGE UP (ref 3.8–10.5)
WBC # BLD: 6.18 K/UL — SIGNIFICANT CHANGE UP (ref 3.8–10.5)
WBC # BLD: 6.18 K/UL — SIGNIFICANT CHANGE UP (ref 3.8–10.5)
WBC # BLD: 6.2 K/UL — SIGNIFICANT CHANGE UP (ref 3.8–10.5)
WBC # BLD: 6.2 K/UL — SIGNIFICANT CHANGE UP (ref 3.8–10.5)
WBC # BLD: 6.33 K/UL — SIGNIFICANT CHANGE UP (ref 3.8–10.5)
WBC # BLD: 6.33 K/UL — SIGNIFICANT CHANGE UP (ref 3.8–10.5)
WBC # BLD: 6.45 K/UL — SIGNIFICANT CHANGE UP (ref 3.8–10.5)
WBC # BLD: 6.45 K/UL — SIGNIFICANT CHANGE UP (ref 3.8–10.5)
WBC # BLD: 6.5 K/UL — SIGNIFICANT CHANGE UP (ref 3.8–10.5)
WBC # BLD: 6.5 K/UL — SIGNIFICANT CHANGE UP (ref 3.8–10.5)
WBC # BLD: 6.52 K/UL — SIGNIFICANT CHANGE UP (ref 3.8–10.5)
WBC # BLD: 6.52 K/UL — SIGNIFICANT CHANGE UP (ref 3.8–10.5)
WBC # BLD: 6.55 K/UL — SIGNIFICANT CHANGE UP (ref 3.8–10.5)
WBC # BLD: 6.55 K/UL — SIGNIFICANT CHANGE UP (ref 3.8–10.5)
WBC # BLD: 6.64 K/UL — SIGNIFICANT CHANGE UP (ref 3.8–10.5)
WBC # BLD: 6.64 K/UL — SIGNIFICANT CHANGE UP (ref 3.8–10.5)
WBC # BLD: 6.81 K/UL — SIGNIFICANT CHANGE UP (ref 3.8–10.5)
WBC # BLD: 6.81 K/UL — SIGNIFICANT CHANGE UP (ref 3.8–10.5)
WBC # BLD: 6.82 K/UL — SIGNIFICANT CHANGE UP (ref 3.8–10.5)
WBC # BLD: 6.82 K/UL — SIGNIFICANT CHANGE UP (ref 3.8–10.5)
WBC # BLD: 6.91 K/UL — SIGNIFICANT CHANGE UP (ref 3.8–10.5)
WBC # BLD: 6.91 K/UL — SIGNIFICANT CHANGE UP (ref 3.8–10.5)
WBC # BLD: 6.94 K/UL — SIGNIFICANT CHANGE UP (ref 3.8–10.5)
WBC # BLD: 6.94 K/UL — SIGNIFICANT CHANGE UP (ref 3.8–10.5)
WBC # BLD: 7.05 K/UL — SIGNIFICANT CHANGE UP (ref 3.8–10.5)
WBC # BLD: 7.05 K/UL — SIGNIFICANT CHANGE UP (ref 3.8–10.5)
WBC # BLD: 7.19 K/UL — SIGNIFICANT CHANGE UP (ref 3.8–10.5)
WBC # BLD: 7.19 K/UL — SIGNIFICANT CHANGE UP (ref 3.8–10.5)
WBC # BLD: 7.22 K/UL — SIGNIFICANT CHANGE UP (ref 3.8–10.5)
WBC # BLD: 7.22 K/UL — SIGNIFICANT CHANGE UP (ref 3.8–10.5)
WBC # BLD: 7.37 K/UL — SIGNIFICANT CHANGE UP (ref 3.8–10.5)
WBC # BLD: 7.37 K/UL — SIGNIFICANT CHANGE UP (ref 3.8–10.5)
WBC # BLD: 7.4 K/UL — SIGNIFICANT CHANGE UP (ref 3.8–10.5)
WBC # BLD: 7.4 K/UL — SIGNIFICANT CHANGE UP (ref 3.8–10.5)
WBC # BLD: 7.49 K/UL — SIGNIFICANT CHANGE UP (ref 3.8–10.5)
WBC # BLD: 7.49 K/UL — SIGNIFICANT CHANGE UP (ref 3.8–10.5)
WBC # BLD: 7.68 K/UL — SIGNIFICANT CHANGE UP (ref 3.8–10.5)
WBC # BLD: 7.68 K/UL — SIGNIFICANT CHANGE UP (ref 3.8–10.5)
WBC # BLD: 9.91 K/UL — SIGNIFICANT CHANGE UP (ref 3.8–10.5)
WBC # BLD: 9.91 K/UL — SIGNIFICANT CHANGE UP (ref 3.8–10.5)
WBC # FLD AUTO: 10.11 K/UL — SIGNIFICANT CHANGE UP (ref 3.8–10.5)
WBC # FLD AUTO: 10.11 K/UL — SIGNIFICANT CHANGE UP (ref 3.8–10.5)
WBC # FLD AUTO: 10.42 K/UL — SIGNIFICANT CHANGE UP (ref 3.8–10.5)
WBC # FLD AUTO: 10.42 K/UL — SIGNIFICANT CHANGE UP (ref 3.8–10.5)
WBC # FLD AUTO: 3 K/UL — LOW (ref 3.8–10.5)
WBC # FLD AUTO: 3 K/UL — LOW (ref 3.8–10.5)
WBC # FLD AUTO: 3.99 K/UL — SIGNIFICANT CHANGE UP (ref 3.8–10.5)
WBC # FLD AUTO: 3.99 K/UL — SIGNIFICANT CHANGE UP (ref 3.8–10.5)
WBC # FLD AUTO: 4.13 K/UL — SIGNIFICANT CHANGE UP (ref 3.8–10.5)
WBC # FLD AUTO: 4.13 K/UL — SIGNIFICANT CHANGE UP (ref 3.8–10.5)
WBC # FLD AUTO: 4.49 K/UL — SIGNIFICANT CHANGE UP (ref 3.8–10.5)
WBC # FLD AUTO: 4.56 K/UL — SIGNIFICANT CHANGE UP (ref 3.8–10.5)
WBC # FLD AUTO: 4.56 K/UL — SIGNIFICANT CHANGE UP (ref 3.8–10.5)
WBC # FLD AUTO: 5 K/UL — SIGNIFICANT CHANGE UP (ref 3.8–10.5)
WBC # FLD AUTO: 5.14 K/UL — SIGNIFICANT CHANGE UP (ref 3.8–10.5)
WBC # FLD AUTO: 5.14 K/UL — SIGNIFICANT CHANGE UP (ref 3.8–10.5)
WBC # FLD AUTO: 5.37 K/UL — SIGNIFICANT CHANGE UP (ref 3.8–10.5)
WBC # FLD AUTO: 5.37 K/UL — SIGNIFICANT CHANGE UP (ref 3.8–10.5)
WBC # FLD AUTO: 5.93 K/UL — SIGNIFICANT CHANGE UP (ref 3.8–10.5)
WBC # FLD AUTO: 5.93 K/UL — SIGNIFICANT CHANGE UP (ref 3.8–10.5)
WBC # FLD AUTO: 5.95 K/UL — SIGNIFICANT CHANGE UP (ref 3.8–10.5)
WBC # FLD AUTO: 5.95 K/UL — SIGNIFICANT CHANGE UP (ref 3.8–10.5)
WBC # FLD AUTO: 6.08 K/UL — SIGNIFICANT CHANGE UP (ref 3.8–10.5)
WBC # FLD AUTO: 6.08 K/UL — SIGNIFICANT CHANGE UP (ref 3.8–10.5)
WBC # FLD AUTO: 6.15 K/UL — SIGNIFICANT CHANGE UP (ref 3.8–10.5)
WBC # FLD AUTO: 6.15 K/UL — SIGNIFICANT CHANGE UP (ref 3.8–10.5)
WBC # FLD AUTO: 6.18 K/UL — SIGNIFICANT CHANGE UP (ref 3.8–10.5)
WBC # FLD AUTO: 6.18 K/UL — SIGNIFICANT CHANGE UP (ref 3.8–10.5)
WBC # FLD AUTO: 6.2 K/UL — SIGNIFICANT CHANGE UP (ref 3.8–10.5)
WBC # FLD AUTO: 6.2 K/UL — SIGNIFICANT CHANGE UP (ref 3.8–10.5)
WBC # FLD AUTO: 6.33 K/UL — SIGNIFICANT CHANGE UP (ref 3.8–10.5)
WBC # FLD AUTO: 6.33 K/UL — SIGNIFICANT CHANGE UP (ref 3.8–10.5)
WBC # FLD AUTO: 6.45 K/UL — SIGNIFICANT CHANGE UP (ref 3.8–10.5)
WBC # FLD AUTO: 6.45 K/UL — SIGNIFICANT CHANGE UP (ref 3.8–10.5)
WBC # FLD AUTO: 6.5 K/UL — SIGNIFICANT CHANGE UP (ref 3.8–10.5)
WBC # FLD AUTO: 6.5 K/UL — SIGNIFICANT CHANGE UP (ref 3.8–10.5)
WBC # FLD AUTO: 6.52 K/UL — SIGNIFICANT CHANGE UP (ref 3.8–10.5)
WBC # FLD AUTO: 6.52 K/UL — SIGNIFICANT CHANGE UP (ref 3.8–10.5)
WBC # FLD AUTO: 6.55 K/UL — SIGNIFICANT CHANGE UP (ref 3.8–10.5)
WBC # FLD AUTO: 6.55 K/UL — SIGNIFICANT CHANGE UP (ref 3.8–10.5)
WBC # FLD AUTO: 6.64 K/UL — SIGNIFICANT CHANGE UP (ref 3.8–10.5)
WBC # FLD AUTO: 6.64 K/UL — SIGNIFICANT CHANGE UP (ref 3.8–10.5)
WBC # FLD AUTO: 6.81 K/UL — SIGNIFICANT CHANGE UP (ref 3.8–10.5)
WBC # FLD AUTO: 6.81 K/UL — SIGNIFICANT CHANGE UP (ref 3.8–10.5)
WBC # FLD AUTO: 6.82 K/UL — SIGNIFICANT CHANGE UP (ref 3.8–10.5)
WBC # FLD AUTO: 6.82 K/UL — SIGNIFICANT CHANGE UP (ref 3.8–10.5)
WBC # FLD AUTO: 6.91 K/UL — SIGNIFICANT CHANGE UP (ref 3.8–10.5)
WBC # FLD AUTO: 6.91 K/UL — SIGNIFICANT CHANGE UP (ref 3.8–10.5)
WBC # FLD AUTO: 6.94 K/UL — SIGNIFICANT CHANGE UP (ref 3.8–10.5)
WBC # FLD AUTO: 6.94 K/UL — SIGNIFICANT CHANGE UP (ref 3.8–10.5)
WBC # FLD AUTO: 7.05 K/UL — SIGNIFICANT CHANGE UP (ref 3.8–10.5)
WBC # FLD AUTO: 7.05 K/UL — SIGNIFICANT CHANGE UP (ref 3.8–10.5)
WBC # FLD AUTO: 7.19 K/UL — SIGNIFICANT CHANGE UP (ref 3.8–10.5)
WBC # FLD AUTO: 7.19 K/UL — SIGNIFICANT CHANGE UP (ref 3.8–10.5)
WBC # FLD AUTO: 7.22 K/UL — SIGNIFICANT CHANGE UP (ref 3.8–10.5)
WBC # FLD AUTO: 7.22 K/UL — SIGNIFICANT CHANGE UP (ref 3.8–10.5)
WBC # FLD AUTO: 7.37 K/UL — SIGNIFICANT CHANGE UP (ref 3.8–10.5)
WBC # FLD AUTO: 7.37 K/UL — SIGNIFICANT CHANGE UP (ref 3.8–10.5)
WBC # FLD AUTO: 7.4 K/UL — SIGNIFICANT CHANGE UP (ref 3.8–10.5)
WBC # FLD AUTO: 7.4 K/UL — SIGNIFICANT CHANGE UP (ref 3.8–10.5)
WBC # FLD AUTO: 7.49 K/UL — SIGNIFICANT CHANGE UP (ref 3.8–10.5)
WBC # FLD AUTO: 7.49 K/UL — SIGNIFICANT CHANGE UP (ref 3.8–10.5)
WBC # FLD AUTO: 7.68 K/UL — SIGNIFICANT CHANGE UP (ref 3.8–10.5)
WBC # FLD AUTO: 7.68 K/UL — SIGNIFICANT CHANGE UP (ref 3.8–10.5)
WBC # FLD AUTO: 9.91 K/UL — SIGNIFICANT CHANGE UP (ref 3.8–10.5)
WBC # FLD AUTO: 9.91 K/UL — SIGNIFICANT CHANGE UP (ref 3.8–10.5)
WBC UR QL: >50
WBC UR QL: >50
WBC UR QL: >50 /HPF — SIGNIFICANT CHANGE UP (ref 0–5)
WBC UR QL: >50 /HPF — SIGNIFICANT CHANGE UP (ref 0–5)
WBC UR QL: SIGNIFICANT CHANGE UP
WHITE BLOOD CELLS URINE: 43 /HPF

## 2023-01-01 PROCEDURE — 99232 SBSQ HOSP IP/OBS MODERATE 35: CPT

## 2023-01-01 PROCEDURE — 99285 EMERGENCY DEPT VISIT HI MDM: CPT

## 2023-01-01 PROCEDURE — G2066: CPT

## 2023-01-01 PROCEDURE — 99222 1ST HOSP IP/OBS MODERATE 55: CPT

## 2023-01-01 PROCEDURE — 99291 CRITICAL CARE FIRST HOUR: CPT | Mod: 25

## 2023-01-01 PROCEDURE — 60200 REMOVE THYROID LESION: CPT

## 2023-01-01 PROCEDURE — 71045 X-RAY EXAM CHEST 1 VIEW: CPT | Mod: 26

## 2023-01-01 PROCEDURE — 99223 1ST HOSP IP/OBS HIGH 75: CPT

## 2023-01-01 PROCEDURE — 99233 SBSQ HOSP IP/OBS HIGH 50: CPT

## 2023-01-01 PROCEDURE — 86077 PHYS BLOOD BANK SERV XMATCH: CPT

## 2023-01-01 PROCEDURE — 99497 ADVNCD CARE PLAN 30 MIN: CPT

## 2023-01-01 PROCEDURE — 99292 CRITICAL CARE ADDL 30 MIN: CPT | Mod: 25

## 2023-01-01 PROCEDURE — 99291 CRITICAL CARE FIRST HOUR: CPT

## 2023-01-01 PROCEDURE — 99221 1ST HOSP IP/OBS SF/LOW 40: CPT

## 2023-01-01 PROCEDURE — 71045 X-RAY EXAM CHEST 1 VIEW: CPT | Mod: 26,76

## 2023-01-01 PROCEDURE — 93298 REM INTERROG DEV EVAL SCRMS: CPT

## 2023-01-01 PROCEDURE — 99239 HOSP IP/OBS DSCHRG MGMT >30: CPT

## 2023-01-01 PROCEDURE — 99497 ADVNCD CARE PLAN 30 MIN: CPT | Mod: 25

## 2023-01-01 PROCEDURE — 93010 ELECTROCARDIOGRAM REPORT: CPT

## 2023-01-01 PROCEDURE — 99496 TRANSJ CARE MGMT HIGH F2F 7D: CPT

## 2023-01-01 PROCEDURE — 76937 US GUIDE VASCULAR ACCESS: CPT | Mod: 26

## 2023-01-01 PROCEDURE — 31624 DX BRONCHOSCOPE/LAVAGE: CPT

## 2023-01-01 PROCEDURE — 78582 LUNG VENTILAT&PERFUS IMAGING: CPT | Mod: 26

## 2023-01-01 PROCEDURE — 31645 BRNCHSC W/THER ASPIR 1ST: CPT

## 2023-01-01 PROCEDURE — 74176 CT ABD & PELVIS W/O CONTRAST: CPT | Mod: 26,MA

## 2023-01-01 PROCEDURE — 31500 INSERT EMERGENCY AIRWAY: CPT

## 2023-01-01 PROCEDURE — G0008: CPT

## 2023-01-01 PROCEDURE — 99496 TRANSJ CARE MGMT HIGH F2F 7D: CPT | Mod: 25

## 2023-01-01 PROCEDURE — 93970 EXTREMITY STUDY: CPT | Mod: 26

## 2023-01-01 PROCEDURE — 93306 TTE W/DOPPLER COMPLETE: CPT | Mod: 26

## 2023-01-01 PROCEDURE — 93308 TTE F-UP OR LMTD: CPT | Mod: 26

## 2023-01-01 PROCEDURE — 71250 CT THORAX DX C-: CPT | Mod: 26,MA

## 2023-01-01 PROCEDURE — 76604 US EXAM CHEST: CPT | Mod: 26

## 2023-01-01 PROCEDURE — 31600 PLANNED TRACHEOSTOMY: CPT

## 2023-01-01 PROCEDURE — 36000 PLACE NEEDLE IN VEIN: CPT

## 2023-01-01 PROCEDURE — 43752 NASAL/OROGASTRIC W/TUBE PLMT: CPT | Mod: 59

## 2023-01-01 PROCEDURE — 31500 INSERT EMERGENCY AIRWAY: CPT | Mod: 59

## 2023-01-01 PROCEDURE — 90662 IIV NO PRSV INCREASED AG IM: CPT

## 2023-01-01 PROCEDURE — 31622 DX BRONCHOSCOPE/WASH: CPT

## 2023-01-01 PROCEDURE — 43246 EGD PLACE GASTROSTOMY TUBE: CPT | Mod: AS

## 2023-01-01 PROCEDURE — 69210 REMOVE IMPACTED EAR WAX UNI: CPT

## 2023-01-01 PROCEDURE — 71045 X-RAY EXAM CHEST 1 VIEW: CPT | Mod: 26,77

## 2023-01-01 PROCEDURE — 99349 HOME/RES VST EST MOD MDM 40: CPT | Mod: 25

## 2023-01-01 DEVICE — KIT ENDO SAFTEY PEG PULL STD 20 FR ENDOVIVE: Type: IMPLANTABLE DEVICE | Status: FUNCTIONAL

## 2023-01-01 DEVICE — TUBE TRACH 6.0 XLT CUFF DISTAL: Type: IMPLANTABLE DEVICE | Status: FUNCTIONAL

## 2023-01-01 DEVICE — SURGICEL 2 X 14": Type: IMPLANTABLE DEVICE | Status: FUNCTIONAL

## 2023-01-01 RX ORDER — MIRTAZAPINE 15 MG/1
15 TABLET, FILM COATED ORAL
Qty: 90 | Refills: 1 | Status: ACTIVE | COMMUNITY
Start: 2020-10-23

## 2023-01-01 RX ORDER — FERROUS SULFATE 325(65) MG
1 TABLET ORAL
Qty: 0 | Refills: 0 | DISCHARGE

## 2023-01-01 RX ORDER — CHLORHEXIDINE GLUCONATE 213 G/1000ML
15 SOLUTION TOPICAL EVERY 12 HOURS
Refills: 0 | Status: DISCONTINUED | OUTPATIENT
Start: 2023-01-01 | End: 2023-01-01

## 2023-01-01 RX ORDER — CEFTRIAXONE 500 MG/1
1000 INJECTION, POWDER, FOR SOLUTION INTRAMUSCULAR; INTRAVENOUS EVERY 24 HOURS
Refills: 0 | Status: DISCONTINUED | OUTPATIENT
Start: 2023-01-01 | End: 2023-01-01

## 2023-01-01 RX ORDER — AMLODIPINE BESYLATE 2.5 MG/1
2.5 TABLET ORAL DAILY
Refills: 0 | Status: DISCONTINUED | OUTPATIENT
Start: 2023-01-01 | End: 2023-01-01

## 2023-01-01 RX ORDER — SODIUM CHLORIDE 9 MG/ML
1000 INJECTION, SOLUTION INTRAVENOUS
Refills: 0 | Status: DISCONTINUED | OUTPATIENT
Start: 2023-01-01 | End: 2023-01-01

## 2023-01-01 RX ORDER — VANCOMYCIN HCL 1 G
500 VIAL (EA) INTRAVENOUS EVERY 24 HOURS
Refills: 0 | Status: DISCONTINUED | OUTPATIENT
Start: 2023-01-01 | End: 2023-01-01

## 2023-01-01 RX ORDER — POTASSIUM CHLORIDE 20 MEQ
40 PACKET (EA) ORAL ONCE
Refills: 0 | Status: COMPLETED | OUTPATIENT
Start: 2023-01-01 | End: 2023-01-01

## 2023-01-01 RX ORDER — BUMETANIDE 0.25 MG/ML
2 INJECTION INTRAMUSCULAR; INTRAVENOUS ONCE
Refills: 0 | Status: COMPLETED | OUTPATIENT
Start: 2023-01-01 | End: 2023-01-01

## 2023-01-01 RX ORDER — DIPHENHYDRAMINE HYDROCHLORIDE 50 MG/ML
50 INJECTION, SOLUTION INTRAMUSCULAR; INTRAVENOUS
Qty: 1 | Refills: 0 | Status: DISCONTINUED | COMMUNITY
Start: 2023-01-13 | End: 2023-01-01

## 2023-01-01 RX ORDER — MIRTAZAPINE 45 MG/1
7.5 TABLET, ORALLY DISINTEGRATING ORAL AT BEDTIME
Refills: 0 | Status: DISCONTINUED | OUTPATIENT
Start: 2023-01-01 | End: 2023-01-01

## 2023-01-01 RX ORDER — ACETYLCYSTEINE 200 MG/ML
4 VIAL (ML) MISCELLANEOUS EVERY 6 HOURS
Refills: 0 | Status: DISCONTINUED | OUTPATIENT
Start: 2023-01-01 | End: 2023-01-01

## 2023-01-01 RX ORDER — AMLODIPINE BESYLATE 5 MG/1
5 TABLET ORAL
Qty: 90 | Refills: 3 | Status: ACTIVE | COMMUNITY
Start: 2020-10-23

## 2023-01-01 RX ORDER — KETOROLAC TROMETHAMINE 5 MG/ML
0.5 SOLUTION OPHTHALMIC 4 TIMES DAILY
Refills: 0 | Status: ACTIVE | COMMUNITY
Start: 2023-01-27

## 2023-01-01 RX ORDER — NOREPINEPHRINE BITARTRATE/D5W 8 MG/250ML
0.05 PLASTIC BAG, INJECTION (ML) INTRAVENOUS
Qty: 8 | Refills: 0 | Status: DISCONTINUED | OUTPATIENT
Start: 2023-01-01 | End: 2023-01-01

## 2023-01-01 RX ORDER — CIPROFLOXACIN LACTATE 400MG/40ML
VIAL (ML) INTRAVENOUS
Refills: 0 | Status: DISCONTINUED | OUTPATIENT
Start: 2023-01-01 | End: 2023-01-01

## 2023-01-01 RX ORDER — MIDAZOLAM HYDROCHLORIDE 1 MG/ML
4 INJECTION, SOLUTION INTRAMUSCULAR; INTRAVENOUS ONCE
Refills: 0 | Status: DISCONTINUED | OUTPATIENT
Start: 2023-01-01 | End: 2023-01-01

## 2023-01-01 RX ORDER — SODIUM CHLORIDE 9 MG/ML
1000 INJECTION, SOLUTION INTRAVENOUS ONCE
Refills: 0 | Status: COMPLETED | OUTPATIENT
Start: 2023-01-01 | End: 2023-01-01

## 2023-01-01 RX ORDER — ACETAMINOPHEN 500 MG
650 TABLET ORAL EVERY 6 HOURS
Refills: 0 | Status: DISCONTINUED | OUTPATIENT
Start: 2023-01-01 | End: 2023-01-01

## 2023-01-01 RX ORDER — GENTAMICIN SULFATE 3 MG/ML
0.3 SOLUTION OPHTHALMIC
Qty: 1 | Refills: 5 | Status: COMPLETED | COMMUNITY
Start: 2023-01-27 | End: 2023-01-01

## 2023-01-01 RX ORDER — COLLAGENASE CLOSTRIDIUM HIST. 250 UNIT/G
1 OINTMENT (GRAM) TOPICAL DAILY
Refills: 0 | Status: DISCONTINUED | OUTPATIENT
Start: 2023-01-01 | End: 2024-01-01

## 2023-01-01 RX ORDER — CIPROFLOXACIN HYDROCHLORIDE 250 MG/1
250 TABLET, FILM COATED ORAL
Qty: 14 | Refills: 0 | Status: ACTIVE | COMMUNITY
Start: 2023-01-01 | End: 1900-01-01

## 2023-01-01 RX ORDER — PROPOFOL 10 MG/ML
10 INJECTION, EMULSION INTRAVENOUS
Qty: 1000 | Refills: 0 | Status: DISCONTINUED | OUTPATIENT
Start: 2023-01-01 | End: 2023-01-01

## 2023-01-01 RX ORDER — VANCOMYCIN HCL 1 G
1000 VIAL (EA) INTRAVENOUS ONCE
Refills: 0 | Status: COMPLETED | OUTPATIENT
Start: 2023-01-01 | End: 2023-01-01

## 2023-01-01 RX ORDER — LABETALOL HCL 100 MG
1 TABLET ORAL
Refills: 0 | DISCHARGE
Start: 2023-01-01 | End: 2024-01-01

## 2023-01-01 RX ORDER — CEFTRIAXONE 500 MG/1
1000 INJECTION, POWDER, FOR SOLUTION INTRAMUSCULAR; INTRAVENOUS ONCE
Refills: 0 | Status: COMPLETED | OUTPATIENT
Start: 2023-01-01 | End: 2023-01-01

## 2023-01-01 RX ORDER — MEROPENEM 1 G/30ML
INJECTION INTRAVENOUS
Refills: 0 | Status: DISCONTINUED | OUTPATIENT
Start: 2023-01-01 | End: 2023-01-01

## 2023-01-01 RX ORDER — SODIUM CHLORIDE 9 MG/ML
1000 INJECTION, SOLUTION INTRAVENOUS
Refills: 0 | Status: COMPLETED | OUTPATIENT
Start: 2023-01-01 | End: 2023-01-01

## 2023-01-01 RX ORDER — CIPROFLOXACIN LACTATE 400MG/40ML
200 VIAL (ML) INTRAVENOUS EVERY 12 HOURS
Refills: 0 | Status: DISCONTINUED | OUTPATIENT
Start: 2023-01-01 | End: 2023-01-01

## 2023-01-01 RX ORDER — SODIUM CHLORIDE 9 MG/ML
4 INJECTION INTRAMUSCULAR; INTRAVENOUS; SUBCUTANEOUS EVERY 6 HOURS
Refills: 0 | Status: DISCONTINUED | OUTPATIENT
Start: 2023-01-01 | End: 2023-01-01

## 2023-01-01 RX ORDER — MEROPENEM 1 G/30ML
1000 INJECTION INTRAVENOUS ONCE
Refills: 0 | Status: COMPLETED | OUTPATIENT
Start: 2023-01-01 | End: 2023-01-01

## 2023-01-01 RX ORDER — SODIUM ZIRCONIUM CYCLOSILICATE 10 G/10G
10 POWDER, FOR SUSPENSION ORAL
Refills: 0 | Status: DISCONTINUED | OUTPATIENT
Start: 2023-01-01 | End: 2023-01-01

## 2023-01-01 RX ORDER — PIPERACILLIN AND TAZOBACTAM 4; .5 G/20ML; G/20ML
3.38 INJECTION, POWDER, LYOPHILIZED, FOR SOLUTION INTRAVENOUS ONCE
Refills: 0 | Status: COMPLETED | OUTPATIENT
Start: 2023-01-01 | End: 2023-01-01

## 2023-01-01 RX ORDER — SODIUM CHLORIDE 9 MG/ML
500 INJECTION, SOLUTION INTRAVENOUS ONCE
Refills: 0 | Status: COMPLETED | OUTPATIENT
Start: 2023-01-01 | End: 2023-01-01

## 2023-01-01 RX ORDER — HEPARIN SODIUM 5000 [USP'U]/ML
5000 INJECTION INTRAVENOUS; SUBCUTANEOUS EVERY 12 HOURS
Refills: 0 | Status: DISCONTINUED | OUTPATIENT
Start: 2023-01-01 | End: 2023-01-01

## 2023-01-01 RX ORDER — MAGNESIUM SULFATE 500 MG/ML
1 VIAL (ML) INJECTION ONCE
Refills: 0 | Status: COMPLETED | OUTPATIENT
Start: 2023-01-01 | End: 2023-01-01

## 2023-01-01 RX ORDER — IPRATROPIUM/ALBUTEROL SULFATE 18-103MCG
3 AEROSOL WITH ADAPTER (GRAM) INHALATION EVERY 6 HOURS
Refills: 0 | Status: DISCONTINUED | OUTPATIENT
Start: 2023-01-01 | End: 2023-01-01

## 2023-01-01 RX ORDER — DEXMEDETOMIDINE HYDROCHLORIDE IN 0.9% SODIUM CHLORIDE 4 UG/ML
0.3 INJECTION INTRAVENOUS
Qty: 200 | Refills: 0 | Status: DISCONTINUED | OUTPATIENT
Start: 2023-01-01 | End: 2023-01-01

## 2023-01-01 RX ORDER — ASPIRIN 81 MG/1
81 TABLET, CHEWABLE ORAL DAILY
Qty: 30 | Refills: 4 | Status: ACTIVE | COMMUNITY
Start: 2022-01-25

## 2023-01-01 RX ORDER — METOPROLOL TARTRATE 50 MG
2.5 TABLET ORAL ONCE
Refills: 0 | Status: COMPLETED | OUTPATIENT
Start: 2023-01-01 | End: 2023-01-01

## 2023-01-01 RX ORDER — CEFTAZIDIME 1 G/20ML
1 INJECTION, POWDER, FOR SOLUTION INTRAMUSCULAR; INTRAVENOUS
Qty: 14 | Refills: 0 | Status: DISCONTINUED | COMMUNITY
Start: 2023-01-01 | End: 2023-01-01

## 2023-01-01 RX ORDER — MIDAZOLAM HYDROCHLORIDE 1 MG/ML
2 INJECTION, SOLUTION INTRAMUSCULAR; INTRAVENOUS ONCE
Refills: 0 | Status: DISCONTINUED | OUTPATIENT
Start: 2023-01-01 | End: 2023-01-01

## 2023-01-01 RX ORDER — METRONIDAZOLE 500 MG
500 TABLET ORAL EVERY 8 HOURS
Refills: 0 | Status: DISCONTINUED | OUTPATIENT
Start: 2023-01-01 | End: 2023-01-01

## 2023-01-01 RX ORDER — MIRTAZAPINE 45 MG/1
15 TABLET, ORALLY DISINTEGRATING ORAL DAILY
Refills: 0 | Status: DISCONTINUED | OUTPATIENT
Start: 2023-01-01 | End: 2024-01-01

## 2023-01-01 RX ORDER — PREDNISOLONE SODIUM PHOSPHATE 1 %
1 DROPS OPHTHALMIC (EYE)
Refills: 0 | Status: DISCONTINUED | OUTPATIENT
Start: 2023-01-01 | End: 2023-01-01

## 2023-01-01 RX ORDER — PREDNISOLONE ACETATE 10 MG/ML
1 SUSPENSION/ DROPS OPHTHALMIC
Qty: 1 | Refills: 3 | Status: ACTIVE | COMMUNITY
Start: 2023-01-27

## 2023-01-01 RX ORDER — AMLODIPINE BESYLATE 2.5 MG/1
5 TABLET ORAL DAILY
Refills: 0 | Status: DISCONTINUED | OUTPATIENT
Start: 2023-01-01 | End: 2023-01-01

## 2023-01-01 RX ORDER — TAMSULOSIN HYDROCHLORIDE 0.4 MG/1
0.4 CAPSULE ORAL
Qty: 90 | Refills: 3 | Status: ACTIVE | COMMUNITY
Start: 2020-10-23

## 2023-01-01 RX ORDER — FINASTERIDE 5 MG/1
1 TABLET, FILM COATED ORAL
Refills: 0 | DISCHARGE
Start: 2023-01-01 | End: 2023-01-01

## 2023-01-01 RX ORDER — CHLORHEXIDINE GLUCONATE 213 G/1000ML
15 SOLUTION TOPICAL EVERY 12 HOURS
Refills: 0 | Status: DISCONTINUED | OUTPATIENT
Start: 2023-01-01 | End: 2024-01-01

## 2023-01-01 RX ORDER — CIPROFLOXACIN LACTATE 400MG/40ML
1 VIAL (ML) INTRAVENOUS
Qty: 8 | Refills: 0
Start: 2023-01-01 | End: 2023-01-01

## 2023-01-01 RX ORDER — HEPARIN SODIUM 5000 [USP'U]/ML
5000 INJECTION INTRAVENOUS; SUBCUTANEOUS EVERY 8 HOURS
Refills: 0 | Status: DISCONTINUED | OUTPATIENT
Start: 2023-01-01 | End: 2023-01-01

## 2023-01-01 RX ORDER — GABAPENTIN 400 MG/1
1 CAPSULE ORAL
Refills: 0 | DISCHARGE
Start: 2023-01-01 | End: 2024-01-01

## 2023-01-01 RX ORDER — ONDANSETRON 8 MG/1
4 TABLET, FILM COATED ORAL EVERY 8 HOURS
Refills: 0 | Status: DISCONTINUED | OUTPATIENT
Start: 2023-01-01 | End: 2023-01-01

## 2023-01-01 RX ORDER — POTASSIUM CHLORIDE 20 MEQ
20 PACKET (EA) ORAL ONCE
Refills: 0 | Status: COMPLETED | OUTPATIENT
Start: 2023-01-01 | End: 2023-01-01

## 2023-01-01 RX ORDER — CEFPODOXIME PROXETIL 200 MG/1
200 TABLET, FILM COATED ORAL
Qty: 20 | Refills: 0 | Status: COMPLETED | COMMUNITY
Start: 2023-01-01 | End: 2023-01-01

## 2023-01-01 RX ORDER — FERROUS SULFATE TAB 325 MG (65 MG ELEMENTAL FE) 325 (65 FE) MG
325 (65 FE) TAB ORAL
Qty: 90 | Refills: 3 | Status: ACTIVE | COMMUNITY
Start: 2023-01-01

## 2023-01-01 RX ORDER — POLYETHYLENE GLYCOL 3350 17 G/17G
17 POWDER, FOR SOLUTION ORAL
Refills: 0 | Status: DISCONTINUED | OUTPATIENT
Start: 2023-01-01 | End: 2024-01-01

## 2023-01-01 RX ORDER — GABAPENTIN 400 MG/1
300 CAPSULE ORAL
Refills: 0 | Status: DISCONTINUED | OUTPATIENT
Start: 2023-01-01 | End: 2024-01-01

## 2023-01-01 RX ORDER — SODIUM CHLORIDE 9 MG/ML
1000 INJECTION INTRAMUSCULAR; INTRAVENOUS; SUBCUTANEOUS
Refills: 0 | Status: DISCONTINUED | OUTPATIENT
Start: 2023-01-01 | End: 2023-01-01

## 2023-01-01 RX ORDER — PHENYLEPHRINE HYDROCHLORIDE 10 MG/ML
200 INJECTION INTRAVENOUS ONCE
Refills: 0 | Status: DISCONTINUED | OUTPATIENT
Start: 2023-01-01 | End: 2023-01-01

## 2023-01-01 RX ORDER — POTASSIUM PHOSPHATE, MONOBASIC POTASSIUM PHOSPHATE, DIBASIC 236; 224 MG/ML; MG/ML
15 INJECTION, SOLUTION INTRAVENOUS ONCE
Refills: 0 | Status: COMPLETED | OUTPATIENT
Start: 2023-01-01 | End: 2023-01-01

## 2023-01-01 RX ORDER — PROPOFOL 10 MG/ML
5 INJECTION, EMULSION INTRAVENOUS
Qty: 1000 | Refills: 0 | Status: DISCONTINUED | OUTPATIENT
Start: 2023-01-01 | End: 2023-01-01

## 2023-01-01 RX ORDER — SODIUM CHLORIDE 9 MG/ML
1000 INJECTION INTRAMUSCULAR; INTRAVENOUS; SUBCUTANEOUS ONCE
Refills: 0 | Status: COMPLETED | OUTPATIENT
Start: 2023-01-01 | End: 2023-01-01

## 2023-01-01 RX ORDER — PHENYLEPHRINE HYDROCHLORIDE 10 MG/ML
200 INJECTION INTRAVENOUS ONCE
Refills: 0 | Status: COMPLETED | OUTPATIENT
Start: 2023-01-01 | End: 2023-01-01

## 2023-01-01 RX ORDER — ACETAMINOPHEN 500 MG
650 TABLET ORAL EVERY 6 HOURS
Refills: 0 | Status: DISCONTINUED | OUTPATIENT
Start: 2023-01-01 | End: 2024-01-01

## 2023-01-01 RX ORDER — TAMSULOSIN HYDROCHLORIDE 0.4 MG/1
0.4 CAPSULE ORAL AT BEDTIME
Refills: 0 | Status: DISCONTINUED | OUTPATIENT
Start: 2023-01-01 | End: 2023-01-01

## 2023-01-01 RX ORDER — LOTEPREDNOL ETABONATE 2.5 MG/ML
0.25 SUSPENSION/ DROPS OPHTHALMIC DAILY
Refills: 0 | Status: ACTIVE | COMMUNITY
Start: 2023-01-27

## 2023-01-01 RX ORDER — FINASTERIDE 5 MG/1
5 TABLET, FILM COATED ORAL DAILY
Refills: 0 | Status: DISCONTINUED | OUTPATIENT
Start: 2023-01-01 | End: 2023-01-01

## 2023-01-01 RX ORDER — PREDNISOLONE SODIUM PHOSPHATE 1 %
0 DROPS OPHTHALMIC (EYE)
Qty: 0 | Refills: 0 | DISCHARGE

## 2023-01-01 RX ORDER — SENNA PLUS 8.6 MG/1
2 TABLET ORAL AT BEDTIME
Refills: 0 | Status: DISCONTINUED | OUTPATIENT
Start: 2023-01-01 | End: 2024-01-01

## 2023-01-01 RX ORDER — POTASSIUM CHLORIDE 20 MEQ
10 PACKET (EA) ORAL
Refills: 0 | Status: COMPLETED | OUTPATIENT
Start: 2023-01-01 | End: 2023-01-01

## 2023-01-01 RX ORDER — SODIUM CHLORIDE 9 MG/ML
1000 INJECTION, SOLUTION INTRAVENOUS
Refills: 0 | Status: DISCONTINUED | OUTPATIENT
Start: 2023-01-01 | End: 2024-01-01

## 2023-01-01 RX ORDER — SODIUM CHLORIDE 9 MG/ML
4 INJECTION INTRAMUSCULAR; INTRAVENOUS; SUBCUTANEOUS EVERY 8 HOURS
Refills: 0 | Status: COMPLETED | OUTPATIENT
Start: 2023-01-01 | End: 2023-01-01

## 2023-01-01 RX ORDER — SODIUM ZIRCONIUM CYCLOSILICATE 10 G/10G
10 POWDER, FOR SUSPENSION ORAL EVERY 8 HOURS
Refills: 0 | Status: COMPLETED | OUTPATIENT
Start: 2023-01-01 | End: 2023-01-01

## 2023-01-01 RX ORDER — CHLORHEXIDINE GLUCONATE 213 G/1000ML
1 SOLUTION TOPICAL
Refills: 0 | Status: DISCONTINUED | OUTPATIENT
Start: 2023-01-01 | End: 2024-01-01

## 2023-01-01 RX ORDER — PIPERACILLIN AND TAZOBACTAM 4; .5 G/20ML; G/20ML
3.38 INJECTION, POWDER, LYOPHILIZED, FOR SOLUTION INTRAVENOUS EVERY 8 HOURS
Refills: 0 | Status: DISCONTINUED | OUTPATIENT
Start: 2023-01-01 | End: 2023-01-01

## 2023-01-01 RX ORDER — BACLOFEN 100 %
0 POWDER (GRAM) MISCELLANEOUS
Qty: 0 | Refills: 0 | DISCHARGE

## 2023-01-01 RX ORDER — SODIUM CHLORIDE 9 MG/ML
4 INJECTION INTRAMUSCULAR; INTRAVENOUS; SUBCUTANEOUS EVERY 8 HOURS
Refills: 0 | Status: DISCONTINUED | OUTPATIENT
Start: 2023-01-01 | End: 2023-01-01

## 2023-01-01 RX ORDER — LANOLIN ALCOHOL/MO/W.PET/CERES
3 CREAM (GRAM) TOPICAL AT BEDTIME
Refills: 0 | Status: DISCONTINUED | OUTPATIENT
Start: 2023-01-01 | End: 2023-01-01

## 2023-01-01 RX ORDER — AMLODIPINE BESYLATE 2.5 MG/1
1 TABLET ORAL
Refills: 0 | DISCHARGE
Start: 2023-01-01 | End: 2023-01-01

## 2023-01-01 RX ORDER — IPRATROPIUM/ALBUTEROL SULFATE 18-103MCG
3 AEROSOL WITH ADAPTER (GRAM) INHALATION EVERY 8 HOURS
Refills: 0 | Status: DISCONTINUED | OUTPATIENT
Start: 2023-01-01 | End: 2023-01-01

## 2023-01-01 RX ORDER — HEPARIN SODIUM 5000 [USP'U]/ML
5000 INJECTION INTRAVENOUS; SUBCUTANEOUS EVERY 12 HOURS
Refills: 0 | Status: DISCONTINUED | OUTPATIENT
Start: 2023-01-01 | End: 2024-01-01

## 2023-01-01 RX ORDER — GABAPENTIN 400 MG/1
300 CAPSULE ORAL
Refills: 0 | Status: DISCONTINUED | OUTPATIENT
Start: 2023-01-01 | End: 2023-01-01

## 2023-01-01 RX ORDER — FINASTERIDE 5 MG/1
1 TABLET, FILM COATED ORAL
Qty: 0 | Refills: 0 | DISCHARGE

## 2023-01-01 RX ORDER — FENTANYL CITRATE 50 UG/ML
50 INJECTION INTRAVENOUS ONCE
Refills: 0 | Status: DISCONTINUED | OUTPATIENT
Start: 2023-01-01 | End: 2023-01-01

## 2023-01-01 RX ORDER — GABAPENTIN 300 MG/1
300 CAPSULE ORAL
Qty: 180 | Refills: 3 | Status: ACTIVE | COMMUNITY
Start: 2022-06-09

## 2023-01-01 RX ORDER — FENTANYL CITRATE 50 UG/ML
25 INJECTION INTRAVENOUS
Refills: 0 | Status: DISCONTINUED | OUTPATIENT
Start: 2023-01-01 | End: 2024-01-01

## 2023-01-01 RX ORDER — DOCUSATE SODIUM 100 MG/1
100 CAPSULE, LIQUID FILLED ORAL
Qty: 90 | Refills: 3 | Status: ACTIVE | COMMUNITY
Start: 2023-01-01

## 2023-01-01 RX ORDER — DEXMEDETOMIDINE HYDROCHLORIDE IN 0.9% SODIUM CHLORIDE 4 UG/ML
0.2 INJECTION INTRAVENOUS
Qty: 400 | Refills: 0 | Status: DISCONTINUED | OUTPATIENT
Start: 2023-01-01 | End: 2023-01-01

## 2023-01-01 RX ORDER — DEXMEDETOMIDINE HYDROCHLORIDE IN 0.9% SODIUM CHLORIDE 4 UG/ML
0.2 INJECTION INTRAVENOUS
Qty: 200 | Refills: 0 | Status: DISCONTINUED | OUTPATIENT
Start: 2023-01-01 | End: 2023-01-01

## 2023-01-01 RX ORDER — SENNOSIDES 8.6 MG TABLETS 8.6 MG/1
8.6 TABLET ORAL
Qty: 60 | Refills: 3 | Status: ACTIVE | COMMUNITY
Start: 2020-10-23

## 2023-01-01 RX ORDER — CEFEPIME 1 G/1
2000 INJECTION, POWDER, FOR SOLUTION INTRAMUSCULAR; INTRAVENOUS ONCE
Refills: 0 | Status: COMPLETED | OUTPATIENT
Start: 2023-01-01 | End: 2023-01-01

## 2023-01-01 RX ORDER — BUMETANIDE 0.25 MG/ML
2 INJECTION INTRAMUSCULAR; INTRAVENOUS DAILY
Refills: 0 | Status: DISCONTINUED | OUTPATIENT
Start: 2023-01-01 | End: 2023-01-01

## 2023-01-01 RX ORDER — SODIUM,POTASSIUM PHOSPHATES 278-250MG
2 POWDER IN PACKET (EA) ORAL ONCE
Refills: 0 | Status: COMPLETED | OUTPATIENT
Start: 2023-01-01 | End: 2023-01-01

## 2023-01-01 RX ORDER — CEFAZOLIN SODIUM 1 G
1000 VIAL (EA) INJECTION EVERY 12 HOURS
Refills: 0 | Status: DISCONTINUED | OUTPATIENT
Start: 2023-01-01 | End: 2023-01-01

## 2023-01-01 RX ORDER — CEFEPIME 1 G/1
INJECTION, POWDER, FOR SOLUTION INTRAMUSCULAR; INTRAVENOUS
Refills: 0 | Status: DISCONTINUED | OUTPATIENT
Start: 2023-01-01 | End: 2023-01-01

## 2023-01-01 RX ORDER — CIPROFLOXACIN HYDROCHLORIDE 250 MG/1
250 TABLET, FILM COATED ORAL
Refills: 0 | Status: ACTIVE | COMMUNITY
Start: 2023-01-01

## 2023-01-01 RX ORDER — MEROPENEM 1 G/30ML
1000 INJECTION INTRAVENOUS EVERY 12 HOURS
Refills: 0 | Status: DISCONTINUED | OUTPATIENT
Start: 2023-01-01 | End: 2023-01-01

## 2023-01-01 RX ORDER — KETAMINE HYDROCHLORIDE 100 MG/ML
100 INJECTION INTRAMUSCULAR; INTRAVENOUS ONCE
Refills: 0 | Status: DISCONTINUED | OUTPATIENT
Start: 2023-01-01 | End: 2023-01-01

## 2023-01-01 RX ORDER — LABETALOL HCL 100 MG
100 TABLET ORAL
Refills: 0 | Status: DISCONTINUED | OUTPATIENT
Start: 2023-01-01 | End: 2023-01-01

## 2023-01-01 RX ORDER — CIPROFLOXACIN LACTATE 400MG/40ML
200 VIAL (ML) INTRAVENOUS ONCE
Refills: 0 | Status: COMPLETED | OUTPATIENT
Start: 2023-01-01 | End: 2023-01-01

## 2023-01-01 RX ORDER — CEFTRIAXONE 500 MG/1
1000 INJECTION, POWDER, FOR SOLUTION INTRAMUSCULAR; INTRAVENOUS ONCE
Refills: 0 | Status: DISCONTINUED | OUTPATIENT
Start: 2023-01-01 | End: 2023-01-01

## 2023-01-01 RX ORDER — LABETALOL HYDROCHLORIDE 100 MG/1
100 TABLET, FILM COATED ORAL
Qty: 180 | Refills: 3 | Status: ACTIVE | COMMUNITY
Start: 2021-03-14

## 2023-01-01 RX ORDER — POLYETHYLENE GLYCOL 3350 17 G/17G
17 POWDER, FOR SOLUTION ORAL AT BEDTIME
Refills: 0 | Status: DISCONTINUED | OUTPATIENT
Start: 2023-01-01 | End: 2023-01-01

## 2023-01-01 RX ORDER — SODIUM ZIRCONIUM CYCLOSILICATE 10 G/10G
10 POWDER, FOR SUSPENSION ORAL ONCE
Refills: 0 | Status: COMPLETED | OUTPATIENT
Start: 2023-01-01 | End: 2023-01-01

## 2023-01-01 RX ORDER — IRON SUCROSE 20 MG/ML
100 INJECTION, SOLUTION INTRAVENOUS ONCE
Refills: 0 | Status: COMPLETED | OUTPATIENT
Start: 2023-01-01 | End: 2023-01-01

## 2023-01-01 RX ORDER — TAMSULOSIN HYDROCHLORIDE 0.4 MG/1
1 CAPSULE ORAL
Refills: 0 | DISCHARGE
Start: 2023-01-01 | End: 2023-01-01

## 2023-01-01 RX ORDER — VANCOMYCIN HCL 1 G
750 VIAL (EA) INTRAVENOUS EVERY 24 HOURS
Refills: 0 | Status: DISCONTINUED | OUTPATIENT
Start: 2023-01-01 | End: 2023-01-01

## 2023-01-01 RX ORDER — SODIUM ZIRCONIUM CYCLOSILICATE 10 G/10G
10 POWDER, FOR SUSPENSION ORAL THREE TIMES A DAY
Refills: 0 | Status: COMPLETED | OUTPATIENT
Start: 2023-01-01 | End: 2023-01-01

## 2023-01-01 RX ORDER — PROPOFOL 10 MG/ML
20 INJECTION, EMULSION INTRAVENOUS
Qty: 1000 | Refills: 0 | Status: DISCONTINUED | OUTPATIENT
Start: 2023-01-01 | End: 2023-01-01

## 2023-01-01 RX ORDER — CYCLOPENTOLATE HYDROCHLORIDE 10 MG/ML
1 SOLUTION OPHTHALMIC 3 TIMES DAILY
Refills: 0 | Status: ACTIVE | COMMUNITY
Start: 2023-01-27

## 2023-01-01 RX ADMIN — Medication 4 MILLILITER(S): at 11:06

## 2023-01-01 RX ADMIN — SODIUM CHLORIDE 4 MILLILITER(S): 9 INJECTION INTRAMUSCULAR; INTRAVENOUS; SUBCUTANEOUS at 11:23

## 2023-01-01 RX ADMIN — MIDAZOLAM HYDROCHLORIDE 4 MILLIGRAM(S): 1 INJECTION, SOLUTION INTRAMUSCULAR; INTRAVENOUS at 14:43

## 2023-01-01 RX ADMIN — SODIUM CHLORIDE 4 MILLILITER(S): 9 INJECTION INTRAMUSCULAR; INTRAVENOUS; SUBCUTANEOUS at 23:25

## 2023-01-01 RX ADMIN — HEPARIN SODIUM 5000 UNIT(S): 5000 INJECTION INTRAVENOUS; SUBCUTANEOUS at 05:44

## 2023-01-01 RX ADMIN — CHLORHEXIDINE GLUCONATE 15 MILLILITER(S): 213 SOLUTION TOPICAL at 05:40

## 2023-01-01 RX ADMIN — Medication 3 MILLILITER(S): at 17:21

## 2023-01-01 RX ADMIN — POLYETHYLENE GLYCOL 3350 17 GRAM(S): 17 POWDER, FOR SOLUTION ORAL at 21:24

## 2023-01-01 RX ADMIN — FINASTERIDE 5 MILLIGRAM(S): 5 TABLET, FILM COATED ORAL at 12:45

## 2023-01-01 RX ADMIN — Medication 1 APPLICATION(S): at 12:40

## 2023-01-01 RX ADMIN — SODIUM CHLORIDE 4 MILLILITER(S): 9 INJECTION INTRAMUSCULAR; INTRAVENOUS; SUBCUTANEOUS at 23:16

## 2023-01-01 RX ADMIN — CHLORHEXIDINE GLUCONATE 15 MILLILITER(S): 213 SOLUTION TOPICAL at 05:21

## 2023-01-01 RX ADMIN — GABAPENTIN 300 MILLIGRAM(S): 400 CAPSULE ORAL at 05:27

## 2023-01-01 RX ADMIN — BUMETANIDE 2 MILLIGRAM(S): 0.25 INJECTION INTRAMUSCULAR; INTRAVENOUS at 05:42

## 2023-01-01 RX ADMIN — Medication 3 MILLILITER(S): at 05:18

## 2023-01-01 RX ADMIN — Medication 250 MILLIGRAM(S): at 19:45

## 2023-01-01 RX ADMIN — CHLORHEXIDINE GLUCONATE 15 MILLILITER(S): 213 SOLUTION TOPICAL at 05:57

## 2023-01-01 RX ADMIN — Medication 1 APPLICATION(S): at 16:57

## 2023-01-01 RX ADMIN — FINASTERIDE 5 MILLIGRAM(S): 5 TABLET, FILM COATED ORAL at 11:32

## 2023-01-01 RX ADMIN — HEPARIN SODIUM 5000 UNIT(S): 5000 INJECTION INTRAVENOUS; SUBCUTANEOUS at 22:08

## 2023-01-01 RX ADMIN — SODIUM CHLORIDE 75 MILLILITER(S): 9 INJECTION INTRAMUSCULAR; INTRAVENOUS; SUBCUTANEOUS at 05:40

## 2023-01-01 RX ADMIN — GABAPENTIN 300 MILLIGRAM(S): 400 CAPSULE ORAL at 17:30

## 2023-01-01 RX ADMIN — Medication 3 MILLILITER(S): at 18:13

## 2023-01-01 RX ADMIN — GABAPENTIN 300 MILLIGRAM(S): 400 CAPSULE ORAL at 17:05

## 2023-01-01 RX ADMIN — HEPARIN SODIUM 5000 UNIT(S): 5000 INJECTION INTRAVENOUS; SUBCUTANEOUS at 22:00

## 2023-01-01 RX ADMIN — SODIUM CHLORIDE 75 MILLILITER(S): 9 INJECTION, SOLUTION INTRAVENOUS at 10:05

## 2023-01-01 RX ADMIN — GABAPENTIN 300 MILLIGRAM(S): 400 CAPSULE ORAL at 17:34

## 2023-01-01 RX ADMIN — Medication 3 MILLILITER(S): at 12:34

## 2023-01-01 RX ADMIN — Medication 3 MILLILITER(S): at 11:18

## 2023-01-01 RX ADMIN — Medication 4 MILLILITER(S): at 00:50

## 2023-01-01 RX ADMIN — Medication 3 MILLILITER(S): at 01:01

## 2023-01-01 RX ADMIN — Medication 40 MILLIEQUIVALENT(S): at 12:46

## 2023-01-01 RX ADMIN — GABAPENTIN 300 MILLIGRAM(S): 400 CAPSULE ORAL at 17:37

## 2023-01-01 RX ADMIN — SODIUM CHLORIDE 75 MILLILITER(S): 9 INJECTION INTRAMUSCULAR; INTRAVENOUS; SUBCUTANEOUS at 14:28

## 2023-01-01 RX ADMIN — SODIUM CHLORIDE 4 MILLILITER(S): 9 INJECTION INTRAMUSCULAR; INTRAVENOUS; SUBCUTANEOUS at 05:26

## 2023-01-01 RX ADMIN — CHLORHEXIDINE GLUCONATE 1 APPLICATION(S): 213 SOLUTION TOPICAL at 05:13

## 2023-01-01 RX ADMIN — HEPARIN SODIUM 5000 UNIT(S): 5000 INJECTION INTRAVENOUS; SUBCUTANEOUS at 05:51

## 2023-01-01 RX ADMIN — Medication 1 APPLICATION(S): at 12:44

## 2023-01-01 RX ADMIN — HEPARIN SODIUM 5000 UNIT(S): 5000 INJECTION INTRAVENOUS; SUBCUTANEOUS at 17:38

## 2023-01-01 RX ADMIN — PIPERACILLIN AND TAZOBACTAM 25 GRAM(S): 4; .5 INJECTION, POWDER, LYOPHILIZED, FOR SOLUTION INTRAVENOUS at 18:16

## 2023-01-01 RX ADMIN — SENNA PLUS 2 TABLET(S): 8.6 TABLET ORAL at 21:39

## 2023-01-01 RX ADMIN — Medication 1 APPLICATION(S): at 12:09

## 2023-01-01 RX ADMIN — Medication 3 MILLILITER(S): at 15:00

## 2023-01-01 RX ADMIN — Medication 1 APPLICATION(S): at 13:23

## 2023-01-01 RX ADMIN — BUMETANIDE 2 MILLIGRAM(S): 0.25 INJECTION INTRAMUSCULAR; INTRAVENOUS at 05:40

## 2023-01-01 RX ADMIN — CHLORHEXIDINE GLUCONATE 15 MILLILITER(S): 213 SOLUTION TOPICAL at 05:06

## 2023-01-01 RX ADMIN — GABAPENTIN 300 MILLIGRAM(S): 400 CAPSULE ORAL at 06:16

## 2023-01-01 RX ADMIN — SENNA PLUS 2 TABLET(S): 8.6 TABLET ORAL at 23:08

## 2023-01-01 RX ADMIN — SENNA PLUS 2 TABLET(S): 8.6 TABLET ORAL at 21:45

## 2023-01-01 RX ADMIN — Medication 40 MILLIEQUIVALENT(S): at 11:35

## 2023-01-01 RX ADMIN — DEXMEDETOMIDINE HYDROCHLORIDE IN 0.9% SODIUM CHLORIDE 5.26 MICROGRAM(S)/KG/HR: 4 INJECTION INTRAVENOUS at 21:51

## 2023-01-01 RX ADMIN — CHLORHEXIDINE GLUCONATE 1 APPLICATION(S): 213 SOLUTION TOPICAL at 16:16

## 2023-01-01 RX ADMIN — Medication 3 MILLILITER(S): at 00:02

## 2023-01-01 RX ADMIN — SODIUM CHLORIDE 1000 MILLILITER(S): 9 INJECTION INTRAMUSCULAR; INTRAVENOUS; SUBCUTANEOUS at 19:23

## 2023-01-01 RX ADMIN — POTASSIUM PHOSPHATE, MONOBASIC POTASSIUM PHOSPHATE, DIBASIC 62.5 MILLIMOLE(S): 236; 224 INJECTION, SOLUTION INTRAVENOUS at 07:28

## 2023-01-01 RX ADMIN — HEPARIN SODIUM 5000 UNIT(S): 5000 INJECTION INTRAVENOUS; SUBCUTANEOUS at 06:14

## 2023-01-01 RX ADMIN — PIPERACILLIN AND TAZOBACTAM 25 GRAM(S): 4; .5 INJECTION, POWDER, LYOPHILIZED, FOR SOLUTION INTRAVENOUS at 13:31

## 2023-01-01 RX ADMIN — SODIUM CHLORIDE 75 MILLILITER(S): 9 INJECTION INTRAMUSCULAR; INTRAVENOUS; SUBCUTANEOUS at 11:09

## 2023-01-01 RX ADMIN — DEXMEDETOMIDINE HYDROCHLORIDE IN 0.9% SODIUM CHLORIDE 5.26 MICROGRAM(S)/KG/HR: 4 INJECTION INTRAVENOUS at 19:26

## 2023-01-01 RX ADMIN — HEPARIN SODIUM 5000 UNIT(S): 5000 INJECTION INTRAVENOUS; SUBCUTANEOUS at 21:52

## 2023-01-01 RX ADMIN — SODIUM CHLORIDE 4 MILLILITER(S): 9 INJECTION INTRAMUSCULAR; INTRAVENOUS; SUBCUTANEOUS at 05:29

## 2023-01-01 RX ADMIN — TAMSULOSIN HYDROCHLORIDE 0.4 MILLIGRAM(S): 0.4 CAPSULE ORAL at 23:23

## 2023-01-01 RX ADMIN — CHLORHEXIDINE GLUCONATE 15 MILLILITER(S): 213 SOLUTION TOPICAL at 17:28

## 2023-01-01 RX ADMIN — Medication 1 APPLICATION(S): at 12:41

## 2023-01-01 RX ADMIN — Medication 3 MILLILITER(S): at 05:24

## 2023-01-01 RX ADMIN — DEXMEDETOMIDINE HYDROCHLORIDE IN 0.9% SODIUM CHLORIDE 5.26 MICROGRAM(S)/KG/HR: 4 INJECTION INTRAVENOUS at 00:18

## 2023-01-01 RX ADMIN — GABAPENTIN 300 MILLIGRAM(S): 400 CAPSULE ORAL at 17:35

## 2023-01-01 RX ADMIN — SODIUM CHLORIDE 100 MILLILITER(S): 9 INJECTION, SOLUTION INTRAVENOUS at 12:41

## 2023-01-01 RX ADMIN — DEXMEDETOMIDINE HYDROCHLORIDE IN 0.9% SODIUM CHLORIDE 3.51 MICROGRAM(S)/KG/HR: 4 INJECTION INTRAVENOUS at 22:18

## 2023-01-01 RX ADMIN — Medication 4 MILLILITER(S): at 05:07

## 2023-01-01 RX ADMIN — PROPOFOL 4.52 MICROGRAM(S)/KG/MIN: 10 INJECTION, EMULSION INTRAVENOUS at 14:43

## 2023-01-01 RX ADMIN — Medication 100 MILLIGRAM(S): at 17:34

## 2023-01-01 RX ADMIN — SODIUM CHLORIDE 4 MILLILITER(S): 9 INJECTION INTRAMUSCULAR; INTRAVENOUS; SUBCUTANEOUS at 23:56

## 2023-01-01 RX ADMIN — DEXMEDETOMIDINE HYDROCHLORIDE IN 0.9% SODIUM CHLORIDE 5.26 MICROGRAM(S)/KG/HR: 4 INJECTION INTRAVENOUS at 05:07

## 2023-01-01 RX ADMIN — Medication 3 MILLILITER(S): at 15:10

## 2023-01-01 RX ADMIN — SODIUM CHLORIDE 75 MILLILITER(S): 9 INJECTION, SOLUTION INTRAVENOUS at 13:06

## 2023-01-01 RX ADMIN — Medication 3 MILLILITER(S): at 11:09

## 2023-01-01 RX ADMIN — PROPOFOL 4.21 MICROGRAM(S)/KG/MIN: 10 INJECTION, EMULSION INTRAVENOUS at 07:10

## 2023-01-01 RX ADMIN — SODIUM CHLORIDE 4 MILLILITER(S): 9 INJECTION INTRAMUSCULAR; INTRAVENOUS; SUBCUTANEOUS at 05:20

## 2023-01-01 RX ADMIN — MIRTAZAPINE 7.5 MILLIGRAM(S): 45 TABLET, ORALLY DISINTEGRATING ORAL at 21:32

## 2023-01-01 RX ADMIN — CHLORHEXIDINE GLUCONATE 1 APPLICATION(S): 213 SOLUTION TOPICAL at 05:44

## 2023-01-01 RX ADMIN — PIPERACILLIN AND TAZOBACTAM 200 GRAM(S): 4; .5 INJECTION, POWDER, LYOPHILIZED, FOR SOLUTION INTRAVENOUS at 15:07

## 2023-01-01 RX ADMIN — POLYETHYLENE GLYCOL 3350 17 GRAM(S): 17 POWDER, FOR SOLUTION ORAL at 21:44

## 2023-01-01 RX ADMIN — DEXMEDETOMIDINE HYDROCHLORIDE IN 0.9% SODIUM CHLORIDE 3.51 MICROGRAM(S)/KG/HR: 4 INJECTION INTRAVENOUS at 08:27

## 2023-01-01 RX ADMIN — CEFTRIAXONE 100 MILLIGRAM(S): 500 INJECTION, POWDER, FOR SOLUTION INTRAMUSCULAR; INTRAVENOUS at 05:41

## 2023-01-01 RX ADMIN — GABAPENTIN 300 MILLIGRAM(S): 400 CAPSULE ORAL at 05:51

## 2023-01-01 RX ADMIN — GABAPENTIN 300 MILLIGRAM(S): 400 CAPSULE ORAL at 05:29

## 2023-01-01 RX ADMIN — TAMSULOSIN HYDROCHLORIDE 0.4 MILLIGRAM(S): 0.4 CAPSULE ORAL at 21:44

## 2023-01-01 RX ADMIN — BUMETANIDE 2 MILLIGRAM(S): 0.25 INJECTION INTRAMUSCULAR; INTRAVENOUS at 10:13

## 2023-01-01 RX ADMIN — POLYETHYLENE GLYCOL 3350 17 GRAM(S): 17 POWDER, FOR SOLUTION ORAL at 22:03

## 2023-01-01 RX ADMIN — Medication 1 APPLICATION(S): at 13:01

## 2023-01-01 RX ADMIN — SODIUM CHLORIDE 4 MILLILITER(S): 9 INJECTION INTRAMUSCULAR; INTRAVENOUS; SUBCUTANEOUS at 18:16

## 2023-01-01 RX ADMIN — CHLORHEXIDINE GLUCONATE 15 MILLILITER(S): 213 SOLUTION TOPICAL at 05:44

## 2023-01-01 RX ADMIN — TAMSULOSIN HYDROCHLORIDE 0.4 MILLIGRAM(S): 0.4 CAPSULE ORAL at 21:32

## 2023-01-01 RX ADMIN — HEPARIN SODIUM 5000 UNIT(S): 5000 INJECTION INTRAVENOUS; SUBCUTANEOUS at 06:27

## 2023-01-01 RX ADMIN — MIRTAZAPINE 15 MILLIGRAM(S): 45 TABLET, ORALLY DISINTEGRATING ORAL at 13:23

## 2023-01-01 RX ADMIN — Medication 3 MILLILITER(S): at 00:00

## 2023-01-01 RX ADMIN — Medication 650 MILLIGRAM(S): at 04:39

## 2023-01-01 RX ADMIN — SODIUM CHLORIDE 4 MILLILITER(S): 9 INJECTION INTRAMUSCULAR; INTRAVENOUS; SUBCUTANEOUS at 00:13

## 2023-01-01 RX ADMIN — MIRTAZAPINE 15 MILLIGRAM(S): 45 TABLET, ORALLY DISINTEGRATING ORAL at 12:34

## 2023-01-01 RX ADMIN — Medication 4 MILLILITER(S): at 05:20

## 2023-01-01 RX ADMIN — GABAPENTIN 300 MILLIGRAM(S): 400 CAPSULE ORAL at 17:06

## 2023-01-01 RX ADMIN — GABAPENTIN 300 MILLIGRAM(S): 400 CAPSULE ORAL at 18:13

## 2023-01-01 RX ADMIN — TAMSULOSIN HYDROCHLORIDE 0.4 MILLIGRAM(S): 0.4 CAPSULE ORAL at 22:03

## 2023-01-01 RX ADMIN — BUMETANIDE 2 MILLIGRAM(S): 0.25 INJECTION INTRAMUSCULAR; INTRAVENOUS at 05:29

## 2023-01-01 RX ADMIN — Medication 3 MILLILITER(S): at 05:16

## 2023-01-01 RX ADMIN — CHLORHEXIDINE GLUCONATE 1 APPLICATION(S): 213 SOLUTION TOPICAL at 06:39

## 2023-01-01 RX ADMIN — Medication 3 MILLILITER(S): at 05:19

## 2023-01-01 RX ADMIN — Medication 4 MILLILITER(S): at 11:11

## 2023-01-01 RX ADMIN — Medication 3 MILLILITER(S): at 06:22

## 2023-01-01 RX ADMIN — FINASTERIDE 5 MILLIGRAM(S): 5 TABLET, FILM COATED ORAL at 11:46

## 2023-01-01 RX ADMIN — Medication 110 MILLIGRAM(S): at 17:29

## 2023-01-01 RX ADMIN — Medication 1 APPLICATION(S): at 12:12

## 2023-01-01 RX ADMIN — Medication 1 APPLICATION(S): at 13:02

## 2023-01-01 RX ADMIN — Medication 100 MILLIGRAM(S): at 17:40

## 2023-01-01 RX ADMIN — CHLORHEXIDINE GLUCONATE 15 MILLILITER(S): 213 SOLUTION TOPICAL at 18:18

## 2023-01-01 RX ADMIN — CHLORHEXIDINE GLUCONATE 1 APPLICATION(S): 213 SOLUTION TOPICAL at 04:00

## 2023-01-01 RX ADMIN — Medication 3 MILLILITER(S): at 00:05

## 2023-01-01 RX ADMIN — Medication 1 APPLICATION(S): at 11:26

## 2023-01-01 RX ADMIN — DEXMEDETOMIDINE HYDROCHLORIDE IN 0.9% SODIUM CHLORIDE 3.51 MICROGRAM(S)/KG/HR: 4 INJECTION INTRAVENOUS at 05:43

## 2023-01-01 RX ADMIN — GABAPENTIN 300 MILLIGRAM(S): 400 CAPSULE ORAL at 04:39

## 2023-01-01 RX ADMIN — DEXMEDETOMIDINE HYDROCHLORIDE IN 0.9% SODIUM CHLORIDE 5.26 MICROGRAM(S)/KG/HR: 4 INJECTION INTRAVENOUS at 15:11

## 2023-01-01 RX ADMIN — DEXMEDETOMIDINE HYDROCHLORIDE IN 0.9% SODIUM CHLORIDE 5.26 MICROGRAM(S)/KG/HR: 4 INJECTION INTRAVENOUS at 21:05

## 2023-01-01 RX ADMIN — DEXMEDETOMIDINE HYDROCHLORIDE IN 0.9% SODIUM CHLORIDE 3.51 MICROGRAM(S)/KG/HR: 4 INJECTION INTRAVENOUS at 21:35

## 2023-01-01 RX ADMIN — SODIUM CHLORIDE 4 MILLILITER(S): 9 INJECTION INTRAMUSCULAR; INTRAVENOUS; SUBCUTANEOUS at 05:16

## 2023-01-01 RX ADMIN — GABAPENTIN 300 MILLIGRAM(S): 400 CAPSULE ORAL at 17:32

## 2023-01-01 RX ADMIN — GABAPENTIN 300 MILLIGRAM(S): 400 CAPSULE ORAL at 17:39

## 2023-01-01 RX ADMIN — SODIUM CHLORIDE 75 MILLILITER(S): 9 INJECTION, SOLUTION INTRAVENOUS at 17:15

## 2023-01-01 RX ADMIN — CHLORHEXIDINE GLUCONATE 15 MILLILITER(S): 213 SOLUTION TOPICAL at 17:32

## 2023-01-01 RX ADMIN — DEXMEDETOMIDINE HYDROCHLORIDE IN 0.9% SODIUM CHLORIDE 3.51 MICROGRAM(S)/KG/HR: 4 INJECTION INTRAVENOUS at 17:37

## 2023-01-01 RX ADMIN — SODIUM ZIRCONIUM CYCLOSILICATE 10 GRAM(S): 10 POWDER, FOR SUSPENSION ORAL at 10:21

## 2023-01-01 RX ADMIN — SODIUM CHLORIDE 4 MILLILITER(S): 9 INJECTION INTRAMUSCULAR; INTRAVENOUS; SUBCUTANEOUS at 15:10

## 2023-01-01 RX ADMIN — PIPERACILLIN AND TAZOBACTAM 25 GRAM(S): 4; .5 INJECTION, POWDER, LYOPHILIZED, FOR SOLUTION INTRAVENOUS at 05:34

## 2023-01-01 RX ADMIN — GABAPENTIN 300 MILLIGRAM(S): 400 CAPSULE ORAL at 17:10

## 2023-01-01 RX ADMIN — HEPARIN SODIUM 5000 UNIT(S): 5000 INJECTION INTRAVENOUS; SUBCUTANEOUS at 05:13

## 2023-01-01 RX ADMIN — SODIUM CHLORIDE 4 MILLILITER(S): 9 INJECTION INTRAMUSCULAR; INTRAVENOUS; SUBCUTANEOUS at 17:22

## 2023-01-01 RX ADMIN — HEPARIN SODIUM 5000 UNIT(S): 5000 INJECTION INTRAVENOUS; SUBCUTANEOUS at 17:53

## 2023-01-01 RX ADMIN — Medication 6.57 MICROGRAM(S)/KG/MIN: at 18:55

## 2023-01-01 RX ADMIN — FINASTERIDE 5 MILLIGRAM(S): 5 TABLET, FILM COATED ORAL at 11:05

## 2023-01-01 RX ADMIN — Medication 100 MILLIGRAM(S): at 21:45

## 2023-01-01 RX ADMIN — SODIUM CHLORIDE 50 MILLILITER(S): 9 INJECTION, SOLUTION INTRAVENOUS at 21:45

## 2023-01-01 RX ADMIN — MEROPENEM 100 MILLIGRAM(S): 1 INJECTION INTRAVENOUS at 18:00

## 2023-01-01 RX ADMIN — Medication 1 DROP(S): at 05:51

## 2023-01-01 RX ADMIN — CEFTRIAXONE 100 MILLIGRAM(S): 500 INJECTION, POWDER, FOR SOLUTION INTRAMUSCULAR; INTRAVENOUS at 22:04

## 2023-01-01 RX ADMIN — Medication 3 MILLILITER(S): at 05:39

## 2023-01-01 RX ADMIN — SODIUM CHLORIDE 4 MILLILITER(S): 9 INJECTION INTRAMUSCULAR; INTRAVENOUS; SUBCUTANEOUS at 21:40

## 2023-01-01 RX ADMIN — SODIUM CHLORIDE 4 MILLILITER(S): 9 INJECTION INTRAMUSCULAR; INTRAVENOUS; SUBCUTANEOUS at 00:03

## 2023-01-01 RX ADMIN — FENTANYL CITRATE 25 MICROGRAM(S): 50 INJECTION INTRAVENOUS at 22:07

## 2023-01-01 RX ADMIN — Medication 3 MILLILITER(S): at 17:06

## 2023-01-01 RX ADMIN — Medication 3 MILLILITER(S): at 11:06

## 2023-01-01 RX ADMIN — HEPARIN SODIUM 5000 UNIT(S): 5000 INJECTION INTRAVENOUS; SUBCUTANEOUS at 05:42

## 2023-01-01 RX ADMIN — SODIUM CHLORIDE 4 MILLILITER(S): 9 INJECTION INTRAMUSCULAR; INTRAVENOUS; SUBCUTANEOUS at 05:09

## 2023-01-01 RX ADMIN — MIRTAZAPINE 15 MILLIGRAM(S): 45 TABLET, ORALLY DISINTEGRATING ORAL at 12:02

## 2023-01-01 RX ADMIN — MIRTAZAPINE 15 MILLIGRAM(S): 45 TABLET, ORALLY DISINTEGRATING ORAL at 11:07

## 2023-01-01 RX ADMIN — CHLORHEXIDINE GLUCONATE 15 MILLILITER(S): 213 SOLUTION TOPICAL at 04:39

## 2023-01-01 RX ADMIN — SODIUM CHLORIDE 4 MILLILITER(S): 9 INJECTION INTRAMUSCULAR; INTRAVENOUS; SUBCUTANEOUS at 05:39

## 2023-01-01 RX ADMIN — MIRTAZAPINE 15 MILLIGRAM(S): 45 TABLET, ORALLY DISINTEGRATING ORAL at 12:13

## 2023-01-01 RX ADMIN — SODIUM CHLORIDE 1000 MILLILITER(S): 9 INJECTION, SOLUTION INTRAVENOUS at 16:15

## 2023-01-01 RX ADMIN — Medication 4 MILLILITER(S): at 00:44

## 2023-01-01 RX ADMIN — Medication 1 APPLICATION(S): at 13:21

## 2023-01-01 RX ADMIN — SODIUM CHLORIDE 4 MILLILITER(S): 9 INJECTION INTRAMUSCULAR; INTRAVENOUS; SUBCUTANEOUS at 17:19

## 2023-01-01 RX ADMIN — Medication 3 MILLILITER(S): at 17:16

## 2023-01-01 RX ADMIN — HEPARIN SODIUM 5000 UNIT(S): 5000 INJECTION INTRAVENOUS; SUBCUTANEOUS at 15:28

## 2023-01-01 RX ADMIN — FINASTERIDE 5 MILLIGRAM(S): 5 TABLET, FILM COATED ORAL at 12:49

## 2023-01-01 RX ADMIN — DEXMEDETOMIDINE HYDROCHLORIDE IN 0.9% SODIUM CHLORIDE 3.51 MICROGRAM(S)/KG/HR: 4 INJECTION INTRAVENOUS at 23:41

## 2023-01-01 RX ADMIN — Medication 3 MILLILITER(S): at 21:40

## 2023-01-01 RX ADMIN — HEPARIN SODIUM 5000 UNIT(S): 5000 INJECTION INTRAVENOUS; SUBCUTANEOUS at 15:02

## 2023-01-01 RX ADMIN — Medication 3 MILLIGRAM(S): at 22:55

## 2023-01-01 RX ADMIN — Medication 3 MILLILITER(S): at 05:45

## 2023-01-01 RX ADMIN — GABAPENTIN 300 MILLIGRAM(S): 400 CAPSULE ORAL at 17:23

## 2023-01-01 RX ADMIN — Medication 3 MILLILITER(S): at 05:20

## 2023-01-01 RX ADMIN — MIRTAZAPINE 7.5 MILLIGRAM(S): 45 TABLET, ORALLY DISINTEGRATING ORAL at 22:55

## 2023-01-01 RX ADMIN — DEXMEDETOMIDINE HYDROCHLORIDE IN 0.9% SODIUM CHLORIDE 3.51 MICROGRAM(S)/KG/HR: 4 INJECTION INTRAVENOUS at 01:52

## 2023-01-01 RX ADMIN — Medication 3 MILLILITER(S): at 05:13

## 2023-01-01 RX ADMIN — Medication 3 MILLILITER(S): at 23:22

## 2023-01-01 RX ADMIN — Medication 20 MILLIEQUIVALENT(S): at 17:34

## 2023-01-01 RX ADMIN — GABAPENTIN 300 MILLIGRAM(S): 400 CAPSULE ORAL at 17:22

## 2023-01-01 RX ADMIN — FENTANYL CITRATE 25 MICROGRAM(S): 50 INJECTION INTRAVENOUS at 20:23

## 2023-01-01 RX ADMIN — Medication 110 MILLIGRAM(S): at 05:20

## 2023-01-01 RX ADMIN — SENNA PLUS 2 TABLET(S): 8.6 TABLET ORAL at 21:46

## 2023-01-01 RX ADMIN — SODIUM CHLORIDE 100 MILLILITER(S): 9 INJECTION INTRAMUSCULAR; INTRAVENOUS; SUBCUTANEOUS at 04:39

## 2023-01-01 RX ADMIN — PIPERACILLIN AND TAZOBACTAM 25 GRAM(S): 4; .5 INJECTION, POWDER, LYOPHILIZED, FOR SOLUTION INTRAVENOUS at 23:43

## 2023-01-01 RX ADMIN — SODIUM CHLORIDE 4 MILLILITER(S): 9 INJECTION INTRAMUSCULAR; INTRAVENOUS; SUBCUTANEOUS at 17:04

## 2023-01-01 RX ADMIN — SODIUM CHLORIDE 4 MILLILITER(S): 9 INJECTION INTRAMUSCULAR; INTRAVENOUS; SUBCUTANEOUS at 17:49

## 2023-01-01 RX ADMIN — AMLODIPINE BESYLATE 5 MILLIGRAM(S): 2.5 TABLET ORAL at 06:06

## 2023-01-01 RX ADMIN — Medication 20 MILLIEQUIVALENT(S): at 14:30

## 2023-01-01 RX ADMIN — GABAPENTIN 300 MILLIGRAM(S): 400 CAPSULE ORAL at 05:04

## 2023-01-01 RX ADMIN — CHLORHEXIDINE GLUCONATE 15 MILLILITER(S): 213 SOLUTION TOPICAL at 18:51

## 2023-01-01 RX ADMIN — FENTANYL CITRATE 25 MICROGRAM(S): 50 INJECTION INTRAVENOUS at 06:04

## 2023-01-01 RX ADMIN — TAMSULOSIN HYDROCHLORIDE 0.4 MILLIGRAM(S): 0.4 CAPSULE ORAL at 21:58

## 2023-01-01 RX ADMIN — FENTANYL CITRATE 25 MICROGRAM(S): 50 INJECTION INTRAVENOUS at 05:04

## 2023-01-01 RX ADMIN — HEPARIN SODIUM 5000 UNIT(S): 5000 INJECTION INTRAVENOUS; SUBCUTANEOUS at 13:30

## 2023-01-01 RX ADMIN — SODIUM CHLORIDE 4 MILLILITER(S): 9 INJECTION INTRAMUSCULAR; INTRAVENOUS; SUBCUTANEOUS at 23:32

## 2023-01-01 RX ADMIN — SENNA PLUS 2 TABLET(S): 8.6 TABLET ORAL at 21:47

## 2023-01-01 RX ADMIN — SODIUM CHLORIDE 4 MILLILITER(S): 9 INJECTION INTRAMUSCULAR; INTRAVENOUS; SUBCUTANEOUS at 05:13

## 2023-01-01 RX ADMIN — Medication 110 MILLIGRAM(S): at 05:43

## 2023-01-01 RX ADMIN — Medication 3 MILLILITER(S): at 11:04

## 2023-01-01 RX ADMIN — SENNA PLUS 2 TABLET(S): 8.6 TABLET ORAL at 21:25

## 2023-01-01 RX ADMIN — POLYETHYLENE GLYCOL 3350 17 GRAM(S): 17 POWDER, FOR SOLUTION ORAL at 17:02

## 2023-01-01 RX ADMIN — SENNA PLUS 2 TABLET(S): 8.6 TABLET ORAL at 22:55

## 2023-01-01 RX ADMIN — POLYETHYLENE GLYCOL 3350 17 GRAM(S): 17 POWDER, FOR SOLUTION ORAL at 21:30

## 2023-01-01 RX ADMIN — Medication 100 MILLIEQUIVALENT(S): at 06:38

## 2023-01-01 RX ADMIN — SODIUM CHLORIDE 1000 MILLILITER(S): 9 INJECTION, SOLUTION INTRAVENOUS at 17:38

## 2023-01-01 RX ADMIN — Medication 250 MILLIGRAM(S): at 21:04

## 2023-01-01 RX ADMIN — SENNA PLUS 2 TABLET(S): 8.6 TABLET ORAL at 21:17

## 2023-01-01 RX ADMIN — CHLORHEXIDINE GLUCONATE 15 MILLILITER(S): 213 SOLUTION TOPICAL at 05:13

## 2023-01-01 RX ADMIN — SODIUM CHLORIDE 4 MILLILITER(S): 9 INJECTION INTRAMUSCULAR; INTRAVENOUS; SUBCUTANEOUS at 05:18

## 2023-01-01 RX ADMIN — DEXMEDETOMIDINE HYDROCHLORIDE IN 0.9% SODIUM CHLORIDE 5.26 MICROGRAM(S)/KG/HR: 4 INJECTION INTRAVENOUS at 03:58

## 2023-01-01 RX ADMIN — POLYETHYLENE GLYCOL 3350 17 GRAM(S): 17 POWDER, FOR SOLUTION ORAL at 05:23

## 2023-01-01 RX ADMIN — CEFTRIAXONE 100 MILLIGRAM(S): 500 INJECTION, POWDER, FOR SOLUTION INTRAMUSCULAR; INTRAVENOUS at 02:04

## 2023-01-01 RX ADMIN — HEPARIN SODIUM 5000 UNIT(S): 5000 INJECTION INTRAVENOUS; SUBCUTANEOUS at 05:41

## 2023-01-01 RX ADMIN — SODIUM CHLORIDE 4 MILLILITER(S): 9 INJECTION INTRAMUSCULAR; INTRAVENOUS; SUBCUTANEOUS at 15:00

## 2023-01-01 RX ADMIN — POLYETHYLENE GLYCOL 3350 17 GRAM(S): 17 POWDER, FOR SOLUTION ORAL at 17:06

## 2023-01-01 RX ADMIN — SODIUM CHLORIDE 4 MILLILITER(S): 9 INJECTION INTRAMUSCULAR; INTRAVENOUS; SUBCUTANEOUS at 06:22

## 2023-01-01 RX ADMIN — DEXMEDETOMIDINE HYDROCHLORIDE IN 0.9% SODIUM CHLORIDE 3.51 MICROGRAM(S)/KG/HR: 4 INJECTION INTRAVENOUS at 07:25

## 2023-01-01 RX ADMIN — SODIUM ZIRCONIUM CYCLOSILICATE 10 GRAM(S): 10 POWDER, FOR SUSPENSION ORAL at 17:54

## 2023-01-01 RX ADMIN — SODIUM CHLORIDE 4 MILLILITER(S): 9 INJECTION INTRAMUSCULAR; INTRAVENOUS; SUBCUTANEOUS at 11:16

## 2023-01-01 RX ADMIN — GABAPENTIN 300 MILLIGRAM(S): 400 CAPSULE ORAL at 17:21

## 2023-01-01 RX ADMIN — SODIUM CHLORIDE 4 MILLILITER(S): 9 INJECTION INTRAMUSCULAR; INTRAVENOUS; SUBCUTANEOUS at 17:29

## 2023-01-01 RX ADMIN — Medication 3 MILLILITER(S): at 23:32

## 2023-01-01 RX ADMIN — DEXMEDETOMIDINE HYDROCHLORIDE IN 0.9% SODIUM CHLORIDE 5.26 MICROGRAM(S)/KG/HR: 4 INJECTION INTRAVENOUS at 18:13

## 2023-01-01 RX ADMIN — BUMETANIDE 2 MILLIGRAM(S): 0.25 INJECTION INTRAMUSCULAR; INTRAVENOUS at 05:19

## 2023-01-01 RX ADMIN — SODIUM CHLORIDE 4 MILLILITER(S): 9 INJECTION INTRAMUSCULAR; INTRAVENOUS; SUBCUTANEOUS at 21:08

## 2023-01-01 RX ADMIN — MIRTAZAPINE 15 MILLIGRAM(S): 45 TABLET, ORALLY DISINTEGRATING ORAL at 11:27

## 2023-01-01 RX ADMIN — SENNA PLUS 2 TABLET(S): 8.6 TABLET ORAL at 22:03

## 2023-01-01 RX ADMIN — DEXMEDETOMIDINE HYDROCHLORIDE IN 0.9% SODIUM CHLORIDE 5.26 MICROGRAM(S)/KG/HR: 4 INJECTION INTRAVENOUS at 05:57

## 2023-01-01 RX ADMIN — CHLORHEXIDINE GLUCONATE 15 MILLILITER(S): 213 SOLUTION TOPICAL at 06:21

## 2023-01-01 RX ADMIN — Medication 3 MILLILITER(S): at 11:29

## 2023-01-01 RX ADMIN — Medication 3 MILLILITER(S): at 17:15

## 2023-01-01 RX ADMIN — Medication 100 MILLIGRAM(S): at 06:25

## 2023-01-01 RX ADMIN — Medication 1 APPLICATION(S): at 12:38

## 2023-01-01 RX ADMIN — FINASTERIDE 5 MILLIGRAM(S): 5 TABLET, FILM COATED ORAL at 12:52

## 2023-01-01 RX ADMIN — HEPARIN SODIUM 5000 UNIT(S): 5000 INJECTION INTRAVENOUS; SUBCUTANEOUS at 17:30

## 2023-01-01 RX ADMIN — DEXMEDETOMIDINE HYDROCHLORIDE IN 0.9% SODIUM CHLORIDE 3.51 MICROGRAM(S)/KG/HR: 4 INJECTION INTRAVENOUS at 17:54

## 2023-01-01 RX ADMIN — HEPARIN SODIUM 5000 UNIT(S): 5000 INJECTION INTRAVENOUS; SUBCUTANEOUS at 13:23

## 2023-01-01 RX ADMIN — DEXMEDETOMIDINE HYDROCHLORIDE IN 0.9% SODIUM CHLORIDE 3.51 MICROGRAM(S)/KG/HR: 4 INJECTION INTRAVENOUS at 07:44

## 2023-01-01 RX ADMIN — SODIUM CHLORIDE 4 MILLILITER(S): 9 INJECTION INTRAMUSCULAR; INTRAVENOUS; SUBCUTANEOUS at 05:19

## 2023-01-01 RX ADMIN — MIRTAZAPINE 15 MILLIGRAM(S): 45 TABLET, ORALLY DISINTEGRATING ORAL at 12:41

## 2023-01-01 RX ADMIN — SODIUM CHLORIDE 4 MILLILITER(S): 9 INJECTION INTRAMUSCULAR; INTRAVENOUS; SUBCUTANEOUS at 23:23

## 2023-01-01 RX ADMIN — POLYETHYLENE GLYCOL 3350 17 GRAM(S): 17 POWDER, FOR SOLUTION ORAL at 22:15

## 2023-01-01 RX ADMIN — PROPOFOL 4.21 MICROGRAM(S)/KG/MIN: 10 INJECTION, EMULSION INTRAVENOUS at 01:07

## 2023-01-01 RX ADMIN — HEPARIN SODIUM 5000 UNIT(S): 5000 INJECTION INTRAVENOUS; SUBCUTANEOUS at 05:57

## 2023-01-01 RX ADMIN — SODIUM CHLORIDE 4 MILLILITER(S): 9 INJECTION INTRAMUSCULAR; INTRAVENOUS; SUBCUTANEOUS at 05:14

## 2023-01-01 RX ADMIN — FINASTERIDE 5 MILLIGRAM(S): 5 TABLET, FILM COATED ORAL at 11:36

## 2023-01-01 RX ADMIN — Medication 650 MILLIGRAM(S): at 07:53

## 2023-01-01 RX ADMIN — POLYETHYLENE GLYCOL 3350 17 GRAM(S): 17 POWDER, FOR SOLUTION ORAL at 05:42

## 2023-01-01 RX ADMIN — GABAPENTIN 300 MILLIGRAM(S): 400 CAPSULE ORAL at 05:57

## 2023-01-01 RX ADMIN — POLYETHYLENE GLYCOL 3350 17 GRAM(S): 17 POWDER, FOR SOLUTION ORAL at 21:46

## 2023-01-01 RX ADMIN — Medication 3 MILLILITER(S): at 23:55

## 2023-01-01 RX ADMIN — DEXMEDETOMIDINE HYDROCHLORIDE IN 0.9% SODIUM CHLORIDE 5.26 MICROGRAM(S)/KG/HR: 4 INJECTION INTRAVENOUS at 07:50

## 2023-01-01 RX ADMIN — Medication 4 MILLILITER(S): at 17:44

## 2023-01-01 RX ADMIN — POLYETHYLENE GLYCOL 3350 17 GRAM(S): 17 POWDER, FOR SOLUTION ORAL at 21:59

## 2023-01-01 RX ADMIN — GABAPENTIN 300 MILLIGRAM(S): 400 CAPSULE ORAL at 17:29

## 2023-01-01 RX ADMIN — Medication 3 MILLILITER(S): at 11:50

## 2023-01-01 RX ADMIN — Medication 250 MILLIGRAM(S): at 19:30

## 2023-01-01 RX ADMIN — HEPARIN SODIUM 5000 UNIT(S): 5000 INJECTION INTRAVENOUS; SUBCUTANEOUS at 05:07

## 2023-01-01 RX ADMIN — DEXMEDETOMIDINE HYDROCHLORIDE IN 0.9% SODIUM CHLORIDE 3.51 MICROGRAM(S)/KG/HR: 4 INJECTION INTRAVENOUS at 02:28

## 2023-01-01 RX ADMIN — Medication 3 MILLILITER(S): at 21:39

## 2023-01-01 RX ADMIN — GABAPENTIN 300 MILLIGRAM(S): 400 CAPSULE ORAL at 05:18

## 2023-01-01 RX ADMIN — HEPARIN SODIUM 5000 UNIT(S): 5000 INJECTION INTRAVENOUS; SUBCUTANEOUS at 17:02

## 2023-01-01 RX ADMIN — DEXMEDETOMIDINE HYDROCHLORIDE IN 0.9% SODIUM CHLORIDE 5.26 MICROGRAM(S)/KG/HR: 4 INJECTION INTRAVENOUS at 02:48

## 2023-01-01 RX ADMIN — SODIUM CHLORIDE 100 MILLILITER(S): 9 INJECTION, SOLUTION INTRAVENOUS at 05:14

## 2023-01-01 RX ADMIN — Medication 1 APPLICATION(S): at 11:48

## 2023-01-01 RX ADMIN — Medication 100 MILLIGRAM(S): at 15:44

## 2023-01-01 RX ADMIN — GABAPENTIN 300 MILLIGRAM(S): 400 CAPSULE ORAL at 18:31

## 2023-01-01 RX ADMIN — Medication 6.57 MICROGRAM(S)/KG/MIN: at 21:35

## 2023-01-01 RX ADMIN — Medication 3 MILLILITER(S): at 05:27

## 2023-01-01 RX ADMIN — CHLORHEXIDINE GLUCONATE 15 MILLILITER(S): 213 SOLUTION TOPICAL at 17:29

## 2023-01-01 RX ADMIN — SODIUM CHLORIDE 50 MILLILITER(S): 9 INJECTION, SOLUTION INTRAVENOUS at 16:53

## 2023-01-01 RX ADMIN — SODIUM CHLORIDE 4 MILLILITER(S): 9 INJECTION INTRAMUSCULAR; INTRAVENOUS; SUBCUTANEOUS at 11:24

## 2023-01-01 RX ADMIN — MIRTAZAPINE 7.5 MILLIGRAM(S): 45 TABLET, ORALLY DISINTEGRATING ORAL at 22:34

## 2023-01-01 RX ADMIN — Medication 100 MILLIGRAM(S): at 05:27

## 2023-01-01 RX ADMIN — DEXMEDETOMIDINE HYDROCHLORIDE IN 0.9% SODIUM CHLORIDE 3.51 MICROGRAM(S)/KG/HR: 4 INJECTION INTRAVENOUS at 19:29

## 2023-01-01 RX ADMIN — Medication 4 MILLILITER(S): at 17:09

## 2023-01-01 RX ADMIN — GABAPENTIN 300 MILLIGRAM(S): 400 CAPSULE ORAL at 05:43

## 2023-01-01 RX ADMIN — FINASTERIDE 5 MILLIGRAM(S): 5 TABLET, FILM COATED ORAL at 12:02

## 2023-01-01 RX ADMIN — CEFTRIAXONE 100 MILLIGRAM(S): 500 INJECTION, POWDER, FOR SOLUTION INTRAMUSCULAR; INTRAVENOUS at 01:46

## 2023-01-01 RX ADMIN — PROPOFOL 8.41 MICROGRAM(S)/KG/MIN: 10 INJECTION, EMULSION INTRAVENOUS at 07:44

## 2023-01-01 RX ADMIN — FINASTERIDE 5 MILLIGRAM(S): 5 TABLET, FILM COATED ORAL at 11:09

## 2023-01-01 RX ADMIN — PROPOFOL 8.41 MICROGRAM(S)/KG/MIN: 10 INJECTION, EMULSION INTRAVENOUS at 06:24

## 2023-01-01 RX ADMIN — Medication 3 MILLILITER(S): at 06:08

## 2023-01-01 RX ADMIN — Medication 4 MILLILITER(S): at 11:09

## 2023-01-01 RX ADMIN — TAMSULOSIN HYDROCHLORIDE 0.4 MILLIGRAM(S): 0.4 CAPSULE ORAL at 21:59

## 2023-01-01 RX ADMIN — FINASTERIDE 5 MILLIGRAM(S): 5 TABLET, FILM COATED ORAL at 12:20

## 2023-01-01 RX ADMIN — SENNA PLUS 2 TABLET(S): 8.6 TABLET ORAL at 21:59

## 2023-01-01 RX ADMIN — GABAPENTIN 300 MILLIGRAM(S): 400 CAPSULE ORAL at 17:28

## 2023-01-01 RX ADMIN — Medication 100 MILLIGRAM(S): at 17:10

## 2023-01-01 RX ADMIN — HEPARIN SODIUM 5000 UNIT(S): 5000 INJECTION INTRAVENOUS; SUBCUTANEOUS at 06:21

## 2023-01-01 RX ADMIN — CHLORHEXIDINE GLUCONATE 15 MILLILITER(S): 213 SOLUTION TOPICAL at 17:55

## 2023-01-01 RX ADMIN — SODIUM CHLORIDE 4 MILLILITER(S): 9 INJECTION INTRAMUSCULAR; INTRAVENOUS; SUBCUTANEOUS at 00:14

## 2023-01-01 RX ADMIN — Medication 250 MILLIGRAM(S): at 22:05

## 2023-01-01 RX ADMIN — GABAPENTIN 300 MILLIGRAM(S): 400 CAPSULE ORAL at 05:10

## 2023-01-01 RX ADMIN — DEXMEDETOMIDINE HYDROCHLORIDE IN 0.9% SODIUM CHLORIDE 3.51 MICROGRAM(S)/KG/HR: 4 INJECTION INTRAVENOUS at 03:46

## 2023-01-01 RX ADMIN — HEPARIN SODIUM 5000 UNIT(S): 5000 INJECTION INTRAVENOUS; SUBCUTANEOUS at 05:35

## 2023-01-01 RX ADMIN — SODIUM CHLORIDE 4 MILLILITER(S): 9 INJECTION INTRAMUSCULAR; INTRAVENOUS; SUBCUTANEOUS at 05:07

## 2023-01-01 RX ADMIN — CHLORHEXIDINE GLUCONATE 1 APPLICATION(S): 213 SOLUTION TOPICAL at 04:40

## 2023-01-01 RX ADMIN — GABAPENTIN 300 MILLIGRAM(S): 400 CAPSULE ORAL at 06:26

## 2023-01-01 RX ADMIN — DEXMEDETOMIDINE HYDROCHLORIDE IN 0.9% SODIUM CHLORIDE 3.51 MICROGRAM(S)/KG/HR: 4 INJECTION INTRAVENOUS at 21:56

## 2023-01-01 RX ADMIN — PROPOFOL 4.21 MICROGRAM(S)/KG/MIN: 10 INJECTION, EMULSION INTRAVENOUS at 17:40

## 2023-01-01 RX ADMIN — SODIUM CHLORIDE 4 MILLILITER(S): 9 INJECTION INTRAMUSCULAR; INTRAVENOUS; SUBCUTANEOUS at 05:15

## 2023-01-01 RX ADMIN — POLYETHYLENE GLYCOL 3350 17 GRAM(S): 17 POWDER, FOR SOLUTION ORAL at 22:20

## 2023-01-01 RX ADMIN — HEPARIN SODIUM 5000 UNIT(S): 5000 INJECTION INTRAVENOUS; SUBCUTANEOUS at 17:29

## 2023-01-01 RX ADMIN — MIRTAZAPINE 15 MILLIGRAM(S): 45 TABLET, ORALLY DISINTEGRATING ORAL at 11:47

## 2023-01-01 RX ADMIN — GABAPENTIN 300 MILLIGRAM(S): 400 CAPSULE ORAL at 05:53

## 2023-01-01 RX ADMIN — Medication 1 APPLICATION(S): at 12:24

## 2023-01-01 RX ADMIN — CHLORHEXIDINE GLUCONATE 1 APPLICATION(S): 213 SOLUTION TOPICAL at 05:21

## 2023-01-01 RX ADMIN — DEXMEDETOMIDINE HYDROCHLORIDE IN 0.9% SODIUM CHLORIDE 3.51 MICROGRAM(S)/KG/HR: 4 INJECTION INTRAVENOUS at 20:24

## 2023-01-01 RX ADMIN — Medication 3 MILLILITER(S): at 05:15

## 2023-01-01 RX ADMIN — HEPARIN SODIUM 5000 UNIT(S): 5000 INJECTION INTRAVENOUS; SUBCUTANEOUS at 05:21

## 2023-01-01 RX ADMIN — Medication 3 MILLILITER(S): at 11:10

## 2023-01-01 RX ADMIN — Medication 4 MILLILITER(S): at 05:24

## 2023-01-01 RX ADMIN — MIRTAZAPINE 15 MILLIGRAM(S): 45 TABLET, ORALLY DISINTEGRATING ORAL at 12:20

## 2023-01-01 RX ADMIN — GABAPENTIN 300 MILLIGRAM(S): 400 CAPSULE ORAL at 17:02

## 2023-01-01 RX ADMIN — SENNA PLUS 2 TABLET(S): 8.6 TABLET ORAL at 21:05

## 2023-01-01 RX ADMIN — DEXMEDETOMIDINE HYDROCHLORIDE IN 0.9% SODIUM CHLORIDE 5.26 MICROGRAM(S)/KG/HR: 4 INJECTION INTRAVENOUS at 17:06

## 2023-01-01 RX ADMIN — Medication 3 MILLILITER(S): at 17:18

## 2023-01-01 RX ADMIN — SODIUM ZIRCONIUM CYCLOSILICATE 10 GRAM(S): 10 POWDER, FOR SUSPENSION ORAL at 21:05

## 2023-01-01 RX ADMIN — Medication 1 APPLICATION(S): at 17:30

## 2023-01-01 RX ADMIN — Medication 3 MILLILITER(S): at 23:23

## 2023-01-01 RX ADMIN — SODIUM CHLORIDE 100 MILLILITER(S): 9 INJECTION, SOLUTION INTRAVENOUS at 02:25

## 2023-01-01 RX ADMIN — DEXMEDETOMIDINE HYDROCHLORIDE IN 0.9% SODIUM CHLORIDE 3.51 MICROGRAM(S)/KG/HR: 4 INJECTION INTRAVENOUS at 05:59

## 2023-01-01 RX ADMIN — SODIUM CHLORIDE 4 MILLILITER(S): 9 INJECTION INTRAMUSCULAR; INTRAVENOUS; SUBCUTANEOUS at 05:38

## 2023-01-01 RX ADMIN — SODIUM ZIRCONIUM CYCLOSILICATE 10 GRAM(S): 10 POWDER, FOR SUSPENSION ORAL at 06:56

## 2023-01-01 RX ADMIN — SODIUM CHLORIDE 50 MILLILITER(S): 9 INJECTION, SOLUTION INTRAVENOUS at 12:25

## 2023-01-01 RX ADMIN — AMLODIPINE BESYLATE 2.5 MILLIGRAM(S): 2.5 TABLET ORAL at 05:53

## 2023-01-01 RX ADMIN — DEXMEDETOMIDINE HYDROCHLORIDE IN 0.9% SODIUM CHLORIDE 3.51 MICROGRAM(S)/KG/HR: 4 INJECTION INTRAVENOUS at 11:10

## 2023-01-01 RX ADMIN — PROPOFOL 2.1 MICROGRAM(S)/KG/MIN: 10 INJECTION, EMULSION INTRAVENOUS at 20:26

## 2023-01-01 RX ADMIN — GABAPENTIN 300 MILLIGRAM(S): 400 CAPSULE ORAL at 05:41

## 2023-01-01 RX ADMIN — SODIUM CHLORIDE 4 MILLILITER(S): 9 INJECTION INTRAMUSCULAR; INTRAVENOUS; SUBCUTANEOUS at 13:34

## 2023-01-01 RX ADMIN — SODIUM CHLORIDE 4 MILLILITER(S): 9 INJECTION INTRAMUSCULAR; INTRAVENOUS; SUBCUTANEOUS at 18:02

## 2023-01-01 RX ADMIN — Medication 6.57 MICROGRAM(S)/KG/MIN: at 19:24

## 2023-01-01 RX ADMIN — Medication 1 DROP(S): at 17:28

## 2023-01-01 RX ADMIN — DEXMEDETOMIDINE HYDROCHLORIDE IN 0.9% SODIUM CHLORIDE 5.26 MICROGRAM(S)/KG/HR: 4 INJECTION INTRAVENOUS at 07:28

## 2023-01-01 RX ADMIN — AMLODIPINE BESYLATE 2.5 MILLIGRAM(S): 2.5 TABLET ORAL at 06:16

## 2023-01-01 RX ADMIN — Medication 3 MILLILITER(S): at 14:40

## 2023-01-01 RX ADMIN — HEPARIN SODIUM 5000 UNIT(S): 5000 INJECTION INTRAVENOUS; SUBCUTANEOUS at 05:48

## 2023-01-01 RX ADMIN — DEXMEDETOMIDINE HYDROCHLORIDE IN 0.9% SODIUM CHLORIDE 5.26 MICROGRAM(S)/KG/HR: 4 INJECTION INTRAVENOUS at 09:23

## 2023-01-01 RX ADMIN — DEXMEDETOMIDINE HYDROCHLORIDE IN 0.9% SODIUM CHLORIDE 3.51 MICROGRAM(S)/KG/HR: 4 INJECTION INTRAVENOUS at 11:09

## 2023-01-01 RX ADMIN — PIPERACILLIN AND TAZOBACTAM 25 GRAM(S): 4; .5 INJECTION, POWDER, LYOPHILIZED, FOR SOLUTION INTRAVENOUS at 05:42

## 2023-01-01 RX ADMIN — HEPARIN SODIUM 5000 UNIT(S): 5000 INJECTION INTRAVENOUS; SUBCUTANEOUS at 05:19

## 2023-01-01 RX ADMIN — DEXMEDETOMIDINE HYDROCHLORIDE IN 0.9% SODIUM CHLORIDE 5.26 MICROGRAM(S)/KG/HR: 4 INJECTION INTRAVENOUS at 10:02

## 2023-01-01 RX ADMIN — Medication 3 MILLILITER(S): at 05:31

## 2023-01-01 RX ADMIN — DEXMEDETOMIDINE HYDROCHLORIDE IN 0.9% SODIUM CHLORIDE 3.51 MICROGRAM(S)/KG/HR: 4 INJECTION INTRAVENOUS at 19:30

## 2023-01-01 RX ADMIN — POLYETHYLENE GLYCOL 3350 17 GRAM(S): 17 POWDER, FOR SOLUTION ORAL at 21:40

## 2023-01-01 RX ADMIN — SODIUM CHLORIDE 4 MILLILITER(S): 9 INJECTION INTRAMUSCULAR; INTRAVENOUS; SUBCUTANEOUS at 05:25

## 2023-01-01 RX ADMIN — BUMETANIDE 2 MILLIGRAM(S): 0.25 INJECTION INTRAMUSCULAR; INTRAVENOUS at 05:57

## 2023-01-01 RX ADMIN — Medication 3 MILLILITER(S): at 23:24

## 2023-01-01 RX ADMIN — Medication 100 MILLIGRAM(S): at 05:29

## 2023-01-01 RX ADMIN — BUMETANIDE 2 MILLIGRAM(S): 0.25 INJECTION INTRAMUSCULAR; INTRAVENOUS at 10:30

## 2023-01-01 RX ADMIN — Medication 4 MILLILITER(S): at 17:12

## 2023-01-01 RX ADMIN — Medication 100 MILLIGRAM(S): at 19:30

## 2023-01-01 RX ADMIN — SODIUM CHLORIDE 500 MILLILITER(S): 9 INJECTION, SOLUTION INTRAVENOUS at 01:18

## 2023-01-01 RX ADMIN — Medication 1 APPLICATION(S): at 12:02

## 2023-01-01 RX ADMIN — Medication 3 MILLILITER(S): at 11:43

## 2023-01-01 RX ADMIN — MEROPENEM 100 MILLIGRAM(S): 1 INJECTION INTRAVENOUS at 05:50

## 2023-01-01 RX ADMIN — MIRTAZAPINE 15 MILLIGRAM(S): 45 TABLET, ORALLY DISINTEGRATING ORAL at 11:32

## 2023-01-01 RX ADMIN — POLYETHYLENE GLYCOL 3350 17 GRAM(S): 17 POWDER, FOR SOLUTION ORAL at 22:10

## 2023-01-01 RX ADMIN — CHLORHEXIDINE GLUCONATE 15 MILLILITER(S): 213 SOLUTION TOPICAL at 17:20

## 2023-01-01 RX ADMIN — SENNA PLUS 2 TABLET(S): 8.6 TABLET ORAL at 22:20

## 2023-01-01 RX ADMIN — SODIUM CHLORIDE 1000 MILLILITER(S): 9 INJECTION, SOLUTION INTRAVENOUS at 19:00

## 2023-01-01 RX ADMIN — PROPOFOL 4.52 MICROGRAM(S)/KG/MIN: 10 INJECTION, EMULSION INTRAVENOUS at 18:50

## 2023-01-01 RX ADMIN — Medication 3 MILLILITER(S): at 23:16

## 2023-01-01 RX ADMIN — SODIUM ZIRCONIUM CYCLOSILICATE 10 GRAM(S): 10 POWDER, FOR SUSPENSION ORAL at 21:36

## 2023-01-01 RX ADMIN — Medication 1 APPLICATION(S): at 12:52

## 2023-01-01 RX ADMIN — Medication 100 MILLIGRAM(S): at 13:01

## 2023-01-01 RX ADMIN — GABAPENTIN 300 MILLIGRAM(S): 400 CAPSULE ORAL at 17:53

## 2023-01-01 RX ADMIN — Medication 3 MILLILITER(S): at 17:12

## 2023-01-01 RX ADMIN — CHLORHEXIDINE GLUCONATE 15 MILLILITER(S): 213 SOLUTION TOPICAL at 17:37

## 2023-01-01 RX ADMIN — POLYETHYLENE GLYCOL 3350 17 GRAM(S): 17 POWDER, FOR SOLUTION ORAL at 21:04

## 2023-01-01 RX ADMIN — Medication 4 MILLILITER(S): at 23:23

## 2023-01-01 RX ADMIN — Medication 3 MILLILITER(S): at 18:01

## 2023-01-01 RX ADMIN — FENTANYL CITRATE 25 MICROGRAM(S): 50 INJECTION INTRAVENOUS at 10:16

## 2023-01-01 RX ADMIN — SODIUM CHLORIDE 4 MILLILITER(S): 9 INJECTION INTRAMUSCULAR; INTRAVENOUS; SUBCUTANEOUS at 11:06

## 2023-01-01 RX ADMIN — CHLORHEXIDINE GLUCONATE 15 MILLILITER(S): 213 SOLUTION TOPICAL at 17:21

## 2023-01-01 RX ADMIN — HEPARIN SODIUM 5000 UNIT(S): 5000 INJECTION INTRAVENOUS; SUBCUTANEOUS at 13:03

## 2023-01-01 RX ADMIN — SENNA PLUS 2 TABLET(S): 8.6 TABLET ORAL at 21:52

## 2023-01-01 RX ADMIN — PROPOFOL 4.52 MICROGRAM(S)/KG/MIN: 10 INJECTION, EMULSION INTRAVENOUS at 00:10

## 2023-01-01 RX ADMIN — HEPARIN SODIUM 5000 UNIT(S): 5000 INJECTION INTRAVENOUS; SUBCUTANEOUS at 05:28

## 2023-01-01 RX ADMIN — Medication 1 APPLICATION(S): at 12:43

## 2023-01-01 RX ADMIN — SODIUM CHLORIDE 4 MILLILITER(S): 9 INJECTION INTRAMUSCULAR; INTRAVENOUS; SUBCUTANEOUS at 17:44

## 2023-01-01 RX ADMIN — Medication 3 MILLILITER(S): at 17:04

## 2023-01-01 RX ADMIN — GABAPENTIN 300 MILLIGRAM(S): 400 CAPSULE ORAL at 05:34

## 2023-01-01 RX ADMIN — TAMSULOSIN HYDROCHLORIDE 0.4 MILLIGRAM(S): 0.4 CAPSULE ORAL at 21:39

## 2023-01-01 RX ADMIN — SODIUM CHLORIDE 4 MILLILITER(S): 9 INJECTION INTRAMUSCULAR; INTRAVENOUS; SUBCUTANEOUS at 05:31

## 2023-01-01 RX ADMIN — Medication 1 DROP(S): at 17:38

## 2023-01-01 RX ADMIN — DEXMEDETOMIDINE HYDROCHLORIDE IN 0.9% SODIUM CHLORIDE 3.51 MICROGRAM(S)/KG/HR: 4 INJECTION INTRAVENOUS at 15:54

## 2023-01-01 RX ADMIN — SODIUM CHLORIDE 75 MILLILITER(S): 9 INJECTION, SOLUTION INTRAVENOUS at 19:03

## 2023-01-01 RX ADMIN — PROPOFOL 2.1 MICROGRAM(S)/KG/MIN: 10 INJECTION, EMULSION INTRAVENOUS at 09:15

## 2023-01-01 RX ADMIN — MIRTAZAPINE 15 MILLIGRAM(S): 45 TABLET, ORALLY DISINTEGRATING ORAL at 12:09

## 2023-01-01 RX ADMIN — Medication 1 ENEMA: at 11:42

## 2023-01-01 RX ADMIN — PROPOFOL 2.1 MICROGRAM(S)/KG/MIN: 10 INJECTION, EMULSION INTRAVENOUS at 23:08

## 2023-01-01 RX ADMIN — CEFEPIME 100 MILLIGRAM(S): 1 INJECTION, POWDER, FOR SOLUTION INTRAMUSCULAR; INTRAVENOUS at 12:43

## 2023-01-01 RX ADMIN — Medication 100 MILLIEQUIVALENT(S): at 04:39

## 2023-01-01 RX ADMIN — AMLODIPINE BESYLATE 5 MILLIGRAM(S): 2.5 TABLET ORAL at 06:26

## 2023-01-01 RX ADMIN — Medication 3 MILLILITER(S): at 05:23

## 2023-01-01 RX ADMIN — GABAPENTIN 300 MILLIGRAM(S): 400 CAPSULE ORAL at 17:58

## 2023-01-01 RX ADMIN — SODIUM CHLORIDE 4 MILLILITER(S): 9 INJECTION INTRAMUSCULAR; INTRAVENOUS; SUBCUTANEOUS at 14:40

## 2023-01-01 RX ADMIN — FENTANYL CITRATE 25 MICROGRAM(S): 50 INJECTION INTRAVENOUS at 22:20

## 2023-01-01 RX ADMIN — Medication 1 APPLICATION(S): at 12:48

## 2023-01-01 RX ADMIN — HEPARIN SODIUM 5000 UNIT(S): 5000 INJECTION INTRAVENOUS; SUBCUTANEOUS at 15:40

## 2023-01-01 RX ADMIN — CHLORHEXIDINE GLUCONATE 15 MILLILITER(S): 213 SOLUTION TOPICAL at 17:15

## 2023-01-01 RX ADMIN — SODIUM CHLORIDE 4 MILLILITER(S): 9 INJECTION INTRAMUSCULAR; INTRAVENOUS; SUBCUTANEOUS at 23:22

## 2023-01-01 RX ADMIN — SODIUM CHLORIDE 4 MILLILITER(S): 9 INJECTION INTRAMUSCULAR; INTRAVENOUS; SUBCUTANEOUS at 11:09

## 2023-01-01 RX ADMIN — Medication 100 MILLIGRAM(S): at 17:01

## 2023-01-01 RX ADMIN — CHLORHEXIDINE GLUCONATE 15 MILLILITER(S): 213 SOLUTION TOPICAL at 17:49

## 2023-01-01 RX ADMIN — Medication 3 MILLILITER(S): at 17:10

## 2023-01-01 RX ADMIN — DEXMEDETOMIDINE HYDROCHLORIDE IN 0.9% SODIUM CHLORIDE 3.51 MICROGRAM(S)/KG/HR: 4 INJECTION INTRAVENOUS at 15:07

## 2023-01-01 RX ADMIN — Medication 7.06 MICROGRAM(S)/KG/MIN: at 14:43

## 2023-01-01 RX ADMIN — GABAPENTIN 300 MILLIGRAM(S): 400 CAPSULE ORAL at 19:07

## 2023-01-01 RX ADMIN — Medication 1 APPLICATION(S): at 12:16

## 2023-01-01 RX ADMIN — Medication 4 MILLILITER(S): at 01:02

## 2023-01-01 RX ADMIN — Medication 3 MILLILITER(S): at 05:38

## 2023-01-01 RX ADMIN — Medication 3 MILLILITER(S): at 11:31

## 2023-01-01 RX ADMIN — CHLORHEXIDINE GLUCONATE 15 MILLILITER(S): 213 SOLUTION TOPICAL at 17:06

## 2023-01-01 RX ADMIN — TAMSULOSIN HYDROCHLORIDE 0.4 MILLIGRAM(S): 0.4 CAPSULE ORAL at 21:23

## 2023-01-01 RX ADMIN — POLYETHYLENE GLYCOL 3350 17 GRAM(S): 17 POWDER, FOR SOLUTION ORAL at 22:56

## 2023-01-01 RX ADMIN — SENNA PLUS 2 TABLET(S): 8.6 TABLET ORAL at 21:30

## 2023-01-01 RX ADMIN — SODIUM CHLORIDE 4 MILLILITER(S): 9 INJECTION INTRAMUSCULAR; INTRAVENOUS; SUBCUTANEOUS at 00:44

## 2023-01-01 RX ADMIN — DEXMEDETOMIDINE HYDROCHLORIDE IN 0.9% SODIUM CHLORIDE 5.26 MICROGRAM(S)/KG/HR: 4 INJECTION INTRAVENOUS at 15:25

## 2023-01-01 RX ADMIN — AMLODIPINE BESYLATE 2.5 MILLIGRAM(S): 2.5 TABLET ORAL at 06:09

## 2023-01-01 RX ADMIN — MIRTAZAPINE 15 MILLIGRAM(S): 45 TABLET, ORALLY DISINTEGRATING ORAL at 18:10

## 2023-01-01 RX ADMIN — CHLORHEXIDINE GLUCONATE 1 APPLICATION(S): 213 SOLUTION TOPICAL at 05:06

## 2023-01-01 RX ADMIN — Medication 3 MILLILITER(S): at 05:07

## 2023-01-01 RX ADMIN — Medication 100 MILLIGRAM(S): at 17:37

## 2023-01-01 RX ADMIN — GABAPENTIN 300 MILLIGRAM(S): 400 CAPSULE ORAL at 17:20

## 2023-01-01 RX ADMIN — Medication 3 MILLILITER(S): at 00:51

## 2023-01-01 RX ADMIN — GABAPENTIN 300 MILLIGRAM(S): 400 CAPSULE ORAL at 19:23

## 2023-01-01 RX ADMIN — MIRTAZAPINE 15 MILLIGRAM(S): 45 TABLET, ORALLY DISINTEGRATING ORAL at 13:20

## 2023-01-01 RX ADMIN — TAMSULOSIN HYDROCHLORIDE 0.4 MILLIGRAM(S): 0.4 CAPSULE ORAL at 21:46

## 2023-01-01 RX ADMIN — Medication 1 DROP(S): at 06:27

## 2023-01-01 RX ADMIN — PIPERACILLIN AND TAZOBACTAM 25 GRAM(S): 4; .5 INJECTION, POWDER, LYOPHILIZED, FOR SOLUTION INTRAVENOUS at 21:39

## 2023-01-01 RX ADMIN — SODIUM CHLORIDE 1000 MILLILITER(S): 9 INJECTION, SOLUTION INTRAVENOUS at 15:15

## 2023-01-01 RX ADMIN — POLYETHYLENE GLYCOL 3350 17 GRAM(S): 17 POWDER, FOR SOLUTION ORAL at 05:06

## 2023-01-01 RX ADMIN — MIRTAZAPINE 15 MILLIGRAM(S): 45 TABLET, ORALLY DISINTEGRATING ORAL at 11:09

## 2023-01-01 RX ADMIN — POLYETHYLENE GLYCOL 3350 17 GRAM(S): 17 POWDER, FOR SOLUTION ORAL at 21:17

## 2023-01-01 RX ADMIN — Medication 3 MILLILITER(S): at 11:32

## 2023-01-01 RX ADMIN — SODIUM CHLORIDE 100 MILLILITER(S): 9 INJECTION, SOLUTION INTRAVENOUS at 10:22

## 2023-01-01 RX ADMIN — FENTANYL CITRATE 50 MICROGRAM(S): 50 INJECTION INTRAVENOUS at 09:56

## 2023-01-01 RX ADMIN — Medication 3 MILLILITER(S): at 05:04

## 2023-01-01 RX ADMIN — SODIUM ZIRCONIUM CYCLOSILICATE 10 GRAM(S): 10 POWDER, FOR SUSPENSION ORAL at 06:18

## 2023-01-01 RX ADMIN — CHLORHEXIDINE GLUCONATE 15 MILLILITER(S): 213 SOLUTION TOPICAL at 05:19

## 2023-01-01 RX ADMIN — SODIUM CHLORIDE 4 MILLILITER(S): 9 INJECTION INTRAMUSCULAR; INTRAVENOUS; SUBCUTANEOUS at 06:07

## 2023-01-01 RX ADMIN — TAMSULOSIN HYDROCHLORIDE 0.4 MILLIGRAM(S): 0.4 CAPSULE ORAL at 21:17

## 2023-01-01 RX ADMIN — GABAPENTIN 300 MILLIGRAM(S): 400 CAPSULE ORAL at 05:48

## 2023-01-01 RX ADMIN — Medication 100 MILLIGRAM(S): at 17:28

## 2023-01-01 RX ADMIN — TAMSULOSIN HYDROCHLORIDE 0.4 MILLIGRAM(S): 0.4 CAPSULE ORAL at 22:15

## 2023-01-01 RX ADMIN — SODIUM CHLORIDE 4 MILLILITER(S): 9 INJECTION INTRAMUSCULAR; INTRAVENOUS; SUBCUTANEOUS at 17:05

## 2023-01-01 RX ADMIN — PIPERACILLIN AND TAZOBACTAM 25 GRAM(S): 4; .5 INJECTION, POWDER, LYOPHILIZED, FOR SOLUTION INTRAVENOUS at 13:54

## 2023-01-01 RX ADMIN — Medication 3 MILLILITER(S): at 17:05

## 2023-01-01 RX ADMIN — Medication 1 APPLICATION(S): at 12:22

## 2023-01-01 RX ADMIN — DEXMEDETOMIDINE HYDROCHLORIDE IN 0.9% SODIUM CHLORIDE 3.51 MICROGRAM(S)/KG/HR: 4 INJECTION INTRAVENOUS at 19:19

## 2023-01-01 RX ADMIN — MIRTAZAPINE 15 MILLIGRAM(S): 45 TABLET, ORALLY DISINTEGRATING ORAL at 15:14

## 2023-01-01 RX ADMIN — FINASTERIDE 5 MILLIGRAM(S): 5 TABLET, FILM COATED ORAL at 12:03

## 2023-01-01 RX ADMIN — CHLORHEXIDINE GLUCONATE 1 APPLICATION(S): 213 SOLUTION TOPICAL at 05:16

## 2023-01-01 RX ADMIN — SENNA PLUS 2 TABLET(S): 8.6 TABLET ORAL at 22:15

## 2023-01-01 RX ADMIN — Medication 3 MILLILITER(S): at 05:21

## 2023-01-01 RX ADMIN — CHLORHEXIDINE GLUCONATE 1 APPLICATION(S): 213 SOLUTION TOPICAL at 06:17

## 2023-01-01 RX ADMIN — DEXMEDETOMIDINE HYDROCHLORIDE IN 0.9% SODIUM CHLORIDE 3.51 MICROGRAM(S)/KG/HR: 4 INJECTION INTRAVENOUS at 07:50

## 2023-01-01 RX ADMIN — Medication 110 MILLIGRAM(S): at 19:47

## 2023-01-01 RX ADMIN — Medication 1 APPLICATION(S): at 17:07

## 2023-01-01 RX ADMIN — SODIUM CHLORIDE 4 MILLILITER(S): 9 INJECTION INTRAMUSCULAR; INTRAVENOUS; SUBCUTANEOUS at 17:06

## 2023-01-01 RX ADMIN — HEPARIN SODIUM 5000 UNIT(S): 5000 INJECTION INTRAVENOUS; SUBCUTANEOUS at 05:40

## 2023-01-01 RX ADMIN — SODIUM CHLORIDE 4 MILLILITER(S): 9 INJECTION INTRAMUSCULAR; INTRAVENOUS; SUBCUTANEOUS at 21:00

## 2023-01-01 RX ADMIN — Medication 100 MILLIGRAM(S): at 05:43

## 2023-01-01 RX ADMIN — TAMSULOSIN HYDROCHLORIDE 0.4 MILLIGRAM(S): 0.4 CAPSULE ORAL at 22:55

## 2023-01-01 RX ADMIN — DEXMEDETOMIDINE HYDROCHLORIDE IN 0.9% SODIUM CHLORIDE 3.51 MICROGRAM(S)/KG/HR: 4 INJECTION INTRAVENOUS at 07:30

## 2023-01-01 RX ADMIN — IRON SUCROSE 100 MILLIGRAM(S): 20 INJECTION, SOLUTION INTRAVENOUS at 12:48

## 2023-01-01 RX ADMIN — BUMETANIDE 2 MILLIGRAM(S): 0.25 INJECTION INTRAMUSCULAR; INTRAVENOUS at 05:05

## 2023-01-01 RX ADMIN — Medication 3 MILLILITER(S): at 23:15

## 2023-01-01 RX ADMIN — POLYETHYLENE GLYCOL 3350 17 GRAM(S): 17 POWDER, FOR SOLUTION ORAL at 19:31

## 2023-01-01 RX ADMIN — Medication 3 MILLILITER(S): at 21:08

## 2023-01-01 RX ADMIN — DEXMEDETOMIDINE HYDROCHLORIDE IN 0.9% SODIUM CHLORIDE 5.26 MICROGRAM(S)/KG/HR: 4 INJECTION INTRAVENOUS at 00:12

## 2023-01-01 RX ADMIN — Medication 40 MILLIEQUIVALENT(S): at 10:05

## 2023-01-01 RX ADMIN — GABAPENTIN 300 MILLIGRAM(S): 400 CAPSULE ORAL at 17:15

## 2023-01-01 RX ADMIN — TAMSULOSIN HYDROCHLORIDE 0.4 MILLIGRAM(S): 0.4 CAPSULE ORAL at 22:34

## 2023-01-01 RX ADMIN — DEXMEDETOMIDINE HYDROCHLORIDE IN 0.9% SODIUM CHLORIDE 5.26 MICROGRAM(S)/KG/HR: 4 INJECTION INTRAVENOUS at 12:19

## 2023-01-01 RX ADMIN — CHLORHEXIDINE GLUCONATE 15 MILLILITER(S): 213 SOLUTION TOPICAL at 17:31

## 2023-01-01 RX ADMIN — CHLORHEXIDINE GLUCONATE 15 MILLILITER(S): 213 SOLUTION TOPICAL at 17:02

## 2023-01-01 RX ADMIN — PROPOFOL 4.52 MICROGRAM(S)/KG/MIN: 10 INJECTION, EMULSION INTRAVENOUS at 05:07

## 2023-01-01 RX ADMIN — SODIUM CHLORIDE 4 MILLILITER(S): 9 INJECTION INTRAMUSCULAR; INTRAVENOUS; SUBCUTANEOUS at 13:24

## 2023-01-01 RX ADMIN — Medication 1 DROP(S): at 06:06

## 2023-01-01 RX ADMIN — Medication 3 MILLILITER(S): at 00:44

## 2023-01-01 RX ADMIN — SODIUM CHLORIDE 4 MILLILITER(S): 9 INJECTION INTRAMUSCULAR; INTRAVENOUS; SUBCUTANEOUS at 05:23

## 2023-01-01 RX ADMIN — Medication 3 MILLILITER(S): at 13:34

## 2023-01-01 RX ADMIN — Medication 40 MILLIEQUIVALENT(S): at 15:40

## 2023-01-01 RX ADMIN — DEXMEDETOMIDINE HYDROCHLORIDE IN 0.9% SODIUM CHLORIDE 3.51 MICROGRAM(S)/KG/HR: 4 INJECTION INTRAVENOUS at 12:26

## 2023-01-01 RX ADMIN — Medication 250 MILLIGRAM(S): at 14:59

## 2023-01-01 RX ADMIN — Medication 3 MILLILITER(S): at 11:23

## 2023-01-01 RX ADMIN — GABAPENTIN 300 MILLIGRAM(S): 400 CAPSULE ORAL at 19:31

## 2023-01-01 RX ADMIN — CHLORHEXIDINE GLUCONATE 15 MILLILITER(S): 213 SOLUTION TOPICAL at 17:34

## 2023-01-01 RX ADMIN — KETAMINE HYDROCHLORIDE 100 MILLIGRAM(S): 100 INJECTION INTRAMUSCULAR; INTRAVENOUS at 17:38

## 2023-01-01 RX ADMIN — TAMSULOSIN HYDROCHLORIDE 0.4 MILLIGRAM(S): 0.4 CAPSULE ORAL at 21:24

## 2023-01-01 RX ADMIN — Medication 100 GRAM(S): at 06:17

## 2023-01-01 RX ADMIN — GABAPENTIN 300 MILLIGRAM(S): 400 CAPSULE ORAL at 17:19

## 2023-01-01 RX ADMIN — CHLORHEXIDINE GLUCONATE 15 MILLILITER(S): 213 SOLUTION TOPICAL at 05:07

## 2023-01-01 RX ADMIN — Medication 3 MILLILITER(S): at 13:24

## 2023-01-01 RX ADMIN — SODIUM CHLORIDE 4 MILLILITER(S): 9 INJECTION INTRAMUSCULAR; INTRAVENOUS; SUBCUTANEOUS at 00:04

## 2023-01-01 RX ADMIN — DEXMEDETOMIDINE HYDROCHLORIDE IN 0.9% SODIUM CHLORIDE 3.51 MICROGRAM(S)/KG/HR: 4 INJECTION INTRAVENOUS at 10:28

## 2023-01-01 RX ADMIN — Medication 100 MILLIGRAM(S): at 04:39

## 2023-01-01 RX ADMIN — HEPARIN SODIUM 5000 UNIT(S): 5000 INJECTION INTRAVENOUS; SUBCUTANEOUS at 17:37

## 2023-01-01 RX ADMIN — CHLORHEXIDINE GLUCONATE 1 APPLICATION(S): 213 SOLUTION TOPICAL at 00:00

## 2023-01-01 RX ADMIN — HEPARIN SODIUM 5000 UNIT(S): 5000 INJECTION INTRAVENOUS; SUBCUTANEOUS at 19:39

## 2023-01-01 RX ADMIN — CHLORHEXIDINE GLUCONATE 15 MILLILITER(S): 213 SOLUTION TOPICAL at 19:30

## 2023-01-01 RX ADMIN — FINASTERIDE 5 MILLIGRAM(S): 5 TABLET, FILM COATED ORAL at 11:27

## 2023-01-01 RX ADMIN — GABAPENTIN 300 MILLIGRAM(S): 400 CAPSULE ORAL at 05:07

## 2023-01-01 RX ADMIN — SODIUM CHLORIDE 75 MILLILITER(S): 9 INJECTION, SOLUTION INTRAVENOUS at 12:51

## 2023-01-01 RX ADMIN — FINASTERIDE 5 MILLIGRAM(S): 5 TABLET, FILM COATED ORAL at 11:55

## 2023-01-01 RX ADMIN — CHLORHEXIDINE GLUCONATE 15 MILLILITER(S): 213 SOLUTION TOPICAL at 05:29

## 2023-01-01 RX ADMIN — SODIUM CHLORIDE 4 MILLILITER(S): 9 INJECTION INTRAMUSCULAR; INTRAVENOUS; SUBCUTANEOUS at 12:34

## 2023-01-01 RX ADMIN — Medication 1 APPLICATION(S): at 11:06

## 2023-01-01 RX ADMIN — Medication 100 MILLIGRAM(S): at 07:15

## 2023-01-01 RX ADMIN — CEFTRIAXONE 100 MILLIGRAM(S): 500 INJECTION, POWDER, FOR SOLUTION INTRAMUSCULAR; INTRAVENOUS at 19:52

## 2023-01-01 RX ADMIN — MEROPENEM 100 MILLIGRAM(S): 1 INJECTION INTRAVENOUS at 18:31

## 2023-01-01 RX ADMIN — Medication 3 MILLILITER(S): at 17:03

## 2023-01-01 RX ADMIN — SODIUM CHLORIDE 100 MILLILITER(S): 9 INJECTION, SOLUTION INTRAVENOUS at 06:15

## 2023-01-01 RX ADMIN — GABAPENTIN 300 MILLIGRAM(S): 400 CAPSULE ORAL at 05:40

## 2023-01-01 RX ADMIN — FENTANYL CITRATE 25 MICROGRAM(S): 50 INJECTION INTRAVENOUS at 09:30

## 2023-01-01 RX ADMIN — FENTANYL CITRATE 25 MICROGRAM(S): 50 INJECTION INTRAVENOUS at 21:22

## 2023-01-01 RX ADMIN — Medication 1 APPLICATION(S): at 12:33

## 2023-01-01 RX ADMIN — Medication 3 MILLILITER(S): at 12:45

## 2023-01-01 RX ADMIN — DEXMEDETOMIDINE HYDROCHLORIDE IN 0.9% SODIUM CHLORIDE 3.51 MICROGRAM(S)/KG/HR: 4 INJECTION INTRAVENOUS at 12:41

## 2023-01-01 RX ADMIN — SODIUM CHLORIDE 4 MILLILITER(S): 9 INJECTION INTRAMUSCULAR; INTRAVENOUS; SUBCUTANEOUS at 11:10

## 2023-01-01 RX ADMIN — GABAPENTIN 300 MILLIGRAM(S): 400 CAPSULE ORAL at 05:13

## 2023-01-01 RX ADMIN — Medication 3 MILLILITER(S): at 17:08

## 2023-01-01 RX ADMIN — GABAPENTIN 300 MILLIGRAM(S): 400 CAPSULE ORAL at 05:06

## 2023-01-01 RX ADMIN — SODIUM CHLORIDE 75 MILLILITER(S): 9 INJECTION INTRAMUSCULAR; INTRAVENOUS; SUBCUTANEOUS at 17:34

## 2023-01-01 RX ADMIN — GABAPENTIN 300 MILLIGRAM(S): 400 CAPSULE ORAL at 05:42

## 2023-01-01 RX ADMIN — SODIUM CHLORIDE 100 MILLILITER(S): 9 INJECTION INTRAMUSCULAR; INTRAVENOUS; SUBCUTANEOUS at 05:41

## 2023-01-01 RX ADMIN — PROPOFOL 4.21 MICROGRAM(S)/KG/MIN: 10 INJECTION, EMULSION INTRAVENOUS at 17:53

## 2023-01-01 RX ADMIN — Medication 100 MILLIGRAM(S): at 06:06

## 2023-01-01 RX ADMIN — Medication 100 MILLIGRAM(S): at 18:30

## 2023-01-01 RX ADMIN — Medication 1 APPLICATION(S): at 11:52

## 2023-01-01 RX ADMIN — POLYETHYLENE GLYCOL 3350 17 GRAM(S): 17 POWDER, FOR SOLUTION ORAL at 05:57

## 2023-01-01 RX ADMIN — GABAPENTIN 300 MILLIGRAM(S): 400 CAPSULE ORAL at 06:09

## 2023-01-01 RX ADMIN — CHLORHEXIDINE GLUCONATE 1 APPLICATION(S): 213 SOLUTION TOPICAL at 17:28

## 2023-01-01 RX ADMIN — MIRTAZAPINE 15 MILLIGRAM(S): 45 TABLET, ORALLY DISINTEGRATING ORAL at 12:12

## 2023-01-01 RX ADMIN — SODIUM CHLORIDE 4 MILLILITER(S): 9 INJECTION INTRAMUSCULAR; INTRAVENOUS; SUBCUTANEOUS at 11:31

## 2023-01-01 RX ADMIN — MIRTAZAPINE 15 MILLIGRAM(S): 45 TABLET, ORALLY DISINTEGRATING ORAL at 12:46

## 2023-01-01 RX ADMIN — SODIUM ZIRCONIUM CYCLOSILICATE 10 GRAM(S): 10 POWDER, FOR SUSPENSION ORAL at 17:08

## 2023-01-01 RX ADMIN — SODIUM CHLORIDE 4 MILLILITER(S): 9 INJECTION INTRAMUSCULAR; INTRAVENOUS; SUBCUTANEOUS at 05:30

## 2023-01-01 RX ADMIN — FINASTERIDE 5 MILLIGRAM(S): 5 TABLET, FILM COATED ORAL at 11:37

## 2023-01-01 RX ADMIN — DEXMEDETOMIDINE HYDROCHLORIDE IN 0.9% SODIUM CHLORIDE 3.51 MICROGRAM(S)/KG/HR: 4 INJECTION INTRAVENOUS at 23:45

## 2023-01-01 RX ADMIN — HEPARIN SODIUM 5000 UNIT(S): 5000 INJECTION INTRAVENOUS; SUBCUTANEOUS at 22:41

## 2023-01-01 RX ADMIN — BUMETANIDE 2 MILLIGRAM(S): 0.25 INJECTION INTRAMUSCULAR; INTRAVENOUS at 05:48

## 2023-01-01 RX ADMIN — HEPARIN SODIUM 5000 UNIT(S): 5000 INJECTION INTRAVENOUS; SUBCUTANEOUS at 17:31

## 2023-01-01 RX ADMIN — Medication 1 APPLICATION(S): at 11:36

## 2023-01-01 RX ADMIN — HEPARIN SODIUM 5000 UNIT(S): 5000 INJECTION INTRAVENOUS; SUBCUTANEOUS at 18:31

## 2023-01-01 RX ADMIN — SODIUM CHLORIDE 4 MILLILITER(S): 9 INJECTION INTRAMUSCULAR; INTRAVENOUS; SUBCUTANEOUS at 11:18

## 2023-01-01 RX ADMIN — TAMSULOSIN HYDROCHLORIDE 0.4 MILLIGRAM(S): 0.4 CAPSULE ORAL at 21:47

## 2023-01-01 RX ADMIN — PHENYLEPHRINE HYDROCHLORIDE 200 MICROGRAM(S): 10 INJECTION INTRAVENOUS at 17:51

## 2023-01-01 RX ADMIN — GABAPENTIN 300 MILLIGRAM(S): 400 CAPSULE ORAL at 06:06

## 2023-01-01 RX ADMIN — Medication 100 MILLIGRAM(S): at 06:26

## 2023-01-01 RX ADMIN — Medication 40 MILLIEQUIVALENT(S): at 11:27

## 2023-01-01 RX ADMIN — Medication 3 MILLILITER(S): at 15:01

## 2023-01-01 RX ADMIN — FENTANYL CITRATE 25 MICROGRAM(S): 50 INJECTION INTRAVENOUS at 01:26

## 2023-01-01 RX ADMIN — SENNA PLUS 2 TABLET(S): 8.6 TABLET ORAL at 21:58

## 2023-01-01 RX ADMIN — AMLODIPINE BESYLATE 2.5 MILLIGRAM(S): 2.5 TABLET ORAL at 05:43

## 2023-01-01 RX ADMIN — HEPARIN SODIUM 5000 UNIT(S): 5000 INJECTION INTRAVENOUS; SUBCUTANEOUS at 05:29

## 2023-01-01 RX ADMIN — Medication 650 MILLIGRAM(S): at 21:06

## 2023-01-01 RX ADMIN — HEPARIN SODIUM 5000 UNIT(S): 5000 INJECTION INTRAVENOUS; SUBCUTANEOUS at 17:54

## 2023-01-01 RX ADMIN — Medication 3 MILLILITER(S): at 00:13

## 2023-01-01 RX ADMIN — SODIUM CHLORIDE 4 MILLILITER(S): 9 INJECTION INTRAMUSCULAR; INTRAVENOUS; SUBCUTANEOUS at 01:03

## 2023-01-01 RX ADMIN — MEROPENEM 100 MILLIGRAM(S): 1 INJECTION INTRAVENOUS at 06:26

## 2023-01-01 RX ADMIN — MIRTAZAPINE 15 MILLIGRAM(S): 45 TABLET, ORALLY DISINTEGRATING ORAL at 12:03

## 2023-01-01 RX ADMIN — MIRTAZAPINE 15 MILLIGRAM(S): 45 TABLET, ORALLY DISINTEGRATING ORAL at 13:01

## 2023-01-01 RX ADMIN — HEPARIN SODIUM 5000 UNIT(S): 5000 INJECTION INTRAVENOUS; SUBCUTANEOUS at 22:04

## 2023-01-01 RX ADMIN — MIRTAZAPINE 15 MILLIGRAM(S): 45 TABLET, ORALLY DISINTEGRATING ORAL at 11:36

## 2023-01-01 RX ADMIN — Medication 100 MILLIGRAM(S): at 21:58

## 2023-01-01 RX ADMIN — CHLORHEXIDINE GLUCONATE 1 APPLICATION(S): 213 SOLUTION TOPICAL at 05:08

## 2023-01-01 RX ADMIN — Medication 3 MILLILITER(S): at 11:17

## 2023-01-01 RX ADMIN — Medication 100 MILLIGRAM(S): at 05:50

## 2023-01-01 RX ADMIN — Medication 1 DROP(S): at 18:32

## 2023-01-01 RX ADMIN — GABAPENTIN 300 MILLIGRAM(S): 400 CAPSULE ORAL at 05:23

## 2023-01-01 RX ADMIN — MIRTAZAPINE 15 MILLIGRAM(S): 45 TABLET, ORALLY DISINTEGRATING ORAL at 11:37

## 2023-01-01 RX ADMIN — HEPARIN SODIUM 5000 UNIT(S): 5000 INJECTION INTRAVENOUS; SUBCUTANEOUS at 21:04

## 2023-01-01 RX ADMIN — Medication 650 MILLIGRAM(S): at 23:00

## 2023-01-01 RX ADMIN — DEXMEDETOMIDINE HYDROCHLORIDE IN 0.9% SODIUM CHLORIDE 5.26 MICROGRAM(S)/KG/HR: 4 INJECTION INTRAVENOUS at 14:16

## 2023-01-01 RX ADMIN — SODIUM CHLORIDE 100 MILLILITER(S): 9 INJECTION, SOLUTION INTRAVENOUS at 22:07

## 2023-01-01 RX ADMIN — SODIUM CHLORIDE 75 MILLILITER(S): 9 INJECTION INTRAMUSCULAR; INTRAVENOUS; SUBCUTANEOUS at 21:16

## 2023-01-01 RX ADMIN — SODIUM CHLORIDE 4 MILLILITER(S): 9 INJECTION INTRAMUSCULAR; INTRAVENOUS; SUBCUTANEOUS at 11:29

## 2023-01-01 RX ADMIN — MIRTAZAPINE 15 MILLIGRAM(S): 45 TABLET, ORALLY DISINTEGRATING ORAL at 12:49

## 2023-01-01 RX ADMIN — Medication 3 MILLILITER(S): at 05:14

## 2023-01-01 RX ADMIN — SODIUM CHLORIDE 4 MILLILITER(S): 9 INJECTION INTRAMUSCULAR; INTRAVENOUS; SUBCUTANEOUS at 17:09

## 2023-01-01 RX ADMIN — GABAPENTIN 300 MILLIGRAM(S): 400 CAPSULE ORAL at 05:02

## 2023-01-01 RX ADMIN — Medication 1 APPLICATION(S): at 12:46

## 2023-01-01 RX ADMIN — PROPOFOL 2.1 MICROGRAM(S)/KG/MIN: 10 INJECTION, EMULSION INTRAVENOUS at 13:41

## 2023-01-01 RX ADMIN — MIRTAZAPINE 15 MILLIGRAM(S): 45 TABLET, ORALLY DISINTEGRATING ORAL at 12:52

## 2023-01-01 RX ADMIN — SENNA PLUS 2 TABLET(S): 8.6 TABLET ORAL at 21:44

## 2023-01-01 RX ADMIN — SODIUM CHLORIDE 75 MILLILITER(S): 9 INJECTION, SOLUTION INTRAVENOUS at 23:25

## 2023-01-01 RX ADMIN — SODIUM CHLORIDE 4 MILLILITER(S): 9 INJECTION INTRAMUSCULAR; INTRAVENOUS; SUBCUTANEOUS at 15:01

## 2023-01-01 RX ADMIN — HEPARIN SODIUM 5000 UNIT(S): 5000 INJECTION INTRAVENOUS; SUBCUTANEOUS at 19:30

## 2023-01-01 RX ADMIN — Medication 3 MILLILITER(S): at 05:26

## 2023-01-01 RX ADMIN — SENNA PLUS 2 TABLET(S): 8.6 TABLET ORAL at 21:23

## 2023-01-01 RX ADMIN — PROPOFOL 4.21 MICROGRAM(S)/KG/MIN: 10 INJECTION, EMULSION INTRAVENOUS at 18:55

## 2023-01-01 RX ADMIN — HEPARIN SODIUM 5000 UNIT(S): 5000 INJECTION INTRAVENOUS; SUBCUTANEOUS at 22:32

## 2023-01-01 RX ADMIN — HEPARIN SODIUM 5000 UNIT(S): 5000 INJECTION INTRAVENOUS; SUBCUTANEOUS at 18:52

## 2023-01-01 RX ADMIN — PROPOFOL 8.41 MICROGRAM(S)/KG/MIN: 10 INJECTION, EMULSION INTRAVENOUS at 21:35

## 2023-01-01 RX ADMIN — PROPOFOL 4.21 MICROGRAM(S)/KG/MIN: 10 INJECTION, EMULSION INTRAVENOUS at 11:08

## 2023-01-01 RX ADMIN — SODIUM CHLORIDE 75 MILLILITER(S): 9 INJECTION, SOLUTION INTRAVENOUS at 11:27

## 2023-01-01 RX ADMIN — CHLORHEXIDINE GLUCONATE 15 MILLILITER(S): 213 SOLUTION TOPICAL at 05:48

## 2023-01-01 RX ADMIN — GABAPENTIN 300 MILLIGRAM(S): 400 CAPSULE ORAL at 17:51

## 2023-01-01 RX ADMIN — SODIUM CHLORIDE 4 MILLILITER(S): 9 INJECTION INTRAMUSCULAR; INTRAVENOUS; SUBCUTANEOUS at 23:24

## 2023-01-01 RX ADMIN — Medication 4 MILLILITER(S): at 17:05

## 2023-01-01 RX ADMIN — MIRTAZAPINE 15 MILLIGRAM(S): 45 TABLET, ORALLY DISINTEGRATING ORAL at 12:26

## 2023-01-01 RX ADMIN — Medication 3 MILLILITER(S): at 17:44

## 2023-01-01 RX ADMIN — SODIUM CHLORIDE 4 MILLILITER(S): 9 INJECTION INTRAMUSCULAR; INTRAVENOUS; SUBCUTANEOUS at 17:15

## 2023-01-01 RX ADMIN — PROPOFOL 4.21 MICROGRAM(S)/KG/MIN: 10 INJECTION, EMULSION INTRAVENOUS at 19:19

## 2023-01-01 RX ADMIN — Medication 4 MILLILITER(S): at 05:38

## 2023-01-01 RX ADMIN — MIDAZOLAM HYDROCHLORIDE 2 MILLIGRAM(S): 1 INJECTION, SOLUTION INTRAMUSCULAR; INTRAVENOUS at 09:56

## 2023-01-01 RX ADMIN — SODIUM CHLORIDE 100 MILLILITER(S): 9 INJECTION INTRAMUSCULAR; INTRAVENOUS; SUBCUTANEOUS at 05:35

## 2023-01-01 RX ADMIN — PROPOFOL 2.1 MICROGRAM(S)/KG/MIN: 10 INJECTION, EMULSION INTRAVENOUS at 03:17

## 2023-01-01 RX ADMIN — Medication 100 MILLIGRAM(S): at 05:10

## 2023-01-01 RX ADMIN — PIPERACILLIN AND TAZOBACTAM 25 GRAM(S): 4; .5 INJECTION, POWDER, LYOPHILIZED, FOR SOLUTION INTRAVENOUS at 21:58

## 2023-01-01 RX ADMIN — SODIUM CHLORIDE 500 MILLILITER(S): 9 INJECTION, SOLUTION INTRAVENOUS at 05:08

## 2023-01-01 RX ADMIN — Medication 1 DROP(S): at 19:31

## 2023-01-01 RX ADMIN — SODIUM CHLORIDE 4 MILLILITER(S): 9 INJECTION INTRAMUSCULAR; INTRAVENOUS; SUBCUTANEOUS at 11:32

## 2023-01-01 RX ADMIN — GABAPENTIN 300 MILLIGRAM(S): 400 CAPSULE ORAL at 17:54

## 2023-01-01 RX ADMIN — CHLORHEXIDINE GLUCONATE 15 MILLILITER(S): 213 SOLUTION TOPICAL at 05:42

## 2023-01-01 RX ADMIN — Medication 2.5 MILLIGRAM(S): at 17:04

## 2023-01-01 RX ADMIN — FENTANYL CITRATE 25 MICROGRAM(S): 50 INJECTION INTRAVENOUS at 01:45

## 2023-01-01 RX ADMIN — Medication 3 MILLILITER(S): at 00:07

## 2023-01-01 RX ADMIN — Medication 100 MILLIEQUIVALENT(S): at 05:44

## 2023-01-01 RX ADMIN — GABAPENTIN 300 MILLIGRAM(S): 400 CAPSULE ORAL at 18:52

## 2023-01-01 RX ADMIN — SODIUM CHLORIDE 100 MILLILITER(S): 9 INJECTION INTRAMUSCULAR; INTRAVENOUS; SUBCUTANEOUS at 01:55

## 2023-01-01 RX ADMIN — AMLODIPINE BESYLATE 5 MILLIGRAM(S): 2.5 TABLET ORAL at 05:50

## 2023-01-01 RX ADMIN — Medication 100 MILLIGRAM(S): at 05:48

## 2023-01-01 RX ADMIN — POLYETHYLENE GLYCOL 3350 17 GRAM(S): 17 POWDER, FOR SOLUTION ORAL at 21:23

## 2023-01-01 RX ADMIN — DEXMEDETOMIDINE HYDROCHLORIDE IN 0.9% SODIUM CHLORIDE 5.26 MICROGRAM(S)/KG/HR: 4 INJECTION INTRAVENOUS at 11:46

## 2023-01-01 RX ADMIN — MIDAZOLAM HYDROCHLORIDE 4 MILLIGRAM(S): 1 INJECTION, SOLUTION INTRAMUSCULAR; INTRAVENOUS at 14:30

## 2023-01-01 RX ADMIN — FINASTERIDE 5 MILLIGRAM(S): 5 TABLET, FILM COATED ORAL at 18:35

## 2023-01-01 RX ADMIN — GABAPENTIN 300 MILLIGRAM(S): 400 CAPSULE ORAL at 06:25

## 2023-01-01 RX ADMIN — PIPERACILLIN AND TAZOBACTAM 25 GRAM(S): 4; .5 INJECTION, POWDER, LYOPHILIZED, FOR SOLUTION INTRAVENOUS at 05:02

## 2023-01-01 RX ADMIN — FENTANYL CITRATE 50 MICROGRAM(S): 50 INJECTION INTRAVENOUS at 10:00

## 2023-01-01 RX ADMIN — CEFTRIAXONE 100 MILLIGRAM(S): 500 INJECTION, POWDER, FOR SOLUTION INTRAMUSCULAR; INTRAVENOUS at 01:55

## 2023-01-01 RX ADMIN — SODIUM CHLORIDE 4 MILLILITER(S): 9 INJECTION INTRAMUSCULAR; INTRAVENOUS; SUBCUTANEOUS at 17:12

## 2023-01-01 RX ADMIN — GABAPENTIN 300 MILLIGRAM(S): 400 CAPSULE ORAL at 19:30

## 2023-01-01 RX ADMIN — Medication 2 PACKET(S): at 04:39

## 2023-01-01 RX ADMIN — FINASTERIDE 5 MILLIGRAM(S): 5 TABLET, FILM COATED ORAL at 13:21

## 2023-01-01 RX ADMIN — Medication 100 MILLIGRAM(S): at 13:17

## 2023-01-01 RX ADMIN — SODIUM CHLORIDE 4 MILLILITER(S): 9 INJECTION INTRAMUSCULAR; INTRAVENOUS; SUBCUTANEOUS at 00:51

## 2023-01-01 RX ADMIN — POLYETHYLENE GLYCOL 3350 17 GRAM(S): 17 POWDER, FOR SOLUTION ORAL at 21:47

## 2023-01-02 ENCOUNTER — RX RENEWAL (OUTPATIENT)
Age: 88
End: 2023-01-02

## 2023-01-12 ENCOUNTER — NON-APPOINTMENT (OUTPATIENT)
Age: 88
End: 2023-01-12

## 2023-01-12 ENCOUNTER — TRANSCRIPTION ENCOUNTER (OUTPATIENT)
Age: 88
End: 2023-01-12

## 2023-01-13 ENCOUNTER — NON-APPOINTMENT (OUTPATIENT)
Age: 88
End: 2023-01-13

## 2023-01-13 ENCOUNTER — INPATIENT (INPATIENT)
Facility: HOSPITAL | Age: 88
LOS: 4 days | Discharge: HOME HEALTH | End: 2023-01-18
Attending: INTERNAL MEDICINE | Admitting: INTERNAL MEDICINE
Payer: MEDICARE

## 2023-01-13 ENCOUNTER — TRANSCRIPTION ENCOUNTER (OUTPATIENT)
Age: 88
End: 2023-01-13

## 2023-01-13 ENCOUNTER — APPOINTMENT (OUTPATIENT)
Dept: HOME HEALTH SERVICES | Facility: HOME HEALTH | Age: 88
End: 2023-01-13
Payer: MEDICARE

## 2023-01-13 VITALS
HEIGHT: 68 IN | OXYGEN SATURATION: 93 % | WEIGHT: 149.91 LBS | SYSTOLIC BLOOD PRESSURE: 129 MMHG | RESPIRATION RATE: 18 BRPM | HEART RATE: 93 BPM | TEMPERATURE: 103 F | DIASTOLIC BLOOD PRESSURE: 76 MMHG

## 2023-01-13 VITALS
RESPIRATION RATE: 18 BRPM | TEMPERATURE: 101.8 F | SYSTOLIC BLOOD PRESSURE: 112 MMHG | DIASTOLIC BLOOD PRESSURE: 78 MMHG | OXYGEN SATURATION: 91 % | HEART RATE: 82 BPM

## 2023-01-13 LAB
ALBUMIN SERPL ELPH-MCNC: 2.6 G/DL — LOW (ref 3.3–5)
ALP SERPL-CCNC: 43 U/L — SIGNIFICANT CHANGE UP (ref 40–120)
ALT FLD-CCNC: 12 U/L — SIGNIFICANT CHANGE UP (ref 12–78)
ANION GAP SERPL CALC-SCNC: 10 MMOL/L — SIGNIFICANT CHANGE UP (ref 5–17)
APPEARANCE UR: ABNORMAL
APTT BLD: 35.9 SEC — HIGH (ref 27.5–35.5)
AST SERPL-CCNC: 17 U/L — SIGNIFICANT CHANGE UP (ref 15–37)
BACTERIA # UR AUTO: ABNORMAL
BASOPHILS # BLD AUTO: 0.02 K/UL — SIGNIFICANT CHANGE UP (ref 0–0.2)
BASOPHILS NFR BLD AUTO: 0.1 % — SIGNIFICANT CHANGE UP (ref 0–2)
BILIRUB SERPL-MCNC: 0.6 MG/DL — SIGNIFICANT CHANGE UP (ref 0.2–1.2)
BILIRUB UR-MCNC: NEGATIVE — SIGNIFICANT CHANGE UP
BUN SERPL-MCNC: 36 MG/DL — HIGH (ref 7–23)
CALCIUM SERPL-MCNC: 8.8 MG/DL — SIGNIFICANT CHANGE UP (ref 8.5–10.1)
CHLORIDE SERPL-SCNC: 94 MMOL/L — LOW (ref 96–108)
CO2 SERPL-SCNC: 27 MMOL/L — SIGNIFICANT CHANGE UP (ref 22–31)
COLOR SPEC: YELLOW — SIGNIFICANT CHANGE UP
CREAT SERPL-MCNC: 2.9 MG/DL — HIGH (ref 0.5–1.3)
DIFF PNL FLD: ABNORMAL
EGFR: 20 ML/MIN/1.73M2 — LOW
EOSINOPHIL # BLD AUTO: 0.11 K/UL — SIGNIFICANT CHANGE UP (ref 0–0.5)
EOSINOPHIL NFR BLD AUTO: 0.8 % — SIGNIFICANT CHANGE UP (ref 0–6)
EPI CELLS # UR: SIGNIFICANT CHANGE UP
FLUAV AG NPH QL: SIGNIFICANT CHANGE UP
FLUBV AG NPH QL: SIGNIFICANT CHANGE UP
GLUCOSE SERPL-MCNC: 96 MG/DL — SIGNIFICANT CHANGE UP (ref 70–99)
GLUCOSE UR QL: NEGATIVE MG/DL — SIGNIFICANT CHANGE UP
HCT VFR BLD CALC: 23.7 % — LOW (ref 39–50)
HGB BLD-MCNC: 7.6 G/DL — LOW (ref 13–17)
IMM GRANULOCYTES NFR BLD AUTO: 0.7 % — SIGNIFICANT CHANGE UP (ref 0–0.9)
INR BLD: 1.48 RATIO — HIGH (ref 0.88–1.16)
KETONES UR-MCNC: NEGATIVE — SIGNIFICANT CHANGE UP
LACTATE SERPL-SCNC: 0.9 MMOL/L — SIGNIFICANT CHANGE UP (ref 0.7–2)
LEUKOCYTE ESTERASE UR-ACNC: ABNORMAL
LYMPHOCYTES # BLD AUTO: 0.66 K/UL — LOW (ref 1–3.3)
LYMPHOCYTES # BLD AUTO: 4.9 % — LOW (ref 13–44)
MCHC RBC-ENTMCNC: 29.6 PG — SIGNIFICANT CHANGE UP (ref 27–34)
MCHC RBC-ENTMCNC: 32.1 G/DL — SIGNIFICANT CHANGE UP (ref 32–36)
MCV RBC AUTO: 92.2 FL — SIGNIFICANT CHANGE UP (ref 80–100)
MONOCYTES # BLD AUTO: 1.16 K/UL — HIGH (ref 0–0.9)
MONOCYTES NFR BLD AUTO: 8.6 % — SIGNIFICANT CHANGE UP (ref 2–14)
NEUTROPHILS # BLD AUTO: 11.49 K/UL — HIGH (ref 1.8–7.4)
NEUTROPHILS NFR BLD AUTO: 84.9 % — HIGH (ref 43–77)
NITRITE UR-MCNC: NEGATIVE — SIGNIFICANT CHANGE UP
NRBC # BLD: 0 /100 WBCS — SIGNIFICANT CHANGE UP (ref 0–0)
PH UR: 6.5 — SIGNIFICANT CHANGE UP (ref 5–8)
PLATELET # BLD AUTO: 200 K/UL — SIGNIFICANT CHANGE UP (ref 150–400)
POTASSIUM SERPL-MCNC: 4.3 MMOL/L — SIGNIFICANT CHANGE UP (ref 3.5–5.3)
POTASSIUM SERPL-SCNC: 4.3 MMOL/L — SIGNIFICANT CHANGE UP (ref 3.5–5.3)
PROT SERPL-MCNC: 7.2 GM/DL — SIGNIFICANT CHANGE UP (ref 6–8.3)
PROT UR-MCNC: 100 MG/DL
PROTHROM AB SERPL-ACNC: 17.8 SEC — HIGH (ref 10.5–13.4)
RBC # BLD: 2.57 M/UL — LOW (ref 4.2–5.8)
RBC # FLD: 15.2 % — HIGH (ref 10.3–14.5)
RBC CASTS # UR COMP ASSIST: ABNORMAL /HPF (ref 0–4)
SARS-COV-2 RNA SPEC QL NAA+PROBE: DETECTED
SODIUM SERPL-SCNC: 131 MMOL/L — LOW (ref 135–145)
SP GR SPEC: 1.01 — SIGNIFICANT CHANGE UP (ref 1.01–1.02)
UROBILINOGEN FLD QL: NEGATIVE MG/DL — SIGNIFICANT CHANGE UP
WBC # BLD: 13.54 K/UL — HIGH (ref 3.8–10.5)
WBC # FLD AUTO: 13.54 K/UL — HIGH (ref 3.8–10.5)
WBC UR QL: >50

## 2023-01-13 PROCEDURE — 99285 EMERGENCY DEPT VISIT HI MDM: CPT

## 2023-01-13 PROCEDURE — 93010 ELECTROCARDIOGRAM REPORT: CPT

## 2023-01-13 PROCEDURE — 99350 HOME/RES VST EST HIGH MDM 60: CPT

## 2023-01-13 PROCEDURE — 99223 1ST HOSP IP/OBS HIGH 75: CPT

## 2023-01-13 PROCEDURE — 71045 X-RAY EXAM CHEST 1 VIEW: CPT | Mod: 26

## 2023-01-13 RX ORDER — VANCOMYCIN HCL 1 G
1000 VIAL (EA) INTRAVENOUS ONCE
Refills: 0 | Status: COMPLETED | OUTPATIENT
Start: 2023-01-13 | End: 2023-01-13

## 2023-01-13 RX ORDER — LABETALOL HCL 100 MG
100 TABLET ORAL
Refills: 0 | Status: DISCONTINUED | OUTPATIENT
Start: 2023-01-13 | End: 2023-01-18

## 2023-01-13 RX ORDER — ONDANSETRON 8 MG/1
4 TABLET, FILM COATED ORAL EVERY 8 HOURS
Refills: 0 | Status: DISCONTINUED | OUTPATIENT
Start: 2023-01-13 | End: 2023-01-18

## 2023-01-13 RX ORDER — CEFTRIAXONE 500 MG/1
1000 INJECTION, POWDER, FOR SOLUTION INTRAMUSCULAR; INTRAVENOUS ONCE
Refills: 0 | Status: COMPLETED | OUTPATIENT
Start: 2023-01-13 | End: 2023-01-13

## 2023-01-13 RX ORDER — HEPARIN SODIUM 5000 [USP'U]/ML
5000 INJECTION INTRAVENOUS; SUBCUTANEOUS EVERY 8 HOURS
Refills: 0 | Status: DISCONTINUED | OUTPATIENT
Start: 2023-01-13 | End: 2023-01-18

## 2023-01-13 RX ORDER — ACETAMINOPHEN 500 MG
650 TABLET ORAL ONCE
Refills: 0 | Status: COMPLETED | OUTPATIENT
Start: 2023-01-13 | End: 2023-01-13

## 2023-01-13 RX ORDER — SODIUM CHLORIDE 9 MG/ML
2100 INJECTION INTRAMUSCULAR; INTRAVENOUS; SUBCUTANEOUS ONCE
Refills: 0 | Status: COMPLETED | OUTPATIENT
Start: 2023-01-13 | End: 2023-01-13

## 2023-01-13 RX ORDER — ACETAMINOPHEN 500 MG
650 TABLET ORAL EVERY 4 HOURS
Refills: 0 | Status: DISCONTINUED | OUTPATIENT
Start: 2023-01-13 | End: 2023-01-18

## 2023-01-13 RX ORDER — POLYETHYLENE GLYCOL 3350 17 G/17G
17 POWDER, FOR SOLUTION ORAL AT BEDTIME
Refills: 0 | Status: DISCONTINUED | OUTPATIENT
Start: 2023-01-13 | End: 2023-01-18

## 2023-01-13 RX ORDER — VANCOMYCIN HCL 1 G
VIAL (EA) INTRAVENOUS
Refills: 0 | Status: DISCONTINUED | OUTPATIENT
Start: 2023-01-13 | End: 2023-01-13

## 2023-01-13 RX ORDER — ACETAMINOPHEN 500 MG
650 TABLET ORAL EVERY 6 HOURS
Refills: 0 | Status: DISCONTINUED | OUTPATIENT
Start: 2023-01-13 | End: 2023-01-18

## 2023-01-13 RX ORDER — FINASTERIDE 5 MG/1
5 TABLET, FILM COATED ORAL DAILY
Refills: 0 | Status: DISCONTINUED | OUTPATIENT
Start: 2023-01-13 | End: 2023-01-18

## 2023-01-13 RX ORDER — CEFTRIAXONE 500 MG/1
1000 INJECTION, POWDER, FOR SOLUTION INTRAMUSCULAR; INTRAVENOUS EVERY 24 HOURS
Refills: 0 | Status: COMPLETED | OUTPATIENT
Start: 2023-01-14 | End: 2023-01-16

## 2023-01-13 RX ORDER — LANOLIN ALCOHOL/MO/W.PET/CERES
3 CREAM (GRAM) TOPICAL AT BEDTIME
Refills: 0 | Status: DISCONTINUED | OUTPATIENT
Start: 2023-01-13 | End: 2023-01-18

## 2023-01-13 RX ORDER — AMLODIPINE BESYLATE 2.5 MG/1
5 TABLET ORAL DAILY
Refills: 0 | Status: DISCONTINUED | OUTPATIENT
Start: 2023-01-13 | End: 2023-01-18

## 2023-01-13 RX ADMIN — Medication 250 MILLIGRAM(S): at 21:10

## 2023-01-13 RX ADMIN — SODIUM CHLORIDE 2100 MILLILITER(S): 9 INJECTION INTRAMUSCULAR; INTRAVENOUS; SUBCUTANEOUS at 20:04

## 2023-01-13 RX ADMIN — CEFTRIAXONE 100 MILLIGRAM(S): 500 INJECTION, POWDER, FOR SOLUTION INTRAMUSCULAR; INTRAVENOUS at 20:04

## 2023-01-13 RX ADMIN — HEPARIN SODIUM 5000 UNIT(S): 5000 INJECTION INTRAVENOUS; SUBCUTANEOUS at 22:18

## 2023-01-13 RX ADMIN — Medication 650 MILLIGRAM(S): at 20:04

## 2023-01-13 RX ADMIN — POLYETHYLENE GLYCOL 3350 17 GRAM(S): 17 POWDER, FOR SOLUTION ORAL at 22:18

## 2023-01-13 RX ADMIN — Medication 650 MILLIGRAM(S): at 22:07

## 2023-01-13 NOTE — ED ADULT NURSE NOTE - OBJECTIVE STATEMENT
Pt bibems from home for high fever, lethargy, poor appetite and malodorous, cloudy urine x3 days. Tmax 102.8F in ed. Last dose of tylenol was at 10:00am today. Code sepsis called in triage. Pt was started on PO cipro by his PMD, but his s/s have worsened instead of improving. Pt's daughter also states that pt's obrien cath was changed on Wednesday. Daughter denies pt c/o cp, sob, n/v/d/c, cough, congestion, bloody stools, headache, numbness/tingling. 12 lead ekg in progress.

## 2023-01-13 NOTE — ED ADULT TRIAGE NOTE - CHIEF COMPLAINT QUOTE
patient BIBA as per EMS patient here for fever , patient had a Ma place 3 days ago , cloudy urine noted

## 2023-01-13 NOTE — ED PROVIDER NOTE - CLINICAL SUMMARY MEDICAL DECISION MAKING FREE TEXT BOX
early sepsis noted - otherwise UTI likely source - will treat with IV ceftriaxone and vanco given - will admit for further care with hospitalist team. ARF noted likely from decreased PO intake from sepsis - will likely improve with fluids

## 2023-01-13 NOTE — ED PROVIDER NOTE - OBJECTIVE STATEMENT
89 year old male with PMH of BPH, HTN, otherwise presenting to ED due to fever and noted cloudy urine x 3 days - PMD had started pt on PO abx for UTI but sent into hospital as pt more lethargic and not improving with breakthrough fever. Denies any nausea/vomiting, no recent falls or trauma.

## 2023-01-13 NOTE — ED ADULT NURSE NOTE - NSIMPLEMENTINTERV_GEN_ALL_ED
Implemented All Fall with Harm Risk Interventions:  [place] to call system. Call bell, personal items and telephone within reach. Instruct patient to call for assistance. Room bathroom lighting operational. Non-slip footwear when patient is off stretcher. Physically safe environment: no spills, clutter or unnecessary equipment. Stretcher in lowest position, wheels locked, appropriate side rails in place. Provide visual cue, wrist band, yellow gown, etc. Monitor gait and stability. Monitor for mental status changes and reorient to person, place, and time. Review medications for side effects contributing to fall risk. Reinforce activity limits and safety measures with patient and family. Provide visual clues: red socks.

## 2023-01-13 NOTE — ED ADULT NURSE NOTE - ED STAT RN HANDOFF DETAILS
handoff given to RN micky NEWTON made aware pt pending vanco and medicated for temp per MAR endorsed for continued care

## 2023-01-14 ENCOUNTER — LABORATORY RESULT (OUTPATIENT)
Age: 88
End: 2023-01-14

## 2023-01-14 ENCOUNTER — NON-APPOINTMENT (OUTPATIENT)
Age: 88
End: 2023-01-14

## 2023-01-14 DIAGNOSIS — U07.1 COVID-19: ICD-10-CM

## 2023-01-14 DIAGNOSIS — N39.0 URINARY TRACT INFECTION, SITE NOT SPECIFIED: ICD-10-CM

## 2023-01-14 DIAGNOSIS — I10 ESSENTIAL (PRIMARY) HYPERTENSION: ICD-10-CM

## 2023-01-14 DIAGNOSIS — T83.511A INFECTION AND INFLAMMATORY REACTION DUE TO INDWELLING URETHRAL CATHETER, INITIAL ENCOUNTER: ICD-10-CM

## 2023-01-14 DIAGNOSIS — N40.1 BENIGN PROSTATIC HYPERPLASIA WITH LOWER URINARY TRACT SYMPTOMS: ICD-10-CM

## 2023-01-14 DIAGNOSIS — D64.9 ANEMIA, UNSPECIFIED: ICD-10-CM

## 2023-01-14 LAB
A1C WITH ESTIMATED AVERAGE GLUCOSE RESULT: 5.1 % — SIGNIFICANT CHANGE UP (ref 4–5.6)
ANION GAP SERPL CALC-SCNC: 9 MMOL/L — SIGNIFICANT CHANGE UP (ref 5–17)
BUN SERPL-MCNC: 36 MG/DL — HIGH (ref 7–23)
CALCIUM SERPL-MCNC: 9 MG/DL — SIGNIFICANT CHANGE UP (ref 8.5–10.1)
CHLORIDE SERPL-SCNC: 104 MMOL/L — SIGNIFICANT CHANGE UP (ref 96–108)
CO2 SERPL-SCNC: 24 MMOL/L — SIGNIFICANT CHANGE UP (ref 22–31)
CREAT SERPL-MCNC: 2.65 MG/DL — HIGH (ref 0.5–1.3)
D DIMER BLD IA.RAPID-MCNC: 2485 NG/ML DDU — HIGH
EGFR: 22 ML/MIN/1.73M2 — LOW
ESTIMATED AVERAGE GLUCOSE: 100 MG/DL — SIGNIFICANT CHANGE UP (ref 68–114)
FERRITIN SERPL-MCNC: 626 NG/ML — HIGH (ref 30–400)
GLUCOSE SERPL-MCNC: 85 MG/DL — SIGNIFICANT CHANGE UP (ref 70–99)
HCT VFR BLD CALC: 23.9 % — LOW (ref 39–50)
HGB BLD-MCNC: 7.7 G/DL — LOW (ref 13–17)
IRON SATN MFR SERPL: 11 % — LOW (ref 16–55)
IRON SATN MFR SERPL: 13 UG/DL — LOW (ref 45–165)
MCHC RBC-ENTMCNC: 29.8 PG — SIGNIFICANT CHANGE UP (ref 27–34)
MCHC RBC-ENTMCNC: 32.2 G/DL — SIGNIFICANT CHANGE UP (ref 32–36)
MCV RBC AUTO: 92.6 FL — SIGNIFICANT CHANGE UP (ref 80–100)
NRBC # BLD: 0 /100 WBCS — SIGNIFICANT CHANGE UP (ref 0–0)
PLATELET # BLD AUTO: 183 K/UL — SIGNIFICANT CHANGE UP (ref 150–400)
POTASSIUM SERPL-MCNC: 3.8 MMOL/L — SIGNIFICANT CHANGE UP (ref 3.5–5.3)
POTASSIUM SERPL-SCNC: 3.8 MMOL/L — SIGNIFICANT CHANGE UP (ref 3.5–5.3)
RBC # BLD: 2.58 M/UL — LOW (ref 4.2–5.8)
RBC # FLD: 15.2 % — HIGH (ref 10.3–14.5)
SODIUM SERPL-SCNC: 137 MMOL/L — SIGNIFICANT CHANGE UP (ref 135–145)
TIBC SERPL-MCNC: 112 UG/DL — LOW (ref 220–430)
UIBC SERPL-MCNC: 99 UG/DL — LOW (ref 110–370)
WBC # BLD: 12.2 K/UL — HIGH (ref 3.8–10.5)
WBC # FLD AUTO: 12.2 K/UL — HIGH (ref 3.8–10.5)

## 2023-01-14 PROCEDURE — 70450 CT HEAD/BRAIN W/O DYE: CPT | Mod: 26

## 2023-01-14 RX ORDER — TAMSULOSIN HYDROCHLORIDE 0.4 MG/1
0.4 CAPSULE ORAL AT BEDTIME
Refills: 0 | Status: DISCONTINUED | OUTPATIENT
Start: 2023-01-14 | End: 2023-01-18

## 2023-01-14 RX ORDER — MIRTAZAPINE 45 MG/1
7.5 TABLET, ORALLY DISINTEGRATING ORAL DAILY
Refills: 0 | Status: DISCONTINUED | OUTPATIENT
Start: 2023-01-14 | End: 2023-01-18

## 2023-01-14 RX ADMIN — TAMSULOSIN HYDROCHLORIDE 0.4 MILLIGRAM(S): 0.4 CAPSULE ORAL at 21:28

## 2023-01-14 RX ADMIN — HEPARIN SODIUM 5000 UNIT(S): 5000 INJECTION INTRAVENOUS; SUBCUTANEOUS at 06:13

## 2023-01-14 RX ADMIN — MIRTAZAPINE 7.5 MILLIGRAM(S): 45 TABLET, ORALLY DISINTEGRATING ORAL at 11:35

## 2023-01-14 RX ADMIN — CEFTRIAXONE 100 MILLIGRAM(S): 500 INJECTION, POWDER, FOR SOLUTION INTRAMUSCULAR; INTRAVENOUS at 22:36

## 2023-01-14 RX ADMIN — Medication 100 MILLIGRAM(S): at 07:03

## 2023-01-14 RX ADMIN — HEPARIN SODIUM 5000 UNIT(S): 5000 INJECTION INTRAVENOUS; SUBCUTANEOUS at 22:36

## 2023-01-14 RX ADMIN — Medication 100 MILLIGRAM(S): at 17:32

## 2023-01-14 RX ADMIN — AMLODIPINE BESYLATE 5 MILLIGRAM(S): 2.5 TABLET ORAL at 06:29

## 2023-01-14 RX ADMIN — FINASTERIDE 5 MILLIGRAM(S): 5 TABLET, FILM COATED ORAL at 11:35

## 2023-01-14 RX ADMIN — HEPARIN SODIUM 5000 UNIT(S): 5000 INJECTION INTRAVENOUS; SUBCUTANEOUS at 15:09

## 2023-01-14 NOTE — H&P ADULT - NSHPPHYSICALEXAM_GEN_ALL_CORE
VITALS:   T(C): 37.2 (01-14-23 @ 01:00), Max: 39.3 (01-13-23 @ 18:11)  HR: 77 (01-14-23 @ 01:00) (77 - 94)  BP: 114/66 (01-14-23 @ 01:00) (114/66 - 129/76)  RR: 18 (01-14-23 @ 01:00) (17 - 18)  SpO2: 94% (01-14-23 @ 01:00) (93% - 99%)    GENERAL: NAD, lying in bed comfortably  HEAD:  Atraumatic, Normocephalic  EYES: EOMI, PERRLA, conjunctiva and sclera clear  ENT: Moist mucous membranes  NECK: Supple, No JVD  CHEST/LUNG: Clear to auscultation bilaterally; Unlabored respirations  HEART: Regular rate and rhythm; No murmurs, rubs, or gallops  ABDOMEN: BSx4; Soft, nontender, nondistended    obrien in place.   EXTREMITIES: No clubbing, cyanosis, or edema  NERVOUS SYSTEM:  A&Ox3, no focal deficits   SKIN: No rashes or lesions

## 2023-01-14 NOTE — H&P ADULT - PROBLEM SELECTOR PLAN 3
hgb 7.6 last 9.4 but was also in the 7s previous to that  - Denies any bleeding or such  - Monitor for any further drop in Hgb.   transfuse if <7

## 2023-01-14 NOTE — H&P ADULT - NSHPREVIEWOFSYSTEMS_GEN_ALL_CORE
REVIEW OF SYSTEMS:    CONSTITUTIONAL: + weakness, - fevers or chills  EYES/ENT: No visual changes;  No vertigo or throat pain   NECK: No pain or stiffness  RESPIRATORY: No cough, wheezing, hemoptysis; No shortness of breath  CARDIOVASCULAR: No chest pain or palpitations  GASTROINTESTINAL: No abdominal or epigastric pain. No nausea, vomiting, or hematemesis; No diarrhea or constipation. No melena or hematochezia.  GENITOURINARY: No dysuria, frequency or hematuria, Dirty urine in obrien  NEUROLOGICAL: No numbness or weakness  SKIN: No itching, rashes

## 2023-01-14 NOTE — PATIENT PROFILE ADULT - FALL HARM RISK - HARM RISK INTERVENTIONS
Assistance with ambulation/Assistance OOB with selected safe patient handling equipment/Communicate Risk of Fall with Harm to all staff/Discuss with provider need for PT consult/Monitor for mental status changes/Monitor gait and stability/Move patient closer to nurses' station/Reinforce activity limits and safety measures with patient and family/Reorient to person, place and time as needed/Review medications for side effects contributing to fall risk/Sit up slowly, dangle for a short time, stand at bedside before walking/Tailored Fall Risk Interventions/Toileting schedule using arm’s reach rule for commode and bathroom/Use of alarms - bed, chair and/or voice tab/Visual Cue: Yellow wristband and red socks/Bed in lowest position, wheels locked, appropriate side rails in place/Call bell, personal items and telephone in reach/Instruct patient to call for assistance before getting out of bed or chair/Non-slip footwear when patient is out of bed/[place] to call system/Physically safe environment - no spills, clutter or unnecessary equipment/Purposeful Proactive Rounding/Room/bathroom lighting operational, light cord in reach

## 2023-01-14 NOTE — H&P ADULT - NSHPLABSRESULTS_GEN_ALL_CORE
=======================================================  Labs:                        7.6    13.54 )-----------( 200      ( 13 Jan 2023 19:20 )             23.7     01-13    131<L>  |  94<L>  |  36<H>  ----------------------------<  96  4.3   |  27  |  2.90<H>    Ca    8.8      13 Jan 2023 19:20    TPro  7.2  /  Alb  2.6<L>  /  TBili  0.6  /  DBili  x   /  AST  17  /  ALT  12  /  AlkPhos  43  01-13      Creatinine, Serum: 2.90 mg/dL (01-13-23 @ 19:20)            WBC Count: 13.54 K/uL (01-13-23 @ 19:20)    SARS-CoV-2 Result: Detected (01-13-23 @ 19:20)      Alkaline Phosphatase, Serum: 43 U/L (01-13-23 @ 19:20)  Alanine Aminotransferase (ALT/SGPT): 12 U/L (01-13-23 @ 19:20)  Aspartate Aminotransferase (AST/SGOT): 17 U/L (01-13-23 @ 19:20)  Bilirubin Total, Serum: 0.6 mg/dL (01-13-23 @ 19:20)

## 2023-01-14 NOTE — H&P ADULT - HISTORY OF PRESENT ILLNESS
89 year old male with PMH of BPH, HTN, otherwise presenting to ED due to fever and noted cloudy urine x 3 days - PMD had started pt on PO abx for UTI but sent into hospital as pt more lethargic and not improving with breakthrough fever. Denies any nausea/vomiting, no recent falls or trauma. Pt does complain of weakness and being unable to walk but on further questioning says that has been going on for a long time now.

## 2023-01-14 NOTE — H&P ADULT - ASSESSMENT
89 year old male with PMH of BPH, HTN, otherwise presenting to ED due to fever and noted cloudy urine x 3 days - PMD had started pt on PO abx for UTI but sent into hospital as pt more lethargic and not improving with breakthrough fever. UTI +, Covid + in ED

## 2023-01-14 NOTE — H&P ADULT - PROBLEM SELECTOR PLAN 1
- Chronic obrien with UTI  -Obrien changed in ED  - FU Urine culture sensitivities drawn by ED  - Ceftriaxone - Chronic obrien with UTI  -Obrien changed in ED  - FU Urine culture sensitivities drawn by ED  - Ceftriaxone    Sanjiv likely 2/2 UTI Cr 2.9 from 1.2  FU AM BMP   avoid nephrotoxic medication

## 2023-01-14 NOTE — CHART NOTE - NSCHARTNOTEFT_GEN_A_CORE
89 year old male with PMH of BPH, HTN, otherwise presenting to ED due to fever and noted cloudy urine x 3 days - PMD had started pt on PO abx for UTI but sent into hospital as pt more lethargic and not improving with breakthrough fever. UTI +, Covid +     Patient is confused on my exam this morning, AAOX1 (to self),not answering questions appropriately     - check CT HEAD (urgent) ordered  - no focal neurological deficits noted   - AMS poss 2/2 to infection however r/o acute neurological etiiology   - f/u urine cx , c/w CTX  - SANJIV improving 2.65 today  - urine lytes ordered please calculate FeNA for Sanjiv etiology   - + COVID comfortable on RA, no indication for steroids or remdesivir, c/w supportive measures    - h/h stable

## 2023-01-15 LAB
ANION GAP SERPL CALC-SCNC: 6 MMOL/L — SIGNIFICANT CHANGE UP (ref 5–17)
BUN SERPL-MCNC: 33 MG/DL — HIGH (ref 7–23)
CALCIUM SERPL-MCNC: 8.7 MG/DL — SIGNIFICANT CHANGE UP (ref 8.5–10.1)
CHLORIDE SERPL-SCNC: 108 MMOL/L — SIGNIFICANT CHANGE UP (ref 96–108)
CO2 SERPL-SCNC: 28 MMOL/L — SIGNIFICANT CHANGE UP (ref 22–31)
CREAT ?TM UR-MCNC: 18 MG/DL — SIGNIFICANT CHANGE UP
CREAT SERPL-MCNC: 2.33 MG/DL — HIGH (ref 0.5–1.3)
EGFR: 26 ML/MIN/1.73M2 — LOW
GLUCOSE SERPL-MCNC: 81 MG/DL — SIGNIFICANT CHANGE UP (ref 70–99)
HCT VFR BLD CALC: 25.1 % — LOW (ref 39–50)
HGB BLD-MCNC: 7.9 G/DL — LOW (ref 13–17)
MCHC RBC-ENTMCNC: 29 PG — SIGNIFICANT CHANGE UP (ref 27–34)
MCHC RBC-ENTMCNC: 31.5 G/DL — LOW (ref 32–36)
MCV RBC AUTO: 92.3 FL — SIGNIFICANT CHANGE UP (ref 80–100)
NRBC # BLD: 0 /100 WBCS — SIGNIFICANT CHANGE UP (ref 0–0)
PLATELET # BLD AUTO: 206 K/UL — SIGNIFICANT CHANGE UP (ref 150–400)
POTASSIUM SERPL-MCNC: 3.3 MMOL/L — LOW (ref 3.5–5.3)
POTASSIUM SERPL-SCNC: 3.3 MMOL/L — LOW (ref 3.5–5.3)
RBC # BLD: 2.72 M/UL — LOW (ref 4.2–5.8)
RBC # FLD: 15.1 % — HIGH (ref 10.3–14.5)
SODIUM SERPL-SCNC: 142 MMOL/L — SIGNIFICANT CHANGE UP (ref 135–145)
SODIUM UR-SCNC: 83 MMOL/L — SIGNIFICANT CHANGE UP
WBC # BLD: 9.34 K/UL — SIGNIFICANT CHANGE UP (ref 3.8–10.5)
WBC # FLD AUTO: 9.34 K/UL — SIGNIFICANT CHANGE UP (ref 3.8–10.5)

## 2023-01-15 PROCEDURE — 99233 SBSQ HOSP IP/OBS HIGH 50: CPT

## 2023-01-15 RX ORDER — POTASSIUM CHLORIDE 20 MEQ
40 PACKET (EA) ORAL ONCE
Refills: 0 | Status: COMPLETED | OUTPATIENT
Start: 2023-01-15 | End: 2023-01-15

## 2023-01-15 RX ORDER — POTASSIUM CHLORIDE 20 MEQ
10 PACKET (EA) ORAL
Refills: 0 | Status: COMPLETED | OUTPATIENT
Start: 2023-01-15 | End: 2023-01-15

## 2023-01-15 RX ADMIN — HEPARIN SODIUM 5000 UNIT(S): 5000 INJECTION INTRAVENOUS; SUBCUTANEOUS at 13:01

## 2023-01-15 RX ADMIN — Medication 100 MILLIGRAM(S): at 06:26

## 2023-01-15 RX ADMIN — Medication 100 MILLIEQUIVALENT(S): at 15:30

## 2023-01-15 RX ADMIN — HEPARIN SODIUM 5000 UNIT(S): 5000 INJECTION INTRAVENOUS; SUBCUTANEOUS at 21:18

## 2023-01-15 RX ADMIN — Medication 100 MILLIEQUIVALENT(S): at 16:19

## 2023-01-15 RX ADMIN — CEFTRIAXONE 100 MILLIGRAM(S): 500 INJECTION, POWDER, FOR SOLUTION INTRAMUSCULAR; INTRAVENOUS at 21:36

## 2023-01-15 RX ADMIN — AMLODIPINE BESYLATE 5 MILLIGRAM(S): 2.5 TABLET ORAL at 06:26

## 2023-01-15 RX ADMIN — Medication 100 MILLIEQUIVALENT(S): at 17:19

## 2023-01-15 RX ADMIN — MIRTAZAPINE 7.5 MILLIGRAM(S): 45 TABLET, ORALLY DISINTEGRATING ORAL at 13:01

## 2023-01-15 RX ADMIN — TAMSULOSIN HYDROCHLORIDE 0.4 MILLIGRAM(S): 0.4 CAPSULE ORAL at 21:18

## 2023-01-15 RX ADMIN — HEPARIN SODIUM 5000 UNIT(S): 5000 INJECTION INTRAVENOUS; SUBCUTANEOUS at 06:26

## 2023-01-15 NOTE — PROGRESS NOTE ADULT - ASSESSMENT
89 year old male with PMH of BPH, HTN, otherwise presenting to ED due to fever and noted cloudy urine x 3 days - PMD had started pt on PO abx for UTI but sent into hospital as pt more lethargic and not improving with breakthrough fever. UTI +, Covid + in ED    #Acute UTI.   Chronic obrien with UTI  Obrien changed in ED  FU Urine culture wtih GNR, sensitivities pending   Ceftriaxone    #ROGER likely 2/2 UTI   Cr 2.9 from 1.2, now down to 2.65  trend BMP  avoid nephrotoxic medication.  #2019 novel coronavirus disease (COVID-19).   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   transfuse if <7    #HTN (hypertension).   amlodipine  labetolol    #BPH with urinary obstruction  flomax  Proscar

## 2023-01-16 LAB
-  AMIKACIN: SIGNIFICANT CHANGE UP
-  AMOXICILLIN/CLAVULANIC ACID: SIGNIFICANT CHANGE UP
-  AMPICILLIN/SULBACTAM: SIGNIFICANT CHANGE UP
-  AMPICILLIN: SIGNIFICANT CHANGE UP
-  AZTREONAM: SIGNIFICANT CHANGE UP
-  CEFAZOLIN: SIGNIFICANT CHANGE UP
-  CEFEPIME: SIGNIFICANT CHANGE UP
-  CEFOXITIN: SIGNIFICANT CHANGE UP
-  CEFTRIAXONE: SIGNIFICANT CHANGE UP
-  CEFUROXIME: SIGNIFICANT CHANGE UP
-  CIPROFLOXACIN: SIGNIFICANT CHANGE UP
-  ERTAPENEM: SIGNIFICANT CHANGE UP
-  GENTAMICIN: SIGNIFICANT CHANGE UP
-  IMIPENEM: SIGNIFICANT CHANGE UP
-  LEVOFLOXACIN: SIGNIFICANT CHANGE UP
-  MEROPENEM: SIGNIFICANT CHANGE UP
-  NITROFURANTOIN: SIGNIFICANT CHANGE UP
-  PIPERACILLIN/TAZOBACTAM: SIGNIFICANT CHANGE UP
-  TOBRAMYCIN: SIGNIFICANT CHANGE UP
-  TRIMETHOPRIM/SULFAMETHOXAZOLE: SIGNIFICANT CHANGE UP
ANION GAP SERPL CALC-SCNC: 10 MMOL/L — SIGNIFICANT CHANGE UP (ref 5–17)
BASOPHILS # BLD AUTO: 0.02 K/UL — SIGNIFICANT CHANGE UP (ref 0–0.2)
BASOPHILS NFR BLD AUTO: 0.3 % — SIGNIFICANT CHANGE UP (ref 0–2)
BUN SERPL-MCNC: 27 MG/DL — HIGH (ref 7–23)
CALCIUM SERPL-MCNC: 8.8 MG/DL — SIGNIFICANT CHANGE UP (ref 8.5–10.1)
CHLORIDE SERPL-SCNC: 109 MMOL/L — HIGH (ref 96–108)
CO2 SERPL-SCNC: 25 MMOL/L — SIGNIFICANT CHANGE UP (ref 22–31)
CREAT SERPL-MCNC: 2.13 MG/DL — HIGH (ref 0.5–1.3)
CULTURE RESULTS: SIGNIFICANT CHANGE UP
EGFR: 28 ML/MIN/1.73M2 — LOW
EOSINOPHIL # BLD AUTO: 0.42 K/UL — SIGNIFICANT CHANGE UP (ref 0–0.5)
EOSINOPHIL NFR BLD AUTO: 5.6 % — SIGNIFICANT CHANGE UP (ref 0–6)
GLUCOSE SERPL-MCNC: 77 MG/DL — SIGNIFICANT CHANGE UP (ref 70–99)
HCT VFR BLD CALC: 25.5 % — LOW (ref 39–50)
HGB BLD-MCNC: 8.2 G/DL — LOW (ref 13–17)
IMM GRANULOCYTES NFR BLD AUTO: 0.4 % — SIGNIFICANT CHANGE UP (ref 0–0.9)
LYMPHOCYTES # BLD AUTO: 0.56 K/UL — LOW (ref 1–3.3)
LYMPHOCYTES # BLD AUTO: 7.4 % — LOW (ref 13–44)
MAGNESIUM SERPL-MCNC: 2 MG/DL — SIGNIFICANT CHANGE UP (ref 1.6–2.6)
MCHC RBC-ENTMCNC: 29.3 PG — SIGNIFICANT CHANGE UP (ref 27–34)
MCHC RBC-ENTMCNC: 32.2 G/DL — SIGNIFICANT CHANGE UP (ref 32–36)
MCV RBC AUTO: 91.1 FL — SIGNIFICANT CHANGE UP (ref 80–100)
METHOD TYPE: SIGNIFICANT CHANGE UP
MONOCYTES # BLD AUTO: 0.71 K/UL — SIGNIFICANT CHANGE UP (ref 0–0.9)
MONOCYTES NFR BLD AUTO: 9.4 % — SIGNIFICANT CHANGE UP (ref 2–14)
NEUTROPHILS # BLD AUTO: 5.8 K/UL — SIGNIFICANT CHANGE UP (ref 1.8–7.4)
NEUTROPHILS NFR BLD AUTO: 76.9 % — SIGNIFICANT CHANGE UP (ref 43–77)
NRBC # BLD: 0 /100 WBCS — SIGNIFICANT CHANGE UP (ref 0–0)
ORGANISM # SPEC MICROSCOPIC CNT: SIGNIFICANT CHANGE UP
ORGANISM # SPEC MICROSCOPIC CNT: SIGNIFICANT CHANGE UP
PLATELET # BLD AUTO: 242 K/UL — SIGNIFICANT CHANGE UP (ref 150–400)
POTASSIUM SERPL-MCNC: 3.7 MMOL/L — SIGNIFICANT CHANGE UP (ref 3.5–5.3)
POTASSIUM SERPL-SCNC: 3.7 MMOL/L — SIGNIFICANT CHANGE UP (ref 3.5–5.3)
RBC # BLD: 2.8 M/UL — LOW (ref 4.2–5.8)
RBC # FLD: 15 % — HIGH (ref 10.3–14.5)
SODIUM SERPL-SCNC: 144 MMOL/L — SIGNIFICANT CHANGE UP (ref 135–145)
SPECIMEN SOURCE: SIGNIFICANT CHANGE UP
WBC # BLD: 7.54 K/UL — SIGNIFICANT CHANGE UP (ref 3.8–10.5)
WBC # FLD AUTO: 7.54 K/UL — SIGNIFICANT CHANGE UP (ref 3.8–10.5)

## 2023-01-16 PROCEDURE — 99232 SBSQ HOSP IP/OBS MODERATE 35: CPT

## 2023-01-16 RX ORDER — SODIUM CHLORIDE 9 MG/ML
1000 INJECTION INTRAMUSCULAR; INTRAVENOUS; SUBCUTANEOUS
Refills: 0 | Status: DISCONTINUED | OUTPATIENT
Start: 2023-01-16 | End: 2023-01-18

## 2023-01-16 RX ADMIN — AMLODIPINE BESYLATE 5 MILLIGRAM(S): 2.5 TABLET ORAL at 06:25

## 2023-01-16 RX ADMIN — TAMSULOSIN HYDROCHLORIDE 0.4 MILLIGRAM(S): 0.4 CAPSULE ORAL at 21:52

## 2023-01-16 RX ADMIN — HEPARIN SODIUM 5000 UNIT(S): 5000 INJECTION INTRAVENOUS; SUBCUTANEOUS at 21:53

## 2023-01-16 RX ADMIN — CEFTRIAXONE 100 MILLIGRAM(S): 500 INJECTION, POWDER, FOR SOLUTION INTRAMUSCULAR; INTRAVENOUS at 21:53

## 2023-01-16 RX ADMIN — MIRTAZAPINE 7.5 MILLIGRAM(S): 45 TABLET, ORALLY DISINTEGRATING ORAL at 12:16

## 2023-01-16 RX ADMIN — HEPARIN SODIUM 5000 UNIT(S): 5000 INJECTION INTRAVENOUS; SUBCUTANEOUS at 15:02

## 2023-01-16 RX ADMIN — Medication 100 MILLIGRAM(S): at 17:44

## 2023-01-16 RX ADMIN — SODIUM CHLORIDE 75 MILLILITER(S): 9 INJECTION INTRAMUSCULAR; INTRAVENOUS; SUBCUTANEOUS at 21:57

## 2023-01-16 RX ADMIN — HEPARIN SODIUM 5000 UNIT(S): 5000 INJECTION INTRAVENOUS; SUBCUTANEOUS at 06:24

## 2023-01-16 RX ADMIN — POLYETHYLENE GLYCOL 3350 17 GRAM(S): 17 POWDER, FOR SOLUTION ORAL at 21:52

## 2023-01-16 RX ADMIN — FINASTERIDE 5 MILLIGRAM(S): 5 TABLET, FILM COATED ORAL at 12:16

## 2023-01-16 RX ADMIN — Medication 100 MILLIGRAM(S): at 06:25

## 2023-01-16 NOTE — PROGRESS NOTE ADULT - ASSESSMENT
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   transfuse if <7    CARDIOLOGY  # HTN   - amlodipine  - labetolol    UROLOGY  #BPH with urinary obstruction  # chronic obrien   - flomax  - Proscar    PLAN  - c/w [facility]  - rocephin for 1-2 weeks  - PT evaluation  - discharge planning     89 yo female w/ pmhx of BPH, HTN, chronic obrien admitted to [facility] for complicated UTI and covid 19    INFECTIOUS DISEASE  # complicated UTI  - Chronic obrien   - Obrien changed in ED  - urine culture- 1/13- klebsiella  - Blood culture - 1/13- negative x 2  - Ceftriaxone 1 gram day # 3    # covid 19  - no indication for steroids or remdesivir  - Supportive measures    NEPHROLOGY  #ROGER  - admission Cr 2.9 ----> 2.13  trend BMP    NEUROLOGY  #AMS   - CTH result noted, likely chronic age realted chagnes in the white matter  - 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   transfuse if <7    CARDIOLOGY  # HTN   - amlodipine  - labetolol    UROLOGY  #BPH with urinary obstruction  # chronic obrien   - flomax  - Proscar    PLAN  - c/w [facility]  - rocephin for 1-2 weeks  - PT evaluation    - called pt's daughter Ching traylor @ [phone] on 1/16 ~ 7 pm. unable to reach. left brief voicemail. will call back tomorrow

## 2023-01-17 LAB
ALBUMIN SERPL ELPH-MCNC: 2.5 G/DL — LOW (ref 3.3–5)
ALP SERPL-CCNC: 42 U/L — SIGNIFICANT CHANGE UP (ref 40–120)
ALT FLD-CCNC: 9 U/L — LOW (ref 12–78)
ANION GAP SERPL CALC-SCNC: 8 MMOL/L — SIGNIFICANT CHANGE UP (ref 5–17)
AST SERPL-CCNC: 11 U/L — LOW (ref 15–37)
BILIRUB SERPL-MCNC: 0.4 MG/DL — SIGNIFICANT CHANGE UP (ref 0.2–1.2)
BUN SERPL-MCNC: 26 MG/DL — HIGH (ref 7–23)
CALCIUM SERPL-MCNC: 8.9 MG/DL — SIGNIFICANT CHANGE UP (ref 8.5–10.1)
CHLORIDE SERPL-SCNC: 109 MMOL/L — HIGH (ref 96–108)
CO2 SERPL-SCNC: 27 MMOL/L — SIGNIFICANT CHANGE UP (ref 22–31)
CREAT SERPL-MCNC: 1.98 MG/DL — HIGH (ref 0.5–1.3)
EGFR: 31 ML/MIN/1.73M2 — LOW
GLUCOSE SERPL-MCNC: 103 MG/DL — HIGH (ref 70–99)
HCT VFR BLD CALC: 25.5 % — LOW (ref 39–50)
HGB BLD-MCNC: 8 G/DL — LOW (ref 13–17)
MAGNESIUM SERPL-MCNC: 1.9 MG/DL — SIGNIFICANT CHANGE UP (ref 1.6–2.6)
MCHC RBC-ENTMCNC: 28.7 PG — SIGNIFICANT CHANGE UP (ref 27–34)
MCHC RBC-ENTMCNC: 31.4 G/DL — LOW (ref 32–36)
MCV RBC AUTO: 91.4 FL — SIGNIFICANT CHANGE UP (ref 80–100)
NRBC # BLD: 0 /100 WBCS — SIGNIFICANT CHANGE UP (ref 0–0)
PHOSPHATE SERPL-MCNC: 2 MG/DL — LOW (ref 2.5–4.5)
PLATELET # BLD AUTO: 239 K/UL — SIGNIFICANT CHANGE UP (ref 150–400)
POTASSIUM SERPL-MCNC: 3.4 MMOL/L — LOW (ref 3.5–5.3)
POTASSIUM SERPL-SCNC: 3.4 MMOL/L — LOW (ref 3.5–5.3)
PROT SERPL-MCNC: 7.5 GM/DL — SIGNIFICANT CHANGE UP (ref 6–8.3)
RBC # BLD: 2.79 M/UL — LOW (ref 4.2–5.8)
RBC # FLD: 14.8 % — HIGH (ref 10.3–14.5)
SODIUM SERPL-SCNC: 144 MMOL/L — SIGNIFICANT CHANGE UP (ref 135–145)
WBC # BLD: 6.79 K/UL — SIGNIFICANT CHANGE UP (ref 3.8–10.5)
WBC # FLD AUTO: 6.79 K/UL — SIGNIFICANT CHANGE UP (ref 3.8–10.5)

## 2023-01-17 PROCEDURE — 99232 SBSQ HOSP IP/OBS MODERATE 35: CPT

## 2023-01-17 PROCEDURE — 71250 CT THORAX DX C-: CPT | Mod: 26

## 2023-01-17 RX ORDER — SODIUM,POTASSIUM PHOSPHATES 278-250MG
2 POWDER IN PACKET (EA) ORAL
Refills: 0 | Status: COMPLETED | OUTPATIENT
Start: 2023-01-17 | End: 2023-01-18

## 2023-01-17 RX ADMIN — HEPARIN SODIUM 5000 UNIT(S): 5000 INJECTION INTRAVENOUS; SUBCUTANEOUS at 21:10

## 2023-01-17 RX ADMIN — HEPARIN SODIUM 5000 UNIT(S): 5000 INJECTION INTRAVENOUS; SUBCUTANEOUS at 05:32

## 2023-01-17 RX ADMIN — FINASTERIDE 5 MILLIGRAM(S): 5 TABLET, FILM COATED ORAL at 11:40

## 2023-01-17 RX ADMIN — Medication 100 MILLIGRAM(S): at 05:32

## 2023-01-17 RX ADMIN — HEPARIN SODIUM 5000 UNIT(S): 5000 INJECTION INTRAVENOUS; SUBCUTANEOUS at 13:52

## 2023-01-17 RX ADMIN — SODIUM CHLORIDE 75 MILLILITER(S): 9 INJECTION INTRAMUSCULAR; INTRAVENOUS; SUBCUTANEOUS at 11:39

## 2023-01-17 RX ADMIN — SODIUM CHLORIDE 75 MILLILITER(S): 9 INJECTION INTRAMUSCULAR; INTRAVENOUS; SUBCUTANEOUS at 05:33

## 2023-01-17 RX ADMIN — MIRTAZAPINE 7.5 MILLIGRAM(S): 45 TABLET, ORALLY DISINTEGRATING ORAL at 11:39

## 2023-01-17 RX ADMIN — AMLODIPINE BESYLATE 5 MILLIGRAM(S): 2.5 TABLET ORAL at 05:32

## 2023-01-17 NOTE — PROGRESS NOTE ADULT - ASSESSMENT
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   transfuse if <7    CARDIOLOGY  # HTN   - amlodipine  - labetolol    UROLOGY  #BPH with urinary obstruction  # chronic obrien   - flomax  - Proscar    PLAN  - c/w [facility]  - rocephin for 1-2 weeks  - PT evaluation    - called pt's daughter Ching traylor @ [phone] on 1/16 ~ 7 pm. unable to reach. left brief voicemail. 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   transfuse if <7    CARDIOLOGY  # HTN   - amlodipine  - labetolol    UROLOGY  #BPH with urinary obstruction  # chronic obrien   - flomax  - Proscar    PLAN  - c/w [facility]  - rocephin for 1-2 weeks  - PT evaluation    - called pt's daughter Ching traylor @ [phone] on 1/17 on 1:44.    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   transfuse if <7    CARDIOLOGY  # HTN   - amlodipine  - labetolol    UROLOGY  #BPH with urinary obstruction  # chronic obrien   - flomax  - Proscar    PLAN  - c/w [facility]  - rocephin for 1-2 weeks  - PT evaluation    - called pt's daughter Ching traylor @ [phone] on 1/17 on 1:44. he requested that i speak to her brother Dr Snell  - called pt's son Dr Pisano @ [phone] on 1/17 ~ 1:51 pm. all questions answered

## 2023-01-17 NOTE — PROGRESS NOTE ADULT - SUBJECTIVE AND OBJECTIVE BOX
Patient is a 89y old  Male who presents with a chief complaint of UTI , Covid+ (15 Eglacio 2023 12:25)    INTERVAL HPI/OVERNIGHT EVENTS: no acute events overnight  SUBJECTIVE: no complaints of pain/discomfort/dysuira    MEDICATIONS  (STANDING):  amLODIPine   Tablet 5 milliGRAM(s) Oral daily  cefTRIAXone   IVPB 1000 milliGRAM(s) IV Intermittent every 24 hours  finasteride 5 milliGRAM(s) Oral daily  heparin   Injectable 5000 Unit(s) SubCutaneous every 8 hours  labetalol 100 milliGRAM(s) Oral two times a day  mirtazapine 7.5 milliGRAM(s) Oral daily  polyethylene glycol 3350 17 Gram(s) Oral at bedtime  tamsulosin 0.4 milliGRAM(s) Oral at bedtime    MEDICATIONS  (PRN):  acetaminophen     Tablet .. 650 milliGRAM(s) Oral every 6 hours PRN Temp greater or equal to 38C (100.4F), Mild Pain (1 - 3)  acetaminophen  Suppository .. 650 milliGRAM(s) Rectal every 4 hours PRN Temp greater or equal to 38.5C (101.3F)  melatonin 3 milliGRAM(s) Oral at bedtime PRN Insomnia  ondansetron Injectable 4 milliGRAM(s) IV Push every 8 hours PRN Nausea and/or Vomiting    Allergies    No Known Allergies    Intolerances      REVIEW OF SYSTEMS:  All other systems reviewed and are negative    Vital Signs Last 24 Hrs  T(C): 37.1 (16 Jan 2023 17:08), Max: 37.4 (16 Jan 2023 11:11)  T(F): 98.8 (16 Jan 2023 17:08), Max: 99.3 (16 Jan 2023 11:11)  HR: 103 (16 Jan 2023 17:44) (91 - 103)  BP: 152/80 (16 Jan 2023 17:44) (148/62 - 159/77)  BP(mean): --  RR: 18 (16 Jan 2023 17:08) (18 - 20)  SpO2: 97% (16 Jan 2023 17:08) (97% - 98%)    Parameters below as of 16 Jan 2023 04:57  Patient On (Oxygen Delivery Method): room air      Daily     Daily   I&O's Summary    15 Gelacio 2023 07:01  -  16 Jan 2023 07:00  --------------------------------------------------------  IN: 50 mL / OUT: 2050 mL / NET: -2000 mL      CAPILLARY BLOOD GLUCOSE        PHYSICAL EXAM:  GENERAL: NAD, well-groomed, well-developed. NAD. pleasant  HEAD:  Atraumatic, Normocephalic  NECK: Supple, No JVD, Normal thyroid  NERVOUS SYSTEM:  Alert & Oriented to person  CHEST/LUNG: Clear to percussion bilaterally; No rales, rhonchi, wheezing, or rubs  HEART: Regular rate and rhythm; No murmurs, rubs, or gallops  ABDOMEN: Soft, Nontender, Nondistended; Bowel sounds present  EXTREMITIES:  2+ Peripheral Pulses, No clubbing, cyanosis, or edema    Labs                          8.2    7.54  )-----------( 242      ( 16 Jan 2023 05:28 )             25.5     01-16    144  |  109<H>  |  27<H>  ----------------------------<  77  3.7   |  25  |  2.13<H>    Ca    8.8      16 Jan 2023 05:28  Mg     2.0     01-16                Culture - Urine (collected 13 Jan 2023 19:19)  Source: Clean Catch Clean Catch (Midstream)  Final Report (16 Jan 2023 15:54):    >100,000 CFU/ml Klebsiella pneumoniae  Organism: Klebsiella pneumoniae (16 Jan 2023 15:54)  Organism: Klebsiella pneumoniae (16 Jan 2023 15:54)    Culture - Blood (collected 13 Jan 2023 19:19)  Source: .Blood Blood-Peripheral  Preliminary Report (15 Gelacio 2023 01:02):    No growth to date.    Culture - Blood (collected 13 Jan 2023 19:19)  Source: .Blood Blood-Peripheral  Preliminary Report (15 Gelacio 2023 01:02):    No growth to date.            
Alejandra Whiting M.D.    Patient is a 89y old  Male who presents with a chief complaint of UTI , Covid+ (2023 04:57)      SUBJECTIVE / OVERNIGHT EVENTS: no event overnight. ROS not obtained due to pt not cooperative with questions.    MEDICATIONS  (STANDING):  amLODIPine   Tablet 5 milliGRAM(s) Oral daily  cefTRIAXone   IVPB 1000 milliGRAM(s) IV Intermittent every 24 hours  finasteride 5 milliGRAM(s) Oral daily  heparin   Injectable 5000 Unit(s) SubCutaneous every 8 hours  labetalol 100 milliGRAM(s) Oral two times a day  mirtazapine 7.5 milliGRAM(s) Oral daily  polyethylene glycol 3350 17 Gram(s) Oral at bedtime  tamsulosin 0.4 milliGRAM(s) Oral at bedtime    MEDICATIONS  (PRN):  acetaminophen     Tablet .. 650 milliGRAM(s) Oral every 6 hours PRN Temp greater or equal to 38C (100.4F), Mild Pain (1 - 3)  acetaminophen  Suppository .. 650 milliGRAM(s) Rectal every 4 hours PRN Temp greater or equal to 38.5C (101.3F)  melatonin 3 milliGRAM(s) Oral at bedtime PRN Insomnia  ondansetron Injectable 4 milliGRAM(s) IV Push every 8 hours PRN Nausea and/or Vomiting      I&O's Summary    2023 07:01  -  15 Gelacio 2023 07:00  --------------------------------------------------------  IN: 75 mL / OUT: 3200 mL / NET: -3125 mL        PHYSICAL EXAM:  Vital Signs Last 24 Hrs  T(C): 36.4 (15 Gelacio 2023 11:14), Max: 36.8 (2023 17:35)  T(F): 97.5 (15 Gelacio 2023 11:14), Max: 98.2 (2023 17:35)  HR: 76 (15 Gelacio 2023 11:14) (76 - 82)  BP: 108/63 (15 Gelacio 2023 11:14) (108/63 - 135/73)  BP(mean): --  RR: 17 (15 Gelacio 2023 11:14) (17 - 18)  SpO2: 97% (15 Gelacio 2023 11:14) (97% - 98%)    Parameters below as of 15 Gelacio 2023 11:14  Patient On (Oxygen Delivery Method): room air    GENERAL: NAD, lying in bed comfortably, not cooperative   HEAD:  Atraumatic, Normocephalic  EYES: EOMI, PERRLA, conjunctiva and sclera clear  ENT: Moist mucous membranes  NECK: Supple, No JVD  CHEST/LUNG: Clear to auscultation bilaterally; Unlabored respirations  HEART: Regular rate and rhythm; No murmurs, rubs, or gallops  ABDOMEN: BSx4; Soft, nontender, nondistended    obrien in place.   EXTREMITIES: No clubbing, cyanosis, or edema  NERVOUS SYSTEM:  Refuse to answer AAO questions  SKIN: No rashes or lesions    LABS:                        7.9    9.34  )-----------( 206      ( 15 Gelacio 2023 11:20 )             25.1     01-14    137  |  104  |  36<H>  ----------------------------<  85  3.8   |  24  |  2.65<H>    Ca    9.0      2023 07:40    TPro  7.2  /  Alb  2.6<L>  /  TBili  0.6  /  DBili  x   /  AST  17  /  ALT  12  /  AlkPhos  43  01-13    PT/INR - ( 2023 19:20 )   PT: 17.8 sec;   INR: 1.48 ratio         PTT - ( 2023 19:20 )  PTT:35.9 sec      Urinalysis Basic - ( 2023 19:20 )    Color: Yellow / Appearance: very cloudy / S.015 / pH: x  Gluc: x / Ketone: Negative  / Bili: Negative / Urobili: Negative mg/dL   Blood: x / Protein: 100 mg/dL / Nitrite: Negative   Leuk Esterase: Moderate / RBC: 25-50 /HPF / WBC >50   Sq Epi: x / Non Sq Epi: Occasional / Bacteria: Many        Culture - Urine (collected 2023 19:19)  Source: Clean Catch Clean Catch (Midstream)  Preliminary Report (15 Gelacio 2023 07:50):    >100,000 CFU/ml Gram Negative Rods    Culture - Blood (collected 2023 19:19)  Source: .Blood Blood-Peripheral  Preliminary Report (15 Gelacio 2023 01:02):    No growth to date.    Culture - Blood (collected 2023 19:19)  Source: .Blood Blood-Peripheral  Preliminary Report (15 Gelacio 2023 01:02):    No growth to date.      CAPILLARY BLOOD GLUCOSE          RADIOLOGY & ADDITIONAL TESTS:  Results Reviewed:   Imaging Personally Reviewed:  Electrocardiogram Personally Reviewed:
Patient is a 89y old  Male who presents with a chief complaint of UTI , Covid+ (16 Jan 2023 18:38)    INTERVAL HPI/OVERNIGHT EVENTS: no acute events overnight  SUBJECTIVE: no complaints of fever/chill/dyspnea or pain    MEDICATIONS  (STANDING):  amLODIPine   Tablet 5 milliGRAM(s) Oral daily  finasteride 5 milliGRAM(s) Oral daily  heparin   Injectable 5000 Unit(s) SubCutaneous every 8 hours  labetalol 100 milliGRAM(s) Oral two times a day  mirtazapine 7.5 milliGRAM(s) Oral daily  polyethylene glycol 3350 17 Gram(s) Oral at bedtime  potassium phosphate / sodium phosphate Powder (PHOS-NaK) 2 Packet(s) Oral two times a day  sodium chloride 0.9%. 1000 milliLiter(s) (75 mL/Hr) IV Continuous <Continuous>  tamsulosin 0.4 milliGRAM(s) Oral at bedtime    MEDICATIONS  (PRN):  acetaminophen     Tablet .. 650 milliGRAM(s) Oral every 6 hours PRN Temp greater or equal to 38C (100.4F), Mild Pain (1 - 3)  acetaminophen  Suppository .. 650 milliGRAM(s) Rectal every 4 hours PRN Temp greater or equal to 38.5C (101.3F)  melatonin 3 milliGRAM(s) Oral at bedtime PRN Insomnia  ondansetron Injectable 4 milliGRAM(s) IV Push every 8 hours PRN Nausea and/or Vomiting    Allergies    No Known Allergies    Intolerances      REVIEW OF SYSTEMS:  All other systems reviewed and are negative    Vital Signs Last 24 Hrs  T(C): 37.6 (17 Jan 2023 11:33), Max: 37.6 (17 Jan 2023 11:33)  T(F): 99.6 (17 Jan 2023 11:33), Max: 99.6 (17 Jan 2023 11:33)  HR: 91 (17 Jan 2023 11:33) (91 - 103)  BP: 136/64 (17 Jan 2023 11:33) (136/64 - 160/71)  BP(mean): --  RR: 17 (17 Jan 2023 11:33) (17 - 18)  SpO2: 98% (17 Jan 2023 11:33) (96% - 98%)      Daily     Daily   I&O's Summary    16 Jan 2023 07:01  -  17 Jan 2023 07:00  --------------------------------------------------------  IN: 0 mL / OUT: 1450 mL / NET: -1450 mL      CAPILLARY BLOOD GLUCOSE        PHYSICAL EXAM:  GENERAL: NAD, well-groomed, well-developed  HEAD:  Atraumatic, Normocephalic  NECK: Supple, No JVD, Normal thyroid  NERVOUS SYSTEM:  Alert & Oriented X3, Good concentration; Motor Strength 5/5 B/L upper and lower extremities; DTRs 2+ intact and symmetric  CHEST/LUNG: Clear to percussion bilaterally; No rales, rhonchi, wheezing, or rubs  HEART: Regular rate and rhythm; No murmurs, rubs, or gallops  ABDOMEN: Soft, Nontender, Nondistended; Bowel sounds present  EXTREMITIES:  2+ Peripheral Pulses, No clubbing, cyanosis, or edema  Lines: obrien. bag shows clear urine. no pus, no blood    Labs                          8.0    6.79  )-----------( 239      ( 17 Jan 2023 07:30 )             25.5     01-17    144  |  109<H>  |  26<H>  ----------------------------<  103<H>  3.4<L>   |  27  |  1.98<H>    Ca    8.9      17 Jan 2023 07:30  Phos  2.0     01-17  Mg     1.9     01-17    TPro  7.5  /  Alb  2.5<L>  /  TBili  0.4  /  DBili  x   /  AST  11<L>  /  ALT  9<L>  /  AlkPhos  42  01-17

## 2023-01-18 ENCOUNTER — TRANSCRIPTION ENCOUNTER (OUTPATIENT)
Age: 88
End: 2023-01-18

## 2023-01-18 VITALS
OXYGEN SATURATION: 98 % | TEMPERATURE: 100 F | DIASTOLIC BLOOD PRESSURE: 67 MMHG | HEART RATE: 104 BPM | RESPIRATION RATE: 18 BRPM | SYSTOLIC BLOOD PRESSURE: 147 MMHG

## 2023-01-18 LAB
ANION GAP SERPL CALC-SCNC: 9 MMOL/L — SIGNIFICANT CHANGE UP (ref 5–17)
BUN SERPL-MCNC: 22 MG/DL — SIGNIFICANT CHANGE UP (ref 7–23)
CALCIUM SERPL-MCNC: 8.8 MG/DL — SIGNIFICANT CHANGE UP (ref 8.5–10.1)
CHLORIDE SERPL-SCNC: 109 MMOL/L — HIGH (ref 96–108)
CO2 SERPL-SCNC: 25 MMOL/L — SIGNIFICANT CHANGE UP (ref 22–31)
CREAT SERPL-MCNC: 1.56 MG/DL — HIGH (ref 0.5–1.3)
EGFR: 41 ML/MIN/1.73M2 — LOW
GLUCOSE SERPL-MCNC: 79 MG/DL — SIGNIFICANT CHANGE UP (ref 70–99)
MAGNESIUM SERPL-MCNC: 1.9 MG/DL — SIGNIFICANT CHANGE UP (ref 1.6–2.6)
PHOSPHATE SERPL-MCNC: 2.5 MG/DL — SIGNIFICANT CHANGE UP (ref 2.5–4.5)
POTASSIUM SERPL-MCNC: 3.2 MMOL/L — LOW (ref 3.5–5.3)
POTASSIUM SERPL-SCNC: 3.2 MMOL/L — LOW (ref 3.5–5.3)
SODIUM SERPL-SCNC: 143 MMOL/L — SIGNIFICANT CHANGE UP (ref 135–145)

## 2023-01-18 PROCEDURE — 99232 SBSQ HOSP IP/OBS MODERATE 35: CPT

## 2023-01-18 RX ORDER — CEFPODOXIME PROXETIL 100 MG
1 TABLET ORAL
Qty: 6 | Refills: 0
Start: 2023-01-18 | End: 2023-01-20

## 2023-01-18 RX ORDER — POTASSIUM CHLORIDE 20 MEQ
2 PACKET (EA) ORAL
Qty: 0 | Refills: 0 | DISCHARGE
Start: 2023-01-18

## 2023-01-18 RX ORDER — POTASSIUM CHLORIDE 20 MEQ
40 PACKET (EA) ORAL ONCE
Refills: 0 | Status: COMPLETED | OUTPATIENT
Start: 2023-01-18 | End: 2023-01-18

## 2023-01-18 RX ORDER — CEFPODOXIME PROXETIL 100 MG
200 TABLET ORAL DAILY
Refills: 0 | Status: DISCONTINUED | OUTPATIENT
Start: 2023-01-18 | End: 2023-01-18

## 2023-01-18 RX ORDER — CEFPODOXIME PROXETIL 100 MG
1 TABLET ORAL
Qty: 0 | Refills: 0 | DISCHARGE
Start: 2023-01-18

## 2023-01-18 RX ADMIN — Medication 200 MILLIGRAM(S): at 16:06

## 2023-01-18 RX ADMIN — SODIUM CHLORIDE 75 MILLILITER(S): 9 INJECTION INTRAMUSCULAR; INTRAVENOUS; SUBCUTANEOUS at 08:31

## 2023-01-18 RX ADMIN — Medication 100 MILLIGRAM(S): at 18:10

## 2023-01-18 RX ADMIN — MIRTAZAPINE 7.5 MILLIGRAM(S): 45 TABLET, ORALLY DISINTEGRATING ORAL at 13:07

## 2023-01-18 RX ADMIN — HEPARIN SODIUM 5000 UNIT(S): 5000 INJECTION INTRAVENOUS; SUBCUTANEOUS at 13:08

## 2023-01-18 RX ADMIN — FINASTERIDE 5 MILLIGRAM(S): 5 TABLET, FILM COATED ORAL at 13:07

## 2023-01-18 RX ADMIN — Medication 2 PACKET(S): at 08:32

## 2023-01-18 RX ADMIN — Medication 40 MILLIEQUIVALENT(S): at 16:07

## 2023-01-18 RX ADMIN — AMLODIPINE BESYLATE 5 MILLIGRAM(S): 2.5 TABLET ORAL at 08:30

## 2023-01-18 RX ADMIN — HEPARIN SODIUM 5000 UNIT(S): 5000 INJECTION INTRAVENOUS; SUBCUTANEOUS at 06:40

## 2023-01-18 RX ADMIN — Medication 100 MILLIGRAM(S): at 08:30

## 2023-01-18 NOTE — DISCHARGE NOTE PROVIDER - NSDCFUSCHEDAPPT_GEN_ALL_CORE_FT
[facility] [phone]  Scheduled Appointment: 01/19/2023

## 2023-01-18 NOTE — DISCHARGE NOTE PROVIDER - NSDCCPCAREPLAN_GEN_ALL_CORE_FT
PRINCIPAL DISCHARGE DIAGNOSIS  Diagnosis: Acute UTI  Assessment and Plan of Treatment:       SECONDARY DISCHARGE DIAGNOSES  Diagnosis: Acute renal failure  Assessment and Plan of Treatment:

## 2023-01-18 NOTE — DISCHARGE NOTE NURSING/CASE MANAGEMENT/SOCIAL WORK - NSDCPEFALRISK_GEN_ALL_CORE
For information on Fall & Injury Prevention, visit: [email or web address] OR  [email or web address] OR  https://www.cdc.gov/steadi/patient.html

## 2023-01-18 NOTE — DISCHARGE NOTE NURSING/CASE MANAGEMENT/SOCIAL WORK - PATIENT PORTAL LINK FT
You can access the FollowMyHealth Patient Portal offered by [facility] by registering at the following website: [email or web address]. By joining [email or web address]’s FollowMyHealth portal, you will also be able to view your health information using other applications (apps) compatible with our system.

## 2023-01-18 NOTE — DISCHARGE NOTE PROVIDER - PROVIDER TOKENS
FREE:[LAST:[PCP],PHONE:[(   )    -],FAX:[(   )    -],ADDRESS:[Please see PCP in 1x week],FOLLOWUP:[1 week]]

## 2023-01-18 NOTE — DISCHARGE NOTE PROVIDER - HOSPITAL COURSE
89 yo female w/ pmhx of BPH, HTN, chronic obrien admitted to [facility] for sepsis 2/2 complicated UTI, Covid 19 and ROGER. Pt found to have klebsiella UTI secondary to chronic obrien. Obrien was exchanged in the ED. Pt was treated with course of rocephin and given IVF. WBC normalized and pt found stable for discharge home

## 2023-01-18 NOTE — DISCHARGE NOTE PROVIDER - NSDCMRMEDTOKEN_GEN_ALL_CORE_FT
acetaminophen 325 mg oral tablet: 2 tab(s) orally every 6 hours, As needed, Temp greater or equal to 38C (100.4F)  amLODIPine 5 mg oral tablet: 1 tab(s) orally once a day  finasteride 5 mg oral tablet: 1 tab(s) orally once a day  gabapentin 300 mg oral capsule: 1 cap(s) orally 2 times a day  labetalol 100 mg oral tablet: 1 tab(s) orally 2 times a day  mirtazapine 15 mg oral tablet: 1/2 tab(s) orally once a day  nystatin 100,000 units/g topical powder: 1 application topically every 8 hours  polyethylene glycol 3350 oral powder for reconstitution: 17 gram(s) orally once a day (at bedtime)  potassium chloride 20 mEq oral tablet, extended release: 2 tab(s) orally once  senna oral tablet: 2 tab(s) orally once a day (at bedtime)  tamsulosin 0.4 mg oral capsule: 1 cap(s) orally once a day (at bedtime)   acetaminophen 325 mg oral tablet: 2 tab(s) orally every 6 hours, As needed, Temp greater or equal to 38C (100.4F)  amLODIPine 5 mg oral tablet: 1 tab(s) orally once a day  cefpodoxime 200 mg oral tablet: 1 tab(s) orally 2 times a day   finasteride 5 mg oral tablet: 1 tab(s) orally once a day  gabapentin 300 mg oral capsule: 1 cap(s) orally 2 times a day  labetalol 100 mg oral tablet: 1 tab(s) orally 2 times a day  mirtazapine 15 mg oral tablet: 1/2 tab(s) orally once a day  nystatin 100,000 units/g topical powder: 1 application topically every 8 hours  polyethylene glycol 3350 oral powder for reconstitution: 17 gram(s) orally once a day (at bedtime)  potassium chloride 20 mEq oral tablet, extended release: 2 tab(s) orally once  senna oral tablet: 2 tab(s) orally once a day (at bedtime)  tamsulosin 0.4 mg oral capsule: 1 cap(s) orally once a day (at bedtime)

## 2023-01-19 ENCOUNTER — APPOINTMENT (OUTPATIENT)
Dept: HOME HEALTH SERVICES | Facility: HOME HEALTH | Age: 88
End: 2023-01-19

## 2023-01-19 ENCOUNTER — NON-APPOINTMENT (OUTPATIENT)
Age: 88
End: 2023-01-19

## 2023-01-19 ENCOUNTER — APPOINTMENT (OUTPATIENT)
Dept: ELECTROPHYSIOLOGY | Facility: CLINIC | Age: 88
End: 2023-01-19
Payer: MEDICARE

## 2023-01-19 PROCEDURE — G2066: CPT

## 2023-01-19 PROCEDURE — 93298 REM INTERROG DEV EVAL SCRMS: CPT

## 2023-01-19 RX ORDER — CEFADROXIL 500 MG/1
500 CAPSULE ORAL
Qty: 20 | Refills: 0 | Status: COMPLETED | COMMUNITY
Start: 2022-01-20 | End: 2023-01-22

## 2023-01-24 DIAGNOSIS — D64.9 ANEMIA, UNSPECIFIED: ICD-10-CM

## 2023-01-24 DIAGNOSIS — N39.0 URINARY TRACT INFECTION, SITE NOT SPECIFIED: ICD-10-CM

## 2023-01-24 DIAGNOSIS — U07.1 COVID-19: ICD-10-CM

## 2023-01-24 DIAGNOSIS — N40.1 BENIGN PROSTATIC HYPERPLASIA WITH LOWER URINARY TRACT SYMPTOMS: ICD-10-CM

## 2023-01-24 DIAGNOSIS — R50.9 FEVER, UNSPECIFIED: ICD-10-CM

## 2023-01-24 DIAGNOSIS — N17.9 ACUTE KIDNEY FAILURE, UNSPECIFIED: ICD-10-CM

## 2023-01-24 DIAGNOSIS — T83.511A INFECTION AND INFLAMMATORY REACTION DUE TO INDWELLING URETHRAL CATHETER, INITIAL ENCOUNTER: ICD-10-CM

## 2023-01-24 DIAGNOSIS — M48.061 SPINAL STENOSIS, LUMBAR REGION WITHOUT NEUROGENIC CLAUDICATION: ICD-10-CM

## 2023-01-24 DIAGNOSIS — N13.8 OTHER OBSTRUCTIVE AND REFLUX UROPATHY: ICD-10-CM

## 2023-01-24 DIAGNOSIS — N43.3 HYDROCELE, UNSPECIFIED: ICD-10-CM
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
## 2023-01-24 DIAGNOSIS — B96.1 KLEBSIELLA PNEUMONIAE [K. PNEUMONIAE] AS THE CAUSE OF DISEASES CLASSIFIED ELSEWHERE: ICD-10-CM

## 2023-01-24 DIAGNOSIS — A41.59 OTHER GRAM-NEGATIVE SEPSIS: ICD-10-CM

## 2023-01-24 DIAGNOSIS — I10 ESSENTIAL (PRIMARY) HYPERTENSION: ICD-10-CM
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
## 2023-01-27 ENCOUNTER — APPOINTMENT (OUTPATIENT)
Dept: HOME HEALTH SERVICES | Facility: HOME HEALTH | Age: 88
End: 2023-01-27
Payer: MEDICARE

## 2023-01-27 VITALS
SYSTOLIC BLOOD PRESSURE: 120 MMHG | HEART RATE: 79 BPM | RESPIRATION RATE: 16 BRPM | OXYGEN SATURATION: 97 % | TEMPERATURE: 97.5 F | DIASTOLIC BLOOD PRESSURE: 60 MMHG

## 2023-01-27 DIAGNOSIS — I70.0 ATHEROSCLEROSIS OF AORTA: ICD-10-CM

## 2023-01-27 PROCEDURE — 99495 TRANSJ CARE MGMT MOD F2F 14D: CPT

## 2023-01-27 RX ORDER — CEFTRIAXONE 1 G/1
1 INJECTION, POWDER, FOR SOLUTION INTRAMUSCULAR; INTRAVENOUS DAILY
Qty: 10 | Refills: 0 | Status: DISCONTINUED | COMMUNITY
Start: 2023-01-13 | End: 2023-01-27

## 2023-02-03 ENCOUNTER — RX RENEWAL (OUTPATIENT)
Age: 88
End: 2023-02-03

## 2023-02-09 PROBLEM — I70.0 AORTIC ATHEROSCLEROSIS: Status: ACTIVE | Noted: 2020-12-15

## 2023-02-09 NOTE — CURRENT MEDS
[Medication and Allergies Reconciled] : medication and allergies reconciled [High Risk Medications Reviewed and Reconciled (Beers Criteria)] : high risk medications reviewed and reconciled [Reviewed patient reported medication adherence from Comprehensive Assessment] : Reviewed patient reported medication adherence from comprehensive assessment [Adherent to medications] : Patient is adherent to medications as prescribed [de-identified] : managed by family

## 2023-02-09 NOTE — PHYSICAL EXAM
[No Acute Distress] : no acute distress [Well Nourished] : well nourished [Well Developed] : well developed [Normal Voice/Communication] : normal voice communication [Normal Sclera/Conjunctiva] : normal sclera/conjunctiva [Normal Outer Ear/Nose] : the ears and nose were normal in appearance [Normal TMs] : both tympanic membranes were normal [No JVD] : no jugular venous distention [Supple] : the neck was supple [No Respiratory Distress] : no respiratory distress [Clear to Auscultation] : lungs were clear to auscultation bilaterally [No Accessory Muscle Use] : no accessory muscle use [Normal Rate] : heart rate was normal  [Regular Rhythm] : with a regular rhythm [Normal S1, S2] : normal S1 and S2 [No Murmurs] : no murmurs heard [No Edema] : there was no peripheral edema [Normal Bowel Sounds] : normal bowel sounds [Non Tender] : non-tender [Soft] : abdomen soft [Not Distended] : not distended [Normal Post Cervical Nodes] : no posterior cervical lymphadenopathy [Normal Anterior Cervical Nodes] : no anterior cervical lymphadenopathy [No CVA Tenderness] : no ~M costovertebral angle tenderness [No Spinal Tenderness] : no spinal tenderness [Normal Gait] : normal gait [Normal Strength/Tone] : muscle strength and tone were normal [No Rash] : no rash [No Skin Lesions] : no skin lesions [Normal Affect] : the affect was normal [de-identified] : pleasantly confused [de-identified] : purulent drainage from eyes [de-identified] : 16 Fr obrien in place, urine clear [de-identified] : A&Ox2

## 2023-02-09 NOTE — REASON FOR VISIT
[Post-hospitalization from ___ Hospital] : Post-hospitalization from [unfilled] Hospital [Admitted on: ___] : The patient was admitted on [unfilled] [Discharge Summary] : discharge summary [Discharge Med List] : discharge medication list [Med Reconciliation] : medication reconciliation has been completed [Patient Contacted By: ____] : and contacted by [unfilled] [FreeTextEntry2] : Admitted for sepsis 2/2 complicated UTI, Covid 19 and ROGER;  found to have klebsiella UTI secondary to chronic obrien-  treated with course of rocephin and IVF\par

## 2023-02-09 NOTE — HISTORY OF PRESENT ILLNESS
[Patient] : patient [Family Member] : family member [Formal Caregiver] : formal caregiver [FreeTextEntry2] : Patient denies fever, cough, trouble breathing, rash, vomiting and diarrhea. Patient has not been in close contact with someone covid positive. \par surgical mask  during visit: [Y ]. Total face to face time with patient is 2o min.\par \par PMH: MCI, CKD, Anemia, Ankylosing spondylitis, HTN, BPH with urinary retention and chronic obrien catheter, Diverticulosis, aortic atherosclerosis\par \par A&Ox2  pleasantly confused in wheelchair for visit \par Daughter reports still using eye drops in eyes from surgery in the summer\par Appetite still good\par Obrien in place- no issues-  \par Sleeps well through night with mirtazapine\par \par BPH with urinary retention- has 16 Fr obrien in place; sees urology Dr Wagner; on finasteride and tamsulosin\par \par Ankylosing spondylitis- uses gabapentin for pain, non- ambulatory as above\par \par HTN: BP controlled with amlodipine\par \par CKD: GFR 41, Creat 1.56 in 1/23\par \par Anemia: H&H 8/25 in 1/23\par \par \par Admitted to [facility]\par 9/22- 10/13/20  for urosepsis/anemia- transfused during that admission - last H&H 9.4/30.6\par \par \par

## 2023-02-09 NOTE — COUNSELING
[Decrease hospital use] : decrease hospital use [Minimize unnecessary interventions] : minimize unnecessary interventions [Discussed disease trajectory with patient/caregiver] : discussed disease trajectory with patient/caregiver [Likely to achieve goals/desired outcomes] : likely to achieve goals/desired outcomes [Patient/Caregiver has ___ understanding of disease process] : patient/caregiver has [unfilled] understanding of disease process [Patient/Caregiver not ready to engage in discussion] : patient/caregiver not ready to engage in discussion [Patient/Caregiver is unclear of wishes] : patient/caregiver is unclear of wishes [Normal Weight - ( BMI  <25 )] : normal weight - ( BMI  <25 ) [Continue diet as tolerated] : continue diet as tolerated based on goals of care [Non - Smoker] : non-smoker [] : foot exam

## 2023-02-09 NOTE — CHRONIC CARE ASSESSMENT
[Other: ____] : [unfilled] [Oriented To Person] : ~L oriented to person [Oriented To Place] : ~L oriented to place [Alert] : ~L alert [Reviewed Mini-Cog Outcomes from Comprehensive Assessment] : Reviewed Mini-Cog outcomes from comprehensive assessment [Reviewed Home Safety Evaluation] : Reviewed home safety evaluation [No Action Needed] : No action needed [Limited decision making ability] : limited decision making ability [Can not Exercise (Disability)] : Exercise: The patient can not exercise due to disability [Oriented To Time] : not ~L oriented to time [Oriented To Situation] : not ~L oriented to situation

## 2023-03-14 NOTE — ED ADULT NURSE NOTE - NSFALLRSKHARMRISK_ED_ALL_ED
Patient Education     Eating Heart-Healthy Foods  Eating has a big impact on your heart health. In fact, eating healthier can improve several of your heart risks at once. For instance, it helps you manage weight, cholesterol, and blood pressure. Here are ideas to help you make heart-healthy changes without giving up all the foods and flavors you love.  Getting started  Talk with your healthcare provider about eating plans, such as the DASH or Mediterranean diet. You may also be referred to a dietitian.  Change a few things at a time. Give yourself time to get used to a few eating changes before adding more.  Work to create a tasty, healthy eating plan that you can stick to for the rest of your life.    Goals for healthy eating  Below are some tips to improve your eating habits:  Limit saturated fats and trans fats. Saturated fats raise your levels of cholesterol, so keep these fats to a minimum. They are found in foods such as fatty meats, whole milk, cheese, and palm and coconut oils. Avoid trans fats because they lower good cholesterol as well as raise bad cholesterol. Trans fats are most often found in processed foods.  Reduce sodium (salt) intake. Eating too much salt may increase your blood pressure. Limit your sodium intake to 2,300 milligrams (mg) per day (the amount in 1 teaspoon of salt), or less if your healthcare provider recommends it. Dining out less often and eating fewer processed foods are two great ways to decrease the amount of salt you consume.  Managing calories. A calorie is a unit of energy. Your body burns calories for fuel, but if you eat more calories than your body burns, the extras are stored as fat. Your healthcare provider can help you create a diet plan to manage your calories. This will likely include eating healthier foods as well as exercising regularly. To help you track your progress, keep a diary to record what you eat and how often you exercise.  Choose the right foods  Aim to  make these foods staples of your diet. If you have diabetes, you may have different recommendations than what is listed here:  Fruits and vegetables provide plenty of nutrients without a lot of calories. At meals, fill half your plate with these foods. Split the other half of your plate between whole grains and lean protein.  Whole grains are high in fiber and rich in vitamins and nutrients. Good choices include whole-wheat bread, pasta, and brown rice.  Lean proteins give you nutrition with less fat. Good choices include fish, skinless chicken, and beans.  Low-fat or nonfat dairy provides nutrients without a lot of fat. Try low-fat or nonfat milk, cheese, or yogurt.  Healthy fats can be good for you in small amounts. These are unsaturated fats, such as olive oil, nuts, and fish. Try to have at least 2 servings per week of fatty fish, such as salmon, sardines, mackerel, rainbow trout, and albacore tuna. These contain omega-3 fatty acids, which are good for your heart. Flaxseed is another source of a heart-healthy fat.  More on heart-healthy eating  Read food labels  Healthy eating starts at the grocery store. Be sure to pay attention to food labels on packaged foods. Look for products that are high in fiber and protein, and low in saturated fat, cholesterol, and sodium. Avoid products that contain trans fat. And pay close attention to serving size. For instance, if you plan to eat two servings, double all the numbers on the label.  Prepare food right  A key part of healthy cooking is cutting down on added fat and salt. Look on the internet for lower-fat, lower-sodium recipes. Also, try these tips:  Remove fat from meat and skin from poultry before cooking.  Skim fat from the surface of soups and sauces.  Broil, boil, bake, steam, grill, and microwave food without added fats.  Choose ingredients that spice up your food without adding calories, fat, or sodium. Try these items: horseradish, hot sauce, lemon, mustard,  nonfat salad dressings, and vinegar. For salt-free herbs and spices, try basil, cilantro, cinnamon, pepper, and rosemary.  Date Last Reviewed: 10/1/2017  © 4050-7205 [employer]. [address] [place] [id number]. All rights reserved. This information is not intended as a substitute for professional medical care. Always follow your healthcare professional's instructions.         Patient Education     Exercise for a Healthier Heart     Exercise with a friend. When activity is fun, you're more likely to stick with it.   You may wonder how you can improve the health of your heart. If you’re thinking about exercise, you’re on the right track. You don’t need to become an athlete. But you do need a certain amount of brisk exercise to help strengthen your heart. If you have been diagnosed with a heart condition, your healthcare provider may advise exercise to help stabilize your condition. To help make exercise a habit, choose safe, fun activities.   Before you start  Check with your healthcare provider before starting an exercise program. This is especially important if you have not been active for a while. It's also important if you have a long-term (chronic) health problem such as heart disease, diabetes, or obesity. Or if you are at high risk for having these problems.   Why exercise?  Exercising regularly offers many healthy rewards. It can help you do all of the following:  Improve your blood cholesterol level to help prevent further heart trouble  Lower your blood pressure to help prevent a stroke or heart attack  Control diabetes, or reduce your risk of getting this disease  Improve your heart and lung function  Reach and stay at a healthy weight  Make your muscles stronger so you can stay active  Prevent falls and fractures by slowing the loss of bone mass (osteoporosis)  Manage stress better  Reduce your blood pressure  Improve your sense of self and your body image  Exercise tips    Ease  into your routine. Set small goals. Then build on them. If you are not sure what your activity level should be, talk with your healthcare provider first before starting an exercise routine.  Exercise on most days. Aim for a total of 150 minutes (2 hours and 30 minutes) or more of moderate-intensity aerobic activity each week. Or 75 minutes (1 hour and 15 minutes) or more of vigorous-intensity aerobic activity each week. Or try for a combination of both. Moderate activity means that you breathe heavier and your heart rate increases but you can still talk. Think about doing 40 minutes of moderate exercise, 3 to 4 times a week. For best results, activity should last for about 40 minutes to lower blood pressure and cholesterol. It's OK to work up to the 40-minute period over time. Examples of moderate-intensity activity are walking 1 mile in 15 minutes. Or doing 30 to 45 minutes of yard work.  Step up your daily activity level.  Along with your exercise program, try being more active the whole day. Walk instead of drive. Or park further away so that you take more steps each day. Do more household tasks or yard work. You may not be able to meet the advised mount of physical activity. But doing some moderate- or vigorous-intensity aerobic activity can help reduce your risk for heart disease. Your healthcare provider can help you figure out what is best for you.  Choose 1 or more activities you enjoy.  Walking is one of the easiest things you can do. You can also try swimming, riding a bike, dancing, or taking an exercise class.    When to call your healthcare provider  Call your healthcare provider if you have any of these:   Chest pain or feel dizzy or lightheaded  Burning, tightness, pressure, or heaviness in your chest, neck, shoulders, back, or arms  Abnormal shortness of breath  More joint or muscle pain  A very fast or irregular heartbeat (palpitations)  Melani last reviewed this educational content on 7/1/2019  ©  6559-1769 [employer]. [address] [place] [address], [place] [address]. All rights reserved. This information is not intended as a substitute for professional medical care. Always follow your healthcare professional's instructions.            yes

## 2023-03-28 NOTE — ED ADULT NURSE NOTE - ED STAT RN HANDOFF DETAILS
Covering for primary RN Denis  Received pt in [facility] 30. Pt is admitted to . Pt denies any s/s. No distress noted.

## 2023-03-28 NOTE — H&P ADULT - ASSESSMENT
89 yo female w/ PMHx of BPH, HTN, Lumbar spinal stenosis, chronic obrien sent in for positive urine cultures sensitive to only IV abx.    # CAUTI  - Cx sens reviewed  - place on Monika  - catheter changed at time cxs drawn    # HTN  - c/w BB    # BPH  - c/w Finasteride, Flomax    #CKD  Renal fxn stable    # Anemia of chronic ds  - H/H stable at [facility]    # DVT ppx - HSQ

## 2023-03-28 NOTE — H&P ADULT - NSHPPHYSICALEXAM_GEN_ALL_CORE
PHYSICAL EXAM:    Vital Signs Last 24 Hrs  T(C): --  T(F): --  HR: --  BP: --  BP(mean): --  RR: --  SpO2: --        GENERAL: Pt lying in bed comfortably in NAD  HEENT:  Atraumatic, EOMI, PERRL, conjunctiva and sclera clear, MMM  NECK: Supple, No JVD  CHEST/LUNG: Clear to auscultation bilaterally; No rales, rhonchi, wheezing or rubs. Unlabored respirations  HEART: Regular rate and rhythm; No murmurs, rubs, or gallops  ABDOMEN: Bowel sounds present; Soft, Nontender, Nondistended. No guarding or rigidity    EXTREMITIES:  2+ Peripheral Pulses, brisk capillary refill. No clubbing, cyanosis, or edema  NEUROLOGICAL:  Alert & Oriented X3, speech clear. Answers questions appropriately. Full and equal strength B/L upper and lower extremities. No deficits   MSK: FROM x 4 extremities   SKIN: No rashes or lesions PHYSICAL EXAM:    Vital Signs Last 24 Hrs  T(C): --  T(F): --  HR: --  BP: --  BP(mean): --  RR: --  SpO2: --        GENERAL: Pt lying in bed comfortably in NAD  HEENT:  Atraumatic, dry MM  NECK: Supple,  CHEST/LUNG: Clear to auscultation bilaterally  HEART: Regular rate and rhythm;  ABDOMEN: Bowel sounds present; Soft, Nontender, Nondistended.     EXTREMITIES:  2+ Peripheral Pulses, brisk capillary refill. No edema  NEUROLOGICAL:  Alert & Oriented X3,  MSK: no overt deformities   SKIN: warm, dry

## 2023-03-28 NOTE — ED PROVIDER NOTE - NS ED MD DISPO SPECIAL CONSIDERATION1
Progress Note    he is a 66y.o. year old male who presents for evalution. Subjective:     Pt with bilateral shoulder pain and would like injections in b/l shoulders. We have done this in the past and this has worked well for him. Pt having sig pain diff to sleep at night. Tends to be worse with colder weather. Reviewed PmHx, RxHx, FmHx, SocHx, AllgHx and updated and dated in the chart. Review of Systems - negative except as listed above in the HPI    Objective:     Vitals:    01/27/21 1121   BP: 137/82   Pulse: 70   Resp: 18   Temp: 98.1 °F (36.7 °C)   TempSrc: Oral   SpO2: 94%   Weight: 177 lb (80.3 kg)   Height: 5' 8\" (1.727 m)       Current Outpatient Medications   Medication Sig    methylPREDNISolone acetate (DEPO-MedroL) 80 mg/mL injection 1 mL by Intra artICUlar route once for 1 dose. Into each shoulder    lidocaine (XYLOCAINE) 10 mg/mL (1 %) injection 4 mL by Intra artICUlar route once for 1 dose. 2ml into each shoulder    bupivacaine (Marcaine) 0.25 % (2.5 mg/mL) soln injection 4 mL by Intra artICUlar route once for 1 dose. 2ml into each shoulder    clopidogreL (PLAVIX) 75 mg tab TAKE 1 TABLET BY MOUTH DAILY    metFORMIN (GLUCOPHAGE) 1,000 mg tablet TAKE 1 TABLET TWICE A DAY WITH MEALS    atorvastatin (LIPITOR) 40 mg tablet TAKE 1 TABLET DAILY (START LIPITOR AFTER VYTORIN IS FINISHED)    ranolazine ER (RANEXA) 500 mg SR tablet TAKE 1 TABLET TWICE A DAY    [START ON 1/30/2021] oxyCODONE-acetaminophen (PERCOCET) 5-325 mg per tablet Take 1 Tab by mouth daily as needed for Pain for up to 30 days. Indications: pain    oxyCODONE-acetaminophen (PERCOCET) 5-325 mg per tablet Take 1-2 Tabs by mouth daily as needed for Pain for up to 30 days. Indications: pain    albuterol (ProAir HFA) 90 mcg/actuation inhaler     acetaminophen-codeine (TYLENOL #3) 300-30 mg per tablet     isosorbide mononitrate ER (IMDUR) 30 mg tablet Take  by mouth daily.     amLODIPine (NORVASC) 5 mg tablet Take 5 mg by mouth daily.  ipratropium (ATROVENT) 0.03 % nasal spray 2 Sprays by Both Nostrils route every twelve (12) hours.  folic acid (FOLVITE) 1 mg tablet TAKE 1 TABLET DAILY, EXCEPT DAY THAT METHOTREXATE IS TAKEN    AVAPRO 150 mg tablet TAKE 1 TABLET NIGHTLY    aspirin delayed-release 81 mg tablet Take  by mouth daily.  ascorbic acid, vitamin C, (VITAMIN C) 500 mg tablet Take 500 mg by mouth two (2) times a day. + Vit D3    multivitamin (ONE A DAY) tablet Take 1 Tab by mouth daily.  omeprazole (PRILOSEC) 40 mg capsule TAKE 1 CAPSULE DAILY    tamsulosin (FLOMAX) 0.4 mg capsule Take 1 Cap by mouth daily.  sildenafil citrate (VIAGRA) 100 mg tablet Take 100 mg by mouth as needed for Erectile Dysfunction.  atenolol (TENORMIN) 50 mg tablet TAKE 1 TABLET DAILY     No current facility-administered medications for this visit. Physical Examination: General appearance - alert, well appearing, and in no distress  Musculoskeletal - pain with active rom of b/l shoulders,  ttp over [facility] joint b/l      Assessment/ Plan:   Diagnoses and all orders for this visit:    1. Localized osteoarthritis of both shoulder regions  -     N[place] DRAIN/INJECT LARGE JOINT/BURSA  -     methylPREDNISolone acetate (DEPO-MedroL) 80 mg/mL injection; 1 mL by Intra artICUlar route once for 1 dose. Into each shoulder  -     lidocaine (XYLOCAINE) 10 mg/mL (1 %) injection; 4 mL by Intra artICUlar route once for 1 dose. 2ml into each shoulder  -     bupivacaine (Marcaine) 0.25 % (2.5 mg/mL) soln injection; 4 mL by Intra artICUlar route once for 1 dose. 2ml into each shoulder       Follow-up and Dispositions    · Return if symptoms worsen or fail to improve. I have discussed the diagnosis with the patient and the intended plan as seen in the above orders. The patient has received an after-visit summary and questions were answered concerning future plans. Pt conveyed understanding of plan.     Medication Side Effects and Warnings were discussed with patient    An electronic signature was used to authenticate this note  Lee Colin DO  Patient has agreed to the procedure and understands the risk of adverse reactions. Procedure:  Joint Injection: Bilateral shoulder    Injected joint(s) with sterile technique using 5cc of mixed 1% Lidocaine 2cc with DepoMedrol  80mg/mL 1cc and 2ml marcaine 0.25% with relief and no adverse reaction to procedure. Patient given instructions and expectations of after visit care. [facility]  OFFICE PROCEDURE PROGRESS NOTE        Chart reviewed for the following:   oJno NINO DO, have reviewed the History, Physical and updated the Allergic reactions.     TIME OUT performed immediately prior to start of procedure:   Jono NINO DO, have performed the following reviews prior to the start of the procedure:            * Patient was identified by name and date of birth   * Agreement on procedure being performed was verified  * Risks and Benefits explained to the patient  * Procedure site verified and marked as necessary  * Patient was positioned for comfort  * Consent was signed and verified     Time: 12 PM      Date of procedure: 1/27/2021    Procedure performed by:  Lee Colin DO    Provider assisted by: Self DO    Patient assisted by: self    How tolerated by patient: tolerated the procedure well with no complications    Post Procedural Pain Scale: 0 - No Hurt    Comments: none None

## 2023-03-28 NOTE — H&P ADULT - REASON FOR ADMISSION
Loopogram reviewed.  Reflux up R side, none on L.  No hydro on U/s.  Unclear etiology of increasing creatinine.    May benefit from IR NT even in the setting of no hydro, recommend discussing with IR.  Discussed with Dr. Benson. CAUTI

## 2023-03-28 NOTE — H&P ADULT - NSHPLABSRESULTS_GEN_ALL_CORE
T(C): --  HR: --  BP: --  RR: --  SpO2: --                        7.9    5.00  )-----------( 216      ( 28 Mar 2023 14:49 )             24.6         133<L>  |  101  |  40<H>  ----------------------------<  97  4.4   |  28  |  1.96<H>    Ca    9.2      28 Mar 2023 14:49    TPro  8.4<H>  /  Alb  2.8<L>  /  TBili  0.4  /  DBili  x   /  AST  30  /  ALT  18  /  AlkPhos  47      LIVER FUNCTIONS - ( 28 Mar 2023 14:49 )  Alb: 2.8 g/dL / Pro: 8.4 gm/dL / ALK PHOS: 47 U/L / ALT: 18 U/L / AST: 30 U/L / GGT: x             Urinalysis Basic - ( 28 Mar 2023 14:49 )    Color: Yellow / Appearance: Clear / S.005 / pH: x  Gluc: x / Ketone: Negative  / Bili: Negative / Urobili: Negative mg/dL   Blood: x / Protein: 100 mg/dL / Nitrite: Negative   Leuk Esterase: Moderate / RBC: 6-10 /HPF / WBC 3-5   Sq Epi: x / Non Sq Epi: x / Bacteria: Moderate        acetaminophen     Tablet .. 650 milliGRAM(s) Oral every 6 hours PRN  aluminum hydroxide/magnesium hydroxide/simethicone Suspension 30 milliLiter(s) Oral every 4 hours PRN  amLODIPine   Tablet 5 milliGRAM(s) Oral daily  finasteride 5 milliGRAM(s) Oral daily  gabapentin 300 milliGRAM(s) Oral two times a day  labetalol 100 milliGRAM(s) Oral two times a day  melatonin 3 milliGRAM(s) Oral at bedtime PRN  meropenem  IVPB      meropenem  IVPB 1000 milliGRAM(s) IV Intermittent once  mirtazapine 7.5 milliGRAM(s) Oral at bedtime  ondansetron Injectable 4 milliGRAM(s) IV Push every 8 hours PRN  prednisoLONE acetate 1% Suspension 1 Drop(s) Right EYE two times a day  tamsulosin 0.4 milliGRAM(s) Oral at bedtime

## 2023-03-28 NOTE — H&P ADULT - HISTORY OF PRESENT ILLNESS
89 yo female w/ PMHx of BPH, HTN, Lumbar spinal stenosis, chronic obrien sent in for positive urine cultures sensitive to only IV abx. Pt reportedly had her catheter changed at which time a UA and urine culture was sent which came back positive; cx grew Pseudomonas.

## 2023-03-28 NOTE — ED PROVIDER NOTE - OBJECTIVE STATEMENT
89 y/o M with PMH HTN, BPH, chronic obrien, sent in by medical house call service for pseudomonal UTI requiring IV antibiotics seen on Ucx 3/15/23.   pt is asymptomatic.   per home NP, the sample was drawn off s/p replacing obrien cath and not from obrien bag.   no fever, hematuria, cp, sob, ab pain, n/v/d, fall, weakness, any other sx.   daughter sanjiv corroborates story and can be reached @ [phone]

## 2023-03-28 NOTE — ED PROVIDER NOTE - ATTENDING APP SHARED VISIT CONTRIBUTION OF CARE
Sent for + multidrug resistant uti, chronic obrien.  Patient asymptomatic, afebrile.  Likely colonized but sent by outpatient NP following with pt for abx and admission.  Plan for labs, urine, abx admit.  Sensitivities reviewed.

## 2023-03-28 NOTE — ED PROVIDER NOTE - NS ED ROS FT
Review of Systems    Constitutional: (-) fever   Cardiovascular: (-) chest pain, (-) syncope (-) palpitations  Respiratory: (-) cough, (-) shortness of breath  Gastrointestinal: (-) vomiting, (-) diarrhea (-) abdominal pain  Genitourinary:  (-) dysuria  see hpi   Musculoskeletal: (-) neck pain, (-) back pain, (-) leg pain/swelling  Integumentary: (-) rash, (-) edema  Neurological: (-) headache (-) confusion  Hematologic: (-) easy bruising

## 2023-03-28 NOTE — ED PROVIDER NOTE - NS ED ATTENDING STATEMENT MOD
This was a shared visit with the BLANCA. I reviewed and verified the documentation and independently performed the documented:

## 2023-03-29 NOTE — PROGRESS NOTE ADULT - ASSESSMENT
89 yo male w/ PMHx of BPH, HTN, Lumbar spinal stenosis, chronic obrien sent in for positive urine cultures sensitive to only IV abx.    # CAUTI  - Cx sens reviewed  - was placed on Monika[penenem by my colleague but unclear if should be treated ? colonization will get ID consult  - catheter  reportedly changed at time cxs drawn    # HTN  - c/w BB   bp stable    # BPH  - c/w Finasteride, Flomax    #CKD stage  3b  Renal fxn stable    # Anemia of chronic disease  - H/H stable at baseline    # DVT ppx - HSQ

## 2023-03-29 NOTE — PATIENT PROFILE ADULT - FALL HARM RISK - HARM RISK INTERVENTIONS
Assistance with ambulation/Assistance OOB with selected safe patient handling equipment/Communicate Risk of Fall with Harm to all staff/Discuss with provider need for PT consult/Monitor gait and stability/Reinforce activity limits and safety measures with patient and family/Tailored Fall Risk Interventions/Use of alarms - bed, chair and/or voice tab/Visual Cue: Yellow wristband and red socks/Bed in lowest position, wheels locked, appropriate side rails in place/Call bell, personal items and telephone in reach/Instruct patient to call for assistance before getting out of bed or chair/Non-slip footwear when patient is out of bed/[place] to call system/Physically safe environment - no spills, clutter or unnecessary equipment/Purposeful Proactive Rounding/Room/bathroom lighting operational, light cord in reach

## 2023-03-29 NOTE — PROGRESS NOTE ADULT - SUBJECTIVE AND OBJECTIVE BOX
Patient is a 89y old  Male who presents with a chief complaint of CAUTI (28 Mar 2023 17:09)      OVERNIGHT EVENTS:      REVIEW OF SYSTEMS: denies chest pain/SOB, diaphoresis, no F/C, cough, dizziness, headache, blurry vision, nausea, vomiting, abdominal pain. Rest unremarkable     MEDICATIONS  (STANDING):  amLODIPine   Tablet 5 milliGRAM(s) Oral daily  finasteride 5 milliGRAM(s) Oral daily  gabapentin 300 milliGRAM(s) Oral two times a day  heparin   Injectable 5000 Unit(s) SubCutaneous every 12 hours  labetalol 100 milliGRAM(s) Oral two times a day  meropenem  IVPB      meropenem  IVPB 1000 milliGRAM(s) IV Intermittent every 12 hours  mirtazapine 7.5 milliGRAM(s) Oral at bedtime  prednisoLONE acetate 1% Suspension 1 Drop(s) Right EYE two times a day  tamsulosin 0.4 milliGRAM(s) Oral at bedtime    MEDICATIONS  (PRN):  acetaminophen     Tablet .. 650 milliGRAM(s) Oral every 6 hours PRN Temp greater or equal to 38C (100.4F), Mild Pain (1 - 3)  aluminum hydroxide/magnesium hydroxide/simethicone Suspension 30 milliLiter(s) Oral every 4 hours PRN Dyspepsia  melatonin 3 milliGRAM(s) Oral at bedtime PRN Insomnia  ondansetron Injectable 4 milliGRAM(s) IV Push every 8 hours PRN Nausea and/or Vomiting      Allergies    No Known Allergies    Intolerances        SUBJECTIVE: in bed in NAD, no acute events overnight     T(F): 97.6 (23 @ 11:06), Max: 98.2 (23 @ 23:09)  HR: 63 (23 @ 11:06) (62 - 75)  BP: 139/69 (23 @ 11:06) (125/71 - 147/88)  RR: 18 (23 @ 11:06) (18 - 20)  SpO2: 99% (23 @ 11:06) (98% - 99%)  Wt(kg): --    PHYSICAL EXAM:  GENERAL: NAD, well-groomed, well-developed, elderly  HEAD:  Atraumatic, Normocephalic  EYES: EOMI, PERRLA, conjunctiva and sclera clear  ENMT: No tonsillar erythema, exudates, or enlargement; Moist mucous membranes, Good dentition, No lesions  NECK: Supple,   CHEST/LUNG: Clear to  auscultation bilaterally; No rales, rhonchi, wheezing, or rubs  bilaterally  HEART: Regular rate and rhythm; No murmurs, rubs, or gallops  ABDOMEN: Soft, Nontender, Nondistended; Bowel sounds present  EXTREMITIES:  2+ Peripheral Pulses, No clubbing, cyanosis, or edema BL LE  SKIN: No rashes or lesions  NERVOUS SYSTEM:  Alert & Oriented X3,     LABS:                        7.8    4.49  )-----------( 211      ( 29 Mar 2023 07:20 )             24.4     -    138  |  106  |  41<H>  ----------------------------<  83  3.5   |  27  |  1.81<H>    Ca    9.3      29 Mar 2023 07:20    TPro  8.4<H>  /  Alb  2.8<L>  /  TBili  0.4  /  DBili  x   /  AST  30  /  ALT  18  /  AlkPhos  47        Urinalysis Basic - ( 28 Mar 2023 14:49 )    Color: Yellow / Appearance: Clear / S.005 / pH: x  Gluc: x / Ketone: Negative  / Bili: Negative / Urobili: Negative mg/dL   Blood: x / Protein: 100 mg/dL / Nitrite: Negative   Leuk Esterase: Moderate / RBC: 6-10 /HPF / WBC 3-5   Sq Epi: x / Non Sq Epi: x / Bacteria: Moderate      Cultures;   CAPILLARY BLOOD GLUCOSE        Lipid panel:           RADIOLOGY & ADDITIONAL TESTS:      Imaging Personally Reviewed:  [ x] YES      Consultant(s) Notes Reviewed:  [x ] YES     Care Discussed with [x ] Consultants [X ] Patient [x ] Family  [x ]    [x ]  Other; RN Patient is a 89y old  Male who presents with a chief complaint of CAUTI (28 Mar 2023 17:09)      OVERNIGHT EVENTS:      REVIEW OF SYSTEMS: denies chest pain/SOB, diaphoresis, no F/C, cough, dizziness, headache, blurry vision, nausea, vomiting, abdominal pain. Rest unremarkable     MEDICATIONS  (STANDING):  amLODIPine   Tablet 5 milliGRAM(s) Oral daily  finasteride 5 milliGRAM(s) Oral daily  gabapentin 300 milliGRAM(s) Oral two times a day  heparin   Injectable 5000 Unit(s) SubCutaneous every 12 hours  labetalol 100 milliGRAM(s) Oral two times a day  meropenem  IVPB      meropenem  IVPB 1000 milliGRAM(s) IV Intermittent every 12 hours  mirtazapine 7.5 milliGRAM(s) Oral at bedtime  prednisoLONE acetate 1% Suspension 1 Drop(s) Right EYE two times a day  tamsulosin 0.4 milliGRAM(s) Oral at bedtime    MEDICATIONS  (PRN):  acetaminophen     Tablet .. 650 milliGRAM(s) Oral every 6 hours PRN Temp greater or equal to 38C (100.4F), Mild Pain (1 - 3)  aluminum hydroxide/magnesium hydroxide/simethicone Suspension 30 milliLiter(s) Oral every 4 hours PRN Dyspepsia  melatonin 3 milliGRAM(s) Oral at bedtime PRN Insomnia  ondansetron Injectable 4 milliGRAM(s) IV Push every 8 hours PRN Nausea and/or Vomiting      Allergies    No Known Allergies    Intolerances        SUBJECTIVE: in bed in NAD, no acute events overnight     T(F): 97.6 (23 @ 11:06), Max: 98.2 (23 @ 23:09)  HR: 63 (23 @ 11:06) (62 - 75)  BP: 139/69 (23 @ 11:06) (125/71 - 147/88)  RR: 18 (23 @ 11:06) (18 - 20)  SpO2: 99% (23 @ 11:06) (98% - 99%)  Wt(kg): --    PHYSICAL EXAM:  GENERAL: NAD, well-groomed, well-developed, elderly  HEAD:  Atraumatic, Normocephalic  EYES: EOMI, PERRLA, conjunctiva and sclera clear  ENMT: No tonsillar erythema, exudates, or enlargement; Moist mucous membranes, Good dentition, No lesions  NECK: Supple,   CHEST/LUNG: Clear to  auscultation bilaterally; No rales, rhonchi, wheezing, or rubs  bilaterally  HEART: Regular rate and rhythm; No murmurs, rubs, or gallops  ABDOMEN: Soft, Nontender, Nondistended; Bowel sounds present  EXTREMITIES:  2+ Peripheral Pulses, No clubbing, cyanosis, or edema BL LE  SKIN: No rashes or lesions  NERVOUS SYSTEM:  Alert & Oriented X3, , bedbound    LABS:                        7.8    4.49  )-----------( 211      ( 29 Mar 2023 07:20 )             24.4     -    138  |  106  |  41<H>  ----------------------------<  83  3.5   |  27  |  1.81<H>    Ca    9.3      29 Mar 2023 07:20    TPro  8.4<H>  /  Alb  2.8<L>  /  TBili  0.4  /  DBili  x   /  AST  30  /  ALT  18  /  AlkPhos  47  -      Urinalysis Basic - ( 28 Mar 2023 14:49 )    Color: Yellow / Appearance: Clear / S.005 / pH: x  Gluc: x / Ketone: Negative  / Bili: Negative / Urobili: Negative mg/dL   Blood: x / Protein: 100 mg/dL / Nitrite: Negative   Leuk Esterase: Moderate / RBC: 6-10 /HPF / WBC 3-5   Sq Epi: x / Non Sq Epi: x / Bacteria: Moderate      Cultures;   CAPILLARY BLOOD GLUCOSE        Lipid panel:           RADIOLOGY & ADDITIONAL TESTS:      Imaging Personally Reviewed:  [ x] YES      Consultant(s) Notes Reviewed:  [x ] YES     Care Discussed with [x ] Consultants [X ] Patient [x ] Family  [x ]    [x ]  Other; RN

## 2023-03-29 NOTE — PATIENT PROFILE ADULT - PATIENT REPRESENTATIVE: ( YOU CAN CHOOSE ANY PERSON THAT CAN ASSIST YOU WITH YOUR HEALTH CARE PREFERENCES, DOES NOT HAVE TO BE A SPOUSE, IMMEDIATE FAMILY OR SIGNIFICANT OTHER/PARTNER)
same name as above Topical Sulfur Applications Pregnancy And Lactation Text: This medication is Pregnancy Category C and has an unknown safety profile during pregnancy. It is unknown if this topical medication is excreted in breast milk.

## 2023-03-30 NOTE — CONSULT NOTE ADULT - SUBJECTIVE AND OBJECTIVE BOX
[facility]  Division of Infectious Diseases  [phone]    MOIZ RANDOLPH  89y, Male  [id number]    HPI--  89M sent to hospital for "pseudomonas uti" noted after obrien catheter change 3/16/2023. IV antibiotics were not able to be given at home  Here patient without any complaints  He is afebrile and HD stable  WBC normal  U/A without pyuria    He was given meropenem here.     PMH/PSH--  HTN (Hypertension)    Benign Prostatic Hyperplasia    Hypertension    BPH (benign prostatic hypertrophy)    Spinal stenosis of lumbar region    Dehydration    Renal insufficiency    Hydrocele in adult    No significant past surgical history        Allergies--  No Known Allergies      Medications--  Antibiotics: meropenem  IVPB      meropenem  IVPB 1000 milliGRAM(s) IV Intermittent every 12 hours    Immunologic:   Other: acetaminophen     Tablet .. PRN  aluminum hydroxide/magnesium hydroxide/simethicone Suspension PRN  amLODIPine   Tablet  finasteride  gabapentin  heparin   Injectable  labetalol  melatonin PRN  mirtazapine  ondansetron Injectable PRN  prednisoLONE acetate 1% Suspension  tamsulosin    Antimicrobials last 90 days per EMR: MEDICATIONS  (STANDING):    meropenem  IVPB   100 mL/Hr IV Intermittent (03-28-23 @ 18:00)    meropenem  IVPB   100 mL/Hr IV Intermittent (03-30-23 @ 05:50)   100 mL/Hr IV Intermittent (03-29-23 @ 18:31)   100 mL/Hr IV Intermittent (03-29-23 @ 06:26)        Social History--  EtOH: denies   Tobacco: denies   Drug Use: denies     Family/Marital History--  No significant family history          Travel/Environmental/Occupational History:      Review of Systems:  A >=10-point review of systems was obtained.   Review of systems otherwise negative except as previously noted.    Physical Exam--  Vital Signs: T(F): 97.4 (03-30-23 @ 17:09), Max: 97.7 (03-30-23 @ 10:13)  HR: 63 (03-30-23 @ 17:09)  BP: 138/70 (03-30-23 @ 17:09)  RR: 17 (03-30-23 @ 17:09)  SpO2: 99% (03-30-23 @ 17:09)  Wt(kg): --  General: Nontoxic-appearing Male in no acute distress.  HEENT: Alopecia. Anicteric. Conj pink/moist  Neck: Not rigid. No sense of mass.  Nodes: None palpable.  Lungs: Poor effort grossly clear  Heart: Regular rate and rhythm.  Abdomen: Bowel sounds present and normoactive. Soft. Nondistended. Nontender.  Back: No costovertebral angle tenderness.   Extremities: No cyanosis or clubbing. No edema. Heels offloaded.   Skin: Warm. Dry. Good turgor. No rash. No vasculitic stigmata.  Psychiatric: Sleepy, rouses.       Laboratory & Imaging Data--  CBC                        7.8    4.49  )-----------( 211      ( 29 Mar 2023 07:20 )             24.4     WBC Count: 5.00 K/uL (03-28-23 @ 14:49)        Chemistries  03-29    138  |  106  |  41<H>  ----------------------------<  83  3.5   |  27  |  1.81<H>        Ca    9.3      29 Mar 2023 07:20  Phos  3.6     03-29  Mg     2.7     03-29        Culture Data    Culture - Urine (collected 28 Mar 2023 14:49)  Source: .Urine  Final Report (29 Mar 2023 23:17):    >=3 organisms. Probable collection contamination.

## 2023-03-30 NOTE — PROGRESS NOTE ADULT - SUBJECTIVE AND OBJECTIVE BOX
Patient is a 89y old  Male who presents with a chief complaint of CAUTI (28 Mar 2023 17:09)      OVERNIGHT EVENTS:    none    MEDICATIONS  (STANDING):  amLODIPine   Tablet 5 milliGRAM(s) Oral daily  finasteride 5 milliGRAM(s) Oral daily  gabapentin 300 milliGRAM(s) Oral two times a day  heparin   Injectable 5000 Unit(s) SubCutaneous every 12 hours  labetalol 100 milliGRAM(s) Oral two times a day  meropenem  IVPB      meropenem  IVPB 1000 milliGRAM(s) IV Intermittent every 12 hours  mirtazapine 7.5 milliGRAM(s) Oral at bedtime  prednisoLONE acetate 1% Suspension 1 Drop(s) Right EYE two times a day  tamsulosin 0.4 milliGRAM(s) Oral at bedtime    MEDICATIONS  (PRN):  acetaminophen     Tablet .. 650 milliGRAM(s) Oral every 6 hours PRN Temp greater or equal to 38C (100.4F), Mild Pain (1 - 3)  aluminum hydroxide/magnesium hydroxide/simethicone Suspension 30 milliLiter(s) Oral every 4 hours PRN Dyspepsia  melatonin 3 milliGRAM(s) Oral at bedtime PRN Insomnia  ondansetron Injectable 4 milliGRAM(s) IV Push every 8 hours PRN Nausea and/or Vomiting    Allergies    No Known Allergies    Intolerances        SUBJECTIVE: in bed in NAD, no acute events overnight     Vital Signs Last 24 Hrs  T(C): 36.5 (30 Mar 2023 10:13), Max: 36.5 (29 Mar 2023 16:54)  T(F): 97.7 (30 Mar 2023 10:13), Max: 97.7 (29 Mar 2023 16:54)  HR: 64 (30 Mar 2023 10:13) (63 - 68)  BP: 106/58 (30 Mar 2023 10:13) (106/58 - 144/78)  BP(mean): --  RR: 18 (30 Mar 2023 10:13) (17 - 18)  SpO2: 97% (30 Mar 2023 10:13) (97% - 100%)    Parameters below as of 30 Mar 2023 10:13  Patient On (Oxygen Delivery Method): room air    PHYSICAL EXAM:  GENERAL: NAD, well-groomed, well-developed, elderly  HEAD:  Atraumatic, Normocephalic  EYES: EOMI, PERRLA, conjunctiva and sclera clear  ENMT: No tonsillar erythema, exudates, or enlargement; Moist mucous membranes, Good dentition, No lesions  NECK: Supple,   CHEST/LUNG: Clear to  auscultation bilaterally; No rales, rhonchi, wheezing, or rubs  bilaterally  HEART: Regular rate and rhythm; No murmurs, rubs, or gallops  ABDOMEN: Soft, Nontender, Nondistended; Bowel sounds present  EXTREMITIES:  2+ Peripheral Pulses, No clubbing, cyanosis, or edema BL LE  SKIN: No rashes or lesions  NERVOUS SYSTEM:  Alert & Oriented X3, , bedbound    LABS:                                 7.8    4.49  )-----------( 211      ( 29 Mar 2023 07:20 )             24.4   -    138  |  106  |  41<H>  ----------------------------<  83  3.5   |  27  |  1.81<H>    Ca    9.3      29 Mar 2023 07:20  Phos  3.6       Mg     2.7         TPro  8.4<H>  /  Alb  2.8<L>  /  TBili  0.4  /  DBili  x   /  AST  30  /  ALT  18  /  AlkPhos  47          Urinalysis Basic - ( 28 Mar 2023 14:49 )    Color: Yellow / Appearance: Clear / S.005 / pH: x  Gluc: x / Ketone: Negative  / Bili: Negative / Urobili: Negative mg/dL   Blood: x / Protein: 100 mg/dL / Nitrite: Negative   Leuk Esterase: Moderate / RBC: 6-10 /HPF / WBC 3-5   Sq Epi: x / Non Sq Epi: x / Bacteria: Moderate      Cultures;   CAPILLARY BLOOD GLUCOSE        Lipid panel:           RADIOLOGY & ADDITIONAL TESTS:      Imaging Personally Reviewed:  [ x] YES      Consultant(s) Notes Reviewed:  [x ] YES     Care Discussed with [x ] Consultants [X ] Patient [x ] Family  [x ]    [x ]  Other; RN

## 2023-03-30 NOTE — PROGRESS NOTE ADULT - ASSESSMENT
89 yo male w/ PMHx of BPH, HTN, Lumbar spinal stenosis, chronic obrien sent in for positive urine cultures sensitive to only IV abx.    # CAUTI  - Cx sens reviewed  - was placed on Monika[penenem by my colleague but unclear if should be treated ? colonization will get ID consult  - catheter  reportedly changed at time cxs drawn  3/30/2023 await ID consult     # HTN  - c/w BB   bp stable    # BPH  - c/w Finasteride, Flomax    #CKD stage  3b  Renal fxn stable    # Anemia of chronic disease  - H/H stable at baseline    # DVT ppx - HSQ

## 2023-03-30 NOTE — CONSULT NOTE ADULT - ASSESSMENT
Prior urine cx had 3 strains of P. aeruginosa, and also S. maltophilia.  Patient here asymptomatic  Patient had been stable in the absence of any effective antibiotic therapy for ~2 weeks  I would not treat any of these urine isolates at this time.  Injuducious use of antibiotics will merely select progressively more resistant jeremy and potential AE from the drugs.    No role for antibiotics at this time  Will stop meropenem    Will no longer follow    D/W Dr. Pelletier.    Anival Thakur MD  Attending Physician  [facility]  Division of Infectious Diseases  [phone]

## 2023-03-31 NOTE — DISCHARGE NOTE PROVIDER - ATTENDING DISCHARGE PHYSICAL EXAMINATION:
GENERAL: NAD, well-groomed, well-developed, elderly  HEAD:  Atraumatic, Normocephalic  EYES: EOMI, PERRLA, conjunctiva and sclera clear  ENMT: No tonsillar erythema, exudates, or enlargement; Moist mucous membranes, Good dentition, No lesions  NECK: Supple,   CHEST/LUNG: Clear to  auscultation bilaterally; No rales, rhonchi, wheezing, or rubs  bilaterally  HEART: Regular rate and rhythm; No murmurs, rubs, or gallops  ABDOMEN: Soft, Nontender, Nondistended; Bowel sounds present  EXTREMITIES:  2+ Peripheral Pulses, No clubbing, cyanosis, or edema BL LE  SKIN: No rashes or lesions  NERVOUS SYSTEM:  Alert & Oriented X3, , bedbound

## 2023-03-31 NOTE — DISCHARGE NOTE NURSING/CASE MANAGEMENT/SOCIAL WORK - NSDCVIVACCINE_GEN_ALL_CORE_FT
influenza, injectable, quadrivalent, preservative free; 19-Dec-2014 11:15; Juan C Jon (RN); [employer]; 111; IntraMuscular; Dorsogluteal Right.; 0.5 milliLiter(s);

## 2023-03-31 NOTE — DISCHARGE NOTE PROVIDER - NSDCCPCAREPLAN_GEN_ALL_CORE_FT
PRINCIPAL DISCHARGE DIAGNOSIS  Diagnosis: Acute UTI  Assessment and Plan of Treatment: resolved, continue home medications     PRINCIPAL DISCHARGE DIAGNOSIS  Diagnosis: Acute UTI  Assessment and Plan of Treatment: seen by ID no need for antibx

## 2023-03-31 NOTE — DISCHARGE NOTE PROVIDER - NSDCFUSCHEDAPPT_GEN_ALL_CORE_FT
[facility] Physician [facility] [phone]  Scheduled Appointment: 05/01/2023

## 2023-03-31 NOTE — DISCHARGE NOTE NURSING/CASE MANAGEMENT/SOCIAL WORK - PATIENT PORTAL LINK FT
You can access the FollowMyHealth Patient Portal offered by [facility] by registering at the following website: [email or web address]. By joining [facility]’s FollowMyHealth portal, you will also be able to view your health information using other applications (apps) compatible with our system.

## 2023-03-31 NOTE — DISCHARGE NOTE PROVIDER - HOSPITAL COURSE
89 yo male w/ PMHx of BPH, HTN, Lumbar spinal stenosis, chronic obrien sent in for positive urine cultures sensitive to only IV abx.    # CAUTI  - Cx sens reviewed  - was placed on Meropenem by my colleague but unclear if should be treated ? colonization will get ID consult  - catheter  reportedly changed at time cxs drawn  3/30/2023 await ID consult     # HTN  - c/w BB   bp stable    # BPH  - c/w Finasteride, Flomax    #CKD stage  3b  Renal fxn stable    # Anemia of chronic disease  - H/H stable at baseline    # DVT ppx - HSQ    SEEN BY ID:  Prior urine cx had 3 strains of P. aeruginosa, and also S. maltophilia.  Patient here asymptomatic  Patient had been stable in the absence of any effective antibiotic therapy for ~2 weeks  I would not treat any of these urine isolates at this time.  Injudicious use of antibiotics will merely select progressively more resistant jeremy and potential AE from the drugs.  No role for antibiotics at this time  Will stop meropenem  Patient is stable to discharge Home      89 yo male w/ PMHx of BPH, HTN, Lumbar spinal stenosis, chronic obrien sent in for positive urine cultures sensitive to only IV abx.    # CAUTI  - Cx sens reviewed  - was placed on Meropenem by my colleague but unclear if should be treated ? colonization will get ID consult  - catheter  reportedly changed at time cxs drawn  3/30/2023 await ID consult   3/31/2023- per ID no need for antibx  # HTN  - c/w BB   bp stable    # BPH  - c/w Finasteride, Flomax    #CKD stage  3b  Renal fxn stable    # Anemia of chronic disease  - H/H stable at baseline    # DVT ppx - HSQ    SEEN BY ID:  Prior urine cx had 3 strains of P. aeruginosa, and also S. maltophilia.  Patient here asymptomatic  Patient had been stable in the absence of any effective antibiotic therapy for ~2 weeks  I would not treat any of these urine isolates at this time.  Injudicious use of antibiotics will merely select progressively more resistant jeremy and potential AE from the drugs.  No role for antibiotics at this time  Will stop meropenem  Patient is stable to discharge Home

## 2023-03-31 NOTE — DISCHARGE NOTE PROVIDER - NSDCMRMEDTOKEN_GEN_ALL_CORE_FT
Patient was overheard speaking on her cellphone in room. Patient was yelling, harassing, and swearing at the person she was speaking to. Seymour Marr was here in the ED later in the night and informed staff that patient had called the fire department and requested to speak to one of the staff by name and the person who answered the phone did not realize it was this patient calling to yell at the [employer] staff that brought her in to the ED. [employer] on staff tonight states they will be screening the calls to ensure patient is not able to contact their staff member again.    acetaminophen 325 mg oral tablet: 2 tab(s) orally every 6 hours, As needed, Temp greater or equal to 38C (100.4F)  amLODIPine 5 mg oral tablet: 1 tab(s) orally once a day  finasteride 5 mg oral tablet: 1 tab(s) orally once a day  gabapentin 300 mg oral capsule: 1 cap(s) orally 2 times a day  labetalol 100 mg oral tablet: 1 tab(s) orally 2 times a day  mirtazapine 15 mg oral tablet: 1/2 tab(s) orally once a day  nystatin 100,000 units/g topical powder: 1 application topically every 8 hours  polyethylene glycol 3350 oral powder for reconstitution: 17 gram(s) orally once a day (at bedtime)  PREDNISOLONE AC 1% OPHTH SUSP  5ML: SHAKE LIQUID AND INSTILL 1 DROP IN RIGHT EYE TWICE DAILY  senna oral tablet: 2 tab(s) orally once a day (at bedtime)  tamsulosin 0.4 mg oral capsule: 1 cap(s) orally once a day (at bedtime)

## 2023-04-10 PROBLEM — H10.33 ACUTE BACTERIAL CONJUNCTIVITIS OF BOTH EYES: Status: RESOLVED | Noted: 2023-01-27 | Resolved: 2023-01-01

## 2023-04-10 NOTE — CURRENT MEDS
[Medication and Allergies Reconciled] : medication and allergies reconciled [High Risk Medications Reviewed and Reconciled (Beers Criteria)] : high risk medications reviewed and reconciled [Reviewed patient reported medication adherence from Comprehensive Assessment] : Reviewed patient reported medication adherence from comprehensive assessment [Adherent to medications] : Patient is adherent to medications as prescribed [de-identified] : managed by family

## 2023-04-10 NOTE — PHYSICAL EXAM
[Well Nourished] : well nourished [Well Developed] : well developed [Normal Voice/Communication] : normal voice communication [Normal TMs] : both tympanic membranes were normal [No JVD] : no jugular venous distention [Supple] : the neck was supple [Normal Gait] : normal gait [Normal Strength/Tone] : muscle strength and tone were normal [de-identified] : pleasantly confused [de-identified] : purulent drainage from eyes [No Acute Distress] : no acute distress [Normal Sclera/Conjunctiva] : normal sclera/conjunctiva [EOMI] : extra ocular movement intact [Normal Outer Ear/Nose] : the ears and nose were normal in appearance [No Respiratory Distress] : no respiratory distress [Clear to Auscultation] : lungs were clear to auscultation bilaterally [No Accessory Muscle Use] : no accessory muscle use [Normal Rate] : heart rate was normal  [Regular Rhythm] : with a regular rhythm [Normal S1, S2] : normal S1 and S2 [No Murmurs] : no murmurs heard [No Edema] : there was no peripheral edema [Normal Bowel Sounds] : normal bowel sounds [Non Tender] : non-tender [Soft] : abdomen soft [Not Distended] : not distended [Normal Post Cervical Nodes] : no posterior cervical lymphadenopathy [Normal Anterior Cervical Nodes] : no anterior cervical lymphadenopathy [No CVA Tenderness] : no ~M costovertebral angle tenderness [No Spinal Tenderness] : no spinal tenderness [Kyphosis] :  kyphosis present [No Joint Swelling] : no joint swelling seen [No Clubbing, Cyanosis] : no clubbing  or cyanosis of the fingernails [No Rash] : no rash [No Skin Lesions] : no skin lesions [Normal Affect] : the affect was normal [Scoliosis] : scoliosis not present [de-identified] : 16 Fr catheter [de-identified] : A&Ox2

## 2023-04-10 NOTE — CHRONIC CARE ASSESSMENT
[Oriented To Person] : ~L oriented to person [Oriented To Place] : ~L oriented to place [Alert] : ~L alert [Reviewed Mini-Cog Outcomes from Comprehensive Assessment] : Reviewed Mini-Cog outcomes from comprehensive assessment [Reviewed Home Safety Evaluation] : Reviewed home safety evaluation [No Action Needed] : No action needed [Limited decision making ability] : limited decision making ability [Other: ____] : [unfilled] [Can not Exercise (Disability)] : Exercise: The patient can not exercise due to disability [Oriented To Time] : not ~L oriented to time [Oriented To Situation] : not ~L oriented to situation

## 2023-04-10 NOTE — REASON FOR VISIT
[Post-hospitalization from ___ Hospital] : Post-hospitalization from [unfilled] Hospital [Discharge Summary] : discharge summary [Discharge Med List] : discharge medication list [Med Reconciliation] : medication reconciliation has been completed [Admitted on: ___] : The patient was admitted on [unfilled] [Discharged on ___] : discharged on [unfilled] [Patient Contacted By: ____] : and contacted by [unfilled] [FreeTextEntry2] : Admitted for  P. aeruginosa, and also S. maltophilia UTI- treated in hospital w/ meropenem- seen by ID- no role for antibiotics at this time, meropenem stopped and pt discharged home \par

## 2023-04-10 NOTE — HISTORY OF PRESENT ILLNESS
[Patient] : patient [Family Member] : family member [Formal Caregiver] : formal caregiver [FreeTextEntry2] : PMH: MCI, CKD, Anemia, Ankylosing spondylitis, HTN, BPH with urinary retention and chronic obrien catheter, Diverticulosis, aortic atherosclerosis\par \par A&Ox2    confused in wheelchair for visit, reports he is tired today \par Aide reports pt "fine" since discharge\par Appetite still good\par Obrien in place- no issues-  \par Sleeps well through night with mirtazapine\par Buttocks reddened- will order A&D ointment\par \par BPH with urinary retention- has 16 Fr obrien in place; sees urology Dr Wagner; on finasteride and tamsulosin\par \par Ankylosing spondylitis- uses gabapentin for pain, non- ambulatory as above\par \par HTN: BP controlled with amlodipine\par \par CKD: GFR 41, Creat 1.56 in 1/23\par \par Anemia: H&H 8/25 in 1/23\par \par \par \par \par \par

## 2023-04-12 NOTE — DISCHARGE NOTE PROVIDER - NSDCCPCAREPLAN_GEN_ALL_CORE_FT
PRINCIPAL DISCHARGE DIAGNOSIS  Diagnosis: UTI (urinary tract infection)  Assessment and Plan of Treatment: Follow with Dr. Mensah No

## 2023-05-02 NOTE — PATIENT PROFILE ADULT - ARE SIGNIFICANT INDICATORS COMPLETE.
Lateral Platysmal Bands Units: 0 Show Right And Left Brow Units: No Show Additional Area 1: Yes Price (Use Numbers Only, No Special Characters Or $): 435 Consent: Written consent obtained. Risks include but not limited to lid/brow ptosis, bruising, swelling, diplopia, temporary effect, incomplete chemical denervation. Forehead Units: 8 Post-Care Instructions: Patient instructed to not lie down for 4 hours and limit physical activity for 24 hours. Patient instructed not to travel by airplane for 48 hours. Detail Level: Detailed Glabellar Complex Units: 15 Lot #: [id number] Dilution (U/0.1 Cc): 5 Reconstitution Date (Optional): 5/1/23 Patient Specific Comments (Will Not Stick From Patient To Patient): Baifizm- [id number] Incrementing Botox Units: By 0.5 Units Expiration Date (Month Year): 4/30/5 No

## 2023-05-12 PROBLEM — H61.21 IMPACTED CERUMEN OF RIGHT EAR: Status: ACTIVE | Noted: 2023-01-01

## 2023-05-12 PROBLEM — Z87.898 HISTORY OF DYSURIA: Status: RESOLVED | Noted: 2023-01-01 | Resolved: 2023-01-01

## 2023-05-12 PROBLEM — N39.0 PSEUDOMONAS URINARY TRACT INFECTION: Status: RESOLVED | Noted: 2023-01-01 | Resolved: 2023-01-01

## 2023-05-12 PROBLEM — I10 BENIGN ESSENTIAL HTN: Status: ACTIVE | Noted: 2020-12-15

## 2023-05-12 PROBLEM — R41.89 COGNITIVE IMPAIRMENT: Status: ACTIVE | Noted: 2020-12-17

## 2023-05-12 NOTE — PHYSICAL EXAM
[No Acute Distress] : no acute distress [Normal Sclera/Conjunctiva] : normal sclera/conjunctiva [EOMI] : extra ocular movement intact [Normal Outer Ear/Nose] : the ears and nose were normal in appearance [No Respiratory Distress] : no respiratory distress [Clear to Auscultation] : lungs were clear to auscultation bilaterally [No Accessory Muscle Use] : no accessory muscle use [Normal Rate] : heart rate was normal  [Regular Rhythm] : with a regular rhythm [Normal S1, S2] : normal S1 and S2 [No Murmurs] : no murmurs heard [No Edema] : there was no peripheral edema [Normal Bowel Sounds] : normal bowel sounds [Non Tender] : non-tender [Soft] : abdomen soft [Not Distended] : not distended [Normal Post Cervical Nodes] : no posterior cervical lymphadenopathy [Normal Anterior Cervical Nodes] : no anterior cervical lymphadenopathy [No CVA Tenderness] : no ~M costovertebral angle tenderness [No Spinal Tenderness] : no spinal tenderness [Kyphosis] :  kyphosis present [No Joint Swelling] : no joint swelling seen [No Clubbing, Cyanosis] : no clubbing  or cyanosis of the fingernails [No Rash] : no rash [No Skin Lesions] : no skin lesions [Normal Affect] : the affect was normal [Normal Voice/Communication] : normal voice communication [No JVD] : no jugular venous distention [Supple] : the neck was supple [Scoliosis] : scoliosis not present [de-identified] : (R) cerumen impaction, upper and lower dentures [de-identified] : 16 Fr catheter [de-identified] : A&Ox2

## 2023-05-12 NOTE — HISTORY OF PRESENT ILLNESS
[In-Place] : has aide services in-place [A] : A [Patient is stable] : patient is stable [Patient] : patient [Family Member] : family member [Formal Caregiver] : formal caregiver [FreeTextEntry2] : PMH: MCI, CKD, Anemia, Ankylosing spondylitis, HTN, BPH with urinary retention and chronic obrien catheter, Diverticulosis, aortic atherosclerosis\par \par A&Ox2-3 talkative today - in wheelchair for visit, reports he has had (L) sided face pain- responds to Tylenol \par Aide reports pt "fine" since discharge\par Appetite still good\par Obrien in place- no issues-  \par Sleeps well through night with mirtazapine\par \par \par BPH with urinary retention- has 16 Fr obrien in place; sees urology Dr Wagner; on finasteride and tamsulosin- obrien changed by [facility]\par \par Ankylosing spondylitis- uses gabapentin for pain, non- ambulatory as above\par \par HTN: BP controlled with amlodipine, labetolol\par \par CKD: GFR 41, Creat 1.56 in 1/23\par \par Anemia: H&H 8/25 in 1/23\par \par \par \par \par \par

## 2023-05-12 NOTE — SURGICAL HISTORY
Nutrition consult received for pressure ulcer > stage 2. Subjective information obtained from HHA at bedside. Pt's UBW reported around 170 pounds, denies any recent wt fluctuations. Pt's current wt is stable at 168 pounds. Pt with good appetite and po intakes, had > 75% po intakes with feeding assistance this morning, flowsheet reviewed. No GI distress, but last BM unknown. Per aide, pt does not have any trouble chewing/swallowing, but prefers current mechanical soft diet. NKFA. PTA takes multivitamin and Ensure supplement 2 x day. [PSH Reviewed and Updated] : past surgical history reviewed and updated

## 2023-05-12 NOTE — REVIEW OF SYSTEMS
[Negative] : Heme/Lymph [Earache] : earache [Constipation] : no constipation [FreeTextEntry8] : as noted in HPI [FreeTextEntry1] : ROS with daughter and pt

## 2023-05-12 NOTE — CURRENT MEDS
[Medication and Allergies Reconciled] : medication and allergies reconciled [High Risk Medications Reviewed and Reconciled (Beers Criteria)] : high risk medications reviewed and reconciled [Reviewed patient reported medication adherence from Comprehensive Assessment] : Reviewed patient reported medication adherence from comprehensive assessment [Adherent to medications] : Patient is adherent to medications as prescribed [de-identified] : managed by family

## 2023-05-12 NOTE — REASON FOR VISIT
[Follow-Up] : a follow-up visit [Pre-Visit Preparation] : pre-visit preparation was done [Intercurrent Specialty/Sub-specialty Visits] : the patient has intercurrent specialty/sub-specialty visits [FreeTextEntry1] : for chronic conditions [FreeTextEntry3] : Home Care

## 2023-05-17 NOTE — PROGRESS NOTE ADULT - ASSESSMENT
fevers   urinary tract infection ? from  obrien   fevers started after obrien change   as per RN no cough ;  urine culture klebsiella;in jan 2020; urinary tract infection urine culture pan sensitive kleb   persistent fever and increased wbc   discussed with PMD   vss   patient is non toxic   will revise antibiotics   follow up urine culture   as no response in 48 hours of cefepime may have esbl   will narrow coverage based on culture fevers   urinary tract infection ? from  obrien   fevers started after obrien change   as per RN no cough ;  urine culture  klebsiella not reported as esbl   persistent increased in wbc from ??  no response despite merrem and that was overkill   antibiotics narrowed   will get ct abdominal pelvis ; there is some abdominal tenderness   antibiotics revised ;; Rinvoq Counseling: I discussed with the patient the risks of Rinvoq therapy including but not limited to upper respiratory tract infections, shingles, cold sores, bronchitis, nausea, cough, fever, acne, and headache. Live vaccines should be avoided.  This medication has been linked to serious infections; higher rate of mortality; malignancy and lymphoproliferative disorders; major adverse cardiovascular events; thrombosis; thrombocytopenia, anemia, and neutropenia; lipid elevations; liver enzyme elevations; and gastrointestinal perforations.

## 2023-06-26 NOTE — PROGRESS NOTE ADULT - PROBLEM SELECTOR PROBLEM 2
----- Message from Nitza Hooks sent at 6/26/2023 10:05 AM CDT -----  Contact: Patient  Patient called to consult with nurse or staff regarding her upcoming appointment. She wanted to see if she could be worked in sooner and would like to speak with office if possible. She states if no sooner appointment, she will keep current appointment scheduled in August. Patient can be reached at [phone]. Thanks/       
UTI (urinary tract infection)

## 2023-06-29 NOTE — ED ADULT NURSE NOTE - CARDIO ASSESSMENT
[FreeTextEntry1] : Patient with mid back pain highly likely musculoskeletal therefore recommend Advil 1 to 2 tablets 2-3 times a day for 5 to 7 days.\par Abdominal complaints of unclear etiology but also could be muscular therefore patient to assess if Advil helps this also.\par Otherwise follow-up. ---

## 2023-08-08 PROBLEM — L89.322 DECUBITUS ULCER OF LEFT BUTTOCK, STAGE 2: Status: ACTIVE | Noted: 2023-01-01

## 2023-08-16 NOTE — H&P ADULT - NSICDXNOFAMILYHX_GEN_ALL_CORE
Patient was offered choice of vendor and chose [facility].  Patient Humble Batista was set up at [place] Kirkpatrick.    Elizabeth Crump   
<-- Click to add NO pertinent Family History

## 2023-09-09 NOTE — PATIENT PROFILE ADULT - PATIENT/CAREGIVER ACCEPTED INTERPRETER SERVICES
Pt BIBA from home, pt's Mom called tonight concerned about fever. Pt is non-verbal quadriplegic. Noted to have a wet cough on arrival, hypotensive and tachycardic with O2 sats in the mid to high 80's on RA. Placed on 2L O2 via NC.           yes

## 2023-09-19 NOTE — DIETITIAN INITIAL EVALUATION ADULT. - PROBLEM SELECTOR PROBLEM 3
Epidural Block    Date/Time: 9/19/2023 8:43 AM    Performed by: Dieter Owen MD  Authorized by: Dieter Owen MD    Patient Location:  L&D  Indication: Labor Pain    Pre anesthesia checklist Patient Identified (2 criteria), Consent Verified, Necessary Block Equipment Present, Monitors applied, IV Bolus, Allergies confirmed, Block Plan Confirmed, Drugs/Solutions Labeled, IV Access functioning, Timeout Performed, Coagulation Status, Block site marked (if applicable), Resuscitation equipment available, Sedation given (if needed) and Aseptic technique  Epidural:     Patient Position:  Sitting    Prep:  Chlorhexidine Gluconate    Max Sterile Barrier Technique:  Hand washing, cap/mask, sterile gloves, sterile drape and sterile dressing applied    Monitoring:  Heart rate, continuous pulse oximetry and non-invasive blood pressure    Approach:  Midline    Location:  L3-4  Injection Technique:  KAYLA saline  Ultrasound Used:  No  Needle and Epidural Catheter:     Needle Type:  Tuohy    Needle Gauge:  17 G    Needle Length:  3.5 in    KAYLA Depth:  4.5 cm  Catheter Type:  Side hole  Catheter Size:  19 G  Catheter at Skin Depth:  10 cm  Securement:  Stabilization device, Tape and Transparent dressing  Test Dose:  Lidocaine 1.5% with epinephrine 1-to-200,000  Test Dose Volume (mL):  5  Test Dose Result:  Negative  Assessment:    Block Outcome: pain improved  Number of Attempts: 1   Procedure Assessment: patient tolerated procedure well with no complications  Start Time:  9/19/2023 8:43 AM  Stop Time: 9/19/2023 8:44 AM       Spinal stenosis of lumbar region without neurogenic claudication

## 2023-10-23 NOTE — PROGRESS NOTE ADULT - ASSESSMENT
Daughter Blanka esteves [facility].  Please advise   89M pmhx HTN, CKD, BPH with chronic obrien who presents with fever and weakness for 8 days. No other symptoms. fever started shortly after his obrien was changed- patient denies nausea vomiting or diarrhea - complain of abdominal distention

## 2023-10-28 NOTE — ED PROVIDER NOTE - PROGRESS NOTE DETAILS
Patient signed out to me by Dr. De Paz, was pending repeat BMP and urine.  Urine consistent with large UTI, given hypotension and ROGER will start antibiotics and admit to medicine to follow-up cultures and for treatment.  Patient's son who is a physician was made aware of plan and is amenable

## 2023-10-28 NOTE — ED PROVIDER NOTE - CLINICAL SUMMARY MEDICAL DECISION MAKING FREE TEXT BOX
89 y/o M hx of HTN, BPH w/ chronic obrien catheter. endorsing episode of lightheadedness and syncope in wheelchair earlier today. witnessed by family member. daughter at bedside now to provide collateral. BP 80/48 w/ EMS, 300ml fluid bolus given w/ improvement in BP. patient endorsing feeling better. r/o infectious etiology. decreased PO intake, consider oleksandr for decreased PO intake. obrien catheter change to check for UTI. fsg wnl. EKG RBBB, no stemi. r/o lyte abn. non-ambulatory at baseline, patient does not move his legs for years per daughter. at baseline per daughter.

## 2023-10-28 NOTE — ED PROVIDER NOTE - OBJECTIVE STATEMENT
89 y/o M hx of HTN, BPH w/ chronic obrien catheter. endorsing episode of lightheadedness and syncope in wheelchair earlier today. witnessed by family member. daughter at bedside now to provide collateral. BP 80/48 w/ EMS, 300ml fluid bolus given w/ improvement in BP. patient endorsing feeling better. patient is not ambulatory at baseline per daughter.

## 2023-10-28 NOTE — H&P ADULT - ASSESSMENT
Patient is a 89 y/o Male with significant PMH of HTN, Loop reorder in place, Dementia, BPH w/ chronic indwelling Ma catheter in place was brought in ED with c/o with c/o lightheadedness and syncope in wheelchair earlier today which was witnessed by family member. Patient is very poor historian and no family members at bedside during my evaluation, and hence most of the informations has been obtained from EMR documentation and in d/w the the ED physician. Reportedly, patient has BP of 80/48 w/ EMS, and 300ml fluid bolus was given by EMS with improvement in BP.  Patient is non-ambulatory at baseline, and he does not move his legs for years, and is at baseline as had been reported by his daughter to ED provider.      # Acute UTI likely catheter associated UTI.  # Syncope due to hypovolumic hypotension.  Denies chest pain. Troponin and EKG unremarkable. Ma catheter changed in ED.  Admit to medical telemetry.  IV fluids for hydration.  Lipid panel in am.  Follow urine culture.  Rocephin 1 gm IV daily.  PT evaluation.    # ROGER likely due to hypotension/dehydration.  BUN 50, Cr 1.93 (no prior level for comparison).  Adequate hydration.  BMP in am.    # BPH with Ma in place.  Ma changed in ED.    # HTN and Loop recorder in place.  Needs home medication reconciliation.    # DVT prophylaxis: Heparin sq.           Patient is a 89 y/o Male with significant PMH of HTN, Loop reorder in place, Dementia, BPH w/ chronic indwelling Ma catheter in place was brought in ED with c/o with c/o lightheadedness and syncope in wheelchair earlier today which was witnessed by family member. Patient is very poor historian and no family members at bedside during my evaluation, and hence most of the informations has been obtained from EMR documentation and in d/w the the ED physician. Reportedly, patient has BP of 80/48 w/ EMS, and 300ml fluid bolus was given by EMS with improvement in BP.  Patient is non-ambulatory at baseline, and he does not move his legs for years, and is at baseline as had been reported by his daughter to ED provider.      # Acute UTI likely catheter associated UTI.  # Syncope due to hypovolumic hypotension.  Denies chest pain. Troponin and EKG unremarkable. Ma catheter changed in ED.  Admit to medical telemetry.  IV fluids for hydration.  Lipid panel in am.  Follow urine culture.  Rocephin 1 gm IV daily.  PT evaluation.    # ROGER likely due to hypotension/dehydration.  BUN 50, Cr 1.93 (no prior level for comparison).  Adequate hydration.  BMP in am.    # Severe anemia.  Hb 8.5, prior level unknown.  No evidence of bleeding.  Monitor H/H daily.    # BPH with Ma in place.  Ma changed in ED.    # HTN and Loop recorder in place.  Needs home medication reconciliation.    # DVT prophylaxis: Heparin sq.

## 2023-10-28 NOTE — H&P ADULT - NSHPLABSRESULTS_GEN_ALL_CORE
LABS:                        8.5    4.13  )-----------( 161      ( 28 Oct 2023 17:00 )             27.2     10-28    138  |  108  |  49<H>  ----------------------------<  100<H>  4.0   |  24  |  1.79<H>    Ca    8.7      28 Oct 2023 21:00  Mg     2.9     10-28          < from: Xray Chest 1 View- PORTABLE-Urgent (Xray Chest 1 View- PORTABLE-Urgent .) (10.28.23 @ 17:09) >      IMPRESSION: Left loop recorder. Dehydration.    --- End of Report ---    < end of copied text >

## 2023-10-28 NOTE — H&P ADULT - NSHPPHYSICALEXAM_GEN_ALL_CORE
PHYSICAL EXAM:  GENERAL: NAD, lying in bed comfortably  HEAD:  Atraumatic, Normocephalic  EYES: EOMI, PERRLA, conjunctiva and sclera clear  ENT: Moist mucous membranes  NECK: Supple, No JVD  CHEST/LUNG: Clear to auscultation bilaterally; No rales, rhonchi, wheezing, or rubs. Unlabored respirations  HEART: Regular rate and rhythm; No murmurs, rubs, or gallops  ABDOMEN: Bowel sounds present; Soft, Nontender, Nondistended. No hepatomegaly  EXTREMITIES:  2+ Peripheral Pulses, brisk capillary refill. No clubbing, cyanosis, or edema  NERVOUS SYSTEM:  Alert & Oriented X1.  MSK: ROM ok, no edema.  SKIN: No rashes or lesions

## 2023-10-28 NOTE — ED ADULT NURSE NOTE - OBJECTIVE STATEMENT
Pt alert and awake, mental status at baseline BIBEMS s/p syncopal episode at home while on wheelchair. Family at bedside state pt was sweating before syncopal episode and has not been drinking or eating as much. Ma catheter noted in place, family reports it has been in since 10/17. Urine appears bright yellow. Pt BP low with EMS and received IV fluids through IV L wrist #18. Pt placed on cardiac monitoring and v/s stable. Pt bedbound at baseline. Pt denies SOB, fever/chills, N/V/D, abdominal pain, chest pain. PMH Paraplegia, HTN, UTI.

## 2023-10-28 NOTE — ED ADULT TRIAGE NOTE - CHIEF COMPLAINT QUOTE
while sitting in his wheelchair he had an episode of syncope, pt is also c/o dizziness and hypotension . initial blood pressure was 80/48 and ems placed a deejay on the left hand 18 gauge and gave 300ml of fluid, fingerstick was 107  . h/p paraplegia . HTN, UTI,

## 2023-10-28 NOTE — H&P ADULT - NSICDXPASTMEDICALHX_GEN_ALL_CORE_FT
PAST MEDICAL HISTORY:  Benign essential HTN     BPH (benign prostatic hyperplasia)     Dementia     Implantable loop recorder present     Indwelling Ma catheter present     Wheelchair dependent

## 2023-10-28 NOTE — ED PROVIDER NOTE - PHYSICAL EXAMINATION
General: Well appearing male in no acute distress  HEENT: Normocephalic, atraumatic. Moist mucous membranes. Oropharynx clear. No lymphadenopathy.  Eyes: No scleral icterus. EOMI. HUMA.  Neck:. Soft and supple. Full ROM without pain. No midline tenderness  Cardiac: Regular rate and regular rhythm. No murmurs, rubs, gallops. Peripheral pulses 2+ and symmetric. No LE edema.  Resp: Lungs CTAB. Speaking in full sentences. No wheezes, rales or rhonchi.  Abd: Soft, non-tender, non-distended. No guarding or rebound. No scars, masses, or lesions.  Back: Spine midline and non-tender. No CVA tenderness.    Skin: No rashes, abrasions, or lacerations.  Neuro: AO x 3. unable to lift bilateral lower extremities off bed.

## 2023-10-28 NOTE — H&P ADULT - HISTORY OF PRESENT ILLNESS
Chief Complaints: Syncope.    Patient is a 89 y/o Male with significant PMH of HTN, Loop reorder in place, Dementia, BPH w/ chronic indwelling Ma catheter in place was brought in ED with c/o with c/o lightheadedness and syncope in wheelchair earlier today which was witnessed by family member. Patient is very poor historian and no family members at bedside during my evaluation, and hence most of the informations has been obtained from EMR documentation and in d/w the the ED physician. Reportedly, patient has BP of 80/48 w/ EMS, and 300ml fluid bolus was given by EMS with improvement in BP.  Patient is non-ambulatory at baseline, and he does not move his legs for years, and is at baseline as had been reported by his daughter to ED provider.    Upon arrival in ED, his SBP was in 90s. During my evaluation, his BP was 111/57.  Currently, he denies any complaints.  In ED, his Ma catheter has been changed.  His UA is positive for UTI.   Other significant labs: WBC 4.13, Hb 8.5, BUN 50, Cr 1.93 (no prior level for comparison), BS 96, Mg 2.9, Troponin 40.    CXR shows loop recorder in place. No acute infiltrates.  EKG 65 in NSR. No acute ST-T changes.    NS 1 L bolus and Rocephin 1 gm IV given in ED.    LIST OF HIS HOME MEDICATION IS UNKNOWN AT THIS TIME.

## 2023-10-28 NOTE — ED ADULT NURSE NOTE - NSFALLHARMRISKINTERV_ED_ALL_ED
Assistance OOB with selected safe patient handling equipment if applicable/Assistance with ambulation/Communicate risk of Fall with Harm to all staff, patient, and family/Monitor gait and stability/Orthostatic vital signs/Provide patient with walking aids/Provide visual cue: red socks, yellow wristband, yellow gown, etc/Reinforce activity limits and safety measures with patient and family/Bed in lowest position, wheels locked, appropriate side rails in place/Call bell, personal items and telephone in reach/Instruct patient to call for assistance before getting out of bed/chair/stretcher/Non-slip footwear applied when patient is off stretcher/[place] to call system/Physically safe environment - no spills, clutter or unnecessary equipment/Purposeful Proactive Rounding/Room/bathroom lighting operational, light cord in reach

## 2023-10-29 NOTE — PATIENT PROFILE ADULT - HAVE YOU RECENTLY LOST WEIGHT WITHOUT TRYING?
Problem: INFECTION - ADULT  Goal: Absence or prevention of progression during hospitalization  Description: INTERVENTIONS:  - Assess and monitor for signs and symptoms of infection  - Monitor lab/diagnostic results  - Monitor all insertion sites, i e  indwelling lines, tubes, and drains  - Monitor endotracheal if appropriate and nasal secretions for changes in amount and color  - [place] appropriate cooling/warming therapies per order  - Administer medications as ordered  - Instruct and encourage patient and family to use good hand hygiene technique  - Identify and instruct in appropriate isolation precautions for identified infection/condition  Outcome: Progressing     Problem: Knowledge Deficit  Goal: Patient/family/caregiver demonstrates understanding of disease process, treatment plan, medications, and discharge instructions  Description: Complete learning assessment and assess knowledge base    Interventions:  - Provide teaching at level of understanding  - Provide teaching via preferred learning methods  Outcome: Progressing
No (0)

## 2023-10-29 NOTE — PHARMACOTHERAPY INTERVENTION NOTE - COMMENTS
Performed medication reconciliation and home medication list updated in outpatient medication review.     Home Medications:  amLODIPine 5 mg oral tablet: 1 tab(s) orally once a day  finasteride 5 mg oral tablet: 1 tab(s) orally once a day   gabapentin 300 mg oral capsule: 1 cap(s) orally 2 times a day  labetalol 100 mg oral tablet: 1 tab(s) orally 2 times a day   tamsulosin 0.4 mg oral capsule: 1 cap(s) orally once a day

## 2023-10-29 NOTE — PATIENT PROFILE ADULT - FALL HARM RISK - HARM RISK INTERVENTIONS

## 2023-10-29 NOTE — PROGRESS NOTE ADULT - SUBJECTIVE AND OBJECTIVE BOX
CHIEF COMPLAINT/INTERVAL HISTORY:    Patient is a 89y old  Male who presents with a chief complaint of UTI, Hypotension and ROGER. (28 Oct 2023 23:12)      HPI:  Chief Complaints: Syncope.    Patient is a 89 y/o Male with significant PMH of HTN, Loop reorder in place, Dementia, BPH w/ chronic indwelling Ma catheter in place was brought in ED with c/o with c/o lightheadedness and syncope in wheelchair earlier today which was witnessed by family member. Patient is very poor historian and no family members at bedside during my evaluation, and hence most of the informations has been obtained from EMR documentation and in d/w the the ED physician. Reportedly, patient has BP of 80/48 w/ EMS, and 300ml fluid bolus was given by EMS with improvement in BP.  Patient is non-ambulatory at baseline, and he does not move his legs for years, and is at baseline as had been reported by his daughter to ED provider.    Upon arrival in ED, his SBP was in 90s. During my evaluation, his BP was 111/57.  Currently, he denies any complaints.  In ED, his Ma catheter has been changed.  His UA is positive for UTI.   Other significant labs: WBC 4.13, Hb 8.5, BUN 50, Cr 1.93 (no prior level for comparison), BS 96, Mg 2.9, Troponin 40.    CXR shows loop recorder in place. No acute infiltrates.  EKG 65 in NSR. No acute ST-T changes.    NS 1 L bolus and Rocephin 1 gm IV given in ED.    LIST OF HIS HOME MEDICATION IS UNKNOWN AT THIS TIME. (28 Oct 2023 23:12)      SUBJECTIVE & OBJECTIVE: Pt seen and examined at bedside. poor historian, denies any complaints, lying comfortably     Vital Signs Last 24 Hrs  T(C): 36.6 (29 Oct 2023 08:51), Max: 37 (28 Oct 2023 15:45)  T(F): 97.8 (29 Oct 2023 08:51), Max: 98.6 (28 Oct 2023 15:45)  HR: 66 (29 Oct 2023 08:51) (66 - 79)  BP: 138/65 (29 Oct 2023 08:51) (96/56 - 138/65)  BP(mean): --  ABP: --  ABP(mean): --  RR: 17 (29 Oct 2023 08:51) (16 - 21)  SpO2: 99% (29 Oct 2023 08:51) (94% - 99%)    O2 Parameters below as of 29 Oct 2023 04:25  Patient On (Oxygen Delivery Method): room air              MEDICATIONS  (STANDING):  cefTRIAXone   IVPB 1000 milliGRAM(s) IV Intermittent every 24 hours  heparin   Injectable 5000 Unit(s) SubCutaneous every 8 hours  sodium chloride 0.9%. 1000 milliLiter(s) (100 mL/Hr) IV Continuous <Continuous>    MEDICATIONS  (PRN):  acetaminophen     Tablet .. 650 milliGRAM(s) Oral every 6 hours PRN Temp greater or equal to 38C (100.4F), Mild Pain (1 - 3)  aluminum hydroxide/magnesium hydroxide/simethicone Suspension 30 milliLiter(s) Oral every 4 hours PRN Dyspepsia  melatonin 3 milliGRAM(s) Oral at bedtime PRN Insomnia  ondansetron Injectable 4 milliGRAM(s) IV Push every 8 hours PRN Nausea and/or Vomiting        PHYSICAL EXAM:    GENERAL: NAD, well-developed  HEAD:  Atraumatic, Normocephalic  EYES: EOMI, PERRLA, conjunctiva and sclera clear  ENMT: Moist mucous membranes  NECK: Supple,  NERVOUS SYSTEM:  Alert & awake, confused  CHEST/LUNG: Clear to auscultation bilaterally; No rales, rhonchi, wheezing, or rubs  HEART: Regular rate and rhythm;  EXTREMITIES: no cyanosis, or edema  ABD: soft, n/t, BS +, Ma in place     LABS:                        8.2    3.00  )-----------( 164      ( 29 Oct 2023 05:30 )             25.8     10-29    140  |  106  |  47<H>  ----------------------------<  88  3.7   |  28  |  1.78<H>    Ca    9.2      29 Oct 2023 05:30  Mg     2.9     10-28        Urinalysis Basic - ( 29 Oct 2023 05:30 )    Color: x / Appearance: x / SG: x / pH: x  Gluc: 88 mg/dL / Ketone: x  / Bili: x / Urobili: x   Blood: x / Protein: x / Nitrite: x   Leuk Esterase: x / RBC: x / WBC x   Sq Epi: x / Non Sq Epi: x / Bacteria: x

## 2023-10-30 NOTE — PHYSICAL THERAPY INITIAL EVALUATION ADULT - ACTIVE RANGE OF MOTION EXAMINATION, REHAB EVAL
No active range of motion noted BLE . Partial range of motion inconsistently noted in B elbows/hands

## 2023-10-30 NOTE — PHYSICAL THERAPY INITIAL EVALUATION ADULT - AMBULATION SKILLS, REHAB EVAL
Pt has been non ambulatory for 14 years. Wheelchair locomotion - propelled by caregiver/unable to perform

## 2023-10-30 NOTE — PHYSICAL THERAPY INITIAL EVALUATION ADULT - ADDITIONAL COMMENTS
Lives in a  with 4 step to enter. +Chronic obrien  Family has a collapsible ramp that is used for transporting pt/in -out of home. Pt has hospital bed /wheelchair- both need repairs. +HHA 12 hr/7 days/week. Pt is never left alone. Dtr works at [employer].

## 2023-10-30 NOTE — PHYSICAL THERAPY INITIAL EVALUATION ADULT - GENERAL OBSERVATIONS, REHAB EVAL
Seen with creole language line assistance ID [id number] Komal - Pt recd supine nad , lights off , L hand over the eyes covering them.  Did not remove hands from eyes despite lights on /off. A x O to, unable to f/u commands. White/cream colored lesion on R side of scalp. +camille

## 2023-10-30 NOTE — PHYSICAL THERAPY INITIAL EVALUATION ADULT - MODALITIES TREATMENT COMMENTS
Pt is at baseline functional level. restorative PT not indicated to improve function . Dtr Ching in agreement

## 2023-10-31 NOTE — DIETITIAN INITIAL EVALUATION ADULT - PERTINENT MEDS FT
MEDICATIONS  (STANDING):  cefTRIAXone   IVPB 1000 milliGRAM(s) IV Intermittent every 24 hours  heparin   Injectable 5000 Unit(s) SubCutaneous every 8 hours  potassium chloride   Powder 40 milliEquivalent(s) Oral once  sodium chloride 0.9%. 1000 milliLiter(s) (100 mL/Hr) IV Continuous <Continuous>    MEDICATIONS  (PRN):  acetaminophen     Tablet .. 650 milliGRAM(s) Oral every 6 hours PRN Temp greater or equal to 38C (100.4F), Mild Pain (1 - 3)  aluminum hydroxide/magnesium hydroxide/simethicone Suspension 30 milliLiter(s) Oral every 4 hours PRN Dyspepsia  melatonin 3 milliGRAM(s) Oral at bedtime PRN Insomnia  ondansetron Injectable 4 milliGRAM(s) IV Push every 8 hours PRN Nausea and/or Vomiting

## 2023-10-31 NOTE — DIETITIAN INITIAL EVALUATION ADULT - OTHER INFO
Pt with PMH of dementia, HTN, has loop recorder, BPH with chronic obrien presented with syncope and found to have UTI.  Pt sleeping at time of visit. Family friend at bedside provided limited information.  Pt completely dependent with ADLs; lives with daughter and has HHA for 12 hrs x 7 days.  Pt consuming 26-75% of documented meals during admission as per flow sheets. Observed pt ate <25% of lunch today. Noted on IV fluids. No reports of any chew/swallowing difficulty, however pt would likely benefit from easy to chew/soft consistency for ease. No reports of any N/V/D/C. Will provide Ensure Plus High Protein nutrition supplement for additional nutrition.  Per MD and ALICIA, plan is for HHA to be reinstated tomorrow and for pt to be discharged home tomorrow 11/01 on PO abx.

## 2023-10-31 NOTE — PROGRESS NOTE ADULT - ASSESSMENT
89 y/o M w/ PMH fo dementia, HTN, has loop recorder, BPH w/ chronic obrien p/w s/p syncope and lightheadedness at home while sitting in WC   In ED found to be hypotensive w/ UTI    sepsis sec to UTI/ UTI associated w/ chronic obrien  Syncope likely sec to dehydration/ hypotension  IVF  IV CTX  fu Urine cx  obrien changed in ED    ROGER vs CKD  IVF, monitor Creat    Anemia- Monitor H/H    BPH w/ chronic obrien  obrien care    HTN and Loop recorder in place.    SQH         
89 y/o M w/ PMH fo dementia, HTN, has loop recorder, BPH w/ chronic obrien presents with syncope and found to have UTI    #UTI  - C/w IV CTX for now for complicated cystitis (chronic Obrien)  - Urine culture with E. Coli, pending sensitivities  Of note, obrien changed in ED    Anemia- Monitor H/H  - Minimize number of blood draws     BPH w/ chronic Obrien    HTN and Loop recorder in place.    [facility]     Dispo: HHA to be reinstated tomorrow, plan for DC at 10 AM on PO antibiotics     
89 y/o M w/ PMH fo dementia, HTN, has loop recorder, BPH w/ chronic obrien presents with syncope and found to have UTI    #UTI  - C/w IV CTX  - F/u Urine cx  Of note, obrien changed in ED    Anemia- Monitor H/H  - Minimize number of blood draws     BPH w/ chronic Obrien    HTN and Loop recorder in place.    [facility]

## 2023-10-31 NOTE — DIETITIAN INITIAL EVALUATION ADULT - NUTRITIONGOAL OUTCOME2
pt presenting with dental pain, reports she had a filling that came out
P to meet >75% of protein needs via meals/supplement

## 2023-10-31 NOTE — DIETITIAN INITIAL EVALUATION ADULT - ADD RECOMMEND
Continue to provide encouragement and assistance with PO intake  Add low Na to diet rx if PO intake improves  Monitor and replete electrolytes as needed

## 2023-10-31 NOTE — PROGRESS NOTE ADULT - SUBJECTIVE AND OBJECTIVE BOX
[facility] Division of Hospital Medicine  Good Kellogg MD  Available via MS Teams    SUBJECTIVE / OVERNIGHT EVENTS: Patient seen and examined at bedside, resting comfortably and in no acute distress. Denies chest pain, palpitations. Denies shortness of breath or cough. ROS otherwise negative.     MEDICATIONS  (STANDING):  cefTRIAXone   IVPB 1000 milliGRAM(s) IV Intermittent every 24 hours  heparin   Injectable 5000 Unit(s) SubCutaneous every 8 hours  potassium chloride   Powder 40 milliEquivalent(s) Oral once  sodium chloride 0.9%. 1000 milliLiter(s) (100 mL/Hr) IV Continuous <Continuous>    MEDICATIONS  (PRN):  acetaminophen     Tablet .. 650 milliGRAM(s) Oral every 6 hours PRN Temp greater or equal to 38C (100.4F), Mild Pain (1 - 3)  aluminum hydroxide/magnesium hydroxide/simethicone Suspension 30 milliLiter(s) Oral every 4 hours PRN Dyspepsia  melatonin 3 milliGRAM(s) Oral at bedtime PRN Insomnia  ondansetron Injectable 4 milliGRAM(s) IV Push every 8 hours PRN Nausea and/or Vomiting      I&O's Summary    30 Oct 2023 07:01  -  31 Oct 2023 07:00  --------------------------------------------------------  IN: 360 mL / OUT: 1675 mL / NET: -1315 mL        PHYSICAL EXAM:  Vital Signs Last 24 Hrs  T(C): 36.4 (31 Oct 2023 11:31), Max: 37.6 (31 Oct 2023 05:03)  T(F): 97.5 (31 Oct 2023 11:31), Max: 99.6 (31 Oct 2023 05:03)  HR: 80 (31 Oct 2023 11:31) (80 - 92)  BP: 154/78 (31 Oct 2023 11:31) (154/78 - 161/78)  RR: 18 (31 Oct 2023 11:31) (16 - 18)  SpO2: 99% (31 Oct 2023 11:31) (97% - 99%)      CONSTITUTIONAL: NAD, well-groomed  EYES: PERRLA; conjunctiva and sclera clear  ENMT: Moist oral mucosa, no pharyngeal injection or exudates; normal dentition  NECK: Supple, no palpable masses; no thyromegaly  RESPIRATORY: Normal respiratory effort; lungs are clear to auscultation bilaterally  CARDIOVASCULAR: normal S1 and S2, no murmur/rub/gallop; No lower extremity edema  ABDOMEN: Nontender to palpation, normoactive bowel sounds, no rebound/guarding; No hepatosplenomegaly  MUSCULOSKELETAL:  no clubbing or cyanosis of digits; no joint swelling or tenderness to palpation  PSYCH: A+O to person, place, and time; affect appropriate  NEUROLOGY: CN 2-12 are intact and symmetric; no gross sensory deficits   SKIN: No rashes; no palpable lesions    LABS:                        8.5    3.99  )-----------( 163      ( 30 Oct 2023 09:33 )             26.4     10-30    142  |  110<H>  |  36<H>  ----------------------------<  115<H>  3.4<L>   |  23  |  1.65<H>    Ca    9.1      30 Oct 2023 09:33            Urinalysis Basic - ( 30 Oct 2023 09:33 )    Color: x / Appearance: x / SG: x / pH: x  Gluc: 115 mg/dL / Ketone: x  / Bili: x / Urobili: x   Blood: x / Protein: x / Nitrite: x   Leuk Esterase: x / RBC: x / WBC x   Sq Epi: x / Non Sq Epi: x / Bacteria: x        Culture - Urine (collected 28 Oct 2023 21:00)  Source: Clean Catch Clean Catch (Midstream)  Preliminary Report (30 Oct 2023 13:37):    >100,000 CFU/ml Escherichia coli

## 2023-10-31 NOTE — DIETITIAN INITIAL EVALUATION ADULT - PERTINENT LABORATORY DATA
10-30    142  |  110<H>  |  36<H>  ----------------------------<  115<H>  3.4<L>   |  23  |  1.65<H>    Ca    9.1      30 Oct 2023 09:33

## 2023-11-01 NOTE — DISCHARGE NOTE PROVIDER - NSDCMRMEDTOKEN_GEN_ALL_CORE_FT
amLODIPine 5 mg oral tablet: 1 tab(s) orally once a day  Cipro 250 mg oral tablet: 1 tab(s) orally once a day take 2 pills on 11/2 then1 pill a day from 11/3.  finasteride 5 mg oral tablet: 1 tab(s) orally once a day  gabapentin 300 mg oral capsule: 1 cap(s) orally 2 times a day  labetalol 100 mg oral tablet: 1 tab(s) orally 2 times a day  tamsulosin 0.4 mg oral capsule: 1 cap(s) orally once a day

## 2023-11-01 NOTE — DISCHARGE NOTE PROVIDER - HOSPITAL COURSE
HPI: 89 y/o M w/ PMH fo dementia, HTN, has loop recorder, BPH w/ chronic obrien presents with syncope and found to have UTI    Course:   #Acute complicated pansensitive E coli UTI associated with chronic obrien. improved clinically. Ok to dc home on oral cipro. follow up with PCP in 1-2 weeks or come back if symptoms recur.     Anemia- stable. outpatient PCP follow up .     BPH w/ chronic Obrien    HTN and Loop recorder in place.    Seen and examined by me today. Vitals stable.   DC time spent by me face to face excluding billable procedures 38 mins

## 2023-11-01 NOTE — DISCHARGE NOTE NURSING/CASE MANAGEMENT/SOCIAL WORK - PATIENT PORTAL LINK FT
You can access the FollowMyHealth Patient Portal offered by [facility] by registering at the following website: [email or web address]. By joining [facility]’s FollowMyHealth portal, you will also be able to view your health information using other applications (apps) compatible with our system.

## 2023-11-01 NOTE — DISCHARGE NOTE PROVIDER - NSDCFUADDINST_GEN_ALL_CORE_FT
1) It is important to see your primary physician as well as other necessary consultants within next 7-10 days to perform a comprehensive medical review.  Call their offices for an appointment as soon as you leave the hospital.  If you do not have a primary physician or unable to reach your PCP, contact the [facility] Physician Referral Service at [phone].  Your medical issues appear to be stable at this time, but if your symptoms recur or worsen, contact your physicians and/or return to the hospital if necessary.  If you encounter any issues or questions with your medication, call your physicians before stopping the medication.    2) Please access [facility] Patient portal (as instructed on the discharge paperwork) to access your medical records at any time after discharge.

## 2023-11-03 PROBLEM — N39.0 ACUTE UTI: Status: ACTIVE | Noted: 2021-10-08

## 2023-11-03 PROBLEM — Z97.8 CHRONIC INDWELLING FOLEY CATHETER: Status: ACTIVE | Noted: 2020-12-15

## 2023-11-03 PROBLEM — N18.30 CKD (CHRONIC KIDNEY DISEASE) STAGE 3, GFR 30-59 ML/MIN: Status: ACTIVE | Noted: 2020-12-15

## 2023-11-03 PROBLEM — Z23 ENCOUNTER FOR IMMUNIZATION: Status: ACTIVE | Noted: 2020-12-15 | Resolved: 2023-01-01

## 2023-11-07 PROBLEM — E87.1 HYPONATREMIA: Status: ACTIVE | Noted: 2023-01-01

## 2023-11-27 PROBLEM — R39.9 UTI SYMPTOMS: Status: ACTIVE | Noted: 2023-01-01

## 2023-11-28 PROBLEM — N17.9 AKI (ACUTE KIDNEY INJURY): Status: ACTIVE | Noted: 2023-01-19

## 2023-11-30 PROBLEM — Z99.3 DEPENDENCE ON WHEELCHAIR: Chronic | Status: ACTIVE | Noted: 2023-01-01

## 2023-11-30 PROBLEM — Z95.818 PRESENCE OF OTHER CARDIAC IMPLANTS AND GRAFTS: Chronic | Status: ACTIVE | Noted: 2023-01-01

## 2023-11-30 PROBLEM — Z97.8 PRESENCE OF OTHER SPECIFIED DEVICES: Chronic | Status: ACTIVE | Noted: 2023-01-01

## 2023-11-30 PROBLEM — F03.90 UNSPECIFIED DEMENTIA WITHOUT BEHAVIORAL DISTURBANCE: Chronic | Status: ACTIVE | Noted: 2023-01-01

## 2023-11-30 PROBLEM — N40.0 BENIGN PROSTATIC HYPERPLASIA WITHOUT LOWER URINARY TRACT SYMPTOMS: Chronic | Status: ACTIVE | Noted: 2023-01-01

## 2023-11-30 PROBLEM — I10 ESSENTIAL (PRIMARY) HYPERTENSION: Chronic | Status: ACTIVE | Noted: 2023-01-01

## 2023-11-30 NOTE — ED ADULT NURSE NOTE - NSICDXFAMILYHX_GEN_ALL_CORE_FT
Pt educated to use her call light when she needs assistance. Bed alarm on when Pt in bed and chair alarm on when Pt up in chair. Non skid socks on and call light placed within reach. RN will continue to monitor Pt. FAMILY HISTORY:  No significant family history

## 2023-11-30 NOTE — ED ADULT NURSE NOTE - NSICDXPASTSURGICALHX_GEN_ALL_CORE_FT
PAST SURGICAL HISTORY:  Implantable loop recorder present     No significant past surgical history

## 2023-11-30 NOTE — ED ADULT NURSE NOTE - NSFALLHARMRISKINTERV_ED_ALL_ED

## 2023-11-30 NOTE — ED ADULT TRIAGE NOTE - CHIEF COMPLAINT QUOTE
BIBA from home, bedbound, Pt aox2 in triage. at baseline mental status, As per aide at home pt c/o "not feeling well" as per EMS.  with EMS. Pt appears comfortable as per aide and daughter, as per EMS. No apparent distress noted. Pt was diagnosed with UTI last week and is being treated with Cipro. Pt presents with obrien placed.   pmhx: HTN, enlarged prostate.

## 2023-11-30 NOTE — ED PROVIDER NOTE - OBJECTIVE STATEMENT
89M pmhx dementia, HTN, loop recorder, BPH with chronic indwelling folley, bed bound due to difficulty ambulating from chronic leg pain per daughter, who presents with complaint of shortness of breath - pt has no acute complaints on my evaluation, however daughter states that the aide called her and she heard the patient in the background screaming about difficulty breathing. Patient's bed has been broken over last few days so patient was not very mobile or able to get out of bed, however bed has been fixed. No known heart problems, not on anticoagulation. Pt denies chest pain, sob, abd pain.

## 2023-11-30 NOTE — ED ADULT NURSE NOTE - OBJECTIVE STATEMENT
89 year old male A/Ox3, pt denies any c/o pain, pt appears rigid and stiff, as per aide pt appears comfortable, no signs or symptoms of acute distress noted, pt placed on cardiac monitor, noted obrien placement. 89 year old male A/Ox3, pt denies any c/o pain, patient denies any complaints, as per ER doctor, spoke to daughter, states patient was complaining of chest pain, but patient denies chest pain, pt appears rigid and stiff, aide and patient poor historian,  pt appears comfortable but occasionally will state "it's cold", no signs or symptoms of acute distress noted, pt placed on cardiac monitor, noted obrien placement. daughter reports obrien was changed yesterday

## 2023-11-30 NOTE — ED PROVIDER NOTE - CLINICAL SUMMARY MEDICAL DECISION MAKING FREE TEXT BOX
89M pmhx dementia, HTN, loop recorder, BPH with chronic indwelling folley, bed bound due to difficulty ambulating from chronic leg pain per daughter, who presents with complaint of shortness of breath - pt has no acute complaints on my evaluation, however daughter states that the aide called her and she heard the patient in the background screaming about difficulty breathing. Patient's bed has been broken over last few days so patient was not very mobile or able to get out of bed, however bed has been fixed. No known heart problems, not on anticoagulation. Pt denies chest pain, sob, abd pain.    - eval for dvt, eval for PE, labs to eval for metabolic derangements, rule out sepsis as hypothermic and mild hypotension, gentle fluids while ruling out chf, reassess. 89M pmhx dementia, HTN, loop recorder, BPH with chronic indwelling folley, bed bound due to difficulty ambulating from chronic leg pain per daughter, who presents with complaint of shortness of breath - pt has no acute complaints on my evaluation, however daughter states that the aide called her and she heard the patient in the background screaming about difficulty breathing. Patient's bed has been broken over last few days so patient was not very mobile or able to get out of bed, however bed has been fixed. No known heart problems, not on anticoagulation. Pt denies chest pain, sob, abd pain.    - eval for dvt, eval for PE due to hypotension and initial O2 sat 93% but O2 sat corrected on room after initial triage,, labs to eval for metabolic derangements, rule out sepsis as hypothermic and mild hypotension, gentle fluids while ruling out chf as new pleural effusion, reassess. tx abx ceftriaxone for poss uti, prior hx pansensitive e coli in urine  - pt bp improved after IV fluids, d/w pts son Samy over the phone who agreed on prbc transfusion by verbal consent

## 2023-11-30 NOTE — ED ADULT NURSE NOTE - ED STAT RN HANDOFF DETAILS
report given to Amy NEWTON. Endorse Blood transfusion running to LAMAR luong- started at 2am to be run over 3hours. Pt VSS at this time.

## 2023-11-30 NOTE — ED PROVIDER NOTE - PHYSICAL EXAMINATION
Gen: aox2, nad   Head: NCAT  ENT: Airway patent, dry  mucous membranes, nasal passageways clear   Cardiac: Normal rate, normal rhythm, no murmurs   Respiratory: Lungs CTA B/L  Gastrointestinal: Abdomen soft, nontender, nondistended, no rebound, no guarding  MSK: No gross abnormalities, FROM of all four extremities, mild lower extremity symmetrical edema  HEME: Extremities warm and well perfused   Skin: No rashes, no lesions  Neuro: No gross neurologic deficits Gen: aox2, nad   Head: NCAT  ENT: Airway patent, dry  mucous membranes, nasal passageways clear   Cardiac: Normal rate, normal rhythm, no murmurs   Respiratory: Lungs CTA B/L  Gastrointestinal: Abdomen soft, nontender, nondistended, no rebound, no guarding  rectal: brown stool, no bleeding, chaperoned by RN alexus constantino   MSK: No gross abnormalities, FROM of all four extremities, mild lower extremity symmetrical edema  HEME: Extremities warm and well perfused   Skin: No rashes, small area of stage I buttock decubitus   Neuro: No gross neurologic deficits

## 2023-11-30 NOTE — ED PROVIDER NOTE - PROGRESS NOTE DETAILS
Alvarado DO: pt stable, pending ct reads, I reviewed no signs of renal stone, pleural effusion therefore more fluids were not given

## 2023-11-30 NOTE — ED ADULT NURSE NOTE - NSICDXPASTMEDICALHX_GEN_ALL_CORE_FT
PAST MEDICAL HISTORY:  Benign essential HTN     Benign Prostatic Hyperplasia     BPH (benign prostatic hyperplasia)     BPH (benign prostatic hypertrophy)     Dehydration     Dementia     HTN (Hypertension)     Hydrocele in adult bilateral, chronic    Hypertension     Implantable loop recorder present     Indwelling Ma catheter present     Renal insufficiency     Spinal stenosis of lumbar region     Wheelchair dependent

## 2023-12-01 NOTE — CHART NOTE - NSCHARTNOTEFT_GEN_A_CORE
89M w/ hx of CKD, HTN, BPH w/ chronic obrien, dementia p/w SOB. Pt poor historian so history obtained from record. Pt was brought in my home aid who reports pt c/o SOB. Denies fever/chills/sweats, URI sx, N/V/D.    In ED, pt hypothermic to 95.5 and hypotensive to 90/60s. Labs notable for Hgb 6.3 (+london), dimer 3000, Na 129, BUN/SCr 70/2.91, BNP 7966, UA+. CT CAP demonstrates mild b/l hydronephrosis and large stool burden w/ perirectal stranding c/f stercoral colitis.    89M w/ hx of CKD HTN, BPH w/ chronic obrien, dementia p/w SOB. Found to have UTI w/ sepsis, hypothermia, stercoral colitis, possible hemolytic anemia, hyponatremia, ROGER on CKD    # UTI w/ sepsis  - Stercoral colitis  - c/w cipro/flagyl  - fleet enema, lactulose, senna  - f/u UCx, BCx    - CT showed Moderate to large left pleural effusion with complete compressive atelectasis of the left lower lobe and partial compressive atelectasis of the left upper lobe and lingula. Small right pleural effusion with mild dependent atelectasis the right lower lobe. No gross CT evidence of pneumonia or pulmonary edema.  - Pt clinically intravascularly depleted however with significant pleural effusions likely etiology of SOB. Effusion possibly malignant; pt w/ large R scalp lesion. Need further info from family  - discontinue IVF for now   - Pt currently NPO awaiting bedside swallow eval    #?Autoimmune hemolytic anemia- +direct london  - s/p 1u pRBCs  - Guaiac neg  - f/u haptoglobin, LDH  - V/Q scan neg for PE    #ROGER on CKD  - s/p IVF in ED, hold further IVF for now  - avoid nephrotoxic agents  - renally dose medication    #Hyponatremia  - improving     - SCDs for DVT ppx    Attempted to reach out to family to obtain further info, unable to reach. Please reach out 89M w/ hx of CKD, HTN, BPH w/ chronic obrien, dementia p/w SOB. Pt poor historian so history obtained from record. Pt was brought in my home aid who reports pt c/o SOB. Denies fever/chills/sweats, URI sx, N/V/D.    In ED, pt hypothermic to 95.5 and hypotensive to 90/60s. Labs notable for Hgb 6.3 (+london), dimer 3000, Na 129, BUN/SCr 70/2.91, BNP 7966, UA+. CT CAP demonstrates mild b/l hydronephrosis and large stool burden w/ perirectal stranding c/f stercoral colitis.    89 y/o M w/ hx of CKD HTN, BPH w/ chronic obrien, dementia p/w SOB. Found to have UTI w/ sepsis, hypothermia, stercoral colitis, possible hemolytic anemia, hyponatremia, ROGER on CKD    # UTI w/ sepsis  - Stercoral colitis  - c/w cipro/flagyl  - fleet enema, lactulose, senna  - f/u UCx, BCx    - CT showed Moderate to large left pleural effusion with complete compressive atelectasis of the left lower lobe and partial compressive atelectasis of the left upper lobe and lingula. Small right pleural effusion with mild dependent atelectasis the right lower lobe. No gross CT evidence of pneumonia or pulmonary edema.  - Pt clinically intravascularly depleted however with significant pleural effusions. Effusion possibly malignant; pt w/ large R scalp lesion.   - dec IVF, change to d5/NS as pt w/o appetite, not eating    #?Autoimmune hemolytic anemia- +direct london  - s/p 1u pRBCs  - Guaiac neg  - f/u haptoglobin, LDH  - V/Q scan neg for PE    #ROGER on CKD  - c/w gentle IVF, discontinue if becomes SOB/develop hypoxia  - avoid nephrotoxic agents  - renally dose medication    #Hyponatremia  - improving     - SCDs for DVT ppx    Spoke w/ family scalp lesion though present when pt w/ a boy, it has grown a lot in size, high susp for malignancy. Family would want bx but would like to wait for now  - Pt F/C for now 89M w/ hx of CKD, HTN, BPH w/ chronic obrien, dementia p/w SOB. Pt poor historian so history obtained from record. Pt was brought in my home aid who reports pt c/o SOB. Denies fever/chills/sweats, URI sx, N/V/D.    In ED, pt hypothermic to 95.5 and hypotensive to 90/60s. Labs notable for Hgb 6.3 (+london), dimer 3000, Na 129, BUN/SCr 70/2.91, BNP 7966, UA+. CT CAP demonstrates mild b/l hydronephrosis and large stool burden w/ perirectal stranding c/f stercoral colitis.    89 y/o M w/ hx of CKD HTN, BPH w/ chronic obrien, dementia p/w SOB. Found to have UTI w/ sepsis, hypothermia, stercoral colitis, possible hemolytic anemia, hyponatremia, ROGER on CKD    Pt seen and examined at bedside. Pt AAOX1, c/o feeling cold. Denied cp or sob.    # UTI w/ sepsis  - Stercoral colitis  - c/w cipro/flagyl  - fleet enema, lactulose, senna  - f/u UCx, BCx    - CT showed Moderate to large left pleural effusion with complete compressive atelectasis of the left lower lobe and partial compressive atelectasis of the left upper lobe and lingula. Small right pleural effusion with mild dependent atelectasis the right lower lobe. No gross CT evidence of pneumonia or pulmonary edema.  - Pt came in w/ SOB now with new significant pleural effusions. Effusion possibly malignant; pt w/ large R scalp lesion.   - Pt also w/ cardiomegaly, f/u 2D Echo  - V/Q scan neg for PE    #?Autoimmune hemolytic anemia- +direct london  - s/p 1u pRBCs  - Guaiac neg  - f/u haptoglobin, LDH to confirm; Hematology consult if +Ve    #ROGER on CKD  - Likely due bladder outlet obstruction given new fullness in the renal collecting systems versus   mild hydronephrosis and mild dilatation of the proximal ureters seen on CT  - obrien draining adequately  - hold IVF fow and continue to monitor  - c/w flomax  - avoid nephrotoxic agents  - renally dose medication    #Hyponatremia  - improving     - SCDs for DVT ppx    Spoke w/ family scalp lesion though present when pt was a boy, it has grown a lot in size, high susp for malignancy. Family would want bx but would like to wait for now  - Pt F/C for now

## 2023-12-01 NOTE — H&P ADULT - HISTORY OF PRESENT ILLNESS
89M w/ hx of CKD, HTN, BPH w/ chronic obrien, dementia p/w SOB. Pt poor historian so history obtained from record. Pt was brought in my home aid who reports pt c/o SOB. Denies fever/chills/sweats, URI sx, N/V/D.    In ED, pt hypothermic to 95.5 and hypotensive to 90/60s. Labs notable for Hgb 6.3 (+london), dimer 3000, Na 129, BUN/SCr 70/2.91, BNP 7966, UA+. CT CAP demonstrates mild b/l hydronephrosis and large stool burden w/ perirectal stranding c/f stercoral colitis.

## 2023-12-01 NOTE — H&P ADULT - ASSESSMENT
In ED, pt hypothermic to 95.5 and hypotensive to 90/60s. Labs notable for Hgb 6.3 (+london), dimer 3000, Na 129, BUN/SCr 70/2.91, BNP 7966, UA+. CT CAP demonstrates mild b/l hydronephrosis and large stool burden w/ perirectal stranding c/f stercoral colitis.    89M w/ hx of CKD HTN, BPH w/ chronic obrien, dementia p/w SOB; unclear etiology. Found to have ?sepsis (GI vs UTI source), evidence of ?AIHA, hyponatremia, ROGER on CKD and UTI.    #?Sepsis  #Hypothermia  #Hypotension  #UTI  #?Stercoral colitis  - start cipro/flagyl  - IV LR  - fleet enema, lactulose, senna  - f/u UCx, BCx    #SOB- ?2/2 anemia vs PE  #?Autoimmune hemolytic anemia- +direct london  - s/p 1u pRBCs  - f/u haptoglobin, LDH to confirm AIHA  - consider heme consult; steroid trial if Hgb continues to decline  - V/Q scan given reduced GFR    #ROGER on CKD  - IV LR  - avoid nephrotoxic agents  - renally dose medication    #Hyponatremia  - IV LR    #UTI  - continue cipro  - f/u UCx, BCx    #FEN/PPX  - NPO  - SCDs for DVT ppx

## 2023-12-01 NOTE — H&P ADULT - NSHPLABSRESULTS_GEN_ALL_CORE
6.3    6.52  )-----------( 261      ( 2023 18:44 )             19.0     11    129<L>  |  98  |  70<H>  ----------------------------<  108<H>  4.4   |  24  |  2.91<H>    Ca    8.3<L>      2023 18:44  Mg     2.5         TPro  8.0  /  Alb  2.1<L>  /  TBili  0.3  /  DBili  x   /  AST  16  /  ALT  18  /  AlkPhos  41      PT/INR - ( 2023 18:44 )   PT: 14.8 sec;   INR: 1.25 ratio         PTT - ( 2023 18:44 )  PTT:34.6 sec  Urinalysis Basic - ( 2023 20:14 )    Color: Yellow / Appearance: Cloudy / S.012 / pH: x  Gluc: x / Ketone: Negative mg/dL  / Bili: Negative / Urobili: 0.2 mg/dL   Blood: x / Protein: 30 mg/dL / Nitrite: Negative   Leuk Esterase: Large / RBC: 5-10 /HPF / WBC >50 /HPF   Sq Epi: x / Non Sq Epi: x / Bacteria: Many /HPF

## 2023-12-01 NOTE — H&P ADULT - NSHPPHYSICALEXAM_GEN_ALL_CORE
T(C): 36.6 (12-01-23 @ 05:15), Max: 36.6 (11-30-23 @ 23:36)  HR: 75 (12-01-23 @ 05:15) (68 - 79)  BP: 102/57 (12-01-23 @ 05:15) (95/55 - 111/61)  RR: 20 (12-01-23 @ 05:15) (18 - 24)  SpO2: 94% (12-01-23 @ 05:15) (93% - 100%)    CONSTITUTIONAL: Well groomed, no apparent distress  EYES: PERRLA and symmetric, EOMI, No conjunctival or scleral injection, non-icteric  ENMT: Oral mucosa with moist membranes. Normal dentition; no pharyngeal injection or exudates             NECK: Supple, symmetric and without tracheal deviation   RESP: No respiratory distress, no use of accessory muscles; CTA b/l, no WRR  CV: RRR, +S1S2, no MRG; no JVD; no peripheral edema  GI: Soft, NT, ND, no rebound, no guarding; no palpable masses; no hepatosplenomegaly; no hernia palpated  LYMPH: No cervical LAD or tenderness; no axillary LAD or tenderness; no inguinal LAD or tenderness  MSK: Normal gait; No digital clubbing or cyanosis; examination of the (head/neck/spine/ribs/pelvis, RUE, LUE, RLE, LLE) without misalignment,            Normal ROM without pain, no spinal tenderness, normal muscle strength/tone  SKIN: No rashes or ulcers noted; no subcutaneous nodules or induration palpable  NEURO: CN II-XII intact; normal reflexes in upper and lower extremities, sensation intact in upper and lower extremities b/l to light touch   PSYCH: Appropriate insight/judgment; A+O x 1, mood and affect appropriate, recent/remote memory intact

## 2023-12-01 NOTE — CONSULT NOTE ADULT - SUBJECTIVE AND OBJECTIVE BOX
[facility] NEPHROLOG[facility], [facility]  NEPHROLOGY AND HYPERTENSION  [address]  [address]  [place] [address]  [phone]    MD SOFY KEENAN MD YELENA ROSENBERG, MD BINNY KOSHY, MD CHRISTOPHER CAPUTO, MD EDWARD BOVER, MD      Information from chart:  "Patient is a 89y old  Male who presents with a chief complaint of   HPI:  89M w/ hx of CKD, HTN, BPH w/ chronic obrien, dementia p/w SOB. Pt poor historian so history obtained from record. Pt was brought in my home aid who reports pt c/o SOB. Denies fever/chills/sweats, URI sx, N/V/D.    In ED, pt hypothermic to 95.5 and hypotensive to 90/60s. Labs notable for Hgb 6.3 (+london), dimer 3000, Na 129, BUN/SCr 70/2.91, BNP 7966, UA+. CT CAP demonstrates mild b/l hydronephrosis and large stool burden w/ perirectal stranding c/f stercoral colitis. (01 Dec 2023 06:11)   "    Feels cold  Mild abd discomfort       PAST MEDICAL & SURGICAL HISTORY:  HTN (Hypertension)      Benign Prostatic Hyperplasia      Hypertension      BPH (benign prostatic hypertrophy)      Spinal stenosis of lumbar region      Dehydration      Renal insufficiency      Hydrocele in adult  bilateral, chronic      Benign essential HTN      BPH (benign prostatic hyperplasia)      Indwelling Obrien catheter present      Dementia      Wheelchair dependent      Implantable loop recorder present      No significant past surgical history      Implantable loop recorder present        FAMILY HISTORY:  No significant family history      Allergies    No Known Allergies    Intolerances      Home Medications:  acetaminophen 325 mg oral tablet: 2 tab(s) orally every 6 hours, As needed, Temp greater or equal to 38C (100.4F) (28 Mar 2023 17:16)  amLODIPine 5 mg oral tablet: 1 tab(s) orally once a day (07 Nov 2023 11:10)  amLODIPine 5 mg oral tablet: 1 tab(s) orally once a day (28 Mar 2023 17:16)  finasteride 5 mg oral tablet: 1 tab(s) orally once a day (07 Nov 2023 11:10)  gabapentin 300 mg oral capsule: 1 cap(s) orally 2 times a day (07 Nov 2023 11:10)  gabapentin 300 mg oral capsule: 1 cap(s) orally 2 times a day (28 Mar 2023 17:16)  labetalol 100 mg oral tablet: 1 tab(s) orally 2 times a day (28 Mar 2023 17:16)  labetalol 100 mg oral tablet: 1 tab(s) orally 2 times a day (07 Nov 2023 11:10)  mirtazapine 15 mg oral tablet: 1/2 tab(s) orally once a day (28 Mar 2023 17:16)  PREDNISOLONE AC 1% OPHTH SUSP  5ML: SHAKE LIQUID AND INSTILL 1 DROP IN RIGHT EYE TWICE DAILY (28 Mar 2023 17:16)  tamsulosin 0.4 mg oral capsule: 1 cap(s) orally once a day (at bedtime) (28 Mar 2023 17:16)    MEDICATIONS  (STANDING):  ciprofloxacin   IVPB      ciprofloxacin   IVPB 200 milliGRAM(s) IV Intermittent every 12 hours  dextrose 5% + sodium chloride 0.9%. 1000 milliLiter(s) (50 mL/Hr) IV Continuous <Continuous>  finasteride 5 milliGRAM(s) Oral daily  gabapentin 300 milliGRAM(s) Oral two times a day  metroNIDAZOLE  IVPB 500 milliGRAM(s) IV Intermittent every 8 hours  mirtazapine 15 milliGRAM(s) Oral daily  polyethylene glycol 3350 17 Gram(s) Oral at bedtime  senna 2 Tablet(s) Oral at bedtime  tamsulosin 0.4 milliGRAM(s) Oral at bedtime    MEDICATIONS  (PRN):  acetaminophen     Tablet .. 650 milliGRAM(s) Oral every 6 hours PRN Temp greater or equal to 38C (100.4F), Mild Pain (1 - 3)  aluminum hydroxide/magnesium hydroxide/simethicone Suspension 30 milliLiter(s) Oral every 4 hours PRN Dyspepsia  melatonin 3 milliGRAM(s) Oral at bedtime PRN Insomnia  ondansetron Injectable 4 milliGRAM(s) IV Push every 8 hours PRN Nausea and/or Vomiting    Vital Signs Last 24 Hrs  T(C): 36.5 (01 Dec 2023 07:38), Max: 36.6 (30 Nov 2023 23:36)  T(F): 97.7 (01 Dec 2023 07:38), Max: 97.9 (30 Nov 2023 23:36)  HR: 79 (01 Dec 2023 07:38) (69 - 79)  BP: 109/60 (01 Dec 2023 07:38) (95/55 - 111/61)  BP(mean): --  RR: 20 (01 Dec 2023 07:38) (18 - 24)  SpO2: 97% (01 Dec 2023 07:38) (94% - 100%)    Parameters below as of 01 Dec 2023 05:15  Patient On (Oxygen Delivery Method): room air        Daily     Daily     CAPILLARY BLOOD GLUCOSE      POCT Blood Glucose.: 139 mg/dL (30 Nov 2023 16:00)    PHYSICAL EXAM:      T(C): 36.5 (12-01-23 @ 07:38), Max: 36.6 (11-30-23 @ 23:36)  HR: 79 (12-01-23 @ 07:38) (69 - 79)  BP: 109/60 (12-01-23 @ 07:38) (95/55 - 111/61)  RR: 20 (12-01-23 @ 07:38) (18 - 24)  SpO2: 97% (12-01-23 @ 07:38) (94% - 100%)  Wt(kg): --  Lungs clear anteriorly decreased BS Left   Heart S1S2  Abd soft distended decreased BS  Extremities:   1 edema              12-01    130<L>  |  99  |  64<H>  ----------------------------<  84  4.2   |  22  |  2.72<H>    Ca    8.4<L>      01 Dec 2023 11:15  Mg     2.4     12-01    TPro  7.9  /  Alb  2.2<L>  /  TBili  0.4  /  DBili  x   /  AST  18  /  ALT  18  /  AlkPhos  38<L>  12-01                          7.3    6.81  )-----------( 274      ( 01 Dec 2023 10:01 )             22.3     Creatinine Trend: 2.72<--, 2.91<--  Urinalysis Basic - ( 01 Dec 2023 11:15 )    Color: x / Appearance: x / SG: x / pH: x  Gluc: 84 mg/dL / Ketone: x  / Bili: x / Urobili: x   Blood: x / Protein: x / Nitrite: x   Leuk Esterase: x / RBC: x / WBC x   Sq Epi: x / Non Sq Epi: x / Bacteria: x      Trend Bun/Cr  12-01-23 @ 11:15  BUN/CR -  64<H> / 2.72<H>  11-30-23 @ 18:44  BUN/CR -  70<H> / 2.91<H>  10-30-23 @ 09:33  BUN/CR -  36<H> / 1.65<H>  10-29-23 @ 05:30  BUN/CR -  47<H> / 1.78<H>  10-28-23 @ 21:00  BUN/CR -  49<H> / 1.79<H>  10-28-23 @ 16:40  BUN/CR -  50<H> / 1.93<H>  03-29-23 @ 07:20  BUN/CR -  41<H> / 1.81<H>  03-28-23 @ 14:49  BUN/CR -  40<H> / 1.96<H>  01-18-23 @ 07:15  BUN/CR -  22 / 1.56<H>  01-17-23 @ 07:30  BUN/CR -  26<H> / 1.98<H>  01-16-23 @ 05:28  BUN/CR -  27<H> / 2.13<H>  01-15-23 @ 11:20  BUN/CR -  33<H> / 2.33<H>        < from: CT Abdomen and Pelvis No Cont (11.30.23 @ 20:37) >  IMPRESSION:  CT chest:  1.  Moderate to large left pleural effusion with complete compressive   atelectasis of the left lower lobe and partial compressive atelectasis of   the left upper lobe and lingula.  2.  Small right pleural effusion with mild dependent atelectasis the   right lower lobe.  3.  No gross CT evidence of pneumonia or pulmonary edema.  4.  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    CT abdomen/pelvis:  1.  Motion degraded.  2.  Fullness in the renal collecting systems versus mild hydronephrosis   and mild dilatation of the proximal ureters bilaterally, new from   09/28/2020.  No obstructing renal stones.  The bladder is collapsed   around a Obrien catheter.  Follow-up ultrasound may be obtained to   evaluate for resolution.  Underlying genitourinary infection and/or   pyelonephritis should be excluded based on clinical symptoms and   laboratory values.  3.  Enlarged prostate with volume of approximately 46 mL. Lower urinary   tract symptoms (LUTS) should be excluded clinically.  4.  There is a large amount of stool distending the rectum to   approximately 8.5 cm.  Mild perirectal fat stranding.  Fecal impaction   and stercoral colitis should be excluded clinically.  5.  Mild subcutaneous edema in the flanks and proximal thighs.      Assessment     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 due to fecal impaction  Pre renal element with low MAP, anemia     Plan   Obrien catheter  Enema; cathartics   May require disimpaction   Transfuse for Hgb < 7      Samy Noriega MD

## 2023-12-01 NOTE — PATIENT PROFILE ADULT - FALL HARM RISK - HARM RISK INTERVENTIONS
Assistance with ambulation/Assistance OOB with selected safe patient handling equipment/Communicate Risk of Fall with Harm to all staff/Discuss with provider need for PT consult/Monitor gait and stability/Provide patient with walking aids - walker, cane, crutches/Reinforce activity limits and safety measures with patient and family/Tailored Fall Risk Interventions/Visual Cue: Yellow wristband and red socks/Bed in lowest position, wheels locked, appropriate side rails in place/Call bell, personal items and telephone in reach/Instruct patient to call for assistance before getting out of bed or chair/Non-slip footwear when patient is out of bed/[place] to call system/Physically safe environment - no spills, clutter or unnecessary equipment/Purposeful Proactive Rounding/Room/bathroom lighting operational, light cord in reach Assistance with ambulation/Assistance OOB with selected safe patient handling equipment/Communicate Risk of Fall with Harm to all staff/Discuss with provider need for PT consult/Monitor gait and stability/Provide patient with walking aids - walker, cane, crutches/Reinforce activity limits and safety measures with patient and family/Tailored Fall Risk Interventions/Visual Cue: Yellow wristband and red socks/Bed in lowest position, wheels locked, appropriate side rails in place/Call bell, personal items and telephone in reach/Instruct patient to call for assistance before getting out of bed or chair/Non-slip footwear when patient is out of bed/[place] to call system/Physically safe environment - no spills, clutter or unnecessary equipment/Purposeful Proactive Rounding/Room/bathroom lighting operational, light cord in reach Assistance with ambulation/Assistance OOB with selected safe patient handling equipment/Communicate Risk of Fall with Harm to all staff/Discuss with provider need for PT consult/Monitor gait and stability/Provide patient with walking aids - walker, cane, crutches/Reinforce activity limits and safety measures with patient and family/Tailored Fall Risk Interventions/Visual Cue: Yellow wristband and red socks/Bed in lowest position, wheels locked, appropriate side rails in place/Call bell, personal items and telephone in reach/Instruct patient to call for assistance before getting out of bed or chair/Non-slip footwear when patient is out of bed/Means to call system/Physically safe environment - no spills, clutter or unnecessary equipment/Purposeful Proactive Rounding/Room/bathroom lighting operational, light cord in reach

## 2023-12-02 NOTE — PROGRESS NOTE ADULT - SUBJECTIVE AND OBJECTIVE BOX
[facility] NEPHROLOGY SERVICES, [facility]  NEPHROLOGY AND HYPERTENSION  [address]  [address]  [place] [address]  [phone]    MD SOFY KEENAN MD YELENA ROSENBERG, MD BINNY KOSHY, MD CHRISTOPHER CAPUTO, MD EDWARD BOVER, MD          Patient events noted    MEDICATIONS  (STANDING):  ciprofloxacin   IVPB 200 milliGRAM(s) IV Intermittent every 12 hours  ciprofloxacin   IVPB      finasteride 5 milliGRAM(s) Oral daily  gabapentin 300 milliGRAM(s) Oral two times a day  metroNIDAZOLE  IVPB 500 milliGRAM(s) IV Intermittent every 8 hours  mirtazapine 15 milliGRAM(s) Oral daily  polyethylene glycol 3350 17 Gram(s) Oral at bedtime  senna 2 Tablet(s) Oral at bedtime  tamsulosin 0.4 milliGRAM(s) Oral at bedtime    MEDICATIONS  (PRN):  acetaminophen     Tablet .. 650 milliGRAM(s) Oral every 6 hours PRN Temp greater or equal to 38C (100.4F), Mild Pain (1 - 3)  aluminum hydroxide/magnesium hydroxide/simethicone Suspension 30 milliLiter(s) Oral every 4 hours PRN Dyspepsia  melatonin 3 milliGRAM(s) Oral at bedtime PRN Insomnia  ondansetron Injectable 4 milliGRAM(s) IV Push every 8 hours PRN Nausea and/or Vomiting      12-01-23 @ 07:01  -  12-02-23 @ 07:00  --------------------------------------------------------  IN: 250 mL / OUT: 1100 mL / NET: -850 mL      PHYSICAL EXAM:      T(C): 37.1 (12-02-23 @ 19:40), Max: 37.1 (12-02-23 @ 19:40)  HR: 87 (12-02-23 @ 19:40) (76 - 88)  BP: 126/63 (12-02-23 @ 19:40) (102/56 - 129/70)  RR: 18 (12-02-23 @ 19:40) (18 - 18)  SpO2: 97% (12-02-23 @ 19:40) (91% - 99%)  Wt(kg): --  Lungs clear  Heart S1S2  Abd soft NT ND  Extremities:   tr edema                                    7.0    5.37  )-----------( 229      ( 02 Dec 2023 07:00 )             21.3     12-02    135  |  103  |  58<H>  ----------------------------<  102<H>  3.6   |  23  |  2.59<H>    Ca    8.3<L>      02 Dec 2023 07:00  Phos  4.5     12-02  Mg     2.3     12-02    TPro  7.3  /  Alb  1.9<L>  /  TBili  0.4  /  DBili  x   /  AST  14<L>  /  ALT  16  /  AlkPhos  34<L>  12-02      LIVER FUNCTIONS - ( 02 Dec 2023 07:00 )  Alb: 1.9 g/dL / Pro: 7.3 gm/dL / ALK PHOS: 34 U/L / ALT: 16 U/L / AST: 14 U/L / GGT: x           Creatinine Trend: 2.59<--, 2.72<--, 2.91<--        Trend Bun/Cr  12-01-23 @ 11:15  BUN/CR -  64<H> / 2.72<H>  11-30-23 @ 18:44  BUN/CR -  70<H> / 2.91<H>  10-30-23 @ 09:33  BUN/CR -  36<H> / 1.65<H>  10-29-23 @ 05:30  BUN/CR -  47<H> / 1.78<H>  10-28-23 @ 21:00  BUN/CR -  49<H> / 1.79<H>  10-28-23 @ 16:40  BUN/CR -  50<H> / 1.93<H>  03-29-23 @ 07:20  BUN/CR -  41<H> / 1.81<H>  03-28-23 @ 14:49  BUN/CR -  40<H> / 1.96<H>  01-18-23 @ 07:15  BUN/CR -  22 / 1.56<H>  01-17-23 @ 07:30  BUN/CR -  26<H> / 1.98<H>  01-16-23 @ 05:28  BUN/CR -  27<H> / 2.13<H>  01-15-23 @ 11:20  BUN/CR -  33<H> / 2.33<H>            Assessment     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 due to fecal impaction  Pre renal element with low MAP, anemia     Plan   Ma catheter  Maintenance IVF  Enema; cathartics   Transfuse for Hgb < 7  Discussed with son at bedside    Samy Noriega MD [facility] NEPHROLOGY SERVICES, [facility]  NEPHROLOGY AND HYPERTENSION  [address]  [address]  [place] [address]  [phone]    MD SOFY KEENAN MD YELENA ROSENBERG, MD BINNY KOSHY, MD CHRISTOPHER CAPUTO, MD EDWARD BOVER, MD          Patient events noted    MEDICATIONS  (STANDING):  ciprofloxacin   IVPB 200 milliGRAM(s) IV Intermittent every 12 hours  ciprofloxacin   IVPB      finasteride 5 milliGRAM(s) Oral daily  gabapentin 300 milliGRAM(s) Oral two times a day  metroNIDAZOLE  IVPB 500 milliGRAM(s) IV Intermittent every 8 hours  mirtazapine 15 milliGRAM(s) Oral daily  polyethylene glycol 3350 17 Gram(s) Oral at bedtime  senna 2 Tablet(s) Oral at bedtime  tamsulosin 0.4 milliGRAM(s) Oral at bedtime    MEDICATIONS  (PRN):  acetaminophen     Tablet .. 650 milliGRAM(s) Oral every 6 hours PRN Temp greater or equal to 38C (100.4F), Mild Pain (1 - 3)  aluminum hydroxide/magnesium hydroxide/simethicone Suspension 30 milliLiter(s) Oral every 4 hours PRN Dyspepsia  melatonin 3 milliGRAM(s) Oral at bedtime PRN Insomnia  ondansetron Injectable 4 milliGRAM(s) IV Push every 8 hours PRN Nausea and/or Vomiting      12-01-23 @ 07:01  -  12-02-23 @ 07:00  --------------------------------------------------------  IN: 250 mL / OUT: 1100 mL / NET: -850 mL      PHYSICAL EXAM:      T(C): 37.1 (12-02-23 @ 19:40), Max: 37.1 (12-02-23 @ 19:40)  HR: 87 (12-02-23 @ 19:40) (76 - 88)  BP: 126/63 (12-02-23 @ 19:40) (102/56 - 129/70)  RR: 18 (12-02-23 @ 19:40) (18 - 18)  SpO2: 97% (12-02-23 @ 19:40) (91% - 99%)  Wt(kg): --  Lungs clear  Heart S1S2  Abd soft NT ND  Extremities:   tr edema                                    7.0    5.37  )-----------( 229      ( 02 Dec 2023 07:00 )             21.3     12-02    135  |  103  |  58<H>  ----------------------------<  102<H>  3.6   |  23  |  2.59<H>    Ca    8.3<L>      02 Dec 2023 07:00  Phos  4.5     12-02  Mg     2.3     12-02    TPro  7.3  /  Alb  1.9<L>  /  TBili  0.4  /  DBili  x   /  AST  14<L>  /  ALT  16  /  AlkPhos  34<L>  12-02      LIVER FUNCTIONS - ( 02 Dec 2023 07:00 )  Alb: 1.9 g/dL / Pro: 7.3 gm/dL / ALK PHOS: 34 U/L / ALT: 16 U/L / AST: 14 U/L / GGT: x           Creatinine Trend: 2.59<--, 2.72<--, 2.91<--        Trend Bun/Cr  12-01-23 @ 11:15  BUN/CR -  64<H> / 2.72<H>  11-30-23 @ 18:44  BUN/CR -  70<H> / 2.91<H>  10-30-23 @ 09:33  BUN/CR -  36<H> / 1.65<H>  10-29-23 @ 05:30  BUN/CR -  47<H> / 1.78<H>  10-28-23 @ 21:00  BUN/CR -  49<H> / 1.79<H>  10-28-23 @ 16:40  BUN/CR -  50<H> / 1.93<H>  03-29-23 @ 07:20  BUN/CR -  41<H> / 1.81<H>  03-28-23 @ 14:49  BUN/CR -  40<H> / 1.96<H>  01-18-23 @ 07:15  BUN/CR -  22 / 1.56<H>  01-17-23 @ 07:30  BUN/CR -  26<H> / 1.98<H>  01-16-23 @ 05:28  BUN/CR -  27<H> / 2.13<H>  01-15-23 @ 11:20  BUN/CR -  33<H> / 2.33<H>            Assessment     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 due to fecal impaction  Pre renal element with low MAP, anemia     Plan   Ma catheter  Maintenance IVF  Enema; cathartics   Transfuse for Hgb < 7  Discussed with son at bedside    Samy Noriega MD [facility] NEPHROLOGY SERVICES, [facility]  NEPHROLOGY AND HYPERTENSION  [address]  [address]  [place] [address]  [phone]    MD SOFY KEENAN MD YELENA ROSENBERG, MD BINNY KOSHY, MD CHRISTOPHER CAPUTO, MD EDWARD BOVER, MD          Patient events noted    MEDICATIONS  (STANDING):  ciprofloxacin   IVPB 200 milliGRAM(s) IV Intermittent every 12 hours  ciprofloxacin   IVPB      finasteride 5 milliGRAM(s) Oral daily  gabapentin 300 milliGRAM(s) Oral two times a day  metroNIDAZOLE  IVPB 500 milliGRAM(s) IV Intermittent every 8 hours  mirtazapine 15 milliGRAM(s) Oral daily  polyethylene glycol 3350 17 Gram(s) Oral at bedtime  senna 2 Tablet(s) Oral at bedtime  tamsulosin 0.4 milliGRAM(s) Oral at bedtime    MEDICATIONS  (PRN):  acetaminophen     Tablet .. 650 milliGRAM(s) Oral every 6 hours PRN Temp greater or equal to 38C (100.4F), Mild Pain (1 - 3)  aluminum hydroxide/magnesium hydroxide/simethicone Suspension 30 milliLiter(s) Oral every 4 hours PRN Dyspepsia  melatonin 3 milliGRAM(s) Oral at bedtime PRN Insomnia  ondansetron Injectable 4 milliGRAM(s) IV Push every 8 hours PRN Nausea and/or Vomiting      12-01-23 @ 07:01  -  12-02-23 @ 07:00  --------------------------------------------------------  IN: 250 mL / OUT: 1100 mL / NET: -850 mL      PHYSICAL EXAM:      T(C): 37.1 (12-02-23 @ 19:40), Max: 37.1 (12-02-23 @ 19:40)  HR: 87 (12-02-23 @ 19:40) (76 - 88)  BP: 126/63 (12-02-23 @ 19:40) (102/56 - 129/70)  RR: 18 (12-02-23 @ 19:40) (18 - 18)  SpO2: 97% (12-02-23 @ 19:40) (91% - 99%)  Wt(kg): --  Lungs clear  Heart S1S2  Abd soft NT ND  Extremities:   tr edema                                    7.0    5.37  )-----------( 229      ( 02 Dec 2023 07:00 )             21.3     12-02    135  |  103  |  58<H>  ----------------------------<  102<H>  3.6   |  23  |  2.59<H>    Ca    8.3<L>      02 Dec 2023 07:00  Phos  4.5     12-02  Mg     2.3     12-02    TPro  7.3  /  Alb  1.9<L>  /  TBili  0.4  /  DBili  x   /  AST  14<L>  /  ALT  16  /  AlkPhos  34<L>  12-02      LIVER FUNCTIONS - ( 02 Dec 2023 07:00 )  Alb: 1.9 g/dL / Pro: 7.3 gm/dL / ALK PHOS: 34 U/L / ALT: 16 U/L / AST: 14 U/L / GGT: x           Creatinine Trend: 2.59<--, 2.72<--, 2.91<--        Trend Bun/Cr  12-01-23 @ 11:15  BUN/CR -  64<H> / 2.72<H>  11-30-23 @ 18:44  BUN/CR -  70<H> / 2.91<H>  10-30-23 @ 09:33  BUN/CR -  36<H> / 1.65<H>  10-29-23 @ 05:30  BUN/CR -  47<H> / 1.78<H>  10-28-23 @ 21:00  BUN/CR -  49<H> / 1.79<H>  10-28-23 @ 16:40  BUN/CR -  50<H> / 1.93<H>  03-29-23 @ 07:20  BUN/CR -  41<H> / 1.81<H>  03-28-23 @ 14:49  BUN/CR -  40<H> / 1.96<H>  01-18-23 @ 07:15  BUN/CR -  22 / 1.56<H>  01-17-23 @ 07:30  BUN/CR -  26<H> / 1.98<H>  01-16-23 @ 05:28  BUN/CR -  27<H> / 2.13<H>  01-15-23 @ 11:20  BUN/CR -  33<H> / 2.33<H>            Assessment     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 due to fecal impaction  Pre renal element with low MAP, anemia     Plan   Ma catheter  Maintenance IVF  Enema; cathartics   Transfuse for Hgb < 7  Discussed with son at bedside    Samy Noriega MD

## 2023-12-02 NOTE — PROGRESS NOTE ADULT - ASSESSMENT
89 y/o M w/ PMH fo dementia, HTN, has loop recorder, BPH w/ chronic obrien presents with syncope and found to have UTI    #UTI  -placed on cipro/flagyl, can continue for now pending urine culture   - prior Urine culture with E. Coli,  -Of note, obrien changed in ED    Anemia- Monitor H/H  -london positive,  but hapto 369, and .   -Transfuse to keep hgb >7  - Minimize number of blood draws     BPH w/ chronic Obrien    HTN and Loop recorder in place.    contipation  -try enema  -c/w senna and miralax    SQH     Dispo: HHA to be reinstated

## 2023-12-02 NOTE — PROGRESS NOTE ADULT - SUBJECTIVE AND OBJECTIVE BOX
PROGRESS NOTE:     Patient is a 89y old  Male who presents with a chief complaint of     SUBJECTIVE / OVERNIGHT EVENTS: No acute overnight events. Patient seen and examined bedside, no new complaints         MEDICATIONS  (STANDING):  ciprofloxacin   IVPB      ciprofloxacin   IVPB 200 milliGRAM(s) IV Intermittent every 12 hours  finasteride 5 milliGRAM(s) Oral daily  gabapentin 300 milliGRAM(s) Oral two times a day  metroNIDAZOLE  IVPB 500 milliGRAM(s) IV Intermittent every 8 hours  mirtazapine 15 milliGRAM(s) Oral daily  polyethylene glycol 3350 17 Gram(s) Oral at bedtime  senna 2 Tablet(s) Oral at bedtime  tamsulosin 0.4 milliGRAM(s) Oral at bedtime    MEDICATIONS  (PRN):  acetaminophen     Tablet .. 650 milliGRAM(s) Oral every 6 hours PRN Temp greater or equal to 38C (100.4F), Mild Pain (1 - 3)  aluminum hydroxide/magnesium hydroxide/simethicone Suspension 30 milliLiter(s) Oral every 4 hours PRN Dyspepsia  melatonin 3 milliGRAM(s) Oral at bedtime PRN Insomnia  ondansetron Injectable 4 milliGRAM(s) IV Push every 8 hours PRN Nausea and/or Vomiting      CAPILLARY BLOOD GLUCOSE        I&O's Summary    01 Dec 2023 07:01  -  02 Dec 2023 07:00  --------------------------------------------------------  IN: 250 mL / OUT: 1100 mL / NET: -850 mL        PHYSICAL EXAM:  Vital Signs Last 24 Hrs  T(C): 36.8 (02 Dec 2023 10:03), Max: 36.8 (02 Dec 2023 00:29)  T(F): 98.3 (02 Dec 2023 10:03), Max: 98.3 (02 Dec 2023 10:03)  HR: 80 (02 Dec 2023 10:03) (76 - 86)  BP: 102/56 (02 Dec 2023 10:03) (102/56 - 110/60)  BP(mean): --  RR: 18 (02 Dec 2023 10:03) (18 - 19)  SpO2: 99% (02 Dec 2023 10:03) (91% - 99%)    Parameters below as of 02 Dec 2023 06:00  Patient On (Oxygen Delivery Method): nasal cannula  O2 Flow (L/min): 2      CONSTITUTIONAL: Well groomed, no apparent distress  EYES: PERRLA and symmetric, EOMI, No conjunctival or scleral injection, non-icteric  ENMT: Oral mucosa with moist membranes. Normal dentition; no pharyngeal injection or exudates    NECK: Supple, symmetric and without tracheal deviation   RESP: No respiratory distress, no use of accessory muscles; CTA b/l, no WRR  CV: RRR, +S1S2, no MRG; no JVD; no peripheral edema  GI: Soft, NT, ND, no rebound, no guarding; no palpable masses; no hepatosplenomegaly; no hernia palpated  SKIN: No rashes or ulcers noted; no subcutaneous nodules or induration palpable  NEURO: CN II-XII intact; normal reflexes in upper and lower extremities, sensation intact in upper and lower extremities b/l to light touch   PSYCH: Appropriate insight/judgment; A+O x 1, mood and affect appropriate, recent/remote memory intact    LABS:                        7.0    5.37  )-----------( 229      ( 02 Dec 2023 07:00 )             21.3     12-02    135  |  103  |  58<H>  ----------------------------<  102<H>  3.6   |  23  |  2.59<H>    Ca    8.3<L>      02 Dec 2023 07:00  Phos  4.5     12-02  Mg     2.3     12-02    TPro  7.3  /  Alb  1.9<L>  /  TBili  0.4  /  DBili  x   /  AST  14<L>  /  ALT  16  /  AlkPhos  34<L>  12-02    PT/INR - ( 30 Nov 2023 18:44 )   PT: 14.8 sec;   INR: 1.25 ratio         PTT - ( 30 Nov 2023 18:44 )  PTT:34.6 sec      Urinalysis Basic - ( 02 Dec 2023 07:00 )    Color: x / Appearance: x / SG: x / pH: x  Gluc: 102 mg/dL / Ketone: x  / Bili: x / Urobili: x   Blood: x / Protein: x / Nitrite: x   Leuk Esterase: x / RBC: x / WBC x   Sq Epi: x / Non Sq Epi: x / Bacteria: x        Culture - Blood (collected 30 Nov 2023 19:20)  Source: .Blood Blood-Venous  Preliminary Report (02 Dec 2023 11:01):    No growth at 24 hours    Culture - Blood (collected 30 Nov 2023 19:00)  Source: .Blood Blood-Peripheral  Preliminary Report (02 Dec 2023 02:03):    No growth at 24 hours        RADIOLOGY & ADDITIONAL TESTS:  Results Reviewed:   Imaging Personally Reviewed:  Electrocardiogram Personally Reviewed:    COORDINATION OF CARE:  Care Discussed with Consultants/Other Providers [Y/N]:  Prior or Outpatient Records Reviewed [Y/N]:

## 2023-12-02 NOTE — CHART NOTE - NSCHARTNOTEFT_GEN_A_CORE
House- Medicine NP:    Called by RN to obtain consent for blood transfusion.   Patient is a 89y old  Male who presents with a chief complaint of                         7.0    5.37  )-----------( 229      ( 02 Dec 2023 07:00 )             21.3     Discussed with patient's daughter regarding the need for blood transfusion. Risk and benefits discussed. Risks including fever, chills/rigors, high or low blood pressure, respiratory distress (wheezing/hypoxia), urticaria/rash/edema, nausea, pain, bleeding, darkened urine, lower back pain, severe allergic reaction and death was discussed. Verbalizes the understanding and consent obtained. Witnessed by staff.

## 2023-12-03 NOTE — PHYSICAL THERAPY INITIAL EVALUATION ADULT - PERTINENT HX OF CURRENT PROBLEM, REHAB EVAL
Patient is a 89 y/o male admitted to [facility] due to anemia, sepsis, dyspnea. Patient is a 89 y/o male admitted to [facility] due to anemia, sepsis, dyspnea.

## 2023-12-03 NOTE — PROGRESS NOTE ADULT - ASSESSMENT
89 y/o M w/ PMH fo dementia, HTN, has loop recorder, BPH w/ chronic obrien presents with syncope and found to have UTI    #UTI  -placed on cipro/flagyl,   -urine culture growing pseudomonas and enteroccous  -c/w cipro for now  -stop flagyl  -start Vanc by level.   -f/u sensitivities.   -f/u blood cx, NTD  -if gets worse, would switch from cipro to zosyn.   - prior Urine culture with E. Coli,  -Of note, obrien changed in ED    Anemia- Monitor H/H  -london positive,  but hapto 369, and .   -s/p 1UPRBC on 12/3 7-->8.8  -Transfuse to keep hgb >7  - Minimize number of blood draws     ROGER on CKD  2,9-->2.47 , baseline around 1.6  BPH w/ chronic Obrien      contipation  - enema  -c/w senna and miralax    SQH     Dispo: HHA to be reinstated

## 2023-12-03 NOTE — PROGRESS NOTE ADULT - SUBJECTIVE AND OBJECTIVE BOX
PROGRESS NOTE:     Patient is a 89y old  Male who presents with a chief complaint of     SUBJECTIVE / OVERNIGHT EVENTS: NO acute overnight events.  No acute overnight events. Patient seen and examined bedside, no new complaints           MEDICATIONS  (STANDING):  ciprofloxacin   IVPB      ciprofloxacin   IVPB 200 milliGRAM(s) IV Intermittent every 12 hours  finasteride 5 milliGRAM(s) Oral daily  gabapentin 300 milliGRAM(s) Oral two times a day  metroNIDAZOLE  IVPB 500 milliGRAM(s) IV Intermittent every 8 hours  mirtazapine 15 milliGRAM(s) Oral daily  polyethylene glycol 3350 17 Gram(s) Oral at bedtime  senna 2 Tablet(s) Oral at bedtime  sodium chloride 0.9%. 1000 milliLiter(s) (75 mL/Hr) IV Continuous <Continuous>  tamsulosin 0.4 milliGRAM(s) Oral at bedtime    MEDICATIONS  (PRN):  acetaminophen     Tablet .. 650 milliGRAM(s) Oral every 6 hours PRN Temp greater or equal to 38C (100.4F), Mild Pain (1 - 3)  aluminum hydroxide/magnesium hydroxide/simethicone Suspension 30 milliLiter(s) Oral every 4 hours PRN Dyspepsia  melatonin 3 milliGRAM(s) Oral at bedtime PRN Insomnia  ondansetron Injectable 4 milliGRAM(s) IV Push every 8 hours PRN Nausea and/or Vomiting      CAPILLARY BLOOD GLUCOSE      POCT Blood Glucose.: 92 mg/dL (03 Dec 2023 07:36)    I&O's Summary    02 Dec 2023 07:01  -  03 Dec 2023 07:00  --------------------------------------------------------  IN: 0 mL / OUT: 800 mL / NET: -800 mL    03 Dec 2023 07:01  -  03 Dec 2023 14:39  --------------------------------------------------------  IN: 0 mL / OUT: 600 mL / NET: -600 mL        PHYSICAL EXAM:  Vital Signs Last 24 Hrs  T(C): 36.9 (03 Dec 2023 05:19), Max: 37.1 (02 Dec 2023 19:40)  T(F): 98.5 (03 Dec 2023 05:19), Max: 98.7 (02 Dec 2023 19:40)  HR: 84 (03 Dec 2023 05:19) (84 - 88)  BP: 131/66 (03 Dec 2023 05:19) (106/60 - 131/73)  BP(mean): --  RR: 18 (03 Dec 2023 05:19) (18 - 18)  SpO2: 97% (03 Dec 2023 05:19) (97% - 99%)    Parameters below as of 02 Dec 2023 19:40  Patient On (Oxygen Delivery Method): room air      CONSTITUTIONAL: Well groomed, no apparent distress  EYES: PERRLA and symmetric, EOMI, No conjunctival or scleral injection, non-icteric  ENMT: Oral mucosa with moist membranes. Normal dentition; no pharyngeal injection or exudates    NECK: Supple, symmetric and without tracheal deviation   RESP: No respiratory distress, no use of accessory muscles; CTA b/l, no WRR  CV: RRR, +S1S2, no MRG; no JVD; no peripheral edema  GI: Soft, NT, ND, no rebound, no guarding; no palpable masses; no hepatosplenomegaly; no hernia palpated  SKIN: No rashes or ulcers noted; no subcutaneous nodules or induration palpable  NEURO: CN II-XII intact; normal reflexes in upper and lower extremities, sensation intact in upper and lower extremities b/l to light touch   PSYCH: Appropriate insight/judgment; A+O x 1, mood and affect appropriate, recent/remote memory intact    LABS:                        8.8    6.18  )-----------( 250      ( 03 Dec 2023 07:30 )             26.9     12-03    137  |  107  |  54<H>  ----------------------------<  91  3.8   |  22  |  2.47<H>    Ca    8.5      03 Dec 2023 07:30  Phos  4.3     12-03  Mg     2.3     12-03    TPro  7.8  /  Alb  2.0<L>  /  TBili  0.4  /  DBili  x   /  AST  15  /  ALT  14  /  AlkPhos  39<L>  12-03          Urinalysis Basic - ( 03 Dec 2023 07:30 )    Color: x / Appearance: x / SG: x / pH: x  Gluc: 91 mg/dL / Ketone: x  / Bili: x / Urobili: x   Blood: x / Protein: x / Nitrite: x   Leuk Esterase: x / RBC: x / WBC x   Sq Epi: x / Non Sq Epi: x / Bacteria: x        Culture - Urine (collected 30 Nov 2023 20:14)  Source: Catheterized Catheterized  Preliminary Report (03 Dec 2023 14:17):    >100,000 CFU/ml Enterococcus faecalis    50,000 - 99,000 CFU/mL Pseudomonas aeruginosa    Culture - Blood (collected 30 Nov 2023 19:20)  Source: .Blood Blood-Venous  Preliminary Report (03 Dec 2023 11:01):    No growth at 48 Hours    Culture - Blood (collected 30 Nov 2023 19:00)  Source: .Blood Blood-Peripheral  Preliminary Report (03 Dec 2023 02:02):    No growth at 48 Hours        RADIOLOGY & ADDITIONAL TESTS:  Results Reviewed:   Imaging Personally Reviewed:  Electrocardiogram Personally Reviewed:    COORDINATION OF CARE:  Care Discussed with Consultants/Other Providers [Y/N]:  Prior or Outpatient Records Reviewed [Y/N]:

## 2023-12-03 NOTE — PROGRESS NOTE ADULT - SUBJECTIVE AND OBJECTIVE BOX
[facility] NEPHROLOGY SERVICES, [facility]  NEPHROLOGY AND HYPERTENSION  [address]  [address]  [place] [address]  [phone]    MD SOFY KEENAN, MD KEATON FRYE MD CHRISTOPHER CAPUTO, MD LEANNE MURRAY MD          Patient events noted    MEDICATIONS  (STANDING):  ciprofloxacin   IVPB 200 milliGRAM(s) IV Intermittent every 12 hours  ciprofloxacin   IVPB      collagenase Ointment 1 Application(s) Topical daily  finasteride 5 milliGRAM(s) Oral daily  gabapentin 300 milliGRAM(s) Oral two times a day  mirtazapine 15 milliGRAM(s) Oral daily  polyethylene glycol 3350 17 Gram(s) Oral at bedtime  senna 2 Tablet(s) Oral at bedtime  sodium chloride 0.9%. 1000 milliLiter(s) (75 mL/Hr) IV Continuous <Continuous>  tamsulosin 0.4 milliGRAM(s) Oral at bedtime    MEDICATIONS  (PRN):  acetaminophen     Tablet .. 650 milliGRAM(s) Oral every 6 hours PRN Temp greater or equal to 38C (100.4F), Mild Pain (1 - 3)  aluminum hydroxide/magnesium hydroxide/simethicone Suspension 30 milliLiter(s) Oral every 4 hours PRN Dyspepsia  melatonin 3 milliGRAM(s) Oral at bedtime PRN Insomnia  ondansetron Injectable 4 milliGRAM(s) IV Push every 8 hours PRN Nausea and/or Vomiting      12-02-23 @ 07:01  -  12-03-23 @ 07:00  --------------------------------------------------------  IN: 0 mL / OUT: 800 mL / NET: -800 mL    12-03-23 @ 07:01  -  12-03-23 @ 23:12  --------------------------------------------------------  IN: 0 mL / OUT: 600 mL / NET: -600 mL      PHYSICAL EXAM:      T(C): 37.3 (12-03-23 @ 16:25), Max: 37.3 (12-03-23 @ 16:25)  HR: 99 (12-03-23 @ 16:25) (84 - 99)  BP: 145/70 (12-03-23 @ 16:25) (131/66 - 145/70)  RR: 16 (12-03-23 @ 16:25) (16 - 18)  SpO2: 97% (12-03-23 @ 16:25) (97% - 99%)  Wt(kg): --  Lungs clear  Heart S1S2  Abd soft NT ND  Extremities:   tr edema                                    8.8    6.18  )-----------( 250      ( 03 Dec 2023 07:30 )             26.9     12-03    137  |  107  |  54<H>  ----------------------------<  91  3.8   |  22  |  2.47<H>    Ca    8.5      03 Dec 2023 07:30  Phos  4.3     12-03  Mg     2.3     12-03    TPro  7.8  /  Alb  2.0<L>  /  TBili  0.4  /  DBili  x   /  AST  15  /  ALT  14  /  AlkPhos  39<L>  12-03      LIVER FUNCTIONS - ( 03 Dec 2023 07:30 )  Alb: 2.0 g/dL / Pro: 7.8 gm/dL / ALK PHOS: 39 U/L / ALT: 14 U/L / AST: 15 U/L / GGT: x           Creatinine Trend: 2.47<--, 2.59<--, 2.72<--, 2.91<--      Urinalysis + Microscopic Examination (11.30.23 @ 20:14)    pH Urine: 6.0   Urine Appearance: Cloudy   Color: Yellow   Specific Gravity: 1.012   Protein, Urine: 30 mg/dL   Glucose Qualitative, Urine: Negative mg/dL   Ketone - Urine: Negative mg/dL   Blood, Urine: Moderate   Bilirubin: Negative   Urobilinogen: 0.2 mg/dL   Leukocyte Esterase Concentration: Large   Nitrite: Negative   White Blood Cell - Urine: >50 /HPF   Red Blood Cell - Urine: 5-10 /HPF   Bacteria: Many /HPF   Squamous Epithelial Cells: Present        Assessment     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 due to fecal impaction  Pre renal element with low MAP, anemia   UTI     Plan     Continued IV abx;   D/C IVF  Overall conservative workup and treatment    Samy Noriega MD [facility] NEPHROLOGY SERVICES, [facility]  NEPHROLOGY AND HYPERTENSION  [address]  [address]  [place] [address]  [phone]    MD SOFY KEENAN, MD KEATON FRYE MD CHRISTOPHER CAPUTO, MD LEANNE MURRAY MD          Patient events noted    MEDICATIONS  (STANDING):  ciprofloxacin   IVPB 200 milliGRAM(s) IV Intermittent every 12 hours  ciprofloxacin   IVPB      collagenase Ointment 1 Application(s) Topical daily  finasteride 5 milliGRAM(s) Oral daily  gabapentin 300 milliGRAM(s) Oral two times a day  mirtazapine 15 milliGRAM(s) Oral daily  polyethylene glycol 3350 17 Gram(s) Oral at bedtime  senna 2 Tablet(s) Oral at bedtime  sodium chloride 0.9%. 1000 milliLiter(s) (75 mL/Hr) IV Continuous <Continuous>  tamsulosin 0.4 milliGRAM(s) Oral at bedtime    MEDICATIONS  (PRN):  acetaminophen     Tablet .. 650 milliGRAM(s) Oral every 6 hours PRN Temp greater or equal to 38C (100.4F), Mild Pain (1 - 3)  aluminum hydroxide/magnesium hydroxide/simethicone Suspension 30 milliLiter(s) Oral every 4 hours PRN Dyspepsia  melatonin 3 milliGRAM(s) Oral at bedtime PRN Insomnia  ondansetron Injectable 4 milliGRAM(s) IV Push every 8 hours PRN Nausea and/or Vomiting      12-02-23 @ 07:01  -  12-03-23 @ 07:00  --------------------------------------------------------  IN: 0 mL / OUT: 800 mL / NET: -800 mL    12-03-23 @ 07:01  -  12-03-23 @ 23:12  --------------------------------------------------------  IN: 0 mL / OUT: 600 mL / NET: -600 mL      PHYSICAL EXAM:      T(C): 37.3 (12-03-23 @ 16:25), Max: 37.3 (12-03-23 @ 16:25)  HR: 99 (12-03-23 @ 16:25) (84 - 99)  BP: 145/70 (12-03-23 @ 16:25) (131/66 - 145/70)  RR: 16 (12-03-23 @ 16:25) (16 - 18)  SpO2: 97% (12-03-23 @ 16:25) (97% - 99%)  Wt(kg): --  Lungs clear  Heart S1S2  Abd soft NT ND  Extremities:   tr edema                                    8.8    6.18  )-----------( 250      ( 03 Dec 2023 07:30 )             26.9     12-03    137  |  107  |  54<H>  ----------------------------<  91  3.8   |  22  |  2.47<H>    Ca    8.5      03 Dec 2023 07:30  Phos  4.3     12-03  Mg     2.3     12-03    TPro  7.8  /  Alb  2.0<L>  /  TBili  0.4  /  DBili  x   /  AST  15  /  ALT  14  /  AlkPhos  39<L>  12-03      LIVER FUNCTIONS - ( 03 Dec 2023 07:30 )  Alb: 2.0 g/dL / Pro: 7.8 gm/dL / ALK PHOS: 39 U/L / ALT: 14 U/L / AST: 15 U/L / GGT: x           Creatinine Trend: 2.47<--, 2.59<--, 2.72<--, 2.91<--      Urinalysis + Microscopic Examination (11.30.23 @ 20:14)    pH Urine: 6.0   Urine Appearance: Cloudy   Color: Yellow   Specific Gravity: 1.012   Protein, Urine: 30 mg/dL   Glucose Qualitative, Urine: Negative mg/dL   Ketone - Urine: Negative mg/dL   Blood, Urine: Moderate   Bilirubin: Negative   Urobilinogen: 0.2 mg/dL   Leukocyte Esterase Concentration: Large   Nitrite: Negative   White Blood Cell - Urine: >50 /HPF   Red Blood Cell - Urine: 5-10 /HPF   Bacteria: Many /HPF   Squamous Epithelial Cells: Present        Assessment     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 due to fecal impaction  Pre renal element with low MAP, anemia   UTI     Plan     Continued IV abx;   D/C IVF  Overall conservative workup and treatment    Samy Noriega MD

## 2023-12-03 NOTE — PHYSICAL THERAPY INITIAL EVALUATION ADULT - GENERAL OBSERVATIONS, REHAB EVAL
Chart (EMR) reviewed. Pt seen for wound care assessment. Received supine c HOB elevated, NAD. +IV intact, +O2 via nasal cannula, +obrien cath, +dressing to sacrum and left ischium. Alert. Ox1. Able to follow simple commands.

## 2023-12-03 NOTE — PHYSICAL THERAPY INITIAL EVALUATION ADULT - ADDITIONAL COMMENTS
As per chart, pt lives c daughter in a PH with 4 step to enter. +Chronic obrien.  Family has a collapsible ramp that is used for transporting pt/in -out of home. Pt has hospital bed /wheelchair- both need repairs. +HHA 12 hr/7 days/week. Pt is never left alone. Dtr works at [employer]. As per chart, pt lives c daughter in a PH with 4 step to enter. +Chronic obrien.  Family has a collapsible ramp that is used for transporting pt/in -out of home. Pt has hospital bed /wheelchair- both need repairs. +HHA 12 hr/7 days/week. Pt is never left alone. Dtr works at [employer].

## 2023-12-04 NOTE — DIETITIAN INITIAL EVALUATION ADULT - PERTINENT MEDS FT
MEDICATIONS  (STANDING):  ciprofloxacin   IVPB      ciprofloxacin   IVPB 200 milliGRAM(s) IV Intermittent every 12 hours  collagenase Ointment 1 Application(s) Topical daily  finasteride 5 milliGRAM(s) Oral daily  gabapentin 300 milliGRAM(s) Oral two times a day  mirtazapine 15 milliGRAM(s) Oral daily  polyethylene glycol 3350 17 Gram(s) Oral at bedtime  senna 2 Tablet(s) Oral at bedtime  sodium chloride 0.9%. 1000 milliLiter(s) (75 mL/Hr) IV Continuous <Continuous>  tamsulosin 0.4 milliGRAM(s) Oral at bedtime    MEDICATIONS  (PRN):  acetaminophen     Tablet .. 650 milliGRAM(s) Oral every 6 hours PRN Temp greater or equal to 38C (100.4F), Mild Pain (1 - 3)  aluminum hydroxide/magnesium hydroxide/simethicone Suspension 30 milliLiter(s) Oral every 4 hours PRN Dyspepsia  melatonin 3 milliGRAM(s) Oral at bedtime PRN Insomnia  ondansetron Injectable 4 milliGRAM(s) IV Push every 8 hours PRN Nausea and/or Vomiting

## 2023-12-04 NOTE — PROGRESS NOTE ADULT - SUBJECTIVE AND OBJECTIVE BOX
[facility] NEPHROLOGY SERVICES, [facility]  NEPHROLOGY AND HYPERTENSION  [address]  [address]  [place] [address]  [phone]    MD SOFY KEENAN MD YELENA ROSENBERG, MD BINNY KOSHY, MD CHRISTOPHER CAPUTO, MD EDWARD BOVER, MD          Patient events noted    MEDICATIONS  (STANDING):  collagenase Ointment 1 Application(s) Topical daily  finasteride 5 milliGRAM(s) Oral daily  gabapentin 300 milliGRAM(s) Oral two times a day  mirtazapine 15 milliGRAM(s) Oral daily  piperacillin/tazobactam IVPB.- 3.375 Gram(s) IV Intermittent once  polyethylene glycol 3350 17 Gram(s) Oral at bedtime  senna 2 Tablet(s) Oral at bedtime  sodium chloride 0.9%. 1000 milliLiter(s) (75 mL/Hr) IV Continuous <Continuous>  tamsulosin 0.4 milliGRAM(s) Oral at bedtime    MEDICATIONS  (PRN):  acetaminophen     Tablet .. 650 milliGRAM(s) Oral every 6 hours PRN Temp greater or equal to 38C (100.4F), Mild Pain (1 - 3)  aluminum hydroxide/magnesium hydroxide/simethicone Suspension 30 milliLiter(s) Oral every 4 hours PRN Dyspepsia  melatonin 3 milliGRAM(s) Oral at bedtime PRN Insomnia  ondansetron Injectable 4 milliGRAM(s) IV Push every 8 hours PRN Nausea and/or Vomiting      12-03-23 @ 07:01  -  12-04-23 @ 07:00  --------------------------------------------------------  IN: 0 mL / OUT: 1200 mL / NET: -1200 mL      PHYSICAL EXAM:      T(C): 36.8 (12-04-23 @ 15:24), Max: 37.4 (12-03-23 @ 23:27)  HR: 98 (12-04-23 @ 15:24) (86 - 100)  BP: 147/80 (12-04-23 @ 15:24) (125/74 - 147/80)  RR: 16 (12-04-23 @ 15:24) (16 - 18)  SpO2: 98% (12-04-23 @ 15:24) (89% - 98%)  Wt(kg): --  Lungs clear  Heart S1S2  Abd soft NT ND  Extremities:   tr edema                                    9.2    7.68  )-----------( 235      ( 04 Dec 2023 06:18 )             28.2     12-04    139  |  108  |  46<H>  ----------------------------<  95  3.5   |  20<L>  |  2.18<H>    Ca    8.8      04 Dec 2023 06:18  Phos  3.8     12-04  Mg     2.1     12-04    TPro  7.8  /  Alb  2.0<L>  /  TBili  0.4  /  DBili  x   /  AST  15  /  ALT  14  /  AlkPhos  39<L>  12-03      LIVER FUNCTIONS - ( 03 Dec 2023 07:30 )  Alb: 2.0 g/dL / Pro: 7.8 gm/dL / ALK PHOS: 39 U/L / ALT: 14 U/L / AST: 15 U/L / GGT: x           Creatinine Trend: 2.18<--, 2.47<--, 2.59<--, 2.72<--, 2.91<--        Assessment     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 due to fecal impaction  Pre renal element with low MAP, anemia   UTI     Plan     Continued IV abx;   Encourage po intake   D/C IVF  Overall conservative workup and treatment      Samy Noriega MD [facility] NEPHROLOGY SERVICES, [facility]  NEPHROLOGY AND HYPERTENSION  [address]  [address]  [place] [address]  [phone]    MD SOFY KEENAN MD YELENA ROSENBERG, MD BINNY KOSHY, MD CHRISTOPHER CAPUTO, MD EDWARD BOVER, MD          Patient events noted    MEDICATIONS  (STANDING):  collagenase Ointment 1 Application(s) Topical daily  finasteride 5 milliGRAM(s) Oral daily  gabapentin 300 milliGRAM(s) Oral two times a day  mirtazapine 15 milliGRAM(s) Oral daily  piperacillin/tazobactam IVPB.- 3.375 Gram(s) IV Intermittent once  polyethylene glycol 3350 17 Gram(s) Oral at bedtime  senna 2 Tablet(s) Oral at bedtime  sodium chloride 0.9%. 1000 milliLiter(s) (75 mL/Hr) IV Continuous <Continuous>  tamsulosin 0.4 milliGRAM(s) Oral at bedtime    MEDICATIONS  (PRN):  acetaminophen     Tablet .. 650 milliGRAM(s) Oral every 6 hours PRN Temp greater or equal to 38C (100.4F), Mild Pain (1 - 3)  aluminum hydroxide/magnesium hydroxide/simethicone Suspension 30 milliLiter(s) Oral every 4 hours PRN Dyspepsia  melatonin 3 milliGRAM(s) Oral at bedtime PRN Insomnia  ondansetron Injectable 4 milliGRAM(s) IV Push every 8 hours PRN Nausea and/or Vomiting      12-03-23 @ 07:01  -  12-04-23 @ 07:00  --------------------------------------------------------  IN: 0 mL / OUT: 1200 mL / NET: -1200 mL      PHYSICAL EXAM:      T(C): 36.8 (12-04-23 @ 15:24), Max: 37.4 (12-03-23 @ 23:27)  HR: 98 (12-04-23 @ 15:24) (86 - 100)  BP: 147/80 (12-04-23 @ 15:24) (125/74 - 147/80)  RR: 16 (12-04-23 @ 15:24) (16 - 18)  SpO2: 98% (12-04-23 @ 15:24) (89% - 98%)  Wt(kg): --  Lungs clear  Heart S1S2  Abd soft NT ND  Extremities:   tr edema                                    9.2    7.68  )-----------( 235      ( 04 Dec 2023 06:18 )             28.2     12-04    139  |  108  |  46<H>  ----------------------------<  95  3.5   |  20<L>  |  2.18<H>    Ca    8.8      04 Dec 2023 06:18  Phos  3.8     12-04  Mg     2.1     12-04    TPro  7.8  /  Alb  2.0<L>  /  TBili  0.4  /  DBili  x   /  AST  15  /  ALT  14  /  AlkPhos  39<L>  12-03      LIVER FUNCTIONS - ( 03 Dec 2023 07:30 )  Alb: 2.0 g/dL / Pro: 7.8 gm/dL / ALK PHOS: 39 U/L / ALT: 14 U/L / AST: 15 U/L / GGT: x           Creatinine Trend: 2.18<--, 2.47<--, 2.59<--, 2.72<--, 2.91<--        Assessment     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 due to fecal impaction  Pre renal element with low MAP, anemia   UTI     Plan     Continued IV abx;   Encourage po intake   D/C IVF  Overall conservative workup and treatment      Samy Noriega MD

## 2023-12-04 NOTE — PROGRESS NOTE ADULT - ASSESSMENT
89 y/o M w/ PMH fo dementia, HTN, has loop recorder, BPH w/ chronic obrien presents with syncope and found to have UTI    Metabolic Encephalopathy  Likely due to UTI  Palliative on board    UTI  Started on cipro/flagyl   urine culture growing pseudomonas and enterococcus    Sensitivities show cipro resistance.  Will start zosyn for UTI   antibiotics currently zosyn/vancomcyi  Of note, obrien changed in ED    Anemia- Monitor H/H  -london positive,  but hapto 369, and .   -s/p 1UPRBC on 12/3 7-->8.8  -Transfuse to keep hgb >7  - Minimize number of blood draws     ROGER on CKD  2,9-->2.47 , baseline around 1.6  BPH w/ chronic Obrien      constipation  - enema  -c/w senna and miralax    SQH     Dispo: HHA to be reinstated      89 y/o M w/ PMH fo dementia, HTN, has loop recorder, BPH w/ chronic obrien presents with syncope and found to have UTI    Metabolic Encephalopathy  Likely due to UTI  Palliative on board    UTI  Started on cipro/flagyl   urine culture growing pseudomonas and enterococcus    Sensitivities show cipro resistance.  Will start zosyn for UTI   antibiotics currently zosyn/vancomcyi  Of note, obrien changed in ED    Anemia- Monitor H/H  -london positive,  but hapto 369, and .   -s/p 1UPRBC on 12/3 7-->8.8  -Transfuse to keep hgb >7  - Minimize number of blood draws     ROGER on CKD  2,9-->2.47 , baseline around 1.6  BPH w/ chronic Obrien      constipation  - enema  -c/w senna and miralax    SQH     Dispo: HHA to be reinstated

## 2023-12-04 NOTE — PROGRESS NOTE ADULT - SUBJECTIVE AND OBJECTIVE BOX
89 y/o M w/ PMH fo dementia, HTN, has loop recorder, BPH w/ chronic obrien presents with syncope and found to have UTI    Patient seen and examined at bedside.    Speaks primarily [place]inean Creole, aide at the bedside  Translation assistance provided by language line # [id number]  Patient appears disoriented, responsive to light painful stimuli  Per aide, patient's mentation at baseline is alert and able to communicate well    Currently can only answer yes/no questions   Aide reports patient has a hx of UTIs, hospitalized for UTI last month  Consulted palliative care for consultation  UCx returned - antibiotics changed  BCx negative x 72 hrs      Physical exam:  General: patient in no significant distress, resting comfortably  Head:  Atraumatic, Normocephalic  Eyes: clear sclera  Neck: Supple, thyroid nontender,   Cardio: S1/S2 +ve  Resp: clear to ausculation bilaterally  GI: abdomen soft, nontender, non distended, no guarding, BS +ve x 4  Ext: no significant pedal edema  Neuro: disoriented, responsive to pain.  .  Skin: sacral decubitus ulcer +ve       Recent Vitals  T(C): 36.8 (12-04-23 @ 15:24), Max: 37.4 (12-03-23 @ 23:27)  HR: 98 (12-04-23 @ 15:24) (86 - 100)  BP: 147/80 (12-04-23 @ 15:24) (125/74 - 147/80)  RR: 16 (12-04-23 @ 15:24) (16 - 18)  SpO2: 98% (12-04-23 @ 15:24) (89% - 98%)                        9.2    7.68  )-----------( 235      ( 04 Dec 2023 06:18 )             28.2     12-04    139  |  108  |  46<H>  ----------------------------<  95  3.5   |  20<L>  |  2.18<H>    Ca    8.8      04 Dec 2023 06:18  Phos  3.8     12-04  Mg     2.1     12-04    TPro  7.8  /  Alb  2.0<L>  /  TBili  0.4  /  DBili  x   /  AST  15  /  ALT  14  /  AlkPhos  39<L>  12-03      LIVER FUNCTIONS - ( 03 Dec 2023 07:30 )  Alb: 2.0 g/dL / Pro: 7.8 gm/dL / ALK PHOS: 39 U/L / ALT: 14 U/L / AST: 15 U/L / GGT: x           Urinalysis Basic - ( 04 Dec 2023 06:18 )    Color: x / Appearance: x / SG: x / pH: x  Gluc: 95 mg/dL / Ketone: x  / Bili: x / Urobili: x   Blood: x / Protein: x / Nitrite: x   Leuk Esterase: x / RBC: x / WBC x   Sq Epi: x / Non Sq Epi: x / Bacteria: x        Home Medications:  acetaminophen 325 mg oral tablet: 2 tab(s) orally every 6 hours, As needed, Temp greater or equal to 38C (100.4F) (28 Mar 2023 17:16)  amLODIPine 5 mg oral tablet: 1 tab(s) orally once a day (07 Nov 2023 11:10)  amLODIPine 5 mg oral tablet: 1 tab(s) orally once a day (28 Mar 2023 17:16)  finasteride 5 mg oral tablet: 1 tab(s) orally once a day (07 Nov 2023 11:10)  gabapentin 300 mg oral capsule: 1 cap(s) orally 2 times a day (07 Nov 2023 11:10)  gabapentin 300 mg oral capsule: 1 cap(s) orally 2 times a day (28 Mar 2023 17:16)  labetalol 100 mg oral tablet: 1 tab(s) orally 2 times a day (28 Mar 2023 17:16)  labetalol 100 mg oral tablet: 1 tab(s) orally 2 times a day (07 Nov 2023 11:10)  mirtazapine 15 mg oral tablet: 1/2 tab(s) orally once a day (28 Mar 2023 17:16)  PREDNISOLONE AC 1% OPHTH SUSP  5ML: SHAKE LIQUID AND INSTILL 1 DROP IN RIGHT EYE TWICE DAILY (28 Mar 2023 17:16)  tamsulosin 0.4 mg oral capsule: 1 cap(s) orally once a day (at bedtime) (28 Mar 2023 17:16)   89 y/o M w/ PMH fo dementia, HTN, has loop recorder, BPH w/ chronic obrien presents with syncope and found to have UTI    Patient seen and examined at bedside.    Speaks primarily [place]ian Creole, aide at the bedside  Translation assistance provided by language line # [id number]  Patient appears disoriented, responsive to light painful stimuli  Per aide, patient's mentation at baseline is alert and able to communicate well    Currently can only answer yes/no questions   Aide reports patient has a hx of UTIs, hospitalized for UTI last month  Consulted palliative care for consultation  UCx returned - antibiotics changed  BCx negative x 72 hrs      Physical exam:  General: patient in no significant distress, resting comfortably  Head:  Atraumatic, Normocephalic  Eyes: clear sclera  Neck: Supple, thyroid nontender,   Cardio: S1/S2 +ve  Resp: clear to ausculation bilaterally  GI: abdomen soft, nontender, non distended, no guarding, BS +ve x 4  Ext: no significant pedal edema  Neuro: disoriented, responsive to pain.  .  Skin: sacral decubitus ulcer +ve       Recent Vitals  T(C): 36.8 (12-04-23 @ 15:24), Max: 37.4 (12-03-23 @ 23:27)  HR: 98 (12-04-23 @ 15:24) (86 - 100)  BP: 147/80 (12-04-23 @ 15:24) (125/74 - 147/80)  RR: 16 (12-04-23 @ 15:24) (16 - 18)  SpO2: 98% (12-04-23 @ 15:24) (89% - 98%)                        9.2    7.68  )-----------( 235      ( 04 Dec 2023 06:18 )             28.2     12-04    139  |  108  |  46<H>  ----------------------------<  95  3.5   |  20<L>  |  2.18<H>    Ca    8.8      04 Dec 2023 06:18  Phos  3.8     12-04  Mg     2.1     12-04    TPro  7.8  /  Alb  2.0<L>  /  TBili  0.4  /  DBili  x   /  AST  15  /  ALT  14  /  AlkPhos  39<L>  12-03      LIVER FUNCTIONS - ( 03 Dec 2023 07:30 )  Alb: 2.0 g/dL / Pro: 7.8 gm/dL / ALK PHOS: 39 U/L / ALT: 14 U/L / AST: 15 U/L / GGT: x           Urinalysis Basic - ( 04 Dec 2023 06:18 )    Color: x / Appearance: x / SG: x / pH: x  Gluc: 95 mg/dL / Ketone: x  / Bili: x / Urobili: x   Blood: x / Protein: x / Nitrite: x   Leuk Esterase: x / RBC: x / WBC x   Sq Epi: x / Non Sq Epi: x / Bacteria: x        Home Medications:  acetaminophen 325 mg oral tablet: 2 tab(s) orally every 6 hours, As needed, Temp greater or equal to 38C (100.4F) (28 Mar 2023 17:16)  amLODIPine 5 mg oral tablet: 1 tab(s) orally once a day (07 Nov 2023 11:10)  amLODIPine 5 mg oral tablet: 1 tab(s) orally once a day (28 Mar 2023 17:16)  finasteride 5 mg oral tablet: 1 tab(s) orally once a day (07 Nov 2023 11:10)  gabapentin 300 mg oral capsule: 1 cap(s) orally 2 times a day (07 Nov 2023 11:10)  gabapentin 300 mg oral capsule: 1 cap(s) orally 2 times a day (28 Mar 2023 17:16)  labetalol 100 mg oral tablet: 1 tab(s) orally 2 times a day (28 Mar 2023 17:16)  labetalol 100 mg oral tablet: 1 tab(s) orally 2 times a day (07 Nov 2023 11:10)  mirtazapine 15 mg oral tablet: 1/2 tab(s) orally once a day (28 Mar 2023 17:16)  PREDNISOLONE AC 1% OPHTH SUSP  5ML: SHAKE LIQUID AND INSTILL 1 DROP IN RIGHT EYE TWICE DAILY (28 Mar 2023 17:16)  tamsulosin 0.4 mg oral capsule: 1 cap(s) orally once a day (at bedtime) (28 Mar 2023 17:16)

## 2023-12-04 NOTE — DIETITIAN INITIAL EVALUATION ADULT - PERTINENT LABORATORY DATA
12-04    139  |  108  |  46<H>  ----------------------------<  95  3.5   |  20<L>  |  2.18<H>    Ca    8.8      04 Dec 2023 06:18  Phos  3.8     12-04  Mg     2.1     12-04    TPro  7.8  /  Alb  2.0<L>  /  TBili  0.4  /  DBili  x   /  AST  15  /  ALT  14  /  AlkPhos  39<L>  12-03  A1C with Estimated Average Glucose Result: 5.1 % (01-14-23 @ 07:40)

## 2023-12-04 NOTE — DIETITIAN INITIAL EVALUATION ADULT - NSFNSPHYEXAMSKINFT_GEN_A_CORE
Pressure Injury 1: sacrum- Stage III  Pressure Injury 2: Left ischium- Stage III  as per flow sheets

## 2023-12-04 NOTE — DIETITIAN INITIAL EVALUATION ADULT - OTHER INFO
Pt with confusion, unable to provide history at time of visit.  admitted with sepsis secondary to UTI, stercoral colitis, pleural effusion.  RD remains available.

## 2023-12-04 NOTE — DIETITIAN INITIAL EVALUATION ADULT - REASON INDICATOR FOR ASSESSMENT
Pt seen for nutrition consult for pressure injury stage II or greater and for MST score 2 or greater

## 2023-12-05 NOTE — CONSULT NOTE ADULT - PROBLEM SELECTOR RECOMMENDATION 5
[facility] discussion as above. Son and daughter make decisions together. Remains full code for now, continue current medical management. On housecal program. For home w services once medically stable. [facility] discussion as above. Son and daughter make decisions together. Remains full code for now, continue current medical management. On housecal program. For home w services once medically stable. [facility] discussion as above. Son and daughter make decisions together. Remains full code for now, continue current medical management. On housecal program. For home w services once medically stable. Palliative team will sign off, please reconsult prn. [facility] discussion as above. Son and daughter make decisions together. Remains full code for now, continue current medical management. On housecal program. For home w services once medically stable. Palliative team will sign off, please reconsult prn.

## 2023-12-05 NOTE — CONSULT NOTE ADULT - PROBLEM SELECTOR RECOMMENDATION 9
ROGER on CKD, renal following, has chronic obrien, changed in ED, on antibiotics for UTI, b/l hydro on CT, on bowel regimen for constipation. Cr downtrending. Avoid nephrotoxins, renally dose meds.

## 2023-12-05 NOTE — PROGRESS NOTE ADULT - ASSESSMENT
89 y/o M w/ PMH fo dementia, HTN, has loop recorder, BPH w/ chronic obrien presents with syncope and found to have UTI    Metabolic Encephalopathy  Likely due to UTI  Palliative on board    UTI  Started on cipro/flagyl   urine culture growing pseudomonas and enterococcus    Sensitivities show cipro resistance.  Will start zosyn for UTI   antibiotics currently zosyn/vancomcyi  Of note, obrien changed in ED    Anemia- Monitor H/H  -london positive,  but hapto 369, and .   -s/p 1UPRBC on 12/3 7-->8.8  -Transfuse to keep hgb >7  - Minimize number of blood draws     ROGER on CKD  2,9-->2.47 , baseline around 1.6  BPH w/ chronic Obrien      constipation  - enema  -c/w senna and miralax    SQH     Dispo: HHA to be reinstated      <<----- Click to add NO significant Past Surgical History

## 2023-12-05 NOTE — PROGRESS NOTE ADULT - SUBJECTIVE AND OBJECTIVE BOX
[facility] NEPHROLOGY SERVICES, [facility]  NEPHROLOGY AND HYPERTENSION  [address]  [address]  [place] [address]  [phone]    MD SOFY KEENAN, MD KEATON FRYE MD CHRISTOPHER CAPUTO, MD LEANNE MURRAY MD          Patient events noted    MEDICATIONS  (STANDING):  collagenase Ointment 1 Application(s) Topical daily  finasteride 5 milliGRAM(s) Oral daily  gabapentin 300 milliGRAM(s) Oral two times a day  mirtazapine 15 milliGRAM(s) Oral daily  piperacillin/tazobactam IVPB.. 3.375 Gram(s) IV Intermittent every 8 hours  polyethylene glycol 3350 17 Gram(s) Oral at bedtime  senna 2 Tablet(s) Oral at bedtime  sodium chloride 0.9%. 1000 milliLiter(s) (75 mL/Hr) IV Continuous <Continuous>  tamsulosin 0.4 milliGRAM(s) Oral at bedtime    MEDICATIONS  (PRN):  acetaminophen     Tablet .. 650 milliGRAM(s) Oral every 6 hours PRN Temp greater or equal to 38C (100.4F), Mild Pain (1 - 3)  aluminum hydroxide/magnesium hydroxide/simethicone Suspension 30 milliLiter(s) Oral every 4 hours PRN Dyspepsia  melatonin 3 milliGRAM(s) Oral at bedtime PRN Insomnia  ondansetron Injectable 4 milliGRAM(s) IV Push every 8 hours PRN Nausea and/or Vomiting      12-04-23 @ 07:01  -  12-05-23 @ 07:00  --------------------------------------------------------  IN: 0 mL / OUT: 1000 mL / NET: -1000 mL    12-05-23 @ 07:01  -  12-05-23 @ 17:44  --------------------------------------------------------  IN: 480 mL / OUT: 300 mL / NET: 180 mL      PHYSICAL EXAM:      T(C): 36.4 (12-05-23 @ 16:10), Max: 37.2 (12-05-23 @ 04:00)  HR: 92 (12-05-23 @ 16:10) (86 - 99)  BP: 149/67 (12-05-23 @ 16:10) (133/70 - 149/67)  RR: 18 (12-05-23 @ 16:10) (16 - 18)  SpO2: 97% (12-05-23 @ 16:10) (95% - 98%)  Wt(kg): --  Lungs clear  Heart S1S2  Abd soft NT ND  Extremities:   tr edema                                    9.0    10.11 )-----------( 209      ( 05 Dec 2023 09:50 )             27.8     12-05    144  |  114<H>  |  41<H>  ----------------------------<  117<H>  3.4<L>   |  23  |  1.99<H>    Ca    8.7      05 Dec 2023 10:10  Phos  3.8     12-04  Mg     2.1     12-04          Creatinine Trend: 1.99<--, 2.18<--, 2.47<--, 2.59<--, 2.72<--, 2.91<--          Assessment     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 [facility] NEPHROLOGY SERVICES, [facility]  NEPHROLOGY AND HYPERTENSION  [address]  [address]  [place] [address]  [phone]    MD SOFY KEENAN, MD KEATON FRYE MD CHRISTOPHER CAPUTO, MD LEANNE MURRAY MD          Patient events noted    MEDICATIONS  (STANDING):  collagenase Ointment 1 Application(s) Topical daily  finasteride 5 milliGRAM(s) Oral daily  gabapentin 300 milliGRAM(s) Oral two times a day  mirtazapine 15 milliGRAM(s) Oral daily  piperacillin/tazobactam IVPB.. 3.375 Gram(s) IV Intermittent every 8 hours  polyethylene glycol 3350 17 Gram(s) Oral at bedtime  senna 2 Tablet(s) Oral at bedtime  sodium chloride 0.9%. 1000 milliLiter(s) (75 mL/Hr) IV Continuous <Continuous>  tamsulosin 0.4 milliGRAM(s) Oral at bedtime    MEDICATIONS  (PRN):  acetaminophen     Tablet .. 650 milliGRAM(s) Oral every 6 hours PRN Temp greater or equal to 38C (100.4F), Mild Pain (1 - 3)  aluminum hydroxide/magnesium hydroxide/simethicone Suspension 30 milliLiter(s) Oral every 4 hours PRN Dyspepsia  melatonin 3 milliGRAM(s) Oral at bedtime PRN Insomnia  ondansetron Injectable 4 milliGRAM(s) IV Push every 8 hours PRN Nausea and/or Vomiting      12-04-23 @ 07:01  -  12-05-23 @ 07:00  --------------------------------------------------------  IN: 0 mL / OUT: 1000 mL / NET: -1000 mL    12-05-23 @ 07:01  -  12-05-23 @ 17:44  --------------------------------------------------------  IN: 480 mL / OUT: 300 mL / NET: 180 mL      PHYSICAL EXAM:      T(C): 36.4 (12-05-23 @ 16:10), Max: 37.2 (12-05-23 @ 04:00)  HR: 92 (12-05-23 @ 16:10) (86 - 99)  BP: 149/67 (12-05-23 @ 16:10) (133/70 - 149/67)  RR: 18 (12-05-23 @ 16:10) (16 - 18)  SpO2: 97% (12-05-23 @ 16:10) (95% - 98%)  Wt(kg): --  Lungs clear  Heart S1S2  Abd soft NT ND  Extremities:   tr edema                                    9.0    10.11 )-----------( 209      ( 05 Dec 2023 09:50 )             27.8     12-05    144  |  114<H>  |  41<H>  ----------------------------<  117<H>  3.4<L>   |  23  |  1.99<H>    Ca    8.7      05 Dec 2023 10:10  Phos  3.8     12-04  Mg     2.1     12-04          Creatinine Trend: 1.99<--, 2.18<--, 2.47<--, 2.59<--, 2.72<--, 2.91<--          Assessment     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

## 2023-12-05 NOTE — CONSULT NOTE ADULT - PROBLEM SELECTOR RECOMMENDATION 2
pt alert at baseline per daughter, denies hx of dementia. Delirium suspected. Reorientation, avoid deliriogenics, cluster care.

## 2023-12-05 NOTE — CONSULT NOTE ADULT - SUBJECTIVE AND OBJECTIVE BOX
Consult to: Discuss complex medical decision making related to goals of care    [facility] Palliative Care Consult Service:   MD Juanita Stewart, NP    May contact any member of Palliative Care team via Microsoft Teams for consults or questions       HPI:  89M w/ hx of CKD, HTN, BPH w/ chronic obrien, dementia p/w SOB. Pt poor historian so history obtained from record. Pt was brought in my home aid who reports pt c/o SOB. Denies fever/chills/sweats, URI sx, N/V/D.    In ED, pt hypothermic to 95.5 and hypotensive to 90/60s. Labs notable for Hgb 6.3 (+london), dimer 3000, Na 129, BUN/SCr 70/2.91, BNP 7966, UA+. CT CAP demonstrates mild b/l hydronephrosis and large stool burden w/ perirectal stranding c/f stercoral colitis. (01 Dec 2023 06:11)    Interval history: denies pain, poor po intake per RN, NAD. Full code on file.       PAST MEDICAL & SURGICAL HISTORY:  HTN (Hypertension)      Benign Prostatic Hyperplasia      Hypertension      BPH (benign prostatic hypertrophy)      Spinal stenosis of lumbar region      Dehydration      Renal insufficiency      Hydrocele in adult  bilateral, chronic      Benign essential HTN      BPH (benign prostatic hyperplasia)      Indwelling Obrien catheter present      Dementia      Wheelchair dependent      Implantable loop recorder present      No significant past surgical history      Implantable loop recorder present          FAMILY HISTORY:      unable to obtain from patient due to poor mentation, family unable to give information, see H&P for history      SOCIAL HISTORY: has children  Admitted from:  home  (with A)     Evangelical/spirituality:   [ none ] Substance abuse, [ none ] Tobacco hx, [ none ] Alcohol hx  [ none ] Home Opioid use      Baseline ADLs (prior to admission): alert, nonambulatory x 9 yrs    Review of systems:     Pain:  [ ] yes [x ] no  QOL impact -   Location -                    Aggravating factors -  Quality -  Radiation -  Timing-  Severity (0-10 scale):  Minimal acceptable level (0-10 scale):     PAIN AD Score:     [email or web address]/painad.pdf (press ctrl +  left click to view)  CPOT:    https://www.[facility].org/getattachment/[email or web address]/Critical-Care-Pain-Observation-Tool-(CPOT)      Dyspnea:      denies                         Limited due to poor mental status    Allergies    No Known Allergies    Intolerances           MEDICATIONS  (STANDING):  collagenase Ointment 1 Application(s) Topical daily  finasteride 5 milliGRAM(s) Oral daily  gabapentin 300 milliGRAM(s) Oral two times a day  mirtazapine 15 milliGRAM(s) Oral daily  piperacillin/tazobactam IVPB.. 3.375 Gram(s) IV Intermittent every 8 hours  polyethylene glycol 3350 17 Gram(s) Oral at bedtime  potassium chloride   Powder 20 milliEquivalent(s) Oral once  senna 2 Tablet(s) Oral at bedtime  sodium chloride 0.9%. 1000 milliLiter(s) (75 mL/Hr) IV Continuous <Continuous>  tamsulosin 0.4 milliGRAM(s) Oral at bedtime    MEDICATIONS  (PRN):  acetaminophen     Tablet .. 650 milliGRAM(s) Oral every 6 hours PRN Temp greater or equal to 38C (100.4F), Mild Pain (1 - 3)  aluminum hydroxide/magnesium hydroxide/simethicone Suspension 30 milliLiter(s) Oral every 4 hours PRN Dyspepsia  melatonin 3 milliGRAM(s) Oral at bedtime PRN Insomnia  ondansetron Injectable 4 milliGRAM(s) IV Push every 8 hours PRN Nausea and/or Vomiting      PHYSICAL EXAM:  Vital Signs Last 24 Hrs  T(C): 36.5 (05 Dec 2023 10:41), Max: 37.2 (05 Dec 2023 04:00)  T(F): 97.7 (05 Dec 2023 10:41), Max: 98.9 (05 Dec 2023 04:00)  HR: 90 (05 Dec 2023 10:41) (86 - 99)  BP: 137/76 (05 Dec 2023 10:41) (133/70 - 137/76)  BP(mean): --  RR: 17 (05 Dec 2023 10:41) (16 - 17)  SpO2: 95% (05 Dec 2023 10:41) (95% - 98%)    Parameters below as of 05 Dec 2023 10:41  Patient On (Oxygen Delivery Method): nasal cannula  O2 Flow (L/min): 2       Palliative Performance Scale/Karnofsky Score: 40     GENERAL: alert, NAD  HEENT: Atraumatic, oropharynx clear, neck supple  CHEST/LUNG: unlabored  HEART: Regular rate and rhythm    ABDOMEN: Soft, Nontender, Nondistended   MUSCULOSKELETAL:  No  edema,  bedbound  NERVOUS SYSTEM:  Alert & Oriented X1-2,  answers simple questions  SKIN: St 3 sacral noted  Oral intake: poor     LABS:                        9.0    10.11 )-----------( 209      ( 05 Dec 2023 09:50 )             27.8     12-05    144  |  114<H>  |  41<H>  ----------------------------<  117<H>  3.4<L>   |  23  |  1.99<H>    Ca    8.7      05 Dec 2023 10:10  Phos  3.8     12-04  Mg     2.1     12-04      Urinalysis Basic - ( 05 Dec 2023 10:10 )    Color: x / Appearance: x / SG: x / pH: x  Gluc: 117 mg/dL / Ketone: x  / Bili: x / Urobili: x   Blood: x / Protein: x / Nitrite: x   Leuk Esterase: x / RBC: x / WBC x   Sq Epi: x / Non Sq Epi: x / Bacteria: x        RADIOLOGY & ADDITIONAL STUDIES:  < from: CT Abdomen and Pelvis No Cont (11.30.23 @ 20:37) >    IMPRESSION:  CT chest:  1.  Moderate to large left pleural effusion with complete compressive   atelectasis of the left lower lobe and partial compressive atelectasis of   the left upper lobe and lingula.  2.  Small right pleural effusion with mild dependent atelectasis the   right lower lobe.  3.  No gross CT evidence of pneumonia or pulmonary edema.  4.  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    CT abdomen/pelvis:  1.  Motion degraded.  2.  Fullness in the renal collecting systems versus mild hydronephrosis   and mild dilatation of the proximal ureters bilaterally, new from   09/28/2020.  No obstructing renal stones.  The bladder is collapsed   around a Obrien catheter.  Follow-up ultrasound may be obtained to   evaluate for resolution.  Underlying genitourinary infection and/or   pyelonephritis should be excluded based on clinical symptoms and   laboratory values.  3.  Enlarged prostate with volume of approximately 46 mL. Lower urinary   tract symptoms (LUTS) should be excluded clinically.  4.  There is a large amount of stool distending the rectum to   approximately 8.5 cm.  Mild perirectal fat stranding.  Fecal impaction   and stercoral colitis should be excluded clinically.  5.  Mild subcutaneous edema in the flanks and proximal thighs.    --- End of Report ---            MAHSA RAYMUNDO MD; Attending Radiologist    < end of copied text >                Consult to: Discuss complex medical decision making related to goals of care    [facility] Palliative Care Consult Service:   MD Juanita Stewart, NP    May contact any member of Palliative Care team via Microsoft Teams for consults or questions       HPI:  89M w/ hx of CKD, HTN, BPH w/ chronic obrien, dementia p/w SOB. Pt poor historian so history obtained from record. Pt was brought in my home aid who reports pt c/o SOB. Denies fever/chills/sweats, URI sx, N/V/D.    In ED, pt hypothermic to 95.5 and hypotensive to 90/60s. Labs notable for Hgb 6.3 (+london), dimer 3000, Na 129, BUN/SCr 70/2.91, BNP 7966, UA+. CT CAP demonstrates mild b/l hydronephrosis and large stool burden w/ perirectal stranding c/f stercoral colitis. (01 Dec 2023 06:11)    Interval history: denies pain, poor po intake per RN, NAD. Full code on file.       PAST MEDICAL & SURGICAL HISTORY:  HTN (Hypertension)      Benign Prostatic Hyperplasia      Hypertension      BPH (benign prostatic hypertrophy)      Spinal stenosis of lumbar region      Dehydration      Renal insufficiency      Hydrocele in adult  bilateral, chronic      Benign essential HTN      BPH (benign prostatic hyperplasia)      Indwelling Obrien catheter present      Dementia      Wheelchair dependent      Implantable loop recorder present      No significant past surgical history      Implantable loop recorder present          FAMILY HISTORY:      unable to obtain from patient due to poor mentation, family unable to give information, see H&P for history      SOCIAL HISTORY: has children  Admitted from:  home  (with A)     Sikh/spirituality:   [ none ] Substance abuse, [ none ] Tobacco hx, [ none ] Alcohol hx  [ none ] Home Opioid use      Baseline ADLs (prior to admission): alert, nonambulatory x 9 yrs    Review of systems:     Pain:  [ ] yes [x ] no  QOL impact -   Location -                    Aggravating factors -  Quality -  Radiation -  Timing-  Severity (0-10 scale):  Minimal acceptable level (0-10 scale):     PAIN AD Score:     http://geriatrictoolkit.[email or web address].[email or web address]/cog/painad.pdf (press ctrl +  left click to view)  CPOT:    https://www.[facility].org/getattachment/[email or web address]/Critical-Care-Pain-Observation-Tool-(CPOT)      Dyspnea:      denies                         Limited due to poor mental status    Allergies    No Known Allergies    Intolerances           MEDICATIONS  (STANDING):  collagenase Ointment 1 Application(s) Topical daily  finasteride 5 milliGRAM(s) Oral daily  gabapentin 300 milliGRAM(s) Oral two times a day  mirtazapine 15 milliGRAM(s) Oral daily  piperacillin/tazobactam IVPB.. 3.375 Gram(s) IV Intermittent every 8 hours  polyethylene glycol 3350 17 Gram(s) Oral at bedtime  potassium chloride   Powder 20 milliEquivalent(s) Oral once  senna 2 Tablet(s) Oral at bedtime  sodium chloride 0.9%. 1000 milliLiter(s) (75 mL/Hr) IV Continuous <Continuous>  tamsulosin 0.4 milliGRAM(s) Oral at bedtime    MEDICATIONS  (PRN):  acetaminophen     Tablet .. 650 milliGRAM(s) Oral every 6 hours PRN Temp greater or equal to 38C (100.4F), Mild Pain (1 - 3)  aluminum hydroxide/magnesium hydroxide/simethicone Suspension 30 milliLiter(s) Oral every 4 hours PRN Dyspepsia  melatonin 3 milliGRAM(s) Oral at bedtime PRN Insomnia  ondansetron Injectable 4 milliGRAM(s) IV Push every 8 hours PRN Nausea and/or Vomiting      PHYSICAL EXAM:  Vital Signs Last 24 Hrs  T(C): 36.5 (05 Dec 2023 10:41), Max: 37.2 (05 Dec 2023 04:00)  T(F): 97.7 (05 Dec 2023 10:41), Max: 98.9 (05 Dec 2023 04:00)  HR: 90 (05 Dec 2023 10:41) (86 - 99)  BP: 137/76 (05 Dec 2023 10:41) (133/70 - 137/76)  BP(mean): --  RR: 17 (05 Dec 2023 10:41) (16 - 17)  SpO2: 95% (05 Dec 2023 10:41) (95% - 98%)    Parameters below as of 05 Dec 2023 10:41  Patient On (Oxygen Delivery Method): nasal cannula  O2 Flow (L/min): 2       Palliative Performance Scale/Karnofsky Score: 40     GENERAL: alert, NAD  HEENT: Atraumatic, oropharynx clear, neck supple  CHEST/LUNG: unlabored  HEART: Regular rate and rhythm    ABDOMEN: Soft, Nontender, Nondistended   MUSCULOSKELETAL:  No  edema,  bedbound  NERVOUS SYSTEM:  Alert & Oriented X1-2,  answers simple questions  SKIN: St 3 sacral noted  Oral intake: poor     LABS:                        9.0    10.11 )-----------( 209      ( 05 Dec 2023 09:50 )             27.8     12-05    144  |  114<H>  |  41<H>  ----------------------------<  117<H>  3.4<L>   |  23  |  1.99<H>    Ca    8.7      05 Dec 2023 10:10  Phos  3.8     12-04  Mg     2.1     12-04      Urinalysis Basic - ( 05 Dec 2023 10:10 )    Color: x / Appearance: x / SG: x / pH: x  Gluc: 117 mg/dL / Ketone: x  / Bili: x / Urobili: x   Blood: x / Protein: x / Nitrite: x   Leuk Esterase: x / RBC: x / WBC x   Sq Epi: x / Non Sq Epi: x / Bacteria: x        RADIOLOGY & ADDITIONAL STUDIES:  < from: CT Abdomen and Pelvis No Cont (11.30.23 @ 20:37) >    IMPRESSION:  CT chest:  1.  Moderate to large left pleural effusion with complete compressive   atelectasis of the left lower lobe and partial compressive atelectasis of   the left upper lobe and lingula.  2.  Small right pleural effusion with mild dependent atelectasis the   right lower lobe.  3.  No gross CT evidence of pneumonia or pulmonary edema.  4.  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    CT abdomen/pelvis:  1.  Motion degraded.  2.  Fullness in the renal collecting systems versus mild hydronephrosis   and mild dilatation of the proximal ureters bilaterally, new from   09/28/2020.  No obstructing renal stones.  The bladder is collapsed   around a Obrien catheter.  Follow-up ultrasound may be obtained to   evaluate for resolution.  Underlying genitourinary infection and/or   pyelonephritis should be excluded based on clinical symptoms and   laboratory values.  3.  Enlarged prostate with volume of approximately 46 mL. Lower urinary   tract symptoms (LUTS) should be excluded clinically.  4.  There is a large amount of stool distending the rectum to   approximately 8.5 cm.  Mild perirectal fat stranding.  Fecal impaction   and stercoral colitis should be excluded clinically.  5.  Mild subcutaneous edema in the flanks and proximal thighs.    --- End of Report ---            MAHSA RAYMUNDO MD; Attending Radiologist    < end of copied text >

## 2023-12-05 NOTE — CONSULT NOTE ADULT - CONVERSATION DETAILS
Spoke w patient's daughter Ching  by phone, not available to come to the hospital at present, regarding current condition, goals of care. She expressed that patient lives w her, has HHA, has home visiting MD services Dr Romo through [facility], bedbound x9 yrs, usually alert and eats well. She stated that he was not eating well because he does not like the food in the hospital although when her friend brought him food yesterday he did not want to eat that either. States he can be forgetful, but usually not confused. Discussed given comorbidities, recent hospitalization, debilitated state, at risk for further complications, rehospitalizations. Discussed risks/benefits of LST such as CPR, intubation in the context of current medical condition. She stated that there is no HCP, she and her brother make decisions together, she is not ready to make a decision about code status but will speak further w her brother. She wishes to continue w current medical management and rehospitalization as needed for now. Provided anticipatory guidance regarding hospice philosophy/services for the near future if continues to decline. She verbalized understanding. Support provided. Spoke w patient's daughter Ching  by phone, not available to come to the hospital at present, regarding current condition, goals of care. She expressed that patient lives w her, has HHA, has home visiting MD services Dr Romo through [facility], bedbound x9 yrs, usually alert and eats well. She stated that he was not eating well because he does not like the food in the hospital although when her friend brought him food yesterday he did not want to eat that either. States he can be forgetful, but usually not confused. Discussed given comorbidities, recent hospitalization, debilitated state, at risk for further complications, rehospitalizations. Discussed risks/benefits of LST such as CPR, intubation in the context of current medical condition. She stated that there is no HCP, she and her brother make decisions together, she is not ready to make a decision about code status but will speak further w her brother. She wishes to continue w current medical management and rehospitalization as needed for now. Provided anticipatory guidance regarding hospice philosophy/services for the near future if continues to decline. She verbalized understanding. Support provided.

## 2023-12-06 NOTE — PROGRESS NOTE ADULT - SUBJECTIVE AND OBJECTIVE BOX
Patient: MOIZ RANDOLPH [id number] 89y Male                            Hospitalist Attending Note    NO complaints.    ____________________PHYSICAL EXAM:  GENERAL:  NAD Alert and Oriented x 1 to person   HEENT: NCAT  CARDIOVASCULAR:  S1, S2  LUNGS: CTAB  ABDOMEN:  soft, (-) tenderness, (-) distension, (+) bowel sounds, (-) guarding, (-) rebound (-) rigidity  EXTREMITIES:  no cyanosis / clubbing / edema.   SKIN: Sacral Stage III ulcer.  L ischial Stage III ulcer.    ____________________     VITALS:  Vital Signs Last 24 Hrs  T(C): 36.9 (06 Dec 2023 10:28), Max: 36.9 (06 Dec 2023 10:28)  T(F): 98.4 (06 Dec 2023 10:28), Max: 98.4 (06 Dec 2023 10:28)  HR: 87 (06 Dec 2023 10:28) (83 - 92)  BP: 137/72 (06 Dec 2023 10:28) (130/75 - 149/67)  BP(mean): --  RR: 18 (06 Dec 2023 10:28) (18 - 18)  SpO2: 99% (06 Dec 2023 10:28) (97% - 100%)    Parameters below as of 05 Dec 2023 16:10  Patient On (Oxygen Delivery Method): nasal cannula  O2 Flow (L/min): 1   Daily     Daily Weight in k.8 (06 Dec 2023 05:03)  CAPILLARY BLOOD GLUCOSE        I&O's Summary    05 Dec 2023 07:01  -  06 Dec 2023 07:00  --------------------------------------------------------  IN: 2130 mL / OUT: 800 mL / NET: 1330 mL        HISTORY:  PAST MEDICAL & SURGICAL HISTORY:  HTN (Hypertension)      Benign Prostatic Hyperplasia      Hypertension      BPH (benign prostatic hypertrophy)      Spinal stenosis of lumbar region      Dehydration      Renal insufficiency      Hydrocele in adult  bilateral, chronic      Benign essential HTN      BPH (benign prostatic hyperplasia)      Indwelling Ma catheter present      Dementia      Wheelchair dependent      Implantable loop recorder present      No significant past surgical history      Implantable loop recorder present      Allergies    No Known Allergies    Intolerances       LABS:                        9.0    10.11 )-----------( 209      ( 05 Dec 2023 09:50 )             27.8     12-    144  |  114<H>  |  41<H>  ----------------------------<  117<H>  3.4<L>   |  23  |  1.99<H>    Ca    8.7      05 Dec 2023 10:10          Urinalysis Basic - ( 05 Dec 2023 10:10 )    Color: x / Appearance: x / SG: x / pH: x  Gluc: 117 mg/dL / Ketone: x  / Bili: x / Urobili: x   Blood: x / Protein: x / Nitrite: x   Leuk Esterase: x / RBC: x / WBC x   Sq Epi: x / Non Sq Epi: x / Bacteria: x              MEDICATIONS:  MEDICATIONS  (STANDING):  collagenase Ointment 1 Application(s) Topical daily  finasteride 5 milliGRAM(s) Oral daily  gabapentin 300 milliGRAM(s) Oral two times a day  mirtazapine 15 milliGRAM(s) Oral daily  piperacillin/tazobactam IVPB.. 3.375 Gram(s) IV Intermittent every 8 hours  polyethylene glycol 3350 17 Gram(s) Oral at bedtime  potassium chloride   Powder 20 milliEquivalent(s) Oral once  senna 2 Tablet(s) Oral at bedtime  sodium chloride 0.9%. 1000 milliLiter(s) (75 mL/Hr) IV Continuous <Continuous>  tamsulosin 0.4 milliGRAM(s) Oral at bedtime    MEDICATIONS  (PRN):  acetaminophen     Tablet .. 650 milliGRAM(s) Oral every 6 hours PRN Temp greater or equal to 38C (100.4F), Mild Pain (1 - 3)  aluminum hydroxide/magnesium hydroxide/simethicone Suspension 30 milliLiter(s) Oral every 4 hours PRN Dyspepsia  melatonin 3 milliGRAM(s) Oral at bedtime PRN Insomnia  ondansetron Injectable 4 milliGRAM(s) IV Push every 8 hours PRN Nausea and/or Vomiting                             Patient: MOIZ RANDOLPH [id number] 89y Male                            Hospitalist Attending Note    NO complaints.    ____________________PHYSICAL EXAM:  GENERAL:  NAD Alert and Oriented x 1 to person   HEENT: NCAT  CARDIOVASCULAR:  S1, S2  LUNGS: CTAB  ABDOMEN:  soft, (-) tenderness, (-) distension, (+) bowel sounds, (-) guarding, (-) rebound (-) rigidity  EXTREMITIES:  no cyanosis / clubbing / edema.   SKIN: Sacral Stage III ulcer.  L ischial Stage III ulcer.    ____________________     VITALS:  Vital Signs Last 24 Hrs  T(C): 36.9 (06 Dec 2023 10:28), Max: 36.9 (06 Dec 2023 10:28)  T(F): 98.4 (06 Dec 2023 10:28), Max: 98.4 (06 Dec 2023 10:28)  HR: 87 (06 Dec 2023 10:28) (83 - 92)  BP: 137/72 (06 Dec 2023 10:28) (130/75 - 149/67)  BP(mean): --  RR: 18 (06 Dec 2023 10:28) (18 - 18)  SpO2: 99% (06 Dec 2023 10:28) (97% - 100%)    Parameters below as of 05 Dec 2023 16:10  Patient On (Oxygen Delivery Method): nasal cannula  O2 Flow (L/min): 1   Daily     Daily Weight in k.8 (06 Dec 2023 05:03)  CAPILLARY BLOOD GLUCOSE        I&O's Summary    05 Dec 2023 07:01  -  06 Dec 2023 07:00  --------------------------------------------------------  IN: 2130 mL / OUT: 800 mL / NET: 1330 mL        HISTORY:  PAST MEDICAL & SURGICAL HISTORY:  HTN (Hypertension)      Benign Prostatic Hyperplasia      Hypertension      BPH (benign prostatic hypertrophy)      Spinal stenosis of lumbar region      Dehydration      Renal insufficiency      Hydrocele in adult  bilateral, chronic      Benign essential HTN      BPH (benign prostatic hyperplasia)      Indwelling Ma catheter present      Dementia      Wheelchair dependent      Implantable loop recorder present      No significant past surgical history      Implantable loop recorder present      Allergies    No Known Allergies    Intolerances       LABS:                        9.0    10.11 )-----------( 209      ( 05 Dec 2023 09:50 )             27.8     12-    144  |  114<H>  |  41<H>  ----------------------------<  117<H>  3.4<L>   |  23  |  1.99<H>    Ca    8.7      05 Dec 2023 10:10          Urinalysis Basic - ( 05 Dec 2023 10:10 )    Color: x / Appearance: x / SG: x / pH: x  Gluc: 117 mg/dL / Ketone: x  / Bili: x / Urobili: x   Blood: x / Protein: x / Nitrite: x   Leuk Esterase: x / RBC: x / WBC x   Sq Epi: x / Non Sq Epi: x / Bacteria: x              MEDICATIONS:  MEDICATIONS  (STANDING):  collagenase Ointment 1 Application(s) Topical daily  finasteride 5 milliGRAM(s) Oral daily  gabapentin 300 milliGRAM(s) Oral two times a day  mirtazapine 15 milliGRAM(s) Oral daily  piperacillin/tazobactam IVPB.. 3.375 Gram(s) IV Intermittent every 8 hours  polyethylene glycol 3350 17 Gram(s) Oral at bedtime  potassium chloride   Powder 20 milliEquivalent(s) Oral once  senna 2 Tablet(s) Oral at bedtime  sodium chloride 0.9%. 1000 milliLiter(s) (75 mL/Hr) IV Continuous <Continuous>  tamsulosin 0.4 milliGRAM(s) Oral at bedtime    MEDICATIONS  (PRN):  acetaminophen     Tablet .. 650 milliGRAM(s) Oral every 6 hours PRN Temp greater or equal to 38C (100.4F), Mild Pain (1 - 3)  aluminum hydroxide/magnesium hydroxide/simethicone Suspension 30 milliLiter(s) Oral every 4 hours PRN Dyspepsia  melatonin 3 milliGRAM(s) Oral at bedtime PRN Insomnia  ondansetron Injectable 4 milliGRAM(s) IV Push every 8 hours PRN Nausea and/or Vomiting                             Patient: MOIZ RANDOLPH [id number] 89y Male                            Hospitalist Attending Note    NO complaints.    ____________________PHYSICAL EXAM:  GENERAL:  NAD Alert and Oriented x 1 to person   HEENT: NCAT  CARDIOVASCULAR:  S1, S2  LUNGS: decreased BS L base.   ABDOMEN:  soft, (-) tenderness, (-) distension, (+) bowel sounds, (-) guarding, (-) rebound (-) rigidity  EXTREMITIES:  no cyanosis / clubbing / edema.   SKIN: Sacral Stage III ulcer.  L ischial Stage III ulcer.    ____________________     VITALS:  Vital Signs Last 24 Hrs  T(C): 36.9 (06 Dec 2023 10:28), Max: 36.9 (06 Dec 2023 10:28)  T(F): 98.4 (06 Dec 2023 10:28), Max: 98.4 (06 Dec 2023 10:28)  HR: 87 (06 Dec 2023 10:28) (83 - 92)  BP: 137/72 (06 Dec 2023 10:28) (130/75 - 149/67)  BP(mean): --  RR: 18 (06 Dec 2023 10:28) (18 - 18)  SpO2: 99% (06 Dec 2023 10:28) (97% - 100%)    Parameters below as of 05 Dec 2023 16:10  Patient On (Oxygen Delivery Method): nasal cannula  O2 Flow (L/min): 1   Daily     Daily Weight in k.8 (06 Dec 2023 05:03)  CAPILLARY BLOOD GLUCOSE        I&O's Summary    05 Dec 2023 07:01  -  06 Dec 2023 07:00  --------------------------------------------------------  IN: 2130 mL / OUT: 800 mL / NET: 1330 mL        HISTORY:  PAST MEDICAL & SURGICAL HISTORY:  HTN (Hypertension)      Benign Prostatic Hyperplasia      Hypertension      BPH (benign prostatic hypertrophy)      Spinal stenosis of lumbar region      Dehydration      Renal insufficiency      Hydrocele in adult  bilateral, chronic      Benign essential HTN      BPH (benign prostatic hyperplasia)      Indwelling Ma catheter present      Dementia      Wheelchair dependent      Implantable loop recorder present      No significant past surgical history      Implantable loop recorder present      Allergies    No Known Allergies    Intolerances       LABS:                        9.0    10.11 )-----------( 209      ( 05 Dec 2023 09:50 )             27.8     12-    144  |  114<H>  |  41<H>  ----------------------------<  117<H>  3.4<L>   |  23  |  1.99<H>    Ca    8.7      05 Dec 2023 10:10          Urinalysis Basic - ( 05 Dec 2023 10:10 )    Color: x / Appearance: x / SG: x / pH: x  Gluc: 117 mg/dL / Ketone: x  / Bili: x / Urobili: x   Blood: x / Protein: x / Nitrite: x   Leuk Esterase: x / RBC: x / WBC x   Sq Epi: x / Non Sq Epi: x / Bacteria: x              MEDICATIONS:  MEDICATIONS  (STANDING):  collagenase Ointment 1 Application(s) Topical daily  finasteride 5 milliGRAM(s) Oral daily  gabapentin 300 milliGRAM(s) Oral two times a day  mirtazapine 15 milliGRAM(s) Oral daily  piperacillin/tazobactam IVPB.. 3.375 Gram(s) IV Intermittent every 8 hours  polyethylene glycol 3350 17 Gram(s) Oral at bedtime  potassium chloride   Powder 20 milliEquivalent(s) Oral once  senna 2 Tablet(s) Oral at bedtime  sodium chloride 0.9%. 1000 milliLiter(s) (75 mL/Hr) IV Continuous <Continuous>  tamsulosin 0.4 milliGRAM(s) Oral at bedtime    MEDICATIONS  (PRN):  acetaminophen     Tablet .. 650 milliGRAM(s) Oral every 6 hours PRN Temp greater or equal to 38C (100.4F), Mild Pain (1 - 3)  aluminum hydroxide/magnesium hydroxide/simethicone Suspension 30 milliLiter(s) Oral every 4 hours PRN Dyspepsia  melatonin 3 milliGRAM(s) Oral at bedtime PRN Insomnia  ondansetron Injectable 4 milliGRAM(s) IV Push every 8 hours PRN Nausea and/or Vomiting                             Patient: MOIZ RANDOLPH [id number] 89y Male                            Hospitalist Attending Note    NO complaints.    ____________________PHYSICAL EXAM:  GENERAL:  NAD Alert and Oriented x 1 to person   HEENT: NCAT  CARDIOVASCULAR:  S1, S2  LUNGS: decreased BS L base.   ABDOMEN:  soft, (-) tenderness, (-) distension, (+) bowel sounds, (-) guarding, (-) rebound (-) rigidity  EXTREMITIES:  no cyanosis / clubbing / edema.   SKIN: Sacral Stage III ulcer.  L ischial Stage III ulcer.    ____________________     VITALS:  Vital Signs Last 24 Hrs  T(C): 36.9 (06 Dec 2023 10:28), Max: 36.9 (06 Dec 2023 10:28)  T(F): 98.4 (06 Dec 2023 10:28), Max: 98.4 (06 Dec 2023 10:28)  HR: 87 (06 Dec 2023 10:28) (83 - 92)  BP: 137/72 (06 Dec 2023 10:28) (130/75 - 149/67)  BP(mean): --  RR: 18 (06 Dec 2023 10:28) (18 - 18)  SpO2: 99% (06 Dec 2023 10:28) (97% - 100%)    Parameters below as of 05 Dec 2023 16:10  Patient On (Oxygen Delivery Method): nasal cannula  O2 Flow (L/min): 1   Daily     Daily Weight in k.8 (06 Dec 2023 05:03)  CAPILLARY BLOOD GLUCOSE        I&O's Summary    05 Dec 2023 07:01  -  06 Dec 2023 07:00  --------------------------------------------------------  IN: 2130 mL / OUT: 800 mL / NET: 1330 mL        HISTORY:  PAST MEDICAL & SURGICAL HISTORY:  HTN (Hypertension)      Benign Prostatic Hyperplasia      Hypertension      BPH (benign prostatic hypertrophy)      Spinal stenosis of lumbar region      Dehydration      Renal insufficiency      Hydrocele in adult  bilateral, chronic      Benign essential HTN      BPH (benign prostatic hyperplasia)      Indwelling Ma catheter present      Dementia      Wheelchair dependent      Implantable loop recorder present      No significant past surgical history      Implantable loop recorder present      Allergies    No Known Allergies    Intolerances       LABS:                        9.0    10.11 )-----------( 209      ( 05 Dec 2023 09:50 )             27.8     12-    144  |  114<H>  |  41<H>  ----------------------------<  117<H>  3.4<L>   |  23  |  1.99<H>    Ca    8.7      05 Dec 2023 10:10          Urinalysis Basic - ( 05 Dec 2023 10:10 )    Color: x / Appearance: x / SG: x / pH: x  Gluc: 117 mg/dL / Ketone: x  / Bili: x / Urobili: x   Blood: x / Protein: x / Nitrite: x   Leuk Esterase: x / RBC: x / WBC x   Sq Epi: x / Non Sq Epi: x / Bacteria: x              MEDICATIONS:  MEDICATIONS  (STANDING):  collagenase Ointment 1 Application(s) Topical daily  finasteride 5 milliGRAM(s) Oral daily  gabapentin 300 milliGRAM(s) Oral two times a day  mirtazapine 15 milliGRAM(s) Oral daily  piperacillin/tazobactam IVPB.. 3.375 Gram(s) IV Intermittent every 8 hours  polyethylene glycol 3350 17 Gram(s) Oral at bedtime  potassium chloride   Powder 20 milliEquivalent(s) Oral once  senna 2 Tablet(s) Oral at bedtime  sodium chloride 0.9%. 1000 milliLiter(s) (75 mL/Hr) IV Continuous <Continuous>  tamsulosin 0.4 milliGRAM(s) Oral at bedtime    MEDICATIONS  (PRN):  acetaminophen     Tablet .. 650 milliGRAM(s) Oral every 6 hours PRN Temp greater or equal to 38C (100.4F), Mild Pain (1 - 3)  aluminum hydroxide/magnesium hydroxide/simethicone Suspension 30 milliLiter(s) Oral every 4 hours PRN Dyspepsia  melatonin 3 milliGRAM(s) Oral at bedtime PRN Insomnia  ondansetron Injectable 4 milliGRAM(s) IV Push every 8 hours PRN Nausea and/or Vomiting

## 2023-12-06 NOTE — CONSULT NOTE ADULT - SUBJECTIVE AND OBJECTIVE BOX
HPI:  89M w/ hx of CKD, HTN, BPH w/ chronic obrien, dementia p/w SOB. Pt poor historian so history obtained from record. Pt was brought in by home aid who reports pt c/o SOB. Denies fever/chills/sweats, URI sx, N/V/D.    In ED, pt hypothermic to 95.5 and hypotensive to 90/60s. Labs notable for Hgb 6.3 (+london), dimer 3000, Na 129, BUN/SCr 70/2.91, BNP 7966, UA+. CT CAP demonstrates mild b/l hydronephrosis and large stool burden w/ perirectal stranding c/f stercoral colitis. (01 Dec 2023 06:11)  ---------------------------------  hx obtained from chart, pt poor historian with dementia, not answering questions appropriately     Admitted to medical service.  UA + , u cx with pseudomonas and enterococcus on zosyn treating for UTI  Metabolic encephalopathy + underlying dementia  Found to be anemic Hb 6.3 on admission s/p 2 units pRBC transfusion  Acute on chronic renal failure with chronic obrien for urinary retention with BPH  CXR with moderate L pleural effusion    at time of exam pt supine in bed in no respiratory distress  intermittent cough, wet and congested      Allergies  No Known Allergies      MEDICATIONS  (STANDING):  collagenase Ointment 1 Application(s) Topical daily  finasteride 5 milliGRAM(s) Oral daily  gabapentin 300 milliGRAM(s) Oral two times a day  mirtazapine 15 milliGRAM(s) Oral daily  piperacillin/tazobactam IVPB.. 3.375 Gram(s) IV Intermittent every 8 hours  polyethylene glycol 3350 17 Gram(s) Oral at bedtime  senna 2 Tablet(s) Oral at bedtime  sodium chloride 0.9%. 1000 milliLiter(s) (75 mL/Hr) IV Continuous <Continuous>  tamsulosin 0.4 milliGRAM(s) Oral at bedtime    MEDICATIONS  (PRN):  acetaminophen     Tablet .. 650 milliGRAM(s) Oral every 6 hours PRN Temp greater or equal to 38C (100.4F), Mild Pain (1 - 3)  aluminum hydroxide/magnesium hydroxide/simethicone Suspension 30 milliLiter(s) Oral every 4 hours PRN Dyspepsia  melatonin 3 milliGRAM(s) Oral at bedtime PRN Insomnia  ondansetron Injectable 4 milliGRAM(s) IV Push every 8 hours PRN Nausea and/or Vomiting        Drug Dosing Weight  Height (cm): 177.8 (30 Nov 2023 15:55)  Weight (kg): 75.3 (01 Dec 2023 16:20)  BMI (kg/m2): 23.8 (01 Dec 2023 16:20)  BSA (m2): 1.93 (01 Dec 2023 16:20)    PAST MEDICAL & SURGICAL HISTORY:  HTN (Hypertension)      BPH (benign prostatic hypertrophy)      Spinal stenosis of lumbar region      Dehydration      Renal insufficiency      Hydrocele in adult  bilateral, chronic      Indwelling Obrien catheter present      Dementia      Wheelchair dependent      Implantable loop recorder present      No significant past surgical history          FAMILY HISTORY:  No significant family history        SOCIAL HISTORY:  unable to obtain due to dementia    ADVANCE DIRECTIVES:  goals of care in discussion    REVIEW OF SYSTEMS:  unable to obtain due to dementia/confusion            Vital Signs Last 24 Hrs  T(C): 36.5 (12-06-23 @ 16:26), Max: 36.9 (12-06-23 @ 10:28)  HR: 91 (12-06-23 @ 16:26) (83 - 91)  BP: 133/72 (12-06-23 @ 16:26) (130/75 - 137/75)  RR: 18 (12-06-23 @ 16:26) (18 - 18)  SpO2: 99% (12-06-23 @ 16:26) (97% - 100%)      I&O  12-05-23 @ 07:01  -  12-06-23 @ 07:00  --------------------------------------------------------  IN: 2130 mL / OUT: 800 mL / NET: 1330 mL        PHYSICAL EXAM:    GENERAL: NAD, well-groomed, well-developed  HEAD:  Atraumatic, Normocephalic  EYES: EOMI, PERRLA, conjunctiva and sclera clear  ENMT: No tonsillar erythema, exudates, or enlargement; Moist mucous membranes, Good dentition, No lesions  NECK: Supple, No JVD, Normal thyroid  NERVOUS SYSTEM:  Alert & Oriented X3, Good concentration; Motor Strength 5/5 B/L upper and lower extremities; DTRs 2+ intact and symmetric  CHEST/LUNG: Clear to percussion bilaterally; No rales, rhonchi, wheezing, or rubs  HEART: Regular rate and rhythm; No murmurs, rubs, or gallops  ABDOMEN: Soft, Nontender, Nondistended; Bowel sounds present  EXTREMITIES:  2+ Peripheral Pulses, No clubbing, cyanosis, or edema  LYMPH: No lymphadenopathy noted  SKIN: No rashes or lesions    LABS:                 9.0    10.11 )-----------( 209      ( 05 Dec 2023 09:50 )             27.8       12-05    144  |  114<H>  |  41<H>  ---------------------------------------<  117<H>  3.4<L>   |  23  |  1.99<H>    Ca    8.7      05 Dec 2023 10:10    Pro-Brain Natriuretic Peptide (11.30.23 @ 18:44)    Pro-Brain Natriuretic Peptide: 7966 pg/mL    Culture - Urine (11.30.23 @ 20:14)   Specimen Source: Catheterized Catheterized   Culture Results:    >100,000 CFU/ml Enterococcus faecalis   50,000 - 99,000 CFU/mL Pseudomonas aeruginosa     Culture - Blood (11.30.23 @ 19:20)    Specimen Source: .Blood Blood-Venous   Culture Results: No growth at 5 days            RADIOLOGY:  < from: Xray Chest 1 View- PORTABLE-Urgent (11.30.23 @ 17:10) >  Elevated left hemidiaphragm again noted. Heart magnified by technique.   Left loop recorder again noted.    Present film shows a moderate left effusion new since October 28.    IMPRESSION: Moderate left effusion new since prior.    -------------------  < from: Xray Chest 1 View- PORTABLE-Urgent (Xray Chest 1 View- PORTABLE-Urgent .) (10.28.23 @ 17:09) >  Elevated diaphragms crowds the chest.    Heart magnified by technique. Left loop recorder noted.    Lungs are clear but under circulated.    IMPRESSION: Left loop recorder. Dehydration.             HPI:  89M w/ hx of CKD, HTN, BPH w/ chronic obrein, dementia p/w SOB. Pt poor historian so history obtained from record. Pt was brought in by home aid who reports pt c/o SOB. Denies fever/chills/sweats, URI sx, N/V/D.    In ED, pt hypothermic to 95.5 and hypotensive to 90/60s. Labs notable for Hgb 6.3 (+london), dimer 3000, Na 129, BUN/SCr 70/2.91, BNP 7966, UA+. CT CAP demonstrates mild b/l hydronephrosis and large stool burden w/ perirectal stranding c/f stercoral colitis. (01 Dec 2023 06:11)  ---------------------------------  hx obtained from chart, pt poor historian with dementia, not answering questions appropriately     Admitted to medical service.  UA + , u cx with pseudomonas and enterococcus on zosyn treating for UTI  Metabolic encephalopathy + underlying dementia  Found to be anemic Hb 6.3 on admission s/p 2 units pRBC transfusion  Acute on chronic renal failure with chronic obrien for urinary retention with BPH  CXR with moderate L pleural effusion    at time of exam pt supine in bed in no respiratory distress  intermittent cough, wet and congested      Allergies  No Known Allergies      MEDICATIONS  (STANDING):  collagenase Ointment 1 Application(s) Topical daily  finasteride 5 milliGRAM(s) Oral daily  gabapentin 300 milliGRAM(s) Oral two times a day  mirtazapine 15 milliGRAM(s) Oral daily  piperacillin/tazobactam IVPB.. 3.375 Gram(s) IV Intermittent every 8 hours  polyethylene glycol 3350 17 Gram(s) Oral at bedtime  senna 2 Tablet(s) Oral at bedtime  sodium chloride 0.9%. 1000 milliLiter(s) (75 mL/Hr) IV Continuous <Continuous>  tamsulosin 0.4 milliGRAM(s) Oral at bedtime    MEDICATIONS  (PRN):  acetaminophen     Tablet .. 650 milliGRAM(s) Oral every 6 hours PRN Temp greater or equal to 38C (100.4F), Mild Pain (1 - 3)  aluminum hydroxide/magnesium hydroxide/simethicone Suspension 30 milliLiter(s) Oral every 4 hours PRN Dyspepsia  melatonin 3 milliGRAM(s) Oral at bedtime PRN Insomnia  ondansetron Injectable 4 milliGRAM(s) IV Push every 8 hours PRN Nausea and/or Vomiting        Drug Dosing Weight  Height (cm): 177.8 (30 Nov 2023 15:55)  Weight (kg): 75.3 (01 Dec 2023 16:20)  BMI (kg/m2): 23.8 (01 Dec 2023 16:20)  BSA (m2): 1.93 (01 Dec 2023 16:20)    PAST MEDICAL & SURGICAL HISTORY:  HTN (Hypertension)      BPH (benign prostatic hypertrophy)      Spinal stenosis of lumbar region      Dehydration      Renal insufficiency      Hydrocele in adult  bilateral, chronic      Indwelling Obrien catheter present      Dementia      Wheelchair dependent      Implantable loop recorder present      No significant past surgical history          FAMILY HISTORY:  No significant family history        SOCIAL HISTORY:  unable to obtain due to dementia    ADVANCE DIRECTIVES:  goals of care in discussion    REVIEW OF SYSTEMS:  unable to obtain due to dementia/confusion            Vital Signs Last 24 Hrs  T(C): 36.5 (12-06-23 @ 16:26), Max: 36.9 (12-06-23 @ 10:28)  HR: 91 (12-06-23 @ 16:26) (83 - 91)  BP: 133/72 (12-06-23 @ 16:26) (130/75 - 137/75)  RR: 18 (12-06-23 @ 16:26) (18 - 18)  SpO2: 99% (12-06-23 @ 16:26) (97% - 100%)      I&O  12-05-23 @ 07:01  -  12-06-23 @ 07:00  --------------------------------------------------------  IN: 2130 mL / OUT: 800 mL / NET: 1330 mL        PHYSICAL EXAM:    GENERAL: NAD, well-groomed, well-developed  HEAD:  Atraumatic, Normocephalic  EYES: EOMI, PERRLA, conjunctiva and sclera clear  ENMT: No tonsillar erythema, exudates, or enlargement; Moist mucous membranes, Good dentition, No lesions  NECK: Supple, No JVD, Normal thyroid  NERVOUS SYSTEM:  Alert & Oriented X3, Good concentration; Motor Strength 5/5 B/L upper and lower extremities; DTRs 2+ intact and symmetric  CHEST/LUNG: Clear to percussion bilaterally; No rales, rhonchi, wheezing, or rubs  HEART: Regular rate and rhythm; No murmurs, rubs, or gallops  ABDOMEN: Soft, Nontender, Nondistended; Bowel sounds present  EXTREMITIES:  2+ Peripheral Pulses, No clubbing, cyanosis, or edema  LYMPH: No lymphadenopathy noted  SKIN: No rashes or lesions    LABS:                 9.0    10.11 )-----------( 209      ( 05 Dec 2023 09:50 )             27.8       12-05    144  |  114<H>  |  41<H>  ---------------------------------------<  117<H>  3.4<L>   |  23  |  1.99<H>    Ca    8.7      05 Dec 2023 10:10    Pro-Brain Natriuretic Peptide (11.30.23 @ 18:44)    Pro-Brain Natriuretic Peptide: 7966 pg/mL    Culture - Urine (11.30.23 @ 20:14)   Specimen Source: Catheterized Catheterized   Culture Results:    >100,000 CFU/ml Enterococcus faecalis   50,000 - 99,000 CFU/mL Pseudomonas aeruginosa     Culture - Blood (11.30.23 @ 19:20)    Specimen Source: .Blood Blood-Venous   Culture Results: No growth at 5 days            RADIOLOGY:  < from: Xray Chest 1 View- PORTABLE-Urgent (11.30.23 @ 17:10) >  Elevated left hemidiaphragm again noted. Heart magnified by technique.   Left loop recorder again noted.    Present film shows a moderate left effusion new since October 28.    IMPRESSION: Moderate left effusion new since prior.    -------------------  < from: Xray Chest 1 View- PORTABLE-Urgent (Xray Chest 1 View- PORTABLE-Urgent .) (10.28.23 @ 17:09) >  Elevated diaphragms crowds the chest.    Heart magnified by technique. Left loop recorder noted.    Lungs are clear but under circulated.    IMPRESSION: Left loop recorder. Dehydration.

## 2023-12-06 NOTE — PROGRESS NOTE ADULT - SUBJECTIVE AND OBJECTIVE BOX
[facility] NEPHROLOGY SERVICES, [facility]  NEPHROLOGY AND HYPERTENSION  [address]  [address]  [place] [address]  [phone]    MD SOFY KEENAN MD YELENA ROSENBERG, MD BINNY KOSHY, MD CHRISTOPHER CAPUTO, MD EDWARD BOVER, MD          Patient events noted    MEDICATIONS  (STANDING):  collagenase Ointment 1 Application(s) Topical daily  finasteride 5 milliGRAM(s) Oral daily  gabapentin 300 milliGRAM(s) Oral two times a day  mirtazapine 15 milliGRAM(s) Oral daily  piperacillin/tazobactam IVPB.. 3.375 Gram(s) IV Intermittent every 8 hours  polyethylene glycol 3350 17 Gram(s) Oral at bedtime  senna 2 Tablet(s) Oral at bedtime  sodium chloride 0.9%. 1000 milliLiter(s) (75 mL/Hr) IV Continuous <Continuous>  tamsulosin 0.4 milliGRAM(s) Oral at bedtime    MEDICATIONS  (PRN):  acetaminophen     Tablet .. 650 milliGRAM(s) Oral every 6 hours PRN Temp greater or equal to 38C (100.4F), Mild Pain (1 - 3)  aluminum hydroxide/magnesium hydroxide/simethicone Suspension 30 milliLiter(s) Oral every 4 hours PRN Dyspepsia  melatonin 3 milliGRAM(s) Oral at bedtime PRN Insomnia  ondansetron Injectable 4 milliGRAM(s) IV Push every 8 hours PRN Nausea and/or Vomiting      12-05-23 @ 07:01  -  12-06-23 @ 07:00  --------------------------------------------------------  IN: 2130 mL / OUT: 800 mL / NET: 1330 mL      PHYSICAL EXAM:      T(C): 36.5 (12-06-23 @ 16:26), Max: 36.9 (12-06-23 @ 10:28)  HR: 91 (12-06-23 @ 16:26) (83 - 91)  BP: 133/72 (12-06-23 @ 16:26) (130/75 - 137/75)  RR: 18 (12-06-23 @ 16:26) (18 - 18)  SpO2: 99% (12-06-23 @ 16:26) (97% - 100%)  Wt(kg): --  Lungs clear  Heart S1S2  Abd soft NT ND  Extremities:   tr edema                                    9.0    10.11 )-----------( 209      ( 05 Dec 2023 09:50 )             27.8     12-05    144  |  114<H>  |  41<H>  ----------------------------<  117<H>  3.4<L>   |  23  |  1.99<H>    Ca    8.7      05 Dec 2023 10:10          Creatinine Trend: 1.99<--, 2.18<--, 2.47<--, 2.59<--, 2.72<--, 2.91<--      Assessment     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 due to fecal impaction  Pre renal element with low MAP, anemia   UTI     Plan     Continued IV abx;   Encourage po intake   D/C IVF tomorrow  Overall conservative workup and treatment      Samy Noriega MD [facility] NEPHROLOGY SERVICES, [facility]  NEPHROLOGY AND HYPERTENSION  [address]  [address]  [place] [address]  [phone]    MD SOFY KEENAN MD YELENA ROSENBERG, MD BINNY KOSHY, MD CHRISTOPHER CAPUTO, MD EDWARD BOVER, MD          Patient events noted    MEDICATIONS  (STANDING):  collagenase Ointment 1 Application(s) Topical daily  finasteride 5 milliGRAM(s) Oral daily  gabapentin 300 milliGRAM(s) Oral two times a day  mirtazapine 15 milliGRAM(s) Oral daily  piperacillin/tazobactam IVPB.. 3.375 Gram(s) IV Intermittent every 8 hours  polyethylene glycol 3350 17 Gram(s) Oral at bedtime  senna 2 Tablet(s) Oral at bedtime  sodium chloride 0.9%. 1000 milliLiter(s) (75 mL/Hr) IV Continuous <Continuous>  tamsulosin 0.4 milliGRAM(s) Oral at bedtime    MEDICATIONS  (PRN):  acetaminophen     Tablet .. 650 milliGRAM(s) Oral every 6 hours PRN Temp greater or equal to 38C (100.4F), Mild Pain (1 - 3)  aluminum hydroxide/magnesium hydroxide/simethicone Suspension 30 milliLiter(s) Oral every 4 hours PRN Dyspepsia  melatonin 3 milliGRAM(s) Oral at bedtime PRN Insomnia  ondansetron Injectable 4 milliGRAM(s) IV Push every 8 hours PRN Nausea and/or Vomiting      12-05-23 @ 07:01  -  12-06-23 @ 07:00  --------------------------------------------------------  IN: 2130 mL / OUT: 800 mL / NET: 1330 mL      PHYSICAL EXAM:      T(C): 36.5 (12-06-23 @ 16:26), Max: 36.9 (12-06-23 @ 10:28)  HR: 91 (12-06-23 @ 16:26) (83 - 91)  BP: 133/72 (12-06-23 @ 16:26) (130/75 - 137/75)  RR: 18 (12-06-23 @ 16:26) (18 - 18)  SpO2: 99% (12-06-23 @ 16:26) (97% - 100%)  Wt(kg): --  Lungs clear  Heart S1S2  Abd soft NT ND  Extremities:   tr edema                                    9.0    10.11 )-----------( 209      ( 05 Dec 2023 09:50 )             27.8     12-05    144  |  114<H>  |  41<H>  ----------------------------<  117<H>  3.4<L>   |  23  |  1.99<H>    Ca    8.7      05 Dec 2023 10:10          Creatinine Trend: 1.99<--, 2.18<--, 2.47<--, 2.59<--, 2.72<--, 2.91<--      Assessment     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 due to fecal impaction  Pre renal element with low MAP, anemia   UTI     Plan     Continued IV abx;   Encourage po intake   D/C IVF tomorrow  Overall conservative workup and treatment      Samy Noriega MD

## 2023-12-06 NOTE — PROGRESS NOTE ADULT - ASSESSMENT
89 y/o M w/ PMH fo dementia, HTN, has loop recorder, BPH w/ chronic obrien presents with syncope and found to have UTI    Metabolic Encephalopathy  Likely due to UTI  Palliative on board    UTI / Chronic Urinary retention  Started on cipro/flagyl   urine culture growing pseudomonas and enterococcus    Sensitivities show cipro resistance.  Will start zosyn for UTI.  S/p course of Zosyn, Vancomycin.  Has also received multiple antibiotics during this hospitalization.  At this point, pt afebrile with normal WBC.  d/c abx.      Anemia- Monitor H/H  -london positive,  but hapto 369, and .   -s/p 1UPRBC on 12/3 7-->8.8  -Transfuse to keep hgb >7  - Minimize number of blood draws     ROGER on CKD  2,9-->2.47 , baseline around 1.6  BPH w/ chronic Obrien  Cr improved - now 1.9.      constipation  - continue laxatives.      Sacral Decubitus Ulcer - continue wound care.     [facility]     Dispo: HHA to be reinstated      89 y/o M w/ PMH fo dementia, HTN, has loop recorder, BPH w/ chronic obrien presents with syncope and found to have UTI    Metabolic Encephalopathy  Likely due to UTI  Palliative on board    UTI / Chronic Urinary retention  Started on cipro/flagyl   urine culture growing pseudomonas and enterococcus    Sensitivities show cipro resistance.  Will start zosyn for UTI.  S/p course of Zosyn, Vancomycin.  Has also received multiple antibiotics during this hospitalization.  At this point, pt afebrile with normal WBC.  d/c abx.      Anemia- Monitor H/H  -london positive,  but hapto 369, and .   -s/p 1UPRBC on 12/3 7-->8.8  -Transfuse to keep hgb >7  - Minimize number of blood draws     ROGER on CKD  2,9-->2.47 , baseline around 1.6  BPH w/ chronic Obrien  Cr improved - now 1.9.      constipation  - continue laxatives.      Sacral Decubitus Ulcer - continue wound care.     [facility]     Dispo: HHA to be reinstated      89 y/o M w/ PMH fo dementia, HTN, has loop recorder, BPH w/ chronic obrien presents with syncope and found to have UTI    Metabolic Encephalopathy  Likely due to UTI  Palliative on board    UTI / Chronic Urinary retention  Started on cipro/flagyl   urine culture growing pseudomonas and enterococcus    Sensitivities show cipro resistance.  Will start zosyn for UTI.  S/p course of Zosyn, Vancomycin.  Has also received multiple antibiotics during this hospitalization.  At this point, pt afebrile with normal WBC.  d/c abx.      L pleural effusion - 11/30 CXR compared to 11/28 CXR - appears to be more significant.  Was on O2 1-2L.  Check RA sat.  CXR for followup.  Pulmonary input.  I d/w son (MD hospitalist in [place]).  Will d/w family regarding goals of care / ? thoracentesis.      Anemia- Monitor H/H  -london positive,  but hapto 369, and .   -s/p 1UPRBC on 12/3 7-->8.8  -Transfuse to keep hgb >7  - Minimize number of blood draws     ROGER on CKD  2,9-->2.47 , baseline around 1.6  BPH w/ chronic Obrien  Cr improved - now 1.9.      constipation  - continue laxatives.      Sacral Decubitus Ulcer - continue wound care.     [facility]     Dispo: HHA to be reinstated     Plan of care d/w son Dr. Tierney at [phone] 89 y/o M w/ PMH fo dementia, HTN, has loop recorder, BPH w/ chronic obrien presents with syncope and found to have UTI    Metabolic Encephalopathy  Likely due to UTI  Palliative on board    UTI / Chronic Urinary retention  Started on cipro/flagyl   urine culture growing pseudomonas and enterococcus    Sensitivities show cipro resistance.  Will start zosyn for UTI.  S/p course of Zosyn, Vancomycin.  Has also received multiple antibiotics during this hospitalization.  At this point, pt afebrile with normal WBC.  d/c abx.      L pleural effusion - 11/30 CXR compared to 11/28 CXR - appears to be more significant.  Was on O2 1-2L.  Check RA sat.  CXR for followup.  Pulmonary input.  I d/w son (MD hospitalist in [place]).  Will d/w family regarding goals of care / ? thoracentesis.      Anemia- Monitor H/H  -london positive,  but hapto 369, and .   -s/p 1UPRBC on 12/3 7-->8.8  -Transfuse to keep hgb >7  - Minimize number of blood draws     ROGER on CKD  2,9-->2.47 , baseline around 1.6  BPH w/ chronic Obrien  Cr improved - now 1.9.      constipation  - continue laxatives.      Sacral Decubitus Ulcer - continue wound care.     [facility]     Dispo: HHA to be reinstated     Plan of care d/w son Dr. Tierney at [phone]

## 2023-12-07 NOTE — PROGRESS NOTE ADULT - ASSESSMENT
89M PMH HTN, CKD3, BPH w/ chronic obrien and multiple admissions for CAUTI, dementia, COVID 1/2023 presents for SOB. Pt poor historian so history obtained from record. Pt was brought in by home aid who reports pt c/o SOB. NO fever/chills/sweats, URI sx, N/V/D reported.    In ED, pt hypothermic to 95.5 and hypotensive to 90/60s. Labs notable for Hgb 6.3 (+london), dimer 3000, Na 129, BUN/SCr 70/2.91, BNP 7966, UA+. CT CAP demonstrates mild b/l hydronephrosis and large stool burden w/ perirectal stranding c/f stercoral colitis.    Dx: left sided atelectasis + effusion    -pt asx and satting well on RA and breathing comfortably   -POCUS with loculated left sided effusion and trace effusion on R with compressive atelectasis. effusion not amenable to tap   - would start duonebs and chest PT for atelectasis and mucous plugging for better mobilization   -rest of mngmt per primary team    d/w dr fischer

## 2023-12-07 NOTE — CHART NOTE - NSCHARTNOTEFT_GEN_A_CORE
:  Hamzah Jacobs PA-C    Indication:  effusion    PROCEDURE:  [ ] LIMITED ECHO  [x ] LIMITED CHEST  [ ] LIMITED RETROPERITONEAL  [ ] LIMITED ABDOMINAL  [ ] LIMITED DVT  [ ] NEEDLE GUIDANCE VASCULAR  [ ] NEEDLE GUIDANCE THORACENTESIS  [ ] NEEDLE GUIDANCE PARACENTESIS  [ ] NEEDLE GUIDANCE PERICARDIOCENTESIS  [ ] OTHER    FINDINGS:  Chest: A-line predominant, trace effusion on R with compressive atelectasis  anterior loculated effusion on left with small effusion posteriorly with compressive atelectasis       INTERPRETATION:  trace R effusion  loculated left sided effusion anteriorly with small effusion posteriorly     images uploaded to qpath

## 2023-12-07 NOTE — PROGRESS NOTE ADULT - ASSESSMENT
89 y/o M w/ PMH fo dementia, HTN, has loop recorder, BPH w/ chronic obrien presents with syncope and found to have UTI    Metabolic Encephalopathy  Likely due to UTI  Palliative on board    UTI / Chronic Urinary retention  Started on cipro/flagyl   urine culture growing pseudomonas and enterococcus    Sensitivities show cipro resistance.  Will start zosyn for UTI.  S/p course of Zosyn, Vancomycin.  Has also received multiple antibiotics during this hospitalization.  At this point, pt afebrile with normal WBC.  d/c abx.      Loculated L  pleural effusion - 11/30 CXR compared to 11/28 CXR - appears to be more significant.  Was on O2 1-2L.  Per Pulmonary, POCUS showed loculated effusion.  Chest PT, pulmonary following.    Anemia- Monitor H/H  -london positive,  but hapto 369, and .   -s/p 1UPRBC on 12/3 7-->8.8  -Transfuse to keep hgb >7  - Minimize number of blood draws     ROGER on CKD  2,9-->2.47 , baseline around 1.6  BPH w/ chronic Obrien  Cr improved - now 1.9.      constipation  - continue laxatives.      Sacral Decubitus Ulcer - continue wound care.     [facility]     Dispo: HHA to be reinstated     Plan of care d/w son Dr. Tierney at [phone] on 12/6.   89 y/o M w/ PMH fo dementia, HTN, has loop recorder, BPH w/ chronic obrien presents with syncope and found to have UTI    Metabolic Encephalopathy  Likely due to UTI  Palliative on board    UTI / Chronic Urinary retention  Started on cipro/flagyl   urine culture growing pseudomonas and enterococcus    Sensitivities show cipro resistance.  Will start zosyn for UTI.  S/p course of Zosyn, Vancomycin.  Has also received multiple antibiotics during this hospitalization.  At this point, pt afebrile with normal WBC.  d/c abx.      Loculated L  pleural effusion - 11/30 CXR compared to 11/28 CXR - appears to be more significant.  Was on O2 1-2L.  Per Pulmonary, POCUS showed loculated effusion.  Chest PT, pulmonary following.    Anemia- Monitor H/H  -london positive,  but hapto 369, and .   -s/p 1UPRBC on 12/3 7-->8.8  -Transfuse to keep hgb >7  - Minimize number of blood draws     ROGER on CKD  2,9-->2.47 , baseline around 1.6  BPH w/ chronic Obrien  Cr improved - now 1.9.      constipation  - continue laxatives.      Sacral Decubitus Ulcer - continue wound care.     [facility]     Dispo: HHA to be reinstated     Plan of care d/w son Dr. Tierney at [phone] on 12/6.   89 y/o M w/ PMH fo dementia, HTN, has loop recorder, BPH w/ chronic obrien presents with syncope and found to have UTI    Metabolic Encephalopathy  Likely due to UTI  Palliative on board    UTI / Chronic Urinary retention  Started on cipro/flagyl   urine culture growing pseudomonas and enterococcus    Sensitivities show cipro resistance.  Will start zosyn for UTI.  S/p course of Zosyn, Vancomycin.  Has also received multiple antibiotics during this hospitalization.  At this point, pt afebrile with normal WBC.  d/c abx.      Loculated L  pleural effusion - 11/30 CXR compared to 11/28 CXR - appears to be more significant.  Was on O2 1-2L.  Per Pulmonary, POCUS showed loculated effusion, not amenable to thoracentesis.  Chest PT, pulmonary following.    Anemia- Monitor H/H  -london positive,  but hapto 369, and .   -s/p 1UPRBC on 12/3 7-->8.8  -Transfuse to keep hgb >7  - Minimize number of blood draws     ROGER on CKD  2,9-->2.47 , baseline around 1.6  BPH w/ chronic Obrien  Cr improved - now 1.9.      constipation  - continue laxatives.      Sacral Decubitus Ulcer - continue wound care.     [facility]     Dispo: HHA to be reinstated     Plan of care d/w son Dr. Tierney at [phone] on 12/6.   89 y/o M w/ PMH fo dementia, HTN, has loop recorder, BPH w/ chronic obrien presents with syncope and found to have UTI    Metabolic Encephalopathy  Likely due to UTI  Palliative on board    UTI / Chronic Urinary retention  Started on cipro/flagyl   urine culture growing pseudomonas and enterococcus    Sensitivities show cipro resistance.  Will start zosyn for UTI.  S/p course of Zosyn, Vancomycin.  Has also received multiple antibiotics during this hospitalization.  At this point, pt afebrile with normal WBC.  d/c abx.      Loculated L  pleural effusion - 11/30 CXR compared to 11/28 CXR - appears to be more significant.  Was on O2 1-2L.  Per Pulmonary, POCUS showed loculated effusion, not amenable to thoracentesis.  Chest PT, pulmonary following.    Anemia- Monitor H/H  -london positive,  but hapto 369, and .   -s/p 1UPRBC on 12/3 7-->8.8  -Transfuse to keep hgb >7  - Minimize number of blood draws     ROGER on CKD  2,9-->2.47 , baseline around 1.6  BPH w/ chronic Obrien  Cr improved - now 1.9.      constipation  - continue laxatives.      Sacral Decubitus Ulcer - continue wound care.     [facility]     Dispo: HHA to be reinstated     Plan of care d/w son Dr. Tierney at [phone] on 12/6.

## 2023-12-07 NOTE — PROGRESS NOTE ADULT - SUBJECTIVE AND OBJECTIVE BOX
INTERVAL HPI/OVERNIGHT EVENTS: no acute events overnight. POCUS with loculated left effusion with atelectasis.     SUBJECTIVE: Patient seen and examined at bedside.  Pt satting well on RA with wet cough.     ROS: unable to assess     VITAL SIGNS:  ICU Vital Signs Last 24 Hrs  T(C): 36.3 (07 Dec 2023 15:21), Max: 36.4 (06 Dec 2023 23:57)  T(F): 97.3 (07 Dec 2023 15:21), Max: 97.6 (06 Dec 2023 23:57)  HR: 88 (07 Dec 2023 15:21) (83 - 96)  BP: 142/82 (07 Dec 2023 15:21) (142/82 - 161/82)  BP(mean): --  ABP: --  ABP(mean): --  RR: 18 (07 Dec 2023 15:21) (18 - 18)  SpO2: 97% (07 Dec 2023 15:21) (94% - 97%)    O2 Parameters below as of 07 Dec 2023 15:21  Patient On (Oxygen Delivery Method): room air        12-06 @ 07:01  -  12-07 @ 07:00  --------------------------------------------------------  IN: 0 mL / OUT: 2000 mL / NET: -2000 mL      CAPILLARY BLOOD GLUCOSE          ECG: reviewed.    PHYSICAL EXAM:    GENERAL: NAD, lying in bed comfortably  HEAD:  Atraumatic, normocephalic  EYES: EOMI, PERRL  NECK: Supple, trachea midline, no JVD  HEART: Regular rate and rhythm  LUNGS: Unlabored respirations.  rhonci L>R  ABDOMEN: Soft, nontender, nondistended  EXTREMITIES: 2+ peripheral pulses bilaterally, cap refill<2 secs. No clubbing, cyanosis, or edema  NERVOUS SYSTEM:  awake and alert, not following commands      MEDICATIONS:  MEDICATIONS  (STANDING):  collagenase Ointment 1 Application(s) Topical daily  dextrose 5%. 1000 milliLiter(s) (75 mL/Hr) IV Continuous <Continuous>  finasteride 5 milliGRAM(s) Oral daily  gabapentin 300 milliGRAM(s) Oral two times a day  mirtazapine 15 milliGRAM(s) Oral daily  polyethylene glycol 3350 17 Gram(s) Oral at bedtime  senna 2 Tablet(s) Oral at bedtime  tamsulosin 0.4 milliGRAM(s) Oral at bedtime    MEDICATIONS  (PRN):  acetaminophen     Tablet .. 650 milliGRAM(s) Oral every 6 hours PRN Temp greater or equal to 38C (100.4F), Mild Pain (1 - 3)  aluminum hydroxide/magnesium hydroxide/simethicone Suspension 30 milliLiter(s) Oral every 4 hours PRN Dyspepsia  melatonin 3 milliGRAM(s) Oral at bedtime PRN Insomnia  ondansetron Injectable 4 milliGRAM(s) IV Push every 8 hours PRN Nausea and/or Vomiting      ALLERGIES:  Allergies    No Known Allergies    Intolerances        LABS:                        9.6    7.40  )-----------( 184      ( 07 Dec 2023 06:15 )             30.1     12-07    146<H>  |  115<H>  |  29<H>  ----------------------------<  93  3.3<L>   |  21<L>  |  1.87<H>    Ca    8.8      07 Dec 2023 06:15        Urinalysis Basic - ( 07 Dec 2023 06:15 )    Color: x / Appearance: x / SG: x / pH: x  Gluc: 93 mg/dL / Ketone: x  / Bili: x / Urobili: x   Blood: x / Protein: x / Nitrite: x   Leuk Esterase: x / RBC: x / WBC x   Sq Epi: x / Non Sq Epi: x / Bacteria: x        Micro:    Culture - Blood (collected 11-30-23 @ 19:20)  Source: .Blood Blood-Venous  Final Report (12-06-23 @ 11:01):    No growth at 5 days    Culture - Blood (collected 11-30-23 @ 19:00)  Source: .Blood Blood-Peripheral  Final Report (12-06-23 @ 02:00):    No growth at 5 days          RADIOLOGY & ADDITIONAL TESTS: Reviewed.

## 2023-12-07 NOTE — PROGRESS NOTE ADULT - SUBJECTIVE AND OBJECTIVE BOX
Patient: MOIZ RANDOLPH [id number] 89y Male                            Hospitalist Attending Note    NO complaints.    ____________________PHYSICAL EXAM:  GENERAL:  NAD Alert and Oriented x 1 to person   HEENT: NCAT  CARDIOVASCULAR:  S1, S2  LUNGS: decreased BS L base.   ABDOMEN:  soft, (-) tenderness, (-) distension, (+) bowel sounds, (-) guarding, (-) rebound (-) rigidity  EXTREMITIES:  no cyanosis / clubbing / edema.   SKIN: Sacral Stage III ulcer.  L ischial Stage III ulcer.    ____________________    VITALS:  Vital Signs Last 24 Hrs  T(C): 36.3 (07 Dec 2023 15:21), Max: 36.4 (06 Dec 2023 23:57)  T(F): 97.3 (07 Dec 2023 15:21), Max: 97.6 (06 Dec 2023 23:57)  HR: 88 (07 Dec 2023 15:21) (83 - 96)  BP: 142/82 (07 Dec 2023 15:21) (142/82 - 161/82)  BP(mean): --  RR: 18 (07 Dec 2023 15:21) (18 - 18)  SpO2: 97% (07 Dec 2023 15:21) (94% - 97%)    Parameters below as of 07 Dec 2023 15:21  Patient On (Oxygen Delivery Method): room air     Daily     Daily Weight in k.5 (07 Dec 2023 04:43)  CAPILLARY BLOOD GLUCOSE        I&O's Summary    06 Dec 2023 07:01  -  07 Dec 2023 07:00  --------------------------------------------------------  IN: 0 mL / OUT: 2000 mL / NET: -2000 mL        LABS:                        9.6    7.40  )-----------( 184      ( 07 Dec 2023 06:15 )             30.1     12-07    146<H>  |  115<H>  |  29<H>  ----------------------------<  93  3.3<L>   |  21<L>  |  1.87<H>    Ca    8.8      07 Dec 2023 06:15          Urinalysis Basic - ( 07 Dec 2023 06:15 )    Color: x / Appearance: x / SG: x / pH: x  Gluc: 93 mg/dL / Ketone: x  / Bili: x / Urobili: x   Blood: x / Protein: x / Nitrite: x   Leuk Esterase: x / RBC: x / WBC x   Sq Epi: x / Non Sq Epi: x / Bacteria: x              MEDICATIONS:  acetaminophen     Tablet .. 650 milliGRAM(s) Oral every 6 hours PRN  albuterol/ipratropium for Nebulization 3 milliLiter(s) Nebulizer every 6 hours  aluminum hydroxide/magnesium hydroxide/simethicone Suspension 30 milliLiter(s) Oral every 4 hours PRN  collagenase Ointment 1 Application(s) Topical daily  dextrose 5%. 1000 milliLiter(s) IV Continuous <Continuous>  finasteride 5 milliGRAM(s) Oral daily  gabapentin 300 milliGRAM(s) Oral two times a day  melatonin 3 milliGRAM(s) Oral at bedtime PRN  mirtazapine 15 milliGRAM(s) Oral daily  ondansetron Injectable 4 milliGRAM(s) IV Push every 8 hours PRN  polyethylene glycol 3350 17 Gram(s) Oral at bedtime  senna 2 Tablet(s) Oral at bedtime  sodium chloride 3%  Inhalation 4 milliLiter(s) Inhalation every 6 hours  tamsulosin 0.4 milliGRAM(s) Oral at bedtime                               Patient: MOIZ RANDOLPH [id number] 89y Male                            Hospitalist Attending Note    NO complaints.    ____________________PHYSICAL EXAM:  GENERAL:  NAD Alert and Oriented x 1 to person   HEENT: NCAT  CARDIOVASCULAR:  S1, S2  LUNGS: decreased BS L base.   ABDOMEN:  soft, (-) tenderness, (-) distension, (+) bowel sounds, (-) guarding, (-) rebound (-) rigidity  EXTREMITIES:  no cyanosis / clubbing / edema.   SKIN: Sacral Stage III ulcer.  L ischial Stage III ulcer.    ____________________    VITALS:  Vital Signs Last 24 Hrs  T(C): 36.3 (07 Dec 2023 15:21), Max: 36.4 (06 Dec 2023 23:57)  T(F): 97.3 (07 Dec 2023 15:21), Max: 97.6 (06 Dec 2023 23:57)  HR: 88 (07 Dec 2023 15:21) (83 - 96)  BP: 142/82 (07 Dec 2023 15:21) (142/82 - 161/82)  BP(mean): --  RR: 18 (07 Dec 2023 15:21) (18 - 18)  SpO2: 97% (07 Dec 2023 15:21) (94% - 97%)    Parameters below as of 07 Dec 2023 15:21  Patient On (Oxygen Delivery Method): room air     Daily     Daily Weight in k.5 (07 Dec 2023 04:43)  CAPILLARY BLOOD GLUCOSE        I&O's Summary    06 Dec 2023 07:01  -  07 Dec 2023 07:00  --------------------------------------------------------  IN: 0 mL / OUT: 2000 mL / NET: -2000 mL        LABS:                        9.6    7.40  )-----------( 184      ( 07 Dec 2023 06:15 )             30.1     12-07    146<H>  |  115<H>  |  29<H>  ----------------------------<  93  3.3<L>   |  21<L>  |  1.87<H>    Ca    8.8      07 Dec 2023 06:15          Urinalysis Basic - ( 07 Dec 2023 06:15 )    Color: x / Appearance: x / SG: x / pH: x  Gluc: 93 mg/dL / Ketone: x  / Bili: x / Urobili: x   Blood: x / Protein: x / Nitrite: x   Leuk Esterase: x / RBC: x / WBC x   Sq Epi: x / Non Sq Epi: x / Bacteria: x              MEDICATIONS:  acetaminophen     Tablet .. 650 milliGRAM(s) Oral every 6 hours PRN  albuterol/ipratropium for Nebulization 3 milliLiter(s) Nebulizer every 6 hours  aluminum hydroxide/magnesium hydroxide/simethicone Suspension 30 milliLiter(s) Oral every 4 hours PRN  collagenase Ointment 1 Application(s) Topical daily  dextrose 5%. 1000 milliLiter(s) IV Continuous <Continuous>  finasteride 5 milliGRAM(s) Oral daily  gabapentin 300 milliGRAM(s) Oral two times a day  melatonin 3 milliGRAM(s) Oral at bedtime PRN  mirtazapine 15 milliGRAM(s) Oral daily  ondansetron Injectable 4 milliGRAM(s) IV Push every 8 hours PRN  polyethylene glycol 3350 17 Gram(s) Oral at bedtime  senna 2 Tablet(s) Oral at bedtime  sodium chloride 3%  Inhalation 4 milliLiter(s) Inhalation every 6 hours  tamsulosin 0.4 milliGRAM(s) Oral at bedtime

## 2023-12-08 NOTE — PROGRESS NOTE ADULT - ASSESSMENT
89M PMH HTN, CKD3, BPH w/ chronic obrien and multiple admissions for CAUTI, dementia, COVID 1/2023 presents for SOB. Pt poor historian so history obtained from record. Pt was brought in by home aid who reports pt c/o SOB. NO fever/chills/sweats, URI sx, N/V/D reported. In ED, pt hypothermic to 95.5 and hypotensive to 90/60s. Labs notable for Hgb 6.3 (+london), d-dimer 3000, Na 129, BUN/SCr 70/2.91, BNP 7966, UA+, u cx growing enterococcus faecalis and pseudomonas . CT A/P demonstrates mild b/l hydronephrosis and large stool burden with perirectal stranding consistent for stercoral colitis. CT chest with moderate to large L pleural effusion and compressive atelectasis L lower lobe    DX: L pleural effusion and atelectasis, CAUTI (present on admission), anemia, ROGER on CKD, dementia    - repeat CXR today with persistent complete L hemithorax opacification  - POCUS lung yesterday with noted L effusion anteriorly and atelectatic lung however small effusion noted at L base along with adjacent atelectatic lung  - L effusion ?loculated with larger effusion noted anteriorly and small effusion at base with lung in view of window at base not amendable for thoracentesis  - effusion + atelectasis (compressive from effusion vs ?component of mucus plugging)  - cont with duonebs + hypertonic saline q6 hrs for secretion mobilization  - manual chest PT performed at bedside, cont chest PT q6 hrs   - follow daily serial CXR to evaluate for L hemithorax  - pt with wet cough on exam but not expectorating  - spoke to son yesterday night and family wants conservative medical management first. if there is no improvement in aeration of lung with mucolytics and bronchodilators and effusion is amendable for thoracentesis on repeat imaging by US will consider thoracentesis +/- bronchoscopy   - will continue daily assessment of pulmonary status  - completed course of zosyn per primary team for CAUTI  - s/p 2 units pRBC for anemia  - d/w hospitalist

## 2023-12-08 NOTE — PROGRESS NOTE ADULT - SUBJECTIVE AND OBJECTIVE BOX
24 hr events  no acute events overnight  on 2L NC O2 sat 97%  weaned to RA  CXR with complete L hemithorax opacification    ROS  [x} unable to obtain due to dementia    MEDICATIONS:  acetaminophen     Tablet .. 650 milliGRAM(s) Oral every 6 hours PRN  albuterol/ipratropium for Nebulization 3 milliLiter(s) Nebulizer every 6 hours  aluminum hydroxide/magnesium hydroxide/simethicone Suspension 30 milliLiter(s) Oral every 4 hours PRN  collagenase Ointment 1 Application(s) Topical daily  dextrose 5%. 1000 milliLiter(s) IV Continuous <Continuous>  finasteride 5 milliGRAM(s) Oral daily  gabapentin 300 milliGRAM(s) Oral two times a day  melatonin 3 milliGRAM(s) Oral at bedtime PRN  mirtazapine 15 milliGRAM(s) Oral daily  ondansetron Injectable 4 milliGRAM(s) IV Push every 8 hours PRN  polyethylene glycol 3350 17 Gram(s) Oral at bedtime  senna 2 Tablet(s) Oral at bedtime  sodium chloride 3%  Inhalation 4 milliLiter(s) Inhalation every 6 hours  tamsulosin 0.4 milliGRAM(s) Oral at bedtime      LABS               9.6    7.40  )-----------( 184      ( 07 Dec 2023 06:15 )             30.1     12-08    147<H>  |  117<H>  |  30<H>  ----------------------------<  96  3.2<L>   |  27  |  1.97<H>    Ca    8.6      08 Dec 2023 06:20      IMAGING  < from: Xray Chest 1 View- PORTABLE-Urgent (Xray Chest 1 View- PORTABLE-Urgent .) (12.06.23 @ 15:59) >  Increasing opacification left hemithorax. At this time there is total   opacification left hemithorax likely due to combination of pleural fluid   and atelectasis. No pneumothorax or other interval change  -----------------------  < from: NM Pulmonary Ventilation/Perfusion Scan (12.01.23 @ 10:05) >   Very low probability of pulmonary embolism.  -----------------------  < from: CT Chest No Cont (11.30.23 @ 20:37) >  LUNGS AND LARGE AIRWAYS: Patent central airways. Complete compressive   atelectasis of the left lower lobe and partial compressive atelectasis of   the left upper lobe and lingula.  Mild dependent atelectasis in the right   lower lobe.  PLEURA: Moderate two large left pleural effusion.  Small right pleural   effusion.  VESSELS: Mildly ectatic mid ascending thoracic aorta measures up to 3.5   cm in transverse caliber (6:72).  No thoracic aortic aneurysm.  Mild   atherosclerotic calcification of the thoracic aorta and arch vessels.  HEART: Cardiomegaly.  Mild calcification aortic valve leaflets. Mild   atherosclerotic calcification of the coronary arteries. No pericardial   effusion.  MEDIASTINUM AND BREANNA: No gross lymphadenopathy.  Nonspecific   subcentimeter mediastinal lymph nodes.  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    ------------------------  < from: TTE Echo Complete w/o Contrast w/ Doppler (12.05.23 @ 08:33) >  Left Ventricle:  Global LV systolic function was normal. Spectral Doppler shows normal   pattern of LV diastolic filling.  Right Ventricle: Normal right ventricular size and function.  Left Atrium: The left atrium is normal in size.  Right Atrium: Mildly enlarged right atrium.  Mitral Valve: Mild thickening and calcification of the anterior and   posterior mitral valveleaflets. There is mild mitral annular   calcification.  Tricuspid Valve: Structurally normal tricuspid valve, with normal leaflet   excursion. Mild tricuspid regurgitation is visualized. Estimated   pulmonary artery systolic pressure is 37.3 mmHg assuming a right atrial   pressure of 3 mmHg, which is consistent with mild pulmonary hypertension.  Aortic Valve: Moderate aortic valve regurgitation is seen.  Pulmonic Valve: Structurally normal pulmonic valve, with normal leaflet   excursion. Trace pulmonic valve regurgitation.  Venous: The inferior vena cava was normal sized, with respiratory size   variation greater than 50%.      Summary   1. Normal global left ventricular systolic function.   2. Mild tricuspid regurgitation.   3. Moderate aortic regurgitation.   4. Mild thickening and calcification of the anterior and posterior   mitral valve leaflets.   5. Mild mitral annular calcification.   6. Mildly enlarged right atrium.   7. Estimated pulmonary artery systolic pressure is 37.3 mmHg assuming a   right atrial pressure of 3 mmHg, which is consistent with mild pulmonary   hypertension.          Vital Signs Last 24 Hrs  T(C): 36.6 (08 Dec 2023 16:20), Max: 37.2 (08 Dec 2023 00:01)  T(F): 97.8 (08 Dec 2023 16:20), Max: 98.9 (08 Dec 2023 00:01)  HR: 105 (08 Dec 2023 16:20) (94 - 108)  BP: 136/62 (08 Dec 2023 16:20) (122/80 - 155/81)  RR: 18 (08 Dec 2023 16:20) (17 - 18)  SpO2: 95% (08 Dec 2023 16:20) (94% - 99%)    Parameters below as of 08 Dec 2023 11:23  Patient On (Oxygen Delivery Method): room air        EXAM  GEN: elderly male, thin, comfortable in bed  HEENT: NC/AT, EOMI, sclera white  CV: RRR  PULM: decreased breath sounds on L, no wheeze/rhonchi  ABD: soft, NT, ND, + BS  EXT: on edema/cyanosis  NEURO: arousable, follows simple commands, confused (dementia at baseline)

## 2023-12-08 NOTE — CHART NOTE - NSCHARTNOTEFT_GEN_A_CORE
Due to dementia, pt has severe mobility limitation and requires a standard wheelchair to assist in daily ADLs. Pt cannot ambulate safely with a cane or a walker. Pt has sufficient upper body strength to self propel said wheelchair and has not expressed an unwillingness to use the wheelchair. Pt has ample room in the home and a caregiver to assist with the wheelchair. Use of a manual wheelchair will significantly improve the patient's ability to participate in daily ADLs and the patient will use it on a regular basis in the home.

## 2023-12-08 NOTE — PROGRESS NOTE ADULT - SUBJECTIVE AND OBJECTIVE BOX
Patient: MOIZ RANDOLPH [id number] 89y Male                            Hospitalist Attending Note    NO complaints.    ____________________PHYSICAL EXAM:  GENERAL:  NAD Alert and Oriented x 1 to person   HEENT: NCAT  CARDIOVASCULAR:  S1, S2  LUNGS: decreased BS L base.   ABDOMEN:  soft, (-) tenderness, (-) distension, (+) bowel sounds, (-) guarding, (-) rebound (-) rigidity  EXTREMITIES:  no cyanosis / clubbing / edema.   SKIN: Sacral Stage III ulcer.  L ischial Stage III ulcer.    ____________________    VITALS:  Vital Signs Last 24 Hrs  T(C): 36.6 (08 Dec 2023 16:20), Max: 37.2 (08 Dec 2023 00:01)  T(F): 97.8 (08 Dec 2023 16:20), Max: 98.9 (08 Dec 2023 00:01)  HR: 105 (08 Dec 2023 16:20) (94 - 108)  BP: 136/62 (08 Dec 2023 16:20) (122/80 - 155/81)  BP(mean): --  RR: 18 (08 Dec 2023 16:20) (17 - 18)  SpO2: 95% (08 Dec 2023 16:20) (94% - 99%)    Parameters below as of 08 Dec 2023 11:23  Patient On (Oxygen Delivery Method): room air     Daily     Daily Weight in k.5 (08 Dec 2023 04:53)  CAPILLARY BLOOD GLUCOSE        I&O's Summary    07 Dec 2023 07:01  -  08 Dec 2023 07:00  --------------------------------------------------------  IN: 0 mL / OUT: 1000 mL / NET: -1000 mL        LABS:                        9.6    7.40  )-----------( 184      ( 07 Dec 2023 06:15 )             30.1     12-08    147<H>  |  117<H>  |  30<H>  ----------------------------<  96  3.2<L>   |  27  |  1.97<H>    Ca    8.6      08 Dec 2023 06:20          Urinalysis Basic - ( 08 Dec 2023 06:20 )    Color: x / Appearance: x / SG: x / pH: x  Gluc: 96 mg/dL / Ketone: x  / Bili: x / Urobili: x   Blood: x / Protein: x / Nitrite: x   Leuk Esterase: x / RBC: x / WBC x   Sq Epi: x / Non Sq Epi: x / Bacteria: x              MEDICATIONS:  acetaminophen     Tablet .. 650 milliGRAM(s) Oral every 6 hours PRN  albuterol/ipratropium for Nebulization 3 milliLiter(s) Nebulizer every 6 hours  aluminum hydroxide/magnesium hydroxide/simethicone Suspension 30 milliLiter(s) Oral every 4 hours PRN  collagenase Ointment 1 Application(s) Topical daily  dextrose 5%. 1000 milliLiter(s) IV Continuous <Continuous>  finasteride 5 milliGRAM(s) Oral daily  gabapentin 300 milliGRAM(s) Oral two times a day  melatonin 3 milliGRAM(s) Oral at bedtime PRN  mirtazapine 15 milliGRAM(s) Oral daily  ondansetron Injectable 4 milliGRAM(s) IV Push every 8 hours PRN  polyethylene glycol 3350 17 Gram(s) Oral at bedtime  senna 2 Tablet(s) Oral at bedtime  sodium chloride 3%  Inhalation 4 milliLiter(s) Inhalation every 6 hours  tamsulosin 0.4 milliGRAM(s) Oral at bedtime                               Patient: MOIZ RANDOLPH [id number] 89y Male                            Hospitalist Attending Note    NO complaints.    ____________________PHYSICAL EXAM:  GENERAL:  NAD Alert and Oriented x 1 to person   HEENT: NCAT  CARDIOVASCULAR:  S1, S2  LUNGS: decreased BS L base.   ABDOMEN:  soft, (-) tenderness, (-) distension, (+) bowel sounds, (-) guarding, (-) rebound (-) rigidity  EXTREMITIES:  no cyanosis / clubbing / edema.   SKIN: Sacral Stage III ulcer.  L ischial Stage III ulcer.    ____________________    VITALS:  Vital Signs Last 24 Hrs  T(C): 36.6 (08 Dec 2023 16:20), Max: 37.2 (08 Dec 2023 00:01)  T(F): 97.8 (08 Dec 2023 16:20), Max: 98.9 (08 Dec 2023 00:01)  HR: 105 (08 Dec 2023 16:20) (94 - 108)  BP: 136/62 (08 Dec 2023 16:20) (122/80 - 155/81)  BP(mean): --  RR: 18 (08 Dec 2023 16:20) (17 - 18)  SpO2: 95% (08 Dec 2023 16:20) (94% - 99%)    Parameters below as of 08 Dec 2023 11:23  Patient On (Oxygen Delivery Method): room air     Daily     Daily Weight in k.5 (08 Dec 2023 04:53)  CAPILLARY BLOOD GLUCOSE        I&O's Summary    07 Dec 2023 07:01  -  08 Dec 2023 07:00  --------------------------------------------------------  IN: 0 mL / OUT: 1000 mL / NET: -1000 mL        LABS:                        9.6    7.40  )-----------( 184      ( 07 Dec 2023 06:15 )             30.1     12-08    147<H>  |  117<H>  |  30<H>  ----------------------------<  96  3.2<L>   |  27  |  1.97<H>    Ca    8.6      08 Dec 2023 06:20          Urinalysis Basic - ( 08 Dec 2023 06:20 )    Color: x / Appearance: x / SG: x / pH: x  Gluc: 96 mg/dL / Ketone: x  / Bili: x / Urobili: x   Blood: x / Protein: x / Nitrite: x   Leuk Esterase: x / RBC: x / WBC x   Sq Epi: x / Non Sq Epi: x / Bacteria: x              MEDICATIONS:  acetaminophen     Tablet .. 650 milliGRAM(s) Oral every 6 hours PRN  albuterol/ipratropium for Nebulization 3 milliLiter(s) Nebulizer every 6 hours  aluminum hydroxide/magnesium hydroxide/simethicone Suspension 30 milliLiter(s) Oral every 4 hours PRN  collagenase Ointment 1 Application(s) Topical daily  dextrose 5%. 1000 milliLiter(s) IV Continuous <Continuous>  finasteride 5 milliGRAM(s) Oral daily  gabapentin 300 milliGRAM(s) Oral two times a day  melatonin 3 milliGRAM(s) Oral at bedtime PRN  mirtazapine 15 milliGRAM(s) Oral daily  ondansetron Injectable 4 milliGRAM(s) IV Push every 8 hours PRN  polyethylene glycol 3350 17 Gram(s) Oral at bedtime  senna 2 Tablet(s) Oral at bedtime  sodium chloride 3%  Inhalation 4 milliLiter(s) Inhalation every 6 hours  tamsulosin 0.4 milliGRAM(s) Oral at bedtime

## 2023-12-08 NOTE — PROGRESS NOTE ADULT - ASSESSMENT
89 y/o M w/ PMH fo dementia, HTN, has loop recorder, BPH w/ chronic obrien presents with syncope and found to have UTI    Metabolic Encephalopathy  Likely due to UTI  Palliative on board    UTI / Chronic Urinary retention  Started on cipro/flagyl   urine culture growing pseudomonas and enterococcus    Sensitivities show cipro resistance.  Will start zosyn for UTI.  S/p course of Zosyn, Vancomycin.  Has also received multiple antibiotics during this hospitalization.  At this point, pt afebrile with normal WBC.  d/c abx.      Loculated L  pleural effusion - 11/30 CXR compared to 11/28 CXR - appears to be more significant.  Was on O2 1-2L.  Per Pulmonary, POCUS showed loculated effusion, not amenable to thoracentesis.  Chest PT, pulmonary following.  Repeat CXR d/w radiology - no improvement.  ? role for bronchoscopy, awaiting pulmonary followup.      Anemia- Monitor H/H  -london positive,  but hapto 369, and .   -s/p 1UPRBC on 12/3 7-->8.8  -Transfuse to keep hgb >7  - Minimize number of blood draws     ROGER on CKD  2,9-->2.47 , baseline around 1.6  BPH w/ chronic Obrien  Cr improved - now 1.9.      constipation  - continue laxatives.      Sacral Decubitus Ulcer - continue wound care.     [facility]     Dispo: HHA to be reinstated     Plan of care d/w son Dr. Tierney at [phone] on 12/6.   89 y/o M w/ PMH fo dementia, HTN, has loop recorder, BPH w/ chronic obrien presents with syncope and found to have UTI    Metabolic Encephalopathy  Likely due to UTI  Palliative on board    UTI / Chronic Urinary retention  Started on cipro/flagyl   urine culture growing pseudomonas and enterococcus    Sensitivities show cipro resistance.  Will start zosyn for UTI.  S/p course of Zosyn, Vancomycin.  Has also received multiple antibiotics during this hospitalization.  At this point, pt afebrile with normal WBC.  d/c abx.      Loculated L  pleural effusion - 11/30 CXR compared to 11/28 CXR - appears to be more significant.  Was on O2 1-2L.  Per Pulmonary, POCUS showed loculated effusion, not amenable to thoracentesis.  Chest PT, pulmonary following.  Repeat CXR d/w radiology - no improvement.  ? role for bronchoscopy, awaiting pulmonary followup.      Anemia- Monitor H/H  -london positive,  but hapto 369, and .   -s/p 1UPRBC on 12/3 7-->8.8  -Transfuse to keep hgb >7  - Minimize number of blood draws     ROGER on CKD  2,9-->2.47 , baseline around 1.6  BPH w/ chronic Obrien  Cr improved - now 1.9.      constipation  - continue laxatives.      Sacral Decubitus Ulcer - continue wound care.     [facility]     Dispo: HHA to be reinstated     Plan of care d/w son Dr. Tierney at [phone] on 12/6.

## 2023-12-08 NOTE — PROGRESS NOTE ADULT - SUBJECTIVE AND OBJECTIVE BOX
[facility] NEPHROLOGY SERVICES, [facility]  NEPHROLOGY AND HYPERTENSION  [address]  [address]  [place] [address]  [phone]    MD SOFY KEENAN MD YELENA ROSENBERG, MD BINNY KOSHY, MD CHRISTOPHER CAPUTO, MD EDWARD BOVER, MD          Patient events noted  NO distress    MEDICATIONS  (STANDING):  albuterol/ipratropium for Nebulization 3 milliLiter(s) Nebulizer every 6 hours  collagenase Ointment 1 Application(s) Topical daily  dextrose 5%. 1000 milliLiter(s) (75 mL/Hr) IV Continuous <Continuous>  finasteride 5 milliGRAM(s) Oral daily  gabapentin 300 milliGRAM(s) Oral two times a day  mirtazapine 15 milliGRAM(s) Oral daily  polyethylene glycol 3350 17 Gram(s) Oral at bedtime  senna 2 Tablet(s) Oral at bedtime  sodium chloride 3%  Inhalation 4 milliLiter(s) Inhalation every 6 hours  tamsulosin 0.4 milliGRAM(s) Oral at bedtime    MEDICATIONS  (PRN):  acetaminophen     Tablet .. 650 milliGRAM(s) Oral every 6 hours PRN Temp greater or equal to 38C (100.4F), Mild Pain (1 - 3)  aluminum hydroxide/magnesium hydroxide/simethicone Suspension 30 milliLiter(s) Oral every 4 hours PRN Dyspepsia  melatonin 3 milliGRAM(s) Oral at bedtime PRN Insomnia  ondansetron Injectable 4 milliGRAM(s) IV Push every 8 hours PRN Nausea and/or Vomiting      12-07-23 @ 07:01  -  12-08-23 @ 07:00  --------------------------------------------------------  IN: 0 mL / OUT: 1000 mL / NET: -1000 mL      PHYSICAL EXAM:      T(C): 36.6 (12-08-23 @ 16:20), Max: 37.2 (12-08-23 @ 00:01)  HR: 105 (12-08-23 @ 16:20) (94 - 108)  BP: 136/62 (12-08-23 @ 16:20) (122/80 - 155/81)  RR: 18 (12-08-23 @ 16:20) (17 - 18)  SpO2: 95% (12-08-23 @ 16:20) (94% - 99%)  Wt(kg): --  Lungs clear  Heart S1S2  Abd soft NT ND  Extremities:   tr edema                                    9.6    7.40  )-----------( 184      ( 07 Dec 2023 06:15 )             30.1     12-08    147<H>  |  117<H>  |  30<H>  ----------------------------<  96  3.2<L>   |  27  |  1.97<H>    Ca    8.6      08 Dec 2023 06:20          Creatinine Trend: 1.97<--, 1.87<--, 1.99<--, 2.18<--, 2.47<--, 2.59<--      Assessment     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 [facility] NEPHROLOGY SERVICES, [facility]  NEPHROLOGY AND HYPERTENSION  [address]  [address]  [place] [address]  [phone]    MD SOFY KEENAN MD YELENA ROSENBERG, MD BINNY KOSHY, MD CHRISTOPHER CAPUTO, MD EDWARD BOVER, MD          Patient events noted  NO distress    MEDICATIONS  (STANDING):  albuterol/ipratropium for Nebulization 3 milliLiter(s) Nebulizer every 6 hours  collagenase Ointment 1 Application(s) Topical daily  dextrose 5%. 1000 milliLiter(s) (75 mL/Hr) IV Continuous <Continuous>  finasteride 5 milliGRAM(s) Oral daily  gabapentin 300 milliGRAM(s) Oral two times a day  mirtazapine 15 milliGRAM(s) Oral daily  polyethylene glycol 3350 17 Gram(s) Oral at bedtime  senna 2 Tablet(s) Oral at bedtime  sodium chloride 3%  Inhalation 4 milliLiter(s) Inhalation every 6 hours  tamsulosin 0.4 milliGRAM(s) Oral at bedtime    MEDICATIONS  (PRN):  acetaminophen     Tablet .. 650 milliGRAM(s) Oral every 6 hours PRN Temp greater or equal to 38C (100.4F), Mild Pain (1 - 3)  aluminum hydroxide/magnesium hydroxide/simethicone Suspension 30 milliLiter(s) Oral every 4 hours PRN Dyspepsia  melatonin 3 milliGRAM(s) Oral at bedtime PRN Insomnia  ondansetron Injectable 4 milliGRAM(s) IV Push every 8 hours PRN Nausea and/or Vomiting      12-07-23 @ 07:01  -  12-08-23 @ 07:00  --------------------------------------------------------  IN: 0 mL / OUT: 1000 mL / NET: -1000 mL      PHYSICAL EXAM:      T(C): 36.6 (12-08-23 @ 16:20), Max: 37.2 (12-08-23 @ 00:01)  HR: 105 (12-08-23 @ 16:20) (94 - 108)  BP: 136/62 (12-08-23 @ 16:20) (122/80 - 155/81)  RR: 18 (12-08-23 @ 16:20) (17 - 18)  SpO2: 95% (12-08-23 @ 16:20) (94% - 99%)  Wt(kg): --  Lungs clear  Heart S1S2  Abd soft NT ND  Extremities:   tr edema                                    9.6    7.40  )-----------( 184      ( 07 Dec 2023 06:15 )             30.1     12-08    147<H>  |  117<H>  |  30<H>  ----------------------------<  96  3.2<L>   |  27  |  1.97<H>    Ca    8.6      08 Dec 2023 06:20          Creatinine Trend: 1.97<--, 1.87<--, 1.99<--, 2.18<--, 2.47<--, 2.59<--      Assessment     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

## 2023-12-09 NOTE — PROGRESS NOTE ADULT - ASSESSMENT
89 y/o M w/ PMH fo dementia, HTN, has loop recorder, BPH w/ chronic obrien presents with syncope and found to have UTI    # Loculated L  pleural effusion - 11/30 CXR compared to 11/28 CXR - appears to be more significant.  Was on O2 1-2L.  Per Pulmonary, POCUS showed loculated effusion, not amenable to thoracentesis.  Chest PT, pulmonary following.  Repeat CXR d/w radiology - no improvement.  ? role for bronchoscopy, awaiting pulmonary followup.    # Metabolic Encephalopathy  Likely due to UTI  Palliative on board  # UTI / Chronic Urinary retention  Completed course of antibiotics.  urine culture growing pseudomonas and enterococcus     # Anemia- Monitor H/H -london positive,  but hapto 369, and .  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    # constipation - continue laxatives.    # Sacral Decubitus Ulcer - continue wound care.    [facility]   Dispo: HHA to be reinstated   Plan of care d/w son Dr. Tierney at [phone] on 12/6.   89 y/o M w/ PMH fo dementia, HTN, has loop recorder, BPH w/ chronic obrien presents with syncope and found to have UTI    # Loculated L  pleural effusion - 11/30 CXR compared to 11/28 CXR - appears to be more significant.  Was on O2 1-2L.  Per Pulmonary, POCUS showed loculated effusion, not amenable to thoracentesis.  Chest PT, pulmonary following.  Repeat CXR d/w radiology - no improvement.  ? role for bronchoscopy, awaiting pulmonary followup.    # Metabolic Encephalopathy  Likely due to UTI  Palliative on board  # UTI / Chronic Urinary retention  Completed course of antibiotics.  urine culture growing pseudomonas and enterococcus     # Anemia- Monitor H/H -london positive,  but hapto 369, and .  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    # constipation - continue laxatives.    # Sacral Decubitus Ulcer - continue wound care.    [facility]   Dispo: HHA to be reinstated   Plan of care d/w son Dr. Tierney at [phone] on 12/6.   89 y/o M w/ PMH fo dementia, HTN, has loop recorder, BPH w/ chronic obrien presents with syncope and found to have UTI    # Loculated L  pleural effusion - 11/30 CXR compared to 11/28 CXR - appears to be more significant.  Was on O2 1-2L.  Per Pulmonary, POCUS showed loculated effusion, not amenable to thoracentesis.  Chest PT, pulmonary following.  Repeat CXR d/w radiology - no improvement.  ? role for bronchoscopy, awaiting pulmonary followup.    # Metabolic Encephalopathy  Likely due to UTI  Palliative on board  # HTN  - BP elevated.  Resume amlodipine.   # UTI / Chronic Urinary retention  Completed course of antibiotics.  urine culture growing pseudomonas and enterococcus     # Anemia- Monitor H/H -london positive,  but hapto 369, and .  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    # constipation - continue laxatives.    # Sacral Decubitus Ulcer - continue wound care.    [facility]   Dispo: HHA to be reinstated   Plan of care d/w son Dr. Tierney at [phone] on 12/6.   89 y/o M w/ PMH fo dementia, HTN, has loop recorder, BPH w/ chronic obrien presents with syncope and found to have UTI    # Loculated L  pleural effusion - 11/30 CXR compared to 11/28 CXR - appears to be more significant.  Was on O2 1-2L.  Per Pulmonary, POCUS showed loculated effusion, not amenable to thoracentesis.  Chest PT, pulmonary following.  Repeat CXR d/w radiology - no improvement.  ? role for bronchoscopy, awaiting pulmonary followup.    # Metabolic Encephalopathy  Likely due to UTI  Palliative on board  # HTN  - BP elevated.  Resume amlodipine.   # UTI / Chronic Urinary retention  Completed course of antibiotics.  urine culture growing pseudomonas and enterococcus     # Anemia- Monitor H/H -london positive,  but hapto 369, and .  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    # constipation - continue laxatives.    # Sacral Decubitus Ulcer - continue wound care.    [facility]   Dispo: HHA to be reinstated   Plan of care d/w son Dr. Tierney at [phone] on 12/6.

## 2023-12-09 NOTE — PROGRESS NOTE ADULT - SUBJECTIVE AND OBJECTIVE BOX
Patient: MOIZ RANDOLPH [id number] 89y Male                            Hospitalist Attending Note    NO complaints.    ____________________PHYSICAL EXAM:  GENERAL:  NAD Alert and Oriented x 1 to person   HEENT: NCAT  CARDIOVASCULAR:  S1, S2  LUNGS: decreased BS L base.   ABDOMEN:  soft, (-) tenderness, (-) distension, (+) bowel sounds, (-) guarding, (-) rebound (-) rigidity  EXTREMITIES:  no cyanosis / clubbing / edema.   SKIN: Sacral Stage III ulcer.  L ischial Stage III ulcer.    ____________________      VITALS:  Vital Signs Last 24 Hrs  T(C): 36.9 (09 Dec 2023 16:22), Max: 37.1 (09 Dec 2023 00:07)  T(F): 98.4 (09 Dec 2023 16:22), Max: 98.7 (09 Dec 2023 00:07)  HR: 92 (09 Dec 2023 16:22) (91 - 99)  BP: 162/81 (09 Dec 2023 16:22) (152/80 - 178/80)  BP(mean): --  RR: 18 (09 Dec 2023 16:22) (18 - 18)  SpO2: 97% (09 Dec 2023 16:22) (96% - 100%)    Parameters below as of 09 Dec 2023 16:22  Patient On (Oxygen Delivery Method): nasal cannula  O2 Flow (L/min): 2   Daily     Daily   CAPILLARY BLOOD GLUCOSE        I&O's Summary    08 Dec 2023 07:01  -  09 Dec 2023 07:00  --------------------------------------------------------  IN: 0 mL / OUT: 1200 mL / NET: -1200 mL    09 Dec 2023 07:01  -  09 Dec 2023 18:02  --------------------------------------------------------  IN: 150 mL / OUT: 0 mL / NET: 150 mL        LABS:                        9.4    5.93  )-----------( 199      ( 09 Dec 2023 05:37 )             29.6     12-09    146<H>  |  115<H>  |  29<H>  ----------------------------<  75  3.2<L>   |  26  |  1.97<H>    Ca    8.9      09 Dec 2023 05:37          Urinalysis Basic - ( 09 Dec 2023 05:37 )    Color: x / Appearance: x / SG: x / pH: x  Gluc: 75 mg/dL / Ketone: x  / Bili: x / Urobili: x   Blood: x / Protein: x / Nitrite: x   Leuk Esterase: x / RBC: x / WBC x   Sq Epi: x / Non Sq Epi: x / Bacteria: x              MEDICATIONS:  acetaminophen     Tablet .. 650 milliGRAM(s) Oral every 6 hours PRN  albuterol/ipratropium for Nebulization 3 milliLiter(s) Nebulizer every 6 hours  aluminum hydroxide/magnesium hydroxide/simethicone Suspension 30 milliLiter(s) Oral every 4 hours PRN  collagenase Ointment 1 Application(s) Topical daily  dextrose 5%. 1000 milliLiter(s) IV Continuous <Continuous>  finasteride 5 milliGRAM(s) Oral daily  gabapentin 300 milliGRAM(s) Oral two times a day  melatonin 3 milliGRAM(s) Oral at bedtime PRN  mirtazapine 15 milliGRAM(s) Oral daily  ondansetron Injectable 4 milliGRAM(s) IV Push every 8 hours PRN  polyethylene glycol 3350 17 Gram(s) Oral at bedtime  senna 2 Tablet(s) Oral at bedtime  sodium chloride 3%  Inhalation 4 milliLiter(s) Inhalation every 6 hours  tamsulosin 0.4 milliGRAM(s) Oral at bedtime                               Patient: MOIZ RANDOLPH [id number] 89y Male                            Hospitalist Attending Note    NO complaints.    ____________________PHYSICAL EXAM:  GENERAL:  NAD Alert and Oriented x 1 to person   HEENT: NCAT  CARDIOVASCULAR:  S1, S2  LUNGS: decreased BS L base.   ABDOMEN:  soft, (-) tenderness, (-) distension, (+) bowel sounds, (-) guarding, (-) rebound (-) rigidity  EXTREMITIES:  no cyanosis / clubbing / edema.   SKIN: Sacral Stage III ulcer.  L ischial Stage III ulcer.    ____________________      VITALS:  Vital Signs Last 24 Hrs  T(C): 36.9 (09 Dec 2023 16:22), Max: 37.1 (09 Dec 2023 00:07)  T(F): 98.4 (09 Dec 2023 16:22), Max: 98.7 (09 Dec 2023 00:07)  HR: 92 (09 Dec 2023 16:22) (91 - 99)  BP: 162/81 (09 Dec 2023 16:22) (152/80 - 178/80)  BP(mean): --  RR: 18 (09 Dec 2023 16:22) (18 - 18)  SpO2: 97% (09 Dec 2023 16:22) (96% - 100%)    Parameters below as of 09 Dec 2023 16:22  Patient On (Oxygen Delivery Method): nasal cannula  O2 Flow (L/min): 2   Daily     Daily   CAPILLARY BLOOD GLUCOSE        I&O's Summary    08 Dec 2023 07:01  -  09 Dec 2023 07:00  --------------------------------------------------------  IN: 0 mL / OUT: 1200 mL / NET: -1200 mL    09 Dec 2023 07:01  -  09 Dec 2023 18:02  --------------------------------------------------------  IN: 150 mL / OUT: 0 mL / NET: 150 mL        LABS:                        9.4    5.93  )-----------( 199      ( 09 Dec 2023 05:37 )             29.6     12-09    146<H>  |  115<H>  |  29<H>  ----------------------------<  75  3.2<L>   |  26  |  1.97<H>    Ca    8.9      09 Dec 2023 05:37          Urinalysis Basic - ( 09 Dec 2023 05:37 )    Color: x / Appearance: x / SG: x / pH: x  Gluc: 75 mg/dL / Ketone: x  / Bili: x / Urobili: x   Blood: x / Protein: x / Nitrite: x   Leuk Esterase: x / RBC: x / WBC x   Sq Epi: x / Non Sq Epi: x / Bacteria: x              MEDICATIONS:  acetaminophen     Tablet .. 650 milliGRAM(s) Oral every 6 hours PRN  albuterol/ipratropium for Nebulization 3 milliLiter(s) Nebulizer every 6 hours  aluminum hydroxide/magnesium hydroxide/simethicone Suspension 30 milliLiter(s) Oral every 4 hours PRN  collagenase Ointment 1 Application(s) Topical daily  dextrose 5%. 1000 milliLiter(s) IV Continuous <Continuous>  finasteride 5 milliGRAM(s) Oral daily  gabapentin 300 milliGRAM(s) Oral two times a day  melatonin 3 milliGRAM(s) Oral at bedtime PRN  mirtazapine 15 milliGRAM(s) Oral daily  ondansetron Injectable 4 milliGRAM(s) IV Push every 8 hours PRN  polyethylene glycol 3350 17 Gram(s) Oral at bedtime  senna 2 Tablet(s) Oral at bedtime  sodium chloride 3%  Inhalation 4 milliLiter(s) Inhalation every 6 hours  tamsulosin 0.4 milliGRAM(s) Oral at bedtime

## 2023-12-09 NOTE — PROGRESS NOTE ADULT - SUBJECTIVE AND OBJECTIVE BOX
Resting    Vital Signs Last 24 Hrs  T(C): 36.9 (12-09-23 @ 16:22), Max: 37.1 (12-09-23 @ 00:07)  T(F): 98.4 (12-09-23 @ 16:22), Max: 98.7 (12-09-23 @ 00:07)  HR: 95 (12-09-23 @ 18:11) (91 - 99)  BP: 162/81 (12-09-23 @ 16:22) (152/80 - 178/80)  RR: 18 (12-09-23 @ 16:22) (18 - 18)  SpO2: 97% (12-09-23 @ 18:11) (96% - 100%)    I&O's Detail    08 Dec 2023 07:01  -  09 Dec 2023 07:00  --------------------------------------------------------  OUT:    Indwelling Catheter - Urethral (mL): 1200 mL  Total OUT: 1200 mL    09 Dec 2023 07:01  -  09 Dec 2023 23:15  --------------------------------------------------------  IN:    Oral Fluid: 150 mL  Total IN: 150 mL    OUT:    Indwelling Catheter - Urethral (mL): 700 mL  Total OUT: 700 mL    Lungs b/l air entry  Heart S1S2  Abd soft ND  Extremities:   tr edema                        9.4    5.93  )-----------( 199      ( 09 Dec 2023 05:37 )             29.6     09 Dec 2023 05:37    146    |  115    |  29     ----------------------------<  75     3.2     |  26     |  1.97     Ca    8.9        09 Dec 2023 05:37    acetaminophen     Tablet .. 650 milliGRAM(s) Oral every 6 hours PRN  albuterol/ipratropium for Nebulization 3 milliLiter(s) Nebulizer every 6 hours  aluminum hydroxide/magnesium hydroxide/simethicone Suspension 30 milliLiter(s) Oral every 4 hours PRN  amLODIPine   Tablet 2.5 milliGRAM(s) Oral daily  collagenase Ointment 1 Application(s) Topical daily  dextrose 5%. 1000 milliLiter(s) IV Continuous <Continuous>  finasteride 5 milliGRAM(s) Oral daily  gabapentin 300 milliGRAM(s) Oral two times a day  melatonin 3 milliGRAM(s) Oral at bedtime PRN  mirtazapine 15 milliGRAM(s) Oral daily  ondansetron Injectable 4 milliGRAM(s) IV Push every 8 hours PRN  polyethylene glycol 3350 17 Gram(s) Oral at bedtime  senna 2 Tablet(s) Oral at bedtime  sodium chloride 3%  Inhalation 4 milliLiter(s) Inhalation every 6 hours  tamsulosin 0.4 milliGRAM(s) Oral at bedtime    Assessment     Obstructive, pre-renal ROGER/CKD 3  Improving w/camille, IVF  Suppl K  BMP, Mg in am  Avoid nephrotoxins  Will follow    [phone] Resting    Vital Signs Last 24 Hrs  T(C): 36.9 (12-09-23 @ 16:22), Max: 37.1 (12-09-23 @ 00:07)  T(F): 98.4 (12-09-23 @ 16:22), Max: 98.7 (12-09-23 @ 00:07)  HR: 95 (12-09-23 @ 18:11) (91 - 99)  BP: 162/81 (12-09-23 @ 16:22) (152/80 - 178/80)  RR: 18 (12-09-23 @ 16:22) (18 - 18)  SpO2: 97% (12-09-23 @ 18:11) (96% - 100%)    I&O's Detail    08 Dec 2023 07:01  -  09 Dec 2023 07:00  --------------------------------------------------------  OUT:    Indwelling Catheter - Urethral (mL): 1200 mL  Total OUT: 1200 mL    09 Dec 2023 07:01  -  09 Dec 2023 23:15  --------------------------------------------------------  IN:    Oral Fluid: 150 mL  Total IN: 150 mL    OUT:    Indwelling Catheter - Urethral (mL): 700 mL  Total OUT: 700 mL    Lungs b/l air entry  Heart S1S2  Abd soft ND  Extremities:   tr edema                        9.4    5.93  )-----------( 199      ( 09 Dec 2023 05:37 )             29.6     09 Dec 2023 05:37    146    |  115    |  29     ----------------------------<  75     3.2     |  26     |  1.97     Ca    8.9        09 Dec 2023 05:37    acetaminophen     Tablet .. 650 milliGRAM(s) Oral every 6 hours PRN  albuterol/ipratropium for Nebulization 3 milliLiter(s) Nebulizer every 6 hours  aluminum hydroxide/magnesium hydroxide/simethicone Suspension 30 milliLiter(s) Oral every 4 hours PRN  amLODIPine   Tablet 2.5 milliGRAM(s) Oral daily  collagenase Ointment 1 Application(s) Topical daily  dextrose 5%. 1000 milliLiter(s) IV Continuous <Continuous>  finasteride 5 milliGRAM(s) Oral daily  gabapentin 300 milliGRAM(s) Oral two times a day  melatonin 3 milliGRAM(s) Oral at bedtime PRN  mirtazapine 15 milliGRAM(s) Oral daily  ondansetron Injectable 4 milliGRAM(s) IV Push every 8 hours PRN  polyethylene glycol 3350 17 Gram(s) Oral at bedtime  senna 2 Tablet(s) Oral at bedtime  sodium chloride 3%  Inhalation 4 milliLiter(s) Inhalation every 6 hours  tamsulosin 0.4 milliGRAM(s) Oral at bedtime    Assessment     Obstructive, pre-renal ROGER/CKD 3  Improving w/camille, IVF  Suppl K  BMP, Mg in am  Avoid nephrotoxins  Will follow    [phone]

## 2023-12-10 NOTE — PROGRESS NOTE ADULT - ASSESSMENT
89 y/o M w/ PMH fo dementia, HTN, has loop recorder, BPH w/ chronic obrien presents with syncope and found to have UTI    # Loculated L  pleural effusion - 11/30 CXR compared to 11/28 CXR - appears to be more significant.  Was on O2 1-2L.  Per Pulmonary, POCUS showed loculated effusion, not amenable to thoracentesis.  Chest PT, pulmonary following.  Repeat CXR d/w radiology - no improvement.  ? role for bronchoscopy, awaiting pulmonary followup.  Will repeat CXR  # Metabolic Encephalopathy  Likely due to UTI  Palliative on board  # HTN  - BP elevated.  Resume amlodipine.   # UTI / Chronic Urinary retention  Completed course of antibiotics.  urine culture growing pseudomonas and enterococcus     # Anemia- Monitor H/H -london positive,  but hapto 369, and .  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    # constipation - continue laxatives.    # Sacral Decubitus Ulcer - continue wound care.    [facility]   Dispo: HHA to be reinstated   Plan of care d/w son Dr. Tierney at [phone] on 12/6.   89 y/o M w/ PMH fo dementia, HTN, has loop recorder, BPH w/ chronic obrien presents with syncope and found to have UTI    # Loculated L  pleural effusion - 11/30 CXR compared to 11/28 CXR - appears to be more significant.  Was on O2 1-2L.  Per Pulmonary, POCUS showed loculated effusion, not amenable to thoracentesis.  Chest PT, pulmonary following.  Repeat CXR d/w radiology - no improvement.  ? role for bronchoscopy, awaiting pulmonary followup.  Will repeat CXR  # Metabolic Encephalopathy  Likely due to UTI  Palliative on board  # HTN  - BP elevated.  Resume amlodipine.   # UTI / Chronic Urinary retention  Completed course of antibiotics.  urine culture growing pseudomonas and enterococcus     # Anemia- Monitor H/H -london positive,  but hapto 369, and .  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    # constipation - continue laxatives.    # Sacral Decubitus Ulcer - continue wound care.    [facility]   Dispo: HHA to be reinstated   Plan of care d/w son Dr. Tierney at [phone] on 12/6.   89 y/o M w/ PMH fo dementia, HTN, has loop recorder, BPH w/ chronic obrien presents with syncope and found to have UTI    # Loculated L  pleural effusion - 11/30 CXR compared to 11/28 CXR - appears to be more significant.  Was on O2 1-2L.  Per Pulmonary, POCUS showed loculated effusion, not amenable to thoracentesis.  Chest PT, pulmonary following.  Repeat CXR d/w radiology - no improvement.  ? role for bronchoscopy, awaiting pulmonary followup.  Will repeat CXR  # Hypernatremia - continue IVF.  Encourage po intake.   # Metabolic Encephalopathy  Likely due to UTI  Palliative on board  # HTN  - BP elevated.  Resume amlodipine.   # UTI / Chronic Urinary retention  Completed course of antibiotics.  urine culture growing pseudomonas and enterococcus     # Anemia- Monitor H/H -london positive,  but hapto 369, and .  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    # constipation - continue laxatives.    # Sacral Decubitus Ulcer - continue wound care.    [facility]   Dispo: HHA to be reinstated   Plan of care d/w son Dr. Tierney at [phone] on 12/6.   89 y/o M w/ PMH fo dementia, HTN, has loop recorder, BPH w/ chronic obrien presents with syncope and found to have UTI    # Loculated L  pleural effusion - 11/30 CXR compared to 11/28 CXR - appears to be more significant.  Was on O2 1-2L.  Per Pulmonary, POCUS showed loculated effusion, not amenable to thoracentesis.  Chest PT, pulmonary following.  Repeat CXR d/w radiology - no improvement.  ? role for bronchoscopy, awaiting pulmonary followup.  Will repeat CXR  # Hypernatremia - continue IVF.  Encourage po intake.   # Metabolic Encephalopathy  Likely due to UTI  Palliative on board  # HTN  - BP elevated.  Resume amlodipine.   # UTI / Chronic Urinary retention  Completed course of antibiotics.  urine culture growing pseudomonas and enterococcus     # Anemia- Monitor H/H -london positive,  but hapto 369, and .  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    # constipation - continue laxatives.    # Sacral Decubitus Ulcer - continue wound care.    [facility]   Dispo: HHA to be reinstated   Plan of care d/w son Dr. Tierney at [phone] on 12/6.

## 2023-12-10 NOTE — PROGRESS NOTE ADULT - SUBJECTIVE AND OBJECTIVE BOX
Patient: MOIZ RANDOLPH [id number] 89y Male                            Hospitalist Attending Note    NO complaints.    ____________________PHYSICAL EXAM:  GENERAL:  NAD Alert and Oriented x 1 to person   HEENT: NCAT  CARDIOVASCULAR:  S1, S2  LUNGS: decreased BS L base.   ABDOMEN:  soft, (-) tenderness, (-) distension, (+) bowel sounds, (-) guarding, (-) rebound (-) rigidity  EXTREMITIES:  no cyanosis / clubbing / edema.   SKIN: Sacral Stage III ulcer.  L ischial Stage III ulcer.    ____________________      VITALS:  Vital Signs Last 24 Hrs  T(C): 37.1 (10 Dec 2023 11:09), Max: 37.4 (10 Dec 2023 05:05)  T(F): 98.8 (10 Dec 2023 11:09), Max: 99.4 (10 Dec 2023 05:05)  HR: 84 (10 Dec 2023 12:00) (84 - 105)  BP: 146/79 (10 Dec 2023 11:09) (143/78 - 162/81)  BP(mean): --  RR: 18 (10 Dec 2023 11:09) (18 - 18)  SpO2: 97% (10 Dec 2023 12:00) (94% - 100%)    Parameters below as of 10 Dec 2023 12:00  Patient On (Oxygen Delivery Method): nasal cannula     Daily     Daily Weight in k.4 (10 Dec 2023 00:38)  CAPILLARY BLOOD GLUCOSE        I&O's Summary    09 Dec 2023 07:01  -  10 Dec 2023 07:00  --------------------------------------------------------  IN: 150 mL / OUT: 1300 mL / NET: -1150 mL        LABS:                        9.4    5.93  )-----------( 199      ( 09 Dec 2023 05:37 )             29.6     12-10    147<H>  |  114<H>  |  28<H>  ----------------------------<  119<H>  3.5   |  26  |  2.04<H>    Ca    8.7      10 Dec 2023 06:55  Mg     1.8     12-10          Urinalysis Basic - ( 10 Dec 2023 06:55 )    Color: x / Appearance: x / SG: x / pH: x  Gluc: 119 mg/dL / Ketone: x  / Bili: x / Urobili: x   Blood: x / Protein: x / Nitrite: x   Leuk Esterase: x / RBC: x / WBC x   Sq Epi: x / Non Sq Epi: x / Bacteria: x              MEDICATIONS:  acetaminophen     Tablet .. 650 milliGRAM(s) Oral every 6 hours PRN  albuterol/ipratropium for Nebulization 3 milliLiter(s) Nebulizer every 6 hours  aluminum hydroxide/magnesium hydroxide/simethicone Suspension 30 milliLiter(s) Oral every 4 hours PRN  amLODIPine   Tablet 2.5 milliGRAM(s) Oral daily  collagenase Ointment 1 Application(s) Topical daily  dextrose 5%. 1000 milliLiter(s) IV Continuous <Continuous>  finasteride 5 milliGRAM(s) Oral daily  gabapentin 300 milliGRAM(s) Oral two times a day  melatonin 3 milliGRAM(s) Oral at bedtime PRN  mirtazapine 15 milliGRAM(s) Oral daily  ondansetron Injectable 4 milliGRAM(s) IV Push every 8 hours PRN  polyethylene glycol 3350 17 Gram(s) Oral at bedtime  senna 2 Tablet(s) Oral at bedtime  sodium chloride 3%  Inhalation 4 milliLiter(s) Inhalation every 6 hours  tamsulosin 0.4 milliGRAM(s) Oral at bedtime                               Patient: MOIZ RANDOLPH [id number] 89y Male                            Hospitalist Attending Note    NO complaints.    ____________________PHYSICAL EXAM:  GENERAL:  NAD Alert and Oriented x 1 to person   HEENT: NCAT  CARDIOVASCULAR:  S1, S2  LUNGS: decreased BS L base.   ABDOMEN:  soft, (-) tenderness, (-) distension, (+) bowel sounds, (-) guarding, (-) rebound (-) rigidity  EXTREMITIES:  no cyanosis / clubbing / edema.   SKIN: Sacral Stage III ulcer.  L ischial Stage III ulcer.    ____________________      VITALS:  Vital Signs Last 24 Hrs  T(C): 37.1 (10 Dec 2023 11:09), Max: 37.4 (10 Dec 2023 05:05)  T(F): 98.8 (10 Dec 2023 11:09), Max: 99.4 (10 Dec 2023 05:05)  HR: 84 (10 Dec 2023 12:00) (84 - 105)  BP: 146/79 (10 Dec 2023 11:09) (143/78 - 162/81)  BP(mean): --  RR: 18 (10 Dec 2023 11:09) (18 - 18)  SpO2: 97% (10 Dec 2023 12:00) (94% - 100%)    Parameters below as of 10 Dec 2023 12:00  Patient On (Oxygen Delivery Method): nasal cannula     Daily     Daily Weight in k.4 (10 Dec 2023 00:38)  CAPILLARY BLOOD GLUCOSE        I&O's Summary    09 Dec 2023 07:01  -  10 Dec 2023 07:00  --------------------------------------------------------  IN: 150 mL / OUT: 1300 mL / NET: -1150 mL        LABS:                        9.4    5.93  )-----------( 199      ( 09 Dec 2023 05:37 )             29.6     12-10    147<H>  |  114<H>  |  28<H>  ----------------------------<  119<H>  3.5   |  26  |  2.04<H>    Ca    8.7      10 Dec 2023 06:55  Mg     1.8     12-10          Urinalysis Basic - ( 10 Dec 2023 06:55 )    Color: x / Appearance: x / SG: x / pH: x  Gluc: 119 mg/dL / Ketone: x  / Bili: x / Urobili: x   Blood: x / Protein: x / Nitrite: x   Leuk Esterase: x / RBC: x / WBC x   Sq Epi: x / Non Sq Epi: x / Bacteria: x              MEDICATIONS:  acetaminophen     Tablet .. 650 milliGRAM(s) Oral every 6 hours PRN  albuterol/ipratropium for Nebulization 3 milliLiter(s) Nebulizer every 6 hours  aluminum hydroxide/magnesium hydroxide/simethicone Suspension 30 milliLiter(s) Oral every 4 hours PRN  amLODIPine   Tablet 2.5 milliGRAM(s) Oral daily  collagenase Ointment 1 Application(s) Topical daily  dextrose 5%. 1000 milliLiter(s) IV Continuous <Continuous>  finasteride 5 milliGRAM(s) Oral daily  gabapentin 300 milliGRAM(s) Oral two times a day  melatonin 3 milliGRAM(s) Oral at bedtime PRN  mirtazapine 15 milliGRAM(s) Oral daily  ondansetron Injectable 4 milliGRAM(s) IV Push every 8 hours PRN  polyethylene glycol 3350 17 Gram(s) Oral at bedtime  senna 2 Tablet(s) Oral at bedtime  sodium chloride 3%  Inhalation 4 milliLiter(s) Inhalation every 6 hours  tamsulosin 0.4 milliGRAM(s) Oral at bedtime

## 2023-12-10 NOTE — PROGRESS NOTE ADULT - SUBJECTIVE AND OBJECTIVE BOX
Resting, on RA    Vital Signs Last 24 Hrs  T(C): 36.9 (12-10-23 @ 18:29), Max: 37.4 (12-10-23 @ 05:05)  T(F): 98.5 (12-10-23 @ 18:29), Max: 99.4 (12-10-23 @ 05:05)  HR: 92 (12-10-23 @ 18:29) (82 - 105)  BP: 140/75 (12-10-23 @ 18:29) (140/75 - 159/82)  RR: 18 (12-10-23 @ 18:29) (18 - 18)  SpO2: 95% (12-10-23 @ 18:29) (93% - 100%)    I&O's Detail    09 Dec 2023 07:01  -  10 Dec 2023 07:00  --------------------------------------------------------  OUT:    Indwelling Catheter - Urethral (mL): 1300 mL  Total OUT: 1300 mL    10 Dec 2023 07:01  -  10 Dec 2023 22:24  --------------------------------------------------------  IN:    dextrose 5%: 750 mL  Total IN: 750 mL    OUT:    Indwelling Catheter - Urethral (mL): 350 mL  Total OUT: 350 mL    Lungs b/l air entry  Heart S1S2  Abd soft ND  Extremities: no edema                              9.4    5.93  )-----------( 199      ( 09 Dec 2023 05:37 )             29.6     10 Dec 2023 06:55    147    |  114    |  28     ----------------------------<  119    3.5     |  26     |  2.04     Ca    8.7        10 Dec 2023 06:55  Mg     1.8       10 Dec 2023 06:55    acetaminophen     Tablet .. 650 milliGRAM(s) Oral every 6 hours PRN  albuterol/ipratropium for Nebulization 3 milliLiter(s) Nebulizer every 6 hours  aluminum hydroxide/magnesium hydroxide/simethicone Suspension 30 milliLiter(s) Oral every 4 hours PRN  amLODIPine   Tablet 2.5 milliGRAM(s) Oral daily  collagenase Ointment 1 Application(s) Topical daily  dextrose 5%. 1000 milliLiter(s) IV Continuous <Continuous>  finasteride 5 milliGRAM(s) Oral daily  gabapentin 300 milliGRAM(s) Oral two times a day  melatonin 3 milliGRAM(s) Oral at bedtime PRN  mirtazapine 15 milliGRAM(s) Oral daily  ondansetron Injectable 4 milliGRAM(s) IV Push every 8 hours PRN  polyethylene glycol 3350 17 Gram(s) Oral at bedtime  senna 2 Tablet(s) Oral at bedtime  sodium chloride 3%  Inhalation 4 milliLiter(s) Inhalation every 6 hours  tamsulosin 0.4 milliGRAM(s) Oral at bedtime    Assessment     Obstructive, pre-renal ROGER/CKD 3 (Cr 1.65 - 10/30/23)  Improving w/obrien, IVF  CBC, BMP, Mg in am  Avoid nephrotoxins  Will follow    [phone] Resting, on RA    Vital Signs Last 24 Hrs  T(C): 36.9 (12-10-23 @ 18:29), Max: 37.4 (12-10-23 @ 05:05)  T(F): 98.5 (12-10-23 @ 18:29), Max: 99.4 (12-10-23 @ 05:05)  HR: 92 (12-10-23 @ 18:29) (82 - 105)  BP: 140/75 (12-10-23 @ 18:29) (140/75 - 159/82)  RR: 18 (12-10-23 @ 18:29) (18 - 18)  SpO2: 95% (12-10-23 @ 18:29) (93% - 100%)    I&O's Detail    09 Dec 2023 07:01  -  10 Dec 2023 07:00  --------------------------------------------------------  OUT:    Indwelling Catheter - Urethral (mL): 1300 mL  Total OUT: 1300 mL    10 Dec 2023 07:01  -  10 Dec 2023 22:24  --------------------------------------------------------  IN:    dextrose 5%: 750 mL  Total IN: 750 mL    OUT:    Indwelling Catheter - Urethral (mL): 350 mL  Total OUT: 350 mL    Lungs b/l air entry  Heart S1S2  Abd soft ND  Extremities: no edema                              9.4    5.93  )-----------( 199      ( 09 Dec 2023 05:37 )             29.6     10 Dec 2023 06:55    147    |  114    |  28     ----------------------------<  119    3.5     |  26     |  2.04     Ca    8.7        10 Dec 2023 06:55  Mg     1.8       10 Dec 2023 06:55    acetaminophen     Tablet .. 650 milliGRAM(s) Oral every 6 hours PRN  albuterol/ipratropium for Nebulization 3 milliLiter(s) Nebulizer every 6 hours  aluminum hydroxide/magnesium hydroxide/simethicone Suspension 30 milliLiter(s) Oral every 4 hours PRN  amLODIPine   Tablet 2.5 milliGRAM(s) Oral daily  collagenase Ointment 1 Application(s) Topical daily  dextrose 5%. 1000 milliLiter(s) IV Continuous <Continuous>  finasteride 5 milliGRAM(s) Oral daily  gabapentin 300 milliGRAM(s) Oral two times a day  melatonin 3 milliGRAM(s) Oral at bedtime PRN  mirtazapine 15 milliGRAM(s) Oral daily  ondansetron Injectable 4 milliGRAM(s) IV Push every 8 hours PRN  polyethylene glycol 3350 17 Gram(s) Oral at bedtime  senna 2 Tablet(s) Oral at bedtime  sodium chloride 3%  Inhalation 4 milliLiter(s) Inhalation every 6 hours  tamsulosin 0.4 milliGRAM(s) Oral at bedtime    Assessment     Obstructive, pre-renal ROGER/CKD 3 (Cr 1.65 - 10/30/23)  Improving w/obrien, IVF  CBC, BMP, Mg in am  Avoid nephrotoxins  Will follow    [phone]

## 2023-12-11 NOTE — PROGRESS NOTE ADULT - SUBJECTIVE AND OBJECTIVE BOX
Patient: MOIZ RANDOLPH [id number] 89y Male                            Hospitalist Attending Note    No complaints.    ____________________PHYSICAL EXAM:  GENERAL:  NAD Alert and Oriented x 1 to person   HEENT: NCAT  CARDIOVASCULAR:  S1, S2  LUNGS: decreased BS L base.   ABDOMEN:  soft, (-) tenderness, (-) distension, (+) bowel sounds, (-) guarding, (-) rebound (-) rigidity  EXTREMITIES:  no cyanosis / clubbing / edema.   SKIN: Sacral Stage III ulcer.  L ischial Stage III ulcer.    ____________________      VITALS:  Vital Signs Last 24 Hrs  T(C): 37.1 (11 Dec 2023 11:17), Max: 37.6 (11 Dec 2023 05:02)  T(F): 98.8 (11 Dec 2023 11:17), Max: 99.7 (11 Dec 2023 05:02)  HR: 93 (11 Dec 2023 11:43) (82 - 98)  BP: 154/85 (11 Dec 2023 11:17) (140/75 - 169/85)  BP(mean): --  RR: 17 (11 Dec 2023 11:17) (17 - 18)  SpO2: 93% (11 Dec 2023 11:43) (93% - 97%)    Parameters below as of 11 Dec 2023 11:43  Patient On (Oxygen Delivery Method): room air     Daily     Daily   CAPILLARY BLOOD GLUCOSE        I&O's Summary    10 Dec 2023 07:01  -  11 Dec 2023 07:00  --------------------------------------------------------  IN: 750 mL / OUT: 350 mL / NET: 400 mL        LABS:                        9.2    6.50  )-----------( 192      ( 11 Dec 2023 05:55 )             29.5     12-11    141  |  112<H>  |  24<H>  ----------------------------<  96  3.3<L>   |  25  |  1.88<H>    Ca    8.6      11 Dec 2023 05:55  Mg     1.8     12-10          Urinalysis Basic - ( 11 Dec 2023 05:55 )    Color: x / Appearance: x / SG: x / pH: x  Gluc: 96 mg/dL / Ketone: x  / Bili: x / Urobili: x   Blood: x / Protein: x / Nitrite: x   Leuk Esterase: x / RBC: x / WBC x   Sq Epi: x / Non Sq Epi: x / Bacteria: x              MEDICATIONS:  acetaminophen     Tablet .. 650 milliGRAM(s) Oral every 6 hours PRN  albuterol/ipratropium for Nebulization 3 milliLiter(s) Nebulizer every 8 hours  aluminum hydroxide/magnesium hydroxide/simethicone Suspension 30 milliLiter(s) Oral every 4 hours PRN  amLODIPine   Tablet 2.5 milliGRAM(s) Oral daily  collagenase Ointment 1 Application(s) Topical daily  dextrose 5%. 1000 milliLiter(s) IV Continuous <Continuous>  finasteride 5 milliGRAM(s) Oral daily  gabapentin 300 milliGRAM(s) Oral two times a day  melatonin 3 milliGRAM(s) Oral at bedtime PRN  mirtazapine 15 milliGRAM(s) Oral daily  ondansetron Injectable 4 milliGRAM(s) IV Push every 8 hours PRN  polyethylene glycol 3350 17 Gram(s) Oral at bedtime  senna 2 Tablet(s) Oral at bedtime  sodium chloride 3%  Inhalation 4 milliLiter(s) Inhalation every 8 hours  tamsulosin 0.4 milliGRAM(s) Oral at bedtime                               Patient: MOIZ RANDOLPH [id number] 89y Male                            Hospitalist Attending Note    No complaints.    ____________________PHYSICAL EXAM:  GENERAL:  NAD Alert and Oriented x 1 to person   HEENT: NCAT  CARDIOVASCULAR:  S1, S2  LUNGS: decreased BS L base.   ABDOMEN:  soft, (-) tenderness, (-) distension, (+) bowel sounds, (-) guarding, (-) rebound (-) rigidity  EXTREMITIES:  no cyanosis / clubbing / edema.   SKIN: Sacral Stage III ulcer.  L ischial Stage III ulcer.    ____________________      VITALS:  Vital Signs Last 24 Hrs  T(C): 37.1 (11 Dec 2023 11:17), Max: 37.6 (11 Dec 2023 05:02)  T(F): 98.8 (11 Dec 2023 11:17), Max: 99.7 (11 Dec 2023 05:02)  HR: 93 (11 Dec 2023 11:43) (82 - 98)  BP: 154/85 (11 Dec 2023 11:17) (140/75 - 169/85)  BP(mean): --  RR: 17 (11 Dec 2023 11:17) (17 - 18)  SpO2: 93% (11 Dec 2023 11:43) (93% - 97%)    Parameters below as of 11 Dec 2023 11:43  Patient On (Oxygen Delivery Method): room air     Daily     Daily   CAPILLARY BLOOD GLUCOSE        I&O's Summary    10 Dec 2023 07:01  -  11 Dec 2023 07:00  --------------------------------------------------------  IN: 750 mL / OUT: 350 mL / NET: 400 mL        LABS:                        9.2    6.50  )-----------( 192      ( 11 Dec 2023 05:55 )             29.5     12-11    141  |  112<H>  |  24<H>  ----------------------------<  96  3.3<L>   |  25  |  1.88<H>    Ca    8.6      11 Dec 2023 05:55  Mg     1.8     12-10          Urinalysis Basic - ( 11 Dec 2023 05:55 )    Color: x / Appearance: x / SG: x / pH: x  Gluc: 96 mg/dL / Ketone: x  / Bili: x / Urobili: x   Blood: x / Protein: x / Nitrite: x   Leuk Esterase: x / RBC: x / WBC x   Sq Epi: x / Non Sq Epi: x / Bacteria: x              MEDICATIONS:  acetaminophen     Tablet .. 650 milliGRAM(s) Oral every 6 hours PRN  albuterol/ipratropium for Nebulization 3 milliLiter(s) Nebulizer every 8 hours  aluminum hydroxide/magnesium hydroxide/simethicone Suspension 30 milliLiter(s) Oral every 4 hours PRN  amLODIPine   Tablet 2.5 milliGRAM(s) Oral daily  collagenase Ointment 1 Application(s) Topical daily  dextrose 5%. 1000 milliLiter(s) IV Continuous <Continuous>  finasteride 5 milliGRAM(s) Oral daily  gabapentin 300 milliGRAM(s) Oral two times a day  melatonin 3 milliGRAM(s) Oral at bedtime PRN  mirtazapine 15 milliGRAM(s) Oral daily  ondansetron Injectable 4 milliGRAM(s) IV Push every 8 hours PRN  polyethylene glycol 3350 17 Gram(s) Oral at bedtime  senna 2 Tablet(s) Oral at bedtime  sodium chloride 3%  Inhalation 4 milliLiter(s) Inhalation every 8 hours  tamsulosin 0.4 milliGRAM(s) Oral at bedtime

## 2023-12-11 NOTE — PROGRESS NOTE ADULT - SUBJECTIVE AND OBJECTIVE BOX
INTERVAL HPI/OVERNIGHT EVENTS:  - On RA, o2 saturation 94%   -POCUS done at bedside     SUBJECTIVE: Patient seen and examined at bedside.   ROS: All negative except as listed above.    VITAL SIGNS:  ICU Vital Signs Last 24 Hrs  T(C): 37.1 (11 Dec 2023 11:17), Max: 37.6 (11 Dec 2023 05:02)  T(F): 98.8 (11 Dec 2023 11:17), Max: 99.7 (11 Dec 2023 05:02)  HR: 93 (11 Dec 2023 11:17) (82 - 98)  BP: 154/85 (11 Dec 2023 11:17) (140/75 - 169/85)  BP(mean): --  ABP: --  ABP(mean): --  RR: 17 (11 Dec 2023 11:17) (17 - 18)  SpO2: 96% (11 Dec 2023 11:17) (93% - 97%)    O2 Parameters below as of 11 Dec 2023 11:17  Patient On (Oxygen Delivery Method): nasal cannula      12-10 @ 07:01  -  12-11 @ 07:00  --------------------------------------------------------  IN: 750 mL / OUT: 350 mL / NET: 400 mL      ECG: reviewed.    PHYSICAL EXAM:  GEN: elderly male, thin, comfortable in bed  HEENT: NC/AT, EOMI, sclera white  CV: RRR  PULM: decreased breath sounds on L, no wheeze/rhonchi  ABD: soft, NT, ND, + BS  EXT: on edema/cyanosis  NEURO: arousable, follows simple commands, confused (dementia at baseline)    MEDICATIONS:  MEDICATIONS  (STANDING):  albuterol/ipratropium for Nebulization 3 milliLiter(s) Nebulizer every 6 hours  amLODIPine   Tablet 2.5 milliGRAM(s) Oral daily  collagenase Ointment 1 Application(s) Topical daily  dextrose 5%. 1000 milliLiter(s) (75 mL/Hr) IV Continuous <Continuous>  finasteride 5 milliGRAM(s) Oral daily  gabapentin 300 milliGRAM(s) Oral two times a day  mirtazapine 15 milliGRAM(s) Oral daily  polyethylene glycol 3350 17 Gram(s) Oral at bedtime  senna 2 Tablet(s) Oral at bedtime  sodium chloride 3%  Inhalation 4 milliLiter(s) Inhalation every 6 hours  tamsulosin 0.4 milliGRAM(s) Oral at bedtime    MEDICATIONS  (PRN):  acetaminophen     Tablet .. 650 milliGRAM(s) Oral every 6 hours PRN Temp greater or equal to 38C (100.4F), Mild Pain (1 - 3)  aluminum hydroxide/magnesium hydroxide/simethicone Suspension 30 milliLiter(s) Oral every 4 hours PRN Dyspepsia  melatonin 3 milliGRAM(s) Oral at bedtime PRN Insomnia  ondansetron Injectable 4 milliGRAM(s) IV Push every 8 hours PRN Nausea and/or Vomiting      ALLERGIES:  Allergies    No Known Allergies    Intolerances        LABS:                        9.2    6.50  )-----------( 192      ( 11 Dec 2023 05:55 )             29.5     12-11    141  |  112<H>  |  24<H>  ----------------------------<  96  3.3<L>   |  25  |  1.88<H>    Ca    8.6      11 Dec 2023 05:55  Mg     1.8     12-10        Urinalysis Basic - ( 11 Dec 2023 05:55 )    Color: x / Appearance: x / SG: x / pH: x  Gluc: 96 mg/dL / Ketone: x  / Bili: x / Urobili: x   Blood: x / Protein: x / Nitrite: x   Leuk Esterase: x / RBC: x / WBC x   Sq Epi: x / Non Sq Epi: x / Bacteria: x        Micro:    Culture - Blood (collected 11-30-23 @ 19:20)  Source: .Blood Blood-Venous  Final Report (12-06-23 @ 11:01):    No growth at 5 days    Culture - Blood (collected 11-30-23 @ 19:00)  Source: .Blood Blood-Peripheral  Final Report (12-06-23 @ 02:00):    No growth at 5 days        IMAGING  < from: Xray Chest 1 View- PORTABLE-Urgent (Xray Chest 1 View- PORTABLE-Urgent .) (12.06.23 @ 15:59) >  Increasing opacification left hemithorax. At this time there is total   opacification left hemithorax likely due to combination of pleural fluid   and atelectasis. No pneumothorax or other interval change  -----------------------  < from: NM Pulmonary Ventilation/Perfusion Scan (12.01.23 @ 10:05) >   Very low probability of pulmonary embolism.  -----------------------  < from: CT Chest No Cont (11.30.23 @ 20:37) >  LUNGS AND LARGE AIRWAYS: Patent central airways. Complete compressive   atelectasis of the left lower lobe and partial compressive atelectasis of   the left upper lobe and lingula.  Mild dependent atelectasis in the right   lower lobe.  PLEURA: Moderate two large left pleural effusion.  Small right pleural   effusion.  VESSELS: Mildly ectatic mid ascending thoracic aorta measures up to 3.5   cm in transverse caliber (6:72).  No thoracic aortic aneurysm.  Mild   atherosclerotic calcification of the thoracic aorta and arch vessels.  HEART: Cardiomegaly.  Mild calcification aortic valve leaflets. Mild   atherosclerotic calcification of the coronary arteries. No pericardial   effusion.  MEDIASTINUM AND BREANNA: No gross lymphadenopathy.  Nonspecific   subcentimeter mediastinal lymph nodes.  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    ------------------------  < from: TTE Echo Complete w/o Contrast w/ Doppler (12.05.23 @ 08:33) >  Left Ventricle:  Global LV systolic function was normal. Spectral Doppler shows normal   pattern of LV diastolic filling.  Right Ventricle: Normal right ventricular size and function.  Left Atrium: The left atrium is normal in size.  Right Atrium: Mildly enlarged right atrium.  Mitral Valve: Mild thickening and calcification of the anterior and   posterior mitral valveleaflets. There is mild mitral annular   calcification.  Tricuspid Valve: Structurally normal tricuspid valve, with normal leaflet   excursion. Mild tricuspid regurgitation is visualized. Estimated   pulmonary artery systolic pressure is 37.3 mmHg assuming a right atrial   pressure of 3 mmHg, which is consistent with mild pulmonary hypertension.  Aortic Valve: Moderate aortic valve regurgitation is seen.  Pulmonic Valve: Structurally normal pulmonic valve, with normal leaflet   excursion. Trace pulmonic valve regurgitation.  Venous: The inferior vena cava was normal sized, with respiratory size   variation greater than 50%.      Summary   1. Normal global left ventricular systolic function.   2. Mild tricuspid regurgitation.   3. Moderate aortic regurgitation.   4. Mild thickening and calcification of the anterior and posterior   mitral valve leaflets.   5. Mild mitral annular calcification.   6. Mildly enlarged right atrium.   7. Estimated pulmonary artery systolic pressure is 37.3 mmHg assuming a   right atrial pressure of 3 mmHg, which is consistent with mild pulmonary   hypertension.    RADIOLOGY & ADDITIONAL TESTS: Reviewed.

## 2023-12-11 NOTE — PROGRESS NOTE ADULT - ASSESSMENT
89 y/o M w/ PMH fo dementia, HTN, has loop recorder, BPH w/ chronic obrien presents with syncope and found to have UTI    # Loculated L  pleural effusion L lung whiteout - 11/30 CXR compared to 11/28 CXR - appears to be more significant.  Was on O2 1-2L.  Per Pulmonary, POCUS showed loculated effusion, not amenable to thoracentesis.  Chest PT, pulmonary following.   ? role for bronchoscopy, awaiting pulmonary followup.  Most recent CXR without improvement.    # Hypernatremia - continue IVF.  Encourage po intake.   # Metabolic Encephalopathy  Likely due to UTI  Palliative on board  # HTN  - BP elevated.  Resume amlodipine.   # UTI / Chronic Urinary retention  Completed course of antibiotics.  urine culture growing pseudomonas and enterococcus     # Anemia- Monitor H/H -london positive,  but hapto 369, and .  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    # constipation - continue laxatives.    # Sacral Decubitus Ulcer - continue wound care.    [facility]   Dispo: HHA to be reinstated   Plan of care d/w son Dr. Tierney at [phone] on 12/6.   89 y/o M w/ PMH fo dementia, HTN, has loop recorder, BPH w/ chronic obrien presents with syncope and found to have UTI    # Loculated L  pleural effusion L lung whiteout - 11/30 CXR compared to 11/28 CXR - appears to be more significant.  Was on O2 1-2L.  Per Pulmonary, POCUS showed loculated effusion, not amenable to thoracentesis.  Chest PT, pulmonary following.   ? role for bronchoscopy, awaiting pulmonary followup.  Most recent CXR without improvement.    # Hypernatremia - continue IVF.  Encourage po intake.   # Metabolic Encephalopathy  Likely due to UTI  Palliative on board  # HTN  - BP elevated.  Resume amlodipine.   # UTI / Chronic Urinary retention  Completed course of antibiotics.  urine culture growing pseudomonas and enterococcus     # Anemia- Monitor H/H -london positive,  but hapto 369, and .  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    # constipation - continue laxatives.    # Sacral Decubitus Ulcer - continue wound care.    [facility]   Dispo: HHA to be reinstated   Plan of care d/w son Dr. Tierney at [phone] on 12/6.

## 2023-12-11 NOTE — PROGRESS NOTE ADULT - SUBJECTIVE AND OBJECTIVE BOX
[facility] NEPHROLOGY SERVICES, [facility]  NEPHROLOGY AND HYPERTENSION  [address]  [address]  [place] [address]  [phone]    MD SOFY KEENAN, MD KEATON FRYE MD CHRISTOPHER CAPUTO, MD EDWARD BOVER, MD          Patient events noted    MEDICATIONS  (STANDING):  albuterol/ipratropium for Nebulization 3 milliLiter(s) Nebulizer every 8 hours  amLODIPine   Tablet 2.5 milliGRAM(s) Oral daily  collagenase Ointment 1 Application(s) Topical daily  dextrose 5%. 1000 milliLiter(s) (75 mL/Hr) IV Continuous <Continuous>  finasteride 5 milliGRAM(s) Oral daily  gabapentin 300 milliGRAM(s) Oral two times a day  mirtazapine 15 milliGRAM(s) Oral daily  polyethylene glycol 3350 17 Gram(s) Oral at bedtime  senna 2 Tablet(s) Oral at bedtime  sodium chloride 3%  Inhalation 4 milliLiter(s) Inhalation every 8 hours  tamsulosin 0.4 milliGRAM(s) Oral at bedtime    MEDICATIONS  (PRN):  acetaminophen     Tablet .. 650 milliGRAM(s) Oral every 6 hours PRN Temp greater or equal to 38C (100.4F), Mild Pain (1 - 3)  aluminum hydroxide/magnesium hydroxide/simethicone Suspension 30 milliLiter(s) Oral every 4 hours PRN Dyspepsia  melatonin 3 milliGRAM(s) Oral at bedtime PRN Insomnia  ondansetron Injectable 4 milliGRAM(s) IV Push every 8 hours PRN Nausea and/or Vomiting      12-10-23 @ 07:01  -  12-11-23 @ 07:00  --------------------------------------------------------  IN: 750 mL / OUT: 350 mL / NET: 400 mL      PHYSICAL EXAM:      T(C): 36.8 (12-11-23 @ 17:25), Max: 37.6 (12-11-23 @ 05:02)  HR: 105 (12-11-23 @ 17:25) (82 - 105)  BP: 135/78 (12-11-23 @ 17:25) (135/78 - 169/85)  RR: 18 (12-11-23 @ 17:25) (17 - 18)  SpO2: 94% (12-11-23 @ 17:25) (93% - 97%)  Wt(kg): --  Lungs clear  Heart S1S2  Abd soft NT ND  Extremities:   tr edema                                    9.2    6.50  )-----------( 192      ( 11 Dec 2023 05:55 )             29.5     12-11    141  |  112<H>  |  24<H>  ----------------------------<  96  3.3<L>   |  25  |  1.88<H>    Ca    8.6      11 Dec 2023 05:55  Mg     1.8     12-10          Creatinine Trend: 1.88<--, 2.04<--, 1.97<--, 1.97<--, 1.87<--, 1.99<--      Assessment/Plan    Obstructive, pre-renal ROGER/CKD 3 (Cr 1.65 - 10/30/23)  Improving w/obrien, IVF  CBC, BMP, Mg in am  Avoid nephrotoxins  Will follow      Samy Noriega MD [facility] NEPHROLOGY SERVICES, [facility]  NEPHROLOGY AND HYPERTENSION  [address]  [address]  [place] [address]  [phone]    MD SOFY KEENAN, MD KEATON FRYE MD CHRISTOPHER CAPUTO, MD EDWARD BOVER, MD          Patient events noted    MEDICATIONS  (STANDING):  albuterol/ipratropium for Nebulization 3 milliLiter(s) Nebulizer every 8 hours  amLODIPine   Tablet 2.5 milliGRAM(s) Oral daily  collagenase Ointment 1 Application(s) Topical daily  dextrose 5%. 1000 milliLiter(s) (75 mL/Hr) IV Continuous <Continuous>  finasteride 5 milliGRAM(s) Oral daily  gabapentin 300 milliGRAM(s) Oral two times a day  mirtazapine 15 milliGRAM(s) Oral daily  polyethylene glycol 3350 17 Gram(s) Oral at bedtime  senna 2 Tablet(s) Oral at bedtime  sodium chloride 3%  Inhalation 4 milliLiter(s) Inhalation every 8 hours  tamsulosin 0.4 milliGRAM(s) Oral at bedtime    MEDICATIONS  (PRN):  acetaminophen     Tablet .. 650 milliGRAM(s) Oral every 6 hours PRN Temp greater or equal to 38C (100.4F), Mild Pain (1 - 3)  aluminum hydroxide/magnesium hydroxide/simethicone Suspension 30 milliLiter(s) Oral every 4 hours PRN Dyspepsia  melatonin 3 milliGRAM(s) Oral at bedtime PRN Insomnia  ondansetron Injectable 4 milliGRAM(s) IV Push every 8 hours PRN Nausea and/or Vomiting      12-10-23 @ 07:01  -  12-11-23 @ 07:00  --------------------------------------------------------  IN: 750 mL / OUT: 350 mL / NET: 400 mL      PHYSICAL EXAM:      T(C): 36.8 (12-11-23 @ 17:25), Max: 37.6 (12-11-23 @ 05:02)  HR: 105 (12-11-23 @ 17:25) (82 - 105)  BP: 135/78 (12-11-23 @ 17:25) (135/78 - 169/85)  RR: 18 (12-11-23 @ 17:25) (17 - 18)  SpO2: 94% (12-11-23 @ 17:25) (93% - 97%)  Wt(kg): --  Lungs clear  Heart S1S2  Abd soft NT ND  Extremities:   tr edema                                    9.2    6.50  )-----------( 192      ( 11 Dec 2023 05:55 )             29.5     12-11    141  |  112<H>  |  24<H>  ----------------------------<  96  3.3<L>   |  25  |  1.88<H>    Ca    8.6      11 Dec 2023 05:55  Mg     1.8     12-10          Creatinine Trend: 1.88<--, 2.04<--, 1.97<--, 1.97<--, 1.87<--, 1.99<--      Assessment/Plan    Obstructive, pre-renal ROGER/CKD 3 (Cr 1.65 - 10/30/23)  Improving w/obrien, IVF  CBC, BMP, Mg in am  Avoid nephrotoxins  Will follow      Samy Noriega MD

## 2023-12-11 NOTE — PROGRESS NOTE ADULT - NS ATTEND AMEND GEN_ALL_CORE FT
Agree with above  Saturating well currently on room air, in no respiratory distress  Decreased AE L side  Bedside POCU/S shows small pockets of fluid interspersed with collapsed lung. Fluid appears simple, anechoic with no evidence of fibrinous stranding. Based on symptoms, and effusion's size and accessibility, the risk of complications far exceeds any potential benefit of thoracentesis.  Recommend aggressive chest PT, c/w HyperSAL Q8h preceded by DuoNEBs.    We will continue to follow the patient with you.

## 2023-12-11 NOTE — PROGRESS NOTE ADULT - ASSESSMENT
89M PMH HTN, CKD3, BPH w/ chronic obrien and multiple admissions for CAUTI, dementia, COVID 1/2023 presents for SOB. Pt poor historian so history obtained from record. Pt was brought in by home aid who reports pt c/o SOB. NO fever/chills/sweats, URI sx, N/V/D reported. In ED, pt hypothermic to 95.5 and hypotensive to 90/60s. Labs notable for Hgb 6.3 (+london), d-dimer 3000, Na 129, BUN/SCr 70/2.91, BNP 7966, UA+, u cx growing enterococcus faecalis and pseudomonas . CT A/P demonstrates mild b/l hydronephrosis and large stool burden with perirectal stranding consistent for stercoral colitis. CT chest with moderate to large L pleural effusion and compressive atelectasis L lower lobe    DX: L pleural effusion and atelectasis, CAUTI (present on admission), anemia, ROGER on CKD, dementia    - repeat CXR w/ persistent complete L hemithorax opacification   - follow serial CXR to evaluate for L hemithorax, does not have to be daily  - POCUS lung today with very small L effusion anteriorly and atelectatic lung however small effusion noted at L base along with adjacent atelectatic lung, improved based on previous POCUS reviews. No loculations, small in size, and no clear pocket to safety tap- not amendable for thoracentesis  - Hemodynamically stable and weaned off of NC over the weekend   - O2 saturation 95% on RA, maintain off o2 with goal > 90%   - cont with duonebs + hypertonic saline q6 hrs for secretion mobilization, will change to q8hr for secretion mobilization w/ cont chest PT q6 hrs   - pt with wet cough on exam but not expectorating  - aspiration precautions   - completed course of zosyn per primary team for CAUTI  - will continue to follow     *Discussed with Pulm attending MD Schmitt

## 2023-12-12 NOTE — DIETITIAN NUTRITION RISK NOTIFICATION - TREATMENT: THE FOLLOWING DIET HAS BEEN RECOMMENDED
Diet, Pureed:   Low Sodium  Liquid Protein Supplement     Qty per Day:  1  Supplement Feeding Modality:  Oral  Ensure Plant-Based Cans or Servings Per Day:  1       Frequency:  Daily (12-12-23 @ 10:47) [Pending Verification By Attending]  Diet, Easy to Chew:   Low Sodium  Supplement Feeding Modality:  Oral  Ensure Plus High Protein Cans or Servings Per Day:  1       Frequency:  Two Times a day (12-04-23 @ 10:13) [Active]

## 2023-12-12 NOTE — PROGRESS NOTE ADULT - SUBJECTIVE AND OBJECTIVE BOX
Patient: MOIZ RANDOLPH [id number] 89y Male                            Hospitalist Attending Note    No complaints.  On Oxygen 2L / min via NC    ____________________PHYSICAL EXAM:  GENERAL:  NAD Arousable, Oriented x 1 to person   HEENT: NCAT  CARDIOVASCULAR:  S1, S2  LUNGS: decreased BS L base.   ABDOMEN:  soft, (-) tenderness, (-) distension, (+) bowel sounds, (-) guarding, (-) rebound (-) rigidity  EXTREMITIES:  no cyanosis / clubbing / edema.   SKIN: Sacral Stage III ulcer.  L ischial Stage III ulcer.    ____________________      VITALS:  Vital Signs Last 24 Hrs  T(C): 36.7 (12 Dec 2023 11:24), Max: 37.1 (11 Dec 2023 23:36)  T(F): 98.1 (12 Dec 2023 11:24), Max: 98.8 (11 Dec 2023 23:36)  HR: 84 (12 Dec 2023 13:34) (84 - 105)  BP: 138/76 (12 Dec 2023 11:24) (132/89 - 138/76)  BP(mean): --  RR: 17 (12 Dec 2023 11:24) (16 - 18)  SpO2: 88% (12 Dec 2023 14:45) (88% - 100%)    Parameters below as of 12 Dec 2023 14:45  Patient On (Oxygen Delivery Method): room air     Daily     Daily   CAPILLARY BLOOD GLUCOSE        I&O's Summary    11 Dec 2023 07:01  -  12 Dec 2023 07:00  --------------------------------------------------------  IN: 650 mL / OUT: 800 mL / NET: -150 mL        LABS:                        9.2    6.50  )-----------( 192      ( 11 Dec 2023 05:55 )             29.5     12-12    143  |  114<H>  |  27<H>  ----------------------------<  82  4.3   |  24  |  1.98<H>    Ca    8.9      12 Dec 2023 08:53          Urinalysis Basic - ( 12 Dec 2023 08:53 )    Color: x / Appearance: x / SG: x / pH: x  Gluc: 82 mg/dL / Ketone: x  / Bili: x / Urobili: x   Blood: x / Protein: x / Nitrite: x   Leuk Esterase: x / RBC: x / WBC x   Sq Epi: x / Non Sq Epi: x / Bacteria: x              MEDICATIONS:  acetaminophen     Tablet .. 650 milliGRAM(s) Oral every 6 hours PRN  albuterol/ipratropium for Nebulization 3 milliLiter(s) Nebulizer every 8 hours  aluminum hydroxide/magnesium hydroxide/simethicone Suspension 30 milliLiter(s) Oral every 4 hours PRN  amLODIPine   Tablet 2.5 milliGRAM(s) Oral daily  collagenase Ointment 1 Application(s) Topical daily  finasteride 5 milliGRAM(s) Oral daily  gabapentin 300 milliGRAM(s) Oral two times a day  melatonin 3 milliGRAM(s) Oral at bedtime PRN  mirtazapine 15 milliGRAM(s) Oral daily  ondansetron Injectable 4 milliGRAM(s) IV Push every 8 hours PRN  polyethylene glycol 3350 17 Gram(s) Oral at bedtime  senna 2 Tablet(s) Oral at bedtime  sodium chloride 3%  Inhalation 4 milliLiter(s) Inhalation every 8 hours  tamsulosin 0.4 milliGRAM(s) Oral at bedtime                               Patient: MOIZ RANDOLPH [id number] 89y Male                            Hospitalist Attending Note    No complaints.  On Oxygen 2L / min via NC    ____________________PHYSICAL EXAM:  GENERAL:  NAD Arousable, Oriented x 1 to person   HEENT: NCAT  CARDIOVASCULAR:  S1, S2  LUNGS: decreased BS L base.   ABDOMEN:  soft, (-) tenderness, (-) distension, (+) bowel sounds, (-) guarding, (-) rebound (-) rigidity  EXTREMITIES:  no cyanosis / clubbing / edema.   SKIN: Sacral Stage III ulcer.  L ischial Stage III ulcer.    ____________________      VITALS:  Vital Signs Last 24 Hrs  T(C): 36.7 (12 Dec 2023 11:24), Max: 37.1 (11 Dec 2023 23:36)  T(F): 98.1 (12 Dec 2023 11:24), Max: 98.8 (11 Dec 2023 23:36)  HR: 84 (12 Dec 2023 13:34) (84 - 105)  BP: 138/76 (12 Dec 2023 11:24) (132/89 - 138/76)  BP(mean): --  RR: 17 (12 Dec 2023 11:24) (16 - 18)  SpO2: 88% (12 Dec 2023 14:45) (88% - 100%)    Parameters below as of 12 Dec 2023 14:45  Patient On (Oxygen Delivery Method): room air     Daily     Daily   CAPILLARY BLOOD GLUCOSE        I&O's Summary    11 Dec 2023 07:01  -  12 Dec 2023 07:00  --------------------------------------------------------  IN: 650 mL / OUT: 800 mL / NET: -150 mL        LABS:                        9.2    6.50  )-----------( 192      ( 11 Dec 2023 05:55 )             29.5     12-12    143  |  114<H>  |  27<H>  ----------------------------<  82  4.3   |  24  |  1.98<H>    Ca    8.9      12 Dec 2023 08:53          Urinalysis Basic - ( 12 Dec 2023 08:53 )    Color: x / Appearance: x / SG: x / pH: x  Gluc: 82 mg/dL / Ketone: x  / Bili: x / Urobili: x   Blood: x / Protein: x / Nitrite: x   Leuk Esterase: x / RBC: x / WBC x   Sq Epi: x / Non Sq Epi: x / Bacteria: x              MEDICATIONS:  acetaminophen     Tablet .. 650 milliGRAM(s) Oral every 6 hours PRN  albuterol/ipratropium for Nebulization 3 milliLiter(s) Nebulizer every 8 hours  aluminum hydroxide/magnesium hydroxide/simethicone Suspension 30 milliLiter(s) Oral every 4 hours PRN  amLODIPine   Tablet 2.5 milliGRAM(s) Oral daily  collagenase Ointment 1 Application(s) Topical daily  finasteride 5 milliGRAM(s) Oral daily  gabapentin 300 milliGRAM(s) Oral two times a day  melatonin 3 milliGRAM(s) Oral at bedtime PRN  mirtazapine 15 milliGRAM(s) Oral daily  ondansetron Injectable 4 milliGRAM(s) IV Push every 8 hours PRN  polyethylene glycol 3350 17 Gram(s) Oral at bedtime  senna 2 Tablet(s) Oral at bedtime  sodium chloride 3%  Inhalation 4 milliLiter(s) Inhalation every 8 hours  tamsulosin 0.4 milliGRAM(s) Oral at bedtime

## 2023-12-12 NOTE — PROGRESS NOTE ADULT - NSPROGADDITIONALINFOA_GEN_ALL_CORE
family not willing for hospice services  can start discharge planning

## 2023-12-12 NOTE — PROGRESS NOTE ADULT - ASSESSMENT
89 y/o M w/ PMH fo dementia, HTN, has loop recorder, BPH w/ chronic obrien presents with syncope and found to have UTI    # Acute Respiratory Failure with Hypoxia, Loculated L  pleural effusion L lung whiteout - 11/30 CXR compared to 11/28 CXR - appears to be more significant.  Was on O2 1-2L.  Per Pulmonary, POCUS showed loculated effusion, not amenable to thoracentesis.  Chest PT, pulmonary following.   ? role for bronchoscopy, pulmonary following.  Most recent CXR without improvement.  Repeat CXR in am.    # Hypernatremia - continue IVF.  Encourage po intake.   # Metabolic Encephalopathy  Likely due to UTI  Palliative on board  # HTN  - BP elevated.  Resume amlodipine.   # UTI / Chronic Urinary retention  Completed course of antibiotics.  urine culture growing pseudomonas and enterococcus     # Anemia- Monitor H/H -london positive,  but hapto 369, and .  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    # constipation - continue laxatives.    # Severe Protein Calorie Malnutrition - Nutrition input, encourage po.  Supplements   # Sacral Decubitus Ulcer - continue wound care.      Dispo: [facility] to be reinstated   Plan of care d/w son Dr. Tierney at [phone] on 12/6.   89 y/o M w/ PMH fo dementia, HTN, has loop recorder, BPH w/ chronic obrien presents with syncope and found to have UTI    # Acute Respiratory Failure with Hypoxia, Loculated L  pleural effusion L lung whiteout - 11/30 CXR compared to 11/28 CXR - appears to be more significant.  Was on O2 1-2L.  Per Pulmonary, POCUS showed loculated effusion, not amenable to thoracentesis.  Chest PT, pulmonary following.   ? role for bronchoscopy, pulmonary following.  Most recent CXR without improvement.  Repeat CXR in am.    # Hypernatremia - continue IVF.  Encourage po intake.   # Metabolic Encephalopathy  Likely due to UTI  Palliative on board  # HTN  - BP elevated.  Resume amlodipine.   # UTI / Chronic Urinary retention  Completed course of antibiotics.  urine culture growing pseudomonas and enterococcus     # Anemia- Monitor H/H -london positive,  but hapto 369, and .  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    # constipation - continue laxatives.    # Severe Protein Calorie Malnutrition - Nutrition input, encourage po.  Supplements   # Sacral Decubitus Ulcer - continue wound care.      Dispo: [facility] to be reinstated   Plan of care d/w son Dr. Tierney at [phone] on 12/6.

## 2023-12-12 NOTE — CHART NOTE - NSCHARTNOTEFT_GEN_A_CORE
Assessment:  Pt is confused, Georgian Creole speaking, not appropriate candidate for interpretation, Nutrition Ambassador who was in the unit who is Georgian Creole speaking attempted to obtain food preference from pt, however without any response.  Per Nursing Assistant, pt has been spitting out food, sometimes eats for family.    Pt adm c diagnosis of anemia, sepsis, dyspnea, c obstructive pre-renal ROGER, CKD 3, improving, left pleural effusion, metabolic encephalopathy, UTI,  anemia, constipation(12/10, BM noted).  PMH include dementia, HTN, BPH, chronic obrien, wheel chair dependent, spinal stenosis of lumbar region, dehydration.  Pt lived c family, had HHA, d/c plan is for home c home care.     Factors impacting intake: [ ] none [ ] nausea  [ ] vomiting [ ] diarrhea [ ] constipation  [x ]chewing problem; pt appeared to be edentulous [ ] swallowing issues  [ x] other: AMS, acute illness    Diet Prescription: Diet, Easy to Chew:   Low Sodium  Supplement Feeding Modality:  Oral  Ensure Plus High Protein Cans or Servings Per Day:  1       Frequency:  Two Times a day (12-04-23 @ 10:13) [Active]    Intake: <50% nutrition needs > 5 days, pt is not taking Ensure supplement    Current Weight: 12/10, 77.4 kg, 12/02, 81.1 kg, 12/01, 75.3 kg, wt. fluctuations c wt. gain of 2.1 kg noted  % Weight Change: 2.8%  12/08, 2+(mild) edema of legs noted    Pt c visible moderate temple, clavicle, shoulder(muscle), moderate orbital(fat) depletion.    Pertinent Medications: MEDICATIONS  (STANDING):  albuterol/ipratropium for Nebulization 3 milliLiter(s) Nebulizer every 8 hours  amLODIPine   Tablet 2.5 milliGRAM(s) Oral daily  collagenase Ointment 1 Application(s) Topical daily  finasteride 5 milliGRAM(s) Oral daily  gabapentin 300 milliGRAM(s) Oral two times a day  mirtazapine 15 milliGRAM(s) Oral daily  polyethylene glycol 3350 17 Gram(s) Oral at bedtime  senna 2 Tablet(s) Oral at bedtime  sodium chloride 3%  Inhalation 4 milliLiter(s) Inhalation every 8 hours  tamsulosin 0.4 milliGRAM(s) Oral at bedtime    MEDICATIONS  (PRN):  acetaminophen     Tablet .. 650 milliGRAM(s) Oral every 6 hours PRN Temp greater or equal to 38C (100.4F), Mild Pain (1 - 3)  aluminum hydroxide/magnesium hydroxide/simethicone Suspension 30 milliLiter(s) Oral every 4 hours PRN Dyspepsia  melatonin 3 milliGRAM(s) Oral at bedtime PRN Insomnia  ondansetron Injectable 4 milliGRAM(s) IV Push every 8 hours PRN Nausea and/or Vomiting    Pertinent Labs: 12-12 Na143 mmol/L Glu 82 mg/dL K+ 4.3 mmol/L Cr  1.98 mg/dL<H> BUN 27 mg/dL<H>   CAPILLARY BLOOD GLUCOSE        Skin:   Pressure ulcer x 2  1. 12/10, sacrum; stage III  2. left ischium; stage III    Estimated Needs:   [x ] no change since previous assessment(12/04, lower range of estimated needs for protein due to ROGER/CKD)  [ ] recalculated:     Previous Nutrition Diagnosis:  (12/04)  Inadequate Energy Intake  Etiology: inability to consume sufficient energy  Signs/Symptoms: pt with poor PO intake throughout LOS  Goal/Expected Outcome: Pt to consume >50% meals/supplements during LOS; not met  Nutrition Diagnosis is [x ] ongoing (being replaced by new related diagnosis below)  [ ] resolved [ ] not applicable     Previous Nutrition Diagnosis (12/04)  Increased Nutrient Needs: protein  Etiology: increased physiological demand for protein  Signs/Symptoms: stage III pressure injuries x 2  Goal/Expected Outcome; Pt to consume >50% meals/supplements during LOS; not met  Nutrition Diagnosis is [ x] ongoing  [ ] resolved [ ] not applicable       New Nutrition Diagnosis: (12/12)  [x ] Malnutrition; severe malnutrition in context of acute illness   Related to: inadequate protein-energy intake in setting of ROGER, pleural effusion, sepsis, UTI, pressure ulcer   As evidenced by: <50% nutrition needs > 5 days, moderate muscle/fat loss    Goal: pt to consume >50% of meals & supplement     Interventions:   Recommend  [x] Change Diet To: puree, low sodium   [x ] Nutrition Supplement; will trial Katefarms 1.0, 11 oz(325 ml), 325 calories, 16 grams protein as product replacement for Ensure plant based supplement 1 x day  + add Liquid protein supplement 1 pkg=15 grams protein, 100 calories   [ ] Nutrition Support  [x ] Other: consider swallow evaluation     Monitoring and Evaluation:   [x ] PO intake [ x ] Tolerance to diet prescription [ x ] weights [ x ] labs[ x ] follow up per protocol  [ ] other: Assessment:  Pt is confused, Singaporean Creole speaking, not appropriate candidate for interpretation, Nutrition Ambassador who was in the unit who is Singaporean Creole speaking attempted to obtain food preference from pt, however without any response.  Per Nursing Assistant, pt has been spitting out food, sometimes eats for family.    Pt adm c diagnosis of anemia, sepsis, dyspnea, c obstructive pre-renal ROGER, CKD 3, improving, left pleural effusion, metabolic encephalopathy, UTI,  anemia, constipation(12/10, BM noted).  PMH include dementia, HTN, BPH, chronic obrien, wheel chair dependent, spinal stenosis of lumbar region, dehydration.  Pt lived c family, had HHA, d/c plan is for home c home care.     Factors impacting intake: [ ] none [ ] nausea  [ ] vomiting [ ] diarrhea [ ] constipation  [x ]chewing problem; pt appeared to be edentulous [ ] swallowing issues  [ x] other: AMS, acute illness    Diet Prescription: Diet, Easy to Chew:   Low Sodium  Supplement Feeding Modality:  Oral  Ensure Plus High Protein Cans or Servings Per Day:  1       Frequency:  Two Times a day (12-04-23 @ 10:13) [Active]    Intake: <50% nutrition needs > 5 days, pt is not taking Ensure supplement    Current Weight: 12/10, 77.4 kg, 12/02, 81.1 kg, 12/01, 75.3 kg, wt. fluctuations c wt. gain of 2.1 kg noted  % Weight Change: 2.8%  12/08, 2+(mild) edema of legs noted    Pt c visible moderate temple, clavicle, shoulder(muscle), moderate orbital(fat) depletion.    Pertinent Medications: MEDICATIONS  (STANDING):  albuterol/ipratropium for Nebulization 3 milliLiter(s) Nebulizer every 8 hours  amLODIPine   Tablet 2.5 milliGRAM(s) Oral daily  collagenase Ointment 1 Application(s) Topical daily  finasteride 5 milliGRAM(s) Oral daily  gabapentin 300 milliGRAM(s) Oral two times a day  mirtazapine 15 milliGRAM(s) Oral daily  polyethylene glycol 3350 17 Gram(s) Oral at bedtime  senna 2 Tablet(s) Oral at bedtime  sodium chloride 3%  Inhalation 4 milliLiter(s) Inhalation every 8 hours  tamsulosin 0.4 milliGRAM(s) Oral at bedtime    MEDICATIONS  (PRN):  acetaminophen     Tablet .. 650 milliGRAM(s) Oral every 6 hours PRN Temp greater or equal to 38C (100.4F), Mild Pain (1 - 3)  aluminum hydroxide/magnesium hydroxide/simethicone Suspension 30 milliLiter(s) Oral every 4 hours PRN Dyspepsia  melatonin 3 milliGRAM(s) Oral at bedtime PRN Insomnia  ondansetron Injectable 4 milliGRAM(s) IV Push every 8 hours PRN Nausea and/or Vomiting    Pertinent Labs: 12-12 Na143 mmol/L Glu 82 mg/dL K+ 4.3 mmol/L Cr  1.98 mg/dL<H> BUN 27 mg/dL<H>   CAPILLARY BLOOD GLUCOSE        Skin:   Pressure ulcer x 2  1. 12/10, sacrum; stage III  2. left ischium; stage III    Estimated Needs:   [x ] no change since previous assessment(12/04, lower range of estimated needs for protein due to ROGER/CKD)  [ ] recalculated:     Previous Nutrition Diagnosis:  (12/04)  Inadequate Energy Intake  Etiology: inability to consume sufficient energy  Signs/Symptoms: pt with poor PO intake throughout LOS  Goal/Expected Outcome: Pt to consume >50% meals/supplements during LOS; not met  Nutrition Diagnosis is [x ] ongoing (being replaced by new related diagnosis below)  [ ] resolved [ ] not applicable     Previous Nutrition Diagnosis (12/04)  Increased Nutrient Needs: protein  Etiology: increased physiological demand for protein  Signs/Symptoms: stage III pressure injuries x 2  Goal/Expected Outcome; Pt to consume >50% meals/supplements during LOS; not met  Nutrition Diagnosis is [ x] ongoing  [ ] resolved [ ] not applicable       New Nutrition Diagnosis: (12/12)  [x ] Malnutrition; severe malnutrition in context of acute illness   Related to: inadequate protein-energy intake in setting of ROGER, pleural effusion, sepsis, UTI, pressure ulcer   As evidenced by: <50% nutrition needs > 5 days, moderate muscle/fat loss    Goal: pt to consume >50% of meals & supplement     Interventions:   Recommend  [x] Change Diet To: puree, low sodium   [x ] Nutrition Supplement; will trial Katefarms 1.0, 11 oz(325 ml), 325 calories, 16 grams protein as product replacement for Ensure plant based supplement 1 x day  + add Liquid protein supplement 1 pkg=15 grams protein, 100 calories   [ ] Nutrition Support  [x ] Other: consider swallow evaluation     Monitoring and Evaluation:   [x ] PO intake [ x ] Tolerance to diet prescription [ x ] weights [ x ] labs[ x ] follow up per protocol  [ ] other: Assessment:  Pt is confused, Scottish Creole speaking, not appropriate candidate for interpretation, Nutrition Ambassador who was in the unit who is Scottish Creole speaking attempted to obtain food preference from pt, however without any response.  Per Nursing Assistant, pt has been spitting out food, sometimes eats for family.    Pt adm c diagnosis of anemia, sepsis, dyspnea, c obstructive pre-renal ROGER, CKD 3, improving, left pleural effusion, metabolic encephalopathy, UTI,  anemia, constipation(12/10, BM noted).  PMH include dementia, HTN, BPH, chronic obrien, wheel chair dependent, spinal stenosis of lumbar region, dehydration.  Pt lived c family, had HHA, d/c plan is for home c home care.     Factors impacting intake: [ ] none [ ] nausea  [ ] vomiting [ ] diarrhea [ ] constipation  [x ]chewing problem; pt appeared to be edentulous [ ] swallowing issues  [ x] other: AMS, acute illness    Diet Prescription: Diet, Easy to Chew:   Low Sodium  Supplement Feeding Modality:  Oral  Ensure Plus High Protein Cans or Servings Per Day:  1       Frequency:  Two Times a day (12-04-23 @ 10:13) [Active]    Intake: <50% nutrition needs > 5 days, pt is not taking Ensure supplement    Current Weight: 12/10, 77.4 kg, 12/02, 81.1 kg, 12/01, 75.3 kg, wt. fluctuations c wt. gain of 2.1 kg noted  % Weight Change: 2.8%  12/08, 2+(mild) edema of legs noted    Pt c visible moderate temple, clavicle, shoulder(muscle), moderate orbital(fat) depletion.    Pertinent Medications: MEDICATIONS  (STANDING):  albuterol/ipratropium for Nebulization 3 milliLiter(s) Nebulizer every 8 hours  amLODIPine   Tablet 2.5 milliGRAM(s) Oral daily  collagenase Ointment 1 Application(s) Topical daily  finasteride 5 milliGRAM(s) Oral daily  gabapentin 300 milliGRAM(s) Oral two times a day  mirtazapine 15 milliGRAM(s) Oral daily  polyethylene glycol 3350 17 Gram(s) Oral at bedtime  senna 2 Tablet(s) Oral at bedtime  sodium chloride 3%  Inhalation 4 milliLiter(s) Inhalation every 8 hours  tamsulosin 0.4 milliGRAM(s) Oral at bedtime    MEDICATIONS  (PRN):  acetaminophen     Tablet .. 650 milliGRAM(s) Oral every 6 hours PRN Temp greater or equal to 38C (100.4F), Mild Pain (1 - 3)  aluminum hydroxide/magnesium hydroxide/simethicone Suspension 30 milliLiter(s) Oral every 4 hours PRN Dyspepsia  melatonin 3 milliGRAM(s) Oral at bedtime PRN Insomnia  ondansetron Injectable 4 milliGRAM(s) IV Push every 8 hours PRN Nausea and/or Vomiting    Pertinent Labs: 12-12 Na143 mmol/L Glu 82 mg/dL K+ 4.3 mmol/L Cr  1.98 mg/dL<H> BUN 27 mg/dL<H>   CAPILLARY BLOOD GLUCOSE        Skin:   Pressure ulcer x 2  1. 12/10, sacrum; stage III  2. left ischium; stage III    Estimated Needs:   [x ] no change since previous assessment(12/04, lower range of estimated needs for protein due to ROGER/CKD)  [ ] recalculated:     Previous Nutrition Diagnosis:  (12/04)  Inadequate Energy Intake  Etiology: inability to consume sufficient energy  Signs/Symptoms: pt with poor PO intake throughout LOS  Goal/Expected Outcome: Pt to consume >50% meals/supplements during LOS; not met  Nutrition Diagnosis is [x ] ongoing (being replaced by new related diagnosis below)  [ ] resolved [ ] not applicable     Previous Nutrition Diagnosis (12/04)  Increased Nutrient Needs: protein  Etiology: increased physiological demand for protein  Signs/Symptoms: stage III pressure injuries x 2  Goal/Expected Outcome; Pt to consume >50% meals/supplements during LOS; not met  Nutrition Diagnosis is [ x] ongoing  [ ] resolved [ ] not applicable       New Nutrition Diagnosis: (12/12)  [x ] Malnutrition; severe malnutrition in context of acute illness   Related to: inadequate protein-energy intake in setting of ROGER, pleural effusion, sepsis, UTI, AMS/metabolic encephalopathy, debility   As evidenced by: <50% nutrition needs > 5 days, moderate muscle/fat loss  Goal: pt to consume >50% of meals & supplement     Interventions:   Recommend  [x] Change Diet To: puree, low sodium   [x ] Nutrition Supplement; will trial Katefarms 1.0, 11 oz(325 ml), 325 calories, 16 grams protein as product replacement for Ensure plant based supplement 1 x day  + add Liquid protein supplement 1 pkg=15 grams protein, 100 calories   [ ] Nutrition Support  [x ] Other: consider swallow evaluation     Monitoring and Evaluation:   [x ] PO intake [ x ] Tolerance to diet prescription [ x ] weights [ x ] labs[ x ] follow up per protocol  [ ] other: Assessment:  Pt is confused, Lao Creole speaking, not appropriate candidate for interpretation, Nutrition Ambassador who was in the unit who is Lao Creole speaking attempted to obtain food preference from pt, however without any response.  Per Nursing Assistant, pt has been spitting out food, sometimes eats for family.    Pt adm c diagnosis of anemia, sepsis, dyspnea, c obstructive pre-renal ROGER, CKD 3, improving, left pleural effusion, metabolic encephalopathy, UTI,  anemia, constipation(12/10, BM noted).  PMH include dementia, HTN, BPH, chronic obrien, wheel chair dependent, spinal stenosis of lumbar region, dehydration.  Pt lived c family, had HHA, d/c plan is for home c home care.     Factors impacting intake: [ ] none [ ] nausea  [ ] vomiting [ ] diarrhea [ ] constipation  [x ]chewing problem; pt appeared to be edentulous [ ] swallowing issues  [ x] other: AMS, acute illness    Diet Prescription: Diet, Easy to Chew:   Low Sodium  Supplement Feeding Modality:  Oral  Ensure Plus High Protein Cans or Servings Per Day:  1       Frequency:  Two Times a day (12-04-23 @ 10:13) [Active]    Intake: <50% nutrition needs > 5 days, pt is not taking Ensure supplement    Current Weight: 12/10, 77.4 kg, 12/02, 81.1 kg, 12/01, 75.3 kg, wt. fluctuations c wt. gain of 2.1 kg noted  % Weight Change: 2.8%  12/08, 2+(mild) edema of legs noted    Pt c visible moderate temple, clavicle, shoulder(muscle), moderate orbital(fat) depletion.    Pertinent Medications: MEDICATIONS  (STANDING):  albuterol/ipratropium for Nebulization 3 milliLiter(s) Nebulizer every 8 hours  amLODIPine   Tablet 2.5 milliGRAM(s) Oral daily  collagenase Ointment 1 Application(s) Topical daily  finasteride 5 milliGRAM(s) Oral daily  gabapentin 300 milliGRAM(s) Oral two times a day  mirtazapine 15 milliGRAM(s) Oral daily  polyethylene glycol 3350 17 Gram(s) Oral at bedtime  senna 2 Tablet(s) Oral at bedtime  sodium chloride 3%  Inhalation 4 milliLiter(s) Inhalation every 8 hours  tamsulosin 0.4 milliGRAM(s) Oral at bedtime    MEDICATIONS  (PRN):  acetaminophen     Tablet .. 650 milliGRAM(s) Oral every 6 hours PRN Temp greater or equal to 38C (100.4F), Mild Pain (1 - 3)  aluminum hydroxide/magnesium hydroxide/simethicone Suspension 30 milliLiter(s) Oral every 4 hours PRN Dyspepsia  melatonin 3 milliGRAM(s) Oral at bedtime PRN Insomnia  ondansetron Injectable 4 milliGRAM(s) IV Push every 8 hours PRN Nausea and/or Vomiting    Pertinent Labs: 12-12 Na143 mmol/L Glu 82 mg/dL K+ 4.3 mmol/L Cr  1.98 mg/dL<H> BUN 27 mg/dL<H>   CAPILLARY BLOOD GLUCOSE        Skin:   Pressure ulcer x 2  1. 12/10, sacrum; stage III  2. left ischium; stage III    Estimated Needs:   [x ] no change since previous assessment(12/04, lower range of estimated needs for protein due to ROGER/CKD)  [ ] recalculated:     Previous Nutrition Diagnosis:  (12/04)  Inadequate Energy Intake  Etiology: inability to consume sufficient energy  Signs/Symptoms: pt with poor PO intake throughout LOS  Goal/Expected Outcome: Pt to consume >50% meals/supplements during LOS; not met  Nutrition Diagnosis is [x ] ongoing (being replaced by new related diagnosis below)  [ ] resolved [ ] not applicable     Previous Nutrition Diagnosis (12/04)  Increased Nutrient Needs: protein  Etiology: increased physiological demand for protein  Signs/Symptoms: stage III pressure injuries x 2  Goal/Expected Outcome; Pt to consume >50% meals/supplements during LOS; not met  Nutrition Diagnosis is [ x] ongoing  [ ] resolved [ ] not applicable       New Nutrition Diagnosis: (12/12)  [x ] Malnutrition; severe malnutrition in context of acute illness   Related to: inadequate protein-energy intake in setting of ROGER, pleural effusion, sepsis, UTI, AMS/metabolic encephalopathy, debility   As evidenced by: <50% nutrition needs > 5 days, moderate muscle/fat loss  Goal: pt to consume >50% of meals & supplement     Interventions:   Recommend  [x] Change Diet To: puree, low sodium   [x ] Nutrition Supplement; will trial Katefarms 1.0, 11 oz(325 ml), 325 calories, 16 grams protein as product replacement for Ensure plant based supplement 1 x day  + add Liquid protein supplement 1 pkg=15 grams protein, 100 calories   [ ] Nutrition Support  [x ] Other: consider swallow evaluation     Monitoring and Evaluation:   [x ] PO intake [ x ] Tolerance to diet prescription [ x ] weights [ x ] labs[ x ] follow up per protocol  [ ] other:

## 2023-12-13 NOTE — SWALLOW BEDSIDE ASSESSMENT ADULT - H & P REVIEW
89M w/ hx of CKD, HTN, BPH w/ chronic obrien, dementia p/w SOB. Pt poor historian so history obtained from record. Pt was brought in my home aid who reports pt c/o SOB./yes

## 2023-12-13 NOTE — SWALLOW BEDSIDE ASSESSMENT ADULT - SLP GENERAL OBSERVATIONS
Pt approached Seated upright in bed, alert and somewhat oriented. Pt edentulous at baseline; requested his dentures be placed prior to food trials.

## 2023-12-13 NOTE — SWALLOW BEDSIDE ASSESSMENT ADULT - SWALLOW EVAL: DIAGNOSIS
Pt's dentures (upper and lower) placed prior to start of evaluation. Pt presented with overtly adequate oropharyngeal skills for puree and thin liquid consistencies with no signs of suspected aspiration. Oral dysphagia for regular solid marked by increased mastication time.

## 2023-12-13 NOTE — PROGRESS NOTE ADULT - SUBJECTIVE AND OBJECTIVE BOX
Patient: MOIZ RANDOLPH [id number] 89y Male                            Hospitalist Attending Note    No complaints.  On Oxygen 2L / min via NC    ____________________PHYSICAL EXAM:  GENERAL:  NAD Arousable, Oriented x 1 to person   HEENT: NCAT  CARDIOVASCULAR:  S1, S2  LUNGS: decreased BS L base.   ABDOMEN:  soft, (-) tenderness, (-) distension, (+) bowel sounds, (-) guarding, (-) rebound (-) rigidity  EXTREMITIES:  no cyanosis / clubbing / edema.   SKIN: Sacral Stage III ulcer.  L ischial Stage III ulcer.    ____________________    VITALS:  Vital Signs Last 24 Hrs  T(C): 36.9 (13 Dec 2023 11:56), Max: 37.2 (12 Dec 2023 23:42)  T(F): 98.4 (13 Dec 2023 11:56), Max: 98.9 (12 Dec 2023 23:42)  HR: 88 (13 Dec 2023 15:00) (81 - 105)  BP: 153/72 (13 Dec 2023 11:56) (121/78 - 153/72)  BP(mean): --  RR: 19 (13 Dec 2023 11:56) (18 - 19)  SpO2: 97% (13 Dec 2023 15:00) (95% - 100%)    Parameters below as of 13 Dec 2023 15:00  Patient On (Oxygen Delivery Method): nasal cannula     Daily     Daily   CAPILLARY BLOOD GLUCOSE        I&O's Summary    12 Dec 2023 07:01  -  13 Dec 2023 07:00  --------------------------------------------------------  IN: 0 mL / OUT: 400 mL / NET: -400 mL        LABS:                        8.5    6.45  )-----------( 213      ( 13 Dec 2023 05:35 )             27.9     12-13    146<H>  |  114<H>  |  33<H>  ----------------------------<  89  3.8   |  25  |  2.09<H>    Ca    9.3      13 Dec 2023 05:35          Urinalysis Basic - ( 13 Dec 2023 05:35 )    Color: x / Appearance: x / SG: x / pH: x  Gluc: 89 mg/dL / Ketone: x  / Bili: x / Urobili: x   Blood: x / Protein: x / Nitrite: x   Leuk Esterase: x / RBC: x / WBC x   Sq Epi: x / Non Sq Epi: x / Bacteria: x              MEDICATIONS:  acetaminophen     Tablet .. 650 milliGRAM(s) Oral every 6 hours PRN  albuterol/ipratropium for Nebulization 3 milliLiter(s) Nebulizer every 8 hours  aluminum hydroxide/magnesium hydroxide/simethicone Suspension 30 milliLiter(s) Oral every 4 hours PRN  amLODIPine   Tablet 2.5 milliGRAM(s) Oral daily  collagenase Ointment 1 Application(s) Topical daily  finasteride 5 milliGRAM(s) Oral daily  gabapentin 300 milliGRAM(s) Oral two times a day  melatonin 3 milliGRAM(s) Oral at bedtime PRN  mirtazapine 15 milliGRAM(s) Oral daily  ondansetron Injectable 4 milliGRAM(s) IV Push every 8 hours PRN  polyethylene glycol 3350 17 Gram(s) Oral at bedtime  senna 2 Tablet(s) Oral at bedtime  sodium chloride 3%  Inhalation 4 milliLiter(s) Inhalation every 8 hours  tamsulosin 0.4 milliGRAM(s) Oral at bedtime                               Patient: MOIZ RANDOLPH [id number] 89y Male                            Hospitalist Attending Note    No complaints.  On Oxygen 2L / min via NC    ____________________PHYSICAL EXAM:  GENERAL:  NAD Arousable, Oriented x 1 to person   HEENT: NCAT  CARDIOVASCULAR:  S1, S2  LUNGS: decreased BS L base.   ABDOMEN:  soft, (-) tenderness, (-) distension, (+) bowel sounds, (-) guarding, (-) rebound (-) rigidity  EXTREMITIES:  no cyanosis / clubbing / edema.   SKIN: Sacral Stage III ulcer.  L ischial Stage III ulcer.    ____________________    VITALS:  Vital Signs Last 24 Hrs  T(C): 36.9 (13 Dec 2023 11:56), Max: 37.2 (12 Dec 2023 23:42)  T(F): 98.4 (13 Dec 2023 11:56), Max: 98.9 (12 Dec 2023 23:42)  HR: 88 (13 Dec 2023 15:00) (81 - 105)  BP: 153/72 (13 Dec 2023 11:56) (121/78 - 153/72)  BP(mean): --  RR: 19 (13 Dec 2023 11:56) (18 - 19)  SpO2: 97% (13 Dec 2023 15:00) (95% - 100%)    Parameters below as of 13 Dec 2023 15:00  Patient On (Oxygen Delivery Method): nasal cannula     Daily     Daily   CAPILLARY BLOOD GLUCOSE        I&O's Summary    12 Dec 2023 07:01  -  13 Dec 2023 07:00  --------------------------------------------------------  IN: 0 mL / OUT: 400 mL / NET: -400 mL        LABS:                        8.5    6.45  )-----------( 213      ( 13 Dec 2023 05:35 )             27.9     12-13    146<H>  |  114<H>  |  33<H>  ----------------------------<  89  3.8   |  25  |  2.09<H>    Ca    9.3      13 Dec 2023 05:35          Urinalysis Basic - ( 13 Dec 2023 05:35 )    Color: x / Appearance: x / SG: x / pH: x  Gluc: 89 mg/dL / Ketone: x  / Bili: x / Urobili: x   Blood: x / Protein: x / Nitrite: x   Leuk Esterase: x / RBC: x / WBC x   Sq Epi: x / Non Sq Epi: x / Bacteria: x              MEDICATIONS:  acetaminophen     Tablet .. 650 milliGRAM(s) Oral every 6 hours PRN  albuterol/ipratropium for Nebulization 3 milliLiter(s) Nebulizer every 8 hours  aluminum hydroxide/magnesium hydroxide/simethicone Suspension 30 milliLiter(s) Oral every 4 hours PRN  amLODIPine   Tablet 2.5 milliGRAM(s) Oral daily  collagenase Ointment 1 Application(s) Topical daily  finasteride 5 milliGRAM(s) Oral daily  gabapentin 300 milliGRAM(s) Oral two times a day  melatonin 3 milliGRAM(s) Oral at bedtime PRN  mirtazapine 15 milliGRAM(s) Oral daily  ondansetron Injectable 4 milliGRAM(s) IV Push every 8 hours PRN  polyethylene glycol 3350 17 Gram(s) Oral at bedtime  senna 2 Tablet(s) Oral at bedtime  sodium chloride 3%  Inhalation 4 milliLiter(s) Inhalation every 8 hours  tamsulosin 0.4 milliGRAM(s) Oral at bedtime

## 2023-12-13 NOTE — PROGRESS NOTE ADULT - SUBJECTIVE AND OBJECTIVE BOX
[facility] NEPHROLOGY SERVICES, [facility]  NEPHROLOGY AND HYPERTENSION  [address]  [address]  [place] [address]  [phone]    MD SOFY KEENAN MD YELENA ROSENBERG, MD BINNY KOSHY, MD CHRISTOPHER CAPUTO, MD EDWARD BOVER, MD          Patient events noted    MEDICATIONS  (STANDING):  albuterol/ipratropium for Nebulization 3 milliLiter(s) Nebulizer every 8 hours  amLODIPine   Tablet 2.5 milliGRAM(s) Oral daily  collagenase Ointment 1 Application(s) Topical daily  finasteride 5 milliGRAM(s) Oral daily  gabapentin 300 milliGRAM(s) Oral two times a day  mirtazapine 15 milliGRAM(s) Oral daily  polyethylene glycol 3350 17 Gram(s) Oral at bedtime  senna 2 Tablet(s) Oral at bedtime  sodium chloride 3%  Inhalation 4 milliLiter(s) Inhalation every 8 hours  tamsulosin 0.4 milliGRAM(s) Oral at bedtime    MEDICATIONS  (PRN):  acetaminophen     Tablet .. 650 milliGRAM(s) Oral every 6 hours PRN Temp greater or equal to 38C (100.4F), Mild Pain (1 - 3)  aluminum hydroxide/magnesium hydroxide/simethicone Suspension 30 milliLiter(s) Oral every 4 hours PRN Dyspepsia  melatonin 3 milliGRAM(s) Oral at bedtime PRN Insomnia  ondansetron Injectable 4 milliGRAM(s) IV Push every 8 hours PRN Nausea and/or Vomiting      12-12-23 @ 07:01  -  12-13-23 @ 07:00  --------------------------------------------------------  IN: 0 mL / OUT: 400 mL / NET: -400 mL      PHYSICAL EXAM:      T(C): 36.9 (12-13-23 @ 11:56), Max: 37.2 (12-12-23 @ 23:42)  HR: 88 (12-13-23 @ 15:00) (81 - 105)  BP: 153/72 (12-13-23 @ 11:56) (121/78 - 153/72)  RR: 19 (12-13-23 @ 11:56) (18 - 19)  SpO2: 97% (12-13-23 @ 15:00) (95% - 100%)  Wt(kg): --  Lungs clear  Heart S1S2  Abd soft NT ND  Extremities:   tr edema                                    8.5    6.45  )-----------( 213      ( 13 Dec 2023 05:35 )             27.9     12-13    146<H>  |  114<H>  |  33<H>  ----------------------------<  89  3.8   |  25  |  2.09<H>    Ca    9.3      13 Dec 2023 05:35          Creatinine Trend: 2.09<--, 1.98<--, 1.88<--, 2.04<--, 1.97<--, 1.97<--      Assessment   ROGER CKD 3- 4  Pre renal azotemia     Plan:  IVF challenge   Bladder scan     Samy Noriega MD [facility] NEPHROLOGY SERVICES, [facility]  NEPHROLOGY AND HYPERTENSION  [address]  [address]  [place] [address]  [phone]    MD SOFY KEENAN MD YELENA ROSENBERG, MD BINNY KOSHY, MD CHRISTOPHER CAPUTO, MD EDWARD BOVER, MD          Patient events noted    MEDICATIONS  (STANDING):  albuterol/ipratropium for Nebulization 3 milliLiter(s) Nebulizer every 8 hours  amLODIPine   Tablet 2.5 milliGRAM(s) Oral daily  collagenase Ointment 1 Application(s) Topical daily  finasteride 5 milliGRAM(s) Oral daily  gabapentin 300 milliGRAM(s) Oral two times a day  mirtazapine 15 milliGRAM(s) Oral daily  polyethylene glycol 3350 17 Gram(s) Oral at bedtime  senna 2 Tablet(s) Oral at bedtime  sodium chloride 3%  Inhalation 4 milliLiter(s) Inhalation every 8 hours  tamsulosin 0.4 milliGRAM(s) Oral at bedtime    MEDICATIONS  (PRN):  acetaminophen     Tablet .. 650 milliGRAM(s) Oral every 6 hours PRN Temp greater or equal to 38C (100.4F), Mild Pain (1 - 3)  aluminum hydroxide/magnesium hydroxide/simethicone Suspension 30 milliLiter(s) Oral every 4 hours PRN Dyspepsia  melatonin 3 milliGRAM(s) Oral at bedtime PRN Insomnia  ondansetron Injectable 4 milliGRAM(s) IV Push every 8 hours PRN Nausea and/or Vomiting      12-12-23 @ 07:01  -  12-13-23 @ 07:00  --------------------------------------------------------  IN: 0 mL / OUT: 400 mL / NET: -400 mL      PHYSICAL EXAM:      T(C): 36.9 (12-13-23 @ 11:56), Max: 37.2 (12-12-23 @ 23:42)  HR: 88 (12-13-23 @ 15:00) (81 - 105)  BP: 153/72 (12-13-23 @ 11:56) (121/78 - 153/72)  RR: 19 (12-13-23 @ 11:56) (18 - 19)  SpO2: 97% (12-13-23 @ 15:00) (95% - 100%)  Wt(kg): --  Lungs clear  Heart S1S2  Abd soft NT ND  Extremities:   tr edema                                    8.5    6.45  )-----------( 213      ( 13 Dec 2023 05:35 )             27.9     12-13    146<H>  |  114<H>  |  33<H>  ----------------------------<  89  3.8   |  25  |  2.09<H>    Ca    9.3      13 Dec 2023 05:35          Creatinine Trend: 2.09<--, 1.98<--, 1.88<--, 2.04<--, 1.97<--, 1.97<--      Assessment   ROGER CKD 3- 4  Pre renal azotemia     Plan:  IVF challenge   Bladder scan     Samy Noriega MD [facility] NEPHROLOGY SERVICES, [facility]  NEPHROLOGY AND HYPERTENSION  [address]  [address]  [place] [address]  [phone]    MD SOFY KEENAN MD YELENA ROSENBERG, MD BINNY KOSHY, MD CHRISTOPHER CAPUTO, MD EDWARD BOVER, MD          Patient events noted    MEDICATIONS  (STANDING):  albuterol/ipratropium for Nebulization 3 milliLiter(s) Nebulizer every 8 hours  amLODIPine   Tablet 2.5 milliGRAM(s) Oral daily  collagenase Ointment 1 Application(s) Topical daily  finasteride 5 milliGRAM(s) Oral daily  gabapentin 300 milliGRAM(s) Oral two times a day  mirtazapine 15 milliGRAM(s) Oral daily  polyethylene glycol 3350 17 Gram(s) Oral at bedtime  senna 2 Tablet(s) Oral at bedtime  sodium chloride 3%  Inhalation 4 milliLiter(s) Inhalation every 8 hours  tamsulosin 0.4 milliGRAM(s) Oral at bedtime    MEDICATIONS  (PRN):  acetaminophen     Tablet .. 650 milliGRAM(s) Oral every 6 hours PRN Temp greater or equal to 38C (100.4F), Mild Pain (1 - 3)  aluminum hydroxide/magnesium hydroxide/simethicone Suspension 30 milliLiter(s) Oral every 4 hours PRN Dyspepsia  melatonin 3 milliGRAM(s) Oral at bedtime PRN Insomnia  ondansetron Injectable 4 milliGRAM(s) IV Push every 8 hours PRN Nausea and/or Vomiting      12-12-23 @ 07:01  -  12-13-23 @ 07:00  --------------------------------------------------------  IN: 0 mL / OUT: 400 mL / NET: -400 mL      PHYSICAL EXAM:      T(C): 36.9 (12-13-23 @ 11:56), Max: 37.2 (12-12-23 @ 23:42)  HR: 88 (12-13-23 @ 15:00) (81 - 105)  BP: 153/72 (12-13-23 @ 11:56) (121/78 - 153/72)  RR: 19 (12-13-23 @ 11:56) (18 - 19)  SpO2: 97% (12-13-23 @ 15:00) (95% - 100%)  Wt(kg): --  Lungs clear  Heart S1S2  Abd soft NT ND  Extremities:   tr edema                                    8.5    6.45  )-----------( 213      ( 13 Dec 2023 05:35 )             27.9     12-13    146<H>  |  114<H>  |  33<H>  ----------------------------<  89  3.8   |  25  |  2.09<H>    Ca    9.3      13 Dec 2023 05:35          Creatinine Trend: 2.09<--, 1.98<--, 1.88<--, 2.04<--, 1.97<--, 1.97<--      Assessment   ROGER CKD 3- 4  Pre renal azotemia     Plan:  IVF challenge       Samy Noriega MD [facility] NEPHROLOGY SERVICES, [facility]  NEPHROLOGY AND HYPERTENSION  [address]  [address]  [place] [address]  [phone]    MD SOFY KEENAN MD YELENA ROSENBERG, MD BINNY KOSHY, MD CHRISTOPHER CAPUTO, MD EDWARD BOVER, MD          Patient events noted    MEDICATIONS  (STANDING):  albuterol/ipratropium for Nebulization 3 milliLiter(s) Nebulizer every 8 hours  amLODIPine   Tablet 2.5 milliGRAM(s) Oral daily  collagenase Ointment 1 Application(s) Topical daily  finasteride 5 milliGRAM(s) Oral daily  gabapentin 300 milliGRAM(s) Oral two times a day  mirtazapine 15 milliGRAM(s) Oral daily  polyethylene glycol 3350 17 Gram(s) Oral at bedtime  senna 2 Tablet(s) Oral at bedtime  sodium chloride 3%  Inhalation 4 milliLiter(s) Inhalation every 8 hours  tamsulosin 0.4 milliGRAM(s) Oral at bedtime    MEDICATIONS  (PRN):  acetaminophen     Tablet .. 650 milliGRAM(s) Oral every 6 hours PRN Temp greater or equal to 38C (100.4F), Mild Pain (1 - 3)  aluminum hydroxide/magnesium hydroxide/simethicone Suspension 30 milliLiter(s) Oral every 4 hours PRN Dyspepsia  melatonin 3 milliGRAM(s) Oral at bedtime PRN Insomnia  ondansetron Injectable 4 milliGRAM(s) IV Push every 8 hours PRN Nausea and/or Vomiting      12-12-23 @ 07:01  -  12-13-23 @ 07:00  --------------------------------------------------------  IN: 0 mL / OUT: 400 mL / NET: -400 mL      PHYSICAL EXAM:      T(C): 36.9 (12-13-23 @ 11:56), Max: 37.2 (12-12-23 @ 23:42)  HR: 88 (12-13-23 @ 15:00) (81 - 105)  BP: 153/72 (12-13-23 @ 11:56) (121/78 - 153/72)  RR: 19 (12-13-23 @ 11:56) (18 - 19)  SpO2: 97% (12-13-23 @ 15:00) (95% - 100%)  Wt(kg): --  Lungs clear  Heart S1S2  Abd soft NT ND  Extremities:   tr edema                                    8.5    6.45  )-----------( 213      ( 13 Dec 2023 05:35 )             27.9     12-13    146<H>  |  114<H>  |  33<H>  ----------------------------<  89  3.8   |  25  |  2.09<H>    Ca    9.3      13 Dec 2023 05:35          Creatinine Trend: 2.09<--, 1.98<--, 1.88<--, 2.04<--, 1.97<--, 1.97<--      Assessment   ROGER CKD 3- 4  Pre renal azotemia     Plan:  IVF challenge       Samy Noriega MD

## 2023-12-13 NOTE — SWALLOW BEDSIDE ASSESSMENT ADULT - COMMENTS
Chest XRAY 12/11/23: No significant change in complete opacification of the left lung. The right lung is grossly clear.

## 2023-12-13 NOTE — PROGRESS NOTE ADULT - ASSESSMENT
89 y/o M w/ PMH fo dementia, HTN, has loop recorder, BPH w/ chronic obrien presents with syncope and found to have UTI    # Acute Respiratory Failure with Hypoxia, Loculated L  pleural effusion L lung whiteout - 11/30 CXR compared to 11/28 CXR - appears to be more significant.  Was on O2 1-2L.  Per Pulmonary, POCUS showed loculated effusion, not amenable to thoracentesis.  Chest PT, pulmonary following.   ? role for bronchoscopy, pulmonary following.  Most recent CXR without improvement.  Repeat CXR.  I d/w pulmonary Dr. Schmitt.    # Hypernatremia - continue IVF.  Encourage po intake.   # Metabolic Encephalopathy  Likely due to UTI  Palliative on board  # HTN  - BP elevated.  Resume amlodipine.   # UTI / Chronic Urinary retention  Completed course of antibiotics.  urine culture growing pseudomonas and enterococcus     # Anemia- Monitor H/H -london positive,  but hapto 369, and .  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    # constipation - continue laxatives.    # Severe Protein Calorie Malnutrition - Nutrition input, encourage po.  Supplements   # Sacral Decubitus Ulcer - continue wound care.      Dispo: HHA to be reinstated   Plan of care d/w son Dr. Tierney at [phone] on 12/13 89 y/o M w/ PMH fo dementia, HTN, has loop recorder, BPH w/ chronic obrien presents with syncope and found to have UTI    # Acute Respiratory Failure with Hypoxia, Loculated L  pleural effusion L lung whiteout - 11/30 CXR compared to 11/28 CXR - appears to be more significant.  Was on O2 1-2L.  Per Pulmonary, POCUS showed loculated effusion, not amenable to thoracentesis.  Chest PT, pulmonary following.   ? role for bronchoscopy, pulmonary following.  Most recent CXR without improvement.  Repeat CXR.  I d/w pulmonary Dr. Schmitt.    # Hypernatremia - continue IVF.  Encourage po intake.   # Metabolic Encephalopathy  Likely due to UTI  Palliative on board  # HTN  - BP elevated.  Resume amlodipine.   # UTI / Chronic Urinary retention  Completed course of antibiotics.  urine culture growing pseudomonas and enterococcus     # Anemia- Monitor H/H -london positive,  but hapto 369, and .  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    # constipation - continue laxatives.    # Severe Protein Calorie Malnutrition - Nutrition input, encourage po.  Supplements   # Sacral Decubitus Ulcer - continue wound care.      Dispo: HHA to be reinstated   Plan of care d/w son Dr. Tierney at [phone] on 12/13

## 2023-12-14 NOTE — PROGRESS NOTE ADULT - ASSESSMENT
89 y/o M w/ PMH fo dementia, HTN, has loop recorder, BPH w/ chronic obrien presents with syncope and found to have UTI    # Acute Respiratory Failure with Hypoxia, Loculated L  pleural effusion L lung whiteout - 11/30 CXR compared to 11/28 CXR - appears to be more significant.  Was on O2 1-2L.  Per Pulmonary, POCUS showed loculated effusion, not amenable to thoracentesis.  Chest PT, pulmonary following.   ? role for bronchoscopy, pulmonary following.  Most recent CXR without improvement.  I d/w pulmonary Dr. Schmitt.    # Hypernatremia - continue IVF.  Encourage po intake.   # Metabolic Encephalopathy  Likely due to UTI  Palliative on board  # HTN  - BP elevated.  Resume amlodipine.   # UTI / Chronic Urinary retention  Completed course of antibiotics.  urine culture growing pseudomonas and enterococcus     # Anemia- Monitor H/H -london positive,  but hapto 369, and .  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    # constipation - continue laxatives.    # Severe Protein Calorie Malnutrition - Nutrition input, encourage po.  Supplements   # Sacral Decubitus Ulcer - continue wound care.      Dispo: HHA to be reinstated   Plan of care d/w son Dr. Tierney at [phone] on 12/14 89 y/o M w/ PMH fo dementia, HTN, has loop recorder, BPH w/ chronic obrien presents with syncope and found to have UTI    # Acute Respiratory Failure with Hypoxia, Loculated L  pleural effusion L lung whiteout - 11/30 CXR compared to 11/28 CXR - appears to be more significant.  Was on O2 1-2L.  Per Pulmonary, POCUS showed loculated effusion, not amenable to thoracentesis.  Chest PT, pulmonary following.   ? role for bronchoscopy, pulmonary following.  Most recent CXR without improvement.  I d/w pulmonary Dr. Schmitt.    # Hypernatremia - continue IVF.  Encourage po intake.   # Metabolic Encephalopathy  Likely due to UTI  Palliative on board  # HTN  - BP elevated.  Resume amlodipine.   # UTI / Chronic Urinary retention  Completed course of antibiotics.  urine culture growing pseudomonas and enterococcus     # Anemia- Monitor H/H -london positive,  but hapto 369, and .  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    # constipation - continue laxatives.    # Severe Protein Calorie Malnutrition - Nutrition input, encourage po.  Supplements   # Sacral Decubitus Ulcer - continue wound care.      Dispo: HHA to be reinstated   Plan of care d/w son Dr. Tierney at [phone] on 12/14

## 2023-12-14 NOTE — CHART NOTE - NSCHARTNOTEFT_GEN_A_CORE
:  CAROLINE Renae dr     Indication:  left lung white out     PROCEDURE:  [ ] LIMITED ECHO  [x ] LIMITED CHEST  [ ] LIMITED RETROPERITONEAL  [ ] LIMITED ABDOMINAL  [ ] LIMITED DVT  [ ] NEEDLE GUIDANCE VASCULAR  [ ] NEEDLE GUIDANCE THORACENTESIS  [ ] NEEDLE GUIDANCE PARACENTESIS  [ ] NEEDLE GUIDANCE PERICARDIOCENTESIS  [ ] OTHER    FINDINGS:  Chest: A-line predominant on R without effusion   left lung with small simple anterior effusion with moderate sized simple effusion posterior with large amount of compressive atelectasis.     INTERPRETATION:  persistent left lung atelectasis with small anterior effusion and moderate sized posterior effusion. no safe window to tap.    images uploaded to [employer] :  CAROLINE Renae dr     Indication:  left lung white out     PROCEDURE:  [ ] LIMITED ECHO  [x ] LIMITED CHEST  [ ] LIMITED RETROPERITONEAL  [ ] LIMITED ABDOMINAL  [ ] LIMITED DVT  [ ] NEEDLE GUIDANCE VASCULAR  [ ] NEEDLE GUIDANCE THORACENTESIS  [ ] NEEDLE GUIDANCE PARACENTESIS  [ ] NEEDLE GUIDANCE PERICARDIOCENTESIS  [ ] OTHER    FINDINGS:  Chest: A-line predominant on R without effusion   left lung with small simple anterior effusion with moderate sized simple effusion posterior with large amount of compressive atelectasis.     INTERPRETATION:  persistent left lung atelectasis with small anterior effusion and moderate sized posterior effusion. no safe window to tap.    images uploaded to [employer]

## 2023-12-14 NOTE — PROGRESS NOTE ADULT - ASSESSMENT
89M PMH HTN, CKD3, BPH w/ chronic obrien and multiple admissions for CAUTI, dementia, COVID 1/2023 presents for SOB. Pt poor historian so history obtained from record. Pt was brought in by home aid who reports pt c/o SOB. NO fever/chills/sweats, URI sx, N/V/D reported. In ED, pt hypothermic to 95.5 and hypotensive to 90/60s. Labs notable for Hgb 6.3 (+london), d-dimer 3000, Na 129, BUN/SCr 70/2.91, BNP 7966, UA+, u cx growing enterococcus faecalis and pseudomonas . CT A/P demonstrates mild b/l hydronephrosis and large stool burden with perirectal stranding consistent for stercoral colitis. CT chest with moderate to large L pleural effusion and compressive atelectasis L lower lobe    DX: L pleural effusion and atelectasis, CAUTI (present on admission), anemia, ROGER on CKD, dementia    - repeat CXR w/ persistent complete L hemithorax opacification   - repeat POCUS lung today with small rim of simple effusion anteriorly with moderate sized effusion posteriorly with large amount of atelectasis. no safe window for tap.  -persistent left effusion likely ex vacuo from mucous plugging/atlectasis. Thus, even if there was a window to tap, if pt has persistent plugging, effusion will just reaccumulate   - O2 saturation 93% on RA, maintain off o2 with goal > 90%   - cont with duonebs + hypertonic saline q8hr for secretion mobilization. will start on chest vest as well for enhanced secretion mobilization. cont chest PT  - pt with wet cough on exam but not expectorating  - aspiration precautions   - rest of mnmgt as per primary team    d/w dr ruiz

## 2023-12-14 NOTE — PROGRESS NOTE ADULT - SUBJECTIVE AND OBJECTIVE BOX
INTERVAL HPI/OVERNIGHT EVENTS: no acute events overnight. repeat cxr this AM with persistent left hemithorax opacification     SUBJECTIVE: Patient seen and examined at bedside. Pt satting 93% on RA.    POCUS with left small moderate rim of pleural effusion without tapable window    ROS: unable to assess 2/2 MS     VITAL SIGNS:  ICU Vital Signs Last 24 Hrs  T(C): 37.9 (14 Dec 2023 11:10), Max: 37.9 (14 Dec 2023 11:10)  T(F): 100.3 (14 Dec 2023 11:10), Max: 100.3 (14 Dec 2023 11:10)  HR: 99 (14 Dec 2023 11:10) (88 - 104)  BP: 119/72 (14 Dec 2023 11:10) (101/75 - 138/74)  BP(mean): --  ABP: --  ABP(mean): --  RR: 17 (14 Dec 2023 11:10) (17 - 18)  SpO2: 99% (14 Dec 2023 11:10) (96% - 100%)    O2 Parameters below as of 14 Dec 2023 11:10  Patient On (Oxygen Delivery Method): nasal cannula    12-13 @ 07:01  -  12-14 @ 07:00  --------------------------------------------------------  IN: 0 mL / OUT: 400 mL / NET: -400 mL      CAPILLARY BLOOD GLUCOSE  ECG: reviewed.    PHYSICAL EXAM:    GENERAL: NAD, lying in bed comfortably  HEAD:  Atraumatic, normocephalic  EYES: EOMI, PERRL  NECK: Supple, trachea midline, no JVD  HEART: Regular rate and rhythm  LUNGS: Unlabored respirations.  clear BS on right with diminished BS on left   ABDOMEN: Soft, nontender, nondistended  EXTREMITIES: 2+ peripheral pulses bilaterally, cap refill<2 secs. No clubbing, cyanosis, or edema  NERVOUS SYSTEM:  awake and alert, following commands, moving all extremities      MEDICATIONS:  MEDICATIONS  (STANDING):  albuterol/ipratropium for Nebulization 3 milliLiter(s) Nebulizer every 8 hours  amLODIPine   Tablet 2.5 milliGRAM(s) Oral daily  collagenase Ointment 1 Application(s) Topical daily  finasteride 5 milliGRAM(s) Oral daily  gabapentin 300 milliGRAM(s) Oral two times a day  mirtazapine 15 milliGRAM(s) Oral daily  polyethylene glycol 3350 17 Gram(s) Oral at bedtime  senna 2 Tablet(s) Oral at bedtime  sodium chloride 3%  Inhalation 4 milliLiter(s) Inhalation every 8 hours  tamsulosin 0.4 milliGRAM(s) Oral at bedtime    MEDICATIONS  (PRN):  acetaminophen     Tablet .. 650 milliGRAM(s) Oral every 6 hours PRN Temp greater or equal to 38C (100.4F), Mild Pain (1 - 3)  aluminum hydroxide/magnesium hydroxide/simethicone Suspension 30 milliLiter(s) Oral every 4 hours PRN Dyspepsia  melatonin 3 milliGRAM(s) Oral at bedtime PRN Insomnia  ondansetron Injectable 4 milliGRAM(s) IV Push every 8 hours PRN Nausea and/or Vomiting      ALLERGIES:  Allergies    No Known Allergies    Intolerances        LABS:                        8.5    6.45  )-----------( 213      ( 13 Dec 2023 05:35 )             27.9     12-14    143  |  113<H>  |  32<H>  ----------------------------<  78  4.2   |  26  |  2.17<H>    Ca    8.8      14 Dec 2023 06:25        Urinalysis Basic - ( 14 Dec 2023 06:25 )    Color: x / Appearance: x / SG: x / pH: x  Gluc: 78 mg/dL / Ketone: x  / Bili: x / Urobili: x   Blood: x / Protein: x / Nitrite: x   Leuk Esterase: x / RBC: x / WBC x   Sq Epi: x / Non Sq Epi: x / Bacteria: x      Micro:    Culture - Blood (collected 11-30-23 @ 19:20)  Source: .Blood Blood-Venous  Final Report (12-06-23 @ 11:01):    No growth at 5 days    Culture - Blood (collected 11-30-23 @ 19:00)  Source: .Blood Blood-Peripheral  Final Report (12-06-23 @ 02:00):    No growth at 5 days          RADIOLOGY & ADDITIONAL TESTS: Reviewed. INTERVAL HPI/OVERNIGHT EVENTS: no acute events overnight. repeat cxr this AM with persistent left hemithorax opacification     SUBJECTIVE: Patient seen and examined at bedside. Pt satting 93% on RA.    POCUS with left small moderate rim of pleural effusion without tappable window    ROS: unable to assess 2/2 MS     VITAL SIGNS:  ICU Vital Signs Last 24 Hrs  T(C): 37.9 (14 Dec 2023 11:10), Max: 37.9 (14 Dec 2023 11:10)  T(F): 100.3 (14 Dec 2023 11:10), Max: 100.3 (14 Dec 2023 11:10)  HR: 99 (14 Dec 2023 11:10) (88 - 104)  BP: 119/72 (14 Dec 2023 11:10) (101/75 - 138/74)  BP(mean): --  ABP: --  ABP(mean): --  RR: 17 (14 Dec 2023 11:10) (17 - 18)  SpO2: 99% (14 Dec 2023 11:10) (96% - 100%)    O2 Parameters below as of 14 Dec 2023 11:10  Patient On (Oxygen Delivery Method): nasal cannula    12-13 @ 07:01  -  12-14 @ 07:00  --------------------------------------------------------  IN: 0 mL / OUT: 400 mL / NET: -400 mL      CAPILLARY BLOOD GLUCOSE  ECG: reviewed.    PHYSICAL EXAM:    GENERAL: NAD, lying in bed comfortably  HEAD:  Atraumatic, normocephalic  EYES: EOMI, PERRL  NECK: Supple, trachea midline, no JVD  HEART: Regular rate and rhythm  LUNGS: Unlabored respirations.  clear BS on right with diminished BS on left   ABDOMEN: Soft, nontender, nondistended  EXTREMITIES: 2+ peripheral pulses bilaterally, cap refill<2 secs. No clubbing, cyanosis, or edema  NERVOUS SYSTEM:  awake and alert, following commands, moving all extremities      MEDICATIONS:  MEDICATIONS  (STANDING):  albuterol/ipratropium for Nebulization 3 milliLiter(s) Nebulizer every 8 hours  amLODIPine   Tablet 2.5 milliGRAM(s) Oral daily  collagenase Ointment 1 Application(s) Topical daily  finasteride 5 milliGRAM(s) Oral daily  gabapentin 300 milliGRAM(s) Oral two times a day  mirtazapine 15 milliGRAM(s) Oral daily  polyethylene glycol 3350 17 Gram(s) Oral at bedtime  senna 2 Tablet(s) Oral at bedtime  sodium chloride 3%  Inhalation 4 milliLiter(s) Inhalation every 8 hours  tamsulosin 0.4 milliGRAM(s) Oral at bedtime    MEDICATIONS  (PRN):  acetaminophen     Tablet .. 650 milliGRAM(s) Oral every 6 hours PRN Temp greater or equal to 38C (100.4F), Mild Pain (1 - 3)  aluminum hydroxide/magnesium hydroxide/simethicone Suspension 30 milliLiter(s) Oral every 4 hours PRN Dyspepsia  melatonin 3 milliGRAM(s) Oral at bedtime PRN Insomnia  ondansetron Injectable 4 milliGRAM(s) IV Push every 8 hours PRN Nausea and/or Vomiting      ALLERGIES:  Allergies    No Known Allergies    Intolerances        LABS:                        8.5    6.45  )-----------( 213      ( 13 Dec 2023 05:35 )             27.9     12-14    143  |  113<H>  |  32<H>  ----------------------------<  78  4.2   |  26  |  2.17<H>    Ca    8.8      14 Dec 2023 06:25        Urinalysis Basic - ( 14 Dec 2023 06:25 )    Color: x / Appearance: x / SG: x / pH: x  Gluc: 78 mg/dL / Ketone: x  / Bili: x / Urobili: x   Blood: x / Protein: x / Nitrite: x   Leuk Esterase: x / RBC: x / WBC x   Sq Epi: x / Non Sq Epi: x / Bacteria: x      Micro:    Culture - Blood (collected 11-30-23 @ 19:20)  Source: .Blood Blood-Venous  Final Report (12-06-23 @ 11:01):    No growth at 5 days    Culture - Blood (collected 11-30-23 @ 19:00)  Source: .Blood Blood-Peripheral  Final Report (12-06-23 @ 02:00):    No growth at 5 days          RADIOLOGY & ADDITIONAL TESTS: Reviewed.

## 2023-12-14 NOTE — PROGRESS NOTE ADULT - SUBJECTIVE AND OBJECTIVE BOX
Patient: MOIZ RANDOLPH [id number] 89y Male                            Hospitalist Attending Note    No complaints.  On Oxygen 2L / min via NC    ____________________PHYSICAL EXAM:  GENERAL:  NAD Arousable, Oriented x 1 to person   HEENT: NCAT  CARDIOVASCULAR:  S1, S2  LUNGS: decreased BS L base.   ABDOMEN:  soft, (-) tenderness, (-) distension, (+) bowel sounds, (-) guarding, (-) rebound (-) rigidity  EXTREMITIES:  no cyanosis / clubbing / edema.   SKIN: Sacral Stage III ulcer.  L ischial Stage III ulcer.    ____________________      VITALS:  Vital Signs Last 24 Hrs  T(C): 37.9 (14 Dec 2023 11:10), Max: 37.9 (14 Dec 2023 11:10)  T(F): 100.3 (14 Dec 2023 11:10), Max: 100.3 (14 Dec 2023 11:10)  HR: 99 (14 Dec 2023 11:10) (95 - 104)  BP: 119/72 (14 Dec 2023 11:10) (101/75 - 138/74)  BP(mean): --  RR: 17 (14 Dec 2023 11:10) (17 - 18)  SpO2: 99% (14 Dec 2023 11:10) (96% - 100%)    Parameters below as of 14 Dec 2023 11:10  Patient On (Oxygen Delivery Method): nasal cannula     Daily     Daily   CAPILLARY BLOOD GLUCOSE        I&O's Summary    13 Dec 2023 07:01  -  14 Dec 2023 07:00  --------------------------------------------------------  IN: 0 mL / OUT: 400 mL / NET: -400 mL        LABS:                        8.5    6.45  )-----------( 213      ( 13 Dec 2023 05:35 )             27.9     12-14    143  |  113<H>  |  32<H>  ----------------------------<  78  4.2   |  26  |  2.17<H>    Ca    8.8      14 Dec 2023 06:25          Urinalysis Basic - ( 14 Dec 2023 06:25 )    Color: x / Appearance: x / SG: x / pH: x  Gluc: 78 mg/dL / Ketone: x  / Bili: x / Urobili: x   Blood: x / Protein: x / Nitrite: x   Leuk Esterase: x / RBC: x / WBC x   Sq Epi: x / Non Sq Epi: x / Bacteria: x              MEDICATIONS:  acetaminophen     Tablet .. 650 milliGRAM(s) Oral every 6 hours PRN  albuterol/ipratropium for Nebulization 3 milliLiter(s) Nebulizer every 8 hours  aluminum hydroxide/magnesium hydroxide/simethicone Suspension 30 milliLiter(s) Oral every 4 hours PRN  amLODIPine   Tablet 2.5 milliGRAM(s) Oral daily  collagenase Ointment 1 Application(s) Topical daily  finasteride 5 milliGRAM(s) Oral daily  gabapentin 300 milliGRAM(s) Oral two times a day  melatonin 3 milliGRAM(s) Oral at bedtime PRN  mirtazapine 15 milliGRAM(s) Oral daily  ondansetron Injectable 4 milliGRAM(s) IV Push every 8 hours PRN  polyethylene glycol 3350 17 Gram(s) Oral at bedtime  senna 2 Tablet(s) Oral at bedtime  sodium chloride 3%  Inhalation 4 milliLiter(s) Inhalation every 8 hours  tamsulosin 0.4 milliGRAM(s) Oral at bedtime                               Patient: MOIZ RANDOLPH [id number] 89y Male                            Hospitalist Attending Note    No complaints.  On Oxygen 2L / min via NC    ____________________PHYSICAL EXAM:  GENERAL:  NAD Arousable, Oriented x 1 to person   HEENT: NCAT  CARDIOVASCULAR:  S1, S2  LUNGS: decreased BS L base.   ABDOMEN:  soft, (-) tenderness, (-) distension, (+) bowel sounds, (-) guarding, (-) rebound (-) rigidity  EXTREMITIES:  no cyanosis / clubbing / edema.   SKIN: Sacral Stage III ulcer.  L ischial Stage III ulcer.    ____________________      VITALS:  Vital Signs Last 24 Hrs  T(C): 37.9 (14 Dec 2023 11:10), Max: 37.9 (14 Dec 2023 11:10)  T(F): 100.3 (14 Dec 2023 11:10), Max: 100.3 (14 Dec 2023 11:10)  HR: 99 (14 Dec 2023 11:10) (95 - 104)  BP: 119/72 (14 Dec 2023 11:10) (101/75 - 138/74)  BP(mean): --  RR: 17 (14 Dec 2023 11:10) (17 - 18)  SpO2: 99% (14 Dec 2023 11:10) (96% - 100%)    Parameters below as of 14 Dec 2023 11:10  Patient On (Oxygen Delivery Method): nasal cannula     Daily     Daily   CAPILLARY BLOOD GLUCOSE        I&O's Summary    13 Dec 2023 07:01  -  14 Dec 2023 07:00  --------------------------------------------------------  IN: 0 mL / OUT: 400 mL / NET: -400 mL        LABS:                        8.5    6.45  )-----------( 213      ( 13 Dec 2023 05:35 )             27.9     12-14    143  |  113<H>  |  32<H>  ----------------------------<  78  4.2   |  26  |  2.17<H>    Ca    8.8      14 Dec 2023 06:25          Urinalysis Basic - ( 14 Dec 2023 06:25 )    Color: x / Appearance: x / SG: x / pH: x  Gluc: 78 mg/dL / Ketone: x  / Bili: x / Urobili: x   Blood: x / Protein: x / Nitrite: x   Leuk Esterase: x / RBC: x / WBC x   Sq Epi: x / Non Sq Epi: x / Bacteria: x              MEDICATIONS:  acetaminophen     Tablet .. 650 milliGRAM(s) Oral every 6 hours PRN  albuterol/ipratropium for Nebulization 3 milliLiter(s) Nebulizer every 8 hours  aluminum hydroxide/magnesium hydroxide/simethicone Suspension 30 milliLiter(s) Oral every 4 hours PRN  amLODIPine   Tablet 2.5 milliGRAM(s) Oral daily  collagenase Ointment 1 Application(s) Topical daily  finasteride 5 milliGRAM(s) Oral daily  gabapentin 300 milliGRAM(s) Oral two times a day  melatonin 3 milliGRAM(s) Oral at bedtime PRN  mirtazapine 15 milliGRAM(s) Oral daily  ondansetron Injectable 4 milliGRAM(s) IV Push every 8 hours PRN  polyethylene glycol 3350 17 Gram(s) Oral at bedtime  senna 2 Tablet(s) Oral at bedtime  sodium chloride 3%  Inhalation 4 milliLiter(s) Inhalation every 8 hours  tamsulosin 0.4 milliGRAM(s) Oral at bedtime

## 2023-12-14 NOTE — PROGRESS NOTE ADULT - NS ATTEND AMEND GEN_ALL_CORE FT
Agree with above  Persistent L hemithorax. On POCU/S, pleural effusion is minimal and has no safe window for drainage.  Saturating well on room air (93% )  Etiology of opacification appears to be mucous plugging rather than effusion  DuoNEBs + HyperSAL, aggressive chest PT and consider chest vest  Aspiration precautions

## 2023-12-15 NOTE — DISCHARGE NOTE PROVIDER - NSDCMRMEDTOKEN_GEN_ALL_CORE_FT
acetaminophen 325 mg oral tablet: 2 tab(s) orally every 6 hours, As needed, Temp greater or equal to 38C (100.4F)  amLODIPine 5 mg oral tablet: 1 tab(s) orally once a day  amLODIPine 5 mg oral tablet: 1 tab(s) orally once a day  Cipro 250 mg oral tablet: 1 tab(s) orally once a day take 2 pills on 11/2 then1 pill a day from 11/3.  finasteride 5 mg oral tablet: 1 tab(s) orally once a day  gabapentin 300 mg oral capsule: 1 cap(s) orally 2 times a day  gabapentin 300 mg oral capsule: 1 cap(s) orally 2 times a day  labetalol 100 mg oral tablet: 1 tab(s) orally 2 times a day  labetalol 100 mg oral tablet: 1 tab(s) orally 2 times a day  mirtazapine 15 mg oral tablet: 1/2 tab(s) orally once a day  nystatin 100,000 units/g topical powder: 1 application topically every 8 hours  polyethylene glycol 3350 oral powder for reconstitution: 17 gram(s) orally once a day (at bedtime)  PREDNISOLONE AC 1% OPHTH SUSP  5ML: SHAKE LIQUID AND INSTILL 1 DROP IN RIGHT EYE TWICE DAILY  senna oral tablet: 2 tab(s) orally once a day (at bedtime)  tamsulosin 0.4 mg oral capsule: 1 cap(s) orally once a day (at bedtime)   acetaminophen 325 mg oral tablet: 2 tab(s) by PEG tube every 6 hours as needed for Temp greater or equal to 38C (100.4F), Mild Pain (1 - 3)  amLODIPine 10 mg oral tablet: 1 tab(s) by PEG tube once a day  collagenase 250 units/g topical ointment: 1 Apply topically to affected area once a day  ipratropium-albuterol 0.5 mg-2.5 mg/3 mL inhalation solution: 3 milliliter(s) inhaled every 6 hours  metoprolol tartrate 25 mg oral tablet: 1 tab(s) by PEG tube 2 times a day  mirtazapine 15 mg oral tablet: 1 tab(s) by PEG tube once a day  polyethylene glycol 3350 oral powder for reconstitution: 17 gram(s) by PEG tube once a day  PREDNISOLONE AC 1% OPHTH SUSP  5ML: SHAKE LIQUID AND INSTILL 1 DROP IN RIGHT EYE TWICE DAILY  senna leaf extract oral tablet: 2 tab(s) by PEG tube once a day (at bedtime)

## 2023-12-15 NOTE — DISCHARGE NOTE PROVIDER - CARE PROVIDER_API CALL
follow up with medical director/ md at rehab facility,   Phone: (   )    -  Fax: (   )    -  Follow Up Time:

## 2023-12-15 NOTE — DISCHARGE NOTE PROVIDER - INSTRUCTIONS
This patient is being managed with:   Diet NPO with Tube Feed-  Tube Feeding Modality: Gastrostomy  Nepro with Carb Steady  Total Volume for 24 Hours (mL): 840  Continuous  Until Goal Tube Feed Rate (mL per Hour): 35  Tube Feed Duration (in Hours): 24       This patient is being managed with:   Diet NPO with Tube Feed-  Tube Feeding Modality: Gastrostomy  Nepro with Carb Steady  Total Volume for 24 Hours (mL): 840  Continuous  Until Goal Tube Feed Rate (mL per Hour): 35  Tube Feed Duration (in Hours): 24  150 ml free water q6 hourly

## 2023-12-15 NOTE — PROGRESS NOTE ADULT - SUBJECTIVE AND OBJECTIVE BOX
Patient: MOIZ RANDOLPH [id number] 89y Male                            Hospitalist Attending Note    No complaints.  On Oxygen 2L / min via NC  No fevers    ____________________PHYSICAL EXAM:  GENERAL:  NAD Arousable, Oriented x 1 to person   HEENT: NCAT  CARDIOVASCULAR:  S1, S2  LUNGS: decreased BS L base.  Coarse BS b/l.    ABDOMEN:  soft, (-) tenderness, (-) distension, (+) bowel sounds, (-) guarding, (-) rebound (-) rigidity  EXTREMITIES:  no cyanosis / clubbing / edema.   SKIN: Sacral Stage III ulcer.  L ischial Stage III ulcer.    ____________________    VITALS:  Vital Signs Last 24 Hrs  T(C): 36.6 (15 Dec 2023 05:33), Max: 37.2 (14 Dec 2023 16:09)  T(F): 97.8 (15 Dec 2023 05:33), Max: 98.9 (14 Dec 2023 16:09)  HR: 100 (15 Dec 2023 05:33) (89 - 105)  BP: 152/77 (15 Dec 2023 05:33) (131/72 - 152/77)  BP(mean): --  RR: 18 (15 Dec 2023 05:33) (18 - 18)  SpO2: 98% (15 Dec 2023 05:33) (96% - 99%)    Parameters below as of 15 Dec 2023 05:33  Patient On (Oxygen Delivery Method): nasal cannula     Daily     Daily   CAPILLARY BLOOD GLUCOSE        I&O's Summary    14 Dec 2023 07:01  -  15 Dec 2023 07:00  --------------------------------------------------------  IN: 0 mL / OUT: 1000 mL / NET: -1000 mL        LABS:                        8.7    6.15  )-----------( 220      ( 15 Dec 2023 05:50 )             28.0     12-15    144  |  112<H>  |  37<H>  ----------------------------<  89  3.9   |  27  |  2.23<H>    Ca    8.9      15 Dec 2023 05:50          Urinalysis Basic - ( 15 Dec 2023 05:50 )    Color: x / Appearance: x / SG: x / pH: x  Gluc: 89 mg/dL / Ketone: x  / Bili: x / Urobili: x   Blood: x / Protein: x / Nitrite: x   Leuk Esterase: x / RBC: x / WBC x   Sq Epi: x / Non Sq Epi: x / Bacteria: x              MEDICATIONS:  acetaminophen     Tablet .. 650 milliGRAM(s) Oral every 6 hours PRN  albuterol/ipratropium for Nebulization 3 milliLiter(s) Nebulizer every 8 hours  aluminum hydroxide/magnesium hydroxide/simethicone Suspension 30 milliLiter(s) Oral every 4 hours PRN  amLODIPine   Tablet 2.5 milliGRAM(s) Oral daily  collagenase Ointment 1 Application(s) Topical daily  finasteride 5 milliGRAM(s) Oral daily  gabapentin 300 milliGRAM(s) Oral two times a day  melatonin 3 milliGRAM(s) Oral at bedtime PRN  mirtazapine 15 milliGRAM(s) Oral daily  ondansetron Injectable 4 milliGRAM(s) IV Push every 8 hours PRN  polyethylene glycol 3350 17 Gram(s) Oral at bedtime  senna 2 Tablet(s) Oral at bedtime  sodium chloride 3%  Inhalation 4 milliLiter(s) Inhalation every 8 hours  tamsulosin 0.4 milliGRAM(s) Oral at bedtime                               Patient: MOIZ RANDOLPH [id number] 89y Male                            Hospitalist Attending Note    No complaints.  On Oxygen 2L / min via NC  No fevers    ____________________PHYSICAL EXAM:  GENERAL:  NAD Arousable, Oriented x 1 to person   HEENT: NCAT  CARDIOVASCULAR:  S1, S2  LUNGS: decreased BS L base.  Coarse BS b/l.    ABDOMEN:  soft, (-) tenderness, (-) distension, (+) bowel sounds, (-) guarding, (-) rebound (-) rigidity  EXTREMITIES:  no cyanosis / clubbing / edema.   SKIN: Sacral Stage III ulcer.  L ischial Stage III ulcer.    ____________________    VITALS:  Vital Signs Last 24 Hrs  T(C): 36.6 (15 Dec 2023 05:33), Max: 37.2 (14 Dec 2023 16:09)  T(F): 97.8 (15 Dec 2023 05:33), Max: 98.9 (14 Dec 2023 16:09)  HR: 100 (15 Dec 2023 05:33) (89 - 105)  BP: 152/77 (15 Dec 2023 05:33) (131/72 - 152/77)  BP(mean): --  RR: 18 (15 Dec 2023 05:33) (18 - 18)  SpO2: 98% (15 Dec 2023 05:33) (96% - 99%)    Parameters below as of 15 Dec 2023 05:33  Patient On (Oxygen Delivery Method): nasal cannula     Daily     Daily   CAPILLARY BLOOD GLUCOSE        I&O's Summary    14 Dec 2023 07:01  -  15 Dec 2023 07:00  --------------------------------------------------------  IN: 0 mL / OUT: 1000 mL / NET: -1000 mL        LABS:                        8.7    6.15  )-----------( 220      ( 15 Dec 2023 05:50 )             28.0     12-15    144  |  112<H>  |  37<H>  ----------------------------<  89  3.9   |  27  |  2.23<H>    Ca    8.9      15 Dec 2023 05:50          Urinalysis Basic - ( 15 Dec 2023 05:50 )    Color: x / Appearance: x / SG: x / pH: x  Gluc: 89 mg/dL / Ketone: x  / Bili: x / Urobili: x   Blood: x / Protein: x / Nitrite: x   Leuk Esterase: x / RBC: x / WBC x   Sq Epi: x / Non Sq Epi: x / Bacteria: x              MEDICATIONS:  acetaminophen     Tablet .. 650 milliGRAM(s) Oral every 6 hours PRN  albuterol/ipratropium for Nebulization 3 milliLiter(s) Nebulizer every 8 hours  aluminum hydroxide/magnesium hydroxide/simethicone Suspension 30 milliLiter(s) Oral every 4 hours PRN  amLODIPine   Tablet 2.5 milliGRAM(s) Oral daily  collagenase Ointment 1 Application(s) Topical daily  finasteride 5 milliGRAM(s) Oral daily  gabapentin 300 milliGRAM(s) Oral two times a day  melatonin 3 milliGRAM(s) Oral at bedtime PRN  mirtazapine 15 milliGRAM(s) Oral daily  ondansetron Injectable 4 milliGRAM(s) IV Push every 8 hours PRN  polyethylene glycol 3350 17 Gram(s) Oral at bedtime  senna 2 Tablet(s) Oral at bedtime  sodium chloride 3%  Inhalation 4 milliLiter(s) Inhalation every 8 hours  tamsulosin 0.4 milliGRAM(s) Oral at bedtime

## 2023-12-15 NOTE — DISCHARGE NOTE PROVIDER - NSDCFUADDINST_GEN_ALL_CORE_FT
can check bmp cbc in one week to ensure stability   last hgb 8.2 and hct 25 on 1/16/2024    wound care- last measured 1/17/2024 5cm x 2.5cm  L Ischium Unstageable     1. Cleanse with sterile normal saline  2. Pat dry  3. Apply cavilon to wound edges  4. Apply collagenase to areas of slough   5. Apply hydrofiber (Aquacel) extra   6. Cover with foam dressing    Change dressing daily & PRN when soiled-Turn & position every 2 hours-Z-sherine positioner for off-loading-B/L CAIR boots to off-load heels-Optimize nutrition & hydration-Strict incontinence care     Turn and position the  patient  frequently per  protocol to prevent decubiti ulcer,  skin breakdown and/or further skin breakdown.   can check bmp cbc in one week to ensure stability   last hgb 8.2 and hct 25 on 1/16/2024    wound care- last measured 1/17/2024 5cm x 2.5cm  L Ischium Unstageable     1. Cleanse with sterile normal saline  2. Pat dry  3. Apply cavilon to wound edges  4. Apply collagenase to areas of slough   5. Apply hydrofiber (Aquacel) extra   6. Cover with foam dressing    Change dressing daily & PRN when soiled-Turn & position every 2 hours-Z-sherine positioner for off-loading-B/L CAIR boots to off-load heels-Optimize nutrition & hydration-Strict incontinence care     Turn and position the  patient  frequently per  protocol to prevent decubiti ulcer,  skin breakdown and/or further skin breakdown.       vent settings   ac/cmv   tv 400 peep 5  fio2 30%   rr 26       place bilateral venodynes or compression sequential devices on legs  can check bmp cbc in one week to ensure stability   last hgb 8.2 and hct 25 on 1/16/2024    wound care- last measured 1/18/2024 6cm x 3.4cm    L Ischium Unstageable     1. Cleanse with sterile normal saline  2. Pat dry  3. Apply cavilon to wound edges  4. Apply collagenase to areas of slough   5. Apply hydrofiber (Aquacel) extra   6. Cover with foam dressing    Change dressing daily & PRN when soiled-Turn & position every 2 hours-Z-sherine positioner for off-loading-B/L CAIR boots to off-load heels-Optimize nutrition & hydration-Strict incontinence care     Turn and position the  patient  frequently per  protocol to prevent decubiti ulcer,  skin breakdown and/or further skin breakdown.    second wound stage 2 supportive care measured 2cmx2.3 cm   vent settings   ac/cmv   tv 400 peep 5  fio2 30%   rr 26       place bilateral venodynes or compression sequential devices on legs

## 2023-12-15 NOTE — PROGRESS NOTE ADULT - ASSESSMENT
89 y/o M w/ PMH fo dementia, HTN, has loop recorder, BPH w/ chronic obrien presents with syncope and found to have UTI, complicated by L lung whiteout.      # Acute Respiratory Failure with Hypoxia, L pleural effusion with atelectasis, L lung whiteout, seen by pulmonary.  Effusion not amenable to thoracentesis per pulmonary.  Continue aggressive chest PT / nebulizers with hypertonic saline.  Most recent CXR without improvement.  Pulmonary recommends against bronchoscopy.    # Hypernatremia - normalized.  Encourage po intake.   # Metabolic Encephalopathy  Likely due to UTI  Palliative on board.  Some improvement.   # HTN  - BP stable.  Continue oral antihypertensives.    # UTI / Chronic Urinary retention  Completed course of antibiotics.  urine culture growing pseudomonas and enterococcus     # Anemia- Monitor H/H -london positive,  but hapto 369, and .  H/H has been stable.  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  Monitor BMP.  # constipation - continue laxatives.    # Severe Protein Calorie Malnutrition - Nutrition input, encourage po.  Supplements   # Sacral Decubitus Ulcer - continue wound care.      Anticipate eventual d/c home [facility]HA once hypoxia improves.    Plan of care d/w son Dr. Tierney at [phone] on 12/14 89 y/o M w/ PMH fo dementia, HTN, has loop recorder, BPH w/ chronic obrien presents with syncope and found to have UTI, complicated by L lung whiteout.      # Acute Respiratory Failure with Hypoxia, L pleural effusion with atelectasis, L lung whiteout, seen by pulmonary.  Effusion not amenable to thoracentesis per pulmonary.  Continue aggressive chest PT / nebulizers with hypertonic saline.  Most recent CXR without improvement.  Pulmonary recommends against bronchoscopy.    # Hypernatremia - normalized.  Encourage po intake.   # Metabolic Encephalopathy  Likely due to UTI  Palliative on board.  Some improvement.   # HTN  - BP stable.  Continue oral antihypertensives.    # UTI / Chronic Urinary retention  Completed course of antibiotics.  urine culture growing pseudomonas and enterococcus     # Anemia- Monitor H/H -london positive,  but hapto 369, and .  H/H has been stable.  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  Monitor BMP.  # constipation - continue laxatives.    # Severe Protein Calorie Malnutrition - Nutrition input, encourage po.  Supplements   # Sacral Decubitus Ulcer - continue wound care.      Anticipate eventual d/c home [facility] once hypoxia improves.    Plan of care d/w son Dr. Tierney at [phone] on 12/14

## 2023-12-15 NOTE — DISCHARGE NOTE PROVIDER - PROVIDER TOKENS
FREE:[LAST:[follow up with medical director/ md at rehab facility],PHONE:[(   )    -],FAX:[(   )    -]]

## 2023-12-15 NOTE — DISCHARGE NOTE PROVIDER - NSDCCPCAREPLAN_GEN_ALL_CORE_FT
PRINCIPAL DISCHARGE DIAGNOSIS  Diagnosis: Acute respiratory failure with hypoxia  Assessment and Plan of Treatment: s/p trach and peg      SECONDARY DISCHARGE DIAGNOSES  Diagnosis: Acute renal failure  Assessment and Plan of Treatment:     Diagnosis: Sepsis  Assessment and Plan of Treatment: s/p antibiotics    Diagnosis: Dyspnea  Assessment and Plan of Treatment:     Diagnosis: Chronic kidney disease, unspecified CKD stage  Assessment and Plan of Treatment:     Diagnosis: Anemia, unspecified  Assessment and Plan of Treatment:     Diagnosis: S/P percutaneous endoscopic gastrostomy (PEG) tube placement  Assessment and Plan of Treatment:     Diagnosis: Tracheostomy present  Assessment and Plan of Treatment:     Diagnosis: Pressure ulcer of ischium  Assessment and Plan of Treatment: continue wound care

## 2023-12-15 NOTE — DISCHARGE NOTE PROVIDER - ATTENDING DISCHARGE PHYSICAL EXAMINATION:
CONSTITUTIONAL: NAD,   ENMT: Moist oral mucosa, trach   RESPIRATORY: Normal respiratory effort; lungs are dim  to auscultation bilaterally  CARDIOVASCULAR: Regular rate and rhythm, normal S1 and S2,; No lower extremity edema;   ABDOMEN: Nontender to palpation,PEG  PSYCH: Alert , not oriented   NEUROLOGY: not cooperating   SKIN: no rash

## 2023-12-15 NOTE — DISCHARGE NOTE PROVIDER - HOSPITAL COURSE
· Assessment	  89M w/ dementia, CKD, chronic urinary retention w/ obrien. Admitted w/ L pleural effusion thought to be due to mucous plugging. Course complicated by worsening hypoxemia requiring intubation and bronchoscopy x2 w/ MRSA pneumonia, stenotrophomonas. Extubated on 12/24 but required re-intubation. Extubated again but failed due to hypercapnia and hypoxia. s/p Trach / PEG.    #acute hypoxic resp failure with trach. s/p peg and trach , FiO2 is 30%  #Febrile, resolved   - Completed course Levaquin for Steno in sputum. Remains vent dependent with full support  - Febrile to 101F yesterday. No leukocytosis - BCx negative    - hold on abx - trend fever curve   - pulm following    1/16/2024 afebrile     #ROGER on CKD suspect due to AIN --renal following does appear creatinine is slowly improving- dialysis to be determined by renal   1/17/2024 per my d/w renal not a candidate for hd      #Acute Anemia  - No clear source of bleeding. s/p 2U pRBCs.   - Trend H/H  (1/12) H/H treading down. give 1 prbc today.  (1/14) H/H improved. stable at 8.1      #fecal impaction- resolved most recent  documented BM on 1/16/24    #Hyponatremia - presumed  iatrogenic was on hypotonic IVF was stopped by my colleague on 1/15/2024. monitor sodium     #urinary retention s/p obrien  (1/12) Change obrien  (1/14) Obrien changed as of 1/13-     Diet: TFs    DVT prophylaxis: [facility]    Dispo: Eventual D/C to vent facility   Code Status: FC         Nutritional Assessment   · Nutritional Assessment	This patient has been assessed with a concern for Malnutrition and has been determined to have a diagnosis/diagnoses of Severe protein-calorie malnutrition.    This patient is being managed with:   Diet NPO with Tube Feed-  Tube Feeding Modality: Gastrostomy  Nepro with Carb Steady  Total Volume for 24 Hours (mL): 840  Continuous  Until Goal Tube Feed Rate (mL per Hour): 35  Tube Feed Duration (in Hours): 24  Tube Feed Start Time: 12:00  Entered: Gelacio  3 2024 11:48AM  on 1/18/2024  d.w iliana Pablo at  all questions answered and he was updated       · Assessment	  89M w/ dementia, CKD, chronic urinary retention w/ obrien. Admitted w/ L pleural effusion thought to be due to mucous plugging. Course complicated by worsening hypoxemia requiring intubation and bronchoscopy x2 w/ MRSA pneumonia, stenotrophomonas. Extubated on 12/24 but required re-intubation. Extubated again but failed due to hypercapnia and hypoxia. s/p Trach / PEG.    #acute hypoxic resp failure with trach. s/p peg and trach , FiO2 is 30%  #Febrile, resolved   - Completed course Levaquin for Steno in sputum. Remains vent dependent with full support  - Febrile to 101F yesterday. No leukocytosis - BCx negative    - hold on abx - trend fever curve   - pulm following    1/16/2024 afebrile     #ROGER on CKD suspect due to AIN --renal following does appear creatinine is slowly improving- dialysis to be determined by renal   1/17/2024 per my d/w renal not a candidate for hd      #Acute Anemia  - No clear source of bleeding. s/p 2U pRBCs.   - Trend H/H  (1/12) H/H treading down. give 1 prbc today.  (1/14) H/H improved. stable at 8.1      #fecal impaction- resolved most recent  documented BM on 1/16/24    #Hyponatremia - presumed  iatrogenic was on hypotonic IVF was stopped by my colleague on 1/15/2024. monitor sodium     #urinary retention s/p obrien  (1/12) Change obrien  (1/14) Obrien changed as of 1/13-     Diet: TFs    DVT prophylaxis: [facility]    Dispo: Eventual D/C to vent facility   Code Status: FC         Nutritional Assessment   · Nutritional Assessment	This patient has been assessed with a concern for Malnutrition and has been determined to have a diagnosis/diagnoses of Severe protein-calorie malnutrition.    This patient is being managed with:   Diet NPO with Tube Feed-  Tube Feeding Modality: Gastrostomy  Nepro with Carb Steady  Total Volume for 24 Hours (mL): 840  Continuous  Until Goal Tube Feed Rate (mL per Hour): 35  Tube Feed Duration (in Hours): 24  Tube Feed Start Time: 12:00  Entered: Gelacio  3 2024 11:48AM  on 1/18/2024  d.w iliana Pablo at  all questions answered and he was updated

## 2023-12-16 NOTE — CONSULT NOTE ADULT - SUBJECTIVE AND OBJECTIVE BOX
INTERVAL HPI/OVERNIGHT EVENTS:    89 yo M PMH HTN, CKD3, BPH w/ chronic obrien and multiple admissions for CAUTI, dementia, COVID 1/2023 presents for SOB. Pt poor historian so history obtained from record. Pt was brought in by home aid who reports pt c/o SOB. NO fever/chills/sweats, URI sx, N/V/D reported. In ED, pt hypothermic to 95.5 and hypotensive to 90/60s. Labs notable for Hgb 6.3 (+london), d-dimer 3000, Na 129, BUN/SCr 70/2.91, BNP 7966, UA+, u cx growing enterococcus faecalis and pseudomonas . CT A/P demonstrates mild b/l hydronephrosis and large stool burden with perirectal stranding consistent for stercoral colitis. CT chest with moderate to large L pleural effusion and compressive atelectasis L lower lobe    This AM, patient was noted to be more obtunded.  Patient with hypoxia on vitals.  Blood gas performed noted to be hypercarbic with respiratory acidosis.  Pt placed on AVAPS and accepted to ICU for intubation and bronchoscopy.    I discussed findings of blood gas with Dr. Tierney and the plan for intubation given patient is largely obtunded to sternal rub with a plan for bronchscopy.  Dr. Tierney agrees to bronchoscopy.  I discussed goals of care with Dr. Tierney for his father, and he is currently full code with no restriction on therapies.      ABG - ( 16 Dec 2023 12:24 )  pH, Arterial: 7.15  pH, Blood: x     /  pCO2: 73    /  pO2: 52    / HCO3: 25    / Base Excess: -5.0  /  SaO2: 83.9        GLOBAL ISSUE/BEST PRACTICE:  Analgesia:   Sedation:  HOB elevation: yes  Stress ulcer prophylaxis:   VTE prophylaxis:   Oral Care: Chlorhexidine  Glycemic control:   Nutrition:Diet, NPO:   Except Medications     Special Instructions for Nursing:  Except Medications (12-16-23 @ 13:11) [Active]    REVIEW OF SYSTEMS:  [x ] Unable to obtain because: obtunded    PHYSICAL EXAM:    GENERAL: On AVAPS, non-responsive to painful stimuli  HEENT: dry mucous membranes  NECK: Supple  CHEST/LUNG: No breath sounds on L, R rhonchi  HEART: S1S2, RRR,   ABDOMEN: Soft, Nontender, Nondistended; Bowel sounds present  EXTREMITIES:  2+ Peripheral Pulses, 1+ edema  NERVOUS SYSTEM: Non-responsive    ICU Vital Signs Last 24 Hrs  T(C): 36.9 (16 Dec 2023 11:00), Max: 37.7 (15 Dec 2023 17:14)  T(F): 98.5 (16 Dec 2023 11:00), Max: 99.8 (15 Dec 2023 17:14)  HR: 106 (16 Dec 2023 11:00) (90 - 106)  BP: 144/70 (16 Dec 2023 11:00) (130/68 - 144/70)  BP(mean): --  ABP: --  ABP(mean): --  RR: 18 (16 Dec 2023 11:00) (18 - 18)  SpO2: 99% (16 Dec 2023 11:00) (96% - 100%)    O2 Parameters below as of 16 Dec 2023 11:00  Patient On (Oxygen Delivery Method): nasal cannula          I&O's Detail    MEDICATIONS  NEURO  Meds: acetaminophen     Tablet .. 650 milliGRAM(s) Oral every 6 hours PRN Temp greater or equal to 38C (100.4F), Mild Pain (1 - 3)  gabapentin 300 milliGRAM(s) Oral two times a day  melatonin 3 milliGRAM(s) Oral at bedtime PRN Insomnia  mirtazapine 15 milliGRAM(s) Oral daily  ondansetron Injectable 4 milliGRAM(s) IV Push every 8 hours PRN Nausea and/or Vomiting    RESPIRATORY  ABG - ( 16 Dec 2023 12:24 )  pH: 7.15  /  pCO2: 73    /  pO2: 52    / HCO3: 25    / Base Excess: -5.0  /  SaO2: 83.9    Lactate: x                Meds: albuterol/ipratropium for Nebulization 3 milliLiter(s) Nebulizer every 8 hours  sodium chloride 3%  Inhalation 4 milliLiter(s) Inhalation every 8 hours    CARDIOVASCULAR  Meds: amLODIPine   Tablet 2.5 milliGRAM(s) Oral daily    GI/NUTRITION  Meds: aluminum hydroxide/magnesium hydroxide/simethicone Suspension 30 milliLiter(s) Oral every 4 hours PRN Dyspepsia  polyethylene glycol 3350 17 Gram(s) Oral at bedtime  senna 2 Tablet(s) Oral at bedtime    GENITOURINARY  Meds: dextrose 5% + sodium chloride 0.9%. 1000 milliLiter(s) IV Continuous <Continuous>  tamsulosin 0.4 milliGRAM(s) Oral at bedtime    HEMATOLOGIC  Meds:   [x] VTE Prophylaxis  INFECTIOUS DISEASES  Meds:   ENDOCRINE  CAPILLARY BLOOD GLUCOSE      POCT Blood Glucose.: 76 mg/dL (16 Dec 2023 06:03)    Meds:   OTHER MEDICATIONS:  chlorhexidine 2% Cloths 1 Application(s) Topical <User Schedule>  collagenase Ointment 1 Application(s) Topical daily  :    LABS:                        8.7    6.15  )-----------( 220      ( 15 Dec 2023 05:50 )             28.0      12-16    146<H>  |  113<H>  |  40<H>  ----------------------------<  71  3.9   |  26  |  2.33<H>    Ca    9.4      16 Dec 2023 07:25        Urinalysis Basic - ( 16 Dec 2023 07:25 )    Color: x / Appearance: x / SG: x / pH: x  Gluc: 71 mg/dL / Ketone: x  / Bili: x / Urobili: x   Blood: x / Protein: x / Nitrite: x   Leuk Esterase: x / RBC: x / WBC x   Sq Epi: x / Non Sq Epi: x / Bacteria: x                RADIOLOGY & ADDITIONAL STUDIES:     INTERVAL HPI/OVERNIGHT EVENTS:    89 yo M PMH HTN, CKD3, BPH w/ chronic obrien and multiple admissions for CAUTI, dementia, COVID 1/2023 presents for SOB. Pt poor historian so history obtained from record. Pt was brought in by home aid who reports pt c/o SOB. NO fever/chills/sweats, URI sx, N/V/D reported. In ED, pt hypothermic to 95.5 and hypotensive to 90/60s. Labs notable for Hgb 6.3 (+london), d-dimer 3000, Na 129, BUN/SCr 70/2.91, BNP 7966, UA+, u cx growing enterococcus faecalis and pseudomonas . CT A/P demonstrates mild b/l hydronephrosis and large stool burden with perirectal stranding consistent for stercoral colitis. CT chest with moderate to large L pleural effusion and compressive atelectasis L lower lobe    This AM, patient was noted to be more obtunded.  Patient with hypoxia on vitals.  Blood gas performed noted to be hypercarbic with respiratory acidosis.  Pt placed on AVAPS and accepted to ICU for intubation and bronchoscopy.    I discussed findings of blood gas with Dr. Tierney and the plan for intubation given patient is largely obtunded to sternal rub with a plan for bronchscopy.  Dr. Tierney agrees to bronchoscopy.  I discussed goals of care with Dr. Tierney for his father, and he is currently full code with no restriction on therapies.      ABG - ( 16 Dec 2023 12:24 )  pH, Arterial: 7.15  pH, Blood: x     /  pCO2: 73    /  pO2: 52    / HCO3: 25    / Base Excess: -5.0  /  SaO2: 83.9        GLOBAL ISSUE/BEST PRACTICE:  Analgesia:   Sedation:  HOB elevation: yes  Stress ulcer prophylaxis:   VTE prophylaxis:   Oral Care: Chlorhexidine  Glycemic control:   Nutrition:Diet, NPO:   Except Medications     Special Instructions for Nursing:  Except Medications (12-16-23 @ 13:11) [Active]    REVIEW OF SYSTEMS:  [x ] Unable to obtain because: obtunded    PHYSICAL EXAM:    GENERAL: On AVAPS, non-responsive to painful stimuli  HEENT: dry mucous membranes  NECK: Supple  CHEST/LUNG: No breath sounds on L, R rhonchi  HEART: S1S2, RRR,   ABDOMEN: Soft, Nontender, Nondistended; Bowel sounds present  EXTREMITIES:  2+ Peripheral Pulses, 1+ edema  NERVOUS SYSTEM: Non-responsive    ICU Vital Signs Last 24 Hrs  T(C): 36.9 (16 Dec 2023 11:00), Max: 37.7 (15 Dec 2023 17:14)  T(F): 98.5 (16 Dec 2023 11:00), Max: 99.8 (15 Dec 2023 17:14)  HR: 106 (16 Dec 2023 11:00) (90 - 106)  BP: 144/70 (16 Dec 2023 11:00) (130/68 - 144/70)  BP(mean): --  ABP: --  ABP(mean): --  RR: 18 (16 Dec 2023 11:00) (18 - 18)  SpO2: 99% (16 Dec 2023 11:00) (96% - 100%)    O2 Parameters below as of 16 Dec 2023 11:00  Patient On (Oxygen Delivery Method): nasal cannula          I&O's Detail    MEDICATIONS  NEURO  Meds: acetaminophen     Tablet .. 650 milliGRAM(s) Oral every 6 hours PRN Temp greater or equal to 38C (100.4F), Mild Pain (1 - 3)  gabapentin 300 milliGRAM(s) Oral two times a day  melatonin 3 milliGRAM(s) Oral at bedtime PRN Insomnia  mirtazapine 15 milliGRAM(s) Oral daily  ondansetron Injectable 4 milliGRAM(s) IV Push every 8 hours PRN Nausea and/or Vomiting    RESPIRATORY  ABG - ( 16 Dec 2023 12:24 )  pH: 7.15  /  pCO2: 73    /  pO2: 52    / HCO3: 25    / Base Excess: -5.0  /  SaO2: 83.9    Lactate: x                Meds: albuterol/ipratropium for Nebulization 3 milliLiter(s) Nebulizer every 8 hours  sodium chloride 3%  Inhalation 4 milliLiter(s) Inhalation every 8 hours    CARDIOVASCULAR  Meds: amLODIPine   Tablet 2.5 milliGRAM(s) Oral daily    GI/NUTRITION  Meds: aluminum hydroxide/magnesium hydroxide/simethicone Suspension 30 milliLiter(s) Oral every 4 hours PRN Dyspepsia  polyethylene glycol 3350 17 Gram(s) Oral at bedtime  senna 2 Tablet(s) Oral at bedtime    GENITOURINARY  Meds: dextrose 5% + sodium chloride 0.9%. 1000 milliLiter(s) IV Continuous <Continuous>  tamsulosin 0.4 milliGRAM(s) Oral at bedtime    HEMATOLOGIC  Meds:   [x] VTE Prophylaxis  INFECTIOUS DISEASES  Meds:   ENDOCRINE  CAPILLARY BLOOD GLUCOSE      POCT Blood Glucose.: 76 mg/dL (16 Dec 2023 06:03)    Meds:   OTHER MEDICATIONS:  chlorhexidine 2% Cloths 1 Application(s) Topical <User Schedule>  collagenase Ointment 1 Application(s) Topical daily  :    LABS:                        8.7    6.15  )-----------( 220      ( 15 Dec 2023 05:50 )             28.0      12-16    146<H>  |  113<H>  |  40<H>  ----------------------------<  71  3.9   |  26  |  2.33<H>    Ca    9.4      16 Dec 2023 07:25        Urinalysis Basic - ( 16 Dec 2023 07:25 )    Color: x / Appearance: x / SG: x / pH: x  Gluc: 71 mg/dL / Ketone: x  / Bili: x / Urobili: x   Blood: x / Protein: x / Nitrite: x   Leuk Esterase: x / RBC: x / WBC x   Sq Epi: x / Non Sq Epi: x / Bacteria: x                RADIOLOGY & ADDITIONAL STUDIES:

## 2023-12-16 NOTE — PROGRESS NOTE ADULT - SUBJECTIVE AND OBJECTIVE BOX
Patient is a 89y old  Male who presents with a chief complaint of Hypoxia (16 Dec 2023 14:37)      BRIEF HOSPITAL COURSE:   89 yo m pmhx dementia, HTN, loop recorder, BPH with chronic obrien admitted with UTI with course complicated by left lung white out and hypoxic/hypercapnic respiratory failure requiring intubation.     Events last 24 hours:  poor urine output, 500cc ivf bolus ordered as fluid challenge, remains on low dose levophed for bp support. weaning as tolerated      PAST MEDICAL & SURGICAL HISTORY:  HTN (Hypertension)  Benign Prostatic Hyperplasia  Hypertension  BPH (benign prostatic hypertrophy)  Spinal stenosis of lumbar region  Dehydration  Renal insufficiency  Hydrocele in adult  bilateral, chronic  Benign essential HTN  BPH (benign prostatic hyperplasia)  Indwelling Obrien catheter present  Dementia  Wheelchair dependent  Implantable loop recorder present  No significant past surgical history  Implantable loop recorder present        Allergies  No Known Allergies      FAMILY HISTORY:  No significant family history        Social History:       Review of Systems:  unable to obtain 22/ intubated/sedated      Physical Examination:    General: elderly male, lying in bed, nad      HEENT: ett in place    PULM: bs b/l    CVS: rrr    ABD: Soft, nondistended, nontender, +bs    EXT: No edema    SKIN: Warm    NEURO: sedated      Medications:  norepinephrine Infusion 0.05 MICROgram(s)/kG/Min IV Continuous <Continuous>  albuterol/ipratropium for Nebulization 3 milliLiter(s) Nebulizer every 8 hours  sodium chloride 3%  Inhalation 4 milliLiter(s) Inhalation every 8 hours  acetaminophen     Tablet .. 650 milliGRAM(s) Oral every 6 hours PRN  gabapentin 300 milliGRAM(s) Oral two times a day  mirtazapine 15 milliGRAM(s) Oral daily  propofol Infusion 10 MICROgram(s)/kG/Min IV Continuous <Continuous>  polyethylene glycol 3350 17 Gram(s) Oral at bedtime  senna 2 Tablet(s) Oral at bedtime  lactated ringers Bolus 500 milliLiter(s) IV Bolus once  lactated ringers. 1000 milliLiter(s) IV Continuous <Continuous  chlorhexidine 0.12% Liquid 15 milliLiter(s) Oral Mucosa every 12 hours  chlorhexidine 2% Cloths 1 Application(s) Topical <User Schedule>  collagenase Ointment 1 Application(s) Topical daily      Mode: AC/ CMV (Assist Control/ Continuous Mandatory Ventilation)  RR (machine): 22  TV (machine): 450  FiO2: 40  PEEP: 5  ITime: 1  MAP: 11  PIP: 24      ICU Vital Signs Last 24 Hrs  T(C): 36.4 (17 Dec 2023 00:00), Max: 37.2 (16 Dec 2023 18:00)  T(F): 97.5 (17 Dec 2023 00:00), Max: 99 (16 Dec 2023 18:00)  HR: 84 (17 Dec 2023 00:37) (83 - 115)  BP: 130/70 (17 Dec 2023 00:00) (88/56 - 183/75)  BP(mean): 88 (17 Dec 2023 00:00) (65 - 103)  ABP: --  ABP(mean): --  RR: 22 (17 Dec 2023 00:00) (18 - 32)  SpO2: 100% (17 Dec 2023 00:37) (96% - 100%)    O2 Parameters below as of 16 Dec 2023 18:00  Patient On (Oxygen Delivery Method): ventilator  O2 Concentration (%): 30      Vital Signs Last 24 Hrs  T(C): 36.4 (17 Dec 2023 00:00), Max: 37.2 (16 Dec 2023 18:00)  T(F): 97.5 (17 Dec 2023 00:00), Max: 99 (16 Dec 2023 18:00)  HR: 84 (17 Dec 2023 00:37) (83 - 115)  BP: 130/70 (17 Dec 2023 00:00) (88/56 - 183/75)  BP(mean): 88 (17 Dec 2023 00:00) (65 - 103)  RR: 22 (17 Dec 2023 00:00) (18 - 32)  SpO2: 100% (17 Dec 2023 00:37) (96% - 100%)    Parameters below as of 16 Dec 2023 18:00  Patient On (Oxygen Delivery Method): ventilator    O2 Concentration (%): 30    ABG - ( 16 Dec 2023 16:40 )  pH, Arterial: 7.39  pH, Blood: x     /  pCO2: 29    /  pO2: 140   / HCO3: 18    / Base Excess: -6.4  /  SaO2: 99.0        I&O's Detail    16 Dec 2023 07:01  -  17 Dec 2023 00:42  --------------------------------------------------------  IN:    Lactated Ringers: 600 mL    Lactated Ringers Bolus: 2000 mL    Norepinephrine: 79.2 mL    Propofol: 159.3 mL  Total IN: 2838.5 mL    OUT:    Indwelling Catheter - Urethral (mL): 125 mL    Voided (mL): 400 mL  Total OUT: 525 mL  Total NET: 2313.5 mL      LABS:                        9.0    10.42 )-----------( 308      ( 16 Dec 2023 17:00 )             29.9     12-16    143  |  110<H>  |  42<H>  ----------------------------<  97  4.2   |  19<L>  |  2.36<H>    Ca    9.2      16 Dec 2023 17:00  Phos  4.2     12-16  Mg     2.0     12-16    TPro  8.4<H>  /  Alb  1.9<L>  /  TBili  0.6  /  DBili  x   /  AST  18  /  ALT  7<L>  /  AlkPhos  32<L>  12-16      CAPILLARY BLOOD GLUCOSE  POCT Blood Glucose.: 76 mg/dL (16 Dec 2023 06:03)      Urinalysis Basic - ( 16 Dec 2023 17:00 )  Color: x / Appearance: x / SG: x / pH: x  Gluc: 97 mg/dL / Ketone: x  / Bili: x / Urobili: x   Blood: x / Protein: x / Nitrite: x   Leuk Esterase: x / RBC: x / WBC x   Sq Epi: x / Non Sq Epi: x / Bacteria: x      CULTURES:  pending      RADIOLOGY:   < from: Xray Chest 1 View- PORTABLE-Routine (Xray Chest 1 View- PORTABLE-Routine .) (12.14.23 @ 06:57) >    ACC: [id number] EXAM:  XR CHEST PORTABLE ROUTINE 1V   ORDERED BY: JOHN GAGE     PROCEDURE DATE:  12/14/2023      INTERPRETATION:  Chest one view    HISTORY: Left lung white out    COMPARISON STUDY: 12/11/2023    Frontal expiratory view of thechest shows the heart to be probably   similar in size. Loop recorder is again noted.    The lungs show clear right lung with similar left chest whiteout and   there is no evidence of pneumothorax nor right pleural effusion.    IMPRESSION:  Similar left chest whiteout.        Thank you for the courtesy of this referral.    --- End of Report ---    JORGE LOVE MD; Attending Interventional Radiologist  This document has been electronically signed. Dec 15 2023  4:35PM    < end of copied text >   Patient is a 89y old  Male who presents with a chief complaint of Hypoxia (16 Dec 2023 14:37)      BRIEF HOSPITAL COURSE:   89 yo m pmhx dementia, HTN, loop recorder, BPH with chronic obrien admitted with UTI with course complicated by left lung white out and hypoxic/hypercapnic respiratory failure requiring intubation.     Events last 24 hours:  poor urine output, 500cc ivf bolus ordered as fluid challenge, remains on low dose levophed for bp support. weaning as tolerated      PAST MEDICAL & SURGICAL HISTORY:  HTN (Hypertension)  Benign Prostatic Hyperplasia  Hypertension  BPH (benign prostatic hypertrophy)  Spinal stenosis of lumbar region  Dehydration  Renal insufficiency  Hydrocele in adult  bilateral, chronic  Benign essential HTN  BPH (benign prostatic hyperplasia)  Indwelling Obrien catheter present  Dementia  Wheelchair dependent  Implantable loop recorder present  No significant past surgical history  Implantable loop recorder present        Allergies  No Known Allergies      FAMILY HISTORY:  No significant family history        Social History:       Review of Systems:  unable to obtain 22/ intubated/sedated      Physical Examination:    General: elderly male, lying in bed, nad      HEENT: ett in place    PULM: bs b/l    CVS: rrr    ABD: Soft, nondistended, nontender, +bs    EXT: No edema    SKIN: Warm    NEURO: sedated      Medications:  norepinephrine Infusion 0.05 MICROgram(s)/kG/Min IV Continuous <Continuous>  albuterol/ipratropium for Nebulization 3 milliLiter(s) Nebulizer every 8 hours  sodium chloride 3%  Inhalation 4 milliLiter(s) Inhalation every 8 hours  acetaminophen     Tablet .. 650 milliGRAM(s) Oral every 6 hours PRN  gabapentin 300 milliGRAM(s) Oral two times a day  mirtazapine 15 milliGRAM(s) Oral daily  propofol Infusion 10 MICROgram(s)/kG/Min IV Continuous <Continuous>  polyethylene glycol 3350 17 Gram(s) Oral at bedtime  senna 2 Tablet(s) Oral at bedtime  lactated ringers Bolus 500 milliLiter(s) IV Bolus once  lactated ringers. 1000 milliLiter(s) IV Continuous <Continuous  chlorhexidine 0.12% Liquid 15 milliLiter(s) Oral Mucosa every 12 hours  chlorhexidine 2% Cloths 1 Application(s) Topical <User Schedule>  collagenase Ointment 1 Application(s) Topical daily      Mode: AC/ CMV (Assist Control/ Continuous Mandatory Ventilation)  RR (machine): 22  TV (machine): 450  FiO2: 40  PEEP: 5  ITime: 1  MAP: 11  PIP: 24      ICU Vital Signs Last 24 Hrs  T(C): 36.4 (17 Dec 2023 00:00), Max: 37.2 (16 Dec 2023 18:00)  T(F): 97.5 (17 Dec 2023 00:00), Max: 99 (16 Dec 2023 18:00)  HR: 84 (17 Dec 2023 00:37) (83 - 115)  BP: 130/70 (17 Dec 2023 00:00) (88/56 - 183/75)  BP(mean): 88 (17 Dec 2023 00:00) (65 - 103)  ABP: --  ABP(mean): --  RR: 22 (17 Dec 2023 00:00) (18 - 32)  SpO2: 100% (17 Dec 2023 00:37) (96% - 100%)    O2 Parameters below as of 16 Dec 2023 18:00  Patient On (Oxygen Delivery Method): ventilator  O2 Concentration (%): 30      Vital Signs Last 24 Hrs  T(C): 36.4 (17 Dec 2023 00:00), Max: 37.2 (16 Dec 2023 18:00)  T(F): 97.5 (17 Dec 2023 00:00), Max: 99 (16 Dec 2023 18:00)  HR: 84 (17 Dec 2023 00:37) (83 - 115)  BP: 130/70 (17 Dec 2023 00:00) (88/56 - 183/75)  BP(mean): 88 (17 Dec 2023 00:00) (65 - 103)  RR: 22 (17 Dec 2023 00:00) (18 - 32)  SpO2: 100% (17 Dec 2023 00:37) (96% - 100%)    Parameters below as of 16 Dec 2023 18:00  Patient On (Oxygen Delivery Method): ventilator    O2 Concentration (%): 30    ABG - ( 16 Dec 2023 16:40 )  pH, Arterial: 7.39  pH, Blood: x     /  pCO2: 29    /  pO2: 140   / HCO3: 18    / Base Excess: -6.4  /  SaO2: 99.0        I&O's Detail    16 Dec 2023 07:01  -  17 Dec 2023 00:42  --------------------------------------------------------  IN:    Lactated Ringers: 600 mL    Lactated Ringers Bolus: 2000 mL    Norepinephrine: 79.2 mL    Propofol: 159.3 mL  Total IN: 2838.5 mL    OUT:    Indwelling Catheter - Urethral (mL): 125 mL    Voided (mL): 400 mL  Total OUT: 525 mL  Total NET: 2313.5 mL      LABS:                        9.0    10.42 )-----------( 308      ( 16 Dec 2023 17:00 )             29.9     12-16    143  |  110<H>  |  42<H>  ----------------------------<  97  4.2   |  19<L>  |  2.36<H>    Ca    9.2      16 Dec 2023 17:00  Phos  4.2     12-16  Mg     2.0     12-16    TPro  8.4<H>  /  Alb  1.9<L>  /  TBili  0.6  /  DBili  x   /  AST  18  /  ALT  7<L>  /  AlkPhos  32<L>  12-16      CAPILLARY BLOOD GLUCOSE  POCT Blood Glucose.: 76 mg/dL (16 Dec 2023 06:03)      Urinalysis Basic - ( 16 Dec 2023 17:00 )  Color: x / Appearance: x / SG: x / pH: x  Gluc: 97 mg/dL / Ketone: x  / Bili: x / Urobili: x   Blood: x / Protein: x / Nitrite: x   Leuk Esterase: x / RBC: x / WBC x   Sq Epi: x / Non Sq Epi: x / Bacteria: x      CULTURES:  pending      RADIOLOGY:   < from: Xray Chest 1 View- PORTABLE-Routine (Xray Chest 1 View- PORTABLE-Routine .) (12.14.23 @ 06:57) >    ACC: [id number] EXAM:  XR CHEST PORTABLE ROUTINE 1V   ORDERED BY: JOHN GAGE     PROCEDURE DATE:  12/14/2023      INTERPRETATION:  Chest one view    HISTORY: Left lung white out    COMPARISON STUDY: 12/11/2023    Frontal expiratory view of thechest shows the heart to be probably   similar in size. Loop recorder is again noted.    The lungs show clear right lung with similar left chest whiteout and   there is no evidence of pneumothorax nor right pleural effusion.    IMPRESSION:  Similar left chest whiteout.        Thank you for the courtesy of this referral.    --- End of Report ---    JORGE LOVE MD; Attending Interventional Radiologist  This document has been electronically signed. Dec 15 2023  4:35PM    < end of copied text >

## 2023-12-16 NOTE — PROGRESS NOTE ADULT - SUBJECTIVE AND OBJECTIVE BOX
Patient is a 89y old  Male who presents with a chief complaint of Hypoxia (15 Dec 2023 11:05)      INTERVAL HPI/OVERNIGHT EVENTS: pt very lethargic this am and unable to tolerate po     MEDICATIONS  (STANDING):  albuterol/ipratropium for Nebulization 3 milliLiter(s) Nebulizer every 8 hours  amLODIPine   Tablet 2.5 milliGRAM(s) Oral daily  chlorhexidine 2% Cloths 1 Application(s) Topical <User Schedule>  collagenase Ointment 1 Application(s) Topical daily  dextrose 5% + sodium chloride 0.9%. 1000 milliLiter(s) (50 mL/Hr) IV Continuous <Continuous>  finasteride 5 milliGRAM(s) Oral daily  gabapentin 300 milliGRAM(s) Oral two times a day  midazolam Injectable 4 milliGRAM(s) IV Push once  mirtazapine 15 milliGRAM(s) Oral daily  norepinephrine Infusion 0.05 MICROgram(s)/kG/Min (7.06 mL/Hr) IV Continuous <Continuous>  polyethylene glycol 3350 17 Gram(s) Oral at bedtime  propofol Infusion 10 MICROgram(s)/kG/Min (4.52 mL/Hr) IV Continuous <Continuous>  senna 2 Tablet(s) Oral at bedtime  sodium chloride 3%  Inhalation 4 milliLiter(s) Inhalation every 8 hours  tamsulosin 0.4 milliGRAM(s) Oral at bedtime    MEDICATIONS  (PRN):  acetaminophen     Tablet .. 650 milliGRAM(s) Oral every 6 hours PRN Temp greater or equal to 38C (100.4F), Mild Pain (1 - 3)  aluminum hydroxide/magnesium hydroxide/simethicone Suspension 30 milliLiter(s) Oral every 4 hours PRN Dyspepsia  melatonin 3 milliGRAM(s) Oral at bedtime PRN Insomnia  ondansetron Injectable 4 milliGRAM(s) IV Push every 8 hours PRN Nausea and/or Vomiting      Allergies    No Known Allergies    Intolerances          Vital Signs Last 24 Hrs  T(C): 36.9 (16 Dec 2023 11:00), Max: 37.7 (15 Dec 2023 17:14)  T(F): 98.5 (16 Dec 2023 11:00), Max: 99.8 (15 Dec 2023 17:14)  HR: 106 (16 Dec 2023 11:00) (90 - 106)  BP: 144/70 (16 Dec 2023 11:00) (130/68 - 144/70)  BP(mean): --  RR: 18 (16 Dec 2023 11:00) (18 - 18)  SpO2: 99% (16 Dec 2023 11:00) (96% - 100%)    Parameters below as of 16 Dec 2023 11:00  Patient On (Oxygen Delivery Method): nasal cannula        PHYSICAL EXAM:  GENERAL: lethargic  HEAD:  Atraumatic, Normocephalic  CHEST/LUNG: decreased respirations   HEART: Regular rate and rhythm; No murmurs, rubs, or gallops  ABDOMEN: Soft, Nontender, Nondistended; Bowel sounds present  EXTREMITIES:  2+ Peripheral Pulses, No clubbing, cyanosis, or edema  LYMPH: No lymphadenopathy noted  SKIN: No rashes or lesions    LABS:                        8.7    6.15  )-----------( 220      ( 15 Dec 2023 05:50 )             28.0     12-16    146<H>  |  113<H>  |  40<H>  ----------------------------<  71  3.9   |  26  |  2.33<H>    Ca    9.4      16 Dec 2023 07:25        Urinalysis Basic - ( 16 Dec 2023 07:25 )    Color: x / Appearance: x / SG: x / pH: x  Gluc: 71 mg/dL / Ketone: x  / Bili: x / Urobili: x   Blood: x / Protein: x / Nitrite: x   Leuk Esterase: x / RBC: x / WBC x   Sq Epi: x / Non Sq Epi: x / Bacteria: x      CAPILLARY BLOOD GLUCOSE      POCT Blood Glucose.: 76 mg/dL (16 Dec 2023 06:03)      RADIOLOGY & ADDITIONAL TESTS:    Imaging Personally Reviewed:  [ x] YES  [ ] NO    Consultant(s) Notes Reviewed:  [ x] YES  [ ] NO    Care Discussed with Consultants/Other Providers [ ] YES  [ ] NO

## 2023-12-16 NOTE — PROGRESS NOTE ADULT - ASSESSMENT
89 yo m pmhx dementia, HTN, loop recorder, BPH with chronic obrien admitted with UTI with course complicated by left lung white out and hypoxic/hypercapnic respiratory failure requiring intubation.     NEURO: sedated on propofol.  CV: distributive shock requiring vasopressor therapy, actively titrating levophed for map >65   RESP: hypoxic/hypercapnic respiratory failure, improved s/p intubation and bronchoscopy, ac/vc 4-6 cc/kg tv lung protective strategies, actively titrating fio2/peep for spo2 >92%.  inh bronchodilators prn, chest pt/pulm lavage as needed.    RENAL: ROGER, avoid nephrotoxic meds, renally dose meds, trend urine output, bun/cr and electrolytes.  replace lytes as needed.  obrien for strict I&Os.   GI: NPO  ENDO: POCT q6hr  ID: Completed course of zosyn  HEME: scd  DISPO: full code    Critical Care time: 38 mins assessing presenting problems of acute illness that poses high probability of life threatening deterioration or end organ damage/dysfunction.  Medical decision making inculding Initiating plan of care, reviewing data, reviewing radiology, discussing with multidisciplinary team, non inclusive of procedures, discussing goals of care with patient/family    DATE OF DOCUMENTATION EQUIVALENT TO DATE OF SERVICES RENDERED  89 yo m pmhx dementia, HTN, loop recorder, BPH with chronic obrien admitted with UTI with course complicated by left lung white out and hypoxic/hypercapnic respiratory failure requiring intubation.     NEURO: sedated on propofol.  CV: distributive shock requiring vasopressor therapy, actively titrating levophed for map >65   RESP: hypoxic/hypercapnic respiratory failure, improved s/p intubation and bronchoscopy, ac/vc 4-6 cc/kg tv lung protective strategies, actively titrating fio2/peep for spo2 >92%.  inh bronchodilators prn, chest pt/pulm lavage as needed.    RENAL: ROGER, avoid nephrotoxic meds, renally dose meds, trend urine output, bun/cr and electrolytes.  replace lytes as needed.  obrien for strict I&Os.   GI: NPO  ENDO: POCT q6hr  ID: Completed course of zosyn  HEME: scd  DISPO: full code    Critical Care time: 38 mins assessing presenting problems of acute illness that poses high probability of life threatening deterioration or end organ damage/dysfunction.  Medical decision making inculding Initiating plan of care, reviewing data, reviewing radiology, discussing with multidisciplinary team, non inclusive of procedures, discussing goals of care with patient/family    DATE OF DOCUMENTATION EQUIVALENT TO DATE OF SERVICES RENDERED

## 2023-12-16 NOTE — PROGRESS NOTE ADULT - ASSESSMENT
89 y/o M w/ PMH fo dementia, HTN, has loop recorder, BPH w/ chronic obrien presents with syncope and found to have UTI, complicated by L lung whiteout.      # Acute Respiratory Failure with Hypoxia and co2 retention, L pleural effusion with atelectasis, L lung whiteout,   - pt very acidotic on abg this am with co2 narcosis,. discussed with family and ICU and will need intubation with possible bronchoscopy   # Hypernatremia - normalized.  Encourage po intake.   # Metabolic Encephalopathy  Likely due to UTI  Palliative on board.  Some improvement.   # HTN  - BP stable.  Continue oral antihypertensives.    # UTI / Chronic Urinary retention  Completed course of antibiotics.  urine culture growing pseudomonas and enterococcus     # Anemia- Monitor H/H -london positive,  but hapto 369, and .  H/H has been stable.  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    # Severe Protein Calorie Malnutrition -  # Sacral Decubitus Ulcer - continue wound care.        Plan of care d/w son Dr. Tierney at [phone]  89 y/o M w/ PMH fo dementia, HTN, has loop recorder, BPH w/ chronic obrien presents with syncope and found to have UTI, complicated by L lung whiteout.      # Acute Respiratory Failure with Hypoxia and co2 retention, L pleural effusion with atelectasis, L lung whiteout,   - pt very acidotic on abg this am with co2 narcosis,. discussed with family and ICU and will need intubation with possible bronchoscopy   # Hypernatremia - normalized.  Encourage po intake.   # Metabolic Encephalopathy  Likely due to UTI  Palliative on board.  Some improvement.   # HTN  - BP stable.  Continue oral antihypertensives.    # UTI / Chronic Urinary retention  Completed course of antibiotics.  urine culture growing pseudomonas and enterococcus     # Anemia- Monitor H/H -london positive,  but hapto 369, and .  H/H has been stable.  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    # Severe Protein Calorie Malnutrition -  # Sacral Decubitus Ulcer - continue wound care.        Plan of care d/w son Dr. Tierney at [phone]

## 2023-12-16 NOTE — CHART NOTE - NSCHARTNOTEFT_GEN_A_CORE
: KIET    INDICATION: Shock    PROCEDURE:  [x ] LIMITED ECHO  [ x] LIMITED CHEST  [ ] LIMITED RETROPERITONEAL  [ ] LIMITED ABDOMINAL  [ ] LIMITED DVT  [ ] NEEDLE GUIDANCE VASCULAR  [ ] NEEDLE GUIDANCE THORACENTESIS  [ ] NEEDLE GUIDANCE PARACENTESIS  [ ] NEEDLE GUIDANCE PERICARDIOCENTESIS  [ ] OTHER    FINDINGS:    2D TTE: hyperdynamic LV   INTERPRETATION:

## 2023-12-17 NOTE — PROGRESS NOTE ADULT - ASSESSMENT
89 yo M with HTN, CKD3, BPH w/ chronic obrien and multiple admissions for CAUTI, dementia, COVID 1/2023 a/w sob found to have L sided pleural effusion with compressive atelectasis of L [place] and chronic UTI with with enterococcus faecalis and pseudomonas . ICU consulted on 12/16 for hypoxic and hypercapneic respiratory failure.      Neuro:  intubated requiring sedation to maintain rass of 0 to -1 and for vent synchrony.  CV:  Maintain MAP >65; required norepinephrine overnight, weaned off after fluid challenge.    Pulm: Acute hypercarbic respiratory failure; s/p bronchoscopy with now growth of staph aureus from sputum; pending sensitivities; failed weaning today with pressure support.  GI: NPO  Renal/Metabolic: ROGER on CKD 3 with chronic obrien, c/w abx for CAUTI present on admission.    ID: c/w abx for CAUTI present on admission.    Heme: HSQ  Skin: Stage III sacral ulcer, continue with wound care  Dispo: Admitted to ICU for intubation and bronchoscopy.  Full code.  Discussed care plan with son, Dr Tierney.  Ongoing goals of care discussions.

## 2023-12-17 NOTE — PROGRESS NOTE ADULT - SUBJECTIVE AND OBJECTIVE BOX
NEPHROLOGY PROGRESS NOTE    CHIEF COMPLAINT:  CKD    HPI:  Transferred to ICU yesterday, intubated and bronched with copious secretions from LLL.   mL.  Renal function minimally worse.  On low dose levophed.    EXAM:  Vital Signs Last 24 Hrs  T(C): 36.9 (17 Dec 2023 13:00), Max: 37.2 (16 Dec 2023 18:00)  T(F): 98.4 (17 Dec 2023 13:00), Max: 99 (16 Dec 2023 18:00)  HR: 93 (17 Dec 2023 13:00) (77 - 112)  BP: 152/74 (17 Dec 2023 13:00) (88/56 - 183/75)  BP(mean): 96 (17 Dec 2023 13:00) (65 - 118)  RR: 18 (17 Dec 2023 13:00) (15 - 32)  SpO2: 100% (17 Dec 2023 13:00) (95% - 100%)    Parameters below as of 16 Dec 2023 18:00  Patient On (Oxygen Delivery Method): ventilator    O2 Concentration (%): 30  I&O's Summary    16 Dec 2023 07:01  -  17 Dec 2023 07:00  --------------------------------------------------------  IN: 4441.2 mL / OUT: 670 mL / NET: 3771.2 mL    17 Dec 2023 07:01  -  17 Dec 2023 13:54  --------------------------------------------------------  IN: 587.6 mL / OUT: 0 mL / NET: 587.6 mL      Daily     Daily Weight in k.9 (17 Dec 2023 04:00)    Sedated  Normal respiratory effort, lungs clear bilaterally  Heart RRR with no murmur, no peripheral edema    LABS                        7.5    6.55  )-----------( 231      ( 17 Dec 2023 08:30 )             23.8     12-17    144  |  111<H>  |  40<H>  ----------------------------<  78  3.7   |  17<L>  |  2.30<H>    Ca    8.6      17 Dec 2023 01:45  Phos  3.2       Mg     1.8         TPro  7.3  /  Alb  1.7<L>  /  TBili  0.5  /  DBili  x   /  AST  15  /  ALT  9<L>  /  AlkPhos  28<L>        Impression:  1.  Acute respiratory failure with whiteout of left lung, improved s/p bronch  2.  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

## 2023-12-17 NOTE — PROGRESS NOTE ADULT - SUBJECTIVE AND OBJECTIVE BOX
INTERVAL HPI/OVERNIGHT EVENTS:    Updated son Dr Pablo Kelsy over the phone.      Bronchial cultures noted staph aureus, awaiting sensitivities.      CENTRAL LINE: [ ] YES [x ] NO  LOCATION:       FORTE: [ x] YES [ ] NO        A-LINE:  [ ] YES [ x] NO  LOCATION:       GLOBAL ISSUE/BEST PRACTICE:  Analgesia:   Sedation:propofol Infusion    HOB elevation: yes  Stress ulcer prophylaxis:   VTE prophylaxis:   Oral Care: Chlorhexidine  Glycemic control:   Nutrition:Diet, NPO with Tube Feed:   Tube Feeding Modality: Nasogastric  Vital AF  Total Volume for 24 Hours (mL): 720  Continuous  Starting Tube Feed Rate mL per Hour: 10  Increase Tube Feed Rate by (mL): 10     Every 10 hours  Until Goal Tube Feed Rate (mL per Hour): 30  Tube Feed Duration (in Hours): 24  Tube Feed Start Time: 12:00 (12-17-23 @ 11:56) [Active]    REVIEW OF SYSTEMS:  [x ] Unable to obtain because: intubated on minimal sedation.      PHYSICAL EXAM:  Gen: intubated and sedated  HEENT: Intubated with ETT  CARD -s1s2, RRR, no M,G,R;   PULM - Coarse vented breath sounds, symmetric breath sounds;   ABD -  +BS, ND, NT, soft, no guarding, no rebound, no masses;   EXT - 2+ pulses on RLE and dopplerable pulse on LLE no edema;   NEURO - no focal neuro deficits     ICU Vital Signs Last 24 Hrs  T(C): 37.1 (17 Dec 2023 15:15), Max: 37.2 (16 Dec 2023 18:00)  T(F): 98.8 (17 Dec 2023 15:15), Max: 99 (16 Dec 2023 18:00)  HR: 91 (17 Dec 2023 15:15) (77 - 107)  BP: 161/77 (17 Dec 2023 15:15) (106/61 - 183/75)  BP(mean): 100 (17 Dec 2023 15:15) (71 - 161)  ABP: --  ABP(mean): --  RR: 18 (17 Dec 2023 15:15) (15 - 28)  SpO2: 100% (17 Dec 2023 15:15) (95% - 100%)    O2 Parameters below as of 17 Dec 2023 14:08  Patient On (Oxygen Delivery Method): ventilator      I&O's Detail    16 Dec 2023 07:01  -  17 Dec 2023 07:00  --------------------------------------------------------  IN:    Lactated Ringers: 1125 mL    Lactated Ringers Bolus: 3000 mL    Norepinephrine: 79.2 mL    Propofol: 237 mL  Total IN: 4441.2 mL    OUT:    Indwelling Catheter - Urethral (mL): 270 mL    Voided (mL): 400 mL  Total OUT: 670 mL    Total NET: 3771.2 mL      17 Dec 2023 07:01  -  17 Dec 2023 15:55  --------------------------------------------------------  IN:    IV PiggyBack: 50 mL    Lactated Ringers: 525 mL    Propofol: 12.6 mL  Total IN: 587.6 mL    OUT:  Total OUT: 0 mL    Total NET: 587.6 mL        MEDICATIONS  NEURO  Meds: acetaminophen     Tablet .. 650 milliGRAM(s) Oral every 6 hours PRN Temp greater or equal to 38C (100.4F), Mild Pain (1 - 3)  gabapentin 300 milliGRAM(s) Oral two times a day  mirtazapine 15 milliGRAM(s) Oral daily  propofol Infusion 10 MICROgram(s)/kG/Min (4.52 mL/Hr) IV Continuous <Continuous>    RESPIRATORY  ABG - ( 17 Dec 2023 02:12 )  pH: 7.45  /  pCO2: 26    /  pO2: 66    / HCO3: 18    / Base Excess: -5.0  /  SaO2: 93.6    Lactate: x                Meds: albuterol/ipratropium for Nebulization 3 milliLiter(s) Nebulizer every 8 hours  sodium chloride 3%  Inhalation 4 milliLiter(s) Inhalation every 8 hours    CARDIOVASCULAR  Meds:   GI/NUTRITION  Meds: polyethylene glycol 3350 17 Gram(s) Oral at bedtime  senna 2 Tablet(s) Oral at bedtime    GENITOURINARY  Meds: lactated ringers. 1000 milliLiter(s) IV Continuous <Continuous>    HEMATOLOGIC  Meds:   [x] VTE Prophylaxis  INFECTIOUS DISEASES  Meds: cefepime   IVPB        ENDOCRINE  CAPILLARY BLOOD GLUCOSE      POCT Blood Glucose.: 94 mg/dL (17 Dec 2023 05:29)  POCT Blood Glucose.: 84 mg/dL (17 Dec 2023 01:20)    Meds:   OTHER MEDICATIONS:  chlorhexidine 0.12% Liquid 15 milliLiter(s) Oral Mucosa every 12 hours  chlorhexidine 2% Cloths 1 Application(s) Topical <User Schedule>  collagenase Ointment 1 Application(s) Topical daily  :    LABS:                        7.5    6.55  )-----------( 231      ( 17 Dec 2023 08:30 )             23.8      12-17    144  |  111<H>  |  40<H>  ----------------------------<  78  3.7   |  17<L>  |  2.30<H>    Ca    8.6      17 Dec 2023 01:45  Phos  3.2     12-17  Mg     1.8     12-17    TPro  7.3  /  Alb  1.7<L>  /  TBili  0.5  /  DBili  x   /  AST  15  /  ALT  9<L>  /  AlkPhos  28<L>  12-17      Urinalysis Basic - ( 17 Dec 2023 01:45 )    Color: x / Appearance: x / SG: x / pH: x  Gluc: 78 mg/dL / Ketone: x  / Bili: x / Urobili: x   Blood: x / Protein: x / Nitrite: x   Leuk Esterase: x / RBC: x / WBC x   Sq Epi: x / Non Sq Epi: x / Bacteria: x      Culture Results:   Moderate Staphylococcus aureus (12-16 @ 22:20)  Culture Results:   Numerous Staphylococcus aureus (12-16 @ 15:00)      Culture - Bronchial (collected 12-16-23 @ 22:20)  Source: .Bronchial  Gram Stain (prelim) (12-17-23 @ 15:32):    Moderate polymorphonuclear leukocytes per low power field    Rare Squamous epithelial cells per low power field    Moderate Gram positive cocci in pairs per oil power field  Preliminary Report (12-17-23 @ 15:32):    Moderate Staphylococcus aureus      Mode: AC/ CMV (Assist Control/ Continuous Mandatory Ventilation), RR (machine): 18, TV (machine): 450, FiO2: 30, PEEP: 5, PS: 5, ITime: 1, MAP: 9, PC: , PIP: 23      RADIOLOGY & ADDITIONAL STUDIES:

## 2023-12-17 NOTE — PROVIDER CONTACT NOTE (OTHER) - SITUATION
Pt with 0 urine output for 2 hours - total for shift so far is 250 mLs. Pt is s/p 500 cc bolus @ 0100 for same. bladder scan shows 0 in bladder

## 2023-12-18 NOTE — PROGRESS NOTE ADULT - SUBJECTIVE AND OBJECTIVE BOX
HPI:  89M w/ hx of CKD, HTN, BPH w/ chronic obrien, dementia p/w SOB. Pt poor historian so history obtained from record. Pt was brought in my home aid who reports pt c/o SOB. Denies fever/chills/sweats, URI sx, N/V/D.    In ED, pt hypothermic to 95.5 and hypotensive to 90/60s. Labs notable for Hgb 6.3 (+london), dimer 3000, Na 129, BUN/SCr 70/2.91, BNP 7966, UA+. CT CAP demonstrates mild b/l hydronephrosis and large stool burden w/ perirectal stranding c/f stercoral colitis. (01 Dec 2023 06:11)      24 hr events: No acute events.     ## ROS:  [x ] unable to obtain    ## Vitals  ICU Vital Signs Last 24 Hrs  T(C): 37.7 (18 Dec 2023 07:00), Max: 38.1 (18 Dec 2023 04:00)  T(F): 99.9 (18 Dec 2023 07:00), Max: 100.6 (18 Dec 2023 04:00)  HR: 98 (18 Dec 2023 08:43) (79 - 108)  BP: 163/83 (18 Dec 2023 07:00) (148/81 - 174/154)  BP(mean): 105 (18 Dec 2023 07:00) (89 - 161)  ABP: --  ABP(mean): --  RR: 18 (18 Dec 2023 07:00) (16 - 20)  SpO2: 99% (18 Dec 2023 08:43) (95% - 100%)    O2 Parameters below as of 17 Dec 2023 14:08  Patient On (Oxygen Delivery Method): ventilator            ## Physical Exam:  Gen:  HEENT:  Resp:  CV:  Abd:  Ext:  Neuro:    ## Vent Data  Mode: CPAP with PS  FiO2: 30  PEEP: 5  PS: 10  ITime: 1  MAP: 8  PIP: 15      ## Labs:  Chem:  12-18    144  |  114<H>  |  40<H>  ----------------------------<  90  3.2<L>   |  20<L>  |  2.26<H>    Ca    8.4<L>      18 Dec 2023 02:04  Phos  2.1     12-18  Mg     1.9     12-18    TPro  7.4  /  Alb  1.7<L>  /  TBili  0.5  /  DBili  x   /  AST  19  /  ALT  10<L>  /  AlkPhos  27<L>  12-18    LIVER FUNCTIONS - ( 18 Dec 2023 02:04 )  Alb: 1.7 g/dL / Pro: 7.4 gm/dL / ALK PHOS: 27 U/L / ALT: 10 U/L / AST: 19 U/L / GGT: x           CBC:                        7.6    6.82  )-----------( 216      ( 18 Dec 2023 02:04 )             24.4     Coags:      Urinalysis Basic - ( 18 Dec 2023 02:04 )    Color: x / Appearance: x / SG: x / pH: x  Gluc: 90 mg/dL / Ketone: x  / Bili: x / Urobili: x   Blood: x / Protein: x / Nitrite: x   Leuk Esterase: x / RBC: x / WBC x   Sq Epi: x / Non Sq Epi: x / Bacteria: x        ## Cardiac        ## Blood Gas  ABG - ( 17 Dec 2023 02:12 )  pH, Arterial: 7.45  pH, Blood: x     /  pCO2: 26    /  pO2: 66    / HCO3: 18    / Base Excess: -5.0  /  SaO2: 93.6                #I/Os  I&O's Detail    17 Dec 2023 07:01  -  18 Dec 2023 07:00  --------------------------------------------------------  IN:    Dexmedetomidine: 42 mL    Enteral Tube Flush: 340 mL    IV PiggyBack: 400 mL    Lactated Ringers: 825 mL    Propofol: 12.6 mL    Vital High Protein: 280 mL  Total IN: 1899.6 mL    OUT:    Indwelling Catheter - Urethral (mL): 2275 mL  Total OUT: 2275 mL    Total NET: -375.4 mL      18 Dec 2023 07:01  -  18 Dec 2023 09:22  --------------------------------------------------------  IN:    Dexmedetomidine: 3.5 mL    Vital High Protein: 20 mL  Total IN: 23.5 mL    OUT:    Indwelling Catheter - Urethral (mL): 300 mL  Total OUT: 300 mL    Total NET: -276.5 mL          ## Imaging:    ## Medications:  MEDICATIONS  (STANDING):  albuterol/ipratropium for Nebulization 3 milliLiter(s) Nebulizer every 6 hours  ceFAZolin   IVPB 1000 milliGRAM(s) IV Intermittent every 12 hours  chlorhexidine 0.12% Liquid 15 milliLiter(s) Oral Mucosa every 12 hours  chlorhexidine 2% Cloths 1 Application(s) Topical <User Schedule>  collagenase Ointment 1 Application(s) Topical daily  dexMEDEtomidine Infusion 0.2 MICROgram(s)/kG/Hr (3.51 mL/Hr) IV Continuous <Continuous>  gabapentin 300 milliGRAM(s) Oral two times a day  mirtazapine 15 milliGRAM(s) Oral daily  polyethylene glycol 3350 17 Gram(s) Oral at bedtime  senna 2 Tablet(s) Oral at bedtime  sodium chloride 7% Inhalation 4 milliLiter(s) Inhalation every 6 hours    MEDICATIONS  (PRN):  acetaminophen     Tablet .. 650 milliGRAM(s) Oral every 6 hours PRN Temp greater or equal to 38C (100.4F), Mild Pain (1 - 3)       HPI:  89M w/ hx of CKD, HTN, BPH w/ chronic obrien, dementia p/w SOB. Pt poor historian so history obtained from record. Pt was brought in my home aid who reports pt c/o SOB. Denies fever/chills/sweats, URI sx, N/V/D.    In ED, pt hypothermic to 95.5 and hypotensive to 90/60s. Labs notable for Hgb 6.3 (+l[place]), dimer 3000, Na 129, BUN/SCr 70/2.91, BNP 7966, UA+. CT CAP demonstrates mild b/l hydronephrosis and large stool burden w/ perirectal stranding c/f stercoral colitis. (01 Dec 2023 06:11)      24 hr events: No acute events.     ## ROS:  [x ] unable to obtain    ## Vitals  ICU Vital Signs Last 24 Hrs  T(C): 37.7 (18 Dec 2023 07:00), Max: 38.1 (18 Dec 2023 04:00)  T(F): 99.9 (18 Dec 2023 07:00), Max: 100.6 (18 Dec 2023 04:00)  HR: 98 (18 Dec 2023 08:43) (79 - 108)  BP: 163/83 (18 Dec 2023 07:00) (148/81 - 174/154)  BP(mean): 105 (18 Dec 2023 07:00) (89 - 161)  ABP: --  ABP(mean): --  RR: 18 (18 Dec 2023 07:00) (16 - 20)  SpO2: 99% (18 Dec 2023 08:43) (95% - 100%)    O2 Parameters below as of 17 Dec 2023 14:08  Patient On (Oxygen Delivery Method): ventilator            ## Physical Exam:  Gen: Elderly male lying in bed, NAD  HEENT: NC/AT sclerae anciteric. ET tube in place  Resp: Ronchorous breath sounds, decreased breath sounds on L side  CV: S1, S2  Abd: Soft, + BS  Ext: WWP  Neuro: Arousable, follows commands    ## Vent Data  Mode: CPAP with PS  FiO2: 30  PEEP: 5  PS: 10  ITime: 1  MAP: 8  PIP: 15      ## Labs:  Chem:  12-18    144  |  114<H>  |  40<H>  ----------------------------<  90  3.2<L>   |  20<L>  |  2.26<H>    Ca    8.4<L>      18 Dec 2023 02:04  Phos  2.1     12-18  Mg     1.9     12-18    TPro  7.4  /  Alb  1.7<L>  /  TBili  0.5  /  DBili  x   /  AST  19  /  ALT  10<L>  /  AlkPhos  27<L>  12-18    LIVER FUNCTIONS - ( 18 Dec 2023 02:04 )  Alb: 1.7 g/dL / Pro: 7.4 gm/dL / ALK PHOS: 27 U/L / ALT: 10 U/L / AST: 19 U/L / GGT: x           CBC:                        7.6    6.82  )-----------( 216      ( 18 Dec 2023 02:04 )             24.4     Coags:      Urinalysis Basic - ( 18 Dec 2023 02:04 )    Color: x / Appearance: x / SG: x / pH: x  Gluc: 90 mg/dL / Ketone: x  / Bili: x / Urobili: x   Blood: x / Protein: x / Nitrite: x   Leuk Esterase: x / RBC: x / WBC x   Sq Epi: x / Non Sq Epi: x / Bacteria: x        ## Cardiac        ## Blood Gas  ABG - ( 17 Dec 2023 02:12 )  pH, Arterial: 7.45  pH, Blood: x     /  pCO2: 26    /  pO2: 66    / HCO3: 18    / Base Excess: -5.0  /  SaO2: 93.6                #I/Os  I&O's Detail    17 Dec 2023 07:01  -  18 Dec 2023 07:00  --------------------------------------------------------  IN:    Dexmedetomidine: 42 mL    Enteral Tube Flush: 340 mL    IV PiggyBack: 400 mL    Lactated Ringers: 825 mL    Propofol: 12.6 mL    Vital High Protein: 280 mL  Total IN: 1899.6 mL    OUT:    Indwelling Catheter - Urethral (mL): 2275 mL  Total OUT: 2275 mL    Total NET: -375.4 mL      18 Dec 2023 07:01  -  18 Dec 2023 09:22  --------------------------------------------------------  IN:    Dexmedetomidine: 3.5 mL    Vital High Protein: 20 mL  Total IN: 23.5 mL    OUT:    Indwelling Catheter - Urethral (mL): 300 mL  Total OUT: 300 mL    Total NET: -276.5 mL          ## Imaging:    ## Medications:  MEDICATIONS  (STANDING):  albuterol/ipratropium for Nebulization 3 milliLiter(s) Nebulizer every 6 hours  ceFAZolin   IVPB 1000 milliGRAM(s) IV Intermittent every 12 hours  chlorhexidine 0.12% Liquid 15 milliLiter(s) Oral Mucosa every 12 hours  chlorhexidine 2% Cloths 1 Application(s) Topical <User Schedule>  collagenase Ointment 1 Application(s) Topical daily  dexMEDEtomidine Infusion 0.2 MICROgram(s)/kG/Hr (3.51 mL/Hr) IV Continuous <Continuous>  gabapentin 300 milliGRAM(s) Oral two times a day  mirtazapine 15 milliGRAM(s) Oral daily  polyethylene glycol 3350 17 Gram(s) Oral at bedtime  senna 2 Tablet(s) Oral at bedtime  sodium chloride 7% Inhalation 4 milliLiter(s) Inhalation every 6 hours    MEDICATIONS  (PRN):  acetaminophen     Tablet .. 650 milliGRAM(s) Oral every 6 hours PRN Temp greater or equal to 38C (100.4F), Mild Pain (1 - 3)

## 2023-12-18 NOTE — PROGRESS NOTE ADULT - ASSESSMENT
89 y/o M with PMHx of dementia, HTN, loop recorder, BPH, and chronic obrien admitted with:    Acute hypoxic/hypercapnic respiratory failure  MRSA PNA    PLAN:  - Sedated 89 y/o M with PMHx of dementia, HTN, loop recorder, BPH, and chronic obrien admitted with:    Acute hypoxic/hypercapnic respiratory failure  MRSA PNA    PLAN:  - Sedated 89 y/o M with PMHx of dementia, HTN, loop recorder, BPH, and chronic obrien admitted with:    Acute hypoxic/hypercapnic respiratory failure  MRSA PNA  ROGER    PLAN:  - Sedated with precedex to maintain ventilator synchrony and promote patient comfort.  - Actively tirating ventilator setttings to maintain SpO2 > 92% and adequate minute ventilation.  - Aggressive pulmonary toileting.   - Spontaneous breathing trials as tolerated.  - Has not required vasopressor support since 12/16.  - Maintain MAP > 65.  - Sputum and BAL cultures grew MRSA. Abx changed to vancomycin.  - Monitor renal function.  - Chemical DVT prophylaxis with heparin.  - Full code.

## 2023-12-18 NOTE — PROGRESS NOTE ADULT - SUBJECTIVE AND OBJECTIVE BOX
Patient is a 89y old  Male who presents with a chief complaint of shortness of breath (17 Dec 2023 15:54)    BRIEF HOSPITAL COURSE: 89 y/o M with PMHx of dementia, HTN, loop recorder, BPH, and chronic obrien who was admitted on 11/30 with UTI and stercoral colitis. Hospital course complicated by acute hypoxic/hypercapnic respiratory failure requiring intubation and subsequent bronchoscopy for left lung white out on 12/16.    Events last 24 hours: Notified by RN that BAL and sputum cultures grew MRSA. Vancomycin added. Tolerated pressure support 5/5 all day.    PAST MEDICAL & SURGICAL HISTORY:  HTN (Hypertension)  Benign Prostatic Hyperplasia  Hypertension  BPH (benign prostatic hypertrophy)  Spinal stenosis of lumbar region  Dehydration  Renal insufficiency  Hydrocele in adult  bilateral, chronic  Benign essential HTN  BPH (benign prostatic hyperplasia)  Indwelling Obrien catheter present  Dementia  Wheelchair dependent  Implantable loop recorder present  No significant past surgical history  Implantable loop recorder present    Review of Systems:  Unable to obtain. Intubated/sedated.    Medications:  ceFAZolin   IVPB 1000 milliGRAM(s) IV Intermittent every 12 hours  vancomycin  IVPB 750 milliGRAM(s) IV Intermittent every 24 hours  albuterol/ipratropium for Nebulization 3 milliLiter(s) Nebulizer every 6 hours  sodium chloride 3%  Inhalation 4 milliLiter(s) Inhalation every 6 hours  acetaminophen     Tablet .. 650 milliGRAM(s) Oral every 6 hours PRN  dexMEDEtomidine Infusion 0.2 MICROgram(s)/kG/Hr IV Continuous <Continuous>  gabapentin 300 milliGRAM(s) Oral two times a day  mirtazapine 15 milliGRAM(s) Oral daily  heparin   Injectable 5000 Unit(s) SubCutaneous every 8 hours  polyethylene glycol 3350 17 Gram(s) Oral at bedtime  senna 2 Tablet(s) Oral at bedtime  chlorhexidine 0.12% Liquid 15 milliLiter(s) Oral Mucosa every 12 hours  chlorhexidine 2% Cloths 1 Application(s) Topical <User Schedule>  collagenase Ointment 1 Application(s) Topical daily    Mode: CPAP with PS  FiO2: 30  PEEP: 5  PS: 5  ITime: 0.8  MAP: 7  PIP: 10    ICU Vital Signs Last 24 Hrs  T(C): 36.3 (18 Dec 2023 20:00), Max: 38.1 (18 Dec 2023 04:00)  T(F): 97.3 (18 Dec 2023 20:00), Max: 100.6 (18 Dec 2023 04:00)  HR: 90 (18 Dec 2023 20:00) (79 - 105)  BP: 125/63 (18 Dec 2023 20:00) (125/63 - 171/82)  BP(mean): 80 (18 Dec 2023 20:00) (80 - 123)  ABP: --  ABP(mean): --  RR: 26 (18 Dec 2023 20:00) (16 - 28)  SpO2: 98% (18 Dec 2023 20:00) (96% - 100%)    O2 Parameters below as of 18 Dec 2023 17:41  Patient On (Oxygen Delivery Method): ventilator    ABG - ( 17 Dec 2023 02:12 )  pH, Arterial: 7.45  pH, Blood: x     /  pCO2: 26    /  pO2: 66    / HCO3: 18    / Base Excess: -5.0  /  SaO2: 93.6      I&O's Detail    17 Dec 2023 07:01  -  18 Dec 2023 07:00  --------------------------------------------------------  IN:    Dexmedetomidine: 42 mL    Enteral Tube Flush: 340 mL    IV PiggyBack: 400 mL    Lactated Ringers: 825 mL    Propofol: 12.6 mL    Vital High Protein: 280 mL  Total IN: 1899.6 mL    OUT:    Indwelling Catheter - Urethral (mL): 2275 mL  Total OUT: 2275 mL    Total NET: -375.4 mL    18 Dec 2023 07:01  -  18 Dec 2023 21:14  --------------------------------------------------------  IN:    Dexmedetomidine: 44 mL    IV PiggyBack: 50 mL    Osmolite 1.2: 90 mL    Vital High Protein: 80 mL  Total IN: 264 mL    OUT:    Indwelling Catheter - Urethral (mL): 1600 mL  Total OUT: 1600 mL    Total NET: -1336 mL    LABS:                        7.6    6.82  )-----------( 216      ( 18 Dec 2023 02:04 )             24.4     12-18    144  |  114<H>  |  40<H>  ----------------------------<  90  3.2<L>   |  20<L>  |  2.26<H>    Ca    8.4<L>      18 Dec 2023 02:04  Phos  2.1     12-18  Mg     1.9     12-18    TPro  7.4  /  Alb  1.7<L>  /  TBili  0.5  /  DBili  x   /  AST  19  /  ALT  10<L>  /  AlkPhos  27<L>  12-18    CAPILLARY BLOOD GLUCOSE    POCT Blood Glucose.: 94 mg/dL (17 Dec 2023 05:29)    Urinalysis Basic - ( 18 Dec 2023 02:04 )  Color: x / Appearance: x / SG: x / pH: x  Gluc: 90 mg/dL / Ketone: x  / Bili: x / Urobili: x   Blood: x / Protein: x / Nitrite: x   Leuk Esterase: x / RBC: x / WBC x   Sq Epi: x / Non Sq Epi: x / Bacteria: x    CULTURES:  Culture Results:   Moderate Methicillin Resistant Staphylococcus aureus  Normal Respiratory Vanessa present (12-16 @ 22:20)  Culture Results:   Numerous Methicillin Resistant Staphylococcus aureus  Normal Respiratory Vanessa present (12-16 @ 15:00)  Culture Results:   Moderate Methicillin Resistant Staphylococcus aureus  Normal Respiratory Vanessa present (12-16 @ 15:00)  Rapid RVP Result: NotDetec (12-14 @ 13:00)    Physical Examination:    General: Intubated.     HEENT: Pupils equal, reactive to light. Symmetric. No scleral icterus or injection.    PULM: Very diminished breath sounds on left side.    NECK: Supple, no lymphadenopathy, trachea midline.    CVS: Regular rate and rhythm, no murmurs appreciated, +s1/s2.    ABD: Soft, nondistended, nontender, normoactive bowel sounds.    EXT: No edema, nontender.    SKIN: Warm and well perfused, no rashes noted.    NEURO: Sedated.    RADIOLOGY:  < from: Xray Chest 1 View- PORTABLE-Urgent (Xray Chest 1 View- PORTABLE-Urgent .) (12.16.23 @ 16:05) >  ACC: [id number] EXAM:  XR CHEST PORTABLE URGENT 1V   ORDERED BY: FLAVIO GAYLE     ACC: [id number] EXAM:  XR CHEST PORTABLE URGENT 1V   ORDERED BY: BERNICE QUIROZ     PROCEDURE DATE:  12/16/2023      INTERPRETATION:  Clinical history hypoxia    12/16/2023 12:51 PM  Comparison 2 days prior. A single frontal view of the chest demonstrates   improved aeration of the left upper lung. There is elevation of the left   hemidiaphragm. Cardiac loop recorder again seen in place. Right lung   remains clear.    12/16/2023 3:25 PM    An endotracheal tube has been inserted with its tip just below the   clavicles. There is moderate left effusion. Right lung remains clear.    IMPRESSION:    Moderate left base effusion    --- End of Report ---    JERED ANDREWS MD; Attending Radiologist  This document has been electronically signed. Dec 17 2023  6:17PM    < end of copied text >    CRITICAL CARE TIME SPENT: 36 minutes    Date of entry of this note is equal to the date of services rendered.     Patient is a 89y old  Male who presents with a chief complaint of shortness of breath (17 Dec 2023 15:54)    BRIEF HOSPITAL COURSE: 89 y/o M with PMHx of dementia, HTN, loop recorder, BPH, and chronic obrien who was admitted on 11/30 with UTI and stercoral colitis. Hospital course complicated by acute hypoxic/hypercapnic respiratory failure requiring intubation and subsequent bronchoscopy for left lung white out on 12/16.    Events last 24 hours: Notified by RN that BAL and sputum cultures grew MRSA. Vancomycin added. Tolerated pressure support 5/5 all day.    PAST MEDICAL & SURGICAL HISTORY:  HTN (Hypertension)  Benign Prostatic Hyperplasia  Hypertension  BPH (benign prostatic hypertrophy)  Spinal stenosis of lumbar region  Dehydration  Renal insufficiency  Hydrocele in adult  bilateral, chronic  Benign essential HTN  BPH (benign prostatic hyperplasia)  Indwelling Obrien catheter present  Dementia  Wheelchair dependent  Implantable loop recorder present  No significant past surgical history  Implantable loop recorder present    Review of Systems:  Unable to obtain. Intubated/sedated.    Medications:  ceFAZolin   IVPB 1000 milliGRAM(s) IV Intermittent every 12 hours  vancomycin  IVPB 750 milliGRAM(s) IV Intermittent every 24 hours  albuterol/ipratropium for Nebulization 3 milliLiter(s) Nebulizer every 6 hours  sodium chloride 3%  Inhalation 4 milliLiter(s) Inhalation every 6 hours  acetaminophen     Tablet .. 650 milliGRAM(s) Oral every 6 hours PRN  dexMEDEtomidine Infusion 0.2 MICROgram(s)/kG/Hr IV Continuous <Continuous>  gabapentin 300 milliGRAM(s) Oral two times a day  mirtazapine 15 milliGRAM(s) Oral daily  heparin   Injectable 5000 Unit(s) SubCutaneous every 8 hours  polyethylene glycol 3350 17 Gram(s) Oral at bedtime  senna 2 Tablet(s) Oral at bedtime  chlorhexidine 0.12% Liquid 15 milliLiter(s) Oral Mucosa every 12 hours  chlorhexidine 2% Cloths 1 Application(s) Topical <User Schedule>  collagenase Ointment 1 Application(s) Topical daily    Mode: CPAP with PS  FiO2: 30  PEEP: 5  PS: 5  ITime: 0.8  MAP: 7  PIP: 10    ICU Vital Signs Last 24 Hrs  T(C): 36.3 (18 Dec 2023 20:00), Max: 38.1 (18 Dec 2023 04:00)  T(F): 97.3 (18 Dec 2023 20:00), Max: 100.6 (18 Dec 2023 04:00)  HR: 90 (18 Dec 2023 20:00) (79 - 105)  BP: 125/63 (18 Dec 2023 20:00) (125/63 - 171/82)  BP(mean): 80 (18 Dec 2023 20:00) (80 - 123)  ABP: --  ABP(mean): --  RR: 26 (18 Dec 2023 20:00) (16 - 28)  SpO2: 98% (18 Dec 2023 20:00) (96% - 100%)    O2 Parameters below as of 18 Dec 2023 17:41  Patient On (Oxygen Delivery Method): ventilator    ABG - ( 17 Dec 2023 02:12 )  pH, Arterial: 7.45  pH, Blood: x     /  pCO2: 26    /  pO2: 66    / HCO3: 18    / Base Excess: -5.0  /  SaO2: 93.6      I&O's Detail    17 Dec 2023 07:01  -  18 Dec 2023 07:00  --------------------------------------------------------  IN:    Dexmedetomidine: 42 mL    Enteral Tube Flush: 340 mL    IV PiggyBack: 400 mL    Lactated Ringers: 825 mL    Propofol: 12.6 mL    Vital High Protein: 280 mL  Total IN: 1899.6 mL    OUT:    Indwelling Catheter - Urethral (mL): 2275 mL  Total OUT: 2275 mL    Total NET: -375.4 mL    18 Dec 2023 07:01  -  18 Dec 2023 21:14  --------------------------------------------------------  IN:    Dexmedetomidine: 44 mL    IV PiggyBack: 50 mL    Osmolite 1.2: 90 mL    Vital High Protein: 80 mL  Total IN: 264 mL    OUT:    Indwelling Catheter - Urethral (mL): 1600 mL  Total OUT: 1600 mL    Total NET: -1336 mL    LABS:                        7.6    6.82  )-----------( 216      ( 18 Dec 2023 02:04 )             24.4     12-18    144  |  114<H>  |  40<H>  ----------------------------<  90  3.2<L>   |  20<L>  |  2.26<H>    Ca    8.4<L>      18 Dec 2023 02:04  Phos  2.1     12-18  Mg     1.9     12-18    TPro  7.4  /  Alb  1.7<L>  /  TBili  0.5  /  DBili  x   /  AST  19  /  ALT  10<L>  /  AlkPhos  27<L>  12-18    CAPILLARY BLOOD GLUCOSE    POCT Blood Glucose.: 94 mg/dL (17 Dec 2023 05:29)    Urinalysis Basic - ( 18 Dec 2023 02:04 )  Color: x / Appearance: x / SG: x / pH: x  Gluc: 90 mg/dL / Ketone: x  / Bili: x / Urobili: x   Blood: x / Protein: x / Nitrite: x   Leuk Esterase: x / RBC: x / WBC x   Sq Epi: x / Non Sq Epi: x / Bacteria: x    CULTURES:  Culture Results:   Moderate Methicillin Resistant Staphylococcus aureus  Normal Respiratory Vanessa present (12-16 @ 22:20)  Culture Results:   Numerous Methicillin Resistant Staphylococcus aureus  Normal Respiratory Vanessa present (12-16 @ 15:00)  Culture Results:   Moderate Methicillin Resistant Staphylococcus aureus  Normal Respiratory Vanessa present (12-16 @ 15:00)  Rapid RVP Result: NotDetec (12-14 @ 13:00)    Physical Examination:    General: Intubated.     HEENT: Pupils equal, reactive to light. Symmetric. No scleral icterus or injection.    PULM: Very diminished breath sounds on left side.    NECK: Supple, no lymphadenopathy, trachea midline.    CVS: Regular rate and rhythm, no murmurs appreciated, +s1/s2.    ABD: Soft, nondistended, nontender, normoactive bowel sounds.    EXT: No edema, nontender.    SKIN: Warm and well perfused, no rashes noted.    NEURO: Sedated.    RADIOLOGY:  < from: Xray Chest 1 View- PORTABLE-Urgent (Xray Chest 1 View- PORTABLE-Urgent .) (12.16.23 @ 16:05) >  ACC: [id number] EXAM:  XR CHEST PORTABLE URGENT 1V   ORDERED BY: FLAVIO GAYLE     ACC: [id number] EXAM:  XR CHEST PORTABLE URGENT 1V   ORDERED BY: BERNICE QUIROZ     PROCEDURE DATE:  12/16/2023      INTERPRETATION:  Clinical history hypoxia    12/16/2023 12:51 PM  Comparison 2 days prior. A single frontal view of the chest demonstrates   improved aeration of the left upper lung. There is elevation of the left   hemidiaphragm. Cardiac loop recorder again seen in place. Right lung   remains clear.    12/16/2023 3:25 PM    An endotracheal tube has been inserted with its tip just below the   clavicles. There is moderate left effusion. Right lung remains clear.    IMPRESSION:    Moderate left base effusion    --- End of Report ---    JERED ANDREWS MD; Attending Radiologist  This document has been electronically signed. Dec 17 2023  6:17PM    < end of copied text >    CRITICAL CARE TIME SPENT: 36 minutes    Date of entry of this note is equal to the date of services rendered.

## 2023-12-18 NOTE — CHART NOTE - NSCHARTNOTEFT_GEN_A_CORE
:  CAROLINE Renae dr   Indication:  hypoxemia     PROCEDURE:  [x ] LIMITED ECHO  [x ] LIMITED CHEST  [ ] LIMITED RETROPERITONEAL  [ ] LIMITED ABDOMINAL  [ ] LIMITED DVT  [ ] NEEDLE GUIDANCE VASCULAR  [ ] NEEDLE GUIDANCE THORACENTESIS  [ ] NEEDLE GUIDANCE PARACENTESIS  [ ] NEEDLE GUIDANCE PERICARDIOCENTESIS  [ ] OTHER    FINDINGS:  Chest: A-line predominant anteriorly. no effusion on R with moderate sized simple effusion on left     ECHO: LV> RV Grossly normal RV and LV function with no wall motion abnormalities, septal bowing/flattening  No pericardial effusion  IVC: indeterminate     INTERPRETATION:  lung exam with moderate sized left pleural effusion. no safe window to tap  grossly nl cardiac exam     images uploaded to [employer] :  CAROLINE Renae dr   Indication:  hypoxemia     PROCEDURE:  [x ] LIMITED ECHO  [x ] LIMITED CHEST  [ ] LIMITED RETROPERITONEAL  [ ] LIMITED ABDOMINAL  [ ] LIMITED DVT  [ ] NEEDLE GUIDANCE VASCULAR  [ ] NEEDLE GUIDANCE THORACENTESIS  [ ] NEEDLE GUIDANCE PARACENTESIS  [ ] NEEDLE GUIDANCE PERICARDIOCENTESIS  [ ] OTHER    FINDINGS:  Chest: A-line predominant anteriorly. no effusion on R with moderate sized simple effusion on left     ECHO: LV> RV Grossly normal RV and LV function with no wall motion abnormalities, septal bowing/flattening  No pericardial effusion  IVC: indeterminate     INTERPRETATION:  lung exam with moderate sized left pleural effusion. no safe window to tap  grossly nl cardiac exam     images uploaded to [employer]

## 2023-12-18 NOTE — PROGRESS NOTE ADULT - SUBJECTIVE AND OBJECTIVE BOX
[facility] NEPHROLOGY SERVICES, [facility]  NEPHROLOGY AND HYPERTENSION  [address]  [address]  [place] [address]  [phone]    MD SOFY KEENAN, MD KEATON FRYE MD CHRISTOPHER CAPUTO, MD LEANNE MURRAY MD          Patient events noted    MEDICATIONS  (STANDING):  albuterol/ipratropium for Nebulization 3 milliLiter(s) Nebulizer every 6 hours  chlorhexidine 0.12% Liquid 15 milliLiter(s) Oral Mucosa every 12 hours  chlorhexidine 2% Cloths 1 Application(s) Topical <User Schedule>  collagenase Ointment 1 Application(s) Topical daily  dexMEDEtomidine Infusion 0.2 MICROgram(s)/kG/Hr (3.51 mL/Hr) IV Continuous <Continuous>  gabapentin 300 milliGRAM(s) Oral two times a day  heparin   Injectable 5000 Unit(s) SubCutaneous every 8 hours  mirtazapine 15 milliGRAM(s) Oral daily  polyethylene glycol 3350 17 Gram(s) Oral at bedtime  senna 2 Tablet(s) Oral at bedtime  sodium chloride 3%  Inhalation 4 milliLiter(s) Inhalation every 6 hours  vancomycin  IVPB 750 milliGRAM(s) IV Intermittent every 24 hours    MEDICATIONS  (PRN):  acetaminophen     Tablet .. 650 milliGRAM(s) Oral every 6 hours PRN Temp greater or equal to 38C (100.4F), Mild Pain (1 - 3)      12-17-23 @ 07:01  -  12-18-23 @ 07:00  --------------------------------------------------------  IN: 1899.6 mL / OUT: 2275 mL / NET: -375.4 mL    12-18-23 @ 07:01  -  12-18-23 @ 23:36  --------------------------------------------------------  IN: 264 mL / OUT: 1600 mL / NET: -1336 mL      PHYSICAL EXAM:      T(C): 36.4 (12-18-23 @ 22:00), Max: 38.1 (12-18-23 @ 04:00)  HR: 81 (12-18-23 @ 22:00) (79 - 105)  BP: 143/84 (12-18-23 @ 22:00) (125/63 - 171/82)  RR: 19 (12-18-23 @ 22:00) (16 - 28)  SpO2: 97% (12-18-23 @ 22:00) (96% - 100%)  Wt(kg): --  Lungs clear anteriorly decreased BS left   Heart S1S2  Abd soft NT ND  Extremities:   tr edema                                    7.6    6.82  )-----------( 216      ( 18 Dec 2023 02:04 )             24.4     12-18    144  |  114<H>  |  40<H>  ----------------------------<  90  3.2<L>   |  20<L>  |  2.26<H>    Ca    8.4<L>      18 Dec 2023 02:04  Phos  2.1     12-18  Mg     1.9     12-18    TPro  7.4  /  Alb  1.7<L>  /  TBili  0.5  /  DBili  x   /  AST  19  /  ALT  10<L>  /  AlkPhos  27<L>  12-18    ABG - ( 17 Dec 2023 02:12 )  pH, Arterial: 7.45  pH, Blood: x     /  pCO2: 26    /  pO2: 66    / HCO3: 18    / Base Excess: -5.0  /  SaO2: 93.6              LIVER FUNCTIONS - ( 18 Dec 2023 02:04 )  Alb: 1.7 g/dL / Pro: 7.4 gm/dL / ALK PHOS: 27 U/L / ALT: 10 U/L / AST: 19 U/L / GGT: x           Creatinine Trend: 2.26<--, 2.30<--, 2.36<--, 2.33<--, 2.23<--, 2.17<--  Mode: AC/ CMV (Assist Control/ Continuous Mandatory Ventilation)  RR (machine): 18  TV (machine): 450  FiO2: 30  PEEP: 5  ITime: 1  MAP: 8  PIP: 15        Impression:  1.  Acute respiratory failure with whiteout of left lung, improved s/p bronch  2.  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 NEPHROLOGY SERVICES, [facility]  NEPHROLOGY AND HYPERTENSION  [address]  [address]  [place] [address]  [phone]    MD SOFY KEENAN, MD KEATON FRYE MD CHRISTOPHER CAPUTO, MD LEANNE MURRAY MD          Patient events noted    MEDICATIONS  (STANDING):  albuterol/ipratropium for Nebulization 3 milliLiter(s) Nebulizer every 6 hours  chlorhexidine 0.12% Liquid 15 milliLiter(s) Oral Mucosa every 12 hours  chlorhexidine 2% Cloths 1 Application(s) Topical <User Schedule>  collagenase Ointment 1 Application(s) Topical daily  dexMEDEtomidine Infusion 0.2 MICROgram(s)/kG/Hr (3.51 mL/Hr) IV Continuous <Continuous>  gabapentin 300 milliGRAM(s) Oral two times a day  heparin   Injectable 5000 Unit(s) SubCutaneous every 8 hours  mirtazapine 15 milliGRAM(s) Oral daily  polyethylene glycol 3350 17 Gram(s) Oral at bedtime  senna 2 Tablet(s) Oral at bedtime  sodium chloride 3%  Inhalation 4 milliLiter(s) Inhalation every 6 hours  vancomycin  IVPB 750 milliGRAM(s) IV Intermittent every 24 hours    MEDICATIONS  (PRN):  acetaminophen     Tablet .. 650 milliGRAM(s) Oral every 6 hours PRN Temp greater or equal to 38C (100.4F), Mild Pain (1 - 3)      12-17-23 @ 07:01  -  12-18-23 @ 07:00  --------------------------------------------------------  IN: 1899.6 mL / OUT: 2275 mL / NET: -375.4 mL    12-18-23 @ 07:01  -  12-18-23 @ 23:36  --------------------------------------------------------  IN: 264 mL / OUT: 1600 mL / NET: -1336 mL      PHYSICAL EXAM:      T(C): 36.4 (12-18-23 @ 22:00), Max: 38.1 (12-18-23 @ 04:00)  HR: 81 (12-18-23 @ 22:00) (79 - 105)  BP: 143/84 (12-18-23 @ 22:00) (125/63 - 171/82)  RR: 19 (12-18-23 @ 22:00) (16 - 28)  SpO2: 97% (12-18-23 @ 22:00) (96% - 100%)  Wt(kg): --  Lungs clear anteriorly decreased BS left   Heart S1S2  Abd soft NT ND  Extremities:   tr edema                                    7.6    6.82  )-----------( 216      ( 18 Dec 2023 02:04 )             24.4     12-18    144  |  114<H>  |  40<H>  ----------------------------<  90  3.2<L>   |  20<L>  |  2.26<H>    Ca    8.4<L>      18 Dec 2023 02:04  Phos  2.1     12-18  Mg     1.9     12-18    TPro  7.4  /  Alb  1.7<L>  /  TBili  0.5  /  DBili  x   /  AST  19  /  ALT  10<L>  /  AlkPhos  27<L>  12-18    ABG - ( 17 Dec 2023 02:12 )  pH, Arterial: 7.45  pH, Blood: x     /  pCO2: 26    /  pO2: 66    / HCO3: 18    / Base Excess: -5.0  /  SaO2: 93.6              LIVER FUNCTIONS - ( 18 Dec 2023 02:04 )  Alb: 1.7 g/dL / Pro: 7.4 gm/dL / ALK PHOS: 27 U/L / ALT: 10 U/L / AST: 19 U/L / GGT: x           Creatinine Trend: 2.26<--, 2.30<--, 2.36<--, 2.33<--, 2.23<--, 2.17<--  Mode: AC/ CMV (Assist Control/ Continuous Mandatory Ventilation)  RR (machine): 18  TV (machine): 450  FiO2: 30  PEEP: 5  ITime: 1  MAP: 8  PIP: 15        Impression:  1.  Acute respiratory failure with whiteout of left lung, improved s/p bronch  2.  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

## 2023-12-18 NOTE — CHART NOTE - NSCHARTNOTEFT_GEN_A_CORE
Pt seen for ICU admission, tube feeding assessment, and malnutrition follow-up. Pt with PMH CKD, HTN, dementia presents with SOB, found with CAUTI, ROGER on CKD, hydronephrosis, stercoral colitis. Pt s/p swallow evaluation 12/13 with recommendation for minced and moist with thin liquids. Admitted to ICU and intubated 12/15.     Factors impacting intake: [ ] none [ ] nausea  [ ] vomiting [ ] diarrhea [ ] constipation  [ ]chewing problems [ ] swallowing issues  [x] other: tube feed formula does not provide estimated kcal needs    Diet Prescription: Diet, NPO with Tube Feed:   Tube Feeding Modality: Nasogastric  Vital HP  Total Volume for 24 Hours (mL): 720  Continuous  Starting Tube Feed Rate {mL per Hour}: 10  Increase Tube Feed Rate by (mL): 10     Every 10 hours  Until Goal Tube Feed Rate (mL per Hour): 30  Tube Feed Duration (in Hours): 24  Tube Feed Start Time: 12:00 (12-17-23 @ 11:56)    Intake: Tube feed held as per ICU tube feed protocol (18 hours). Tube feed provides: Vital HP @ 30ml/hr x 18 hours (540ml total volume, 540kcal, 47g protein, 451ml water) not meeting pt's needs as estimated 12/04    Current Weight: Weight (kg): (12/18) 67.6kg (12/01) 75.3kg indicates weight loss of 7.7kg  % Weight Change: 10.2% weight loss x 17 days    Edema: 1+ generalized; 2+ right and left arms and hands as per flow sheets    Pertinent Medications: MEDICATIONS  (STANDING):  albuterol/ipratropium for Nebulization 3 milliLiter(s) Nebulizer every 6 hours  ceFAZolin   IVPB 1000 milliGRAM(s) IV Intermittent every 12 hours  chlorhexidine 0.12% Liquid 15 milliLiter(s) Oral Mucosa every 12 hours  chlorhexidine 2% Cloths 1 Application(s) Topical <User Schedule>  collagenase Ointment 1 Application(s) Topical daily  dexMEDEtomidine Infusion 0.2 MICROgram(s)/kG/Hr (3.51 mL/Hr) IV Continuous <Continuous>  gabapentin 300 milliGRAM(s) Oral two times a day  heparin   Injectable 5000 Unit(s) SubCutaneous every 8 hours  mirtazapine 15 milliGRAM(s) Oral daily  polyethylene glycol 3350 17 Gram(s) Oral at bedtime  senna 2 Tablet(s) Oral at bedtime  sodium chloride 3%  Inhalation 4 milliLiter(s) Inhalation every 6 hours    MEDICATIONS  (PRN):  acetaminophen     Tablet .. 650 milliGRAM(s) Oral every 6 hours PRN Temp greater or equal to 38C (100.4F), Mild Pain (1 - 3)    Pertinent Labs: 12-18 Na144 mmol/L Glu 90 mg/dL K+ 3.2 mmol/L<L> Cr  2.26 mg/dL<H> BUN 40 mg/dL<H> 12-18 Phos 2.1 mg/dL<L> 12-18 Alb 1.7 g/dL<L>      Skin: Pressure ulcer x 2  1. sacrum- stage III  2. left ischium- stage III  as per flow sheets    Estimated Needs:   [x] no change since previous assessment: 12/04  [ ] recalculated:     Previous Nutrition Diagnosis:   [x ] Malnutrition; severe malnutrition in context of acute illness   Related to: inadequate protein-energy intake in setting of ROGER, pleural effusion, sepsis, UTI, AMS/metabolic encephalopathy, debility   As evidenced by: <50% nutrition needs > 5 days, moderate muscle/fat loss  Goal: pt to consume >50% of meals & supplement (not met via tube feed)    Nutrition Diagnosis is [x] ongoing  [ ] resolved [ ] not applicable     New Nutrition Diagnosis: [x] not applicable      Interventions:   Recommend  [ ] Change Diet To:  [ ] Nutrition Supplement  [x] Nutrition Support: Change tube feed to Osmolite 1.2 @ 80 ml/hr x 18 hours which provides 1440 ml, 1728 calories, 80 grams protein, 1180 ml free water better meeting nutrient needs  x ] Other: Monitor and replete electrolytes as needed     Monitoring and Evaluation:   [ ] PO intake [ x ] Tolerance to diet prescription [ x ] weights [ x ] labs[ x ] follow up per protocol  [ ] other:

## 2023-12-18 NOTE — PROGRESS NOTE ADULT - ASSESSMENT
89 y/o M w/dementia, CKD, chronic urinary retention s/p borien initially admitted for dyspnea found to have L sided pleural effusions and compressive atelectasis w/hospital course c/b acute respiratory failure with hypoxia and hypercapnia likely secondary to staph aureus PNA.     - Sedation as needed goal RASS -1 to 0  - Wean from vent as tolerated  - Hypertonic saline, chest PT, pulmonary toilet  - Trend Cr, avoid nephrotoxins  - Diuresis goal net negative 1-2 L daily  - Cefazolin for MSSA PNA  - DVT prophylaxis  - Full code    I have personally provided 35 minutes of attending critical care time excluding procedures. 89 y/o M w/dementia, CKD, chronic urinary retention s/p obrien initially admitted for dyspnea found to have L sided pleural effusions and compressive atelectasis w/hospital course c/b acute respiratory failure with hypoxia and hypercapnia likely secondary to staph aureus PNA.     - Sedation as needed goal RASS -1 to 0  - Wean from vent as tolerated  - Hypertonic saline, chest PT, pulmonary toilet  - Trend Cr, avoid nephrotoxins  - Diuresis goal net negative 1-2 L daily  - Cefazolin for MSSA PNA  - DVT prophylaxis  - Full code    I have personally provided 35 minutes of attending critical care time excluding procedures. 89 y/o M w/dementia, CKD, chronic urinary retention s/p obrien initially admitted for dyspnea found to have L sided pleural effusions and compressive atelectasis w/hospital course c/b acute respiratory failure with hypoxia and hypercapnia likely secondary to staph aureus PNA.     - Sedation as needed goal RASS -1 to 0  - Wean from vent as tolerated  - Hypertonic saline, chest PT, pulmonary toilet  - Trend Cr, avoid nephrotoxins  - Diuresis goal net negative 1-2 L daily  - Abx for staph PNA, follow up sensitivities  - DVT prophylaxis  - Full code    I have personally provided 35 minutes of attending critical care time excluding procedures. 89 y/o M w/dementia, CKD, chronic urinary retention s/p obrien initially admitted for dyspnea found to have L sided pleural effusions and compressive atelectasis w/hospital course c/b acute respiratory failure with hypoxia and hypercapnia likely secondary to staph aureus PNA.     - Sedation as needed goal RASS -1 to 0  - Wean from vent as tolerated  - Hypertonic saline, chest PT, pulmonary toilet  - Trend Cr, avoid nephrotoxins  - Diuresis goal net negative 1-2 L daily  - Abx for staph PNA, follow up sensitivities  - DVT prophylaxis  - Full code    I have personally provided 35 minutes of attending critical care time excluding procedures.

## 2023-12-19 NOTE — PROGRESS NOTE ADULT - SUBJECTIVE AND OBJECTIVE BOX
[facility] NEPHROLOGY SERVICES, [facility]  NEPHROLOGY AND HYPERTENSION  [address]  [address]  [place] [address]  [phone]    MD SOFY KEENAN MD YELENA ROSENBERG, MD BINNY KOSHY, MD CHRISTOPHER CAPUTO, MD EDWARD BOVER, MD          Patient events noted    MEDICATIONS  (STANDING):  albuterol/ipratropium for Nebulization 3 milliLiter(s) Nebulizer every 6 hours  buMETAnide Injectable 2 milliGRAM(s) IV Push daily  chlorhexidine 0.12% Liquid 15 milliLiter(s) Oral Mucosa every 12 hours  chlorhexidine 2% Cloths 1 Application(s) Topical <User Schedule>  collagenase Ointment 1 Application(s) Topical daily  dexMEDEtomidine Infusion 0.2 MICROgram(s)/kG/Hr (3.51 mL/Hr) IV Continuous <Continuous>  gabapentin 300 milliGRAM(s) Oral two times a day  heparin   Injectable 5000 Unit(s) SubCutaneous every 8 hours  mirtazapine 15 milliGRAM(s) Oral daily  polyethylene glycol 3350 17 Gram(s) Oral at bedtime  senna 2 Tablet(s) Oral at bedtime  sodium chloride 3%  Inhalation 4 milliLiter(s) Inhalation every 6 hours  vancomycin  IVPB 750 milliGRAM(s) IV Intermittent every 24 hours    MEDICATIONS  (PRN):  acetaminophen     Tablet .. 650 milliGRAM(s) Oral every 6 hours PRN Temp greater or equal to 38C (100.4F), Mild Pain (1 - 3)      12-18-23 @ 07:01  -  12-19-23 @ 07:00  --------------------------------------------------------  IN: 1117 mL / OUT: 2060 mL / NET: -943 mL    12-19-23 @ 07:01  -  12-19-23 @ 22:33  --------------------------------------------------------  IN: 967.9 mL / OUT: 800 mL / NET: 167.9 mL      PHYSICAL EXAM:      T(C): 37.3 (12-19-23 @ 07:41), Max: 37.8 (12-19-23 @ 06:00)  HR: 89 (12-19-23 @ 21:07) (77 - 96)  BP: 98/50 (12-19-23 @ 20:00) (98/50 - 144/69)  RR: 23 (12-19-23 @ 20:00) (18 - 28)  SpO2: 99% (12-19-23 @ 21:07) (98% - 100%)  Wt(kg): --  Lungs clear  Heart S1S2  decreased BS L>R  Abd soft NT ND  Extremities:   tr edema                                    8.7    7.05  )-----------( 242      ( 19 Dec 2023 02:30 )             27.7     12-19    143  |  113<H>  |  40<H>  ----------------------------<  151<H>  3.7   |  25  |  2.21<H>    Ca    8.8      19 Dec 2023 02:30  Phos  2.5     12-19  Mg     1.8     12-19    TPro  7.8  /  Alb  1.7<L>  /  TBili  0.5  /  DBili  x   /  AST  22  /  ALT  9<L>  /  AlkPhos  33<L>  12-19      LIVER FUNCTIONS - ( 19 Dec 2023 02:30 )  Alb: 1.7 g/dL / Pro: 7.8 gm/dL / ALK PHOS: 33 U/L / ALT: 9 U/L / AST: 22 U/L / GGT: x           Creatinine Trend: 2.21<--, 2.26<--, 2.30<--, 2.36<--, 2.33<--, 2.23<--  Mode: AC/ CMV (Assist Control/ Continuous Mandatory Ventilation)  RR (machine): 18  TV (machine): 450  FiO2: 30  PEEP: 5  ITime: 0.8  MAP: 10  PIP: 32        Impression:  1.  Acute respiratory failure with whiteout of left lung, improved s/p bronch  2.  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 - probably prerenal vs ATN    Recommend:  Continue supportive care  Prognosis guarded       Samy Hari, MD [facility] NEPHROLOGY SERVICES, [facility]  NEPHROLOGY AND HYPERTENSION  [address]  [address]  [place] [address]  [phone]    MD SOFY KEENAN MD YELENA ROSENBERG, MD BINNY KOSHY, MD CHRISTOPHER CAPUTO, MD EDWARD BOVER, MD          Patient events noted    MEDICATIONS  (STANDING):  albuterol/ipratropium for Nebulization 3 milliLiter(s) Nebulizer every 6 hours  buMETAnide Injectable 2 milliGRAM(s) IV Push daily  chlorhexidine 0.12% Liquid 15 milliLiter(s) Oral Mucosa every 12 hours  chlorhexidine 2% Cloths 1 Application(s) Topical <User Schedule>  collagenase Ointment 1 Application(s) Topical daily  dexMEDEtomidine Infusion 0.2 MICROgram(s)/kG/Hr (3.51 mL/Hr) IV Continuous <Continuous>  gabapentin 300 milliGRAM(s) Oral two times a day  heparin   Injectable 5000 Unit(s) SubCutaneous every 8 hours  mirtazapine 15 milliGRAM(s) Oral daily  polyethylene glycol 3350 17 Gram(s) Oral at bedtime  senna 2 Tablet(s) Oral at bedtime  sodium chloride 3%  Inhalation 4 milliLiter(s) Inhalation every 6 hours  vancomycin  IVPB 750 milliGRAM(s) IV Intermittent every 24 hours    MEDICATIONS  (PRN):  acetaminophen     Tablet .. 650 milliGRAM(s) Oral every 6 hours PRN Temp greater or equal to 38C (100.4F), Mild Pain (1 - 3)      12-18-23 @ 07:01  -  12-19-23 @ 07:00  --------------------------------------------------------  IN: 1117 mL / OUT: 2060 mL / NET: -943 mL    12-19-23 @ 07:01  -  12-19-23 @ 22:33  --------------------------------------------------------  IN: 967.9 mL / OUT: 800 mL / NET: 167.9 mL      PHYSICAL EXAM:      T(C): 37.3 (12-19-23 @ 07:41), Max: 37.8 (12-19-23 @ 06:00)  HR: 89 (12-19-23 @ 21:07) (77 - 96)  BP: 98/50 (12-19-23 @ 20:00) (98/50 - 144/69)  RR: 23 (12-19-23 @ 20:00) (18 - 28)  SpO2: 99% (12-19-23 @ 21:07) (98% - 100%)  Wt(kg): --  Lungs clear  Heart S1S2  decreased BS L>R  Abd soft NT ND  Extremities:   tr edema                                    8.7    7.05  )-----------( 242      ( 19 Dec 2023 02:30 )             27.7     12-19    143  |  113<H>  |  40<H>  ----------------------------<  151<H>  3.7   |  25  |  2.21<H>    Ca    8.8      19 Dec 2023 02:30  Phos  2.5     12-19  Mg     1.8     12-19    TPro  7.8  /  Alb  1.7<L>  /  TBili  0.5  /  DBili  x   /  AST  22  /  ALT  9<L>  /  AlkPhos  33<L>  12-19      LIVER FUNCTIONS - ( 19 Dec 2023 02:30 )  Alb: 1.7 g/dL / Pro: 7.8 gm/dL / ALK PHOS: 33 U/L / ALT: 9 U/L / AST: 22 U/L / GGT: x           Creatinine Trend: 2.21<--, 2.26<--, 2.30<--, 2.36<--, 2.33<--, 2.23<--  Mode: AC/ CMV (Assist Control/ Continuous Mandatory Ventilation)  RR (machine): 18  TV (machine): 450  FiO2: 30  PEEP: 5  ITime: 0.8  MAP: 10  PIP: 32        Impression:  1.  Acute respiratory failure with whiteout of left lung, improved s/p bronch  2.  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

## 2023-12-19 NOTE — PROGRESS NOTE ADULT - SUBJECTIVE AND OBJECTIVE BOX
HPI:  89M w/ hx of CKD, HTN, BPH w/ chronic obrien, dementia p/w SOB. Pt poor historian so history obtained from record. Pt was brought in my home aid who reports pt c/o SOB. Denies fever/chills/sweats, URI sx, N/V/D.    In ED, pt hypothermic to 95.5 and hypotensive to 90/60s. Labs notable for Hgb 6.3 (+london), dimer 3000, Na 129, BUN/SCr 70/2.91, BNP 7966, UA+. CT CAP demonstrates mild b/l hydronephrosis and large stool burden w/ perirectal stranding c/f stercoral colitis. (01 Dec 2023 06:11)      24 hr events: No acute events    ## ROS:  [x ] unable to obtain      ## Vitals  ICU Vital Signs Last 24 Hrs  T(C): 37.3 (19 Dec 2023 07:41), Max: 37.8 (19 Dec 2023 06:00)  T(F): 99.1 (19 Dec 2023 07:41), Max: 100 (19 Dec 2023 06:00)  HR: 93 (19 Dec 2023 09:00) (77 - 99)  BP: 117/55 (19 Dec 2023 09:00) (107/68 - 145/76)  BP(mean): 71 (19 Dec 2023 09:00) (71 - 104)  ABP: --  ABP(mean): --  RR: 27 (19 Dec 2023 09:00) (16 - 27)  SpO2: 100% (19 Dec 2023 09:00) (96% - 100%)    O2 Parameters below as of 19 Dec 2023 06:22  Patient On (Oxygen Delivery Method): ventilator            ## Physical Exam:  Gen: Elderly male lying in bed, NAD  HEENT: NC/AT sclerae anciteric. ET tube in place  Resp: Ronchorous breath sounds, decreased breath sounds on L side  CV: S1, S2  Abd: Soft, + BS  Ext: WWP  Neuro: Arousable, follows commands    ## Vent Data  Mode: CPAP with PS  FiO2: 30  PEEP: 5  PS: 5  ITime: 0.8  MAP: 7  PIP: 11      ## Labs:  Chem:  12-19    143  |  113<H>  |  40<H>  ----------------------------<  151<H>  3.7   |  25  |  2.21<H>    Ca    8.8      19 Dec 2023 02:30  Phos  2.5     12-19  Mg     1.8     12-19    TPro  7.8  /  Alb  1.7<L>  /  TBili  0.5  /  DBili  x   /  AST  22  /  ALT  9<L>  /  AlkPhos  33<L>  12-19    LIVER FUNCTIONS - ( 19 Dec 2023 02:30 )  Alb: 1.7 g/dL / Pro: 7.8 gm/dL / ALK PHOS: 33 U/L / ALT: 9 U/L / AST: 22 U/L / GGT: x           CBC:                        8.7    7.05  )-----------( 242      ( 19 Dec 2023 02:30 )             27.7     Coags:      Urinalysis Basic - ( 19 Dec 2023 02:30 )    Color: x / Appearance: x / SG: x / pH: x  Gluc: 151 mg/dL / Ketone: x  / Bili: x / Urobili: x   Blood: x / Protein: x / Nitrite: x   Leuk Esterase: x / RBC: x / WBC x   Sq Epi: x / Non Sq Epi: x / Bacteria: x        ## Cardiac        ## Blood Gas      #I/Os  I&O's Detail    18 Dec 2023 07:01  -  19 Dec 2023 07:00  --------------------------------------------------------  IN:    Dexmedetomidine: 107 mL    Enteral Tube Flush: 110 mL    IV PiggyBack: 50 mL    IV PiggyBack: 250 mL    Osmolite 1.2: 520 mL    Vital High Protein: 80 mL  Total IN: 1117 mL    OUT:    Indwelling Catheter - Urethral (mL): 2060 mL  Total OUT: 2060 mL    Total NET: -943 mL      19 Dec 2023 07:01  -  19 Dec 2023 10:30  --------------------------------------------------------  IN:    Dexmedetomidine: 19.3 mL    Osmolite 1.2: 120 mL  Total IN: 139.3 mL    OUT:    Indwelling Catheter - Urethral (mL): 125 mL  Total OUT: 125 mL    Total NET: 14.3 mL          ## Imaging:    ## Medications:  MEDICATIONS  (STANDING):  albuterol/ipratropium for Nebulization 3 milliLiter(s) Nebulizer every 6 hours  buMETAnide Injectable 2 milliGRAM(s) IV Push daily  chlorhexidine 0.12% Liquid 15 milliLiter(s) Oral Mucosa every 12 hours  chlorhexidine 2% Cloths 1 Application(s) Topical <User Schedule>  collagenase Ointment 1 Application(s) Topical daily  dexMEDEtomidine Infusion 0.2 MICROgram(s)/kG/Hr (3.51 mL/Hr) IV Continuous <Continuous>  gabapentin 300 milliGRAM(s) Oral two times a day  heparin   Injectable 5000 Unit(s) SubCutaneous every 8 hours  mirtazapine 15 milliGRAM(s) Oral daily  polyethylene glycol 3350 17 Gram(s) Oral at bedtime  senna 2 Tablet(s) Oral at bedtime  sodium chloride 3%  Inhalation 4 milliLiter(s) Inhalation every 6 hours  vancomycin  IVPB 750 milliGRAM(s) IV Intermittent every 24 hours    MEDICATIONS  (PRN):  acetaminophen     Tablet .. 650 milliGRAM(s) Oral every 6 hours PRN Temp greater or equal to 38C (100.4F), Mild Pain (1 - 3)

## 2023-12-19 NOTE — PROGRESS NOTE ADULT - ASSESSMENT
89 y/o M w/dementia, CKD, chronic urinary retention s/p obrien initially admitted for dyspnea found to have L sided pleural effusions and compressive atelectasis w/hospital course c/b acute respiratory failure with hypoxia and hypercapnia likely secondary to staph aureus PNA.     - Sedation as needed goal RASS -1 to 0  - Wean from vent as tolerated  - Hypertonic saline, chest PT, pulmonary toilet  - Trend Cr, avoid nephrotoxins  - Diuresis goal net negative 1-2 L daily  - Vancomycin for MRSA PNA  - DVT prophylaxis  - Full code    I have personally provided 35 minutes of attending critical care time excluding procedures.

## 2023-12-20 NOTE — PROGRESS NOTE ADULT - SUBJECTIVE AND OBJECTIVE BOX
HPI:  89M w/ hx of CKD, HTN, BPH w/ chronic obrien, dementia p/w SOB. Pt poor historian so history obtained from record. Pt was brought in my home aid who reports pt c/o SOB. Denies fever/chills/sweats, URI sx, N/V/D.    In ED, pt hypothermic to 95.5 and hypotensive to 90/60s. Labs notable for Hgb 6.3 (+london), dimer 3000, Na 129, BUN/SCr 70/2.91, BNP 7966, UA+. CT CAP demonstrates mild b/l hydronephrosis and large stool burden w/ perirectal stranding c/f stercoral colitis. (01 Dec 2023 06:11)      24 hr events: No acute events.     ## ROS:  [x ] unable to obtain      ## Vitals  ICU Vital Signs Last 24 Hrs  T(C): 36.7 (20 Dec 2023 08:00), Max: 36.7 (20 Dec 2023 08:00)  T(F): 98.1 (20 Dec 2023 08:00), Max: 98.1 (20 Dec 2023 08:00)  HR: 100 (20 Dec 2023 08:00) (75 - 100)  BP: 117/50 (20 Dec 2023 08:00) (98/50 - 172/126)  BP(mean): 61 (20 Dec 2023 08:00) (61 - 143)  ABP: --  ABP(mean): --  RR: 28 (20 Dec 2023 08:00) (18 - 28)  SpO2: 99% (20 Dec 2023 08:00) (98% - 100%)    O2 Parameters below as of 20 Dec 2023 07:04  Patient On (Oxygen Delivery Method): ventilator    O2 Concentration (%): 25        ## Physical Exam:  Gen: Elderly male lying in bed, NAD  HEENT: NC/AT sclerae anciteric. ET tube in place  Resp: Ronchorous breath sounds, decreased breath sounds on L side  CV: S1, S2  Abd: Soft, + BS  Ext: WWP  Neuro: Arousable, follows commands    ## Vent Data  Mode: CPAP with PS  FiO2: 25  PEEP: 5  PS: 5  ITime: 0.8  MAP: 7  PIP: 10      ## Labs:  Chem:  12-20    140  |  110<H>  |  42<H>  ----------------------------<  177<H>  4.1   |  25  |  2.26<H>    Ca    8.4<L>      20 Dec 2023 04:00  Phos  2.6     12-20  Mg     1.8     12-20    TPro  7.9  /  Alb  1.6<L>  /  TBili  0.3  /  DBili  x   /  AST  14<L>  /  ALT  <6<L>  /  AlkPhos  41  12-20    LIVER FUNCTIONS - ( 20 Dec 2023 04:00 )  Alb: 1.6 g/dL / Pro: 7.9 gm/dL / ALK PHOS: 41 U/L / ALT: <6 U/L / AST: 14 U/L / GGT: x           CBC:                        7.9    6.20  )-----------( 230      ( 20 Dec 2023 04:00 )             25.7     Coags:      Urinalysis Basic - ( 20 Dec 2023 04:00 )    Color: x / Appearance: x / SG: x / pH: x  Gluc: 177 mg/dL / Ketone: x  / Bili: x / Urobili: x   Blood: x / Protein: x / Nitrite: x   Leuk Esterase: x / RBC: x / WBC x   Sq Epi: x / Non Sq Epi: x / Bacteria: x        ## Cardiac        ## Blood Gas      #I/Os  I&O's Detail    19 Dec 2023 07:01  -  20 Dec 2023 07:00  --------------------------------------------------------  IN:    Dexmedetomidine: 135.3 mL    Enteral Tube Flush: 60 mL    IV PiggyBack: 500 mL    Osmolite 1.2: 965 mL  Total IN: 1660.3 mL    OUT:    Indwelling Catheter - Urethral (mL): 1275 mL  Total OUT: 1275 mL    Total NET: 385.3 mL          ## Imaging:    ## Medications:  MEDICATIONS  (STANDING):  albuterol/ipratropium for Nebulization 3 milliLiter(s) Nebulizer every 6 hours  buMETAnide Injectable 2 milliGRAM(s) IV Push daily  chlorhexidine 0.12% Liquid 15 milliLiter(s) Oral Mucosa every 12 hours  chlorhexidine 2% Cloths 1 Application(s) Topical <User Schedule>  collagenase Ointment 1 Application(s) Topical daily  dexMEDEtomidine Infusion 0.2 MICROgram(s)/kG/Hr (3.51 mL/Hr) IV Continuous <Continuous>  gabapentin 300 milliGRAM(s) Oral two times a day  heparin   Injectable 5000 Unit(s) SubCutaneous every 8 hours  mirtazapine 15 milliGRAM(s) Oral daily  polyethylene glycol 3350 17 Gram(s) Oral at bedtime  senna 2 Tablet(s) Oral at bedtime  sodium chloride 3%  Inhalation 4 milliLiter(s) Inhalation every 6 hours  vancomycin  IVPB 750 milliGRAM(s) IV Intermittent every 24 hours    MEDICATIONS  (PRN):  acetaminophen     Tablet .. 650 milliGRAM(s) Oral every 6 hours PRN Temp greater or equal to 38C (100.4F), Mild Pain (1 - 3)

## 2023-12-20 NOTE — PROGRESS NOTE ADULT - ASSESSMENT
89 y/o M w/dementia, CKD, chronic urinary retention s/p obrien initially admitted for dyspnea found to have L sided pleural effusions and compressive atelectasis w/hospital course c/b acute respiratory failure with hypoxia and hypercapnia likely secondary to staph aureus PNA.     - Sedation as needed goal RASS -1 to 0  - Wean from vent as tolerated  - Hypertonic saline, chest PT, pulmonary toilet  - Trend Cr, avoid nephrotoxins  - Diuresis goal net negative 1-2 L daily  - Vancomycin for MRSA PNA  - DVT prophylaxis  - Full code    I have personally provided 35 minutes of attending critical care time excluding procedures.     89 y/o M w/dementia, CKD, chronic urinary retention s/p obrien initially admitted for dyspnea found to have L sided pleural effusions and compressive atelectasis w/hospital course c/b acute respiratory failure with hypoxia and hypercapnia likely secondary to staph aureus PNA.     - Sedation as needed goal RASS -1 to 0  - Wean from vent as tolerated  - Hypertonic saline, chest PT, pulmonary toilet  - Trend Cr, avoid nephrotoxins  - Diuresis goal net negative 1-2 L daily  - Vancomycin for MRSA PNA  - DVT prophylaxis  - Full code    I have personally provided 35 minutes of attending critical care time excluding procedures.

## 2023-12-20 NOTE — GOALS OF CARE CONVERSATION - ADVANCED CARE PLANNING - CONVERSATION DETAILS
I called pt's daughter Ching to update her on her fathers condition and she asked to defer decision making to her brother, who is a physician, Dr Samy Tierney ([phone]). I spoke with Samy over the phone regarding his father's condition. I explained that although the left lung white out transiently improved post-bronchosocpy, it rapidly re-plugged on a follow up xray shortly after. I explained that despite continued neb treatments and metanebs, the left lung remains cyril out from mucous plugging. I explained that there are no plans at this point to repeat bronchoscopy as the pt replugged quickly and likely the same thing will happen again despite another bronchoscopy. The pt continues to do well on PSV trials, breathing well on his own. However, given his reoccurring mucous plugging, pt would be at high risk for reintubation if attempted to be extubated. I explained that we can either try extubation and if he fails we can keep him comfortable and palliate him, or we can reintubate him for trach/PEG. Samy was under the impression that another bronch would be repeated. I explained that it wont change the underlying problem. He said he cant come to the hospital since he has COVID, however he will speak with his family and come to a decision within the next few days and have someone visit to discuss in person further. All questions were answered. I called pt's daughter Ching to update her on her fathers condition and she asked to defer decision making to her brother, who is a physician, Dr Samy Tierney ([phone]). I spoke with Samy over the phone regarding his father's condition. I explained that although the left lung white out transiently improved post-bronchosocpy, it rapidly re-plugged on a follow up xray shortly after. I explained that despite continued neb treatments and metanebs, the left lung remains cyril out from mucous plugging. I explained that there are no plans at this point to repeat bronchoscopy as the pt replugged quickly and likely the same thing will happen again despite another bronchoscopy. The pt continues to do well on PSV trials, breathing well on his own. However, given his reoccurring mucous plugging, pt would be at high risk for reintubation if attempted to be extubated. I explained that we can either try extubation and if he fails we can keep him comfortable and palliate him, or we can reintubate him for trach/PEG. Samy was under the impression that another bronch would be repeated. I explained that it wont change the underlying problem. He said he cant come to the hospital since he has COVID, however he will speak with his family and come to a decision within the next few days and have someone visit to discuss in person further. All questions were answered.

## 2023-12-20 NOTE — PROGRESS NOTE ADULT - SUBJECTIVE AND OBJECTIVE BOX
[facility] NEPHROLOGY SERVICES, [facility]  NEPHROLOGY AND HYPERTENSION  [address]  [address]  [place] [address]  [phone]    MD SOFY KEENAN MD YELENA ROSENBERG, MD BINNY KOSHY, MD CHRISTOPHER CAPUTO, MD EDWARD BOVER, MD          Patient events noted  No distress    MEDICATIONS  (STANDING):  albuterol/ipratropium for Nebulization 3 milliLiter(s) Nebulizer every 6 hours  buMETAnide Injectable 2 milliGRAM(s) IV Push daily  chlorhexidine 0.12% Liquid 15 milliLiter(s) Oral Mucosa every 12 hours  chlorhexidine 2% Cloths 1 Application(s) Topical <User Schedule>  collagenase Ointment 1 Application(s) Topical daily  dexMEDEtomidine Infusion 0.3 MICROgram(s)/kG/Hr (5.26 mL/Hr) IV Continuous <Continuous>  gabapentin 300 milliGRAM(s) Oral two times a day  heparin   Injectable 5000 Unit(s) SubCutaneous every 8 hours  mirtazapine 15 milliGRAM(s) Oral daily  polyethylene glycol 3350 17 Gram(s) Oral at bedtime  senna 2 Tablet(s) Oral at bedtime  sodium chloride 3%  Inhalation 4 milliLiter(s) Inhalation every 6 hours  vancomycin  IVPB 750 milliGRAM(s) IV Intermittent every 24 hours    MEDICATIONS  (PRN):  acetaminophen     Tablet .. 650 milliGRAM(s) Oral every 6 hours PRN Temp greater or equal to 38C (100.4F), Mild Pain (1 - 3)      12-19-23 @ 07:01  -  12-20-23 @ 07:00  --------------------------------------------------------  IN: 1660.3 mL / OUT: 1275 mL / NET: 385.3 mL    12-20-23 @ 07:01  -  12-20-23 @ 16:01  --------------------------------------------------------  IN: 252.9 mL / OUT: 485 mL / NET: -232.1 mL      PHYSICAL EXAM:      T(C): 37.3 (12-20-23 @ 15:00), Max: 37.3 (12-20-23 @ 15:00)  HR: 115 (12-20-23 @ 15:00) (75 - 115)  BP: 124/74 (12-20-23 @ 15:00) (98/50 - 172/126)  RR: 20 (12-20-23 @ 15:00) (18 - 29)  SpO2: 100% (12-20-23 @ 15:00) (98% - 100%)  Wt(kg): --  Lungs decreased BS left   Heart S1S2  Abd soft NT ND  Extremities:   tr edema                                    7.9    6.20  )-----------( 230      ( 20 Dec 2023 04:00 )             25.7     12-20    140  |  110<H>  |  42<H>  ----------------------------<  177<H>  4.1   |  25  |  2.26<H>    Ca    8.4<L>      20 Dec 2023 04:00  Phos  2.6     12-20  Mg     1.8     12-20    TPro  7.9  /  Alb  1.6<L>  /  TBili  0.3  /  DBili  x   /  AST  14<L>  /  ALT  <6<L>  /  AlkPhos  41  12-20      LIVER FUNCTIONS - ( 20 Dec 2023 04:00 )  Alb: 1.6 g/dL / Pro: 7.9 gm/dL / ALK PHOS: 41 U/L / ALT: <6 U/L / AST: 14 U/L / GGT: x           Creatinine Trend: 2.26<--, 2.21<--, 2.26<--, 2.30<--, 2.36<--, 2.33<--  Mode: CPAP with PS  FiO2: 25  PEEP: 5  PS: 5  ITime: 1  MAP: 8  PIP: 11      Impression:  1.  Acute respiratory failure with whiteout of left lung, improved s/p bronch  2.  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 - probably prerenal vs ATN  Indices stable     Recommend:  Continue supportive care  Prognosis guarded     Saym Noriega MD [facility] NEPHROLOGY SERVICES, [facility]  NEPHROLOGY AND HYPERTENSION  [address]  [address]  [place] [address]  [phone]    MD SOFY KEENAN MD YELENA ROSENBERG, MD BINNY KOSHY, MD CHRISTOPHER CAPUTO, MD EDWARD BOVER, MD          Patient events noted  No distress    MEDICATIONS  (STANDING):  albuterol/ipratropium for Nebulization 3 milliLiter(s) Nebulizer every 6 hours  buMETAnide Injectable 2 milliGRAM(s) IV Push daily  chlorhexidine 0.12% Liquid 15 milliLiter(s) Oral Mucosa every 12 hours  chlorhexidine 2% Cloths 1 Application(s) Topical <User Schedule>  collagenase Ointment 1 Application(s) Topical daily  dexMEDEtomidine Infusion 0.3 MICROgram(s)/kG/Hr (5.26 mL/Hr) IV Continuous <Continuous>  gabapentin 300 milliGRAM(s) Oral two times a day  heparin   Injectable 5000 Unit(s) SubCutaneous every 8 hours  mirtazapine 15 milliGRAM(s) Oral daily  polyethylene glycol 3350 17 Gram(s) Oral at bedtime  senna 2 Tablet(s) Oral at bedtime  sodium chloride 3%  Inhalation 4 milliLiter(s) Inhalation every 6 hours  vancomycin  IVPB 750 milliGRAM(s) IV Intermittent every 24 hours    MEDICATIONS  (PRN):  acetaminophen     Tablet .. 650 milliGRAM(s) Oral every 6 hours PRN Temp greater or equal to 38C (100.4F), Mild Pain (1 - 3)      12-19-23 @ 07:01  -  12-20-23 @ 07:00  --------------------------------------------------------  IN: 1660.3 mL / OUT: 1275 mL / NET: 385.3 mL    12-20-23 @ 07:01  -  12-20-23 @ 16:01  --------------------------------------------------------  IN: 252.9 mL / OUT: 485 mL / NET: -232.1 mL      PHYSICAL EXAM:      T(C): 37.3 (12-20-23 @ 15:00), Max: 37.3 (12-20-23 @ 15:00)  HR: 115 (12-20-23 @ 15:00) (75 - 115)  BP: 124/74 (12-20-23 @ 15:00) (98/50 - 172/126)  RR: 20 (12-20-23 @ 15:00) (18 - 29)  SpO2: 100% (12-20-23 @ 15:00) (98% - 100%)  Wt(kg): --  Lungs decreased BS left   Heart S1S2  Abd soft NT ND  Extremities:   tr edema                                    7.9    6.20  )-----------( 230      ( 20 Dec 2023 04:00 )             25.7     12-20    140  |  110<H>  |  42<H>  ----------------------------<  177<H>  4.1   |  25  |  2.26<H>    Ca    8.4<L>      20 Dec 2023 04:00  Phos  2.6     12-20  Mg     1.8     12-20    TPro  7.9  /  Alb  1.6<L>  /  TBili  0.3  /  DBili  x   /  AST  14<L>  /  ALT  <6<L>  /  AlkPhos  41  12-20      LIVER FUNCTIONS - ( 20 Dec 2023 04:00 )  Alb: 1.6 g/dL / Pro: 7.9 gm/dL / ALK PHOS: 41 U/L / ALT: <6 U/L / AST: 14 U/L / GGT: x           Creatinine Trend: 2.26<--, 2.21<--, 2.26<--, 2.30<--, 2.36<--, 2.33<--  Mode: CPAP with PS  FiO2: 25  PEEP: 5  PS: 5  ITime: 1  MAP: 8  PIP: 11      Impression:  1.  Acute respiratory failure with whiteout of left lung, improved s/p bronch  2.  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

## 2023-12-21 NOTE — PROGRESS NOTE ADULT - SUBJECTIVE AND OBJECTIVE BOX
Progress Note    MOIZ RANDOLPH 89y (22-Mar-1930) Male [id number]  11-30-23 (21d)      Chief Complaint: UTI    Subjective:  Continues to have lung white out on CXR, continues to have tenacious secretions.    Review of Systems: Unable to obtain due to sedation/intubation.      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 650 milliGRAM(s) Oral every 6 hours PRN  albuterol/ipratropium for Nebulization 3 milliLiter(s) Nebulizer every 6 hours  buMETAnide Injectable 2 milliGRAM(s) IV Push daily  chlorhexidine 0.12% Liquid 15 milliLiter(s) Oral Mucosa every 12 hours  chlorhexidine 2% Cloths 1 Application(s) Topical <User Schedule>  collagenase Ointment 1 Application(s) Topical daily  dexMEDEtomidine Infusion 0.3 MICROgram(s)/kG/Hr IV Continuous <Continuous>  gabapentin 300 milliGRAM(s) Oral two times a day  heparin   Injectable 5000 Unit(s) SubCutaneous every 8 hours  mirtazapine 15 milliGRAM(s) Oral daily  polyethylene glycol 3350 17 Gram(s) Oral at bedtime  senna 2 Tablet(s) Oral at bedtime  sodium chloride 3%  Inhalation 4 milliLiter(s) Inhalation every 6 hours  vancomycin  IVPB 750 milliGRAM(s) IV Intermittent every 24 hours    Objective:  T(C): 36.6 (12-21-23 @ 13:00), Max: 37.9 (12-20-23 @ 21:00)  HR: 90 (12-21-23 @ 13:00) (84 - 115)  BP: 106/61 (12-21-23 @ 13:00) (99/55 - 139/118)  RR: 18 (12-21-23 @ 13:00) (18 - 29)  SpO2: 100% (12-21-23 @ 12:03) (95% - 100%)    Physical exam:  GENERAL: NAD, moving around. Does respond to son's voice.  HEAD:  Atraumatic, Normocephalic  EYES: EOMI, PERRLA, conjunctiva and sclera clear  NECK: Supple,   CHEST/LUNG: No lung sounds on L, much diminished than R.  HEART: Regular rate and rhythm;   ABDOMEN: Soft, Nontender, Nondistended;   EXTREMITIES:  2+ Peripheral Pulses, No clubbing, cyanosis, or edema  NEUROLOGY: non-focal      12-20-23 @ 07:01  -  12-21-23 @ 07:00  --------------------------------------------------------  IN: 1642.7 mL / OUT: 943 mL / NET: 699.7 mL    12-21-23 @ 07:01  -  12-21-23 @ 14:13  --------------------------------------------------------  IN: 321.5 mL / OUT: 250 mL / NET: 71.5 mL        CAPILLARY BLOOD GLUCOSE      (12-21 @ 03:52)                      8.3  7.19 )-----------( 257                 26.5    Neutrophils = -- (--%)  Lymphocytes = -- (--%)  Eosinophils = -- (--%)  Basophils = -- (--%)  Monocytes = -- (--%)  Bands = --%    12-21    143  |  110<H>  |  45<H>  ----------------------------<  175<H>  4.4   |  30  |  2.26<H>    Ca    8.6      21 Dec 2023 03:52  Phos  2.0     12-21  Mg     1.7     12-21    TPro  7.8  /  Alb  1.6<L>  /  TBili  0.3  /  DBili  x   /  AST  16  /  ALT  <6<L>  /  AlkPhos  40  12-21          RVP:    Venous Blood Gas:  12-21 @ 07:57  7.41/51/44/32/71.6  VBG Lactate: --        Tox:         Urinalysis Basic - ( 21 Dec 2023 03:52 )    Color: x / Appearance: x / SG: x / pH: x  Gluc: 175 mg/dL / Ketone: x  / Bili: x / Urobili: x   Blood: x / Protein: x / Nitrite: x   Leuk Esterase: x / RBC: x / WBC x   Sq Epi: x / Non Sq Epi: x / Bacteria: x        WBC Trend: 7.19<--, 6.20<--, 7.05<--    Hb Trend: 8.3<--, 7.9<--, 8.7<--, 7.6<--, 7.5<--        New imaging in last 24 hours:  Consult notes reviewed: Progress Note    MOIZ RANDOLPH 89y (22-Mar-1930) Male [id number]  11-30-23 (21d)      Chief Complaint: UTI    Subjective:  Continues to have lung white out on CXR, continues to have tenacious secretions.    Review of Systems: Unable to obtain due to sedation/intubation.      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 650 milliGRAM(s) Oral every 6 hours PRN  albuterol/ipratropium for Nebulization 3 milliLiter(s) Nebulizer every 6 hours  buMETAnide Injectable 2 milliGRAM(s) IV Push daily  chlorhexidine 0.12% Liquid 15 milliLiter(s) Oral Mucosa every 12 hours  chlorhexidine 2% Cloths 1 Application(s) Topical <User Schedule>  collagenase Ointment 1 Application(s) Topical daily  dexMEDEtomidine Infusion 0.3 MICROgram(s)/kG/Hr IV Continuous <Continuous>  gabapentin 300 milliGRAM(s) Oral two times a day  heparin   Injectable 5000 Unit(s) SubCutaneous every 8 hours  mirtazapine 15 milliGRAM(s) Oral daily  polyethylene glycol 3350 17 Gram(s) Oral at bedtime  senna 2 Tablet(s) Oral at bedtime  sodium chloride 3%  Inhalation 4 milliLiter(s) Inhalation every 6 hours  vancomycin  IVPB 750 milliGRAM(s) IV Intermittent every 24 hours    Objective:  T(C): 36.6 (12-21-23 @ 13:00), Max: 37.9 (12-20-23 @ 21:00)  HR: 90 (12-21-23 @ 13:00) (84 - 115)  BP: 106/61 (12-21-23 @ 13:00) (99/55 - 139/118)  RR: 18 (12-21-23 @ 13:00) (18 - 29)  SpO2: 100% (12-21-23 @ 12:03) (95% - 100%)    Physical exam:  GENERAL: NAD, moving around. Does respond to son's voice.  HEAD:  Atraumatic, Normocephalic  EYES: EOMI, PERRLA, conjunctiva and sclera clear  NECK: Supple,   CHEST/LUNG: No lung sounds on L, much diminished than R.  HEART: Regular rate and rhythm;   ABDOMEN: Soft, Nontender, Nondistended;   EXTREMITIES:  2+ Peripheral Pulses, No clubbing, cyanosis, or edema  NEUROLOGY: non-focal      12-20-23 @ 07:01  -  12-21-23 @ 07:00  --------------------------------------------------------  IN: 1642.7 mL / OUT: 943 mL / NET: 699.7 mL    12-21-23 @ 07:01  -  12-21-23 @ 14:13  --------------------------------------------------------  IN: 321.5 mL / OUT: 250 mL / NET: 71.5 mL        CAPILLARY BLOOD GLUCOSE      (12-21 @ 03:52)                      8.3  7.19 )-----------( 257                 26.5    Neutrophils = -- (--%)  Lymphocytes = -- (--%)  Eosinophils = -- (--%)  Basophils = -- (--%)  Monocytes = -- (--%)  Bands = --%    12-21    143  |  110<H>  |  45<H>  ----------------------------<  175<H>  4.4   |  30  |  2.26<H>    Ca    8.6      21 Dec 2023 03:52  Phos  2.0     12-21  Mg     1.7     12-21    TPro  7.8  /  Alb  1.6<L>  /  TBili  0.3  /  DBili  x   /  AST  16  /  ALT  <6<L>  /  AlkPhos  40  12-21          RVP:    Venous Blood Gas:  12-21 @ 07:57  7.41/51/44/32/71.6  VBG Lactate: --        Tox:         Urinalysis Basic - ( 21 Dec 2023 03:52 )    Color: x / Appearance: x / SG: x / pH: x  Gluc: 175 mg/dL / Ketone: x  / Bili: x / Urobili: x   Blood: x / Protein: x / Nitrite: x   Leuk Esterase: x / RBC: x / WBC x   Sq Epi: x / Non Sq Epi: x / Bacteria: x        WBC Trend: 7.19<--, 6.20<--, 7.05<--    Hb Trend: 8.3<--, 7.9<--, 8.7<--, 7.6<--, 7.5<--        New imaging in last 24 hours:  Consult notes reviewed:

## 2023-12-21 NOTE — PROGRESS NOTE ADULT - ASSESSMENT
89 y/o M w/dementia, CKD, chronic urinary retention s/p obrien initially admitted for dyspnea found to have L sided pleural effusions and compressive atelectasis w/hospital course c/b acute respiratory failure with hypoxia and hypercapnia likely secondary to MRSA PNA.  Neuro:  Sedation as needed goal RASS -1 to 0  Respiratory: Wean from vent as tolerated: Hypertonic saline, chest PT, pulmonary toilet, mucomyst. Consilidated L lung. Will do bronchoscopy tomorrow.  Renal: Trend Cr, avoid nephrotoxins. creatinine at baseline.  ID: Vancomycin for MRSA PNA  Heme: DVT prophylaxis with heparin subq  Ethics: Full code. Discussed with son, Dr. Samy Tierney (internal medicine) at bedside. He wants his father to undergo "everything within reason." His dad would not want to live connected to ventilator for the rest of his life. Will continue GOC.         89 y/o M w/dementia, CKD, chronic urinary retention s/p obrien initially admitted for dyspnea found to have L sided pleural effusions and compressive atelectasis w/hospital course c/b acute respiratory failure with hypoxia and hypercapnia likely secondary to MRSA PNA.  Neuro:  Sedation as needed goal RASS -1 to 0  Respiratory: Wean from vent as tolerated: Hypertonic saline, chest PT, pulmonary toilet, mucomyst. Consilidated L lung. Will do bronchoscopy tomorrow.  Renal: Trend Cr, avoid nephrotoxins. creatinine at baseline.  ID: Vancomycin for MRSA PNA  Heme: DVT prophylaxis with heparin subq  Ethics: Full code. Discussed with son, Dr. Samy Tierney (internal medicine) at bedside. He wants his father to undergo "everything within reason." His dad would not want to live connected to ventilator for the rest of his life. Will continue GOC.

## 2023-12-21 NOTE — PROGRESS NOTE ADULT - SUBJECTIVE AND OBJECTIVE BOX
NEPHROLOGY PROGRESS NOTE    CHIEF COMPLAINT:  ROGER/CKD    HPI:  Vented, no pressors;  renal function stable.  UO about 1 liter/day.    EXAM:  Vital Signs Last 24 Hrs  T(C): 35.2 (21 Dec 2023 10:00), Max: 37.9 (20 Dec 2023 21:00)  T(F): 95.4 (21 Dec 2023 10:00), Max: 100.2 (20 Dec 2023 21:00)  HR: 96 (21 Dec 2023 10:00) (85 - 115)  BP: 110/57 (21 Dec 2023 10:00) (99/55 - 139/118)  BP(mean): 72 (21 Dec 2023 10:00) (67 - 126)  RR: 28 (21 Dec 2023 10:00) (18 - 29)  SpO2: 99% (21 Dec 2023 10:00) (95% - 100%)    Parameters below as of 21 Dec 2023 05:17  Patient On (Oxygen Delivery Method): ventilator      I&O's Summary    20 Dec 2023 07:01  -  21 Dec 2023 07:00  --------------------------------------------------------  IN: 1642.7 mL / OUT: 943 mL / NET: 699.7 mL    21 Dec 2023 07:01  -  21 Dec 2023 10:39  --------------------------------------------------------  IN: 219.6 mL / OUT: 250 mL / NET: -30.4 mL      Daily     Daily Weight in k.5 (21 Dec 2023 06:00)    Sedated, vented  Normal respiratory effort, lungs clear bilaterally  Heart RRR with no murmur, no peripheral edema    LABS                        8.3    7.19  )-----------( 257      ( 21 Dec 2023 03:52 )             26.5     12-21    143  |  110<H>  |  45<H>  ----------------------------<  175<H>  4.4   |  30  |  2.26<H>    Ca    8.6      21 Dec 2023 03:52  Phos  2.0     12-  Mg     1.7     -    TPro  7.8  /  Alb  1.6<L>  /  TBili  0.3  /  DBili  x   /  AST  16  /  ALT  <6<L>  /  AlkPhos  40        Impression:  1.  Acute respiratory failure with whiteout of left lung, improved s/p bronch;  staph aureus pneumonia  2.  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

## 2023-12-22 NOTE — PROGRESS NOTE ADULT - ASSESSMENT
89 y/o M w/dementia, CKD, chronic urinary retention s/p obrien initially admitted for dyspnea found to have L sided pleural effusions and compressive atelectasis w/hospital course c/b acute respiratory failure with hypoxia and hypercapnia likely secondary to MRSA PNA.  Neuro:  Sedation as needed goal RASS -1 to 0  Respiratory: Wean from vent as tolerated: Hypertonic saline, chest PT, pulmonary toilet, mucomyst. Consilidated L lung. S/p bronchoscopy with copious secretions aspirated. Improvement in aeration on CXR.   Renal: Trend Cr, avoid nephrotoxins. creatinine at baseline.  ID: Vancomycin for MRSA PNA  Heme: DVT prophylaxis with heparin subq  Ethics: Full code. Discussed with son, Dr. Samy Tierney (internal medicine) at bedside. He wants his father to undergo "everything within reason." His dad would not want to live connected to ventilator for the rest of his life. Discussed today post-bronch. Explained that he has his best shot now but will likely replug.          89 y/o M w/dementia, CKD, chronic urinary retention s/p obrien initially admitted for dyspnea found to have L sided pleural effusions and compressive atelectasis w/hospital course c/b acute respiratory failure with hypoxia and hypercapnia likely secondary to MRSA PNA.  Neuro:  Sedation as needed goal RASS -1 to 0  Respiratory: Wean from vent as tolerated: Hypertonic saline, chest PT, pulmonary toilet, mucomyst. Consilidated L lung. S/p bronchoscopy with copious secretions aspirated. Improvement in aeration on CXR.   Renal: Trend Cr, avoid nephrotoxins. creatinine at baseline.  ID: Vancomycin for MRSA PNA  Heme: DVT prophylaxis with heparin subq  Ethics: Full code. Discussed with son, Dr. Samy Tierney (internal medicine) at bedside. He wants his father to undergo "everything within reason." His dad would not want to live connected to ventilator for the rest of his life. Discussed today post-bronch. Explained that he has his best shot now but will likely replug.

## 2023-12-22 NOTE — PROGRESS NOTE ADULT - SUBJECTIVE AND OBJECTIVE BOX
Progress Note    MOIZ RANDOLPH 89y (22-Mar-1930) Male [id number]  11-30-23 (22d)      Chief Complaint: UTI    Subjective:  No acute events overnight. Patient seen and examined at bedside.    Review of Systems: Unable to obtain due to sedation/mental status.     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 650 milliGRAM(s) Oral every 6 hours PRN  acetylcysteine 20%  Inhalation 4 milliLiter(s) Inhalation every 6 hours  albuterol/ipratropium for Nebulization 3 milliLiter(s) Nebulizer every 6 hours  buMETAnide Injectable 2 milliGRAM(s) IV Push daily  chlorhexidine 0.12% Liquid 15 milliLiter(s) Oral Mucosa every 12 hours  chlorhexidine 2% Cloths 1 Application(s) Topical <User Schedule>  collagenase Ointment 1 Application(s) Topical daily  dexMEDEtomidine Infusion 0.3 MICROgram(s)/kG/Hr IV Continuous <Continuous>  gabapentin 300 milliGRAM(s) Oral two times a day  mirtazapine 15 milliGRAM(s) Oral daily  norepinephrine Infusion 0.05 MICROgram(s)/kG/Min IV Continuous <Continuous>  polyethylene glycol 3350 17 Gram(s) Oral two times a day  senna 2 Tablet(s) Oral at bedtime  sodium chloride 3%  Inhalation 4 milliLiter(s) Inhalation every 6 hours  vancomycin  IVPB 500 milliGRAM(s) IV Intermittent every 24 hours    Objective:  T(C): 37 (12-22-23 @ 13:00), Max: 37 (12-21-23 @ 17:00)  HR: 90 (12-22-23 @ 13:00) (75 - 100)  BP: 123/78 (12-22-23 @ 13:00) (107/66 - 150/58)  RR: 19 (12-22-23 @ 13:00) (18 - 29)  SpO2: 100% (12-22-23 @ 13:00) (96% - 100%)    Physical exam:  GENERAL: NAD  HEAD:  Atraumatic  EYES: EOMI, PERRLA, conjunctiva and sclera clear  NECK: Supple, No JVD  CHEST/LUNG: Decreased breath sounds on L.   HEART: Regular rate and rhythm; No murmurs, rubs, or gallops  ABDOMEN: Soft, Nontender, Nondistended; Bowel sounds present  EXTREMITIES:  2+ Peripheral Pulses, No clubbing, cyanosis, or edema  PSYCH: AAOx3  NEUROLOGY: non-focal  SKIN: No rashes or lesions      12-21-23 @ 07:01  -  12-22-23 @ 07:00  --------------------------------------------------------  IN: 1507.2 mL / OUT: 1470 mL / NET: 37.2 mL    12-22-23 @ 07:01  -  12-22-23 @ 13:20  --------------------------------------------------------  IN: 350 mL / OUT: 535 mL / NET: -185 mL        CAPILLARY BLOOD GLUCOSE      (12-22 @ 03:30)                      7.4  7.37 )-----------( 263                 24.0    Neutrophils = -- (--%)  Lymphocytes = -- (--%)  Eosinophils = -- (--%)  Basophils = -- (--%)  Monocytes = -- (--%)  Bands = --%    12-22    142  |  109<H>  |  55<H>  ----------------------------<  193<H>  4.7   |  29  |  2.18<H>    Ca    8.1<L>      22 Dec 2023 03:30  Phos  2.9     12-22  Mg     2.1     12-22    TPro  7.5  /  Alb  1.5<L>  /  TBili  0.3  /  DBili  x   /  AST  18  /  ALT  <6<L>  /  AlkPhos  59  12-22    Urinalysis Basic - ( 22 Dec 2023 03:30 )    Color: x / Appearance: x / SG: x / pH: x  Gluc: 193 mg/dL / Ketone: x  / Bili: x / Urobili: x   Blood: x / Protein: x / Nitrite: x   Leuk Esterase: x / RBC: x / WBC x   Sq Epi: x / Non Sq Epi: x / Bacteria: x        WBC Trend: 7.37<--, 7.19<--, 6.20<--    Hb Trend: 7.4<--, 8.3<--, 7.9<--, 8.7<--, 7.6<--        New imaging in last 24 hours:  Consult notes reviewed: Progress Note    MOIZ RANDOLPH 89y (22-Mar-1930) Male [id number]  11-30-23 (22d)      Chief Complaint: UTI    Subjective:  No acute events overnight. Patient seen and examined at bedside.    Review of Systems: Unable to obtain due to sedation/mental status.     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 650 milliGRAM(s) Oral every 6 hours PRN  acetylcysteine 20%  Inhalation 4 milliLiter(s) Inhalation every 6 hours  albuterol/ipratropium for Nebulization 3 milliLiter(s) Nebulizer every 6 hours  buMETAnide Injectable 2 milliGRAM(s) IV Push daily  chlorhexidine 0.12% Liquid 15 milliLiter(s) Oral Mucosa every 12 hours  chlorhexidine 2% Cloths 1 Application(s) Topical <User Schedule>  collagenase Ointment 1 Application(s) Topical daily  dexMEDEtomidine Infusion 0.3 MICROgram(s)/kG/Hr IV Continuous <Continuous>  gabapentin 300 milliGRAM(s) Oral two times a day  mirtazapine 15 milliGRAM(s) Oral daily  norepinephrine Infusion 0.05 MICROgram(s)/kG/Min IV Continuous <Continuous>  polyethylene glycol 3350 17 Gram(s) Oral two times a day  senna 2 Tablet(s) Oral at bedtime  sodium chloride 3%  Inhalation 4 milliLiter(s) Inhalation every 6 hours  vancomycin  IVPB 500 milliGRAM(s) IV Intermittent every 24 hours    Objective:  T(C): 37 (12-22-23 @ 13:00), Max: 37 (12-21-23 @ 17:00)  HR: 90 (12-22-23 @ 13:00) (75 - 100)  BP: 123/78 (12-22-23 @ 13:00) (107/66 - 150/58)  RR: 19 (12-22-23 @ 13:00) (18 - 29)  SpO2: 100% (12-22-23 @ 13:00) (96% - 100%)    Physical exam:  GENERAL: NAD  HEAD:  Atraumatic  EYES: EOMI, PERRLA, conjunctiva and sclera clear  NECK: Supple, No JVD  CHEST/LUNG: Decreased breath sounds on L.   HEART: Regular rate and rhythm; No murmurs, rubs, or gallops  ABDOMEN: Soft, Nontender, Nondistended; Bowel sounds present  EXTREMITIES:  2+ Peripheral Pulses, No clubbing, cyanosis, or edema  PSYCH: AAOx3  NEUROLOGY: non-focal  SKIN: No rashes or lesions      12-21-23 @ 07:01  -  12-22-23 @ 07:00  --------------------------------------------------------  IN: 1507.2 mL / OUT: 1470 mL / NET: 37.2 mL    12-22-23 @ 07:01  -  12-22-23 @ 13:20  --------------------------------------------------------  IN: 350 mL / OUT: 535 mL / NET: -185 mL        CAPILLARY BLOOD GLUCOSE      (12-22 @ 03:30)                      7.4  7.37 )-----------( 263                 24.0    Neutrophils = -- (--%)  Lymphocytes = -- (--%)  Eosinophils = -- (--%)  Basophils = -- (--%)  Monocytes = -- (--%)  Bands = --%    12-22    142  |  109<H>  |  55<H>  ----------------------------<  193<H>  4.7   |  29  |  2.18<H>    Ca    8.1<L>      22 Dec 2023 03:30  Phos  2.9     12-22  Mg     2.1     12-22    TPro  7.5  /  Alb  1.5<L>  /  TBili  0.3  /  DBili  x   /  AST  18  /  ALT  <6<L>  /  AlkPhos  59  12-22    Urinalysis Basic - ( 22 Dec 2023 03:30 )    Color: x / Appearance: x / SG: x / pH: x  Gluc: 193 mg/dL / Ketone: x  / Bili: x / Urobili: x   Blood: x / Protein: x / Nitrite: x   Leuk Esterase: x / RBC: x / WBC x   Sq Epi: x / Non Sq Epi: x / Bacteria: x        WBC Trend: 7.37<--, 7.19<--, 6.20<--    Hb Trend: 7.4<--, 8.3<--, 7.9<--, 8.7<--, 7.6<--        New imaging in last 24 hours:  Consult notes reviewed:

## 2023-12-22 NOTE — PROGRESS NOTE ADULT - SUBJECTIVE AND OBJECTIVE BOX
[facility] NEPHROLOGY SERVICES, [facility]  NEPHROLOGY AND HYPERTENSION  [address]  [address]  [place] [address]  [phone]    MD SOFY KEENAN MD YELENA ROSENBERG, MD BINNY KOSHY, MD CHRISTOPHER CAPUTO, MD EDWARD BOVER, MD          Patient events noted  Ni distress  intubated     MEDICATIONS  (STANDING):  acetylcysteine 20%  Inhalation 4 milliLiter(s) Inhalation every 6 hours  albuterol/ipratropium for Nebulization 3 milliLiter(s) Nebulizer every 6 hours  buMETAnide Injectable 2 milliGRAM(s) IV Push daily  chlorhexidine 0.12% Liquid 15 milliLiter(s) Oral Mucosa every 12 hours  chlorhexidine 2% Cloths 1 Application(s) Topical <User Schedule>  collagenase Ointment 1 Application(s) Topical daily  dexMEDEtomidine Infusion 0.3 MICROgram(s)/kG/Hr (5.26 mL/Hr) IV Continuous <Continuous>  gabapentin 300 milliGRAM(s) Oral two times a day  mirtazapine 15 milliGRAM(s) Oral daily  polyethylene glycol 3350 17 Gram(s) Oral two times a day  senna 2 Tablet(s) Oral at bedtime  sodium chloride 3%  Inhalation 4 milliLiter(s) Inhalation every 6 hours  vancomycin  IVPB 500 milliGRAM(s) IV Intermittent every 24 hours    MEDICATIONS  (PRN):  acetaminophen     Tablet .. 650 milliGRAM(s) Oral every 6 hours PRN Temp greater or equal to 38C (100.4F), Mild Pain (1 - 3)      12-21-23 @ 07:01  -  12-22-23 @ 07:00  --------------------------------------------------------  IN: 1507.2 mL / OUT: 1470 mL / NET: 37.2 mL    12-22-23 @ 07:01  -  12-22-23 @ 21:04  --------------------------------------------------------  IN: 775 mL / OUT: 1100 mL / NET: -325 mL      PHYSICAL EXAM:      T(C): 37.3 (12-22-23 @ 20:00), Max: 37.4 (12-22-23 @ 18:00)  HR: 87 (12-22-23 @ 20:00) (80 - 100)  BP: 122/63 (12-22-23 @ 20:00) (109/63 - 150/58)  RR: 18 (12-22-23 @ 20:00) (18 - 29)  SpO2: 100% (12-22-23 @ 20:00) (96% - 100%)  Wt(kg): --  Lungs decreased BS L  Heart S1S2  Abd soft NT ND  Extremities:   tr edema                                    7.4    7.37  )-----------( 263      ( 22 Dec 2023 03:30 )             24.0     12-22    142  |  109<H>  |  55<H>  ----------------------------<  193<H>  4.7   |  29  |  2.18<H>    Ca    8.1<L>      22 Dec 2023 03:30  Phos  2.9     12-22  Mg     2.1     12-22    TPro  7.5  /  Alb  1.5<L>  /  TBili  0.3  /  DBili  x   /  AST  18  /  ALT  <6<L>  /  AlkPhos  59  12-22      LIVER FUNCTIONS - ( 22 Dec 2023 03:30 )  Alb: 1.5 g/dL / Pro: 7.5 gm/dL / ALK PHOS: 59 U/L / ALT: <6 U/L / AST: 18 U/L / GGT: x           Creatinine Trend: 2.18<--, 2.26<--, 2.26<--, 2.21<--, 2.26<--, 2.30<--  Mode: AC/ CMV (Assist Control/ Continuous Mandatory Ventilation)  RR (machine): 18  TV (machine): 450  FiO2: 30  PEEP: 5  ITime: 0.9  MAP: 10  PIP: 26    Impression:  1.  Acute respiratory failure with whiteout of left lung, improved s/p bronch;  staph aureus pneumonia  2.  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    Recommend:  Continue supportive care including diuretics  Prognosis guarded       Samy Noriega MD [facility] NEPHROLOGY SERVICES, [facility]  NEPHROLOGY AND HYPERTENSION  [address]  [address]  [place] [address]  [phone]    MD SOFY KEENAN MD YELENA ROSENBERG, MD BINNY KOSHY, MD CHRISTOPHER CAPUTO, MD EDWARD BOVER, MD          Patient events noted  Ni distress  intubated     MEDICATIONS  (STANDING):  acetylcysteine 20%  Inhalation 4 milliLiter(s) Inhalation every 6 hours  albuterol/ipratropium for Nebulization 3 milliLiter(s) Nebulizer every 6 hours  buMETAnide Injectable 2 milliGRAM(s) IV Push daily  chlorhexidine 0.12% Liquid 15 milliLiter(s) Oral Mucosa every 12 hours  chlorhexidine 2% Cloths 1 Application(s) Topical <User Schedule>  collagenase Ointment 1 Application(s) Topical daily  dexMEDEtomidine Infusion 0.3 MICROgram(s)/kG/Hr (5.26 mL/Hr) IV Continuous <Continuous>  gabapentin 300 milliGRAM(s) Oral two times a day  mirtazapine 15 milliGRAM(s) Oral daily  polyethylene glycol 3350 17 Gram(s) Oral two times a day  senna 2 Tablet(s) Oral at bedtime  sodium chloride 3%  Inhalation 4 milliLiter(s) Inhalation every 6 hours  vancomycin  IVPB 500 milliGRAM(s) IV Intermittent every 24 hours    MEDICATIONS  (PRN):  acetaminophen     Tablet .. 650 milliGRAM(s) Oral every 6 hours PRN Temp greater or equal to 38C (100.4F), Mild Pain (1 - 3)      12-21-23 @ 07:01  -  12-22-23 @ 07:00  --------------------------------------------------------  IN: 1507.2 mL / OUT: 1470 mL / NET: 37.2 mL    12-22-23 @ 07:01  -  12-22-23 @ 21:04  --------------------------------------------------------  IN: 775 mL / OUT: 1100 mL / NET: -325 mL      PHYSICAL EXAM:      T(C): 37.3 (12-22-23 @ 20:00), Max: 37.4 (12-22-23 @ 18:00)  HR: 87 (12-22-23 @ 20:00) (80 - 100)  BP: 122/63 (12-22-23 @ 20:00) (109/63 - 150/58)  RR: 18 (12-22-23 @ 20:00) (18 - 29)  SpO2: 100% (12-22-23 @ 20:00) (96% - 100%)  Wt(kg): --  Lungs decreased BS L  Heart S1S2  Abd soft NT ND  Extremities:   tr edema                                    7.4    7.37  )-----------( 263      ( 22 Dec 2023 03:30 )             24.0     12-22    142  |  109<H>  |  55<H>  ----------------------------<  193<H>  4.7   |  29  |  2.18<H>    Ca    8.1<L>      22 Dec 2023 03:30  Phos  2.9     12-22  Mg     2.1     12-22    TPro  7.5  /  Alb  1.5<L>  /  TBili  0.3  /  DBili  x   /  AST  18  /  ALT  <6<L>  /  AlkPhos  59  12-22      LIVER FUNCTIONS - ( 22 Dec 2023 03:30 )  Alb: 1.5 g/dL / Pro: 7.5 gm/dL / ALK PHOS: 59 U/L / ALT: <6 U/L / AST: 18 U/L / GGT: x           Creatinine Trend: 2.18<--, 2.26<--, 2.26<--, 2.21<--, 2.26<--, 2.30<--  Mode: AC/ CMV (Assist Control/ Continuous Mandatory Ventilation)  RR (machine): 18  TV (machine): 450  FiO2: 30  PEEP: 5  ITime: 0.9  MAP: 10  PIP: 26    Impression:  1.  Acute respiratory failure with whiteout of left lung, improved s/p bronch;  staph aureus pneumonia  2.  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

## 2023-12-23 NOTE — GOALS OF CARE CONVERSATION - ADVANCED CARE PLANNING - CONVERSATION DETAILS
Spoke with patients son & daughter at bedside today. I provided an update on patients condition, explaining that bronchoscopy yesterday provided very minimal improvement based off chest xray & that we will continue to treat his MRSA PNA. Also explained that future bronchoscopy will likely not provide benefit to the patient.    Explained that patient has been tolerating pressure support ventilation today and is meeting extubation criteria. I explained that ICU team would like to try to extubate patient and that we hope this is successful but that its important to discuss future GOC for patient if it is unsucccessful. Discussed with family options for patient if he were to decompensate post-extubation. Discussed if patient does not tolerate extubation and decompensates, if patient/family would like us to reintubate versus palliative care measures. I explained that if patient was to be reintubated that would likely mean he would require a tracheostomy in the future.   Son & daughter state that they will discuss with rest of their family regarding this matter and get back to ICU team regarding future GOC. Spoke with patients son & daughter at bedside today. I provided an update on patients condition, explaining that bronchoscopy yesterday provided very minimal improvement based off chest xray & that we will continue to treat his MRSA PNA. Also explained that future bronchoscopy will likely not provide benefit to the patient.    Explained that patient has been tolerating pressure support ventilation today and is meeting extubation criteria. I explained that ICU team would like to try to extubate patient and that we hope this is successful but that its important to discuss future GOC for patient if it is unsuccessful. Discussed with family options for patient if he were to decompensate post-extubation. Discussed if patient does not tolerate extubation and decompensates, if patient/family would like us to reintubate versus palliative care measures. I explained that if patient was to be reintubated that would likely mean he would require a tracheostomy in the future.   Son & daughter state that they will discuss with rest of their family regarding this matter and get back to ICU team regarding future GOC. Will f/u daily with GOC conversations.

## 2023-12-23 NOTE — PROGRESS NOTE ADULT - SUBJECTIVE AND OBJECTIVE BOX
Intubated in ICU    Vital Signs Last 24 Hrs  T(C): 36.7 (12-23-23 @ 23:00), Max: 37.1 (12-23-23 @ 00:00)  T(F): 98.1 (12-23-23 @ 23:00), Max: 98.8 (12-23-23 @ 00:00)  HR: 87 (12-23-23 @ 23:00) (82 - 97)  BP: 123/59 (12-23-23 @ 23:00) (115/49 - 143/84)  BP(mean): 78 (12-23-23 @ 23:00) (66 - 98)  RR: 18 (12-23-23 @ 23:00) (10 - 31)  SpO2: 100% (12-23-23 @ 23:00) (97% - 100%)    I&O's Detail    22 Dec 2023 07:01  -  23 Dec 2023 07:00  --------------------------------------------------------  IN:    Dexmedetomidine: 456.5 mL    Enteral Tube Flush: 110 mL    Osmolite 1.2: 1170 mL  Total IN: 1736.5 mL    OUT:    Indwelling Catheter - Urethral (mL): 1645 mL  Total OUT: 1645 mL    23 Dec 2023 07:01  -  23 Dec 2023 23:58  --------------------------------------------------------  IN:    Dexmedetomidine: 290.5 mL    Enteral Tube Flush: 30 mL    IV PiggyBack: 100 mL    Osmolite 1.2: 650 mL  Total IN: 1070.5 mL    OUT:    Indwelling Catheter - Urethral (mL): 1015 mL  Total OUT: 1015 mL    Mode: AC/ CMV (Assist Control/ Continuous Mandatory Ventilation)  RR (machine): 18  TV (machine): 450  FiO2: 30  PEEP: 5  ITime: 0.8  MAP: 9  PIP: 22    Lungs b/l air entry  Heart S1S2  Abd soft ND  Extremities: no edema                                       7.3    6.33  )-----------( 245      ( 23 Dec 2023 04:40 )             24.3     23 Dec 2023 04:40    144    |  109    |  58     ----------------------------<  217    5.0     |  31     |  2.11     Ca    8.4        23 Dec 2023 04:40  Phos  3.2       23 Dec 2023 04:40  Mg     2.1       23 Dec 2023 04:40    TPro  7.8    /  Alb  1.5    /  TBili  0.2    /  DBili  x      /  AST  24     /  ALT  <6     /  AlkPhos  64     23 Dec 2023 04:40    LIVER FUNCTIONS - ( 23 Dec 2023 04:40 )  Alb: 1.5 g/dL / Pro: 7.8 gm/dL / ALK PHOS: 64 U/L / ALT: <6 U/L / AST: 24 U/L / GGT: x           Culture - Acid Fast - Bronchial w/Smear (collected 22 Dec 2023 13:56)  Source: .Bronchial Bronchial Lavage    Culture - Bronchial (collected 22 Dec 2023 13:56)  Source: .Bronchial Bronchial Lavage  Gram Stain (prelim) (23 Dec 2023 18:12):    Numerous polymorphonuclear leukocytes per low power field    Rare Squamous epithelial cells per low power field    Moderate Gram Negative Rods per oil power field    Few Gram positive cocci in pairs per oil power field    Rare Gram Positive Cocci in Clusters per oil power field  Preliminary Report (23 Dec 2023 18:12):    Numerous Stenotrophomonas maltophilia    Moderate Staphylococcus aureus    acetaminophen     Tablet .. 650 milliGRAM(s) Oral every 6 hours PRN  acetylcysteine 20%  Inhalation 4 milliLiter(s) Inhalation every 6 hours  albuterol/ipratropium for Nebulization 3 milliLiter(s) Nebulizer every 6 hours  buMETAnide Injectable 2 milliGRAM(s) IV Push daily  chlorhexidine 0.12% Liquid 15 milliLiter(s) Oral Mucosa every 12 hours  chlorhexidine 2% Cloths 1 Application(s) Topical <User Schedule>  collagenase Ointment 1 Application(s) Topical daily  dexMEDEtomidine Infusion 0.3 MICROgram(s)/kG/Hr IV Continuous <Continuous>  doxycycline IVPB 100 milliGRAM(s) IV Intermittent every 12 hours  gabapentin 300 milliGRAM(s) Oral two times a day  heparin   Injectable 5000 Unit(s) SubCutaneous every 12 hours  mirtazapine 15 milliGRAM(s) Oral daily  polyethylene glycol 3350 17 Gram(s) Oral two times a day  senna 2 Tablet(s) Oral at bedtime  sodium chloride 3%  Inhalation 4 milliLiter(s) Inhalation every 6 hours  vancomycin  IVPB 500 milliGRAM(s) IV Intermittent every 24 hours    Assessment     Resp failure, PNA  Hemodynamic ROGER/CKD 3 (Cr 1.65 - 10/30/23)  Abx, diuresis per ICU team  CBC, BMP, Mg in am  F/u UO  Avoid nephrotoxins  Supportive care  Renal fx is fairly stable     [phone] Intubated in ICU    Vital Signs Last 24 Hrs  T(C): 36.7 (12-23-23 @ 23:00), Max: 37.1 (12-23-23 @ 00:00)  T(F): 98.1 (12-23-23 @ 23:00), Max: 98.8 (12-23-23 @ 00:00)  HR: 87 (12-23-23 @ 23:00) (82 - 97)  BP: 123/59 (12-23-23 @ 23:00) (115/49 - 143/84)  BP(mean): 78 (12-23-23 @ 23:00) (66 - 98)  RR: 18 (12-23-23 @ 23:00) (10 - 31)  SpO2: 100% (12-23-23 @ 23:00) (97% - 100%)    I&O's Detail    22 Dec 2023 07:01  -  23 Dec 2023 07:00  --------------------------------------------------------  IN:    Dexmedetomidine: 456.5 mL    Enteral Tube Flush: 110 mL    Osmolite 1.2: 1170 mL  Total IN: 1736.5 mL    OUT:    Indwelling Catheter - Urethral (mL): 1645 mL  Total OUT: 1645 mL    23 Dec 2023 07:01  -  23 Dec 2023 23:58  --------------------------------------------------------  IN:    Dexmedetomidine: 290.5 mL    Enteral Tube Flush: 30 mL    IV PiggyBack: 100 mL    Osmolite 1.2: 650 mL  Total IN: 1070.5 mL    OUT:    Indwelling Catheter - Urethral (mL): 1015 mL  Total OUT: 1015 mL    Mode: AC/ CMV (Assist Control/ Continuous Mandatory Ventilation)  RR (machine): 18  TV (machine): 450  FiO2: 30  PEEP: 5  ITime: 0.8  MAP: 9  PIP: 22    Lungs b/l air entry  Heart S1S2  Abd soft ND  Extremities: no edema                                       7.3    6.33  )-----------( 245      ( 23 Dec 2023 04:40 )             24.3     23 Dec 2023 04:40    144    |  109    |  58     ----------------------------<  217    5.0     |  31     |  2.11     Ca    8.4        23 Dec 2023 04:40  Phos  3.2       23 Dec 2023 04:40  Mg     2.1       23 Dec 2023 04:40    TPro  7.8    /  Alb  1.5    /  TBili  0.2    /  DBili  x      /  AST  24     /  ALT  <6     /  AlkPhos  64     23 Dec 2023 04:40    LIVER FUNCTIONS - ( 23 Dec 2023 04:40 )  Alb: 1.5 g/dL / Pro: 7.8 gm/dL / ALK PHOS: 64 U/L / ALT: <6 U/L / AST: 24 U/L / GGT: x           Culture - Acid Fast - Bronchial w/Smear (collected 22 Dec 2023 13:56)  Source: .Bronchial Bronchial Lavage    Culture - Bronchial (collected 22 Dec 2023 13:56)  Source: .Bronchial Bronchial Lavage  Gram Stain (prelim) (23 Dec 2023 18:12):    Numerous polymorphonuclear leukocytes per low power field    Rare Squamous epithelial cells per low power field    Moderate Gram Negative Rods per oil power field    Few Gram positive cocci in pairs per oil power field    Rare Gram Positive Cocci in Clusters per oil power field  Preliminary Report (23 Dec 2023 18:12):    Numerous Stenotrophomonas maltophilia    Moderate Staphylococcus aureus    acetaminophen     Tablet .. 650 milliGRAM(s) Oral every 6 hours PRN  acetylcysteine 20%  Inhalation 4 milliLiter(s) Inhalation every 6 hours  albuterol/ipratropium for Nebulization 3 milliLiter(s) Nebulizer every 6 hours  buMETAnide Injectable 2 milliGRAM(s) IV Push daily  chlorhexidine 0.12% Liquid 15 milliLiter(s) Oral Mucosa every 12 hours  chlorhexidine 2% Cloths 1 Application(s) Topical <User Schedule>  collagenase Ointment 1 Application(s) Topical daily  dexMEDEtomidine Infusion 0.3 MICROgram(s)/kG/Hr IV Continuous <Continuous>  doxycycline IVPB 100 milliGRAM(s) IV Intermittent every 12 hours  gabapentin 300 milliGRAM(s) Oral two times a day  heparin   Injectable 5000 Unit(s) SubCutaneous every 12 hours  mirtazapine 15 milliGRAM(s) Oral daily  polyethylene glycol 3350 17 Gram(s) Oral two times a day  senna 2 Tablet(s) Oral at bedtime  sodium chloride 3%  Inhalation 4 milliLiter(s) Inhalation every 6 hours  vancomycin  IVPB 500 milliGRAM(s) IV Intermittent every 24 hours    Assessment     Resp failure, PNA  Hemodynamic ROGER/CKD 3 (Cr 1.65 - 10/30/23)  Abx, diuresis per ICU team  CBC, BMP, Mg in am  F/u UO  Avoid nephrotoxins  Supportive care  Renal fx is fairly stable     [phone]

## 2023-12-23 NOTE — CHART NOTE - NSCHARTNOTEFT_GEN_A_CORE
Patient currently being treated for MRSA PNA. Bronch culture just came back positive for numerous Stenotrophomonas maltophilia  and moderate staph aureus. Discusssed case with Pharmacy and they are recommending continuing the vancomycin and starting doxycycline 100 mg q12. Will order and follow up with ID in AM.

## 2023-12-23 NOTE — PROGRESS NOTE ADULT - SUBJECTIVE AND OBJECTIVE BOX
CHIEF COMPLAINT: Acute hypoxic respiratory failure     Interval Events: Pressure support     REVIEW OF SYSTEMS:  Unable to perform ROS due to sedation      OBJECTIVE:  ICU Vital Signs Last 24 Hrs  T(C): 36.7 (23 Dec 2023 12:00), Max: 37.4 (22 Dec 2023 18:00)  T(F): 98.1 (23 Dec 2023 12:00), Max: 99.3 (22 Dec 2023 18:00)  HR: 94 (23 Dec 2023 12:00) (79 - 97)  BP: 115/49 (23 Dec 2023 12:00) (115/49 - 145/71)  BP(mean): 66 (23 Dec 2023 12:00) (66 - 92)  ABP: --  ABP(mean): --  RR: 28 (23 Dec 2023 12:00) (10 - 30)  SpO2: 100% (23 Dec 2023 12:00) (97% - 100%)    O2 Parameters below as of 23 Dec 2023 07:00  Patient On (Oxygen Delivery Method): ventilator          Mode: CPAP with PS, FiO2: 30, PEEP: 5, PS: 5, ITime: 0.8, MAP: 7, PIP: 11    12-22 @ 07:01  -  12-23 @ 07:00  --------------------------------------------------------  IN: 1736.5 mL / OUT: 1645 mL / NET: 91.5 mL    12-23 @ 07:01  -  12-23 @ 13:24  --------------------------------------------------------  IN: 340.5 mL / OUT: 480 mL / NET: -139.5 mL      CAPILLARY BLOOD GLUCOSE          PHYSICAL EXAM:  GENERAL: NAD, well-groomed, well-developed  EYES: EOMI, PERRLA, conjunctiva and sclera clear  ENMT: Et tube in place.   CHEST/LUNG: Clear to auscultation bilaterally; No rales, rhonchi, wheezing, or rubs  HEART: Regular rate and rhythm; No murmurs, rubs, or gallops  ABDOMEN: Soft, Nontender, Nondistended; Bowel sounds present  VASCULAR:  2+ Peripheral Pulses, No clubbing, cyanosis, or edema  LYMPH: No lymphadenopathy noted  SKIN: No rashes or lesions  NERVOUS SYSTEM: Sedation     LINES:    HOSPITAL MEDICATIONS:  heparin   Injectable 5000 Unit(s) SubCutaneous every 12 hours    vancomycin  IVPB 500 milliGRAM(s) IV Intermittent every 24 hours    buMETAnide Injectable 2 milliGRAM(s) IV Push daily      acetylcysteine 20%  Inhalation 4 milliLiter(s) Inhalation every 6 hours  albuterol/ipratropium for Nebulization 3 milliLiter(s) Nebulizer every 6 hours  sodium chloride 3%  Inhalation 4 milliLiter(s) Inhalation every 6 hours    acetaminophen     Tablet .. 650 milliGRAM(s) Oral every 6 hours PRN  dexMEDEtomidine Infusion 0.3 MICROgram(s)/kG/Hr IV Continuous <Continuous>  gabapentin 300 milliGRAM(s) Oral two times a day  mirtazapine 15 milliGRAM(s) Oral daily    polyethylene glycol 3350 17 Gram(s) Oral two times a day  senna 2 Tablet(s) Oral at bedtime            chlorhexidine 0.12% Liquid 15 milliLiter(s) Oral Mucosa every 12 hours  chlorhexidine 2% Cloths 1 Application(s) Topical <User Schedule>  collagenase Ointment 1 Application(s) Topical daily        LABS:                        7.3    6.33  )-----------( 245      ( 23 Dec 2023 04:40 )             24.3     Hgb Trend: 7.3<--, 7.4<--, 8.3<--, 7.9<--, 8.7<--  12-23    144  |  109<H>  |  58<H>  ----------------------------<  217<H>  5.0   |  31  |  2.11<H>    Ca    8.4<L>      23 Dec 2023 04:40  Phos  3.2     12-23  Mg     2.1     12-23    TPro  7.8  /  Alb  1.5<L>  /  TBili  0.2  /  DBili  x   /  AST  24  /  ALT  <6<L>  /  AlkPhos  64  12-23    Creatinine Trend: 2.11<--, 2.18<--, 2.26<--, 2.26<--, 2.21<--, 2.26<--    Urinalysis Basic - ( 23 Dec 2023 04:40 )    Color: x / Appearance: x / SG: x / pH: x  Gluc: 217 mg/dL / Ketone: x  / Bili: x / Urobili: x   Blood: x / Protein: x / Nitrite: x   Leuk Esterase: x / RBC: x / WBC x   Sq Epi: x / Non Sq Epi: x / Bacteria: x            MICROBIOLOGY:     RADIOLOGY:  [ ] Reviewed and interpreted by me    EKG:

## 2023-12-23 NOTE — PROGRESS NOTE ADULT - ASSESSMENT
89 y/o M w/dementia, CKD, chronic urinary retention s/p obrien initially admitted for dyspnea found to have L sided pleural effusions and compressive atelectasis w/hospital course c/b acute respiratory failure with hypoxia and hypercapnia likely secondary to MRSA PNA.    Neuro:  Sedation as needed goal RASS -1 to 0  Respiratory: Wean from vent as tolerated: Hypertonic saline, chest PT, pulmonary toilet, mucomyst. Consolidated L lung. S/p bronchoscopy with copious secretions aspirated. Improvement in aeration on CXR.   Renal: Trend Cr, avoid nephrotoxins. creatinine at baseline.  ID: Vancomycin for MRSA PNA. Plan for 7 days.   Heme: DVT prophylaxis with heparin subq  Ethics: Full code. Discussed with son, Dr. Samy Tireney (internal medicine) on 12/22. He wants his father to undergo "everything within reason." His dad would not want to live connected to ventilator for the rest of his life. Discussed today post-bronch. Explained that he has his best shot now but will likely re-plug.          89 y/o M w/dementia, CKD, chronic urinary retention s/p obrien initially admitted for dyspnea found to have L sided pleural effusions and compressive atelectasis w/hospital course c/b acute respiratory failure with hypoxia and hypercapnia likely secondary to MRSA PNA.    Neuro:  Sedation as needed goal RASS -1 to 0  Respiratory: Wean from vent as tolerated: Hypertonic saline, chest PT, pulmonary toilet, mucomyst. Consolidated L lung. S/p bronchoscopy with copious secretions aspirated. Improvement in aeration on CXR.   Renal: Trend Cr, avoid nephrotoxins. creatinine at baseline.  ID: Vancomycin for MRSA PNA. Plan for 7 days.   Heme: DVT prophylaxis with heparin subq  Ethics: Full code. Discussed with son, Dr. Samy Tierney (internal medicine) on 12/22. He wants his father to undergo "everything within reason." His dad would not want to live connected to ventilator for the rest of his life. Discussed today post-bronch. Explained that he has his best shot now but will likely re-plug.

## 2023-12-24 NOTE — PROGRESS NOTE ADULT - ASSESSMENT
89 y/o M w/dementia, CKD, chronic urinary retention s/p obrien initially admitted for dyspnea found to have L sided pleural effusions and compressive atelectasis w/hospital course c/b acute respiratory failure with hypoxia and hypercapnia likely secondary to MRSA PNA.    Neuro:  SAT daily, off of Precedex    Respiratory: Wean from vent as tolerated: C/w Hypertonic saline, chest PT, pulmonary toilet, mucomyst. Consolidated L lung. S/p bronchoscopy with copious secretions aspirated. Improvement in aeration on CXR.  Continues to pressure support very well. Patient optimized for extubation, will d/w family   Renal: Trend Cr, avoid nephrotoxins. Cr at baseline.  ID: Vancomycin for MRSA PNA. Plan for 7 days. Sputum cx also positive for Stenotrophomonas, started on doxycycline     Heme: DVT prophylaxis with heparin subq  Ethics: Full code. will discus with family on plan for extubation. Patient is medically optimized for extubation. Family wants reintubation

## 2023-12-24 NOTE — PROGRESS NOTE ADULT - SUBJECTIVE AND OBJECTIVE BOX
CHIEF COMPLAINT: PNA    Interval Events: Continues to pressure support well; d/w family on further plan of care     REVIEW OF SYSTEMS:  Unable to perform ROS due to intubation       OBJECTIVE:  ICU Vital Signs Last 24 Hrs  T(C): 36.7 (24 Dec 2023 11:00), Max: 37.1 (24 Dec 2023 06:00)  T(F): 98.1 (24 Dec 2023 11:00), Max: 98.8 (24 Dec 2023 06:00)  HR: 119 (24 Dec 2023 11:46) (87 - 119)  BP: 123/66 (24 Dec 2023 11:00) (101/66 - 143/84)  BP(mean): 79 (24 Dec 2023 11:00) (74 - 108)  ABP: --  ABP(mean): --  RR: 29 (24 Dec 2023 11:00) (18 - 32)  SpO2: 100% (24 Dec 2023 11:46) (99% - 100%)    O2 Parameters below as of 24 Dec 2023 11:29  Patient On (Oxygen Delivery Method): ventilator          Mode: CPAP with PS, FiO2: 30, PEEP: 5, PS: 5, ITime: 0.8, MAP: 7, PIP: 10    12-23 @ 07:01 - 12-24 @ 07:00  --------------------------------------------------------  IN: 1830.5 mL / OUT: 1515 mL / NET: 315.5 mL    12-24 @ 07:01 - 12-24 @ 12:33  --------------------------------------------------------  IN: 295 mL / OUT: 400 mL / NET: -105 mL      CAPILLARY BLOOD GLUCOSE          PHYSICAL EXAM:  GENERAL: NAD, well-groomed, well-developed  EYES: EOMI, PERRLA, conjunctiva and sclera clear  ENMT: Et tube in place   CHEST/LUNG: Clear to auscultation bilaterally; No rales, rhonchi, wheezing, or rubs  HEART: Regular rate and rhythm; No murmurs, rubs, or gallops  ABDOMEN: Soft, Nontender, Nondistended; Bowel sounds present  VASCULAR:  2+ Peripheral Pulses, No clubbing, cyanosis, or edema  LYMPH: No lymphadenopathy noted  SKIN: No rashes or lesions  NERVOUS SYSTEM:  Alert & Oriented X1    LINES:    HOSPITAL MEDICATIONS:  heparin   Injectable 5000 Unit(s) SubCutaneous every 12 hours    doxycycline IVPB 100 milliGRAM(s) IV Intermittent every 12 hours  vancomycin  IVPB 500 milliGRAM(s) IV Intermittent every 24 hours    buMETAnide Injectable 2 milliGRAM(s) IV Push daily      acetylcysteine 20%  Inhalation 4 milliLiter(s) Inhalation every 6 hours  albuterol/ipratropium for Nebulization 3 milliLiter(s) Nebulizer every 6 hours  sodium chloride 3%  Inhalation 4 milliLiter(s) Inhalation every 6 hours    acetaminophen     Tablet .. 650 milliGRAM(s) Oral every 6 hours PRN  dexMEDEtomidine Infusion 0.3 MICROgram(s)/kG/Hr IV Continuous <Continuous>  gabapentin 300 milliGRAM(s) Oral two times a day  mirtazapine 15 milliGRAM(s) Oral daily    polyethylene glycol 3350 17 Gram(s) Oral two times a day  senna 2 Tablet(s) Oral at bedtime            chlorhexidine 0.12% Liquid 15 milliLiter(s) Oral Mucosa every 12 hours  chlorhexidine 2% Cloths 1 Application(s) Topical <User Schedule>  collagenase Ointment 1 Application(s) Topical daily        LABS:                        7.5    6.08  )-----------( 253      ( 24 Dec 2023 02:15 )             24.8     Hgb Trend: 7.5<--, 7.3<--, 7.4<--, 8.3<--, 7.9<--  12-24    143  |  109<H>  |  63<H>  ----------------------------<  173<H>  5.1   |  30  |  2.24<H>    Ca    8.9      24 Dec 2023 02:15  Phos  3.6     12-24  Mg     2.1     12-24    TPro  8.0  /  Alb  1.5<L>  /  TBili  0.3  /  DBili  x   /  AST  35  /  ALT  8<L>  /  AlkPhos  88  12-24    Creatinine Trend: 2.24<--, 2.11<--, 2.18<--, 2.26<--, 2.26<--, 2.21<--    Urinalysis Basic - ( 24 Dec 2023 02:15 )    Color: x / Appearance: x / SG: x / pH: x  Gluc: 173 mg/dL / Ketone: x  / Bili: x / Urobili: x   Blood: x / Protein: x / Nitrite: x   Leuk Esterase: x / RBC: x / WBC x   Sq Epi: x / Non Sq Epi: x / Bacteria: x      Arterial Blood Gas:  12-24 @ 09:34  7.46/47/130/33/99.4/8.3  ABG lactate: --        MICROBIOLOGY:     RADIOLOGY:  [ ] Reviewed and interpreted by me    EKG:

## 2023-12-24 NOTE — CHART NOTE - NSCHARTNOTEFT_GEN_A_CORE
After discussion with son at bedside this AM, he endorsed that the pt would like to be reintubated if fails extubation. Pt extubated this AM to NC  5 hours post extubation, pt became unresponsive and was not protecting his airway. Stat ABG with acute hypercapnic resp failure likely 2/2 mucous plugging/ atlectasis.   Son at bedside and wished for pt to be reintubated.   Pt induced with ketamine and intubated with 7.5 ETT on first attempt. Post intubation, pt became hypotensive requiring levophed and IVP of phenylephrine. BP now stabilized.   Will f/u post intubation ABG    I spent 45 minutes assessing presenting problems of acute illness, which pose high probability of life threatening deterioration or end organ damage/dysfunction, as well as medical decision making including initiating plan of care, reviewing data, reviewing radiologic exams, discussing with multidisciplinary team,  discussing goals of care with patient/family, and writing this note.  Non-inclusive of procedures performed,

## 2023-12-24 NOTE — PROGRESS NOTE ADULT - SUBJECTIVE AND OBJECTIVE BOX
Extubated, re-intubated today     Vital Signs Last 24 Hrs  T(C): 37.9 (12-24-23 @ 16:00), Max: 37.9 (12-24-23 @ 16:00)  T(F): 100.2 (12-24-23 @ 16:00), Max: 100.2 (12-24-23 @ 16:00)  HR: 119 (12-24-23 @ 19:30) (87 - 147)  BP: 117/63 (12-24-23 @ 19:30) (61/41 - 196/95)  BP(mean): 75 (12-24-23 @ 19:30) (48 - 123)  RR: 24 (12-24-23 @ 19:30) (0 - 37)  SpO2: 100% (12-24-23 @ 19:30) (97% - 100%)    I&O's Detail    23 Dec 2023 07:01  -  24 Dec 2023 07:00  --------------------------------------------------------  IN:    Dexmedetomidine: 430.5 mL    Enteral Tube Flush: 30 mL    IV PiggyBack: 100 mL    IV PiggyBack: 100 mL    Osmolite 1.2: 1170 mL  Total IN: 1830.5 mL    OUT:    Indwelling Catheter - Urethral (mL): 1515 mL  Total OUT: 1515 mL    24 Dec 2023 07:01  -  24 Dec 2023 19:46  --------------------------------------------------------  IN:    Dexmedetomidine: 35 mL    IV PiggyBack: 100 mL    Norepinephrine: 29 mL    Osmolite 1.2: 260 mL    Propofol: 16.8 mL  Total IN: 440.8 mL    OUT:    Indwelling Catheter - Urethral (mL): 930 mL  Total OUT: 930 mL    Mode: AC/ CMV (Assist Control/ Continuous Mandatory Ventilation)  RR (machine): 22  TV (machine): 400  FiO2: 100  PEEP: 5  ITime: 1  MAP: 10  PIP: 27    Lungs b/l air entry  Heart S1S2  Abd soft ND  Extremities: sm edema                                                7.5    6.08  )-----------( 253      ( 24 Dec 2023 02:15 )             24.8     24 Dec 2023 16:10    142    |  109    |  64     ----------------------------<  136    4.7     |  29     |  2.17     Ca    9.1        24 Dec 2023 16:10  Phos  3.6       24 Dec 2023 02:15  Mg     2.1       24 Dec 2023 02:15    TPro  8.2    /  Alb  1.5    /  TBili  0.5    /  DBili  x      /  AST  42     /  ALT  8      /  AlkPhos  112    24 Dec 2023 16:10    LIVER FUNCTIONS - ( 24 Dec 2023 16:10 )  Alb: 1.5 g/dL / Pro: 8.2 gm/dL / ALK PHOS: 112 U/L / ALT: 8 U/L / AST: 42 U/L / GGT: x           Culture - Acid Fast - Bronchial w/Smear (collected 22 Dec 2023 13:56)  Source: .Bronchial Bronchial Lavage    Culture - Bronchial (collected 22 Dec 2023 13:56)  Source: .Bronchial Bronchial Lavage  Gram Stain (24 Dec 2023 17:17):    Numerous polymorphonuclear leukocytes per low power field    Rare Squamous epithelial cells per low power field    Moderate Gram Negative Rods per oil power field    Few Gram positive cocci in pairs per oil power field    Rare Gram Positive Cocci in Clusters per oil power field  Final Report (24 Dec 2023 17:17):    Numerous Stenotrophomonas maltophilia    Moderate Methicillin Resistant Staphylococcus aureus    Normal Respiratory Vanessa absent  Organism: Stenotrophomonas maltophilia  Methicillin resistant Staphylococcus aureus (24 Dec 2023 17:17)  Organism: Methicillin resistant Staphylococcus aureus (24 Dec 2023 17:17)  Organism: Stenotrophomonas maltophilia (24 Dec 2023 17:17)  Organism: Stenotrophomonas maltophilia (24 Dec 2023 17:17)    acetaminophen     Tablet .. 650 milliGRAM(s) Oral every 6 hours PRN  acetylcysteine 20%  Inhalation 4 milliLiter(s) Inhalation every 6 hours  albuterol/ipratropium for Nebulization 3 milliLiter(s) Nebulizer every 6 hours  buMETAnide Injectable 2 milliGRAM(s) IV Push daily  chlorhexidine 0.12% Liquid 15 milliLiter(s) Oral Mucosa every 12 hours  chlorhexidine 2% Cloths 1 Application(s) Topical <User Schedule>  collagenase Ointment 1 Application(s) Topical daily  doxycycline IVPB 100 milliGRAM(s) IV Intermittent every 12 hours  gabapentin 300 milliGRAM(s) Oral two times a day  heparin   Injectable 5000 Unit(s) SubCutaneous every 12 hours  mirtazapine 15 milliGRAM(s) Oral daily  norepinephrine Infusion 0.05 MICROgram(s)/kG/Min IV Continuous <Continuous>  polyethylene glycol 3350 17 Gram(s) Oral two times a day  propofol Infusion 10 MICROgram(s)/kG/Min IV Continuous <Continuous>  senna 2 Tablet(s) Oral at bedtime  sodium chloride 3%  Inhalation 4 milliLiter(s) Inhalation every 6 hours  vancomycin  IVPB 500 milliGRAM(s) IV Intermittent every 24 hours    Assessment     Resp failure, PNA  Hemodynamic ROGER/CKD 3 (Cr 1.65 - 10/30/23)  Abx, diuresis per ICU team  F/u CBC, BMP, Mg, UO  Avoid nephrotoxins  Supportive care  Renal fx is fairly stable     [phone] Extubated, re-intubated today     Vital Signs Last 24 Hrs  T(C): 37.9 (12-24-23 @ 16:00), Max: 37.9 (12-24-23 @ 16:00)  T(F): 100.2 (12-24-23 @ 16:00), Max: 100.2 (12-24-23 @ 16:00)  HR: 119 (12-24-23 @ 19:30) (87 - 147)  BP: 117/63 (12-24-23 @ 19:30) (61/41 - 196/95)  BP(mean): 75 (12-24-23 @ 19:30) (48 - 123)  RR: 24 (12-24-23 @ 19:30) (0 - 37)  SpO2: 100% (12-24-23 @ 19:30) (97% - 100%)    I&O's Detail    23 Dec 2023 07:01  -  24 Dec 2023 07:00  --------------------------------------------------------  IN:    Dexmedetomidine: 430.5 mL    Enteral Tube Flush: 30 mL    IV PiggyBack: 100 mL    IV PiggyBack: 100 mL    Osmolite 1.2: 1170 mL  Total IN: 1830.5 mL    OUT:    Indwelling Catheter - Urethral (mL): 1515 mL  Total OUT: 1515 mL    24 Dec 2023 07:01  -  24 Dec 2023 19:46  --------------------------------------------------------  IN:    Dexmedetomidine: 35 mL    IV PiggyBack: 100 mL    Norepinephrine: 29 mL    Osmolite 1.2: 260 mL    Propofol: 16.8 mL  Total IN: 440.8 mL    OUT:    Indwelling Catheter - Urethral (mL): 930 mL  Total OUT: 930 mL    Mode: AC/ CMV (Assist Control/ Continuous Mandatory Ventilation)  RR (machine): 22  TV (machine): 400  FiO2: 100  PEEP: 5  ITime: 1  MAP: 10  PIP: 27    Lungs b/l air entry  Heart S1S2  Abd soft ND  Extremities: sm edema                                                7.5    6.08  )-----------( 253      ( 24 Dec 2023 02:15 )             24.8     24 Dec 2023 16:10    142    |  109    |  64     ----------------------------<  136    4.7     |  29     |  2.17     Ca    9.1        24 Dec 2023 16:10  Phos  3.6       24 Dec 2023 02:15  Mg     2.1       24 Dec 2023 02:15    TPro  8.2    /  Alb  1.5    /  TBili  0.5    /  DBili  x      /  AST  42     /  ALT  8      /  AlkPhos  112    24 Dec 2023 16:10    LIVER FUNCTIONS - ( 24 Dec 2023 16:10 )  Alb: 1.5 g/dL / Pro: 8.2 gm/dL / ALK PHOS: 112 U/L / ALT: 8 U/L / AST: 42 U/L / GGT: x           Culture - Acid Fast - Bronchial w/Smear (collected 22 Dec 2023 13:56)  Source: .Bronchial Bronchial Lavage    Culture - Bronchial (collected 22 Dec 2023 13:56)  Source: .Bronchial Bronchial Lavage  Gram Stain (24 Dec 2023 17:17):    Numerous polymorphonuclear leukocytes per low power field    Rare Squamous epithelial cells per low power field    Moderate Gram Negative Rods per oil power field    Few Gram positive cocci in pairs per oil power field    Rare Gram Positive Cocci in Clusters per oil power field  Final Report (24 Dec 2023 17:17):    Numerous Stenotrophomonas maltophilia    Moderate Methicillin Resistant Staphylococcus aureus    Normal Respiratory Vanessa absent  Organism: Stenotrophomonas maltophilia  Methicillin resistant Staphylococcus aureus (24 Dec 2023 17:17)  Organism: Methicillin resistant Staphylococcus aureus (24 Dec 2023 17:17)  Organism: Stenotrophomonas maltophilia (24 Dec 2023 17:17)  Organism: Stenotrophomonas maltophilia (24 Dec 2023 17:17)    acetaminophen     Tablet .. 650 milliGRAM(s) Oral every 6 hours PRN  acetylcysteine 20%  Inhalation 4 milliLiter(s) Inhalation every 6 hours  albuterol/ipratropium for Nebulization 3 milliLiter(s) Nebulizer every 6 hours  buMETAnide Injectable 2 milliGRAM(s) IV Push daily  chlorhexidine 0.12% Liquid 15 milliLiter(s) Oral Mucosa every 12 hours  chlorhexidine 2% Cloths 1 Application(s) Topical <User Schedule>  collagenase Ointment 1 Application(s) Topical daily  doxycycline IVPB 100 milliGRAM(s) IV Intermittent every 12 hours  gabapentin 300 milliGRAM(s) Oral two times a day  heparin   Injectable 5000 Unit(s) SubCutaneous every 12 hours  mirtazapine 15 milliGRAM(s) Oral daily  norepinephrine Infusion 0.05 MICROgram(s)/kG/Min IV Continuous <Continuous>  polyethylene glycol 3350 17 Gram(s) Oral two times a day  propofol Infusion 10 MICROgram(s)/kG/Min IV Continuous <Continuous>  senna 2 Tablet(s) Oral at bedtime  sodium chloride 3%  Inhalation 4 milliLiter(s) Inhalation every 6 hours  vancomycin  IVPB 500 milliGRAM(s) IV Intermittent every 24 hours    Assessment     Resp failure, PNA  Hemodynamic ROGER/CKD 3 (Cr 1.65 - 10/30/23)  Abx, diuresis per ICU team  F/u CBC, BMP, Mg, UO  Avoid nephrotoxins  Supportive care  Renal fx is fairly stable     [phone]

## 2023-12-25 NOTE — PROGRESS NOTE ADULT - ASSESSMENT
89 y/o M w/dementia, CKD, chronic urinary retention s/p obrien initially admitted for dyspnea found to have L sided pleural effusions and compressive atelectasis w/hospital course c/b acute respiratory failure with hypoxia and hypercapnia likely secondary to MRSA PNA.    Neuro:  SAT daily. Currently on Propofol    Respiratory: Wean from vent as tolerated: C/w Hypertonic saline, chest PT, pulmonary toilet, Mucomyst Consolidated L lung. S/p bronchoscopy with copious secretions aspirated. Improvement in aeration on CXR. Started on Metanebs  CV: Maintain MAP goal of >65. Vasoplegia due to sedation   Renal: Trend Cr, avoid nephrotoxins. Cr at baseline.  ID: Vancomycin for MRSA PNA. Plan for 7 days. Sputum cx also positive for Stenotrophomonas switched to Levaquin today based on sensitivities   Heme: DVT prophylaxis with heparin subq  Ethics: Full code. Given patient failed extubation based on the family wishes, recommend trach/peg vs palliative extubation.          89 y/o M w/dementia, CKD, chronic urinary retention s/p obrien initially admitted for dyspnea found to have L sided pleural effusions and compressive atelectasis w/hospital course c/b acute respiratory failure with hypoxia and hypercapnia likely secondary to MRSA PNA.    Neuro:  SAT daily. Currently on Propofol    Respiratory: Wean from vent as tolerated: C/w Hypertonic saline, chest PT, pulmonary toilet, Mucomyst Consolidated L lung. S/p bronchoscopy with copious secretions aspirated. Improvement in aeration on CXR. Started on Metanebs  CV: Maintain MAP goal of >65. Vasoplegia due to sedation   Renal: Trend Cr, avoid nephrotoxins. Cr at baseline.  ID: Vancomycin for MRSA PNA. Plan for 7 days. Sputum cx also positive for Stenotrophomonas switched to Levaquin today based on sensitivities   Heme: DVT prophylaxis with heparin subq  Ethics: Full code. Given patient failed extubation based on the family wishes, recommend trach/peg vs palliative extubation.

## 2023-12-25 NOTE — PROGRESS NOTE ADULT - SUBJECTIVE AND OBJECTIVE BOX
CHIEF COMPLAINT: Aspiration PNA     Interval Events: Failed extubation last night, required reintubation. Currently sedated     REVIEW OF SYSTEMS:  Unable to perform ROS due to sedation       OBJECTIVE:  ICU Vital Signs Last 24 Hrs  T(C): 36.9 (25 Dec 2023 07:30), Max: 37.9 (24 Dec 2023 16:00)  T(F): 98.4 (25 Dec 2023 07:30), Max: 100.2 (24 Dec 2023 16:00)  HR: 113 (25 Dec 2023 09:30) (108 - 147)  BP: 126/58 (25 Dec 2023 09:30) (61/41 - 196/95)  BP(mean): 79 (25 Dec 2023 09:30) (48 - 123)  ABP: --  ABP(mean): --  RR: 22 (25 Dec 2023 09:30) (10 - 37)  SpO2: 100% (25 Dec 2023 09:30) (93% - 100%)    O2 Parameters below as of 24 Dec 2023 17:20  Patient On (Oxygen Delivery Method): nasal cannula          Mode: AC/ CMV (Assist Control/ Continuous Mandatory Ventilation), RR (machine): 22, TV (machine): 400, FiO2: 30, PEEP: 5, ITime: 0.71, MAP: 10, PIP: 22    12-24 @ 07:01  -  12-25 @ 07:00  --------------------------------------------------------  IN: 1375.8 mL / OUT: 1380 mL / NET: -4.2 mL    12-25 @ 07:01  -  12-25 @ 10:20  --------------------------------------------------------  IN: 85.5 mL / OUT: 40 mL / NET: 45.5 mL      CAPILLARY BLOOD GLUCOSE          PHYSICAL EXAM:  GENERAL: NAD  EYES: EOMI, PERRLA, conjunctiva and sclera clear  ENMT: No tonsillar erythema, exudates, or enlargement; Moist mucous membranes, Good dentition, No lesions  CHEST/LUNG: Mechanical breat sounds   HEART: Regular rate and rhythm; No murmurs, rubs, or gallops  ABDOMEN: Soft, Nontender, Nondistended; Bowel sounds present  VASCULAR:  2+ Peripheral Pulses, No clubbing, cyanosis, or edema  LYMPH: No lymphadenopathy noted  SKIN: No rashes or lesions  NERVOUS SYSTEM: sedated       HOSPITAL MEDICATIONS:  heparin   Injectable 5000 Unit(s) SubCutaneous every 12 hours    levoFLOXacin IVPB      levoFLOXacin IVPB 750 milliGRAM(s) IV Intermittent once  vancomycin  IVPB 500 milliGRAM(s) IV Intermittent every 24 hours    buMETAnide Injectable 2 milliGRAM(s) IV Push daily  norepinephrine Infusion 0.05 MICROgram(s)/kG/Min IV Continuous <Continuous>      acetylcysteine 20%  Inhalation 4 milliLiter(s) Inhalation every 6 hours  albuterol/ipratropium for Nebulization 3 milliLiter(s) Nebulizer every 6 hours  sodium chloride 3%  Inhalation 4 milliLiter(s) Inhalation every 6 hours    acetaminophen     Tablet .. 650 milliGRAM(s) Oral every 6 hours PRN  gabapentin 300 milliGRAM(s) Oral two times a day  mirtazapine 15 milliGRAM(s) Oral daily  propofol Infusion 10 MICROgram(s)/kG/Min IV Continuous <Continuous>    polyethylene glycol 3350 17 Gram(s) Oral two times a day  senna 2 Tablet(s) Oral at bedtime            chlorhexidine 0.12% Liquid 15 milliLiter(s) Oral Mucosa every 12 hours  chlorhexidine 2% Cloths 1 Application(s) Topical <User Schedule>  collagenase Ointment 1 Application(s) Topical daily        LABS:                        7.5    9.91  )-----------( 333      ( 25 Dec 2023 04:04 )             25.2     Hgb Trend: 7.5<--, 7.5<--, 7.3<--, 7.4<--, 8.3<--  12-25    144  |  108  |  66<H>  ----------------------------<  175<H>  5.0   |  30  |  2.37<H>    Ca    9.0      25 Dec 2023 04:04  Phos  4.2     12-25  Mg     2.1     12-25    TPro  8.0  /  Alb  1.5<L>  /  TBili  0.3  /  DBili  x   /  AST  46<H>  /  ALT  12  /  AlkPhos  115  12-25    Creatinine Trend: 2.37<--, 2.17<--, 2.24<--, 2.11<--, 2.18<--, 2.26<--    Urinalysis Basic - ( 25 Dec 2023 04:04 )    Color: x / Appearance: x / SG: x / pH: x  Gluc: 175 mg/dL / Ketone: x  / Bili: x / Urobili: x   Blood: x / Protein: x / Nitrite: x   Leuk Esterase: x / RBC: x / WBC x   Sq Epi: x / Non Sq Epi: x / Bacteria: x      Arterial Blood Gas:  12-24 @ 19:59  7.38/54/324/32/99.7/5.9  ABG lactate: --  Arterial Blood Gas:  12-24 @ 17:28  7.13/110/174/37/99.7/5.6  ABG lactate: --  Arterial Blood Gas:  12-24 @ 09:34  7.46/47/130/33/99.4/8.3  ABG lactate: --        MICROBIOLOGY:     RADIOLOGY:  [ ] Reviewed and interpreted by me    EKG: Home

## 2023-12-25 NOTE — PROGRESS NOTE ADULT - SUBJECTIVE AND OBJECTIVE BOX
Intubated in ICU    Vital Signs Last 24 Hrs  T(C): 36.8 (12-25-23 @ 21:00), Max: 37.6 (12-24-23 @ 23:40)  T(F): 98.2 (12-25-23 @ 21:00), Max: 99.6 (12-24-23 @ 23:40)  HR: 96 (12-25-23 @ 21:00) (93 - 128)  BP: 135/67 (12-25-23 @ 21:00) (88/56 - 162/72)  BP(mean): 87 (12-25-23 @ 21:00) (64 - 110)  RR: 22 (12-25-23 @ 21:00) (10 - 31)  SpO2: 100% (12-25-23 @ 21:00) (93% - 100%)    I&O's Detail    24 Dec 2023 07:01  -  25 Dec 2023 07:00  --------------------------------------------------------  IN:    Dexmedetomidine: 35 mL    IV PiggyBack: 100 mL    Norepinephrine: 173.3 mL    Osmolite 1.2: 910 mL    Propofol: 157.5 mL  Total IN: 1375.8 mL    OUT:    Indwelling Catheter - Urethral (mL): 1380 mL  Total OUT: 1380 mL    25 Dec 2023 07:01  -  25 Dec 2023 21:29  --------------------------------------------------------  IN:    IV PiggyBack: 150 mL    Norepinephrine: 108 mL    Osmolite 1.2: 390 mL    Propofol: 238.4 mL    Propofol: 19 mL  Total IN: 905.4 mL    OUT:    Indwelling Catheter - Urethral (mL): 570 mL  Total OUT: 570 mL    Mode: AC/ CMV (Assist Control/ Continuous Mandatory Ventilation)  RR (machine): 22  TV (machine): 400  FiO2: 30  PEEP: 5  ITime: 1  MAP: 9  PIP: 21    Lungs b/l air entry  Heart S1S2  Abd soft ND  Extremities: sm edema                                                      7.5    9.91  )-----------( 333      ( 25 Dec 2023 04:04 )             25.2     25 Dec 2023 04:04    144    |  108    |  66     ----------------------------<  175    5.0     |  30     |  2.37     Ca    9.0        25 Dec 2023 04:04  Phos  4.2       25 Dec 2023 04:04  Mg     2.1       25 Dec 2023 04:04    TPro  8.0    /  Alb  1.5    /  TBili  0.3    /  DBili  x      /  AST  46     /  ALT  12     /  AlkPhos  115    25 Dec 2023 04:04    LIVER FUNCTIONS - ( 25 Dec 2023 04:04 )  Alb: 1.5 g/dL / Pro: 8.0 gm/dL / ALK PHOS: 115 U/L / ALT: 12 U/L / AST: 46 U/L / GGT: x           acetaminophen     Tablet .. 650 milliGRAM(s) Oral every 6 hours PRN  albuterol/ipratropium for Nebulization 3 milliLiter(s) Nebulizer every 6 hours  buMETAnide Injectable 2 milliGRAM(s) IV Push daily  chlorhexidine 0.12% Liquid 15 milliLiter(s) Oral Mucosa every 12 hours  chlorhexidine 2% Cloths 1 Application(s) Topical <User Schedule>  collagenase Ointment 1 Application(s) Topical daily  gabapentin 300 milliGRAM(s) Oral two times a day  heparin   Injectable 5000 Unit(s) SubCutaneous every 12 hours  levoFLOXacin IVPB      mirtazapine 15 milliGRAM(s) Oral daily  norepinephrine Infusion 0.05 MICROgram(s)/kG/Min IV Continuous <Continuous>  polyethylene glycol 3350 17 Gram(s) Oral two times a day  propofol Infusion 5 MICROgram(s)/kG/Min IV Continuous <Continuous>  senna 2 Tablet(s) Oral at bedtime  sodium chloride 3%  Inhalation 4 milliLiter(s) Inhalation every 6 hours  vancomycin  IVPB 500 milliGRAM(s) IV Intermittent every 24 hours    Assessment     Resp failure, PNA  Hemodynamic ROGER/CKD 3 (Cr 1.65 - 10/30/23)  Abx, diuresis per ICU team  F/u CBC, BMP, Mg, UO, Vanco level   Avoid nephrotoxins  Supportive care  Overall prognosis is guarded     [phone] Intubated in ICU    Vital Signs Last 24 Hrs  T(C): 36.8 (12-25-23 @ 21:00), Max: 37.6 (12-24-23 @ 23:40)  T(F): 98.2 (12-25-23 @ 21:00), Max: 99.6 (12-24-23 @ 23:40)  HR: 96 (12-25-23 @ 21:00) (93 - 128)  BP: 135/67 (12-25-23 @ 21:00) (88/56 - 162/72)  BP(mean): 87 (12-25-23 @ 21:00) (64 - 110)  RR: 22 (12-25-23 @ 21:00) (10 - 31)  SpO2: 100% (12-25-23 @ 21:00) (93% - 100%)    I&O's Detail    24 Dec 2023 07:01  -  25 Dec 2023 07:00  --------------------------------------------------------  IN:    Dexmedetomidine: 35 mL    IV PiggyBack: 100 mL    Norepinephrine: 173.3 mL    Osmolite 1.2: 910 mL    Propofol: 157.5 mL  Total IN: 1375.8 mL    OUT:    Indwelling Catheter - Urethral (mL): 1380 mL  Total OUT: 1380 mL    25 Dec 2023 07:01  -  25 Dec 2023 21:29  --------------------------------------------------------  IN:    IV PiggyBack: 150 mL    Norepinephrine: 108 mL    Osmolite 1.2: 390 mL    Propofol: 238.4 mL    Propofol: 19 mL  Total IN: 905.4 mL    OUT:    Indwelling Catheter - Urethral (mL): 570 mL  Total OUT: 570 mL    Mode: AC/ CMV (Assist Control/ Continuous Mandatory Ventilation)  RR (machine): 22  TV (machine): 400  FiO2: 30  PEEP: 5  ITime: 1  MAP: 9  PIP: 21    Lungs b/l air entry  Heart S1S2  Abd soft ND  Extremities: sm edema                                                      7.5    9.91  )-----------( 333      ( 25 Dec 2023 04:04 )             25.2     25 Dec 2023 04:04    144    |  108    |  66     ----------------------------<  175    5.0     |  30     |  2.37     Ca    9.0        25 Dec 2023 04:04  Phos  4.2       25 Dec 2023 04:04  Mg     2.1       25 Dec 2023 04:04    TPro  8.0    /  Alb  1.5    /  TBili  0.3    /  DBili  x      /  AST  46     /  ALT  12     /  AlkPhos  115    25 Dec 2023 04:04    LIVER FUNCTIONS - ( 25 Dec 2023 04:04 )  Alb: 1.5 g/dL / Pro: 8.0 gm/dL / ALK PHOS: 115 U/L / ALT: 12 U/L / AST: 46 U/L / GGT: x           acetaminophen     Tablet .. 650 milliGRAM(s) Oral every 6 hours PRN  albuterol/ipratropium for Nebulization 3 milliLiter(s) Nebulizer every 6 hours  buMETAnide Injectable 2 milliGRAM(s) IV Push daily  chlorhexidine 0.12% Liquid 15 milliLiter(s) Oral Mucosa every 12 hours  chlorhexidine 2% Cloths 1 Application(s) Topical <User Schedule>  collagenase Ointment 1 Application(s) Topical daily  gabapentin 300 milliGRAM(s) Oral two times a day  heparin   Injectable 5000 Unit(s) SubCutaneous every 12 hours  levoFLOXacin IVPB      mirtazapine 15 milliGRAM(s) Oral daily  norepinephrine Infusion 0.05 MICROgram(s)/kG/Min IV Continuous <Continuous>  polyethylene glycol 3350 17 Gram(s) Oral two times a day  propofol Infusion 5 MICROgram(s)/kG/Min IV Continuous <Continuous>  senna 2 Tablet(s) Oral at bedtime  sodium chloride 3%  Inhalation 4 milliLiter(s) Inhalation every 6 hours  vancomycin  IVPB 500 milliGRAM(s) IV Intermittent every 24 hours    Assessment     Resp failure, PNA  Hemodynamic ROGER/CKD 3 (Cr 1.65 - 10/30/23)  Abx, diuresis per ICU team  F/u CBC, BMP, Mg, UO, Vanco level   Avoid nephrotoxins  Supportive care  Overall prognosis is guarded     [phone]

## 2023-12-26 NOTE — PROGRESS NOTE ADULT - SUBJECTIVE AND OBJECTIVE BOX
[facility] NEPHROLOGY SERVICES, [facility]  NEPHROLOGY AND HYPERTENSION  [address]  [address]  [place] [address]  [phone]    MD SOFY KEENAN MD YELENA ROSENBERG, MD BINNY KOSHY, MD CHRISTOPHER CAPUTO, MD EDWARD BOVER, MD          Patient events noted  No distress    MEDICATIONS  (STANDING):  albuterol/ipratropium for Nebulization 3 milliLiter(s) Nebulizer every 6 hours  buMETAnide Injectable 2 milliGRAM(s) IV Push daily  chlorhexidine 0.12% Liquid 15 milliLiter(s) Oral Mucosa every 12 hours  chlorhexidine 2% Cloths 1 Application(s) Topical <User Schedule>  collagenase Ointment 1 Application(s) Topical daily  dexMEDEtomidine Infusion 0.2 MICROgram(s)/kG/Hr (3.51 mL/Hr) IV Continuous <Continuous>  gabapentin 300 milliGRAM(s) Oral two times a day  heparin   Injectable 5000 Unit(s) SubCutaneous every 12 hours  levoFLOXacin IVPB      mirtazapine 15 milliGRAM(s) Oral daily  polyethylene glycol 3350 17 Gram(s) Oral two times a day  propofol Infusion 5 MICROgram(s)/kG/Min (2.1 mL/Hr) IV Continuous <Continuous>  senna 2 Tablet(s) Oral at bedtime  sodium chloride 3%  Inhalation 4 milliLiter(s) Inhalation every 6 hours    MEDICATIONS  (PRN):  acetaminophen     Tablet .. 650 milliGRAM(s) Oral every 6 hours PRN Temp greater or equal to 38C (100.4F), Mild Pain (1 - 3)      12-25-23 @ 07:01  -  12-26-23 @ 07:00  --------------------------------------------------------  IN: 1793.8 mL / OUT: 900 mL / NET: 893.8 mL    12-26-23 @ 07:01  -  12-26-23 @ 20:49  --------------------------------------------------------  IN: 1536.7 mL / OUT: 340 mL / NET: 1196.7 mL      PHYSICAL EXAM:      T(C): 36 (12-26-23 @ 20:00), Max: 36.8 (12-25-23 @ 21:00)  HR: 88 (12-26-23 @ 20:43) (84 - 99)  BP: 105/61 (12-26-23 @ 20:00) (88/53 - 139/60)  RR: 22 (12-26-23 @ 20:00) (22 - 28)  SpO2: 100% (12-26-23 @ 20:43) (98% - 100%)  Wt(kg): --  Lungs decreased BS  R >L  Heart S1S2  Abd soft NT ND  Extremities:   tr edema                                    6.7    6.64  )-----------( 213      ( 26 Dec 2023 07:50 )             22.2     12-26    143  |  107  |  76<H>  ----------------------------<  138<H>  5.1   |  32<H>  |  2.68<H>    Ca    9.0      26 Dec 2023 02:15  Phos  4.4     12-26  Mg     2.1     12-26    TPro  7.6  /  Alb  1.4<L>  /  TBili  0.3  /  DBili  x   /  AST  29  /  ALT  12  /  AlkPhos  92  12-26      LIVER FUNCTIONS - ( 26 Dec 2023 02:15 )  Alb: 1.4 g/dL / Pro: 7.6 gm/dL / ALK PHOS: 92 U/L / ALT: 12 U/L / AST: 29 U/L / GGT: x           Creatinine Trend: 2.68<--, 2.37<--, 2.17<--, 2.24<--, 2.11<--, 2.18<--  Mode: AC/ CMV (Assist Control/ Continuous Mandatory Ventilation)  RR (machine): 22  TV (machine): 400  FiO2: 30  PEEP: 5  ITime: 1  MAP: 10  PIP: 22    Assessment     Resp failure, PNA  Hemodynamic ROGER/CKD 3 (Cr 1.65 - 10/30/23)      Plan  Abx, diuresis per ICU team  F/u CBC, BMP, Mg, UO, Vanco level   Avoid nephrotoxins  Supportive care  Overall prognosis is guarded     [phone]      Samy Noriega MD [facility] NEPHROLOGY SERVICES, [facility]  NEPHROLOGY AND HYPERTENSION  [address]  [address]  [place] [address]  [phone]    MD SOFY KEENAN MD YELENA ROSENBERG, MD BINNY KOSHY, MD CHRISTOPHER CAPUTO, MD EDWARD BOVER, MD          Patient events noted  No distress    MEDICATIONS  (STANDING):  albuterol/ipratropium for Nebulization 3 milliLiter(s) Nebulizer every 6 hours  buMETAnide Injectable 2 milliGRAM(s) IV Push daily  chlorhexidine 0.12% Liquid 15 milliLiter(s) Oral Mucosa every 12 hours  chlorhexidine 2% Cloths 1 Application(s) Topical <User Schedule>  collagenase Ointment 1 Application(s) Topical daily  dexMEDEtomidine Infusion 0.2 MICROgram(s)/kG/Hr (3.51 mL/Hr) IV Continuous <Continuous>  gabapentin 300 milliGRAM(s) Oral two times a day  heparin   Injectable 5000 Unit(s) SubCutaneous every 12 hours  levoFLOXacin IVPB      mirtazapine 15 milliGRAM(s) Oral daily  polyethylene glycol 3350 17 Gram(s) Oral two times a day  propofol Infusion 5 MICROgram(s)/kG/Min (2.1 mL/Hr) IV Continuous <Continuous>  senna 2 Tablet(s) Oral at bedtime  sodium chloride 3%  Inhalation 4 milliLiter(s) Inhalation every 6 hours    MEDICATIONS  (PRN):  acetaminophen     Tablet .. 650 milliGRAM(s) Oral every 6 hours PRN Temp greater or equal to 38C (100.4F), Mild Pain (1 - 3)      12-25-23 @ 07:01  -  12-26-23 @ 07:00  --------------------------------------------------------  IN: 1793.8 mL / OUT: 900 mL / NET: 893.8 mL    12-26-23 @ 07:01  -  12-26-23 @ 20:49  --------------------------------------------------------  IN: 1536.7 mL / OUT: 340 mL / NET: 1196.7 mL      PHYSICAL EXAM:      T(C): 36 (12-26-23 @ 20:00), Max: 36.8 (12-25-23 @ 21:00)  HR: 88 (12-26-23 @ 20:43) (84 - 99)  BP: 105/61 (12-26-23 @ 20:00) (88/53 - 139/60)  RR: 22 (12-26-23 @ 20:00) (22 - 28)  SpO2: 100% (12-26-23 @ 20:43) (98% - 100%)  Wt(kg): --  Lungs decreased BS  R >L  Heart S1S2  Abd soft NT ND  Extremities:   tr edema                                    6.7    6.64  )-----------( 213      ( 26 Dec 2023 07:50 )             22.2     12-26    143  |  107  |  76<H>  ----------------------------<  138<H>  5.1   |  32<H>  |  2.68<H>    Ca    9.0      26 Dec 2023 02:15  Phos  4.4     12-26  Mg     2.1     12-26    TPro  7.6  /  Alb  1.4<L>  /  TBili  0.3  /  DBili  x   /  AST  29  /  ALT  12  /  AlkPhos  92  12-26      LIVER FUNCTIONS - ( 26 Dec 2023 02:15 )  Alb: 1.4 g/dL / Pro: 7.6 gm/dL / ALK PHOS: 92 U/L / ALT: 12 U/L / AST: 29 U/L / GGT: x           Creatinine Trend: 2.68<--, 2.37<--, 2.17<--, 2.24<--, 2.11<--, 2.18<--  Mode: AC/ CMV (Assist Control/ Continuous Mandatory Ventilation)  RR (machine): 22  TV (machine): 400  FiO2: 30  PEEP: 5  ITime: 1  MAP: 10  PIP: 22    Assessment     Resp failure, PNA  Hemodynamic ROGER/CKD 3 (Cr 1.65 - 10/30/23)      Plan  Abx, diuresis per ICU team  F/u CBC, BMP, Mg, UO, Vanco level   Avoid nephrotoxins  Supportive care  Overall prognosis is guarded     [phone]      Samy Noriega MD

## 2023-12-26 NOTE — CHART NOTE - NSCHARTNOTEFT_GEN_A_CORE
Assessment:  Pt seen in ICU, on vent, on contact isolation, on NGT feeding of Osmolite 1.5 @ 65 ml/hr x 18 hour regimen.  Per chart review, pt adm c diagnosis of anemia, sepsis, dyspnea, left pleural effusion, worsening hypoxemia requiring intubation, s/p bronchoscopy x 2, c pneumonia, s/p extubation 12/24  & re-intubated, ROGER/CKD, family would like trach, PEG if unable to extubate.  PMH include HTN, BPH, dementia, wheelchair dependent, spinal stenosis, renal insufficiency.       Factors impacting intake: [ ] none [ ] nausea  [ ] vomiting [ ] diarrhea [ ] constipation  [ ]chewing problems [ ] swallowing issues  [ x] other: acute illness, tolerating enteral feeding    Diet Prescription: Diet, NPO with Tube Feed:   Tube Feeding Modality: Nasogastric  Osmolite 1.5  Total Volume for 24 Hours (mL): 1170  Continuous  Starting Tube Feed Rate {mL per Hour}: 65  Until Goal Tube Feed Rate (mL per Hour): 65  Tube Feed Duration (in Hours): 18  Tube Feed Start Time: 17:00  Tube Feed Stop Time: 11:00 (12-19-23 @ 14:50)    Intake: Osmolite 1.5, 65 ml/hr x 18 hours 1170 qp=6057 calories, 73 grams protein, 892 ml free water, 117% RDIs  + pt received 491.7 calories from 447 ml propofol on 12/15.    Wt. Changes 12/26, 71.2 kg 12/12, 81.1 kg, 12/01, 75.3 kg, wt. fluctuations c wt. loss of 4.1 kg noted  % Weight Change: 5.4% in <1 month  12/26, 1+(trace) edema of left arm, hands noted     Unable to conduct nutrition focus physical due to limited view of pt on vent, pt appeared to have visible moderate temple and orbital depletion.      Pertinent Medications: MEDICATIONS  (STANDING):  albuterol/ipratropium for Nebulization 3 milliLiter(s) Nebulizer every 6 hours  buMETAnide Injectable 2 milliGRAM(s) IV Push daily  chlorhexidine 0.12% Liquid 15 milliLiter(s) Oral Mucosa every 12 hours  chlorhexidine 2% Cloths 1 Application(s) Topical <User Schedule>  collagenase Ointment 1 Application(s) Topical daily  dexMEDEtomidine Infusion 0.2 MICROgram(s)/kG/Hr (3.51 mL/Hr) IV Continuous <Continuous>  gabapentin 300 milliGRAM(s) Oral two times a day  heparin   Injectable 5000 Unit(s) SubCutaneous every 12 hours  levoFLOXacin IVPB      mirtazapine 15 milliGRAM(s) Oral daily  polyethylene glycol 3350 17 Gram(s) Oral two times a day  propofol Infusion 5 MICROgram(s)/kG/Min (2.1 mL/Hr) IV Continuous <Continuous>  senna 2 Tablet(s) Oral at bedtime  sodium chloride 3%  Inhalation 4 milliLiter(s) Inhalation every 6 hours  vancomycin  IVPB 500 milliGRAM(s) IV Intermittent every 24 hours    MEDICATIONS  (PRN):  acetaminophen     Tablet .. 650 milliGRAM(s) Oral every 6 hours PRN Temp greater or equal to 38C (100.4F), Mild Pain (1 - 3)    Pertinent Labs: 12-26 Na143 mmol/L Glu 138 mg/dL<H> K+ 5.1 mmol/L Cr  2.68 mg/dL<H> BUN 76 mg/dL<H> 12-26 Phos 4.4 mg/dL 12-26 Alb 1.4 g/dL<L>12-26 ALT 12 U/L AST 29 U/L Alkaline Phosphatase 92 U/L   CAPILLARY BLOOD GLUCOSE    Skin:   Pressure ulcer x 2  1. sacrum; stage III  2. left  ischium; unstageable     Estimated Needs:   [ x] no change since previous assessment(12/12, lower range for protein needs for ROGER/CKD)  [ ] recalculated:     Previous Nutrition Diagnosis #1(12/04)  Increased Nutrient Needs: protein  Etiology: increased physiological demand for protein  Signs/Symptoms: stage III pressure injuries x 2  New Goal(12/26) pt to meet >75% protein-energy needs via enteral feeding; met  Nutrition Diagnosis is [ x] ongoing  [ ] resolved [ ] not applicable     Previous Nutrition Diagnosis: #2 (12/12)  Malnutrition; severe malnutrition in context of acute illness   Related to: inadequate protein-energy intake in setting of ROGER, pleural effusion, sepsis, UTI, AMS/metabolic encephalopathy, debility   As evidenced by: <50% nutrition needs > 5 days, moderate muscle/fat loss  New Goal: pt to meet >75% protein-energy needs via enteral feeding; met   Nutrition Diagnosis is [ ] ongoing  [ ] resolved [ ] not applicable       New Nutrition Diagnosis: [ x] not applicable     Interventions:   Recommend  [ ] Change Diet To:  [ ] Nutrition Supplement  [x ] Nutrition Support; Continue c current enteral feeding regimen   [x ] Other: if renal indices without improvement, consider change feeding to Nepro @ 50 ml/hr x 18 hours =900 ml, 1620 calories, 73 grams protein, 654 ml free water     Monitoring and Evaluation:   [ ] PO intake [ x ] Tolerance to diet prescription [ x ] weights [ x ] labs[ x ] follow up per protocol  [ ] other: Assessment:  Pt seen in ICU, on vent, on contact isolation, on NGT feeding of Osmolite 1.5 @ 65 ml/hr x 18 hour regimen.  Per chart review, pt adm c diagnosis of anemia, sepsis, dyspnea, left pleural effusion, worsening hypoxemia requiring intubation, s/p bronchoscopy x 2, c pneumonia, s/p extubation 12/24  & re-intubated, ROGER/CKD, family would like trach, PEG if unable to extubate.  PMH include HTN, BPH, dementia, wheelchair dependent, spinal stenosis, renal insufficiency.       Factors impacting intake: [ ] none [ ] nausea  [ ] vomiting [ ] diarrhea [ ] constipation  [ ]chewing problems [ ] swallowing issues  [ x] other: acute illness, tolerating enteral feeding    Diet Prescription: Diet, NPO with Tube Feed:   Tube Feeding Modality: Nasogastric  Osmolite 1.5  Total Volume for 24 Hours (mL): 1170  Continuous  Starting Tube Feed Rate {mL per Hour}: 65  Until Goal Tube Feed Rate (mL per Hour): 65  Tube Feed Duration (in Hours): 18  Tube Feed Start Time: 17:00  Tube Feed Stop Time: 11:00 (12-19-23 @ 14:50)    Intake: Osmolite 1.5, 65 ml/hr x 18 hours 1170 la=2728 calories, 73 grams protein, 892 ml free water, 117% RDIs  + pt received 491.7 calories from 447 ml propofol on 12/15.    Wt. Changes 12/26, 71.2 kg 12/12, 81.1 kg, 12/01, 75.3 kg, wt. fluctuations c wt. loss of 4.1 kg noted  % Weight Change: 5.4% in <1 month  12/26, 1+(trace) edema of left arm, hands noted     Unable to conduct nutrition focus physical due to limited view of pt on vent, pt appeared to have visible moderate temple and orbital depletion.      Pertinent Medications: MEDICATIONS  (STANDING):  albuterol/ipratropium for Nebulization 3 milliLiter(s) Nebulizer every 6 hours  buMETAnide Injectable 2 milliGRAM(s) IV Push daily  chlorhexidine 0.12% Liquid 15 milliLiter(s) Oral Mucosa every 12 hours  chlorhexidine 2% Cloths 1 Application(s) Topical <User Schedule>  collagenase Ointment 1 Application(s) Topical daily  dexMEDEtomidine Infusion 0.2 MICROgram(s)/kG/Hr (3.51 mL/Hr) IV Continuous <Continuous>  gabapentin 300 milliGRAM(s) Oral two times a day  heparin   Injectable 5000 Unit(s) SubCutaneous every 12 hours  levoFLOXacin IVPB      mirtazapine 15 milliGRAM(s) Oral daily  polyethylene glycol 3350 17 Gram(s) Oral two times a day  propofol Infusion 5 MICROgram(s)/kG/Min (2.1 mL/Hr) IV Continuous <Continuous>  senna 2 Tablet(s) Oral at bedtime  sodium chloride 3%  Inhalation 4 milliLiter(s) Inhalation every 6 hours  vancomycin  IVPB 500 milliGRAM(s) IV Intermittent every 24 hours    MEDICATIONS  (PRN):  acetaminophen     Tablet .. 650 milliGRAM(s) Oral every 6 hours PRN Temp greater or equal to 38C (100.4F), Mild Pain (1 - 3)    Pertinent Labs: 12-26 Na143 mmol/L Glu 138 mg/dL<H> K+ 5.1 mmol/L Cr  2.68 mg/dL<H> BUN 76 mg/dL<H> 12-26 Phos 4.4 mg/dL 12-26 Alb 1.4 g/dL<L>12-26 ALT 12 U/L AST 29 U/L Alkaline Phosphatase 92 U/L   CAPILLARY BLOOD GLUCOSE    Skin:   Pressure ulcer x 2  1. sacrum; stage III  2. left  ischium; unstageable     Estimated Needs:   [ x] no change since previous assessment(12/12, lower range for protein needs for ROGER/CKD)  [ ] recalculated:     Previous Nutrition Diagnosis #1(12/04)  Increased Nutrient Needs: protein  Etiology: increased physiological demand for protein  Signs/Symptoms: stage III pressure injuries x 2  New Goal(12/26) pt to meet >75% protein-energy needs via enteral feeding; met  Nutrition Diagnosis is [ x] ongoing  [ ] resolved [ ] not applicable     Previous Nutrition Diagnosis: #2 (12/12)  Malnutrition; severe malnutrition in context of acute illness   Related to: inadequate protein-energy intake in setting of ROGER, pleural effusion, sepsis, UTI, AMS/metabolic encephalopathy, debility   As evidenced by: <50% nutrition needs > 5 days, moderate muscle/fat loss  New Goal: pt to meet >75% protein-energy needs via enteral feeding; met   Nutrition Diagnosis is [ ] ongoing  [ ] resolved [ ] not applicable       New Nutrition Diagnosis: [ x] not applicable     Interventions:   Recommend  [ ] Change Diet To:  [ ] Nutrition Supplement  [x ] Nutrition Support; Continue c current enteral feeding regimen   [x ] Other: if renal indices without improvement, consider change feeding to Nepro @ 50 ml/hr x 18 hours =900 ml, 1620 calories, 73 grams protein, 654 ml free water     Monitoring and Evaluation:   [ ] PO intake [ x ] Tolerance to diet prescription [ x ] weights [ x ] labs[ x ] follow up per protocol  [ ] other: Assessment:  Pt seen in ICU, on vent, on contact isolation, on NGT feeding of Osmolite 1.5 @ 65 ml/hr x 18 hour regimen.  Per chart review, pt adm c diagnosis of anemia, sepsis, dyspnea, left pleural effusion, worsening hypoxemia requiring intubation, s/p bronchoscopy x 2, c pneumonia, s/p extubation 12/24  & re-intubated, ROGER/CKD, family would like trach, PEG if unable to extubate.  PMH include HTN, BPH, dementia, wheelchair dependent, spinal stenosis, renal insufficiency.       Factors impacting intake: [ ] none [ ] nausea  [ ] vomiting [ ] diarrhea [ ] constipation  [ ]chewing problems [ ] swallowing issues  [ x] other: acute illness, tolerating enteral feeding    Diet Prescription: Diet, NPO with Tube Feed:   Tube Feeding Modality: Nasogastric  Osmolite 1.5  Total Volume for 24 Hours (mL): 1170  Continuous  Starting Tube Feed Rate {mL per Hour}: 65  Until Goal Tube Feed Rate (mL per Hour): 65  Tube Feed Duration (in Hours): 18  Tube Feed Start Time: 17:00  Tube Feed Stop Time: 11:00 (12-19-23 @ 14:50)    Intake: Osmolite 1.5, 65 ml/hr x 18 hours 1170 uc=4939 calories, 73 grams protein, 892 ml free water, 117% RDIs  + pt received 491.7 calories from 447 ml propofol on 12/15.    Wt. Changes 12/26, 71.2 kg 12/12, 81.1 kg, 12/01, 75.3 kg, wt. fluctuations c wt. loss of 4.1 kg noted  % Weight Change: 5.4% in <1 month  12/26, 1+(trace) edema of left arm, hands noted     Unable to conduct nutrition focus physical due to limited view of pt on vent, pt appeared to have visible moderate temple and orbital depletion.      Pertinent Medications: MEDICATIONS  (STANDING):  albuterol/ipratropium for Nebulization 3 milliLiter(s) Nebulizer every 6 hours  buMETAnide Injectable 2 milliGRAM(s) IV Push daily  chlorhexidine 0.12% Liquid 15 milliLiter(s) Oral Mucosa every 12 hours  chlorhexidine 2% Cloths 1 Application(s) Topical <User Schedule>  collagenase Ointment 1 Application(s) Topical daily  dexMEDEtomidine Infusion 0.2 MICROgram(s)/kG/Hr (3.51 mL/Hr) IV Continuous <Continuous>  gabapentin 300 milliGRAM(s) Oral two times a day  heparin   Injectable 5000 Unit(s) SubCutaneous every 12 hours  levoFLOXacin IVPB      mirtazapine 15 milliGRAM(s) Oral daily  polyethylene glycol 3350 17 Gram(s) Oral two times a day  propofol Infusion 5 MICROgram(s)/kG/Min (2.1 mL/Hr) IV Continuous <Continuous>  senna 2 Tablet(s) Oral at bedtime  sodium chloride 3%  Inhalation 4 milliLiter(s) Inhalation every 6 hours  vancomycin  IVPB 500 milliGRAM(s) IV Intermittent every 24 hours    MEDICATIONS  (PRN):  acetaminophen     Tablet .. 650 milliGRAM(s) Oral every 6 hours PRN Temp greater or equal to 38C (100.4F), Mild Pain (1 - 3)    Pertinent Labs: 12-26 Na143 mmol/L Glu 138 mg/dL<H> K+ 5.1 mmol/L Cr  2.68 mg/dL<H> BUN 76 mg/dL<H> 12-26 Phos 4.4 mg/dL 12-26 Alb 1.4 g/dL<L>12-26 ALT 12 U/L AST 29 U/L Alkaline Phosphatase 92 U/L   CAPILLARY BLOOD GLUCOSE    Skin:   Pressure ulcer x 2  1. sacrum; stage III  2. left  ischium; unstageable     Estimated Needs:   [ x] no change since previous assessment(12/04, lower range for protein needs for ROGER/CKD)  [ ] recalculated:     Previous Nutrition Diagnosis #1(12/04)  Increased Nutrient Needs: protein  Etiology: increased physiological demand for protein  Signs/Symptoms: stage III pressure injuries x 2  New Goal(12/26) pt to meet >75% protein-energy needs via enteral feeding; met  Nutrition Diagnosis is [ x] ongoing  [ ] resolved [ ] not applicable     Previous Nutrition Diagnosis: #2 (12/12)  Malnutrition; severe malnutrition in context of acute illness   Related to: inadequate protein-energy intake in setting of ROGER, pleural effusion, sepsis, UTI, AMS/metabolic encephalopathy, debility   As evidenced by: <50% nutrition needs > 5 days, moderate muscle/fat loss  New Goal: pt to meet >75% protein-energy needs via enteral feeding; met   Nutrition Diagnosis is [x ] ongoing  [ ] resolved [ ] not applicable       New Nutrition Diagnosis: [ x] not applicable     Interventions:   Recommend  [ ] Change Diet To:  [ ] Nutrition Supplement  [x ] Nutrition Support; Continue c current enteral feeding regimen   [x ] Other: if renal indices without improvement, consider change feeding to Nepro @ 50 ml/hr x 18 hours =900 ml, 1620 calories, 73 grams protein, 654 ml free water     Monitoring and Evaluation:   [ ] PO intake [ x ] Tolerance to diet prescription [ x ] weights [ x ] labs[ x ] follow up per protocol  [ ] other: Assessment:  Pt seen in ICU, on vent, on contact isolation, on NGT feeding of Osmolite 1.5 @ 65 ml/hr x 18 hour regimen.  Per chart review, pt adm c diagnosis of anemia, sepsis, dyspnea, left pleural effusion, worsening hypoxemia requiring intubation, s/p bronchoscopy x 2, c pneumonia, s/p extubation 12/24  & re-intubated, ROGER/CKD, family would like trach, PEG if unable to extubate.  PMH include HTN, BPH, dementia, wheelchair dependent, spinal stenosis, renal insufficiency.       Factors impacting intake: [ ] none [ ] nausea  [ ] vomiting [ ] diarrhea [ ] constipation  [ ]chewing problems [ ] swallowing issues  [ x] other: acute illness, tolerating enteral feeding    Diet Prescription: Diet, NPO with Tube Feed:   Tube Feeding Modality: Nasogastric  Osmolite 1.5  Total Volume for 24 Hours (mL): 1170  Continuous  Starting Tube Feed Rate {mL per Hour}: 65  Until Goal Tube Feed Rate (mL per Hour): 65  Tube Feed Duration (in Hours): 18  Tube Feed Start Time: 17:00  Tube Feed Stop Time: 11:00 (12-19-23 @ 14:50)    Intake: Osmolite 1.5, 65 ml/hr x 18 hours 1170 xw=2734 calories, 73 grams protein, 892 ml free water, 117% RDIs  + pt received 491.7 calories from 447 ml propofol on 12/15.    Wt. Changes 12/26, 71.2 kg 12/12, 81.1 kg, 12/01, 75.3 kg, wt. fluctuations c wt. loss of 4.1 kg noted  % Weight Change: 5.4% in <1 month  12/26, 1+(trace) edema of left arm, hands noted     Unable to conduct nutrition focus physical due to limited view of pt on vent, pt appeared to have visible moderate temple and orbital depletion.      Pertinent Medications: MEDICATIONS  (STANDING):  albuterol/ipratropium for Nebulization 3 milliLiter(s) Nebulizer every 6 hours  buMETAnide Injectable 2 milliGRAM(s) IV Push daily  chlorhexidine 0.12% Liquid 15 milliLiter(s) Oral Mucosa every 12 hours  chlorhexidine 2% Cloths 1 Application(s) Topical <User Schedule>  collagenase Ointment 1 Application(s) Topical daily  dexMEDEtomidine Infusion 0.2 MICROgram(s)/kG/Hr (3.51 mL/Hr) IV Continuous <Continuous>  gabapentin 300 milliGRAM(s) Oral two times a day  heparin   Injectable 5000 Unit(s) SubCutaneous every 12 hours  levoFLOXacin IVPB      mirtazapine 15 milliGRAM(s) Oral daily  polyethylene glycol 3350 17 Gram(s) Oral two times a day  propofol Infusion 5 MICROgram(s)/kG/Min (2.1 mL/Hr) IV Continuous <Continuous>  senna 2 Tablet(s) Oral at bedtime  sodium chloride 3%  Inhalation 4 milliLiter(s) Inhalation every 6 hours  vancomycin  IVPB 500 milliGRAM(s) IV Intermittent every 24 hours    MEDICATIONS  (PRN):  acetaminophen     Tablet .. 650 milliGRAM(s) Oral every 6 hours PRN Temp greater or equal to 38C (100.4F), Mild Pain (1 - 3)    Pertinent Labs: 12-26 Na143 mmol/L Glu 138 mg/dL<H> K+ 5.1 mmol/L Cr  2.68 mg/dL<H> BUN 76 mg/dL<H> 12-26 Phos 4.4 mg/dL 12-26 Alb 1.4 g/dL<L>12-26 ALT 12 U/L AST 29 U/L Alkaline Phosphatase 92 U/L   CAPILLARY BLOOD GLUCOSE    Skin:   Pressure ulcer x 2  1. sacrum; stage III  2. left  ischium; unstageable     Estimated Needs:   [ x] no change since previous assessment(12/04, lower range for protein needs for ROGER/CKD)  [ ] recalculated:     Previous Nutrition Diagnosis #1(12/04)  Increased Nutrient Needs: protein  Etiology: increased physiological demand for protein  Signs/Symptoms: stage III pressure injuries x 2  New Goal(12/26) pt to meet >75% protein-energy needs via enteral feeding; met  Nutrition Diagnosis is [ x] ongoing  [ ] resolved [ ] not applicable     Previous Nutrition Diagnosis: #2 (12/12)  Malnutrition; severe malnutrition in context of acute illness   Related to: inadequate protein-energy intake in setting of ROGER, pleural effusion, sepsis, UTI, AMS/metabolic encephalopathy, debility   As evidenced by: <50% nutrition needs > 5 days, moderate muscle/fat loss  New Goal: pt to meet >75% protein-energy needs via enteral feeding; met   Nutrition Diagnosis is [x ] ongoing  [ ] resolved [ ] not applicable       New Nutrition Diagnosis: [ x] not applicable     Interventions:   Recommend  [ ] Change Diet To:  [ ] Nutrition Supplement  [x ] Nutrition Support; Continue c current enteral feeding regimen   [x ] Other: if renal indices without improvement, consider change feeding to Nepro @ 50 ml/hr x 18 hours =900 ml, 1620 calories, 73 grams protein, 654 ml free water     Monitoring and Evaluation:   [ ] PO intake [ x ] Tolerance to diet prescription [ x ] weights [ x ] labs[ x ] follow up per protocol  [ ] other:

## 2023-12-26 NOTE — PROCEDURE NOTE - NSPROCDETAILS_GEN_ALL_CORE
patient pre-oxygenated, tube inserted, placement confirmed
nasogastric
dynamic US guidance/blood seen on insertion/dressing applied/flushes easily/secured in place
location identified, draped/prepped, sterile technique used, needle inserted/introduced/Seldinger technique/catheter inserted over needle/connection to syringe
location identified, draped/prepped, sterile technique used/blood seen on insertion/dressing applied/flushes easily/secured in place
patient pre-oxygenated, tube inserted, placement confirmed

## 2023-12-26 NOTE — PROCEDURE NOTE - ADDITIONAL PROCEDURE DETAILS
Ultrasound guided
ICU care
post procedure lung ultrasound with + lung slide on R - no PTX. residual trace R pleural effusion

## 2023-12-26 NOTE — GOALS OF CARE CONVERSATION - ADVANCED CARE PLANNING - CONVERSATION DETAILS
Spoke with patients son at bedside today. I provided an update on patients condition, explaining that he had a total of 2 bronchoscopies and a failed extubation trial due to hypercapnia. He is getting tx for PNA by two organisms (MRSA/Steno). He has now been intubated a total of 10 days. Son is very frustrated with the varying care, let him voice his opinion and thoughts. Apologized for his feelings and listened intently. Explained that he knows dad better than anyone else and that we need to establish what dad would want and touch base about GOC. Explained that given his prolonged course and high risk for aspiration and decompensation these conversations need to be discussed. We continued to say he's on all pulmonary toileting modalities to best optimize him for extubation however he is at high risk for reintubation. Explained it is not in dad's best interest to keep getting intubated and reintubated. I addressed that we will optimize him to the best of our abilities and once we deem it is safe to extubate we will attempt. Discussed if patient does not tolerate extubation and decompensates, if patient/family would like us to reintubate and trach/peg versus palliative care measures. Son would like to attempt to extubate after optimization and if he fails would pursue trach/peg. Discussed with attending MD

## 2023-12-26 NOTE — PROGRESS NOTE ADULT - SUBJECTIVE AND OBJECTIVE BOX
CHIEF COMPLAINT:    Interval Events:    REVIEW OF SYSTEMS:  Constitutional: [ ] fevers [ ] chills [ ] weight loss [ ] weight gain  HEENT: [ ] dry eyes [ ] eye irritation [ ] postnasal drip [ ] nasal congestion  CV: [ ] chest pain [ ] orthopnea [ ] palpitations [ ] murmur  Resp: [ ] cough [ ] shortness of breath [ ] dyspnea [ ] wheezing [ ] sputum [ ] hemoptysis  GI: [ ] nausea [ ] vomiting [ ] diarrhea [ ] constipation [ ] abd pain [ ] dysphagia   : [ ] dysuria [ ] nocturia [ ] hematuria [ ] increased urinary frequency  Musculoskeletal: [ ] back pain [ ] myalgias [ ] arthralgias [ ] fracture  Skin: [ ] rash [ ] itch  Neurological: [ ] headache [ ] dizziness [ ] syncope [ ] weakness [ ] numbness  Hematologic/Lymphatic: [ ] anemia [ ] bleeding problem  Allergic/Immunologic: [ ] itchy eyes [ ] nasal discharge [ ] hives [ ] angioedema  [ ] All other systems negative  [ ] Unable to assess ROS because ________    OBJECTIVE:  ICU Vital Signs Last 24 Hrs  T(C): 35.9 (26 Dec 2023 07:00), Max: 37.2 (25 Dec 2023 18:00)  T(F): 96.6 (26 Dec 2023 07:00), Max: 99 (25 Dec 2023 18:00)  HR: 95 (26 Dec 2023 07:00) (90 - 113)  BP: 128/64 (26 Dec 2023 07:00) (106/53 - 141/64)  BP(mean): 83 (26 Dec 2023 07:00) (69 - 92)  ABP: --  ABP(mean): --  RR: 22 (26 Dec 2023 07:00) (22 - 31)  SpO2: 100% (26 Dec 2023 07:00) (97% - 100%)    O2 Parameters below as of 25 Dec 2023 19:00  Patient On (Oxygen Delivery Method): ventilator          Mode: AC/ CMV (Assist Control/ Continuous Mandatory Ventilation), RR (machine): 22, TV (machine): 400, FiO2: 30, PEEP: 5, ITime: 1, MAP: 9, PIP: 20    12-25 @ 07:01  -  12-26 @ 07:00  --------------------------------------------------------  IN: 1793.8 mL / OUT: 870 mL / NET: 923.8 mL      CAPILLARY BLOOD GLUCOSE          PHYSICAL EXAM:  General:   HEENT:   Neck:   Respiratory:   Cardiovascular:   Abdomen:   Extremities:   Skin:   Neurological:  Psychiatry:    LINES:    HOSPITAL MEDICATIONS:  MEDICATIONS  (STANDING):  albuterol/ipratropium for Nebulization 3 milliLiter(s) Nebulizer every 6 hours  buMETAnide Injectable 2 milliGRAM(s) IV Push daily  chlorhexidine 0.12% Liquid 15 milliLiter(s) Oral Mucosa every 12 hours  chlorhexidine 2% Cloths 1 Application(s) Topical <User Schedule>  collagenase Ointment 1 Application(s) Topical daily  gabapentin 300 milliGRAM(s) Oral two times a day  heparin   Injectable 5000 Unit(s) SubCutaneous every 12 hours  levoFLOXacin IVPB      mirtazapine 15 milliGRAM(s) Oral daily  norepinephrine Infusion 0.05 MICROgram(s)/kG/Min (6.57 mL/Hr) IV Continuous <Continuous>  polyethylene glycol 3350 17 Gram(s) Oral two times a day  propofol Infusion 5 MICROgram(s)/kG/Min (2.1 mL/Hr) IV Continuous <Continuous>  senna 2 Tablet(s) Oral at bedtime  sodium chloride 3%  Inhalation 4 milliLiter(s) Inhalation every 6 hours  vancomycin  IVPB 500 milliGRAM(s) IV Intermittent every 24 hours    MEDICATIONS  (PRN):  acetaminophen     Tablet .. 650 milliGRAM(s) Oral every 6 hours PRN Temp greater or equal to 38C (100.4F), Mild Pain (1 - 3)      LABS:                        6.9    7.22  )-----------( 246      ( 26 Dec 2023 02:15 )             22.3     Hgb Trend: 6.9<--, 7.5<--, 7.5<--, 7.3<--, 7.4<--  12-26    143  |  107  |  76<H>  ----------------------------<  138<H>  5.1   |  32<H>  |  2.68<H>    Ca    9.0      26 Dec 2023 02:15  Phos  4.4     12-26  Mg     2.1     12-26    TPro  7.6  /  Alb  1.4<L>  /  TBili  0.3  /  DBili  x   /  AST  29  /  ALT  12  /  AlkPhos  92  12-26      Urinalysis Basic - ( 26 Dec 2023 02:15 )    Color: x / Appearance: x / SG: x / pH: x  Gluc: 138 mg/dL / Ketone: x  / Bili: x / Urobili: x   Blood: x / Protein: x / Nitrite: x   Leuk Esterase: x / RBC: x / WBC x   Sq Epi: x / Non Sq Epi: x / Bacteria: x      Arterial Blood Gas:  12-24 @ 19:59  7.38/54/324/32/99.7/5.9  ABG lactate: --  Arterial Blood Gas:  12-24 @ 17:28  7.13/110/174/37/99.7/5.6  ABG lactate: --  Arterial Blood Gas:  12-24 @ 09:34  7.46/47/130/33/99.4/8.3  ABG lactate: --        MICROBIOLOGY:     RADIOLOGY:  [ ] Reviewed and interpreted by me CHIEF COMPLAINT:    Interval Events:  off pressors since 9AM     REVIEW OF SYSTEMS:  [x ] Unable to assess ROS because intubated/sedated    OBJECTIVE:  ICU Vital Signs Last 24 Hrs  T(C): 35.9 (26 Dec 2023 07:00), Max: 37.2 (25 Dec 2023 18:00)  T(F): 96.6 (26 Dec 2023 07:00), Max: 99 (25 Dec 2023 18:00)  HR: 95 (26 Dec 2023 07:00) (90 - 113)  BP: 128/64 (26 Dec 2023 07:00) (106/53 - 141/64)  BP(mean): 83 (26 Dec 2023 07:00) (69 - 92)  ABP: --  ABP(mean): --  RR: 22 (26 Dec 2023 07:00) (22 - 31)  SpO2: 100% (26 Dec 2023 07:00) (97% - 100%)    O2 Parameters below as of 25 Dec 2023 19:00  Patient On (Oxygen Delivery Method): ventilator          Mode: AC/ CMV (Assist Control/ Continuous Mandatory Ventilation), RR (machine): 22, TV (machine): 400, FiO2: 30, PEEP: 5, ITime: 1, MAP: 9, PIP: 20    12-25 @ 07:01  -  12-26 @ 07:00  --------------------------------------------------------  IN: 1793.8 mL / OUT: 870 mL / NET: 923.8 mL      CAPILLARY BLOOD GLUCOSE          PHYSICAL EXAM:  General: NAD, chronically ill appearing  HEENT: ETT and NGT in place  Neck: supple  Respiratory: mechanical BS  Cardiovascular: s1s2 RRR  Abdomen: soft, non tender, non distended  Extremities: warm, no edema or clubbing   Skin: L ischium stage 3, sacrum stage 2  Neurological: unable to assess  Psychiatry: unable to assess    LINES:  [facility]    HOSPITAL MEDICATIONS:  MEDICATIONS  (STANDING):  albuterol/ipratropium for Nebulization 3 milliLiter(s) Nebulizer every 6 hours  buMETAnide Injectable 2 milliGRAM(s) IV Push daily  chlorhexidine 0.12% Liquid 15 milliLiter(s) Oral Mucosa every 12 hours  chlorhexidine 2% Cloths 1 Application(s) Topical <User Schedule>  collagenase Ointment 1 Application(s) Topical daily  gabapentin 300 milliGRAM(s) Oral two times a day  heparin   Injectable 5000 Unit(s) SubCutaneous every 12 hours  levoFLOXacin IVPB      mirtazapine 15 milliGRAM(s) Oral daily  norepinephrine Infusion 0.05 MICROgram(s)/kG/Min (6.57 mL/Hr) IV Continuous <Continuous>  polyethylene glycol 3350 17 Gram(s) Oral two times a day  propofol Infusion 5 MICROgram(s)/kG/Min (2.1 mL/Hr) IV Continuous <Continuous>  senna 2 Tablet(s) Oral at bedtime  sodium chloride 3%  Inhalation 4 milliLiter(s) Inhalation every 6 hours  vancomycin  IVPB 500 milliGRAM(s) IV Intermittent every 24 hours    MEDICATIONS  (PRN):  acetaminophen     Tablet .. 650 milliGRAM(s) Oral every 6 hours PRN Temp greater or equal to 38C (100.4F), Mild Pain (1 - 3)      LABS:                        6.9    7.22  )-----------( 246      ( 26 Dec 2023 02:15 )             22.3     Hgb Trend: 6.9<--, 7.5<--, 7.5<--, 7.3<--, 7.4<--  12-26    143  |  107  |  76<H>  ----------------------------<  138<H>  5.1   |  32<H>  |  2.68<H>    Ca    9.0      26 Dec 2023 02:15  Phos  4.4     12-26  Mg     2.1     12-26    TPro  7.6  /  Alb  1.4<L>  /  TBili  0.3  /  DBili  x   /  AST  29  /  ALT  12  /  AlkPhos  92  12-26      Urinalysis Basic - ( 26 Dec 2023 02:15 )    Color: x / Appearance: x / SG: x / pH: x  Gluc: 138 mg/dL / Ketone: x  / Bili: x / Urobili: x   Blood: x / Protein: x / Nitrite: x   Leuk Esterase: x / RBC: x / WBC x   Sq Epi: x / Non Sq Epi: x / Bacteria: x      Arterial Blood Gas:  12-24 @ 19:59  7.38/54/324/32/99.7/5.9  ABG lactate: --  Arterial Blood Gas:  12-24 @ 17:28  7.13/110/174/37/99.7/5.6  ABG lactate: --  Arterial Blood Gas:  12-24 @ 09:34  7.46/47/130/33/99.4/8.3  ABG lactate: --        MICROBIOLOGY:     RADIOLOGY:  [ ] Reviewed and interpreted by me CHIEF COMPLAINT:    Interval Events:  off pressors since 9AM     REVIEW OF SYSTEMS:  [x ] Unable to assess ROS because intubated/sedated    OBJECTIVE:  ICU Vital Signs Last 24 Hrs  T(C): 35.9 (26 Dec 2023 07:00), Max: 37.2 (25 Dec 2023 18:00)  T(F): 96.6 (26 Dec 2023 07:00), Max: 99 (25 Dec 2023 18:00)  HR: 95 (26 Dec 2023 07:00) (90 - 113)  BP: 128/64 (26 Dec 2023 07:00) (106/53 - 141/64)  BP(mean): 83 (26 Dec 2023 07:00) (69 - 92)  ABP: --  ABP(mean): --  RR: 22 (26 Dec 2023 07:00) (22 - 31)  SpO2: 100% (26 Dec 2023 07:00) (97% - 100%)    O2 Parameters below as of 25 Dec 2023 19:00  Patient On (Oxygen Delivery Method): ventilator          Mode: AC/ CMV (Assist Control/ Continuous Mandatory Ventilation), RR (machine): 22, TV (machine): 400, FiO2: 30, PEEP: 5, ITime: 1, MAP: 9, PIP: 20    12-25 @ 07:01  -  12-26 @ 07:00  --------------------------------------------------------  IN: 1793.8 mL / OUT: 870 mL / NET: 923.8 mL      CAPILLARY BLOOD GLUCOSE          PHYSICAL EXAM:  General: NAD, chronically ill appearing  HEENT: ETT and NGT in place  Neck: supple  Respiratory: mechanical BS  Cardiovascular: s1s2 RRR  Abdomen: soft, non tender, non distended  Extremities: warm, no edema or clubbing   Skin: L ischium stage 3, sacrum stage 2  Neurological: unable to assess  Psychiatry: unable to assess    LINES:  [facility]    HOSPITAL MEDICATIONS:  MEDICATIONS  (STANDING):  albuterol/ipratropium for Nebulization 3 milliLiter(s) Nebulizer every 6 hours  buMETAnide Injectable 2 milliGRAM(s) IV Push daily  chlorhexidine 0.12% Liquid 15 milliLiter(s) Oral Mucosa every 12 hours  chlorhexidine 2% Cloths 1 Application(s) Topical <User Schedule>  collagenase Ointment 1 Application(s) Topical daily  gabapentin 300 milliGRAM(s) Oral two times a day  heparin   Injectable 5000 Unit(s) SubCutaneous every 12 hours  levoFLOXacin IVPB      mirtazapine 15 milliGRAM(s) Oral daily  norepinephrine Infusion 0.05 MICROgram(s)/kG/Min (6.57 mL/Hr) IV Continuous <Continuous>  polyethylene glycol 3350 17 Gram(s) Oral two times a day  propofol Infusion 5 MICROgram(s)/kG/Min (2.1 mL/Hr) IV Continuous <Continuous>  senna 2 Tablet(s) Oral at bedtime  sodium chloride 3%  Inhalation 4 milliLiter(s) Inhalation every 6 hours  vancomycin  IVPB 500 milliGRAM(s) IV Intermittent every 24 hours    MEDICATIONS  (PRN):  acetaminophen     Tablet .. 650 milliGRAM(s) Oral every 6 hours PRN Temp greater or equal to 38C (100.4F), Mild Pain (1 - 3)      LABS:                        6.9    7.22  )-----------( 246      ( 26 Dec 2023 02:15 )             22.3     Hgb Trend: 6.9<--, 7.5<--, 7.5<--, 7.3<--, 7.4<--  12-26    143  |  107  |  76<H>  ----------------------------<  138<H>  5.1   |  32<H>  |  2.68<H>    Ca    9.0      26 Dec 2023 02:15  Phos  4.4     12-26  Mg     2.1     12-26    TPro  7.6  /  Alb  1.4<L>  /  TBili  0.3  /  DBili  x   /  AST  29  /  ALT  12  /  AlkPhos  92  12-26      Urinalysis Basic - ( 26 Dec 2023 02:15 )    Color: x / Appearance: x / SG: x / pH: x  Gluc: 138 mg/dL / Ketone: x  / Bili: x / Urobili: x   Blood: x / Protein: x / Nitrite: x   Leuk Esterase: x / RBC: x / WBC x   Sq Epi: x / Non Sq Epi: x / Bacteria: x      Arterial Blood Gas:  12-24 @ 19:59  7.38/54/324/32/99.7/5.9  ABG lactate: --  Arterial Blood Gas:  12-24 @ 17:28  7.13/110/174/37/99.7/5.6  ABG lactate: --  Arterial Blood Gas:  12-24 @ 09:34  7.46/47/130/33/99.4/8.3  ABG lactate: --        MICROBIOLOGY:     RADIOLOGY:  [ ] Reviewed and interpreted by me

## 2023-12-26 NOTE — PHARMACOTHERAPY INTERVENTION NOTE - COMMENTS
Recommended ciprofloxacin to piperacillin/tazobactam switch given ciprofloxacin resistance on  C&S report.
Spoke to PA and recommended Vancomycin 750mg q 24- gives an AUC of 577, trough of 19. PA will monitor scr and will adjust accordingly.
Recommended holding vancomycin today and starting 500 mg Q24h (decreasing from 750 mg Q24h) tomorrow morning. Patient's current AUC is 603. The future estimated AUC with 500 mg Q24h is 531. 
Recommended initiation of heparin subcutaneous 5000 units q8h for VTE prophylaxis
Recommended discontinuation of vancomycin as the patient has completed a total of 7 days of treatment for pneumonia. 
paraplegic

## 2023-12-26 NOTE — PROGRESS NOTE ADULT - ASSESSMENT
89M w/ dementia, CKD, chronic urinary retention w/ obrien. Admitted w/ L pleural effusion thought to be due to mucous plugging. Course complicated by worsening hypoxemia requiring intubation and bronchoscopy x2 w/ MRSA pneumonia. Extubated on 12/24 but required re-intubation several hours later due to hypercapnia and poor mental status.     #Neuro - sedated w/ propofol, switch to precedex for weaning; continue gabpentin and mirtazepine  #CV - shock state has resolved, likely sedation related/vasoplegia   #Pulm - remains intubated on minimal vent settings, continue metinebs, airway clearance to try to optimize for extubation; SBT as tolerated; family would like trach/PEG if fails extubation again  #ID- vancomcyin for MRSA pneumonia w/ acceptable levels; bronchoscopy cx also grew Stenotrophomonas continue levofloxacin for 10-14 day course  #Renal/metabolic - CKD likely near baseline, non-oliguric; monitor I/Os, electrolytes; continue bumex  #GI- tolerating TF, having BM- continue bowel regimen  #Heme - anemia to 6.7, no active bleeding, 1 pRBC today  #Endo - monitor BG  #Skin - wound care for sacral wounds  #PPx - HSQ q12  #Dispo- remains in ICU intubated; prognosis very guarded; full code - family to elect for Trach/PEG if unable to extubate successfully

## 2023-12-27 NOTE — PROCEDURE NOTE - NSTRACHPOSTINTU_RESP_A_CORE
Appropriate capnography/Breath sounds bilateral/Breath sounds equal/Chest excursion noted/Chest X-Ray/Positive end tidal Co2 noted

## 2023-12-27 NOTE — PROCEDURE NOTE - NSBRONCHHISTORY_GEN_A_CORE_FT
Hx of MRSA and stenotrophomonas pneumonia w/ mucous plugging
Complete white of L lung
Repeat mucous plugging.

## 2023-12-27 NOTE — PROCEDURE NOTE - NSPROCNAME_GEN_A_CORE
Bronchoscopy
Peripheral Line Insertion
Peripheral Line Insertion
Gastric Intubation/Gastric Lavage
Tracheal Intubation
Tracheal Intubation
Thoracentesis
Tracheal Intubation

## 2023-12-27 NOTE — PROCEDURE NOTE - NSINFORMCONSENT_GEN_A_CORE
no iv access/This was an emergent procedure.
Benefits, risks, and possible complications of procedure explained to patient/caregiver who verbalized understanding and gave verbal consent.
This was an emergent procedure.
Benefits, risks, and possible complications of procedure explained to patient/caregiver who verbalized understanding and gave written consent.
Benefits, risks, and possible complications of procedure explained to patient/caregiver who verbalized understanding and gave verbal consent.
Benefits, risks, and possible complications of procedure explained to patient/caregiver who verbalized understanding and gave written consent.
This was an emergent procedure.
This was an emergent procedure.
Benefits, risks, and possible complications of procedure explained to patient/caregiver who verbalized understanding and gave verbal consent.

## 2023-12-27 NOTE — CONSULT NOTE ADULT - NS ATTEND AMEND GEN_ALL_CORE FT
Patient seen and examined  Labs, imaging, and hospital course reviewed  Risks, benefits, and alternatives to tracheostomy and percutaneous endoscopy gastrostomy tube placement discussed with patient's family  OR planning
The patient is seen and examined and the pt is in ICU, intubated and is scheduled to have PEG and TRACH today or tomorrow.  The patient has left Gluteal  ulcer, pressure/Shearing  5x4x.2 cms, stage 2 with some loose slough that is removed and also has area of 1.5x1.5 cms of unstageable .  Recommend No debridement at this time, apply daily collagenase ointment. will follow next week .

## 2023-12-27 NOTE — PROGRESS NOTE ADULT - SUBJECTIVE AND OBJECTIVE BOX
CHIEF COMPLAINT:    Interval Events:    REVIEW OF SYSTEMS:  Constitutional: [ ] fevers [ ] chills [ ] weight loss [ ] weight gain  HEENT: [ ] dry eyes [ ] eye irritation [ ] postnasal drip [ ] nasal congestion  CV: [ ] chest pain [ ] orthopnea [ ] palpitations [ ] murmur  Resp: [ ] cough [ ] shortness of breath [ ] dyspnea [ ] wheezing [ ] sputum [ ] hemoptysis  GI: [ ] nausea [ ] vomiting [ ] diarrhea [ ] constipation [ ] abd pain [ ] dysphagia   : [ ] dysuria [ ] nocturia [ ] hematuria [ ] increased urinary frequency  Musculoskeletal: [ ] back pain [ ] myalgias [ ] arthralgias [ ] fracture  Skin: [ ] rash [ ] itch  Neurological: [ ] headache [ ] dizziness [ ] syncope [ ] weakness [ ] numbness  Hematologic/Lymphatic: [ ] anemia [ ] bleeding problem  Allergic/Immunologic: [ ] itchy eyes [ ] nasal discharge [ ] hives [ ] angioedema  [ ] All other systems negative  [ ] Unable to assess ROS because ________    OBJECTIVE:  ICU Vital Signs Last 24 Hrs  T(C): 35.6 (27 Dec 2023 07:00), Max: 36.2 (26 Dec 2023 13:15)  T(F): 96.1 (27 Dec 2023 07:00), Max: 97.2 (26 Dec 2023 13:15)  HR: 88 (27 Dec 2023 07:00) (80 - 98)  BP: 110/44 (27 Dec 2023 06:00) (88/53 - 135/66)  BP(mean): 62 (27 Dec 2023 06:00) (62 - 96)  ABP: --  ABP(mean): --  RR: 27 (27 Dec 2023 07:00) (0 - 28)  SpO2: 100% (27 Dec 2023 07:00) (97% - 100%)    O2 Parameters below as of 27 Dec 2023 05:50  Patient On (Oxygen Delivery Method): ventilator          Mode: CPAP with PS, FiO2: 30, PEEP: 5, PS: 10, MAP: 7, PIP: 13    12-26 @ 07:01  -  12-27 @ 07:00  --------------------------------------------------------  IN: 2468.5 mL / OUT: 570 mL / NET: 1898.5 mL      CAPILLARY BLOOD GLUCOSE          PHYSICAL EXAM:  General:   HEENT:   Neck:   Respiratory:   Cardiovascular:   Abdomen:   Extremities:   Skin:   Neurological:  Psychiatry:    LINES:    HOSPITAL MEDICATIONS:  MEDICATIONS  (STANDING):  albuterol/ipratropium for Nebulization 3 milliLiter(s) Nebulizer every 6 hours  buMETAnide Injectable 2 milliGRAM(s) IV Push daily  chlorhexidine 0.12% Liquid 15 milliLiter(s) Oral Mucosa every 12 hours  chlorhexidine 2% Cloths 1 Application(s) Topical <User Schedule>  collagenase Ointment 1 Application(s) Topical daily  dexMEDEtomidine Infusion 0.2 MICROgram(s)/kG/Hr (3.51 mL/Hr) IV Continuous <Continuous>  gabapentin 300 milliGRAM(s) Oral two times a day  heparin   Injectable 5000 Unit(s) SubCutaneous every 12 hours  levoFLOXacin IVPB      levoFLOXacin IVPB 500 milliGRAM(s) IV Intermittent every 48 hours  mirtazapine 15 milliGRAM(s) Oral daily  polyethylene glycol 3350 17 Gram(s) Oral two times a day  senna 2 Tablet(s) Oral at bedtime  sodium chloride 3%  Inhalation 4 milliLiter(s) Inhalation every 6 hours    MEDICATIONS  (PRN):  acetaminophen     Tablet .. 650 milliGRAM(s) Oral every 6 hours PRN Temp greater or equal to 38C (100.4F), Mild Pain (1 - 3)      LABS:                        7.4    4.56  )-----------( 209      ( 27 Dec 2023 03:45 )             23.4     Hgb Trend: 7.4<--, 6.7<--, 6.9<--, 7.5<--, 7.5<--  12-27    142  |  107  |  78<H>  ----------------------------<  164<H>  5.9<H>   |  30  |  2.83<H>    Ca    8.7      27 Dec 2023 03:45  Phos  4.5     12-27  Mg     2.1     12-27    TPro  7.3  /  Alb  1.4<L>  /  TBili  0.3  /  DBili  x   /  AST  24  /  ALT  7<L>  /  AlkPhos  85  12-27      Urinalysis Basic - ( 27 Dec 2023 03:45 )    Color: x / Appearance: x / SG: x / pH: x  Gluc: 164 mg/dL / Ketone: x  / Bili: x / Urobili: x   Blood: x / Protein: x / Nitrite: x   Leuk Esterase: x / RBC: x / WBC x   Sq Epi: x / Non Sq Epi: x / Bacteria: x            MICROBIOLOGY:     RADIOLOGY:  [ ] Reviewed and interpreted by me CHIEF COMPLAINT:    Interval Events:  hypothermia overnight  on precedex only, off propofol  hyperK treated w/ lokelma  on PSV 8.5 this morning    REVIEW OF SYSTEMS:  [ x] Unable to assess ROS because intubated/sedated    OBJECTIVE:  ICU Vital Signs Last 24 Hrs  T(C): 35.6 (27 Dec 2023 07:00), Max: 36.2 (26 Dec 2023 13:15)  T(F): 96.1 (27 Dec 2023 07:00), Max: 97.2 (26 Dec 2023 13:15)  HR: 88 (27 Dec 2023 07:00) (80 - 98)  BP: 110/44 (27 Dec 2023 06:00) (88/53 - 135/66)  BP(mean): 62 (27 Dec 2023 06:00) (62 - 96)  ABP: --  ABP(mean): --  RR: 27 (27 Dec 2023 07:00) (0 - 28)  SpO2: 100% (27 Dec 2023 07:00) (97% - 100%)    O2 Parameters below as of 27 Dec 2023 05:50  Patient On (Oxygen Delivery Method): ventilator          Mode: CPAP with PS, FiO2: 30, PEEP: 5, PS: 10, MAP: 7, PIP: 13    12-26 @ 07:01  -  12-27 @ 07:00  --------------------------------------------------------  IN: 2468.5 mL / OUT: 570 mL / NET: 1898.5 mL      CAPILLARY BLOOD GLUCOSE          PHYSICAL EXAM:  General: NAD, chronically ill appearing  HEENT: ETT and NGT in place  Neck: supple  Respiratory: mechanical BS  Cardiovascular: s1s2 RRR  Abdomen: soft, non tender, non distended  Extremities: warm, no edema or clubbing   Skin: L ischium stage 3, sacrum stage 2  Neurological: unable to assess  Psychiatry: unable to assess    LINES:  [facility]    HOSPITAL MEDICATIONS:  MEDICATIONS  (STANDING):  albuterol/ipratropium for Nebulization 3 milliLiter(s) Nebulizer every 6 hours  buMETAnide Injectable 2 milliGRAM(s) IV Push daily  chlorhexidine 0.12% Liquid 15 milliLiter(s) Oral Mucosa every 12 hours  chlorhexidine 2% Cloths 1 Application(s) Topical <User Schedule>  collagenase Ointment 1 Application(s) Topical daily  dexMEDEtomidine Infusion 0.2 MICROgram(s)/kG/Hr (3.51 mL/Hr) IV Continuous <Continuous>  gabapentin 300 milliGRAM(s) Oral two times a day  heparin   Injectable 5000 Unit(s) SubCutaneous every 12 hours  levoFLOXacin IVPB      levoFLOXacin IVPB 500 milliGRAM(s) IV Intermittent every 48 hours  mirtazapine 15 milliGRAM(s) Oral daily  polyethylene glycol 3350 17 Gram(s) Oral two times a day  senna 2 Tablet(s) Oral at bedtime  sodium chloride 3%  Inhalation 4 milliLiter(s) Inhalation every 6 hours    MEDICATIONS  (PRN):  acetaminophen     Tablet .. 650 milliGRAM(s) Oral every 6 hours PRN Temp greater or equal to 38C (100.4F), Mild Pain (1 - 3)      LABS:                        7.4    4.56  )-----------( 209      ( 27 Dec 2023 03:45 )             23.4     Hgb Trend: 7.4<--, 6.7<--, 6.9<--, 7.5<--, 7.5<--  12-27    142  |  107  |  78<H>  ----------------------------<  164<H>  5.9<H>   |  30  |  2.83<H>    Ca    8.7      27 Dec 2023 03:45  Phos  4.5     12-27  Mg     2.1     12-27    TPro  7.3  /  Alb  1.4<L>  /  TBili  0.3  /  DBili  x   /  AST  24  /  ALT  7<L>  /  AlkPhos  85  12-27      Urinalysis Basic - ( 27 Dec 2023 03:45 )    Color: x / Appearance: x / SG: x / pH: x  Gluc: 164 mg/dL / Ketone: x  / Bili: x / Urobili: x   Blood: x / Protein: x / Nitrite: x   Leuk Esterase: x / RBC: x / WBC x   Sq Epi: x / Non Sq Epi: x / Bacteria: x            MICROBIOLOGY:     RADIOLOGY:  [ ] Reviewed and interpreted by me CHIEF COMPLAINT:    Interval Events:  hypothermia overnight  on precedex only, off propofol  hyperK treated w/ lokelma  on PSV 8.5 this morning    REVIEW OF SYSTEMS:  [ x] Unable to assess ROS because intubated/sedated    OBJECTIVE:  ICU Vital Signs Last 24 Hrs  T(C): 35.6 (27 Dec 2023 07:00), Max: 36.2 (26 Dec 2023 13:15)  T(F): 96.1 (27 Dec 2023 07:00), Max: 97.2 (26 Dec 2023 13:15)  HR: 88 (27 Dec 2023 07:00) (80 - 98)  BP: 110/44 (27 Dec 2023 06:00) (88/53 - 135/66)  BP(mean): 62 (27 Dec 2023 06:00) (62 - 96)  ABP: --  ABP(mean): --  RR: 27 (27 Dec 2023 07:00) (0 - 28)  SpO2: 100% (27 Dec 2023 07:00) (97% - 100%)    O2 Parameters below as of 27 Dec 2023 05:50  Patient On (Oxygen Delivery Method): ventilator          Mode: CPAP with PS, FiO2: 30, PEEP: 5, PS: 10, MAP: 7, PIP: 13    12-26 @ 07:01  -  12-27 @ 07:00  --------------------------------------------------------  IN: 2468.5 mL / OUT: 570 mL / NET: 1898.5 mL      CAPILLARY BLOOD GLUCOSE          PHYSICAL EXAM:  General: NAD, chronically ill appearing  HEENT: ETT and NGT in place  Neck: supple  Respiratory: mechanical BS  Cardiovascular: s1s2 RRR  Abdomen: soft, non tender, non distended  Extremities: warm, no edema or clubbing   Skin: L ischium stage 3, sacrum stage 2  Neurological: unable to assess  Psychiatry: unable to assess    LINES:  [facility]    HOSPITAL MEDICATIONS:  MEDICATIONS  (STANDING):  albuterol/ipratropium for Nebulization 3 milliLiter(s) Nebulizer every 6 hours  buMETAnide Injectable 2 milliGRAM(s) IV Push daily  chlorhexidine 0.12% Liquid 15 milliLiter(s) Oral Mucosa every 12 hours  chlorhexidine 2% Cloths 1 Application(s) Topical <User Schedule>  collagenase Ointment 1 Application(s) Topical daily  dexMEDEtomidine Infusion 0.2 MICROgram(s)/kG/Hr (3.51 mL/Hr) IV Continuous <Continuous>  gabapentin 300 milliGRAM(s) Oral two times a day  heparin   Injectable 5000 Unit(s) SubCutaneous every 12 hours  levoFLOXacin IVPB      levoFLOXacin IVPB 500 milliGRAM(s) IV Intermittent every 48 hours  mirtazapine 15 milliGRAM(s) Oral daily  polyethylene glycol 3350 17 Gram(s) Oral two times a day  senna 2 Tablet(s) Oral at bedtime  sodium chloride 3%  Inhalation 4 milliLiter(s) Inhalation every 6 hours    MEDICATIONS  (PRN):  acetaminophen     Tablet .. 650 milliGRAM(s) Oral every 6 hours PRN Temp greater or equal to 38C (100.4F), Mild Pain (1 - 3)      LABS:                        7.4    4.56  )-----------( 209      ( 27 Dec 2023 03:45 )             23.4     Hgb Trend: 7.4<--, 6.7<--, 6.9<--, 7.5<--, 7.5<--  12-27    142  |  107  |  78<H>  ----------------------------<  164<H>  5.9<H>   |  30  |  2.83<H>    Ca    8.7      27 Dec 2023 03:45  Phos  4.5     12-27  Mg     2.1     12-27    TPro  7.3  /  Alb  1.4<L>  /  TBili  0.3  /  DBili  x   /  AST  24  /  ALT  7<L>  /  AlkPhos  85  12-27      Urinalysis Basic - ( 27 Dec 2023 03:45 )    Color: x / Appearance: x / SG: x / pH: x  Gluc: 164 mg/dL / Ketone: x  / Bili: x / Urobili: x   Blood: x / Protein: x / Nitrite: x   Leuk Esterase: x / RBC: x / WBC x   Sq Epi: x / Non Sq Epi: x / Bacteria: x            MICROBIOLOGY:     RADIOLOGY:  [ ] Reviewed and interpreted by me

## 2023-12-27 NOTE — CONSULT NOTE ADULT - SUBJECTIVE AND OBJECTIVE BOX
SURGERY PA CONSULT NOTE:    CHIEF COMPLAINT:  Patient is a 89y old  Male who presents with a chief complaint of UTI (18 Dec 2023 21:13)      HPI FROM ED:  HPI:  89M w/ hx of CKD, HTN, BPH w/ chronic obrien, dementia p/w SOB. Pt poor historian so history obtained from record. Pt was brought in my home aid who reports pt c/o SOB. Denies fever/chills/sweats, URI sx, N/V/D.    In ED, pt hypothermic to 95.5 and hypotensive to 90/60s. Labs notable for Hgb 6.3 (+london), dimer 3000, Na 129, BUN/SCr 70/2.91, BNP 7966, UA+. CT CAP demonstrates mild b/l hydronephrosis and large stool burden w/ perirectal stranding c/f stercoral colitis. (01 Dec 2023 06:11)      Interval HPI:  Patient is a 89 year old male with PMHx as listed above, admitted for SOB, found to have PNA.  Patient with worsening AMS and hypercapnea requiring intubation.  Patient has now failed multiple attempts at extubation, Surgery team consulted for evaluation for Trach/Peg placement.      PAST MEDICAL HISTORY:  PAST MEDICAL & SURGICAL HISTORY:  HTN (Hypertension)      Benign Prostatic Hyperplasia      Hypertension      BPH (benign prostatic hypertrophy)      Spinal stenosis of lumbar region      Dehydration      Renal insufficiency      Hydrocele in adult  bilateral, chronic      Benign essential HTN      BPH (benign prostatic hyperplasia)      Indwelling Obrien catheter present      Dementia      Wheelchair dependent      Implantable loop recorder present      No significant past surgical history      Implantable loop recorder present          PAST SURGICAL HISTORY:    REVIEW OF SYSTEMS:  Unable to obtain due to intubation.      MEDICATIONS:  Home Medications:  acetaminophen 325 mg oral tablet: 2 tab(s) orally every 6 hours, As needed, Temp greater or equal to 38C (100.4F) (28 Mar 2023 17:16)  amLODIPine 5 mg oral tablet: 1 tab(s) orally once a day (07 Nov 2023 11:10)  amLODIPine 5 mg oral tablet: 1 tab(s) orally once a day (28 Mar 2023 17:16)  finasteride 5 mg oral tablet: 1 tab(s) orally once a day (07 Nov 2023 11:10)  gabapentin 300 mg oral capsule: 1 cap(s) orally 2 times a day (07 Nov 2023 11:10)  gabapentin 300 mg oral capsule: 1 cap(s) orally 2 times a day (28 Mar 2023 17:16)  labetalol 100 mg oral tablet: 1 tab(s) orally 2 times a day (28 Mar 2023 17:16)  labetalol 100 mg oral tablet: 1 tab(s) orally 2 times a day (07 Nov 2023 11:10)  mirtazapine 15 mg oral tablet: 1/2 tab(s) orally once a day (28 Mar 2023 17:16)  PREDNISOLONE AC 1% OPHTH SUSP  5ML: SHAKE LIQUID AND INSTILL 1 DROP IN RIGHT EYE TWICE DAILY (28 Mar 2023 17:16)  tamsulosin 0.4 mg oral capsule: 1 cap(s) orally once a day (at bedtime) (28 Mar 2023 17:16)    MEDICATIONS  (STANDING):  albuterol/ipratropium for Nebulization 3 milliLiter(s) Nebulizer every 6 hours  chlorhexidine 0.12% Liquid 15 milliLiter(s) Oral Mucosa every 12 hours  chlorhexidine 2% Cloths 1 Application(s) Topical <User Schedule>  collagenase Ointment 1 Application(s) Topical daily  dexMEDEtomidine Infusion 0.2 MICROgram(s)/kG/Hr (3.51 mL/Hr) IV Continuous <Continuous>  gabapentin 300 milliGRAM(s) Oral two times a day  heparin   Injectable 5000 Unit(s) SubCutaneous every 12 hours  levoFLOXacin IVPB 500 milliGRAM(s) IV Intermittent every 48 hours  levoFLOXacin IVPB      midazolam Injectable 4 milliGRAM(s) IV Push once  mirtazapine 15 milliGRAM(s) Oral daily  norepinephrine Infusion 0.05 MICROgram(s)/kG/Min (6.57 mL/Hr) IV Continuous <Continuous>  polyethylene glycol 3350 17 Gram(s) Oral two times a day  propofol Infusion 20 MICROgram(s)/kG/Min (8.41 mL/Hr) IV Continuous <Continuous>  senna 2 Tablet(s) Oral at bedtime  sodium chloride 3%  Inhalation 4 milliLiter(s) Inhalation every 6 hours  sodium zirconium cyclosilicate 10 Gram(s) Oral three times a day    MEDICATIONS  (PRN):  acetaminophen     Tablet .. 650 milliGRAM(s) Oral every 6 hours PRN Temp greater or equal to 38C (100.4F), Mild Pain (1 - 3)      ALLERGIES:  Allergies    No Known Allergies    Intolerances        SOCIAL HISTORY:  Social History:    Smoking: Yes [ ]  No [x]   ______pk yrs  ETOH  Yes [ ]  No [x]  Social [ ]  DRUGS:  Yes [ ]  No [x]  if so what______________    FAMILY HISTORY:  FAMILY HISTORY:  No significant family history        VITAL SIGNS:  Vital Signs Last 24 Hrs  T(C): 35.6 (27 Dec 2023 11:00), Max: 36.2 (26 Dec 2023 15:30)  T(F): 96.1 (27 Dec 2023 11:00), Max: 97.2 (26 Dec 2023 15:30)  HR: 101 (27 Dec 2023 15:02) (80 - 109)  BP: 157/73 (27 Dec 2023 15:02) (81/63 - 162/77)  BP(mean): 96 (27 Dec 2023 15:02) (62 - 100)  RR: 23 (27 Dec 2023 15:02) (0 - 29)  SpO2: 100% (27 Dec 2023 15:02) (97% - 100%)    Parameters below as of 27 Dec 2023 11:15  Patient On (Oxygen Delivery Method): nasal cannula  O2 Flow (L/min): 2      PHYSICAL EXAM:  General: No acute distress,  Chest: intubated, on mechanical ventilation   Heart: Heart rhythm regular, no murmurs  Abdomen: Soft, non tender, good bowel sounds present in all four quadrants.  No guarding, rebound, and no peritoneal signs.    Extremity: No swelling, or open sores, no gross deformities,  good range of motion, 2+ peripheral pulses bilat UE/LE, no edema,  negative Angelo's sign, no lymphadenopathy  Neuro: sedated    Skin: Good color, turgor, texture with no gross lesions    LABS:                        7.4    4.56  )-----------( 209      ( 27 Dec 2023 03:45 )             23.4     12-27    142  |  107  |  78<H>  ----------------------------<  164<H>  5.9<H>   |  30  |  2.83<H>    Ca    8.7      27 Dec 2023 03:45  Phos  4.5     12-27  Mg     2.1     12-27    TPro  7.3  /  Alb  1.4<L>  /  TBili  0.3  /  DBili  x   /  AST  24  /  ALT  7<L>  /  AlkPhos  85  12-27      Urinalysis Basic - ( 27 Dec 2023 03:45 )    Color: x / Appearance: x / SG: x / pH: x  Gluc: 164 mg/dL / Ketone: x  / Bili: x / Urobili: x   Blood: x / Protein: x / Nitrite: x   Leuk Esterase: x / RBC: x / WBC x   Sq Epi: x / Non Sq Epi: x / Bacteria: x      LIVER FUNCTIONS - ( 27 Dec 2023 03:45 )  Alb: 1.4 g/dL / Pro: 7.3 gm/dL / ALK PHOS: 85 U/L / ALT: 7 U/L / AST: 24 U/L / GGT: x                 ASSESSMENT:  Patient is a 89 year old male with PMHx as listed above, admitted for SOB, found to have PNA.  Patient with worsening AMS and hypercapnia requiring intubation.  Patient has now failed multiple attempts at extubation, Surgery team consulted for evaluation for Trach/Peg placement.      PLAN:  - Patient will need to be weened off maximum FiO2 requirements prior to OR  - Please hold AC 24 priors to procedure  - Planning for OR on Friday 12/29  - Discussed w/ Dr. Muñoz  SURGERY PA CONSULT NOTE:    CHIEF COMPLAINT:  Patient is a 89y old  Male who presents with a chief complaint of UTI (18 Dec 2023 21:13)      HPI FROM ED:  HPI:  89M w/ hx of CKD, HTN, BPH w/ chronic obrien, dementia p/w SOB. Pt poor historian so history obtained from record. Pt was brought in my home aid who reports pt c/o SOB. Denies fever/chills/sweats, URI sx, N/V/D.    In ED, pt hypothermic to 95.5 and hypotensive to 90/60s. Labs notable for Hgb 6.3 (+london), dimer 3000, Na 129, BUN/SCr 70/2.91, BNP 7966, UA+. CT CAP demonstrates mild b/l hydronephrosis and large stool burden w/ perirectal stranding c/f stercoral colitis. (01 Dec 2023 06:11)      Interval HPI:  Patient is a 89 year old male with PMHx as listed above, admitted for SOB, found to have PNA.  Patient with worsening AMS and hypercapnea requiring intubation.  Patient has now failed multiple attempts at extubation, Surgery team consulted for evaluation for Trach/Peg placement.      PAST MEDICAL HISTORY:  PAST MEDICAL & SURGICAL HISTORY:  HTN (Hypertension)      Benign Prostatic Hyperplasia      Hypertension      BPH (benign prostatic hypertrophy)      Spinal stenosis of lumbar region      Dehydration      Renal insufficiency      Hydrocele in adult  bilateral, chronic      Benign essential HTN      BPH (benign prostatic hyperplasia)      Indwelling Obrien catheter present      Dementia      Wheelchair dependent      Implantable loop recorder present      No significant past surgical history      Implantable loop recorder present          PAST SURGICAL HISTORY:    REVIEW OF SYSTEMS:  Unable to obtain due to intubation.      MEDICATIONS:  Home Medications:  acetaminophen 325 mg oral tablet: 2 tab(s) orally every 6 hours, As needed, Temp greater or equal to 38C (100.4F) (28 Mar 2023 17:16)  amLODIPine 5 mg oral tablet: 1 tab(s) orally once a day (07 Nov 2023 11:10)  amLODIPine 5 mg oral tablet: 1 tab(s) orally once a day (28 Mar 2023 17:16)  finasteride 5 mg oral tablet: 1 tab(s) orally once a day (07 Nov 2023 11:10)  gabapentin 300 mg oral capsule: 1 cap(s) orally 2 times a day (07 Nov 2023 11:10)  gabapentin 300 mg oral capsule: 1 cap(s) orally 2 times a day (28 Mar 2023 17:16)  labetalol 100 mg oral tablet: 1 tab(s) orally 2 times a day (28 Mar 2023 17:16)  labetalol 100 mg oral tablet: 1 tab(s) orally 2 times a day (07 Nov 2023 11:10)  mirtazapine 15 mg oral tablet: 1/2 tab(s) orally once a day (28 Mar 2023 17:16)  PREDNISOLONE AC 1% OPHTH SUSP  5ML: SHAKE LIQUID AND INSTILL 1 DROP IN RIGHT EYE TWICE DAILY (28 Mar 2023 17:16)  tamsulosin 0.4 mg oral capsule: 1 cap(s) orally once a day (at bedtime) (28 Mar 2023 17:16)    MEDICATIONS  (STANDING):  albuterol/ipratropium for Nebulization 3 milliLiter(s) Nebulizer every 6 hours  chlorhexidine 0.12% Liquid 15 milliLiter(s) Oral Mucosa every 12 hours  chlorhexidine 2% Cloths 1 Application(s) Topical <User Schedule>  collagenase Ointment 1 Application(s) Topical daily  dexMEDEtomidine Infusion 0.2 MICROgram(s)/kG/Hr (3.51 mL/Hr) IV Continuous <Continuous>  gabapentin 300 milliGRAM(s) Oral two times a day  heparin   Injectable 5000 Unit(s) SubCutaneous every 12 hours  levoFLOXacin IVPB 500 milliGRAM(s) IV Intermittent every 48 hours  levoFLOXacin IVPB      midazolam Injectable 4 milliGRAM(s) IV Push once  mirtazapine 15 milliGRAM(s) Oral daily  norepinephrine Infusion 0.05 MICROgram(s)/kG/Min (6.57 mL/Hr) IV Continuous <Continuous>  polyethylene glycol 3350 17 Gram(s) Oral two times a day  propofol Infusion 20 MICROgram(s)/kG/Min (8.41 mL/Hr) IV Continuous <Continuous>  senna 2 Tablet(s) Oral at bedtime  sodium chloride 3%  Inhalation 4 milliLiter(s) Inhalation every 6 hours  sodium zirconium cyclosilicate 10 Gram(s) Oral three times a day    MEDICATIONS  (PRN):  acetaminophen     Tablet .. 650 milliGRAM(s) Oral every 6 hours PRN Temp greater or equal to 38C (100.4F), Mild Pain (1 - 3)      ALLERGIES:  Allergies    No Known Allergies    Intolerances        SOCIAL HISTORY:  Social History:    Smoking: Yes [ ]  No [x]   ______pk yrs  ETOH  Yes [ ]  No [x]  Social [ ]  DRUGS:  Yes [ ]  No [x]  if so what______________    FAMILY HISTORY:  FAMILY HISTORY:  No significant family history        VITAL SIGNS:  Vital Signs Last 24 Hrs  T(C): 35.6 (27 Dec 2023 11:00), Max: 36.2 (26 Dec 2023 15:30)  T(F): 96.1 (27 Dec 2023 11:00), Max: 97.2 (26 Dec 2023 15:30)  HR: 101 (27 Dec 2023 15:02) (80 - 109)  BP: 157/73 (27 Dec 2023 15:02) (81/63 - 162/77)  BP(mean): 96 (27 Dec 2023 15:02) (62 - 100)  RR: 23 (27 Dec 2023 15:02) (0 - 29)  SpO2: 100% (27 Dec 2023 15:02) (97% - 100%)    Parameters below as of 27 Dec 2023 11:15  Patient On (Oxygen Delivery Method): nasal cannula  O2 Flow (L/min): 2      PHYSICAL EXAM:  General: No acute distress,  Chest: intubated, on mechanical ventilation   Heart: Heart rhythm regular, no murmurs  Abdomen: Soft, non tender, good bowel sounds present in all four quadrants.  No guarding, rebound, and no peritoneal signs.    Extremity: No swelling, or open sores, no gross deformities,  good range of motion, 2+ peripheral pulses bilat UE/LE, no edema,  negative Angelo's sign, no lymphadenopathy  Neuro: sedated    Skin: Good color, turgor, texture with no gross lesions    LABS:                        7.4    4.56  )-----------( 209      ( 27 Dec 2023 03:45 )             23.4     12-27    142  |  107  |  78<H>  ----------------------------<  164<H>  5.9<H>   |  30  |  2.83<H>    Ca    8.7      27 Dec 2023 03:45  Phos  4.5     12-27  Mg     2.1     12-27    TPro  7.3  /  Alb  1.4<L>  /  TBili  0.3  /  DBili  x   /  AST  24  /  ALT  7<L>  /  AlkPhos  85  12-27      Urinalysis Basic - ( 27 Dec 2023 03:45 )    Color: x / Appearance: x / SG: x / pH: x  Gluc: 164 mg/dL / Ketone: x  / Bili: x / Urobili: x   Blood: x / Protein: x / Nitrite: x   Leuk Esterase: x / RBC: x / WBC x   Sq Epi: x / Non Sq Epi: x / Bacteria: x      LIVER FUNCTIONS - ( 27 Dec 2023 03:45 )  Alb: 1.4 g/dL / Pro: 7.3 gm/dL / ALK PHOS: 85 U/L / ALT: 7 U/L / AST: 24 U/L / GGT: x                 ASSESSMENT:  Patient is a 89 year old male with PMHx as listed above, admitted for SOB, found to have PNA.  Patient with worsening AMS and hypercapnia requiring intubation.  Patient has now failed multiple attempts at extubation, Surgery team consulted for evaluation for Trach/Peg placement.      PLAN:  - Patient will need to be weened off maximum FiO2 requirements prior to OR; will need to be at least on 40%.    - Hold tube feeds starting at midnight   - Please hold AC 24 priors to procedure  - Planning for OR on Friday 12/29  - Discussed w/ Dr. Muñoz

## 2023-12-27 NOTE — PROGRESS NOTE ADULT - ASSESSMENT
89M w/ dementia, CKD, chronic urinary retention w/ obrien. Admitted w/ L pleural effusion thought to be due to mucous plugging. Course complicated by worsening hypoxemia requiring intubation and bronchoscopy x2 w/ MRSA pneumonia. Extubated on 12/24 but required re-intubation several hours later due to hypercapnia and poor mental status.     #Neuro - currently on precedex, comfortable; continue gabapentin and mirtazepine  #CV - shock state has resolved, likely sedation related/vasoplegia   #Pulm - remains intubated on minimal vent settings, continue metinebs, airway clearance to try to optimize for extubation; SBT today, likely will extubate today; family would like trach/PEG if fails extubation again  #ID- completed 7 days of vancomcyin for MRSA pneumonia w/ acceptable vanco levels; bronchoscopy cx also grew Stenotrophomonas continue levofloxacin for 10-14 day course  #Renal/metabolic - CKD likely near baseline, non-oliguric; monitor I/Os, electrolytes; d/c bumex; lokelma for mild hyperK  #GI- tolerating TF, having BM- continue bowel regimen  #Heme - s/p 1 PRBC yesterday, hgb acceptable today, transfuse for hgb <7  #Endo - monitor BG  #Skin - wound care for sacral wounds  #PPx - HSQ q12  #Dispo- remains in ICU intubated; prognosis very guarded; full code    Spoke w/ pt's son at bedside, explained that medically pt is ready for extubation. He confirmed that he if requires re-intubation they would be agreeable and would like to proceed with tracheostomy; also confirmed that pt is full code

## 2023-12-27 NOTE — PROGRESS NOTE ADULT - SUBJECTIVE AND OBJECTIVE BOX
[facility] NEPHROLOGY SERVICES, [facility]  NEPHROLOGY AND HYPERTENSION  [address]  [address]  [place] [address]  [phone]    MD SOFY KEENAN MD YELENA ROSENBERG, MD BINNY KOSHY, MD CHRISTOPHER CAPUTO, MD EDWARD BOVER, MD          Patient events noted    MEDICATIONS  (STANDING):  albuterol/ipratropium for Nebulization 3 milliLiter(s) Nebulizer every 6 hours  chlorhexidine 0.12% Liquid 15 milliLiter(s) Oral Mucosa every 12 hours  chlorhexidine 2% Cloths 1 Application(s) Topical <User Schedule>  collagenase Ointment 1 Application(s) Topical daily  dexMEDEtomidine Infusion 0.2 MICROgram(s)/kG/Hr (3.51 mL/Hr) IV Continuous <Continuous>  gabapentin 300 milliGRAM(s) Oral two times a day  heparin   Injectable 5000 Unit(s) SubCutaneous every 12 hours  levoFLOXacin IVPB 500 milliGRAM(s) IV Intermittent every 48 hours  levoFLOXacin IVPB      mirtazapine 15 milliGRAM(s) Oral daily  norepinephrine Infusion 0.05 MICROgram(s)/kG/Min (6.57 mL/Hr) IV Continuous <Continuous>  polyethylene glycol 3350 17 Gram(s) Oral two times a day  propofol Infusion 20 MICROgram(s)/kG/Min (8.41 mL/Hr) IV Continuous <Continuous>  senna 2 Tablet(s) Oral at bedtime  sodium chloride 3%  Inhalation 4 milliLiter(s) Inhalation every 6 hours  sodium zirconium cyclosilicate 10 Gram(s) Oral three times a day    MEDICATIONS  (PRN):  acetaminophen     Tablet .. 650 milliGRAM(s) Oral every 6 hours PRN Temp greater or equal to 38C (100.4F), Mild Pain (1 - 3)      12-26-23 @ 07:01  -  12-27-23 @ 07:00  --------------------------------------------------------  IN: 2468.5 mL / OUT: 600 mL / NET: 1868.5 mL    12-27-23 @ 07:01  -  12-27-23 @ 21:57  --------------------------------------------------------  IN: 781.9 mL / OUT: 405 mL / NET: 376.9 mL      PHYSICAL EXAM:      T(C): 36.5 (12-27-23 @ 21:05), Max: 36.7 (12-27-23 @ 19:30)  HR: 94 (12-27-23 @ 21:05) (80 - 109)  BP: 88/52 (12-27-23 @ 21:05) (74/54 - 162/77)  RR: 26 (12-27-23 @ 21:05) (0 - 29)  SpO2: 100% (12-27-23 @ 21:05) (97% - 100%)  Wt(kg): --  Lungs clear decreased BS L>R   Heart S1S2  Abd soft NT ND  Extremities:   tr edema                                    7.4    4.56  )-----------( 209      ( 27 Dec 2023 03:45 )             23.4     12-27    142  |  107  |  78<H>  ----------------------------<  164<H>  5.9<H>   |  30  |  2.83<H>    Ca    8.7      27 Dec 2023 03:45  Phos  4.5     12-27  Mg     2.1     12-27    TPro  7.3  /  Alb  1.4<L>  /  TBili  0.3  /  DBili  x   /  AST  24  /  ALT  7<L>  /  AlkPhos  85  12-27    ABG - ( 27 Dec 2023 16:16 )  pH, Arterial: 7.36  pH, Blood: x     /  pCO2: 57    /  pO2: 359   / HCO3: 32    / Base Excess: 5.1   /  SaO2: 99.9              LIVER FUNCTIONS - ( 27 Dec 2023 03:45 )  Alb: 1.4 g/dL / Pro: 7.3 gm/dL / ALK PHOS: 85 U/L / ALT: 7 U/L / AST: 24 U/L / GGT: x           Creatinine Trend: 2.83<--, 2.68<--, 2.37<--, 2.17<--, 2.24<--, 2.11<--  Mode: AC/ CMV (Assist Control/ Continuous Mandatory Ventilation)  RR (machine): 26  TV (machine): 400  FiO2: 40  PEEP: 5  ITime: 1  MAP: 10  PIP: 21        Assessment     Resp failure, PNA  Hemodynamic ROGER/CKD 3 (Cr 1.65 - 10/30/23)  Hyperkalemia       Plan  Abx, diuresis per ICU team  Lokelma maintenance   F/u CBC, BMP, Mg, UO, Vanco level   Avoid nephrotoxins  Supportive care  Overall prognosis is guarded     [phone]  Samy Noriega MD [facility] NEPHROLOGY SERVICES, [facility]  NEPHROLOGY AND HYPERTENSION  [address]  [address]  [place] [address]  [phone]    MD SOFY KEENAN MD YELENA ROSENBERG, MD BINNY KOSHY, MD CHRISTOPHER CAPUTO, MD EDWARD BOVER, MD          Patient events noted    MEDICATIONS  (STANDING):  albuterol/ipratropium for Nebulization 3 milliLiter(s) Nebulizer every 6 hours  chlorhexidine 0.12% Liquid 15 milliLiter(s) Oral Mucosa every 12 hours  chlorhexidine 2% Cloths 1 Application(s) Topical <User Schedule>  collagenase Ointment 1 Application(s) Topical daily  dexMEDEtomidine Infusion 0.2 MICROgram(s)/kG/Hr (3.51 mL/Hr) IV Continuous <Continuous>  gabapentin 300 milliGRAM(s) Oral two times a day  heparin   Injectable 5000 Unit(s) SubCutaneous every 12 hours  levoFLOXacin IVPB 500 milliGRAM(s) IV Intermittent every 48 hours  levoFLOXacin IVPB      mirtazapine 15 milliGRAM(s) Oral daily  norepinephrine Infusion 0.05 MICROgram(s)/kG/Min (6.57 mL/Hr) IV Continuous <Continuous>  polyethylene glycol 3350 17 Gram(s) Oral two times a day  propofol Infusion 20 MICROgram(s)/kG/Min (8.41 mL/Hr) IV Continuous <Continuous>  senna 2 Tablet(s) Oral at bedtime  sodium chloride 3%  Inhalation 4 milliLiter(s) Inhalation every 6 hours  sodium zirconium cyclosilicate 10 Gram(s) Oral three times a day    MEDICATIONS  (PRN):  acetaminophen     Tablet .. 650 milliGRAM(s) Oral every 6 hours PRN Temp greater or equal to 38C (100.4F), Mild Pain (1 - 3)      12-26-23 @ 07:01  -  12-27-23 @ 07:00  --------------------------------------------------------  IN: 2468.5 mL / OUT: 600 mL / NET: 1868.5 mL    12-27-23 @ 07:01  -  12-27-23 @ 21:57  --------------------------------------------------------  IN: 781.9 mL / OUT: 405 mL / NET: 376.9 mL      PHYSICAL EXAM:      T(C): 36.5 (12-27-23 @ 21:05), Max: 36.7 (12-27-23 @ 19:30)  HR: 94 (12-27-23 @ 21:05) (80 - 109)  BP: 88/52 (12-27-23 @ 21:05) (74/54 - 162/77)  RR: 26 (12-27-23 @ 21:05) (0 - 29)  SpO2: 100% (12-27-23 @ 21:05) (97% - 100%)  Wt(kg): --  Lungs clear decreased BS L>R   Heart S1S2  Abd soft NT ND  Extremities:   tr edema                                    7.4    4.56  )-----------( 209      ( 27 Dec 2023 03:45 )             23.4     12-27    142  |  107  |  78<H>  ----------------------------<  164<H>  5.9<H>   |  30  |  2.83<H>    Ca    8.7      27 Dec 2023 03:45  Phos  4.5     12-27  Mg     2.1     12-27    TPro  7.3  /  Alb  1.4<L>  /  TBili  0.3  /  DBili  x   /  AST  24  /  ALT  7<L>  /  AlkPhos  85  12-27    ABG - ( 27 Dec 2023 16:16 )  pH, Arterial: 7.36  pH, Blood: x     /  pCO2: 57    /  pO2: 359   / HCO3: 32    / Base Excess: 5.1   /  SaO2: 99.9              LIVER FUNCTIONS - ( 27 Dec 2023 03:45 )  Alb: 1.4 g/dL / Pro: 7.3 gm/dL / ALK PHOS: 85 U/L / ALT: 7 U/L / AST: 24 U/L / GGT: x           Creatinine Trend: 2.83<--, 2.68<--, 2.37<--, 2.17<--, 2.24<--, 2.11<--  Mode: AC/ CMV (Assist Control/ Continuous Mandatory Ventilation)  RR (machine): 26  TV (machine): 400  FiO2: 40  PEEP: 5  ITime: 1  MAP: 10  PIP: 21        Assessment     Resp failure, PNA  Hemodynamic ROGER/CKD 3 (Cr 1.65 - 10/30/23)  Hyperkalemia       Plan  Abx, diuresis per ICU team  Lokelma maintenance   F/u CBC, BMP, Mg, UO, Vanco level   Avoid nephrotoxins  Supportive care  Overall prognosis is guarded     [phone]  Samy Noriega MD

## 2023-12-27 NOTE — PROCEDURE NOTE - NSINDICATIONS_GEN_A_CORE
pleural effusion/respiratory failure
mental status change/respiratory failure
antibiotic therapy
feeds
emergency venous access
hypercapnic resp failure/airway protection/respiratory failure
airway protection/mental status change/respiratory failure

## 2023-12-27 NOTE — CONSULT NOTE ADULT - SUBJECTIVE AND OBJECTIVE BOX
SURGERY PA CONSULT NOTE:    CHIEF COMPLAINT:  Patient is a 89y old  Male who presents with a chief complaint of UTI (18 Dec 2023 21:13)      HPI FROM ED:  HPI:  89M w/ hx of CKD, HTN, BPH w/ chronic obrien, dementia p/w SOB. Pt poor historian so history obtained from record. Pt was brought in my home aid who reports pt c/o SOB. Denies fever/chills/sweats, URI sx, N/V/D.    In ED, pt hypothermic to 95.5 and hypotensive to 90/60s. Labs notable for Hgb 6.3 (+london), dimer 3000, Na 129, BUN/SCr 70/2.91, BNP 7966, UA+. CT CAP demonstrates mild b/l hydronephrosis and large stool burden w/ perirectal stranding c/f stercoral colitis. (01 Dec 2023 06:11)      Interval HPI:   Patient is a 89 year old male with PMHx as listed above, admitted for SOB, found to have PNA.  Patient with worsening AMS and hypercapnea requiring intubation.  Patient has now failed multiple attempts at extubation.  Patient found to have moderate sized sacral wound with central eschar noted.  Wound care consulted for debridement.         PAST MEDICAL HISTORY:  PAST MEDICAL & SURGICAL HISTORY:  HTN (Hypertension)      Benign Prostatic Hyperplasia      Hypertension      BPH (benign prostatic hypertrophy)      Spinal stenosis of lumbar region      Dehydration      Renal insufficiency      Hydrocele in adult  bilateral, chronic      Benign essential HTN      BPH (benign prostatic hyperplasia)      Indwelling Obrien catheter present      Dementia      Wheelchair dependent      Implantable loop recorder present      No significant past surgical history      Implantable loop recorder present          PAST SURGICAL HISTORY:    REVIEW OF SYSTEMS:  Unable to obtain due to intubation and sedation     MEDICATIONS:  Home Medications:  acetaminophen 325 mg oral tablet: 2 tab(s) orally every 6 hours, As needed, Temp greater or equal to 38C (100.4F) (28 Mar 2023 17:16)  amLODIPine 5 mg oral tablet: 1 tab(s) orally once a day (07 Nov 2023 11:10)  amLODIPine 5 mg oral tablet: 1 tab(s) orally once a day (28 Mar 2023 17:16)  finasteride 5 mg oral tablet: 1 tab(s) orally once a day (07 Nov 2023 11:10)  gabapentin 300 mg oral capsule: 1 cap(s) orally 2 times a day (07 Nov 2023 11:10)  gabapentin 300 mg oral capsule: 1 cap(s) orally 2 times a day (28 Mar 2023 17:16)  labetalol 100 mg oral tablet: 1 tab(s) orally 2 times a day (28 Mar 2023 17:16)  labetalol 100 mg oral tablet: 1 tab(s) orally 2 times a day (07 Nov 2023 11:10)  mirtazapine 15 mg oral tablet: 1/2 tab(s) orally once a day (28 Mar 2023 17:16)  PREDNISOLONE AC 1% OPHTH SUSP  5ML: SHAKE LIQUID AND INSTILL 1 DROP IN RIGHT EYE TWICE DAILY (28 Mar 2023 17:16)  tamsulosin 0.4 mg oral capsule: 1 cap(s) orally once a day (at bedtime) (28 Mar 2023 17:16)    MEDICATIONS  (STANDING):  albuterol/ipratropium for Nebulization 3 milliLiter(s) Nebulizer every 6 hours  chlorhexidine 0.12% Liquid 15 milliLiter(s) Oral Mucosa every 12 hours  chlorhexidine 2% Cloths 1 Application(s) Topical <User Schedule>  collagenase Ointment 1 Application(s) Topical daily  dexMEDEtomidine Infusion 0.2 MICROgram(s)/kG/Hr (3.51 mL/Hr) IV Continuous <Continuous>  gabapentin 300 milliGRAM(s) Oral two times a day  heparin   Injectable 5000 Unit(s) SubCutaneous every 12 hours  levoFLOXacin IVPB      levoFLOXacin IVPB 500 milliGRAM(s) IV Intermittent every 48 hours  midazolam Injectable 4 milliGRAM(s) IV Push once  mirtazapine 15 milliGRAM(s) Oral daily  norepinephrine Infusion 0.05 MICROgram(s)/kG/Min (6.57 mL/Hr) IV Continuous <Continuous>  polyethylene glycol 3350 17 Gram(s) Oral two times a day  propofol Infusion 20 MICROgram(s)/kG/Min (8.41 mL/Hr) IV Continuous <Continuous>  senna 2 Tablet(s) Oral at bedtime  sodium chloride 3%  Inhalation 4 milliLiter(s) Inhalation every 6 hours  sodium zirconium cyclosilicate 10 Gram(s) Oral three times a day    MEDICATIONS  (PRN):  acetaminophen     Tablet .. 650 milliGRAM(s) Oral every 6 hours PRN Temp greater or equal to 38C (100.4F), Mild Pain (1 - 3)      ALLERGIES:  Allergies    No Known Allergies    Intolerances        SOCIAL HISTORY:  Social History:    Smoking: Yes [ ]  No [ ]   ______pk yrs  ETOH  Yes [ ]  No [ ]  Social [ ]  DRUGS:  Yes [ ]  No [ ]  if so what______________    FAMILY HISTORY:  FAMILY HISTORY:  No significant family history        VITAL SIGNS:  Vital Signs Last 24 Hrs  T(C): 36.1 (27 Dec 2023 15:40), Max: 36.2 (26 Dec 2023 17:00)  T(F): 97 (27 Dec 2023 15:40), Max: 97.2 (26 Dec 2023 17:00)  HR: 107 (27 Dec 2023 16:32) (80 - 109)  BP: 132/75 (27 Dec 2023 15:40) (81/63 - 162/77)  BP(mean): 93 (27 Dec 2023 15:40) (62 - 100)  RR: 13 (27 Dec 2023 15:40) (0 - 29)  SpO2: 100% (27 Dec 2023 16:32) (97% - 100%)    Parameters below as of 27 Dec 2023 11:15  Patient On (Oxygen Delivery Method): nasal cannula  O2 Flow (L/min): 2      PHYSICAL EXAM:  General: No acute distress,  Chest: intubated, on mechanical ventilation   Heart: Heart rhythm regular, no murmurs  Abdomen: Soft, non tender, good bowel sounds present in all four quadrants.  No guarding, rebound, and no peritoneal signs.    Extremity: No swelling, or open sores, no gross deformities,  good range of motion, 2+ peripheral pulses bilat UE/LE, no edema,  negative Angelo's sign, no lymphadenopathy  Neuro: sedated    Skin: moderate sized wound overlying sacrum with central eschar, periwound eyrthema noted.      LABS:                        7.4    4.56  )-----------( 209      ( 27 Dec 2023 03:45 )             23.4     12-27    142  |  107  |  78<H>  ----------------------------<  164<H>  5.9<H>   |  30  |  2.83<H>    Ca    8.7      27 Dec 2023 03:45  Phos  4.5     12-27  Mg     2.1     12-27    TPro  7.3  /  Alb  1.4<L>  /  TBili  0.3  /  DBili  x   /  AST  24  /  ALT  7<L>  /  AlkPhos  85  12-27      Urinalysis Basic - ( 27 Dec 2023 03:45 )    Color: x / Appearance: x / SG: x / pH: x  Gluc: 164 mg/dL / Ketone: x  / Bili: x / Urobili: x   Blood: x / Protein: x / Nitrite: x   Leuk Esterase: x / RBC: x / WBC x   Sq Epi: x / Non Sq Epi: x / Bacteria: x      LIVER FUNCTIONS - ( 27 Dec 2023 03:45 )  Alb: 1.4 g/dL / Pro: 7.3 gm/dL / ALK PHOS: 85 U/L / ALT: 7 U/L / AST: 24 U/L / GGT: x               RADIOLOGY & ADDITIONAL STUDIES:    ASSESSMENT:  89 male with PMHx  CKD, HTN, BPH w/ chronic obrien, dementia a/w PNA now requiring multiple intubations for failure of extubation.  Wound care consulted for moderate sized unstagable sacral wound    PLAN:  - will plan for debridement this week  - Please hold AC at least 24 hours prior to debridement  - Discussed case with Dr. Slaughter  SURGERY PA CONSULT NOTE:    CHIEF COMPLAINT:  Patient is a 89y old  Male who presents with a chief complaint of UTI (18 Dec 2023 21:13)      HPI FROM ED:  HPI:  89M w/ hx of CKD, HTN, BPH w/ chronic obrien, dementia p/w SOB. Pt poor historian so history obtained from record. Pt was brought in my home aid who reports pt c/o SOB. Denies fever/chills/sweats, URI sx, N/V/D.    In ED, pt hypothermic to 95.5 and hypotensive to 90/60s. Labs notable for Hgb 6.3 (+london), dimer 3000, Na 129, BUN/SCr 70/2.91, BNP 7966, UA+. CT CAP demonstrates mild b/l hydronephrosis and large stool burden w/ perirectal stranding c/f stercoral colitis. (01 Dec 2023 06:11)      Interval HPI:   Patient is a 89 year old male with PMHx as listed above, admitted for SOB, found to have PNA.  Patient with worsening AMS and hypercapnea requiring intubation.  Patient has now failed multiple attempts at extubation.  Patient found to have moderate sized sacral wound with central eschar noted.  Wound care consulted for debridement.         PAST MEDICAL HISTORY:  PAST MEDICAL & SURGICAL HISTORY:  HTN (Hypertension)      Benign Prostatic Hyperplasia      Hypertension      BPH (benign prostatic hypertrophy)      Spinal stenosis of lumbar region      Dehydration      Renal insufficiency      Hydrocele in adult  bilateral, chronic      Benign essential HTN      BPH (benign prostatic hyperplasia)      Indwelling Obrien catheter present      Dementia      Wheelchair dependent      Implantable loop recorder present      No significant past surgical history      Implantable loop recorder present          PAST SURGICAL HISTORY:    REVIEW OF SYSTEMS:  Unable to obtain due to intubation and sedation     MEDICATIONS:  Home Medications:  acetaminophen 325 mg oral tablet: 2 tab(s) orally every 6 hours, As needed, Temp greater or equal to 38C (100.4F) (28 Mar 2023 17:16)  amLODIPine 5 mg oral tablet: 1 tab(s) orally once a day (07 Nov 2023 11:10)  amLODIPine 5 mg oral tablet: 1 tab(s) orally once a day (28 Mar 2023 17:16)  finasteride 5 mg oral tablet: 1 tab(s) orally once a day (07 Nov 2023 11:10)  gabapentin 300 mg oral capsule: 1 cap(s) orally 2 times a day (07 Nov 2023 11:10)  gabapentin 300 mg oral capsule: 1 cap(s) orally 2 times a day (28 Mar 2023 17:16)  labetalol 100 mg oral tablet: 1 tab(s) orally 2 times a day (28 Mar 2023 17:16)  labetalol 100 mg oral tablet: 1 tab(s) orally 2 times a day (07 Nov 2023 11:10)  mirtazapine 15 mg oral tablet: 1/2 tab(s) orally once a day (28 Mar 2023 17:16)  PREDNISOLONE AC 1% OPHTH SUSP  5ML: SHAKE LIQUID AND INSTILL 1 DROP IN RIGHT EYE TWICE DAILY (28 Mar 2023 17:16)  tamsulosin 0.4 mg oral capsule: 1 cap(s) orally once a day (at bedtime) (28 Mar 2023 17:16)    MEDICATIONS  (STANDING):  albuterol/ipratropium for Nebulization 3 milliLiter(s) Nebulizer every 6 hours  chlorhexidine 0.12% Liquid 15 milliLiter(s) Oral Mucosa every 12 hours  chlorhexidine 2% Cloths 1 Application(s) Topical <User Schedule>  collagenase Ointment 1 Application(s) Topical daily  dexMEDEtomidine Infusion 0.2 MICROgram(s)/kG/Hr (3.51 mL/Hr) IV Continuous <Continuous>  gabapentin 300 milliGRAM(s) Oral two times a day  heparin   Injectable 5000 Unit(s) SubCutaneous every 12 hours  levoFLOXacin IVPB      levoFLOXacin IVPB 500 milliGRAM(s) IV Intermittent every 48 hours  midazolam Injectable 4 milliGRAM(s) IV Push once  mirtazapine 15 milliGRAM(s) Oral daily  norepinephrine Infusion 0.05 MICROgram(s)/kG/Min (6.57 mL/Hr) IV Continuous <Continuous>  polyethylene glycol 3350 17 Gram(s) Oral two times a day  propofol Infusion 20 MICROgram(s)/kG/Min (8.41 mL/Hr) IV Continuous <Continuous>  senna 2 Tablet(s) Oral at bedtime  sodium chloride 3%  Inhalation 4 milliLiter(s) Inhalation every 6 hours  sodium zirconium cyclosilicate 10 Gram(s) Oral three times a day    MEDICATIONS  (PRN):  acetaminophen     Tablet .. 650 milliGRAM(s) Oral every 6 hours PRN Temp greater or equal to 38C (100.4F), Mild Pain (1 - 3)      ALLERGIES:  Allergies    No Known Allergies    Intolerances        SOCIAL HISTORY:  Social History:    Smoking: Yes [ ]  No [ ]   ______pk yrs  ETOH  Yes [ ]  No [ ]  Social [ ]  DRUGS:  Yes [ ]  No [ ]  if so what______________    FAMILY HISTORY:  FAMILY HISTORY:  No significant family history        VITAL SIGNS:  Vital Signs Last 24 Hrs  T(C): 36.1 (27 Dec 2023 15:40), Max: 36.2 (26 Dec 2023 17:00)  T(F): 97 (27 Dec 2023 15:40), Max: 97.2 (26 Dec 2023 17:00)  HR: 107 (27 Dec 2023 16:32) (80 - 109)  BP: 132/75 (27 Dec 2023 15:40) (81/63 - 162/77)  BP(mean): 93 (27 Dec 2023 15:40) (62 - 100)  RR: 13 (27 Dec 2023 15:40) (0 - 29)  SpO2: 100% (27 Dec 2023 16:32) (97% - 100%)    Parameters below as of 27 Dec 2023 11:15  Patient On (Oxygen Delivery Method): nasal cannula  O2 Flow (L/min): 2      PHYSICAL EXAM:  General: No acute distress,  Chest: intubated, on mechanical ventilation   Heart: Heart rhythm regular, no murmurs  Abdomen: Soft, non tender, good bowel sounds present in all four quadrants.  No guarding, rebound, and no peritoneal signs.    Extremity: No swelling, or open sores, no gross deformities,  good range of motion, 2+ peripheral pulses bilat UE/LE, no edema,  negative Angelo's sign, no lymphadenopathy  Neuro: sedated    Skin: moderate sized wound overlying sacrum with central eschar, periwound eyrthema noted.      LABS:                        7.4    4.56  )-----------( 209      ( 27 Dec 2023 03:45 )             23.4     12-27    142  |  107  |  78<H>  ----------------------------<  164<H>  5.9<H>   |  30  |  2.83<H>    Ca    8.7      27 Dec 2023 03:45  Phos  4.5     12-27  Mg     2.1     12-27    TPro  7.3  /  Alb  1.4<L>  /  TBili  0.3  /  DBili  x   /  AST  24  /  ALT  7<L>  /  AlkPhos  85  12-27      Urinalysis Basic - ( 27 Dec 2023 03:45 )    Color: x / Appearance: x / SG: x / pH: x  Gluc: 164 mg/dL / Ketone: x  / Bili: x / Urobili: x   Blood: x / Protein: x / Nitrite: x   Leuk Esterase: x / RBC: x / WBC x   Sq Epi: x / Non Sq Epi: x / Bacteria: x      LIVER FUNCTIONS - ( 27 Dec 2023 03:45 )  Alb: 1.4 g/dL / Pro: 7.3 gm/dL / ALK PHOS: 85 U/L / ALT: 7 U/L / AST: 24 U/L / GGT: x               RADIOLOGY & ADDITIONAL STUDIES:    ASSESSMENT:  89 male with PMHx  CKD, HTN, BPH w/ chronic obrien, dementia a/w PNA now requiring multiple intubations for failure of extubation.  Wound care consulted for moderate sized unstageable left gluteal pressure ulcer.  -   - Discussed case with Dr. Slaughter

## 2023-12-27 NOTE — PROVIDER CONTACT NOTE (EICU) - ACTION/TREATMENT ORDERED:
phenylephrine 200 mcg x1
Mechanical vent AC//22/5/100%  CXR for ETT placement
Orders placed for CXR to confirm ETT and Levophed and Versed 4mg x 1. Further plan as per primary team.

## 2023-12-27 NOTE — PROCEDURE NOTE - PROCEDURE DATE TIME, MLM
26-Dec-2023 06:00
16-Dec-2023 14:50
27-Dec-2023 15:38
16-Dec-2023 16:57
24-Dec-2023 19:16
24-Dec-2023 19:24
07-Dec-2023 18:00
16-Dec-2023 14:40

## 2023-12-27 NOTE — PROCEDURE NOTE - NSBRONCHPROCDETAILS_GEN_A_CORE_FT
Bronchoscopy consent obtained from sonSamy. R bronchi clean with no secretions. Left side with copious mucous plugging.  Cleared out. Samples sent. Post-bronchoscopy CXR done with mild improvement in aeration. 
Indication: L lung atelectasis    Pre-op Dx:    History:    Preop medication:    Xray Findings:    Findings:  Bronchoscope inserted through ETT. ETT noted to be in good position. Airway evaluation revealed Sharp Mikayla. PEREZ and LLL evaluation revealed copious amounts of secretion thick in L mainstem with friable and erythematous airways.  BAL obtained from LLL sent for culture.    RUL, RML, RLL revealed scant thinner secretions noted. Bronchoscope then withdrawn from ETT. Minimal bleeding noted    Specimens: BAL obtained from LLL
Disposable bronchoscope passed through the ETT. ETT in correct position. Large mucous plug seen at entrance to LMB, which was suctioned. Remainder of tracheobronchial tree examined. Minimal secretions noted. More secretions noted in PEREZ which was suctioned out. Pt tolerated procedure without complications.

## 2023-12-28 NOTE — PROVIDER CONTACT NOTE (EICU) - BACKGROUND
89 year old scheduled for trach and peg in AM
MRSA PNA
89 year old male w/ PMHx of dementia, CKD, chronic urinary retention w/ obrien. Admitted w/ L pleural effusion thought to be due to mucous plugging. Course complicated by worsening hypoxemia requiring intubation and bronchoscopy x2 w/ MRSA pneumonia. Extubated on 12/24 but required re-intubation several hours later due to hypercapnia and poor mental status.

## 2023-12-28 NOTE — PROGRESS NOTE ADULT - ASSESSMENT
89M w/ dementia, CKD, chronic urinary retention w/ obrien. Admitted w/ L pleural effusion thought to be due to mucous plugging. Course complicated by worsening hypoxemia requiring intubation and bronchoscopy x2 w/ MRSA pneumonia. Extubated on 12/24 but required re-intubation. Extubated again yesterday but failed due to hypercapnia and hypoxia    #Neuro - sedated w/ propofol and precedex, turn off propofol; continue gabapentin and mirtazepine  #CV - shock state has resolved, likely sedation related/vasoplegia   #Pulm - pt again re-intubated for hypoxia and hypercapnia, back on minimal vent settings; secretions have improved and on bronchoscopy yesterday minimal sputum, d/c metinebs and hypersal, continue duoenbs and chest PT; for trach/PEG  #ID- completed 7 days of vancomcyin for MRSA pneumonia w/ acceptable vanco levels; bronchoscopy cx also grew Stenotrophomonas continue levofloxacin for 10-14 day course  #Renal/metabolic - ROGER on CKD, likely in setting of hypotension jenaro-intubation; monitor I/Os, electrolytes; lokelma for mild hyperK  #GI- tolerating TF, having BM- continue bowel regimen  #Heme - stable anemia, transfuse for hgb <7  #Endo - monitor BG  #Skin - wound care for sacral wounds  #PPx - HSQ q12  #Dispo- remains in ICU intubated; prognosis very guarded; full code, awaiting trach/PEG

## 2023-12-28 NOTE — PROGRESS NOTE ADULT - SUBJECTIVE AND OBJECTIVE BOX
CHIEF COMPLAINT:    Interval Events:    REVIEW OF SYSTEMS:  Constitutional: [ ] fevers [ ] chills [ ] weight loss [ ] weight gain  HEENT: [ ] dry eyes [ ] eye irritation [ ] postnasal drip [ ] nasal congestion  CV: [ ] chest pain [ ] orthopnea [ ] palpitations [ ] murmur  Resp: [ ] cough [ ] shortness of breath [ ] dyspnea [ ] wheezing [ ] sputum [ ] hemoptysis  GI: [ ] nausea [ ] vomiting [ ] diarrhea [ ] constipation [ ] abd pain [ ] dysphagia   : [ ] dysuria [ ] nocturia [ ] hematuria [ ] increased urinary frequency  Musculoskeletal: [ ] back pain [ ] myalgias [ ] arthralgias [ ] fracture  Skin: [ ] rash [ ] itch  Neurological: [ ] headache [ ] dizziness [ ] syncope [ ] weakness [ ] numbness  Hematologic/Lymphatic: [ ] anemia [ ] bleeding problem  Allergic/Immunologic: [ ] itchy eyes [ ] nasal discharge [ ] hives [ ] angioedema  [ ] All other systems negative  [ ] Unable to assess ROS because ________    OBJECTIVE:  ICU Vital Signs Last 24 Hrs  T(C): 36 (28 Dec 2023 07:00), Max: 36.7 (27 Dec 2023 19:30)  T(F): 96.8 (28 Dec 2023 07:00), Max: 98.1 (27 Dec 2023 19:30)  HR: 88 (28 Dec 2023 07:00) (75 - 109)  BP: 143/81 (28 Dec 2023 07:00) (74/54 - 162/77)  BP(mean): 96 (28 Dec 2023 07:00) (59 - 108)  ABP: --  ABP(mean): --  RR: 31 (28 Dec 2023 07:00) (10 - 31)  SpO2: 100% (28 Dec 2023 07:00) (99% - 100%)    O2 Parameters below as of 28 Dec 2023 06:27  Patient On (Oxygen Delivery Method): ventilator          Mode: AC/ CMV (Assist Control/ Continuous Mandatory Ventilation), RR (machine): 26, TV (machine): 400, FiO2: 40, PEEP: 5, ITime: 1, MAP: 9, PIP: 25    12-27 @ 07:01  -  12-28 @ 07:00  --------------------------------------------------------  IN: 1257.3 mL / OUT: 705 mL / NET: 552.3 mL      CAPILLARY BLOOD GLUCOSE          PHYSICAL EXAM:  General:   HEENT:   Neck:   Respiratory:   Cardiovascular:   Abdomen:   Extremities:   Skin:   Neurological:  Psychiatry:    LINES:    HOSPITAL MEDICATIONS:  MEDICATIONS  (STANDING):  albuterol/ipratropium for Nebulization 3 milliLiter(s) Nebulizer every 6 hours  chlorhexidine 0.12% Liquid 15 milliLiter(s) Oral Mucosa every 12 hours  chlorhexidine 2% Cloths 1 Application(s) Topical <User Schedule>  collagenase Ointment 1 Application(s) Topical daily  dexMEDEtomidine Infusion 0.2 MICROgram(s)/kG/Hr (3.51 mL/Hr) IV Continuous <Continuous>  gabapentin 300 milliGRAM(s) Oral two times a day  heparin   Injectable 5000 Unit(s) SubCutaneous every 12 hours  levoFLOXacin IVPB      levoFLOXacin IVPB 500 milliGRAM(s) IV Intermittent every 48 hours  mirtazapine 15 milliGRAM(s) Oral daily  norepinephrine Infusion 0.05 MICROgram(s)/kG/Min (6.57 mL/Hr) IV Continuous <Continuous>  polyethylene glycol 3350 17 Gram(s) Oral two times a day  propofol Infusion 20 MICROgram(s)/kG/Min (8.41 mL/Hr) IV Continuous <Continuous>  senna 2 Tablet(s) Oral at bedtime  sodium chloride 3%  Inhalation 4 milliLiter(s) Inhalation every 6 hours    MEDICATIONS  (PRN):  acetaminophen     Tablet .. 650 milliGRAM(s) Oral every 6 hours PRN Temp greater or equal to 38C (100.4F), Mild Pain (1 - 3)      LABS:                        7.4    5.14  )-----------( 201      ( 28 Dec 2023 04:00 )             22.8     Hgb Trend: 7.4<--, 7.4<--, 6.7<--, 6.9<--, 7.5<--  12-28    140  |  105  |  85<H>  ----------------------------<  85  5.5<H>   |  30  |  3.14<H>    Ca    8.8      28 Dec 2023 04:00  Phos  4.7     12-28  Mg     2.2     12-28    TPro  7.4  /  Alb  1.5<L>  /  TBili  0.3  /  DBili  x   /  AST  30  /  ALT  10<L>  /  AlkPhos  72  12-28    PT/INR - ( 28 Dec 2023 04:00 )   PT: 12.2 sec;   INR: 1.02 ratio         PTT - ( 28 Dec 2023 04:00 )  PTT:35.9 sec  Urinalysis Basic - ( 28 Dec 2023 04:00 )    Color: x / Appearance: x / SG: x / pH: x  Gluc: 85 mg/dL / Ketone: x  / Bili: x / Urobili: x   Blood: x / Protein: x / Nitrite: x   Leuk Esterase: x / RBC: x / WBC x   Sq Epi: x / Non Sq Epi: x / Bacteria: x      Arterial Blood Gas:  12-27 @ 16:16  7.36/57/359/32/99.9/5.1  ABG lactate: --  Arterial Blood Gas:  12-27 @ 14:06  7.09/>112/91/36/95.0/4.3  ABG lactate: --        MICROBIOLOGY:     RADIOLOGY:  [ ] Reviewed and interpreted by me CHIEF COMPLAINT:    Interval Events:  extubated but re-intubated shortly thereafter for hypercapnia and hypoxia   called surgery for trach/PEG    REVIEW OF SYSTEMS:  [x ] Unable to assess ROS because intubated/sedated    OBJECTIVE:  ICU Vital Signs Last 24 Hrs  T(C): 36 (28 Dec 2023 07:00), Max: 36.7 (27 Dec 2023 19:30)  T(F): 96.8 (28 Dec 2023 07:00), Max: 98.1 (27 Dec 2023 19:30)  HR: 88 (28 Dec 2023 07:00) (75 - 109)  BP: 143/81 (28 Dec 2023 07:00) (74/54 - 162/77)  BP(mean): 96 (28 Dec 2023 07:00) (59 - 108)  ABP: --  ABP(mean): --  RR: 31 (28 Dec 2023 07:00) (10 - 31)  SpO2: 100% (28 Dec 2023 07:00) (99% - 100%)    O2 Parameters below as of 28 Dec 2023 06:27  Patient On (Oxygen Delivery Method): ventilator          Mode: AC/ CMV (Assist Control/ Continuous Mandatory Ventilation), RR (machine): 26, TV (machine): 400, FiO2: 40, PEEP: 5, ITime: 1, MAP: 9, PIP: 25    12-27 @ 07:01  -  12-28 @ 07:00  --------------------------------------------------------  IN: 1257.3 mL / OUT: 705 mL / NET: 552.3 mL      CAPILLARY BLOOD GLUCOSE          PHYSICAL EXAM:  General: NAD, chronically ill appearing  HEENT: ETT and NGT in place  Neck: supple  Respiratory: mechanical BS  Cardiovascular: s1s2 RRR  Abdomen: soft, non tender, non distended  Extremities: warm, no edema or clubbing   Skin: L ischium stage 3, sacrum stage 2  Neurological: unable to assess  Psychiatry: unable to assess    LINES:  [facility]    HOSPITAL MEDICATIONS:  MEDICATIONS  (STANDING):  albuterol/ipratropium for Nebulization 3 milliLiter(s) Nebulizer every 6 hours  chlorhexidine 0.12% Liquid 15 milliLiter(s) Oral Mucosa every 12 hours  chlorhexidine 2% Cloths 1 Application(s) Topical <User Schedule>  collagenase Ointment 1 Application(s) Topical daily  dexMEDEtomidine Infusion 0.2 MICROgram(s)/kG/Hr (3.51 mL/Hr) IV Continuous <Continuous>  gabapentin 300 milliGRAM(s) Oral two times a day  heparin   Injectable 5000 Unit(s) SubCutaneous every 12 hours  levoFLOXacin IVPB      levoFLOXacin IVPB 500 milliGRAM(s) IV Intermittent every 48 hours  mirtazapine 15 milliGRAM(s) Oral daily  norepinephrine Infusion 0.05 MICROgram(s)/kG/Min (6.57 mL/Hr) IV Continuous <Continuous>  polyethylene glycol 3350 17 Gram(s) Oral two times a day  propofol Infusion 20 MICROgram(s)/kG/Min (8.41 mL/Hr) IV Continuous <Continuous>  senna 2 Tablet(s) Oral at bedtime  sodium chloride 3%  Inhalation 4 milliLiter(s) Inhalation every 6 hours    MEDICATIONS  (PRN):  acetaminophen     Tablet .. 650 milliGRAM(s) Oral every 6 hours PRN Temp greater or equal to 38C (100.4F), Mild Pain (1 - 3)      LABS:                        7.4    5.14  )-----------( 201      ( 28 Dec 2023 04:00 )             22.8     Hgb Trend: 7.4<--, 7.4<--, 6.7<--, 6.9<--, 7.5<--  12-28    140  |  105  |  85<H>  ----------------------------<  85  5.5<H>   |  30  |  3.14<H>    Ca    8.8      28 Dec 2023 04:00  Phos  4.7     12-28  Mg     2.2     12-28    TPro  7.4  /  Alb  1.5<L>  /  TBili  0.3  /  DBili  x   /  AST  30  /  ALT  10<L>  /  AlkPhos  72  12-28    PT/INR - ( 28 Dec 2023 04:00 )   PT: 12.2 sec;   INR: 1.02 ratio         PTT - ( 28 Dec 2023 04:00 )  PTT:35.9 sec  Urinalysis Basic - ( 28 Dec 2023 04:00 )    Color: x / Appearance: x / SG: x / pH: x  Gluc: 85 mg/dL / Ketone: x  / Bili: x / Urobili: x   Blood: x / Protein: x / Nitrite: x   Leuk Esterase: x / RBC: x / WBC x   Sq Epi: x / Non Sq Epi: x / Bacteria: x      Arterial Blood Gas:  12-27 @ 16:16  7.36/57/359/32/99.9/5.1  ABG lactate: --  Arterial Blood Gas:  12-27 @ 14:06  7.09/>112/91/36/95.0/4.3  ABG lactate: --        MICROBIOLOGY:     RADIOLOGY:  [ ] Reviewed and interpreted by me CHIEF COMPLAINT:    Interval Events:  extubated but re-intubated shortly thereafter for hypercapnia and hypoxia   called surgery for trach/PEG    REVIEW OF SYSTEMS:  [x ] Unable to assess ROS because intubated/sedated    OBJECTIVE:  ICU Vital Signs Last 24 Hrs  T(C): 36 (28 Dec 2023 07:00), Max: 36.7 (27 Dec 2023 19:30)  T(F): 96.8 (28 Dec 2023 07:00), Max: 98.1 (27 Dec 2023 19:30)  HR: 88 (28 Dec 2023 07:00) (75 - 109)  BP: 143/81 (28 Dec 2023 07:00) (74/54 - 162/77)  BP(mean): 96 (28 Dec 2023 07:00) (59 - 108)  ABP: --  ABP(mean): --  RR: 31 (28 Dec 2023 07:00) (10 - 31)  SpO2: 100% (28 Dec 2023 07:00) (99% - 100%)    O2 Parameters below as of 28 Dec 2023 06:27  Patient On (Oxygen Delivery Method): ventilator          Mode: AC/ CMV (Assist Control/ Continuous Mandatory Ventilation), RR (machine): 26, TV (machine): 400, FiO2: 40, PEEP: 5, ITime: 1, MAP: 9, PIP: 25    12-27 @ 07:01  -  12-28 @ 07:00  --------------------------------------------------------  IN: 1257.3 mL / OUT: 705 mL / NET: 552.3 mL      CAPILLARY BLOOD GLUCOSE          PHYSICAL EXAM:  General: NAD, chronically ill appearing  HEENT: ETT and NGT in place  Neck: supple  Respiratory: mechanical BS  Cardiovascular: s1s2 RRR  Abdomen: soft, non tender, non distended  Extremities: warm, no edema or clubbing   Skin: L ischium stage 3, sacrum stage 2  Neurological: unable to assess  Psychiatry: unable to assess    LINES:  [facility]    HOSPITAL MEDICATIONS:  MEDICATIONS  (STANDING):  albuterol/ipratropium for Nebulization 3 milliLiter(s) Nebulizer every 6 hours  chlorhexidine 0.12% Liquid 15 milliLiter(s) Oral Mucosa every 12 hours  chlorhexidine 2% Cloths 1 Application(s) Topical <User Schedule>  collagenase Ointment 1 Application(s) Topical daily  dexMEDEtomidine Infusion 0.2 MICROgram(s)/kG/Hr (3.51 mL/Hr) IV Continuous <Continuous>  gabapentin 300 milliGRAM(s) Oral two times a day  heparin   Injectable 5000 Unit(s) SubCutaneous every 12 hours  levoFLOXacin IVPB      levoFLOXacin IVPB 500 milliGRAM(s) IV Intermittent every 48 hours  mirtazapine 15 milliGRAM(s) Oral daily  norepinephrine Infusion 0.05 MICROgram(s)/kG/Min (6.57 mL/Hr) IV Continuous <Continuous>  polyethylene glycol 3350 17 Gram(s) Oral two times a day  propofol Infusion 20 MICROgram(s)/kG/Min (8.41 mL/Hr) IV Continuous <Continuous>  senna 2 Tablet(s) Oral at bedtime  sodium chloride 3%  Inhalation 4 milliLiter(s) Inhalation every 6 hours    MEDICATIONS  (PRN):  acetaminophen     Tablet .. 650 milliGRAM(s) Oral every 6 hours PRN Temp greater or equal to 38C (100.4F), Mild Pain (1 - 3)      LABS:                        7.4    5.14  )-----------( 201      ( 28 Dec 2023 04:00 )             22.8     Hgb Trend: 7.4<--, 7.4<--, 6.7<--, 6.9<--, 7.5<--  12-28    140  |  105  |  85<H>  ----------------------------<  85  5.5<H>   |  30  |  3.14<H>    Ca    8.8      28 Dec 2023 04:00  Phos  4.7     12-28  Mg     2.2     12-28    TPro  7.4  /  Alb  1.5<L>  /  TBili  0.3  /  DBili  x   /  AST  30  /  ALT  10<L>  /  AlkPhos  72  12-28    PT/INR - ( 28 Dec 2023 04:00 )   PT: 12.2 sec;   INR: 1.02 ratio         PTT - ( 28 Dec 2023 04:00 )  PTT:35.9 sec  Urinalysis Basic - ( 28 Dec 2023 04:00 )    Color: x / Appearance: x / SG: x / pH: x  Gluc: 85 mg/dL / Ketone: x  / Bili: x / Urobili: x   Blood: x / Protein: x / Nitrite: x   Leuk Esterase: x / RBC: x / WBC x   Sq Epi: x / Non Sq Epi: x / Bacteria: x      Arterial Blood Gas:  12-27 @ 16:16  7.36/57/359/32/99.9/5.1  ABG lactate: --  Arterial Blood Gas:  12-27 @ 14:06  7.09/>112/91/36/95.0/4.3  ABG lactate: --        MICROBIOLOGY:     RADIOLOGY:  [ ] Reviewed and interpreted by me

## 2023-12-28 NOTE — PROGRESS NOTE ADULT - SUBJECTIVE AND OBJECTIVE BOX
[facility] NEPHROLOGY SERVICES, [facility]  NEPHROLOGY AND HYPERTENSION  [address]  [address]  [place] [address]  [phone]    MD SOFY KEENAN MD YELENA ROSENBERG, MD BINNY KOSHY, MD CHRISTOPHER CAPUTO, MD EDWARD BOVER, MD          Patient events noted    MEDICATIONS  (STANDING):  albuterol/ipratropium for Nebulization 3 milliLiter(s) Nebulizer every 6 hours  chlorhexidine 0.12% Liquid 15 milliLiter(s) Oral Mucosa every 12 hours  chlorhexidine 2% Cloths 1 Application(s) Topical <User Schedule>  collagenase Ointment 1 Application(s) Topical daily  dexMEDEtomidine Infusion 0.2 MICROgram(s)/kG/Hr (3.51 mL/Hr) IV Continuous <Continuous>  gabapentin 300 milliGRAM(s) Oral two times a day  heparin   Injectable 5000 Unit(s) SubCutaneous every 12 hours  levoFLOXacin IVPB 500 milliGRAM(s) IV Intermittent every 48 hours  levoFLOXacin IVPB      mirtazapine 15 milliGRAM(s) Oral daily  polyethylene glycol 3350 17 Gram(s) Oral two times a day  senna 2 Tablet(s) Oral at bedtime  sodium zirconium cyclosilicate 10 Gram(s) Oral two times a day    MEDICATIONS  (PRN):  acetaminophen     Tablet .. 650 milliGRAM(s) Oral every 6 hours PRN Temp greater or equal to 38C (100.4F), Mild Pain (1 - 3)      12-27-23 @ 07:01  -  12-28-23 @ 07:00  --------------------------------------------------------  IN: 1257.3 mL / OUT: 735 mL / NET: 522.3 mL    12-28-23 @ 07:01  -  12-28-23 @ 15:40  --------------------------------------------------------  IN: 107.2 mL / OUT: 215 mL / NET: -107.8 mL      PHYSICAL EXAM:      T(C): 36 (12-28-23 @ 13:00), Max: 36.7 (12-27-23 @ 19:30)  HR: 78 (12-28-23 @ 13:48) (75 - 108)  BP: 136/74 (12-28-23 @ 13:00) (74/54 - 145/85)  RR: 0 (12-28-23 @ 13:00) (0 - 26)  SpO2: 100% (12-28-23 @ 13:48) (98% - 100%)  Wt(kg): --  Lungs decreased BS left base  Heart S1S2  Abd soft NT ND  Extremities:   tr edema                                    7.4    5.14  )-----------( 201      ( 28 Dec 2023 04:00 )             22.8     12-28    140  |  105  |  85<H>  ----------------------------<  85  5.5<H>   |  30  |  3.14<H>    Ca    8.8      28 Dec 2023 04:00  Phos  4.7     12-28  Mg     2.2     12-28    TPro  7.4  /  Alb  1.5<L>  /  TBili  0.3  /  DBili  x   /  AST  30  /  ALT  10<L>  /  AlkPhos  72  12-28    ABG - ( 27 Dec 2023 16:16 )  pH, Arterial: 7.36  pH, Blood: x     /  pCO2: 57    /  pO2: 359   / HCO3: 32    / Base Excess: 5.1   /  SaO2: 99.9              LIVER FUNCTIONS - ( 28 Dec 2023 04:00 )  Alb: 1.5 g/dL / Pro: 7.4 gm/dL / ALK PHOS: 72 U/L / ALT: 10 U/L / AST: 30 U/L / GGT: x           Creatinine Trend: 3.14<--, 2.83<--, 2.68<--, 2.37<--, 2.17<--, 2.24<--  Mode: AC/ CMV (Assist Control/ Continuous Mandatory Ventilation)  RR (machine): 26  TV (machine): 400  FiO2: 40  PEEP: 5  ITime: 1  MAP: 10  PIP: 26      Assessment     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 [facility] NEPHROLOGY SERVICES, [facility]  NEPHROLOGY AND HYPERTENSION  [address]  [address]  [place] [address]  [phone]    MD SOFY KEENAN MD YELENA ROSENBERG, MD BINNY KOSHY, MD CHRISTOPHER CAPUTO, MD EDWARD BOVER, MD          Patient events noted    MEDICATIONS  (STANDING):  albuterol/ipratropium for Nebulization 3 milliLiter(s) Nebulizer every 6 hours  chlorhexidine 0.12% Liquid 15 milliLiter(s) Oral Mucosa every 12 hours  chlorhexidine 2% Cloths 1 Application(s) Topical <User Schedule>  collagenase Ointment 1 Application(s) Topical daily  dexMEDEtomidine Infusion 0.2 MICROgram(s)/kG/Hr (3.51 mL/Hr) IV Continuous <Continuous>  gabapentin 300 milliGRAM(s) Oral two times a day  heparin   Injectable 5000 Unit(s) SubCutaneous every 12 hours  levoFLOXacin IVPB 500 milliGRAM(s) IV Intermittent every 48 hours  levoFLOXacin IVPB      mirtazapine 15 milliGRAM(s) Oral daily  polyethylene glycol 3350 17 Gram(s) Oral two times a day  senna 2 Tablet(s) Oral at bedtime  sodium zirconium cyclosilicate 10 Gram(s) Oral two times a day    MEDICATIONS  (PRN):  acetaminophen     Tablet .. 650 milliGRAM(s) Oral every 6 hours PRN Temp greater or equal to 38C (100.4F), Mild Pain (1 - 3)      12-27-23 @ 07:01  -  12-28-23 @ 07:00  --------------------------------------------------------  IN: 1257.3 mL / OUT: 735 mL / NET: 522.3 mL    12-28-23 @ 07:01  -  12-28-23 @ 15:40  --------------------------------------------------------  IN: 107.2 mL / OUT: 215 mL / NET: -107.8 mL      PHYSICAL EXAM:      T(C): 36 (12-28-23 @ 13:00), Max: 36.7 (12-27-23 @ 19:30)  HR: 78 (12-28-23 @ 13:48) (75 - 108)  BP: 136/74 (12-28-23 @ 13:00) (74/54 - 145/85)  RR: 0 (12-28-23 @ 13:00) (0 - 26)  SpO2: 100% (12-28-23 @ 13:48) (98% - 100%)  Wt(kg): --  Lungs decreased BS left base  Heart S1S2  Abd soft NT ND  Extremities:   tr edema                                    7.4    5.14  )-----------( 201      ( 28 Dec 2023 04:00 )             22.8     12-28    140  |  105  |  85<H>  ----------------------------<  85  5.5<H>   |  30  |  3.14<H>    Ca    8.8      28 Dec 2023 04:00  Phos  4.7     12-28  Mg     2.2     12-28    TPro  7.4  /  Alb  1.5<L>  /  TBili  0.3  /  DBili  x   /  AST  30  /  ALT  10<L>  /  AlkPhos  72  12-28    ABG - ( 27 Dec 2023 16:16 )  pH, Arterial: 7.36  pH, Blood: x     /  pCO2: 57    /  pO2: 359   / HCO3: 32    / Base Excess: 5.1   /  SaO2: 99.9              LIVER FUNCTIONS - ( 28 Dec 2023 04:00 )  Alb: 1.5 g/dL / Pro: 7.4 gm/dL / ALK PHOS: 72 U/L / ALT: 10 U/L / AST: 30 U/L / GGT: x           Creatinine Trend: 3.14<--, 2.83<--, 2.68<--, 2.37<--, 2.17<--, 2.24<--  Mode: AC/ CMV (Assist Control/ Continuous Mandatory Ventilation)  RR (machine): 26  TV (machine): 400  FiO2: 40  PEEP: 5  ITime: 1  MAP: 10  PIP: 26      Assessment     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

## 2023-12-29 NOTE — BRIEF OPERATIVE NOTE - NSICDXBRIEFPROCEDURE_GEN_ALL_CORE_FT
PROCEDURES:  EGD, with PEG 29-Dec-2023 15:25:21  Reyes, Madeleine  Open tracheostomy 29-Dec-2023 15:25:32  Reyes, Madeleine

## 2023-12-29 NOTE — PROGRESS NOTE ADULT - SUBJECTIVE AND OBJECTIVE BOX
[facility] NEPHROLOGY SERVICES, [facility]  NEPHROLOGY AND HYPERTENSION  [address]  [address]  [place] [address]  [phone]    MD SOFY KEENAN, MD JIGNESH MCCARTY, MD ELSA RAMIREZ, MD LEANNE MURRYA MD          Patient events noted  Vent dependent   For trach and peg placement   MEDICATIONS  (STANDING):  albuterol/ipratropium for Nebulization 3 milliLiter(s) Nebulizer every 6 hours  chlorhexidine 0.12% Liquid 15 milliLiter(s) Oral Mucosa every 12 hours  chlorhexidine 2% Cloths 1 Application(s) Topical <User Schedule>  collagenase Ointment 1 Application(s) Topical daily  dexMEDEtomidine Infusion 0.2 MICROgram(s)/kG/Hr (3.51 mL/Hr) IV Continuous <Continuous>  gabapentin 300 milliGRAM(s) Oral two times a day  heparin   Injectable 5000 Unit(s) SubCutaneous every 12 hours  mirtazapine 15 milliGRAM(s) Oral daily  polyethylene glycol 3350 17 Gram(s) Oral two times a day  senna 2 Tablet(s) Oral at bedtime    MEDICATIONS  (PRN):  acetaminophen     Tablet .. 650 milliGRAM(s) Oral every 6 hours PRN Temp greater or equal to 38C (100.4F), Mild Pain (1 - 3)  fentaNYL    Injectable 25 MICROGram(s) IV Push every 2 hours PRN Moderate Pain (4 - 6)      12-28-23 @ 07:01  -  12-29-23 @ 07:00  --------------------------------------------------------  IN: 1895.1 mL / OUT: 945 mL / NET: 950.1 mL    12-29-23 @ 07:01  -  12-30-23 @ 00:48  --------------------------------------------------------  IN: 219.6 mL / OUT: 515 mL / NET: -295.4 mL      PHYSICAL EXAM:      T(C): 36.3 (12-30-23 @ 00:00), Max: 36.7 (12-29-23 @ 01:00)  HR: 76 (12-30-23 @ 00:00) (70 - 90)  BP: 127/61 (12-30-23 @ 00:00) (95/52 - 160/109)  RR: 26 (12-30-23 @ 00:00) (11 - 26)  SpO2: 98% (12-30-23 @ 00:00) (95% - 100%)  Wt(kg): --  Lungs clear  Heart S1S2  Abd soft NT ND  Extremities:   tr edema                                    7.3    5.95  )-----------( 185      ( 29 Dec 2023 02:20 )             22.6     12-29    142  |  105  |  86<H>  ----------------------------<  112<H>  4.4   |  31  |  3.25<H>    Ca    8.6      29 Dec 2023 02:20  Phos  5.0     12-29  Mg     2.3     12-29    TPro  7.0  /  Alb  1.4<L>  /  TBili  0.3  /  DBili  x   /  AST  47<H>  /  ALT  15  /  AlkPhos  59  12-29      LIVER FUNCTIONS - ( 29 Dec 2023 02:20 )  Alb: 1.4 g/dL / Pro: 7.0 gm/dL / ALK PHOS: 59 U/L / ALT: 15 U/L / AST: 47 U/L / GGT: x           Creatinine Trend: 3.25<--, 3.09<--, 3.14<--, 2.83<--, 2.68<--, 2.37<--  Mode: AC/ CMV (Assist Control/ Continuous Mandatory Ventilation)  RR (machine): 26  TV (machine): 400  FiO2: 30  PEEP: 5  ITime: 1  MAP: 10  PIP: 23    Assessment     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 [facility] NEPHROLOGY SERVICES, [facility]  NEPHROLOGY AND HYPERTENSION  [address]  [address]  [place] [address]  [phone]    MD SOFY KEENAN, MD JIGNESH MCCARTY, MD LESA RAMIREZ, MD LEANNE MURRAY MD          Patient events noted  Vent dependent   For trach and peg placement   MEDICATIONS  (STANDING):  albuterol/ipratropium for Nebulization 3 milliLiter(s) Nebulizer every 6 hours  chlorhexidine 0.12% Liquid 15 milliLiter(s) Oral Mucosa every 12 hours  chlorhexidine 2% Cloths 1 Application(s) Topical <User Schedule>  collagenase Ointment 1 Application(s) Topical daily  dexMEDEtomidine Infusion 0.2 MICROgram(s)/kG/Hr (3.51 mL/Hr) IV Continuous <Continuous>  gabapentin 300 milliGRAM(s) Oral two times a day  heparin   Injectable 5000 Unit(s) SubCutaneous every 12 hours  mirtazapine 15 milliGRAM(s) Oral daily  polyethylene glycol 3350 17 Gram(s) Oral two times a day  senna 2 Tablet(s) Oral at bedtime    MEDICATIONS  (PRN):  acetaminophen     Tablet .. 650 milliGRAM(s) Oral every 6 hours PRN Temp greater or equal to 38C (100.4F), Mild Pain (1 - 3)  fentaNYL    Injectable 25 MICROGram(s) IV Push every 2 hours PRN Moderate Pain (4 - 6)      12-28-23 @ 07:01  -  12-29-23 @ 07:00  --------------------------------------------------------  IN: 1895.1 mL / OUT: 945 mL / NET: 950.1 mL    12-29-23 @ 07:01  -  12-30-23 @ 00:48  --------------------------------------------------------  IN: 219.6 mL / OUT: 515 mL / NET: -295.4 mL      PHYSICAL EXAM:      T(C): 36.3 (12-30-23 @ 00:00), Max: 36.7 (12-29-23 @ 01:00)  HR: 76 (12-30-23 @ 00:00) (70 - 90)  BP: 127/61 (12-30-23 @ 00:00) (95/52 - 160/109)  RR: 26 (12-30-23 @ 00:00) (11 - 26)  SpO2: 98% (12-30-23 @ 00:00) (95% - 100%)  Wt(kg): --  Lungs clear  Heart S1S2  Abd soft NT ND  Extremities:   tr edema                                    7.3    5.95  )-----------( 185      ( 29 Dec 2023 02:20 )             22.6     12-29    142  |  105  |  86<H>  ----------------------------<  112<H>  4.4   |  31  |  3.25<H>    Ca    8.6      29 Dec 2023 02:20  Phos  5.0     12-29  Mg     2.3     12-29    TPro  7.0  /  Alb  1.4<L>  /  TBili  0.3  /  DBili  x   /  AST  47<H>  /  ALT  15  /  AlkPhos  59  12-29      LIVER FUNCTIONS - ( 29 Dec 2023 02:20 )  Alb: 1.4 g/dL / Pro: 7.0 gm/dL / ALK PHOS: 59 U/L / ALT: 15 U/L / AST: 47 U/L / GGT: x           Creatinine Trend: 3.25<--, 3.09<--, 3.14<--, 2.83<--, 2.68<--, 2.37<--  Mode: AC/ CMV (Assist Control/ Continuous Mandatory Ventilation)  RR (machine): 26  TV (machine): 400  FiO2: 30  PEEP: 5  ITime: 1  MAP: 10  PIP: 23    Assessment     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

## 2023-12-29 NOTE — PROGRESS NOTE ADULT - SUBJECTIVE AND OBJECTIVE BOX
Post-op check    S/P tracheostomy/PEG POD#0  Pt seen and examined at bedside in the ICU. No chest pain, shortness of breath, nausea/ vomiting, and dizziness.     Vital Signs Last 24 Hrs  T(F): 97 (12-29-23 @ 21:00), Max: 98.1 (12-28-23 @ 23:00)  HR: 83 (12-29-23 @ 21:00)  BP: 95/59 (12-29-23 @ 21:00)  RR: 26 (12-29-23 @ 21:00)  SpO2: 98% (12-29-23 @ 21:00)    CONSTITUTIONAL: NAD  HEENT: Tracheostomy intact and functioning well. No bleeding or other abnormalities.   RESPIRATORY: Clear to auscultation bilaterally, respirations nonlabored  CARDIOVASCULAR: S1S2, Regular rate and rhythm  GASTROINTESTINAL: PEG dressing C/D/I, Nondistended, bowel sounds, soft, nontender  MUSCULOSKELETAL: No calf tenderness, No edema      Assessment: 89M S/P tracheostomy/PEG POD#0    Plan:   - trachestomy/PEG care  - continue current management per ICU

## 2023-12-29 NOTE — PROGRESS NOTE ADULT - SUBJECTIVE AND OBJECTIVE BOX
Team Surgery Preop Note    Patient is a 89y old  Male who presents with a chief complaint of UTI (18 Dec 2023 21:13)    Diagnosis: respiratory failure  Procedure: trache/PEG  Surgeon: Dr Yariel Muñoz                          7.3    5.95  )-----------( 185      ( 29 Dec 2023 02:20 )             22.6     12-29    142  |  105  |  86<H>  ----------------------------<  112<H>  4.4   |  31  |  3.25<H>    Ca    8.6      29 Dec 2023 02:20  Phos  5.0     12-29  Mg     2.3     12-29    TPro  7.0  /  Alb  1.4<L>  /  TBili  0.3  /  DBili  x   /  AST  47<H>  /  ALT  15  /  AlkPhos  59  12-29    PT/INR - ( 28 Dec 2023 04:00 )   PT: 12.2 sec;   INR: 1.02 ratio         PTT - ( 28 Dec 2023 04:00 )  PTT:35.9 sec  Urinalysis Basic - ( 29 Dec 2023 02:20 )    Color: x / Appearance: x / SG: x / pH: x  Gluc: 112 mg/dL / Ketone: x  / Bili: x / Urobili: x   Blood: x / Protein: x / Nitrite: x   Leuk Esterase: x / RBC: x / WBC x   Sq Epi: x / Non Sq Epi: x / Bacteria: x        [x ] Type & Screen  [x ] CBC  [x ] BMP  [ x] PT/PTT/INR  [ x] Chest X-ray  [x ] EKG  [x ] NPO/IVF  [x ] Consent  [x ] Clearance  [ x] Added on to OR Schedule

## 2023-12-29 NOTE — PROGRESS NOTE ADULT - ASSESSMENT
89M w/ dementia, CKD, chronic urinary retention w/ obrien. Admitted w/ L pleural effusion thought to be due to mucous plugging. Course complicated by worsening hypoxemia requiring intubation and bronchoscopy x2 w/ MRSA pneumonia. Extubated on 12/24 but required re-intubation. Extubated again yesterday but failed due to hypercapnia and hypoxia    #Neuro - precedex for comfort; continue gabapentin and mirtazepine  #CV - shock state has resolved, likely sedation related/vasoplegia   #Pulm - pt again re-intubated for hypoxia and hypercapnia, back on minimal vent settings; secretions have improved, continue duoenbs and chest PT; for trach/PEG today  #ID- completed 7 days of vancomcyin for MRSA pneumonia w/ acceptable vanco levels; bronchoscopy cx also grew Stenotrophomonas continue levofloxacin for 14 day course (7 more days from today)  #Renal/metabolic - ROGER on CKD, likely in setting of hypotension jenaro-intubation; monitor I/Os, electrolytes; lokelma for mild hyperK  #GI- tolerating TF, having BM- continue bowel regimen  #Heme - stable anemia, transfuse for hgb <7  #Endo - monitor BG  #Skin - wound care for sacral wounds  #PPx - HSQ q12  #Dispo- remains in ICU intubated; prognosis very guarded; full code, awaiting trach/PEG - pt is medically optimized for planned surgical procedure         89M w/ dementia, CKD, chronic urinary retention w/ obrien. Admitted w/ L pleural effusion thought to be due to mucous plugging. Course complicated by worsening hypoxemia requiring intubation and bronchoscopy x2 w/ MRSA pneumonia, stenotrophomonas. Extubated on 12/24 but required re-intubation. Extubated again but failed due to hypercapnia and hypoxia    #Neuro - precedex for comfort; continue gabapentin and mirtazepine  #CV - shock state has resolved, likely sedation related/vasoplegia   #Pulm - pt again re-intubated for hypoxia and hypercapnia, back on minimal vent settings; secretions have improved, continue duoenbs and chest PT; for trach/PEG today  #ID- completed 7 days of vancomcyin for MRSA pneumonia w/ acceptable vanco levels; bronchoscopy cx also grew Stenotrophomonas continue levofloxacin for 14 day course (7 more days from today)  #Renal/metabolic - ROGER on CKD, likely in setting of hypotension jenaro-intubation; monitor I/Os, electrolytes; lokelma for mild hyperK  #GI- tolerating TF, having BM- continue bowel regimen  #Heme - stable anemia, transfuse for hgb <7  #Endo - monitor BG  #Skin - wound care for sacral wounds  #PPx - HSQ q12  #Dispo- remains in ICU intubated; prognosis very guarded; full code, awaiting trach/PEG - pt is medically optimized for planned surgical procedure

## 2023-12-29 NOTE — PROGRESS NOTE ADULT - SUBJECTIVE AND OBJECTIVE BOX
CHIEF COMPLAINT:    Interval Events:    REVIEW OF SYSTEMS:  Constitutional: [ ] fevers [ ] chills [ ] weight loss [ ] weight gain  HEENT: [ ] dry eyes [ ] eye irritation [ ] postnasal drip [ ] nasal congestion  CV: [ ] chest pain [ ] orthopnea [ ] palpitations [ ] murmur  Resp: [ ] cough [ ] shortness of breath [ ] dyspnea [ ] wheezing [ ] sputum [ ] hemoptysis  GI: [ ] nausea [ ] vomiting [ ] diarrhea [ ] constipation [ ] abd pain [ ] dysphagia   : [ ] dysuria [ ] nocturia [ ] hematuria [ ] increased urinary frequency  Musculoskeletal: [ ] back pain [ ] myalgias [ ] arthralgias [ ] fracture  Skin: [ ] rash [ ] itch  Neurological: [ ] headache [ ] dizziness [ ] syncope [ ] weakness [ ] numbness  Hematologic/Lymphatic: [ ] anemia [ ] bleeding problem  Allergic/Immunologic: [ ] itchy eyes [ ] nasal discharge [ ] hives [ ] angioedema  [ ] All other systems negative  [ ] Unable to assess ROS because ________    OBJECTIVE:  ICU Vital Signs Last 24 Hrs  T(C): 36.2 (29 Dec 2023 07:15), Max: 36.7 (28 Dec 2023 23:00)  T(F): 97.2 (29 Dec 2023 07:15), Max: 98.1 (28 Dec 2023 23:00)  HR: 70 (29 Dec 2023 07:15) (70 - 94)  BP: 136/86 (29 Dec 2023 07:00) (101/63 - 143/77)  BP(mean): 103 (29 Dec 2023 07:00) (64 - 111)  ABP: --  ABP(mean): --  RR: 26 (29 Dec 2023 07:15) (11 - 39)  SpO2: 100% (29 Dec 2023 07:15) (97% - 100%)    O2 Parameters below as of 29 Dec 2023 05:15  Patient On (Oxygen Delivery Method): ventilator          Mode: AC/ CMV (Assist Control/ Continuous Mandatory Ventilation), RR (machine): 26, TV (machine): 400, FiO2: 30, PEEP: 5, ITime: 1, MAP: 9, PIP: 30    12-28 @ 07:01  -  12-29 @ 07:00  --------------------------------------------------------  IN: 1881 mL / OUT: 910 mL / NET: 971 mL      CAPILLARY BLOOD GLUCOSE          PHYSICAL EXAM:  General:   HEENT:   Neck:   Respiratory:   Cardiovascular:   Abdomen:   Extremities:   Skin:   Neurological:  Psychiatry:    LINES:    HOSPITAL MEDICATIONS:  MEDICATIONS  (STANDING):  albuterol/ipratropium for Nebulization 3 milliLiter(s) Nebulizer every 6 hours  chlorhexidine 0.12% Liquid 15 milliLiter(s) Oral Mucosa every 12 hours  chlorhexidine 2% Cloths 1 Application(s) Topical <User Schedule>  collagenase Ointment 1 Application(s) Topical daily  dexMEDEtomidine Infusion 0.2 MICROgram(s)/kG/Hr (3.51 mL/Hr) IV Continuous <Continuous>  gabapentin 300 milliGRAM(s) Oral two times a day  heparin   Injectable 5000 Unit(s) SubCutaneous every 12 hours  levoFLOXacin IVPB      levoFLOXacin IVPB 500 milliGRAM(s) IV Intermittent every 48 hours  mirtazapine 15 milliGRAM(s) Oral daily  polyethylene glycol 3350 17 Gram(s) Oral two times a day  senna 2 Tablet(s) Oral at bedtime  sodium zirconium cyclosilicate 10 Gram(s) Oral every 8 hours    MEDICATIONS  (PRN):  acetaminophen     Tablet .. 650 milliGRAM(s) Oral every 6 hours PRN Temp greater or equal to 38C (100.4F), Mild Pain (1 - 3)      LABS:                        7.3    5.95  )-----------( 185      ( 29 Dec 2023 02:20 )             22.6     Hgb Trend: 7.3<--, 7.4<--, 7.4<--, 6.7<--, 6.9<--  12-29    142  |  105  |  86<H>  ----------------------------<  112<H>  4.4   |  31  |  3.25<H>    Ca    8.6      29 Dec 2023 02:20  Phos  5.0     12-29  Mg     2.3     12-29    TPro  7.0  /  Alb  1.4<L>  /  TBili  0.3  /  DBili  x   /  AST  47<H>  /  ALT  15  /  AlkPhos  59  12-29    PT/INR - ( 28 Dec 2023 04:00 )   PT: 12.2 sec;   INR: 1.02 ratio         PTT - ( 28 Dec 2023 04:00 )  PTT:35.9 sec  Urinalysis Basic - ( 29 Dec 2023 02:20 )    Color: x / Appearance: x / SG: x / pH: x  Gluc: 112 mg/dL / Ketone: x  / Bili: x / Urobili: x   Blood: x / Protein: x / Nitrite: x   Leuk Esterase: x / RBC: x / WBC x   Sq Epi: x / Non Sq Epi: x / Bacteria: x      Arterial Blood Gas:  12-27 @ 16:16  7.36/57/359/32/99.9/5.1  ABG lactate: --  Arterial Blood Gas:  12-27 @ 14:06  7.09/>112/91/36/95.0/4.3  ABG lactate: --        MICROBIOLOGY:     RADIOLOGY:  [ ] Reviewed and interpreted by me CHIEF COMPLAINT:    Interval Events:  increased precedex for pt comfort  no other o/n events    REVIEW OF SYSTEMS:  [ x] Unable to assess ROS because intubated    OBJECTIVE:  ICU Vital Signs Last 24 Hrs  T(C): 36.2 (29 Dec 2023 07:15), Max: 36.7 (28 Dec 2023 23:00)  T(F): 97.2 (29 Dec 2023 07:15), Max: 98.1 (28 Dec 2023 23:00)  HR: 70 (29 Dec 2023 07:15) (70 - 94)  BP: 136/86 (29 Dec 2023 07:00) (101/63 - 143/77)  BP(mean): 103 (29 Dec 2023 07:00) (64 - 111)  ABP: --  ABP(mean): --  RR: 26 (29 Dec 2023 07:15) (11 - 39)  SpO2: 100% (29 Dec 2023 07:15) (97% - 100%)    O2 Parameters below as of 29 Dec 2023 05:15  Patient On (Oxygen Delivery Method): ventilator          Mode: AC/ CMV (Assist Control/ Continuous Mandatory Ventilation), RR (machine): 26, TV (machine): 400, FiO2: 30, PEEP: 5, ITime: 1, MAP: 9, PIP: 30    12-28 @ 07:01  -  12-29 @ 07:00  --------------------------------------------------------  IN: 1881 mL / OUT: 910 mL / NET: 971 mL      CAPILLARY BLOOD GLUCOSE          PHYSICAL EXAM:  General: NAD, chronically ill appearing  HEENT: ETT and NGT in place  Neck: supple  Respiratory: mechanical BS  Cardiovascular: s1s2 RRR  Abdomen: soft, non tender, non distended  Extremities: warm, no edema or clubbing   Skin: L ischium stage 3, sacrum stage 2  Neurological: unable to assess  Psychiatry: unable to assess    LINES:  [facility]      HOSPITAL MEDICATIONS:  MEDICATIONS  (STANDING):  albuterol/ipratropium for Nebulization 3 milliLiter(s) Nebulizer every 6 hours  chlorhexidine 0.12% Liquid 15 milliLiter(s) Oral Mucosa every 12 hours  chlorhexidine 2% Cloths 1 Application(s) Topical <User Schedule>  collagenase Ointment 1 Application(s) Topical daily  dexMEDEtomidine Infusion 0.2 MICROgram(s)/kG/Hr (3.51 mL/Hr) IV Continuous <Continuous>  gabapentin 300 milliGRAM(s) Oral two times a day  heparin   Injectable 5000 Unit(s) SubCutaneous every 12 hours  levoFLOXacin IVPB      levoFLOXacin IVPB 500 milliGRAM(s) IV Intermittent every 48 hours  mirtazapine 15 milliGRAM(s) Oral daily  polyethylene glycol 3350 17 Gram(s) Oral two times a day  senna 2 Tablet(s) Oral at bedtime  sodium zirconium cyclosilicate 10 Gram(s) Oral every 8 hours    MEDICATIONS  (PRN):  acetaminophen     Tablet .. 650 milliGRAM(s) Oral every 6 hours PRN Temp greater or equal to 38C (100.4F), Mild Pain (1 - 3)      LABS:                        7.3    5.95  )-----------( 185      ( 29 Dec 2023 02:20 )             22.6     Hgb Trend: 7.3<--, 7.4<--, 7.4<--, 6.7<--, 6.9<--  12-29    142  |  105  |  86<H>  ----------------------------<  112<H>  4.4   |  31  |  3.25<H>    Ca    8.6      29 Dec 2023 02:20  Phos  5.0     12-29  Mg     2.3     12-29    TPro  7.0  /  Alb  1.4<L>  /  TBili  0.3  /  DBili  x   /  AST  47<H>  /  ALT  15  /  AlkPhos  59  12-29    PT/INR - ( 28 Dec 2023 04:00 )   PT: 12.2 sec;   INR: 1.02 ratio         PTT - ( 28 Dec 2023 04:00 )  PTT:35.9 sec  Urinalysis Basic - ( 29 Dec 2023 02:20 )    Color: x / Appearance: x / SG: x / pH: x  Gluc: 112 mg/dL / Ketone: x  / Bili: x / Urobili: x   Blood: x / Protein: x / Nitrite: x   Leuk Esterase: x / RBC: x / WBC x   Sq Epi: x / Non Sq Epi: x / Bacteria: x      Arterial Blood Gas:  12-27 @ 16:16  7.36/57/359/32/99.9/5.1  ABG lactate: --  Arterial Blood Gas:  12-27 @ 14:06  7.09/>112/91/36/95.0/4.3  ABG lactate: --        MICROBIOLOGY:     RADIOLOGY:  [ ] Reviewed and interpreted by me CHIEF COMPLAINT:    Interval Events:  increased precedex for pt comfort  no other o/n events    REVIEW OF SYSTEMS:  [ x] Unable to assess ROS because intubated    OBJECTIVE:  ICU Vital Signs Last 24 Hrs  T(C): 36.2 (29 Dec 2023 07:15), Max: 36.7 (28 Dec 2023 23:00)  T(F): 97.2 (29 Dec 2023 07:15), Max: 98.1 (28 Dec 2023 23:00)  HR: 70 (29 Dec 2023 07:15) (70 - 94)  BP: 136/86 (29 Dec 2023 07:00) (101/63 - 143/77)  BP(mean): 103 (29 Dec 2023 07:00) (64 - 111)  ABP: --  ABP(mean): --  RR: 26 (29 Dec 2023 07:15) (11 - 39)  SpO2: 100% (29 Dec 2023 07:15) (97% - 100%)    O2 Parameters below as of 29 Dec 2023 05:15  Patient On (Oxygen Delivery Method): ventilator          Mode: AC/ CMV (Assist Control/ Continuous Mandatory Ventilation), RR (machine): 26, TV (machine): 400, FiO2: 30, PEEP: 5, ITime: 1, MAP: 9, PIP: 30    12-28 @ 07:01  -  12-29 @ 07:00  --------------------------------------------------------  IN: 1881 mL / OUT: 910 mL / NET: 971 mL      CAPILLARY BLOOD GLUCOSE          PHYSICAL EXAM:  General: NAD, chronically ill appearing  HEENT: ETT and NGT in place  Neck: supple  Respiratory: mechanical BS  Cardiovascular: s1s2 RRR  Abdomen: soft, non tender, non distended  Extremities: warm, no edema or clubbing   Skin: L ischium stage 3, sacrum stage 2  Neurological: unable to assess  Psychiatry: unable to assess    LINES:  [facility]      HOSPITAL MEDICATIONS:  MEDICATIONS  (STANDING):  albuterol/ipratropium for Nebulization 3 milliLiter(s) Nebulizer every 6 hours  chlorhexidine 0.12% Liquid 15 milliLiter(s) Oral Mucosa every 12 hours  chlorhexidine 2% Cloths 1 Application(s) Topical <User Schedule>  collagenase Ointment 1 Application(s) Topical daily  dexMEDEtomidine Infusion 0.2 MICROgram(s)/kG/Hr (3.51 mL/Hr) IV Continuous <Continuous>  gabapentin 300 milliGRAM(s) Oral two times a day  heparin   Injectable 5000 Unit(s) SubCutaneous every 12 hours  levoFLOXacin IVPB      levoFLOXacin IVPB 500 milliGRAM(s) IV Intermittent every 48 hours  mirtazapine 15 milliGRAM(s) Oral daily  polyethylene glycol 3350 17 Gram(s) Oral two times a day  senna 2 Tablet(s) Oral at bedtime  sodium zirconium cyclosilicate 10 Gram(s) Oral every 8 hours    MEDICATIONS  (PRN):  acetaminophen     Tablet .. 650 milliGRAM(s) Oral every 6 hours PRN Temp greater or equal to 38C (100.4F), Mild Pain (1 - 3)      LABS:                        7.3    5.95  )-----------( 185      ( 29 Dec 2023 02:20 )             22.6     Hgb Trend: 7.3<--, 7.4<--, 7.4<--, 6.7<--, 6.9<--  12-29    142  |  105  |  86<H>  ----------------------------<  112<H>  4.4   |  31  |  3.25<H>    Ca    8.6      29 Dec 2023 02:20  Phos  5.0     12-29  Mg     2.3     12-29    TPro  7.0  /  Alb  1.4<L>  /  TBili  0.3  /  DBili  x   /  AST  47<H>  /  ALT  15  /  AlkPhos  59  12-29    PT/INR - ( 28 Dec 2023 04:00 )   PT: 12.2 sec;   INR: 1.02 ratio         PTT - ( 28 Dec 2023 04:00 )  PTT:35.9 sec  Urinalysis Basic - ( 29 Dec 2023 02:20 )    Color: x / Appearance: x / SG: x / pH: x  Gluc: 112 mg/dL / Ketone: x  / Bili: x / Urobili: x   Blood: x / Protein: x / Nitrite: x   Leuk Esterase: x / RBC: x / WBC x   Sq Epi: x / Non Sq Epi: x / Bacteria: x      Arterial Blood Gas:  12-27 @ 16:16  7.36/57/359/32/99.9/5.1  ABG lactate: --  Arterial Blood Gas:  12-27 @ 14:06  7.09/>112/91/36/95.0/4.3  ABG lactate: --        MICROBIOLOGY:     RADIOLOGY:  [ ] Reviewed and interpreted by me

## 2023-12-30 NOTE — PROGRESS NOTE ADULT - ASSESSMENT
89M w/ dementia, CKD, chronic urinary retention w/ obrien. Admitted w/ L pleural effusion thought to be due to mucous plugging. Course complicated by worsening hypoxemia requiring intubation and bronchoscopy x2 w/ MRSA pneumonia, stenotrophomonas. Extubated on 12/24 but required re-intubation. Extubated again but failed due to hypercapnia and hypoxia. s/p Trach / PEG.      wean precedex. pain regimen prn.  hemodynamically stable  s/p trach. weaning pathway.  trend fever curve / wbc  TF through PEG when ok from surgery.   fingerstick/correction per routine  dvt ppx  stable for downgrade.

## 2023-12-30 NOTE — CHART NOTE - NSCHARTNOTEFT_GEN_A_CORE
Patient seen and examined at bedside. PEG tube site with dried blood and small clot. Cleaned and dressed with dry gauze and replaced abdominal binder. Patient seen and examined at bedside. PEG tube site with dried ooze and small clot. No active bleeding. Cleaned and dressed with dry gauze and replaced abdominal binder.

## 2023-12-30 NOTE — CHART NOTE - NSCHARTNOTEFT_GEN_A_CORE
ICU DOWN GRADE NOTE      Patient is a 89y old  Male who presents with a chief complaint of hypoxic resp failure (30 Dec 2023 12:07)      HPI:  89M w/ hx of CKD, HTN, BPH w/ chronic obrien, dementia p/w SOB. Pt poor historian so history obtained from record. Pt was brought in my home aid who reports pt c/o SOB. Denies fever/chills/sweats, URI sx, N/V/D.    In ED, pt hypothermic to 95.5 and hypotensive to 90/60s. Labs notable for Hgb 6.3 (+london), dimer 3000, Na 129, BUN/SCr 70/2.91, BNP 7966, UA+. CT CAP demonstrates mild b/l hydronephrosis and large stool burden w/ perirectal stranding c/f stercoral colitis. (01 Dec 2023 06:11)      INTERVAL HPI/OVERNIGHT EVENTS: Patient S/P trach and peg yesterday 12/29. On 25 % Fio2 tolerating well. MS improving follows simple commands (baseline with dementia). Shakes head no when asked if he is in pain.         REVIEW OF SYSTEMS:  All negative unless listed within interval HPI      MEDICATIONS:  acetaminophen     Tablet .. 650 milliGRAM(s) Oral every 6 hours PRN  chlorhexidine 0.12% Liquid 15 milliLiter(s) Oral Mucosa every 12 hours  chlorhexidine 2% Cloths 1 Application(s) Topical <User Schedule>  collagenase Ointment 1 Application(s) Topical daily  dextrose 5% + sodium chloride 0.45%. 1000 milliLiter(s) IV Continuous <Continuous>  fentaNYL    Injectable 25 MICROGram(s) IV Push every 2 hours PRN  gabapentin 300 milliGRAM(s) Oral two times a day  heparin   Injectable 5000 Unit(s) SubCutaneous every 12 hours  mirtazapine 15 milliGRAM(s) Oral daily  polyethylene glycol 3350 17 Gram(s) Oral two times a day  senna 2 Tablet(s) Oral at bedtime      T(C): 36 (12-30-23 @ 19:00), Max: 36.3 (12-29-23 @ 22:00)  HR: 79 (12-30-23 @ 19:00) (70 - 90)  BP: 138/103 (12-30-23 @ 19:00) (95/59 - 189/116)  RR: 32 (12-30-23 @ 19:00) (17 - 32)  SpO2: 97% (12-30-23 @ 19:00) (95% - 100%)  Wt(kg): --Vital Signs Last 24 Hrs  T(C): 36 (30 Dec 2023 19:00), Max: 36.3 (29 Dec 2023 22:00)  T(F): 96.8 (30 Dec 2023 19:00), Max: 97.3 (29 Dec 2023 22:00)  HR: 79 (30 Dec 2023 19:00) (70 - 90)  BP: 138/103 (30 Dec 2023 19:00) (95/59 - 189/116)  BP(mean): 116 (30 Dec 2023 19:00) (55 - 130)  RR: 32 (30 Dec 2023 19:00) (17 - 32)  SpO2: 97% (30 Dec 2023 19:00) (95% - 100%)    Parameters below as of 30 Dec 2023 06:24  Patient On (Oxygen Delivery Method): ventilator        PHYSICAL EXAM:  General: NAD, chronically ill appearing  HEENT: ETT and NGT in place, PERRL  Neck: s/p trach. no bleeding. Clotted blood.  Respiratory: mechanical BS B/L  Cardiovascular: s1s2 RRR  Abdomen: soft, non tender, non distended. +PEG. C/D/I  Extremities: warm, no edema or clubbing   Skin: L ischium stage 3, sacrum stage 2  Neurological: unable to assess  Psychiatry: unable to assess          Consultant(s) Notes Reviewed:  [x ] YES  [ ] NO  Care Discussed with Consultants/Other Providers [ x] YES  [ ] NO    LABS:                        7.4    6.91  )-----------( 182      ( 30 Dec 2023 02:42 )             23.8     12-30    141  |  106  |  88<H>  ----------------------------<  69<L>  3.8   |  26  |  3.40<H>    Ca    8.7      30 Dec 2023 02:42  Phos  5.7     12-30  Mg     2.3     12-30    TPro  7.3  /  Alb  1.5<L>  /  TBili  0.4  /  DBili  x   /  AST  49<H>  /  ALT  14  /  AlkPhos  58  12-30      Urinalysis Basic - ( 30 Dec 2023 02:42 )    Color: x / Appearance: x / SG: x / pH: x  Gluc: 69 mg/dL / Ketone: x  / Bili: x / Urobili: x   Blood: x / Protein: x / Nitrite: x   Leuk Esterase: x / RBC: x / WBC x   Sq Epi: x / Non Sq Epi: x / Bacteria: x      CAPILLARY BLOOD GLUCOSE      POCT Blood Glucose.: 81 mg/dL (30 Dec 2023 03:28)        Urinalysis Basic - ( 30 Dec 2023 02:42 )    Color: x / Appearance: x / SG: x / pH: x  Gluc: 69 mg/dL / Ketone: x  / Bili: x / Urobili: x   Blood: x / Protein: x / Nitrite: x   Leuk Esterase: x / RBC: x / WBC x   Sq Epi: x / Non Sq Epi: x / Bacteria: x        RADIOLOGY & ADDITIONAL TESTS:    Imaging Personally Reviewed:  [x ] YES  [ ] NO        Assessment/ To follow up:    89M w/ dementia, CKD, chronic urinary retention w/ obrien. Admitted w/ L pleural effusion thought to be due to mucous plugging. Course complicated by worsening hypoxemia requiring intubation and bronchoscopy x2 w/ MRSA pneumonia, stenotrophomonas. Extubated on 12/24 but required re-intubation. Extubated again but failed due to hypercapnia and hypoxia. s/p Trach / PEG.      -MS close to baseline,  pain regimen prn.  -hemodynamically stable  -s/p trach. weaning pathway.  - Complete course Levaquin for Steno in sputum  -trend fever curve / wbc  -Initiated TF today  -FS ssi  -dvt ppx  - No longer requires ICU level of care and is stable for transfer to medicine floor.   - Plan of care discussed with ICU MD Ferguson, Verbal sign out discussed with Hospitalist MD Freedman.

## 2023-12-30 NOTE — PROGRESS NOTE ADULT - SUBJECTIVE AND OBJECTIVE BOX
SURGERY PROGRESS HPI:  POD#1  Pt seen and examined at bedside in the ICU.     Vital Signs Last 24 Hrs  T(C): 35.7 (30 Dec 2023 06:00), Max: 36.3 (29 Dec 2023 22:00)  T(F): 96.3 (30 Dec 2023 06:00), Max: 97.3 (29 Dec 2023 22:00)  HR: 86 (30 Dec 2023 06:24) (70 - 90)  BP: 131/66 (30 Dec 2023 06:00) (95/52 - 160/109)  BP(mean): 85 (30 Dec 2023 06:00) (65 - 116)  RR: 26 (30 Dec 2023 06:00) (17 - 26)  SpO2: 97% (30 Dec 2023 06:24) (95% - 100%)    Parameters below as of 30 Dec 2023 06:24  Patient On (Oxygen Delivery Method): ventilator      PHYSICAL EXAM:  CONSTITUTIONAL: NAD  HEENT: Tracheostomy intact and functioning well. No bleeding or other abnormalities.   RESPIRATORY: Clear to auscultation bilaterally, respirations nonlabored  CARDIOVASCULAR: S1S2, Regular rate and rhythm  GASTROINTESTINAL: PEG dressing C/D/I, Nondistended, bowel sounds, soft, nontender  MUSCULOSKELETAL: No calf tenderness, No edema    I&O's Detail    28 Dec 2023 07:01  -  29 Dec 2023 07:00  --------------------------------------------------------  IN:    Dexmedetomidine: 280.9 mL    Enteral Tube Flush: 120 mL    IV PiggyBack: 1100 mL    Osmolite: 390 mL    Propofol: 4.2 mL  Total IN: 1895.1 mL    OUT:    Indwelling Catheter - Urethral (mL): 945 mL  Total OUT: 945 mL    Total NET: 950.1 mL      29 Dec 2023 07:01  -  30 Dec 2023 06:32  --------------------------------------------------------  IN:    Dexmedetomidine: 191.6 mL    dextrose 5% + sodium chloride 0.45%: 400 mL    IV PiggyBack: 100 mL  Total IN: 691.6 mL    OUT:    Indwelling Catheter - Urethral (mL): 705 mL    Osmolite: 0 mL  Total OUT: 705 mL    Total NET: -13.4 mL          LABS:                        7.4    6.91  )-----------( 182      ( 30 Dec 2023 02:42 )             23.8     12-30    141  |  106  |  88<H>  ----------------------------<  69<L>  3.8   |  26  |  3.40<H>    Ca    8.7      30 Dec 2023 02:42  Phos  5.7     12-30  Mg     2.3     12-30    TPro  7.3  /  Alb  1.5<L>  /  TBili  0.4  /  DBili  x   /  AST  49<H>  /  ALT  14  /  AlkPhos  58  12-30      Urinalysis Basic - ( 30 Dec 2023 02:42 )    Color: x / Appearance: x / SG: x / pH: x  Gluc: 69 mg/dL / Ketone: x  / Bili: x / Urobili: x   Blood: x / Protein: x / Nitrite: x   Leuk Esterase: x / RBC: x / WBC x   Sq Epi: x / Non Sq Epi: x / Bacteria: x        Assessment: 89M S/P tracheostomy/PEG POD#1    Plan:   - trachestomy/PEG care  - continue current management per ICU

## 2023-12-30 NOTE — GOALS OF CARE CONVERSATION - ADVANCED CARE PLANNING - CONVERSATION DETAILS
Conversation with Son Praveen and Daughter Milena at bedside. Explained their mother (the patient) has advanced COPD, has failed BIPAP/ NIV and would further require intubation and place on ventilator for aggressive treatment of hypercapic respiratory failure. Praveen and Milena expressed that their mothers overall health has been declining for some time and they don't believe being intubated would lead to an improved quality of life and that she would not want to live long term on mechanical ventilation should she not be able to be weaned from ventilator. They also expressed that should the patients hear stop they and the patient would not want extraordinary measures such as cardiac compressions and then being placed on mechanical ventilation. Explained to Praveen and Milena that we would then continue with BIPAP/ NIV and if she were to pass away we would allow her to pass without further intervention at that point. They would like to continue with all other medical treatments at this point including CVC, vasopressors, and antibiotics. Praveen and Milena requested to fill out MOLST form indicating DNR DNI. MOLST form filled out and placed in chart.

## 2023-12-30 NOTE — PROGRESS NOTE ADULT - SUBJECTIVE AND OBJECTIVE BOX
INTERVAL HPI/OVERNIGHT EVENTS:   HPI:  89M w/ hx of CKD, HTN, BPH w/ chronic obrien, dementia p/w SOB. Pt poor historian so history obtained from record. Pt was brought in my home aid who reports pt c/o SOB. Denies fever/chills/sweats, URI sx, N/V/D.    In ED, pt hypothermic to 95.5 and hypotensive to 90/60s. Labs notable for Hgb 6.3 (+london), dimer 3000, Na 129, BUN/SCr 70/2.91, BNP 7966, UA+. CT CAP demonstrates mild b/l hydronephrosis and large stool burden w/ perirectal stranding c/f stercoral colitis. (01 Dec 2023 06:11)      CENTRAL LINE: [ ] YES [ ] NO  LOCATION:   DATE INSERTED:  REMOVE: [ ] YES [ ] NO  EXPLAIN:    OBRIEN: [ ] YES [ ] NO    DATE INSERTED:  REMOVE:  [ ] YES [ ] NO  EXPLAIN:    A-LINE:  [ ] YES [ ] NO  LOCATION:   DATE INSERTED:  REMOVE:  [ ] YES [ ] NO  EXPLAIN:    PAST MEDICAL & SURGICAL HISTORY:  HTN (Hypertension)      Benign Prostatic Hyperplasia      Hypertension      BPH (benign prostatic hypertrophy)      Spinal stenosis of lumbar region      Dehydration      Renal insufficiency      Hydrocele in adult  bilateral, chronic      Benign essential HTN      BPH (benign prostatic hyperplasia)      Indwelling Obrien catheter present      Dementia      Wheelchair dependent      Implantable loop recorder present      No significant past surgical history      Implantable loop recorder present          REVIEW OF SYSTEMS:    Negative ROS aside from HPI/Interval events above.    ICU Vital Signs Last 24 Hrs  T(C): 35.8 (30 Dec 2023 10:00), Max: 36.3 (29 Dec 2023 22:00)  T(F): 96.4 (30 Dec 2023 10:00), Max: 97.3 (29 Dec 2023 22:00)  HR: 74 (30 Dec 2023 10:00) (71 - 90)  BP: 141/66 (30 Dec 2023 10:00) (95/52 - 160/109)  BP(mean): 89 (30 Dec 2023 10:00) (65 - 118)  ABP: --  ABP(mean): --  RR: 26 (30 Dec 2023 10:00) (17 - 26)  SpO2: 98% (30 Dec 2023 10:00) (95% - 100%)    O2 Parameters below as of 30 Dec 2023 06:24  Patient On (Oxygen Delivery Method): ventilator                I&O's Detail    29 Dec 2023 07:01  -  30 Dec 2023 07:00  --------------------------------------------------------  IN:    Dexmedetomidine: 200.3 mL    dextrose 5% + sodium chloride 0.45%: 500 mL    IV PiggyBack: 100 mL  Total IN: 800.3 mL    OUT:    Indwelling Catheter - Urethral (mL): 715 mL    Osmolite: 0 mL  Total OUT: 715 mL    Total NET: 85.3 mL      30 Dec 2023 07:01  -  30 Dec 2023 12:08  --------------------------------------------------------  IN:    Dexmedetomidine: 42 mL  Total IN: 42 mL    OUT:    Indwelling Catheter - Urethral (mL): 60 mL  Total OUT: 60 mL    Total NET: -18 mL          Mode: AC/ CMV (Assist Control/ Continuous Mandatory Ventilation)  RR (machine): 26  TV (machine): 400  FiO2: 25  PEEP: 5  ITime: 1  MAP: 12  PIP: 27    CAPILLARY BLOOD GLUCOSE      POCT Blood Glucose.: 81 mg/dL (30 Dec 2023 03:28)        PHYSICAL EXAM:    General: NAD, chronically ill appearing  HEENT: ETT and NGT in place  Neck: s/p trach. no bleeding. Clotted blood.  Respiratory: mechanical BS  Cardiovascular: s1s2 RRR  Abdomen: soft, non tender, non distended. +PEG. C/D/I  Extremities: warm, no edema or clubbing   Skin: L ischium stage 3, sacrum stage 2  Neurological: unable to assess  Psychiatry: unable to assess      LABS:                        7.4    6.91  )-----------( 182      ( 30 Dec 2023 02:42 )             23.8      12-30    141  |  106  |  88<H>  ----------------------------<  69<L>  3.8   |  26  |  3.40<H>    Ca    8.7      30 Dec 2023 02:42  Phos  5.7     12-30  Mg     2.3     12-30    TPro  7.3  /  Alb  1.5<L>  /  TBili  0.4  /  DBili  x   /  AST  49<H>  /  ALT  14  /  AlkPhos  58  12-30      Urinalysis Basic - ( 30 Dec 2023 02:42 )    Color: x / Appearance: x / SG: x / pH: x  Gluc: 69 mg/dL / Ketone: x  / Bili: x / Urobili: x   Blood: x / Protein: x / Nitrite: x   Leuk Esterase: x / RBC: x / WBC x   Sq Epi: x / Non Sq Epi: x / Bacteria: x

## 2023-12-31 NOTE — PROGRESS NOTE ADULT - SUBJECTIVE AND OBJECTIVE BOX
Patient is a 89y old  Male who presents with a chief complaint of hypoxic resp failure (30 Dec 2023 12:07)      INTERVAL HPI/OVERNIGHT EVENTS: none     MEDICATIONS  (STANDING):  chlorhexidine 0.12% Liquid 15 milliLiter(s) Oral Mucosa every 12 hours  chlorhexidine 2% Cloths 1 Application(s) Topical <User Schedule>  collagenase Ointment 1 Application(s) Topical daily  dextrose 5% + sodium chloride 0.45%. 1000 milliLiter(s) (100 mL/Hr) IV Continuous <Continuous>  gabapentin 300 milliGRAM(s) Oral two times a day  heparin   Injectable 5000 Unit(s) SubCutaneous every 12 hours  mirtazapine 15 milliGRAM(s) Oral daily  polyethylene glycol 3350 17 Gram(s) Oral two times a day  senna 2 Tablet(s) Oral at bedtime    MEDICATIONS  (PRN):  acetaminophen     Tablet .. 650 milliGRAM(s) Oral every 6 hours PRN Temp greater or equal to 38C (100.4F), Mild Pain (1 - 3)  fentaNYL    Injectable 25 MICROGram(s) IV Push every 2 hours PRN Moderate Pain (4 - 6)      Allergies    No Known Allergies    Intolerances            Vital Signs Last 24 Hrs  T(C): 35.9 (31 Dec 2023 07:00), Max: 36.5 (30 Dec 2023 23:43)  T(F): 96.6 (31 Dec 2023 07:00), Max: 97.7 (30 Dec 2023 23:43)  HR: 105 (31 Dec 2023 12:39) (70 - 112)  BP: 145/62 (31 Dec 2023 08:00) (112/89 - 189/116)  BP(mean): 74 (31 Dec 2023 08:00) (55 - 130)  RR: 27 (31 Dec 2023 08:00) (17 - 32)  SpO2: 100% (31 Dec 2023 12:39) (95% - 100%)        PHYSICAL EXAM:  GENERAL: NAD  HEAD:  Atraumatic, Normocephalic  EYES: EOMI, PERRLA, conjunctiva and sclera clear  ENMT: No tonsillar erythema, exudates, or enlargement; trach site stable   NECK: Supple, Normal thyroid  NERVOUS SYSTEM:  Alert, tries to speak   CHEST/LUNG: CTABL; No rales, rhonchi, wheezing, or rubs  HEART: Regular rate and rhythm; No murmurs, rubs, or gallops  ABDOMEN: Soft, Nontender, Nondistended; Bowel sounds present. PEG   EXTREMITIES:  2+ Peripheral Pulses, No clubbing, cyanosis, or edema  LYMPH: No lymphadenopathy noted  SKIN: No rashes or lesions    LABS:                        7.4    6.91  )-----------( 182      ( 30 Dec 2023 02:42 )             23.8     12-30    141  |  106  |  88<H>  ----------------------------<  69<L>  3.8   |  26  |  3.40<H>    Ca    8.7      30 Dec 2023 02:42  Phos  5.7     12-30  Mg     2.3     12-30    TPro  7.3  /  Alb  1.5<L>  /  TBili  0.4  /  DBili  x   /  AST  49<H>  /  ALT  14  /  AlkPhos  58  12-30      Urinalysis Basic - ( 30 Dec 2023 02:42 )    Color: x / Appearance: x / SG: x / pH: x  Gluc: 69 mg/dL / Ketone: x  / Bili: x / Urobili: x   Blood: x / Protein: x / Nitrite: x   Leuk Esterase: x / RBC: x / WBC x   Sq Epi: x / Non Sq Epi: x / Bacteria: x      CAPILLARY BLOOD GLUCOSE      POCT Blood Glucose.: 138 mg/dL (31 Dec 2023 00:05)      RADIOLOGY & ADDITIONAL TESTS:    Imaging Personally Reviewed:  [ ] YES  [ ] NO    Consultant(s) Notes Reviewed:  [ ] YES  [ ] NO    Care Discussed with Consultants/Other Providers [ ] YES  [ ] NO

## 2023-12-31 NOTE — PROGRESS NOTE ADULT - SUBJECTIVE AND OBJECTIVE BOX
[facility] NEPHROLOGY SERVICES, [facility]  NEPHROLOGY AND HYPERTENSION  [address]  [address]  [place] [address]  [phone]    MD SOFY KEENAN, MD JIGNESH MCCARTY, MD LESA RAMIREZ, MD LEANNE MURRAY MD          Patient events noted    MEDICATIONS  (STANDING):  chlorhexidine 0.12% Liquid 15 milliLiter(s) Oral Mucosa every 12 hours  chlorhexidine 2% Cloths 1 Application(s) Topical <User Schedule>  collagenase Ointment 1 Application(s) Topical daily  dextrose 5% + sodium chloride 0.45%. 1000 milliLiter(s) (100 mL/Hr) IV Continuous <Continuous>  gabapentin 300 milliGRAM(s) Oral two times a day  heparin   Injectable 5000 Unit(s) SubCutaneous every 12 hours  mirtazapine 15 milliGRAM(s) Oral daily  polyethylene glycol 3350 17 Gram(s) Oral two times a day  senna 2 Tablet(s) Oral at bedtime    MEDICATIONS  (PRN):  acetaminophen     Tablet .. 650 milliGRAM(s) Oral every 6 hours PRN Temp greater or equal to 38C (100.4F), Mild Pain (1 - 3)  fentaNYL    Injectable 25 MICROGram(s) IV Push every 2 hours PRN Moderate Pain (4 - 6)      12-29-23 @ 07:01  -  12-30-23 @ 07:00  --------------------------------------------------------  IN: 800.3 mL / OUT: 715 mL / NET: 85.3 mL    12-30-23 @ 07:01  -  12-31-23 @ 00:02  --------------------------------------------------------  IN: 1804.5 mL / OUT: 675 mL / NET: 1129.5 mL      PHYSICAL EXAM:      T(C): 36.5 (12-30-23 @ 23:43), Max: 36.5 (12-30-23 @ 23:43)  HR: 85 (12-30-23 @ 21:14) (70 - 93)  BP: 112/89 (12-30-23 @ 20:27) (112/89 - 189/116)  RR: 27 (12-30-23 @ 20:27) (17 - 32)  SpO2: 98% (12-30-23 @ 21:14) (95% - 100%)  Wt(kg): --  Lungs clear  Heart S1S2  Abd soft NT ND  Extremities:   tr edema                                    7.4    6.91  )-----------( 182      ( 30 Dec 2023 02:42 )             23.8     12-30    141  |  106  |  88<H>  ----------------------------<  69<L>  3.8   |  26  |  3.40<H>    Ca    8.7      30 Dec 2023 02:42  Phos  5.7     12-30  Mg     2.3     12-30    TPro  7.3  /  Alb  1.5<L>  /  TBili  0.4  /  DBili  x   /  AST  49<H>  /  ALT  14  /  AlkPhos  58  12-30      LIVER FUNCTIONS - ( 30 Dec 2023 02:42 )  Alb: 1.5 g/dL / Pro: 7.3 gm/dL / ALK PHOS: 58 U/L / ALT: 14 U/L / AST: 49 U/L / GGT: x           Creatinine Trend: 3.40<--, 3.25<--, 3.09<--, 3.14<--, 2.83<--, 2.68<--  Mode: AC/ CMV (Assist Control/ Continuous Mandatory Ventilation)  RR (machine): 26  TV (machine): 400  FiO2: 25  PEEP: 5  ITime: 1  MAP: 10  PIP: 27      Assessment     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 [facility] NEPHROLOGY SERVICES, [facility]  NEPHROLOGY AND HYPERTENSION  [address]  [address]  [place] [address]  [phone]    MD SOFY KEENAN, MD JIGNESH MCCARTY, MD LESA RAMIREZ, MD LEANNE MURRAY MD          Patient events noted    MEDICATIONS  (STANDING):  chlorhexidine 0.12% Liquid 15 milliLiter(s) Oral Mucosa every 12 hours  chlorhexidine 2% Cloths 1 Application(s) Topical <User Schedule>  collagenase Ointment 1 Application(s) Topical daily  dextrose 5% + sodium chloride 0.45%. 1000 milliLiter(s) (100 mL/Hr) IV Continuous <Continuous>  gabapentin 300 milliGRAM(s) Oral two times a day  heparin   Injectable 5000 Unit(s) SubCutaneous every 12 hours  mirtazapine 15 milliGRAM(s) Oral daily  polyethylene glycol 3350 17 Gram(s) Oral two times a day  senna 2 Tablet(s) Oral at bedtime    MEDICATIONS  (PRN):  acetaminophen     Tablet .. 650 milliGRAM(s) Oral every 6 hours PRN Temp greater or equal to 38C (100.4F), Mild Pain (1 - 3)  fentaNYL    Injectable 25 MICROGram(s) IV Push every 2 hours PRN Moderate Pain (4 - 6)      12-29-23 @ 07:01  -  12-30-23 @ 07:00  --------------------------------------------------------  IN: 800.3 mL / OUT: 715 mL / NET: 85.3 mL    12-30-23 @ 07:01  -  12-31-23 @ 00:02  --------------------------------------------------------  IN: 1804.5 mL / OUT: 675 mL / NET: 1129.5 mL      PHYSICAL EXAM:      T(C): 36.5 (12-30-23 @ 23:43), Max: 36.5 (12-30-23 @ 23:43)  HR: 85 (12-30-23 @ 21:14) (70 - 93)  BP: 112/89 (12-30-23 @ 20:27) (112/89 - 189/116)  RR: 27 (12-30-23 @ 20:27) (17 - 32)  SpO2: 98% (12-30-23 @ 21:14) (95% - 100%)  Wt(kg): --  Lungs clear  Heart S1S2  Abd soft NT ND  Extremities:   tr edema                                    7.4    6.91  )-----------( 182      ( 30 Dec 2023 02:42 )             23.8     12-30    141  |  106  |  88<H>  ----------------------------<  69<L>  3.8   |  26  |  3.40<H>    Ca    8.7      30 Dec 2023 02:42  Phos  5.7     12-30  Mg     2.3     12-30    TPro  7.3  /  Alb  1.5<L>  /  TBili  0.4  /  DBili  x   /  AST  49<H>  /  ALT  14  /  AlkPhos  58  12-30      LIVER FUNCTIONS - ( 30 Dec 2023 02:42 )  Alb: 1.5 g/dL / Pro: 7.3 gm/dL / ALK PHOS: 58 U/L / ALT: 14 U/L / AST: 49 U/L / GGT: x           Creatinine Trend: 3.40<--, 3.25<--, 3.09<--, 3.14<--, 2.83<--, 2.68<--  Mode: AC/ CMV (Assist Control/ Continuous Mandatory Ventilation)  RR (machine): 26  TV (machine): 400  FiO2: 25  PEEP: 5  ITime: 1  MAP: 10  PIP: 27      Assessment     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

## 2023-12-31 NOTE — PROGRESS NOTE ADULT - SUBJECTIVE AND OBJECTIVE BOX
[facility] NEPHROLOGY SERVICES, [facility]  NEPHROLOGY AND HYPERTENSION  [address]  [address]  [place] [address]  [phone]    MD SOFY KEENAN, MD JIGNESH MCCARTY, MD LESA RAMIREZ, MD LEANNE MURRAY MD          Patient events noted    MEDICATIONS  (STANDING):  chlorhexidine 0.12% Liquid 15 milliLiter(s) Oral Mucosa every 12 hours  chlorhexidine 2% Cloths 1 Application(s) Topical <User Schedule>  collagenase Ointment 1 Application(s) Topical daily  dextrose 5% + sodium chloride 0.45%. 1000 milliLiter(s) (100 mL/Hr) IV Continuous <Continuous>  gabapentin 300 milliGRAM(s) Oral two times a day  heparin   Injectable 5000 Unit(s) SubCutaneous every 12 hours  mirtazapine 15 milliGRAM(s) Oral daily  polyethylene glycol 3350 17 Gram(s) Oral two times a day  senna 2 Tablet(s) Oral at bedtime    MEDICATIONS  (PRN):  acetaminophen     Tablet .. 650 milliGRAM(s) Oral every 6 hours PRN Temp greater or equal to 38C (100.4F), Mild Pain (1 - 3)  fentaNYL    Injectable 25 MICROGram(s) IV Push every 2 hours PRN Moderate Pain (4 - 6)      12-30-23 @ 07:01  -  12-31-23 @ 07:00  --------------------------------------------------------  IN: 2804.5 mL / OUT: 950 mL / NET: 1854.5 mL    12-31-23 @ 07:01  -  12-31-23 @ 21:19  --------------------------------------------------------  IN: 1580 mL / OUT: 600 mL / NET: 980 mL      PHYSICAL EXAM:      T(C): 35.3 (12-31-23 @ 20:00), Max: 36.5 (12-30-23 @ 23:43)  HR: 100 (12-31-23 @ 20:00) (80 - 112)  BP: 161/79 (12-31-23 @ 20:00) (131/81 - 182/71)  RR: 27 (12-31-23 @ 20:00) (19 - 27)  SpO2: 97% (12-31-23 @ 20:00) (96% - 100%)  Wt(kg): --  Lungs clear  Heart S1S2  Abd soft NT ND  Extremities:   tr edema                                    7.4    6.91  )-----------( 182      ( 30 Dec 2023 02:42 )             23.8     12-30    141  |  106  |  88<H>  ----------------------------<  69<L>  3.8   |  26  |  3.40<H>    Ca    8.7      30 Dec 2023 02:42  Phos  5.7     12-30  Mg     2.3     12-30    TPro  7.3  /  Alb  1.5<L>  /  TBili  0.4  /  DBili  x   /  AST  49<H>  /  ALT  14  /  AlkPhos  58  12-30      LIVER FUNCTIONS - ( 30 Dec 2023 02:42 )  Alb: 1.5 g/dL / Pro: 7.3 gm/dL / ALK PHOS: 58 U/L / ALT: 14 U/L / AST: 49 U/L / GGT: x           Creatinine Trend: 3.40<--, 3.25<--, 3.09<--, 3.14<--, 2.83<--, 2.68<--  Mode: AC/ CMV (Assist Control/ Continuous Mandatory Ventilation)  RR (machine): 26  TV (machine): 400  FiO2: 25  PEEP: 5  ITime: 1  MAP: 12  PIP: 28      Assessment     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; pre renal azotemia; risk for ischemic ATN  Hyperkalemia       Plan  Continue IVF  Avoid nephrotoxic mediations      Samy Noriega MD [facility] NEPHROLOGY SERVICES, [facility]  NEPHROLOGY AND HYPERTENSION  [address]  [address]  [place] [address]  [phone]    MD SOFY KEENAN, MD JIGNESH MCCARTY, MD LESA RAMIREZ, MD LEANNE MURRAY MD          Patient events noted    MEDICATIONS  (STANDING):  chlorhexidine 0.12% Liquid 15 milliLiter(s) Oral Mucosa every 12 hours  chlorhexidine 2% Cloths 1 Application(s) Topical <User Schedule>  collagenase Ointment 1 Application(s) Topical daily  dextrose 5% + sodium chloride 0.45%. 1000 milliLiter(s) (100 mL/Hr) IV Continuous <Continuous>  gabapentin 300 milliGRAM(s) Oral two times a day  heparin   Injectable 5000 Unit(s) SubCutaneous every 12 hours  mirtazapine 15 milliGRAM(s) Oral daily  polyethylene glycol 3350 17 Gram(s) Oral two times a day  senna 2 Tablet(s) Oral at bedtime    MEDICATIONS  (PRN):  acetaminophen     Tablet .. 650 milliGRAM(s) Oral every 6 hours PRN Temp greater or equal to 38C (100.4F), Mild Pain (1 - 3)  fentaNYL    Injectable 25 MICROGram(s) IV Push every 2 hours PRN Moderate Pain (4 - 6)      12-30-23 @ 07:01  -  12-31-23 @ 07:00  --------------------------------------------------------  IN: 2804.5 mL / OUT: 950 mL / NET: 1854.5 mL    12-31-23 @ 07:01  -  12-31-23 @ 21:19  --------------------------------------------------------  IN: 1580 mL / OUT: 600 mL / NET: 980 mL      PHYSICAL EXAM:      T(C): 35.3 (12-31-23 @ 20:00), Max: 36.5 (12-30-23 @ 23:43)  HR: 100 (12-31-23 @ 20:00) (80 - 112)  BP: 161/79 (12-31-23 @ 20:00) (131/81 - 182/71)  RR: 27 (12-31-23 @ 20:00) (19 - 27)  SpO2: 97% (12-31-23 @ 20:00) (96% - 100%)  Wt(kg): --  Lungs clear  Heart S1S2  Abd soft NT ND  Extremities:   tr edema                                    7.4    6.91  )-----------( 182      ( 30 Dec 2023 02:42 )             23.8     12-30    141  |  106  |  88<H>  ----------------------------<  69<L>  3.8   |  26  |  3.40<H>    Ca    8.7      30 Dec 2023 02:42  Phos  5.7     12-30  Mg     2.3     12-30    TPro  7.3  /  Alb  1.5<L>  /  TBili  0.4  /  DBili  x   /  AST  49<H>  /  ALT  14  /  AlkPhos  58  12-30      LIVER FUNCTIONS - ( 30 Dec 2023 02:42 )  Alb: 1.5 g/dL / Pro: 7.3 gm/dL / ALK PHOS: 58 U/L / ALT: 14 U/L / AST: 49 U/L / GGT: x           Creatinine Trend: 3.40<--, 3.25<--, 3.09<--, 3.14<--, 2.83<--, 2.68<--  Mode: AC/ CMV (Assist Control/ Continuous Mandatory Ventilation)  RR (machine): 26  TV (machine): 400  FiO2: 25  PEEP: 5  ITime: 1  MAP: 12  PIP: 28      Assessment     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; pre renal azotemia; risk for ischemic ATN  Hyperkalemia       Plan  Continue IVF  Avoid nephrotoxic mediations      Samy Noriega MD [facility] NEPHROLOGY SERVICES, [facility]  NEPHROLOGY AND HYPERTENSION  [address]  [address]  [place] [address]  [phone]    MD SOFY KEENAN, MD JIGNESH MCCARTY, MD LESA RAMIREZ, MD LEANNE MURRAY MD          Patient events noted    MEDICATIONS  (STANDING):  chlorhexidine 0.12% Liquid 15 milliLiter(s) Oral Mucosa every 12 hours  chlorhexidine 2% Cloths 1 Application(s) Topical <User Schedule>  collagenase Ointment 1 Application(s) Topical daily  dextrose 5% + sodium chloride 0.45%. 1000 milliLiter(s) (100 mL/Hr) IV Continuous <Continuous>  gabapentin 300 milliGRAM(s) Oral two times a day  heparin   Injectable 5000 Unit(s) SubCutaneous every 12 hours  mirtazapine 15 milliGRAM(s) Oral daily  polyethylene glycol 3350 17 Gram(s) Oral two times a day  senna 2 Tablet(s) Oral at bedtime    MEDICATIONS  (PRN):  acetaminophen     Tablet .. 650 milliGRAM(s) Oral every 6 hours PRN Temp greater or equal to 38C (100.4F), Mild Pain (1 - 3)  fentaNYL    Injectable 25 MICROGram(s) IV Push every 2 hours PRN Moderate Pain (4 - 6)      12-30-23 @ 07:01  -  12-31-23 @ 07:00  --------------------------------------------------------  IN: 2804.5 mL / OUT: 950 mL / NET: 1854.5 mL    12-31-23 @ 07:01  -  12-31-23 @ 21:19  --------------------------------------------------------  IN: 1580 mL / OUT: 600 mL / NET: 980 mL      PHYSICAL EXAM:      T(C): 35.3 (12-31-23 @ 20:00), Max: 36.5 (12-30-23 @ 23:43)  HR: 100 (12-31-23 @ 20:00) (80 - 112)  BP: 161/79 (12-31-23 @ 20:00) (131/81 - 182/71)  RR: 27 (12-31-23 @ 20:00) (19 - 27)  SpO2: 97% (12-31-23 @ 20:00) (96% - 100%)  Wt(kg): --  Lungs clear  Heart S1S2  Abd soft NT ND  Extremities:   tr edema                                    7.4    6.91  )-----------( 182      ( 30 Dec 2023 02:42 )             23.8     12-30    141  |  106  |  88<H>  ----------------------------<  69<L>  3.8   |  26  |  3.40<H>    Ca    8.7      30 Dec 2023 02:42  Phos  5.7     12-30  Mg     2.3     12-30    TPro  7.3  /  Alb  1.5<L>  /  TBili  0.4  /  DBili  x   /  AST  49<H>  /  ALT  14  /  AlkPhos  58  12-30      LIVER FUNCTIONS - ( 30 Dec 2023 02:42 )  Alb: 1.5 g/dL / Pro: 7.3 gm/dL / ALK PHOS: 58 U/L / ALT: 14 U/L / AST: 49 U/L / GGT: x           Creatinine Trend: 3.40<--, 3.25<--, 3.09<--, 3.14<--, 2.83<--, 2.68<--  Mode: AC/ CMV (Assist Control/ Continuous Mandatory Ventilation)  RR (machine): 26  TV (machine): 400  FiO2: 25  PEEP: 5  ITime: 1  MAP: 12  PIP: 28      Assessment     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 [facility] NEPHROLOGY SERVICES, [facility]  NEPHROLOGY AND HYPERTENSION  [address]  [address]  [place] [address]  [phone]    MD SOFY KEENAN, MD JIGNESH MCCARTY, MD LESA RAMIREZ, MD LEANNE MURRAY MD          Patient events noted    MEDICATIONS  (STANDING):  chlorhexidine 0.12% Liquid 15 milliLiter(s) Oral Mucosa every 12 hours  chlorhexidine 2% Cloths 1 Application(s) Topical <User Schedule>  collagenase Ointment 1 Application(s) Topical daily  dextrose 5% + sodium chloride 0.45%. 1000 milliLiter(s) (100 mL/Hr) IV Continuous <Continuous>  gabapentin 300 milliGRAM(s) Oral two times a day  heparin   Injectable 5000 Unit(s) SubCutaneous every 12 hours  mirtazapine 15 milliGRAM(s) Oral daily  polyethylene glycol 3350 17 Gram(s) Oral two times a day  senna 2 Tablet(s) Oral at bedtime    MEDICATIONS  (PRN):  acetaminophen     Tablet .. 650 milliGRAM(s) Oral every 6 hours PRN Temp greater or equal to 38C (100.4F), Mild Pain (1 - 3)  fentaNYL    Injectable 25 MICROGram(s) IV Push every 2 hours PRN Moderate Pain (4 - 6)      12-30-23 @ 07:01  -  12-31-23 @ 07:00  --------------------------------------------------------  IN: 2804.5 mL / OUT: 950 mL / NET: 1854.5 mL    12-31-23 @ 07:01  -  12-31-23 @ 21:19  --------------------------------------------------------  IN: 1580 mL / OUT: 600 mL / NET: 980 mL      PHYSICAL EXAM:      T(C): 35.3 (12-31-23 @ 20:00), Max: 36.5 (12-30-23 @ 23:43)  HR: 100 (12-31-23 @ 20:00) (80 - 112)  BP: 161/79 (12-31-23 @ 20:00) (131/81 - 182/71)  RR: 27 (12-31-23 @ 20:00) (19 - 27)  SpO2: 97% (12-31-23 @ 20:00) (96% - 100%)  Wt(kg): --  Lungs clear  Heart S1S2  Abd soft NT ND  Extremities:   tr edema                                    7.4    6.91  )-----------( 182      ( 30 Dec 2023 02:42 )             23.8     12-30    141  |  106  |  88<H>  ----------------------------<  69<L>  3.8   |  26  |  3.40<H>    Ca    8.7      30 Dec 2023 02:42  Phos  5.7     12-30  Mg     2.3     12-30    TPro  7.3  /  Alb  1.5<L>  /  TBili  0.4  /  DBili  x   /  AST  49<H>  /  ALT  14  /  AlkPhos  58  12-30      LIVER FUNCTIONS - ( 30 Dec 2023 02:42 )  Alb: 1.5 g/dL / Pro: 7.3 gm/dL / ALK PHOS: 58 U/L / ALT: 14 U/L / AST: 49 U/L / GGT: x           Creatinine Trend: 3.40<--, 3.25<--, 3.09<--, 3.14<--, 2.83<--, 2.68<--  Mode: AC/ CMV (Assist Control/ Continuous Mandatory Ventilation)  RR (machine): 26  TV (machine): 400  FiO2: 25  PEEP: 5  ITime: 1  MAP: 12  PIP: 28      Assessment     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

## 2023-12-31 NOTE — PROGRESS NOTE ADULT - SUBJECTIVE AND OBJECTIVE BOX
Postoperative Day #:2 s/p trache/PEG  Patient seen and examined bedside resting comfortably.        T(F): 96.6 (12-31-23 @ 07:00), Max: 97.7 (12-30-23 @ 23:43)  HR: 91 (12-31-23 @ 08:00) (70 - 100)  BP: 145/62 (12-31-23 @ 08:00) (112/89 - 189/116)  RR: 27 (12-31-23 @ 08:00) (17 - 32)  SpO2: 99% (12-31-23 @ 08:00) (95% - 100%)  Wt(kg): --  CAPILLARY BLOOD GLUCOSE      POCT Blood Glucose.: 138 mg/dL (31 Dec 2023 00:05)      PHYSICAL EXAM:  General: NAD  Neuro: awake and alert  Neck: tracheostomy in place, surgicel present with clot. no active bleeding  Lung: respirations nonlabored on vent  Abdomen: soft, NTND. PEG in place receiving tube feeds. guaze dressing changed   Extremities: no pedal edema or calf tenderness noted     LABS:                        7.4    6.91  )-----------( 182      ( 30 Dec 2023 02:42 )             23.8     12-30    141  |  106  |  88<H>  ----------------------------<  69<L>  3.8   |  26  |  3.40<H>    Ca    8.7      30 Dec 2023 02:42  Phos  5.7     12-30  Mg     2.3     12-30    TPro  7.3  /  Alb  1.5<L>  /  TBili  0.4  /  DBili  x   /  AST  49<H>  /  ALT  14  /  AlkPhos  58  12-30        I&O:     89 yo M with respiratory failure POD2 s/p trache/PEG. started tube feeds yesterday    Postoperative Day #:2 s/p trache/PEG  Patient seen and examined bedside resting comfortably.        T(F): 96.6 (12-31-23 @ 07:00), Max: 97.7 (12-30-23 @ 23:43)  HR: 91 (12-31-23 @ 08:00) (70 - 100)  BP: 145/62 (12-31-23 @ 08:00) (112/89 - 189/116)  RR: 27 (12-31-23 @ 08:00) (17 - 32)  SpO2: 99% (12-31-23 @ 08:00) (95% - 100%)  Wt(kg): --  CAPILLARY BLOOD GLUCOSE      POCT Blood Glucose.: 138 mg/dL (31 Dec 2023 00:05)      PHYSICAL EXAM:  General: NAD  Neuro: awake and alert  Neck: tracheostomy in place, surgicel present with clot. no active bleeding  Lung: respirations nonlabored on vent  Abdomen: soft, NTND. PEG in place receiving tube feeds. guaze dressing changed   Extremities: no pedal edema or calf tenderness noted     LABS:                        7.4    6.91  )-----------( 182      ( 30 Dec 2023 02:42 )             23.8     12-30    141  |  106  |  88<H>  ----------------------------<  69<L>  3.8   |  26  |  3.40<H>    Ca    8.7      30 Dec 2023 02:42  Phos  5.7     12-30  Mg     2.3     12-30    TPro  7.3  /  Alb  1.5<L>  /  TBili  0.4  /  DBili  x   /  AST  49<H>  /  ALT  14  /  AlkPhos  58  12-30        I&O:     89 yo M with respiratory failure POD2 s/p trache/PEG. started tube feeds yesterday   will remove surgicel later today  -continue care per primary team  -discussed with Dr Vigil    Postoperative Day #:2 s/p trache/PEG  Patient seen and examined bedside resting comfortably.        T(F): 96.6 (12-31-23 @ 07:00), Max: 97.7 (12-30-23 @ 23:43)  HR: 91 (12-31-23 @ 08:00) (70 - 100)  BP: 145/62 (12-31-23 @ 08:00) (112/89 - 189/116)  RR: 27 (12-31-23 @ 08:00) (17 - 32)  SpO2: 99% (12-31-23 @ 08:00) (95% - 100%)  Wt(kg): --  CAPILLARY BLOOD GLUCOSE      POCT Blood Glucose.: 138 mg/dL (31 Dec 2023 00:05)      PHYSICAL EXAM:  General: NAD  Neuro: awake and alert  Neck: tracheostomy in place, surgicel present with clot. no active bleeding  Lung: respirations nonlabored on vent  Abdomen: soft, NTND. PEG in place receiving tube feeds. guaze dressing changed   Extremities: no pedal edema or calf tenderness noted     LABS:                        7.4    6.91  )-----------( 182      ( 30 Dec 2023 02:42 )             23.8     12-30    141  |  106  |  88<H>  ----------------------------<  69<L>  3.8   |  26  |  3.40<H>    Ca    8.7      30 Dec 2023 02:42  Phos  5.7     12-30  Mg     2.3     12-30    TPro  7.3  /  Alb  1.5<L>  /  TBili  0.4  /  DBili  x   /  AST  49<H>  /  ALT  14  /  AlkPhos  58  12-30        I&O:     89 yo M with respiratory failure POD2 s/p trache/PEG. started tube feeds yesterday   will remove surgicel later today  -continue care per primary team  -discussed with Dr Vigil    Postoperative Day #:2 s/p trache/PEG  Patient seen and examined bedside resting comfortably.        T(F): 96.6 (12-31-23 @ 07:00), Max: 97.7 (12-30-23 @ 23:43)  HR: 91 (12-31-23 @ 08:00) (70 - 100)  BP: 145/62 (12-31-23 @ 08:00) (112/89 - 189/116)  RR: 27 (12-31-23 @ 08:00) (17 - 32)  SpO2: 99% (12-31-23 @ 08:00) (95% - 100%)  Wt(kg): --  CAPILLARY BLOOD GLUCOSE      POCT Blood Glucose.: 138 mg/dL (31 Dec 2023 00:05)      PHYSICAL EXAM:  General: NAD  Neuro: awake and alert  Neck: tracheostomy in place, surgicel present with clot. no active bleeding  Lung: respirations nonlabored on vent  Abdomen: soft, NTND. PEG in place receiving tube feeds. guaze dressing changed   Extremities: no pedal edema or calf tenderness noted     LABS:                        7.4    6.91  )-----------( 182      ( 30 Dec 2023 02:42 )             23.8     12-30    141  |  106  |  88<H>  ----------------------------<  69<L>  3.8   |  26  |  3.40<H>    Ca    8.7      30 Dec 2023 02:42  Phos  5.7     12-30  Mg     2.3     12-30    TPro  7.3  /  Alb  1.5<L>  /  TBili  0.4  /  DBili  x   /  AST  49<H>  /  ALT  14  /  AlkPhos  58  12-30        I&O:     89 yo M with respiratory failure POD2 s/p trache/PEG. started tube feeds yesterday   surgicel removed  -continue care per primary team  -discussed with Dr Vigil. will sign off, please reconsult PRN

## 2023-12-31 NOTE — PROGRESS NOTE ADULT - ASSESSMENT
89M w/ dementia, CKD, chronic urinary retention w/ obrien. Admitted w/ L pleural effusion thought to be due to mucous plugging. Course complicated by worsening hypoxemia requiring intubation and bronchoscopy x2 w/ MRSA pneumonia, stenotrophomonas. Extubated on 12/24 but required re-intubation. Extubated again but failed due to hypercapnia and hypoxia. s/p Trach / PEG.      acute hypoxic resp failure with pna   - s/p peg and trach   - Complete course Levaquin for Steno in sputum  - trend fever curve / wbc  - pulm follow up     ckd

## 2024-01-01 ENCOUNTER — NON-APPOINTMENT (OUTPATIENT)
Age: 89
End: 2024-01-01

## 2024-01-01 ENCOUNTER — TRANSCRIPTION ENCOUNTER (OUTPATIENT)
Age: 89
End: 2024-01-01

## 2024-01-01 VITALS — HEART RATE: 98 BPM | OXYGEN SATURATION: 88 %

## 2024-01-01 DIAGNOSIS — J18.9 PNEUMONIA, UNSPECIFIED ORGANISM: ICD-10-CM

## 2024-01-01 DIAGNOSIS — J98.11 ATELECTASIS: ICD-10-CM

## 2024-01-01 DIAGNOSIS — Z95.818 PRESENCE OF OTHER CARDIAC IMPLANTS AND GRAFTS: ICD-10-CM

## 2024-01-01 DIAGNOSIS — U07.1 COVID-19: ICD-10-CM

## 2024-01-01 DIAGNOSIS — R53.81 OTHER MALAISE: ICD-10-CM
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
## 2024-01-01 DIAGNOSIS — L89.153 PRESSURE ULCER OF SACRAL REGION, STAGE 3: ICD-10-CM

## 2024-01-01 DIAGNOSIS — N40.1 BENIGN PROSTATIC HYPERPLASIA WITH LOWER URINARY TRACT SYMPTOMS: ICD-10-CM

## 2024-01-01 DIAGNOSIS — N39.0 URINARY TRACT INFECTION, SITE NOT SPECIFIED: ICD-10-CM

## 2024-01-01 DIAGNOSIS — F03.90 UNSPECIFIED DEMENTIA WITHOUT BEHAVIORAL DISTURBANCE: ICD-10-CM

## 2024-01-01 DIAGNOSIS — N17.9 ACUTE KIDNEY FAILURE, UNSPECIFIED: ICD-10-CM

## 2024-01-01 DIAGNOSIS — D63.1 ANEMIA IN CHRONIC KIDNEY DISEASE: ICD-10-CM

## 2024-01-01 DIAGNOSIS — E43 UNSPECIFIED SEVERE PROTEIN-CALORIE MALNUTRITION: ICD-10-CM

## 2024-01-01 DIAGNOSIS — R06.02 SHORTNESS OF BREATH: ICD-10-CM

## 2024-01-01 DIAGNOSIS — R13.10 DYSPHAGIA, UNSPECIFIED: ICD-10-CM
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
## 2024-01-01 DIAGNOSIS — N18.30 CHRONIC KIDNEY DISEASE, STAGE 3 UNSPECIFIED: ICD-10-CM

## 2024-01-01 DIAGNOSIS — G93.40 ENCEPHALOPATHY, UNSPECIFIED: ICD-10-CM

## 2024-01-01 DIAGNOSIS — B95.62 METHICILLIN RESISTANT STAPHYLOCOCCUS AUREUS INFECTION AS THE CAUSE OF DISEASES CLASSIFIED ELSEWHERE: ICD-10-CM

## 2024-01-01 DIAGNOSIS — R33.8 OTHER RETENTION OF URINE: ICD-10-CM

## 2024-01-01 DIAGNOSIS — N13.30 UNSPECIFIED HYDRONEPHROSIS: ICD-10-CM

## 2024-01-01 DIAGNOSIS — K59.00 CONSTIPATION, UNSPECIFIED: ICD-10-CM

## 2024-01-01 DIAGNOSIS — J96.01 ACUTE RESPIRATORY FAILURE WITH HYPOXIA: ICD-10-CM

## 2024-01-01 DIAGNOSIS — E87.1 HYPO-OSMOLALITY AND HYPONATREMIA: ICD-10-CM

## 2024-01-01 DIAGNOSIS — A41.9 SEPSIS, UNSPECIFIED ORGANISM: ICD-10-CM

## 2024-01-01 DIAGNOSIS — T83.511A INFECTION AND INFLAMMATORY REACTION DUE TO INDWELLING URETHRAL CATHETER, INITIAL ENCOUNTER: ICD-10-CM

## 2024-01-01 DIAGNOSIS — I12.9 HYPERTENSIVE CHRONIC KIDNEY DISEASE WITH STAGE 1 THROUGH STAGE 4 CHRONIC KIDNEY DISEASE, OR UNSPECIFIED CHRONIC KIDNEY DISEASE: ICD-10-CM

## 2024-01-01 DIAGNOSIS — R62.7 ADULT FAILURE TO THRIVE: ICD-10-CM

## 2024-01-01 DIAGNOSIS — E87.29 OTHER ACIDOSIS: ICD-10-CM

## 2024-01-01 DIAGNOSIS — J96.02 ACUTE RESPIRATORY FAILURE WITH HYPERCAPNIA: ICD-10-CM

## 2024-01-01 LAB
ALBUMIN SERPL ELPH-MCNC: 1.4 G/DL — LOW (ref 3.3–5)
ALBUMIN SERPL ELPH-MCNC: 1.5 G/DL — LOW (ref 3.3–5)
ALBUMIN SERPL ELPH-MCNC: 1.5 G/DL — LOW (ref 3.3–5)
ALBUMIN SERPL ELPH-MCNC: 1.6 G/DL — LOW (ref 3.3–5)
ALBUMIN SERPL ELPH-MCNC: 1.6 G/DL — LOW (ref 3.3–5)
ALBUMIN SERPL ELPH-MCNC: 1.7 G/DL — LOW (ref 3.3–5)
ALP SERPL-CCNC: 60 U/L — SIGNIFICANT CHANGE UP (ref 40–120)
ALP SERPL-CCNC: 62 U/L — SIGNIFICANT CHANGE UP (ref 40–120)
ALP SERPL-CCNC: 62 U/L — SIGNIFICANT CHANGE UP (ref 40–120)
ALP SERPL-CCNC: 73 U/L — SIGNIFICANT CHANGE UP (ref 40–120)
ALP SERPL-CCNC: 73 U/L — SIGNIFICANT CHANGE UP (ref 40–120)
ALP SERPL-CCNC: 74 U/L — SIGNIFICANT CHANGE UP (ref 40–120)
ALP SERPL-CCNC: 74 U/L — SIGNIFICANT CHANGE UP (ref 40–120)
ALP SERPL-CCNC: 78 U/L — SIGNIFICANT CHANGE UP (ref 40–120)
ALP SERPL-CCNC: 78 U/L — SIGNIFICANT CHANGE UP (ref 40–120)
ALT FLD-CCNC: 10 U/L — LOW (ref 12–78)
ALT FLD-CCNC: 10 U/L — LOW (ref 12–78)
ALT FLD-CCNC: 11 U/L — LOW (ref 12–78)
ALT FLD-CCNC: 11 U/L — LOW (ref 12–78)
ALT FLD-CCNC: 14 U/L — SIGNIFICANT CHANGE UP (ref 12–78)
ALT FLD-CCNC: 15 U/L — SIGNIFICANT CHANGE UP (ref 12–78)
ALT FLD-CCNC: 15 U/L — SIGNIFICANT CHANGE UP (ref 12–78)
ALT FLD-CCNC: 7 U/L — LOW (ref 12–78)
ALT FLD-CCNC: 7 U/L — LOW (ref 12–78)
ANION GAP SERPL CALC-SCNC: 10 MMOL/L — SIGNIFICANT CHANGE UP (ref 5–17)
ANION GAP SERPL CALC-SCNC: 10 MMOL/L — SIGNIFICANT CHANGE UP (ref 5–17)
ANION GAP SERPL CALC-SCNC: 11 MMOL/L — SIGNIFICANT CHANGE UP (ref 5–17)
ANION GAP SERPL CALC-SCNC: 5 MMOL/L — SIGNIFICANT CHANGE UP (ref 5–17)
ANION GAP SERPL CALC-SCNC: 5 MMOL/L — SIGNIFICANT CHANGE UP (ref 5–17)
ANION GAP SERPL CALC-SCNC: 6 MMOL/L — SIGNIFICANT CHANGE UP (ref 5–17)
ANION GAP SERPL CALC-SCNC: 7 MMOL/L — SIGNIFICANT CHANGE UP (ref 5–17)
ANION GAP SERPL CALC-SCNC: 8 MMOL/L — SIGNIFICANT CHANGE UP (ref 5–17)
ANION GAP SERPL CALC-SCNC: 9 MMOL/L — SIGNIFICANT CHANGE UP (ref 5–17)
APPEARANCE UR: CLEAR — SIGNIFICANT CHANGE UP
APPEARANCE UR: CLEAR — SIGNIFICANT CHANGE UP
AST SERPL-CCNC: 20 U/L — SIGNIFICANT CHANGE UP (ref 15–37)
AST SERPL-CCNC: 20 U/L — SIGNIFICANT CHANGE UP (ref 15–37)
AST SERPL-CCNC: 21 U/L — SIGNIFICANT CHANGE UP (ref 15–37)
AST SERPL-CCNC: 21 U/L — SIGNIFICANT CHANGE UP (ref 15–37)
AST SERPL-CCNC: 25 U/L — SIGNIFICANT CHANGE UP (ref 15–37)
AST SERPL-CCNC: 25 U/L — SIGNIFICANT CHANGE UP (ref 15–37)
AST SERPL-CCNC: 27 U/L — SIGNIFICANT CHANGE UP (ref 15–37)
AST SERPL-CCNC: 29 U/L — SIGNIFICANT CHANGE UP (ref 15–37)
AST SERPL-CCNC: 29 U/L — SIGNIFICANT CHANGE UP (ref 15–37)
BACTERIA # UR AUTO: ABNORMAL /HPF
BACTERIA # UR AUTO: ABNORMAL /HPF
BASOPHILS # BLD AUTO: 0.01 K/UL — SIGNIFICANT CHANGE UP (ref 0–0.2)
BASOPHILS # BLD AUTO: 0.02 K/UL — SIGNIFICANT CHANGE UP (ref 0–0.2)
BASOPHILS NFR BLD AUTO: 0.1 % — SIGNIFICANT CHANGE UP (ref 0–2)
BASOPHILS NFR BLD AUTO: 0.3 % — SIGNIFICANT CHANGE UP (ref 0–2)
BILIRUB SERPL-MCNC: 0.2 MG/DL — SIGNIFICANT CHANGE UP (ref 0.2–1.2)
BILIRUB SERPL-MCNC: 0.2 MG/DL — SIGNIFICANT CHANGE UP (ref 0.2–1.2)
BILIRUB SERPL-MCNC: 0.3 MG/DL — SIGNIFICANT CHANGE UP (ref 0.2–1.2)
BILIRUB SERPL-MCNC: 0.4 MG/DL — SIGNIFICANT CHANGE UP (ref 0.2–1.2)
BILIRUB SERPL-MCNC: 0.4 MG/DL — SIGNIFICANT CHANGE UP (ref 0.2–1.2)
BILIRUB UR-MCNC: NEGATIVE — SIGNIFICANT CHANGE UP
BILIRUB UR-MCNC: NEGATIVE — SIGNIFICANT CHANGE UP
BLD GP AB SCN SERPL QL: SIGNIFICANT CHANGE UP
BUN SERPL-MCNC: 104 MG/DL — HIGH (ref 7–23)
BUN SERPL-MCNC: 104 MG/DL — HIGH (ref 7–23)
BUN SERPL-MCNC: 122 MG/DL — HIGH (ref 7–23)
BUN SERPL-MCNC: 122 MG/DL — HIGH (ref 7–23)
BUN SERPL-MCNC: 124 MG/DL — HIGH (ref 7–23)
BUN SERPL-MCNC: 124 MG/DL — HIGH (ref 7–23)
BUN SERPL-MCNC: 128 MG/DL — HIGH (ref 7–23)
BUN SERPL-MCNC: 128 MG/DL — HIGH (ref 7–23)
BUN SERPL-MCNC: 137 MG/DL — HIGH (ref 7–23)
BUN SERPL-MCNC: 138 MG/DL — HIGH (ref 7–23)
BUN SERPL-MCNC: 141 MG/DL — HIGH (ref 7–23)
BUN SERPL-MCNC: 142 MG/DL — HIGH (ref 7–23)
BUN SERPL-MCNC: 142 MG/DL — HIGH (ref 7–23)
BUN SERPL-MCNC: 143 MG/DL — HIGH (ref 7–23)
BUN SERPL-MCNC: 143 MG/DL — HIGH (ref 7–23)
BUN SERPL-MCNC: 144 MG/DL — HIGH (ref 7–23)
BUN SERPL-MCNC: 78 MG/DL — HIGH (ref 7–23)
BUN SERPL-MCNC: 78 MG/DL — HIGH (ref 7–23)
BUN SERPL-MCNC: 84 MG/DL — HIGH (ref 7–23)
BUN SERPL-MCNC: 84 MG/DL — HIGH (ref 7–23)
BUN SERPL-MCNC: 86 MG/DL — HIGH (ref 7–23)
BUN SERPL-MCNC: 86 MG/DL — HIGH (ref 7–23)
BUN SERPL-MCNC: 98 MG/DL — HIGH (ref 7–23)
BUN SERPL-MCNC: 98 MG/DL — HIGH (ref 7–23)
CALCIUM SERPL-MCNC: 7.6 MG/DL — LOW (ref 8.5–10.1)
CALCIUM SERPL-MCNC: 7.9 MG/DL — LOW (ref 8.5–10.1)
CALCIUM SERPL-MCNC: 8 MG/DL — LOW (ref 8.5–10.1)
CALCIUM SERPL-MCNC: 8 MG/DL — LOW (ref 8.5–10.1)
CALCIUM SERPL-MCNC: 8.5 MG/DL — SIGNIFICANT CHANGE UP (ref 8.5–10.1)
CALCIUM SERPL-MCNC: 8.5 MG/DL — SIGNIFICANT CHANGE UP (ref 8.5–10.1)
CALCIUM SERPL-MCNC: 8.6 MG/DL — SIGNIFICANT CHANGE UP (ref 8.5–10.1)
CALCIUM SERPL-MCNC: 8.6 MG/DL — SIGNIFICANT CHANGE UP (ref 8.5–10.1)
CALCIUM SERPL-MCNC: 8.7 MG/DL — SIGNIFICANT CHANGE UP (ref 8.5–10.1)
CALCIUM SERPL-MCNC: 8.9 MG/DL — SIGNIFICANT CHANGE UP (ref 8.5–10.1)
CALCIUM SERPL-MCNC: 9 MG/DL — SIGNIFICANT CHANGE UP (ref 8.5–10.1)
CALCIUM SERPL-MCNC: 9.1 MG/DL — SIGNIFICANT CHANGE UP (ref 8.5–10.1)
CALCIUM SERPL-MCNC: 9.1 MG/DL — SIGNIFICANT CHANGE UP (ref 8.5–10.1)
CALCIUM SERPL-MCNC: 9.2 MG/DL — SIGNIFICANT CHANGE UP (ref 8.5–10.1)
CALCIUM SERPL-MCNC: 9.2 MG/DL — SIGNIFICANT CHANGE UP (ref 8.5–10.1)
CALCIUM SERPL-MCNC: 9.4 MG/DL — SIGNIFICANT CHANGE UP (ref 8.5–10.1)
CALCIUM SERPL-MCNC: 9.4 MG/DL — SIGNIFICANT CHANGE UP (ref 8.5–10.1)
CHLORIDE SERPL-SCNC: 100 MMOL/L — SIGNIFICANT CHANGE UP (ref 96–108)
CHLORIDE SERPL-SCNC: 105 MMOL/L — SIGNIFICANT CHANGE UP (ref 96–108)
CHLORIDE SERPL-SCNC: 105 MMOL/L — SIGNIFICANT CHANGE UP (ref 96–108)
CHLORIDE SERPL-SCNC: 107 MMOL/L — SIGNIFICANT CHANGE UP (ref 96–108)
CHLORIDE SERPL-SCNC: 107 MMOL/L — SIGNIFICANT CHANGE UP (ref 96–108)
CHLORIDE SERPL-SCNC: 110 MMOL/L — HIGH (ref 96–108)
CHLORIDE SERPL-SCNC: 111 MMOL/L — HIGH (ref 96–108)
CHLORIDE SERPL-SCNC: 112 MMOL/L — HIGH (ref 96–108)
CHLORIDE SERPL-SCNC: 112 MMOL/L — HIGH (ref 96–108)
CHLORIDE SERPL-SCNC: 113 MMOL/L — HIGH (ref 96–108)
CHLORIDE SERPL-SCNC: 113 MMOL/L — HIGH (ref 96–108)
CHLORIDE SERPL-SCNC: 115 MMOL/L — HIGH (ref 96–108)
CHLORIDE SERPL-SCNC: 115 MMOL/L — HIGH (ref 96–108)
CHLORIDE SERPL-SCNC: 116 MMOL/L — HIGH (ref 96–108)
CHLORIDE SERPL-SCNC: 118 MMOL/L — HIGH (ref 96–108)
CHLORIDE SERPL-SCNC: 99 MMOL/L — SIGNIFICANT CHANGE UP (ref 96–108)
CHLORIDE UR-SCNC: 65 MMOL/L — SIGNIFICANT CHANGE UP
CHLORIDE UR-SCNC: 65 MMOL/L — SIGNIFICANT CHANGE UP
CO2 SERPL-SCNC: 18 MMOL/L — LOW (ref 22–31)
CO2 SERPL-SCNC: 19 MMOL/L — LOW (ref 22–31)
CO2 SERPL-SCNC: 19 MMOL/L — LOW (ref 22–31)
CO2 SERPL-SCNC: 21 MMOL/L — LOW (ref 22–31)
CO2 SERPL-SCNC: 23 MMOL/L — SIGNIFICANT CHANGE UP (ref 22–31)
CO2 SERPL-SCNC: 24 MMOL/L — SIGNIFICANT CHANGE UP (ref 22–31)
CO2 SERPL-SCNC: 25 MMOL/L — SIGNIFICANT CHANGE UP (ref 22–31)
CO2 SERPL-SCNC: 26 MMOL/L — SIGNIFICANT CHANGE UP (ref 22–31)
CO2 SERPL-SCNC: 26 MMOL/L — SIGNIFICANT CHANGE UP (ref 22–31)
COD CRY URNS QL: SIGNIFICANT CHANGE UP
COD CRY URNS QL: SIGNIFICANT CHANGE UP
COLOR SPEC: YELLOW — SIGNIFICANT CHANGE UP
COLOR SPEC: YELLOW — SIGNIFICANT CHANGE UP
CREAT ?TM UR-MCNC: 16 MG/DL — SIGNIFICANT CHANGE UP
CREAT ?TM UR-MCNC: 16 MG/DL — SIGNIFICANT CHANGE UP
CREAT SERPL-MCNC: 3.6 MG/DL — HIGH (ref 0.5–1.3)
CREAT SERPL-MCNC: 3.6 MG/DL — HIGH (ref 0.5–1.3)
CREAT SERPL-MCNC: 3.83 MG/DL — HIGH (ref 0.5–1.3)
CREAT SERPL-MCNC: 3.83 MG/DL — HIGH (ref 0.5–1.3)
CREAT SERPL-MCNC: 4 MG/DL — HIGH (ref 0.5–1.3)
CREAT SERPL-MCNC: 4 MG/DL — HIGH (ref 0.5–1.3)
CREAT SERPL-MCNC: 4.38 MG/DL — HIGH (ref 0.5–1.3)
CREAT SERPL-MCNC: 4.38 MG/DL — HIGH (ref 0.5–1.3)
CREAT SERPL-MCNC: 4.43 MG/DL — HIGH (ref 0.5–1.3)
CREAT SERPL-MCNC: 4.43 MG/DL — HIGH (ref 0.5–1.3)
CREAT SERPL-MCNC: 4.74 MG/DL — HIGH (ref 0.5–1.3)
CREAT SERPL-MCNC: 4.75 MG/DL — HIGH (ref 0.5–1.3)
CREAT SERPL-MCNC: 4.75 MG/DL — HIGH (ref 0.5–1.3)
CREAT SERPL-MCNC: 4.88 MG/DL — HIGH (ref 0.5–1.3)
CREAT SERPL-MCNC: 4.9 MG/DL — HIGH (ref 0.5–1.3)
CREAT SERPL-MCNC: 4.9 MG/DL — HIGH (ref 0.5–1.3)
CREAT SERPL-MCNC: 5.03 MG/DL — HIGH (ref 0.5–1.3)
CREAT SERPL-MCNC: 5.03 MG/DL — HIGH (ref 0.5–1.3)
CREAT SERPL-MCNC: 5.07 MG/DL — HIGH (ref 0.5–1.3)
CREAT SERPL-MCNC: 5.07 MG/DL — HIGH (ref 0.5–1.3)
CREAT SERPL-MCNC: 5.11 MG/DL — HIGH (ref 0.5–1.3)
CREAT SERPL-MCNC: 5.11 MG/DL — HIGH (ref 0.5–1.3)
CREAT SERPL-MCNC: 5.36 MG/DL — HIGH (ref 0.5–1.3)
CREAT SERPL-MCNC: 5.36 MG/DL — HIGH (ref 0.5–1.3)
CREAT SERPL-MCNC: 5.48 MG/DL — HIGH (ref 0.5–1.3)
CREAT SERPL-MCNC: 5.48 MG/DL — HIGH (ref 0.5–1.3)
CREAT SERPL-MCNC: 5.49 MG/DL — HIGH (ref 0.5–1.3)
CREAT SERPL-MCNC: 5.49 MG/DL — HIGH (ref 0.5–1.3)
CREAT SERPL-MCNC: 5.55 MG/DL — HIGH (ref 0.5–1.3)
CREAT SERPL-MCNC: 5.55 MG/DL — HIGH (ref 0.5–1.3)
CULTURE RESULTS: ABNORMAL
CULTURE RESULTS: ABNORMAL
CULTURE RESULTS: SIGNIFICANT CHANGE UP
DIFF PNL FLD: ABNORMAL
DIFF PNL FLD: ABNORMAL
EGFR: 10 ML/MIN/1.73M2 — LOW
EGFR: 11 ML/MIN/1.73M2 — LOW
EGFR: 12 ML/MIN/1.73M2 — LOW
EGFR: 13 ML/MIN/1.73M2 — LOW
EGFR: 13 ML/MIN/1.73M2 — LOW
EGFR: 14 ML/MIN/1.73M2 — LOW
EGFR: 14 ML/MIN/1.73M2 — LOW
EGFR: 15 ML/MIN/1.73M2 — LOW
EGFR: 15 ML/MIN/1.73M2 — LOW
EGFR: 9 ML/MIN/1.73M2 — LOW
EOSINOPHIL # BLD AUTO: 0.5 K/UL — SIGNIFICANT CHANGE UP (ref 0–0.5)
EOSINOPHIL # BLD AUTO: 0.76 K/UL — HIGH (ref 0–0.5)
EOSINOPHIL # BLD AUTO: 0.76 K/UL — HIGH (ref 0–0.5)
EOSINOPHIL # BLD AUTO: 0.83 K/UL — HIGH (ref 0–0.5)
EOSINOPHIL # BLD AUTO: 0.83 K/UL — HIGH (ref 0–0.5)
EOSINOPHIL # BLD AUTO: 0.87 K/UL — HIGH (ref 0–0.5)
EOSINOPHIL # BLD AUTO: 0.87 K/UL — HIGH (ref 0–0.5)
EOSINOPHIL NFR BLD AUTO: 11.6 % — HIGH (ref 0–6)
EOSINOPHIL NFR BLD AUTO: 11.6 % — HIGH (ref 0–6)
EOSINOPHIL NFR BLD AUTO: 13.2 % — HIGH (ref 0–6)
EOSINOPHIL NFR BLD AUTO: 13.2 % — HIGH (ref 0–6)
EOSINOPHIL NFR BLD AUTO: 13.7 % — HIGH (ref 0–6)
EOSINOPHIL NFR BLD AUTO: 13.7 % — HIGH (ref 0–6)
EOSINOPHIL NFR BLD AUTO: 5.7 % — SIGNIFICANT CHANGE UP (ref 0–6)
EOSINOPHIL NFR BLD AUTO: 7.7 % — HIGH (ref 0–6)
EOSINOPHIL NFR BLD AUTO: 7.7 % — HIGH (ref 0–6)
EPI CELLS # UR: PRESENT
EPI CELLS # UR: PRESENT
GLUCOSE BLDC GLUCOMTR-MCNC: 122 MG/DL — HIGH (ref 70–99)
GLUCOSE BLDC GLUCOMTR-MCNC: 122 MG/DL — HIGH (ref 70–99)
GLUCOSE BLDC GLUCOMTR-MCNC: 124 MG/DL — HIGH (ref 70–99)
GLUCOSE BLDC GLUCOMTR-MCNC: 124 MG/DL — HIGH (ref 70–99)
GLUCOSE BLDC GLUCOMTR-MCNC: 142 MG/DL — HIGH (ref 70–99)
GLUCOSE BLDC GLUCOMTR-MCNC: 142 MG/DL — HIGH (ref 70–99)
GLUCOSE SERPL-MCNC: 101 MG/DL — HIGH (ref 70–99)
GLUCOSE SERPL-MCNC: 102 MG/DL — HIGH (ref 70–99)
GLUCOSE SERPL-MCNC: 102 MG/DL — HIGH (ref 70–99)
GLUCOSE SERPL-MCNC: 103 MG/DL — HIGH (ref 70–99)
GLUCOSE SERPL-MCNC: 103 MG/DL — HIGH (ref 70–99)
GLUCOSE SERPL-MCNC: 106 MG/DL — HIGH (ref 70–99)
GLUCOSE SERPL-MCNC: 106 MG/DL — HIGH (ref 70–99)
GLUCOSE SERPL-MCNC: 110 MG/DL — HIGH (ref 70–99)
GLUCOSE SERPL-MCNC: 112 MG/DL — HIGH (ref 70–99)
GLUCOSE SERPL-MCNC: 112 MG/DL — HIGH (ref 70–99)
GLUCOSE SERPL-MCNC: 113 MG/DL — HIGH (ref 70–99)
GLUCOSE SERPL-MCNC: 113 MG/DL — HIGH (ref 70–99)
GLUCOSE SERPL-MCNC: 120 MG/DL — HIGH (ref 70–99)
GLUCOSE SERPL-MCNC: 120 MG/DL — HIGH (ref 70–99)
GLUCOSE SERPL-MCNC: 121 MG/DL — HIGH (ref 70–99)
GLUCOSE SERPL-MCNC: 121 MG/DL — HIGH (ref 70–99)
GLUCOSE SERPL-MCNC: 122 MG/DL — HIGH (ref 70–99)
GLUCOSE SERPL-MCNC: 127 MG/DL — HIGH (ref 70–99)
GLUCOSE SERPL-MCNC: 139 MG/DL — HIGH (ref 70–99)
GLUCOSE SERPL-MCNC: 139 MG/DL — HIGH (ref 70–99)
GLUCOSE SERPL-MCNC: 176 MG/DL — HIGH (ref 70–99)
GLUCOSE SERPL-MCNC: 176 MG/DL — HIGH (ref 70–99)
GLUCOSE SERPL-MCNC: 87 MG/DL — SIGNIFICANT CHANGE UP (ref 70–99)
GLUCOSE SERPL-MCNC: 87 MG/DL — SIGNIFICANT CHANGE UP (ref 70–99)
GLUCOSE UR QL: NEGATIVE MG/DL — SIGNIFICANT CHANGE UP
GLUCOSE UR QL: NEGATIVE MG/DL — SIGNIFICANT CHANGE UP
GRAM STN FLD: ABNORMAL
HCT VFR BLD CALC: 19.1 % — CRITICAL LOW (ref 39–50)
HCT VFR BLD CALC: 19.1 % — CRITICAL LOW (ref 39–50)
HCT VFR BLD CALC: 20.2 % — CRITICAL LOW (ref 39–50)
HCT VFR BLD CALC: 20.2 % — CRITICAL LOW (ref 39–50)
HCT VFR BLD CALC: 20.4 % — CRITICAL LOW (ref 39–50)
HCT VFR BLD CALC: 20.4 % — CRITICAL LOW (ref 39–50)
HCT VFR BLD CALC: 22.3 % — LOW (ref 39–50)
HCT VFR BLD CALC: 22.3 % — LOW (ref 39–50)
HCT VFR BLD CALC: 22.5 % — LOW (ref 39–50)
HCT VFR BLD CALC: 22.5 % — LOW (ref 39–50)
HCT VFR BLD CALC: 22.6 % — LOW (ref 39–50)
HCT VFR BLD CALC: 22.6 % — LOW (ref 39–50)
HCT VFR BLD CALC: 23.5 % — LOW (ref 39–50)
HCT VFR BLD CALC: 23.5 % — LOW (ref 39–50)
HCT VFR BLD CALC: 23.9 % — LOW (ref 39–50)
HCT VFR BLD CALC: 23.9 % — LOW (ref 39–50)
HCT VFR BLD CALC: 24 % — LOW (ref 39–50)
HCT VFR BLD CALC: 24.1 % — LOW (ref 39–50)
HCT VFR BLD CALC: 24.1 % — LOW (ref 39–50)
HCT VFR BLD CALC: 25 % — LOW (ref 39–50)
HCT VFR BLD CALC: 25 % — LOW (ref 39–50)
HCT VFR BLD CALC: 25.1 % — LOW (ref 39–50)
HCV RNA FLD QL NAA+PROBE: SIGNIFICANT CHANGE UP
HCV RNA SPEC NAA+PROBE-LOG IU: SIGNIFICANT CHANGE UP
HCV RNA SPEC NAA+PROBE-LOG IU: SIGNIFICANT CHANGE UP LOGIU/ML
HCV RNA SPEC QL PROBE+SIG AMP: SIGNIFICANT CHANGE UP
HGB BLD-MCNC: 6.1 G/DL — CRITICAL LOW (ref 13–17)
HGB BLD-MCNC: 6.1 G/DL — CRITICAL LOW (ref 13–17)
HGB BLD-MCNC: 6.6 G/DL — CRITICAL LOW (ref 13–17)
HGB BLD-MCNC: 6.6 G/DL — CRITICAL LOW (ref 13–17)
HGB BLD-MCNC: 6.7 G/DL — CRITICAL LOW (ref 13–17)
HGB BLD-MCNC: 6.7 G/DL — CRITICAL LOW (ref 13–17)
HGB BLD-MCNC: 7.1 G/DL — LOW (ref 13–17)
HGB BLD-MCNC: 7.1 G/DL — LOW (ref 13–17)
HGB BLD-MCNC: 7.2 G/DL — LOW (ref 13–17)
HGB BLD-MCNC: 7.2 G/DL — LOW (ref 13–17)
HGB BLD-MCNC: 7.5 G/DL — LOW (ref 13–17)
HGB BLD-MCNC: 7.5 G/DL — LOW (ref 13–17)
HGB BLD-MCNC: 7.6 G/DL — LOW (ref 13–17)
HGB BLD-MCNC: 7.6 G/DL — LOW (ref 13–17)
HGB BLD-MCNC: 7.7 G/DL — LOW (ref 13–17)
HGB BLD-MCNC: 7.8 G/DL — LOW (ref 13–17)
HGB BLD-MCNC: 7.8 G/DL — LOW (ref 13–17)
HGB BLD-MCNC: 8 G/DL — LOW (ref 13–17)
HGB BLD-MCNC: 8 G/DL — LOW (ref 13–17)
HGB BLD-MCNC: 8.1 G/DL — LOW (ref 13–17)
HGB BLD-MCNC: 8.1 G/DL — LOW (ref 13–17)
HGB BLD-MCNC: 8.2 G/DL — LOW (ref 13–17)
HIV 1 & 2 AB SERPL IA.RAPID: SIGNIFICANT CHANGE UP
IMM GRANULOCYTES NFR BLD AUTO: 0.3 % — SIGNIFICANT CHANGE UP (ref 0–0.9)
IMM GRANULOCYTES NFR BLD AUTO: 0.3 % — SIGNIFICANT CHANGE UP (ref 0–0.9)
IMM GRANULOCYTES NFR BLD AUTO: 0.5 % — SIGNIFICANT CHANGE UP (ref 0–0.9)
IMM GRANULOCYTES NFR BLD AUTO: 0.6 % — SIGNIFICANT CHANGE UP (ref 0–0.9)
KETONES UR-MCNC: NEGATIVE MG/DL — SIGNIFICANT CHANGE UP
KETONES UR-MCNC: NEGATIVE MG/DL — SIGNIFICANT CHANGE UP
LEUKOCYTE ESTERASE UR-ACNC: ABNORMAL
LEUKOCYTE ESTERASE UR-ACNC: ABNORMAL
LYMPHOCYTES # BLD AUTO: 0.55 K/UL — LOW (ref 1–3.3)
LYMPHOCYTES # BLD AUTO: 0.55 K/UL — LOW (ref 1–3.3)
LYMPHOCYTES # BLD AUTO: 0.56 K/UL — LOW (ref 1–3.3)
LYMPHOCYTES # BLD AUTO: 0.63 K/UL — LOW (ref 1–3.3)
LYMPHOCYTES # BLD AUTO: 0.63 K/UL — LOW (ref 1–3.3)
LYMPHOCYTES # BLD AUTO: 0.69 K/UL — LOW (ref 1–3.3)
LYMPHOCYTES # BLD AUTO: 10 % — LOW (ref 13–44)
LYMPHOCYTES # BLD AUTO: 10 % — LOW (ref 13–44)
LYMPHOCYTES # BLD AUTO: 10.5 % — LOW (ref 13–44)
LYMPHOCYTES # BLD AUTO: 10.5 % — LOW (ref 13–44)
LYMPHOCYTES # BLD AUTO: 10.6 % — LOW (ref 13–44)
LYMPHOCYTES # BLD AUTO: 10.6 % — LOW (ref 13–44)
LYMPHOCYTES # BLD AUTO: 6.4 % — LOW (ref 13–44)
LYMPHOCYTES # BLD AUTO: 8.6 % — LOW (ref 13–44)
LYMPHOCYTES # BLD AUTO: 8.6 % — LOW (ref 13–44)
MAGNESIUM SERPL-MCNC: 2.3 MG/DL — SIGNIFICANT CHANGE UP (ref 1.6–2.6)
MAGNESIUM SERPL-MCNC: 2.3 MG/DL — SIGNIFICANT CHANGE UP (ref 1.6–2.6)
MAGNESIUM SERPL-MCNC: 2.8 MG/DL — HIGH (ref 1.6–2.6)
MAGNESIUM SERPL-MCNC: 2.8 MG/DL — HIGH (ref 1.6–2.6)
MAGNESIUM SERPL-MCNC: 3.3 MG/DL — HIGH (ref 1.6–2.6)
MAGNESIUM SERPL-MCNC: 3.3 MG/DL — HIGH (ref 1.6–2.6)
MAGNESIUM SERPL-MCNC: 3.4 MG/DL — HIGH (ref 1.6–2.6)
MCHC RBC-ENTMCNC: 28.5 PG — SIGNIFICANT CHANGE UP (ref 27–34)
MCHC RBC-ENTMCNC: 28.7 PG — SIGNIFICANT CHANGE UP (ref 27–34)
MCHC RBC-ENTMCNC: 28.8 PG — SIGNIFICANT CHANGE UP (ref 27–34)
MCHC RBC-ENTMCNC: 28.9 PG — SIGNIFICANT CHANGE UP (ref 27–34)
MCHC RBC-ENTMCNC: 28.9 PG — SIGNIFICANT CHANGE UP (ref 27–34)
MCHC RBC-ENTMCNC: 29 PG — SIGNIFICANT CHANGE UP (ref 27–34)
MCHC RBC-ENTMCNC: 29 PG — SIGNIFICANT CHANGE UP (ref 27–34)
MCHC RBC-ENTMCNC: 29.1 PG — SIGNIFICANT CHANGE UP (ref 27–34)
MCHC RBC-ENTMCNC: 29.1 PG — SIGNIFICANT CHANGE UP (ref 27–34)
MCHC RBC-ENTMCNC: 29.2 PG — SIGNIFICANT CHANGE UP (ref 27–34)
MCHC RBC-ENTMCNC: 29.3 PG — SIGNIFICANT CHANGE UP (ref 27–34)
MCHC RBC-ENTMCNC: 29.3 PG — SIGNIFICANT CHANGE UP (ref 27–34)
MCHC RBC-ENTMCNC: 29.5 PG — SIGNIFICANT CHANGE UP (ref 27–34)
MCHC RBC-ENTMCNC: 29.5 PG — SIGNIFICANT CHANGE UP (ref 27–34)
MCHC RBC-ENTMCNC: 31.8 G/DL — LOW (ref 32–36)
MCHC RBC-ENTMCNC: 31.8 G/DL — LOW (ref 32–36)
MCHC RBC-ENTMCNC: 31.9 G/DL — LOW (ref 32–36)
MCHC RBC-ENTMCNC: 31.9 G/DL — LOW (ref 32–36)
MCHC RBC-ENTMCNC: 32 G/DL — SIGNIFICANT CHANGE UP (ref 32–36)
MCHC RBC-ENTMCNC: 32.2 G/DL — SIGNIFICANT CHANGE UP (ref 32–36)
MCHC RBC-ENTMCNC: 32.2 G/DL — SIGNIFICANT CHANGE UP (ref 32–36)
MCHC RBC-ENTMCNC: 32.3 G/DL — SIGNIFICANT CHANGE UP (ref 32–36)
MCHC RBC-ENTMCNC: 32.3 G/DL — SIGNIFICANT CHANGE UP (ref 32–36)
MCHC RBC-ENTMCNC: 32.4 G/DL — SIGNIFICANT CHANGE UP (ref 32–36)
MCHC RBC-ENTMCNC: 32.4 G/DL — SIGNIFICANT CHANGE UP (ref 32–36)
MCHC RBC-ENTMCNC: 32.5 G/DL — SIGNIFICANT CHANGE UP (ref 32–36)
MCHC RBC-ENTMCNC: 32.5 G/DL — SIGNIFICANT CHANGE UP (ref 32–36)
MCHC RBC-ENTMCNC: 32.7 G/DL — SIGNIFICANT CHANGE UP (ref 32–36)
MCHC RBC-ENTMCNC: 33.2 G/DL — SIGNIFICANT CHANGE UP (ref 32–36)
MCHC RBC-ENTMCNC: 33.8 G/DL — SIGNIFICANT CHANGE UP (ref 32–36)
MCHC RBC-ENTMCNC: 33.8 G/DL — SIGNIFICANT CHANGE UP (ref 32–36)
MCV RBC AUTO: 86.6 FL — SIGNIFICANT CHANGE UP (ref 80–100)
MCV RBC AUTO: 86.6 FL — SIGNIFICANT CHANGE UP (ref 80–100)
MCV RBC AUTO: 88.1 FL — SIGNIFICANT CHANGE UP (ref 80–100)
MCV RBC AUTO: 88.2 FL — SIGNIFICANT CHANGE UP (ref 80–100)
MCV RBC AUTO: 88.2 FL — SIGNIFICANT CHANGE UP (ref 80–100)
MCV RBC AUTO: 88.3 FL — SIGNIFICANT CHANGE UP (ref 80–100)
MCV RBC AUTO: 88.3 FL — SIGNIFICANT CHANGE UP (ref 80–100)
MCV RBC AUTO: 88.9 FL — SIGNIFICANT CHANGE UP (ref 80–100)
MCV RBC AUTO: 88.9 FL — SIGNIFICANT CHANGE UP (ref 80–100)
MCV RBC AUTO: 89 FL — SIGNIFICANT CHANGE UP (ref 80–100)
MCV RBC AUTO: 89.2 FL — SIGNIFICANT CHANGE UP (ref 80–100)
MCV RBC AUTO: 89.2 FL — SIGNIFICANT CHANGE UP (ref 80–100)
MCV RBC AUTO: 89.7 FL — SIGNIFICANT CHANGE UP (ref 80–100)
MCV RBC AUTO: 89.7 FL — SIGNIFICANT CHANGE UP (ref 80–100)
MCV RBC AUTO: 89.9 FL — SIGNIFICANT CHANGE UP (ref 80–100)
MCV RBC AUTO: 89.9 FL — SIGNIFICANT CHANGE UP (ref 80–100)
MCV RBC AUTO: 90.3 FL — SIGNIFICANT CHANGE UP (ref 80–100)
MCV RBC AUTO: 90.3 FL — SIGNIFICANT CHANGE UP (ref 80–100)
MCV RBC AUTO: 90.5 FL — SIGNIFICANT CHANGE UP (ref 80–100)
MCV RBC AUTO: 90.5 FL — SIGNIFICANT CHANGE UP (ref 80–100)
MCV RBC AUTO: 91.8 FL — SIGNIFICANT CHANGE UP (ref 80–100)
MCV RBC AUTO: 91.8 FL — SIGNIFICANT CHANGE UP (ref 80–100)
MONOCYTES # BLD AUTO: 0.55 K/UL — SIGNIFICANT CHANGE UP (ref 0–0.9)
MONOCYTES # BLD AUTO: 0.55 K/UL — SIGNIFICANT CHANGE UP (ref 0–0.9)
MONOCYTES # BLD AUTO: 0.57 K/UL — SIGNIFICANT CHANGE UP (ref 0–0.9)
MONOCYTES # BLD AUTO: 0.57 K/UL — SIGNIFICANT CHANGE UP (ref 0–0.9)
MONOCYTES # BLD AUTO: 0.62 K/UL — SIGNIFICANT CHANGE UP (ref 0–0.9)
MONOCYTES # BLD AUTO: 0.62 K/UL — SIGNIFICANT CHANGE UP (ref 0–0.9)
MONOCYTES # BLD AUTO: 0.63 K/UL — SIGNIFICANT CHANGE UP (ref 0–0.9)
MONOCYTES # BLD AUTO: 0.63 K/UL — SIGNIFICANT CHANGE UP (ref 0–0.9)
MONOCYTES # BLD AUTO: 1.21 K/UL — HIGH (ref 0–0.9)
MONOCYTES NFR BLD AUTO: 13.7 % — SIGNIFICANT CHANGE UP (ref 2–14)
MONOCYTES NFR BLD AUTO: 8.6 % — SIGNIFICANT CHANGE UP (ref 2–14)
MONOCYTES NFR BLD AUTO: 8.6 % — SIGNIFICANT CHANGE UP (ref 2–14)
MONOCYTES NFR BLD AUTO: 8.8 % — SIGNIFICANT CHANGE UP (ref 2–14)
MONOCYTES NFR BLD AUTO: 8.8 % — SIGNIFICANT CHANGE UP (ref 2–14)
MONOCYTES NFR BLD AUTO: 9.6 % — SIGNIFICANT CHANGE UP (ref 2–14)
MONOCYTES NFR BLD AUTO: 9.6 % — SIGNIFICANT CHANGE UP (ref 2–14)
MONOCYTES NFR BLD AUTO: 9.8 % — SIGNIFICANT CHANGE UP (ref 2–14)
MONOCYTES NFR BLD AUTO: 9.8 % — SIGNIFICANT CHANGE UP (ref 2–14)
NEUTROPHILS # BLD AUTO: 4.18 K/UL — SIGNIFICANT CHANGE UP (ref 1.8–7.4)
NEUTROPHILS # BLD AUTO: 4.18 K/UL — SIGNIFICANT CHANGE UP (ref 1.8–7.4)
NEUTROPHILS # BLD AUTO: 4.34 K/UL — SIGNIFICANT CHANGE UP (ref 1.8–7.4)
NEUTROPHILS # BLD AUTO: 4.34 K/UL — SIGNIFICANT CHANGE UP (ref 1.8–7.4)
NEUTROPHILS # BLD AUTO: 4.46 K/UL — SIGNIFICANT CHANGE UP (ref 1.8–7.4)
NEUTROPHILS # BLD AUTO: 4.46 K/UL — SIGNIFICANT CHANGE UP (ref 1.8–7.4)
NEUTROPHILS # BLD AUTO: 4.7 K/UL — SIGNIFICANT CHANGE UP (ref 1.8–7.4)
NEUTROPHILS # BLD AUTO: 4.7 K/UL — SIGNIFICANT CHANGE UP (ref 1.8–7.4)
NEUTROPHILS # BLD AUTO: 6.48 K/UL — SIGNIFICANT CHANGE UP (ref 1.8–7.4)
NEUTROPHILS NFR BLD AUTO: 66.2 % — SIGNIFICANT CHANGE UP (ref 43–77)
NEUTROPHILS NFR BLD AUTO: 66.2 % — SIGNIFICANT CHANGE UP (ref 43–77)
NEUTROPHILS NFR BLD AUTO: 67.7 % — SIGNIFICANT CHANGE UP (ref 43–77)
NEUTROPHILS NFR BLD AUTO: 67.7 % — SIGNIFICANT CHANGE UP (ref 43–77)
NEUTROPHILS NFR BLD AUTO: 68.3 % — SIGNIFICANT CHANGE UP (ref 43–77)
NEUTROPHILS NFR BLD AUTO: 68.3 % — SIGNIFICANT CHANGE UP (ref 43–77)
NEUTROPHILS NFR BLD AUTO: 72.1 % — SIGNIFICANT CHANGE UP (ref 43–77)
NEUTROPHILS NFR BLD AUTO: 72.1 % — SIGNIFICANT CHANGE UP (ref 43–77)
NEUTROPHILS NFR BLD AUTO: 73.5 % — SIGNIFICANT CHANGE UP (ref 43–77)
NITRITE UR-MCNC: NEGATIVE — SIGNIFICANT CHANGE UP
NITRITE UR-MCNC: NEGATIVE — SIGNIFICANT CHANGE UP
NRBC # BLD: 0 /100 WBCS — SIGNIFICANT CHANGE UP (ref 0–0)
OB PNL STL: NEGATIVE — SIGNIFICANT CHANGE UP
OB PNL STL: NEGATIVE — SIGNIFICANT CHANGE UP
PH UR: 8 — SIGNIFICANT CHANGE UP (ref 5–8)
PH UR: 8 — SIGNIFICANT CHANGE UP (ref 5–8)
PHOSPHATE SERPL-MCNC: 5.6 MG/DL — HIGH (ref 2.5–4.5)
PHOSPHATE SERPL-MCNC: 5.6 MG/DL — HIGH (ref 2.5–4.5)
PHOSPHATE SERPL-MCNC: 5.8 MG/DL — HIGH (ref 2.5–4.5)
PHOSPHATE SERPL-MCNC: 5.8 MG/DL — HIGH (ref 2.5–4.5)
PHOSPHATE SERPL-MCNC: 6 MG/DL — HIGH (ref 2.5–4.5)
PHOSPHATE SERPL-MCNC: 6 MG/DL — HIGH (ref 2.5–4.5)
PHOSPHATE SERPL-MCNC: 6.1 MG/DL — HIGH (ref 2.5–4.5)
PHOSPHATE SERPL-MCNC: 6.1 MG/DL — HIGH (ref 2.5–4.5)
PLATELET # BLD AUTO: 142 K/UL — LOW (ref 150–400)
PLATELET # BLD AUTO: 142 K/UL — LOW (ref 150–400)
PLATELET # BLD AUTO: 149 K/UL — LOW (ref 150–400)
PLATELET # BLD AUTO: 149 K/UL — LOW (ref 150–400)
PLATELET # BLD AUTO: 153 K/UL — SIGNIFICANT CHANGE UP (ref 150–400)
PLATELET # BLD AUTO: 153 K/UL — SIGNIFICANT CHANGE UP (ref 150–400)
PLATELET # BLD AUTO: 155 K/UL — SIGNIFICANT CHANGE UP (ref 150–400)
PLATELET # BLD AUTO: 155 K/UL — SIGNIFICANT CHANGE UP (ref 150–400)
PLATELET # BLD AUTO: 160 K/UL — SIGNIFICANT CHANGE UP (ref 150–400)
PLATELET # BLD AUTO: 160 K/UL — SIGNIFICANT CHANGE UP (ref 150–400)
PLATELET # BLD AUTO: 161 K/UL — SIGNIFICANT CHANGE UP (ref 150–400)
PLATELET # BLD AUTO: 161 K/UL — SIGNIFICANT CHANGE UP (ref 150–400)
PLATELET # BLD AUTO: 181 K/UL — SIGNIFICANT CHANGE UP (ref 150–400)
PLATELET # BLD AUTO: 181 K/UL — SIGNIFICANT CHANGE UP (ref 150–400)
PLATELET # BLD AUTO: 208 K/UL — SIGNIFICANT CHANGE UP (ref 150–400)
PLATELET # BLD AUTO: 208 K/UL — SIGNIFICANT CHANGE UP (ref 150–400)
PLATELET # BLD AUTO: 212 K/UL — SIGNIFICANT CHANGE UP (ref 150–400)
PLATELET # BLD AUTO: 212 K/UL — SIGNIFICANT CHANGE UP (ref 150–400)
PLATELET # BLD AUTO: 266 K/UL — SIGNIFICANT CHANGE UP (ref 150–400)
PLATELET # BLD AUTO: 266 K/UL — SIGNIFICANT CHANGE UP (ref 150–400)
PLATELET # BLD AUTO: 279 K/UL — SIGNIFICANT CHANGE UP (ref 150–400)
PLATELET # BLD AUTO: 279 K/UL — SIGNIFICANT CHANGE UP (ref 150–400)
PLATELET # BLD AUTO: 285 K/UL — SIGNIFICANT CHANGE UP (ref 150–400)
PLATELET # BLD AUTO: 285 K/UL — SIGNIFICANT CHANGE UP (ref 150–400)
PLATELET # BLD AUTO: 314 K/UL — SIGNIFICANT CHANGE UP (ref 150–400)
POTASSIUM SERPL-MCNC: 2.8 MMOL/L — CRITICAL LOW (ref 3.5–5.3)
POTASSIUM SERPL-MCNC: 2.8 MMOL/L — CRITICAL LOW (ref 3.5–5.3)
POTASSIUM SERPL-MCNC: 3.3 MMOL/L — LOW (ref 3.5–5.3)
POTASSIUM SERPL-MCNC: 3.3 MMOL/L — LOW (ref 3.5–5.3)
POTASSIUM SERPL-MCNC: 3.4 MMOL/L — LOW (ref 3.5–5.3)
POTASSIUM SERPL-MCNC: 3.4 MMOL/L — LOW (ref 3.5–5.3)
POTASSIUM SERPL-MCNC: 3.6 MMOL/L — SIGNIFICANT CHANGE UP (ref 3.5–5.3)
POTASSIUM SERPL-MCNC: 3.6 MMOL/L — SIGNIFICANT CHANGE UP (ref 3.5–5.3)
POTASSIUM SERPL-MCNC: 3.7 MMOL/L — SIGNIFICANT CHANGE UP (ref 3.5–5.3)
POTASSIUM SERPL-MCNC: 3.7 MMOL/L — SIGNIFICANT CHANGE UP (ref 3.5–5.3)
POTASSIUM SERPL-MCNC: 3.8 MMOL/L — SIGNIFICANT CHANGE UP (ref 3.5–5.3)
POTASSIUM SERPL-MCNC: 3.9 MMOL/L — SIGNIFICANT CHANGE UP (ref 3.5–5.3)
POTASSIUM SERPL-MCNC: 4 MMOL/L — SIGNIFICANT CHANGE UP (ref 3.5–5.3)
POTASSIUM SERPL-MCNC: 4.1 MMOL/L — SIGNIFICANT CHANGE UP (ref 3.5–5.3)
POTASSIUM SERPL-MCNC: 4.2 MMOL/L — SIGNIFICANT CHANGE UP (ref 3.5–5.3)
POTASSIUM SERPL-MCNC: 4.2 MMOL/L — SIGNIFICANT CHANGE UP (ref 3.5–5.3)
POTASSIUM SERPL-MCNC: 4.3 MMOL/L — SIGNIFICANT CHANGE UP (ref 3.5–5.3)
POTASSIUM SERPL-MCNC: 4.3 MMOL/L — SIGNIFICANT CHANGE UP (ref 3.5–5.3)
POTASSIUM SERPL-MCNC: 4.8 MMOL/L — SIGNIFICANT CHANGE UP (ref 3.5–5.3)
POTASSIUM SERPL-MCNC: 4.8 MMOL/L — SIGNIFICANT CHANGE UP (ref 3.5–5.3)
POTASSIUM SERPL-SCNC: 2.8 MMOL/L — CRITICAL LOW (ref 3.5–5.3)
POTASSIUM SERPL-SCNC: 2.8 MMOL/L — CRITICAL LOW (ref 3.5–5.3)
POTASSIUM SERPL-SCNC: 3.3 MMOL/L — LOW (ref 3.5–5.3)
POTASSIUM SERPL-SCNC: 3.3 MMOL/L — LOW (ref 3.5–5.3)
POTASSIUM SERPL-SCNC: 3.4 MMOL/L — LOW (ref 3.5–5.3)
POTASSIUM SERPL-SCNC: 3.4 MMOL/L — LOW (ref 3.5–5.3)
POTASSIUM SERPL-SCNC: 3.6 MMOL/L — SIGNIFICANT CHANGE UP (ref 3.5–5.3)
POTASSIUM SERPL-SCNC: 3.6 MMOL/L — SIGNIFICANT CHANGE UP (ref 3.5–5.3)
POTASSIUM SERPL-SCNC: 3.7 MMOL/L — SIGNIFICANT CHANGE UP (ref 3.5–5.3)
POTASSIUM SERPL-SCNC: 3.7 MMOL/L — SIGNIFICANT CHANGE UP (ref 3.5–5.3)
POTASSIUM SERPL-SCNC: 3.8 MMOL/L — SIGNIFICANT CHANGE UP (ref 3.5–5.3)
POTASSIUM SERPL-SCNC: 3.9 MMOL/L — SIGNIFICANT CHANGE UP (ref 3.5–5.3)
POTASSIUM SERPL-SCNC: 4 MMOL/L — SIGNIFICANT CHANGE UP (ref 3.5–5.3)
POTASSIUM SERPL-SCNC: 4.1 MMOL/L — SIGNIFICANT CHANGE UP (ref 3.5–5.3)
POTASSIUM SERPL-SCNC: 4.2 MMOL/L — SIGNIFICANT CHANGE UP (ref 3.5–5.3)
POTASSIUM SERPL-SCNC: 4.2 MMOL/L — SIGNIFICANT CHANGE UP (ref 3.5–5.3)
POTASSIUM SERPL-SCNC: 4.3 MMOL/L — SIGNIFICANT CHANGE UP (ref 3.5–5.3)
POTASSIUM SERPL-SCNC: 4.3 MMOL/L — SIGNIFICANT CHANGE UP (ref 3.5–5.3)
POTASSIUM SERPL-SCNC: 4.8 MMOL/L — SIGNIFICANT CHANGE UP (ref 3.5–5.3)
POTASSIUM SERPL-SCNC: 4.8 MMOL/L — SIGNIFICANT CHANGE UP (ref 3.5–5.3)
PROT SERPL-MCNC: 6.8 GM/DL — SIGNIFICANT CHANGE UP (ref 6–8.3)
PROT SERPL-MCNC: 6.8 GM/DL — SIGNIFICANT CHANGE UP (ref 6–8.3)
PROT SERPL-MCNC: 7 GM/DL — SIGNIFICANT CHANGE UP (ref 6–8.3)
PROT SERPL-MCNC: 7 GM/DL — SIGNIFICANT CHANGE UP (ref 6–8.3)
PROT SERPL-MCNC: 7.2 GM/DL — SIGNIFICANT CHANGE UP (ref 6–8.3)
PROT SERPL-MCNC: 7.2 GM/DL — SIGNIFICANT CHANGE UP (ref 6–8.3)
PROT SERPL-MCNC: 7.3 GM/DL — SIGNIFICANT CHANGE UP (ref 6–8.3)
PROT SERPL-MCNC: 7.3 GM/DL — SIGNIFICANT CHANGE UP (ref 6–8.3)
PROT SERPL-MCNC: 7.8 GM/DL — SIGNIFICANT CHANGE UP (ref 6–8.3)
PROT SERPL-MCNC: 7.8 GM/DL — SIGNIFICANT CHANGE UP (ref 6–8.3)
PROT SERPL-MCNC: 8 GM/DL — SIGNIFICANT CHANGE UP (ref 6–8.3)
PROT SERPL-MCNC: 8 GM/DL — SIGNIFICANT CHANGE UP (ref 6–8.3)
PROT UR-MCNC: 100 MG/DL
PROT UR-MCNC: 100 MG/DL
RBC # BLD: 2.11 M/UL — LOW (ref 4.2–5.8)
RBC # BLD: 2.11 M/UL — LOW (ref 4.2–5.8)
RBC # BLD: 2.27 M/UL — LOW (ref 4.2–5.8)
RBC # BLD: 2.43 M/UL — LOW (ref 4.2–5.8)
RBC # BLD: 2.43 M/UL — LOW (ref 4.2–5.8)
RBC # BLD: 2.53 M/UL — LOW (ref 4.2–5.8)
RBC # BLD: 2.53 M/UL — LOW (ref 4.2–5.8)
RBC # BLD: 2.56 M/UL — LOW (ref 4.2–5.8)
RBC # BLD: 2.56 M/UL — LOW (ref 4.2–5.8)
RBC # BLD: 2.62 M/UL — LOW (ref 4.2–5.8)
RBC # BLD: 2.62 M/UL — LOW (ref 4.2–5.8)
RBC # BLD: 2.67 M/UL — LOW (ref 4.2–5.8)
RBC # BLD: 2.67 M/UL — LOW (ref 4.2–5.8)
RBC # BLD: 2.68 M/UL — LOW (ref 4.2–5.8)
RBC # BLD: 2.68 M/UL — LOW (ref 4.2–5.8)
RBC # BLD: 2.72 M/UL — LOW (ref 4.2–5.8)
RBC # BLD: 2.72 M/UL — LOW (ref 4.2–5.8)
RBC # BLD: 2.77 M/UL — LOW (ref 4.2–5.8)
RBC # BLD: 2.77 M/UL — LOW (ref 4.2–5.8)
RBC # BLD: 2.81 M/UL — LOW (ref 4.2–5.8)
RBC # BLD: 2.81 M/UL — LOW (ref 4.2–5.8)
RBC # BLD: 2.85 M/UL — LOW (ref 4.2–5.8)
RBC # FLD: 16 % — HIGH (ref 10.3–14.5)
RBC # FLD: 16 % — HIGH (ref 10.3–14.5)
RBC # FLD: 16.1 % — HIGH (ref 10.3–14.5)
RBC # FLD: 16.1 % — HIGH (ref 10.3–14.5)
RBC # FLD: 16.3 % — HIGH (ref 10.3–14.5)
RBC # FLD: 16.3 % — HIGH (ref 10.3–14.5)
RBC # FLD: 16.5 % — HIGH (ref 10.3–14.5)
RBC # FLD: 16.6 % — HIGH (ref 10.3–14.5)
RBC # FLD: 16.8 % — HIGH (ref 10.3–14.5)
RBC # FLD: 16.8 % — HIGH (ref 10.3–14.5)
RBC # FLD: 17.1 % — HIGH (ref 10.3–14.5)
RBC # FLD: 17.1 % — HIGH (ref 10.3–14.5)
RBC # FLD: 17.2 % — HIGH (ref 10.3–14.5)
RBC # FLD: 17.3 % — HIGH (ref 10.3–14.5)
RBC # FLD: 17.3 % — HIGH (ref 10.3–14.5)
RBC CASTS # UR COMP ASSIST: SIGNIFICANT CHANGE UP /HPF (ref 0–4)
RBC CASTS # UR COMP ASSIST: SIGNIFICANT CHANGE UP /HPF (ref 0–4)
SODIUM SERPL-SCNC: 129 MMOL/L — LOW (ref 135–145)
SODIUM SERPL-SCNC: 135 MMOL/L — SIGNIFICANT CHANGE UP (ref 135–145)
SODIUM SERPL-SCNC: 135 MMOL/L — SIGNIFICANT CHANGE UP (ref 135–145)
SODIUM SERPL-SCNC: 140 MMOL/L — SIGNIFICANT CHANGE UP (ref 135–145)
SODIUM SERPL-SCNC: 140 MMOL/L — SIGNIFICANT CHANGE UP (ref 135–145)
SODIUM SERPL-SCNC: 141 MMOL/L — SIGNIFICANT CHANGE UP (ref 135–145)
SODIUM SERPL-SCNC: 141 MMOL/L — SIGNIFICANT CHANGE UP (ref 135–145)
SODIUM SERPL-SCNC: 142 MMOL/L — SIGNIFICANT CHANGE UP (ref 135–145)
SODIUM SERPL-SCNC: 143 MMOL/L — SIGNIFICANT CHANGE UP (ref 135–145)
SODIUM SERPL-SCNC: 143 MMOL/L — SIGNIFICANT CHANGE UP (ref 135–145)
SODIUM SERPL-SCNC: 144 MMOL/L — SIGNIFICANT CHANGE UP (ref 135–145)
SODIUM SERPL-SCNC: 144 MMOL/L — SIGNIFICANT CHANGE UP (ref 135–145)
SODIUM SERPL-SCNC: 145 MMOL/L — SIGNIFICANT CHANGE UP (ref 135–145)
SODIUM SERPL-SCNC: 145 MMOL/L — SIGNIFICANT CHANGE UP (ref 135–145)
SODIUM SERPL-SCNC: 146 MMOL/L — HIGH (ref 135–145)
SODIUM SERPL-SCNC: 146 MMOL/L — HIGH (ref 135–145)
SODIUM SERPL-SCNC: 147 MMOL/L — HIGH (ref 135–145)
SODIUM SERPL-SCNC: 147 MMOL/L — HIGH (ref 135–145)
SODIUM SERPL-SCNC: 148 MMOL/L — HIGH (ref 135–145)
SODIUM SERPL-SCNC: 148 MMOL/L — HIGH (ref 135–145)
SODIUM SERPL-SCNC: 149 MMOL/L — HIGH (ref 135–145)
SODIUM UR-SCNC: 87 MMOL/L — SIGNIFICANT CHANGE UP
SODIUM UR-SCNC: 87 MMOL/L — SIGNIFICANT CHANGE UP
SP GR SPEC: 1.01 — SIGNIFICANT CHANGE UP (ref 1–1.03)
SP GR SPEC: 1.01 — SIGNIFICANT CHANGE UP (ref 1–1.03)
SPECIMEN SOURCE: SIGNIFICANT CHANGE UP
URATE SERPL-MCNC: 8.9 MG/DL — HIGH (ref 3.4–8.8)
URATE SERPL-MCNC: 8.9 MG/DL — HIGH (ref 3.4–8.8)
UROBILINOGEN FLD QL: 0.2 MG/DL — SIGNIFICANT CHANGE UP (ref 0.2–1)
UROBILINOGEN FLD QL: 0.2 MG/DL — SIGNIFICANT CHANGE UP (ref 0.2–1)
WBC # BLD: 6.31 K/UL — SIGNIFICANT CHANGE UP (ref 3.8–10.5)
WBC # BLD: 6.31 K/UL — SIGNIFICANT CHANGE UP (ref 3.8–10.5)
WBC # BLD: 6.36 K/UL — SIGNIFICANT CHANGE UP (ref 3.8–10.5)
WBC # BLD: 6.36 K/UL — SIGNIFICANT CHANGE UP (ref 3.8–10.5)
WBC # BLD: 6.38 K/UL — SIGNIFICANT CHANGE UP (ref 3.8–10.5)
WBC # BLD: 6.38 K/UL — SIGNIFICANT CHANGE UP (ref 3.8–10.5)
WBC # BLD: 6.51 K/UL — SIGNIFICANT CHANGE UP (ref 3.8–10.5)
WBC # BLD: 6.51 K/UL — SIGNIFICANT CHANGE UP (ref 3.8–10.5)
WBC # BLD: 6.58 K/UL — SIGNIFICANT CHANGE UP (ref 3.8–10.5)
WBC # BLD: 6.58 K/UL — SIGNIFICANT CHANGE UP (ref 3.8–10.5)
WBC # BLD: 6.71 K/UL — SIGNIFICANT CHANGE UP (ref 3.8–10.5)
WBC # BLD: 6.71 K/UL — SIGNIFICANT CHANGE UP (ref 3.8–10.5)
WBC # BLD: 6.73 K/UL — SIGNIFICANT CHANGE UP (ref 3.8–10.5)
WBC # BLD: 6.73 K/UL — SIGNIFICANT CHANGE UP (ref 3.8–10.5)
WBC # BLD: 7.1 K/UL — SIGNIFICANT CHANGE UP (ref 3.8–10.5)
WBC # BLD: 7.1 K/UL — SIGNIFICANT CHANGE UP (ref 3.8–10.5)
WBC # BLD: 7.21 K/UL — SIGNIFICANT CHANGE UP (ref 3.8–10.5)
WBC # BLD: 7.21 K/UL — SIGNIFICANT CHANGE UP (ref 3.8–10.5)
WBC # BLD: 7.34 K/UL — SIGNIFICANT CHANGE UP (ref 3.8–10.5)
WBC # BLD: 7.34 K/UL — SIGNIFICANT CHANGE UP (ref 3.8–10.5)
WBC # BLD: 7.65 K/UL — SIGNIFICANT CHANGE UP (ref 3.8–10.5)
WBC # BLD: 7.65 K/UL — SIGNIFICANT CHANGE UP (ref 3.8–10.5)
WBC # BLD: 8.55 K/UL — SIGNIFICANT CHANGE UP (ref 3.8–10.5)
WBC # BLD: 8.55 K/UL — SIGNIFICANT CHANGE UP (ref 3.8–10.5)
WBC # BLD: 8.81 K/UL — SIGNIFICANT CHANGE UP (ref 3.8–10.5)
WBC # FLD AUTO: 6.31 K/UL — SIGNIFICANT CHANGE UP (ref 3.8–10.5)
WBC # FLD AUTO: 6.31 K/UL — SIGNIFICANT CHANGE UP (ref 3.8–10.5)
WBC # FLD AUTO: 6.36 K/UL — SIGNIFICANT CHANGE UP (ref 3.8–10.5)
WBC # FLD AUTO: 6.36 K/UL — SIGNIFICANT CHANGE UP (ref 3.8–10.5)
WBC # FLD AUTO: 6.38 K/UL — SIGNIFICANT CHANGE UP (ref 3.8–10.5)
WBC # FLD AUTO: 6.38 K/UL — SIGNIFICANT CHANGE UP (ref 3.8–10.5)
WBC # FLD AUTO: 6.51 K/UL — SIGNIFICANT CHANGE UP (ref 3.8–10.5)
WBC # FLD AUTO: 6.51 K/UL — SIGNIFICANT CHANGE UP (ref 3.8–10.5)
WBC # FLD AUTO: 6.58 K/UL — SIGNIFICANT CHANGE UP (ref 3.8–10.5)
WBC # FLD AUTO: 6.58 K/UL — SIGNIFICANT CHANGE UP (ref 3.8–10.5)
WBC # FLD AUTO: 6.71 K/UL — SIGNIFICANT CHANGE UP (ref 3.8–10.5)
WBC # FLD AUTO: 6.71 K/UL — SIGNIFICANT CHANGE UP (ref 3.8–10.5)
WBC # FLD AUTO: 6.73 K/UL — SIGNIFICANT CHANGE UP (ref 3.8–10.5)
WBC # FLD AUTO: 6.73 K/UL — SIGNIFICANT CHANGE UP (ref 3.8–10.5)
WBC # FLD AUTO: 7.1 K/UL — SIGNIFICANT CHANGE UP (ref 3.8–10.5)
WBC # FLD AUTO: 7.1 K/UL — SIGNIFICANT CHANGE UP (ref 3.8–10.5)
WBC # FLD AUTO: 7.21 K/UL — SIGNIFICANT CHANGE UP (ref 3.8–10.5)
WBC # FLD AUTO: 7.21 K/UL — SIGNIFICANT CHANGE UP (ref 3.8–10.5)
WBC # FLD AUTO: 7.34 K/UL — SIGNIFICANT CHANGE UP (ref 3.8–10.5)
WBC # FLD AUTO: 7.34 K/UL — SIGNIFICANT CHANGE UP (ref 3.8–10.5)
WBC # FLD AUTO: 7.65 K/UL — SIGNIFICANT CHANGE UP (ref 3.8–10.5)
WBC # FLD AUTO: 7.65 K/UL — SIGNIFICANT CHANGE UP (ref 3.8–10.5)
WBC # FLD AUTO: 8.55 K/UL — SIGNIFICANT CHANGE UP (ref 3.8–10.5)
WBC # FLD AUTO: 8.55 K/UL — SIGNIFICANT CHANGE UP (ref 3.8–10.5)
WBC # FLD AUTO: 8.81 K/UL — SIGNIFICANT CHANGE UP (ref 3.8–10.5)
WBC UR QL: SIGNIFICANT CHANGE UP /HPF (ref 0–5)
WBC UR QL: SIGNIFICANT CHANGE UP /HPF (ref 0–5)

## 2024-01-01 PROCEDURE — 71045 X-RAY EXAM CHEST 1 VIEW: CPT | Mod: 26

## 2024-01-01 PROCEDURE — 93970 EXTREMITY STUDY: CPT | Mod: 26

## 2024-01-01 PROCEDURE — 99239 HOSP IP/OBS DSCHRG MGMT >30: CPT

## 2024-01-01 PROCEDURE — 99232 SBSQ HOSP IP/OBS MODERATE 35: CPT

## 2024-01-01 PROCEDURE — 99233 SBSQ HOSP IP/OBS HIGH 50: CPT

## 2024-01-01 PROCEDURE — 71250 CT THORAX DX C-: CPT | Mod: 26

## 2024-01-01 PROCEDURE — 74176 CT ABD & PELVIS W/O CONTRAST: CPT | Mod: 26

## 2024-01-01 PROCEDURE — 76775 US EXAM ABDO BACK WALL LIM: CPT | Mod: 26

## 2024-01-01 PROCEDURE — 99231 SBSQ HOSP IP/OBS SF/LOW 25: CPT

## 2024-01-01 RX ORDER — IPRATROPIUM/ALBUTEROL SULFATE 18-103MCG
3 AEROSOL WITH ADAPTER (GRAM) INHALATION
Qty: 0 | Refills: 0 | DISCHARGE
Start: 2024-01-01

## 2024-01-01 RX ORDER — AMLODIPINE BESYLATE 2.5 MG/1
10 TABLET ORAL DAILY
Refills: 0 | Status: DISCONTINUED | OUTPATIENT
Start: 2024-01-01 | End: 2024-01-01

## 2024-01-01 RX ORDER — AMLODIPINE BESYLATE 2.5 MG/1
5 TABLET ORAL ONCE
Refills: 0 | Status: COMPLETED | OUTPATIENT
Start: 2024-01-01 | End: 2024-01-01

## 2024-01-01 RX ORDER — GABAPENTIN 400 MG/1
1 CAPSULE ORAL
Qty: 0 | Refills: 0 | DISCHARGE

## 2024-01-01 RX ORDER — GABAPENTIN 400 MG/1
300 CAPSULE ORAL AT BEDTIME
Refills: 0 | Status: DISCONTINUED | OUTPATIENT
Start: 2024-01-01 | End: 2024-01-01

## 2024-01-01 RX ORDER — ACETAMINOPHEN 500 MG
2 TABLET ORAL
Qty: 0 | Refills: 0 | DISCHARGE
Start: 2024-01-01

## 2024-01-01 RX ORDER — METOPROLOL TARTRATE 50 MG
12.5 TABLET ORAL
Refills: 0 | Status: DISCONTINUED | OUTPATIENT
Start: 2024-01-01 | End: 2024-01-01

## 2024-01-01 RX ORDER — POLYETHYLENE GLYCOL 3350 17 G/17G
17 POWDER, FOR SOLUTION ORAL
Refills: 0 | Status: DISCONTINUED | OUTPATIENT
Start: 2024-01-01 | End: 2024-01-01

## 2024-01-01 RX ORDER — IPRATROPIUM/ALBUTEROL SULFATE 18-103MCG
3 AEROSOL WITH ADAPTER (GRAM) INHALATION EVERY 6 HOURS
Refills: 0 | Status: DISCONTINUED | OUTPATIENT
Start: 2024-01-01 | End: 2024-01-01

## 2024-01-01 RX ORDER — POLYETHYLENE GLYCOL 3350 17 G/17G
17 POWDER, FOR SOLUTION ORAL
Qty: 0 | Refills: 0 | DISCHARGE
Start: 2024-01-01

## 2024-01-01 RX ORDER — TAMSULOSIN HYDROCHLORIDE 0.4 MG/1
1 CAPSULE ORAL
Qty: 0 | Refills: 0 | DISCHARGE

## 2024-01-01 RX ORDER — SENNA PLUS 8.6 MG/1
2 TABLET ORAL
Qty: 0 | Refills: 0 | DISCHARGE
Start: 2024-01-01

## 2024-01-01 RX ORDER — SODIUM CHLORIDE 9 MG/ML
1 INJECTION INTRAMUSCULAR; INTRAVENOUS; SUBCUTANEOUS ONCE
Refills: 0 | Status: COMPLETED | OUTPATIENT
Start: 2024-01-01 | End: 2024-01-01

## 2024-01-01 RX ORDER — METOPROLOL TARTRATE 50 MG
25 TABLET ORAL
Refills: 0 | Status: DISCONTINUED | OUTPATIENT
Start: 2024-01-01 | End: 2024-01-01

## 2024-01-01 RX ORDER — HEPARIN SODIUM 5000 [USP'U]/ML
5000 INJECTION INTRAVENOUS; SUBCUTANEOUS EVERY 12 HOURS
Refills: 0 | Status: DISCONTINUED | OUTPATIENT
Start: 2024-01-01 | End: 2024-01-01

## 2024-01-01 RX ORDER — MIRTAZAPINE 45 MG/1
15 TABLET, ORALLY DISINTEGRATING ORAL DAILY
Refills: 0 | Status: DISCONTINUED | OUTPATIENT
Start: 2024-01-01 | End: 2024-01-01

## 2024-01-01 RX ORDER — SODIUM CHLORIDE 9 MG/ML
4 INJECTION INTRAMUSCULAR; INTRAVENOUS; SUBCUTANEOUS EVERY 6 HOURS
Refills: 0 | Status: DISCONTINUED | OUTPATIENT
Start: 2024-01-01 | End: 2024-01-01

## 2024-01-01 RX ORDER — SODIUM CHLORIDE 9 MG/ML
1000 INJECTION, SOLUTION INTRAVENOUS
Refills: 0 | Status: DISCONTINUED | OUTPATIENT
Start: 2024-01-01 | End: 2024-01-01

## 2024-01-01 RX ORDER — MORPHINE SULFATE 50 MG/1
2 CAPSULE, EXTENDED RELEASE ORAL EVERY 4 HOURS
Refills: 0 | Status: DISCONTINUED | OUTPATIENT
Start: 2024-01-01 | End: 2024-01-01

## 2024-01-01 RX ORDER — LABETALOL HCL 100 MG
1 TABLET ORAL
Qty: 0 | Refills: 0 | DISCHARGE

## 2024-01-01 RX ORDER — POTASSIUM CHLORIDE 20 MEQ
40 PACKET (EA) ORAL ONCE
Refills: 0 | Status: COMPLETED | OUTPATIENT
Start: 2024-01-01 | End: 2024-01-01

## 2024-01-01 RX ORDER — AMLODIPINE BESYLATE 2.5 MG/1
5 TABLET ORAL DAILY
Refills: 0 | Status: DISCONTINUED | OUTPATIENT
Start: 2024-01-01 | End: 2024-01-01

## 2024-01-01 RX ORDER — AMLODIPINE BESYLATE 2.5 MG/1
1 TABLET ORAL
Qty: 0 | Refills: 0 | DISCHARGE
Start: 2024-01-01

## 2024-01-01 RX ORDER — COLLAGENASE CLOSTRIDIUM HIST. 250 UNIT/G
1 OINTMENT (GRAM) TOPICAL
Qty: 0 | Refills: 0 | DISCHARGE
Start: 2024-01-01

## 2024-01-01 RX ORDER — MIRTAZAPINE 45 MG/1
1 TABLET, ORALLY DISINTEGRATING ORAL
Qty: 0 | Refills: 0 | DISCHARGE

## 2024-01-01 RX ORDER — ACETAMINOPHEN 500 MG
650 TABLET ORAL EVERY 6 HOURS
Refills: 0 | Status: DISCONTINUED | OUTPATIENT
Start: 2024-01-01 | End: 2024-01-01

## 2024-01-01 RX ORDER — METOPROLOL TARTRATE 50 MG
1 TABLET ORAL
Qty: 60 | Refills: 0
Start: 2024-01-01 | End: 2024-02-16

## 2024-01-01 RX ORDER — MIRTAZAPINE 45 MG/1
1 TABLET, ORALLY DISINTEGRATING ORAL
Qty: 0 | Refills: 0 | DISCHARGE
Start: 2024-01-01

## 2024-01-01 RX ORDER — POTASSIUM CHLORIDE 20 MEQ
40 PACKET (EA) ORAL EVERY 4 HOURS
Refills: 0 | Status: COMPLETED | OUTPATIENT
Start: 2024-01-01 | End: 2024-01-01

## 2024-01-01 RX ADMIN — Medication 3 MILLILITER(S): at 11:11

## 2024-01-01 RX ADMIN — GABAPENTIN 300 MILLIGRAM(S): 400 CAPSULE ORAL at 22:04

## 2024-01-01 RX ADMIN — SODIUM CHLORIDE 4 MILLILITER(S): 9 INJECTION INTRAMUSCULAR; INTRAVENOUS; SUBCUTANEOUS at 17:37

## 2024-01-01 RX ADMIN — Medication 1 APPLICATION(S): at 11:58

## 2024-01-01 RX ADMIN — Medication 1 APPLICATION(S): at 13:14

## 2024-01-01 RX ADMIN — Medication 1 APPLICATION(S): at 13:30

## 2024-01-01 RX ADMIN — GABAPENTIN 300 MILLIGRAM(S): 400 CAPSULE ORAL at 06:34

## 2024-01-01 RX ADMIN — CHLORHEXIDINE GLUCONATE 15 MILLILITER(S): 213 SOLUTION TOPICAL at 18:39

## 2024-01-01 RX ADMIN — SODIUM CHLORIDE 1 GRAM(S): 9 INJECTION INTRAMUSCULAR; INTRAVENOUS; SUBCUTANEOUS at 13:59

## 2024-01-01 RX ADMIN — CHLORHEXIDINE GLUCONATE 15 MILLILITER(S): 213 SOLUTION TOPICAL at 18:30

## 2024-01-01 RX ADMIN — SENNA PLUS 2 TABLET(S): 8.6 TABLET ORAL at 23:28

## 2024-01-01 RX ADMIN — CHLORHEXIDINE GLUCONATE 15 MILLILITER(S): 213 SOLUTION TOPICAL at 05:17

## 2024-01-01 RX ADMIN — GABAPENTIN 300 MILLIGRAM(S): 400 CAPSULE ORAL at 18:12

## 2024-01-01 RX ADMIN — MIRTAZAPINE 15 MILLIGRAM(S): 45 TABLET, ORALLY DISINTEGRATING ORAL at 19:03

## 2024-01-01 RX ADMIN — SODIUM CHLORIDE 4 MILLILITER(S): 9 INJECTION INTRAMUSCULAR; INTRAVENOUS; SUBCUTANEOUS at 23:14

## 2024-01-01 RX ADMIN — MIRTAZAPINE 15 MILLIGRAM(S): 45 TABLET, ORALLY DISINTEGRATING ORAL at 11:19

## 2024-01-01 RX ADMIN — HEPARIN SODIUM 5000 UNIT(S): 5000 INJECTION INTRAVENOUS; SUBCUTANEOUS at 05:20

## 2024-01-01 RX ADMIN — HEPARIN SODIUM 5000 UNIT(S): 5000 INJECTION INTRAVENOUS; SUBCUTANEOUS at 06:06

## 2024-01-01 RX ADMIN — SENNA PLUS 2 TABLET(S): 8.6 TABLET ORAL at 22:58

## 2024-01-01 RX ADMIN — Medication 3 MILLILITER(S): at 17:30

## 2024-01-01 RX ADMIN — CHLORHEXIDINE GLUCONATE 15 MILLILITER(S): 213 SOLUTION TOPICAL at 08:38

## 2024-01-01 RX ADMIN — CHLORHEXIDINE GLUCONATE 15 MILLILITER(S): 213 SOLUTION TOPICAL at 05:53

## 2024-01-01 RX ADMIN — SENNA PLUS 2 TABLET(S): 8.6 TABLET ORAL at 22:36

## 2024-01-01 RX ADMIN — Medication 12.5 MILLIGRAM(S): at 05:46

## 2024-01-01 RX ADMIN — SODIUM CHLORIDE 75 MILLILITER(S): 9 INJECTION, SOLUTION INTRAVENOUS at 21:35

## 2024-01-01 RX ADMIN — AMLODIPINE BESYLATE 10 MILLIGRAM(S): 2.5 TABLET ORAL at 06:06

## 2024-01-01 RX ADMIN — SODIUM CHLORIDE 4 MILLILITER(S): 9 INJECTION INTRAMUSCULAR; INTRAVENOUS; SUBCUTANEOUS at 05:31

## 2024-01-01 RX ADMIN — Medication 3 MILLILITER(S): at 11:16

## 2024-01-01 RX ADMIN — MIRTAZAPINE 15 MILLIGRAM(S): 45 TABLET, ORALLY DISINTEGRATING ORAL at 11:40

## 2024-01-01 RX ADMIN — Medication 3 MILLILITER(S): at 17:16

## 2024-01-01 RX ADMIN — Medication 3 MILLILITER(S): at 05:22

## 2024-01-01 RX ADMIN — SENNA PLUS 2 TABLET(S): 8.6 TABLET ORAL at 22:32

## 2024-01-01 RX ADMIN — CHLORHEXIDINE GLUCONATE 15 MILLILITER(S): 213 SOLUTION TOPICAL at 18:05

## 2024-01-01 RX ADMIN — Medication 3 MILLILITER(S): at 17:25

## 2024-01-01 RX ADMIN — CHLORHEXIDINE GLUCONATE 1 APPLICATION(S): 213 SOLUTION TOPICAL at 05:43

## 2024-01-01 RX ADMIN — CHLORHEXIDINE GLUCONATE 1 APPLICATION(S): 213 SOLUTION TOPICAL at 06:11

## 2024-01-01 RX ADMIN — Medication 1 APPLICATION(S): at 14:06

## 2024-01-01 RX ADMIN — SENNA PLUS 2 TABLET(S): 8.6 TABLET ORAL at 22:20

## 2024-01-01 RX ADMIN — CHLORHEXIDINE GLUCONATE 1 APPLICATION(S): 213 SOLUTION TOPICAL at 06:37

## 2024-01-01 RX ADMIN — Medication 1 APPLICATION(S): at 13:22

## 2024-01-01 RX ADMIN — CHLORHEXIDINE GLUCONATE 15 MILLILITER(S): 213 SOLUTION TOPICAL at 06:16

## 2024-01-01 RX ADMIN — HEPARIN SODIUM 5000 UNIT(S): 5000 INJECTION INTRAVENOUS; SUBCUTANEOUS at 06:31

## 2024-01-01 RX ADMIN — CHLORHEXIDINE GLUCONATE 15 MILLILITER(S): 213 SOLUTION TOPICAL at 18:55

## 2024-01-01 RX ADMIN — Medication 1 APPLICATION(S): at 14:05

## 2024-01-01 RX ADMIN — CHLORHEXIDINE GLUCONATE 15 MILLILITER(S): 213 SOLUTION TOPICAL at 05:38

## 2024-01-01 RX ADMIN — MIRTAZAPINE 15 MILLIGRAM(S): 45 TABLET, ORALLY DISINTEGRATING ORAL at 15:27

## 2024-01-01 RX ADMIN — AMLODIPINE BESYLATE 5 MILLIGRAM(S): 2.5 TABLET ORAL at 06:10

## 2024-01-01 RX ADMIN — MIRTAZAPINE 15 MILLIGRAM(S): 45 TABLET, ORALLY DISINTEGRATING ORAL at 11:51

## 2024-01-01 RX ADMIN — SODIUM CHLORIDE 4 MILLILITER(S): 9 INJECTION INTRAMUSCULAR; INTRAVENOUS; SUBCUTANEOUS at 23:01

## 2024-01-01 RX ADMIN — AMLODIPINE BESYLATE 5 MILLIGRAM(S): 2.5 TABLET ORAL at 06:36

## 2024-01-01 RX ADMIN — Medication 12.5 MILLIGRAM(S): at 05:41

## 2024-01-01 RX ADMIN — SODIUM CHLORIDE 4 MILLILITER(S): 9 INJECTION INTRAMUSCULAR; INTRAVENOUS; SUBCUTANEOUS at 17:25

## 2024-01-01 RX ADMIN — SODIUM CHLORIDE 4 MILLILITER(S): 9 INJECTION INTRAMUSCULAR; INTRAVENOUS; SUBCUTANEOUS at 11:16

## 2024-01-01 RX ADMIN — SODIUM CHLORIDE 4 MILLILITER(S): 9 INJECTION INTRAMUSCULAR; INTRAVENOUS; SUBCUTANEOUS at 23:38

## 2024-01-01 RX ADMIN — Medication 60 MILLIGRAM(S): at 06:37

## 2024-01-01 RX ADMIN — Medication 3 MILLILITER(S): at 11:52

## 2024-01-01 RX ADMIN — Medication 3 MILLILITER(S): at 00:58

## 2024-01-01 RX ADMIN — CHLORHEXIDINE GLUCONATE 1 APPLICATION(S): 213 SOLUTION TOPICAL at 06:16

## 2024-01-01 RX ADMIN — Medication 3 MILLILITER(S): at 00:24

## 2024-01-01 RX ADMIN — Medication 40 MILLIEQUIVALENT(S): at 11:57

## 2024-01-01 RX ADMIN — SODIUM CHLORIDE 4 MILLILITER(S): 9 INJECTION INTRAMUSCULAR; INTRAVENOUS; SUBCUTANEOUS at 00:59

## 2024-01-01 RX ADMIN — CHLORHEXIDINE GLUCONATE 15 MILLILITER(S): 213 SOLUTION TOPICAL at 18:15

## 2024-01-01 RX ADMIN — Medication 3 MILLILITER(S): at 05:52

## 2024-01-01 RX ADMIN — SENNA PLUS 2 TABLET(S): 8.6 TABLET ORAL at 22:04

## 2024-01-01 RX ADMIN — Medication 3 MILLILITER(S): at 11:49

## 2024-01-01 RX ADMIN — Medication 3 MILLILITER(S): at 23:00

## 2024-01-01 RX ADMIN — SODIUM CHLORIDE 4 MILLILITER(S): 9 INJECTION INTRAMUSCULAR; INTRAVENOUS; SUBCUTANEOUS at 00:24

## 2024-01-01 RX ADMIN — POLYETHYLENE GLYCOL 3350 17 GRAM(S): 17 POWDER, FOR SOLUTION ORAL at 19:52

## 2024-01-01 RX ADMIN — SODIUM CHLORIDE 4 MILLILITER(S): 9 INJECTION INTRAMUSCULAR; INTRAVENOUS; SUBCUTANEOUS at 05:52

## 2024-01-01 RX ADMIN — SODIUM CHLORIDE 70 MILLILITER(S): 9 INJECTION, SOLUTION INTRAVENOUS at 08:14

## 2024-01-01 RX ADMIN — Medication 12.5 MILLIGRAM(S): at 17:29

## 2024-01-01 RX ADMIN — CHLORHEXIDINE GLUCONATE 1 APPLICATION(S): 213 SOLUTION TOPICAL at 05:53

## 2024-01-01 RX ADMIN — HEPARIN SODIUM 5000 UNIT(S): 5000 INJECTION INTRAVENOUS; SUBCUTANEOUS at 06:36

## 2024-01-01 RX ADMIN — Medication 40 MILLIEQUIVALENT(S): at 21:22

## 2024-01-01 RX ADMIN — Medication 3 MILLILITER(S): at 05:17

## 2024-01-01 RX ADMIN — POLYETHYLENE GLYCOL 3350 17 GRAM(S): 17 POWDER, FOR SOLUTION ORAL at 05:41

## 2024-01-01 RX ADMIN — Medication 650 MILLIGRAM(S): at 06:41

## 2024-01-01 RX ADMIN — Medication 3 MILLILITER(S): at 12:57

## 2024-01-01 RX ADMIN — CHLORHEXIDINE GLUCONATE 1 APPLICATION(S): 213 SOLUTION TOPICAL at 05:20

## 2024-01-01 RX ADMIN — SODIUM CHLORIDE 4 MILLILITER(S): 9 INJECTION INTRAMUSCULAR; INTRAVENOUS; SUBCUTANEOUS at 11:59

## 2024-01-01 RX ADMIN — Medication 3 MILLILITER(S): at 23:43

## 2024-01-01 RX ADMIN — HEPARIN SODIUM 5000 UNIT(S): 5000 INJECTION INTRAVENOUS; SUBCUTANEOUS at 06:16

## 2024-01-01 RX ADMIN — Medication 40 MILLIEQUIVALENT(S): at 18:05

## 2024-01-01 RX ADMIN — AMLODIPINE BESYLATE 5 MILLIGRAM(S): 2.5 TABLET ORAL at 01:24

## 2024-01-01 RX ADMIN — HEPARIN SODIUM 5000 UNIT(S): 5000 INJECTION INTRAVENOUS; SUBCUTANEOUS at 05:38

## 2024-01-01 RX ADMIN — SODIUM CHLORIDE 4 MILLILITER(S): 9 INJECTION INTRAMUSCULAR; INTRAVENOUS; SUBCUTANEOUS at 00:48

## 2024-01-01 RX ADMIN — Medication 3 MILLILITER(S): at 11:23

## 2024-01-01 RX ADMIN — SENNA PLUS 2 TABLET(S): 8.6 TABLET ORAL at 22:21

## 2024-01-01 RX ADMIN — Medication 12.5 MILLIGRAM(S): at 17:48

## 2024-01-01 RX ADMIN — CHLORHEXIDINE GLUCONATE 15 MILLILITER(S): 213 SOLUTION TOPICAL at 06:57

## 2024-01-01 RX ADMIN — Medication 1 APPLICATION(S): at 11:51

## 2024-01-01 RX ADMIN — SODIUM CHLORIDE 4 MILLILITER(S): 9 INJECTION INTRAMUSCULAR; INTRAVENOUS; SUBCUTANEOUS at 17:35

## 2024-01-01 RX ADMIN — AMLODIPINE BESYLATE 5 MILLIGRAM(S): 2.5 TABLET ORAL at 06:17

## 2024-01-01 RX ADMIN — SODIUM CHLORIDE 4 MILLILITER(S): 9 INJECTION INTRAMUSCULAR; INTRAVENOUS; SUBCUTANEOUS at 11:11

## 2024-01-01 RX ADMIN — Medication 1 APPLICATION(S): at 11:52

## 2024-01-01 RX ADMIN — SODIUM CHLORIDE 4 MILLILITER(S): 9 INJECTION INTRAMUSCULAR; INTRAVENOUS; SUBCUTANEOUS at 17:24

## 2024-01-01 RX ADMIN — MIRTAZAPINE 15 MILLIGRAM(S): 45 TABLET, ORALLY DISINTEGRATING ORAL at 11:52

## 2024-01-01 RX ADMIN — CHLORHEXIDINE GLUCONATE 15 MILLILITER(S): 213 SOLUTION TOPICAL at 18:22

## 2024-01-01 RX ADMIN — SODIUM CHLORIDE 75 MILLILITER(S): 9 INJECTION, SOLUTION INTRAVENOUS at 22:57

## 2024-01-01 RX ADMIN — CHLORHEXIDINE GLUCONATE 15 MILLILITER(S): 213 SOLUTION TOPICAL at 18:49

## 2024-01-01 RX ADMIN — Medication 3 MILLILITER(S): at 05:33

## 2024-01-01 RX ADMIN — SODIUM CHLORIDE 4 MILLILITER(S): 9 INJECTION INTRAMUSCULAR; INTRAVENOUS; SUBCUTANEOUS at 05:32

## 2024-01-01 RX ADMIN — CHLORHEXIDINE GLUCONATE 15 MILLILITER(S): 213 SOLUTION TOPICAL at 06:10

## 2024-01-01 RX ADMIN — AMLODIPINE BESYLATE 10 MILLIGRAM(S): 2.5 TABLET ORAL at 06:10

## 2024-01-01 RX ADMIN — POLYETHYLENE GLYCOL 3350 17 GRAM(S): 17 POWDER, FOR SOLUTION ORAL at 05:33

## 2024-01-01 RX ADMIN — SODIUM CHLORIDE 4 MILLILITER(S): 9 INJECTION INTRAMUSCULAR; INTRAVENOUS; SUBCUTANEOUS at 17:06

## 2024-01-01 RX ADMIN — Medication 650 MILLIGRAM(S): at 17:28

## 2024-01-01 RX ADMIN — CHLORHEXIDINE GLUCONATE 15 MILLILITER(S): 213 SOLUTION TOPICAL at 18:21

## 2024-01-01 RX ADMIN — SENNA PLUS 2 TABLET(S): 8.6 TABLET ORAL at 22:57

## 2024-01-01 RX ADMIN — SODIUM CHLORIDE 4 MILLILITER(S): 9 INJECTION INTRAMUSCULAR; INTRAVENOUS; SUBCUTANEOUS at 17:22

## 2024-01-01 RX ADMIN — HEPARIN SODIUM 5000 UNIT(S): 5000 INJECTION INTRAVENOUS; SUBCUTANEOUS at 06:08

## 2024-01-01 RX ADMIN — SENNA PLUS 2 TABLET(S): 8.6 TABLET ORAL at 21:53

## 2024-01-01 RX ADMIN — CHLORHEXIDINE GLUCONATE 15 MILLILITER(S): 213 SOLUTION TOPICAL at 18:07

## 2024-01-01 RX ADMIN — Medication 25 MILLIGRAM(S): at 18:56

## 2024-01-01 RX ADMIN — SODIUM CHLORIDE 4 MILLILITER(S): 9 INJECTION INTRAMUSCULAR; INTRAVENOUS; SUBCUTANEOUS at 11:25

## 2024-01-01 RX ADMIN — Medication 3 MILLILITER(S): at 00:32

## 2024-01-01 RX ADMIN — SODIUM CHLORIDE 4 MILLILITER(S): 9 INJECTION INTRAMUSCULAR; INTRAVENOUS; SUBCUTANEOUS at 17:30

## 2024-01-01 RX ADMIN — HEPARIN SODIUM 5000 UNIT(S): 5000 INJECTION INTRAVENOUS; SUBCUTANEOUS at 06:57

## 2024-01-01 RX ADMIN — SODIUM CHLORIDE 70 MILLILITER(S): 9 INJECTION, SOLUTION INTRAVENOUS at 05:37

## 2024-01-01 RX ADMIN — POLYETHYLENE GLYCOL 3350 17 GRAM(S): 17 POWDER, FOR SOLUTION ORAL at 05:47

## 2024-01-01 RX ADMIN — Medication 3 MILLILITER(S): at 11:59

## 2024-01-01 RX ADMIN — Medication 3 MILLILITER(S): at 17:13

## 2024-01-01 RX ADMIN — CHLORHEXIDINE GLUCONATE 1 APPLICATION(S): 213 SOLUTION TOPICAL at 06:36

## 2024-01-01 RX ADMIN — Medication 40 MILLIEQUIVALENT(S): at 19:13

## 2024-01-01 RX ADMIN — Medication 12.5 MILLIGRAM(S): at 05:54

## 2024-01-01 RX ADMIN — CHLORHEXIDINE GLUCONATE 15 MILLILITER(S): 213 SOLUTION TOPICAL at 06:00

## 2024-01-01 RX ADMIN — Medication 3 MILLILITER(S): at 11:32

## 2024-01-01 RX ADMIN — Medication 12.5 MILLIGRAM(S): at 05:33

## 2024-01-01 RX ADMIN — HEPARIN SODIUM 5000 UNIT(S): 5000 INJECTION INTRAVENOUS; SUBCUTANEOUS at 18:22

## 2024-01-01 RX ADMIN — Medication 3 MILLILITER(S): at 11:15

## 2024-01-01 RX ADMIN — Medication 650 MILLIGRAM(S): at 23:00

## 2024-01-01 RX ADMIN — POLYETHYLENE GLYCOL 3350 17 GRAM(S): 17 POWDER, FOR SOLUTION ORAL at 17:49

## 2024-01-01 RX ADMIN — SODIUM CHLORIDE 4 MILLILITER(S): 9 INJECTION INTRAMUSCULAR; INTRAVENOUS; SUBCUTANEOUS at 00:33

## 2024-01-01 RX ADMIN — SODIUM CHLORIDE 4 MILLILITER(S): 9 INJECTION INTRAMUSCULAR; INTRAVENOUS; SUBCUTANEOUS at 12:58

## 2024-01-01 RX ADMIN — CHLORHEXIDINE GLUCONATE 15 MILLILITER(S): 213 SOLUTION TOPICAL at 17:12

## 2024-01-01 RX ADMIN — CHLORHEXIDINE GLUCONATE 1 APPLICATION(S): 213 SOLUTION TOPICAL at 06:34

## 2024-01-01 RX ADMIN — CHLORHEXIDINE GLUCONATE 15 MILLILITER(S): 213 SOLUTION TOPICAL at 17:29

## 2024-01-01 RX ADMIN — SODIUM CHLORIDE 4 MILLILITER(S): 9 INJECTION INTRAMUSCULAR; INTRAVENOUS; SUBCUTANEOUS at 06:32

## 2024-01-01 RX ADMIN — Medication 12.5 MILLIGRAM(S): at 06:11

## 2024-01-01 RX ADMIN — Medication 3 MILLILITER(S): at 23:37

## 2024-01-01 RX ADMIN — SENNA PLUS 2 TABLET(S): 8.6 TABLET ORAL at 21:19

## 2024-01-01 RX ADMIN — HEPARIN SODIUM 5000 UNIT(S): 5000 INJECTION INTRAVENOUS; SUBCUTANEOUS at 18:39

## 2024-01-01 RX ADMIN — HEPARIN SODIUM 5000 UNIT(S): 5000 INJECTION INTRAVENOUS; SUBCUTANEOUS at 18:47

## 2024-01-01 RX ADMIN — CHLORHEXIDINE GLUCONATE 15 MILLILITER(S): 213 SOLUTION TOPICAL at 06:30

## 2024-01-01 RX ADMIN — CHLORHEXIDINE GLUCONATE 1 APPLICATION(S): 213 SOLUTION TOPICAL at 06:08

## 2024-01-01 RX ADMIN — POLYETHYLENE GLYCOL 3350 17 GRAM(S): 17 POWDER, FOR SOLUTION ORAL at 06:11

## 2024-01-01 RX ADMIN — SODIUM CHLORIDE 4 MILLILITER(S): 9 INJECTION INTRAMUSCULAR; INTRAVENOUS; SUBCUTANEOUS at 05:33

## 2024-01-01 RX ADMIN — Medication 60 MILLIGRAM(S): at 06:17

## 2024-01-01 RX ADMIN — Medication 3 MILLILITER(S): at 05:37

## 2024-01-01 RX ADMIN — CHLORHEXIDINE GLUCONATE 1 APPLICATION(S): 213 SOLUTION TOPICAL at 06:25

## 2024-01-01 RX ADMIN — CHLORHEXIDINE GLUCONATE 15 MILLILITER(S): 213 SOLUTION TOPICAL at 17:37

## 2024-01-01 RX ADMIN — AMLODIPINE BESYLATE 5 MILLIGRAM(S): 2.5 TABLET ORAL at 05:42

## 2024-01-01 RX ADMIN — CHLORHEXIDINE GLUCONATE 15 MILLILITER(S): 213 SOLUTION TOPICAL at 05:33

## 2024-01-01 RX ADMIN — HEPARIN SODIUM 5000 UNIT(S): 5000 INJECTION INTRAVENOUS; SUBCUTANEOUS at 18:12

## 2024-01-01 RX ADMIN — SENNA PLUS 2 TABLET(S): 8.6 TABLET ORAL at 22:24

## 2024-01-01 RX ADMIN — POLYETHYLENE GLYCOL 3350 17 GRAM(S): 17 POWDER, FOR SOLUTION ORAL at 06:09

## 2024-01-01 RX ADMIN — Medication 650 MILLIGRAM(S): at 22:04

## 2024-01-01 RX ADMIN — Medication 3 MILLILITER(S): at 05:31

## 2024-01-01 RX ADMIN — MIRTAZAPINE 15 MILLIGRAM(S): 45 TABLET, ORALLY DISINTEGRATING ORAL at 19:14

## 2024-01-01 RX ADMIN — AMLODIPINE BESYLATE 5 MILLIGRAM(S): 2.5 TABLET ORAL at 11:57

## 2024-01-01 RX ADMIN — CHLORHEXIDINE GLUCONATE 15 MILLILITER(S): 213 SOLUTION TOPICAL at 17:45

## 2024-01-01 RX ADMIN — CHLORHEXIDINE GLUCONATE 15 MILLILITER(S): 213 SOLUTION TOPICAL at 18:56

## 2024-01-01 RX ADMIN — Medication 1 APPLICATION(S): at 12:11

## 2024-01-01 RX ADMIN — MIRTAZAPINE 15 MILLIGRAM(S): 45 TABLET, ORALLY DISINTEGRATING ORAL at 14:06

## 2024-01-01 RX ADMIN — Medication 3 MILLILITER(S): at 00:48

## 2024-01-01 RX ADMIN — SODIUM CHLORIDE 70 MILLILITER(S): 9 INJECTION, SOLUTION INTRAVENOUS at 07:12

## 2024-01-01 RX ADMIN — SODIUM CHLORIDE 4 MILLILITER(S): 9 INJECTION INTRAMUSCULAR; INTRAVENOUS; SUBCUTANEOUS at 11:53

## 2024-01-01 RX ADMIN — HEPARIN SODIUM 5000 UNIT(S): 5000 INJECTION INTRAVENOUS; SUBCUTANEOUS at 17:11

## 2024-01-01 RX ADMIN — CHLORHEXIDINE GLUCONATE 15 MILLILITER(S): 213 SOLUTION TOPICAL at 06:38

## 2024-01-01 RX ADMIN — Medication 1 APPLICATION(S): at 11:40

## 2024-01-01 RX ADMIN — Medication 3 MILLILITER(S): at 17:06

## 2024-01-01 RX ADMIN — CHLORHEXIDINE GLUCONATE 15 MILLILITER(S): 213 SOLUTION TOPICAL at 05:20

## 2024-01-01 RX ADMIN — CHLORHEXIDINE GLUCONATE 1 APPLICATION(S): 213 SOLUTION TOPICAL at 06:14

## 2024-01-01 RX ADMIN — SODIUM CHLORIDE 4 MILLILITER(S): 9 INJECTION INTRAMUSCULAR; INTRAVENOUS; SUBCUTANEOUS at 05:38

## 2024-01-01 RX ADMIN — Medication 12.5 MILLIGRAM(S): at 17:40

## 2024-01-01 RX ADMIN — Medication 60 MILLIGRAM(S): at 06:57

## 2024-01-01 RX ADMIN — CHLORHEXIDINE GLUCONATE 1 APPLICATION(S): 213 SOLUTION TOPICAL at 06:57

## 2024-01-01 RX ADMIN — SODIUM CHLORIDE 4 MILLILITER(S): 9 INJECTION INTRAMUSCULAR; INTRAVENOUS; SUBCUTANEOUS at 11:23

## 2024-01-01 RX ADMIN — MIRTAZAPINE 15 MILLIGRAM(S): 45 TABLET, ORALLY DISINTEGRATING ORAL at 13:30

## 2024-01-01 RX ADMIN — Medication 12.5 MILLIGRAM(S): at 19:08

## 2024-01-01 RX ADMIN — CHLORHEXIDINE GLUCONATE 1 APPLICATION(S): 213 SOLUTION TOPICAL at 05:45

## 2024-01-01 RX ADMIN — AMLODIPINE BESYLATE 10 MILLIGRAM(S): 2.5 TABLET ORAL at 05:33

## 2024-01-01 RX ADMIN — MIRTAZAPINE 15 MILLIGRAM(S): 45 TABLET, ORALLY DISINTEGRATING ORAL at 13:08

## 2024-01-01 RX ADMIN — Medication 1 APPLICATION(S): at 11:45

## 2024-01-01 RX ADMIN — HEPARIN SODIUM 5000 UNIT(S): 5000 INJECTION INTRAVENOUS; SUBCUTANEOUS at 06:10

## 2024-01-01 RX ADMIN — CHLORHEXIDINE GLUCONATE 15 MILLILITER(S): 213 SOLUTION TOPICAL at 05:45

## 2024-01-01 RX ADMIN — CHLORHEXIDINE GLUCONATE 15 MILLILITER(S): 213 SOLUTION TOPICAL at 06:34

## 2024-01-01 RX ADMIN — MIRTAZAPINE 15 MILLIGRAM(S): 45 TABLET, ORALLY DISINTEGRATING ORAL at 12:05

## 2024-01-01 RX ADMIN — MIRTAZAPINE 15 MILLIGRAM(S): 45 TABLET, ORALLY DISINTEGRATING ORAL at 14:13

## 2024-01-01 RX ADMIN — POLYETHYLENE GLYCOL 3350 17 GRAM(S): 17 POWDER, FOR SOLUTION ORAL at 18:22

## 2024-01-01 RX ADMIN — SODIUM CHLORIDE 4 MILLILITER(S): 9 INJECTION INTRAMUSCULAR; INTRAVENOUS; SUBCUTANEOUS at 05:23

## 2024-01-01 RX ADMIN — HEPARIN SODIUM 5000 UNIT(S): 5000 INJECTION INTRAVENOUS; SUBCUTANEOUS at 18:55

## 2024-01-01 RX ADMIN — HEPARIN SODIUM 5000 UNIT(S): 5000 INJECTION INTRAVENOUS; SUBCUTANEOUS at 17:45

## 2024-01-01 RX ADMIN — CHLORHEXIDINE GLUCONATE 1 APPLICATION(S): 213 SOLUTION TOPICAL at 05:41

## 2024-01-01 RX ADMIN — POLYETHYLENE GLYCOL 3350 17 GRAM(S): 17 POWDER, FOR SOLUTION ORAL at 06:37

## 2024-01-01 RX ADMIN — Medication 3 MILLILITER(S): at 06:32

## 2024-01-01 RX ADMIN — SODIUM CHLORIDE 4 MILLILITER(S): 9 INJECTION INTRAMUSCULAR; INTRAVENOUS; SUBCUTANEOUS at 11:42

## 2024-01-01 RX ADMIN — Medication 3 MILLILITER(S): at 11:54

## 2024-01-01 RX ADMIN — POLYETHYLENE GLYCOL 3350 17 GRAM(S): 17 POWDER, FOR SOLUTION ORAL at 06:17

## 2024-01-01 RX ADMIN — AMLODIPINE BESYLATE 10 MILLIGRAM(S): 2.5 TABLET ORAL at 05:54

## 2024-01-01 RX ADMIN — Medication 3 MILLILITER(S): at 05:05

## 2024-01-01 RX ADMIN — CHLORHEXIDINE GLUCONATE 1 APPLICATION(S): 213 SOLUTION TOPICAL at 05:33

## 2024-01-01 RX ADMIN — CHLORHEXIDINE GLUCONATE 1 APPLICATION(S): 213 SOLUTION TOPICAL at 06:04

## 2024-01-01 RX ADMIN — AMLODIPINE BESYLATE 10 MILLIGRAM(S): 2.5 TABLET ORAL at 05:17

## 2024-01-01 RX ADMIN — Medication 3 MILLILITER(S): at 00:15

## 2024-01-01 RX ADMIN — Medication 1 APPLICATION(S): at 18:06

## 2024-01-01 RX ADMIN — Medication 40 MILLIEQUIVALENT(S): at 09:49

## 2024-01-01 RX ADMIN — POLYETHYLENE GLYCOL 3350 17 GRAM(S): 17 POWDER, FOR SOLUTION ORAL at 18:07

## 2024-01-01 RX ADMIN — MIRTAZAPINE 15 MILLIGRAM(S): 45 TABLET, ORALLY DISINTEGRATING ORAL at 13:33

## 2024-01-01 RX ADMIN — Medication 3 MILLILITER(S): at 17:34

## 2024-01-01 RX ADMIN — AMLODIPINE BESYLATE 10 MILLIGRAM(S): 2.5 TABLET ORAL at 05:46

## 2024-01-01 RX ADMIN — AMLODIPINE BESYLATE 10 MILLIGRAM(S): 2.5 TABLET ORAL at 06:43

## 2024-01-01 RX ADMIN — CHLORHEXIDINE GLUCONATE 15 MILLILITER(S): 213 SOLUTION TOPICAL at 17:48

## 2024-01-01 RX ADMIN — CHLORHEXIDINE GLUCONATE 1 APPLICATION(S): 213 SOLUTION TOPICAL at 05:17

## 2024-01-01 RX ADMIN — Medication 3 MILLILITER(S): at 11:42

## 2024-01-01 RX ADMIN — HEPARIN SODIUM 5000 UNIT(S): 5000 INJECTION INTRAVENOUS; SUBCUTANEOUS at 18:33

## 2024-01-01 RX ADMIN — Medication 40 MILLIEQUIVALENT(S): at 13:10

## 2024-01-01 RX ADMIN — POLYETHYLENE GLYCOL 3350 17 GRAM(S): 17 POWDER, FOR SOLUTION ORAL at 05:17

## 2024-01-01 RX ADMIN — AMLODIPINE BESYLATE 5 MILLIGRAM(S): 2.5 TABLET ORAL at 06:57

## 2024-01-01 RX ADMIN — Medication 650 MILLIGRAM(S): at 05:37

## 2024-01-01 RX ADMIN — MIRTAZAPINE 15 MILLIGRAM(S): 45 TABLET, ORALLY DISINTEGRATING ORAL at 11:57

## 2024-01-01 RX ADMIN — CHLORHEXIDINE GLUCONATE 1 APPLICATION(S): 213 SOLUTION TOPICAL at 06:00

## 2024-01-01 RX ADMIN — Medication 3 MILLILITER(S): at 05:50

## 2024-01-01 RX ADMIN — Medication 650 MILLIGRAM(S): at 16:28

## 2024-01-01 RX ADMIN — Medication 3 MILLILITER(S): at 17:22

## 2024-01-01 RX ADMIN — MIRTAZAPINE 15 MILLIGRAM(S): 45 TABLET, ORALLY DISINTEGRATING ORAL at 13:22

## 2024-01-01 RX ADMIN — CHLORHEXIDINE GLUCONATE 15 MILLILITER(S): 213 SOLUTION TOPICAL at 06:08

## 2024-01-01 RX ADMIN — POLYETHYLENE GLYCOL 3350 17 GRAM(S): 17 POWDER, FOR SOLUTION ORAL at 05:53

## 2024-01-01 RX ADMIN — SENNA PLUS 2 TABLET(S): 8.6 TABLET ORAL at 23:29

## 2024-01-01 RX ADMIN — Medication 1 APPLICATION(S): at 13:34

## 2024-01-01 RX ADMIN — POLYETHYLENE GLYCOL 3350 17 GRAM(S): 17 POWDER, FOR SOLUTION ORAL at 18:43

## 2024-01-01 RX ADMIN — HEPARIN SODIUM 5000 UNIT(S): 5000 INJECTION INTRAVENOUS; SUBCUTANEOUS at 06:34

## 2024-01-01 RX ADMIN — SODIUM CHLORIDE 75 MILLILITER(S): 9 INJECTION, SOLUTION INTRAVENOUS at 02:15

## 2024-01-01 RX ADMIN — CHLORHEXIDINE GLUCONATE 15 MILLILITER(S): 213 SOLUTION TOPICAL at 05:41

## 2024-01-01 RX ADMIN — SODIUM CHLORIDE 4 MILLILITER(S): 9 INJECTION INTRAMUSCULAR; INTRAVENOUS; SUBCUTANEOUS at 17:16

## 2024-01-01 RX ADMIN — Medication 60 MILLIGRAM(S): at 06:04

## 2024-01-01 RX ADMIN — AMLODIPINE BESYLATE 5 MILLIGRAM(S): 2.5 TABLET ORAL at 05:20

## 2024-01-01 RX ADMIN — HEPARIN SODIUM 5000 UNIT(S): 5000 INJECTION INTRAVENOUS; SUBCUTANEOUS at 18:30

## 2024-01-01 RX ADMIN — Medication 3 MILLILITER(S): at 17:37

## 2024-01-01 RX ADMIN — SODIUM CHLORIDE 75 MILLILITER(S): 9 INJECTION, SOLUTION INTRAVENOUS at 14:14

## 2024-01-01 RX ADMIN — Medication 3 MILLILITER(S): at 17:24

## 2024-01-01 RX ADMIN — SODIUM CHLORIDE 75 MILLILITER(S): 9 INJECTION, SOLUTION INTRAVENOUS at 05:53

## 2024-01-01 RX ADMIN — CHLORHEXIDINE GLUCONATE 15 MILLILITER(S): 213 SOLUTION TOPICAL at 06:35

## 2024-01-01 RX ADMIN — MIRTAZAPINE 15 MILLIGRAM(S): 45 TABLET, ORALLY DISINTEGRATING ORAL at 11:45

## 2024-01-01 RX ADMIN — MIRTAZAPINE 15 MILLIGRAM(S): 45 TABLET, ORALLY DISINTEGRATING ORAL at 13:16

## 2024-01-01 RX ADMIN — Medication 25 MILLIGRAM(S): at 05:17

## 2024-01-01 RX ADMIN — Medication 1 APPLICATION(S): at 19:03

## 2024-01-01 RX ADMIN — Medication 3 MILLILITER(S): at 23:15

## 2024-01-01 RX ADMIN — HEPARIN SODIUM 5000 UNIT(S): 5000 INJECTION INTRAVENOUS; SUBCUTANEOUS at 05:41

## 2024-01-01 RX ADMIN — SODIUM CHLORIDE 4 MILLILITER(S): 9 INJECTION INTRAMUSCULAR; INTRAVENOUS; SUBCUTANEOUS at 05:05

## 2024-01-01 RX ADMIN — SENNA PLUS 2 TABLET(S): 8.6 TABLET ORAL at 21:17

## 2024-01-01 RX ADMIN — Medication 3 MILLILITER(S): at 17:02

## 2024-01-01 RX ADMIN — AMLODIPINE BESYLATE 10 MILLIGRAM(S): 2.5 TABLET ORAL at 05:42

## 2024-01-01 RX ADMIN — Medication 1 APPLICATION(S): at 13:51

## 2024-01-01 RX ADMIN — CHLORHEXIDINE GLUCONATE 15 MILLILITER(S): 213 SOLUTION TOPICAL at 06:11

## 2024-01-01 RX ADMIN — AMLODIPINE BESYLATE 5 MILLIGRAM(S): 2.5 TABLET ORAL at 06:25

## 2024-01-01 RX ADMIN — CHLORHEXIDINE GLUCONATE 15 MILLILITER(S): 213 SOLUTION TOPICAL at 18:47

## 2024-01-01 RX ADMIN — CHLORHEXIDINE GLUCONATE 15 MILLILITER(S): 213 SOLUTION TOPICAL at 06:25

## 2024-01-01 RX ADMIN — Medication 1 APPLICATION(S): at 11:18

## 2024-01-01 RX ADMIN — CHLORHEXIDINE GLUCONATE 15 MILLILITER(S): 213 SOLUTION TOPICAL at 19:05

## 2024-01-01 NOTE — PROGRESS NOTE ADULT - ASSESSMENT
89M w/ dementia, CKD, chronic urinary retention w/ obrien. Admitted w/ L pleural effusion thought to be due to mucous plugging. Course complicated by worsening hypoxemia requiring intubation and bronchoscopy x2 w/ MRSA pneumonia, stenotrophomonas. Extubated on 12/24 but required re-intubation. Extubated again but failed due to hypercapnia and hypoxia. s/p Trach / PEG.      acute hypoxic resp failure with pna   - s/p peg and trach   - Completed course Levaquin for Steno in sputum  - trend fever curve / wbc  - pulm follow up   - remians vent dependent     blood in the mouth likely from trach   - reconsult surgery     AOCD   - tx as needed     ckd     dc planning to vent facility

## 2024-01-01 NOTE — PROGRESS NOTE ADULT - ASSESSMENT
89 Y/O male POD 3 s/p tracheostomy and PEG placement. Surgery called to evaluate oozing from tracheostomy site. Trach was cleaned with dry gauze and placed surgicel and dry gauze atop for hemostasis. No bloody secretions seen at bedside. H/H stable.       PLAN:       - Local wound care with surgicel and dry gauze  - Trend H/H  - Hold subcutaneous heparin   - Continue care per primary team   - Discussed with Dr. Vigil

## 2024-01-01 NOTE — PROGRESS NOTE ADULT - SUBJECTIVE AND OBJECTIVE BOX
[facility] NEPHROLOGY SERVICES, [facility]  NEPHROLOGY AND HYPERTENSION  [address]  [address]  [place] [address]  [phone]    MD SOFY KEENAN, MD JIGNESH MCCARTY, MD LESA RAMIREZ, MD LEANNE MURRAY MD          Patient events noted    MEDICATIONS  (STANDING):  chlorhexidine 0.12% Liquid 15 milliLiter(s) Oral Mucosa every 12 hours  chlorhexidine 2% Cloths 1 Application(s) Topical <User Schedule>  collagenase Ointment 1 Application(s) Topical daily  dextrose 5% + sodium chloride 0.45%. 1000 milliLiter(s) (100 mL/Hr) IV Continuous <Continuous>  gabapentin 300 milliGRAM(s) Oral two times a day  levoFLOXacin  Tablet 500 milliGRAM(s) Oral every 48 hours  mirtazapine 15 milliGRAM(s) Oral daily  polyethylene glycol 3350 17 Gram(s) Oral two times a day  senna 2 Tablet(s) Oral at bedtime    MEDICATIONS  (PRN):  acetaminophen     Tablet .. 650 milliGRAM(s) Oral every 6 hours PRN Temp greater or equal to 38C (100.4F), Mild Pain (1 - 3)  fentaNYL    Injectable 25 MICROGram(s) IV Push every 2 hours PRN Moderate Pain (4 - 6)      12-31-23 @ 07:01  -  01-01-24 @ 07:00  --------------------------------------------------------  IN: 1580 mL / OUT: 600 mL / NET: 980 mL    01-01-24 @ 07:01  -  01-01-24 @ 23:00  --------------------------------------------------------  IN: 730 mL / OUT: 500 mL / NET: 230 mL      PHYSICAL EXAM:      T(C): 37.6 (01-01-24 @ 16:13), Max: 37.6 (01-01-24 @ 16:13)  HR: 83 (01-01-24 @ 21:17) (83 - 889)  BP: 168/77 (01-01-24 @ 16:13) (156/83 - 169/86)  RR: 19 (01-01-24 @ 16:13) (16 - 19)  SpO2: 98% (01-01-24 @ 21:17) (98% - 100%)  Wt(kg): --  Lungs clear  Heart S1S2  Abd soft NT ND  Extremities:   tr edema                                    7.5    8.55  )-----------( 155      ( 01 Jan 2024 10:19 )             22.6     01-01    142  |  107  |  84<H>  ----------------------------<  120<H>  2.8<LL>   |  26  |  3.60<H>    Ca    8.7      01 Jan 2024 10:19  Phos  5.8     01-01  Mg     2.3     01-01    TPro  7.3  /  Alb  1.5<L>  /  TBili  0.2  /  DBili  x   /  AST  27  /  ALT  10<L>  /  AlkPhos  73  01-01      LIVER FUNCTIONS - ( 01 Jan 2024 10:19 )  Alb: 1.5 g/dL / Pro: 7.3 gm/dL / ALK PHOS: 73 U/L / ALT: 10 U/L / AST: 27 U/L / GGT: x           Creatinine Trend: 3.60<--, 3.40<--, 3.25<--, 3.09<--, 3.14<--, 2.83<--  Mode: AC/ CMV (Assist Control/ Continuous Mandatory Ventilation)  RR (machine): 26  TV (machine): 400  FiO2: 30  PEEP: 5  ITime: 1  MAP: 11  PIP: 27      Assessment     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; pre renal azotemia; risk for ischemic ATN        Plan  Continue IVF  Replete K carefully   Avoid nephrotoxic mediations  Decrease Gabapentin to 300 mg daily         Samy Noriega MD [facility] NEPHROLOGY SERVICES, [facility]  NEPHROLOGY AND HYPERTENSION  [address]  [address]  [place] [address]  [phone]    MD SOFY KEENAN, MD JIGNESH MCCARTY, MD LEAS RAMIREZ, MD LEANNE MURRAY MD          Patient events noted    MEDICATIONS  (STANDING):  chlorhexidine 0.12% Liquid 15 milliLiter(s) Oral Mucosa every 12 hours  chlorhexidine 2% Cloths 1 Application(s) Topical <User Schedule>  collagenase Ointment 1 Application(s) Topical daily  dextrose 5% + sodium chloride 0.45%. 1000 milliLiter(s) (100 mL/Hr) IV Continuous <Continuous>  gabapentin 300 milliGRAM(s) Oral two times a day  levoFLOXacin  Tablet 500 milliGRAM(s) Oral every 48 hours  mirtazapine 15 milliGRAM(s) Oral daily  polyethylene glycol 3350 17 Gram(s) Oral two times a day  senna 2 Tablet(s) Oral at bedtime    MEDICATIONS  (PRN):  acetaminophen     Tablet .. 650 milliGRAM(s) Oral every 6 hours PRN Temp greater or equal to 38C (100.4F), Mild Pain (1 - 3)  fentaNYL    Injectable 25 MICROGram(s) IV Push every 2 hours PRN Moderate Pain (4 - 6)      12-31-23 @ 07:01  -  01-01-24 @ 07:00  --------------------------------------------------------  IN: 1580 mL / OUT: 600 mL / NET: 980 mL    01-01-24 @ 07:01  -  01-01-24 @ 23:00  --------------------------------------------------------  IN: 730 mL / OUT: 500 mL / NET: 230 mL      PHYSICAL EXAM:      T(C): 37.6 (01-01-24 @ 16:13), Max: 37.6 (01-01-24 @ 16:13)  HR: 83 (01-01-24 @ 21:17) (83 - 889)  BP: 168/77 (01-01-24 @ 16:13) (156/83 - 169/86)  RR: 19 (01-01-24 @ 16:13) (16 - 19)  SpO2: 98% (01-01-24 @ 21:17) (98% - 100%)  Wt(kg): --  Lungs clear  Heart S1S2  Abd soft NT ND  Extremities:   tr edema                                    7.5    8.55  )-----------( 155      ( 01 Jan 2024 10:19 )             22.6     01-01    142  |  107  |  84<H>  ----------------------------<  120<H>  2.8<LL>   |  26  |  3.60<H>    Ca    8.7      01 Jan 2024 10:19  Phos  5.8     01-01  Mg     2.3     01-01    TPro  7.3  /  Alb  1.5<L>  /  TBili  0.2  /  DBili  x   /  AST  27  /  ALT  10<L>  /  AlkPhos  73  01-01      LIVER FUNCTIONS - ( 01 Jan 2024 10:19 )  Alb: 1.5 g/dL / Pro: 7.3 gm/dL / ALK PHOS: 73 U/L / ALT: 10 U/L / AST: 27 U/L / GGT: x           Creatinine Trend: 3.60<--, 3.40<--, 3.25<--, 3.09<--, 3.14<--, 2.83<--  Mode: AC/ CMV (Assist Control/ Continuous Mandatory Ventilation)  RR (machine): 26  TV (machine): 400  FiO2: 30  PEEP: 5  ITime: 1  MAP: 11  PIP: 27      Assessment     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; pre renal azotemia; risk for ischemic ATN        Plan  Continue IVF  Replete K carefully   Avoid nephrotoxic mediations  Decrease Gabapentin to 300 mg daily         Samy Noriega MD

## 2024-01-01 NOTE — PROGRESS NOTE ADULT - SUBJECTIVE AND OBJECTIVE BOX
Patient is a 89y old  Male who presents with a chief complaint of hypoxic resp failure (30 Dec 2023 12:07)      INTERVAL HPI/OVERNIGHT EVENTS: shorty blood seen in the mouth     MEDICATIONS  (STANDING):  chlorhexidine 0.12% Liquid 15 milliLiter(s) Oral Mucosa every 12 hours  chlorhexidine 2% Cloths 1 Application(s) Topical <User Schedule>  collagenase Ointment 1 Application(s) Topical daily  dextrose 5% + sodium chloride 0.45%. 1000 milliLiter(s) (100 mL/Hr) IV Continuous <Continuous>  gabapentin 300 milliGRAM(s) Oral two times a day  heparin   Injectable 5000 Unit(s) SubCutaneous every 12 hours  mirtazapine 15 milliGRAM(s) Oral daily  polyethylene glycol 3350 17 Gram(s) Oral two times a day  senna 2 Tablet(s) Oral at bedtime    MEDICATIONS  (PRN):  acetaminophen     Tablet .. 650 milliGRAM(s) Oral every 6 hours PRN Temp greater or equal to 38C (100.4F), Mild Pain (1 - 3)  fentaNYL    Injectable 25 MICROGram(s) IV Push every 2 hours PRN Moderate Pain (4 - 6)      Allergies    No Known Allergies    Intolerances            Vital Signs Last 24 Hrs  T(C): 35.9 (31 Dec 2023 07:00), Max: 36.5 (30 Dec 2023 23:43)  T(F): 96.6 (31 Dec 2023 07:00), Max: 97.7 (30 Dec 2023 23:43)  HR: 105 (31 Dec 2023 12:39) (70 - 112)  BP: 145/62 (31 Dec 2023 08:00) (112/89 - 189/116)  BP(mean): 74 (31 Dec 2023 08:00) (55 - 130)  RR: 27 (31 Dec 2023 08:00) (17 - 32)  SpO2: 100% (31 Dec 2023 12:39) (95% - 100%)        PHYSICAL EXAM:  GENERAL: NAD  HEAD:  Atraumatic, Normocephalic  EYES: EOMI, PERRLA, conjunctiva and sclera clear  ENMT: No tonsillar erythema, exudates, or enlargement; trach site stable   NECK: Supple, Normal thyroid  NERVOUS SYSTEM:  Alert, tries to speak   CHEST/LUNG: CTABL; No rales, rhonchi, wheezing, or rubs  HEART: Regular rate and rhythm; No murmurs, rubs, or gallops  ABDOMEN: Soft, Nontender, Nondistended; Bowel sounds present. PEG   EXTREMITIES:  2+ Peripheral Pulses, No clubbing, cyanosis, or edema  LYMPH: No lymphadenopathy noted  SKIN: No rashes or lesions    LABS:                 Urinalysis Basic - ( 30 Dec 2023 02:42 )    Color: x / Appearance: x / SG: x / pH: x  Gluc: 69 mg/dL / Ketone: x  / Bili: x / Urobili: x   Blood: x / Protein: x / Nitrite: x   Leuk Esterase: x / RBC: x / WBC x   Sq Epi: x / Non Sq Epi: x / Bacteria: x      CAPILLARY BLOOD GLUCOSE      POCT Blood Glucose.: 138 mg/dL (31 Dec 2023 00:05)      RADIOLOGY & ADDITIONAL TESTS:    Imaging Personally Reviewed:  [ ] YES  [ ] NO    Consultant(s) Notes Reviewed:  [ ] YES  [ ] NO    Care Discussed with Consultants/Other Providers [ ] YES  [ ] NO

## 2024-01-01 NOTE — H&P ADULT - NSHPADDITIONALINFOADULT_GEN_ALL_CORE
Blood in stool a few weeks ago, saw Dr. Acuna for this , was told it is an anal fissure.  For last 2 weeks, it has not been healing, and mom feels there was more blood today than what is normal.  Baby eating and drinking fine, alert and age appropriate.   
Med rec in AM a pt unsure of his medications

## 2024-01-01 NOTE — PROGRESS NOTE ADULT - SUBJECTIVE AND OBJECTIVE BOX
Patient seen and examined at bedside. Tracheostomy in place.   Resting comfortably.     Surgery called by Dr. Pineda to evaluate oozing from tracheostomy site.         Vital Signs Last 24 Hrs  T(F): 99.6 (01-01-24 @ 16:13), Max: 99.6 (01-01-24 @ 16:13)  HR: 89 (01-01-24 @ 17:45)  BP: 168/77 (01-01-24 @ 16:13)  RR: 19 (01-01-24 @ 16:13)  SpO2: 99% (01-01-24 @ 17:45)  Wt(kg): --   CAPILLARY BLOOD GLUCOSE      POCT Blood Glucose.: 124 mg/dL (01 Jan 2024 11:13)      GENERAL: Alert, NAD, tracheostomy in place  CHEST/LUNG: Respirations non labored, good inspiratory effort, tracheostomy in place with oozing blood, suctioned at bedside with clear secretions   HEART: S1S2  HEENT: NCAT, EOMI, conjunctiva clear   ABDOMEN: Nondistended, + bowel sounds, nontender, PEG site in place   EXTREMITIES:  No calf tenderness, no edema    I&O's Detail    31 Dec 2023 07:01  -  01 Jan 2024 07:00  --------------------------------------------------------  IN:    dextrose 5% + sodium chloride 0.45%: 1300 mL    Nepro: 280 mL  Total IN: 1580 mL    OUT:    Indwelling Catheter - Urethral (mL): 600 mL  Total OUT: 600 mL    Total NET: 980 mL      01 Jan 2024 07:01  -  01 Jan 2024 17:58  --------------------------------------------------------  IN:  Total IN: 0 mL    OUT:    Indwelling Catheter - Urethral (mL): 500 mL  Total OUT: 500 mL    Total NET: -500 mL          LABS:                        7.5    8.55  )-----------( 155      ( 01 Jan 2024 10:19 )             22.6     01-01    142  |  107  |  84<H>  ----------------------------<  120<H>  2.8<LL>   |  26  |  3.60<H>    Ca    8.7      01 Jan 2024 10:19  Phos  5.8     01-01  Mg     2.3     01-01    TPro  7.3  /  Alb  1.5<L>  /  TBili  0.2  /  DBili  x   /  AST  27  /  ALT  10<L>  /  AlkPhos  73  01-01

## 2024-01-01 NOTE — PROVIDER CONTACT NOTE (CRITICAL VALUE NOTIFICATION) - PERSON GIVING RESULT:
[facility] lab0 [facility] lab0 [facility] lab- [facility] lab- [facility]

## 2024-01-02 NOTE — CONSULT NOTE ADULT - SUBJECTIVE AND OBJECTIVE BOX
HPI:  89M w/ hx of CKD, HTN, BPH w/ chronic obrien, dementia p/w SOB. Pt poor historian so history obtained from record. Pt was brought in my home aid who reports pt c/o SOB. Denies fever/chills/sweats, URI sx, N/V/D.    In ED, pt hypothermic to 95.5 and hypotensive to 90/60s. Labs notable for Hgb 6.3 (+l[place]), dimer 3000, Na 129, BUN/SCr 70/2.91, BNP 7966, UA+. CT CAP demonstrates mild b/l hydronephrosis and large stool burden w/ perirectal stranding c/f stercoral colitis. (01 Dec 2023 06:11)          PAST MEDICAL & SURGICAL HISTORY:  HTN (Hypertension)      Benign Prostatic Hyperplasia      Hypertension      BPH (benign prostatic hypertrophy)      Spinal stenosis of lumbar region      Dehydration      Renal insufficiency      Hydrocele in adult  bilateral, chronic      Benign essential HTN      BPH (benign prostatic hyperplasia)      Indwelling Obrien catheter present      Dementia      Wheelchair dependent      Implantable loop recorder present      No significant past surgical history      Implantable loop recorder present          FAMILY HISTORY:  No significant family history        SOCIAL HISTORY:  Smoking: __ packs x ___ years  EtOH Use:  Marital Status:  Occupation:  Exposures:  Recent Travel:    Allergies    No Known Allergies    Intolerances        HOME MEDICATIONS:    REVIEW OF SYSTEMS:    [x ] Unable to assess ROS because poor MS,  minimally/ nonverbal    OBJECTIVE:  Vital Signs Last 24 Hrs  T(C): 38.1 (2024 16:05), Max: 38.1 (2024 16:05)  T(F): 100.6 (2024 16:05), Max: 100.6 (2024 16:05)  HR: 110 (2024 16:20) (83 - 110)  BP: 168/89 (2024 16:05) (139/81 - 168/89)  BP(mean): --  ABP: --  ABP(mean): --  RR: 17 (2024 16:05) (17 - 19)  SpO2: 95% (2024 16:20) (95% - 100%)    O2 Parameters below as of 2024 10:52  Patient On (Oxygen Delivery Method): ventilator          Mode: AC/ CMV (Assist Control/ Continuous Mandatory Ventilation), RR (machine): 26, TV (machine): 400, FiO2: 30, PEEP: 5, ITime: 1, MAP: 12, PIP: 32     @ 07: @ 07:00  --------------------------------------------------------  IN: 730 mL / OUT: 500 mL / NET: 230 mL     @ 07: @ 20:24  --------------------------------------------------------  IN: 0 mL / OUT: 1380 mL / NET: -1380 mL      CAPILLARY BLOOD GLUCOSE      POCT Blood Glucose.: 124 mg/dL (2024 11:13)      PHYSICAL EXAM:  General: NAD, chronically ill appearing  HEENT: NC/AT, PERRL, MMM  Neck: supple, + Trach  Respiratory: mechanical BS B/L  Cardiovascular: s1s2 RRR  Abdomen: soft, non tender, non distended. +PEG. C/D/I  Extremities: warm, no edema or clubbing   Skin: no rashes or lesions  Neurological: unable to assess  Psychiatry: unable to assess    HOSPITAL MEDICATIONS:  MEDICATIONS  (STANDING):  chlorhexidine 0.12% Liquid 15 milliLiter(s) Oral Mucosa every 12 hours  chlorhexidine 2% Cloths 1 Application(s) Topical <User Schedule>  collagenase Ointment 1 Application(s) Topical daily  dextrose 5% + sodium chloride 0.45%. 1000 milliLiter(s) (70 mL/Hr) IV Continuous <Continuous>  gabapentin 300 milliGRAM(s) Oral at bedtime  heparin   Injectable 5000 Unit(s) SubCutaneous every 12 hours  levoFLOXacin  Tablet 500 milliGRAM(s) Oral every 48 hours  mirtazapine 15 milliGRAM(s) Oral daily  polyethylene glycol 3350 17 Gram(s) Oral two times a day  senna 2 Tablet(s) Oral at bedtime    MEDICATIONS  (PRN):  acetaminophen     Tablet .. 650 milliGRAM(s) Oral every 6 hours PRN Temp greater or equal to 38C (100.4F), Mild Pain (1 - 3)  fentaNYL    Injectable 25 MICROGram(s) IV Push every 2 hours PRN Moderate Pain (4 - 6)      LABS:                        7.2    7.10  )-----------( 153      ( 2024 05:54 )             22.5     01-02    140  |  110<H>  |  78<H>  ----------------------------<  87  4.8   |  25  |  3.83<H>    Ca    8.5      2024 05:54  Phos  5.8       Mg     2.3         TPro  7.3  /  Alb  1.5<L>  /  TBili  0.2  /  DBili  x   /  AST  27  /  ALT  10<L>  /  AlkPhos  73        Urinalysis Basic - ( 2024 15:37 )    Color: Yellow / Appearance: Clear / S.010 / pH: x  Gluc: x / Ketone: Negative mg/dL  / Bili: Negative / Urobili: 0.2 mg/dL   Blood: x / Protein: 100 mg/dL / Nitrite: Negative   Leuk Esterase: Moderate / RBC: 0-2 /HPF / WBC 6-10 /HPF   Sq Epi: x / Non Sq Epi: x / Bacteria: Many /HPF            MICROBIOLOGY:     Culture - Acid Fast - Bronchial w/Smear (23 @ 13:56)    Specimen Source: .Bronchial Bronchial Lavage   Acid Fast Bacilli Smear:   No acid-fast bacilli seen by fluorochrome stain   Culture Results:   No acid-fast bacilli isolated at 1 week. ****Acid-fast cultures are held  for 6 weeks.****      Culture - Bronchial (23 @ 13:56)    -  Minocycline: S   Gram Stain:   Numerous polymorphonuclear leukocytes per low power field  Rare Squamous epithelial cells per low power field  Moderate Gram Negative Rods per oil power field  Few Gram positive cocci in pairs per oil power field  Rare Gram Positive Cocci in Clusters per oil power field   -  Trimethoprim/Sulfamethoxazole: S <=0.5/9.5   -  Trimethoprim/Sulfamethoxazole: S <=0.5/9.5   -  Vancomycin: S 2   -  Rifampin: S <=1 Should not be used as monotherapy   -  Tetracycline: S <=1   -  Levofloxacin: S <=0.5   -  Linezolid: S 2   -  Oxacillin: R >2   -  Penicillin: R >8   -  Cefazolin: R >16   -  Ampicillin/Sulbactam: R 16/8   -  Clindamycin: S <=0.25   -  Erythromycin: R >4   -  Gentamicin: S <=1 Should not be used as monotherapy   Specimen Source: .Bronchial Bronchial Lavage   Culture Results:   Numerous Stenotrophomonas maltophilia  Moderate Methicillin Resistant Staphylococcus aureus  Normal Respiratory Vanessa absent   Organism Identification: Stenotrophomonas maltophilia  Methicillin resistant Staphylococcus aureus   Organism: Stenotrophomonas maltophilia   Organism: Stenotrophomonas maltophilia  Culture - Bronchial (23 @ 22:20)    -  Tetracycline: S <=1   Gram Stain:   Moderate polymorphonuclear leukocytes per low power field  Rare Squamous epithelial cells per low power field  Moderate Gram positive cocci in pairs per oil power field   -  Trimethoprim/Sulfamethoxazole: S <=0.5/9.5   -  Vancomycin: S 2   -  Rifampin: S <=1 Should not be used as monotherapy   -  Ampicillin/Sulbactam: R <=8/4   -  Cefazolin: R >16   -  Penicillin: R 2   -  Linezolid: S 4   -  Oxacillin: R >2   -  Erythromycin: R >4   -  Clindamycin: S 0.5   -  Gentamicin: S <=1 Should not be used as monotherapy   Specimen Source: .Bronchial   Culture Results:   Moderate Methicillin Resistant Staphylococcus aureus  Normal Respiratory Vanessa present   Organism Identification: Methicillin resistant Staphylococcus aureus   Organism: Methicillin resistant Staphylococcus aureus   Method Type: JASWINDER     Organism: Methicillin resistant Staphylococcus aureus   Method Type: KB   Method Type: JASWINDER   Method Type: JASWINDER      Culture - Sputum . (23 @ 15:00)    -  Cefazolin: R >16   -  Ampicillin/Sulbactam: R >16/8   Gram Stain:   Moderate polymorphonuclear leukocytes per low power field  Few Squamous epithelial cells per low power field  Numerous Gram positive cocci in pairs per oil power field  Few Gram Positive Rods per oil power field   -  Vancomycin: S 2   -  Trimethoprim/Sulfamethoxazole: S <=0.5/9.5   -  Tetracycline: S <=1   -  Rifampin: S <=1 Should not be used as monotherapy   -  Oxacillin: R >2   -  Penicillin: R >8   -  Linezolid: S 2   -  Gentamicin: S <=1 Should not be used as monotherapy   -  Erythromycin: R >4   -  Clindamycin: S 0.5   Specimen Source: ET Tube ET Tube   Culture Results:   Numerous Methicillin Resistant Staphylococcus aureus  Normal Respiratory Vanessa present   Organism Identification: Methicillin resistant Staphylococcus aureus   Organism: Methicillin resistant Staphylococcus aureus   Method Type: JASWINDER    Culture - Urine (23 @ 20:14)    -  Aztreonam: S 8   -  Amikacin: I 32   -  Ampicillin: S <=2 Predicts results to ampicillin/sulbactam, amoxacillin-clavulanate and  piperacillin-tazobactam.   -  Cefepime: S 8   -  Ceftazidime: S 4   -  Ciprofloxacin: R >2   -  Ciprofloxacin: R >2   -  Vancomycin: S 2   -  Levofloxacin: R >4   -  Levofloxacin: R >4   -  Imipenem: S <=1   -  Meropenem: S <=1   -  Nitrofurantoin: S <=32 Should not be used to treat pyelonephritis.   -  Piperacillin/Tazobactam: S <=8   -  Tetracycline: I 8   Specimen Source: Catheterized Catheterized   Culture Results:   >100,000 CFU/ml Enterococcus faecalis  50,000 - 99,000 CFU/mL Pseudomonas aeruginosa   Organism Identification: Enterococcus faecalis  Pseudomonas aeruginosa   Organism: Enterococcus faecalis   Organism: Pseudomonas aeruginosa   Method Type: JASWINDER   Method Type: JASWINDER                    RADIOLOGY:  [ x] Reviewed

## 2024-01-02 NOTE — PROGRESS NOTE ADULT - SUBJECTIVE AND OBJECTIVE BOX
Patient is a 89y old  Male who presents with a chief complaint of hypoxic resp failure (30 Dec 2023 12:07)      INTERVAL HPI/OVERNIGHT EVENTS: resolved blood in the mouth     MEDICATIONS  (STANDING):  chlorhexidine 0.12% Liquid 15 milliLiter(s) Oral Mucosa every 12 hours  chlorhexidine 2% Cloths 1 Application(s) Topical <User Schedule>  collagenase Ointment 1 Application(s) Topical daily  dextrose 5% + sodium chloride 0.45%. 1000 milliLiter(s) (100 mL/Hr) IV Continuous <Continuous>  gabapentin 300 milliGRAM(s) Oral two times a day  heparin   Injectable 5000 Unit(s) SubCutaneous every 12 hours  mirtazapine 15 milliGRAM(s) Oral daily  polyethylene glycol 3350 17 Gram(s) Oral two times a day  senna 2 Tablet(s) Oral at bedtime    MEDICATIONS  (PRN):  acetaminophen     Tablet .. 650 milliGRAM(s) Oral every 6 hours PRN Temp greater or equal to 38C (100.4F), Mild Pain (1 - 3)  fentaNYL    Injectable 25 MICROGram(s) IV Push every 2 hours PRN Moderate Pain (4 - 6)      Allergies    No Known Allergies    Intolerances            Vital Signs Last 24 Hrs  T(C): 38.1 (01-02-24 @ 16:05), Max: 38.1 (01-02-24 @ 16:05)  T(F): 100.6 (01-02-24 @ 16:05), Max: 100.6 (01-02-24 @ 16:05)  HR: 110 (01-02-24 @ 16:05) (83 - 110)  BP: 168/89 (01-02-24 @ 16:05) (139/81 - 168/89)  BP(mean): --  RR: 17 (01-02-24 @ 16:05) (17 - 19)  SpO2: 95% (01-02-24 @ 16:05) (95% - 100%)        PHYSICAL EXAM:  GENERAL: NAD  HEAD:  Atraumatic, Normocephalic  EYES: EOMI, PERRLA, conjunctiva and sclera clear  ENMT: No tonsillar erythema, exudates, or enlargement; trach site stable   NECK: Supple, Normal thyroid  NERVOUS SYSTEM:  Alert, tries to speak   CHEST/LUNG: CTABL; No rales, rhonchi, wheezing, or rubs  HEART: Regular rate and rhythm; No murmurs, rubs, or gallops  ABDOMEN: Soft, Nontender, Nondistended; Bowel sounds present. PEG   EXTREMITIES:  2+ Peripheral Pulses, No clubbing, cyanosis, or edema  LYMPH: No lymphadenopathy noted  SKIN: No rashes or lesions    LABS:                 Urinalysis Basic - ( 30 Dec 2023 02:42 )    Color: x / Appearance: x / SG: x / pH: x  Gluc: 69 mg/dL / Ketone: x  / Bili: x / Urobili: x   Blood: x / Protein: x / Nitrite: x   Leuk Esterase: x / RBC: x / WBC x   Sq Epi: x / Non Sq Epi: x / Bacteria: x      CAPILLARY BLOOD GLUCOSE      POCT Blood Glucose.: 138 mg/dL (31 Dec 2023 00:05)      RADIOLOGY & ADDITIONAL TESTS:    Imaging Personally Reviewed:  [ ] YES  [ ] NO    Consultant(s) Notes Reviewed:  [ ] YES  [ ] NO    Care Discussed with Consultants/Other Providers [ ] YES  [ ] NO

## 2024-01-02 NOTE — PROGRESS NOTE ADULT - SUBJECTIVE AND OBJECTIVE BOX
[facility] NEPHROLOGY SERVICES, [facility]  NEPHROLOGY AND HYPERTENSION  [address]  [address]  [place] [address]  [phone]    MD SOFY KEENAN, MD JIGNESH MCCARTY, MD LESA RAMIREZ, MD LEANNE MURRAY MD          Patient events noted    MEDICATIONS  (STANDING):  chlorhexidine 0.12% Liquid 15 milliLiter(s) Oral Mucosa every 12 hours  chlorhexidine 2% Cloths 1 Application(s) Topical <User Schedule>  collagenase Ointment 1 Application(s) Topical daily  dextrose 5% + sodium chloride 0.45%. 1000 milliLiter(s) (70 mL/Hr) IV Continuous <Continuous>  gabapentin 300 milliGRAM(s) Oral at bedtime  heparin   Injectable 5000 Unit(s) SubCutaneous every 12 hours  levoFLOXacin  Tablet 500 milliGRAM(s) Oral every 48 hours  mirtazapine 15 milliGRAM(s) Oral daily  polyethylene glycol 3350 17 Gram(s) Oral two times a day  senna 2 Tablet(s) Oral at bedtime    MEDICATIONS  (PRN):  acetaminophen     Tablet .. 650 milliGRAM(s) Oral every 6 hours PRN Temp greater or equal to 38C (100.4F), Mild Pain (1 - 3)  fentaNYL    Injectable 25 MICROGram(s) IV Push every 2 hours PRN Moderate Pain (4 - 6)      01-01-24 @ 07:01  -  01-02-24 @ 07:00  --------------------------------------------------------  IN: 730 mL / OUT: 500 mL / NET: 230 mL    01-02-24 @ 07:01  -  01-02-24 @ 23:30  --------------------------------------------------------  IN: 0 mL / OUT: 1380 mL / NET: -1380 mL      PHYSICAL EXAM:      T(C): 38.2 (01-02-24 @ 22:17), Max: 38.2 (01-02-24 @ 22:17)  HR: 100 (01-02-24 @ 21:41) (85 - 110)  BP: 168/89 (01-02-24 @ 16:05) (139/81 - 168/89)  RR: 17 (01-02-24 @ 16:05) (17 - 19)  SpO2: 96% (01-02-24 @ 21:41) (95% - 100%)  Wt(kg): --  Lungs clear  Heart S1S2  Abd soft NT ND  Extremities:   tr edema                                    7.2    7.10  )-----------( 153      ( 02 Jan 2024 05:54 )             22.5     01-02    140  |  110<H>  |  78<H>  ----------------------------<  87  4.8   |  25  |  3.83<H>    Ca    8.5      02 Jan 2024 05:54  Phos  5.8     01-01  Mg     2.3     01-01    TPro  7.3  /  Alb  1.5<L>  /  TBili  0.2  /  DBili  x   /  AST  27  /  ALT  10<L>  /  AlkPhos  73  01-01      LIVER FUNCTIONS - ( 01 Jan 2024 10:19 )  Alb: 1.5 g/dL / Pro: 7.3 gm/dL / ALK PHOS: 73 U/L / ALT: 10 U/L / AST: 27 U/L / GGT: x           Creatinine Trend: 3.83<--, 3.60<--, 3.40<--, 3.25<--, 3.09<--, 3.14<--  Mode: AC/ CMV (Assist Control/ Continuous Mandatory Ventilation)  RR (machine): 26  TV (machine): 400  FiO2: 30  PEEP: 5  ITime: 1  MAP: 12  PIP: 28        doxycycline IVPB   110 mL/Hr IV Intermittent (12-25-23 @ 05:20)   110 mL/Hr IV Intermittent (12-24-23 @ 17:29)   110 mL/Hr IV Intermittent (12-24-23 @ 05:43)   110 mL/Hr IV Intermittent (12-23-23 @ 19:47)    levoFLOXacin  Tablet   500 milliGRAM(s) Oral (01-01-24 @ 06:34)    levoFLOXacin IVPB   100 mL/Hr IV Intermittent (12-29-23 @ 09:41)   100 mL/Hr IV Intermittent (12-27-23 @ 09:41)    levoFLOXacin IVPB   100 mL/Hr IV Intermittent (12-25-23 @ 11:07)    vancomycin  IVPB   250 mL/Hr IV Intermittent (12-20-23 @ 21:04)    vancomycin  IVPB   100 mL/Hr IV Intermittent (12-26-23 @ 05:29)   100 mL/Hr IV Intermittent (12-24-23 @ 05:43)   100 mL/Hr IV Intermittent (12-22-23 @ 05:48)        Assessment     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  NEPHROLOGY AND HYPERTENSION  [address]  [address]  [place] [address]  [phone]    MD SOFY KEENAN, MD JIGNESH MCCARTY, MD LESA RAMIREZ, MD LEANNE MURRAY MD          Patient events noted    MEDICATIONS  (STANDING):  chlorhexidine 0.12% Liquid 15 milliLiter(s) Oral Mucosa every 12 hours  chlorhexidine 2% Cloths 1 Application(s) Topical <User Schedule>  collagenase Ointment 1 Application(s) Topical daily  dextrose 5% + sodium chloride 0.45%. 1000 milliLiter(s) (70 mL/Hr) IV Continuous <Continuous>  gabapentin 300 milliGRAM(s) Oral at bedtime  heparin   Injectable 5000 Unit(s) SubCutaneous every 12 hours  levoFLOXacin  Tablet 500 milliGRAM(s) Oral every 48 hours  mirtazapine 15 milliGRAM(s) Oral daily  polyethylene glycol 3350 17 Gram(s) Oral two times a day  senna 2 Tablet(s) Oral at bedtime    MEDICATIONS  (PRN):  acetaminophen     Tablet .. 650 milliGRAM(s) Oral every 6 hours PRN Temp greater or equal to 38C (100.4F), Mild Pain (1 - 3)  fentaNYL    Injectable 25 MICROGram(s) IV Push every 2 hours PRN Moderate Pain (4 - 6)      01-01-24 @ 07:01  -  01-02-24 @ 07:00  --------------------------------------------------------  IN: 730 mL / OUT: 500 mL / NET: 230 mL    01-02-24 @ 07:01  -  01-02-24 @ 23:30  --------------------------------------------------------  IN: 0 mL / OUT: 1380 mL / NET: -1380 mL      PHYSICAL EXAM:      T(C): 38.2 (01-02-24 @ 22:17), Max: 38.2 (01-02-24 @ 22:17)  HR: 100 (01-02-24 @ 21:41) (85 - 110)  BP: 168/89 (01-02-24 @ 16:05) (139/81 - 168/89)  RR: 17 (01-02-24 @ 16:05) (17 - 19)  SpO2: 96% (01-02-24 @ 21:41) (95% - 100%)  Wt(kg): --  Lungs clear  Heart S1S2  Abd soft NT ND  Extremities:   tr edema                                    7.2    7.10  )-----------( 153      ( 02 Jan 2024 05:54 )             22.5     01-02    140  |  110<H>  |  78<H>  ----------------------------<  87  4.8   |  25  |  3.83<H>    Ca    8.5      02 Jan 2024 05:54  Phos  5.8     01-01  Mg     2.3     01-01    TPro  7.3  /  Alb  1.5<L>  /  TBili  0.2  /  DBili  x   /  AST  27  /  ALT  10<L>  /  AlkPhos  73  01-01      LIVER FUNCTIONS - ( 01 Jan 2024 10:19 )  Alb: 1.5 g/dL / Pro: 7.3 gm/dL / ALK PHOS: 73 U/L / ALT: 10 U/L / AST: 27 U/L / GGT: x           Creatinine Trend: 3.83<--, 3.60<--, 3.40<--, 3.25<--, 3.09<--, 3.14<--  Mode: AC/ CMV (Assist Control/ Continuous Mandatory Ventilation)  RR (machine): 26  TV (machine): 400  FiO2: 30  PEEP: 5  ITime: 1  MAP: 12  PIP: 28        doxycycline IVPB   110 mL/Hr IV Intermittent (12-25-23 @ 05:20)   110 mL/Hr IV Intermittent (12-24-23 @ 17:29)   110 mL/Hr IV Intermittent (12-24-23 @ 05:43)   110 mL/Hr IV Intermittent (12-23-23 @ 19:47)    levoFLOXacin  Tablet   500 milliGRAM(s) Oral (01-01-24 @ 06:34)    levoFLOXacin IVPB   100 mL/Hr IV Intermittent (12-29-23 @ 09:41)   100 mL/Hr IV Intermittent (12-27-23 @ 09:41)    levoFLOXacin IVPB   100 mL/Hr IV Intermittent (12-25-23 @ 11:07)    vancomycin  IVPB   250 mL/Hr IV Intermittent (12-20-23 @ 21:04)    vancomycin  IVPB   100 mL/Hr IV Intermittent (12-26-23 @ 05:29)   100 mL/Hr IV Intermittent (12-24-23 @ 05:43)   100 mL/Hr IV Intermittent (12-22-23 @ 05:48)        Assessment     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

## 2024-01-02 NOTE — PROGRESS NOTE ADULT - SUBJECTIVE AND OBJECTIVE BOX
POD4 s/p tache/PEG  Patient seen and examined bedside resting comfortably.        T(F): 99.4 (01-02-24 @ 05:34), Max: 99.6 (01-01-24 @ 16:13)  HR: 106 (01-02-24 @ 05:34) (83 - 106)  BP: 151/67 (01-02-24 @ 05:34) (139/81 - 169/86)  RR: 18 (01-02-24 @ 05:34) (16 - 19)  SpO2: 97% (01-02-24 @ 05:34) (97% - 100%)  Wt(kg): --  CAPILLARY BLOOD GLUCOSE      POCT Blood Glucose.: 124 mg/dL (01 Jan 2024 11:13)      PHYSICAL EXAM:  General: NAD  Neck: tracheostomy in place, surgicel removed, no active bleeding   Lung: respirations nonlabored on vent  Abdomen: soft, NTND. PEG in place       LABS:                        7.2    7.10  )-----------( 153      ( 02 Jan 2024 05:54 )             22.5     01-02    140  |  110<H>  |  78<H>  ----------------------------<  87  4.8   |  25  |  3.83<H>    Ca    8.5      02 Jan 2024 05:54  Phos  5.8     01-01  Mg     2.3     01-01    TPro  7.3  /  Alb  1.5<L>  /  TBili  0.2  /  DBili  x   /  AST  27  /  ALT  10<L>  /  AlkPhos  73  01-01        I&O:     A/P: 89 yo M with respiratory failure POD4 s/p trache/PEG. Reconsulted yesterday for oozing around trache site, surgicel placed. removed today with no active bleeding  -can resume VTE ppx per primary team  -case discussed with Dr Vigil, will sign off, please reconsult PRN  POD4 s/p tache/PEG  Patient seen and examined bedside resting comfortably.        T(F): 99.4 (01-02-24 @ 05:34), Max: 99.6 (01-01-24 @ 16:13)  HR: 106 (01-02-24 @ 05:34) (83 - 106)  BP: 151/67 (01-02-24 @ 05:34) (139/81 - 169/86)  RR: 18 (01-02-24 @ 05:34) (16 - 19)  SpO2: 97% (01-02-24 @ 05:34) (97% - 100%)  Wt(kg): --  CAPILLARY BLOOD GLUCOSE      POCT Blood Glucose.: 124 mg/dL (01 Jan 2024 11:13)      PHYSICAL EXAM:  General: NAD  Neck: tracheostomy in place, surgicel removed, no active bleeding   Lung: respirations nonlabored on vent  Abdomen: soft, NTND. PEG in place       LABS:                        7.2    7.10  )-----------( 153      ( 02 Jan 2024 05:54 )             22.5     01-02    140  |  110<H>  |  78<H>  ----------------------------<  87  4.8   |  25  |  3.83<H>    Ca    8.5      02 Jan 2024 05:54  Phos  5.8     01-01  Mg     2.3     01-01    TPro  7.3  /  Alb  1.5<L>  /  TBili  0.2  /  DBili  x   /  AST  27  /  ALT  10<L>  /  AlkPhos  73  01-01        I&O:     A/P: 89 yo M with respiratory failure POD4 s/p trache/PEG. Reconsulted yesterday for oozing around trache site, surgicel placed. removed today with no active bleeding  -can resume VTE ppx per primary team  -case discussed with Dr Vigil, will sign off, please reconsult PRN

## 2024-01-02 NOTE — CONSULT NOTE ADULT - CONSULT REQUESTED DATE/TIME
02-Jan-2024 11:00
27-Dec-2023 16:47
01-Dec-2023 15:58
27-Dec-2023 15:14
05-Dec-2023 15:25
16-Dec-2023 13:19
06-Dec-2023 15:00

## 2024-01-02 NOTE — CONSULT NOTE ADULT - ASSESSMENT
89M w/ dementia, CKD, chronic urinary retention w/ obrien. Admitted w/ L pleural effusion thought to be due to mucous plugging. Course complicated by worsening hypoxemia requiring intubation and bronchoscopy x2 w/ MRSA pneumonia, stenotrophomonas. Extubated on 12/24 but required re-intubation. Extubated again but failed due to hypercapnia and hypoxia. s/p Trach / PEG.      DX: Left pleural effusion, mucous plugging, Hypoxemic respiratory failure, MRSA & Stenotrophomonas PNA.    Reccs:    - Acute hypoxic resp failure likely 2/2 mucous plugging with PNA  - Now S/P peg and trach vent dependant  - Frequent suctioning, Pulmonary toileting  - Wean as tolerates  - Completed course Levaquin for Steno in sputum  - Trend fever curve / wbc  - Plan of care discussed with Pulmonology attending Dr. Bills     
89M PMH HTN, CKD3, BPH w/ chronic obrien and multiple admissions for CAUTI, dementia, COVID 1/2023 presents for SOB. Pt poor historian so history obtained from record. Pt was brought in by home aid who reports pt c/o SOB. NO fever/chills/sweats, URI sx, N/V/D reported.    In ED, pt hypothermic to 95.5 and hypotensive to 90/60s. Labs notable for Hgb 6.3 (+[place]), dimer 3000, Na 129, BUN/SCr 70/2.91, BNP 7966, UA+. CT CAP demonstrates mild b/l hydronephrosis and large stool burden w/ perirectal stranding c/f stercoral colitis.    New L pleural effusion found on CXR    POCUS   [x] limited lung    - difficult exam as pt comabtive swinging and not cooperating with exam  - noted L anterior effusion with atelectatic lung, unable to visualize lung base due to agitation    will repeat in AM again for better view    
89 yo M with HTN, CKD3, BPH w/ chronic obrien and multiple admissions for CAUTI, dementia, COVID 1/2023 a/w sob found to have L sided pleural effusion with compressive atelectasis of L [place] and chronic UTI with with enterococcus faecalis and pseudomonas . ICU consulted on 12/16 for hypoxic and hypercapneic respiratory failure.      Neuro:  Obtunded 2/2 respiratory acidosis and CO2 narcosis; will need intubation. Will need post intubation sedation with likely propofol  CV:  Maintain MAP >65; will have norepinephrine on hold during intubation.  Hold antihypertensive meds.  Pulm: Will bring to ICU for intubation and bronchoscopy.    GI: NPO  Renal/Metabolic: ROGER on CKD 3 with chronic obrien, c/w abx for CAUTI present on admission.    ID: c/w abx for CAUTI present on admission.    Heme: HSQ  Skin: Stage III sacral ulcer, continue with wound care  Dispo: Admit to ICU for intubation and bronchoscopy.  Full code.  Discussed care plan with son, Dr Tierney.  Ongoing goals of care discussions.

## 2024-01-02 NOTE — PROGRESS NOTE ADULT - ASSESSMENT
89M w/ dementia, CKD, chronic urinary retention w/ obrien. Admitted w/ L pleural effusion thought to be due to mucous plugging. Course complicated by worsening hypoxemia requiring intubation and bronchoscopy x2 w/ MRSA pneumonia, stenotrophomonas. Extubated on 12/24 but required re-intubation. Extubated again but failed due to hypercapnia and hypoxia. s/p Trach / PEG.      acute hypoxic resp failure with pna   - s/p peg and trach   - Completed course Levaquin for Steno in sputum  - trend fever curve / wbc  - pulm follow up   - remains vent dependent     blood in the mouth likely from trach   - reconsulted surgery and applied surgicil and bleeding has resolved     AOCD   - tx as needed     ckd     dc planning to vent facility

## 2024-01-03 NOTE — PROGRESS NOTE ADULT - SUBJECTIVE AND OBJECTIVE BOX
INTERVAL HPI/OVERNIGHT EVENTS:  Pt seen and examined at bedside.     Allergies/Intolerance: No Known Allergies      MEDICATIONS  (STANDING):  chlorhexidine 0.12% Liquid 15 milliLiter(s) Oral Mucosa every 12 hours  chlorhexidine 2% Cloths 1 Application(s) Topical <User Schedule>  collagenase Ointment 1 Application(s) Topical daily  dextrose 5% + sodium chloride 0.45%. 1000 milliLiter(s) (70 mL/Hr) IV Continuous <Continuous>  gabapentin 300 milliGRAM(s) Oral at bedtime  heparin   Injectable 5000 Unit(s) SubCutaneous every 12 hours  mirtazapine 15 milliGRAM(s) Oral daily  polyethylene glycol 3350 17 Gram(s) Oral two times a day  senna 2 Tablet(s) Oral at bedtime    MEDICATIONS  (PRN):  acetaminophen     Tablet .. 650 milliGRAM(s) Oral every 6 hours PRN Temp greater or equal to 38C (100.4F), Mild Pain (1 - 3)  fentaNYL    Injectable 25 MICROGram(s) IV Push every 2 hours PRN Moderate Pain (4 - 6)        ROS: all systems reviewed and wnl      PHYSICAL EXAMINATION:  Vital Signs Last 24 Hrs  T(C): 37.1 (03 Jan 2024 07:14), Max: 38.2 (02 Jan 2024 22:17)  T(F): 98.7 (03 Jan 2024 07:14), Max: 100.8 (02 Jan 2024 22:17)  HR: 86 (03 Jan 2024 08:30) (86 - 110)  BP: 174/73 (03 Jan 2024 05:26) (149/75 - 174/73)  BP(mean): --  RR: 20 (03 Jan 2024 05:26) (17 - 20)  SpO2: 97% (03 Jan 2024 08:30) (95% - 99%)      CAPILLARY BLOOD GLUCOSE          01-02 @ 07:01  -  01-03 @ 07:00  --------------------------------------------------------  IN: 0 mL / OUT: 1380 mL / NET: -1380 mL        GENERAL:   NECK: supple, No JVD  CHEST/LUNG: clear to auscultation bilaterally; no rales, rhonchi, or wheezing b/l  HEART: normal S1, S2  ABDOMEN: BS+, soft, ND, NT   EXTREMITIES:  pulses palpable; no clubbing, cyanosis, or edema b/l LEs  SKIN: no rashes or lesions      LABS:                        6.7    6.38  )-----------( 142      ( 03 Jan 2024 09:55 )             20.2     01-03    144  |  112<H>  |  86<H>  ----------------------------<  102<H>  4.2   |  23  |  4.00<H>    Ca    9.0      03 Jan 2024 05:50    TPro  6.8  /  Alb  1.4<L>  /  TBili  0.3  /  DBili  x   /  AST  21  /  ALT  7<L>  /  AlkPhos  62  01-03      Urinalysis Basic - ( 03 Jan 2024 05:50 )    Color: x / Appearance: x / SG: x / pH: x  Gluc: 102 mg/dL / Ketone: x  / Bili: x / Urobili: x   Blood: x / Protein: x / Nitrite: x   Leuk Esterase: x / RBC: x / WBC x   Sq Epi: x / Non Sq Epi: x / Bacteria: x             INTERVAL HPI/OVERNIGHT EVENTS:  Pt seen and examined at bedside.     Allergies/Intolerance: No Known Allergies      MEDICATIONS  (STANDING):  chlorhexidine 0.12% Liquid 15 milliLiter(s) Oral Mucosa every 12 hours  chlorhexidine 2% Cloths 1 Application(s) Topical <User Schedule>  collagenase Ointment 1 Application(s) Topical daily  dextrose 5% + sodium chloride 0.45%. 1000 milliLiter(s) (70 mL/Hr) IV Continuous <Continuous>  gabapentin 300 milliGRAM(s) Oral at bedtime  heparin   Injectable 5000 Unit(s) SubCutaneous every 12 hours  mirtazapine 15 milliGRAM(s) Oral daily  polyethylene glycol 3350 17 Gram(s) Oral two times a day  senna 2 Tablet(s) Oral at bedtime    MEDICATIONS  (PRN):  acetaminophen     Tablet .. 650 milliGRAM(s) Oral every 6 hours PRN Temp greater or equal to 38C (100.4F), Mild Pain (1 - 3)  fentaNYL    Injectable 25 MICROGram(s) IV Push every 2 hours PRN Moderate Pain (4 - 6)        ROS: all systems reviewed and wnl      PHYSICAL EXAMINATION:  Vital Signs Last 24 Hrs  T(C): 37.1 (03 Jan 2024 07:14), Max: 38.2 (02 Jan 2024 22:17)  T(F): 98.7 (03 Jan 2024 07:14), Max: 100.8 (02 Jan 2024 22:17)  HR: 86 (03 Jan 2024 08:30) (86 - 110)  BP: 174/73 (03 Jan 2024 05:26) (149/75 - 174/73)  BP(mean): --  RR: 20 (03 Jan 2024 05:26) (17 - 20)  SpO2: 97% (03 Jan 2024 08:30) (95% - 99%)      CAPILLARY BLOOD GLUCOSE          01-02 @ 07:01  -  01-03 @ 07:00  --------------------------------------------------------  IN: 0 mL / OUT: 1380 mL / NET: -1380 mL        GENERAL: stable, on vent  NECK: supple, No JVD  CHEST/LUNG: clear to auscultation bilaterally; no rales, rhonchi, or wheezing b/l  HEART: normal S1, S2  ABDOMEN: BS+, soft, ND, NT   EXTREMITIES:  pulses palpable; no clubbing, cyanosis, or edema b/l LEs    LABS:                        6.7    6.38  )-----------( 142      ( 03 Jan 2024 09:55 )             20.2     01-03    144  |  112<H>  |  86<H>  ----------------------------<  102<H>  4.2   |  23  |  4.00<H>    Ca    9.0      03 Jan 2024 05:50    TPro  6.8  /  Alb  1.4<L>  /  TBili  0.3  /  DBili  x   /  AST  21  /  ALT  7<L>  /  AlkPhos  62  01-03      Urinalysis Basic - ( 03 Jan 2024 05:50 )    Color: x / Appearance: x / SG: x / pH: x  Gluc: 102 mg/dL / Ketone: x  / Bili: x / Urobili: x   Blood: x / Protein: x / Nitrite: x   Leuk Esterase: x / RBC: x / WBC x   Sq Epi: x / Non Sq Epi: x / Bacteria: x             INTERVAL HPI/OVERNIGHT EVENTS:  Pt seen and examined at bedside.     Allergies/Intolerance: No Known Allergies      MEDICATIONS  (STANDING):  chlorhexidine 0.12% Liquid 15 milliLiter(s) Oral Mucosa every 12 hours  chlorhexidine 2% Cloths 1 Application(s) Topical <User Schedule>  collagenase Ointment 1 Application(s) Topical daily  dextrose 5% + sodium chloride 0.45%. 1000 milliLiter(s) (70 mL/Hr) IV Continuous <Continuous>  gabapentin 300 milliGRAM(s) Oral at bedtime  heparin   Injectable 5000 Unit(s) SubCutaneous every 12 hours  mirtazapine 15 milliGRAM(s) Oral daily  polyethylene glycol 3350 17 Gram(s) Oral two times a day  senna 2 Tablet(s) Oral at bedtime    MEDICATIONS  (PRN):  acetaminophen     Tablet .. 650 milliGRAM(s) Oral every 6 hours PRN Temp greater or equal to 38C (100.4F), Mild Pain (1 - 3)  fentaNYL    Injectable 25 MICROGram(s) IV Push every 2 hours PRN Moderate Pain (4 - 6)        ROS: all systems reviewed and wnl      PHYSICAL EXAMINATION:  Vital Signs Last 24 Hrs  T(C): 37.1 (03 Jan 2024 07:14), Max: 38.2 (02 Jan 2024 22:17)  T(F): 98.7 (03 Jan 2024 07:14), Max: 100.8 (02 Jan 2024 22:17)  HR: 86 (03 Jan 2024 08:30) (86 - 110)  BP: 174/73 (03 Jan 2024 05:26) (149/75 - 174/73)  BP(mean): --  RR: 20 (03 Jan 2024 05:26) (17 - 20)  SpO2: 97% (03 Jan 2024 08:30) (95% - 99%)      CAPILLARY BLOOD GLUCOSE          01-02 @ 07:01  -  01-03 @ 07:00  --------------------------------------------------------  IN: 0 mL / OUT: 1380 mL / NET: -1380 mL        GENERAL: stable, on vent/trach and PEG, obrien in place.   NECK: supple, No JVD  CHEST/LUNG: clear to auscultation bilaterally; no rales, rhonchi, or wheezing b/l  HEART: normal S1, S2  ABDOMEN: BS+, soft, ND, NT   EXTREMITIES:  pulses palpable; no clubbing, cyanosis, or edema b/l LEs    LABS:                        6.7    6.38  )-----------( 142      ( 03 Jan 2024 09:55 )             20.2     01-03    144  |  112<H>  |  86<H>  ----------------------------<  102<H>  4.2   |  23  |  4.00<H>    Ca    9.0      03 Jan 2024 05:50    TPro  6.8  /  Alb  1.4<L>  /  TBili  0.3  /  DBili  x   /  AST  21  /  ALT  7<L>  /  AlkPhos  62  01-03      Urinalysis Basic - ( 03 Jan 2024 05:50 )    Color: x / Appearance: x / SG: x / pH: x  Gluc: 102 mg/dL / Ketone: x  / Bili: x / Urobili: x   Blood: x / Protein: x / Nitrite: x   Leuk Esterase: x / RBC: x / WBC x   Sq Epi: x / Non Sq Epi: x / Bacteria: x

## 2024-01-03 NOTE — PROGRESS NOTE ADULT - ASSESSMENT
89M w/ dementia, CKD, chronic urinary retention w/ obrien. Admitted w/ L pleural effusion thought to be due to mucous plugging. Course complicated by worsening hypoxemia requiring intubation and bronchoscopy x2 w/ MRSA pneumonia, stenotrophomonas. Extubated on 12/24 but required re-intubation. Extubated again but failed due to hypercapnia and hypoxia. s/p Trach / PEG.    DX: Left pleural effusion, mucous plugging, Hypoxemic respiratory failure, MRSA & Stenotrophomonas PNA.    Reccs:  - Acute hypoxic resp failure likely 2/2 mucous plugging with PNA  - Now S/P peg and trach vent dependant  - Frequent suctioning, Pulmonary toileting  - High risk for VAP, close monitor on fever curve & WBC   - Wean as tolerates  - Completed course Vanc & Levaquin for MRSA & Steno in sputum  - Plan of care discussed with Pulmonology attending Dr. Bills

## 2024-01-03 NOTE — PROGRESS NOTE ADULT - ASSESSMENT
89M w/ dementia, CKD, chronic urinary retention w/ obrien. Admitted w/ L pleural effusion thought to be due to mucous plugging. Course complicated by worsening hypoxemia requiring intubation and bronchoscopy x2 w/ MRSA pneumonia, stenotrophomonas. Extubated on 12/24 but required re-intubation. Extubated again but failed due to hypercapnia and hypoxia. s/p Trach / PEG.      acute hypoxic resp failure with trach. s/p peg and trach   - Completed course Levaquin for Steno in sputum  - trend fever curve / wbc  - pulm follow up   - remains vent dependent     blood in the mouth likely from trach   - reconsulted surgery and applied surgicil and bleeding has resolved     AOCD   - tx as needed     ckd     D/C to vent facility when bed available.      89M w/ dementia, CKD, chronic urinary retention w/ obrien. Admitted w/ L pleural effusion thought to be due to mucous plugging. Course complicated by worsening hypoxemia requiring intubation and bronchoscopy x2 w/ MRSA pneumonia, stenotrophomonas. Extubated on 12/24 but required re-intubation. Extubated again but failed due to hypercapnia and hypoxia. s/p Trach / PEG.      acute hypoxic resp failure with trach. s/p peg and trach     - Completed course Levaquin for Steno in sputum  - trend fever curve / wbc  - pulm follow up   - remains vent dependent     blood in the mouth likely from trach, reconsulted surgery and applied surgicil and bleeding has resolved     CKD appears stable, creatinine 4.00, will stop IVF.      D/C to vent facility when bed available.      89M w/ dementia, CKD, chronic urinary retention w/ obrien. Admitted w/ L pleural effusion thought to be due to mucous plugging. Course complicated by worsening hypoxemia requiring intubation and bronchoscopy x2 w/ MRSA pneumonia, stenotrophomonas. Extubated on 12/24 but required re-intubation. Extubated again but failed due to hypercapnia and hypoxia. s/p Trach / PEG.      acute hypoxic resp failure with trach. s/p peg and trach     - Completed course Levaquin for Steno in sputum  - trend fever curve / wbc  - pulm follow up   - remains vent dependent     CKD appears stable, creatinine 4.00, will stop IVF.      D/C to vent facility when bed available.      Will stop Neurontin, no clear indication.     Follow HGB, if lower may need PRBC. No symptoms at present.

## 2024-01-03 NOTE — PROGRESS NOTE ADULT - SUBJECTIVE AND OBJECTIVE BOX
INTERVAL HPI/OVERNIGHT EVENTS: Anemic overnight hgb 6.6. Remains on full AC support. Tmax 100.6     SUBJECTIVE: Patient seen and examined at bedside.   ROS: All negative except as listed above.    VITAL SIGNS:  ICU Vital Signs Last 24 Hrs  T(C): 37.1 (03 Jan 2024 07:14), Max: 38.2 (02 Jan 2024 22:17)  T(F): 98.7 (03 Jan 2024 07:14), Max: 100.8 (02 Jan 2024 22:17)  HR: 97 (03 Jan 2024 05:26) (88 - 110)  BP: 174/73 (03 Jan 2024 05:26) (143/64 - 174/73)  BP(mean): --  ABP: --  ABP(mean): --  RR: 20 (03 Jan 2024 05:26) (17 - 20)  SpO2: 99% (03 Jan 2024 05:26) (95% - 100%)    O2 Parameters below as of 02 Jan 2024 10:52  Patient On (Oxygen Delivery Method): ventilator          Mode: AC/ CMV (Assist Control/ Continuous Mandatory Ventilation), RR (machine): 26, TV (machine): 400, FiO2: 30, PEEP: 5, ITime: 0.7, MAP: 15, PIP: 40  Plateau pressure:   P/F ratio:     01-02 @ 07:01  -  01-03 @ 07:00  --------------------------------------------------------  IN: 0 mL / OUT: 1380 mL / NET: -1380 mL      CAPILLARY BLOOD GLUCOSE      POCT Blood Glucose.: 124 mg/dL (01 Jan 2024 11:13)      ECG: reviewed.    PHYSICAL EXAM:  General: NAD, chronically ill appearing  HEENT: NC/AT, PERRL, MMM  Neck: supple, + Trach  Respiratory: mechanical BS B/L  Cardiovascular: s1s2 RRR  Abdomen: soft, non tender, non distended. +PEG. C/D/I  Extremities: warm, no edema or clubbing   Skin: no rashes or lesions  Neurological: unable to assess  Psychiatry: unable to assess    MEDICATIONS:  MEDICATIONS  (STANDING):  chlorhexidine 0.12% Liquid 15 milliLiter(s) Oral Mucosa every 12 hours  chlorhexidine 2% Cloths 1 Application(s) Topical <User Schedule>  collagenase Ointment 1 Application(s) Topical daily  dextrose 5% + sodium chloride 0.45%. 1000 milliLiter(s) (70 mL/Hr) IV Continuous <Continuous>  gabapentin 300 milliGRAM(s) Oral at bedtime  heparin   Injectable 5000 Unit(s) SubCutaneous every 12 hours  levoFLOXacin  Tablet 500 milliGRAM(s) Oral every 48 hours  mirtazapine 15 milliGRAM(s) Oral daily  polyethylene glycol 3350 17 Gram(s) Oral two times a day  senna 2 Tablet(s) Oral at bedtime    MEDICATIONS  (PRN):  acetaminophen     Tablet .. 650 milliGRAM(s) Oral every 6 hours PRN Temp greater or equal to 38C (100.4F), Mild Pain (1 - 3)  fentaNYL    Injectable 25 MICROGram(s) IV Push every 2 hours PRN Moderate Pain (4 - 6)      ALLERGIES:  Allergies    No Known Allergies    Intolerances        LABS:                        6.6    6.73  )-----------( 149      ( 03 Jan 2024 05:50 )             20.4     01-03    144  |  112<H>  |  86<H>  ----------------------------<  102<H>  4.2   |  23  |  4.00<H>    Ca    9.0      03 Jan 2024 05:50  Phos  5.8     01-01  Mg     2.3     01-01    TPro  6.8  /  Alb  1.4<L>  /  TBili  0.3  /  DBili  x   /  AST  21  /  ALT  7<L>  /  AlkPhos  62  01-03      Urinalysis Basic - ( 03 Jan 2024 05:50 )    Color: x / Appearance: x / SG: x / pH: x  Gluc: 102 mg/dL / Ketone: x  / Bili: x / Urobili: x   Blood: x / Protein: x / Nitrite: x   Leuk Esterase: x / RBC: x / WBC x   Sq Epi: x / Non Sq Epi: x / Bacteria: x      ABG:      vBG:    Micro:      Culture - Sputum (collected 12-16-23 @ 15:00)  Source: ET Tube ET Tube  Gram Stain (12-18-23 @ 19:19):    Moderate polymorphonuclear leukocytes per low power field    Few Squamous epithelial cells per low power field    Numerous Gram positive cocci in pairs per oil power field    Few Gram Positive Rods per oil power field  Final Report (12-18-23 @ 19:19):    Numerous Methicillin Resistant Staphylococcus aureus    Normal Respiratory Vanessa present  Organism: Methicillin resistant Staphylococcus aureus (12-18-23 @ 19:19)  Organism: Methicillin resistant Staphylococcus aureus (12-18-23 @ 19:19)      Method Type: JASWINDER      -  Ampicillin/Sulbactam: R >16/8      -  Cefazolin: R >16      -  Clindamycin: S 0.5      -  Erythromycin: R >4      -  Gentamicin: S <=1 Should not be used as monotherapy      -  Linezolid: S 2      -  Oxacillin: R >2      -  Penicillin: R >8      -  Rifampin: S <=1 Should not be used as monotherapy      -  Tetracycline: S <=1      -  Trimethoprim/Sulfamethoxazole: S <=0.5/9.5      -  Vancomycin: S 2        RADIOLOGY & ADDITIONAL TESTS: Reviewed.

## 2024-01-03 NOTE — PROGRESS NOTE ADULT - SUBJECTIVE AND OBJECTIVE BOX
[facility] NEPHROLOGY SERVICES, [facility]  NEPHROLOGY AND HYPERTENSION  [address]  [address]  [place] [address]  [phone]    MD SOFY KEENAN, MD KEATON FRYE MD CHRISTOPHER CAPUTO, MD EDWARD BOVER, MD          Patient events noted    MEDICATIONS  (STANDING):  chlorhexidine 0.12% Liquid 15 milliLiter(s) Oral Mucosa every 12 hours  chlorhexidine 2% Cloths 1 Application(s) Topical <User Schedule>  collagenase Ointment 1 Application(s) Topical daily  heparin   Injectable 5000 Unit(s) SubCutaneous every 12 hours  mirtazapine 15 milliGRAM(s) Oral daily  polyethylene glycol 3350 17 Gram(s) Oral two times a day  senna 2 Tablet(s) Oral at bedtime    MEDICATIONS  (PRN):  morphine  - Injectable 2 milliGRAM(s) IV Push every 4 hours PRN Mild Pain (1 - 3)      01-02-24 @ 07:01  -  01-03-24 @ 07:00  --------------------------------------------------------  IN: 0 mL / OUT: 1380 mL / NET: -1380 mL      PHYSICAL EXAM:      T(C): 37.1 (01-03-24 @ 17:33), Max: 38.1 (01-03-24 @ 05:26)  HR: 99 (01-03-24 @ 20:38) (86 - 102)  BP: 157/77 (01-03-24 @ 17:33) (149/75 - 174/73)  RR: 20 (01-03-24 @ 05:26) (20 - 20)  SpO2: 99% (01-03-24 @ 20:38) (96% - 100%)  Wt(kg): --  Lungs clear  Heart S1S2  Abd soft NT ND  Extremities:   tr edema                                    6.7    6.38  )-----------( 142      ( 03 Jan 2024 09:55 )             20.2     01-03    144  |  112<H>  |  86<H>  ----------------------------<  102<H>  4.2   |  23  |  4.00<H>    Ca    9.0      03 Jan 2024 05:50    TPro  6.8  /  Alb  1.4<L>  /  TBili  0.3  /  DBili  x   /  AST  21  /  ALT  7<L>  /  AlkPhos  62  01-03      LIVER FUNCTIONS - ( 03 Jan 2024 05:50 )  Alb: 1.4 g/dL / Pro: 6.8 gm/dL / ALK PHOS: 62 U/L / ALT: 7 U/L / AST: 21 U/L / GGT: x           Creatinine Trend: 4.00<--, 3.83<--, 3.60<--, 3.40<--, 3.25<--, 3.09<--  Mode: AC/ CMV (Assist Control/ Continuous Mandatory Ventilation)  RR (machine): 26  TV (machine): 400  FiO2: 30  PEEP: 5  ITime: 1  MAP: 12  PIP: 27        doxycycline IVPB   110 mL/Hr IV Intermittent (12-25-23 @ 05:20)   110 mL/Hr IV Intermittent (12-24-23 @ 17:29)   110 mL/Hr IV Intermittent (12-24-23 @ 05:43)   110 mL/Hr IV Intermittent (12-23-23 @ 19:47)    levoFLOXacin  Tablet   500 milliGRAM(s) Oral (01-03-24 @ 05:39)   500 milliGRAM(s) Oral (01-01-24 @ 06:34)    levoFLOXacin IVPB   100 mL/Hr IV Intermittent (12-29-23 @ 09:41)   100 mL/Hr IV Intermittent (12-27-23 @ 09:41)    levoFLOXacin IVPB   100 mL/Hr IV Intermittent (12-25-23 @ 11:07)    vancomycin  IVPB   100 mL/Hr IV Intermittent (12-26-23 @ 05:29)   100 mL/Hr IV Intermittent (12-24-23 @ 05:43)   100 mL/Hr IV Intermittent (12-22-23 @ 05:48)    Trend Bun/Cr  01-03-24 @ 05:50  BUN/CR -  86<H> / 4.00<H>  01-02-24 @ 05:54  BUN/CR -  78<H> / 3.83<H>  01-01-24 @ 10:19  BUN/CR -  84<H> / 3.60<H>      < from: US Renal (01.03.24 @ 11:07) >  IMPRESSION:  Limited evaluation left kidney.    Mild right hydronephrosis.  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      Plan  Continue IVF  Levaquin d/c  Avoid nephrotoxic mediations  Decreased Gabapentin to 300 mg daily       Samy Noriega MD [facility] NEPHROLOGY SERVICES, [facility]  NEPHROLOGY AND HYPERTENSION  [address]  [address]  [place] [address]  [phone]    MD SOFY KEENAN, MD KEATON FRYE MD CHRISTOPHER CAPUTO, MD EDWARD BOVER, MD          Patient events noted    MEDICATIONS  (STANDING):  chlorhexidine 0.12% Liquid 15 milliLiter(s) Oral Mucosa every 12 hours  chlorhexidine 2% Cloths 1 Application(s) Topical <User Schedule>  collagenase Ointment 1 Application(s) Topical daily  heparin   Injectable 5000 Unit(s) SubCutaneous every 12 hours  mirtazapine 15 milliGRAM(s) Oral daily  polyethylene glycol 3350 17 Gram(s) Oral two times a day  senna 2 Tablet(s) Oral at bedtime    MEDICATIONS  (PRN):  morphine  - Injectable 2 milliGRAM(s) IV Push every 4 hours PRN Mild Pain (1 - 3)      01-02-24 @ 07:01  -  01-03-24 @ 07:00  --------------------------------------------------------  IN: 0 mL / OUT: 1380 mL / NET: -1380 mL      PHYSICAL EXAM:      T(C): 37.1 (01-03-24 @ 17:33), Max: 38.1 (01-03-24 @ 05:26)  HR: 99 (01-03-24 @ 20:38) (86 - 102)  BP: 157/77 (01-03-24 @ 17:33) (149/75 - 174/73)  RR: 20 (01-03-24 @ 05:26) (20 - 20)  SpO2: 99% (01-03-24 @ 20:38) (96% - 100%)  Wt(kg): --  Lungs clear  Heart S1S2  Abd soft NT ND  Extremities:   tr edema                                    6.7    6.38  )-----------( 142      ( 03 Jan 2024 09:55 )             20.2     01-03    144  |  112<H>  |  86<H>  ----------------------------<  102<H>  4.2   |  23  |  4.00<H>    Ca    9.0      03 Jan 2024 05:50    TPro  6.8  /  Alb  1.4<L>  /  TBili  0.3  /  DBili  x   /  AST  21  /  ALT  7<L>  /  AlkPhos  62  01-03      LIVER FUNCTIONS - ( 03 Jan 2024 05:50 )  Alb: 1.4 g/dL / Pro: 6.8 gm/dL / ALK PHOS: 62 U/L / ALT: 7 U/L / AST: 21 U/L / GGT: x           Creatinine Trend: 4.00<--, 3.83<--, 3.60<--, 3.40<--, 3.25<--, 3.09<--  Mode: AC/ CMV (Assist Control/ Continuous Mandatory Ventilation)  RR (machine): 26  TV (machine): 400  FiO2: 30  PEEP: 5  ITime: 1  MAP: 12  PIP: 27        doxycycline IVPB   110 mL/Hr IV Intermittent (12-25-23 @ 05:20)   110 mL/Hr IV Intermittent (12-24-23 @ 17:29)   110 mL/Hr IV Intermittent (12-24-23 @ 05:43)   110 mL/Hr IV Intermittent (12-23-23 @ 19:47)    levoFLOXacin  Tablet   500 milliGRAM(s) Oral (01-03-24 @ 05:39)   500 milliGRAM(s) Oral (01-01-24 @ 06:34)    levoFLOXacin IVPB   100 mL/Hr IV Intermittent (12-29-23 @ 09:41)   100 mL/Hr IV Intermittent (12-27-23 @ 09:41)    levoFLOXacin IVPB   100 mL/Hr IV Intermittent (12-25-23 @ 11:07)    vancomycin  IVPB   100 mL/Hr IV Intermittent (12-26-23 @ 05:29)   100 mL/Hr IV Intermittent (12-24-23 @ 05:43)   100 mL/Hr IV Intermittent (12-22-23 @ 05:48)    Trend Bun/Cr  01-03-24 @ 05:50  BUN/CR -  86<H> / 4.00<H>  01-02-24 @ 05:54  BUN/CR -  78<H> / 3.83<H>  01-01-24 @ 10:19  BUN/CR -  84<H> / 3.60<H>      < from: US Renal (01.03.24 @ 11:07) >  IMPRESSION:  Limited evaluation left kidney.    Mild right hydronephrosis.  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      Plan  Continue IVF  Levaquin d/c  Avoid nephrotoxic mediations  Decreased Gabapentin to 300 mg daily       Samy Noriega MD

## 2024-01-03 NOTE — CHART NOTE - NSCHARTNOTEFT_GEN_A_CORE
Assessment:  Pt seen in 2C unit, d/p transfer from ICU 12/31, appeared alert, responded to name, without any GI complaints.  Pt is on contact isolation, trach->vent, on G-Tube feeding of Nepro @ 40 ml/hr x 18 hours.  Pt adm c diagnosis of anemia, sepsis, dyspnea, left pleural effusion, acute respiratory failure c pneumonia, ROGER/CKD, d/c planning to vent facility noted.  PMH include HTN, BPH, dementia, wheelchair dependent, spinal stenosis, renal insufficiency.    Factors impacting intake: [ ] none [ ] nausea  [ ] vomiting [ ] diarrhea [ ] constipation  [ ]chewing problems [x ] swallowing issues  [x ] other: acute illness    Diet Prescription: Diet, NPO with Tube Feed:   Tube Feeding Modality: Gastrostomy  Nepro with Carb Steady  Total Volume for 24 Hours (mL): 720  Continuous  Starting Tube Feed Rate {mL per Hour}: 10  Increase Tube Feed Rate by (mL): 10     Every 4 hours  Until Goal Tube Feed Rate (mL per Hour): 40  Tube Feed Duration (in Hours): 18  Tube Feed Start Time: 17:00  Tube Feed Stop Time: 11:00 (12-30-23 @ 11:54)  Diet, NPO after Midnight:      NPO Start Date: 28-Dec-2023,   NPO Start Time: 23:59 (12-28-23 @ 14:48)    Intake: Nepro 720 ml= 1296 calories, 58 grams protein, 76% RDIs, 526 ml free water     Current Weight: 01/02, 70.1 kg, 12/26, 71.2 kg, 12/12, 81.1 kg, 12/01, 75.3 kg, wt. fluctuations c wt. loss of 5.2 kg noted  % Weight Change: 6.9% in 1 month  01/03, 2+(mild) generalized edema noted  I/O: 0/1380(-1380), ?     Pt is not appropriate for nutrition focused physical, limited view of pt on vent, c trach, c visible moderate temple depletion     Pertinent Medications: MEDICATIONS  (STANDING):  chlorhexidine 0.12% Liquid 15 milliLiter(s) Oral Mucosa every 12 hours  chlorhexidine 2% Cloths 1 Application(s) Topical <User Schedule>  collagenase Ointment 1 Application(s) Topical daily  dextrose 5% + sodium chloride 0.45%. 1000 milliLiter(s) (70 mL/Hr) IV Continuous <Continuous>  gabapentin 300 milliGRAM(s) Oral at bedtime  heparin   Injectable 5000 Unit(s) SubCutaneous every 12 hours  mirtazapine 15 milliGRAM(s) Oral daily  polyethylene glycol 3350 17 Gram(s) Oral two times a day  senna 2 Tablet(s) Oral at bedtime    MEDICATIONS  (PRN):  acetaminophen     Tablet .. 650 milliGRAM(s) Oral every 6 hours PRN Temp greater or equal to 38C (100.4F), Mild Pain (1 - 3)  fentaNYL    Injectable 25 MICROGram(s) IV Push every 2 hours PRN Moderate Pain (4 - 6)    Pertinent Labs: 01-03 Na144 mmol/L Glu 102 mg/dL<H> K+ 4.2 mmol/L Cr  4.00 mg/dL<H> BUN 86 mg/dL<H> 01-01 Phos 5.8 mg/dL<H> 01-03 Alb 1.4 g/dL<L>01-03 ALT 7 U/L<L> AST 21 U/L Alkaline Phosphatase 62 U/L   CAPILLARY BLOOD GLUCOSE      Skin:   Pressure ulcer x 2  01/01; right neck; stage II, blister  12/27, left ischium; unstageable   skin tear; right cheek noted    Estimated Needs:   [x] no change since previous assessment(12/04, lower range for protein needs for ROGER/CKD)  [ ] recalculated:       Previous Nutrition Diagnosis #1(12/04)  Increased Nutrient Needs: protein  Etiology: increased physiological demand for protein  Signs/Symptoms: stage III pressure injuries x 2  New Goal(12/26) pt to meet >75% protein-energy needs via enteral feeding; not met  Nutrition Diagnosis is [ x] ongoing  [ ] resolved [ ] not applicable     Previous Nutrition Diagnosis: #2 (12/12)  Malnutrition; severe malnutrition in context of acute illness   Related to: inadequate protein-energy intake in setting of ROGER, pleural effusion, sepsis, UTI, AMS/metabolic encephalopathy, debility   As evidenced by: <50% nutrition needs > 5 days, moderate muscle/fat loss  addendum; >5% wt. loss in 1 month  New Goal: pt to meet >75% protein-energy needs via enteral feeding; not met  Nutrition Diagnosis is [ x] ongoing  [ ] resolved [ ] not applicable         New Nutrition Diagnosis: [ x] not applicable     Interventions:   Recommend  [ ] Change Diet To:  [ ] Nutrition Supplement  [x ] Nutrition Support; increase to goal rate Nepro @ 35 ml/hr x 24 hours=840 ml, 1512 calories, 68 grams protein, 610 ml free water, 89% RDIs   If renal indices improve, increase Nepro to goal rate 40 mL/hr x 24 hrs=960 ml, 1728 raciel, 78 gm pro, 701 mL free water, 101% RDIs   [x ] Other: when IVF is d/c'd, recommend 200 ml water flush q 6 hours    Monitoring and Evaluation:   [ ] PO intake [ x ] Tolerance to diet prescription [ x ] weights [ x ] labs; renal indices[ x ] follow up per protocol  [x ] other: I/O

## 2024-01-04 NOTE — CHART NOTE - NSCHARTNOTEFT_GEN_A_CORE
Medicine Hospitalist PA    Was notified by RN for critical H/H 6.1/19. Pt is trach/PEG no signs of acute bleeding. Will order 2 PRBC. Type & Screen in chart. Consent reviewed in chart. F/u post transfusion CBC. Notified RN and MD.

## 2024-01-04 NOTE — PROGRESS NOTE ADULT - ASSESSMENT
89M w/ dementia, CKD, chronic urinary retention w/ obrien. Admitted w/ L pleural effusion thought to be due to mucous plugging. Course complicated by worsening hypoxemia requiring intubation and bronchoscopy x2 w/ MRSA pneumonia, stenotrophomonas. Extubated on 12/24 but required re-intubation. Extubated again but failed due to hypercapnia and hypoxia. s/p Trach / PEG.    DX: Left pleural effusion, mucous plugging, Hypoxemic respiratory failure, MRSA & Stenotrophomonas PNA.    Reccs:  - Acute hypoxic resp failure likely 2/2 mucous plugging with PNA  - daily PSV trials and full vent support at night, although will likely be slow wean given chronic weakness and recurrent mucous plugging of left lung   - Now S/P peg and trach vent dependant  - Frequent suctioning, Pulmonary toileting  - High risk for VAP, close monitor on fever curve & WBC   - Completed course Vanc & Levaquin for MRSA & Steno in sputum    note incomplete 89M w/ dementia, CKD, chronic urinary retention w/ obrien. Admitted w/ L pleural effusion thought to be due to mucous plugging. Course complicated by worsening hypoxemia requiring intubation and bronchoscopy x2 w/ MRSA pneumonia, stenotrophomonas. Extubated on 12/24 but required re-intubation. Extubated again but failed due to hypercapnia and hypoxia. s/p Trach / PEG.    DX: Left pleural effusion, mucous plugging, Hypoxemic respiratory failure, MRSA & Stenotrophomonas PNA.    Reccs:  - Acute hypoxic resp failure likely 2/2 mucous plugging with PNA  - daily PSV trials and full vent support at night, although will likely be slow wean given chronic weakness and recurrent mucous plugging of left lung   - Now S/P peg and trach vent dependant  - Frequent suctioning, Pulmonary toileting  - High risk for VAP, close monitor on fever curve & WBC   - Completed course Vanc & Levaquin for MRSA & Steno in sputum  - persistent anemia despite transfusions. no signs of melena but PEG site with dried blood and mild oozing. consider CT abd to r/o intrabd bleeding with recent PEG    d/w dr fischer

## 2024-01-04 NOTE — PROGRESS NOTE ADULT - SUBJECTIVE AND OBJECTIVE BOX
INTERVAL HPI/OVERNIGHT EVENTS: pt only tolerated PSV briefly yesterday before requiring to be placed back on full support. Placed back on PSV this AM    SUBJECTIVE: Patient seen and examined at bedside.     ROS: All negative except as listed above.    VITAL SIGNS:  ICU Vital Signs Last 24 Hrs  T(C): 38.3 (04 Jan 2024 06:40), Max: 38.3 (04 Jan 2024 06:40)  T(F): 101 (04 Jan 2024 06:40), Max: 101 (04 Jan 2024 06:40)  HR: 88 (04 Jan 2024 06:40) (86 - 104)  BP: 159/71 (04 Jan 2024 06:40) (157/77 - 171/71)  BP(mean): --  ABP: --  ABP(mean): --  RR: 18 (04 Jan 2024 06:40) (18 - 19)  SpO2: 98% (04 Jan 2024 06:40) (98% - 100%)      Mode: AC/ CMV (Assist Control/ Continuous Mandatory Ventilation), RR (machine): 26, TV (machine): 400, FiO2: 30, PEEP: 5, ITime: 0.7, MAP: 12, PIP: 28      01-03 @ 07:01  -  01-04 @ 07:00  --------------------------------------------------------  IN: 0 mL / OUT: 1400 mL / NET: -1400 mL      CAPILLARY BLOOD GLUCOSE          ECG: reviewed.    PHYSICAL EXAM:    General: NAD, chronically ill appearing  HEENT: NC/AT, PERRL, MMM  Neck: supple, + Trach  Respiratory: mechanical BS B/L  Cardiovascular: s1s2 RRR  Abdomen: soft, non tender, non distended. +PEG. C/D/I  Extremities: warm, no edema or clubbing   Skin: no rashes or lesions  Neurological: opens eyes to voice, doesnt follow commands    MEDICATIONS:  MEDICATIONS  (STANDING):  amLODIPine   Tablet 5 milliGRAM(s) Oral daily  chlorhexidine 0.12% Liquid 15 milliLiter(s) Oral Mucosa every 12 hours  chlorhexidine 2% Cloths 1 Application(s) Topical <User Schedule>  collagenase Ointment 1 Application(s) Topical daily  heparin   Injectable 5000 Unit(s) SubCutaneous every 12 hours  mirtazapine 15 milliGRAM(s) Oral daily  polyethylene glycol 3350 17 Gram(s) Oral two times a day  senna 2 Tablet(s) Oral at bedtime    MEDICATIONS  (PRN):  acetaminophen     Tablet .. 650 milliGRAM(s) Oral every 6 hours PRN Temp greater or equal to 38C (100.4F), Mild Pain (1 - 3)  morphine  - Injectable 2 milliGRAM(s) IV Push every 4 hours PRN Mild Pain (1 - 3)      ALLERGIES:  Allergies    No Known Allergies    Intolerances        LABS:                        6.7    6.38  )-----------( 142      ( 03 Jan 2024 09:55 )             20.2     01-03    144  |  112<H>  |  86<H>  ----------------------------<  102<H>  4.2   |  23  |  4.00<H>    Ca    9.0      03 Jan 2024 05:50    TPro  6.8  /  Alb  1.4<L>  /  TBili  0.3  /  DBili  x   /  AST  21  /  ALT  7<L>  /  AlkPhos  62  01-03      Urinalysis Basic - ( 03 Jan 2024 05:50 )    Color: x / Appearance: x / SG: x / pH: x  Gluc: 102 mg/dL / Ketone: x  / Bili: x / Urobili: x   Blood: x / Protein: x / Nitrite: x   Leuk Esterase: x / RBC: x / WBC x   Sq Epi: x / Non Sq Epi: x / Bacteria: x        Micro:      Culture - Sputum (collected 12-16-23 @ 15:00)  Source: ET Tube ET Tube  Gram Stain (12-18-23 @ 19:19):    Moderate polymorphonuclear leukocytes per low power field    Few Squamous epithelial cells per low power field    Numerous Gram positive cocci in pairs per oil power field    Few Gram Positive Rods per oil power field  Final Report (12-18-23 @ 19:19):    Numerous Methicillin Resistant Staphylococcus aureus    Normal Respiratory Vanessa present  Organism: Methicillin resistant Staphylococcus aureus (12-18-23 @ 19:19)  Organism: Methicillin resistant Staphylococcus aureus (12-18-23 @ 19:19)      -  Clindamycin: S 0.5      -  Oxacillin: R >2      -  Gentamicin: S <=1 Should not be used as monotherapy      -  Linezolid: S 2      -  Cefazolin: R >16      -  Vancomycin: S 2      -  Tetracycline: S <=1      Method Type: JASWINDER      -  Ampicillin/Sulbactam: R >16/8      -  Penicillin: R >8      -  Rifampin: S <=1 Should not be used as monotherapy      -  Erythromycin: R >4      -  Trimethoprim/Sulfamethoxazole: S <=0.5/9.5        RADIOLOGY & ADDITIONAL TESTS: Reviewed.   INTERVAL HPI/OVERNIGHT EVENTS: pt only tolerated PSV briefly yesterday before requiring to be placed back on full support. Placed back on PSV this AM now tolerating 12/5 with good Vts    SUBJECTIVE: Patient seen and examined at bedside.     ROS: unable to eval     VITAL SIGNS:  ICU Vital Signs Last 24 Hrs  T(C): 38.3 (04 Jan 2024 06:40), Max: 38.3 (04 Jan 2024 06:40)  T(F): 101 (04 Jan 2024 06:40), Max: 101 (04 Jan 2024 06:40)  HR: 88 (04 Jan 2024 06:40) (86 - 104)  BP: 159/71 (04 Jan 2024 06:40) (157/77 - 171/71)  BP(mean): --  ABP: --  ABP(mean): --  RR: 18 (04 Jan 2024 06:40) (18 - 19)  SpO2: 98% (04 Jan 2024 06:40) (98% - 100%)      Mode: AC/ CMV (Assist Control/ Continuous Mandatory Ventilation), RR (machine): 26, TV (machine): 400, FiO2: 30, PEEP: 5, ITime: 0.7, MAP: 12, PIP: 28      01-03 @ 07:01  -  01-04 @ 07:00  --------------------------------------------------------  IN: 0 mL / OUT: 1400 mL / NET: -1400 mL      CAPILLARY BLOOD GLUCOSE          ECG: reviewed.    PHYSICAL EXAM:    General: NAD, chronically ill appearing  HEENT: NC/AT, PERRL, MMM  Neck: supple, + Trach  Respiratory: mechanical BS B/L  Cardiovascular: s1s2 RRR  Abdomen: soft, non tender, non distended. +PEG. C/D/I  Extremities: warm, no edema or clubbing   Skin: no rashes or lesions  Neurological: opens eyes to voice, follows some commands    MEDICATIONS:  MEDICATIONS  (STANDING):  amLODIPine   Tablet 5 milliGRAM(s) Oral daily  chlorhexidine 0.12% Liquid 15 milliLiter(s) Oral Mucosa every 12 hours  chlorhexidine 2% Cloths 1 Application(s) Topical <User Schedule>  collagenase Ointment 1 Application(s) Topical daily  heparin   Injectable 5000 Unit(s) SubCutaneous every 12 hours  mirtazapine 15 milliGRAM(s) Oral daily  polyethylene glycol 3350 17 Gram(s) Oral two times a day  senna 2 Tablet(s) Oral at bedtime    MEDICATIONS  (PRN):  acetaminophen     Tablet .. 650 milliGRAM(s) Oral every 6 hours PRN Temp greater or equal to 38C (100.4F), Mild Pain (1 - 3)  morphine  - Injectable 2 milliGRAM(s) IV Push every 4 hours PRN Mild Pain (1 - 3)      ALLERGIES:  Allergies    No Known Allergies    Intolerances        LABS:                        6.7    6.38  )-----------( 142      ( 03 Jan 2024 09:55 )             20.2     01-03    144  |  112<H>  |  86<H>  ----------------------------<  102<H>  4.2   |  23  |  4.00<H>    Ca    9.0      03 Jan 2024 05:50    TPro  6.8  /  Alb  1.4<L>  /  TBili  0.3  /  DBili  x   /  AST  21  /  ALT  7<L>  /  AlkPhos  62  01-03      Urinalysis Basic - ( 03 Jan 2024 05:50 )    Color: x / Appearance: x / SG: x / pH: x  Gluc: 102 mg/dL / Ketone: x  / Bili: x / Urobili: x   Blood: x / Protein: x / Nitrite: x   Leuk Esterase: x / RBC: x / WBC x   Sq Epi: x / Non Sq Epi: x / Bacteria: x        Micro:      Culture - Sputum (collected 12-16-23 @ 15:00)  Source: ET Tube ET Tube  Gram Stain (12-18-23 @ 19:19):    Moderate polymorphonuclear leukocytes per low power field    Few Squamous epithelial cells per low power field    Numerous Gram positive cocci in pairs per oil power field    Few Gram Positive Rods per oil power field  Final Report (12-18-23 @ 19:19):    Numerous Methicillin Resistant Staphylococcus aureus    Normal Respiratory Vanessa present  Organism: Methicillin resistant Staphylococcus aureus (12-18-23 @ 19:19)  Organism: Methicillin resistant Staphylococcus aureus (12-18-23 @ 19:19)      -  Clindamycin: S 0.5      -  Oxacillin: R >2      -  Gentamicin: S <=1 Should not be used as monotherapy      -  Linezolid: S 2      -  Cefazolin: R >16      -  Vancomycin: S 2      -  Tetracycline: S <=1      Method Type: JASWINDER      -  Ampicillin/Sulbactam: R >16/8      -  Penicillin: R >8      -  Rifampin: S <=1 Should not be used as monotherapy      -  Erythromycin: R >4      -  Trimethoprim/Sulfamethoxazole: S <=0.5/9.5        RADIOLOGY & ADDITIONAL TESTS: Reviewed.   INTERVAL HPI/OVERNIGHT EVENTS: pt only tolerated PSV briefly yesterday before requiring to be placed back on full support. Placed back on PSV this AM now tolerating 12/5 with good Vts    SUBJECTIVE: Patient seen and examined at bedside.     ROS: unable to eval     VITAL SIGNS:  Vital Signs Last 24 Hrs  T(C): 38.3 (04 Jan 2024 06:40), Max: 38.3 (04 Jan 2024 06:40)  T(F): 101 (04 Jan 2024 06:40), Max: 101 (04 Jan 2024 06:40)  HR: 88 (04 Jan 2024 06:40) (86 - 104)  BP: 159/71 (04 Jan 2024 06:40) (157/77 - 171/71)  RR: 18 (04 Jan 2024 06:40) (18 - 19)  SpO2: 98% (04 Jan 2024 06:40) (98% - 100%)      Mode: AC/ CMV (Assist Control/ Continuous Mandatory Ventilation), RR (machine): 26, TV (machine): 400, FiO2: 30, PEEP: 5, ITime: 0.7, MAP: 12, PIP: 28      01-03 @ 07:01  -  01-04 @ 07:00  --------------------------------------------------------  IN: 0 mL / OUT: 1400 mL / NET: -1400 mL      PHYSICAL EXAM:  General: ederly male, NAD, chronically ill appearing, trached to vent  HEENT: NC/AT, PERRL, MMM  Neck: supple, + Trach  Respiratory: mechanical BS B/L  Cardiovascular: s1s2 RRR  Abdomen: soft, non tender, non distended. +PEG  Extremities: warm, no edema or clubbing   Skin: no rashes or lesions  Neurological: opens eyes to voice, follows some commands    MEDICATIONS:  MEDICATIONS  (STANDING):  amLODIPine   Tablet 5 milliGRAM(s) Oral daily  chlorhexidine 0.12% Liquid 15 milliLiter(s) Oral Mucosa every 12 hours  chlorhexidine 2% Cloths 1 Application(s) Topical <User Schedule>  collagenase Ointment 1 Application(s) Topical daily  heparin   Injectable 5000 Unit(s) SubCutaneous every 12 hours  mirtazapine 15 milliGRAM(s) Oral daily  polyethylene glycol 3350 17 Gram(s) Oral two times a day  senna 2 Tablet(s) Oral at bedtime    MEDICATIONS  (PRN):  acetaminophen     Tablet .. 650 milliGRAM(s) Oral every 6 hours PRN Temp greater or equal to 38C (100.4F), Mild Pain (1 - 3)  morphine  - Injectable 2 milliGRAM(s) IV Push every 4 hours PRN Mild Pain (1 - 3)      Allergies  No Known Allergies      LABS:                        6.7    6.38  )-----------( 142      ( 03 Jan 2024 09:55 )             20.2       Complete Blood Count in AM (01.04.24 @ 07:45)    Nucleated RBC: 0 /100 WBCs   WBC Count: 6.71 K/uL   RBC Count: 2.11 M/uL   Hemoglobin: 6.1: TYPE:(C=Critical, N=Notification, A=Abnormal) _c  TESTS: _hgb,hct  DATE/TIME CALLED: 01/04/2024 08:55:29 EST  CALLED TO: _damon huerta RN  READ BACK (2 Patient Identifiers)(Y/N): _y  READ BACK VALUES (Y/N): y  CALLED BY: Harish c. g/dL   Hematocrit: 19.1 %   Mean Cell Volume: 90.5 fl   Mean Cell Hemoglobin: 28.9 pg   Mean Cell Hemoglobin Conc: 31.9 g/dL   Red Cell Distrib Width: 16.6 %   Platelet Count - Automated: 160 K/uL        01-03    144  |  112<H>  |  86<H>  ----------------------------<  102<H>  4.2   |  23  |  4.00<H>    Ca    9.0      03 Jan 2024 05:50    TPro  6.8  /  Alb  1.4<L>  /  TBili  0.3  /  DBili  x   /  AST  21  /  ALT  7<L>  /  AlkPhos  62  01-03        Micro:      Culture - Sputum (collected 12-16-23 @ 15:00)  Source: ET Tube ET Tube  Gram Stain (12-18-23 @ 19:19):    Moderate polymorphonuclear leukocytes per low power field    Few Squamous epithelial cells per low power field    Numerous Gram positive cocci in pairs per oil power field    Few Gram Positive Rods per oil power field  Final Report (12-18-23 @ 19:19):    Numerous Methicillin Resistant Staphylococcus aureus    Normal Respiratory Vanessa present  Organism: Methicillin resistant Staphylococcus aureus (12-18-23 @ 19:19)  Organism: Methicillin resistant Staphylococcus aureus (12-18-23 @ 19:19)      -  Clindamycin: S 0.5      -  Oxacillin: R >2      -  Gentamicin: S <=1 Should not be used as monotherapy      -  Linezolid: S 2      -  Cefazolin: R >16      -  Vancomycin: S 2      -  Tetracycline: S <=1      Method Type: JASWINEDR      -  Ampicillin/Sulbactam: R >16/8      -  Penicillin: R >8      -  Rifampin: S <=1 Should not be used as monotherapy      -  Erythromycin: R >4      -  Trimethoprim/Sulfamethoxazole: S <=0.5/9.5        RADIOLOGY & ADDITIONAL TESTS: Reviewed.  < from: Xray Chest 1 View- PORTABLE-Urgent (Xray Chest 1 View- PORTABLE-Urgent .) (12.29.23 @ 16:48) >  Tracheostomy tube is present. There is a loop recorder overlying the left   chest.  HEART:difficult to access in this projection.  LUNGS: Worsening left effusion, and lobar atelectasis. There is also   atelectasis at the right base. No pneumothorax..  BONES: degenerative changes    --------------------  < from: CT Chest No Cont (11.30.23 @ 20:37) >  CHEST:  LUNGS AND LARGE AIRWAYS: Patent central airways. Complete compressive   atelectasis of the left lower lobe and partial compressive atelectasis of   the left upper lobe and lingula.  Mild dependent atelectasis in the right   lower lobe.  PLEURA: Moderate two large left pleural effusion.  Small right pleural   effusion.  VESSELS: Mildly ectatic mid ascending thoracic aorta measures up to 3.5   cm in transverse caliber (6:72).  No thoracic aortic aneurysm.  Mild   atherosclerotic calcification of the thoracic aorta and arch vessels.  HEART: Cardiomegaly.  Mild calcification aortic valve leaflets. Mild   atherosclerotic calcification of the coronary arteries. No pericardial   effusion.  MEDIASTINUM AND BREANNA: No gross lymphadenopathy.  Nonspecific   subcentimeter mediastinal lymph nodes.  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    ABDOMEN AND PELVIS:  The examination is motion degraded.    LIVER: Within normal limits.  BILE DUCTS: Normal caliber.  GALLBLADDER: Within normal limits.  SPLEEN: Within normal limits.  PANCREAS: Atrophic.  ADRENALS: No adrenal nodules.  KIDNEYS/URETERS: There is fullness in the renal collecting systems versus   mild hydronephrosis and mild dilatation of the proximal ureters, new from   09/28/2020.  No obstructing renal stones.    BLADDER: Collapsed around a Ma catheter  REPRODUCTIVE ORGANS: Enlarged prostate measures approximately 4.1 x 5 x   4.3 cm (AP x LR xCC) , with a volume of approximately 46 mL (2:271 and   6:93).    BOWEL: No bowel obstruction. The appendix is normal.  Extensive   left-sided colonic diverticulosis without evidence for acute   diverticulitis.  There is a large amount of stool distending the rectum   to approximately 8.5 cm (2:62).  There is mild perirectal fat stranding.  PERITONEUM: No ascites.  VESSELS: Mild atherosclerotic calcification of the aortoiliac tree and   proximal thigh vasculature.  RETROPERITONEUM/LYMPH NODES: Nolymphadenopathy.  ABDOMINAL WALL: Mild subcutaneous edema in the flanks and proximal thighs.  BONES: No acute fracture or suspicious osseous lesion.  There are flowing   anterolateral vertebral marginal osteophytes throughout the thoracic and   lumbar spine, compatible with diffuse idiopathic skeletal hyperostosis   (DISH).  There is a mild spinal canal stenosis.  No high-grade spinal   canal stenosis is visualized by CT technique.  Mild osteoarthrosis in the   hip joints bilaterally.    IMPRESSION:  CT chest:  1.  Moderate to large left pleural effusion with complete compressive   atelectasis of the left lower lobe and partial compressive atelectasis of   the left upper lobe and lingula.  2.  Small right pleural effusion with mild dependent atelectasis the   right lower lobe.  3.  No gross CT evidence of pneumonia or pulmonary edema.  4.  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    CT abdomen/pelvis:  1.  Motion degraded.  2.  Fullness in the renal collecting systems versus mild hydronephrosis   and mild dilatation of the proximal ureters bilaterally, new from   09/28/2020.  No obstructing renal stones.  The bladder is collapsed   around a Ma catheter.  Follow-up ultrasound may be obtained to   evaluate for resolution.  Underlying genitourinary infection and/or   pyelonephritis should be excluded based on clinical symptoms and   laboratory values.  3.  Enlarged prostate with volume of approximately 46 mL. Lower urinary   tract symptoms (LUTS) should be excluded clinically.  4.  There is a large amount of stool distending the rectum to   approximately 8.5 cm.  Mild perirectal fat stranding.  Fecal impaction   and stercoral colitis should be excluded clinically.  5.  Mild subcutaneous edema in the flanks and proximal thighs.           INTERVAL HPI/OVERNIGHT EVENTS: pt only tolerated PSV briefly yesterday before requiring to be placed back on full support. Placed back on PSV this AM now tolerating 12/5 with good Vts    SUBJECTIVE: Patient seen and examined at bedside.     ROS: unable to eval     VITAL SIGNS:  Vital Signs Last 24 Hrs  T(C): 38.3 (04 Jan 2024 06:40), Max: 38.3 (04 Jan 2024 06:40)  T(F): 101 (04 Jan 2024 06:40), Max: 101 (04 Jan 2024 06:40)  HR: 88 (04 Jan 2024 06:40) (86 - 104)  BP: 159/71 (04 Jan 2024 06:40) (157/77 - 171/71)  RR: 18 (04 Jan 2024 06:40) (18 - 19)  SpO2: 98% (04 Jan 2024 06:40) (98% - 100%)      Mode: AC/ CMV (Assist Control/ Continuous Mandatory Ventilation), RR (machine): 26, TV (machine): 400, FiO2: 30, PEEP: 5, ITime: 0.7, MAP: 12, PIP: 28      01-03 @ 07:01  -  01-04 @ 07:00  --------------------------------------------------------  IN: 0 mL / OUT: 1400 mL / NET: -1400 mL      PHYSICAL EXAM:  General: ederly male, NAD, chronically ill appearing, trached to vent  HEENT: NC/AT, PERRL, MMM  Neck: supple, + Trach  Respiratory: mechanical BS B/L  Cardiovascular: s1s2 RRR  Abdomen: soft, non tender, non distended. +PEG  Extremities: warm, no edema or clubbing   Skin: no rashes or lesions  Neurological: opens eyes to voice, follows some commands    MEDICATIONS:  MEDICATIONS  (STANDING):  amLODIPine   Tablet 5 milliGRAM(s) Oral daily  chlorhexidine 0.12% Liquid 15 milliLiter(s) Oral Mucosa every 12 hours  chlorhexidine 2% Cloths 1 Application(s) Topical <User Schedule>  collagenase Ointment 1 Application(s) Topical daily  heparin   Injectable 5000 Unit(s) SubCutaneous every 12 hours  mirtazapine 15 milliGRAM(s) Oral daily  polyethylene glycol 3350 17 Gram(s) Oral two times a day  senna 2 Tablet(s) Oral at bedtime    MEDICATIONS  (PRN):  acetaminophen     Tablet .. 650 milliGRAM(s) Oral every 6 hours PRN Temp greater or equal to 38C (100.4F), Mild Pain (1 - 3)  morphine  - Injectable 2 milliGRAM(s) IV Push every 4 hours PRN Mild Pain (1 - 3)      Allergies  No Known Allergies      LABS:                        6.7    6.38  )-----------( 142      ( 03 Jan 2024 09:55 )             20.2       Complete Blood Count in AM (01.04.24 @ 07:45)    Nucleated RBC: 0 /100 WBCs   WBC Count: 6.71 K/uL   RBC Count: 2.11 M/uL   Hemoglobin: 6.1: TYPE:(C=Critical, N=Notification, A=Abnormal) _c  TESTS: _hgb,hct  DATE/TIME CALLED: 01/04/2024 08:55:29 EST  CALLED TO: _damon huerta RN  READ BACK (2 Patient Identifiers)(Y/N): _y  READ BACK VALUES (Y/N): y  CALLED BY: Harish c. g/dL   Hematocrit: 19.1 %   Mean Cell Volume: 90.5 fl   Mean Cell Hemoglobin: 28.9 pg   Mean Cell Hemoglobin Conc: 31.9 g/dL   Red Cell Distrib Width: 16.6 %   Platelet Count - Automated: 160 K/uL        01-03    144  |  112<H>  |  86<H>  ----------------------------<  102<H>  4.2   |  23  |  4.00<H>    Ca    9.0      03 Jan 2024 05:50    TPro  6.8  /  Alb  1.4<L>  /  TBili  0.3  /  DBili  x   /  AST  21  /  ALT  7<L>  /  AlkPhos  62  01-03        Micro:      Culture - Sputum (collected 12-16-23 @ 15:00)  Source: ET Tube ET Tube  Gram Stain (12-18-23 @ 19:19):    Moderate polymorphonuclear leukocytes per low power field    Few Squamous epithelial cells per low power field    Numerous Gram positive cocci in pairs per oil power field    Few Gram Positive Rods per oil power field  Final Report (12-18-23 @ 19:19):    Numerous Methicillin Resistant Staphylococcus aureus    Normal Respiratory Vanessa present  Organism: Methicillin resistant Staphylococcus aureus (12-18-23 @ 19:19)  Organism: Methicillin resistant Staphylococcus aureus (12-18-23 @ 19:19)      -  Clindamycin: S 0.5      -  Oxacillin: R >2      -  Gentamicin: S <=1 Should not be used as monotherapy      -  Linezolid: S 2      -  Cefazolin: R >16      -  Vancomycin: S 2      -  Tetracycline: S <=1      Method Type: JASWINDER      -  Ampicillin/Sulbactam: R >16/8      -  Penicillin: R >8      -  Rifampin: S <=1 Should not be used as monotherapy      -  Erythromycin: R >4      -  Trimethoprim/Sulfamethoxazole: S <=0.5/9.5        RADIOLOGY & ADDITIONAL TESTS: Reviewed.  < from: Xray Chest 1 View- PORTABLE-Urgent (Xray Chest 1 View- PORTABLE-Urgent .) (12.29.23 @ 16:48) >  Tracheostomy tube is present. There is a loop recorder overlying the left   chest.  HEART:difficult to access in this projection.  LUNGS: Worsening left effusion, and lobar atelectasis. There is also   atelectasis at the right base. No pneumothorax..  BONES: degenerative changes    --------------------  < from: CT Chest No Cont (11.30.23 @ 20:37) >  CHEST:  LUNGS AND LARGE AIRWAYS: Patent central airways. Complete compressive   atelectasis of the left lower lobe and partial compressive atelectasis of   the left upper lobe and lingula.  Mild dependent atelectasis in the right   lower lobe.  PLEURA: Moderate two large left pleural effusion.  Small right pleural   effusion.  VESSELS: Mildly ectatic mid ascending thoracic aorta measures up to 3.5   cm in transverse caliber (6:72).  No thoracic aortic aneurysm.  Mild   atherosclerotic calcification of the thoracic aorta and arch vessels.  HEART: Cardiomegaly.  Mild calcification aortic valve leaflets. Mild   atherosclerotic calcification of the coronary arteries. No pericardial   effusion.  MEDIASTINUM AND BREANNA: No gross lymphadenopathy.  Nonspecific   subcentimeter mediastinal lymph nodes.  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    ABDOMEN AND PELVIS:  The examination is motion degraded.    LIVER: Within normal limits.  BILE DUCTS: Normal caliber.  GALLBLADDER: Within normal limits.  SPLEEN: Within normal limits.  PANCREAS: Atrophic.  ADRENALS: No adrenal nodules.  KIDNEYS/URETERS: There is fullness in the renal collecting systems versus   mild hydronephrosis and mild dilatation of the proximal ureters, new from   09/28/2020.  No obstructing renal stones.    BLADDER: Collapsed around a Ma catheter  REPRODUCTIVE ORGANS: Enlarged prostate measures approximately 4.1 x 5 x   4.3 cm (AP x LR xCC) , with a volume of approximately 46 mL (2:271 and   6:93).    BOWEL: No bowel obstruction. The appendix is normal.  Extensive   left-sided colonic diverticulosis without evidence for acute   diverticulitis.  There is a large amount of stool distending the rectum   to approximately 8.5 cm (2:62).  There is mild perirectal fat stranding.  PERITONEUM: No ascites.  VESSELS: Mild atherosclerotic calcification of the aortoiliac tree and   proximal thigh vasculature.  RETROPERITONEUM/LYMPH NODES: Nolymphadenopathy.  ABDOMINAL WALL: Mild subcutaneous edema in the flanks and proximal thighs.  BONES: No acute fracture or suspicious osseous lesion.  There are flowing   anterolateral vertebral marginal osteophytes throughout the thoracic and   lumbar spine, compatible with diffuse idiopathic skeletal hyperostosis   (DISH).  There is a mild spinal canal stenosis.  No high-grade spinal   canal stenosis is visualized by CT technique.  Mild osteoarthrosis in the   hip joints bilaterally.    IMPRESSION:  CT chest:  1.  Moderate to large left pleural effusion with complete compressive   atelectasis of the left lower lobe and partial compressive atelectasis of   the left upper lobe and lingula.  2.  Small right pleural effusion with mild dependent atelectasis the   right lower lobe.  3.  No gross CT evidence of pneumonia or pulmonary edema.  4.  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    CT abdomen/pelvis:  1.  Motion degraded.  2.  Fullness in the renal collecting systems versus mild hydronephrosis   and mild dilatation of the proximal ureters bilaterally, new from   09/28/2020.  No obstructing renal stones.  The bladder is collapsed   around a Ma catheter.  Follow-up ultrasound may be obtained to   evaluate for resolution.  Underlying genitourinary infection and/or   pyelonephritis should be excluded based on clinical symptoms and   laboratory values.  3.  Enlarged prostate with volume of approximately 46 mL. Lower urinary   tract symptoms (LUTS) should be excluded clinically.  4.  There is a large amount of stool distending the rectum to   approximately 8.5 cm.  Mild perirectal fat stranding.  Fecal impaction   and stercoral colitis should be excluded clinically.  5.  Mild subcutaneous edema in the flanks and proximal thighs.

## 2024-01-04 NOTE — PROGRESS NOTE ADULT - NS ATTEND AMEND GEN_ALL_CORE FT
pt seen and examined with PA    Hb continues to trend down in last 24 hrs Hb 6.6 -> repeated 6.7 -> 6.1 today  2 units pRBC ordered for today  stool today noted to be brown  no hematuria in obrien bag  tracheal suctioning with tan secretions, non bloody  PEG site with old dark crusted blood but also with some minor oozing red blood around PEG site  tolerated a few hours of weaning yesterday  weaning on pressure support 12/5  Cr continues to rise  febrile 101 overnight    89M PMH HTN, CKD3, BPH w/ chronic obrien and multiple admissions for CAUTI, dementia, COVID 1/2023 presents for SOB. Pt poor historian so history obtained from record. Pt was brought in by home aid who reports pt c/o SOB. NO fever/chills/sweats, URI sx, N/V/D reported. In ED, pt hypothermic to 95.5 and hypotensive to 90/60s. Labs notable for Hgb 6.3 (+london), dimer 3000, Na 129, BUN/SCr 70/2.91, BNP 7966, UA+. CT CAP demonstrates mild b/l hydronephrosis and large stool burden w/ perirectal stranding c/f stercoral colitis. New L pleural effusion found on CXR. Lung US without good window for thoracentesis. Hospital course with L hemithorax opacification likely from mucus plugging, hypoxic respiratory failure requiring intubation 12/16. bronchoscopy x2 w/ MRSA pneumonia, stenotrophomonas. Extubated on 12/24 but required re-intubation. Extubated again but failed due to hypercapnia and hypoxia. s/p Trach / PEG. pt seen and examined with PA    Hb continues to trend down in last 24 hrs Hb 6.6 -> repeated 6.7 -> 6.1 today  2 units pRBC ordered for today  stool today noted to be brown  no hematuria in obrien bag  tracheal suctioning with tan secretions, non bloody  PEG site with old dark crusted blood but also with some minor oozing red blood around PEG site  tolerated a few hours of weaning yesterday  weaning on pressure support 12/5  Cr continues to rise  febrile 101 overnight    89M PMH HTN, CKD3, BPH w/ chronic obrien and multiple admissions for CAUTI, dementia, COVID 1/2023 presents for SOB. Pt poor historian so history obtained from record. Pt was brought in by home aid who reports pt c/o SOB. NO fever/chills/sweats, URI sx, N/V/D reported. In ED, pt hypothermic to 95.5 and hypotensive to 90/60s. Labs notable for Hgb 6.3 (+london), dimer 3000, Na 129, BUN/SCr 70/2.91, BNP 7966, UA+. CT CAP demonstrates mild b/l hydronephrosis and large stool burden w/ perirectal stranding c/f stercoral colitis. New L pleural effusion found on CXR. Lung US without good window for thoracentesis. Hospital course with L hemithorax opacification likely from mucus plugging, hypoxic respiratory failure requiring intubation 12/16. bronchoscopy x2 w/ MRSA pneumonia, stenotrophomonas. Extubated on 12/24 but required re-intubation. Extubated again 12/27 but failed due to hypercapnia and hypoxia. now s/p Trach / PEG with surgery on 12/29. Hospital course with recurrent anemia Hb 6 range requiring pRBC transfusion. Cr now up trending.    DX: acute hypoxic and hypercarbic respiratory failure requiring multiple intubations, pleural effusion, mucus plugging with atelectasis of L lung, MRSA and pseudomonas PNA, anemia, ROGER on CKD, stercoral colitis, CAUTI, s/p trach and PEG, dementia    - cont mechanical vent support  - daily weaning trials as tolerates  - placed back on full vent support today after tolerating 12/5 CPAP due to tachypnea  - cont tracheal suctioning and local trach care  - s/p course of vanco for MRSA PNA and completed course of levaquin for stenotrophomonas  - s/p bronchoscopy x2 in ICU for airway clearance  - repeat CXR this afternoon (my read) with L effusion with associated L lung atelectasis with left lung haziness/opacity - worse compared to prior film 12/27 similar to 12/29 film  - transfuse 2 units pRBC, check post transfusion CBC. anemia from blood loss? vs   - ROGER on CKD: renal US with mild R hydronephrosis   - fever work up: check blood cx, sputum cx

## 2024-01-04 NOTE — PROGRESS NOTE ADULT - SUBJECTIVE AND OBJECTIVE BOX
NEPHROLOGY PROGRESS NOTE    CHIEF COMPLAINT:  ROGER/CKD    HPI:  Vented.  Getting PRBC.  Some loose stool noted.  Creatinine slowly rising.     EXAM:  Vital Signs Last 24 Hrs  T(C): 38.3 (04 Jan 2024 06:40), Max: 38.3 (04 Jan 2024 06:40)  T(F): 101 (04 Jan 2024 06:40), Max: 101 (04 Jan 2024 06:40)  HR: 88 (04 Jan 2024 06:40) (88 - 104)  BP: 159/71 (04 Jan 2024 06:40) (157/77 - 171/71)  BP(mean): --  RR: 18 (04 Jan 2024 06:40) (18 - 19)  SpO2: 98% (04 Jan 2024 06:40) (98% - 100%)      I&O's Summary    03 Jan 2024 07:01  -  04 Jan 2024 07:00  --------------------------------------------------------  IN: 0 mL / OUT: 1400 mL / NET: -1400 mL      Vented, awake  Normal respiratory effort, lungs clear bilaterally  Heart RRR with no murmur, no peripheral edema    LABS                        6.1    6.71  )-----------( 160      ( 04 Jan 2024 07:45 )             19.1     01-04    142  |  111<H>  |  98<H>  ----------------------------<  103<H>  4.1   |  24  |  4.38<H>    Ca    8.9      04 Jan 2024 07:45    TPro  7.0  /  Alb  1.4<L>  /  TBili  0.3  /  DBili  x   /  AST  20  /  ALT  11<L>  /  AlkPhos  60  01-04        Assessment   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    Plan  Discontinue IVF  Levaquin d/c  Avoid nephrotoxic mediations  Check urine studies

## 2024-01-04 NOTE — PROGRESS NOTE ADULT - ASSESSMENT
89M w/ dementia, CKD, chronic urinary retention w/ obrien. Admitted w/ L pleural effusion thought to be due to mucous plugging. Course complicated by worsening hypoxemia requiring intubation and bronchoscopy x2 w/ MRSA pneumonia, stenotrophomonas. Extubated on 12/24 but required re-intubation. Extubated again but failed due to hypercapnia and hypoxia. s/p Trach / PEG.    #acute hypoxic resp failure with trach. s/p peg and trach   #Febrile  - Completed course Levaquin for Steno in sputum. Remains vent dependent with full support  - Febrile to 101F this morning. No leukocytosis - Check BCx, RVP, UA/ Urine culture, Sputum culture  - hold on abx - trend fever curve   - pulm following      #ROGER on CKD   - creatinine 4.38 today.     - Renal u/s mild right hydro    #Acute Anemia  - No clear source of bleeding. Hgb 6.1 today. Given 2U pRBCs.        D/C to vent facility when bed available.     89M w/ dementia, CKD, chronic urinary retention w/ obrien. Admitted w/ L pleural effusion thought to be due to mucous plugging. Course complicated by worsening hypoxemia requiring intubation and bronchoscopy x2 w/ MRSA pneumonia, stenotrophomonas. Extubated on 12/24 but required re-intubation. Extubated again but failed due to hypercapnia and hypoxia. s/p Trach / PEG.    #acute hypoxic resp failure with trach. s/p peg and trach   #Febrile  - Completed course Levaquin for Steno in sputum. Remains vent dependent with full support  - Febrile to 101F this morning. No leukocytosis - Check BCx, RVP, UA/ Urine culture, Sputum culture  - hold on abx - trend fever curve   - pulm following      #ROGER on CKD   - creatinine 4.38 today. suspect AIN from levaquin     - Renal u/s mild right hydro  - Nephro following    #Acute Anemia  - No clear source of bleeding. Hgb 6.1 today. Given 2U pRBCs.       Dispo: Eventual D/C to vent facility

## 2024-01-04 NOTE — PROGRESS NOTE ADULT - SUBJECTIVE AND OBJECTIVE BOX
Patient is a 89y old  Male who presents with a chief complaint of resp failure (04 Jan 2024 08:32)    INTERVAL HPI/OVERNIGHT EVENTS: NAEON    MEDICATIONS  (STANDING):  amLODIPine   Tablet 5 milliGRAM(s) Oral daily  chlorhexidine 0.12% Liquid 15 milliLiter(s) Oral Mucosa every 12 hours  chlorhexidine 2% Cloths 1 Application(s) Topical <User Schedule>  collagenase Ointment 1 Application(s) Topical daily  heparin   Injectable 5000 Unit(s) SubCutaneous every 12 hours  mirtazapine 15 milliGRAM(s) Oral daily  polyethylene glycol 3350 17 Gram(s) Oral two times a day  senna 2 Tablet(s) Oral at bedtime    MEDICATIONS  (PRN):  acetaminophen     Tablet .. 650 milliGRAM(s) Oral every 6 hours PRN Temp greater or equal to 38C (100.4F), Mild Pain (1 - 3)  morphine  - Injectable 2 milliGRAM(s) IV Push every 4 hours PRN Mild Pain (1 - 3)    Allergies    No Known Allergies    Intolerances      REVIEW OF SYSTEMS:  All other systems reviewed and are negative    Vital Signs Last 24 Hrs  T(C): 38.3 (04 Jan 2024 06:40), Max: 38.3 (04 Jan 2024 06:40)  T(F): 101 (04 Jan 2024 06:40), Max: 101 (04 Jan 2024 06:40)  HR: 88 (04 Jan 2024 06:40) (88 - 104)  BP: 159/71 (04 Jan 2024 06:40) (157/77 - 171/71)  BP(mean): --  RR: 18 (04 Jan 2024 06:40) (18 - 19)  SpO2: 98% (04 Jan 2024 06:40) (98% - 100%)      Daily     Daily   I&O's Summary    03 Jan 2024 07:01  -  04 Jan 2024 07:00  --------------------------------------------------------  IN: 0 mL / OUT: 1400 mL / NET: -1400 mL      CAPILLARY BLOOD GLUCOSE        PHYSICAL EXAM:  GENERAL: stable, on vent/trach and PEG, obrien in place.   NECK: supple, No JVD  CHEST/LUNG: clear to auscultation bilaterally; no rales, rhonchi, or wheezing b/l  HEART: normal S1, S2  ABDOMEN: BS+, soft, ND, NT   EXTREMITIES:  pulses palpable; no clubbing, cyanosis, or edema b/l LEs    Labs                          6.1    6.71  )-----------( 160      ( 04 Jan 2024 07:45 )             19.1     01-04    142  |  111<H>  |  98<H>  ----------------------------<  103<H>  4.1   |  24  |  4.38<H>    Ca    8.9      04 Jan 2024 07:45    TPro  7.0  /  Alb  1.4<L>  /  TBili  0.3  /  DBili  x   /  AST  20  /  ALT  11<L>  /  AlkPhos  60  01-04          Urinalysis Basic - ( 04 Jan 2024 07:45 )    Color: x / Appearance: x / SG: x / pH: x  Gluc: 103 mg/dL / Ketone: x  / Bili: x / Urobili: x   Blood: x / Protein: x / Nitrite: x   Leuk Esterase: x / RBC: x / WBC x   Sq Epi: x / Non Sq Epi: x / Bacteria: x                  DVT prophylaxis: > Lovenox 40mg SQ daily  > Heparin   > SCD's

## 2024-01-05 NOTE — PROVIDER CONTACT NOTE (CRITICAL VALUE NOTIFICATION) - ASSESSMENT
No s/s of bleeding
No signs of bleeding, 
patient is AOx4 no acute distress notes. No signs and symptoms of bleeding noted
No acute distress noted
Pt assessed at the bedside. No signs of active bleeding indicated. Pt not in any distress.

## 2024-01-05 NOTE — PROGRESS NOTE ADULT - ASSESSMENT
89M w/ dementia, CKD, chronic urinary retention w/ obrien. Admitted w/ L pleural effusion thought to be due to mucous plugging. Course complicated by worsening hypoxemia requiring intubation and bronchoscopy x2 w/ MRSA pneumonia, stenotrophomonas. Extubated on 12/24 but required re-intubation. Extubated again but failed due to hypercapnia and hypoxia. s/p Trach / PEG.    #acute hypoxic resp failure with trach. s/p peg and trach   #Febrile  - Completed course Levaquin for Steno in sputum. Remains vent dependent with full support  - Febrile to 101F yesterday. No leukocytosis - BCx negative    - hold on abx - trend fever curve   - pulm following      #ROGER on CKD   - creatinine 4.43 today. suspect AIN from levaquin     - Renal u/s mild right hydro  - Checking urine studies   - Nephro following    #Acute Anemia  - No clear source of bleeding. Hgb 7.8 today. s/p 2U pRBCs.     Diet: TFs  DVT prophylaxis: SQH  Dispo: Eventual D/C to vent facility   Code Status:

## 2024-01-05 NOTE — PROGRESS NOTE ADULT - ASSESSMENT
A/P: 89 yo M with respiratory failure POD#7 s/p trach/PEG. no active bleeding       - pain control prn   - continue care with primary team    pt seen and d/w Dr. Muñoz  Surgery   Orly Garcia PA-C

## 2024-01-05 NOTE — PROGRESS NOTE ADULT - ASSESSMENT
89M w/ dementia, CKD, chronic urinary retention w/ obrien. Admitted w/ L pleural effusion thought to be due to mucous plugging. Course complicated by worsening hypoxemia requiring intubation and bronchoscopy x2 w/ MRSA pneumonia, stenotrophomonas. Extubated on 12/24 but required re-intubation. Extubated again but failed due to hypercapnia and hypoxia.   12/29 s/p Trach / PEG.    DX: Left pleural effusion, mucous plugging, Hypoxemic respiratory failure, MRSA & Stenotrophomonas PNA.    Recs:    - Acute hypoxic resp failure likely 2/2 mucous plugging with PNA  - S/P trach/ PEG# 6 XLT   - daily PSV trials and full vent support at night, although will likely be slow wean given chronic weakness and recurrent mucous plugging of left lung   - Frequent suctioning, Pulmonary toileting  -POCUS 1/5 Nasrin Right  with small left effusion with a lot of atelectatic compressive areas.   -s/p 2 PRBC,1/4/2024 PEG with oozing around insertion site continue to monitor     - High risk for VAP, close monitor on fever curve & WBC   - Completed course Vanc & Levaquin for MRSA & Steno in sputum  - persistent anemia despite transfusions. no signs of melena but PEG site with dried blood and mild oozing. consider CT abd to r/o intrabd bleeding with recent PEG     d/w dr fischer   89M w/ dementia, CKD, chronic urinary retention w/ obrien. Admitted w/ L pleural effusion thought to be due to mucous plugging. Course complicated by worsening hypoxemia requiring intubation and bronchoscopy x2 w/ MRSA pneumonia, stenotrophomonas. Extubated on 12/24 but required re-intubation. Extubated again but failed due to hypercapnia and hypoxia.   12/29 s/p Trach / PEG.    DX: Left pleural effusion, mucous plugging, Hypoxemic respiratory failure, MRSA & Stenotrophomonas PNA.    Recs:    - Acute hypoxic resp failure likely 2/2 mucous plugging with PNA  - S/P trach/ PEG# 6 XLT   - daily PSV trials and full vent support at night, although will likely be slow wean given chronic weakness and recurrent mucous plugging of left lung   - Frequent suctioning, Pulmonary toileting  -POCUS 1/5 Trace Right  with small left effusion with a lot of atelectatic compressive areas.   -s/p 2 PRBC,1/4/2024 PEG with oozing around insertion site continue to monitor     - High risk for VAP, close monitor on fever curve & WBC   - Completed course Vanc & Levaquin for MRSA & Steno in sputum  - persistent anemia despite transfusions. no signs of melena but PEG site with dried blood and mild oozing today. consider CT abd to r/o intrabd bleeding with recent PEG     d/w dr fischer

## 2024-01-05 NOTE — PROGRESS NOTE ADULT - SUBJECTIVE AND OBJECTIVE BOX
Patient is a 89y old  Male who presents with a chief complaint of resp failure (04 Jan 2024 08:32)    INTERVAL HPI/OVERNIGHT EVENTS: NAEON HD stable, no fever since yesterday     MEDICATIONS  (STANDING):  amLODIPine   Tablet 5 milliGRAM(s) Oral daily  chlorhexidine 0.12% Liquid 15 milliLiter(s) Oral Mucosa every 12 hours  chlorhexidine 2% Cloths 1 Application(s) Topical <User Schedule>  collagenase Ointment 1 Application(s) Topical daily  heparin   Injectable 5000 Unit(s) SubCutaneous every 12 hours  mirtazapine 15 milliGRAM(s) Oral daily  polyethylene glycol 3350 17 Gram(s) Oral two times a day  senna 2 Tablet(s) Oral at bedtime    MEDICATIONS  (PRN):  acetaminophen     Tablet .. 650 milliGRAM(s) Oral every 6 hours PRN Temp greater or equal to 38C (100.4F), Mild Pain (1 - 3)  morphine  - Injectable 2 milliGRAM(s) IV Push every 4 hours PRN Mild Pain (1 - 3)    Allergies    No Known Allergies    Intolerances      REVIEW OF SYSTEMS:  All other systems reviewed and are negative    Vital Signs Last 24 Hrs  T(C): 36.9 (05 Jan 2024 12:05), Max: 37.8 (04 Jan 2024 22:23)  T(F): 98.5 (05 Jan 2024 12:05), Max: 100 (04 Jan 2024 22:23)  HR: 87 (05 Jan 2024 12:05) (85 - 102)  BP: 178/83 (05 Jan 2024 12:05) (167/74 - 178/83)  BP(mean): --  RR: 20 (05 Jan 2024 12:05) (17 - 20)  SpO2: 100% (05 Jan 2024 12:05) (94% - 100%)    Parameters below as of 05 Jan 2024 12:05  Patient On (Oxygen Delivery Method): ventilator      Daily     Daily   I&O's Summary    04 Jan 2024 07:01  -  05 Jan 2024 07:00  --------------------------------------------------------  IN: 0 mL / OUT: 1200 mL / NET: -1200 mL      CAPILLARY BLOOD GLUCOSE        PHYSICAL EXAM:  GENERAL: stable, on vent/trach and PEG, obrien in place.   NECK: supple, No JVD  CHEST/LUNG: clear to auscultation bilaterally; no rales, rhonchi, or wheezing b/l  HEART: normal S1, S2  ABDOMEN: BS+, soft, ND, NT   EXTREMITIES:  pulses palpable; no clubbing, cyanosis, or edema b/l LEs    Labs                          7.8    6.51  )-----------( 161      ( 05 Jan 2024 06:06 )             24.0     01-05    143  |  111<H>  |  104<H>  ----------------------------<  110<H>  4.1   |  23  |  4.43<H>    Ca    9.0      05 Jan 2024 06:06    TPro  7.0  /  Alb  1.4<L>  /  TBili  0.3  /  DBili  x   /  AST  20  /  ALT  11<L>  /  AlkPhos  60  01-04          Urinalysis Basic - ( 05 Jan 2024 06:06 )    Color: x / Appearance: x / SG: x / pH: x  Gluc: 110 mg/dL / Ketone: x  / Bili: x / Urobili: x   Blood: x / Protein: x / Nitrite: x   Leuk Esterase: x / RBC: x / WBC x   Sq Epi: x / Non Sq Epi: x / Bacteria: x                  DVT prophylaxis: > Lovenox 40mg SQ daily  > Heparin   > SCD's

## 2024-01-05 NOTE — PROVIDER CONTACT NOTE (CRITICAL VALUE NOTIFICATION) - TEST AND RESULT REPORTED:
Hgb 6.8 Hct 21.5
Hgb 7.0
Sputum culture MRSA  Bronch culture MRSA
Hgb 6.3 / Hct 19
Hgb 6.6 and Hct 20.4
Hgb 6.7 HCT 20.2
Potassium 2.8
Hgb-6.7
Hgb 6.9
Brachial + MRSA
HB 6.1 and HCT 1901
Hgb 7.0

## 2024-01-05 NOTE — PROGRESS NOTE ADULT - SUBJECTIVE AND OBJECTIVE BOX
CHIEF COMPLAINT:  ===============  NOT FEELING WELL    ANEMIA; SEPSIS; DYSPNEA        INTERVAL EVENTS:  ==================    - T(F): , Max: 100 (01-04-24 @ 22:23)  SpO2:  (94% - 100%)  -Mode: CPAP with PS, FiO2: 30      REVIEW OF SYSTEMS:  ==================  - no fever, no chills  - no HA, no dizziness  - no visual changes, no auditory changes  - no sore throat, no sinus congestion  - no SOB, no cough  - no chest pain, no palpitations  - no abdominal pain, no N/V/D  - no dysuria, no hematuria  - no myalgias, no arthralgias  - no pain in extremity, no swelling   - no rashes, no pruritis  - no neuro deficits  - no psychiatric concerns       HPI:      OBJECTIVE:  ===========    ICU Vital Signs Last 24 Hrs  T(C): 36.9 (05 Jan 2024 12:05), Max: 37.8 (04 Jan 2024 22:23)  T(F): 98.5 (05 Jan 2024 12:05), Max: 100 (04 Jan 2024 22:23)  HR: 87 (05 Jan 2024 12:05) (85 - 102)  BP: 178/83 (05 Jan 2024 12:05) (167/74 - 178/83)  BP(mean): --  ABP: --  ABP(mean): --  RR: 20 (05 Jan 2024 12:05) (17 - 20)  SpO2: 100% (05 Jan 2024 12:05) (94% - 100%)    O2 Parameters below as of 05 Jan 2024 12:05  Patient On (Oxygen Delivery Method): ventilator          Mode: CPAP with PS, FiO2: 30, PEEP: 5, PS: 12, ITime: 0.7    01-04 @ 07:01  -  01-05 @ 07:00  --------------------------------------------------------  IN: 0 mL / OUT: 1200 mL / NET: -1200 mL      CAPILLARY BLOOD GLUCOSE            PHYSICAL EXAM:  ==============  GENERAL: NAD, well-groomed, well-developed  HEAD:  Atraumatic, Normocephalic  EYES: EOMI, PERRLA, conjunctiva and sclera clear  ENMT: No tonsillar erythema, exudates, or enlargement; Moist mucous membranes, Good dentition, No lesions  NECK: Supple, No JVD, Normal thyroid  NERVOUS SYSTEM:  Alert & Oriented X3, Good concentration; Motor Strength 5/5 B/L upper and lower extremities; DTRs 2+ intact and symmetric  CHEST/LUNG: Clear to percussion bilaterally; No rales, rhonchi, wheezing, or rubs  HEART: Regular rate and rhythm; No murmurs, rubs, or gallops  ABDOMEN: Soft, Nontender, Nondistended; Bowel sounds present  EXTREMITIES:  2+ Peripheral Pulses, No clubbing, cyanosis, or edema  LYMPH: No lymphadenopathy noted  SKIN: No rashes or lesions        HOSPITAL MEDICATIONS:  ======================  heparin   Injectable 5000 Unit(s) SubCutaneous every 12 hours      amLODIPine   Tablet 5 milliGRAM(s) Oral daily        acetaminophen     Tablet .. 650 milliGRAM(s) Oral every 6 hours PRN  mirtazapine 15 milliGRAM(s) Oral daily  morphine  - Injectable 2 milliGRAM(s) IV Push every 4 hours PRN    polyethylene glycol 3350 17 Gram(s) Oral two times a day  senna 2 Tablet(s) Oral at bedtime            chlorhexidine 0.12% Liquid 15 milliLiter(s) Oral Mucosa every 12 hours  chlorhexidine 2% Cloths 1 Application(s) Topical <User Schedule>  collagenase Ointment 1 Application(s) Topical daily        LABS:  =====                          7.8    6.51  )-----------( 161      ( 05 Jan 2024 06:06 )             24.0     Hgb Trend: 7.8<--, 6.1<--, 6.7<--, 6.6<--, 7.2<--  01-05    143  |  111<H>  |  104<H>  ----------------------------<  110<H>  4.1   |  23  |  4.43<H>    Ca    9.0      05 Jan 2024 06:06    TPro  7.0  /  Alb  1.4<L>  /  TBili  0.3  /  DBili  x   /  AST  20  /  ALT  11<L>  /  AlkPhos  60  01-04    Creatinine Trend: 4.43<--, 4.38<--, 4.00<--, 3.83<--, 3.60<--, 3.40<--      Urinalysis Basic - ( 05 Jan 2024 06:06 )    Color: x / Appearance: x / SG: x / pH: x  Gluc: 110 mg/dL / Ketone: x  / Bili: x / Urobili: x   Blood: x / Protein: x / Nitrite: x   Leuk Esterase: x / RBC: x / WBC x   Sq Epi: x / Non Sq Epi: x / Bacteria: x              MICROBIOLOGY:     Culture - Sputum  Source: ET Tube ET Tube  Gram Stain (12-18-23 @ 19:19):    Moderate polymorphonuclear leukocytes per low power field    Few Squamous epithelial cells per low power field    Numerous Gram positive cocci in pairs per oil power field    Few Gram Positive Rods per oil power field  Final Report (12-18-23 @ 19:19):    Numerous Methicillin Resistant Staphylococcus aureus    Normal Respiratory Vanessa present  Organism: Methicillin resistant Staphylococcus aureus (12-18-23 @ 19:19)  Organism: Methicillin resistant Staphylococcus aureus (12-18-23 @ 19:19)    Sensitivities:      Method Type: JASWINDER      -  Ampicillin/Sulbactam: R >16/8      -  Cefazolin: R >16      -  Clindamycin: S 0.5      -  Erythromycin: R >4      -  Gentamicin: S <=1 Should not be used as monotherapy      -  Linezolid: S 2      -  Oxacillin: R >2      -  Penicillin: R >8      -  Rifampin: S <=1 Should not be used as monotherapy      -  Tetracycline: S <=1      -  Trimethoprim/Sulfamethoxazole: S <=0.5/9.5      -  Vancomycin: S 2    Culture - Urine  Source: Catheterized Catheterized  Final Report (12-04-23 @ 12:54):    >100,000 CFU/ml Enterococcus faecalis    50,000 - 99,000 CFU/mL Pseudomonas aeruginosa  Organism: Enterococcus faecalis  Pseudomonas aeruginosa (12-04-23 @ 12:54)  Organism: Pseudomonas aeruginosa (12-04-23 @ 12:54)    Sensitivities:      Method Type: JASWINDER      -  Amikacin: I 32      -  Aztreonam: S 8      -  Cefepime: S 8      -  Ceftazidime: S 4      -  Ciprofloxacin: R >2      -  Imipenem: S <=1      -  Levofloxacin: R >4      -  Meropenem: S <=1      -  Piperacillin/Tazobactam: S <=8  Organism: Enterococcus faecalis (12-04-23 @ 12:54)    Sensitivities:      Method Type: JASWINDER      -  Ampicillin: S <=2 Predicts results to ampicillin/sulbactam, amoxacillin-clavulanate and  piperacillin-tazobactam.      -  Ciprofloxacin: R >2      -  Levofloxacin: R >4      -  Nitrofurantoin: S <=32 Should not be used to treat pyelonephritis.      -  Tetracycline: I 8      -  Vancomycin: S 2    Culture - Blood  Source: .Blood Blood-Venous  Final Report (12-06-23 @ 11:01):    No growth at 5 days    Culture - Blood  Source: .Blood Blood-Peripheral  Final Report (12-06-23 @ 02:00):    No growth at 5 days          MRSA PCR Result.: NotDetec (12-17-23 @ 12:00)    Rapid RVP Result: NotDetec (12-14-23 @ 13:00)      RADIOLOGY:  ==========  CT Chest No Cont:  (11-30-23 @ 20:37)  CT Abdomen and Pelvis No Cont:  (11-30-23 @ 20:37)    PULMONARY:  ============  Mode: CPAP with PS, FiO2: 30, PEEP: 5      CARDIAC:  ========   CHIEF COMPLAINT:  ===============  NOT FEELING WELL  ANEMIA; SEPSIS; DYSPNEA    INTERVAL EVENTS:  ==================    - T(F): , Max: 100 (01-04-24 @ 22:23)  SpO2:  (94% - 100%)  -Mode: CPAP with PS, FiO2: 30      HPI:  89M w/ hx of CKD, HTN, BPH w/ chronic obrien, dementia p/w SOB. Pt poor historian so history obtained from record. Pt was brought in my home aid who reports pt c/o SOB. Denies fever/chills/sweats, URI sx, N/V/D.    In ED, pt hypothermic to 95.5 and hypotensive to 90/60s. Labs notable for Hgb 6.3 (+london), dimer 3000, Na 129, BUN/SCr 70/2.91, BNP 7966, UA+. CT CAP demonstrates mild b/l hydronephrosis and large stool burden w/ perirectal stranding c/f stercoral colitis.      OBJECTIVE:  ===========    ICU Vital Signs Last 24 Hrs  T(C): 36.9 (05 Jan 2024 12:05), Max: 37.8 (04 Jan 2024 22:23)  T(F): 98.5 (05 Jan 2024 12:05), Max: 100 (04 Jan 2024 22:23)  HR: 87 (05 Jan 2024 12:05) (85 - 102)  BP: 178/83 (05 Jan 2024 12:05) (167/74 - 178/83)  RR: 20 (05 Jan 2024 12:05) (17 - 20)  SpO2: 100% (05 Jan 2024 12:05) (94% - 100%)    O2 Parameters below as of 05 Jan 2024 12:05  Patient On (Oxygen Delivery Method): ventilator      Mode: CPAP with PS, FiO2: 30, PEEP: 5, PS: 12, ITime: 0.7    01-04 @ 07:01  -  01-05 @ 07:00  --------------------------------------------------------  IN: 0 mL / OUT: 1200 mL / NET: -1200 mL      CAPILLARY BLOOD GLUCOSE    PHYSICAL EXAM:  ==============  GENERAL: NAD, well-groomed, well-developed  HEAD:  Atraumatic, Normocephalic  EYES: EOMI, PERRLA, conjunctiva and sclera clear  ENMT: No tonsillar erythema, exudates, or enlargement; Moist mucous membranes, Good dentition, No lesions  NECK: Trach in place   NERVOUS SYSTEM:  Alert,  Good concentration; Motor Strength 5/5 B/L upper and lower extremities; DTRs 2+ intact and symmetric  CHEST/LUNG: Clear to percussion bilaterally; No rales, rhonchi, wheezing, or rubs  HEART: Regular rate and rhythm; No murmurs, rubs, or gallops  ABDOMEN: Soft, Nontender, Nondistended; Bowel sounds present  + PEG   EXTREMITIES:  2+ Peripheral Pulses, No clubbing, cyanosis, or edema  LYMPH: No lymphadenopathy noted  SKIN: No rashes or lesions        HOSPITAL MEDICATIONS:  ======================  heparin   Injectable 5000 Unit(s) SubCutaneous every 12 hours      amLODIPine   Tablet 5 milliGRAM(s) Oral daily  acetaminophen     Tablet .. 650 milliGRAM(s) Oral every 6 hours PRN  mirtazapine 15 milliGRAM(s) Oral daily  morphine  - Injectable 2 milliGRAM(s) IV Push every 4 hours PRN  polyethylene glycol 3350 17 Gram(s) Oral two times a day  senna 2 Tablet(s) Oral at bedtime  chlorhexidine 0.12% Liquid 15 milliLiter(s) Oral Mucosa every 12 hours  chlorhexidine 2% Cloths 1 Application(s) Topical <User Schedule>  collagenase Ointment 1 Application(s) Topical daily        LABS:  =====                          7.8    6.51  )-----------( 161      ( 05 Jan 2024 06:06 )             24.0     Hgb Trend: 7.8<--, 6.1<--, 6.7<--, 6.6<--, 7.2<--  01-05    143  |  111<H>  |  104<H>  ----------------------------<  110<H>  4.1   |  23  |  4.43<H>    Ca    9.0      05 Jan 2024 06:06    TPro  7.0  /  Alb  1.4<L>  /  TBili  0.3  /  DBili  x   /  AST  20  /  ALT  11<L>  /  AlkPhos  60  01-04    Creatinine Trend: 4.43<--, 4.38<--, 4.00<--, 3.83<--, 3.60<--, 3.40<--      Urinalysis Basic - ( 05 Jan 2024 06:06 )    Color: x / Appearance: x / SG: x / pH: x  Gluc: 110 mg/dL / Ketone: x  / Bili: x / Urobili: x   Blood: x / Protein: x / Nitrite: x   Leuk Esterase: x / RBC: x / WBC x   Sq Epi: x / Non Sq Epi: x / Bacteria: x              MICROBIOLOGY:     Culture - Sputum  Source: ET Tube ET Tube  Gram Stain (12-18-23 @ 19:19):    Moderate polymorphonuclear leukocytes per low power field    Few Squamous epithelial cells per low power field    Numerous Gram positive cocci in pairs per oil power field    Few Gram Positive Rods per oil power field  Final Report (12-18-23 @ 19:19):    Numerous Methicillin Resistant Staphylococcus aureus    Normal Respiratory Vanessa present  Organism: Methicillin resistant Staphylococcus aureus (12-18-23 @ 19:19)  Organism: Methicillin resistant Staphylococcus aureus (12-18-23 @ 19:19)    Sensitivities:      Method Type: JASWINDER      -  Ampicillin/Sulbactam: R >16/8      -  Cefazolin: R >16      -  Clindamycin: S 0.5      -  Erythromycin: R >4      -  Gentamicin: S <=1 Should not be used as monotherapy      -  Linezolid: S 2      -  Oxacillin: R >2      -  Penicillin: R >8      -  Rifampin: S <=1 Should not be used as monotherapy      -  Tetracycline: S <=1      -  Trimethoprim/Sulfamethoxazole: S <=0.5/9.5      -  Vancomycin: S 2    Culture - Urine  Source: Catheterized Catheterized  Final Report (12-04-23 @ 12:54):    >100,000 CFU/ml Enterococcus faecalis    50,000 - 99,000 CFU/mL Pseudomonas aeruginosa  Organism: Enterococcus faecalis  Pseudomonas aeruginosa (12-04-23 @ 12:54)  Organism: Pseudomonas aeruginosa (12-04-23 @ 12:54)    Sensitivities:      Method Type: JASWINDER      -  Amikacin: I 32      -  Aztreonam: S 8      -  Cefepime: S 8      -  Ceftazidime: S 4      -  Ciprofloxacin: R >2      -  Imipenem: S <=1      -  Levofloxacin: R >4      -  Meropenem: S <=1      -  Piperacillin/Tazobactam: S <=8  Organism: Enterococcus faecalis (12-04-23 @ 12:54)    Sensitivities:      Method Type: JASWINDER      -  Ampicillin: S <=2 Predicts results to ampicillin/sulbactam, amoxacillin-clavulanate and  piperacillin-tazobactam.      -  Ciprofloxacin: R >2      -  Levofloxacin: R >4      -  Nitrofurantoin: S <=32 Should not be used to treat pyelonephritis.      -  Tetracycline: I 8      -  Vancomycin: S 2    Culture - Blood  Source: .Blood Blood-Venous  Final Report (12-06-23 @ 11:01):    No growth at 5 days    Culture - Blood  Source: .Blood Blood-Peripheral  Final Report (12-06-23 @ 02:00):    No growth at 5 days          MRSA PCR Result.: NotDetec (12-17-23 @ 12:00)    Rapid RVP Result: NotDetec (12-14-23 @ 13:00)      RADIOLOGY:  ==========  CT Chest No Cont:  (11-30-23 @ 20:37)  CT Abdomen and Pelvis No Cont:  (11-30-23 @ 20:37)    PULMONARY:  ============  Mode: CPAP with PS, FiO2: 30, PEEP: 5      CARDIAC:  ========   CHIEF COMPLAINT:  ===============  NOT FEELING WELL  ANEMIA; SEPSIS; DYSPNEA    INTERVAL EVENTS:  ==================    - T(F): , Max: 100 (01-04-24 @ 22:23)  - SpO2:  (94% - 100%)  - Mode: CPAP with PS, FiO2: 30      HPI:  89M w/ hx of CKD, HTN, BPH w/ chronic obrien, dementia p/w SOB. Pt poor historian so history obtained from record. Pt was brought in my home aid who reports pt c/o SOB. Denies fever/chills/sweats, URI sx, N/V/D.    In ED, pt hypothermic to 95.5 and hypotensive to 90/60s. Labs notable for Hgb 6.3 (+london), dimer 3000, Na 129, BUN/SCr 70/2.91, BNP 7966, UA+. CT CAP demonstrates mild b/l hydronephrosis and large stool burden w/ perirectal stranding c/f stercoral colitis.      OBJECTIVE:  ===========    Vital Signs Last 24 Hrs  T(C): 36.9 (05 Jan 2024 12:05), Max: 37.8 (04 Jan 2024 22:23)  T(F): 98.5 (05 Jan 2024 12:05), Max: 100 (04 Jan 2024 22:23)  HR: 87 (05 Jan 2024 12:05) (85 - 102)  BP: 178/83 (05 Jan 2024 12:05) (167/74 - 178/83)  RR: 20 (05 Jan 2024 12:05) (17 - 20)  SpO2: 100% (05 Jan 2024 12:05) (94% - 100%)    O2 Parameters below as of 05 Jan 2024 12:05  Patient On (Oxygen Delivery Method): ventilator      Mode: CPAP with PS, FiO2: 30, PEEP: 5, PS: 12, ITime: 0.7    01-04 @ 07:01  -  01-05 @ 07:00  --------------------------------------------------------  IN: 0 mL / OUT: 1200 mL / NET: -1200 mL          PHYSICAL EXAM:  ==============  GENERAL: elderly male, trach to vent, NAD  HEAD:  Atraumatic, Normocephalic, R scalp soft tissue growth  EYES: EOMI, PERRLA, conjunctiva and sclera clear  ENMT: moist mucus membrane  NECK: Trach in place   NERVOUS SYSTEM:  awake, smiles with came call, intermittently follows simple commands  CHEST/LUNG: mechanical breath sounds, No rales, rhonchi, wheezing, or rubs  HEART: Regular rate and rhythm  ABDOMEN: Soft, Nontender, Nondistended; Bowel sounds present  + PEG with mild oozing around PEG with fresh blood  EXTREMITIES:  2+ Peripheral Pulses, No clubbing, cyanosis, or edema  LYMPH: No lymphadenopathy noted  SKIN: No rashes or lesions        HOSPITAL MEDICATIONS:  ======================  heparin   Injectable 5000 Unit(s) SubCutaneous every 12 hours      amLODIPine   Tablet 5 milliGRAM(s) Oral daily  acetaminophen     Tablet .. 650 milliGRAM(s) Oral every 6 hours PRN  mirtazapine 15 milliGRAM(s) Oral daily  morphine  - Injectable 2 milliGRAM(s) IV Push every 4 hours PRN  polyethylene glycol 3350 17 Gram(s) Oral two times a day  senna 2 Tablet(s) Oral at bedtime  chlorhexidine 0.12% Liquid 15 milliLiter(s) Oral Mucosa every 12 hours  chlorhexidine 2% Cloths 1 Application(s) Topical <User Schedule>  collagenase Ointment 1 Application(s) Topical daily        LABS:  =====                          7.8    6.51  )-----------( 161      ( 05 Jan 2024 06:06 )             24.0     Hgb Trend: 7.8<--, 6.1<--, 6.7<--, 6.6<--, 7.2<--  01-05    143  |  111<H>  |  104<H>  ----------------------------<  110<H>  4.1   |  23  |  4.43<H>    Ca    9.0      05 Jan 2024 06:06    TPro  7.0  /  Alb  1.4<L>  /  TBili  0.3  /  DBili  x   /  AST  20  /  ALT  11<L>  /  AlkPhos  60  01-04    Creatinine Trend: 4.43<--, 4.38<--, 4.00<--, 3.83<--, 3.60<--, 3.40<--        MICROBIOLOGY:     Culture - Sputum  Source: ET Tube ET Tube  Gram Stain (12-18-23 @ 19:19):    Moderate polymorphonuclear leukocytes per low power field    Few Squamous epithelial cells per low power field    Numerous Gram positive cocci in pairs per oil power field    Few Gram Positive Rods per oil power field  Final Report (12-18-23 @ 19:19):    Numerous Methicillin Resistant Staphylococcus aureus    Normal Respiratory Vanessa present  Organism: Methicillin resistant Staphylococcus aureus (12-18-23 @ 19:19)  Organism: Methicillin resistant Staphylococcus aureus (12-18-23 @ 19:19)    Sensitivities:      Method Type: JASWINDER      -  Ampicillin/Sulbactam: R >16/8      -  Cefazolin: R >16      -  Clindamycin: S 0.5      -  Erythromycin: R >4      -  Gentamicin: S <=1 Should not be used as monotherapy      -  Linezolid: S 2      -  Oxacillin: R >2      -  Penicillin: R >8      -  Rifampin: S <=1 Should not be used as monotherapy      -  Tetracycline: S <=1      -  Trimethoprim/Sulfamethoxazole: S <=0.5/9.5      -  Vancomycin: S 2    Culture - Urine  Source: Catheterized Catheterized  Final Report (12-04-23 @ 12:54):    >100,000 CFU/ml Enterococcus faecalis    50,000 - 99,000 CFU/mL Pseudomonas aeruginosa  Organism: Enterococcus faecalis  Pseudomonas aeruginosa (12-04-23 @ 12:54)  Organism: Pseudomonas aeruginosa (12-04-23 @ 12:54)    Sensitivities:      Method Type: JASWINDER      -  Amikacin: I 32      -  Aztreonam: S 8      -  Cefepime: S 8      -  Ceftazidime: S 4      -  Ciprofloxacin: R >2      -  Imipenem: S <=1      -  Levofloxacin: R >4      -  Meropenem: S <=1      -  Piperacillin/Tazobactam: S <=8  Organism: Enterococcus faecalis (12-04-23 @ 12:54)    Sensitivities:      Method Type: JASWINDER      -  Ampicillin: S <=2 Predicts results to ampicillin/sulbactam, amoxacillin-clavulanate and  piperacillin-tazobactam.      -  Ciprofloxacin: R >2      -  Levofloxacin: R >4      -  Nitrofurantoin: S <=32 Should not be used to treat pyelonephritis.      -  Tetracycline: I 8      -  Vancomycin: S 2    Culture - Blood  Source: .Blood Blood-Venous  Final Report (12-06-23 @ 11:01):    No growth at 5 days    Culture - Blood  Source: .Blood Blood-Peripheral  Final Report (12-06-23 @ 02:00):    No growth at 5 days          MRSA PCR Result.: NotDetec (12-17-23 @ 12:00)    Rapid RVP Result: NotDetec (12-14-23 @ 13:00)      RADIOLOGY:  ==========  CT Chest No Cont:  (11-30-23 @ 20:37)  CT Abdomen and Pelvis No Cont:  (11-30-23 @ 20:37)    PULMONARY:  ============  Mode: CPAP with PS, FiO2: 30, PEEP: 5      CARDIAC:  ========

## 2024-01-05 NOTE — PROGRESS NOTE ADULT - SUBJECTIVE AND OBJECTIVE BOX
SURGERY DAILY PROGRESS NOTE:       SUBJECTIVE/ROS: limited subjective 2/2 trach         MEDICATIONS  (STANDING):  amLODIPine   Tablet 5 milliGRAM(s) Oral daily  chlorhexidine 0.12% Liquid 15 milliLiter(s) Oral Mucosa every 12 hours  chlorhexidine 2% Cloths 1 Application(s) Topical <User Schedule>  collagenase Ointment 1 Application(s) Topical daily  heparin   Injectable 5000 Unit(s) SubCutaneous every 12 hours  mirtazapine 15 milliGRAM(s) Oral daily  polyethylene glycol 3350 17 Gram(s) Oral two times a day  senna 2 Tablet(s) Oral at bedtime    MEDICATIONS  (PRN):  acetaminophen     Tablet .. 650 milliGRAM(s) Oral every 6 hours PRN Temp greater or equal to 38C (100.4F), Mild Pain (1 - 3)  morphine  - Injectable 2 milliGRAM(s) IV Push every 4 hours PRN Mild Pain (1 - 3)      OBJECTIVE:    Vital Signs Last 24 Hrs  T(C): 37.6 (05 Jan 2024 06:14), Max: 37.8 (04 Jan 2024 22:23)  T(F): 99.7 (05 Jan 2024 06:14), Max: 100 (04 Jan 2024 22:23)  HR: 96 (05 Jan 2024 09:26) (85 - 102)  BP: 173/74 (05 Jan 2024 06:14) (167/74 - 175/81)  BP(mean): --  RR: 18 (05 Jan 2024 06:14) (17 - 18)  SpO2: 98% (05 Jan 2024 09:26) (94% - 100%)    Parameters below as of 04 Jan 2024 22:23  Patient On (Oxygen Delivery Method): ventilator            I&O's Detail    04 Jan 2024 07:01  -  05 Jan 2024 07:00  --------------------------------------------------------  IN:  Total IN: 0 mL    OUT:    Indwelling Catheter - Urethral (mL): 1200 mL  Total OUT: 1200 mL    Total NET: -1200 mL          Daily     Daily     LABS:                        7.8    6.51  )-----------( 161      ( 05 Jan 2024 06:06 )             24.0     01-05    143  |  111<H>  |  104<H>  ----------------------------<  110<H>  4.1   |  23  |  4.43<H>    Ca    9.0      05 Jan 2024 06:06    TPro  7.0  /  Alb  1.4<L>  /  TBili  0.3  /  DBili  x   /  AST  20  /  ALT  11<L>  /  AlkPhos  60  01-04      Urinalysis Basic - ( 05 Jan 2024 06:06 )    Color: x / Appearance: x / SG: x / pH: x  Gluc: 110 mg/dL / Ketone: x  / Bili: x / Urobili: x   Blood: x / Protein: x / Nitrite: x   Leuk Esterase: x / RBC: x / WBC x   Sq Epi: x / Non Sq Epi: x / Bacteria: x      PHYSICAL EXAM:  Constitutional: well developed, well nourished, NAD  Neck: trach in place, no active bleeding   Respiratory: on MV  Cardiovascular: RRR  Gastrointestinal: abdomen soft, nontender, nondistended. PEG in place

## 2024-01-05 NOTE — PROGRESS NOTE ADULT - NS ATTEND AMEND GEN_ALL_CORE FT
pt seen and examined at bedside with NP    s/p 2 units pRBC yesterday for anemia Hb 6.1  post transfusion CBC with Hb 7.8 today  mild oozing around PEG with fresh blood  PEG flushed at bedside without return of coffee ground or fresh blood  tolerating weaning on pressure support 12 over 5, FIO2: at 30%  no leukocytosis  febrile 101 yesterday, afebrile today  CXR with L hemithorax opacification/haziness    POCUS  [x] limited lung      L Negar    INDICATION  [x] follow up pleural effusion  [x] respiratory failure  [x] abnormal CXR    FINDINGS  - bilateral anterior a line predominance  - trace R effusion  - small L effusion with largely noted hepatization of lung likely reflecting mostly atelectatic lung vs PNA        89M PMH HTN, CKD3, BPH w/ chronic obrien and multiple admissions for CAUTI, dementia, COVID 1/2023 presents for SOB. Pt poor historian so history obtained from record. Pt was brought in by home aid who reports pt c/o SOB. NO fever/chills/sweats, URI sx, N/V/D reported. In ED, pt hypothermic to 95.5 and hypotensive to 90/60s. Labs notable for Hgb 6.3 (+london), dimer 3000, Na 129, BUN/SCr 70/2.91, BNP 7966, UA+. CT CAP demonstrates mild b/l hydronephrosis and large stool burden w/ perirectal stranding c/f stercoral colitis. New L pleural effusion found on CXR. Lung US without good window for thoracentesis. Hospital course with L hemithorax opacification likely from mucus plugging, hypoxic respiratory failure requiring intubation 12/16. bronchoscopy x2 w/ MRSA pneumonia, stenotrophomonas. Extubated on 12/24 but required re-intubation. Extubated again 12/27 but failed due to hypercapnia and hypoxia. now s/p Trach / PEG with surgery on 12/29. Hospital course with recurrent anemia Hb 6 range requiring pRBC transfusion. Cr now up trending. Febrile again    DX: acute hypoxic and hypercarbic respiratory failure requiring multiple intubations, pleural effusion, mucus plugging with atelectasis of L lung, MRSA and pseudomonas PNA, anemia, ROGER on CKD, stercoral colitis, CAUTI, s/p trach and PEG, dementia    - cont mechanical vent support  - daily weaning trials as tolerates  - cont tracheal suctioning and local trach care. no bleeding noted from trach   - s/p course of vanco for MRSA PNA and completed course of levaquin for stenotrophomonas  - s/p bronchoscopy x2 in ICU for airway clearance  - repeat CXR 1/4 with L effusion with associated L lung atelectasis with left lung haziness/opacity - worse compared to prior film 12/27 similar to 12/29 film  - transfused 2 units pRBC fr anemia, check post transfusion Hb improved. anemia from blood loss? vs renal insufficiency vs underlying infection  - ROGER on CKD: renal US with mild R hydronephrosis, Cr rising, obrien with yellow urine  - fever work up: follow up check blood cx, sputum cx.  - repeat bedside lung US today with trace R effusion and a small L effusion not amendable for thoracentesis. L effusion small and with mostly atelectatic lung pattern on L  - cont duonebs, chest PT, suctioning for airway clearance. hypertonic saline nebs. can consider repeat bronchoscopy however he has had 2 bronchoscopies already for recurrent mucus plugging. frequent repeat bronchoscopy is not realistic or ideal long term solution for chronic mucus plugging  - effusion on L likely loculated to a degree, will cont to monitor

## 2024-01-05 NOTE — PROGRESS NOTE ADULT - SUBJECTIVE AND OBJECTIVE BOX
NEPHROLOGY PROGRESS NOTE    CHIEF COMPLAINT:  ROGER/CKD    HPI:  Renal function plateauing?  UO 1.2 liters.    EXAM:  Vital Signs Last 24 Hrs  T(C): 36.9 (05 Jan 2024 12:05), Max: 37.8 (04 Jan 2024 22:23)  T(F): 98.5 (05 Jan 2024 12:05), Max: 100 (04 Jan 2024 22:23)  HR: 87 (05 Jan 2024 12:05) (85 - 102)  BP: 178/83 (05 Jan 2024 12:05) (167/74 - 178/83)  BP(mean): --  RR: 20 (05 Jan 2024 12:05) (17 - 20)  SpO2: 100% (05 Jan 2024 12:05) (94% - 100%)    Parameters below as of 05 Jan 2024 12:05  Patient On (Oxygen Delivery Method): ventilator      I&O's Summary    04 Jan 2024 07:01  -  05 Jan 2024 07:00  --------------------------------------------------------  IN: 0 mL / OUT: 1200 mL / NET: -1200 mL      Vented, tube fed with Nepro  Normal respiratory effort, lungs clear bilaterally  Heart RRR with no murmur, no peripheral edema    LABS                        7.8    6.51  )-----------( 161      ( 05 Jan 2024 06:06 )             24.0     Eos 7.7%      01-05    143  |  111<H>  |  104<H>  ----------------------------<  110<H>  4.1   |  23  |  4.43<H>    Ca    9.0      05 Jan 2024 06:06    TPro  7.0  /  Alb  1.4<L>  /  TBili  0.3  /  DBili  x   /  AST  20  /  ALT  11<L>  /  AlkPhos  60  01-04      Urine Na 87      Assessment   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

## 2024-01-05 NOTE — PROVIDER CONTACT NOTE (CRITICAL VALUE NOTIFICATION) - SITUATION
Critical Hgb of 6.6 and Hct of 20.4
pt admitted for uti/sepsis/anemia on cipro and flagyl  received 1 unit of prbcs in ED
patient received critical value of 6.8 Hgb 21.5 Hct
H/H now 7.0/21.4 ;  last was 9.0/29.0

## 2024-01-06 NOTE — PROGRESS NOTE ADULT - ASSESSMENT
89M w/ dementia, CKD, chronic urinary retention w/ obrien. Admitted w/ L pleural effusion thought to be due to mucous plugging. Course complicated by worsening hypoxemia requiring intubation and bronchoscopy x2 w/ MRSA pneumonia, stenotrophomonas. Extubated on 12/24 but required re-intubation. Extubated again but failed due to hypercapnia and hypoxia. s/p Trach / PEG.    #acute hypoxic resp failure with trach. s/p peg and trach   #Febrile  - Completed course Levaquin for Steno in sputum. Remains vent dependent with full support  - Febrile to 101F yesterday. No leukocytosis - BCx negative    - hold on abx - trend fever curve   - pulm following      #ROGER on CKD   - creatinine 4.43 today. suspect AIN from levaquin     - Renal u/s mild right hydro  - Checking urine studies   - C/w d5 half NS for hypernatremia   - Nephro following    #Acute Anemia  - No clear source of bleeding. Hgb 7.8 today. s/p 2U pRBCs.   - Check CTAP for bleeding source     Diet: TFs  DVT prophylaxis: SQH  Dispo: Eventual D/C to vent facility   Code Status:

## 2024-01-06 NOTE — PROGRESS NOTE ADULT - SUBJECTIVE AND OBJECTIVE BOX
NEPHROLOGY PROGRESS NOTE    CHIEF COMPLAINT:  ROGER/CKD    HPI:  Renal function slowly worse.   mL.    EXAM:  Vital Signs Last 24 Hrs  T(C): 37.3 (06 Jan 2024 11:35), Max: 37.9 (06 Jan 2024 05:05)  T(F): 99.1 (06 Jan 2024 11:35), Max: 100.2 (06 Jan 2024 05:05)  HR: 87 (06 Jan 2024 13:09) (83 - 92)  BP: 140/67 (06 Jan 2024 11:35) (140/67 - 180/77)  BP(mean): --  RR: 17 (06 Jan 2024 11:35) (17 - 20)  SpO2: 95% (06 Jan 2024 13:09) (95% - 100%)    Parameters below as of 06 Jan 2024 11:35  Patient On (Oxygen Delivery Method): ventilator      I&O's Summary    05 Jan 2024 07:01  -  06 Jan 2024 07:00  --------------------------------------------------------  IN: 0 mL / OUT: 800 mL / NET: -800 mL    06 Jan 2024 07:01  -  06 Jan 2024 15:02  --------------------------------------------------------  IN: 0 mL / OUT: 350 mL / NET: -350 mL      Alert, vented via trache  Normal respiratory effort, lungs clear bilaterally  Heart RRR with no murmur, no peripheral edema    LABS                        7.8    6.51  )-----------( 161      ( 05 Jan 2024 06:06 )             24.0     01-05    143  |  111<H>  |  104<H>  ----------------------------<  110<H>  4.1   |  23  |  4.43<H>    Ca    9.0      05 Jan 2024 06:06      RADIOLOGY  CXR shows no change in large left pleural effusion    Renal sonogram shows mild right hydronephrosis      Assessment   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

## 2024-01-07 NOTE — PROGRESS NOTE ADULT - SUBJECTIVE AND OBJECTIVE BOX
Patient is a 89y old  Male who presents with a chief complaint of resp failure (04 Jan 2024 08:32)    INTERVAL HPI/OVERNIGHT EVENTS: NAEON    MEDICATIONS  (STANDING):  amLODIPine   Tablet 5 milliGRAM(s) Oral daily  chlorhexidine 0.12% Liquid 15 milliLiter(s) Oral Mucosa every 12 hours  chlorhexidine 2% Cloths 1 Application(s) Topical <User Schedule>  collagenase Ointment 1 Application(s) Topical daily  heparin   Injectable 5000 Unit(s) SubCutaneous every 12 hours  mirtazapine 15 milliGRAM(s) Oral daily  polyethylene glycol 3350 17 Gram(s) Oral two times a day  senna 2 Tablet(s) Oral at bedtime    MEDICATIONS  (PRN):  acetaminophen     Tablet .. 650 milliGRAM(s) Oral every 6 hours PRN Temp greater or equal to 38C (100.4F), Mild Pain (1 - 3)  morphine  - Injectable 2 milliGRAM(s) IV Push every 4 hours PRN Mild Pain (1 - 3)    Allergies    No Known Allergies    Intolerances      REVIEW OF SYSTEMS:  All other systems reviewed and are negative    Vital Signs Last 24 Hrs  T(C): 36.9 (07 Jan 2024 11:40), Max: 36.9 (07 Jan 2024 11:40)  T(F): 98.5 (07 Jan 2024 11:40), Max: 98.5 (07 Jan 2024 11:40)  HR: 78 (07 Jan 2024 12:10) (67 - 97)  BP: 147/65 (07 Jan 2024 11:40) (147/65 - 178/94)  BP(mean): --  RR: 19 (07 Jan 2024 11:40) (18 - 20)  SpO2: 100% (07 Jan 2024 12:10) (97% - 100%)    Parameters below as of 07 Jan 2024 05:39  Patient On (Oxygen Delivery Method): ventilator      Daily     Daily   I&O's Summary    06 Jan 2024 07:01  -  07 Jan 2024 07:00  --------------------------------------------------------  IN: 450 mL / OUT: 350 mL / NET: 100 mL      CAPILLARY BLOOD GLUCOSE        PHYSICAL EXAM:  GENERAL: stable, on vent/trach and PEG, obrien in place.   NECK: supple, No JVD  CHEST/LUNG: clear to auscultation bilaterally; no rales, rhonchi, or wheezing b/l  HEART: normal S1, S2  ABDOMEN: BS+, soft, ND, NT   EXTREMITIES:  pulses palpable; no clubbing, cyanosis, or edema b/l LEs    Labs                          8.0    6.58  )-----------( 181      ( 07 Jan 2024 06:25 )             25.0     01-07    145  |  113<H>  |  122<H>  ----------------------------<  110<H>  4.3   |  23  |  4.90<H>    Ca    9.1      07 Jan 2024 06:25  Mg     2.8     01-07            Urinalysis Basic - ( 07 Jan 2024 06:25 )    Color: x / Appearance: x / SG: x / pH: x  Gluc: 110 mg/dL / Ketone: x  / Bili: x / Urobili: x   Blood: x / Protein: x / Nitrite: x   Leuk Esterase: x / RBC: x / WBC x   Sq Epi: x / Non Sq Epi: x / Bacteria: x        Culture - Sputum (collected 04 Jan 2024 21:20)  Source: .Sputum Sputum  Gram Stain (prelim) (06 Jan 2024 11:49):    Rare Squamous epithelial cells per low power field    No polymorphonuclear cells seen per low power field    Rare Yeast like cells per oil power field  Preliminary Report (06 Jan 2024 17:13):    Normal Respiratory Vanessa present                DVT prophylaxis: > Lovenox 40mg SQ daily  > Heparin   > SCD's

## 2024-01-07 NOTE — PROGRESS NOTE ADULT - ASSESSMENT
89M w/ dementia, CKD, chronic urinary retention w/ obrien. Admitted w/ L pleural effusion thought to be due to mucous plugging. Course complicated by worsening hypoxemia requiring intubation and bronchoscopy x2 w/ MRSA pneumonia, stenotrophomonas. Extubated on 12/24 but required re-intubation. Extubated again but failed due to hypercapnia and hypoxia. s/p Trach / PEG.    #acute hypoxic resp failure with trach. s/p peg and trach   #Febrile, resolved   - Completed course Levaquin for Steno in sputum. Remains vent dependent with full support  - Febrile to 101F yesterday. No leukocytosis - BCx negative    - hold on abx - trend fever curve   - pulm following      #ROGER on CKD   - creatinine 4.90 today. suspect AIN from levaquin     - Renal u/s mild right hydro  - Checking urine studies   - C/w d5 half NS for hypernatremia   - Nephro following    #Acute Anemia  - No clear source of bleeding. Hgb 7.8 today. s/p 2U pRBCs.   - Check CTAP for bleeding source     Diet: TFs  DVT prophylaxis: SQH  Dispo: Eventual D/C to vent facility   Code Status:

## 2024-01-07 NOTE — PROGRESS NOTE ADULT - SUBJECTIVE AND OBJECTIVE BOX
NEPHROLOGY PROGRESS NOTE    CHIEF COMPLAINT:  ROGER/CKD    HPI:  Renal function slowly worsening daily.   mL.    EXAM:  Vital Signs Last 24 Hrs  T(C): 36.9 (07 Jan 2024 11:40), Max: 36.9 (07 Jan 2024 11:40)  T(F): 98.5 (07 Jan 2024 11:40), Max: 98.5 (07 Jan 2024 11:40)  HR: 78 (07 Jan 2024 12:10) (67 - 97)  BP: 147/65 (07 Jan 2024 11:40) (147/65 - 178/94)  BP(mean): --  RR: 19 (07 Jan 2024 11:40) (18 - 20)  SpO2: 100% (07 Jan 2024 12:10) (97% - 100%)    Parameters below as of 07 Jan 2024 05:39  Patient On (Oxygen Delivery Method): ventilator      I&O's Summary    06 Jan 2024 07:01  -  07 Jan 2024 07:00  --------------------------------------------------------  IN: 450 mL / OUT: 350 mL / NET: 100 mL        Vented, somnolent  Normal respiratory effort, lungs clear bilaterally  Heart RRR with no murmur, no peripheral edema    LABS                        8.0    6.58  )-----------( 181      ( 07 Jan 2024 06:25 )             25.0     Eos 7.7 -> 11.6%      01-07    145  |  113<H>  |  122<H>  ----------------------------<  110<H>  4.3   |  23  |  4.90<H>    Ca    9.1      07 Jan 2024 06:25  Mg     2.8     01-07        RADIOLOGY  CXR shows no change in large left pleural effusion    Renal sonogram shows mild right hydronephrosis      Assessment   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 prednisone 60 mg QD) if no strong medical & infectious disease contraindications  Levaquin was discontinued January 3  CT scan abdomen/pelvis to evaluate further the mild hydro seen on sonogram from 1/3

## 2024-01-08 NOTE — PROGRESS NOTE ADULT - ASSESSMENT
89M w/ dementia, CKD, chronic urinary retention w/ obrien. Admitted w/ L pleural effusion thought to be due to mucous plugging. Course complicated by worsening hypoxemia requiring intubation and bronchoscopy x2 w/ MRSA pneumonia, stenotrophomonas. Extubated on 12/24 but required re-intubation. Extubated again but failed due to hypercapnia and hypoxia.   12/29 s/p Trach / PEG.    DX: Left pleural effusion, mucous plugging, Hypoxemic respiratory failure, MRSA & Stenotrophomonas PNA.    Recs:    - Acute hypoxic resp failure likely 2/2 mucous plugging with PNA  - S/P trach/ PEG# 6 XLT   - daily PSV trials and full vent support at night, although will likely be slow wean given chronic weakness and recurrent mucous plugging of left lung   - Frequent suctioning, Pulmonary toileting  -POCUS 1/5 Trace Right  with small left effusion with a lot of atelectatic compressive areas.   -s/p 2 PRBC,1/4/2024 PEG with oozing around insertion site continue to monitor     - High risk for VAP, close monitor on fever curve & WBC   - Completed course Vanc & Levaquin for MRSA & Steno in sputum  - persistent anemia despite transfusions. no signs of melena but PEG site with dried blood and mild oozing today. consider CT abd to r/o intrabd bleeding with recent PEG     d/w dr fischer   89M w/ dementia, CKD, chronic urinary retention w/ obrien. Admitted w/ L pleural effusion thought to be due to mucous plugging. Course complicated by worsening hypoxemia requiring intubation and bronchoscopy x2 w/ MRSA pneumonia, stenotrophomonas. Extubated on 12/24 but required re-intubation. Extubated again but failed due to hypercapnia and hypoxia.   12/29 s/p Trach / PEG.    DX: Left pleural effusion, mucous plugging, Hypoxemic respiratory failure, MRSA & Stenotrophomonas PNA.    Recs:    - Acute hypoxic resp failure likely 2/2 mucous plugging with PNA  - S/P trach/ PEG# 6 XLT   - daily PSV trials and full vent support at night, although will likely be slow wean given chronic weakness and recurrent mucous plugging of left lung   - Frequent suctioning, Pulmonary toileting  -POCUS 1/5 Trace Right  with small left effusion with a lot of atelectatic compressive areas.   -s/p 2 PRBC,1/4/2024 PEG with oozing around insertion site continue to monitor     - High risk for VAP, close monitor on fever curve & WBC   - Completed course Vanc & Levaquin for MRSA & Steno in sputum  - persistent anemia despite transfusions. no signs of melena but PEG site with dried blood and mild oozing, none noted today. consider CT abd to r/o intrabd bleeding if recurrent anemia or bleeding / oozing with recent PEG     d/w dr fischer

## 2024-01-08 NOTE — PROGRESS NOTE ADULT - SUBJECTIVE AND OBJECTIVE BOX
Patient is a 89y old  Male who presents with a chief complaint of respiratory failure (08 Jan 2024 11:57)    INTERVAL HPI/OVERNIGHT EVENTS: NAEON    MEDICATIONS  (STANDING):  amLODIPine   Tablet 5 milliGRAM(s) Oral daily  chlorhexidine 0.12% Liquid 15 milliLiter(s) Oral Mucosa every 12 hours  chlorhexidine 2% Cloths 1 Application(s) Topical <User Schedule>  collagenase Ointment 1 Application(s) Topical daily  heparin   Injectable 5000 Unit(s) SubCutaneous every 12 hours  mirtazapine 15 milliGRAM(s) Oral daily  polyethylene glycol 3350 17 Gram(s) Oral two times a day  predniSONE   Tablet 60 milliGRAM(s) Oral daily  senna 2 Tablet(s) Oral at bedtime    MEDICATIONS  (PRN):  acetaminophen     Tablet .. 650 milliGRAM(s) Oral every 6 hours PRN Temp greater or equal to 38C (100.4F), Mild Pain (1 - 3)  morphine  - Injectable 2 milliGRAM(s) IV Push every 4 hours PRN Mild Pain (1 - 3)    Allergies    No Known Allergies    Intolerances      REVIEW OF SYSTEMS:  All other systems reviewed and are negative    Vital Signs Last 24 Hrs  T(C): 36.7 (08 Jan 2024 11:22), Max: 37.1 (08 Jan 2024 05:03)  T(F): 98 (08 Jan 2024 11:22), Max: 98.8 (08 Jan 2024 05:03)  HR: 86 (08 Jan 2024 11:22) (84 - 104)  BP: 138/69 (08 Jan 2024 11:22) (138/69 - 169/79)  BP(mean): --  RR: 17 (08 Jan 2024 11:22) (17 - 20)  SpO2: 97% (08 Jan 2024 11:22) (97% - 99%)      Daily     Daily   I&O's Summary    07 Jan 2024 07:01  -  08 Jan 2024 07:00  --------------------------------------------------------  IN: 0 mL / OUT: 400 mL / NET: -400 mL      CAPILLARY BLOOD GLUCOSE        PHYSICAL EXAM:  GENERAL: stable, on vent/trach and PEG, obrien in place.   NECK: supple, No JVD  CHEST/LUNG: clear to auscultation bilaterally; no rales, rhonchi, or wheezing b/l  HEART: normal S1, S2  ABDOMEN: BS+, soft, ND, NT   EXTREMITIES:  pulses palpable; no clubbing, cyanosis, or edema b/l LEs    Labs                          7.7    6.36  )-----------( 208      ( 08 Jan 2024 11:40 )             23.9     01-08    148<H>  |  116<H>  |  128<H>  ----------------------------<  122<H>  3.9   |  24  |  5.07<H>    Ca    8.9      08 Jan 2024 11:40  Mg     2.8     01-07            Urinalysis Basic - ( 08 Jan 2024 11:40 )    Color: x / Appearance: x / SG: x / pH: x  Gluc: 122 mg/dL / Ketone: x  / Bili: x / Urobili: x   Blood: x / Protein: x / Nitrite: x   Leuk Esterase: x / RBC: x / WBC x   Sq Epi: x / Non Sq Epi: x / Bacteria: x                  DVT prophylaxis: > Lovenox 40mg SQ daily  > Heparin   > SCD's

## 2024-01-08 NOTE — PROGRESS NOTE ADULT - NS ATTEND AMEND GEN_ALL_CORE FT
pt seen and examined at bedside    tolerating weaning trials today on pressure support 12/5  Cr and BUN rising  urine output 400cc in lat 24 hrs  Hb stable post 2 units pRBC tranfusion 1/4/24  blood cx 1/4/24 negative to date  sputum cx 1/4/24 normal respiratory jeremy  afebrile, last fever documented 1/6/24  tracheal suctioning performed at bedsides along with manual chest PT    89M PMH HTN, CKD3, BPH w/ chronic obrien and multiple admissions for CAUTI, dementia, COVID 1/2023 presents for SOB. Pt poor historian so history obtained from record. Pt was brought in by home aid who reports pt c/o SOB. NO fever/chills/sweats, URI sx, N/V/D reported. In ED, pt hypothermic to 95.5 and hypotensive to 90/60s. Labs notable for Hgb 6.3 (+london), dimer 3000, Na 129, BUN/SCr 70/2.91, BNP 7966, UA+. CT CAP demonstrates mild b/l hydronephrosis and large stool burden w/ perirectal stranding c/f stercoral colitis. New L pleural effusion found on CXR. Lung US without good window for thoracentesis. Hospital course with L hemithorax opacification likely from mucus plugging, hypoxic respiratory failure requiring intubation 12/16. bronchoscopy x2 w/ MRSA pneumonia, stenotrophomonas. Extubated on 12/24 but required re-intubation. Extubated again 12/27 but failed due to hypercapnia and hypoxia. now s/p Trach / PEG with surgery on 12/29. Hospital course with recurrent anemia Hb 6 range requiring pRBC transfusions. BUN/Cr now up trending. Course with fevers.     DX: acute hypoxic and hypercarbic respiratory failure requiring multiple intubations, pleural effusion, mucus plugging with atelectasis of L lung, MRSA and pseudomonas PNA, anemia, ROGER on CKD, stercoral colitis, CAUTI, s/p trach and PEG, dementia    - cont mechanical vent support  - daily weaning trials as tolerates  - cont tracheal suctioning and local trach care. no bleeding noted from trach   - s/p course of vanco for MRSA PNA and completed course of levaquin for stenotrophomonas  - s/p bronchoscopy x2 in ICU for airway clearance  - repeat CXR 1/4 with L effusion with associated L lung atelectasis with left lung haziness/opacity - worse compared to prior film 12/27 similar to 12/29 film  - transfused 2 units pRBC for anemia on 1/4 with improvement in Hb count. anemia from blood loss? vs renal insufficiency vs underlying infection  - ROGER on CKD: renal US with mild R hydronephrosis, Cr rising, obrien with yellow urine. noted on blood work to have elevated eosinophils on CBC. nephrology following, treating possible AIN due to antibx levaquin. started on steroids tonight. Trend BUN/Cr. Monitor urine ouput  - fever work up with negative repeat blood cx and sputum cx. afebrile last 24 hours. Last fever noted 1/6. monitoring off antibx at present time  - repeat bedside lung US 1/5/24 with trace R effusion and a small L effusion not amendable for thoracentesis. L effusion small with mostly atelectatic lung pattern on L  - would placed on duonebs, chest PT, suctioning for airway clearance. hypertonic saline nebs. can consider repeat bronchoscopy however he has had 2 bronchoscopies already for recurrent mucus plugging. frequent repeat bronchoscopy is not realistic or an ideal long term solution for chronic mucus plugging  - effusion on L likely also loculated to a degree, will cont to monitor. pt seen and examined at bedside    tolerating weaning trials today on pressure support 12/5  Cr and BUN rising  urine output 400cc in lat 24 hrs  Hb stable post 2 units pRBC tranfusion 1/4/24  blood cx 1/4/24 negative to date  sputum cx 1/4/24 normal respiratory jeremy  afebrile, last fever documented 1/6/24  tracheal suctioning performed at bedsides along with manual chest PT    89M PMH HTN, CKD3, BPH w/ chronic obrien and multiple admissions for CAUTI, dementia, COVID 1/2023 presents for SOB. Pt poor historian so history obtained from record. Pt was brought in by home aid who reports pt c/o SOB. NO fever/chills/sweats, URI sx, N/V/D reported. In ED, pt hypothermic to 95.5 and hypotensive to 90/60s. Labs notable for Hgb 6.3 (+london), dimer 3000, Na 129, BUN/SCr 70/2.91, BNP 7966, UA+. CT CAP demonstrates mild b/l hydronephrosis and large stool burden w/ perirectal stranding c/f stercoral colitis. New L pleural effusion found on CXR. Lung US without good window for thoracentesis. Hospital course with L hemithorax opacification likely from mucus plugging, hypoxic respiratory failure requiring intubation 12/16. bronchoscopy x2 w/ MRSA pneumonia, stenotrophomonas. Extubated on 12/24 but required re-intubation. Extubated again 12/27 but failed due to hypercapnia and hypoxia. now s/p Trach / PEG with surgery on 12/29. Hospital course with recurrent anemia Hb 6 range requiring pRBC transfusions. BUN/Cr now up trending. Course with fevers.     DX: acute hypoxic and hypercarbic respiratory failure requiring multiple intubations, pleural effusion, mucus plugging with atelectasis of L lung, MRSA and pseudomonas PNA, anemia, ROGER on CKD, stercoral colitis, CAUTI, s/p trach and PEG, dementia    - cont mechanical vent support  - daily weaning trials as tolerates  - cont tracheal suctioning and local trach care. no bleeding noted from trach   - s/p course of vanco for MRSA PNA and completed course of levaquin for stenotrophomonas  - s/p bronchoscopy x2 in ICU for airway clearance  - repeat CXR 1/4 with L effusion with associated L lung atelectasis with left lung haziness/opacity - worse compared to prior film 12/27 similar to 12/29 film  - transfused 2 units pRBC for anemia on 1/4 with improvement in Hb count. anemia from blood loss? vs renal insufficiency vs underlying infection  - had some minor oozing around PEG last week, none noted today or reported by nursing staff. cont to monitor. if rebleeding occurs or a drop in H/H reoccurs can consider imaging abdomen  - ROGER on CKD: renal US with mild R hydronephrosis, Cr rising, obrien with yellow urine. noted on blood work to have elevated eosinophils on CBC. nephrology following, treating possible AIN due to antibx levaquin. started on steroids tonight. Trend BUN/Cr. Monitor urine ouput  - fever work up with negative repeat blood cx and sputum cx. afebrile last 24 hours. Last fever noted 1/6. monitoring off antibx at present time  - repeat bedside lung US 1/5/24 with trace R effusion and a small L effusion not amendable for thoracentesis. L effusion small with mostly atelectatic lung pattern on L  - would placed on duonebs, chest PT, suctioning for airway clearance. hypertonic saline nebs. can consider repeat bronchoscopy however he has had 2 bronchoscopies already for recurrent mucus plugging. frequent repeat bronchoscopy is not realistic or an ideal long term solution for chronic mucus plugging  - effusion on L likely also loculated to a degree, will cont to monitor.

## 2024-01-08 NOTE — PROGRESS NOTE ADULT - ASSESSMENT
89M w/ dementia, CKD, chronic urinary retention w/ obrien. Admitted w/ L pleural effusion thought to be due to mucous plugging. Course complicated by worsening hypoxemia requiring intubation and bronchoscopy x2 w/ MRSA pneumonia, stenotrophomonas. Extubated on 12/24 but required re-intubation. Extubated again but failed due to hypercapnia and hypoxia. s/p Trach / PEG.    #acute hypoxic resp failure with trach. s/p peg and trach   #Febrile, resolved   - Completed course Levaquin for Steno in sputum. Remains vent dependent with full support  - Febrile to 101F yesterday. No leukocytosis - BCx negative    - hold on abx - trend fever curve   - pulm following      #ROGER on CKD   - creatinine 4.90 today. suspect AIN from levaquin     - start steroids  - Pending CTAP  - Renal u/s mild right hydro  - Checking urine studies   - C/w d5 half NS for hypernatremia   - Nephro following    #Acute Anemia  - No clear source of bleeding. Hgb 7.8 today. s/p 2U pRBCs.   - Check CTAP for bleeding source     Diet: TFs  DVT prophylaxis: SQH  Dispo: Eventual D/C to vent facility   Code Status:

## 2024-01-08 NOTE — PROGRESS NOTE ADULT - SUBJECTIVE AND OBJECTIVE BOX
CHIEF COMPLAINT:  ===============  NOT FEELING WELL    ANEMIA; SEPSIS; DYSPNEA        INTERVAL EVENTS:  ==================    - T(F): , Max: 98.8 (01-08-24 @ 05:03)  SpO2:  (97% - 100%)  -Mode: CPAP with PS, FiO2: 30    REVIEW OF SYSTEMS:  ==================  -NEGATIVE except for those listed above     HPI:  ======  HPI:  89M w/ hx of CKD, HTN, BPH w/ chronic obrien, dementia p/w SOB. Pt poor historian so history obtained from record. Pt was brought in my home aid who reports pt c/o SOB. Denies fever/chills/sweats, URI sx, N/V/D.    In ED, pt hypothermic to 95.5 and hypotensive to 90/60s. Labs notable for Hgb 6.3 (+london), dimer 3000, Na 129, BUN/SCr 70/2.91, BNP 7966, UA+. CT CAP demonstrates mild b/l hydronephrosis and large stool burden w/ perirectal stranding c/f stercoral colitis.    OBJECTIVE:  ===========  ICU Vital Signs Last 24 Hrs  T(C): 36.7 (08 Jan 2024 11:22), Max: 37.1 (08 Jan 2024 05:03)  T(F): 98 (08 Jan 2024 11:22), Max: 98.8 (08 Jan 2024 05:03)  HR: 86 (08 Jan 2024 11:22) (78 - 104)  BP: 138/69 (08 Jan 2024 11:22) (138/69 - 169/79)  RR: 17 (08 Jan 2024 11:22) (17 - 20)  SpO2: 97% (08 Jan 2024 11:22) (97% - 100%)      Mode: CPAP with PS, FiO2: 30, PEEP: 5, PS: 12, MAP: 7    01-07 @ 07:01  -  01-08 @ 07:00  --------------------------------------------------------  IN: 0 mL / OUT: 400 mL / NET: -400 mL      CAPILLARY BLOOD GLUCOSE      PHYSICAL EXAM:  ==============  GENERAL: NAD, well-groomed, well-developed  HEAD:  Atraumatic, Normocephalic  EYES: EOMI, PERRLA, conjunctiva and sclera clear  ENMT: No tonsillar erythema, exudates, or enlargement; Moist mucous membranes, Good dentition, No lesions  NECK: TRACH  NERVOUS SYSTEM:  Alert & Oriented X3, Good concentration; Motor Strength 5/5 B/L upper and lower extremities; DTRs 2+ intact and symmetric  CHEST/LUNG: Clear to percussion bilaterally; No rales, rhonchi, wheezing, or rubs  HEART: Regular rate and rhythm; No murmurs, rubs, or gallops  ABDOMEN: Soft, Nontender, Nondistended; Bowel sounds present + PEG   EXTREMITIES:  2+ Peripheral Pulses, No clubbing, cyanosis, or edema  LYMPH: No lymphadenopathy noted  SKIN: No rashes or lesions      HOSPITAL MEDICATIONS:  ======================  heparin   Injectable 5000 Unit(s) SubCutaneous every 12 hours  amLODIPine   Tablet 5 milliGRAM(s) Oral daily    predniSONE   Tablet 60 milliGRAM(s) Oral daily  acetaminophen     Tablet .. 650 milliGRAM(s) Oral every 6 hours PRN  mirtazapine 15 milliGRAM(s) Oral daily  morphine  - Injectable 2 milliGRAM(s) IV Push every 4 hours PRN  polyethylene glycol 3350 17 Gram(s) Oral two times a day  senna 2 Tablet(s) Oral at bedtime  chlorhexidine 0.12% Liquid 15 milliLiter(s) Oral Mucosa every 12 hours  chlorhexidine 2% Cloths 1 Application(s) Topical <User Schedule>  collagenase Ointment 1 Application(s) Topical daily        LABS:  =====                          8.0    6.58  )-----------( 181      ( 07 Jan 2024 06:25 )             25.0     Hgb Trend: 8.0<--, 7.8<--, 6.1<--, 6.7<--, 6.6<--  01-07    145  |  113<H>  |  122<H>  ----------------------------<  110<H>  4.3   |  23  |  4.90<H>    Ca    9.1      07 Jan 2024 06:25  Mg     2.8     01-07    Creatinine Trend: 4.90<--, 4.43<--, 4.38<--, 4.00<--, 3.83<--, 3.60<--      Urinalysis Basic - ( 07 Jan 2024 06:25 )    Color: x / Appearance: x / SG: x / pH: x  Gluc: 110 mg/dL / Ketone: x  / Bili: x / Urobili: x   Blood: x / Protein: x / Nitrite: x   Leuk Esterase: x / RBC: x / WBC x   Sq Epi: x / Non Sq Epi: x / Bacteria: x      MICROBIOLOGY:     Culture - Sputum  Source: .Sputum Sputum  Gram Stain (prelim) (01-06-24 @ 11:49):    Rare Squamous epithelial cells per low power field    No polymorphonuclear cells seen per low power field    Rare Yeast like cells per oil power field  Preliminary Report (01-06-24 @ 17:13):    Normal Respiratory Vanessa present    Culture - Sputum  Source: ET Tube ET Tube  Gram Stain (12-18-23 @ 19:19):    Moderate polymorphonuclear leukocytes per low power field    Few Squamous epithelial cells per low power field    Numerous Gram positive cocci in pairs per oil power field    Few Gram Positive Rods per oil power field  Final Report (12-18-23 @ 19:19):    Numerous Methicillin Resistant Staphylococcus aureus    Normal Respiratory Vanessa present  Organism: Methicillin resistant Staphylococcus aureus (12-18-23 @ 19:19)    Culture - Urine  Source: Catheterized Catheterized  Final Report (12-04-23 @ 12:54):    >100,000 CFU/ml Enterococcus faecalis    50,000 - 99,000 CFU/mL Pseudomonas aeruginosa  Organism: Enterococcus faecalis  Pseudomonas aeruginosa (12-04-23 @ 12:54)  Organism: Pseudomonas aeruginosa (12-04-23 @ 12:54)      MRSA PCR Result.: NotDetec (12-17-23 @ 12:00)    RADIOLOGY:  ==========  CT Chest No Cont:  (11-30-23 @ 20:37)  CT Abdomen and Pelvis No Cont:  (11-30-23 @ 20:37)    PULMONARY:  ============  Mode: CPAP with PS, FiO2: 30, PEEP: 5      CARDIAC:  ========  Summary   1. Normal global left ventricular systolic function.   2. Mild tricuspid regurgitation.   3. Moderate aortic regurgitation.   4. Mild thickening and calcification of the anterior and posterior   mitral valve leaflets.   5. Mild mitral annular calcification.   6. Mildly enlarged right atrium.   7. Estimated pulmonary artery systolic pressure is 37.3 mmHg assuming a   right atrial pressure of 3 mmHg, which is consistent with mild pulmonary   hypertension.   CHIEF COMPLAINT:  ===============  NOT FEELING WELL    ANEMIA; SEPSIS; DYSPNEA        INTERVAL EVENTS:  ==================    - T(F): , Max: 98.8 (01-08-24 @ 05:03)  - SpO2:  (97% - 100%)  - Mode: CPAP with PS, FiO2: 30    REVIEW OF SYSTEMS:  ==================  [x] unable to obtain due to trach to vent status    HPI:  ======  HPI:  89M w/ hx of CKD, HTN, BPH w/ chronic obrien, dementia p/w SOB. Pt poor historian so history obtained from record. Pt was brought in my home aid who reports pt c/o SOB. Denies fever/chills/sweats, URI sx, N/V/D.    In ED, pt hypothermic to 95.5 and hypotensive to 90/60s. Labs notable for Hgb 6.3 (+london), dimer 3000, Na 129, BUN/SCr 70/2.91, BNP 7966, UA+. CT CAP demonstrates mild b/l hydronephrosis and large stool burden w/ perirectal stranding c/f stercoral colitis.    OBJECTIVE:  ===========  Vital Signs Last 24 Hrs  T(C): 36.7 (08 Jan 2024 11:22), Max: 37.1 (08 Jan 2024 05:03)  T(F): 98 (08 Jan 2024 11:22), Max: 98.8 (08 Jan 2024 05:03)  HR: 86 (08 Jan 2024 11:22) (78 - 104)  BP: 138/69 (08 Jan 2024 11:22) (138/69 - 169/79)  RR: 17 (08 Jan 2024 11:22) (17 - 20)  SpO2: 97% (08 Jan 2024 11:22) (97% - 100%)      Mode: CPAP with PS, FiO2: 30, PEEP: 5, PS: 12, MAP: 7    01-07 @ 07:01  -  01-08 @ 07:00  --------------------------------------------------------  IN: 0 mL / OUT: 400 mL / NET: -400 mL      PHYSICAL EXAM:  ==============  GENERAL: elderly male, trached to vent, in no distress  HEAD:  Atraumatic, R temporal scalp lesion  EYES: EOMI, conjunctiva and sclera clear  ENMT: Moist mucous membranes  NECK: + TRACH  NERVOUS SYSTEM:  awake, alert, follows simple commands, waves hello  CHEST/LUNG: Clear to ausculatation bilaterally; mechanical breath sounds, No rales, rhonchi, wheezing  HEART: Regular rate and rhythm  ABDOMEN: Soft, Nontender, Nondistended; Bowel sounds present + PEG   EXTREMITIES:  2+ Peripheral Pulses, No clubbing, cyanosis, or edema  LYMPH: No lymphadenopathy noted  SKIN: No rashes or lesions      HOSPITAL MEDICATIONS:  ======================  heparin   Injectable 5000 Unit(s) SubCutaneous every 12 hours  amLODIPine   Tablet 5 milliGRAM(s) Oral daily    predniSONE   Tablet 60 milliGRAM(s) Oral daily  acetaminophen     Tablet .. 650 milliGRAM(s) Oral every 6 hours PRN  mirtazapine 15 milliGRAM(s) Oral daily  morphine  - Injectable 2 milliGRAM(s) IV Push every 4 hours PRN  polyethylene glycol 3350 17 Gram(s) Oral two times a day  senna 2 Tablet(s) Oral at bedtime  chlorhexidine 0.12% Liquid 15 milliLiter(s) Oral Mucosa every 12 hours  chlorhexidine 2% Cloths 1 Application(s) Topical <User Schedule>  collagenase Ointment 1 Application(s) Topical daily        LABS:  =====                          8.0    6.58  )-----------( 181      ( 07 Jan 2024 06:25 )             25.0     Hgb Trend: 8.0<--, 7.8<--, 6.1<--, 6.7<--, 6.6<--  01-07    145  |  113<H>  |  122<H>  ----------------------------<  110<H>  4.3   |  23  |  4.90<H>    Ca    9.1      07 Jan 2024 06:25  Mg     2.8     01-07    Creatinine Trend: 4.90<--, 4.43<--, 4.38<--, 4.00<--, 3.83<--, 3.60<--        MICROBIOLOGY:     Culture - Sputum  Source: .Sputum Sputum  Gram Stain (prelim) (01-06-24 @ 11:49):    Rare Squamous epithelial cells per low power field    No polymorphonuclear cells seen per low power field    Rare Yeast like cells per oil power field  Preliminary Report (01-06-24 @ 17:13):    Normal Respiratory Vanessa present    Culture - Sputum  Source: ET Tube ET Tube  Gram Stain (12-18-23 @ 19:19):    Moderate polymorphonuclear leukocytes per low power field    Few Squamous epithelial cells per low power field    Numerous Gram positive cocci in pairs per oil power field    Few Gram Positive Rods per oil power field  Final Report (12-18-23 @ 19:19):    Numerous Methicillin Resistant Staphylococcus aureus    Normal Respiratory Vanessa present  Organism: Methicillin resistant Staphylococcus aureus (12-18-23 @ 19:19)    Culture - Urine  Source: Catheterized Catheterized  Final Report (12-04-23 @ 12:54):    >100,000 CFU/ml Enterococcus faecalis    50,000 - 99,000 CFU/mL Pseudomonas aeruginosa  Organism: Enterococcus faecalis  Pseudomonas aeruginosa (12-04-23 @ 12:54)  Organism: Pseudomonas aeruginosa (12-04-23 @ 12:54)      MRSA PCR Result.: NotDetec (12-17-23 @ 12:00)    RADIOLOGY:  ==========  CT Chest No Cont:  (11-30-23 @ 20:37)  < from: CT Chest No Cont (11.30.23 @ 20:37) >  CT chest:  1.  Moderate to large left pleural effusion with complete compressive   atelectasis of the left lower lobe and partial compressive atelectasis of   the left upper lobe and lingula.  2.  Small right pleural effusion with mild dependent atelectasis the   right lower lobe.  3.  No gross CT evidence of pneumonia or pulmonary edema.  4.  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        CT Abdomen and Pelvis No Cont:  (11-30-23 @ 20:37)  < from: CT Abdomen and Pelvis No Cont (11.30.23 @ 20:37) >    CT abdomen/pelvis:  1.  Motion degraded.  2.  Fullness in the renal collecting systems versus mild hydronephrosis   and mild dilatation of the proximal ureters bilaterally, new from   09/28/2020.  No obstructing renal stones.  The bladder is collapsed   around a Obrien catheter.  Follow-up ultrasound may be obtained to   evaluate for resolution.  Underlying genitourinary infection and/or   pyelonephritis should be excluded based on clinical symptoms and   laboratory values.  3.  Enlarged prostate with volume of approximately 46 mL. Lower urinary   tract symptoms (LUTS) should be excluded clinically.  4.  There is a large amount of stool distending the rectum to   approximately 8.5 cm.  Mild perirectal fat stranding.  Fecal impaction   and stercoral colitis should be excluded clinically.  5.  Mild subcutaneous edema in the flanks and proximal thighs.          PULMONARY:  ============  Mode: CPAP with PS, FiO2: 30, PEEP: 5      CARDIAC:  ========  Summary   1. Normal global left ventricular systolic function.   2. Mild tricuspid regurgitation.   3. Moderate aortic regurgitation.   4. Mild thickening and calcification of the anterior and posterior   mitral valve leaflets.   5. Mild mitral annular calcification.   6. Mildly enlarged right atrium.   7. Estimated pulmonary artery systolic pressure is 37.3 mmHg assuming a   right atrial pressure of 3 mmHg, which is consistent with mild pulmonary   hypertension.

## 2024-01-08 NOTE — PROGRESS NOTE ADULT - SUBJECTIVE AND OBJECTIVE BOX
[facility] NEPHROLOGY SERVICES, [facility]  NEPHROLOGY AND HYPERTENSION  [address]  [address]  [place] [address]  [phone]    MD SOFY KEENAN MD YELENA ROSENBERG, MD BINNY KOSHY, MD CHRISTOPHER CAPUTO, MD EDWARD BOVER, MD          Patient events noted  No distress    MEDICATIONS  (STANDING):  amLODIPine   Tablet 5 milliGRAM(s) Oral daily  chlorhexidine 0.12% Liquid 15 milliLiter(s) Oral Mucosa every 12 hours  chlorhexidine 2% Cloths 1 Application(s) Topical <User Schedule>  collagenase Ointment 1 Application(s) Topical daily  heparin   Injectable 5000 Unit(s) SubCutaneous every 12 hours  mirtazapine 15 milliGRAM(s) Oral daily  polyethylene glycol 3350 17 Gram(s) Oral two times a day  predniSONE   Tablet 60 milliGRAM(s) Oral daily  senna 2 Tablet(s) Oral at bedtime    MEDICATIONS  (PRN):  acetaminophen     Tablet .. 650 milliGRAM(s) Oral every 6 hours PRN Temp greater or equal to 38C (100.4F), Mild Pain (1 - 3)  morphine  - Injectable 2 milliGRAM(s) IV Push every 4 hours PRN Mild Pain (1 - 3)      01-07-24 @ 07:01  -  01-08-24 @ 07:00  --------------------------------------------------------  IN: 0 mL / OUT: 400 mL / NET: -400 mL      PHYSICAL EXAM:      T(C): 36.7 (01-08-24 @ 17:02), Max: 37.1 (01-08-24 @ 05:03)  HR: 90 (01-08-24 @ 17:30) (84 - 94)  BP: 162/72 (01-08-24 @ 17:02) (138/69 - 162/72)  RR: 18 (01-08-24 @ 17:02) (17 - 20)  SpO2: 98% (01-08-24 @ 17:30) (97% - 99%)  Wt(kg): --  Lungs clear  Heart S1S2  Abd soft NT ND  Extremities:   tr edema                                    7.7    6.36  )-----------( 208      ( 08 Jan 2024 11:40 )             23.9     01-08    148<H>  |  116<H>  |  128<H>  ----------------------------<  122<H>  3.9   |  24  |  5.07<H>    Ca    8.9      08 Jan 2024 11:40  Mg     2.8     01-07          Creatinine Trend: 5.07<--, 4.90<--, 4.43<--, 4.38<--, 4.00<--, 3.83<--  Mode: CPAP with PS  FiO2: 30  PEEP: 5  PS: 12  MAP: 8      Assessment   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 prednisone 60 mg QD) if no strong medical & infectious disease contraindications  Levaquin was discontinued January 3  CT scan abdomen/pelvis to evaluate further the mild hydro seen on sonogram from 1/3          Samy Noriega MD [facility] NEPHROLOGY SERVICES, [facility]  NEPHROLOGY AND HYPERTENSION  [address]  [address]  [place] [address]  [phone]    MD SOFY KEENAN MD YELENA ROSENBERG, MD BINNY KOSHY, MD CHRISTOPHER CAPUTO, MD EDWARD BOVER, MD          Patient events noted  No distress    MEDICATIONS  (STANDING):  amLODIPine   Tablet 5 milliGRAM(s) Oral daily  chlorhexidine 0.12% Liquid 15 milliLiter(s) Oral Mucosa every 12 hours  chlorhexidine 2% Cloths 1 Application(s) Topical <User Schedule>  collagenase Ointment 1 Application(s) Topical daily  heparin   Injectable 5000 Unit(s) SubCutaneous every 12 hours  mirtazapine 15 milliGRAM(s) Oral daily  polyethylene glycol 3350 17 Gram(s) Oral two times a day  predniSONE   Tablet 60 milliGRAM(s) Oral daily  senna 2 Tablet(s) Oral at bedtime    MEDICATIONS  (PRN):  acetaminophen     Tablet .. 650 milliGRAM(s) Oral every 6 hours PRN Temp greater or equal to 38C (100.4F), Mild Pain (1 - 3)  morphine  - Injectable 2 milliGRAM(s) IV Push every 4 hours PRN Mild Pain (1 - 3)      01-07-24 @ 07:01  -  01-08-24 @ 07:00  --------------------------------------------------------  IN: 0 mL / OUT: 400 mL / NET: -400 mL      PHYSICAL EXAM:      T(C): 36.7 (01-08-24 @ 17:02), Max: 37.1 (01-08-24 @ 05:03)  HR: 90 (01-08-24 @ 17:30) (84 - 94)  BP: 162/72 (01-08-24 @ 17:02) (138/69 - 162/72)  RR: 18 (01-08-24 @ 17:02) (17 - 20)  SpO2: 98% (01-08-24 @ 17:30) (97% - 99%)  Wt(kg): --  Lungs clear  Heart S1S2  Abd soft NT ND  Extremities:   tr edema                                    7.7    6.36  )-----------( 208      ( 08 Jan 2024 11:40 )             23.9     01-08    148<H>  |  116<H>  |  128<H>  ----------------------------<  122<H>  3.9   |  24  |  5.07<H>    Ca    8.9      08 Jan 2024 11:40  Mg     2.8     01-07          Creatinine Trend: 5.07<--, 4.90<--, 4.43<--, 4.38<--, 4.00<--, 3.83<--  Mode: CPAP with PS  FiO2: 30  PEEP: 5  PS: 12  MAP: 8      Assessment   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 prednisone 60 mg QD) if no strong medical & infectious disease contraindications  Levaquin was discontinued January 3  CT scan abdomen/pelvis to evaluate further the mild hydro seen on sonogram from 1/3          Samy Noriega MD

## 2024-01-09 NOTE — PROGRESS NOTE ADULT - SUBJECTIVE AND OBJECTIVE BOX
[facility] NEPHROLOGY SERVICES, [facility]  NEPHROLOGY AND HYPERTENSION  [address]  [address]  [place] [address]  [phone]    MD SOFY KEENAN MD YELENA ROSENBERG, MD BINNY KOSHY, MD CHRISTOPHER CAPUTO, MD EDWARD BOVER, MD          Patient events noted    MEDICATIONS  (STANDING):  albuterol/ipratropium for Nebulization 3 milliLiter(s) Nebulizer every 6 hours  amLODIPine   Tablet 5 milliGRAM(s) Oral daily  chlorhexidine 0.12% Liquid 15 milliLiter(s) Oral Mucosa every 12 hours  chlorhexidine 2% Cloths 1 Application(s) Topical <User Schedule>  collagenase Ointment 1 Application(s) Topical daily  heparin   Injectable 5000 Unit(s) SubCutaneous every 12 hours  mirtazapine 15 milliGRAM(s) Oral daily  polyethylene glycol 3350 17 Gram(s) Oral two times a day  predniSONE   Tablet 60 milliGRAM(s) Oral daily  senna 2 Tablet(s) Oral at bedtime  sodium chloride 3%  Inhalation 4 milliLiter(s) Inhalation every 6 hours    MEDICATIONS  (PRN):  acetaminophen     Tablet .. 650 milliGRAM(s) Oral every 6 hours PRN Temp greater or equal to 38C (100.4F), Mild Pain (1 - 3)  morphine  - Injectable 2 milliGRAM(s) IV Push every 4 hours PRN Mild Pain (1 - 3)      01-08-24 @ 07:01  -  01-09-24 @ 07:00  --------------------------------------------------------  IN: 438 mL / OUT: 840 mL / NET: -402 mL      PHYSICAL EXAM:      T(C): 36.7 (01-09-24 @ 10:38), Max: 37.4 (01-09-24 @ 05:03)  HR: 86 (01-09-24 @ 12:22) (86 - 94)  BP: 154/83 (01-09-24 @ 10:38) (154/83 - 175/86)  RR: 20 (01-09-24 @ 10:38) (18 - 20)  SpO2: 98% (01-09-24 @ 12:22) (91% - 99%)  Wt(kg): --  Lungs clear  Heart S1S2  Abd soft NT ND  Extremities:   tr edema                                    7.7    6.31  )-----------( 212      ( 09 Jan 2024 05:37 )             24.1     01-09    146<H>  |  116<H>  |  124<H>  ----------------------------<  106<H>  3.8   |  24  |  5.11<H>    Ca    9.2      09 Jan 2024 05:37          Creatinine Trend: 5.11<--, 5.07<--, 4.90<--, 4.43<--, 4.38<--, 4.00<--  Mode: AC/ CMV (Assist Control/ Continuous Mandatory Ventilation)  RR (machine): 26  TV (machine): 400  FiO2: 30  PEEP: 5  ITime: 0.7  MAP: 13  PIP: 31    < from: CT Abdomen and Pelvis No Cont (01.09.24 @ 11:49) >    ACC: [id number] EXAM:  CT ABDOMEN AND PELVIS   ORDERED BY: CHAPINCITO DEYSI     ACC: [id number] EXAM:  CT CHEST   ORDERED BY: TOLU CHAMBERS     PROCEDURE DATE:  01/09/2024          INTERPRETATION:  CLINICAL INFORMATION: 89 years  Male with respiratory  failure.    COMPARISON: CT chest abdomen and pelvis 11/30/2023    CONTRAST/COMPLICATIONS:  IV Contrast: NONE  Oral Contrast: NONE  Complications: None reported at time of study completion    PROCEDURE:  CT of the Chest, Abdomen and Pelvis was performed.  Sagittal and coronal reformats were performed.    LIMITATIONS: Evaluation of the solid organs, vascular structures and GI   tract is limited without oral and IV contrast. Motion artifact. Streak   artifact from patient's arms.    FINDINGS:  CHEST:  LUNGS AND LARGE AIRWAYS: Patent central airways. Tracheostomy in situ. No   pulmonary nodules. Bilateral lower lobe passive atelectasis.  PLEURA: Large left pleural effusion and small right pleural effusion. No   change.  VESSELS: Atheroscleroticaorta.  HEART: Mild cardiomegaly. Trace pericardial effusion.  MEDIASTINUM AND BREANNA: No lymphadenopathy.  CHEST WALL AND LOWER NECK: Within normal limits.    ABDOMEN AND PELVIS:  LIVER: Within normal limits.  BILE DUCTS: Normal caliber.  GALLBLADDER:Within normal limits.  SPLEEN: Within normal limits.  PANCREAS: Within normal limits.  ADRENALS: Within normal limits.  KIDNEYS/URETERS: Atrophic kidneys. Bilateral cysts. Mild bilateral   hydronephrosis.    BLADDER: Collapsed around Ma catheter.  REPRODUCTIVE ORGANS: Mildly enlarged prostate.    BOWEL: No bowel obstruction. Appendix is not visualized. No evidence of   inflammation in the pericecal region.. Gastrostomy tube in situ. Rectal   fecal impaction with distention to 7.0 cm, previously 8.5 cm.. Descending   and sigmoid colonic diverticulosis without diverticulitis.  PERITONEUM: No ascites.  VESSELS: Atherosclerotic changes.  RETROPERITONEUM/LYMPH NODES: No lymphadenopathy.  ABDOMINAL WALL: Subcutaneous nodules in the anterior abdominal wall   likely injection sites. Mild anasarca.  BONES: Degenerative changes. Redemonstrated cervical spinal stenosis   secondary to ossification of the posterior longitudinal ligament.    IMPRESSION:  Trace pericardial effusion.    Stable large left pleural effusion and small right pleural effusion with   underlying passive atelectasis.    Colonic diverticulosis without diverticulitis. Rectal fecal impaction.    Mild bilateral hydronephrosis unchanged.        --- End of Report ---            INEZ INTERIANO MD; Attending Radiologist  This document has been electronically signed. 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, strongly suggesting tubulointerstitial nephritis, slowly stabilizing   Mild bilateral hydronephrosis unclear significance, Cr 11/30 scan 2.8, similar CT findings  Persistent anemia, normocytic    Plan  Empiric steroids, suspected increasing BUN related   Supportive care        Samy Noriega MD [facility] NEPHROLOGY SERVICES, [facility]  NEPHROLOGY AND HYPERTENSION  [address]  [address]  [place] [address]  [phone]    MD SOFY KEENAN MD YELENA ROSENBERG, MD BINNY KOSHY, MD CHRISTOPHER CAPUTO, MD EDWARD BOVER, MD          Patient events noted    MEDICATIONS  (STANDING):  albuterol/ipratropium for Nebulization 3 milliLiter(s) Nebulizer every 6 hours  amLODIPine   Tablet 5 milliGRAM(s) Oral daily  chlorhexidine 0.12% Liquid 15 milliLiter(s) Oral Mucosa every 12 hours  chlorhexidine 2% Cloths 1 Application(s) Topical <User Schedule>  collagenase Ointment 1 Application(s) Topical daily  heparin   Injectable 5000 Unit(s) SubCutaneous every 12 hours  mirtazapine 15 milliGRAM(s) Oral daily  polyethylene glycol 3350 17 Gram(s) Oral two times a day  predniSONE   Tablet 60 milliGRAM(s) Oral daily  senna 2 Tablet(s) Oral at bedtime  sodium chloride 3%  Inhalation 4 milliLiter(s) Inhalation every 6 hours    MEDICATIONS  (PRN):  acetaminophen     Tablet .. 650 milliGRAM(s) Oral every 6 hours PRN Temp greater or equal to 38C (100.4F), Mild Pain (1 - 3)  morphine  - Injectable 2 milliGRAM(s) IV Push every 4 hours PRN Mild Pain (1 - 3)      01-08-24 @ 07:01  -  01-09-24 @ 07:00  --------------------------------------------------------  IN: 438 mL / OUT: 840 mL / NET: -402 mL      PHYSICAL EXAM:      T(C): 36.7 (01-09-24 @ 10:38), Max: 37.4 (01-09-24 @ 05:03)  HR: 86 (01-09-24 @ 12:22) (86 - 94)  BP: 154/83 (01-09-24 @ 10:38) (154/83 - 175/86)  RR: 20 (01-09-24 @ 10:38) (18 - 20)  SpO2: 98% (01-09-24 @ 12:22) (91% - 99%)  Wt(kg): --  Lungs clear  Heart S1S2  Abd soft NT ND  Extremities:   tr edema                                    7.7    6.31  )-----------( 212      ( 09 Jan 2024 05:37 )             24.1     01-09    146<H>  |  116<H>  |  124<H>  ----------------------------<  106<H>  3.8   |  24  |  5.11<H>    Ca    9.2      09 Jan 2024 05:37          Creatinine Trend: 5.11<--, 5.07<--, 4.90<--, 4.43<--, 4.38<--, 4.00<--  Mode: AC/ CMV (Assist Control/ Continuous Mandatory Ventilation)  RR (machine): 26  TV (machine): 400  FiO2: 30  PEEP: 5  ITime: 0.7  MAP: 13  PIP: 31    < from: CT Abdomen and Pelvis No Cont (01.09.24 @ 11:49) >    ACC: [id number] EXAM:  CT ABDOMEN AND PELVIS   ORDERED BY: CHAPINCITO DEYSI     ACC: [id number] EXAM:  CT CHEST   ORDERED BY: TOLU CHAMBERS     PROCEDURE DATE:  01/09/2024          INTERPRETATION:  CLINICAL INFORMATION: 89 years  Male with respiratory  failure.    COMPARISON: CT chest abdomen and pelvis 11/30/2023    CONTRAST/COMPLICATIONS:  IV Contrast: NONE  Oral Contrast: NONE  Complications: None reported at time of study completion    PROCEDURE:  CT of the Chest, Abdomen and Pelvis was performed.  Sagittal and coronal reformats were performed.    LIMITATIONS: Evaluation of the solid organs, vascular structures and GI   tract is limited without oral and IV contrast. Motion artifact. Streak   artifact from patient's arms.    FINDINGS:  CHEST:  LUNGS AND LARGE AIRWAYS: Patent central airways. Tracheostomy in situ. No   pulmonary nodules. Bilateral lower lobe passive atelectasis.  PLEURA: Large left pleural effusion and small right pleural effusion. No   change.  VESSELS: Atheroscleroticaorta.  HEART: Mild cardiomegaly. Trace pericardial effusion.  MEDIASTINUM AND BREANNA: No lymphadenopathy.  CHEST WALL AND LOWER NECK: Within normal limits.    ABDOMEN AND PELVIS:  LIVER: Within normal limits.  BILE DUCTS: Normal caliber.  GALLBLADDER:Within normal limits.  SPLEEN: Within normal limits.  PANCREAS: Within normal limits.  ADRENALS: Within normal limits.  KIDNEYS/URETERS: Atrophic kidneys. Bilateral cysts. Mild bilateral   hydronephrosis.    BLADDER: Collapsed around Ma catheter.  REPRODUCTIVE ORGANS: Mildly enlarged prostate.    BOWEL: No bowel obstruction. Appendix is not visualized. No evidence of   inflammation in the pericecal region.. Gastrostomy tube in situ. Rectal   fecal impaction with distention to 7.0 cm, previously 8.5 cm.. Descending   and sigmoid colonic diverticulosis without diverticulitis.  PERITONEUM: No ascites.  VESSELS: Atherosclerotic changes.  RETROPERITONEUM/LYMPH NODES: No lymphadenopathy.  ABDOMINAL WALL: Subcutaneous nodules in the anterior abdominal wall   likely injection sites. Mild anasarca.  BONES: Degenerative changes. Redemonstrated cervical spinal stenosis   secondary to ossification of the posterior longitudinal ligament.    IMPRESSION:  Trace pericardial effusion.    Stable large left pleural effusion and small right pleural effusion with   underlying passive atelectasis.    Colonic diverticulosis without diverticulitis. Rectal fecal impaction.    Mild bilateral hydronephrosis unchanged.        --- End of Report ---            INEZ INTERIANO MD; Attending Radiologist  This document has been electronically signed. 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

## 2024-01-09 NOTE — PROGRESS NOTE ADULT - SUBJECTIVE AND OBJECTIVE BOX
Patient is a 89y old  Male who presents with a chief complaint of respiratory failure (08 Jan 2024 11:57)    INTERVAL HPI/OVERNIGHT EVENTS: NAEON    MEDICATIONS  (STANDING):  albuterol/ipratropium for Nebulization 3 milliLiter(s) Nebulizer every 6 hours  amLODIPine   Tablet 5 milliGRAM(s) Oral daily  chlorhexidine 0.12% Liquid 15 milliLiter(s) Oral Mucosa every 12 hours  chlorhexidine 2% Cloths 1 Application(s) Topical <User Schedule>  collagenase Ointment 1 Application(s) Topical daily  heparin   Injectable 5000 Unit(s) SubCutaneous every 12 hours  mirtazapine 15 milliGRAM(s) Oral daily  polyethylene glycol 3350 17 Gram(s) Oral two times a day  predniSONE   Tablet 60 milliGRAM(s) Oral daily  senna 2 Tablet(s) Oral at bedtime  sodium chloride 3%  Inhalation 4 milliLiter(s) Inhalation every 6 hours    MEDICATIONS  (PRN):  acetaminophen     Tablet .. 650 milliGRAM(s) Oral every 6 hours PRN Temp greater or equal to 38C (100.4F), Mild Pain (1 - 3)  morphine  - Injectable 2 milliGRAM(s) IV Push every 4 hours PRN Mild Pain (1 - 3)    Allergies    No Known Allergies    Intolerances      REVIEW OF SYSTEMS:  All other systems reviewed and are negative    Vital Signs Last 24 Hrs  T(C): 36.7 (09 Jan 2024 10:38), Max: 37.4 (09 Jan 2024 05:03)  T(F): 98 (09 Jan 2024 10:38), Max: 99.3 (09 Jan 2024 05:03)  HR: 86 (09 Jan 2024 12:22) (86 - 94)  BP: 154/83 (09 Jan 2024 10:38) (154/83 - 175/86)  BP(mean): --  RR: 20 (09 Jan 2024 10:38) (18 - 20)  SpO2: 98% (09 Jan 2024 12:22) (91% - 99%)    Parameters below as of 09 Jan 2024 12:15  Patient On (Oxygen Delivery Method): ventilator      Daily     Daily   I&O's Summary    08 Jan 2024 07:01  -  09 Jan 2024 07:00  --------------------------------------------------------  IN: 438 mL / OUT: 840 mL / NET: -402 mL      CAPILLARY BLOOD GLUCOSE        PHYSICAL EXAM:  GENERAL: stable, on vent/trach and PEG, obrien in place.   NECK: supple, No JVD  CHEST/LUNG: clear to auscultation bilaterally; no rales, rhonchi, or wheezing b/l  HEART: normal S1, S2  ABDOMEN: BS+, soft, ND, NT   EXTREMITIES:  pulses palpable; no clubbing, cyanosis, or edema b/l LEs    Labs                          7.7    6.31  )-----------( 212      ( 09 Jan 2024 05:37 )             24.1     01-09    146<H>  |  116<H>  |  124<H>  ----------------------------<  106<H>  3.8   |  24  |  5.11<H>    Ca    9.2      09 Jan 2024 05:37            Urinalysis Basic - ( 09 Jan 2024 05:37 )    Color: x / Appearance: x / SG: x / pH: x  Gluc: 106 mg/dL / Ketone: x  / Bili: x / Urobili: x   Blood: x / Protein: x / Nitrite: x   Leuk Esterase: x / RBC: x / WBC x   Sq Epi: x / Non Sq Epi: x / Bacteria: x                  DVT prophylaxis: > Lovenox 40mg SQ daily  > Heparin   > SCD's

## 2024-01-09 NOTE — PROGRESS NOTE ADULT - SUBJECTIVE AND OBJECTIVE BOX
CHIEF COMPLAINT:  ===============  NOT FEELING WELL  ANEMIA; SEPSIS; DYSPNEA      INTERVAL EVENTS:  ==================    -tolerated PS X 12 hours   - T(F): , Max: 99.3 (01-09-24 @ 05:03)  - SpO2:  (97% - 99%)  -Mode: AC/ CMV (Assist Control/ Continuous Mandatory Ventilation), RR (machine): 26, FiO2: 30, PIP: 41    REVIEW OF SYSTEMS:  ==================  -NEGATIVE except for those listed above     HPI and hospitalization   ======  89M w/ hx of CKD, HTN, BPH w/ chronic obrien, dementia p/w SOB. Pt poor historian so history obtained from record. Pt was brought in my home aid who reports pt c/o SOB. Denies fever/chills/sweats, URI sx, N/V/D.    In ED, pt hypothermic to 95.5 and hypotensive to 90/60s. Labs notable for Hgb 6.3 (+london), dimer 3000, Na 129, BUN/SCr 70/2.91, BNP 7966, UA+. CT CAP demonstrates mild b/l hydronephrosis and large stool burden w/ perirectal stranding c/f stercoral colitis.  12/2 Intubated  12/24 extubated  12/26 reintubated - 12/27 extubated  12/27 reintubated   Patient s/p Trache / PEG  12/29  PRBC x2 1/4    OBJECTIVE:  ===========  ICU Vital Signs Last 24 Hrs  T(C): 37.4 (09 Jan 2024 05:03), Max: 37.4 (09 Jan 2024 05:03)  T(F): 99.3 (09 Jan 2024 05:03), Max: 99.3 (09 Jan 2024 05:03)  HR: 86 (09 Jan 2024 05:48) (86 - 94)  BP: 173/89 (09 Jan 2024 05:03) (138/69 - 175/86)  RR: 20 (09 Jan 2024 05:03) (17 - 20)  SpO2: 98% (09 Jan 2024 05:48) (97% - 99%)    Mode: AC/ CMV (Assist Control/ Continuous Mandatory Ventilation), RR (machine): 26, TV (machine): 400, FiO2: 30, PEEP: 5, ITime: 1, MAP: 15, PIP: 41    01-08 @ 07:01  -  01-09 @ 07:00  --------------------------------------------------------  IN: 438 mL / OUT: 840 mL / NET: -402 mL      CAPILLARY BLOOD GLUCOSE      PHYSICAL EXAM:  ==============  GENERAL: NAD, well-groomed, well-developed  HEAD:  Atraumatic, Normocephalic  EYES: EOMI, PERRLA, conjunctiva and sclera clear  ENMT: No tonsillar erythema, exudates, or enlargement; Moist mucous membranes, Good dentition, No lesions  NECK: Trache # 6 XLT  NERVOUS SYSTEM:  Alert , Motor Strength 5/5 B/L upper and lower extremities; DTRs 2+ intact and symmetric  CHEST/LUNG: C  HEART: Regular rate and rhythm; No murmurs, rubs, or gallops  ABDOMEN: Soft, Nontender, Nondistended; Bowel sounds present, +PEG tube in place   EXTREMITIES:  2+ Peripheral Pulses, No clubbing, cyanosis, or edema  LYMPH: No lymphadenopathy noted  SKIN: No rashes or lesions      HOSPITAL MEDICATIONS:  ======================  heparin   Injectable 5000 Unit(s) SubCutaneous every 12 hours  amLODIPine   Tablet 5 milliGRAM(s) Oral daily  predniSONE   Tablet 60 milliGRAM(s) Oral daily  albuterol/ipratropium for Nebulization 3 milliLiter(s) Nebulizer every 6 hours  sodium chloride 3%  Inhalation 4 milliLiter(s) Inhalation every 6 hours  acetaminophen     Tablet .. 650 milliGRAM(s) Oral every 6 hours PRN  mirtazapine 15 milliGRAM(s) Oral daily  morphine  - Injectable 2 milliGRAM(s) IV Push every 4 hours PRN  polyethylene glycol 3350 17 Gram(s) Oral two times a day  senna 2 Tablet(s) Oral at bedtime  chlorhexidine 0.12% Liquid 15 milliLiter(s) Oral Mucosa every 12 hours  chlorhexidine 2% Cloths 1 Application(s) Topical <User Schedule>  collagenase Ointment 1 Application(s) Topical daily        LABS:  =====                          7.7    6.31  )-----------( 212      ( 09 Jan 2024 05:37 )             24.1     Hgb Trend: 7.7<--, 7.7<--, 8.0<--, 7.8<--, 6.1<--  01-09    146<H>  |  116<H>  |  124<H>  ----------------------------<  106<H>  3.8   |  24  |  5.11<H>    Ca    9.2      09 Jan 2024 05:37      Creatinine Trend: 5.11<--, 5.07<--, 4.90<--, 4.43<--, 4.38<--, 4.00<--      Urinalysis Basic - ( 09 Jan 2024 05:37 )    Color: x / Appearance: x / SG: x / pH: x  Gluc: 106 mg/dL / Ketone: x  / Bili: x / Urobili: x   Blood: x / Protein: x / Nitrite: x   Leuk Esterase: x / RBC: x / WBC x   Sq Epi: x / Non Sq Epi: x / Bacteria: x        MICROBIOLOGY:     Culture - Sputum  Source: .Sputum Sputum  Gram Stain (prelim) (01-06-24 @ 11:49):    Rare Squamous epithelial cells per low power field    No polymorphonuclear cells seen per low power field    Rare Yeast like cells per oil power field  Preliminary Report (01-06-24 @ 17:13):    Normal Respiratory Vanessa present    Culture - Blood  Source: .Blood Blood-Peripheral  Preliminary Report (01-08-24 @ 19:01):    No growth at 4 days    Culture - Blood  Source: .Blood Blood-Peripheral  Preliminary Report (01-08-24 @ 19:01):    No growth at 4 days    Culture - Sputum  Source: ET Tube ET Tube  Gram Stain (12-18-23 @ 19:19):    Moderate polymorphonuclear leukocytes per low power field    Few Squamous epithelial cells per low power field    Numerous Gram positive cocci in pairs per oil power field    Few Gram Positive Rods per oil power field  Final Report (12-18-23 @ 19:19):    Numerous Methicillin Resistant Staphylococcus aureus    Normal Respiratory Vanessa present  Organism: Methicillin resistant Staphylococcus aureus (12-18-23 @ 19:19)  Organism: Methicillin resistant Staphylococcus aureus (12-18-23 @ 19:19)    Sensitivities:      Method Type: JASWINDER      -  Ampicillin/Sulbactam: R >16/8      -  Cefazolin: R >16      -  Clindamycin: S 0.5      -  Erythromycin: R >4      -  Gentamicin: S <=1 Should not be used as monotherapy      -  Linezolid: S 2      -  Oxacillin: R >2      -  Penicillin: R >8      -  Rifampin: S <=1 Should not be used as monotherapy      -  Tetracycline: S <=1      -  Trimethoprim/Sulfamethoxazole: S <=0.5/9.5      -  Vancomycin: S 2    Culture - Urine  Source: Catheterized Catheterized  Final Report (12-04-23 @ 12:54):    >100,000 CFU/ml Enterococcus faecalis    50,000 - 99,000 CFU/mL Pseudomonas aeruginosa  Organism: Enterococcus faecalis  Pseudomonas aeruginosa (12-04-23 @ 12:54)  Organism: Pseudomonas aeruginosa (12-04-23 @ 12:54)    Sensitivities:      Method Type: JASWINDER      -  Amikacin: I 32      -  Aztreonam: S 8      -  Cefepime: S 8      -  Ceftazidime: S 4      -  Ciprofloxacin: R >2      -  Imipenem: S <=1      -  Levofloxacin: R >4      -  Meropenem: S <=1      -  Piperacillin/Tazobactam: S <=8  Organism: Enterococcus faecalis (12-04-23 @ 12:54)    Sensitivities:      Method Type: JASWINDER      -  Ampicillin: S <=2 Predicts results to ampicillin/sulbactam, amoxacillin-clavulanate and  piperacillin-tazobactam.      -  Ciprofloxacin: R >2      -  Levofloxacin: R >4      -  Nitrofurantoin: S <=32 Should not be used to treat pyelonephritis.      -  Tetracycline: I 8      -  Vancomycin: S 2    Culture - Blood  Source: .Blood Blood-Venous  Final Report (12-06-23 @ 11:01):    No growth at 5 days    Culture - Blood  Source: .Blood Blood-Peripheral  Final Report (12-06-23 @ 02:00):    No growth at 5 days    MRSA PCR Result.: NotDetec (12-17-23 @ 12:00)    RADIOLOGY:  ==========  IMPRESSION: Tracheostomy cannula, loop recorder reidentified unchanged in   position. No change in large left pleural effusion. No pneumothorax or   other interval change    US Renal:  Limited evaluation left kidney.  Mild right hydronephrosis.  correlate with CT abdomen and pelvis      PULMONARY:  ============  Mode: AC/ CMV (Assist Control/ Continuous Mandatory Ventilation), RR (machine): 26, TV (machine): 400, FiO2: 30, PEEP: 5, PIP: 41  albuterol/ipratropium for Nebulization 3 Nebulizer every 6 hours  sodium chloride 3%  Inhalation 4 Inhalation every 6 hours      CARDIAC:  ========  Summary   1. Normal global left ventricular systolic function.   2. Mild tricuspid regurgitation.   3. Moderate aortic regurgitation.   4. Mild thickening and calcification of the anterior and posterior   mitral valve leaflets.   5. Mild mitral annular calcification.   6. Mildly enlarged right atrium.   7. Estimated pulmonary artery systolic pressure is 37.3 mmHg assuming a   right atrial pressure of 3 mmHg, which is consistent with mild pulmonary   hypertension.

## 2024-01-09 NOTE — PROGRESS NOTE ADULT - ASSESSMENT
89M w/ dementia, CKD, chronic urinary retention w/ obrien. Admitted w/ L pleural effusion thought to be due to mucous plugging. Course complicated by worsening hypoxemia requiring intubation and bronchoscopy x2 w/ MRSA pneumonia, stenotrophomonas. Extubated on 12/24 but required re-intubation. Extubated again but failed due to hypercapnia and hypoxia.   12/29 s/p Trach / PEG.    DX: Left pleural effusion, mucous plugging, Hypoxemic respiratory failure, MRSA & Stenotrophomonas PNA.    Recs:    - Acute hypoxic resp failure likely 2/2 mucous plugging with PNA  - 12/29  S/P trach/ PEG # 6 XLT   - continue with daily PSV trials and full vent support at night,   - will likely be slow wean given chronic weakness and recurrent mucous plugging of left lung   - Frequent suctioning, Pulmonary toileting  - will re-add duoneb/ hypersal and chest PT   - repeat CXR 1/9  - POCUS 1/5 Trace Right  with small left effusion with a lot of atelectatic compressive areas.   - s/p 2 PRBC,1/4/2024 PEG with oozing around insertion site continue to monitor   - h/h holding at this time   - Completed course Vanc & Levaquin for MRSA & Steno in sputum  - High risk for VAP, close monitor on fever curve & WBC   - worsening renal function- CT abdomen/pelvis to assess hydronephrosis on US    89M w/ dementia, CKD, chronic urinary retention w/ obrien. Admitted w/ L pleural effusion thought to be due to mucous plugging. Course complicated by worsening hypoxemia requiring intubation and bronchoscopy x2 w/ MRSA pneumonia, stenotrophomonas. Extubated on 12/24 but required re-intubation. Extubated again but failed due to hypercapnia and hypoxia.   12/29 s/p Trach / PEG.    DX: Left pleural effusion, mucous plugging, Hypoxemic respiratory failure, MRSA & Stenotrophomonas PNA.    Recs:    - Acute hypoxic resp failure likely 2/2 mucous plugging with PNA  - 12/29  S/P trach/ PEG # 6 XLT   - repeat CT chest this AM - redemonstrates large left pleural effusion   - continue with daily PSV trials and full vent support at night,   - will likely be slow wean given chronic weakness and recurrent mucous plugging of left lung   - Frequent suctioning, Pulmonary toileting  - will re-add duoneb/ hypersal and chest PT   - POCUS 1/5 Trace Right  with small left effusion with a lot of atelectatic compressive areas.   - s/p 2 PRBC,1/4/2024 PEG with oozing around insertion site continue to monitor   - h/h holding at this time   - Completed course Vanc & Levaquin for MRSA & Steno in sputum  - High risk for VAP, close monitor on fever curve & WBC   - worsening renal function- CT abdomen/pelvis to assess hydronephrosis on US

## 2024-01-09 NOTE — PROGRESS NOTE ADULT - TIME BILLING
I have spent a total of 45 minutes to prepare to see the patient, obtaining and reviewing history, physical examination, explaining the diagnosis, prognosis and treatment plan with the patient/family/caregiver. I also have spent the time ordering studies and testing, interpreting results, medicine reconciliation, subspecialty consultation and documentation as above.
I have spent a total of 42 minutes to prepare to see the patient, obtaining and reviewing history, physical examination, explaining the diagnosis, prognosis and treatment plan with the patient/family/caregiver. I also have spent the time ordering studies and testing, interpreting results, medicine reconciliation, subspecialty consultation and documentation as above.
I have spent a total of 43 minutes to prepare to see the patient, obtaining and reviewing history, physical examination, explaining the diagnosis, prognosis and treatment plan with the patient/family/caregiver. I also have spent the time ordering studies and testing, interpreting results, medicine reconciliation, subspecialty consultation and documentation as above.
I have spent a total of 45 minutes to prepare to see the patient, obtaining and reviewing history, physical examination, explaining the diagnosis, prognosis and treatment plan with the patient/family/caregiver. I also have spent the time ordering studies and testing, interpreting results, medicine reconciliation, subspecialty consultation and documentation as above.
I have spent a total of 35 minutes to prepare to see the patient, obtaining and reviewing history, physical examination, explaining the diagnosis, prognosis and treatment plan with the patient/family/caregiver. I also have spent the time ordering studies and testing, interpreting results, medicine reconciliation, subspecialty consultation and documentation as above.
s/p Trach. no longer orally intubated. off vasopressor.
Reviewed images and labs. Clinical decision making, changes in medication regimen. Bedside ultrasound evaluation.
I have spent a total of 52 minutes to prepare to see the patient, obtaining and reviewing history, physical examination, explaining the diagnosis, prognosis and treatment plan with the patient/family/caregiver. I also have spent the time ordering studies and testing, interpreting results, medicine reconciliation, subspecialty consultation and documentation as above.
Reviewed images and labs. Clinical decision making, changes in medication regimen. Bedside ultrasound

## 2024-01-09 NOTE — PROGRESS NOTE ADULT - ASSESSMENT
89M w/ dementia, CKD, chronic urinary retention w/ obrien. Admitted w/ L pleural effusion thought to be due to mucous plugging. Course complicated by worsening hypoxemia requiring intubation and bronchoscopy x2 w/ MRSA pneumonia, stenotrophomonas. Extubated on 12/24 but required re-intubation. Extubated again but failed due to hypercapnia and hypoxia. s/p Trach / PEG.    #acute hypoxic resp failure with trach. s/p peg and trach   #Febrile, resolved   - Completed course Levaquin for Steno in sputum. Remains vent dependent with full support  - Febrile to 101F yesterday. No leukocytosis - BCx negative    - hold on abx - trend fever curve   - pulm following      #ROGER on CKD   - creatinine 5.11 today. suspect AIN from levaquin     - c/w PO steroids  - Pending CTAP  - Renal u/s mild right hydro  - Checking urine studies   - C/w d5 half NS for hypernatremia   - Nephro following    #Acute Anemia  - No clear source of bleeding. Hgb 7.8 today. s/p 2U pRBCs.   - Check CTAP for bleeding source     Diet: TFs  DVT prophylaxis: SQH  Dispo: Eventual D/C to vent facility   Code Status:

## 2024-01-10 NOTE — CHART NOTE - NSCHARTNOTEFT_GEN_A_CORE
Pt with PMH CKD, HTN, dementia presents with SOB, found with CAUTI, ROGER on CKD, hydronephrosis, stercoral colitis. Admitted to ICU and intubated 12/15- failed extubation. Pt remains with trach to vent, and with PEG.   Discharge planning in progress    Factors impacting intake: [ ] none [ ] nausea  [ ] vomiting [ ] diarrhea [ ] constipation  [ ]chewing problems [ ] swallowing issues  [ ] other:     Diet Prescription: Diet, NPO with Tube Feed:   Tube Feeding Modality: Gastrostomy  Nepro with Carb Steady  Total Volume for 24 Hours (mL): 840  Continuous  Until Goal Tube Feed Rate (mL per Hour): 35  Tube Feed Duration (in Hours): 24  Tube Feed Start Time: 12:00 (01-03-24 @ 11:48)    Intake: Tube feed infusing @ at time of visit. Tube feed as ordered Nepro @ 35 ml/hr x 24 hours provides 840 ml total volume, 1512 calories, 68 grams protein, 610 ml free water     Current Weight: (01/02) 70.1kg (12/01) 75.3kg indicating weight loss of 5.2kg  % Weight Change: 6.9% weight loss x 1 month    Edema: 4+ scrotum as per flow sheets    Pertinent Medications: MEDICATIONS  (STANDING):  albuterol/ipratropium for Nebulization 3 milliLiter(s) Nebulizer every 6 hours  amLODIPine   Tablet 5 milliGRAM(s) Oral daily  chlorhexidine 0.12% Liquid 15 milliLiter(s) Oral Mucosa every 12 hours  chlorhexidine 2% Cloths 1 Application(s) Topical <User Schedule>  collagenase Ointment 1 Application(s) Topical daily  heparin   Injectable 5000 Unit(s) SubCutaneous every 12 hours  mirtazapine 15 milliGRAM(s) Oral daily  polyethylene glycol 3350 17 Gram(s) Oral two times a day  predniSONE   Tablet 60 milliGRAM(s) Oral daily  senna 2 Tablet(s) Oral at bedtime  sodium chloride 3%  Inhalation 4 milliLiter(s) Inhalation every 6 hours    MEDICATIONS  (PRN):  acetaminophen     Tablet .. 650 milliGRAM(s) Oral every 6 hours PRN Temp greater or equal to 38C (100.4F), Mild Pain (1 - 3)  morphine  - Injectable 2 milliGRAM(s) IV Push every 4 hours PRN Mild Pain (1 - 3)    Pertinent Labs: 01-09 Na146 mmol/L<H> Glu 106 mg/dL<H> K+ 3.8 mmol/L Cr  5.11 mg/dL<H>  mg/dL<H> 01-04 Alb 1.4 g/dL<L>      Skin: Pressure injuries as per flow sheets: 1. right side of neck- stage II 2. left ischium- stage III    Estimated Needs:   [x] no change since previous assessment: 12/04  [ ] recalculated:     Previous Nutrition Diagnosis:   [x] Increased Nutrient Needs: protein  Etiology: increased physiological demand for protein  Signs/Symptoms: stage III pressure injuries x 2  New Goal(12/26) pt to meet >75% protein-energy needs via enteral feeding    Nutrition Diagnosis is [ x] ongoing  [ ] resolved [ ] not applicable     Previous Nutrition Diagnosis: #2 (12/12)  [x] Malnutrition; severe malnutrition in context of acute illness   Related to: inadequate protein-energy intake in setting of ROGER, pleural effusion, sepsis, UTI, AMS/metabolic encephalopathy, debility   As evidenced by: <50% nutrition needs > 5 days, moderate muscle/fat loss as noted   addendum; >5% wt. loss in 1 month  New Goal: pt to meet >75% protein-energy needs via enteral feeding    Nutrition Diagnosis is [ x] ongoing  [ ] resolved [ ] not applicable     New Nutrition Diagnosis: [x] not applicable      Interventions:   Recommend  [x] Continue current tube feed as ordered above  [ ] Change Diet To:  [ ] Nutrition Supplement  [ ] Nutrition Support  [ ] Other:     Monitoring and Evaluation:   [ ] PO intake [ x ] Tolerance to diet prescription [ x ] weights [ x ] labs[ x ] follow up per protocol  [ ] other: Pt with PMH CKD, HTN, dementia presents with SOB, found with CAUTI, ROGER on CKD, hydronephrosis, stercoral colitis. Admitted to ICU and intubated 12/15- failed extubation. Pt remains with trach to vent, and with PEG. On contact isolation for MRSA. Discharge planning in progress.    Factors impacting intake: [x] none [ ] nausea  [ ] vomiting [ ] diarrhea [ ] constipation  [ ]chewing problems [ ] swallowing issues  [ ] other:     Diet Prescription: Diet, NPO with Tube Feed:   Tube Feeding Modality: Gastrostomy  Nepro with Carb Steady  Total Volume for 24 Hours (mL): 840  Continuous  Until Goal Tube Feed Rate (mL per Hour): 35  Tube Feed Duration (in Hours): 24  Tube Feed Start Time: 12:00 (01-03-24 @ 11:48)    Intake: Tube feed infusing @ goal rate at time of visit. Tube feed as ordered Nepro @ 35 ml/hr x 24 hours provides 840 ml total volume, 1512 calories, 68 grams protein, 610 ml free water     Current Weight: (01/02) 70.1kg (12/01) 75.3kg indicating weight loss of 5.2kg  % Weight Change: 6.9% weight loss x 1 month    Edema: 4+ scrotum as per flow sheets    Pertinent Medications: MEDICATIONS  (STANDING):  albuterol/ipratropium for Nebulization 3 milliLiter(s) Nebulizer every 6 hours  amLODIPine   Tablet 5 milliGRAM(s) Oral daily  chlorhexidine 0.12% Liquid 15 milliLiter(s) Oral Mucosa every 12 hours  chlorhexidine 2% Cloths 1 Application(s) Topical <User Schedule>  collagenase Ointment 1 Application(s) Topical daily  heparin   Injectable 5000 Unit(s) SubCutaneous every 12 hours  mirtazapine 15 milliGRAM(s) Oral daily  polyethylene glycol 3350 17 Gram(s) Oral two times a day  predniSONE   Tablet 60 milliGRAM(s) Oral daily  senna 2 Tablet(s) Oral at bedtime  sodium chloride 3%  Inhalation 4 milliLiter(s) Inhalation every 6 hours    MEDICATIONS  (PRN):  acetaminophen     Tablet .. 650 milliGRAM(s) Oral every 6 hours PRN Temp greater or equal to 38C (100.4F), Mild Pain (1 - 3)  morphine  - Injectable 2 milliGRAM(s) IV Push every 4 hours PRN Mild Pain (1 - 3)    Pertinent Labs: 01-09 Na146 mmol/L<H> Glu 106 mg/dL<H> K+ 3.8 mmol/L Cr  5.11 mg/dL<H>  mg/dL<H> 01-04 Alb 1.4 g/dL<L>      Skin: Pressure injuries as per flow sheets: 1. right side of neck- stage II 2. left ischium- stage III    Estimated Needs:   [x] no change since previous assessment: 12/04  [ ] recalculated:     Previous Nutrition Diagnosis:   [x] Increased Nutrient Needs: protein  Etiology: increased physiological demand for protein  Signs/Symptoms: stage II & III pressure injuries  Goal(12/26) pt to meet >75% protein-energy needs via enteral feeding (met)    Nutrition Diagnosis is [ x] ongoing  [ ] resolved [ ] not applicable     Previous Nutrition Diagnosis: #2 (12/12)  [x] Malnutrition; severe malnutrition in context of acute illness   Related to: inadequate protein-energy intake in setting of ROGER, pleural effusion, sepsis, UTI, AMS/metabolic encephalopathy, debility   As evidenced by: <50% nutrition needs > 5 days, moderate muscle/fat loss as noted   addendum; >5% wt. loss in 1 month  Goal: pt to meet >75% protein-energy needs via enteral feeding (met)    Nutrition Diagnosis is [ x] ongoing  [ ] resolved [ ] not applicable     New Nutrition Diagnosis: [x] not applicable      Interventions:   Recommend  [x] Continue current tube feed as ordered above  [ ] Change Diet To:  [ ] Nutrition Supplement  [ ] Nutrition Support  [ ] Other:     Monitoring and Evaluation:   [ ] PO intake [ x ] Tolerance to diet prescription [ x ] weights [ x ] labs[ x ] follow up per protocol  [ ] other:

## 2024-01-10 NOTE — PROGRESS NOTE ADULT - ASSESSMENT
89M w/ dementia, CKD, chronic urinary retention w/ obrien. Admitted w/ L pleural effusion thought to be due to mucous plugging. Course complicated by worsening hypoxemia requiring intubation and bronchoscopy x2 w/ MRSA pneumonia, stenotrophomonas. Extubated on 12/24 but required re-intubation. Extubated again but failed due to hypercapnia and hypoxia. s/p Trach / PEG.    #acute hypoxic resp failure with trach. s/p peg and trach   #Febrile, resolved   - Completed course Levaquin for Steno in sputum. Remains vent dependent with full support  - Febrile to 101F yesterday. No leukocytosis - BCx negative    - hold on abx - trend fever curve   - pulm following      #ROGER on CKD   - creatinine rising suspect AIN from levaquin     - c/w PO steroids  - Pending CTAP  - Renal u/s mild right hydro  - Checking urine studies   - C/w d5 half NS for hypernatremia   - Nephro following  (1/10) Repeat CT ab/pelvis reviewed. Follow nephrology recommendations. Pt may need dialysis if worsens.    #Acute Anemia  - No clear source of bleeding. s/p 2U pRBCs.   - Trend H/H    #fecal impaction  enema    Diet: TFs  DVT prophylaxis: SQH  Dispo: Eventual D/C to vent facility   Code Status:

## 2024-01-10 NOTE — PROGRESS NOTE ADULT - SUBJECTIVE AND OBJECTIVE BOX
[facility] NEPHROLOGY SERVICES, [facility]  NEPHROLOGY AND HYPERTENSION  [address]  [address]  [place] [address]  [phone]    MD SOFY KEENAN, MD JIGNESH MCCARTY, MD LESA RAMIREZ, MD LEANNE MURRAY MD          Patient events noted    MEDICATIONS  (STANDING):  albuterol/ipratropium for Nebulization 3 milliLiter(s) Nebulizer every 6 hours  amLODIPine   Tablet 5 milliGRAM(s) Oral daily  chlorhexidine 0.12% Liquid 15 milliLiter(s) Oral Mucosa every 12 hours  chlorhexidine 2% Cloths 1 Application(s) Topical <User Schedule>  collagenase Ointment 1 Application(s) Topical daily  heparin   Injectable 5000 Unit(s) SubCutaneous every 12 hours  mirtazapine 15 milliGRAM(s) Oral daily  polyethylene glycol 3350 17 Gram(s) Oral two times a day  predniSONE   Tablet 60 milliGRAM(s) Oral daily  senna 2 Tablet(s) Oral at bedtime  sodium chloride 3%  Inhalation 4 milliLiter(s) Inhalation every 6 hours    MEDICATIONS  (PRN):  acetaminophen     Tablet .. 650 milliGRAM(s) Oral every 6 hours PRN Temp greater or equal to 38C (100.4F), Mild Pain (1 - 3)  morphine  - Injectable 2 milliGRAM(s) IV Push every 4 hours PRN Mild Pain (1 - 3)      01-09-24 @ 07:01  -  01-10-24 @ 07:00  --------------------------------------------------------  IN: 650 mL / OUT: 750 mL / NET: -100 mL    01-10-24 @ 07:01  -  01-10-24 @ 23:02  --------------------------------------------------------  IN: 150 mL / OUT: 620 mL / NET: -470 mL      PHYSICAL EXAM:      T(C): 37 (01-10-24 @ 17:15), Max: 37.2 (01-10-24 @ 11:19)  HR: 90 (01-10-24 @ 22:10) (77 - 96)  BP: 153/85 (01-10-24 @ 17:15) (148/72 - 186/101)  RR: 17 (01-10-24 @ 17:15) (17 - 20)  SpO2: 98% (01-10-24 @ 22:10) (97% - 100%)  Wt(kg): --  Lungs clear  Heart S1S2  Abd soft NT ND  Extremities:   tr edema                                    7.7    6.31  )-----------( 212      ( 09 Jan 2024 05:37 )             24.1     01-10    149<H>  |  118<H>  |  142<H>  ----------------------------<  139<H>  3.9   |  25  |  5.49<H>    Ca    9.4      10 Gelacio 2024 11:10  Phos  6.1     01-10  Mg     3.4     01-10          Creatinine Trend: 5.49<--, 5.11<--, 5.07<--, 4.90<--, 4.43<--, 4.38<--  Mode: AC/ CMV (Assist Control/ Continuous Mandatory Ventilation)  RR (machine): 26  TV (machine): 400  FiO2: 30  PEEP: 5  ITime: 0.7  MAP: 11  PIP: 24    Assessment   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 [facility] NEPHROLOGY SERVICES, [facility]  NEPHROLOGY AND HYPERTENSION  [address]  [address]  [place] [address]  [phone]    MD SOFY KEENAN, MD JIGNESH MCCARTY, MD LESA RAMIREZ, MD LEANNE MURRAY MD          Patient events noted    MEDICATIONS  (STANDING):  albuterol/ipratropium for Nebulization 3 milliLiter(s) Nebulizer every 6 hours  amLODIPine   Tablet 5 milliGRAM(s) Oral daily  chlorhexidine 0.12% Liquid 15 milliLiter(s) Oral Mucosa every 12 hours  chlorhexidine 2% Cloths 1 Application(s) Topical <User Schedule>  collagenase Ointment 1 Application(s) Topical daily  heparin   Injectable 5000 Unit(s) SubCutaneous every 12 hours  mirtazapine 15 milliGRAM(s) Oral daily  polyethylene glycol 3350 17 Gram(s) Oral two times a day  predniSONE   Tablet 60 milliGRAM(s) Oral daily  senna 2 Tablet(s) Oral at bedtime  sodium chloride 3%  Inhalation 4 milliLiter(s) Inhalation every 6 hours    MEDICATIONS  (PRN):  acetaminophen     Tablet .. 650 milliGRAM(s) Oral every 6 hours PRN Temp greater or equal to 38C (100.4F), Mild Pain (1 - 3)  morphine  - Injectable 2 milliGRAM(s) IV Push every 4 hours PRN Mild Pain (1 - 3)      01-09-24 @ 07:01  -  01-10-24 @ 07:00  --------------------------------------------------------  IN: 650 mL / OUT: 750 mL / NET: -100 mL    01-10-24 @ 07:01  -  01-10-24 @ 23:02  --------------------------------------------------------  IN: 150 mL / OUT: 620 mL / NET: -470 mL      PHYSICAL EXAM:      T(C): 37 (01-10-24 @ 17:15), Max: 37.2 (01-10-24 @ 11:19)  HR: 90 (01-10-24 @ 22:10) (77 - 96)  BP: 153/85 (01-10-24 @ 17:15) (148/72 - 186/101)  RR: 17 (01-10-24 @ 17:15) (17 - 20)  SpO2: 98% (01-10-24 @ 22:10) (97% - 100%)  Wt(kg): --  Lungs clear  Heart S1S2  Abd soft NT ND  Extremities:   tr edema                                    7.7    6.31  )-----------( 212      ( 09 Jan 2024 05:37 )             24.1     01-10    149<H>  |  118<H>  |  142<H>  ----------------------------<  139<H>  3.9   |  25  |  5.49<H>    Ca    9.4      10 Gelacio 2024 11:10  Phos  6.1     01-10  Mg     3.4     01-10          Creatinine Trend: 5.49<--, 5.11<--, 5.07<--, 4.90<--, 4.43<--, 4.38<--  Mode: AC/ CMV (Assist Control/ Continuous Mandatory Ventilation)  RR (machine): 26  TV (machine): 400  FiO2: 30  PEEP: 5  ITime: 0.7  MAP: 11  PIP: 24    Assessment   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

## 2024-01-10 NOTE — PROGRESS NOTE ADULT - SUBJECTIVE AND OBJECTIVE BOX
[facility] NEPHROLOGY SERVICES, [facility]  NEPHROLOGY AND HYPERTENSION  [address]  [address]  [place] [address]  [phone]    MD SOFY KEENAN, MD KEATON FRYE MD CHRISTOPHER CAPUTO, MD EDWARD BOVER, MD          Patient events noted    MEDICATIONS  (STANDING):  albuterol/ipratropium for Nebulization 3 milliLiter(s) Nebulizer every 6 hours  amLODIPine   Tablet 10 milliGRAM(s) Oral daily  chlorhexidine 0.12% Liquid 15 milliLiter(s) Oral Mucosa every 12 hours  chlorhexidine 2% Cloths 1 Application(s) Topical <User Schedule>  collagenase Ointment 1 Application(s) Topical daily  dextrose 5%. 1000 milliLiter(s) (75 mL/Hr) IV Continuous <Continuous>  mirtazapine 15 milliGRAM(s) Oral daily  polyethylene glycol 3350 17 Gram(s) Oral two times a day  predniSONE   Tablet 60 milliGRAM(s) Oral daily  senna 2 Tablet(s) Oral at bedtime  sodium chloride 3%  Inhalation 4 milliLiter(s) Inhalation every 6 hours    MEDICATIONS  (PRN):  acetaminophen     Tablet .. 650 milliGRAM(s) Oral every 6 hours PRN Temp greater or equal to 38C (100.4F), Mild Pain (1 - 3)      01-11-24 @ 07:01  -  01-12-24 @ 07:00  --------------------------------------------------------  IN: 0 mL / OUT: 300 mL / NET: -300 mL      PHYSICAL EXAM:      T(C): 37 (01-12-24 @ 18:49), Max: 37.7 (01-12-24 @ 06:20)  HR: 90 (01-12-24 @ 20:55) (84 - 99)  BP: 146/76 (01-12-24 @ 18:49) (131/69 - 158/82)  RR: 18 (01-12-24 @ 18:49) (18 - 18)  SpO2: 100% (01-12-24 @ 20:55) (97% - 100%)  Wt(kg): --  Lungs clear  Heart S1S2  Abd soft NT ND  Extremities:   tr edema                                    7.1    7.34  )-----------( 279      ( 12 Jan 2024 06:15 )             22.3     01-12    147<H>  |  115<H>  |  141<H>  ----------------------------<  122<H>  3.7   |  25  |  5.48<H>    Ca    8.7      12 Jan 2024 06:15  Phos  6.0     01-12  Mg     3.3     01-12    TPro  7.8  /  Alb  1.7<L>  /  TBili  0.3  /  DBili  x   /  AST  25  /  ALT  15  /  AlkPhos  60  01-11      LIVER FUNCTIONS - ( 11 Jan 2024 07:55 )  Alb: 1.7 g/dL / Pro: 7.8 gm/dL / ALK PHOS: 60 U/L / ALT: 15 U/L / AST: 25 U/L / GGT: x           Creatinine Trend: 5.48<--, 5.55<--, 5.49<--, 5.11<--, 5.07<--, 4.90<--  Mode: AC/ CMV (Assist Control/ Continuous Mandatory Ventilation)  RR (machine): 26  TV (machine): 400  FiO2: 30  PEEP: 5  PS: 10  ITime: 1  MAP: 11  PIP: 26      Assessment   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 [facility] NEPHROLOGY SERVICES, [facility]  NEPHROLOGY AND HYPERTENSION  [address]  [address]  [place] [address]  [phone]    MD SOFY KEENAN, MD KEATON FRYE MD CHRISTOPHER CAPUTO, MD EDWARD BOVER, MD          Patient events noted    MEDICATIONS  (STANDING):  albuterol/ipratropium for Nebulization 3 milliLiter(s) Nebulizer every 6 hours  amLODIPine   Tablet 10 milliGRAM(s) Oral daily  chlorhexidine 0.12% Liquid 15 milliLiter(s) Oral Mucosa every 12 hours  chlorhexidine 2% Cloths 1 Application(s) Topical <User Schedule>  collagenase Ointment 1 Application(s) Topical daily  dextrose 5%. 1000 milliLiter(s) (75 mL/Hr) IV Continuous <Continuous>  mirtazapine 15 milliGRAM(s) Oral daily  polyethylene glycol 3350 17 Gram(s) Oral two times a day  predniSONE   Tablet 60 milliGRAM(s) Oral daily  senna 2 Tablet(s) Oral at bedtime  sodium chloride 3%  Inhalation 4 milliLiter(s) Inhalation every 6 hours    MEDICATIONS  (PRN):  acetaminophen     Tablet .. 650 milliGRAM(s) Oral every 6 hours PRN Temp greater or equal to 38C (100.4F), Mild Pain (1 - 3)      01-11-24 @ 07:01  -  01-12-24 @ 07:00  --------------------------------------------------------  IN: 0 mL / OUT: 300 mL / NET: -300 mL      PHYSICAL EXAM:      T(C): 37 (01-12-24 @ 18:49), Max: 37.7 (01-12-24 @ 06:20)  HR: 90 (01-12-24 @ 20:55) (84 - 99)  BP: 146/76 (01-12-24 @ 18:49) (131/69 - 158/82)  RR: 18 (01-12-24 @ 18:49) (18 - 18)  SpO2: 100% (01-12-24 @ 20:55) (97% - 100%)  Wt(kg): --  Lungs clear  Heart S1S2  Abd soft NT ND  Extremities:   tr edema                                    7.1    7.34  )-----------( 279      ( 12 Jan 2024 06:15 )             22.3     01-12    147<H>  |  115<H>  |  141<H>  ----------------------------<  122<H>  3.7   |  25  |  5.48<H>    Ca    8.7      12 Jan 2024 06:15  Phos  6.0     01-12  Mg     3.3     01-12    TPro  7.8  /  Alb  1.7<L>  /  TBili  0.3  /  DBili  x   /  AST  25  /  ALT  15  /  AlkPhos  60  01-11      LIVER FUNCTIONS - ( 11 Jan 2024 07:55 )  Alb: 1.7 g/dL / Pro: 7.8 gm/dL / ALK PHOS: 60 U/L / ALT: 15 U/L / AST: 25 U/L / GGT: x           Creatinine Trend: 5.48<--, 5.55<--, 5.49<--, 5.11<--, 5.07<--, 4.90<--  Mode: AC/ CMV (Assist Control/ Continuous Mandatory Ventilation)  RR (machine): 26  TV (machine): 400  FiO2: 30  PEEP: 5  PS: 10  ITime: 1  MAP: 11  PIP: 26      Assessment   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

## 2024-01-10 NOTE — PROGRESS NOTE ADULT - SUBJECTIVE AND OBJECTIVE BOX
Patient is a 89y old  Male who presents with a chief complaint of Trach peg (09 Jan 2024 14:53)    INTERVAL HPI/OVERNIGHT EVENTS: Patients seen and examined at bedside this morning. No acute events overnight.    MEDICATIONS  (STANDING):  albuterol/ipratropium for Nebulization 3 milliLiter(s) Nebulizer every 6 hours  amLODIPine   Tablet 5 milliGRAM(s) Oral daily  chlorhexidine 0.12% Liquid 15 milliLiter(s) Oral Mucosa every 12 hours  chlorhexidine 2% Cloths 1 Application(s) Topical <User Schedule>  collagenase Ointment 1 Application(s) Topical daily  heparin   Injectable 5000 Unit(s) SubCutaneous every 12 hours  mirtazapine 15 milliGRAM(s) Oral daily  polyethylene glycol 3350 17 Gram(s) Oral two times a day  predniSONE   Tablet 60 milliGRAM(s) Oral daily  senna 2 Tablet(s) Oral at bedtime  sodium chloride 3%  Inhalation 4 milliLiter(s) Inhalation every 6 hours    MEDICATIONS  (PRN):  acetaminophen     Tablet .. 650 milliGRAM(s) Oral every 6 hours PRN Temp greater or equal to 38C (100.4F), Mild Pain (1 - 3)  morphine  - Injectable 2 milliGRAM(s) IV Push every 4 hours PRN Mild Pain (1 - 3)    Allergies    No Known Allergies    Intolerances      REVIEW OF SYSTEMS:  All other systems reviewed and are negative    Vital Signs Last 24 Hrs  T(C): 37.2 (10 Gelacio 2024 11:19), Max: 37.2 (10 Gelacio 2024 11:19)  T(F): 98.9 (10 Gelacio 2024 11:19), Max: 98.9 (10 Gelacio 2024 11:19)  HR: 96 (10 Gelacio 2024 11:19) (77 - 96)  BP: 186/101 (10 Gelacio 2024 11:19) (148/72 - 186/101)  BP(mean): --  RR: 20 (10 Gelacio 2024 05:22) (18 - 20)  SpO2: 98% (10 Gelacio 2024 11:19) (98% - 100%)    Parameters below as of 10 Gelacio 2024 11:15  Patient On (Oxygen Delivery Method): ventilator      Daily     Daily   I&O's Summary    09 Jan 2024 07:01  -  10 Gelacio 2024 07:00  --------------------------------------------------------  IN: 650 mL / OUT: 750 mL / NET: -100 mL      CAPILLARY BLOOD GLUCOSE        PHYSICAL EXAM:  GENERAL: stable, on vent/trach and PEG, obrien in place.   NECK: supple, No JVD  CHEST/LUNG: clear to auscultation bilaterally; no rales, rhonchi, or wheezing b/l  HEART: normal S1, S2  ABDOMEN: BS+, soft, ND, NT   EXTREMITIES:  pulses palpable; no clubbing, cyanosis, or edema b/l LEs    Labs                          7.7    6.31  )-----------( 212      ( 09 Jan 2024 05:37 )             24.1     01-10    149<H>  |  118<H>  |  142<H>  ----------------------------<  139<H>  3.9   |  25  |  5.49<H>    Ca    9.4      10 Gelacio 2024 11:10  Phos  6.1     01-10  Mg     3.4     01-10            Urinalysis Basic - ( 10 Eglacio 2024 11:10 )    Color: x / Appearance: x / SG: x / pH: x  Gluc: 139 mg/dL / Ketone: x  / Bili: x / Urobili: x   Blood: x / Protein: x / Nitrite: x   Leuk Esterase: x / RBC: x / WBC x   Sq Epi: x / Non Sq Epi: x / Bacteria: x                      Radiology and Imaging reviewed. Patient is a 89y old  Male who presents with a chief complaint of Trach peg (09 Jan 2024 14:53)    INTERVAL HPI/OVERNIGHT EVENTS: Patients seen and examined at bedside this morning. No acute events overnight.    MEDICATIONS  (STANDING):  albuterol/ipratropium for Nebulization 3 milliLiter(s) Nebulizer every 6 hours  amLODIPine   Tablet 5 milliGRAM(s) Oral daily  chlorhexidine 0.12% Liquid 15 milliLiter(s) Oral Mucosa every 12 hours  chlorhexidine 2% Cloths 1 Application(s) Topical <User Schedule>  collagenase Ointment 1 Application(s) Topical daily  heparin   Injectable 5000 Unit(s) SubCutaneous every 12 hours  mirtazapine 15 milliGRAM(s) Oral daily  polyethylene glycol 3350 17 Gram(s) Oral two times a day  predniSONE   Tablet 60 milliGRAM(s) Oral daily  senna 2 Tablet(s) Oral at bedtime  sodium chloride 3%  Inhalation 4 milliLiter(s) Inhalation every 6 hours    MEDICATIONS  (PRN):  acetaminophen     Tablet .. 650 milliGRAM(s) Oral every 6 hours PRN Temp greater or equal to 38C (100.4F), Mild Pain (1 - 3)  morphine  - Injectable 2 milliGRAM(s) IV Push every 4 hours PRN Mild Pain (1 - 3)    Allergies    No Known Allergies    Intolerances      REVIEW OF SYSTEMS:  All other systems reviewed and are negative    Vital Signs Last 24 Hrs  T(C): 37.2 (10 Gelacio 2024 11:19), Max: 37.2 (10 Gelacio 2024 11:19)  T(F): 98.9 (10 Gelacio 2024 11:19), Max: 98.9 (10 Gelacio 2024 11:19)  HR: 96 (10 Gelacio 2024 11:19) (77 - 96)  BP: 186/101 (10 Gelacio 2024 11:19) (148/72 - 186/101)  BP(mean): --  RR: 20 (10 Gelacio 2024 05:22) (18 - 20)  SpO2: 98% (10 Gelacio 2024 11:19) (98% - 100%)    Parameters below as of 10 Gelacio 2024 11:15  Patient On (Oxygen Delivery Method): ventilator      Daily     Daily   I&O's Summary    09 Jan 2024 07:01  -  10 Gelacio 2024 07:00  --------------------------------------------------------  IN: 650 mL / OUT: 750 mL / NET: -100 mL      CAPILLARY BLOOD GLUCOSE        PHYSICAL EXAM:  GENERAL: stable, on vent/trach and PEG, obrien in place.   NECK: supple, No JVD  CHEST/LUNG: clear to auscultation bilaterally; no rales, rhonchi, or wheezing b/l  HEART: normal S1, S2  ABDOMEN: BS+, soft, ND, NT   EXTREMITIES:  pulses palpable; no clubbing, cyanosis, or edema b/l LEs    Labs                          7.7    6.31  )-----------( 212      ( 09 Jan 2024 05:37 )             24.1     01-10    149<H>  |  118<H>  |  142<H>  ----------------------------<  139<H>  3.9   |  25  |  5.49<H>    Ca    9.4      10 Gelacio 2024 11:10  Phos  6.1     01-10  Mg     3.4     01-10            Urinalysis Basic - ( 10 Gelacio 2024 11:10 )    Color: x / Appearance: x / SG: x / pH: x  Gluc: 139 mg/dL / Ketone: x  / Bili: x / Urobili: x   Blood: x / Protein: x / Nitrite: x   Leuk Esterase: x / RBC: x / WBC x   Sq Epi: x / Non Sq Epi: x / Bacteria: x                      Radiology and Imaging reviewed.

## 2024-01-10 NOTE — PROGRESS NOTE ADULT - SUBJECTIVE AND OBJECTIVE BOX
CHIEF COMPLAINT:    Interval Events:    REVIEW OF SYSTEMS:  Constitutional: [ ] negative [ ] fevers [ ] chills [ ] weight loss [ ] weight gain  HEENT: [ ] negative [ ] dry eyes [ ] eye irritation [ ] postnasal drip [ ] nasal congestion  CV: [ ] negative  [ ] chest pain [ ] orthopnea [ ] palpitations [ ] murmur  Resp: [ ] negative [ ] cough [ ] shortness of breath [ ] dyspnea [ ] wheezing [ ] sputum [ ] hemoptysis  GI: [ ] negative [ ] nausea [ ] vomiting [ ] diarrhea [ ] constipation [ ] abd pain [ ] dysphagia   : [ ] negative [ ] dysuria [ ] nocturia [ ] hematuria [ ] increased urinary frequency  Musculoskeletal: [ ] negative [ ] back pain [ ] myalgias [ ] arthralgias [ ] fracture  Skin: [ ] negative [ ] rash [ ] itch  Neurological: [ ] negative [ ] headache [ ] dizziness [ ] syncope [ ] weakness [ ] numbness  Psychiatric: [ ] negative [ ] anxiety [ ] depression  Endocrine: [ ] negative [ ] diabetes [ ] thyroid problem  Hematologic/Lymphatic: [ ] negative [ ] anemia [ ] bleeding problem  Allergic/Immunologic: [ ] negative [ ] itchy eyes [ ] nasal discharge [ ] hives [ ] angioedema  [ ] All other systems negative  [ ] Unable to assess ROS because ________    OBJECTIVE:  ICU Vital Signs Last 24 Hrs  T(C): 36.8 (10 Gelacio 2024 05:22), Max: 37 (09 Jan 2024 23:31)  T(F): 98.2 (10 Gelacio 2024 05:22), Max: 98.6 (09 Jan 2024 23:31)  HR: 87 (10 Gelacio 2024 09:08) (77 - 91)  BP: 157/65 (10 Gelacio 2024 05:22) (148/72 - 164/74)  BP(mean): --  ABP: --  ABP(mean): --  RR: 20 (10 Gelacio 2024 05:22) (18 - 20)  SpO2: 99% (10 Gelacio 2024 09:08) (91% - 100%)    O2 Parameters below as of 09 Jan 2024 17:45  Patient On (Oxygen Delivery Method): ventilator          Mode: AC/ CMV (Assist Control/ Continuous Mandatory Ventilation), RR (machine): 26, TV (machine): 400, FiO2: 30, PEEP: 5, ITime: 1, MAP: 11, PIP: 24    01-09 @ 07:01  -  01-10 @ 07:00  --------------------------------------------------------  IN: 650 mL / OUT: 750 mL / NET: -100 mL      CAPILLARY BLOOD GLUCOSE          PHYSICAL EXAM:  General:   HEENT:   Lymph Nodes:  Neck:   Respiratory:   Cardiovascular:   Abdomen:   Extremities:   Skin:   Neurological:  Psychiatry:    HOSPITAL MEDICATIONS:  heparin   Injectable 5000 Unit(s) SubCutaneous every 12 hours      amLODIPine   Tablet 5 milliGRAM(s) Oral daily    predniSONE   Tablet 60 milliGRAM(s) Oral daily    albuterol/ipratropium for Nebulization 3 milliLiter(s) Nebulizer every 6 hours  sodium chloride 3%  Inhalation 4 milliLiter(s) Inhalation every 6 hours    acetaminophen     Tablet .. 650 milliGRAM(s) Oral every 6 hours PRN  mirtazapine 15 milliGRAM(s) Oral daily  morphine  - Injectable 2 milliGRAM(s) IV Push every 4 hours PRN    polyethylene glycol 3350 17 Gram(s) Oral two times a day  senna 2 Tablet(s) Oral at bedtime            chlorhexidine 0.12% Liquid 15 milliLiter(s) Oral Mucosa every 12 hours  chlorhexidine 2% Cloths 1 Application(s) Topical <User Schedule>  collagenase Ointment 1 Application(s) Topical daily        LABS:                        7.7    6.31  )-----------( 212      ( 09 Jan 2024 05:37 )             24.1     Hgb Trend: 7.7<--, 7.7<--, 8.0<--, 7.8<--, 6.1<--  01-09    146<H>  |  116<H>  |  124<H>  ----------------------------<  106<H>  3.8   |  24  |  5.11<H>    Ca    9.2      09 Jan 2024 05:37      Creatinine Trend: 5.11<--, 5.07<--, 4.90<--, 4.43<--, 4.38<--, 4.00<--    Urinalysis Basic - ( 09 Jan 2024 05:37 )    Color: x / Appearance: x / SG: x / pH: x  Gluc: 106 mg/dL / Ketone: x  / Bili: x / Urobili: x   Blood: x / Protein: x / Nitrite: x   Leuk Esterase: x / RBC: x / WBC x   Sq Epi: x / Non Sq Epi: x / Bacteria: x            MICROBIOLOGY:     RADIOLOGY:  [ ] Reviewed and interpreted by me    PULMONARY FUNCTION TESTS:    EKG: Interval Events: Patient seen and examined at bedside. On trach to vent with 30% FiO2 SpO2 stable. No acute overnight events.     REVIEW OF SYSTEMS:  unable to obtain ROS 2/2 MS and Trach/ vent dependant     OBJECTIVE:  Vital Signs Last 24 Hrs  T(C): 36.8 (10 Gelacio 2024 05:22), Max: 37 (09 Jan 2024 23:31)  T(F): 98.2 (10 Gelacio 2024 05:22), Max: 98.6 (09 Jan 2024 23:31)  HR: 87 (10 Gelacio 2024 09:08) (77 - 91)  BP: 157/65 (10 Gelacio 2024 05:22) (148/72 - 164/74)  BP(mean): --  ABP: --  ABP(mean): --  RR: 20 (10 Gelacio 2024 05:22) (18 - 20)  SpO2: 99% (10 Gelacio 2024 09:08) (91% - 100%)    O2 Parameters below as of 09 Jan 2024 17:45  Patient On (Oxygen Delivery Method): ventilator          Mode: AC/ CMV (Assist Control/ Continuous Mandatory Ventilation), RR (machine): 26, TV (machine): 400, FiO2: 30, PEEP: 5, ITime: 1, MAP: 11, PIP: 24    01-09 @ 07:01  -  01-10 @ 07:00  --------------------------------------------------------  IN: 650 mL / OUT: 750 mL / NET: -100 mL      CAPILLARY BLOOD GLUCOSE          PHYSICAL EXAM:  GENERAL: NAD, well-groomed, well-developed  HEAD:  Atraumatic, Normocephalic  EYES: PERRLA, conjunctiva and sclera clear  ENMT: ; Moist mucous membranes  NECK: Trache # 6 XLT  NERVOUS SYSTEM: Awake, alert, does not follow commands  CHEST/LUNG: Mechanical BS B/L, CTA  HEART: Regular rate and rhythm; No murmurs, rubs, or gallops  ABDOMEN: Soft, Nontender, Nondistended; Bowel sounds present, +PEG tube in place   EXTREMITIES:  2+ Peripheral Pulses, No clubbing, cyanosis, or edema  SKIN: No rashes or lesions    HOSPITAL MEDICATIONS:  heparin   Injectable 5000 Unit(s) SubCutaneous every 12 hours      amLODIPine   Tablet 5 milliGRAM(s) Oral daily    predniSONE   Tablet 60 milliGRAM(s) Oral daily    albuterol/ipratropium for Nebulization 3 milliLiter(s) Nebulizer every 6 hours  sodium chloride 3%  Inhalation 4 milliLiter(s) Inhalation every 6 hours    acetaminophen     Tablet .. 650 milliGRAM(s) Oral every 6 hours PRN  mirtazapine 15 milliGRAM(s) Oral daily  morphine  - Injectable 2 milliGRAM(s) IV Push every 4 hours PRN    polyethylene glycol 3350 17 Gram(s) Oral two times a day  senna 2 Tablet(s) Oral at bedtime            chlorhexidine 0.12% Liquid 15 milliLiter(s) Oral Mucosa every 12 hours  chlorhexidine 2% Cloths 1 Application(s) Topical <User Schedule>  collagenase Ointment 1 Application(s) Topical daily        LABS:                        7.7    6.31  )-----------( 212      ( 09 Jan 2024 05:37 )             24.1     Hgb Trend: 7.7<--, 7.7<--, 8.0<--, 7.8<--, 6.1<--  01-09    146<H>  |  116<H>  |  124<H>  ----------------------------<  106<H>  3.8   |  24  |  5.11<H>    Ca    9.2      09 Jan 2024 05:37      Creatinine Trend: 5.11<--, 5.07<--, 4.90<--, 4.43<--, 4.38<--, 4.00<--    Urinalysis Basic - ( 09 Jan 2024 05:37 )    Color: x / Appearance: x / SG: x / pH: x  Gluc: 106 mg/dL / Ketone: x  / Bili: x / Urobili: x   Blood: x / Protein: x / Nitrite: x   Leuk Esterase: x / RBC: x / WBC x   Sq Epi: x / Non Sq Epi: x / Bacteria: x            MICROBIOLOGY:     Culture - Sputum  Source: .Sputum Sputum  Gram Stain (prelim) (01-06-24 @ 11:49):    Rare Squamous epithelial cells per low power field    No polymorphonuclear cells seen per low power field    Rare Yeast like cells per oil power field  Preliminary Report (01-06-24 @ 17:13):    Normal Respiratory Vanessa present    Culture - Blood  Source: .Blood Blood-Peripheral  Preliminary Report (01-08-24 @ 19:01):    No growth at 4 days    Culture - Blood  Source: .Blood Blood-Peripheral  Preliminary Report (01-08-24 @ 19:01):    No growth at 4 days    Culture - Sputum  Source: ET Tube ET Tube  Gram Stain (12-18-23 @ 19:19):    Moderate polymorphonuclear leukocytes per low power field    Few Squamous epithelial cells per low power field    Numerous Gram positive cocci in pairs per oil power field    Few Gram Positive Rods per oil power field  Final Report (12-18-23 @ 19:19):    Numerous Methicillin Resistant Staphylococcus aureus    Normal Respiratory Vanessa present  Organism: Methicillin resistant Staphylococcus aureus (12-18-23 @ 19:19)  Organism: Methicillin resistant Staphylococcus aureus (12-18-23 @ 19:19)    Sensitivities:      Method Type: JASWINDER      -  Ampicillin/Sulbactam: R >16/8      -  Cefazolin: R >16      -  Clindamycin: S 0.5      -  Erythromycin: R >4      -  Gentamicin: S <=1 Should not be used as monotherapy      -  Linezolid: S 2      -  Oxacillin: R >2      -  Penicillin: R >8      -  Rifampin: S <=1 Should not be used as monotherapy      -  Tetracycline: S <=1      -  Trimethoprim/Sulfamethoxazole: S <=0.5/9.5      -  Vancomycin: S 2    Culture - Urine  Source: Catheterized Catheterized  Final Report (12-04-23 @ 12:54):    >100,000 CFU/ml Enterococcus faecalis    50,000 - 99,000 CFU/mL Pseudomonas aeruginosa  Organism: Enterococcus faecalis  Pseudomonas aeruginosa (12-04-23 @ 12:54)  Organism: Pseudomonas aeruginosa (12-04-23 @ 12:54)    Sensitivities:      Method Type: JASWINDER      -  Amikacin: I 32      -  Aztreonam: S 8      -  Cefepime: S 8      -  Ceftazidime: S 4      -  Ciprofloxacin: R >2      -  Imipenem: S <=1      -  Levofloxacin: R >4      -  Meropenem: S <=1      -  Piperacillin/Tazobactam: S <=8  Organism: Enterococcus faecalis (12-04-23 @ 12:54)    Sensitivities:      Method Type: JASWINDER      -  Ampicillin: S <=2 Predicts results to ampicillin/sulbactam, amoxacillin-clavulanate and  piperacillin-tazobactam.      -  Ciprofloxacin: R >2      -  Levofloxacin: R >4      -  Nitrofurantoin: S <=32 Should not be used to treat pyelonephritis.      -  Tetracycline: I 8      -  Vancomycin: S 2    Culture - Blood  Source: .Blood Blood-Venous  Final Report (12-06-23 @ 11:01):    No growth at 5 days    Culture - Blood  Source: .Blood Blood-Peripheral  Final Report (12-06-23 @ 02:00):    No growth at 5 days    MRSA PCR Result.: NotDetec (12-17-23 @ 12:00)        RADIOLOGY:  [ x] Reviewed  Interval Events: Patient seen and examined at bedside. On trach to vent with 30% FiO2 SpO2 stable. No acute overnight events.     REVIEW OF SYSTEMS:  unable to obtain ROS 2/2 MS and Trach/ vent dependant     OBJECTIVE:  Vital Signs Last 24 Hrs  T(C): 36.8 (10 Gelacio 2024 05:22), Max: 37 (09 Jan 2024 23:31)  T(F): 98.2 (10 Gelacio 2024 05:22), Max: 98.6 (09 Jan 2024 23:31)  HR: 87 (10 Gelacio 2024 09:08) (77 - 91)  BP: 157/65 (10 Gelacio 2024 05:22) (148/72 - 164/74)  BP(mean): --  ABP: --  ABP(mean): --  RR: 20 (10 Gelacio 2024 05:22) (18 - 20)  SpO2: 99% (10 Gelacio 2024 09:08) (91% - 100%)    O2 Parameters below as of 09 Jan 2024 17:45  Patient On (Oxygen Delivery Method): ventilator          Mode: AC/ CMV (Assist Control/ Continuous Mandatory Ventilation), RR (machine): 26, TV (machine): 400, FiO2: 30, PEEP: 5, ITime: 1, MAP: 11, PIP: 24    01-09 @ 07:01  -  01-10 @ 07:00  --------------------------------------------------------  IN: 650 mL / OUT: 750 mL / NET: -100 mL      CAPILLARY BLOOD GLUCOSE          PHYSICAL EXAM:  GENERAL: NAD, well-groomed, well-developed  HEAD:  Atraumatic, Normocephalic  EYES: PERRLA, conjunctiva and sclera clear  ENMT: ; Moist mucous membranes  NECK: Trache # 6 XLT  NERVOUS SYSTEM: Awake, alert, does not follow commands  CHEST/LUNG: Mechanical BS B/L, CTA  HEART: Regular rate and rhythm; No murmurs, rubs, or gallops  ABDOMEN: Soft, Nontender, Nondistended; Bowel sounds present, +PEG tube in place   EXTREMITIES:  2+ Peripheral Pulses, No clubbing, cyanosis, or edema  SKIN: No rashes or lesions    HOSPITAL MEDICATIONS:  heparin   Injectable 5000 Unit(s) SubCutaneous every 12 hours      amLODIPine   Tablet 5 milliGRAM(s) Oral daily    predniSONE   Tablet 60 milliGRAM(s) Oral daily    albuterol/ipratropium for Nebulization 3 milliLiter(s) Nebulizer every 6 hours  sodium chloride 3%  Inhalation 4 milliLiter(s) Inhalation every 6 hours    acetaminophen     Tablet .. 650 milliGRAM(s) Oral every 6 hours PRN  mirtazapine 15 milliGRAM(s) Oral daily  morphine  - Injectable 2 milliGRAM(s) IV Push every 4 hours PRN    polyethylene glycol 3350 17 Gram(s) Oral two times a day  senna 2 Tablet(s) Oral at bedtime            chlorhexidine 0.12% Liquid 15 milliLiter(s) Oral Mucosa every 12 hours  chlorhexidine 2% Cloths 1 Application(s) Topical <User Schedule>  collagenase Ointment 1 Application(s) Topical daily        LABS:                        7.7    6.31  )-----------( 212      ( 09 Jan 2024 05:37 )             24.1     Hgb Trend: 7.7<--, 7.7<--, 8.0<--, 7.8<--, 6.1<--  01-09    146<H>  |  116<H>  |  124<H>  ----------------------------<  106<H>  3.8   |  24  |  5.11<H>    Ca    9.2      09 Jan 2024 05:37      Creatinine Trend: 5.11<--, 5.07<--, 4.90<--, 4.43<--, 4.38<--, 4.00<--    Urinalysis Basic - ( 09 Jan 2024 05:37 )    Color: x / Appearance: x / SG: x / pH: x  Gluc: 106 mg/dL / Ketone: x  / Bili: x / Urobili: x   Blood: x / Protein: x / Nitrite: x   Leuk Esterase: x / RBC: x / WBC x   Sq Epi: x / Non Sq Epi: x / Bacteria: x            MICROBIOLOGY:     Culture - Sputum  Source: .Sputum Sputum  Gram Stain (prelim) (01-06-24 @ 11:49):    Rare Squamous epithelial cells per low power field    No polymorphonuclear cells seen per low power field    Rare Yeast like cells per oil power field  Preliminary Report (01-06-24 @ 17:13):    Normal Respiratory Vanessa present    Culture - Blood  Source: .Blood Blood-Peripheral  Preliminary Report (01-08-24 @ 19:01):    No growth at 4 days    Culture - Blood  Source: .Blood Blood-Peripheral  Preliminary Report (01-08-24 @ 19:01):    No growth at 4 days    Culture - Sputum  Source: ET Tube ET Tube  Gram Stain (12-18-23 @ 19:19):    Moderate polymorphonuclear leukocytes per low power field    Few Squamous epithelial cells per low power field    Numerous Gram positive cocci in pairs per oil power field    Few Gram Positive Rods per oil power field  Final Report (12-18-23 @ 19:19):    Numerous Methicillin Resistant Staphylococcus aureus    Normal Respiratory Vanessa present  Organism: Methicillin resistant Staphylococcus aureus (12-18-23 @ 19:19)  Organism: Methicillin resistant Staphylococcus aureus (12-18-23 @ 19:19)    Sensitivities:      Method Type: JASWINDER      -  Ampicillin/Sulbactam: R >16/8      -  Cefazolin: R >16      -  Clindamycin: S 0.5      -  Erythromycin: R >4      -  Gentamicin: S <=1 Should not be used as monotherapy      -  Linezolid: S 2      -  Oxacillin: R >2      -  Penicillin: R >8      -  Rifampin: S <=1 Should not be used as monotherapy      -  Tetracycline: S <=1      -  Trimethoprim/Sulfamethoxazole: S <=0.5/9.5      -  Vancomycin: S 2    Culture - Urine  Source: Catheterized Catheterized  Final Report (12-04-23 @ 12:54):    >100,000 CFU/ml Enterococcus faecalis    50,000 - 99,000 CFU/mL Pseudomonas aeruginosa  Organism: Enterococcus faecalis  Pseudomonas aeruginosa (12-04-23 @ 12:54)  Organism: Pseudomonas aeruginosa (12-04-23 @ 12:54)    Sensitivities:      Method Type: JASWINDER      -  Amikacin: I 32      -  Aztreonam: S 8      -  Cefepime: S 8      -  Ceftazidime: S 4      -  Ciprofloxacin: R >2      -  Imipenem: S <=1      -  Levofloxacin: R >4      -  Meropenem: S <=1      -  Piperacillin/Tazobactam: S <=8  Organism: Enterococcus faecalis (12-04-23 @ 12:54)    Sensitivities:      Method Type: JSAWINDER      -  Ampicillin: S <=2 Predicts results to ampicillin/sulbactam, amoxacillin-clavulanate and  piperacillin-tazobactam.      -  Ciprofloxacin: R >2      -  Levofloxacin: R >4      -  Nitrofurantoin: S <=32 Should not be used to treat pyelonephritis.      -  Tetracycline: I 8      -  Vancomycin: S 2    Culture - Blood  Source: .Blood Blood-Venous  Final Report (12-06-23 @ 11:01):    No growth at 5 days    Culture - Blood  Source: .Blood Blood-Peripheral  Final Report (12-06-23 @ 02:00):    No growth at 5 days    MRSA PCR Result.: NotDetec (12-17-23 @ 12:00)        RADIOLOGY:  [ x] Reviewed

## 2024-01-10 NOTE — PROGRESS NOTE ADULT - ASSESSMENT
89M w/ dementia, CKD, chronic urinary retention w/ obrien. Admitted w/ L pleural effusion thought to be due to mucous plugging. Course complicated by worsening hypoxemia requiring intubation and bronchoscopy x2 w/ MRSA pneumonia, stenotrophomonas. Extubated on 12/24 but required re-intubation. Extubated again but failed due to hypercapnia and hypoxia.   12/29 s/p Trach / PEG.    DX: Left pleural effusion, mucous plugging, Hypoxemic respiratory failure, MRSA & Stenotrophomonas PNA.    Recs:    - Acute hypoxic resp failure likely 2/2 mucous plugging with PNA  - 12/29  S/P trach/ PEG # 6 XLT   - continue with daily PSV trials and full vent support at night,   - will likely be slow wean given chronic weakness and recurrent mucous plugging of left lung   - Frequent suctioning, Pulmonary toileting  - will re-add duoneb/ hypersal and chest PT   - POCUS 1/5 Trace Right  with small left effusion with a lot of atelectatic compressive areas.   - s/p 2 PRBC,1/4/2024 PEG with oozing around insertion site continue to monitor   - h/h holding at this time   - Completed course Vanc & Levaquin for MRSA & Steno in sputum  - High risk for VAP, close monitor on fever curve & WBC   - worsening renal function- CT abdomen/pelvis to assess hydronephrosis on US     - Plan of care discussed with Pulmonology attending Dr. Bills.

## 2024-01-11 NOTE — PROGRESS NOTE ADULT - ASSESSMENT
89M w/ dementia, CKD, chronic urinary retention w/ obrien. Admitted w/ L pleural effusion thought to be due to mucous plugging. Course complicated by worsening hypoxemia requiring intubation and bronchoscopy x2 w/ MRSA pneumonia, stenotrophomonas. Extubated on 12/24 but required re-intubation. Extubated again but failed due to hypercapnia and hypoxia. s/p Trach / PEG.    #acute hypoxic resp failure with trach. s/p peg and trach   #Febrile, resolved   - Completed course Levaquin for Steno in sputum. Remains vent dependent with full support  - Febrile to 101F yesterday. No leukocytosis - BCx negative    - hold on abx - trend fever curve   - pulm following      #ROGER on CKD   - creatinine rising suspect AIN from levaquin     - c/w PO steroids  - Pending CTAP  - Renal u/s mild right hydro  - Checking urine studies   - C/w d5 half NS for hypernatremia   - Nephro following  (1/10) Repeat CT ab/pelvis reviewed. Follow nephrology recommendations. Pt may need dialysis if worsens.  (1/11) D5W @ 75 cc/hr. C/W monitoring Cr.     #Acute Anemia  - No clear source of bleeding. s/p 2U pRBCs.   - Trend H/H    #fecal impaction  enema    Diet: TFs  DVT prophylaxis: SQH  Dispo: Eventual D/C to vent facility   Code Status:

## 2024-01-11 NOTE — PROGRESS NOTE ADULT - SUBJECTIVE AND OBJECTIVE BOX
NEPHROLOGY PROGRESS NOTE    CHIEF COMPLAINT:  ROGER/CKD    HPI:  Renal function slowly worse.    EXAM:  Vital Signs Last 24 Hrs  T(C): 37.2 (11 Jan 2024 11:45), Max: 37.3 (11 Jan 2024 05:31)  T(F): 99 (11 Jan 2024 11:45), Max: 99.1 (11 Jan 2024 05:31)  HR: 93 (11 Jan 2024 11:45) (85 - 96)  BP: 175/72 (11 Jan 2024 11:45) (153/85 - 179/90)  BP(mean): --  RR: 17 (11 Jan 2024 11:45) (17 - 18)  SpO2: 99% (11 Jan 2024 11:45) (97% - 100%)    Parameters below as of 11 Jan 2024 05:31  Patient On (Oxygen Delivery Method): ventilator      I&O's Summary    10 Gelacio 2024 07:01  -  11 Jan 2024 07:00  --------------------------------------------------------  IN: 570 mL / OUT: 920 mL / NET: -350 mL      Conversant, in no apparent distress  Normal respiratory effort, lungs clear bilaterally  Heart RRR with no murmur, no peripheral edema    LABS                        7.6    7.21  )-----------( 266      ( 11 Jan 2024 07:55 )             23.5     Eos 13.2%      01-11    149<H>  |  118<H>  |  144<H>  ----------------------------<  112<H>  3.8   |  23  |  5.55<H>    Ca    9.0      11 Jan 2024 07:55  Phos  5.6     01-11  Mg     3.4     01-11    TPro  7.8  /  Alb  1.7<L>  /  TBili  0.3  /  DBili  x   /  AST  25  /  ALT  15  /  AlkPhos  60  01-11      Assessment   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

## 2024-01-11 NOTE — PROGRESS NOTE ADULT - SUBJECTIVE AND OBJECTIVE BOX
Patient is a 89y old  Male who presents with a chief complaint of Trach peg (09 Jan 2024 14:53)    INTERVAL HPI/OVERNIGHT EVENTS: Patients seen and examined at bedside this morning. No acute events overnight.    MEDICATIONS  (STANDING):  albuterol/ipratropium for Nebulization 3 milliLiter(s) Nebulizer every 6 hours  chlorhexidine 0.12% Liquid 15 milliLiter(s) Oral Mucosa every 12 hours  chlorhexidine 2% Cloths 1 Application(s) Topical <User Schedule>  collagenase Ointment 1 Application(s) Topical daily  dextrose 5%. 1000 milliLiter(s) (75 mL/Hr) IV Continuous <Continuous>  heparin   Injectable 5000 Unit(s) SubCutaneous every 12 hours  mirtazapine 15 milliGRAM(s) Oral daily  polyethylene glycol 3350 17 Gram(s) Oral two times a day  predniSONE   Tablet 60 milliGRAM(s) Oral daily  senna 2 Tablet(s) Oral at bedtime  sodium chloride 3%  Inhalation 4 milliLiter(s) Inhalation every 6 hours    MEDICATIONS  (PRN):  acetaminophen     Tablet .. 650 milliGRAM(s) Oral every 6 hours PRN Temp greater or equal to 38C (100.4F), Mild Pain (1 - 3)    Allergies    No Known Allergies    Intolerances      REVIEW OF SYSTEMS:  All other systems reviewed and are negative    Vital Signs Last 24 Hrs  T(C): 37.2 (11 Jan 2024 11:45), Max: 37.3 (11 Jan 2024 05:31)  T(F): 99 (11 Jan 2024 11:45), Max: 99.1 (11 Jan 2024 05:31)  HR: 93 (11 Jan 2024 11:45) (85 - 96)  BP: 175/72 (11 Jan 2024 11:45) (153/85 - 179/90)  BP(mean): --  RR: 17 (11 Jan 2024 11:45) (17 - 18)  SpO2: 99% (11 Jan 2024 11:45) (97% - 100%)    Parameters below as of 11 Jan 2024 05:31  Patient On (Oxygen Delivery Method): ventilator      Daily     Daily   I&O's Summary    10 Gelacio 2024 07:01  -  11 Jan 2024 07:00  --------------------------------------------------------  IN: 570 mL / OUT: 920 mL / NET: -350 mL      CAPILLARY BLOOD GLUCOSE        PHYSICAL EXAM:  GENERAL: stable, on vent/trach and PEG, obrien in place.   NECK: supple, No JVD  CHEST/LUNG: clear to auscultation bilaterally; no rales, rhonchi, or wheezing b/l  HEART: normal S1, S2  ABDOMEN: BS+, soft, ND, NT   EXTREMITIES:  pulses palpable; no clubbing, cyanosis, or edema b/l LEs      Labs                          7.6    7.21  )-----------( 266      ( 11 Jan 2024 07:55 )             23.5     01-11    149<H>  |  118<H>  |  144<H>  ----------------------------<  112<H>  3.8   |  23  |  5.55<H>    Ca    9.0      11 Jan 2024 07:55  Phos  5.6     01-11  Mg     3.4     01-11    TPro  7.8  /  Alb  1.7<L>  /  TBili  0.3  /  DBili  x   /  AST  25  /  ALT  15  /  AlkPhos  60  01-11          Urinalysis Basic - ( 11 Jan 2024 07:55 )    Color: x / Appearance: x / SG: x / pH: x  Gluc: 112 mg/dL / Ketone: x  / Bili: x / Urobili: x   Blood: x / Protein: x / Nitrite: x   Leuk Esterase: x / RBC: x / WBC x   Sq Epi: x / Non Sq Epi: x / Bacteria: x                      Radiology and Imaging reviewed.

## 2024-01-12 NOTE — PROGRESS NOTE ADULT - SUBJECTIVE AND OBJECTIVE BOX
Patient is a 89y old  Male who presents with a chief complaint of Trach peg (09 Jan 2024 14:53)    INTERVAL HPI/OVERNIGHT EVENTS: Patients seen and examined at bedside this morning. No acute events overnight.     MEDICATIONS  (STANDING):  albuterol/ipratropium for Nebulization 3 milliLiter(s) Nebulizer every 6 hours  amLODIPine   Tablet 10 milliGRAM(s) Oral daily  chlorhexidine 0.12% Liquid 15 milliLiter(s) Oral Mucosa every 12 hours  chlorhexidine 2% Cloths 1 Application(s) Topical <User Schedule>  collagenase Ointment 1 Application(s) Topical daily  dextrose 5%. 1000 milliLiter(s) (75 mL/Hr) IV Continuous <Continuous>  mirtazapine 15 milliGRAM(s) Oral daily  polyethylene glycol 3350 17 Gram(s) Oral two times a day  predniSONE   Tablet 60 milliGRAM(s) Oral daily  senna 2 Tablet(s) Oral at bedtime  sodium chloride 3%  Inhalation 4 milliLiter(s) Inhalation every 6 hours    MEDICATIONS  (PRN):  acetaminophen     Tablet .. 650 milliGRAM(s) Oral every 6 hours PRN Temp greater or equal to 38C (100.4F), Mild Pain (1 - 3)    Allergies    No Known Allergies    Intolerances      REVIEW OF SYSTEMS:  All other systems reviewed and are negative    Vital Signs Last 24 Hrs  T(C): 37.7 (12 Jan 2024 06:20), Max: 37.7 (12 Jan 2024 06:20)  T(F): 99.8 (12 Jan 2024 06:20), Max: 99.8 (12 Jan 2024 06:20)  HR: 98 (12 Jan 2024 13:14) (84 - 99)  BP: 154/72 (12 Jan 2024 06:20) (154/72 - 163/77)  BP(mean): --  RR: 18 (12 Jan 2024 06:20) (18 - 18)  SpO2: 99% (12 Jan 2024 13:14) (97% - 100%)    Parameters below as of 12 Jan 2024 05:33  Patient On (Oxygen Delivery Method): ventilator      Daily     Daily   I&O's Summary    11 Jan 2024 07:01  -  12 Jan 2024 07:00  --------------------------------------------------------  IN: 0 mL / OUT: 300 mL / NET: -300 mL      CAPILLARY BLOOD GLUCOSE        PHYSICAL EXAM:  GENERAL: stable, on vent/trach and PEG, obrien in place.   NECK: supple, No JVD  CHEST/LUNG: clear to auscultation bilaterally; no rales, rhonchi, or wheezing b/l  HEART: normal S1, S2  ABDOMEN: BS+, soft, ND, NT   EXTREMITIES:  pulses palpable; no clubbing, cyanosis, or edema b/l LEs      Labs                          7.1    7.34  )-----------( 279      ( 12 Jan 2024 06:15 )             22.3     01-12    147<H>  |  115<H>  |  141<H>  ----------------------------<  122<H>  3.7   |  25  |  5.48<H>    Ca    8.7      12 Jan 2024 06:15  Phos  6.0     01-12  Mg     3.3     01-12    TPro  7.8  /  Alb  1.7<L>  /  TBili  0.3  /  DBili  x   /  AST  25  /  ALT  15  /  AlkPhos  60  01-11          Urinalysis Basic - ( 12 Jan 2024 06:15 )    Color: x / Appearance: x / SG: x / pH: x  Gluc: 122 mg/dL / Ketone: x  / Bili: x / Urobili: x   Blood: x / Protein: x / Nitrite: x   Leuk Esterase: x / RBC: x / WBC x   Sq Epi: x / Non Sq Epi: x / Bacteria: x                      Radiology and Imaging reviewed.

## 2024-01-12 NOTE — PROGRESS NOTE ADULT - NS ATTEST RISK PROBLEM GEN_ALL_CORE FT
L effusion + atelectasis/mucus plugging, L hemithorax opacification, UTI, anemia, dementia, ROGER on CKD
fever, infectious work up, PNA, mucus plugging, respiratory failure, anemia requiring pRBC transfusuion
recurrent mucus plugging, respiratory failure requiring mechanical vent support, acute on chronic renal failure, anemia requiring pRBC transfusion

## 2024-01-12 NOTE — PROGRESS NOTE ADULT - NS ATTEND AMEND GEN_ALL_CORE FT
pt seen and examined with PA    Hb 7.1 today, being transfused 1 unit pRBC  tolerating weaning trials on PS 10/5  afebrile  no acute events  Cr remains elevated in the mid 5 range  still making urine, but lots of sediments noted in obrien tubing    89M PMH HTN, CKD3, BPH w/ chronic obrien and multiple admissions for CAUTI, dementia, COVID 1/2023 presents for SOB. Pt poor historian so history obtained from record. Pt was brought in by home aid who reports pt c/o SOB. NO fever/chills/sweats, URI sx, N/V/D reported. In ED, pt hypothermic to 95.5 and hypotensive to 90/60s. Labs notable for Hgb 6.3 (+l[place]), dimer 3000, Na 129, BUN/SCr 70/2.91, BNP 7966, UA+. CT CAP demonstrates mild b/l hydronephrosis and large stool burden w/ perirectal stranding c/f stercoral colitis. New L pleural effusion found on CXR. Lung US without good window for thoracentesis. Hospital course with L hemithorax opacification likely from mucus plugging, hypoxic respiratory failure requiring intubation 12/16. bronchoscopy x2 w/ MRSA and Stenotrophomonas PNA. Extubated on 12/24 but required re-intubation. Extubated again 12/27 but failed due to hypercapnia and hypoxia. now s/p Trach / PEG with surgery on 12/29. Hospital course with recurrent anemia Hb 6 range requiring pRBC transfusions. up trending BUN/Cr and fevers.     DX: acute hypoxic and hypercarbic respiratory failure requiring multiple intubations, pleural effusion, mucus plugging with atelectasis of L lung, MRSA and stenotrophomonas PNA, anemia, ROGER on CKD, stercoral colitis, CAUTI, s/p trach and PEG, dementia    - cont mechanical vent support  - daily weaning trials as tolerates: now tolerating pressure support of 10 over 5  - attempted lower pressure support of 8 over 5 however pt pulled low TVs in the 200s range  - cont tracheal suctioning and local trach care. no bleeding noted from trach   - s/p course of vanco for MRSA PNA and completed course of levaquin for Stenotrophomonas  - s/p bronchoscopy x2 in ICU for airway clearance  - CXR 1/4 with L effusion with associated L lung atelectasis with left lung haziness/opacity - worse compared to prior film 12/27 similar to 12/29 film  - transfused 2 units pRBC for anemia on 1/4 with improvement in Hb count. anemia from blood loss? vs renal insufficiency vs underlying infection. Transfused 1 unit pRBC today for Hb 7.1 per primary team  - had some minor oozing around PEG last week, none noted today. cont to monitor.  - ROGER on CKD: renal US with mild R hydronephrosis, Cr rising, obrien with yellow urine. noted on blood work to have elevated eosinophils on CBC. nephrology following, treating possible AIN due to antibx levaquin. being treated with steroids. Trend BUN/Cr. Monitor urine ouput.   - fever work up with negative repeat blood cx and sputum cx. remains afebrile. Last fever noted 1/6. monitoring off antibx at present time  - repeat bedside lung US 1/5/24 with trace R effusion and a small L effusion not amendable for thoracentesis. L effusion small on US with mostly atelectatic lung pattern on L. effusion on L likely also loculated to a degree, will cont to monitor.  - repeat imaging CXR 1/9 with improved aeration of left upper lung field.  - can consider repeat bronchoscopy if recurrent atelectasis, however he has had 2 bronchoscopies already for recurrent mucus plugging. frequent repeat bronchoscopy is not realistic or an ideal long term solution for chronic mucus plugging  - would cont on duonebs, chest PT, tracheal suctioning for airway clearance. hypertonic saline nebs.   - likely eventual d/c planning to vent facility as he remains vent dependent at present time

## 2024-01-12 NOTE — PROGRESS NOTE ADULT - SUBJECTIVE AND OBJECTIVE BOX
INTERVAL HPI/OVERNIGHT EVENTS: No acute events overnight. On trach to vent with 30% FiO2 SpO2 stable.     SUBJECTIVE: Patient seen and examined at bedside.     ROS: unable to obtain ROS 2/2 MS and Trach/ vent dependant     VITAL SIGNS:  ICU Vital Signs Last 24 Hrs  T(C): 37.7 (12 Jan 2024 06:20), Max: 37.7 (12 Jan 2024 06:20)  T(F): 99.8 (12 Jan 2024 06:20), Max: 99.8 (12 Jan 2024 06:20)  HR: 97 (12 Jan 2024 09:07) (84 - 99)  BP: 154/72 (12 Jan 2024 06:20) (154/72 - 163/77)  BP(mean): --  ABP: --  ABP(mean): --  RR: 18 (12 Jan 2024 06:20) (18 - 18)  SpO2: 98% (12 Jan 2024 09:07) (97% - 100%)    O2 Parameters below as of 12 Jan 2024 05:33  Patient On (Oxygen Delivery Method): ventilator          Mode: PS (Pressure Support)/ Spontaneous, FiO2: 30, PEEP: 5, PS: 10, ITime: 1, MAP: 8, PIP: 15  Plateau pressure:   P/F ratio:     01-11 @ 07:01  -  01-12 @ 07:00  --------------------------------------------------------  IN: 0 mL / OUT: 300 mL / NET: -300 mL      CAPILLARY BLOOD GLUCOSE          ECG: reviewed.    PHYSICAL EXAM:    GENERAL: NAD, well-groomed, well-developed  HEAD:  Atraumatic, Normocephalic  EYES: PERRLA, conjunctiva and sclera clear  ENMT: ; Moist mucous membranes  NECK: Trache # 6 XLT  NERVOUS SYSTEM: Awake, alert, does not follow commands  CHEST/LUNG: Mechanical BS B/L, CTA  HEART: Regular rate and rhythm; No murmurs, rubs, or gallops  ABDOMEN: Soft, Nontender, Nondistended; Bowel sounds present, +PEG tube in place   EXTREMITIES:  2+ Peripheral Pulses, No clubbing, cyanosis, or edema      MEDICATIONS:  MEDICATIONS  (STANDING):  albuterol/ipratropium for Nebulization 3 milliLiter(s) Nebulizer every 6 hours  amLODIPine   Tablet 10 milliGRAM(s) Oral daily  chlorhexidine 0.12% Liquid 15 milliLiter(s) Oral Mucosa every 12 hours  chlorhexidine 2% Cloths 1 Application(s) Topical <User Schedule>  collagenase Ointment 1 Application(s) Topical daily  dextrose 5%. 1000 milliLiter(s) (75 mL/Hr) IV Continuous <Continuous>  mirtazapine 15 milliGRAM(s) Oral daily  polyethylene glycol 3350 17 Gram(s) Oral two times a day  predniSONE   Tablet 60 milliGRAM(s) Oral daily  senna 2 Tablet(s) Oral at bedtime  sodium chloride 3%  Inhalation 4 milliLiter(s) Inhalation every 6 hours    MEDICATIONS  (PRN):  acetaminophen     Tablet .. 650 milliGRAM(s) Oral every 6 hours PRN Temp greater or equal to 38C (100.4F), Mild Pain (1 - 3)      ALLERGIES:  Allergies    No Known Allergies    Intolerances        LABS:                        7.1    7.34  )-----------( 279      ( 12 Jan 2024 06:15 )             22.3     01-12    147<H>  |  115<H>  |  141<H>  ----------------------------<  122<H>  3.7   |  25  |  5.48<H>    Ca    8.7      12 Jan 2024 06:15  Phos  6.0     01-12  Mg     3.3     01-12    TPro  7.8  /  Alb  1.7<L>  /  TBili  0.3  /  DBili  x   /  AST  25  /  ALT  15  /  AlkPhos  60  01-11      Urinalysis Basic - ( 12 Jan 2024 06:15 )    Color: x / Appearance: x / SG: x / pH: x  Gluc: 122 mg/dL / Ketone: x  / Bili: x / Urobili: x   Blood: x / Protein: x / Nitrite: x   Leuk Esterase: x / RBC: x / WBC x   Sq Epi: x / Non Sq Epi: x / Bacteria: x      ABG:      vBG:    Micro:    Culture - Blood (collected 01-04-24 @ 12:15)  Source: .Blood Blood-Peripheral  Final Report (01-09-24 @ 19:00):    No growth at 5 days    Culture - Blood (collected 01-04-24 @ 12:15)  Source: .Blood Blood-Peripheral  Final Report (01-09-24 @ 19:00):    No growth at 5 days        Culture - Sputum (collected 01-04-24 @ 21:20)  Source: .Sputum Sputum  Gram Stain (01-09-24 @ 15:07):    Rare Squamous epithelial cells per low power field    No polymorphonuclear cells seen per low power field    Rare Yeast like cells per oil power field  Final Report (01-09-24 @ 15:07):    Normal Respiratory Vanessa present    Culture - Sputum (collected 12-16-23 @ 15:00)  Source: ET Tube ET Tube  Gram Stain (12-18-23 @ 19:19):    Moderate polymorphonuclear leukocytes per low power field    Few Squamous epithelial cells per low power field    Numerous Gram positive cocci in pairs per oil power field    Few Gram Positive Rods per oil power field  Final Report (12-18-23 @ 19:19):    Numerous Methicillin Resistant Staphylococcus aureus    Normal Respiratory Vanessa present  Organism: Methicillin resistant Staphylococcus aureus (12-18-23 @ 19:19)  Organism: Methicillin resistant Staphylococcus aureus (12-18-23 @ 19:19)      -  Clindamycin: S 0.5      -  Oxacillin: R >2      -  Gentamicin: S <=1 Should not be used as monotherapy      -  Linezolid: S 2      -  Cefazolin: R >16      -  Vancomycin: S 2      -  Tetracycline: S <=1      Method Type: JASWINDER      -  Ampicillin/Sulbactam: R >16/8      -  Penicillin: R >8      -  Rifampin: S <=1 Should not be used as monotherapy      -  Erythromycin: R >4      -  Trimethoprim/Sulfamethoxazole: S <=0.5/9.5        RADIOLOGY & ADDITIONAL TESTS: Reviewed.   INTERVAL HPI/OVERNIGHT EVENTS: No acute events overnight. On trach to vent with 30% FiO2 SpO2 stable.     SUBJECTIVE: Patient seen and examined at bedside.     ROS: unable to obtain ROS 2/2 MS and Trach/ vent dependant     VITAL SIGNS:  Vital Signs Last 24 Hrs  T(C): 37.7 (12 Jan 2024 06:20), Max: 37.7 (12 Jan 2024 06:20)  T(F): 99.8 (12 Jan 2024 06:20), Max: 99.8 (12 Jan 2024 06:20)  HR: 97 (12 Jan 2024 09:07) (84 - 99)  BP: 154/72 (12 Jan 2024 06:20) (154/72 - 163/77)  RR: 18 (12 Jan 2024 06:20) (18 - 18)  SpO2: 98% (12 Jan 2024 09:07) (97% - 100%)    O2 Parameters below as of 12 Jan 2024 05:33  Patient On (Oxygen Delivery Method): ventilator          Mode: PS (Pressure Support)/ Spontaneous, FiO2: 30, PEEP: 5, PS: 10, ITime: 1, MAP: 8, PIP: 15      01-11 @ 07:01  -  01-12 @ 07:00  --------------------------------------------------------  IN: 0 mL / OUT: 300 mL / NET: -300 mL        PHYSICAL EXAM:  GENERAL: NAD, elderly male, trached to vent  HEAD:  Atraumatic, R scalp soft tissue growth  EYES:  conjunctiva and sclera clear  ENMT: Moist mucous membranes  NECK: Trach # 6 XLT  NERVOUS SYSTEM: Awake, alert, smiles, waves hands  CHEST/LUNG: Mechanical BS B/L, no wheeze  HEART: Regular rate and rhythm  ABDOMEN: Soft, Nontender, Nondistended; Bowel sounds present, +PEG tube in place   EXTREMITIES:  2+ Peripheral Pulses, No clubbing, cyanosis, or edema      MEDICATIONS:  MEDICATIONS  (STANDING):  albuterol/ipratropium for Nebulization 3 milliLiter(s) Nebulizer every 6 hours  amLODIPine   Tablet 10 milliGRAM(s) Oral daily  chlorhexidine 0.12% Liquid 15 milliLiter(s) Oral Mucosa every 12 hours  chlorhexidine 2% Cloths 1 Application(s) Topical <User Schedule>  collagenase Ointment 1 Application(s) Topical daily  dextrose 5%. 1000 milliLiter(s) (75 mL/Hr) IV Continuous <Continuous>  mirtazapine 15 milliGRAM(s) Oral daily  polyethylene glycol 3350 17 Gram(s) Oral two times a day  predniSONE   Tablet 60 milliGRAM(s) Oral daily  senna 2 Tablet(s) Oral at bedtime  sodium chloride 3%  Inhalation 4 milliLiter(s) Inhalation every 6 hours    MEDICATIONS  (PRN):  acetaminophen     Tablet .. 650 milliGRAM(s) Oral every 6 hours PRN Temp greater or equal to 38C (100.4F), Mild Pain (1 - 3)          LABS:                        7.1    7.34  )-----------( 279      ( 12 Jan 2024 06:15 )             22.3     01-12    147<H>  |  115<H>  |  141<H>  ----------------------------<  122<H>  3.7   |  25  |  5.48<H>    Ca    8.7      12 Jan 2024 06:15  Phos  6.0     01-12  Mg     3.3     01-12    TPro  7.8  /  Alb  1.7<L>  /  TBili  0.3  /  DBili  x   /  AST  25  /  ALT  15  /  AlkPhos  60  01-11          Micro:    Culture - Blood (collected 01-04-24 @ 12:15)  Source: .Blood Blood-Peripheral  Final Report (01-09-24 @ 19:00):    No growth at 5 days    Culture - Blood (collected 01-04-24 @ 12:15)  Source: .Blood Blood-Peripheral  Final Report (01-09-24 @ 19:00):    No growth at 5 days        Culture - Sputum (collected 01-04-24 @ 21:20)  Source: .Sputum Sputum  Gram Stain (01-09-24 @ 15:07):    Rare Squamous epithelial cells per low power field    No polymorphonuclear cells seen per low power field    Rare Yeast like cells per oil power field  Final Report (01-09-24 @ 15:07):    Normal Respiratory Vanessa present    Culture - Sputum (collected 12-16-23 @ 15:00)  Source: ET Tube ET Tube  Gram Stain (12-18-23 @ 19:19):    Moderate polymorphonuclear leukocytes per low power field    Few Squamous epithelial cells per low power field    Numerous Gram positive cocci in pairs per oil power field    Few Gram Positive Rods per oil power field  Final Report (12-18-23 @ 19:19):    Numerous Methicillin Resistant Staphylococcus aureus    Normal Respiratory Vanessa present  Organism: Methicillin resistant Staphylococcus aureus (12-18-23 @ 19:19)  Organism: Methicillin resistant Staphylococcus aureus (12-18-23 @ 19:19)      -  Clindamycin: S 0.5      -  Oxacillin: R >2      -  Gentamicin: S <=1 Should not be used as monotherapy      -  Linezolid: S 2      -  Cefazolin: R >16      -  Vancomycin: S 2      -  Tetracycline: S <=1      Method Type: JASWINDER      -  Ampicillin/Sulbactam: R >16/8      -  Penicillin: R >8      -  Rifampin: S <=1 Should not be used as monotherapy      -  Erythromycin: R >4      -  Trimethoprim/Sulfamethoxazole: S <=0.5/9.5        RADIOLOGY & ADDITIONAL TESTS: Reviewed.  < from: Xray Chest 1 View- PORTABLE-Urgent (Xray Chest 1 View- PORTABLE-Urgent .) (01.09.24 @ 10:36) >  There is a loop recorder overlying the left chest. Tracheostomy tube is   present.  HEART: normal in size.  LUNGS: Layering left effusion, improving aeration in the left upper lobe..  BONES: degenerative changes

## 2024-01-12 NOTE — PROGRESS NOTE ADULT - ASSESSMENT
89M w/ dementia, CKD, chronic urinary retention w/ obrien. Admitted w/ L pleural effusion thought to be due to mucous plugging. Course complicated by worsening hypoxemia requiring intubation and bronchoscopy x2 w/ MRSA pneumonia, stenotrophomonas. Extubated on 12/24 but required re-intubation. Extubated again but failed due to hypercapnia and hypoxia. s/p Trach / PEG.    #acute hypoxic resp failure with trach. s/p peg and trach   #Febrile, resolved   - Completed course Levaquin for Steno in sputum. Remains vent dependent with full support  - Febrile to 101F yesterday. No leukocytosis - BCx negative    - hold on abx - trend fever curve   - pulm following      #ROGER on CKD   - creatinine rising suspect AIN from levaquin     - c/w PO steroids  - Pending CTAP  - Renal u/s mild right hydro  - Checking urine studies   - C/w d5 half NS for hypernatremia   - Nephro following  (1/10) Repeat CT ab/pelvis reviewed. Follow nephrology recommendations. Pt may need dialysis if worsens.  (1/11) D5W @ 75 cc/hr. C/W monitoring Cr.   (1/12) Cr plateau. C/W steroids    #Acute Anemia  - No clear source of bleeding. s/p 2U pRBCs.   - Trend H/H  (1/12) H/H treading down. give 1 prbc today.    #fecal impaction  enema  (1/12) resolved    #urinary retention s/p obrien  (1/12) Change obrien    Diet: TFs  DVT prophylaxis: SQH  Dispo: Eventual D/C to vent facility   Code Status:

## 2024-01-12 NOTE — PROGRESS NOTE ADULT - ASSESSMENT
89M w/ dementia, CKD, chronic urinary retention w/ obrien. Admitted w/ L pleural effusion thought to be due to mucous plugging. Course complicated by worsening hypoxemia requiring intubation and bronchoscopy x2 w/ MRSA pneumonia, stenotrophomonas. Extubated on 12/24 but required re-intubation. Extubated again but failed due to hypercapnia and hypoxia.   12/29 s/p Trach / PEG.    DX: Left pleural effusion, mucous plugging, Hypoxemic respiratory failure, MRSA & Stenotrophomonas PNA.    Recs:    - Acute hypoxic resp failure likely 2/2 mucous plugging with PNA  - 12/29  S/P trach/ PEG # 6 XLT   - continue with daily PSV trials and full vent support at night,   - will likely be slow wean given chronic weakness and recurrent mucous plugging of left lung   - Frequent suctioning, Pulmonary toileting  - C/w duoneb/ hypersal and chest PT   - POCUS 1/5 Trace Right  with small left effusion with a lot of atelectatic compressive areas.   - CT abd/pelvis on 1/10/24 showing trace pericardial effusion and stable large left pleural effusion and small right pleural effusion with underlying passive atelectasis.  - s/p 2 PRBC,1/4/2024 PEG with oozing around insertion site continue to monitor   - h/h holding at this time, hgb this morning 7.1  - Completed course Vanc & Levaquin for MRSA & Steno in sputum  - High risk for VAP, close monitor on fever curve & WBC. TMAX temp 101 on 1/9/24, No leukocytosis - BCx negative   - worsening renal function- CT abdomen/pelvis on 1/10/24 remarkable for Mild bilateral hydronephrosis unchanged.  - Nephrology following, f/u recs for possible need for HD     NOTE INCOMPLETE, PENDING DISCUSSION WITH ATTENDING  89M w/ dementia, CKD, chronic urinary retention w/ obrien. Admitted w/ L pleural effusion and mucous plugging. Course complicated by worsening hypoxemia requiring intubation and bronchoscopy x2 w/ MRSA pneumonia, Stenotrophomonas Extubated on 12/24 but required re-intubation. Extubated again but failed due to hypercapnia and hypoxia. 12/29 s/p Trach / PEG.    DX: Left pleural effusion, mucous plugging, Hypoxemic respiratory failure, MRSA & Stenotrophomonas PNA.    Recs:    - Acute hypoxic resp failure likely 2/2 mucous plugging with PNA  - 12/29  S/P trach/ PEG # 6 XLT   - continue with daily PSV trials and full vent support at night,   - will likely be slow wean given chronic weakness and recurrent mucous plugging of left lung   - Frequent suctioning, Pulmonary toileting  - C/w duoneb/ hypersal and chest PT   - POCUS 1/5 Trace Right  with small left effusion with a lot of atelectatic compressive areas.   - CT abd/pelvis on 1/10/24 showing trace pericardial effusion and stable large left pleural effusion and small right pleural effusion with underlying passive atelectasis.  - s/p 2 PRBC,1/4/2024 PEG with oozing around insertion site continue to monitor   - h/h holding at this time, hgb this morning 7.1  - Completed course Vanc & Levaquin for MRSA & Steno in sputum  - High risk for VAP, close monitor on fever curve & WBC. TMAX temp 101 on 1/9/24, No leukocytosis - BCx negative   - worsening renal function- CT abdomen/pelvis on 1/10/24 remarkable for Mild bilateral hydronephrosis unchanged.  - Nephrology following, f/u recs for possible need for HD

## 2024-01-13 NOTE — PROGRESS NOTE ADULT - SUBJECTIVE AND OBJECTIVE BOX
[facility] NEPHROLOGY SERVICES, [facility]  NEPHROLOGY AND HYPERTENSION  [address]  [address]  [place] [address]  [phone]    MD SOFY KEENAN MD YELENA ROSENBERG, MD BINNY KOSHY, MD CHRISTOPHER CAPUTO, MD EDWARD BOVER, MD          Patient events noted    MEDICATIONS  (STANDING):  albuterol/ipratropium for Nebulization 3 milliLiter(s) Nebulizer every 6 hours  amLODIPine   Tablet 10 milliGRAM(s) Oral daily  chlorhexidine 0.12% Liquid 15 milliLiter(s) Oral Mucosa every 12 hours  chlorhexidine 2% Cloths 1 Application(s) Topical <User Schedule>  collagenase Ointment 1 Application(s) Topical daily  dextrose 5%. 1000 milliLiter(s) (75 mL/Hr) IV Continuous <Continuous>  metoprolol tartrate 12.5 milliGRAM(s) Oral two times a day  mirtazapine 15 milliGRAM(s) Oral daily  polyethylene glycol 3350 17 Gram(s) Oral two times a day  senna 2 Tablet(s) Oral at bedtime  sodium chloride 3%  Inhalation 4 milliLiter(s) Inhalation every 6 hours    MEDICATIONS  (PRN):  acetaminophen     Tablet .. 650 milliGRAM(s) Oral every 6 hours PRN Temp greater or equal to 38C (100.4F), Mild Pain (1 - 3)      01-12-24 @ 07:01  -  01-13-24 @ 07:00  --------------------------------------------------------  IN: 1910 mL / OUT: 1500 mL / NET: 410 mL      PHYSICAL EXAM:      T(C): 36.4 (01-13-24 @ 16:27), Max: 37.4 (01-13-24 @ 05:34)  HR: 90 (01-13-24 @ 22:05) (74 - 96)  BP: 170/73 (01-13-24 @ 16:27) (162/83 - 170/88)  RR: 18 (01-13-24 @ 16:27) (18 - 19)  SpO2: 97% (01-13-24 @ 22:05) (97% - 100%)  Wt(kg): --  Lungs clear  Heart S1S2  Abd soft NT ND  Extremities:   tr edema                                    7.1    7.34  )-----------( 279      ( 12 Jan 2024 06:15 )             22.3     01-13    141  |  110<H>  |  144<H>  ----------------------------<  176<H>  3.6   |  21<L>  |  5.36<H>    Ca    8.6      13 Jan 2024 10:15  Phos  6.0     01-12  Mg     3.3     01-12    TPro  8.0  /  Alb  1.7<L>  /  TBili  0.4  /  DBili  x   /  AST  27  /  ALT  14  /  AlkPhos  78  01-13      LIVER FUNCTIONS - ( 13 Jan 2024 10:15 )  Alb: 1.7 g/dL / Pro: 8.0 gm/dL / ALK PHOS: 78 U/L / ALT: 14 U/L / AST: 27 U/L / GGT: x           Creatinine Trend: 5.36<--, 5.48<--, 5.55<--, 5.49<--, 5.11<--, 5.07<--  Mode: AC/ CMV (Assist Control/ Continuous Mandatory Ventilation)  RR (machine): 26  TV (machine): 400  FiO2: 30  PEEP: 5  ITime: 0.7  MAP: 13  PIP: 30    Assessment   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 NEPHROLOGY SERVICES, [facility]  NEPHROLOGY AND HYPERTENSION  [address]  [address]  [place] [address]  [phone]    MD SOFY KEENAN MD YELENA ROSENBERG, MD BINNY KOSHY, MD CHRISTOPHER CAPUTO, MD EDWARD BOVER, MD          Patient events noted    MEDICATIONS  (STANDING):  albuterol/ipratropium for Nebulization 3 milliLiter(s) Nebulizer every 6 hours  amLODIPine   Tablet 10 milliGRAM(s) Oral daily  chlorhexidine 0.12% Liquid 15 milliLiter(s) Oral Mucosa every 12 hours  chlorhexidine 2% Cloths 1 Application(s) Topical <User Schedule>  collagenase Ointment 1 Application(s) Topical daily  dextrose 5%. 1000 milliLiter(s) (75 mL/Hr) IV Continuous <Continuous>  metoprolol tartrate 12.5 milliGRAM(s) Oral two times a day  mirtazapine 15 milliGRAM(s) Oral daily  polyethylene glycol 3350 17 Gram(s) Oral two times a day  senna 2 Tablet(s) Oral at bedtime  sodium chloride 3%  Inhalation 4 milliLiter(s) Inhalation every 6 hours    MEDICATIONS  (PRN):  acetaminophen     Tablet .. 650 milliGRAM(s) Oral every 6 hours PRN Temp greater or equal to 38C (100.4F), Mild Pain (1 - 3)      01-12-24 @ 07:01  -  01-13-24 @ 07:00  --------------------------------------------------------  IN: 1910 mL / OUT: 1500 mL / NET: 410 mL      PHYSICAL EXAM:      T(C): 36.4 (01-13-24 @ 16:27), Max: 37.4 (01-13-24 @ 05:34)  HR: 90 (01-13-24 @ 22:05) (74 - 96)  BP: 170/73 (01-13-24 @ 16:27) (162/83 - 170/88)  RR: 18 (01-13-24 @ 16:27) (18 - 19)  SpO2: 97% (01-13-24 @ 22:05) (97% - 100%)  Wt(kg): --  Lungs clear  Heart S1S2  Abd soft NT ND  Extremities:   tr edema                                    7.1    7.34  )-----------( 279      ( 12 Jan 2024 06:15 )             22.3     01-13    141  |  110<H>  |  144<H>  ----------------------------<  176<H>  3.6   |  21<L>  |  5.36<H>    Ca    8.6      13 Jan 2024 10:15  Phos  6.0     01-12  Mg     3.3     01-12    TPro  8.0  /  Alb  1.7<L>  /  TBili  0.4  /  DBili  x   /  AST  27  /  ALT  14  /  AlkPhos  78  01-13      LIVER FUNCTIONS - ( 13 Jan 2024 10:15 )  Alb: 1.7 g/dL / Pro: 8.0 gm/dL / ALK PHOS: 78 U/L / ALT: 14 U/L / AST: 27 U/L / GGT: x           Creatinine Trend: 5.36<--, 5.48<--, 5.55<--, 5.49<--, 5.11<--, 5.07<--  Mode: AC/ CMV (Assist Control/ Continuous Mandatory Ventilation)  RR (machine): 26  TV (machine): 400  FiO2: 30  PEEP: 5  ITime: 0.7  MAP: 13  PIP: 30    Assessment   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

## 2024-01-13 NOTE — PROGRESS NOTE ADULT - SUBJECTIVE AND OBJECTIVE BOX
Patient is a 89y old  Male who presents with a chief complaint of Trach peg (09 Jan 2024 14:53)    INTERVAL HPI/OVERNIGHT EVENTS: Patients seen and examined at bedside this morning. No acute events overnight.    MEDICATIONS  (STANDING):  albuterol/ipratropium for Nebulization 3 milliLiter(s) Nebulizer every 6 hours  amLODIPine   Tablet 10 milliGRAM(s) Oral daily  chlorhexidine 0.12% Liquid 15 milliLiter(s) Oral Mucosa every 12 hours  chlorhexidine 2% Cloths 1 Application(s) Topical <User Schedule>  collagenase Ointment 1 Application(s) Topical daily  dextrose 5%. 1000 milliLiter(s) (75 mL/Hr) IV Continuous <Continuous>  mirtazapine 15 milliGRAM(s) Oral daily  polyethylene glycol 3350 17 Gram(s) Oral two times a day  senna 2 Tablet(s) Oral at bedtime  sodium chloride 3%  Inhalation 4 milliLiter(s) Inhalation every 6 hours    MEDICATIONS  (PRN):  acetaminophen     Tablet .. 650 milliGRAM(s) Oral every 6 hours PRN Temp greater or equal to 38C (100.4F), Mild Pain (1 - 3)    Allergies    No Known Allergies    Intolerances      REVIEW OF SYSTEMS:  All other systems reviewed and are negative    Vital Signs Last 24 Hrs  T(C): 37.4 (13 Jan 2024 05:34), Max: 37.4 (13 Jan 2024 05:34)  T(F): 99.3 (13 Jan 2024 05:34), Max: 99.3 (13 Jan 2024 05:34)  HR: 95 (13 Jan 2024 05:55) (88 - 98)  BP: 162/83 (13 Jan 2024 05:34) (131/69 - 170/88)  BP(mean): --  RR: 19 (13 Jan 2024 05:34) (18 - 19)  SpO2: 98% (13 Jan 2024 05:55) (98% - 100%)    Parameters below as of 13 Jan 2024 05:55  Patient On (Oxygen Delivery Method): ventilator      Daily     Daily   I&O's Summary    12 Jan 2024 07:01  -  13 Jan 2024 07:00  --------------------------------------------------------  IN: 1910 mL / OUT: 1500 mL / NET: 410 mL      CAPILLARY BLOOD GLUCOSE        PHYSICAL EXAM:  GENERAL: stable, on vent/trach and PEG, obrien in place.   NECK: supple, No JVD  CHEST/LUNG: clear to auscultation bilaterally; no rales, rhonchi, or wheezing b/l  HEART: normal S1, S2  ABDOMEN: BS+, soft, ND, NT   EXTREMITIES:  pulses palpable; no clubbing, cyanosis, or edema b/l LEs    Labs                          7.1    7.34  )-----------( 279      ( 12 Jan 2024 06:15 )             22.3     01-12    147<H>  |  115<H>  |  141<H>  ----------------------------<  122<H>  3.7   |  25  |  5.48<H>    Ca    8.7      12 Jan 2024 06:15  Phos  6.0     01-12  Mg     3.3     01-12            Urinalysis Basic - ( 12 Jan 2024 06:15 )    Color: x / Appearance: x / SG: x / pH: x  Gluc: 122 mg/dL / Ketone: x  / Bili: x / Urobili: x   Blood: x / Protein: x / Nitrite: x   Leuk Esterase: x / RBC: x / WBC x   Sq Epi: x / Non Sq Epi: x / Bacteria: x                      Radiology and Imaging reviewed.

## 2024-01-14 NOTE — PROGRESS NOTE ADULT - ASSESSMENT
89M w/ dementia, CKD, chronic urinary retention w/ obrien. Admitted w/ L pleural effusion thought to be due to mucous plugging. Course complicated by worsening hypoxemia requiring intubation and bronchoscopy x2 w/ MRSA pneumonia, stenotrophomonas. Extubated on 12/24 but required re-intubation. Extubated again but failed due to hypercapnia and hypoxia. s/p Trach / PEG.    #acute hypoxic resp failure with trach. s/p peg and trach   #Febrile, resolved   - Completed course Levaquin for Steno in sputum. Remains vent dependent with full support  - Febrile to 101F yesterday. No leukocytosis - BCx negative    - hold on abx - trend fever curve   - pulm following      #ROGER on CKD   - creatinine rising suspect AIN from levaquin     - c/w PO steroids  - Pending CTAP  - Renal u/s mild right hydro  - Checking urine studies   - C/w d5 half NS for hypernatremia   - Nephro following  (1/10) Repeat CT ab/pelvis reviewed. Follow nephrology recommendations. Pt may need dialysis if worsens.  (1/11) D5W @ 75 cc/hr. C/W monitoring Cr.   (1/12) Cr plateau. C/W steroids  (1/14) Cr/ plateau steroids complete.    #Acute Anemia  - No clear source of bleeding. s/p 2U pRBCs.   - Trend H/H  (1/12) H/H treading down. give 1 prbc today.  (1/14) H/H improved. stable at 8.1  #fecal impaction  enema  (1/12) resolved    #urinary retention s/p obrien  (1/12) Change obrien  (1/14) Obrien changed as of 1/13  Diet: TFs  DVT prophylaxis: SQH  Dispo: Eventual D/C to vent facility   Code Status: FC     (1/14) Prepare for discharge to vent facility. C/W monitoring Cr. F/U with nephro

## 2024-01-14 NOTE — PROGRESS NOTE ADULT - SUBJECTIVE AND OBJECTIVE BOX
Patient is a 89y old  Male who presents with a chief complaint of AHRF (13 Jan 2024 09:00)    INTERVAL HPI/OVERNIGHT EVENTS: Patients seen and examined at bedside this morning. No acute events overnight.     MEDICATIONS  (STANDING):  albuterol/ipratropium for Nebulization 3 milliLiter(s) Nebulizer every 6 hours  amLODIPine   Tablet 10 milliGRAM(s) Oral daily  chlorhexidine 0.12% Liquid 15 milliLiter(s) Oral Mucosa every 12 hours  chlorhexidine 2% Cloths 1 Application(s) Topical <User Schedule>  collagenase Ointment 1 Application(s) Topical daily  dextrose 5%. 1000 milliLiter(s) (75 mL/Hr) IV Continuous <Continuous>  metoprolol tartrate 12.5 milliGRAM(s) Oral two times a day  mirtazapine 15 milliGRAM(s) Oral daily  polyethylene glycol 3350 17 Gram(s) Oral two times a day  senna 2 Tablet(s) Oral at bedtime  sodium chloride 3%  Inhalation 4 milliLiter(s) Inhalation every 6 hours    MEDICATIONS  (PRN):  acetaminophen     Tablet .. 650 milliGRAM(s) Oral every 6 hours PRN Temp greater or equal to 38C (100.4F), Mild Pain (1 - 3)    Allergies    No Known Allergies    Intolerances      REVIEW OF SYSTEMS:  All other systems reviewed and are negative    Vital Signs Last 24 Hrs  T(C): 36.2 (14 Jan 2024 12:12), Max: 36.4 (13 Jan 2024 16:27)  T(F): 97.1 (14 Jan 2024 12:12), Max: 97.6 (13 Jan 2024 16:27)  HR: 72 (14 Jan 2024 12:12) (70 - 95)  BP: 123/69 (14 Jan 2024 12:12) (123/69 - 191/72)  BP(mean): --  RR: 19 (14 Jan 2024 12:12) (18 - 19)  SpO2: 99% (14 Jan 2024 12:12) (97% - 100%)    Parameters below as of 14 Jan 2024 11:11  Patient On (Oxygen Delivery Method): ventilator      Daily     Daily   I&O's Summary    13 Jan 2024 07:01  -  14 Jan 2024 07:00  --------------------------------------------------------  IN: 1050 mL / OUT: 750 mL / NET: 300 mL      CAPILLARY BLOOD GLUCOSE      POCT Blood Glucose.: 142 mg/dL (14 Jan 2024 14:21)    PHYSICAL EXAM:  GENERAL: stable, on vent/trach and PEG, obrien in place.   NECK: supple, No JVD  CHEST/LUNG: clear to auscultation bilaterally; no rales, rhonchi, or wheezing b/l  HEART: normal S1, S2  ABDOMEN: BS+, soft, ND, NT   EXTREMITIES:  pulses palpable; no clubbing, cyanosis, or edema b/l LEs        Labs                          8.1    7.65  )-----------( 285      ( 14 Jan 2024 09:35 )             24.0     01-14    135  |  105  |  143<H>  ----------------------------<  121<H>  3.3<L>   |  19<L>  |  5.03<H>    Ca    8.0<L>      14 Jan 2024 09:35    TPro  7.2  /  Alb  1.6<L>  /  TBili  0.3  /  DBili  x   /  AST  29  /  ALT  14  /  AlkPhos  74  01-14          Urinalysis Basic - ( 14 Jan 2024 09:35 )    Color: x / Appearance: x / SG: x / pH: x  Gluc: 121 mg/dL / Ketone: x  / Bili: x / Urobili: x   Blood: x / Protein: x / Nitrite: x   Leuk Esterase: x / RBC: x / WBC x   Sq Epi: x / Non Sq Epi: x / Bacteria: x                      Radiology and Imaging reviewed.

## 2024-01-15 NOTE — PROGRESS NOTE ADULT - ASSESSMENT
89M w/ dementia, CKD, chronic urinary retention w/ obrien. Admitted w/ L pleural effusion thought to be due to mucous plugging. Course complicated by worsening hypoxemia requiring intubation and bronchoscopy x2 w/ MRSA pneumonia, stenotrophomonas. Extubated on 12/24 but required re-intubation. Extubated again but failed due to hypercapnia and hypoxia. s/p Trach / PEG.    #acute hypoxic resp failure with trach. s/p peg and trach , FiO2 is 30%  #Febrile, resolved   - Completed course Levaquin for Steno in sputum. Remains vent dependent with full support  - Febrile to 101F yesterday. No leukocytosis - BCx negative    - hold on abx - trend fever curve   - pulm following      #ROGER on CKD   - creatinine rising suspect AIN from levaquin     - c/w PO steroids  - Pending CTAP  - Renal u/s mild right hydro  - Checking urine studies   - C/w d5 half NS for hypernatremia   - Nephro following  (1/10) Repeat CT ab/pelvis reviewed. Follow nephrology recommendations. Pt may need dialysis if worsens.  (1/11) D5W @ 75 cc/hr. C/W monitoring Cr.   (1/12) Cr plateau. C/W steroids  (1/14) Cr/ plateau steroids complete.    #Acute Anemia  - No clear source of bleeding. s/p 2U pRBCs.   - Trend H/H  (1/12) H/H treading down. give 1 prbc today.  (1/14) H/H improved. stable at 8.1  #fecal impaction  enema  (1/12) resolved    #urinary retention s/p obrien  (1/12) Change obrien  (1/14) Obrien changed as of 1/13  Diet: TFs  DVT prophylaxis: SQH  Dispo: Eventual D/C to vent facility   Code Status: FC     (1/14) Prepare for discharge to vent facility. C/W monitoring Cr. F/U with nephro 89M w/ dementia, CKD, chronic urinary retention w/ obrien. Admitted w/ L pleural effusion thought to be due to mucous plugging. Course complicated by worsening hypoxemia requiring intubation and bronchoscopy x2 w/ MRSA pneumonia, stenotrophomonas. Extubated on 12/24 but required re-intubation. Extubated again but failed due to hypercapnia and hypoxia. s/p Trach / PEG.    #acute hypoxic resp failure with trach. s/p peg and trach , FiO2 is 30%  #Febrile, resolved   - Completed course Levaquin for Steno in sputum. Remains vent dependent with full support  - Febrile to 101F yesterday. No leukocytosis - BCx negative    - hold on abx - trend fever curve   - pulm following      #ROGER on CKD   - creatinine rising suspect AIN from levaquin     - c/w PO steroids  - Pending CTAP  - Renal u/s mild right hydro  - Checking urine studies   - C/w d5 half NS for hypernatremia   - Nephro following  (1/10) Repeat CT ab/pelvis reviewed. Follow nephrology recommendations. Pt may need dialysis if worsens.  (1/11) D5W @ 75 cc/hr. C/W monitoring Cr.   (1/12) Cr plateau. C/W steroids  (1/14) Cr/ plateau steroids complete.    #Acute Anemia  - No clear source of bleeding. s/p 2U pRBCs.   - Trend H/H  (1/12) H/H treading down. give 1 prbc today.  (1/14) H/H improved. stable at 8.1  #fecal impaction  enema  (1/12) resolved    Hyponatremia   reduce free water to 100 ml     #urinary retention s/p obrien  (1/12) Change obrien  (1/14) Obrien changed as of 1/13  Diet: TFs  DVT prophylaxis: [facility]  Dispo: Eventual D/C to vent facility   Code Status: FC     (1/14) Prepare for discharge to vent facility. C/W monitoring Cr. F/U with nephro 89M w/ dementia, CKD, chronic urinary retention w/ obrien. Admitted w/ L pleural effusion thought to be due to mucous plugging. Course complicated by worsening hypoxemia requiring intubation and bronchoscopy x2 w/ MRSA pneumonia, stenotrophomonas. Extubated on 12/24 but required re-intubation. Extubated again but failed due to hypercapnia and hypoxia. s/p Trach / PEG.    #acute hypoxic resp failure with trach. s/p peg and trach , FiO2 is 30%  #Febrile, resolved   - Completed course Levaquin for Steno in sputum. Remains vent dependent with full support  - Febrile to 101F yesterday. No leukocytosis - BCx negative    - hold on abx - trend fever curve   - pulm following      #ROGER on CKD   - creatinine rising suspect AIN from levaquin     - c/w PO steroids  - Pending CTAP  - Renal u/s mild right hydro  - Checking urine studies   - C/w d5 half NS for hypernatremia   - Nephro following  (1/10) Repeat CT ab/pelvis reviewed. Follow nephrology recommendations. Pt may need dialysis if worsens.  (1/11) D5W @ 75 cc/hr. C/W monitoring Cr.   (1/12) Cr plateau. C/W steroids  (1/14) Cr/ plateau steroids complete.    #Acute Anemia  - No clear source of bleeding. s/p 2U pRBCs.   - Trend H/H  (1/12) H/H treading down. give 1 prbc today.  (1/14) H/H improved. stable at 8.1  #fecal impaction  enema  (1/12) resolved    Hyponatremia   reduce free water to 100 ml     #urinary retention s/p obrien  (1/12) Change obrien  (1/14) Obrien changed as of 1/13  Diet: TFs  DVT prophylaxis: [facility]  Dispo: Eventual D/C to vent facility   Code Status: FC     (1/14) Prepare for discharge to vent facility. C/W monitoring Cr. F/U with nephro 89M w/ dementia, CKD, chronic urinary retention w/ obrien. Admitted w/ L pleural effusion thought to be due to mucous plugging. Course complicated by worsening hypoxemia requiring intubation and bronchoscopy x2 w/ MRSA pneumonia, stenotrophomonas. Extubated on 12/24 but required re-intubation. Extubated again but failed due to hypercapnia and hypoxia. s/p Trach / PEG.    #acute hypoxic resp failure with trach. s/p peg and trach , FiO2 is 30%  #Febrile, resolved   - Completed course Levaquin for Steno in sputum. Remains vent dependent with full support  - Febrile to 101F yesterday. No leukocytosis - BCx negative    - hold on abx - trend fever curve   - pulm following      #ROGER on CKD   - creatinine rising suspect AIN from levaquin     - c/w PO steroids  - Pending CTAP  - Renal u/s mild right hydro  - Checking urine studies   - C/w d5 half NS for hypernatremia   - Nephro following  (1/10) Repeat CT ab/pelvis reviewed. Follow nephrology recommendations. Pt may need dialysis if worsens.  (1/11) D5W @ 75 cc/hr. C/W monitoring Cr.   (1/12) Cr plateau. C/W steroids  (1/14) Cr/ plateau steroids complete.    #Acute Anemia  - No clear source of bleeding. s/p 2U pRBCs.   - Trend H/H  (1/12) H/H treading down. give 1 prbc today.  (1/14) H/H improved. stable at 8.1  #fecal impaction  enema  (1/12) resolved    Hyponatremia   reduce free water to 100 ml     #urinary retention s/p obrien  (1/12) Change obrien  (1/14) Obrien changed as of 1/13  Diet: TFs  DVT prophylaxis: [facility]  Dispo: Eventual D/C to vent facility   Code Status: FC     (1/14) Prepare for discharge to vent facility. C/W monitoring Cr. F/U with nephro

## 2024-01-15 NOTE — PROGRESS NOTE ADULT - SUBJECTIVE AND OBJECTIVE BOX
Medicine Progress Note    Patient is a 89y old  Male who presents with a chief complaint of AHRF (14 Jan 2024 15:14)      SUBJECTIVE / OVERNIGHT EVENTS: Patient is confused , trach/ Vent , PEG     ADDITIONAL REVIEW OF SYSTEMS: as above     MEDICATIONS  (STANDING):    albuterol/ipratropium for Nebulization 3 milliLiter(s) Nebulizer every 6 hours  amLODIPine   Tablet 10 milliGRAM(s) Oral daily  chlorhexidine 0.12% Liquid 15 milliLiter(s) Oral Mucosa every 12 hours  chlorhexidine 2% Cloths 1 Application(s) Topical <User Schedule>  collagenase Ointment 1 Application(s) Topical daily  metoprolol tartrate 12.5 milliGRAM(s) Oral two times a day  mirtazapine 15 milliGRAM(s) Oral daily  polyethylene glycol 3350 17 Gram(s) Oral two times a day  senna 2 Tablet(s) Oral at bedtime  sodium chloride 3%  Inhalation 4 milliLiter(s) Inhalation every 6 hours    MEDICATIONS  (PRN):  acetaminophen     Tablet .. 650 milliGRAM(s) Oral every 6 hours PRN Temp greater or equal to 38C (100.4F), Mild Pain (1 - 3)    CAPILLARY BLOOD GLUCOSE        I&O's Summary    14 Jan 2024 07:01  -  15 Gelacio 2024 07:00  --------------------------------------------------------  IN: 1200 mL / OUT: 1050 mL / NET: 150 mL        PHYSICAL EXAM:  Vital Signs Last 24 Hrs  T(C): 37 (15 Gelacio 2024 10:32), Max: 37.2 (15 Gelacio 2024 04:00)  T(F): 98.6 (15 Gelacio 2024 10:32), Max: 99 (15 Gelacio 2024 04:00)  HR: 88 (15 Gelacio 2024 12:35) (71 - 91)  BP: 114/87 (15 Gelacio 2024 10:32) (114/87 - 160/77)  BP(mean): --  RR: 28 (15 Gelacio 2024 04:00) (17 - 28)  SpO2: 100% (15 Gelacio 2024 12:35) (97% - 100%)    Parameters below as of 15 Gelacio 2024 10:32  Patient On (Oxygen Delivery Method): ventilator      CONSTITUTIONAL: NAD,   ENMT: Moist oral mucosa, trach   RESPIRATORY: Normal respiratory effort; lungs are dim  to auscultation bilaterally  CARDIOVASCULAR: Regular rate and rhythm, normal S1 and S2,; No lower extremity edema;   ABDOMEN: Nontender to palpation,PEG  PSYCH: Alert , not oriented   NEUROLOGY: not cooperating   SKIN: no rash    LABS:                        8.1    7.65  )-----------( 285      ( 14 Jan 2024 09:35 )             24.0     01-15    129<L>  |  99  |  141<H>  ----------------------------<  113<H>  3.4<L>   |  21<L>  |  4.75<H>    Ca    7.6<L>      15 Gelacio 2024 06:15    TPro  7.2  /  Alb  1.6<L>  /  TBili  0.3  /  DBili  x   /  AST  29  /  ALT  14  /  AlkPhos  74  01-14          Urinalysis Basic - ( 15 Gelacio 2024 06:15 )    Color: x / Appearance: x / SG: x / pH: x  Gluc: 113 mg/dL / Ketone: x  / Bili: x / Urobili: x   Blood: x / Protein: x / Nitrite: x   Leuk Esterase: x / RBC: x / WBC x   Sq Epi: x / Non Sq Epi: x / Bacteria: x        SARS-CoV-2: NotDetec (14 Dec 2023 13:00)      RADIOLOGY & ADDITIONAL TESTS:  Imaging from Last 24 Hours:    Electrocardiogram/QTc Interval:    COORDINATION OF CARE:  Care Discussed with Consultants/Other Providers:

## 2024-01-15 NOTE — PROGRESS NOTE ADULT - SUBJECTIVE AND OBJECTIVE BOX
[facility] NEPHROLOGY SERVICES, [facility]  NEPHROLOGY AND HYPERTENSION  [address]  [address]  [place] [address]  [phone]    MD SOFY KEENAN, MD KEATON FRYE MD CHRISTOPHER CAPUTO, MD LEANNE MURRAY MD          Patient events noted    MEDICATIONS  (STANDING):  albuterol/ipratropium for Nebulization 3 milliLiter(s) Nebulizer every 6 hours  amLODIPine   Tablet 10 milliGRAM(s) Oral daily  chlorhexidine 0.12% Liquid 15 milliLiter(s) Oral Mucosa every 12 hours  chlorhexidine 2% Cloths 1 Application(s) Topical <User Schedule>  collagenase Ointment 1 Application(s) Topical daily  metoprolol tartrate 12.5 milliGRAM(s) Oral two times a day  mirtazapine 15 milliGRAM(s) Oral daily  polyethylene glycol 3350 17 Gram(s) Oral two times a day  senna 2 Tablet(s) Oral at bedtime  sodium chloride 3%  Inhalation 4 milliLiter(s) Inhalation every 6 hours    MEDICATIONS  (PRN):  acetaminophen     Tablet .. 650 milliGRAM(s) Oral every 6 hours PRN Temp greater or equal to 38C (100.4F), Mild Pain (1 - 3)      01-14-24 @ 07:01  -  01-15-24 @ 07:00  --------------------------------------------------------  IN: 1200 mL / OUT: 1050 mL / NET: 150 mL      PHYSICAL EXAM:      T(C): 36.8 (01-15-24 @ 16:26), Max: 37.2 (01-15-24 @ 04:00)  HR: 84 (01-15-24 @ 21:10) (75 - 93)  BP: 124/72 (01-15-24 @ 16:26) (114/87 - 160/77)  RR: 18 (01-15-24 @ 16:26) (17 - 28)  SpO2: 100% (01-15-24 @ 21:10) (98% - 100%)  Wt(kg): --  Lungs clear  Heart S1S2  Abd soft NT ND  Extremities:   tr edema                                    8.1    7.65  )-----------( 285      ( 14 Jan 2024 09:35 )             24.0     01-15    129<L>  |  99  |  141<H>  ----------------------------<  113<H>  3.4<L>   |  21<L>  |  4.75<H>    Ca    7.6<L>      15 Gelacio 2024 06:15    TPro  7.2  /  Alb  1.6<L>  /  TBili  0.3  /  DBili  x   /  AST  29  /  ALT  14  /  AlkPhos  74  01-14      LIVER FUNCTIONS - ( 14 Jan 2024 09:35 )  Alb: 1.6 g/dL / Pro: 7.2 gm/dL / ALK PHOS: 74 U/L / ALT: 14 U/L / AST: 29 U/L / GGT: x           Creatinine Trend: 4.75<--, 5.03<--, 5.36<--, 5.48<--, 5.55<--, 5.49<--  Mode: AC/ CMV (Assist Control/ Continuous Mandatory Ventilation)  RR (machine): 26  TV (machine): 400  FiO2: 30  PEEP: 5  ITime: 1  MAP: 14  PIP: 38      Assessment   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, strongly suggesting tubulointerstitial nephritis  Mild bilateral hydronephrosis unclear significance, Cr 11/30 scan 2.8, similar CT findings  Persistent anemia, normocytic  Hypernatremia  Slowly improving     Plan  D/C steroids  D/C D5W  Discussed with son at bedside  Supportive care      Samy Noriega MD [facility] NEPHROLOGY SERVICES, [facility]  NEPHROLOGY AND HYPERTENSION  [address]  [address]  [place] [address]  [phone]    MD SOFY KEENAN, MD KEATON FRYE MD CHRISTOPHER CAPUTO, MD LEANNE MURRAY MD          Patient events noted    MEDICATIONS  (STANDING):  albuterol/ipratropium for Nebulization 3 milliLiter(s) Nebulizer every 6 hours  amLODIPine   Tablet 10 milliGRAM(s) Oral daily  chlorhexidine 0.12% Liquid 15 milliLiter(s) Oral Mucosa every 12 hours  chlorhexidine 2% Cloths 1 Application(s) Topical <User Schedule>  collagenase Ointment 1 Application(s) Topical daily  metoprolol tartrate 12.5 milliGRAM(s) Oral two times a day  mirtazapine 15 milliGRAM(s) Oral daily  polyethylene glycol 3350 17 Gram(s) Oral two times a day  senna 2 Tablet(s) Oral at bedtime  sodium chloride 3%  Inhalation 4 milliLiter(s) Inhalation every 6 hours    MEDICATIONS  (PRN):  acetaminophen     Tablet .. 650 milliGRAM(s) Oral every 6 hours PRN Temp greater or equal to 38C (100.4F), Mild Pain (1 - 3)      01-14-24 @ 07:01  -  01-15-24 @ 07:00  --------------------------------------------------------  IN: 1200 mL / OUT: 1050 mL / NET: 150 mL      PHYSICAL EXAM:      T(C): 36.8 (01-15-24 @ 16:26), Max: 37.2 (01-15-24 @ 04:00)  HR: 84 (01-15-24 @ 21:10) (75 - 93)  BP: 124/72 (01-15-24 @ 16:26) (114/87 - 160/77)  RR: 18 (01-15-24 @ 16:26) (17 - 28)  SpO2: 100% (01-15-24 @ 21:10) (98% - 100%)  Wt(kg): --  Lungs clear  Heart S1S2  Abd soft NT ND  Extremities:   tr edema                                    8.1    7.65  )-----------( 285      ( 14 Jan 2024 09:35 )             24.0     01-15    129<L>  |  99  |  141<H>  ----------------------------<  113<H>  3.4<L>   |  21<L>  |  4.75<H>    Ca    7.6<L>      15 Gelacio 2024 06:15    TPro  7.2  /  Alb  1.6<L>  /  TBili  0.3  /  DBili  x   /  AST  29  /  ALT  14  /  AlkPhos  74  01-14      LIVER FUNCTIONS - ( 14 Jan 2024 09:35 )  Alb: 1.6 g/dL / Pro: 7.2 gm/dL / ALK PHOS: 74 U/L / ALT: 14 U/L / AST: 29 U/L / GGT: x           Creatinine Trend: 4.75<--, 5.03<--, 5.36<--, 5.48<--, 5.55<--, 5.49<--  Mode: AC/ CMV (Assist Control/ Continuous Mandatory Ventilation)  RR (machine): 26  TV (machine): 400  FiO2: 30  PEEP: 5  ITime: 1  MAP: 14  PIP: 38      Assessment   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

## 2024-01-16 NOTE — PROGRESS NOTE ADULT - SUBJECTIVE AND OBJECTIVE BOX
Medicine Progress Note    Patient is a 89y old  Male who presents with a chief complaint of AHRF (14 Jan 2024 15:14)      SUBJECTIVE / OVERNIGHT EVENTS: Patient is confused , trach/ Vent , PEG    MEDICATIONS  (STANDING):  albuterol/ipratropium for Nebulization 3 milliLiter(s) Nebulizer every 6 hours  amLODIPine   Tablet 10 milliGRAM(s) Oral daily  chlorhexidine 0.12% Liquid 15 milliLiter(s) Oral Mucosa every 12 hours  chlorhexidine 2% Cloths 1 Application(s) Topical <User Schedule>  collagenase Ointment 1 Application(s) Topical daily  metoprolol tartrate 12.5 milliGRAM(s) Oral two times a day  mirtazapine 15 milliGRAM(s) Oral daily  polyethylene glycol 3350 17 Gram(s) Oral two times a day  senna 2 Tablet(s) Oral at bedtime  sodium chloride 3%  Inhalation 4 milliLiter(s) Inhalation every 6 hours    MEDICATIONS  (PRN):  acetaminophen     Tablet .. 650 milliGRAM(s) Oral every 6 hours PRN Temp greater or equal to 38C (100.4F), Mild Pain (1 - 3)    Vital Signs Last 24 Hrs  T(C): 36.8 (16 Jan 2024 11:50), Max: 37.3 (15 Gelacio 2024 23:42)  T(F): 98.2 (16 Jan 2024 11:50), Max: 99.2 (15 Gelacio 2024 23:42)  HR: 69 (16 Jan 2024 11:50) (69 - 93)  BP: 150/76 (16 Jan 2024 11:50) (124/72 - 150/76)  BP(mean): --  RR: 18 (16 Jan 2024 06:10) (18 - 18)  SpO2: 100% (16 Jan 2024 08:45) (98% - 100%)    Parameters below as of 16 Jan 2024 01:14  Patient On (Oxygen Delivery Method): ventilator      PHYSICAL EXAM:    Vital Signs Last 24 Hrs  T(C): 36.8 (16 Jan 2024 11:50), Max: 37.3 (15 Gelacio 2024 23:42)  T(F): 98.2 (16 Jan 2024 11:50), Max: 99.2 (15 Gelacio 2024 23:42)  HR: 69 (16 Jan 2024 11:50) (69 - 93)  BP: 150/76 (16 Jan 2024 11:50) (124/72 - 150/76)  BP(mean): --  RR: 18 (16 Jan 2024 06:10) (18 - 18)  SpO2: 100% (16 Jan 2024 08:45) (98% - 100%)    Parameters below as of 16 Jan 2024 01:14  Patient On (Oxygen Delivery Method): ventilator      CONSTITUTIONAL: NAD,   ENMT: Moist oral mucosa, trach   RESPIRATORY: Normal respiratory effort; lungs are dim  to auscultation bilaterally  CARDIOVASCULAR: Regular rate and rhythm, normal S1 and S2,; No lower extremity edema;   ABDOMEN: Nontender to palpation,PEG  PSYCH: Alert , not oriented   NEUROLOGY: not cooperating   SKIN: no rash    LABS:                                 8.2    8.81  )-----------( 314      ( 16 Jan 2024 07:55 )             25.1   01-16    129<L>  |  99  |  138<H>  ----------------------------<  101<H>  3.8   |  21<L>  |  4.74<H>    Ca    7.6<L>      16 Jan 2024 07:55          Urinalysis Basic - ( 15 Gelacio 2024 06:15 )    Color: x / Appearance: x / SG: x / pH: x  Gluc: 113 mg/dL / Ketone: x  / Bili: x / Urobili: x   Blood: x / Protein: x / Nitrite: x   Leuk Esterase: x / RBC: x / WBC x   Sq Epi: x / Non Sq Epi: x / Bacteria: x        SARS-CoV-2: NotDetec (14 Dec 2023 13:00)      RADIOLOGY & ADDITIONAL TESTS:    Imaging from Last 24 Hours:    Electrocardiogram/QTc Interval:    COORDINATION OF CARE:  Care Discussed with Consultants/Other Providers:    Medicine Progress Note    Patient is a 89y old  Male who presents with a chief complaint of AHRF (14 Jan 2024 15:14)      SUBJECTIVE / OVERNIGHT EVENTS: Patient is confused , trach/ Vent , PEG    MEDICATIONS  (STANDING):  albuterol/ipratropium for Nebulization 3 milliLiter(s) Nebulizer every 6 hours  amLODIPine   Tablet 10 milliGRAM(s) Oral daily  chlorhexidine 0.12% Liquid 15 milliLiter(s) Oral Mucosa every 12 hours  chlorhexidine 2% Cloths 1 Application(s) Topical <User Schedule>  collagenase Ointment 1 Application(s) Topical daily  metoprolol tartrate 12.5 milliGRAM(s) Oral two times a day  mirtazapine 15 milliGRAM(s) Oral daily  polyethylene glycol 3350 17 Gram(s) Oral two times a day  senna 2 Tablet(s) Oral at bedtime  sodium chloride 3%  Inhalation 4 milliLiter(s) Inhalation every 6 hours    MEDICATIONS  (PRN):  acetaminophen     Tablet .. 650 milliGRAM(s) Oral every 6 hours PRN Temp greater or equal to 38C (100.4F), Mild Pain (1 - 3)    Vital Signs Last 24 Hrs  T(C): 36.8 (16 Jan 2024 11:50), Max: 37.3 (15 Gelacio 2024 23:42)  T(F): 98.2 (16 Jan 2024 11:50), Max: 99.2 (15 Gelacio 2024 23:42)  HR: 69 (16 Jan 2024 11:50) (69 - 93)  BP: 150/76 (16 Jan 2024 11:50) (124/72 - 150/76)  BP(mean): --  RR: 18 (16 Jan 2024 06:10) (18 - 18)  SpO2: 100% (16 Jan 2024 08:45) (98% - 100%)    Parameters below as of 16 Jan 2024 01:14  Patient On (Oxygen Delivery Method): ventilator      PHYSICAL EXAM:    Vital Signs Last 24 Hrs  T(C): 36.8 (16 Jan 2024 11:50), Max: 37.3 (15 Gelacio 2024 23:42)  T(F): 98.2 (16 Jan 2024 11:50), Max: 99.2 (15 Gelacoi 2024 23:42)  HR: 69 (16 Jan 2024 11:50) (69 - 93)  BP: 150/76 (16 Jan 2024 11:50) (124/72 - 150/76)  BP(mean): --  RR: 18 (16 Jan 2024 06:10) (18 - 18)  SpO2: 100% (16 Jan 2024 08:45) (98% - 100%)    Parameters below as of 16 Jan 2024 01:14  Patient On (Oxygen Delivery Method): ventilator      CONSTITUTIONAL: NAD,   ENMT: Moist oral mucosa, trach   RESPIRATORY: Normal respiratory effort; lungs are dim  to auscultation bilaterally  CARDIOVASCULAR: Regular rate and rhythm, normal S1 and S2,; No lower extremity edema;   ABDOMEN: Nontender to palpation,PEG  PSYCH: Alert , not oriented   NEUROLOGY: not cooperating   SKIN: no rash    LABS:                                 8.2    8.81  )-----------( 314      ( 16 Jan 2024 07:55 )             25.1   01-16    129<L>  |  99  |  138<H>  ----------------------------<  101<H>  3.8   |  21<L>  |  4.74<H>    Ca    7.6<L>      16 Jan 2024 07:55          Urinalysis Basic - ( 15 Gelacio 2024 06:15 )    Color: x / Appearance: x / SG: x / pH: x  Gluc: 113 mg/dL / Ketone: x  / Bili: x / Urobili: x   Blood: x / Protein: x / Nitrite: x   Leuk Esterase: x / RBC: x / WBC x   Sq Epi: x / Non Sq Epi: x / Bacteria: x        SARS-CoV-2: NotDetec (14 Dec 2023 13:00)      RADIOLOGY & ADDITIONAL TESTS:    Imaging from Last 24 Hours:    Electrocardiogram/QTc Interval:    COORDINATION OF CARE:  Care Discussed with Consultants/Other Providers:

## 2024-01-16 NOTE — PROGRESS NOTE ADULT - ASSESSMENT
89M w/ dementia, CKD, chronic urinary retention w/ obrien. Admitted w/ L pleural effusion thought to be due to mucous plugging. Course complicated by worsening hypoxemia requiring intubation and bronchoscopy x2 w/ MRSA pneumonia, stenotrophomonas. Extubated on 12/24 but required re-intubation. Extubated again but failed due to hypercapnia and hypoxia.   12/29 s/p Trach / PEG.    DX: Left pleural effusion, mucous plugging, Hypoxemic respiratory failure, MRSA & Stenotrophomonas PNA.    Recs:    - Acute hypoxic resp failure likely 2/2 mucous plugging with PNA  - 12/29  S/P trach/ PEG # 6 XLT   - continue with daily PSV trials and full vent support at night,   - will likely be slow wean given chronic weakness and recurrent mucous plugging of left lung   - Frequent suctioning, Pulmonary toileting  - will re-add duoneb/ hypersal and chest PT   - POCUS 1/5 Trace Right  with small left effusion with a lot of atelectatic compressive areas.   - Completed course Vanc & Levaquin for MRSA & Steno in sputum  - High risk for VAP, close monitor on fever curve & WBC     note incomplete

## 2024-01-16 NOTE — PROGRESS NOTE ADULT - SUBJECTIVE AND OBJECTIVE BOX
INTERVAL HPI/OVERNIGHT EVENTS: no acute events overnight. Pt was on full vent support. Now on PSV this AM    SUBJECTIVE: Patient seen and examined at bedside. Pt on PSV    ROS: unable to assess 2/2 MS     VITAL SIGNS:  ICU Vital Signs Last 24 Hrs  T(C): 37.3 (16 Jan 2024 06:10), Max: 37.3 (15 Gelacio 2024 23:42)  T(F): 99.1 (16 Jan 2024 06:10), Max: 99.2 (15 Gelacio 2024 23:42)  HR: 92 (16 Jan 2024 08:45) (81 - 93)  BP: 148/79 (16 Jan 2024 06:10) (124/72 - 148/79)  BP(mean): --  ABP: --  ABP(mean): --  RR: 18 (16 Jan 2024 06:10) (18 - 18)  SpO2: 100% (16 Jan 2024 08:45) (98% - 100%)    O2 Parameters below as of 16 Jan 2024 01:14  Patient On (Oxygen Delivery Method): ventilator          Mode: CPAP with PS, FiO2: 30, PEEP: 5, PS: 8, ITime: 1, MAP: 7, PIP: 13    01-15 @ 07:01  -  01-16 @ 07:00  --------------------------------------------------------  IN: 420 mL / OUT: 1000 mL / NET: -580 mL      CAPILLARY BLOOD GLUCOSE      POCT Blood Glucose.: 142 mg/dL (14 Jan 2024 14:21)      ECG: reviewed.    PHYSICAL EXAM:    GENERAL: NAD  EYES: PERRL  ENMT: ; Moist mucous membranes  NECK: Trache # 6 XLT  NERVOUS SYSTEM: Awake, alert, does not follow commands  CHEST/LUNG: Mechanical BS B/L, CTA  HEART: Regular rate and rhythm; No murmurs, rubs, or gallops  ABDOMEN: Soft, Nontender, Nondistended; Bowel sounds present, +PEG tube in place   EXTREMITIES:  2+ Peripheral Pulses, No clubbing, cyanosis, or edema    MEDICATIONS:  MEDICATIONS  (STANDING):  albuterol/ipratropium for Nebulization 3 milliLiter(s) Nebulizer every 6 hours  amLODIPine   Tablet 10 milliGRAM(s) Oral daily  chlorhexidine 0.12% Liquid 15 milliLiter(s) Oral Mucosa every 12 hours  chlorhexidine 2% Cloths 1 Application(s) Topical <User Schedule>  collagenase Ointment 1 Application(s) Topical daily  metoprolol tartrate 12.5 milliGRAM(s) Oral two times a day  mirtazapine 15 milliGRAM(s) Oral daily  polyethylene glycol 3350 17 Gram(s) Oral two times a day  senna 2 Tablet(s) Oral at bedtime  sodium chloride 3%  Inhalation 4 milliLiter(s) Inhalation every 6 hours    MEDICATIONS  (PRN):  acetaminophen     Tablet .. 650 milliGRAM(s) Oral every 6 hours PRN Temp greater or equal to 38C (100.4F), Mild Pain (1 - 3)      ALLERGIES:  Allergies    No Known Allergies    Intolerances        LABS:                        8.2    8.81  )-----------( 314      ( 16 Jan 2024 07:55 )             25.1     01-16    129<L>  |  99  |  138<H>  ----------------------------<  101<H>  3.8   |  21<L>  |  4.74<H>    Ca    7.6<L>      16 Jan 2024 07:55        Urinalysis Basic - ( 16 Jan 2024 07:55 )    Color: x / Appearance: x / SG: x / pH: x  Gluc: 101 mg/dL / Ketone: x  / Bili: x / Urobili: x   Blood: x / Protein: x / Nitrite: x   Leuk Esterase: x / RBC: x / WBC x   Sq Epi: x / Non Sq Epi: x / Bacteria: x      ABG:      vBG:    Micro:    Culture - Blood (collected 01-04-24 @ 12:15)  Source: .Blood Blood-Peripheral  Final Report (01-09-24 @ 19:00):    No growth at 5 days    Culture - Blood (collected 01-04-24 @ 12:15)  Source: .Blood Blood-Peripheral  Final Report (01-09-24 @ 19:00):    No growth at 5 days        Culture - Sputum (collected 01-04-24 @ 21:20)  Source: .Sputum Sputum  Gram Stain (01-09-24 @ 15:07):    Rare Squamous epithelial cells per low power field    No polymorphonuclear cells seen per low power field    Rare Yeast like cells per oil power field  Final Report (01-09-24 @ 15:07):    Normal Respiratory Vanessa present        RADIOLOGY & ADDITIONAL TESTS: Reviewed.

## 2024-01-16 NOTE — PROGRESS NOTE ADULT - SUBJECTIVE AND OBJECTIVE BOX
[facility] NEPHROLOGY SERVICES, [facility]  NEPHROLOGY AND HYPERTENSION  [address]  [address]  [place] [address]  [phone]    MD SOFY KEENAN, MD KEATON FRYE MD CHRISTOPHER CAPUTO, MD LEANNE MURRAY MD          Patient events noted    MEDICATIONS  (STANDING):  albuterol/ipratropium for Nebulization 3 milliLiter(s) Nebulizer every 6 hours  amLODIPine   Tablet 10 milliGRAM(s) Oral daily  chlorhexidine 0.12% Liquid 15 milliLiter(s) Oral Mucosa every 12 hours  chlorhexidine 2% Cloths 1 Application(s) Topical <User Schedule>  collagenase Ointment 1 Application(s) Topical daily  metoprolol tartrate 12.5 milliGRAM(s) Oral two times a day  mirtazapine 15 milliGRAM(s) Oral daily  polyethylene glycol 3350 17 Gram(s) Oral two times a day  senna 2 Tablet(s) Oral at bedtime  sodium chloride 3%  Inhalation 4 milliLiter(s) Inhalation every 6 hours    MEDICATIONS  (PRN):  acetaminophen     Tablet .. 650 milliGRAM(s) Oral every 6 hours PRN Temp greater or equal to 38C (100.4F), Mild Pain (1 - 3)      01-15-24 @ 07:01  -  01-16-24 @ 07:00  --------------------------------------------------------  IN: 420 mL / OUT: 1000 mL / NET: -580 mL      PHYSICAL EXAM:      T(C): 36.9 (01-16-24 @ 16:33), Max: 37.3 (01-15-24 @ 23:42)  HR: 92 (01-16-24 @ 17:35) (69 - 94)  BP: 118/72 (01-16-24 @ 16:33) (118/72 - 150/76)  RR: 18 (01-16-24 @ 16:33) (18 - 18)  SpO2: 98% (01-16-24 @ 17:35) (97% - 100%)  Wt(kg): --  Lungs clear  Heart S1S2  Abd soft NT ND  Extremities:   tr edema                                    8.2    8.81  )-----------( 314      ( 16 Jan 2024 07:55 )             25.1     01-16    129<L>  |  99  |  138<H>  ----------------------------<  101<H>  3.8   |  21<L>  |  4.74<H>    Ca    7.6<L>      16 Jan 2024 07:55          Creatinine Trend: 4.74<--, 4.75<--, 5.03<--, 5.36<--, 5.48<--, 5.55<--  Mode: AC/ CMV (Assist Control/ Continuous Mandatory Ventilation)  RR (machine): 26  TV (machine): 400  FiO2: 30  PEEP: 5  ITime: 1  MAP: 15  PIP: 28    Assessment   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

## 2024-01-16 NOTE — PROGRESS NOTE ADULT - ASSESSMENT
89M w/ dementia, CKD, chronic urinary retention w/ obrien. Admitted w/ L pleural effusion thought to be due to mucous plugging. Course complicated by worsening hypoxemia requiring intubation and bronchoscopy x2 w/ MRSA pneumonia, stenotrophomonas. Extubated on 12/24 but required re-intubation. Extubated again but failed due to hypercapnia and hypoxia. s/p Trach / PEG.    #acute hypoxic resp failure with trach. s/p peg and trach , FiO2 is 30%  #Febrile, resolved   - Completed course Levaquin for Steno in sputum. Remains vent dependent with full support  - Febrile to 101F yesterday. No leukocytosis - BCx negative    - hold on abx - trend fever curve   - pulm following    1/16/2024 afebrile     #ROGER on CKD suspect due to AIN --renal following does appear creatinine is slowly improving- dialysis to be determined by renal       #Acute Anemia  - No clear source of bleeding. s/p 2U pRBCs.   - Trend H/H  (1/12) H/H treading down. give 1 prbc today.  (1/14) H/H improved. stable at 8.1      #fecal impaction- resolved most recent  documented BM on 1/16/24    #Hyponatremia - presumed  iatrogenic was on hypotonic IVF was stopped by my colleague on 1/15/2024. monitor sodium     #urinary retention s/p obrien  (1/12) Change obrien  (1/14) Obrien changed as of 1/13    Diet: TFs    DVT prophylaxis: SQH    Dispo: Eventual D/C to vent facility   Code Status:

## 2024-01-17 NOTE — CHART NOTE - NSCHARTNOTEFT_GEN_A_CORE
Pt presented c left pleural effusion thought to be due to mucous plugging; course complicated by worsening hypoxemia requiring intubation (12/15) and bronchoscopy x 2; c MRSA PNA, Stenotrophomonas, ROGER on CKD (pt not a candidate for HD per nephrology note; pt failed extubation (12/24); now s/p trach & PEG placement; on full vent support; pulmonary following.      Factors impacting intake: [X ] none [ ] nausea  [ ] vomiting [ ] diarrhea [ ] constipation  [ ]chewing problems [ ] swallowing issues  [ ] other:     Diet Prescription: Diet, NPO with Tube Feed:   Tube Feeding Modality: Gastrostomy  Nepro with Carb Steady  Total Volume for 24 Hours (mL): 840  Continuous  Until Goal Tube Feed Rate (mL per Hour): 35  Tube Feed Duration (in Hours): 24  Tube Feed Start Time: 12:00 (01-03-24 @ 11:48)    Intake:  Tube feeding running at goal rate; pt tolerating well (provides 840 ml, 1512 kcal, 68 g protein, 612 ml H2O)    Current Weight: (01/02) 70.1kg (12/01) 75.3kg indicating weight loss of 5.2kg  % Weight Change: 6.9% weight loss x 1 month; no new wt recorded    No edema noted since 1/12; at that time 1+ generalized edema noted      Pertinent Medications: MEDICATIONS  (STANDING):  albuterol/ipratropium for Nebulization 3 milliLiter(s) Nebulizer every 6 hours  amLODIPine   Tablet 10 milliGRAM(s) Oral daily  chlorhexidine 0.12% Liquid 15 milliLiter(s) Oral Mucosa every 12 hours  chlorhexidine 2% Cloths 1 Application(s) Topical <User Schedule>  collagenase Ointment 1 Application(s) Topical daily  metoprolol tartrate 12.5 milliGRAM(s) Oral two times a day  mirtazapine 15 milliGRAM(s) Oral daily  polyethylene glycol 3350 17 Gram(s) Oral two times a day  senna 2 Tablet(s) Oral at bedtime  sodium chloride 3%  Inhalation 4 milliLiter(s) Inhalation every 6 hours    MEDICATIONS  (PRN):  acetaminophen     Tablet .. 650 milliGRAM(s) Oral every 6 hours PRN Temp greater or equal to 38C (100.4F), Mild Pain (1 - 3)    Pertinent Labs: 01-16 Na129 mmol/L<L> Glu 101 mg/dL<H> K+ 3.8 mmol/L Cr  4.74 mg/dL<H>  mg/dL<H> 01-12 Phos 6.0 mg/dL<H> 01-14 Alb 1.6 g/dL<L>      Skin: Pressure injuries as per flow sheets: 1. right side of neck- stage II 2. left ischium- stage III    Estimated Needs:   [X ] no change since previous assessment  12/04  [ ] recalculated:     Previous Nutrition Diagnosis #1:  [x] Increased Nutrient Needs: protein  Etiology: increased physiological demand for protein  Signs/Symptoms: stage II & III pressure injuries    Nutrition Diagnosis is [ x] ongoing  [ ] resolved [  ] not applicable     Previous Nutrition Diagnosis #2:  [x] Severe malnutrition in context of acute illness   Etiology:  inadequate protein-energy intake in setting of ROGER, pleural effusion, MRSA PNA, Stenotrophomonas, acute respiratory failure requiring vent support   Signs & Symptoms:  moderate muscle/fat loss as noted  12/12; 5% wt loss x 1 month    GOAL for both PES statements:  pt to meet >75% protein-energy needs via enteral feeding (met)    Nutrition Diagnosis is [X ] ongoing  [ ] resolved [  ] not applicable     New Nutrition Diagnosis: [X ] not applicable      Interventions:   continue current TF as rx  Recommend  [ ] Change Diet To:  [ ] Nutrition Supplement  [ ] Nutrition Support  [X ] Other:  add H2O flushes of 250 ml q 6 hrs    Monitoring and Evaluation:   [ ] PO intake [ x ] Tolerance to diet prescription [ x ] weights [ x ] labs[ x ] follow up per protocol  [ ] other: Pt presented c left pleural effusion thought to be due to mucous plugging; course complicated by worsening hypoxemia requiring intubation (12/15) and bronchoscopy x 2; c MRSA PNA, Stenotrophomonas, ROGER on CKD (pt not a candidate for HD per nephrology note; pt failed extubation (12/24); now s/p trach & PEG placement; on full vent support; pulmonary following.  Per renal note (1/16) MD d/brian free H2O; RN confirmed.    Factors impacting intake: [X ] none [ ] nausea  [ ] vomiting [ ] diarrhea [ ] constipation  [ ]chewing problems [ ] swallowing issues  [ ] other:     Diet Prescription: Diet, NPO with Tube Feed:   Tube Feeding Modality: Gastrostomy  Nepro with Carb Steady  Total Volume for 24 Hours (mL): 840  Continuous  Until Goal Tube Feed Rate (mL per Hour): 35  Tube Feed Duration (in Hours): 24  Tube Feed Start Time: 12:00 (01-03-24 @ 11:48)    Intake:  Tube feeding running at goal rate; pt tolerating well (provides 840 ml, 1512 kcal, 68 g protein, 612 ml H2O)    Current Weight: (01/02) 70.1kg (12/01) 75.3kg indicating weight loss of 5.2kg  % Weight Change: 6.9% weight loss x 1 month; no new wt recorded    No edema noted since 1/12; at that time 1+ generalized edema noted      Pertinent Medications: MEDICATIONS  (STANDING):  albuterol/ipratropium for Nebulization 3 milliLiter(s) Nebulizer every 6 hours  amLODIPine   Tablet 10 milliGRAM(s) Oral daily  chlorhexidine 0.12% Liquid 15 milliLiter(s) Oral Mucosa every 12 hours  chlorhexidine 2% Cloths 1 Application(s) Topical <User Schedule>  collagenase Ointment 1 Application(s) Topical daily  metoprolol tartrate 12.5 milliGRAM(s) Oral two times a day  mirtazapine 15 milliGRAM(s) Oral daily  polyethylene glycol 3350 17 Gram(s) Oral two times a day  senna 2 Tablet(s) Oral at bedtime  sodium chloride 3%  Inhalation 4 milliLiter(s) Inhalation every 6 hours    MEDICATIONS  (PRN):  acetaminophen     Tablet .. 650 milliGRAM(s) Oral every 6 hours PRN Temp greater or equal to 38C (100.4F), Mild Pain (1 - 3)    Pertinent Labs: 01-16 Na129 mmol/L<L> Glu 101 mg/dL<H> K+ 3.8 mmol/L Cr  4.74 mg/dL<H>  mg/dL<H> 01-12 Phos 6.0 mg/dL<H> 01-14 Alb 1.6 g/dL<L>      Skin: Pressure injuries as per flow sheets: 1. right side of neck- stage II 2. left ischium- stage III    Estimated Needs:   [X ] no change since previous assessment  12/04  [ ] recalculated:     Previous Nutrition Diagnosis #1:  [x] Increased Nutrient Needs: protein  Etiology: increased physiological demand for protein  Signs/Symptoms: stage II & III pressure injuries    Nutrition Diagnosis is [ x] ongoing  [ ] resolved [  ] not applicable     Previous Nutrition Diagnosis #2:  [x] Severe malnutrition in context of acute illness   Etiology:  inadequate protein-energy intake in setting of ROGER, pleural effusion, MRSA PNA, Stenotrophomonas, acute respiratory failure requiring vent support   Signs & Symptoms:  moderate muscle/fat loss as noted  12/12; 5% wt loss x 1 month    GOAL for both PES statements:  pt to meet >75% protein-energy needs via enteral feeding (met)    Nutrition Diagnosis is [X ] ongoing  [ ] resolved [  ] not applicable     New Nutrition Diagnosis: [X ] not applicable      Interventions:   continue current TF as rx  Recommend  [ ] Change Diet To:  [ ] Nutrition Supplement  [ ] Nutrition Support  [ ] Other:     Monitoring and Evaluation:   [ ] PO intake [ x ] Tolerance to diet prescription [ x ] weights [ x ] labs[ x ] follow up per protocol  [ ] other:

## 2024-01-17 NOTE — CHART NOTE - NSCHARTNOTESELECT_GEN_ALL_CORE
Event Note
Nutrition Services
Transfer Note
WHEELCHAIR/Event Note
critical anemia/Event Note
pocus note/Event Note
Event Note
Event Note
Nutrition Services
Event Note
Event Note
Nutrition Services
POCUS
POCUS note/Event Note
POCUS note/Event Note

## 2024-01-17 NOTE — PROGRESS NOTE ADULT - SUBJECTIVE AND OBJECTIVE BOX
[facility] NEPHROLOGY SERVICES, [facility]  NEPHROLOGY AND HYPERTENSION  [address]  [address]  [place] [address]  [phone]    MD SOFY KEENAN, MD JIGNESH MCCARTY, MD LESA RAMIREZ, MD LEANNE MURRAY MD          Patient events noted    MEDICATIONS  (STANDING):  albuterol/ipratropium for Nebulization 3 milliLiter(s) Nebulizer every 6 hours  amLODIPine   Tablet 10 milliGRAM(s) Oral daily  chlorhexidine 0.12% Liquid 15 milliLiter(s) Oral Mucosa every 12 hours  chlorhexidine 2% Cloths 1 Application(s) Topical <User Schedule>  collagenase Ointment 1 Application(s) Topical daily  metoprolol tartrate 12.5 milliGRAM(s) Oral two times a day  mirtazapine 15 milliGRAM(s) Oral daily  polyethylene glycol 3350 17 Gram(s) Oral two times a day  senna 2 Tablet(s) Oral at bedtime    MEDICATIONS  (PRN):  acetaminophen     Tablet .. 650 milliGRAM(s) Oral every 6 hours PRN Temp greater or equal to 38C (100.4F), Mild Pain (1 - 3)      01-16-24 @ 07:01  -  01-17-24 @ 07:00  --------------------------------------------------------  IN: 550 mL / OUT: 1290 mL / NET: -740 mL    01-17-24 @ 07:01  -  01-17-24 @ 23:00  --------------------------------------------------------  IN: 0 mL / OUT: 600 mL / NET: -600 mL      PHYSICAL EXAM:      T(C): 36.4 (01-17-24 @ 17:09), Max: 37.3 (01-16-24 @ 23:23)  HR: 73 (01-17-24 @ 20:11) (71 - 96)  BP: 157/69 (01-17-24 @ 17:09) (116/64 - 160/70)  RR: 18 (01-17-24 @ 17:09) (18 - 18)  SpO2: 100% (01-17-24 @ 20:11) (97% - 100%)  Wt(kg): --  Lungs clear  Heart S1S2  Abd soft NT ND  Extremities:   tr edema                                    8.2    8.81  )-----------( 314      ( 16 Jan 2024 07:55 )             25.1     01-17    129<L>  |  100  |  137<H>  ----------------------------<  127<H>  4.0   |  18<L>  |  4.88<H>    Ca    7.9<L>      17 Jan 2024 11:00          Creatinine Trend: 4.88<--, 4.74<--, 4.75<--, 5.03<--, 5.36<--, 5.48<--  Mode: AC/ CMV (Assist Control/ Continuous Mandatory Ventilation)  RR (machine): 26  TV (machine): 400  FiO2: 30  PEEP: 5  ITime: 0.8  MAP: 14  PIP: 24        Assessment   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

## 2024-01-17 NOTE — PROGRESS NOTE ADULT - SUBJECTIVE AND OBJECTIVE BOX
INTERVAL HPI/OVERNIGHT EVENTS: no acute events overnight. Pt placed back on full vent support and tolerating PSV this AM for few hours     SUBJECTIVE: Patient seen and examined at bedside. Pt satting well on full vent support     ROS: All negative except as listed above.    VITAL SIGNS:  ICU Vital Signs Last 24 Hrs  T(C): 37 (17 Jan 2024 05:26), Max: 37.3 (16 Jan 2024 23:23)  T(F): 98.6 (17 Jan 2024 05:26), Max: 99.2 (16 Jan 2024 23:23)  HR: 94 (17 Jan 2024 05:31) (69 - 96)  BP: 116/64 (17 Jan 2024 05:26) (116/64 - 150/76)  BP(mean): --  ABP: --  ABP(mean): --  RR: 18 (17 Jan 2024 05:26) (18 - 18)  SpO2: 98% (17 Jan 2024 05:31) (97% - 99%)    O2 Parameters below as of 17 Jan 2024 05:31  Patient On (Oxygen Delivery Method): ventilator          Mode: AC/ CMV (Assist Control/ Continuous Mandatory Ventilation), RR (machine): 26, TV (machine): 400, FiO2: 30, PEEP: 5, ITime: 1, MAP: 17, PIP: 19      01-16 @ 07:01  -  01-17 @ 07:00  --------------------------------------------------------  IN: 550 mL / OUT: 1290 mL / NET: -740 mL      CAPILLARY BLOOD GLUCOSE          ECG: reviewed.    PHYSICAL EXAM:    GENERAL: NAD  EYES: PERRL  ENMT: ; Moist mucous membranes  NECK: Trache # 6 XLT  NERVOUS SYSTEM: Awake, alert, does not follow commands  CHEST/LUNG: Mechanical BS B/L, CTA  HEART: Regular rate and rhythm; No murmurs, rubs, or gallops  ABDOMEN: Soft, Nontender, Nondistended; Bowel sounds present, +PEG tube in place   EXTREMITIES:  2+ Peripheral Pulses, No clubbing, cyanosis, or edema    MEDICATIONS:  MEDICATIONS  (STANDING):  albuterol/ipratropium for Nebulization 3 milliLiter(s) Nebulizer every 6 hours  amLODIPine   Tablet 10 milliGRAM(s) Oral daily  chlorhexidine 0.12% Liquid 15 milliLiter(s) Oral Mucosa every 12 hours  chlorhexidine 2% Cloths 1 Application(s) Topical <User Schedule>  collagenase Ointment 1 Application(s) Topical daily  metoprolol tartrate 12.5 milliGRAM(s) Oral two times a day  mirtazapine 15 milliGRAM(s) Oral daily  polyethylene glycol 3350 17 Gram(s) Oral two times a day  senna 2 Tablet(s) Oral at bedtime  sodium chloride 3%  Inhalation 4 milliLiter(s) Inhalation every 6 hours    MEDICATIONS  (PRN):  acetaminophen     Tablet .. 650 milliGRAM(s) Oral every 6 hours PRN Temp greater or equal to 38C (100.4F), Mild Pain (1 - 3)      ALLERGIES:  Allergies    No Known Allergies    Intolerances        LABS:                        8.2    8.81  )-----------( 314      ( 16 Jan 2024 07:55 )             25.1     01-16    129<L>  |  99  |  138<H>  ----------------------------<  101<H>  3.8   |  21<L>  |  4.74<H>    Ca    7.6<L>      16 Jan 2024 07:55        Urinalysis Basic - ( 16 Jan 2024 07:55 )    Color: x / Appearance: x / SG: x / pH: x  Gluc: 101 mg/dL / Ketone: x  / Bili: x / Urobili: x   Blood: x / Protein: x / Nitrite: x   Leuk Esterase: x / RBC: x / WBC x   Sq Epi: x / Non Sq Epi: x / Bacteria: x      Micro:    Culture - Blood (collected 01-04-24 @ 12:15)  Source: .Blood Blood-Peripheral  Final Report (01-09-24 @ 19:00):    No growth at 5 days    Culture - Blood (collected 01-04-24 @ 12:15)  Source: .Blood Blood-Peripheral  Final Report (01-09-24 @ 19:00):    No growth at 5 days        Culture - Sputum (collected 01-04-24 @ 21:20)  Source: .Sputum Sputum  Gram Stain (01-09-24 @ 15:07):    Rare Squamous epithelial cells per low power field    No polymorphonuclear cells seen per low power field    Rare Yeast like cells per oil power field  Final Report (01-09-24 @ 15:07):    Normal Respiratory Vanessa present        RADIOLOGY & ADDITIONAL TESTS: Reviewed.

## 2024-01-17 NOTE — PROGRESS NOTE ADULT - ASSESSMENT
89M w/ dementia, CKD, chronic urinary retention w/ obrien. Admitted w/ L pleural effusion thought to be due to mucous plugging. Course complicated by worsening hypoxemia requiring intubation and bronchoscopy x2 w/ MRSA pneumonia, stenotrophomonas. Extubated on 12/24 but required re-intubation. Extubated again but failed due to hypercapnia and hypoxia.   12/29 s/p Trach / PEG.    DX: Left pleural effusion, mucous plugging, Hypoxemic respiratory failure, MRSA & Stenotrophomonas PNA.    Recs:    - Acute hypoxic resp failure likely 2/2 mucous plugging with PNA  - 12/29  S/P trach/ PEG # 6 XLT   - continue with daily PSV trials and full vent support at night,   - will likely be slow wean given chronic weakness and recurrent mucous plugging of left lung   - Frequent suctioning, Pulmonary toileting  - will re-add duoneb/ hypersal and chest PT   - POCUS 1/5 Trace Right  with small left effusion with a lot of atelectatic compressive areas.   - Completed course Vanc & Levaquin for MRSA & Steno in sputum    note incomplete 89M w/ dementia, CKD, chronic urinary retention w/ obrien. Admitted w/ L pleural effusion thought to be due to mucous plugging. Course complicated by worsening hypoxemia requiring intubation and bronchoscopy x2 w/ MRSA pneumonia, stenotrophomonas. Extubated on 12/24 but required re-intubation. Extubated again but failed due to hypercapnia and hypoxia.   12/29 s/p Trach / PEG.    DX: Left pleural effusion, mucous plugging, Hypoxemic respiratory failure, MRSA & Stenotrophomonas PNA.    Recs:    - Acute hypoxic resp failure likely 2/2 mucous plugging with PNA  - 12/29  S/P trach/ PEG # 6 XLT   - continue with daily PSV trials and full vent support at night,   - will likely be slow wean given chronic weakness and recurrent mucous plugging of left lung   - Frequent suctioning, Pulmonary toileting  - cont duoneb/ and chest PT   - POCUS 1/5 Trace Right  with small left effusion with a lot of atelectatic compressive areas.   - Completed course Vanc & Levaquin for MRSA & Steno in sputum    d/w dr fischer

## 2024-01-17 NOTE — PROGRESS NOTE ADULT - ASSESSMENT
89M w/ dementia, CKD, chronic urinary retention w/ obrien. Admitted w/ L pleural effusion thought to be due to mucous plugging. Course complicated by worsening hypoxemia requiring intubation and bronchoscopy x2 w/ MRSA pneumonia, stenotrophomonas. Extubated on 12/24 but required re-intubation. Extubated again but failed due to hypercapnia and hypoxia. s/p Trach / PEG.    #acute hypoxic resp failure with trach. s/p peg and trach , FiO2 is 30%  #Febrile, resolved   - Completed course Levaquin for Steno in sputum. Remains vent dependent with full support  - Febrile to 101F yesterday. No leukocytosis - BCx negative    - hold on abx - trend fever curve   - pulm following    1/16/2024 afebrile     #ROGER on CKD suspect due to AIN --renal following does appear creatinine is slowly improving- dialysis to be determined by renal   1/17/2024 per my d/w renal not a candidate for hd      #Acute Anemia  - No clear source of bleeding. s/p 2U pRBCs.   - Trend H/H  (1/12) H/H treading down. give 1 prbc today.  (1/14) H/H improved. stable at 8.1      #fecal impaction- resolved most recent  documented BM on 1/16/24    #Hyponatremia - presumed  iatrogenic was on hypotonic IVF was stopped by my colleague on 1/15/2024. monitor sodium     #urinary retention s/p obrien  (1/12) Change obrien  (1/14) Obrien changed as of 1/13    Diet: TFs    DVT prophylaxis: SQ    Dispo: Eventual D/C to vent facility   Code Status: FC    statrt d/c pallnining

## 2024-01-17 NOTE — PROGRESS NOTE ADULT - SUBJECTIVE AND OBJECTIVE BOX
Medicine Progress Note    Patient is a 89y old  Male who presents with a chief complaint of AHRF (14 Jan 2024 15:14)      SUBJECTIVE / OVERNIGHT EVENTS: Patient is confused , trach/ Vent , PEG      MEDICATIONS  (STANDING):  albuterol/ipratropium for Nebulization 3 milliLiter(s) Nebulizer every 6 hours  amLODIPine   Tablet 10 milliGRAM(s) Oral daily  chlorhexidine 0.12% Liquid 15 milliLiter(s) Oral Mucosa every 12 hours  chlorhexidine 2% Cloths 1 Application(s) Topical <User Schedule>  collagenase Ointment 1 Application(s) Topical daily  metoprolol tartrate 12.5 milliGRAM(s) Oral two times a day  mirtazapine 15 milliGRAM(s) Oral daily  polyethylene glycol 3350 17 Gram(s) Oral two times a day  senna 2 Tablet(s) Oral at bedtime  sodium chloride 3%  Inhalation 4 milliLiter(s) Inhalation every 6 hours    MEDICATIONS  (PRN):  acetaminophen     Tablet .. 650 milliGRAM(s) Oral every 6 hours PRN Temp greater or equal to 38C (100.4F), Mild Pain (1 - 3)    Vital Signs Last 24 Hrs  T(C): 37 (17 Jan 2024 05:26), Max: 37.3 (16 Jan 2024 23:23)  T(F): 98.6 (17 Jan 2024 05:26), Max: 99.2 (16 Jan 2024 23:23)  HR: 94 (17 Jan 2024 05:31) (69 - 96)  BP: 116/64 (17 Jan 2024 05:26) (116/64 - 150/76)  BP(mean): --  RR: 18 (17 Jan 2024 05:26) (18 - 18)  SpO2: 98% (17 Jan 2024 05:31) (97% - 99%)    Parameters below as of 17 Jan 2024 05:31  Patient On (Oxygen Delivery Method): ventilator        PHYSICAL EXAM:      CONSTITUTIONAL: NAD,   ENMT: Moist oral mucosa, trach   RESPIRATORY: Normal respiratory effort; lungs are dim  to auscultation bilaterally  CARDIOVASCULAR: Regular rate and rhythm, normal S1 and S2,; No lower extremity edema;   ABDOMEN: Nontender to palpation,PEG  PSYCH: Alert , not oriented   NEUROLOGY: not cooperating   SKIN: no rash    LABS:                    Urinalysis Basic - ( 15 Gelacio 2024 06:15 )    Color: x / Appearance: x / SG: x / pH: x  Gluc: 113 mg/dL / Ketone: x  / Bili: x / Urobili: x   Blood: x / Protein: x / Nitrite: x   Leuk Esterase: x / RBC: x / WBC x   Sq Epi: x / Non Sq Epi: x / Bacteria: x        SARS-CoV-2: NotDetec (14 Dec 2023 13:00)      RADIOLOGY & ADDITIONAL TESTS:    Imaging from Last 24 Hours:

## 2024-01-18 NOTE — PROGRESS NOTE ADULT - ASSESSMENT
89M w/ dementia, CKD, chronic urinary retention w/ obrien. Admitted w/ L pleural effusion thought to be due to mucous plugging. Course complicated by worsening hypoxemia requiring intubation and bronchoscopy x2 w/ MRSA pneumonia, stenotrophomonas. Extubated on 12/24 but required re-intubation. Extubated again but failed due to hypercapnia and hypoxia.   12/29 s/p Trach / PEG.    DX: Left pleural effusion, mucous plugging, Hypoxemic respiratory failure, MRSA & Stenotrophomonas PNA.    Recs:    - Acute hypoxic resp failure likely 2/2 mucous plugging with PNA  - 12/29  S/P trach/ PEG # 6 XLT   - continue with daily PSV trials and full vent support at night,   - will likely be slow wean given chronic weakness and recurrent mucous plugging of left lung   - Frequent suctioning, Pulmonary toileting  - cont duoneb/ and chest PT   - POCUS 1/5 Trace Right  with small left effusion with a lot of atelectatic compressive areas.   - Completed course Vanc & Levaquin for MRSA & Steno in sputum    d/w dr fischer    89M w/ dementia, CKD, chronic urinary retention w/ obrien. Admitted w/ L pleural effusion thought to be due to mucous plugging. Course complicated by worsening hypoxemia requiring intubation and bronchoscopy x2 w/ MRSA pneumonia, stenotrophomonas. Extubated on 12/24 but required re-intubation. Extubated again but failed due to hypercapnia and hypoxia.   12/29 s/p Trach / PEG.    DX: Left pleural effusion, mucous plugging, Hypoxemic respiratory failure, MRSA & Stenotrophomonas PNA.    Recs:    - Acute hypoxic resp failure likely 2/2 mucous plugging with PNA  - 12/29  S/P trach/ PEG # 6 XLT   - continue with daily PSV trials   - will likely be slow wean given chronic weakness and recurrent mucous plugging of left lung   - Frequent suctioning, Pulmonary toileting  - cont duoneb/ and chest PT   - Completed course Vanc & Levaquin for MRSA & Steno in sputum    d/w dr fischer

## 2024-01-18 NOTE — DISCHARGE NOTE NURSING/CASE MANAGEMENT/SOCIAL WORK - PATIENT PORTAL LINK FT
You can access the FollowMyHealth Patient Portal offered by [facility] by registering at the following website: [email or web address]. By joining [facility]’s FollowMyHealth portal, you will also be able to view your health information using other applications (apps) compatible with our system.

## 2024-01-18 NOTE — PROGRESS NOTE ADULT - SUBJECTIVE AND OBJECTIVE BOX
Medicine Progress Note    Patient is a 89y old  Male who presents with a chief complaint of AHRF (14 Jan 2024 15:14)      SUBJECTIVE / OVERNIGHT EVENTS: Patient is confused , trach/ Vent , PEG      MEDICATIONS  (STANDING):  albuterol/ipratropium for Nebulization 3 milliLiter(s) Nebulizer every 6 hours  amLODIPine   Tablet 10 milliGRAM(s) Oral daily  chlorhexidine 0.12% Liquid 15 milliLiter(s) Oral Mucosa every 12 hours  chlorhexidine 2% Cloths 1 Application(s) Topical <User Schedule>  collagenase Ointment 1 Application(s) Topical daily  metoprolol tartrate 12.5 milliGRAM(s) Oral two times a day  mirtazapine 15 milliGRAM(s) Oral daily  polyethylene glycol 3350 17 Gram(s) Oral two times a day  senna 2 Tablet(s) Oral at bedtime  sodium chloride 3%  Inhalation 4 milliLiter(s) Inhalation every 6 hours    MEDICATIONS  (PRN):  acetaminophen     Tablet .. 650 milliGRAM(s) Oral every 6 hours PRN Temp greater or equal to 38C (100.4F), Mild Pain (1 - 3)      Vital Signs Last 24 Hrs  T(F): 98.8  HR: 88  BP: 136/72  BP(mean): --  RR: 20  SpO2: 100%       PHYSICAL EXAM:      CONSTITUTIONAL: NAD,   ENMT: Moist oral mucosa, trach   RESPIRATORY: Normal respiratory effort; lungs are dim  to auscultation bilaterally  CARDIOVASCULAR: Regular rate and rhythm, normal S1 and S2,; No lower extremity edema;   ABDOMEN: Nontender to palpation,PEG  PSYCH: Alert , not oriented   NEUROLOGY: not cooperating   SKIN: no rash    LABS:                    Urinalysis Basic - ( 15 Gelacio 2024 06:15 )    Color: x / Appearance: x / SG: x / pH: x  Gluc: 113 mg/dL / Ketone: x  / Bili: x / Urobili: x   Blood: x / Protein: x / Nitrite: x   Leuk Esterase: x / RBC: x / WBC x   Sq Epi: x / Non Sq Epi: x / Bacteria: x        SARS-CoV-2: NotDetec (14 Dec 2023 13:00)      RADIOLOGY & ADDITIONAL TESTS:    Imaging from Last 24 Hours:

## 2024-01-18 NOTE — PROGRESS NOTE ADULT - SUBJECTIVE AND OBJECTIVE BOX
Interval Events: Patient seen & examined at bedside...     REVIEW OF SYSTEMS:  Constitutional: [ ] negative [ ] fevers [ ] chills [ ] weight loss [ ] weight gain  HEENT: [ ] negative [ ] dry eyes [ ] eye irritation [ ] postnasal drip [ ] nasal congestion  CV: [ ] negative  [ ] chest pain [ ] orthopnea [ ] palpitations [ ] murmur  Resp: [ ] negative [ ] cough [ ] shortness of breath [ ] dyspnea [ ] wheezing [ ] sputum [ ] hemoptysis  GI: [ ] negative [ ] nausea [ ] vomiting [ ] diarrhea [ ] constipation [ ] abd pain [ ] dysphagia   : [ ] negative [ ] dysuria [ ] nocturia [ ] hematuria [ ] increased urinary frequency  Musculoskeletal: [ ] negative [ ] back pain [ ] myalgias [ ] arthralgias [ ] fracture  Skin: [ ] negative [ ] rash [ ] itch  Neurological: [ ] negative [ ] headache [ ] dizziness [ ] syncope [ ] weakness [ ] numbness  Psychiatric: [ ] negative [ ] anxiety [ ] depression  Endocrine: [ ] negative [ ] diabetes [ ] thyroid problem  Hematologic/Lymphatic: [ ] negative [ ] anemia [ ] bleeding problem  Allergic/Immunologic: [ ] negative [ ] itchy eyes [ ] nasal discharge [ ] hives [ ] angioedema  [ ] All other systems negative  [ ] Unable to assess ROS because ________    OBJECTIVE:  ICU Vital Signs Last 24 Hrs  T(C): 36.8 (18 Jan 2024 06:19), Max: 36.8 (18 Jan 2024 06:19)  T(F): 98.3 (18 Jan 2024 06:19), Max: 98.3 (18 Jan 2024 06:19)  HR: 82 (18 Jan 2024 06:19) (71 - 82)  BP: 189/75 (18 Jan 2024 06:19) (145/70 - 189/75)  BP(mean): --  ABP: --  ABP(mean): --  RR: 19 (18 Jan 2024 06:19) (18 - 19)  SpO2: 100% (18 Jan 2024 06:19) (99% - 100%)    O2 Parameters below as of 18 Jan 2024 05:56  Patient On (Oxygen Delivery Method): ventilator          Mode: AC/ CMV (Assist Control/ Continuous Mandatory Ventilation), RR (machine): 26, TV (machine): 400, FiO2: 30, PEEP: 5, ITime: 0.8, MAP: 14, PIP: 34    01-17 @ 07:01  -  01-18 @ 07:00  --------------------------------------------------------  IN: 420 mL / OUT: 1400 mL / NET: -980 mL      CAPILLARY BLOOD GLUCOSE          PHYSICAL EXAM:  GENERAL: NAD, lying in bed comfortably  HEAD:  Atraumatic, normocephalic  EYES: EOMI, PERRLA, conjunctiva and sclera clear  NECK: Supple, trachea midline, no JVD  HEART: Regular rate and rhythm, no murmurs, rubs, or gallops  LUNGS: Unlabored respirations.  Clear to auscultation bilaterally, no crackles, wheezing, or rhonchi  ABDOMEN: Soft, nontender, nondistended, +BS  EXTREMITIES: 2+ peripheral pulses bilaterally. No clubbing, cyanosis, or edema  NERVOUS SYSTEM:  A&Ox3, moving all extremities, no focal deficits   SKIN: No rashes or lesions    HOSPITAL MEDICATIONS:      amLODIPine   Tablet 10 milliGRAM(s) Oral daily  metoprolol tartrate 12.5 milliGRAM(s) Oral two times a day      albuterol/ipratropium for Nebulization 3 milliLiter(s) Nebulizer every 6 hours    acetaminophen     Tablet .. 650 milliGRAM(s) Oral every 6 hours PRN  mirtazapine 15 milliGRAM(s) Oral daily    polyethylene glycol 3350 17 Gram(s) Oral two times a day  senna 2 Tablet(s) Oral at bedtime            chlorhexidine 0.12% Liquid 15 milliLiter(s) Oral Mucosa every 12 hours  chlorhexidine 2% Cloths 1 Application(s) Topical <User Schedule>  collagenase Ointment 1 Application(s) Topical daily        LABS:    Hgb Trend: 8.2<--, 8.1<--, 7.1<--  01-17    129<L>  |  100  |  137<H>  ----------------------------<  127<H>  4.0   |  18<L>  |  4.88<H>    Ca    7.9<L>      17 Jan 2024 11:00      Creatinine Trend: 4.88<--, 4.74<--, 4.75<--, 5.03<--, 5.36<--, 5.48<--    Urinalysis Basic - ( 17 Jan 2024 11:00 )    Color: x / Appearance: x / SG: x / pH: x  Gluc: 127 mg/dL / Ketone: x  / Bili: x / Urobili: x   Blood: x / Protein: x / Nitrite: x   Leuk Esterase: x / RBC: x / WBC x   Sq Epi: x / Non Sq Epi: x / Bacteria: x         Interval Events: Patient seen & examined at bedside s/p trach. PSV 8/5 & tolerating well. Patient with minimal secretions when suctioned.     REVIEW OF SYSTEMS:  Constitutional: [ ] negative [ ] fevers [ ] chills [ ] weight loss [ ] weight gain  HEENT: [ ] negative [ ] dry eyes [ ] eye irritation [ ] postnasal drip [ ] nasal congestion  CV: [ ] negative  [ ] chest pain [ ] orthopnea [ ] palpitations [ ] murmur  Resp: [ ] negative [ ] cough [ ] shortness of breath [ ] dyspnea [ ] wheezing [ ] sputum [ ] hemoptysis  GI: [ ] negative [ ] nausea [ ] vomiting [ ] diarrhea [ ] constipation [ ] abd pain [ ] dysphagia   : [ ] negative [ ] dysuria [ ] nocturia [ ] hematuria [ ] increased urinary frequency  Musculoskeletal: [ ] negative [ ] back pain [ ] myalgias [ ] arthralgias [ ] fracture  Skin: [ ] negative [ ] rash [ ] itch  Neurological: [ ] negative [ ] headache [ ] dizziness [ ] syncope [ ] weakness [ ] numbness  Psychiatric: [ ] negative [ ] anxiety [ ] depression  Endocrine: [ ] negative [ ] diabetes [ ] thyroid problem  Hematologic/Lymphatic: [ ] negative [ ] anemia [ ] bleeding problem  Allergic/Immunologic: [ ] negative [ ] itchy eyes [ ] nasal discharge [ ] hives [ ] angioedema  [ ] All other systems negative  [ X] Unable to assess ROS because mental status    OBJECTIVE:  ICU Vital Signs Last 24 Hrs  T(C): 36.8 (18 Jan 2024 06:19), Max: 36.8 (18 Jan 2024 06:19)  T(F): 98.3 (18 Jan 2024 06:19), Max: 98.3 (18 Jan 2024 06:19)  HR: 82 (18 Jan 2024 06:19) (71 - 82)  BP: 189/75 (18 Jan 2024 06:19) (145/70 - 189/75)  BP(mean): --  ABP: --  ABP(mean): --  RR: 19 (18 Jan 2024 06:19) (18 - 19)  SpO2: 100% (18 Jan 2024 06:19) (99% - 100%)    O2 Parameters below as of 18 Jan 2024 05:56  Patient On (Oxygen Delivery Method): ventilator          Mode: AC/ CMV (Assist Control/ Continuous Mandatory Ventilation), RR (machine): 26, TV (machine): 400, FiO2: 30, PEEP: 5, ITime: 0.8, MAP: 14, PIP: 34    01-17 @ 07:01  -  01-18 @ 07:00  --------------------------------------------------------  IN: 420 mL / OUT: 1400 mL / NET: -980 mL      CAPILLARY BLOOD GLUCOSE          PHYSICAL EXAM:  GENERAL: NAD, lying in bed comfortably  HEART: Regular rate and rhythm, no murmurs, rubs, or gallops  LUNGS: S/P trach. Unlabored respirations. diminished BS on LT, no wheezing/rhonchi/rales/stridor.       HOSPITAL MEDICATIONS:      amLODIPine   Tablet 10 milliGRAM(s) Oral daily  metoprolol tartrate 12.5 milliGRAM(s) Oral two times a day      albuterol/ipratropium for Nebulization 3 milliLiter(s) Nebulizer every 6 hours    acetaminophen     Tablet .. 650 milliGRAM(s) Oral every 6 hours PRN  mirtazapine 15 milliGRAM(s) Oral daily    polyethylene glycol 3350 17 Gram(s) Oral two times a day  senna 2 Tablet(s) Oral at bedtime            chlorhexidine 0.12% Liquid 15 milliLiter(s) Oral Mucosa every 12 hours  chlorhexidine 2% Cloths 1 Application(s) Topical <User Schedule>  collagenase Ointment 1 Application(s) Topical daily        LABS:    Hgb Trend: 8.2<--, 8.1<--, 7.1<--  01-17    129<L>  |  100  |  137<H>  ----------------------------<  127<H>  4.0   |  18<L>  |  4.88<H>    Ca    7.9<L>      17 Jan 2024 11:00      Creatinine Trend: 4.88<--, 4.74<--, 4.75<--, 5.03<--, 5.36<--, 5.48<--    Urinalysis Basic - ( 17 Jan 2024 11:00 )    Color: x / Appearance: x / SG: x / pH: x  Gluc: 127 mg/dL / Ketone: x  / Bili: x / Urobili: x   Blood: x / Protein: x / Nitrite: x   Leuk Esterase: x / RBC: x / WBC x   Sq Epi: x / Non Sq Epi: x / Bacteria: x

## 2024-01-18 NOTE — PROGRESS NOTE ADULT - NUTRITIONAL ASSESSMENT
This patient has been assessed with a concern for Malnutrition and has been determined to have a diagnosis/diagnoses of Severe protein-calorie malnutrition.    This patient is being managed with:   Diet NPO with Tube Feed-  Tube Feeding Modality: Gastrostomy  Nepro with Carb Steady  Total Volume for 24 Hours (mL): 720  Continuous  Starting Tube Feed Rate {mL per Hour}: 10  Increase Tube Feed Rate by (mL): 10     Every 4 hours  Until Goal Tube Feed Rate (mL per Hour): 40  Tube Feed Duration (in Hours): 18  Tube Feed Start Time: 17:00  Tube Feed Stop Time: 11:00  Entered: Dec 30 2023 11:54AM    Diet NPO after Midnight-     NPO Start Date: 28-Dec-2023   NPO Start Time: 23:59  Entered: Dec 28 2023  2:48PM  
This patient has been assessed with a concern for Malnutrition and has been determined to have a diagnosis/diagnoses of Severe protein-calorie malnutrition.    This patient is being managed with:   Diet NPO with Tube Feed-  Tube Feeding Modality: Gastrostomy  Nepro with Carb Steady  Total Volume for 24 Hours (mL): 840  Continuous  Until Goal Tube Feed Rate (mL per Hour): 35  Tube Feed Duration (in Hours): 24  Tube Feed Start Time: 12:00  Entered: Gelacio  3 2024 11:48AM  
This patient has been assessed with a concern for Malnutrition and has been determined to have a diagnosis/diagnoses of Severe protein-calorie malnutrition.    This patient is being managed with:   Diet NPO with Tube Feed-  Tube Feeding Modality: Gastrostomy  Nepro with Carb Steady  Total Volume for 24 Hours (mL): 840  Continuous  Until Goal Tube Feed Rate (mL per Hour): 35  Tube Feed Duration (in Hours): 24  Tube Feed Start Time: 12:00  Entered: Gelacio  3 2024 11:48AM    Diet NPO after Midnight-     NPO Start Date: 28-Dec-2023   NPO Start Time: 23:59  Entered: Dec 28 2023  2:48PM  
This patient has been assessed with a concern for Malnutrition and has been determined to have a diagnosis/diagnoses of Severe protein-calorie malnutrition.    This patient is being managed with:   Diet NPO with Tube Feed-  Tube Feeding Modality: Gastrostomy  Nepro with Carb Steady  Total Volume for 24 Hours (mL): 840  Continuous  Until Goal Tube Feed Rate (mL per Hour): 35  Tube Feed Duration (in Hours): 24  Tube Feed Start Time: 12:00  Entered: Gelacio  3 2024 11:48AM    Diet NPO after Midnight-     NPO Start Date: 28-Dec-2023   NPO Start Time: 23:59  Entered: Dec 28 2023  2:48PM  
This patient has been assessed with a concern for Malnutrition and has been determined to have a diagnosis/diagnoses of Severe protein-calorie malnutrition.    This patient is being managed with:   Diet NPO with Tube Feed-  Tube Feeding Modality: Nasogastric  Osmolite 1.2 Shmuel  Total Volume for 24 Hours (mL): 1440  Continuous  Starting Tube Feed Rate {mL per Hour}: 30  Increase Tube Feed Rate by (mL): 10     Every 8 hours  Until Goal Tube Feed Rate (mL per Hour): 80  Tube Feed Duration (in Hours): 18  Tube Feed Start Time: 17:00  Entered: Dec 18 2023  2:04PM  
This patient has been assessed with a concern for Malnutrition and has been determined to have a diagnosis/diagnoses of Severe protein-calorie malnutrition.    This patient is being managed with:   Diet NPO with Tube Feed-  Tube Feeding Modality: Nasogastric  Osmolite 1.5  Total Volume for 24 Hours (mL): 1170  Continuous  Starting Tube Feed Rate {mL per Hour}: 65  Until Goal Tube Feed Rate (mL per Hour): 65  Tube Feed Duration (in Hours): 18  Tube Feed Start Time: 17:00  Tube Feed Stop Time: 11:00  Entered: Dec 19 2023  2:50PM  
This patient has been assessed with a concern for Malnutrition and has been determined to have a diagnosis/diagnoses of Severe protein-calorie malnutrition.    This patient is being managed with:   Diet NPO with Tube Feed-  Tube Feeding Modality: Nasogastric  Osmolite 1.5  Total Volume for 24 Hours (mL): 1170  Continuous  Starting Tube Feed Rate {mL per Hour}: 65  Until Goal Tube Feed Rate (mL per Hour): 65  Tube Feed Duration (in Hours): 18  Tube Feed Start Time: 17:00  Tube Feed Stop Time: 11:00  Entered: Dec 19 2023  2:50PM  
This patient has been assessed with a concern for Malnutrition and has been determined to have a diagnosis/diagnoses of Severe protein-calorie malnutrition.    This patient is being managed with:   Diet Pureed-  Low Sodium  Liquid Protein Supplement     Qty per Day:  1  Supplement Feeding Modality:  Oral  Ensure Plant-Based Cans or Servings Per Day:  1       Frequency:  Daily  Entered: Dec 12 2023 10:46AM  
This patient has been assessed with a concern for Malnutrition and has been determined to have a diagnosis/diagnoses of Severe protein-calorie malnutrition.    This patient is being managed with:   Diet NPO with Tube Feed-  Tube Feeding Modality: Gastrostomy  Nepro with Carb Steady  Total Volume for 24 Hours (mL): 840  Continuous  Until Goal Tube Feed Rate (mL per Hour): 35  Tube Feed Duration (in Hours): 24  Tube Feed Start Time: 12:00  Entered: Gelacio  3 2024 11:48AM  
This patient has been assessed with a concern for Malnutrition and has been determined to have a diagnosis/diagnoses of Severe protein-calorie malnutrition.    This patient is being managed with:   Diet NPO with Tube Feed-  Tube Feeding Modality: Gastrostomy  Nepro with Carb Steady  Total Volume for 24 Hours (mL): 840  Continuous  Until Goal Tube Feed Rate (mL per Hour): 35  Tube Feed Duration (in Hours): 24  Tube Feed Start Time: 12:00  Entered: Gelacio  3 2024 11:48AM    Diet NPO after Midnight-     NPO Start Date: 28-Dec-2023   NPO Start Time: 23:59  Entered: Dec 28 2023  2:48PM  
This patient has been assessed with a concern for Malnutrition and has been determined to have a diagnosis/diagnoses of Severe protein-calorie malnutrition.    This patient is being managed with:   Diet NPO with Tube Feed-  Tube Feeding Modality: Gastrostomy  Nepro with Carb Steady  Total Volume for 24 Hours (mL): 840  Continuous  Until Goal Tube Feed Rate (mL per Hour): 35  Tube Feed Duration (in Hours): 24  Tube Feed Start Time: 12:00  Entered: Gelacio  3 2024 11:48AM    Diet NPO after Midnight-     NPO Start Date: 28-Dec-2023   NPO Start Time: 23:59  Entered: Dec 28 2023  2:48PM  
This patient has been assessed with a concern for Malnutrition and has been determined to have a diagnosis/diagnoses of Severe protein-calorie malnutrition.    This patient is being managed with:   Diet NPO with Tube Feed-  Tube Feeding Modality: Gastrostomy  Nepro with Carb Steady  Total Volume for 24 Hours (mL): 720  Continuous  Starting Tube Feed Rate {mL per Hour}: 10  Increase Tube Feed Rate by (mL): 10     Every 4 hours  Until Goal Tube Feed Rate (mL per Hour): 40  Tube Feed Duration (in Hours): 18  Tube Feed Start Time: 17:00  Tube Feed Stop Time: 11:00  Entered: Dec 30 2023 11:54AM    Diet NPO after Midnight-     NPO Start Date: 28-Dec-2023   NPO Start Time: 23:59  Entered: Dec 28 2023  2:48PM  
This patient has been assessed with a concern for Malnutrition and has been determined to have a diagnosis/diagnoses of Severe protein-calorie malnutrition.    This patient is being managed with:   Diet NPO-  Except Medications     Special Instructions for Nursing:  Except Medications  Entered: Dec 16 2023  1:11PM  
This patient has been assessed with a concern for Malnutrition and has been determined to have a diagnosis/diagnoses of Severe protein-calorie malnutrition.    This patient is being managed with:   Diet Pureed-  Low Sodium  Liquid Protein Supplement     Qty per Day:  1  Supplement Feeding Modality:  Oral  Ensure Plant-Based Cans or Servings Per Day:  1       Frequency:  Daily  Entered: Dec 12 2023 10:46AM  
This patient has been assessed with a concern for Malnutrition and has been determined to have a diagnosis/diagnoses of Severe protein-calorie malnutrition.    This patient is being managed with:   Diet NPO with Tube Feed-  Tube Feeding Modality: Gastrostomy  Nepro with Carb Steady  Total Volume for 24 Hours (mL): 720  Continuous  Starting Tube Feed Rate {mL per Hour}: 10  Increase Tube Feed Rate by (mL): 10     Every 4 hours  Until Goal Tube Feed Rate (mL per Hour): 40  Tube Feed Duration (in Hours): 18  Tube Feed Start Time: 17:00  Tube Feed Stop Time: 11:00  Entered: Dec 30 2023 11:54AM    Diet NPO after Midnight-     NPO Start Date: 28-Dec-2023   NPO Start Time: 23:59  Entered: Dec 28 2023  2:48PM  
This patient has been assessed with a concern for Malnutrition and has been determined to have a diagnosis/diagnoses of Severe protein-calorie malnutrition.    This patient is being managed with:   Diet NPO with Tube Feed-  Tube Feeding Modality: Gastrostomy  Nepro with Carb Steady  Total Volume for 24 Hours (mL): 840  Continuous  Until Goal Tube Feed Rate (mL per Hour): 35  Tube Feed Duration (in Hours): 24  Tube Feed Start Time: 12:00  Entered: Gelacio  3 2024 11:48AM  
This patient has been assessed with a concern for Malnutrition and has been determined to have a diagnosis/diagnoses of Severe protein-calorie malnutrition.    This patient is being managed with:   Diet NPO with Tube Feed-  Tube Feeding Modality: Gastrostomy  Nepro with Carb Steady  Total Volume for 24 Hours (mL): 840  Continuous  Until Goal Tube Feed Rate (mL per Hour): 35  Tube Feed Duration (in Hours): 24  Tube Feed Start Time: 12:00  Entered: Gelacio  3 2024 11:48AM    Diet NPO after Midnight-     NPO Start Date: 28-Dec-2023   NPO Start Time: 23:59  Entered: Dec 28 2023  2:48PM  
This patient has been assessed with a concern for Malnutrition and has been determined to have a diagnosis/diagnoses of Severe protein-calorie malnutrition.    This patient is being managed with:   Diet NPO with Tube Feed-  Tube Feeding Modality: Gastrostomy  Nepro with Carb Steady  Total Volume for 24 Hours (mL): 840  Continuous  Until Goal Tube Feed Rate (mL per Hour): 35  Tube Feed Duration (in Hours): 24  Tube Feed Start Time: 12:00  Entered: Gelacio  3 2024 11:48AM    Diet NPO after Midnight-     NPO Start Date: 28-Dec-2023   NPO Start Time: 23:59  Entered: Dec 28 2023  2:48PM  
This patient has been assessed with a concern for Malnutrition and has been determined to have a diagnosis/diagnoses of Severe protein-calorie malnutrition.    This patient is being managed with:   Diet NPO with Tube Feed-  Tube Feeding Modality: Nasogastric  Osmolite 1.2 Shmuel  Total Volume for 24 Hours (mL): 1440  Continuous  Starting Tube Feed Rate {mL per Hour}: 30  Increase Tube Feed Rate by (mL): 10     Every 8 hours  Until Goal Tube Feed Rate (mL per Hour): 80  Tube Feed Duration (in Hours): 18  Tube Feed Start Time: 17:00  Entered: Dec 18 2023  2:04PM  
This patient has been assessed with a concern for Malnutrition and has been determined to have a diagnosis/diagnoses of Severe protein-calorie malnutrition.    This patient is being managed with:   Diet NPO with Tube Feed-  Tube Feeding Modality: Gastrostomy  Nepro with Carb Steady  Total Volume for 24 Hours (mL): 720  Continuous  Starting Tube Feed Rate {mL per Hour}: 10  Increase Tube Feed Rate by (mL): 10     Every 4 hours  Until Goal Tube Feed Rate (mL per Hour): 40  Tube Feed Duration (in Hours): 18  Tube Feed Start Time: 17:00  Tube Feed Stop Time: 11:00  Entered: Dec 30 2023 11:54AM    Diet NPO after Midnight-     NPO Start Date: 28-Dec-2023   NPO Start Time: 23:59  Entered: Dec 28 2023  2:48PM  
This patient has been assessed with a concern for Malnutrition and has been determined to have a diagnosis/diagnoses of Severe protein-calorie malnutrition.    This patient is being managed with:   Diet NPO with Tube Feed-  Tube Feeding Modality: Gastrostomy  Nepro with Carb Steady  Total Volume for 24 Hours (mL): 720  Continuous  Starting Tube Feed Rate {mL per Hour}: 10  Increase Tube Feed Rate by (mL): 10     Every 4 hours  Until Goal Tube Feed Rate (mL per Hour): 40  Tube Feed Duration (in Hours): 18  Tube Feed Start Time: 17:00  Tube Feed Stop Time: 11:00  Entered: Dec 30 2023 11:54AM    Diet NPO after Midnight-     NPO Start Date: 28-Dec-2023   NPO Start Time: 23:59  Entered: Dec 28 2023  2:48PM  
This patient has been assessed with a concern for Malnutrition and has been determined to have a diagnosis/diagnoses of Severe protein-calorie malnutrition.    This patient is being managed with:   Diet NPO with Tube Feed-  Tube Feeding Modality: Gastrostomy  Nepro with Carb Steady  Total Volume for 24 Hours (mL): 840  Continuous  Until Goal Tube Feed Rate (mL per Hour): 35  Tube Feed Duration (in Hours): 24  Tube Feed Start Time: 12:00  Entered: Gelacio  3 2024 11:48AM  
This patient has been assessed with a concern for Malnutrition and has been determined to have a diagnosis/diagnoses of Severe protein-calorie malnutrition.    This patient is being managed with:   Diet NPO with Tube Feed-  Tube Feeding Modality: Gastrostomy  Nepro with Carb Steady  Total Volume for 24 Hours (mL): 840  Continuous  Until Goal Tube Feed Rate (mL per Hour): 35  Tube Feed Duration (in Hours): 24  Tube Feed Start Time: 12:00  Entered: Gelacio  3 2024 11:48AM  
This patient has been assessed with a concern for Malnutrition and has been determined to have a diagnosis/diagnoses of Severe protein-calorie malnutrition.    This patient is being managed with:   Diet NPO with Tube Feed-  Tube Feeding Modality: Nasogastric  Vital AF  Total Volume for 24 Hours (mL): 720  Continuous  Starting Tube Feed Rate {mL per Hour}: 10  Increase Tube Feed Rate by (mL): 10     Every 10 hours  Until Goal Tube Feed Rate (mL per Hour): 30  Tube Feed Duration (in Hours): 24  Tube Feed Start Time: 12:00  Entered: Dec 17 2023 11:56AM  
This patient has been assessed with a concern for Malnutrition and has been determined to have a diagnosis/diagnoses of Severe protein-calorie malnutrition.    This patient is being managed with:   Diet Pureed-  Low Sodium  Liquid Protein Supplement     Qty per Day:  1  Supplement Feeding Modality:  Oral  Ensure Plant-Based Cans or Servings Per Day:  1       Frequency:  Daily  Entered: Dec 12 2023 10:46AM  
This patient has been assessed with a concern for Malnutrition and has been determined to have a diagnosis/diagnoses of Severe protein-calorie malnutrition.    This patient is being managed with:   Diet NPO with Tube Feed-  Tube Feeding Modality: Gastrostomy  Nepro with Carb Steady  Total Volume for 24 Hours (mL): 840  Continuous  Until Goal Tube Feed Rate (mL per Hour): 35  Tube Feed Duration (in Hours): 24  Tube Feed Start Time: 12:00  Entered: Gelacio  3 2024 11:48AM    Diet NPO after Midnight-     NPO Start Date: 28-Dec-2023   NPO Start Time: 23:59  Entered: Dec 28 2023  2:48PM  
This patient has been assessed with a concern for Malnutrition and has been determined to have a diagnosis/diagnoses of Severe protein-calorie malnutrition.    This patient is being managed with:   Diet NPO with Tube Feed-  Tube Feeding Modality: Gastrostomy  Nepro with Carb Steady  Total Volume for 24 Hours (mL): 840  Continuous  Until Goal Tube Feed Rate (mL per Hour): 35  Tube Feed Duration (in Hours): 24  Tube Feed Start Time: 12:00  Entered: Gelacio  3 2024 11:48AM    Diet NPO after Midnight-     NPO Start Date: 28-Dec-2023   NPO Start Time: 23:59  Entered: Dec 28 2023  2:48PM  
This patient has been assessed with a concern for Malnutrition and has been determined to have a diagnosis/diagnoses of Severe protein-calorie malnutrition.    This patient is being managed with:   Diet NPO with Tube Feed-  Tube Feeding Modality: Nasogastric  Vital AF  Total Volume for 24 Hours (mL): 720  Continuous  Starting Tube Feed Rate {mL per Hour}: 10  Increase Tube Feed Rate by (mL): 10     Every 10 hours  Until Goal Tube Feed Rate (mL per Hour): 30  Tube Feed Duration (in Hours): 24  Tube Feed Start Time: 12:00  Entered: Dec 17 2023 11:56AM  
This patient has been assessed with a concern for Malnutrition and has been determined to have a diagnosis/diagnoses of Severe protein-calorie malnutrition.    This patient is being managed with:   Diet NPO with Tube Feed-  Tube Feeding Modality: Gastrostomy  Nepro with Carb Steady  Total Volume for 24 Hours (mL): 840  Continuous  Until Goal Tube Feed Rate (mL per Hour): 35  Tube Feed Duration (in Hours): 24  Tube Feed Start Time: 12:00  Entered: Gelacio  3 2024 11:48AM  
This patient has been assessed with a concern for Malnutrition and has been determined to have a diagnosis/diagnoses of Severe protein-calorie malnutrition.    This patient is being managed with:   Diet NPO with Tube Feed-  Tube Feeding Modality: Gastrostomy  Nepro with Carb Steady  Total Volume for 24 Hours (mL): 840  Continuous  Until Goal Tube Feed Rate (mL per Hour): 35  Tube Feed Duration (in Hours): 24  Tube Feed Start Time: 12:00  Entered: Gelacio  3 2024 11:48AM    Diet NPO after Midnight-     NPO Start Date: 28-Dec-2023   NPO Start Time: 23:59  Entered: Dec 28 2023  2:48PM  
This patient has been assessed with a concern for Malnutrition and has been determined to have a diagnosis/diagnoses of Severe protein-calorie malnutrition.    This patient is being managed with:   Diet NPO with Tube Feed-  Tube Feeding Modality: Nasogastric  Osmolite 1.5  Total Volume for 24 Hours (mL): 1170  Continuous  Starting Tube Feed Rate {mL per Hour}: 65  Until Goal Tube Feed Rate (mL per Hour): 65  Tube Feed Duration (in Hours): 18  Tube Feed Start Time: 17:00  Tube Feed Stop Time: 11:00  Entered: Dec 19 2023  2:50PM  
This patient has been assessed with a concern for Malnutrition and has been determined to have a diagnosis/diagnoses of Severe protein-calorie malnutrition.    This patient is being managed with:   Diet NPO with Tube Feed-  Tube Feeding Modality: Nasogastric  Osmolite 1.5  Total Volume for 24 Hours (mL): 1170  Continuous  Starting Tube Feed Rate {mL per Hour}: 65  Until Goal Tube Feed Rate (mL per Hour): 65  Tube Feed Duration (in Hours): 18  Tube Feed Start Time: 17:00  Tube Feed Stop Time: 11:00  Entered: Dec 19 2023  2:50PM  
This patient has been assessed with a concern for Malnutrition and has been determined to have a diagnosis/diagnoses of Severe protein-calorie malnutrition.    This patient is being managed with:   Diet Pureed-  Low Sodium  Liquid Protein Supplement     Qty per Day:  1  Supplement Feeding Modality:  Oral  Ensure Plant-Based Cans or Servings Per Day:  1       Frequency:  Daily  Entered: Dec 12 2023 10:46AM  
This patient has been assessed with a concern for Malnutrition and has been determined to have a diagnosis/diagnoses of Severe protein-calorie malnutrition.    This patient is being managed with:   Diet Pureed-  Low Sodium  Liquid Protein Supplement     Qty per Day:  1  Supplement Feeding Modality:  Oral  Ensure Plant-Based Cans or Servings Per Day:  1       Frequency:  Daily  Entered: Dec 12 2023 10:46AM  
This patient has been assessed with a concern for Malnutrition and has been determined to have a diagnosis/diagnoses of Severe protein-calorie malnutrition.    This patient is being managed with:   Diet NPO with Tube Feed-  Tube Feeding Modality: Nasogastric  Osmolite 1.5  Total Volume for 24 Hours (mL): 1170  Continuous  Starting Tube Feed Rate {mL per Hour}: 65  Until Goal Tube Feed Rate (mL per Hour): 65  Tube Feed Duration (in Hours): 18  Tube Feed Start Time: 17:00  Tube Feed Stop Time: 11:00  Entered: Dec 19 2023  2:50PM  
This patient has been assessed with a concern for Malnutrition and has been determined to have a diagnosis/diagnoses of Severe protein-calorie malnutrition.    This patient is being managed with:   Diet NPO with Tube Feed-  Tube Feeding Modality: Nasogastric  Osmolite 1.5  Total Volume for 24 Hours (mL): 1170  Continuous  Starting Tube Feed Rate {mL per Hour}: 65  Until Goal Tube Feed Rate (mL per Hour): 65  Tube Feed Duration (in Hours): 18  Tube Feed Start Time: 17:00  Tube Feed Stop Time: 11:00  Entered: Dec 19 2023  2:50PM  
This patient has been assessed with a concern for Malnutrition and has been determined to have a diagnosis/diagnoses of Severe protein-calorie malnutrition.    This patient is being managed with:   Diet NPO with Tube Feed-  Tube Feeding Modality: Gastrostomy  Nepro with Carb Steady  Total Volume for 24 Hours (mL): 720  Continuous  Starting Tube Feed Rate {mL per Hour}: 10  Increase Tube Feed Rate by (mL): 10     Every 4 hours  Until Goal Tube Feed Rate (mL per Hour): 40  Tube Feed Duration (in Hours): 18  Tube Feed Start Time: 17:00  Tube Feed Stop Time: 11:00  Entered: Dec 30 2023 11:54AM    Diet NPO after Midnight-     NPO Start Date: 28-Dec-2023   NPO Start Time: 23:59  Entered: Dec 28 2023  2:48PM  
This patient has been assessed with a concern for Malnutrition and has been determined to have a diagnosis/diagnoses of Severe protein-calorie malnutrition.    This patient is being managed with:   Diet NPO with Tube Feed-  Tube Feeding Modality: Gastrostomy  Nepro with Carb Steady  Total Volume for 24 Hours (mL): 840  Continuous  Until Goal Tube Feed Rate (mL per Hour): 35  Tube Feed Duration (in Hours): 24  Tube Feed Start Time: 12:00  Entered: Gelacio  3 2024 11:48AM  
This patient has been assessed with a concern for Malnutrition and has been determined to have a diagnosis/diagnoses of Severe protein-calorie malnutrition.    This patient is being managed with:   Diet NPO with Tube Feed-  Tube Feeding Modality: Gastrostomy  Nepro with Carb Steady  Total Volume for 24 Hours (mL): 840  Continuous  Until Goal Tube Feed Rate (mL per Hour): 35  Tube Feed Duration (in Hours): 24  Tube Feed Start Time: 12:00  Entered: Gelacio  3 2024 11:48AM

## 2024-01-18 NOTE — PROGRESS NOTE ADULT - REASON FOR ADMISSION
Hypoxemic respiratory failur
resp failure
hypoxic resp failure
AHRF
Hypoxia
UTI
shortness of breath.
AHRF
Dyspnea
Hypoxia
AHRF
respiratory failure
Dyspnea
Trach peg
AHRF

## 2024-01-19 NOTE — PROGRESS NOTE ADULT - SUBJECTIVE AND OBJECTIVE BOX
Interval Events:    REVIEW OF SYSTEMS:  Constitutional: [ ] negative [ ] fevers [ ] chills [ ] weight loss [ ] weight gain  HEENT: [ ] negative [ ] dry eyes [ ] eye irritation [ ] postnasal drip [ ] nasal congestion  CV: [ ] negative  [ ] chest pain [ ] orthopnea [ ] palpitations [ ] murmur  Resp: [ ] negative [ ] cough [ ] shortness of breath [ ] dyspnea [ ] wheezing [ ] sputum [ ] hemoptysis  GI: [ ] negative [ ] nausea [ ] vomiting [ ] diarrhea [ ] constipation [ ] abd pain [ ] dysphagia   : [ ] negative [ ] dysuria [ ] nocturia [ ] hematuria [ ] increased urinary frequency  Musculoskeletal: [ ] negative [ ] back pain [ ] myalgias [ ] arthralgias [ ] fracture  Skin: [ ] negative [ ] rash [ ] itch  Neurological: [ ] negative [ ] headache [ ] dizziness [ ] syncope [ ] weakness [ ] numbness  Psychiatric: [ ] negative [ ] anxiety [ ] depression  Endocrine: [ ] negative [ ] diabetes [ ] thyroid problem  Hematologic/Lymphatic: [ ] negative [ ] anemia [ ] bleeding problem  Allergic/Immunologic: [ ] negative [ ] itchy eyes [ ] nasal discharge [ ] hives [ ] angioedema  [ ] All other systems negative  [ ] Unable to assess ROS because ________        OBJECTIVE:  Vital Signs Last 24 Hrs  T(C): 37.2 (19 Jan 2024 05:15), Max: 37.2 (19 Jan 2024 05:15)  T(F): 99 (19 Jan 2024 05:15), Max: 99 (19 Jan 2024 05:15)  HR: 80 (19 Jan 2024 09:06) (72 - 88)  BP: 175/76 (19 Jan 2024 05:15) (136/72 - 175/76)  BP(mean): --  ABP: --  ABP(mean): --  RR: 18 (19 Jan 2024 05:15) (18 - 20)  SpO2: 98% (19 Jan 2024 09:06) (97% - 100%)    O2 Parameters below as of 19 Jan 2024 06:19  Patient On (Oxygen Delivery Method): ventilator          Mode: CPAP with PS, FiO2: 30, PEEP: 5, PS: 10, MAP: 9    01-18 @ 07:01  -  01-19 @ 07:00  --------------------------------------------------------  IN: 620 mL / OUT: 520 mL / NET: 100 mL      CAPILLARY BLOOD GLUCOSE          PHYSICAL EXAM:  General:   HEENT:   Lymph Nodes:  Neck:   Respiratory:   Cardiovascular:   Abdomen:   Extremities:   Skin:   Neurological:  Psychiatry:    HOSPITAL MEDICATIONS:      amLODIPine   Tablet 10 milliGRAM(s) Oral daily  metoprolol tartrate 25 milliGRAM(s) Enteral Tube two times a day      albuterol/ipratropium for Nebulization 3 milliLiter(s) Nebulizer every 6 hours    acetaminophen     Tablet .. 650 milliGRAM(s) Oral every 6 hours PRN  mirtazapine 15 milliGRAM(s) Oral daily    polyethylene glycol 3350 17 Gram(s) Oral two times a day  senna 2 Tablet(s) Oral at bedtime            chlorhexidine 0.12% Liquid 15 milliLiter(s) Oral Mucosa every 12 hours  chlorhexidine 2% Cloths 1 Application(s) Topical <User Schedule>  collagenase Ointment 1 Application(s) Topical daily        LABS:    Hgb Trend: 8.2<--, 8.1<--        Creatinine Trend: 4.88<--, 4.74<--, 4.75<--, 5.03<--, 5.36<--, 5.48<--            MICROBIOLOGY: see previous results          RADIOLOGY:  [X ] Reviewed

## 2024-01-19 NOTE — PROGRESS NOTE ADULT - PROVIDER SPECIALTY LIST ADULT
Critical Care
Hospitalist
Internal Medicine
Internal Medicine
Nephrology
Pulmonology
Surgery
Surgery
Critical Care
Hospitalist
Nephrology
Pulmonology
Surgery
Critical Care
Hospitalist
Internal Medicine
Nephrology
Pulmonology
Surgery
Hospitalist
Pulmonology
Critical Care
Critical Care
Hospitalist
Internal Medicine
Internal Medicine
Pulmonology
Pulmonology
Hospitalist
Pulmonology
Critical Care
Critical Care
Hospitalist
Pulmonology
Pulmonology
Hospitalist
Critical Care
Hospitalist
Hospitalist
Critical Care
Hospitalist
Critical Care
Hospitalist

## 2024-01-19 NOTE — PROGRESS NOTE ADULT - ASSESSMENT
89M w/ dementia, CKD, chronic urinary retention w/ obrien. Admitted w/ L pleural effusion thought to be due to mucous plugging. Course complicated by worsening hypoxemia requiring intubation and bronchoscopy x2 w/ MRSA pneumonia, stenotrophomonas. Extubated on 12/24 but required re-intubation. Extubated again but failed due to hypercapnia and hypoxia.   12/29 s/p Trach / PEG.    DX: Left pleural effusion, mucous plugging, Hypoxemic respiratory failure, MRSA & Stenotrophomonas PNA.    Recs:    - Acute hypoxic resp failure likely 2/2 mucous plugging with PNA  - 12/29  S/P trach/ PEG # 6 XLT   - continue with daily PSV trials   - will likely be slow wean given chronic weakness and recurrent mucous plugging of left lung   - Frequent suctioning, Pulmonary toileting  - cont duoneb/ and chest PT   - Completed course Vanc & Levaquin for MRSA & Steno in sputum    Will discuss with Pulmonary attending Dr. Bills

## 2024-01-29 NOTE — CONSULT NOTE ADULT - ASSESSMENT
Labs entered for CINEMA visit    
89 year old male with PMHx of HTN, CKD, BPH with chronic obrien, fever and weakness for 8 days, and complaints of abdominal distention. Palliative care consulted for GOC discussion in the setting of advanced age and multiple underlying comorbidities.
Assessment and Plan     A:     sepsis due to uti with hx chronic obrien   r/o bacteremia   toxic metabolic encephalopathy   acute on chronic renal failure     P:    cont antibiotics   renal US  follow cultures  will follow with you. Thank you for the courtesy of this consultation.   
Assessment:  89yo Male with PMH of HTN, CKD, BPH with chronic obrien presented to [facility] with a fever, admitted with UTI. Obrien catheter replaced with Dr. Mensah at bedside.    Plan:  - continue obrien catheter  - plan discussed with Dr. Mensah  
Onychomycosis 1-10
HPI:  : 89M pmhx HTN, CKD, BPH with chronic obrien. fever and weakness for 8 days. no other symptoms. fever started shortly after hs obrien was changed- patient denies nausea vomiting or diarrhea - complain of abdominal distention      (22 Sep 2020 15:16)  ----------------- As above --------------------------------------------  The patient presented with sepsis from the urinary tract. Hopsital stay complicated by aspiration pneumonia Consult called for increase secretions and poor PO intake. patient cleared by speech for dysphagia diet. Patient's MS improving. Patient states that he denies dysphagia or odynophagia but does not have an appetite.   Spoke with son who is a doctor. Patient had no problems with eating prior to getting sick      ? Dysphagia  -  1) Daughter will bring in food and attempt to feed the patient today 2) MBS 3) ? NGT  ======= discussed with son ~ 15 minutes [phone] who agrees with above

## 2024-02-07 LAB
CULTURE RESULTS: SIGNIFICANT CHANGE UP
NIGHT BLUE STAIN TISS: SIGNIFICANT CHANGE UP
SPECIMEN SOURCE: SIGNIFICANT CHANGE UP

## 2024-02-16 NOTE — DISCHARGE NOTE NURSING/CASE MANAGEMENT/SOCIAL WORK - NSDCVIVACCINE_GEN_ALL_CORE_FT
influenza, injectable, quadrivalent, preservative free; 19-Dec-2014 11:15; Juan C Jon (RN); [employer]; 111; IntraMuscular; Dorsogluteal Right.; 0.5 milliLiter(s);    [id number]

## 2024-02-21 ENCOUNTER — NON-APPOINTMENT (OUTPATIENT)
Age: 89
End: 2024-02-21

## 2024-07-05 NOTE — DISCHARGE NOTE PROVIDER - HOSPITAL COURSE
HPI:    Pt is a 89 y/o male with a PMHx of HTN, BHP chronic obrien catheter, paraplegic b/l LE, CKD and recurrent UTIs. Patient accompanied by son who explains he had been experiencing confusion and weakness. Son explains that these are the same symptoms he usually experiences during UTI. The son noted a low grade fever, Tmax: 99.7. Pt had obrien changed 7 days prior, suspecting of possible infection, and brought pt in for evaluation. Obrien usually changed 1x/month. Denies CP, SOB, cough, respiratory symptoms, HA.     Of note, son states SPC was attempted last time however unsuccessful 2/2 to persistent bleeding, requiring blood transfusion.        Hospital course:    Patient was treated with 5 days of cefepime.  Overall clinical picture improved.    Pt seen by urology; plan was for possible reattempt at [facility] however son would rather that patient follow as outpt.    Pt is stable for discharge to home.
No

## 2024-08-15 NOTE — ED ADULT TRIAGE NOTE - NS ED NOTE AC HIGH RISK COUNTRIES
Changing context  
Medication Refill Request     Name Duloxetine  Dose/Frequency 30mg cap/ take 1 cap by mouth daily  Quantity 90 caps  Verified pharmacy   [x]  Verified ordering Provider   [x]  Does patient have enough for the next 3 days? Yes [x] No []    
No

## 2025-01-24 NOTE — ED ADULT TRIAGE NOTE - NS ED NURSE REPORT GIVEN DT
Quality 226: Preventive Care And Screening: Tobacco Use: Screening And Cessation Intervention: Patient screened for tobacco use and is an ex/non-smoker
Detail Level: Detailed
28-Mar-2023 21:00

## 2025-05-20 NOTE — ED PROVIDER NOTE - CADM POA CENTRAL LINE
Pt presented to the [facility]fusion [facility] for q4wk Inflectra infusion, no complaints, proceeded with treatment via peripheral IV.  
No

## (undated) DEVICE — SUT VICRYL 2-0 18" TIES UNDYED

## (undated) DEVICE — DRSG TEGADERM 4X4.75"

## (undated) DEVICE — SUT PROLENE 2 60" TP-1

## (undated) DEVICE — STAPLER SKIN MULTI DIRECTION W35

## (undated) DEVICE — DRAPE 3/4 SHEET 52X76"

## (undated) DEVICE — SUT CHROMIC 3-0 27" SH

## (undated) DEVICE — WARMING BLANKET FULL UNDERBODY

## (undated) DEVICE — LIGASURE IMPACT

## (undated) DEVICE — DRSG CURITY GAUZE SPONGE 4 X 4" 12-PLY

## (undated) DEVICE — SUT VICRYL 3-0 18" SH UNDYED (POP-OFF)

## (undated) DEVICE — SUT PROLENE 1 30" CT-1

## (undated) DEVICE — GLV 7 PROTEXIS (WHITE)

## (undated) DEVICE — FOLEY CATH 2-WAY 14FR 5CC SILICONE

## (undated) DEVICE — ELCTR GROUNDING PAD ADULT COVIDIEN

## (undated) DEVICE — SOL IRR POUR NS 0.9% 1500ML

## (undated) DEVICE — LAP PAD W RING 18 X 18"

## (undated) DEVICE — SUT VICRYL 2-0 27" SH UNDYED

## (undated) DEVICE — CATH NG TL FILTER ABRAM 5/8X15"

## (undated) DEVICE — PACK MAJOR ABDOMINAL WITH LAP

## (undated) DEVICE — CANISTER DISPOSABLE THIN WALL 3000CC

## (undated) DEVICE — SUT SILK 3-0 18" SH (POP-OFF)

## (undated) DEVICE — DRSG TELFA 3 X 8

## (undated) DEVICE — SUT PDS II 1 48" TP-1

## (undated) DEVICE — ABDOMINAL BINDER MED/LG 12" X 36"-54"

## (undated) DEVICE — VENODYNE/SCD SLEEVE CALF MEDIUM

## (undated) DEVICE — SUT VICRYL 0 18" TIES UNDYED

## (undated) DEVICE — POOLE SUCTION TIP

## (undated) DEVICE — GLV 7.5 PROTEXIS (WHITE)

## (undated) DEVICE — DRAPE TOWEL BLUE 17" X 24"

## (undated) DEVICE — LABELS BLANK W PEN

## (undated) DEVICE — FOLEY TRAY 16FR 5CC LF UMETER CLOSED

## (undated) DEVICE — DRAPE FLUID WARMER 44 X 44"

## (undated) DEVICE — SOL IRR POUR H2O 1500ML

## (undated) DEVICE — POSITIONER STRAP ARMBOARD VELCRO TS-30

## (undated) DEVICE — DRAPE LAPAROTOMY W VELCRO CORD TABS